# Patient Record
Sex: FEMALE | NOT HISPANIC OR LATINO | Employment: OTHER | URBAN - METROPOLITAN AREA
[De-identification: names, ages, dates, MRNs, and addresses within clinical notes are randomized per-mention and may not be internally consistent; named-entity substitution may affect disease eponyms.]

---

## 2017-01-12 ENCOUNTER — HOSPITAL ENCOUNTER (OUTPATIENT)
Dept: ULTRASOUND IMAGING | Facility: HOSPITAL | Age: 82
Discharge: HOME | End: 2017-01-12
Attending: INTERNAL MEDICINE | Admitting: INTERNAL MEDICINE

## 2017-01-12 DIAGNOSIS — N18.3 CHRONIC KIDNEY DISEASE (CKD), STAGE 3 (MODERATE): ICD-10-CM

## 2017-01-12 PROCEDURE — 76775 US EXAM ABDO BACK WALL LIM: CPT

## 2017-01-19 ENCOUNTER — LAB (OUTPATIENT)
Dept: LAB | Facility: HOSPITAL | Age: 82
End: 2017-01-19
Attending: INTERNAL MEDICINE

## 2017-01-19 ENCOUNTER — TRANSCRIBE ORDERS (OUTPATIENT)
Dept: ADMINISTRATIVE | Facility: HOSPITAL | Age: 82
End: 2017-01-19

## 2017-01-19 DIAGNOSIS — N18.30 CHRONIC KIDNEY DISEASE, STAGE III (MODERATE) (HCC): ICD-10-CM

## 2017-01-19 DIAGNOSIS — N17.9 ACUTE KIDNEY FAILURE, UNSPECIFIED (HCC): ICD-10-CM

## 2017-01-19 DIAGNOSIS — I12.9 HYPERTENSIVE NEPHROPATHY, STAGE 1-4 OR UNSPECIFIED CHRONIC KIDNEY DISEASE: Primary | ICD-10-CM

## 2017-01-19 DIAGNOSIS — I12.9 HYPERTENSIVE NEPHROPATHY, STAGE 1-4 OR UNSPECIFIED CHRONIC KIDNEY DISEASE: ICD-10-CM

## 2017-01-19 LAB
ANION GAP SERPL CALCULATED.3IONS-SCNC: 10.5 MMOL/L
BACTERIA UR QL AUTO: ABNORMAL /HPF
BILIRUB UR QL STRIP: NEGATIVE
BUN BLD-MCNC: 20 MG/DL (ref 8–23)
BUN/CREAT SERPL: 15.9 (ref 7–25)
CALCIUM SPEC-SCNC: 9.8 MG/DL (ref 8.6–10.5)
CHLORIDE SERPL-SCNC: 103 MMOL/L (ref 98–107)
CLARITY UR: CLEAR
CO2 SERPL-SCNC: 28.5 MMOL/L (ref 22–29)
COLOR UR: YELLOW
CREAT BLD-MCNC: 1.26 MG/DL (ref 0.57–1)
CREAT UR-MCNC: 14.3 MG/DL
DEPRECATED RDW RBC AUTO: 48.2 FL (ref 37–54)
ERYTHROCYTE [DISTWIDTH] IN BLOOD BY AUTOMATED COUNT: 12.9 % (ref 11.7–13)
GFR SERPL CREATININE-BSD FRML MDRD: 41 ML/MIN/1.73
GLUCOSE BLD-MCNC: 88 MG/DL (ref 65–99)
GLUCOSE UR STRIP-MCNC: NEGATIVE MG/DL
HCT VFR BLD AUTO: 40.7 % (ref 35.6–45.5)
HGB BLD-MCNC: 13.2 G/DL (ref 11.9–15.5)
HGB UR QL STRIP.AUTO: ABNORMAL
HYALINE CASTS UR QL AUTO: ABNORMAL /LPF
KETONES UR QL STRIP: NEGATIVE
LEUKOCYTE ESTERASE UR QL STRIP.AUTO: NEGATIVE
MCH RBC QN AUTO: 33.2 PG (ref 26.9–32)
MCHC RBC AUTO-ENTMCNC: 32.4 G/DL (ref 32.4–36.3)
MCV RBC AUTO: 102.3 FL (ref 80.5–98.2)
NITRITE UR QL STRIP: NEGATIVE
PH UR STRIP.AUTO: 7.5 [PH] (ref 5–8)
PLATELET # BLD AUTO: 243 10*3/MM3 (ref 140–500)
PMV BLD AUTO: 10.4 FL (ref 6–12)
POTASSIUM BLD-SCNC: 3.8 MMOL/L (ref 3.5–5.2)
PROT UR QL STRIP: ABNORMAL
RBC # BLD AUTO: 3.98 10*6/MM3 (ref 3.9–5.2)
RBC # UR: ABNORMAL /HPF
REF LAB TEST METHOD: ABNORMAL
SODIUM BLD-SCNC: 142 MMOL/L (ref 136–145)
SODIUM UR-SCNC: 88 MMOL/L
SP GR UR STRIP: <=1.005 (ref 1–1.03)
SQUAMOUS #/AREA URNS HPF: ABNORMAL /HPF
UROBILINOGEN UR QL STRIP: ABNORMAL
WBC NRBC COR # BLD: 4.87 10*3/MM3 (ref 4.5–10.7)
WBC UR QL AUTO: ABNORMAL /HPF

## 2017-01-19 PROCEDURE — 81001 URINALYSIS AUTO W/SCOPE: CPT

## 2017-01-19 PROCEDURE — 36415 COLL VENOUS BLD VENIPUNCTURE: CPT

## 2017-01-19 PROCEDURE — 84300 ASSAY OF URINE SODIUM: CPT

## 2017-01-19 PROCEDURE — 80048 BASIC METABOLIC PNL TOTAL CA: CPT

## 2017-01-19 PROCEDURE — 82570 ASSAY OF URINE CREATININE: CPT

## 2017-01-19 PROCEDURE — 85027 COMPLETE CBC AUTOMATED: CPT

## 2017-01-25 ENCOUNTER — OFFICE VISIT (OUTPATIENT)
Dept: FAMILY MEDICINE CLINIC | Facility: CLINIC | Age: 82
End: 2017-01-25

## 2017-01-25 VITALS
TEMPERATURE: 98.6 F | HEIGHT: 62 IN | DIASTOLIC BLOOD PRESSURE: 84 MMHG | SYSTOLIC BLOOD PRESSURE: 142 MMHG | BODY MASS INDEX: 23.92 KG/M2 | OXYGEN SATURATION: 96 % | HEART RATE: 55 BPM | WEIGHT: 130 LBS

## 2017-01-25 DIAGNOSIS — R51.9 NONINTRACTABLE EPISODIC HEADACHE, UNSPECIFIED HEADACHE TYPE: ICD-10-CM

## 2017-01-25 DIAGNOSIS — R41.3 MEMORY LOSS: ICD-10-CM

## 2017-01-25 DIAGNOSIS — I10 ESSENTIAL HYPERTENSION: ICD-10-CM

## 2017-01-25 DIAGNOSIS — R47.01 EXPRESSIVE APHASIA: Primary | ICD-10-CM

## 2017-01-25 PROCEDURE — 99213 OFFICE O/P EST LOW 20 MIN: CPT | Performed by: NURSE PRACTITIONER

## 2017-01-25 NOTE — PROGRESS NOTES
Subjective   Alba Correa is a 83 y.o. female.     History of Present Illness   Patient presents to office with  and daughter-in-law for evaluation of elevated blood pressure.  Patient reports she has had headache and elevated readings for the past 4 days.  She also reports some increased anxiety recently. She is very concerned about possible dementia. Reports she has noticed some short term memory issues.  She reports she has difficulty speaking at times. She is unable to recall words when she is trying to explain something.  She also reports some general lower extremity weakness, but it is bilateral.  Her headaches and aphasia are not necessarily coinciding.  She has had the aphasia going on for some time but only recently started having headaches and elevated blood pressure. Patient also reports some dizziness but has had intermittent vertigo for years. Initial blood pressure today was 150/90 but repeat BP is 142/84.  Patient sees nephrologist and has appt tomorrow.    The following portions of the patient's history were reviewed and updated as appropriate: allergies, current medications, past family history, past medical history, past social history, past surgical history and problem list.    Review of Systems   Constitutional: Negative for unexpected weight change.   Respiratory: Negative for shortness of breath.    Cardiovascular: Negative for chest pain.   Neurological: Positive for dizziness, weakness and headaches. Negative for tremors, seizures, syncope, facial asymmetry, speech difficulty and numbness.       Objective   Physical Exam   Constitutional: She is oriented to person, place, and time. She appears well-developed and well-nourished.   HENT:   Head: Normocephalic and atraumatic.   Pulmonary/Chest: Effort normal.   Neurological: She is alert and oriented to person, place, and time.   Skin: Skin is warm and dry.   Psychiatric: She has a normal mood and affect. Judgment normal.   Vitals  reviewed.      Assessment/Plan   Alba was seen today for hypertension and headache.    Diagnoses and all orders for this visit:    Expressive aphasia  -     Ambulatory Referral to Neurology    Memory loss  -     Ambulatory Referral to Neurology    Essential hypertension    Nonintractable episodic headache, unspecified headache type  -     Ambulatory Referral to Neurology      Do not want to change blood pressure medication until patient has seen nephrologist.   Encouraged patient to make f/u appt after seeing nephrologist.

## 2017-01-25 NOTE — MR AVS SNAPSHOT
Alba Correa   2017 2:15 PM   Office Visit    Provider:  GROVER Lazcano   Department:  [facility]   Dept Phone:  [phone]                Your Full Care Plan              Today's Medication Changes          These changes are accurate as of: 17  3:11 PM.  If you have any questions, ask your nurse or doctor.               Stop taking medication(s)listed here:     furosemide 40 MG tablet   Commonly known as:  LASIX   Stopped by:  GROVER Lazcano                      Your Updated Medication List          This list is accurate as of: 17  3:11 PM.  Always use your most recent med list.                amitriptyline 50 MG tablet   Commonly known as:  ELAVIL   Take 1 tablet by mouth Daily.       atenolol 25 MG tablet   Commonly known as:  TENORMIN   Take 1 tablet by mouth Daily.       DULoxetine 60 MG capsule   Commonly known as:  CYMBALTA   Take 1 capsule by mouth Daily.       febuxostat 40 MG tablet   Commonly known as:  ULORIC   Take 1 tablet by mouth Daily.       simvastatin 20 MG tablet   Commonly known as:  ZOCOR   Take 1 tablet by mouth Daily.               We Performed the Following     Ambulatory Referral to Neurology       You Were Diagnosed With        Codes Comments    Expressive aphasia    -  Primary ICD-10-CM: R47.01  ICD-9-CM: 784.3       Instructions     None    Patient Instructions History      [employer] Signup     [facility] [employer] allows you to send messages to your doctor, view your test results, renew your prescriptions, schedule appointments, and more. To sign up, go to [employer] and click on the Sign Up Now link in the New User? box. Enter your [employer] Activation Code exactly as it appears below along with the last four digits of your Social Security Number and your Date of Birth () to complete the sign-up process. If you do not sign up before the expiration date, you must  "request a new code.    [employer] Activation Code: [email or web address]  Expires: 2/8/2017  3:11 PM    If you have questions, you can email [email or web address] or call [phone] to talk to our [facility] [employer] staff. Remember, [employer] is NOT to be used for urgent needs. For medical emergencies, dial 911.               Other Info from Your Visit           Your Appointments     May 17, 2017  1:00 PM EDT   Office Visit with Aramis Doty MD   [facility] (--)    [address] [place] [id number] [phone]           Arrive 15 minutes prior to appointment.              Allergies     Bactrim [Sulfamethoxazole-trimethoprim]  Delirium    Levofloxacin        Reason for Visit     Hypertension 4 days    Headache           Vital Signs     Blood Pressure Pulse Temperature Height Weight Oxygen Saturation    142/84 (BP Location: Right arm, Patient Position: Sitting, Cuff Size: Adult) 55 98.6 °F (37 °C) 62\" (157.5 cm) 130 lb (59 kg) 96%    Body Mass Index Smoking Status                23.78 kg/m2 Never Smoker          Problems and Diagnoses Noted     Expressive aphasia    -  Primary      "

## 2017-02-03 ENCOUNTER — TELEPHONE (OUTPATIENT)
Dept: FAMILY MEDICINE CLINIC | Facility: CLINIC | Age: 82
End: 2017-02-03

## 2017-02-03 NOTE — TELEPHONE ENCOUNTER
Patient states her appt for neurology is not until 05- she is concerned that is too long to wait. Should she be referred to another doctor?

## 2017-02-24 ENCOUNTER — PROCEDURE VISIT (OUTPATIENT)
Dept: FAMILY MEDICINE CLINIC | Facility: CLINIC | Age: 82
End: 2017-02-24

## 2017-02-24 VITALS
BODY MASS INDEX: 23.55 KG/M2 | RESPIRATION RATE: 16 BRPM | TEMPERATURE: 98.5 F | DIASTOLIC BLOOD PRESSURE: 82 MMHG | HEART RATE: 54 BPM | WEIGHT: 128 LBS | HEIGHT: 62 IN | SYSTOLIC BLOOD PRESSURE: 136 MMHG

## 2017-02-24 DIAGNOSIS — B96.89 BACTERIAL VAGINOSIS: Primary | ICD-10-CM

## 2017-02-24 DIAGNOSIS — N76.0 BACTERIAL VAGINOSIS: Primary | ICD-10-CM

## 2017-02-24 PROCEDURE — 99214 OFFICE O/P EST MOD 30 MIN: CPT | Performed by: NURSE PRACTITIONER

## 2017-02-24 RX ORDER — AMITRIPTYLINE HYDROCHLORIDE 10 MG/1
TABLET, FILM COATED ORAL
Refills: 0 | COMMUNITY
Start: 2017-02-07 | End: 2017-05-10

## 2017-02-24 RX ORDER — METRONIDAZOLE 500 MG/1
500 TABLET ORAL 2 TIMES DAILY
Qty: 14 TABLET | Refills: 0 | Status: SHIPPED | OUTPATIENT
Start: 2017-02-24 | End: 2017-03-03

## 2017-02-24 NOTE — PROGRESS NOTES
Subjective   Alba Correa is a 83 y.o. female.     History of Present Illness   Alba Correa female 83 y.o. who presents for evaluation of vaginal symptoms of perineal odor. Symptoms have been present for several months. Symptoms include intermittent vaginal itching, occasional creamy discharge, fishy odor.  Prior therapies tried: OTC yeast cream such as Monistat or Gyne-Lotrimin.   Denies spotting.  Denies urinary symptoms. Had urinalysis last shabnam which was negative for infection.     The following portions of the patient's history were reviewed and updated as appropriate: allergies, current medications, past family history, past medical history, past social history, past surgical history and problem list.    Review of Systems   Constitutional: Negative for chills and fever.   Genitourinary: Positive for vaginal discharge. Negative for decreased urine volume, difficulty urinating, dyspareunia, dysuria, flank pain, frequency, genital sores, hematuria, menstrual problem, pelvic pain, urgency, vaginal bleeding and vaginal pain.       Objective   Physical Exam   Constitutional: She is oriented to person, place, and time. She appears well-developed and well-nourished.   Pulmonary/Chest: Effort normal.   Genitourinary: Vagina normal. Pelvic exam was performed with patient supine. There is no rash, tenderness, lesion or injury on the right labia. There is no rash, tenderness, lesion or injury on the left labia. No erythema, tenderness or bleeding in the vagina. No vaginal discharge found.   Neurological: She is alert and oriented to person, place, and time.   Skin: Skin is warm and dry.   Psychiatric: She has a normal mood and affect. Her behavior is normal. Judgment and thought content normal.   Nursing note and vitals reviewed.      Assessment/Plan   Alba was seen today for gynecologic exam.    Diagnoses and all orders for this visit:    Bacterial vaginosis  -     metroNIDAZOLE (FLAGYL) 500 MG tablet; Take 1 tablet  by mouth 2 (Two) Times a Day for 7 days.

## 2017-05-07 DIAGNOSIS — F33.42 RECURRENT MAJOR DEPRESSIVE DISORDER, IN FULL REMISSION (HCC): ICD-10-CM

## 2017-05-08 ENCOUNTER — HOSPITAL ENCOUNTER (EMERGENCY)
Facility: HOSPITAL | Age: 82
Discharge: HOME | End: 2017-05-08
Attending: EMERGENCY MEDICINE | Admitting: EMERGENCY MEDICINE

## 2017-05-08 VITALS
OXYGEN SATURATION: 98 % | RESPIRATION RATE: 16 BRPM | HEIGHT: 62 IN | HEART RATE: 62 BPM | BODY MASS INDEX: 22.08 KG/M2 | WEIGHT: 120 LBS | SYSTOLIC BLOOD PRESSURE: 179 MMHG | TEMPERATURE: 97.2 F | DIASTOLIC BLOOD PRESSURE: 89 MMHG

## 2017-05-08 DIAGNOSIS — I10 ESSENTIAL HYPERTENSION: Primary | ICD-10-CM

## 2017-05-08 DIAGNOSIS — R30.0 DYSURIA: ICD-10-CM

## 2017-05-08 LAB
ALBUMIN SERPL-MCNC: 4.2 G/DL (ref 3.5–5.2)
ALBUMIN/GLOB SERPL: 1.1 G/DL
ALP SERPL-CCNC: 67 U/L (ref 39–117)
ALT SERPL W P-5'-P-CCNC: 14 U/L (ref 1–33)
ANION GAP SERPL CALCULATED.3IONS-SCNC: 14.3 MMOL/L
AST SERPL-CCNC: 30 U/L (ref 1–32)
BASOPHILS # BLD AUTO: 0.07 10*3/MM3 (ref 0–0.2)
BASOPHILS NFR BLD AUTO: 1 % (ref 0–1.5)
BILIRUB SERPL-MCNC: 0.5 MG/DL (ref 0.1–1.2)
BILIRUB UR QL STRIP: NEGATIVE
BUN BLD-MCNC: 14 MG/DL (ref 8–23)
BUN/CREAT SERPL: 13.3 (ref 7–25)
CALCIUM SPEC-SCNC: 9.9 MG/DL (ref 8.6–10.5)
CHLORIDE SERPL-SCNC: 101 MMOL/L (ref 98–107)
CLARITY UR: CLEAR
CO2 SERPL-SCNC: 24.7 MMOL/L (ref 22–29)
COLOR UR: YELLOW
CREAT BLD-MCNC: 1.05 MG/DL (ref 0.57–1)
DEPRECATED RDW RBC AUTO: 48.3 FL (ref 37–54)
EOSINOPHIL # BLD AUTO: 0.4 10*3/MM3 (ref 0–0.7)
EOSINOPHIL NFR BLD AUTO: 5.8 % (ref 0.3–6.2)
ERYTHROCYTE [DISTWIDTH] IN BLOOD BY AUTOMATED COUNT: 13.3 % (ref 11.7–13)
GFR SERPL CREATININE-BSD FRML MDRD: 50 ML/MIN/1.73
GLOBULIN UR ELPH-MCNC: 3.9 GM/DL
GLUCOSE BLD-MCNC: 106 MG/DL (ref 65–99)
GLUCOSE UR STRIP-MCNC: NEGATIVE MG/DL
HCT VFR BLD AUTO: 42.3 % (ref 35.6–45.5)
HGB BLD-MCNC: 14 G/DL (ref 11.9–15.5)
HGB UR QL STRIP.AUTO: NEGATIVE
HOLD SPECIMEN: NORMAL
IMM GRANULOCYTES # BLD: 0 10*3/MM3 (ref 0–0.03)
IMM GRANULOCYTES NFR BLD: 0 % (ref 0–0.5)
KETONES UR QL STRIP: NEGATIVE
LEUKOCYTE ESTERASE UR QL STRIP.AUTO: NEGATIVE
LYMPHOCYTES # BLD AUTO: 3.75 10*3/MM3 (ref 0.9–4.8)
LYMPHOCYTES NFR BLD AUTO: 54.6 % (ref 19.6–45.3)
MCH RBC QN AUTO: 32.8 PG (ref 26.9–32)
MCHC RBC AUTO-ENTMCNC: 33.1 G/DL (ref 32.4–36.3)
MCV RBC AUTO: 99.1 FL (ref 80.5–98.2)
MONOCYTES # BLD AUTO: 0.56 10*3/MM3 (ref 0.2–1.2)
MONOCYTES NFR BLD AUTO: 8.2 % (ref 5–12)
NEUTROPHILS # BLD AUTO: 2.09 10*3/MM3 (ref 1.9–8.1)
NEUTROPHILS NFR BLD AUTO: 30.4 % (ref 42.7–76)
NITRITE UR QL STRIP: NEGATIVE
PH UR STRIP.AUTO: 7.5 [PH] (ref 5–8)
PLATELET # BLD AUTO: 270 10*3/MM3 (ref 140–500)
PMV BLD AUTO: 10.3 FL (ref 6–12)
POTASSIUM BLD-SCNC: 4 MMOL/L (ref 3.5–5.2)
PROT SERPL-MCNC: 8.1 G/DL (ref 6–8.5)
PROT UR QL STRIP: ABNORMAL
RBC # BLD AUTO: 4.27 10*6/MM3 (ref 3.9–5.2)
SODIUM BLD-SCNC: 140 MMOL/L (ref 136–145)
SP GR UR STRIP: 1.01 (ref 1–1.03)
UROBILINOGEN UR QL STRIP: ABNORMAL
WBC NRBC COR # BLD: 6.87 10*3/MM3 (ref 4.5–10.7)
WHOLE BLOOD HOLD SPECIMEN: NORMAL

## 2017-05-08 PROCEDURE — 85025 COMPLETE CBC W/AUTO DIFF WBC: CPT | Performed by: EMERGENCY MEDICINE

## 2017-05-08 PROCEDURE — 99283 EMERGENCY DEPT VISIT LOW MDM: CPT

## 2017-05-08 PROCEDURE — 36415 COLL VENOUS BLD VENIPUNCTURE: CPT | Performed by: EMERGENCY MEDICINE

## 2017-05-08 PROCEDURE — 81003 URINALYSIS AUTO W/O SCOPE: CPT | Performed by: EMERGENCY MEDICINE

## 2017-05-08 PROCEDURE — 80053 COMPREHEN METABOLIC PANEL: CPT | Performed by: EMERGENCY MEDICINE

## 2017-05-08 RX ORDER — DULOXETIN HYDROCHLORIDE 60 MG/1
CAPSULE, DELAYED RELEASE ORAL
Qty: 90 CAPSULE | Refills: 1 | Status: SHIPPED | OUTPATIENT
Start: 2017-05-08 | End: 2017-05-17 | Stop reason: SDUPTHER

## 2017-05-08 RX ORDER — SODIUM CHLORIDE 0.9 % (FLUSH) 0.9 %
10 SYRINGE (ML) INJECTION AS NEEDED
Status: DISCONTINUED | OUTPATIENT
Start: 2017-05-08 | End: 2017-05-08 | Stop reason: HOSPADM

## 2017-05-10 ENCOUNTER — OFFICE VISIT (OUTPATIENT)
Dept: FAMILY MEDICINE CLINIC | Facility: CLINIC | Age: 82
End: 2017-05-10

## 2017-05-10 VITALS
SYSTOLIC BLOOD PRESSURE: 144 MMHG | HEART RATE: 84 BPM | WEIGHT: 123 LBS | BODY MASS INDEX: 22.63 KG/M2 | RESPIRATION RATE: 16 BRPM | OXYGEN SATURATION: 94 % | HEIGHT: 62 IN | TEMPERATURE: 98.2 F | DIASTOLIC BLOOD PRESSURE: 84 MMHG

## 2017-05-10 DIAGNOSIS — E78.5 HYPERLIPIDEMIA, UNSPECIFIED HYPERLIPIDEMIA TYPE: ICD-10-CM

## 2017-05-10 DIAGNOSIS — I10 BENIGN ESSENTIAL HYPERTENSION: ICD-10-CM

## 2017-05-10 PROCEDURE — 99214 OFFICE O/P EST MOD 30 MIN: CPT | Performed by: FAMILY MEDICINE

## 2017-05-10 RX ORDER — SIMVASTATIN 20 MG
20 TABLET ORAL DAILY
Qty: 90 TABLET | Refills: 1 | Status: SHIPPED | OUTPATIENT
Start: 2017-05-10 | End: 2017-05-17 | Stop reason: SDUPTHER

## 2017-05-10 RX ORDER — FEBUXOSTAT 40 MG/1
40 TABLET, FILM COATED ORAL DAILY
Qty: 90 TABLET | Refills: 1 | Status: CANCELLED | OUTPATIENT
Start: 2017-05-10

## 2017-05-10 RX ORDER — ATENOLOL 25 MG/1
25 TABLET ORAL DAILY
Qty: 90 TABLET | Refills: 1 | Status: SHIPPED | OUTPATIENT
Start: 2017-05-10 | End: 2017-05-17 | Stop reason: SDUPTHER

## 2017-05-10 RX ORDER — SENNOSIDES 8.6 MG
650 CAPSULE ORAL EVERY 8 HOURS PRN
COMMUNITY
End: 2018-09-10

## 2017-05-10 RX ORDER — LISINOPRIL 5 MG/1
5 TABLET ORAL DAILY
Qty: 30 TABLET | Refills: 2 | Status: SHIPPED | OUTPATIENT
Start: 2017-05-10 | End: 2017-05-17 | Stop reason: SINTOL

## 2017-05-17 ENCOUNTER — OFFICE VISIT (OUTPATIENT)
Dept: FAMILY MEDICINE CLINIC | Facility: CLINIC | Age: 82
End: 2017-05-17

## 2017-05-17 VITALS
TEMPERATURE: 98.5 F | SYSTOLIC BLOOD PRESSURE: 136 MMHG | WEIGHT: 125 LBS | HEART RATE: 76 BPM | RESPIRATION RATE: 16 BRPM | BODY MASS INDEX: 23 KG/M2 | HEIGHT: 62 IN | DIASTOLIC BLOOD PRESSURE: 78 MMHG

## 2017-05-17 DIAGNOSIS — F33.42 RECURRENT MAJOR DEPRESSIVE DISORDER, IN FULL REMISSION (HCC): ICD-10-CM

## 2017-05-17 DIAGNOSIS — I10 BENIGN ESSENTIAL HYPERTENSION: ICD-10-CM

## 2017-05-17 DIAGNOSIS — M10.9 GOUT, UNSPECIFIED CAUSE, UNSPECIFIED CHRONICITY, UNSPECIFIED SITE: Primary | ICD-10-CM

## 2017-05-17 DIAGNOSIS — E78.5 HYPERLIPIDEMIA, UNSPECIFIED HYPERLIPIDEMIA TYPE: ICD-10-CM

## 2017-05-17 PROCEDURE — 99214 OFFICE O/P EST MOD 30 MIN: CPT | Performed by: FAMILY MEDICINE

## 2017-05-17 RX ORDER — FEBUXOSTAT 40 MG/1
40 TABLET, FILM COATED ORAL DAILY
COMMUNITY
End: 2017-05-17 | Stop reason: SDUPTHER

## 2017-05-17 RX ORDER — DULOXETIN HYDROCHLORIDE 60 MG/1
60 CAPSULE, DELAYED RELEASE ORAL DAILY
Qty: 90 CAPSULE | Refills: 1 | Status: SHIPPED | OUTPATIENT
Start: 2017-05-17 | End: 2018-01-10 | Stop reason: SDUPTHER

## 2017-05-17 RX ORDER — LOSARTAN POTASSIUM 25 MG/1
25 TABLET ORAL DAILY
Qty: 90 TABLET | Refills: 1 | Status: SHIPPED | OUTPATIENT
Start: 2017-05-17 | End: 2017-06-13 | Stop reason: SDUPTHER

## 2017-05-17 RX ORDER — SIMVASTATIN 20 MG
20 TABLET ORAL DAILY
Qty: 90 TABLET | Refills: 1 | Status: SHIPPED | OUTPATIENT
Start: 2017-05-17 | End: 2018-01-10 | Stop reason: SDUPTHER

## 2017-05-17 RX ORDER — ATENOLOL 25 MG/1
25 TABLET ORAL DAILY
Qty: 90 TABLET | Refills: 1 | Status: SHIPPED | OUTPATIENT
Start: 2017-05-17 | End: 2018-01-10 | Stop reason: SDUPTHER

## 2017-05-17 RX ORDER — FEBUXOSTAT 40 MG/1
40 TABLET, FILM COATED ORAL DAILY
Qty: 90 TABLET | Refills: 1 | Status: SHIPPED | OUTPATIENT
Start: 2017-05-17 | End: 2017-10-31 | Stop reason: SDUPTHER

## 2017-06-13 ENCOUNTER — OFFICE VISIT (OUTPATIENT)
Dept: FAMILY MEDICINE CLINIC | Facility: CLINIC | Age: 82
End: 2017-06-13

## 2017-06-13 VITALS
DIASTOLIC BLOOD PRESSURE: 66 MMHG | WEIGHT: 126 LBS | HEART RATE: 70 BPM | RESPIRATION RATE: 16 BRPM | HEIGHT: 62 IN | SYSTOLIC BLOOD PRESSURE: 158 MMHG | BODY MASS INDEX: 23.19 KG/M2 | TEMPERATURE: 98.2 F

## 2017-06-13 DIAGNOSIS — I10 BENIGN ESSENTIAL HYPERTENSION: ICD-10-CM

## 2017-06-13 PROCEDURE — 99213 OFFICE O/P EST LOW 20 MIN: CPT | Performed by: FAMILY MEDICINE

## 2017-06-13 RX ORDER — LOSARTAN POTASSIUM 25 MG/1
50 TABLET ORAL DAILY
Qty: 90 TABLET | Refills: 1
Start: 2017-06-13 | End: 2017-07-05 | Stop reason: SDUPTHER

## 2017-06-13 NOTE — PROGRESS NOTES
"Subjective   Alba Correa is a 84 y.o. female.     History of Present Illness     Chief Complaint:   Chief Complaint   Patient presents with   • Hypertension     follow up blood pressure        Alba Correa 84 y.o. female who presents today for Medical Management of the below listed issues and medication refills.  she has a history of   Patient Active Problem List   Diagnosis   • Chronic kidney failure   • DVT (deep venous thrombosis)   • Depression   • Edema   • Gout   • Elevated cholesterol   • Osteoporosis   • Hyperparathyroidism   • Benign essential hypertension   • Adaptive colitis   • Osteopenia   • Rheumatoid arthritis   • Degeneration of intervertebral disc   • D (diarrhea)   • BP (high blood pressure)   • Detrusor muscle hypertonia   • Open leg wound   • Vaginal odor   • Infected wound   .  Since the last visit, she has overall felt well.  she has been compliant with   Current Outpatient Prescriptions:   •  acetaminophen (TYLENOL) 650 MG 8 hr tablet, Take 650 mg by mouth Every 8 (Eight) Hours As Needed for Mild Pain (1-3)., Disp: , Rfl:   •  atenolol (TENORMIN) 25 MG tablet, Take 1 tablet by mouth Daily., Disp: 90 tablet, Rfl: 1  •  Calcium Carbonate-Vitamin D (CALCIUM 600+D PO), Take  by mouth., Disp: , Rfl:   •  DULoxetine (CYMBALTA) 60 MG capsule, Take 1 capsule by mouth Daily., Disp: 90 capsule, Rfl: 1  •  febuxostat (ULORIC) 40 MG tablet, Take 1 tablet by mouth Daily., Disp: 90 tablet, Rfl: 1  •  losartan (COZAAR) 25 MG tablet, Take 2 tablets by mouth Daily., Disp: 90 tablet, Rfl: 1  •  simvastatin (ZOCOR) 20 MG tablet, Take 1 tablet by mouth Daily., Disp: 90 tablet, Rfl: 1.  she denies medication side effects.    All of the chronic condition(s) listed above are stable w/o issues.    /66  Pulse 70  Temp 98.2 °F (36.8 °C) (Oral)   Resp 16  Ht 62\" (157.5 cm)  Wt 126 lb (57.2 kg)  BMI 23.05 kg/m2    Results for orders placed or performed during the hospital encounter of 05/08/17   Urinalysis " With / Culture If Indicated   Result Value Ref Range    Color, UA Yellow Yellow, Straw    Appearance, UA Clear Clear    pH, UA 7.5 5.0 - 8.0    Specific Gravity, UA 1.008 1.005 - 1.030    Glucose, UA Negative Negative    Ketones, UA Negative Negative    Bilirubin, UA Negative Negative    Blood, UA Negative Negative    Protein, UA Trace (A) Negative    Leuk Esterase, UA Negative Negative    Nitrite, UA Negative Negative    Urobilinogen, UA 0.2 E.U./dL 0.2 - 1.0 E.U./dL   Comprehensive Metabolic Panel   Result Value Ref Range    Glucose 106 (H) 65 - 99 mg/dL    BUN 14 8 - 23 mg/dL    Creatinine 1.05 (H) 0.57 - 1.00 mg/dL    Sodium 140 136 - 145 mmol/L    Potassium 4.0 3.5 - 5.2 mmol/L    Chloride 101 98 - 107 mmol/L    CO2 24.7 22.0 - 29.0 mmol/L    Calcium 9.9 8.6 - 10.5 mg/dL    Total Protein 8.1 6.0 - 8.5 g/dL    Albumin 4.20 3.50 - 5.20 g/dL    ALT (SGPT) 14 1 - 33 U/L    AST (SGOT) 30 1 - 32 U/L    Alkaline Phosphatase 67 39 - 117 U/L    Total Bilirubin 0.5 0.1 - 1.2 mg/dL    eGFR Non African Amer 50 (L) >60 mL/min/1.73    Globulin 3.9 gm/dL    A/G Ratio 1.1 g/dL    BUN/Creatinine Ratio 13.3 7.0 - 25.0    Anion Gap 14.3 mmol/L   CBC Auto Differential   Result Value Ref Range    WBC 6.87 4.50 - 10.70 10*3/mm3    RBC 4.27 3.90 - 5.20 10*6/mm3    Hemoglobin 14.0 11.9 - 15.5 g/dL    Hematocrit 42.3 35.6 - 45.5 %    MCV 99.1 (H) 80.5 - 98.2 fL    MCH 32.8 (H) 26.9 - 32.0 pg    MCHC 33.1 32.4 - 36.3 g/dL    RDW 13.3 (H) 11.7 - 13.0 %    RDW-SD 48.3 37.0 - 54.0 fl    MPV 10.3 6.0 - 12.0 fL    Platelets 270 140 - 500 10*3/mm3    Neutrophil % 30.4 (L) 42.7 - 76.0 %    Lymphocyte % 54.6 (H) 19.6 - 45.3 %    Monocyte % 8.2 5.0 - 12.0 %    Eosinophil % 5.8 0.3 - 6.2 %    Basophil % 1.0 0.0 - 1.5 %    Immature Grans % 0.0 0.0 - 0.5 %    Neutrophils, Absolute 2.09 1.90 - 8.10 10*3/mm3    Lymphocytes, Absolute 3.75 0.90 - 4.80 10*3/mm3    Monocytes, Absolute 0.56 0.20 - 1.20 10*3/mm3    Eosinophils, Absolute 0.40 0.00 - 0.70  10*3/mm3    Basophils, Absolute 0.07 0.00 - 0.20 10*3/mm3    Immature Grans, Absolute 0.00 0.00 - 0.03 10*3/mm3   Light Blue Top   Result Value Ref Range    Extra Tube hold for add-on    Gold Top - SST   Result Value Ref Range    Extra Tube Hold for add-ons.          The following portions of the patient's history were reviewed and updated as appropriate: allergies, current medications, past family history, past medical history, past social history, past surgical history and problem list.    Review of Systems   Constitutional: Negative for activity change, chills, fatigue and fever.   Respiratory: Negative for cough and chest tightness.    Cardiovascular: Negative for chest pain and palpitations.   Gastrointestinal: Negative for abdominal pain and nausea.   Endocrine: Negative for cold intolerance.   Psychiatric/Behavioral: Negative for behavioral problems and dysphoric mood.       Objective   Physical Exam   Constitutional: She appears well-developed and well-nourished.   Neck: Neck supple. No thyromegaly present.   Cardiovascular: Normal rate and regular rhythm.    No murmur heard.  Pulmonary/Chest: Effort normal and breath sounds normal.   Abdominal: Bowel sounds are normal.   Psychiatric: She has a normal mood and affect. Her behavior is normal.   Nursing note and vitals reviewed.      Assessment/Plan   Alba was seen today for hypertension.    Diagnoses and all orders for this visit:    Benign essential hypertension  Comments:  uncontrolled  Orders:  -     losartan (COZAAR) 25 MG tablet; Take 2 tablets by mouth Daily.

## 2017-07-05 ENCOUNTER — TELEPHONE (OUTPATIENT)
Dept: FAMILY MEDICINE CLINIC | Facility: CLINIC | Age: 82
End: 2017-07-05

## 2017-07-05 DIAGNOSIS — I10 BENIGN ESSENTIAL HYPERTENSION: ICD-10-CM

## 2017-07-05 RX ORDER — LOSARTAN POTASSIUM 50 MG/1
50 TABLET ORAL DAILY
Qty: 90 TABLET | Refills: 1 | Status: SHIPPED | OUTPATIENT
Start: 2017-07-05 | End: 2018-01-10 | Stop reason: SDUPTHER

## 2017-07-11 ENCOUNTER — OFFICE VISIT (OUTPATIENT)
Dept: FAMILY MEDICINE CLINIC | Facility: CLINIC | Age: 82
End: 2017-07-11

## 2017-07-11 VITALS
BODY MASS INDEX: 23.19 KG/M2 | SYSTOLIC BLOOD PRESSURE: 128 MMHG | WEIGHT: 126 LBS | TEMPERATURE: 97.8 F | RESPIRATION RATE: 16 BRPM | HEART RATE: 68 BPM | HEIGHT: 62 IN | DIASTOLIC BLOOD PRESSURE: 86 MMHG

## 2017-07-11 DIAGNOSIS — Z00.00 ANNUAL PHYSICAL EXAM: Primary | ICD-10-CM

## 2017-07-11 DIAGNOSIS — Z86.59 HISTORY OF DEPRESSION: ICD-10-CM

## 2017-07-11 DIAGNOSIS — Z87.448 HISTORY OF RENAL INSUFFICIENCY SYNDROME: ICD-10-CM

## 2017-07-11 DIAGNOSIS — Z87.39 HISTORY OF DEGENERATIVE DISC DISEASE: ICD-10-CM

## 2017-07-11 DIAGNOSIS — Z87.39 HISTORY OF RHEUMATOID ARTHRITIS: ICD-10-CM

## 2017-07-11 DIAGNOSIS — I10 BENIGN ESSENTIAL HYPERTENSION: ICD-10-CM

## 2017-07-11 DIAGNOSIS — Z86.718 HISTORY OF DVT (DEEP VEIN THROMBOSIS): ICD-10-CM

## 2017-07-11 DIAGNOSIS — Z87.39 HISTORY OF OSTEOPOROSIS: ICD-10-CM

## 2017-07-11 PROCEDURE — 99212 OFFICE O/P EST SF 10 MIN: CPT | Performed by: FAMILY MEDICINE

## 2017-07-11 PROCEDURE — G0438 PPPS, INITIAL VISIT: HCPCS | Performed by: FAMILY MEDICINE

## 2017-07-11 NOTE — PROGRESS NOTES
"Subjective   Alba Correa is a 84 y.o. female.     History of Present Illness     Chief Complaint:   Chief Complaint   Patient presents with   • MEDICARE WELLNESS DUE     BP FOLLOW UP    • Hypertension   • Hyperlipidemia   • Anxiety       Alba Correa 84 y.o. female who presents today for Medical Management of the below listed issues and medication refills.  she has a history of   Patient Active Problem List   Diagnosis   • Chronic kidney failure   • DVT (deep venous thrombosis)   • Depression   • Edema   • Gout   • Elevated cholesterol   • Osteoporosis   • Hyperparathyroidism   • Benign essential hypertension   • Adaptive colitis   • Osteopenia   • Rheumatoid arthritis   • Degeneration of intervertebral disc   • D (diarrhea)   • BP (high blood pressure)   • Detrusor muscle hypertonia   • Open leg wound   • Vaginal odor   • Infected wound   .  Since the last visit, she has overall felt well.  she has been compliant with   Current Outpatient Prescriptions:   •  acetaminophen (TYLENOL) 650 MG 8 hr tablet, Take 650 mg by mouth Every 8 (Eight) Hours As Needed for Mild Pain (1-3)., Disp: , Rfl:   •  atenolol (TENORMIN) 25 MG tablet, Take 1 tablet by mouth Daily., Disp: 90 tablet, Rfl: 1  •  Calcium Carbonate-Vitamin D (CALCIUM 600+D PO), Take  by mouth., Disp: , Rfl:   •  DULoxetine (CYMBALTA) 60 MG capsule, Take 1 capsule by mouth Daily., Disp: 90 capsule, Rfl: 1  •  febuxostat (ULORIC) 40 MG tablet, Take 1 tablet by mouth Daily., Disp: 90 tablet, Rfl: 1  •  losartan (COZAAR) 50 MG tablet, Take 1 tablet by mouth Daily., Disp: 90 tablet, Rfl: 1  •  simvastatin (ZOCOR) 20 MG tablet, Take 1 tablet by mouth Daily., Disp: 90 tablet, Rfl: 1.  she denies medication side effects.    All of the chronic condition(s) listed above are stable w/o issues.    /86  Pulse 68  Temp 97.8 °F (36.6 °C) (Oral)   Resp 16  Ht 62\" (157.5 cm)  Wt 126 lb (57.2 kg)  BMI 23.05 kg/m2    Results for orders placed or performed during the " hospital encounter of 05/08/17   Urinalysis With / Culture If Indicated   Result Value Ref Range    Color, UA Yellow Yellow, Straw    Appearance, UA Clear Clear    pH, UA 7.5 5.0 - 8.0    Specific Gravity, UA 1.008 1.005 - 1.030    Glucose, UA Negative Negative    Ketones, UA Negative Negative    Bilirubin, UA Negative Negative    Blood, UA Negative Negative    Protein, UA Trace (A) Negative    Leuk Esterase, UA Negative Negative    Nitrite, UA Negative Negative    Urobilinogen, UA 0.2 E.U./dL 0.2 - 1.0 E.U./dL   Comprehensive Metabolic Panel   Result Value Ref Range    Glucose 106 (H) 65 - 99 mg/dL    BUN 14 8 - 23 mg/dL    Creatinine 1.05 (H) 0.57 - 1.00 mg/dL    Sodium 140 136 - 145 mmol/L    Potassium 4.0 3.5 - 5.2 mmol/L    Chloride 101 98 - 107 mmol/L    CO2 24.7 22.0 - 29.0 mmol/L    Calcium 9.9 8.6 - 10.5 mg/dL    Total Protein 8.1 6.0 - 8.5 g/dL    Albumin 4.20 3.50 - 5.20 g/dL    ALT (SGPT) 14 1 - 33 U/L    AST (SGOT) 30 1 - 32 U/L    Alkaline Phosphatase 67 39 - 117 U/L    Total Bilirubin 0.5 0.1 - 1.2 mg/dL    eGFR Non African Amer 50 (L) >60 mL/min/1.73    Globulin 3.9 gm/dL    A/G Ratio 1.1 g/dL    BUN/Creatinine Ratio 13.3 7.0 - 25.0    Anion Gap 14.3 mmol/L   CBC Auto Differential   Result Value Ref Range    WBC 6.87 4.50 - 10.70 10*3/mm3    RBC 4.27 3.90 - 5.20 10*6/mm3    Hemoglobin 14.0 11.9 - 15.5 g/dL    Hematocrit 42.3 35.6 - 45.5 %    MCV 99.1 (H) 80.5 - 98.2 fL    MCH 32.8 (H) 26.9 - 32.0 pg    MCHC 33.1 32.4 - 36.3 g/dL    RDW 13.3 (H) 11.7 - 13.0 %    RDW-SD 48.3 37.0 - 54.0 fl    MPV 10.3 6.0 - 12.0 fL    Platelets 270 140 - 500 10*3/mm3    Neutrophil % 30.4 (L) 42.7 - 76.0 %    Lymphocyte % 54.6 (H) 19.6 - 45.3 %    Monocyte % 8.2 5.0 - 12.0 %    Eosinophil % 5.8 0.3 - 6.2 %    Basophil % 1.0 0.0 - 1.5 %    Immature Grans % 0.0 0.0 - 0.5 %    Neutrophils, Absolute 2.09 1.90 - 8.10 10*3/mm3    Lymphocytes, Absolute 3.75 0.90 - 4.80 10*3/mm3    Monocytes, Absolute 0.56 0.20 - 1.20 10*3/mm3     Eosinophils, Absolute 0.40 0.00 - 0.70 10*3/mm3    Basophils, Absolute 0.07 0.00 - 0.20 10*3/mm3    Immature Grans, Absolute 0.00 0.00 - 0.03 10*3/mm3   Light Blue Top   Result Value Ref Range    Extra Tube hold for add-on    Gold Top - SST   Result Value Ref Range    Extra Tube Hold for add-ons.          The following portions of the patient's history were reviewed and updated as appropriate: allergies, current medications, past family history, past medical history, past social history, past surgical history and problem list.    Review of Systems   Constitutional: Negative for activity change, chills, fatigue and fever.   Respiratory: Negative for cough and chest tightness.    Cardiovascular: Negative for chest pain and palpitations.   Gastrointestinal: Negative for abdominal pain and nausea.   Endocrine: Negative for cold intolerance.   Psychiatric/Behavioral: Negative for behavioral problems and dysphoric mood.       Objective   Physical Exam   Constitutional: She appears well-developed and well-nourished.   Neck: Neck supple. No thyromegaly present.   Cardiovascular: Normal rate and regular rhythm.    No murmur heard.  Pulmonary/Chest: Effort normal and breath sounds normal.   Abdominal: Bowel sounds are normal.   Psychiatric: She has a normal mood and affect. Her behavior is normal.   Nursing note and vitals reviewed.      Assessment/Plan   Alba was seen today for medicare wellness due, hypertension, hyperlipidemia and anxiety.    Diagnoses and all orders for this visit:    Annual physical exam    Benign essential hypertension    Pt doing markedly better. Will continue the same.

## 2017-07-11 NOTE — PATIENT INSTRUCTIONS
Medicare Wellness  Personal Prevention Plan of Service     Date of Office Visit:  2017  Encounter Provider:  Aramis Doty MD  Place of Service:  [facility]  Patient Name: Alba Correa  :  1933    As part of the Medicare Wellness portion of your visit today, we are providing you with this personalized preventive plan of services (PPPS). This plan is based upon recommendations of the [place] Preventive Services Task Force (USPSTF) and the Advisory Committee on Immunization Practices (ACIP).    This lists the preventive care services that should be considered, and provides dates of when you are due. Items listed as completed are up-to-date and do not require any further intervention.    Health Maintenance   Topic Date Due   • INFLUENZA VACCINE  2017   • DXA SCAN  10/26/2017   • MAMMOGRAM  2017   • LIPID PANEL  2017   • MEDICARE ANNUAL WELLNESS  2018   • COLONOSCOPY  2026   • PNEUMOCOCCAL VACCINES (65+ LOW/MEDIUM RISK)  Completed   • ZOSTER VACCINE  Completed   • TDAP/TD VACCINES  Excluded       No orders of the defined types were placed in this encounter.      Return in about 6 months (around 2018) for Recheck.

## 2017-07-11 NOTE — PROGRESS NOTES
QUICK REFERENCE INFORMATION:  The ABCs of the Annual Wellness Visit    Initial Medicare Wellness Visit    HEALTH RISK ASSESSMENT    5/22/1933    Recent Hospitalizations:  No hospitalization(s) within the last year..        Current Medical Providers:  Patient Care Team:  Aramis Doty MD as PCP - General (Family Medicine)  Zack Dewitt MD as PCP - Claims Attributed  Juan Jose Muñiz MD as Consulting Physician (Neurology)  Adrian Perez MD as Consulting Physician (Hematology and Oncology)        Smoking Status:  History   Smoking Status   • Never Smoker   Smokeless Tobacco   • Never Used       Alcohol Consumption:  History   Alcohol Use   • Yes       Depression Screen:   PHQ-9 Depression Screening 7/11/2017   Little interest or pleasure in doing things 0   Feeling down, depressed, or hopeless 0   Trouble falling or staying asleep, or sleeping too much 0   Feeling tired or having little energy 0   Poor appetite or overeating 0   Feeling bad about yourself - or that you are a failure or have let yourself or your family down 0   Trouble concentrating on things, such as reading the newspaper or watching television 0   Moving or speaking so slowly that other people could have noticed. Or the opposite - being so fidgety or restless that you have been moving around a lot more than usual 0   Thoughts that you would be better off dead, or of hurting yourself in some way 0   PHQ-9 Total Score 0       Health Habits and Functional and Cognitive Screening:  Functional & Cognitive Status 7/11/2017   Do you have difficulty preparing food and eating? No   Do you have difficulty bathing yourself? No   Do you have difficulty getting dressed? No   Do you have difficulty using the toilet? No   Do you have difficulty moving around from place to place? No   In the past year have you fallen or experienced a near fall? No   Do you need help using the phone?  No   Are you deaf or do you have serious difficulty hearing?  No   Do you need help  with transportation? No   Do you need help shopping? No   Do you need help preparing meals?  No   Do you need help with housework?  No   Do you need help with laundry? No   Do you need help taking your medications? No   Do you need help managing money? No   Do you have difficulty concentrating, remembering or making decisions? No       Health Habits  Current Diet: Well Balanced Diet  Dental Exam: Up to date  Eye Exam: Up to date  Exercise (times per week): 3 times per week  Current Exercise Activities Include: Walking          Does the patient have evidence of cognitive impairment? No    Asiprin use counseling: Does not need ASA (and currently is not on it)      Recent Lab Results:    Visual Acuity:  No exam data present    Age-appropriate Screening Schedule:  Refer to the list below for future screening recommendations based on patient's age, sex and/or medical conditions. Orders for these recommended tests are listed in the plan section. The patient has been provided with a written plan.    Health Maintenance   Topic Date Due   • INFLUENZA VACCINE  08/01/2017   • DXA SCAN  10/26/2017   • MAMMOGRAM  11/01/2017   • LIPID PANEL  11/17/2017   • COLONOSCOPY  11/16/2026   • PNEUMOCOCCAL VACCINES (65+ LOW/MEDIUM RISK)  Completed   • ZOSTER VACCINE  Completed   • TDAP/TD VACCINES  Excluded        Subjective   History of Present Illness    Alba Correa is a 84 y.o. female who presents for an Annual Wellness Visit.    The following portions of the patient's history were reviewed and updated as appropriate: allergies, current medications, past family history, past medical history, past social history, past surgical history and problem list.    Outpatient Medications Prior to Visit   Medication Sig Dispense Refill   • acetaminophen (TYLENOL) 650 MG 8 hr tablet Take 650 mg by mouth Every 8 (Eight) Hours As Needed for Mild Pain (1-3).     • atenolol (TENORMIN) 25 MG tablet Take 1 tablet by mouth Daily. 90 tablet 1   • Calcium  "Carbonate-Vitamin D (CALCIUM 600+D PO) Take  by mouth.     • DULoxetine (CYMBALTA) 60 MG capsule Take 1 capsule by mouth Daily. 90 capsule 1   • febuxostat (ULORIC) 40 MG tablet Take 1 tablet by mouth Daily. 90 tablet 1   • losartan (COZAAR) 50 MG tablet Take 1 tablet by mouth Daily. 90 tablet 1   • simvastatin (ZOCOR) 20 MG tablet Take 1 tablet by mouth Daily. 90 tablet 1     No facility-administered medications prior to visit.        Patient Active Problem List   Diagnosis   • Chronic kidney failure   • DVT (deep venous thrombosis)   • Depression   • Edema   • Gout   • Elevated cholesterol   • Osteoporosis   • Hyperparathyroidism   • Benign essential hypertension   • Adaptive colitis   • Osteopenia   • Rheumatoid arthritis   • Degeneration of intervertebral disc   • D (diarrhea)   • BP (high blood pressure)   • Detrusor muscle hypertonia   • Open leg wound   • Vaginal odor   • Infected wound   • History of depression   • History of renal insufficiency syndrome   • History of degenerative disc disease   • History of osteoporosis   • History of DVT (deep vein thrombosis)   • History of rheumatoid arthritis       Advance Care Planning:  has an advance directive - a copy HAS NOT been provided    Identification of Risk Factors:  Risk factors include: cardiovascular risk.    Review of Systems    Compared to one year ago, the patient feels her physical health is the same.  Compared to one year ago, the patient feels her mental health is the same.    Objective     Physical Exam    Vitals:    07/11/17 1256   BP: 128/86   Pulse: 68   Resp: 16   Temp: 97.8 °F (36.6 °C)   TempSrc: Oral   Weight: 126 lb (57.2 kg)   Height: 62\" (157.5 cm)   PainSc: 0-No pain       Body mass index is 23.05 kg/(m^2).  Discussed the patient's BMI with her. The BMI is in the acceptable range.    Assessment/Plan   Patient Self-Management and Personalized Health Advice  The patient has been provided with information about: diet, exercise and weight " management and preventive services including:   · Exercise counseling provided, Fall Risk assessment done, Nutrition counseling provided.    Visit Diagnoses:    ICD-10-CM ICD-9-CM   1. Annual physical exam Z00.00 V70.0   2. Benign essential hypertension I10 401.1   3. History of depression Z86.59 V11.8   4. History of renal insufficiency syndrome Z87.448 V13.09   5. History of degenerative disc disease Z87.39 V13.59   6. History of osteoporosis Z87.39 V13.59   7. History of DVT (deep vein thrombosis) Z86.718 V12.51   8. History of rheumatoid arthritis Z87.39 V13.4       No orders of the defined types were placed in this encounter.      Outpatient Encounter Prescriptions as of 7/11/2017   Medication Sig Dispense Refill   • acetaminophen (TYLENOL) 650 MG 8 hr tablet Take 650 mg by mouth Every 8 (Eight) Hours As Needed for Mild Pain (1-3).     • atenolol (TENORMIN) 25 MG tablet Take 1 tablet by mouth Daily. 90 tablet 1   • Calcium Carbonate-Vitamin D (CALCIUM 600+D PO) Take  by mouth.     • DULoxetine (CYMBALTA) 60 MG capsule Take 1 capsule by mouth Daily. 90 capsule 1   • febuxostat (ULORIC) 40 MG tablet Take 1 tablet by mouth Daily. 90 tablet 1   • losartan (COZAAR) 50 MG tablet Take 1 tablet by mouth Daily. 90 tablet 1   • simvastatin (ZOCOR) 20 MG tablet Take 1 tablet by mouth Daily. 90 tablet 1     No facility-administered encounter medications on file as of 7/11/2017.        Reviewed use of high risk medication in the elderly: not applicable  Reviewed for potential of harmful drug interactions in the elderly: not applicable    Follow Up:  Return in about 6 months (around 1/11/2018) for Recheck.     An After Visit Summary and PPPS with all of these plans were given to the patient.

## 2017-09-10 ENCOUNTER — HOSPITAL ENCOUNTER (EMERGENCY)
Facility: HOSPITAL | Age: 82
Discharge: HOME | End: 2017-09-10
Attending: EMERGENCY MEDICINE | Admitting: EMERGENCY MEDICINE

## 2017-09-10 VITALS
HEIGHT: 62 IN | HEART RATE: 76 BPM | SYSTOLIC BLOOD PRESSURE: 198 MMHG | BODY MASS INDEX: 22.08 KG/M2 | TEMPERATURE: 97.8 F | RESPIRATION RATE: 16 BRPM | DIASTOLIC BLOOD PRESSURE: 98 MMHG | OXYGEN SATURATION: 96 % | WEIGHT: 120 LBS

## 2017-09-10 DIAGNOSIS — T14.8XXA MULTIPLE SKIN TEARS: ICD-10-CM

## 2017-09-10 DIAGNOSIS — S00.83XA FACIAL CONTUSION, INITIAL ENCOUNTER: ICD-10-CM
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
## 2017-09-10 PROCEDURE — 99283 EMERGENCY DEPT VISIT LOW MDM: CPT

## 2017-09-10 RX ORDER — CEPHALEXIN 500 MG/1
500 CAPSULE ORAL 3 TIMES DAILY
Qty: 21 CAPSULE | Refills: 0 | Status: SHIPPED | OUTPATIENT
Start: 2017-09-10 | End: 2018-01-10

## 2017-09-10 NOTE — ED TRIAGE NOTES
PT TRIPPED OVER CURB AT [facility] INJURED LEFT KNEE, ABRASION TO NOSE RT HAND INJURY. KASHMIR LOC, NOT ON BLOOD THINNERS.

## 2017-09-11 NOTE — ED PROVIDER NOTES
" EMERGENCY DEPARTMENT ENCOUNTER    CHIEF COMPLAINT  Chief Complaint: L knee injury  History given by: Pt  History limited by: Nothing  Room Number: HALA/A  PMD: Aramis Doty MD      HPI:  Pt is a 84 y.o. female who presents complaining of multiple abrasions and L knee injury s/p falling, onset PTA. She states that when she fell she landed face down. Pt also c/o abrasions to LUE and face Pt reports coming from a party and drinking \"two little glasses\" of alcohol. Pt denies any LOC or visual disturbances.     Duration:  PTA  Onset: sudden  Timing: constant  Location: L knee  Radiation: none  Quality: pain  Intensity/Severity: moderate  Progression: unchanged  Associated Symptoms: abrasions to face and LUE  Aggravating Factors: none  Alleviating Factors: none  Previous Episodes: none  Treatment before arrival: Pt does not state any treatment PTA.    PAST MEDICAL HISTORY  Active Ambulatory Problems     Diagnosis Date Noted   • Chronic kidney failure    • DVT (deep venous thrombosis)    • Depression    • Edema    • Gout    • Elevated cholesterol    • Osteoporosis    • Hyperparathyroidism    • Benign essential hypertension    • Adaptive colitis    • Osteopenia    • Rheumatoid arthritis    • Degeneration of intervertebral disc    • D (diarrhea) 11/20/2015   • BP (high blood pressure) 11/20/2015   • 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   • Open leg wound 11/20/2015   • Vaginal odor 11/20/2015   • Infected wound 11/20/2015   • History of depression 07/11/2017   • History of renal insufficiency syndrome 07/11/2017   • History of degenerative disc disease 07/11/2017   • History of osteoporosis 07/11/2017   • History of DVT (deep vein thrombosis) 07/11/2017   • History of rheumatoid arthritis 07/11/2017     Resolved Ambulatory Problems     Diagnosis Date Noted   • No Resolved Ambulatory Problems     Past Medical History:   Diagnosis Date   • Benign essential hypertension    • Chronic renal insufficiency    • Degeneration of " intervertebral disc    • Depression    • Disease of thyroid gland    • DVT (deep venous thrombosis)    • Edema    • Gout    • H/O complete eye exam 09/2016   • Hyperlipidemia    • Hyperparathyroidism    • IBS (irritable bowel syndrome)    • OAB (overactive bladder)    • Osteopenia    • Osteoporosis    • Rheumatoid arthritis        PAST SURGICAL HISTORY  Past Surgical History:   Procedure Laterality Date   • BREAST BIOPSY     • COLONOSCOPY      declines   • HERNIA REPAIR  1965   • HYSTERECTOMY      still has ovaries   • MAMMO BILATERAL  11/16/2016    pt declines   • PAP SMEAR  11/16/2016    declines       FAMILY HISTORY  Family History   Problem Relation Age of Onset   • Alcohol abuse Other    • Cancer Other    • Hypertension Other    • Kidney disease Other    • Lung disease Other    • Heart disease Mother 89   • Ovarian cancer Daughter 63       SOCIAL HISTORY  Social History     Social History   • Marital status:      Spouse name: N/A   • Number of children: N/A   • Years of education: N/A     Occupational History   • Not on file.     Social History Main Topics   • Smoking status: Never Smoker   • Smokeless tobacco: Never Used   • Alcohol use Yes   • Drug use: No   • Sexual activity: Yes     Partners: Male     Birth control/ protection: Surgical     Other Topics Concern   • Not on file     Social History Narrative   • No narrative on file       ALLERGIES  Bactrim [sulfamethoxazole-trimethoprim] and Levofloxacin    REVIEW OF SYSTEMS  Review of Systems   Constitutional: Negative for fever.   HENT: Negative for sore throat.    Eyes: Negative.    Respiratory: Negative for cough and shortness of breath.    Cardiovascular: Negative for chest pain.   Gastrointestinal: Negative for abdominal pain, diarrhea and vomiting.   Genitourinary: Negative for dysuria.   Musculoskeletal: Negative for neck pain.   Skin: Positive for wound (Abrasion to knee, LUE and face). Negative for rash.   Allergic/Immunologic: Negative.     Neurological: Negative for weakness, numbness and headaches.   Hematological: Negative.    Psychiatric/Behavioral: Negative.    All other systems reviewed and are negative.      PHYSICAL EXAM  ED Triage Vitals   Temp Heart Rate Resp BP SpO2   09/10/17 1925 09/10/17 1925 09/10/17 1925 09/10/17 1928 09/10/17 1925   98.1 °F (36.7 °C) 69 18 182/76 94 %      Temp src Heart Rate Source Patient Position BP Location FiO2 (%)   09/10/17 1925 09/10/17 1925 -- -- --   Tympanic Monitor          Physical Exam   Constitutional: She is oriented to person, place, and time and well-developed, well-nourished, and in no distress. No distress.   HENT:   Head: Normocephalic.   Mouth/Throat: Normal dentition.   No malocculsion or scalp hematoma; abrasion to her L upper lip, ecchymosis and soft tissue swelling to L cheek, eyebrow, and L lip.  Small laceration to the internal aspect of the left upper lip.   Eyes: EOM are normal. Pupils are equal, round, and reactive to light.   Neck: Normal range of motion. Neck supple.   Cardiovascular: Normal rate, regular rhythm and normal heart sounds.    Pulmonary/Chest: Effort normal and breath sounds normal. No respiratory distress.   Abdominal: Soft. There is no tenderness. There is no rebound and no guarding.   Musculoskeletal: Normal range of motion. She exhibits no edema or deformity.        Cervical back: She exhibits no tenderness.   Neurological: She is alert and oriented to person, place, and time. She has normal sensation and normal strength.   Skin: Skin is warm and dry. No rash noted.   Skin tear to R hand, L forearm and elbow, and L knee.   Psychiatric: Mood and affect normal.   Nursing note and vitals reviewed.        PROCEDURES  Laceration Repair  Date/Time: 9/10/2017 9:53 PM  Performed by: TIMOTHY JOHNSON.  Authorized by: TIMOTHY JOHNSON   Consent: Verbal consent obtained.  Consent given by: patient  Patient identity confirmed: arm band  Body area: lower extremity  Location  details: left knee  Laceration length: 5 cm  Foreign bodies: no foreign bodies  Tendon involvement: none  Nerve involvement: none  Vascular damage: no  Irrigation solution: saline  Irrigation method: tap  Amount of cleaning: standard  Debridement: none  Degree of undermining: none  Skin closure: Steri-Strips  Number of sutures: 7  Technique: simple  Approximation: close  Approximation difficulty: simple  Dressing: 4x4 sterile gauze  Patient tolerance: Patient tolerated the procedure well with no immediate complications            PROGRESS AND CONSULTS  ED Course     2118 - Wound care ordered.    2153 - Laceration repair ordered.      MEDICAL DECISION MAKING  Results were reviewed/discussed with the patient and they were also made aware of online access. Pt also made aware that some labs, such as cultures, will not be resulted during ER visit and follow up with PMD is necessary.     MDM  Number of Diagnoses or Management Options         DIAGNOSIS  Final diagnoses:   Fall, initial encounter   Facial contusion, initial encounter   Multiple skin tears       DISPOSITION  DISCHARGE    Patient discharged in stable condition.    Reviewed implications of results, diagnosis, meds, responsibility to follow up, warning signs and symptoms of possible worsening, potential complications and reasons to return to ER.    Patient/Family voiced understanding of above instructions.    Discussed plan for discharge, as there is no emergent indication for admission.  Pt/family is agreeable and understands need for follow up and repeat testing.  Pt is aware that discharge does not mean that nothing is wrong but it indicates no emergency is present that requires admission and they must continue care with follow-up as given below or physician of their choice.     FOLLOW-UP  Aramis Doty MD  [address] [address] [address] [phone]    Schedule an appointment as soon as possible for a visit in 1 week      [facility]  [facility]  [address] [facility] [address]  [phone]  Schedule an appointment as soon as possible for a visit  For wound re-check         Medication List      New Prescriptions          cephalexin 500 MG capsule   Commonly known as:  KEFLEX   Take 1 capsule by mouth 3 (Three) Times a Day.                 Latest Documented Vital Signs:  As of 11:07 PM  BP- (!) 198/98 HR- 76 Temp- 97.8 °F (36.6 °C) (Tympanic) O2 sat- 96%    --  Documentation assistance provided by nick Gutiérrez for Monster Stark PA-C.  Information recorded by the nick was done at my direction and has been verified and validated by me.     Guy Gutiérrez  09/10/17 8213       EFFIE Gannon  09/10/17 6272

## 2017-09-11 NOTE — DISCHARGE INSTRUCTIONS
Home, rest, home medicine as prescribed, follow up with PCP for recheck. Return to care with further concerns. Keep laceration clean and dry, antibiotic as directed, watch carefully for signs of infection, follow up with [facility] for recheck as needed.

## 2017-09-11 NOTE — ED NOTES
Applied Dressing and cleaned wound to right hand and left elbow. Knee is currently Soaking while PA preps to mastisol knee     Adrian [facility]  09/10/17 1950

## 2017-09-11 NOTE — ED PROVIDER NOTES
I supervised care provided by the midlevel provider.    We have discussed this patient's history, physical exam, and treatment plan.   I have reviewed the note and personally saw and examined the patient and agree with the plan of care.    Pt with L knee injury s/p fall after tripping over curb at restaurant. She reports striking her face, but denies any LOC.    On exam, she is NV intact. There is a repaired skin tear to the L knee. Multiple various contusions along body. No bony tenderness. MAEW.    Ster-strips applied to the knee. Will discharge.    Documentation assistance provided by nick Easley for Dr. Lama.  Information recorded by the scribe was done at my direction and has been verified and validated by me.       Adrian Easley  09/10/17 3339       Denys Lama MD  09/11/17 5214

## 2017-09-21 ENCOUNTER — OFFICE VISIT (OUTPATIENT)
Dept: FAMILY MEDICINE CLINIC | Facility: CLINIC | Age: 82
End: 2017-09-21

## 2017-09-21 VITALS
BODY MASS INDEX: 22.82 KG/M2 | RESPIRATION RATE: 16 BRPM | HEART RATE: 66 BPM | SYSTOLIC BLOOD PRESSURE: 148 MMHG | HEIGHT: 62 IN | DIASTOLIC BLOOD PRESSURE: 58 MMHG | TEMPERATURE: 98 F | WEIGHT: 124 LBS

## 2017-09-21 DIAGNOSIS — Z00.00 MEDICARE ANNUAL WELLNESS VISIT, SUBSEQUENT: Primary | ICD-10-CM

## 2017-09-21 DIAGNOSIS — Z23 IMMUNIZATION DUE: ICD-10-CM

## 2017-09-21 DIAGNOSIS — S81.012D: ICD-10-CM
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
## 2017-09-21 PROCEDURE — G0008 ADMIN INFLUENZA VIRUS VAC: HCPCS | Performed by: FAMILY MEDICINE

## 2017-09-21 PROCEDURE — G0439 PPPS, SUBSEQ VISIT: HCPCS | Performed by: FAMILY MEDICINE

## 2017-09-21 PROCEDURE — 90686 IIV4 VACC NO PRSV 0.5 ML IM: CPT | Performed by: FAMILY MEDICINE

## 2017-09-21 PROCEDURE — 99213 OFFICE O/P EST LOW 20 MIN: CPT | Performed by: FAMILY MEDICINE

## 2017-09-21 NOTE — PATIENT INSTRUCTIONS
Medicare Wellness  Personal Prevention Plan of Service     Date of Office Visit:  2017  Encounter Provider:  Aramis Doty MD  Place of Service:  [facility]  Patient Name: Alba Correa  :  1933    As part of the Medicare Wellness portion of your visit today, we are providing you with this personalized preventive plan of services (PPPS). This plan is based upon recommendations of the [place] Preventive Services Task Force (USPSTF) and the Advisory Committee on Immunization Practices (ACIP).    This lists the preventive care services that should be considered, and provides dates of when you are due. Items listed as completed are up-to-date and do not require any further intervention.    Health Maintenance   Topic Date Due   • INFLUENZA VACCINE  10/16/2017 (Originally 2017)   • DXA SCAN  10/26/2017   • MAMMOGRAM  2017   • LIPID PANEL  2017   • MEDICARE ANNUAL WELLNESS  2018   • COLONOSCOPY  2026   • PNEUMOCOCCAL VACCINES (65+ LOW/MEDIUM RISK)  Completed   • ZOSTER VACCINE  Completed   • TDAP/TD VACCINES  Excluded       No orders of the defined types were placed in this encounter.      Return if symptoms worsen or fail to improve.

## 2017-09-21 NOTE — PROGRESS NOTES
"Subjective   Alba Correa is a 84 y.o. female.     CC: Hospital F/U for Fall    History of Present Illness     Pt returns today for the above. That ED visit was as follows:    HPI:  Pt is a 84 y.o. female who presents complaining of multiple abrasions and L knee injury s/p falling, onset PTA. She states that when she fell she landed face down. Pt also c/o abrasions to LUE and face Pt reports coming from a party and drinking \"two little glasses\" of alcohol. Pt denies any LOC or visual disturbances.     PROCEDURES  Laceration Repair  Date/Time: 9/10/2017 9:53 PM  Performed by: TIMOTHY JOHNSON.  Authorized by: TIMOTHY JOHNSON   Consent: Verbal consent obtained.  Consent given by: patient  Patient identity confirmed: arm band  Body area: lower extremity  Location details: left knee  Laceration length: 5 cm  Foreign bodies: no foreign bodies  Tendon involvement: none  Nerve involvement: none  Vascular damage: no  Irrigation solution: saline  Irrigation method: tap  Amount of cleaning: standard  Debridement: none  Degree of undermining: none  Skin closure: Steri-Strips  Number of sutures: 7  Technique: simple  Approximation: close  Approximation difficulty: simple  Dressing: 4x4 sterile gauze  Patient tolerance: Patient tolerated the procedure well with no immediate complications    DIAGNOSIS  Final diagnoses:   Fall, initial encounter   Facial contusion, initial encounter   Multiple skin tears       New Prescriptions         cephalexin 500 MG capsule   Commonly known as:  KEFLEX   Take 1 capsule by mouth 3 (Three) Times a Day.    Current medication list is compared to recent hospital d/c and the medications are reconciled.    The following portions of the patient's history were reviewed and updated as appropriate: allergies, current medications, past family history, past medical history, past social history, past surgical history and problem list.    Review of Systems   Constitutional: Negative for activity change, " "chills, fatigue and fever.   Respiratory: Negative for cough and chest tightness.    Cardiovascular: Negative for chest pain and palpitations.   Gastrointestinal: Negative for abdominal pain and nausea.   Endocrine: Negative for cold intolerance.   Psychiatric/Behavioral: Negative for behavioral problems and dysphoric mood.     /58  Pulse 66  Temp 98 °F (36.7 °C) (Oral)   Resp 16  Ht 62\" (157.5 cm)  Wt 124 lb (56.2 kg)  BMI 22.68 kg/m2    Objective   Physical Exam   Constitutional: She appears well-developed and well-nourished.   Neck: Neck supple. No thyromegaly present.   Cardiovascular: Normal rate and regular rhythm.    No murmur heard.  Pulmonary/Chest: Effort normal and breath sounds normal.   Abdominal: Bowel sounds are normal.   Skin:   Well-healing laceration left knee with Steri-Strips in place   Psychiatric: She has a normal mood and affect. Her behavior is normal.   Nursing note and vitals reviewed.  Hospital records reviewed with pt confirming HPI.      Assessment/Plan   Alba was seen today for fall, facial contusions, multiple abraisons and medicare wellness exam.    Diagnoses and all orders for this visit:    Medicare annual wellness visit, subsequent    Laceration of knee, left, subsequent encounter    Fall, subsequent encounter    Immunization due  -     Flu Vaccine Quad PF 3YR+    Discussed fall prevention and cessation of ETOH at her age.           "

## 2017-09-21 NOTE — PROGRESS NOTES
QUICK REFERENCE INFORMATION:  The ABCs of the Annual Wellness Visit    Subsequent Medicare Wellness Visit    HEALTH RISK ASSESSMENT    5/22/1933    Recent Hospitalizations:  No hospitalization(s) within the last year..        Current Medical Providers:  Patient Care Team:  Aramis Doty MD as PCP - General (Family Medicine)  Zack Brown MD as PCP - Claims Attributed  Juan Jose Muñiz MD as Consulting Physician (Neurology)  Adrian Perez MD as Consulting Physician (Hematology and Oncology)        Smoking Status:  History   Smoking Status   • Never Smoker   Smokeless Tobacco   • Never Used       Alcohol Consumption:  History   Alcohol Use   • Yes       Depression Screen:   PHQ-2/PHQ-9 Depression Screening 9/21/2017   Little interest or pleasure in doing things 0   Feeling down, depressed, or hopeless 0   Trouble falling or staying asleep, or sleeping too much -   Feeling tired or having little energy -   Poor appetite or overeating -   Feeling bad about yourself - or that you are a failure or have let yourself or your family down -   Trouble concentrating on things, such as reading the newspaper or watching television -   Moving or speaking so slowly that other people could have noticed. Or the opposite - being so fidgety or restless that you have been moving around a lot more than usual -   Thoughts that you would be better off dead, or of hurting yourself in some way -   Total Score 0   If you checked off any problems, how difficult have these problems made it for you to do your work, take care of things at home, or get along with other people? -       Health Habits and Functional and Cognitive Screening:  Functional & Cognitive Status 9/21/2017   Do you have difficulty preparing food and eating? No   Do you have difficulty bathing yourself? No   Do you have difficulty getting dressed? No   Do you have difficulty using the toilet? No   Do you have difficulty moving around from place to place? No   In the past year  have you fallen or experienced a near fall? Yes   Do you need help using the phone?  No   Are you deaf or do you have serious difficulty hearing?  No   Do you need help with transportation? No   Do you need help shopping? No   Do you need help preparing meals?  No   Do you need help with housework?  No   Do you need help with laundry? No   Do you need help taking your medications? No   Do you need help managing money? No   Do you have difficulty concentrating, remembering or making decisions? No       Health Habits  Current Diet: Well Balanced Diet  Dental Exam: Up to date  Eye Exam: Up to date  Exercise (times per week): 0 times per week  Current Exercise Activities Include: Walking      Does the patient have evidence of cognitive impairment? No    Aspirin use counseling: Does not need ASA (and currently is not on it)      Recent Lab Results:  CMP:  Lab Results   Component Value Date    GLU 97 11/17/2016    BUN 14 05/08/2017    CREATININE 1.05 (H) 05/08/2017    EGFRIFNONA 50 (L) 05/08/2017    EGFRIFAFRI 40 (L) 11/17/2016    BCR 13.3 05/08/2017     05/08/2017    K 4.0 05/08/2017    CO2 24.7 05/08/2017    CALCIUM 9.9 05/08/2017    PROTENTOTREF 7.4 11/17/2016    ALBUMIN 4.20 05/08/2017    LABGLOBREF 3.2 11/17/2016    LABIL2 1.1 05/08/2017    BILITOT 0.5 05/08/2017    ALKPHOS 67 05/08/2017    AST 30 05/08/2017    ALT 14 05/08/2017     Lipid Panel:  Lab Results   Component Value Date    TRIG 165 (H) 11/17/2016    HDL 43 11/17/2016    VLDL 33 11/17/2016     HbA1c:       Visual Acuity:  No exam data present    Age-appropriate Screening Schedule:  Refer to the list below for future screening recommendations based on patient's age, sex and/or medical conditions. Orders for these recommended tests are listed in the plan section. The patient has been provided with a written plan.    Health Maintenance   Topic Date Due   • INFLUENZA VACCINE  10/16/2017 (Originally 8/1/2017)   • DXA SCAN  10/26/2017   • MAMMOGRAM   11/01/2017   • LIPID PANEL  11/17/2017   • COLONOSCOPY  11/16/2026   • PNEUMOCOCCAL VACCINES (65+ LOW/MEDIUM RISK)  Completed   • ZOSTER VACCINE  Completed   • TDAP/TD VACCINES  Excluded        Subjective   History of Present Illness    Alba Correa is a 84 y.o. female who presents for an Subsequent Wellness Visit.    The following portions of the patient's history were reviewed and updated as appropriate: allergies, current medications, past family history, past medical history, past social history, past surgical history and problem list.    Outpatient Medications Prior to Visit   Medication Sig Dispense Refill   • acetaminophen (TYLENOL) 650 MG 8 hr tablet Take 650 mg by mouth Every 8 (Eight) Hours As Needed for Mild Pain (1-3).     • atenolol (TENORMIN) 25 MG tablet Take 1 tablet by mouth Daily. 90 tablet 1   • Calcium Carbonate-Vitamin D (CALCIUM 600+D PO) Take  by mouth.     • cephalexin (KEFLEX) 500 MG capsule Take 1 capsule by mouth 3 (Three) Times a Day. 21 capsule 0   • DULoxetine (CYMBALTA) 60 MG capsule Take 1 capsule by mouth Daily. 90 capsule 1   • febuxostat (ULORIC) 40 MG tablet Take 1 tablet by mouth Daily. 90 tablet 1   • losartan (COZAAR) 50 MG tablet Take 1 tablet by mouth Daily. 90 tablet 1   • simvastatin (ZOCOR) 20 MG tablet Take 1 tablet by mouth Daily. 90 tablet 1   • Triamcinolone Acetonide (NASACORT AQ NA) into each nostril As Needed.       No facility-administered medications prior to visit.        Patient Active Problem List   Diagnosis   • Chronic kidney failure   • DVT (deep venous thrombosis)   • Depression   • Edema   • Gout   • Elevated cholesterol   • Osteoporosis   • Hyperparathyroidism   • Benign essential hypertension   • Adaptive colitis   • Osteopenia   • Rheumatoid arthritis   • Degeneration of intervertebral disc   • D (diarrhea)   • BP (high blood pressure)   • Detrusor muscle hypertonia   • Open leg wound   • Vaginal odor   • Infected wound   • History of depression   •  "History of renal insufficiency syndrome   • History of degenerative disc disease   • History of osteoporosis   • History of DVT (deep vein thrombosis)   • History of rheumatoid arthritis       Advance Care Planning:  has an advance directive - a copy HAS NOT been provided    Identification of Risk Factors:  Risk factors include: cardiovascular risk.    Review of Systems    Compared to one year ago, the patient feels her physical health is the same.  Compared to one year ago, the patient feels her mental health is the same.    Objective     Physical Exam    Vitals:    09/21/17 1035   BP: 148/58   Pulse: 66   Resp: 16   Temp: 98 °F (36.7 °C)   TempSrc: Oral   Weight: 124 lb (56.2 kg)   Height: 62\" (157.5 cm)   PainSc: 2  Comment: LEFT KNEE PAIN       Body mass index is 22.68 kg/(m^2).  Discussed the patient's BMI with her. The BMI is in the acceptable range.    Assessment/Plan   Patient Self-Management and Personalized Health Advice  The patient has been provided with information about: diet and weight management and preventive services including:   · Exercise counseling provided, Fall Risk assessment done, Nutrition counseling provided.    Visit Diagnoses:    ICD-10-CM ICD-9-CM   1. Medicare annual wellness visit, subsequent Z00.00 V70.0   2. Laceration of knee, left, subsequent encounter S81.012D V58.89     891.0   3. Fall, subsequent encounter W19.XXXD V58.89     E888.9       No orders of the defined types were placed in this encounter.      Outpatient Encounter Prescriptions as of 9/21/2017   Medication Sig Dispense Refill   • acetaminophen (TYLENOL) 650 MG 8 hr tablet Take 650 mg by mouth Every 8 (Eight) Hours As Needed for Mild Pain (1-3).     • atenolol (TENORMIN) 25 MG tablet Take 1 tablet by mouth Daily. 90 tablet 1   • Calcium Carbonate-Vitamin D (CALCIUM 600+D PO) Take  by mouth.     • cephalexin (KEFLEX) 500 MG capsule Take 1 capsule by mouth 3 (Three) Times a Day. 21 capsule 0   • DULoxetine (CYMBALTA) 60 " MG capsule Take 1 capsule by mouth Daily. 90 capsule 1   • febuxostat (ULORIC) 40 MG tablet Take 1 tablet by mouth Daily. 90 tablet 1   • losartan (COZAAR) 50 MG tablet Take 1 tablet by mouth Daily. 90 tablet 1   • simvastatin (ZOCOR) 20 MG tablet Take 1 tablet by mouth Daily. 90 tablet 1   • Triamcinolone Acetonide (NASACORT AQ NA) into each nostril As Needed.       No facility-administered encounter medications on file as of 9/21/2017.        Reviewed use of high risk medication in the elderly: not applicable  Reviewed for potential of harmful drug interactions in the elderly: not applicable    Follow Up:  No Follow-up on file.     An After Visit Summary and PPPS with all of these plans were given to the patient.

## 2017-10-31 DIAGNOSIS — M10.9 GOUT, UNSPECIFIED CAUSE, UNSPECIFIED CHRONICITY, UNSPECIFIED SITE: ICD-10-CM

## 2017-10-31 RX ORDER — FEBUXOSTAT 40 MG/1
TABLET ORAL
Qty: 90 TABLET | Refills: 0 | Status: SHIPPED | OUTPATIENT
Start: 2017-10-31 | End: 2018-01-10 | Stop reason: SDUPTHER

## 2017-12-23 DIAGNOSIS — I10 BENIGN ESSENTIAL HYPERTENSION: ICD-10-CM

## 2017-12-26 RX ORDER — LOSARTAN POTASSIUM 50 MG/1
TABLET ORAL
Qty: 90 TABLET | Refills: 1 | OUTPATIENT
Start: 2017-12-26

## 2017-12-27 ENCOUNTER — TELEPHONE (OUTPATIENT)
Dept: FAMILY MEDICINE CLINIC | Facility: CLINIC | Age: 82
End: 2017-12-27

## 2017-12-27 DIAGNOSIS — E78.00 ELEVATED CHOLESTEROL: ICD-10-CM

## 2017-12-27 DIAGNOSIS — I10 BENIGN ESSENTIAL HYPERTENSION: Primary | ICD-10-CM

## 2018-01-10 ENCOUNTER — OFFICE VISIT (OUTPATIENT)
Dept: FAMILY MEDICINE CLINIC | Facility: CLINIC | Age: 83
End: 2018-01-10

## 2018-01-10 VITALS
SYSTOLIC BLOOD PRESSURE: 144 MMHG | TEMPERATURE: 98.6 F | WEIGHT: 123 LBS | HEART RATE: 78 BPM | DIASTOLIC BLOOD PRESSURE: 81 MMHG | BODY MASS INDEX: 22.63 KG/M2 | HEIGHT: 62 IN | RESPIRATION RATE: 16 BRPM

## 2018-01-10 DIAGNOSIS — F33.42 RECURRENT MAJOR DEPRESSIVE DISORDER, IN FULL REMISSION (HCC): ICD-10-CM

## 2018-01-10 DIAGNOSIS — E78.5 HYPERLIPIDEMIA, UNSPECIFIED HYPERLIPIDEMIA TYPE: ICD-10-CM

## 2018-01-10 DIAGNOSIS — J06.9 ACUTE URI: ICD-10-CM

## 2018-01-10 DIAGNOSIS — M10.9 GOUT, UNSPECIFIED CAUSE, UNSPECIFIED CHRONICITY, UNSPECIFIED SITE: ICD-10-CM

## 2018-01-10 DIAGNOSIS — I10 BENIGN ESSENTIAL HYPERTENSION: Primary | ICD-10-CM

## 2018-01-10 PROCEDURE — 99214 OFFICE O/P EST MOD 30 MIN: CPT | Performed by: FAMILY MEDICINE

## 2018-01-10 RX ORDER — DULOXETIN HYDROCHLORIDE 60 MG/1
60 CAPSULE, DELAYED RELEASE ORAL DAILY
Qty: 90 CAPSULE | Refills: 1 | Status: SHIPPED | OUTPATIENT
Start: 2018-01-10 | End: 2018-08-15 | Stop reason: SDUPTHER

## 2018-01-10 RX ORDER — SIMVASTATIN 20 MG
20 TABLET ORAL DAILY
Qty: 90 TABLET | Refills: 1 | Status: SHIPPED | OUTPATIENT
Start: 2018-01-10 | End: 2018-08-08 | Stop reason: HOSPADM

## 2018-01-10 RX ORDER — AZITHROMYCIN 250 MG/1
TABLET, FILM COATED ORAL
Qty: 6 TABLET | Refills: 0 | Status: SHIPPED | OUTPATIENT
Start: 2018-01-10 | End: 2018-02-28

## 2018-01-10 RX ORDER — LOSARTAN POTASSIUM 50 MG/1
50 TABLET ORAL DAILY
Qty: 90 TABLET | Refills: 1 | Status: SHIPPED | OUTPATIENT
Start: 2018-01-10 | End: 2018-01-10 | Stop reason: SDUPTHER

## 2018-01-10 RX ORDER — FEBUXOSTAT 40 MG/1
40 TABLET, FILM COATED ORAL DAILY
Qty: 90 TABLET | Refills: 1 | Status: SHIPPED | OUTPATIENT
Start: 2018-01-10 | End: 2018-05-21 | Stop reason: SDUPTHER

## 2018-01-10 RX ORDER — LOSARTAN POTASSIUM 50 MG/1
50 TABLET ORAL DAILY
Qty: 14 TABLET | Refills: 1 | Status: SHIPPED | OUTPATIENT
Start: 2018-01-10 | End: 2018-01-19 | Stop reason: SDUPTHER

## 2018-01-10 RX ORDER — ATENOLOL 25 MG/1
25 TABLET ORAL DAILY
Qty: 90 TABLET | Refills: 1 | Status: SHIPPED | OUTPATIENT
Start: 2018-01-10 | End: 2018-08-15 | Stop reason: SDUPTHER

## 2018-01-10 NOTE — PROGRESS NOTES
Subjective   Alba Correa is a 84 y.o. female.     History of Present Illness     Chief Complaint:   Chief Complaint   Patient presents with   • Depression   • Hypertension   • Hyperlipidemia   • Earache   • Cough       Alba Correa 84 y.o. female who presents today for Medical Management of the below listed issues and medication refills.  she has a problem list of   Patient Active Problem List   Diagnosis   • Chronic kidney failure   • DVT (deep venous thrombosis)   • Depression   • Edema   • Gout   • Elevated cholesterol   • Osteoporosis   • Hyperparathyroidism   • Benign essential hypertension   • Adaptive colitis   • Osteopenia   • Rheumatoid arthritis   • Degeneration of intervertebral disc   • D (diarrhea)   • BP (high blood pressure)   • Detrusor muscle hypertonia   • Open leg wound   • Vaginal odor   • Infected wound   • History of depression   • History of renal insufficiency syndrome   • History of degenerative disc disease   • History of osteoporosis   • History of DVT (deep vein thrombosis)   • History of rheumatoid arthritis   • Hyperlipidemia   .  Since the last visit, she has overall felt well until 1 week ago when she developed left ear pressure, along with lots of coughing and malaise. No f/c. No sinus pressure.  she has been compliant with   Current Outpatient Prescriptions:   •  atenolol (TENORMIN) 25 MG tablet, Take 1 tablet by mouth Daily., Disp: 90 tablet, Rfl: 1  •  DULoxetine (CYMBALTA) 60 MG capsule, Take 1 capsule by mouth Daily., Disp: 90 capsule, Rfl: 1  •  febuxostat (ULORIC) 40 MG tablet, Take 1 tablet by mouth Daily., Disp: 90 tablet, Rfl: 1  •  losartan (COZAAR) 50 MG tablet, Take 1 tablet by mouth Daily., Disp: 14 tablet, Rfl: 1  •  simvastatin (ZOCOR) 20 MG tablet, Take 1 tablet by mouth Daily., Disp: 90 tablet, Rfl: 1  •  acetaminophen (TYLENOL) 650 MG 8 hr tablet, Take 650 mg by mouth Every 8 (Eight) Hours As Needed for Mild Pain (1-3)., Disp: , Rfl:   •  azithromycin (ZITHROMAX  "Z-JOHN) 250 MG tablet, Take 2 tablets the first day, then 1 tablet daily for 4 days., Disp: 6 tablet, Rfl: 0  •  Calcium Carbonate-Vitamin D (CALCIUM 600+D PO), Take  by mouth., Disp: , Rfl:   •  Dextromethorphan-Guaifenesin (MUCINEX DM PO), Take  by mouth., Disp: , Rfl: .  she denies medication side effects.    All of the chronic condition(s) listed above are stable w/o issues.    /81  Pulse 78  Temp 98.6 °F (37 °C) (Oral)   Resp 16  Ht 157.5 cm (62\")  Wt 55.8 kg (123 lb)  BMI 22.5 kg/m2    Results for orders placed or performed during the hospital encounter of 05/08/17   Urinalysis With / Culture If Indicated   Result Value Ref Range    Color, UA Yellow Yellow, Straw    Appearance, UA Clear Clear    pH, UA 7.5 5.0 - 8.0    Specific Gravity, UA 1.008 1.005 - 1.030    Glucose, UA Negative Negative    Ketones, UA Negative Negative    Bilirubin, UA Negative Negative    Blood, UA Negative Negative    Protein, UA Trace (A) Negative    Leuk Esterase, UA Negative Negative    Nitrite, UA Negative Negative    Urobilinogen, UA 0.2 E.U./dL 0.2 - 1.0 E.U./dL   Comprehensive Metabolic Panel   Result Value Ref Range    Glucose 106 (H) 65 - 99 mg/dL    BUN 14 8 - 23 mg/dL    Creatinine 1.05 (H) 0.57 - 1.00 mg/dL    Sodium 140 136 - 145 mmol/L    Potassium 4.0 3.5 - 5.2 mmol/L    Chloride 101 98 - 107 mmol/L    CO2 24.7 22.0 - 29.0 mmol/L    Calcium 9.9 8.6 - 10.5 mg/dL    Total Protein 8.1 6.0 - 8.5 g/dL    Albumin 4.20 3.50 - 5.20 g/dL    ALT (SGPT) 14 1 - 33 U/L    AST (SGOT) 30 1 - 32 U/L    Alkaline Phosphatase 67 39 - 117 U/L    Total Bilirubin 0.5 0.1 - 1.2 mg/dL    eGFR Non African Amer 50 (L) >60 mL/min/1.73    Globulin 3.9 gm/dL    A/G Ratio 1.1 g/dL    BUN/Creatinine Ratio 13.3 7.0 - 25.0    Anion Gap 14.3 mmol/L   CBC Auto Differential   Result Value Ref Range    WBC 6.87 4.50 - 10.70 10*3/mm3    RBC 4.27 3.90 - 5.20 10*6/mm3    Hemoglobin 14.0 11.9 - 15.5 g/dL    Hematocrit 42.3 35.6 - 45.5 %    MCV 99.1 " (H) 80.5 - 98.2 fL    MCH 32.8 (H) 26.9 - 32.0 pg    MCHC 33.1 32.4 - 36.3 g/dL    RDW 13.3 (H) 11.7 - 13.0 %    RDW-SD 48.3 37.0 - 54.0 fl    MPV 10.3 6.0 - 12.0 fL    Platelets 270 140 - 500 10*3/mm3    Neutrophil % 30.4 (L) 42.7 - 76.0 %    Lymphocyte % 54.6 (H) 19.6 - 45.3 %    Monocyte % 8.2 5.0 - 12.0 %    Eosinophil % 5.8 0.3 - 6.2 %    Basophil % 1.0 0.0 - 1.5 %    Immature Grans % 0.0 0.0 - 0.5 %    Neutrophils, Absolute 2.09 1.90 - 8.10 10*3/mm3    Lymphocytes, Absolute 3.75 0.90 - 4.80 10*3/mm3    Monocytes, Absolute 0.56 0.20 - 1.20 10*3/mm3    Eosinophils, Absolute 0.40 0.00 - 0.70 10*3/mm3    Basophils, Absolute 0.07 0.00 - 0.20 10*3/mm3    Immature Grans, Absolute 0.00 0.00 - 0.03 10*3/mm3   Light Blue Top   Result Value Ref Range    Extra Tube hold for add-on    Gold Top - SST   Result Value Ref Range    Extra Tube Hold for add-ons.            The following portions of the patient's history were reviewed and updated as appropriate: allergies, current medications, past family history, past medical history, past social history, past surgical history and problem list.    Review of Systems   Constitutional: Negative for activity change, chills, fatigue and fever.   HENT: Positive for ear pain.    Respiratory: Positive for cough. Negative for chest tightness.    Cardiovascular: Negative for chest pain and palpitations.   Gastrointestinal: Negative for abdominal pain and nausea.   Endocrine: Negative for cold intolerance.   Psychiatric/Behavioral: Negative for behavioral problems and dysphoric mood.       Objective   Physical Exam   Constitutional: She appears well-developed and well-nourished.   Neck: Neck supple. No thyromegaly present.   Cardiovascular: Normal rate and regular rhythm.    No murmur heard.  Pulmonary/Chest: Effort normal and breath sounds normal.   Abdominal: Bowel sounds are normal.   Psychiatric: She has a normal mood and affect. Her behavior is normal.   Nursing note and vitals  reviewed.  Labs ordered and pt to complete.    Assessment/Plan   Alba was seen today for depression, hypertension, hyperlipidemia, earache and cough.    Diagnoses and all orders for this visit:    Benign essential hypertension  -     atenolol (TENORMIN) 25 MG tablet; Take 1 tablet by mouth Daily.  -     Discontinue: losartan (COZAAR) 50 MG tablet; Take 1 tablet by mouth Daily.  -     losartan (COZAAR) 50 MG tablet; Take 1 tablet by mouth Daily.    Recurrent major depressive disorder, in full remission  -     DULoxetine (CYMBALTA) 60 MG capsule; Take 1 capsule by mouth Daily.    Hyperlipidemia, unspecified hyperlipidemia type  -     simvastatin (ZOCOR) 20 MG tablet; Take 1 tablet by mouth Daily.    Gout, unspecified cause, unspecified chronicity, unspecified site  -     febuxostat (ULORIC) 40 MG tablet; Take 1 tablet by mouth Daily.    Acute URI  -     azithromycin (ZITHROMAX Z-JOHN) 250 MG tablet; Take 2 tablets the first day, then 1 tablet daily for 4 days.

## 2018-01-19 DIAGNOSIS — I10 BENIGN ESSENTIAL HYPERTENSION: ICD-10-CM

## 2018-01-19 LAB
ALBUMIN SERPL-MCNC: 4.1 G/DL (ref 3.5–5.2)
ALBUMIN/GLOB SERPL: 1.1 G/DL
ALP SERPL-CCNC: 77 U/L (ref 39–117)
ALT SERPL-CCNC: 16 U/L (ref 1–33)
AST SERPL-CCNC: 29 U/L (ref 1–32)
BASOPHILS # BLD AUTO: 0.1 10*3/MM3 (ref 0–0.2)
BASOPHILS NFR BLD AUTO: 1.7 % (ref 0–1.5)
BILIRUB SERPL-MCNC: 0.4 MG/DL (ref 0.1–1.2)
BUN SERPL-MCNC: 23 MG/DL (ref 8–23)
BUN/CREAT SERPL: 18.3 (ref 7–25)
CALCIUM SERPL-MCNC: 10 MG/DL (ref 8.6–10.5)
CHLORIDE SERPL-SCNC: 101 MMOL/L (ref 98–107)
CHOLEST SERPL-MCNC: 165 MG/DL (ref 0–200)
CO2 SERPL-SCNC: 29.6 MMOL/L (ref 22–29)
CREAT SERPL-MCNC: 1.26 MG/DL (ref 0.57–1)
EOSINOPHIL # BLD AUTO: 0.57 10*3/MM3 (ref 0–0.7)
EOSINOPHIL NFR BLD AUTO: 9.8 % (ref 0.3–6.2)
ERYTHROCYTE [DISTWIDTH] IN BLOOD BY AUTOMATED COUNT: 12.4 % (ref 11.7–13)
GLOBULIN SER CALC-MCNC: 3.8 GM/DL
GLUCOSE SERPL-MCNC: 94 MG/DL (ref 65–99)
HCT VFR BLD AUTO: 42.5 % (ref 35.6–45.5)
HDLC SERPL-MCNC: 41 MG/DL (ref 40–60)
HGB BLD-MCNC: 13.1 G/DL (ref 11.9–15.5)
IMM GRANULOCYTES # BLD: 0 10*3/MM3 (ref 0–0.03)
IMM GRANULOCYTES NFR BLD: 0 % (ref 0–0.5)
LDLC SERPL CALC-MCNC: 85 MG/DL (ref 0–100)
LDLC/HDLC SERPL: 2.08 {RATIO}
LYMPHOCYTES # BLD AUTO: 2.69 10*3/MM3 (ref 0.9–4.8)
LYMPHOCYTES NFR BLD AUTO: 46.3 % (ref 19.6–45.3)
MCH RBC QN AUTO: 32.4 PG (ref 26.9–32)
MCHC RBC AUTO-ENTMCNC: 30.8 G/DL (ref 32.4–36.3)
MCV RBC AUTO: 105.2 FL (ref 80.5–98.2)
MONOCYTES # BLD AUTO: 0.53 10*3/MM3 (ref 0.2–1.2)
MONOCYTES NFR BLD AUTO: 9.1 % (ref 5–12)
NEUTROPHILS # BLD AUTO: 1.92 10*3/MM3 (ref 1.9–8.1)
NEUTROPHILS NFR BLD AUTO: 33.1 % (ref 42.7–76)
PLATELET # BLD AUTO: 302 10*3/MM3 (ref 140–500)
POTASSIUM SERPL-SCNC: 5.3 MMOL/L (ref 3.5–5.2)
PROT SERPL-MCNC: 7.9 G/DL (ref 6–8.5)
RBC # BLD AUTO: 4.04 10*6/MM3 (ref 3.9–5.2)
SODIUM SERPL-SCNC: 142 MMOL/L (ref 136–145)
TRIGL SERPL-MCNC: 193 MG/DL (ref 0–150)
TSH SERPL DL<=0.005 MIU/L-ACNC: 2.94 MIU/ML (ref 0.27–4.2)
URATE SERPL-MCNC: 3.1 MG/DL (ref 2.4–5.7)
VLDLC SERPL CALC-MCNC: 38.6 MG/DL (ref 5–40)
WBC # BLD AUTO: 5.81 10*3/MM3 (ref 4.5–10.7)

## 2018-01-19 RX ORDER — LOSARTAN POTASSIUM 50 MG/1
50 TABLET ORAL DAILY
Qty: 90 TABLET | Refills: 1 | Status: SHIPPED | OUTPATIENT
Start: 2018-01-19 | End: 2018-05-15 | Stop reason: SDUPTHER

## 2018-01-23 LAB
FOLATE SERPL-MCNC: >20 NG/ML (ref 4.78–24.2)
VIT B12 SERPL-MCNC: 1195 PG/ML (ref 211–946)
WRITTEN AUTHORIZATION: NORMAL

## 2018-01-29 ENCOUNTER — TELEPHONE (OUTPATIENT)
Dept: FAMILY MEDICINE CLINIC | Facility: CLINIC | Age: 83
End: 2018-01-29

## 2018-01-29 DIAGNOSIS — R79.89 ABNORMAL CBC: Primary | ICD-10-CM

## 2018-01-29 NOTE — TELEPHONE ENCOUNTER
----- Message from Aramis oDty MD sent at 1/24/2018  6:39 PM EST -----  Please call the patient regarding her abnormal result. B12/Folate WNL. Send to [facility] (Dr Vu) to look into further.

## 2018-02-28 ENCOUNTER — LAB (OUTPATIENT)
Dept: LAB | Facility: HOSPITAL | Age: 83
End: 2018-02-28

## 2018-02-28 ENCOUNTER — APPOINTMENT (OUTPATIENT)
Dept: ONCOLOGY | Facility: CLINIC | Age: 83
End: 2018-02-28

## 2018-02-28 ENCOUNTER — OFFICE VISIT (OUTPATIENT)
Dept: ONCOLOGY | Facility: CLINIC | Age: 83
End: 2018-02-28

## 2018-02-28 ENCOUNTER — APPOINTMENT (OUTPATIENT)
Dept: LAB | Facility: HOSPITAL | Age: 83
End: 2018-02-28

## 2018-02-28 VITALS
SYSTOLIC BLOOD PRESSURE: 207 MMHG | BODY MASS INDEX: 23.7 KG/M2 | DIASTOLIC BLOOD PRESSURE: 82 MMHG | TEMPERATURE: 98.4 F | RESPIRATION RATE: 12 BRPM | OXYGEN SATURATION: 99 % | HEIGHT: 62 IN | HEART RATE: 65 BPM | WEIGHT: 128.8 LBS

## 2018-02-28 DIAGNOSIS — D75.89 MACROCYTOSIS: Primary | ICD-10-CM

## 2018-02-28 DIAGNOSIS — R79.89 ABNORMAL CBC: Primary | ICD-10-CM

## 2018-02-28 LAB
BASOPHILS # BLD AUTO: 0.07 10*3/MM3 (ref 0–0.1)
BASOPHILS NFR BLD AUTO: 1.3 % (ref 0–1.1)
DEPRECATED RDW RBC AUTO: 46.1 FL (ref 37–49)
EOSINOPHIL # BLD AUTO: 0.3 10*3/MM3 (ref 0–0.36)
EOSINOPHIL NFR BLD AUTO: 5.4 % (ref 1–5)
ERYTHROCYTE [DISTWIDTH] IN BLOOD BY AUTOMATED COUNT: 12.6 % (ref 11.7–14.5)
HCT VFR BLD AUTO: 40.3 % (ref 34–45)
HGB BLD-MCNC: 13.5 G/DL (ref 11.5–14.9)
IMM GRANULOCYTES # BLD: 0.01 10*3/MM3 (ref 0–0.03)
IMM GRANULOCYTES NFR BLD: 0.2 % (ref 0–0.5)
LYMPHOCYTES # BLD AUTO: 2.89 10*3/MM3 (ref 1–3.5)
LYMPHOCYTES NFR BLD AUTO: 52.1 % (ref 20–49)
MCH RBC QN AUTO: 33.2 PG (ref 27–33)
MCHC RBC AUTO-ENTMCNC: 33.5 G/DL (ref 32–35)
MCV RBC AUTO: 99 FL (ref 83–97)
MONOCYTES # BLD AUTO: 0.58 10*3/MM3 (ref 0.25–0.8)
MONOCYTES NFR BLD AUTO: 10.5 % (ref 4–12)
NEUTROPHILS # BLD AUTO: 1.7 10*3/MM3 (ref 1.5–7)
NEUTROPHILS NFR BLD AUTO: 30.5 % (ref 39–75)
NRBC BLD MANUAL-RTO: 0 /100 WBC (ref 0–0)
PLATELET # BLD AUTO: 227 10*3/MM3 (ref 150–375)
PMV BLD AUTO: 9.8 FL (ref 8.9–12.1)
RBC # BLD AUTO: 4.07 10*6/MM3 (ref 3.9–5)
WBC NRBC COR # BLD: 5.55 10*3/MM3 (ref 4–10)

## 2018-02-28 PROCEDURE — 85025 COMPLETE CBC W/AUTO DIFF WBC: CPT | Performed by: INTERNAL MEDICINE

## 2018-02-28 PROCEDURE — 36416 COLLJ CAPILLARY BLOOD SPEC: CPT | Performed by: INTERNAL MEDICINE

## 2018-02-28 PROCEDURE — 99215 OFFICE O/P EST HI 40 MIN: CPT | Performed by: INTERNAL MEDICINE

## 2018-02-28 RX ORDER — ACETAMINOPHEN,DIPHENHYDRAMINE HCL 500; 25 MG/1; MG/1
1 TABLET, FILM COATED ORAL NIGHTLY PRN
COMMUNITY
End: 2018-08-08 | Stop reason: HOSPADM

## 2018-02-28 NOTE — PROGRESS NOTES
REFERRING PROVIDER:    Aramis Doty MD  [id number] [facility] [address]  [place] [address]    REASON FOR CONSULTATION:    Macrocytosis without anemia    HISTORY OF PRESENT ILLNESS:  Alba Correa is a 84 y.o. female who is referred today for further evaluation of macrocytosis without anemia.  She had a CBC on 1/19/2018 with a normal hemoglobin at 13.1 but her MCV was elevated at 105.2.  White blood cells and platelets were normal.  TSH was normal at 2.94.  Vitamin B12 was normal at 1195 and folic acid was greater than 20.  She was referred for further evaluation.    The MCV was essentially normal at 99.1 as of 5/8/2017.  The MCV was elevated at 105.8 on 11/17/2016.       I saw her during an inpatient hospitalization at [facility] in November 2015 for pancytopenia related to Bactrim and methotrexate use.  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  I saw her back in the office on 1/13/2016.  I intended to see her back in 6 months but she did not follow-up.    She feels well at this point.  Her rheumatologic issues are in remission.  She has some mild swelling of the left wrist.  She has some fatigue.  She denies any bleeding.  No fevers or chills.  No significant alcohol use.  She denies any headaches or chest pain.  She is quite hypertensive in the office today.    Past Medical History:   Diagnosis Date   • Benign essential hypertension    • Chronic renal insufficiency    • Degeneration of intervertebral disc    • Depression    • Disease of thyroid gland    • DVT (deep venous thrombosis)    • Edema    • Gout    • H/O complete eye exam 09/2016   • Hyperlipidemia    • Hyperparathyroidism    • IBS (irritable bowel syndrome)    • OAB (overactive bladder)    • Osteopenia    • Osteoporosis    • Rheumatoid arthritis        Past Surgical History:   Procedure Laterality Date   • BREAST BIOPSY     • COLONOSCOPY      declines    • HERNIA REPAIR  1965   • HYSTERECTOMY      still has ovaries   • MAMMO BILATERAL  11/16/2016    pt declines   • PAP SMEAR  11/16/2016    declines       SOCIAL HISTORY:   reports that she has quit smoking. Her smoking use included Cigarettes. She quit after 5.00 years of use. She has never used smokeless tobacco. She reports that she drinks alcohol. She reports that she does not use illicit drugs.    FAMILY HISTORY:  family history includes Alcohol abuse in her other; Cancer in her other; Heart disease (age of onset: 89) in her mother; Hypertension in her other; Kidney disease in her other; Lung cancer in her brother; Lung disease in her other; Ovarian cancer in her sister; Ovarian cancer (age of onset: 63) in her daughter; Stomach cancer in her father.    ALLERGIES:  Allergies   Allergen Reactions   • Bactrim [Sulfamethoxazole-Trimethoprim] Delirium   • Levofloxacin    • Lisinopril        MEDICATIONS:  The medication list has been reviewed with the patient by the medical assistant, and the list has been updated in the electronic medical record, which I reviewed.  Medication dosages and frequencies were confirmed to be accurate.    Review of Systems   Constitutional: Positive for fatigue. Negative for appetite change, chills, fever and unexpected weight change.   HENT: Negative for congestion, dental problem, facial swelling, hearing loss, mouth sores, nosebleeds, rhinorrhea, sore throat, tinnitus, trouble swallowing and voice change.    Eyes: Negative.    Respiratory: Negative for cough, chest tightness, shortness of breath, wheezing and stridor.    Cardiovascular: Negative for chest pain, palpitations and leg swelling.   Gastrointestinal: Negative for abdominal distention, abdominal pain, blood in stool, constipation, diarrhea, nausea and vomiting.   Endocrine: Negative.    Genitourinary: Negative for difficulty urinating, dysuria, flank pain, frequency, hematuria, menstrual problem, pelvic pain, vaginal  "bleeding and vaginal discharge.   Musculoskeletal: Positive for joint swelling. Negative for arthralgias, back pain, myalgias, neck pain and neck stiffness.   Skin: Negative for color change, rash and wound.   Allergic/Immunologic: Negative for environmental allergies.   Neurological: Negative for dizziness, seizures, syncope, weakness, numbness and headaches.   Hematological: Negative for adenopathy. Does not bruise/bleed easily.   Psychiatric/Behavioral: Negative for agitation, confusion and sleep disturbance. The patient is not nervous/anxious.    All other systems reviewed and are negative.      Vitals:    02/28/18 1051   BP: (!) 207/82  Comment: RECHECKED WITH THE MACHINE   Pulse: 65   Resp: 12   Temp: 98.4 °F (36.9 °C)   TempSrc: Oral   SpO2: 99%   Weight: 58.4 kg (128 lb 12.8 oz)   Height: 158 cm (62.21\")   PainSc: 0-No pain       Physical Exam   Constitutional: She is oriented to person, place, and time. She appears well-developed and well-nourished.   HENT:   Head: Normocephalic and atraumatic.   Nose: Nose normal.   Eyes: Conjunctivae and EOM are normal. Pupils are equal, round, and reactive to light.   Neck: Neck supple.   Cardiovascular: Normal rate, regular rhythm, S1 normal, S2 normal and normal heart sounds.  Exam reveals no gallop and no friction rub.    No murmur heard.  Pulmonary/Chest: Effort normal and breath sounds normal. No stridor. No respiratory distress. She has no wheezes. She has no rhonchi. She has no rales. She exhibits no tenderness.   Abdominal: Soft. Bowel sounds are normal. She exhibits no distension and no mass. There is generalized tenderness. There is no rebound and no guarding.   Musculoskeletal: Normal range of motion. She exhibits no edema.   Mild edema of the left wrist   Lymphadenopathy:     She has no cervical adenopathy.     She has no axillary adenopathy.        Right: No inguinal and no supraclavicular adenopathy present.        Left: No inguinal and no supraclavicular " adenopathy present.   Neurological: She is alert and oriented to person, place, and time. No cranial nerve deficit or sensory deficit.   Skin: Skin is warm and dry. No rash noted. No erythema.   Psychiatric: She has a normal mood and affect. Her behavior is normal. Judgment and thought content normal.   Vitals reviewed.      DIAGNOSTIC DATA:  WBC   Date Value Ref Range Status   02/28/2018 5.55 4.00 - 10.00 10*3/mm3 Final     RBC   Date Value Ref Range Status   02/28/2018 4.07 3.90 - 5.00 10*6/mm3 Final     Hemoglobin   Date Value Ref Range Status   02/28/2018 13.5 11.5 - 14.9 g/dL Final     Hematocrit   Date Value Ref Range Status   02/28/2018 40.3 34.0 - 45.0 % Final     MCV   Date Value Ref Range Status   02/28/2018 99.0 (H) 83.0 - 97.0 fL Final     MCH   Date Value Ref Range Status   02/28/2018 33.2 (H) 27.0 - 33.0 pg Final     MCHC   Date Value Ref Range Status   02/28/2018 33.5 32.0 - 35.0 g/dL Final     RDW   Date Value Ref Range Status   02/28/2018 12.6 11.7 - 14.5 % Final     RDW-SD   Date Value Ref Range Status   02/28/2018 46.1 37.0 - 49.0 fl Final     MPV   Date Value Ref Range Status   02/28/2018 9.8 8.9 - 12.1 fL Final     Platelets   Date Value Ref Range Status   02/28/2018 227 150 - 375 10*3/mm3 Final     Neutrophil %   Date Value Ref Range Status   02/28/2018 30.5 (L) 39.0 - 75.0 % Final     Lymphocyte %   Date Value Ref Range Status   02/28/2018 52.1 (H) 20.0 - 49.0 % Final     Monocyte %   Date Value Ref Range Status   02/28/2018 10.5 4.0 - 12.0 % Final     Eosinophil %   Date Value Ref Range Status   02/28/2018 5.4 (H) 1.0 - 5.0 % Final     Basophil %   Date Value Ref Range Status   02/28/2018 1.3 (H) 0.0 - 1.1 % Final     Immature Grans %   Date Value Ref Range Status   02/28/2018 0.2 0.0 - 0.5 % Final     Neutrophils, Absolute   Date Value Ref Range Status   02/28/2018 1.70 1.50 - 7.00 10*3/mm3 Final     Lymphocytes, Absolute   Date Value Ref Range Status   02/28/2018 2.89 1.00 - 3.50 10*3/mm3  Final     Monocytes, Absolute   Date Value Ref Range Status   02/28/2018 0.58 0.25 - 0.80 10*3/mm3 Final     Eosinophils, Absolute   Date Value Ref Range Status   02/28/2018 0.30 0.00 - 0.36 10*3/mm3 Final     Basophils, Absolute   Date Value Ref Range Status   02/28/2018 0.07 0.00 - 0.10 10*3/mm3 Final     Immature Grans, Absolute   Date Value Ref Range Status   02/28/2018 0.01 0.00 - 0.03 10*3/mm3 Final     nRBC   Date Value Ref Range Status   02/28/2018 0.0 0.0 - 0.0 /100 WBC Final       IMAGING:    None reviewed    ASSESSMENT:  This is a 84 y.o. female with:  1.  Macrocytosis without anemia: Referred back for this new issue today.  Her MCV is essentially normal today.  She denies alcohol use.  Vitamin B12 and folic acid levels were normal.  TSH was normal.  The absence of anemia I don't think any further evaluation is necessary at this point.  We will continue to monitor this.    2.  History of pancytopenia secondary to methotrexate and Bactrim use.  Her blood counts are normal today.  Rheumatologic issues are in remission.  3.  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  She does continue to have more lymphocytes than neutrophils this point.  She is asymptomatic.  Total white blood cell count is normal.  We will monitor this intermittently.  No therapy is necessary.  4.  Hypertension: I repeated her blood pressure today in the left arm and got 180/110.  She is asymptomatic without headache or chest pain.  She has a history of labile hypertension.  She will follow-up with Dr. Doty.    PLAN:   1.  I will see her back in 6 months for follow-up with a CBC and differential.    Daughter present and provided some history today as well.

## 2018-03-01 ENCOUNTER — OFFICE VISIT (OUTPATIENT)
Dept: FAMILY MEDICINE CLINIC | Facility: CLINIC | Age: 83
End: 2018-03-01

## 2018-03-01 VITALS
RESPIRATION RATE: 16 BRPM | HEIGHT: 61 IN | HEART RATE: 60 BPM | BODY MASS INDEX: 24.17 KG/M2 | DIASTOLIC BLOOD PRESSURE: 94 MMHG | WEIGHT: 128 LBS | TEMPERATURE: 97.7 F | SYSTOLIC BLOOD PRESSURE: 156 MMHG

## 2018-03-01 DIAGNOSIS — I10 BENIGN ESSENTIAL HYPERTENSION: Primary | ICD-10-CM

## 2018-03-01 PROCEDURE — 99213 OFFICE O/P EST LOW 20 MIN: CPT | Performed by: FAMILY MEDICINE

## 2018-03-01 RX ORDER — AMLODIPINE BESYLATE 2.5 MG/1
2.5 TABLET ORAL DAILY
Qty: 30 TABLET | Refills: 5 | Status: SHIPPED | OUTPATIENT
Start: 2018-03-01 | End: 2018-08-18 | Stop reason: SDUPTHER

## 2018-03-01 NOTE — PROGRESS NOTES
"Subjective   Alba Correa is a 84 y.o. female.     CC: Management of HTN    History of Present Illness     Pt returns today after being seen at her hematologist yesterday and being told her BPs are elevated and she was asked to come in today. Denies sx.    The following portions of the patient's history were reviewed and updated as appropriate: allergies, current medications, past family history, past medical history, past social history, past surgical history and problem list.    Review of Systems   Constitutional: Negative for activity change, chills, fatigue and fever.   Respiratory: Negative for cough and chest tightness.    Cardiovascular: Negative for chest pain and palpitations.   Gastrointestinal: Negative for abdominal pain and nausea.   Endocrine: Negative for cold intolerance.   Psychiatric/Behavioral: Negative for behavioral problems and dysphoric mood.     /94  Pulse 60  Temp 97.7 °F (36.5 °C) (Oral)   Resp 16  Ht 154.9 cm (61\")  Wt 58.1 kg (128 lb)  BMI 24.19 kg/m2    Objective   Physical Exam   Constitutional: She appears well-developed and well-nourished.   Neck: Neck supple. No thyromegaly present.   Cardiovascular: Normal rate and regular rhythm.    No murmur heard.  Pulmonary/Chest: Effort normal and breath sounds normal.   Abdominal: Bowel sounds are normal.   Psychiatric: She has a normal mood and affect. Her behavior is normal.   Nursing note and vitals reviewed.      Assessment/Plan   Alba was seen today for hypertension.    Diagnoses and all orders for this visit:    Benign essential hypertension  Comments:  uncontrolled  Orders:  -     amLODIPine (NORVASC) 2.5 MG tablet; Take 1 tablet by mouth Daily.               "

## 2018-03-29 ENCOUNTER — OFFICE VISIT (OUTPATIENT)
Dept: FAMILY MEDICINE CLINIC | Facility: CLINIC | Age: 83
End: 2018-03-29

## 2018-03-29 VITALS
BODY MASS INDEX: 22.45 KG/M2 | DIASTOLIC BLOOD PRESSURE: 72 MMHG | RESPIRATION RATE: 14 BRPM | HEART RATE: 58 BPM | HEIGHT: 62 IN | TEMPERATURE: 98 F | WEIGHT: 122 LBS | SYSTOLIC BLOOD PRESSURE: 128 MMHG

## 2018-03-29 DIAGNOSIS — I10 BENIGN ESSENTIAL HYPERTENSION: Primary | ICD-10-CM

## 2018-03-29 PROCEDURE — 99212 OFFICE O/P EST SF 10 MIN: CPT | Performed by: FAMILY MEDICINE

## 2018-03-29 NOTE — PROGRESS NOTES
Subjective   Alba Correa is a 84 y.o. female.     History of Present Illness     Chief Complaint:   Chief Complaint   Patient presents with   • Hypertension     new bp medication amlodipine 2.5 mgs once a day        Alba Correa 84 y.o. female who presents today for Medical Management of the below listed issues and medication refills.  she has a problem list of   Patient Active Problem List   Diagnosis   • Chronic kidney failure   • DVT (deep venous thrombosis)   • Depression   • Edema   • Gout   • Elevated cholesterol   • Osteoporosis   • Hyperparathyroidism   • Benign essential hypertension   • Adaptive colitis   • Osteopenia   • Rheumatoid arthritis   • Degeneration of intervertebral disc   • D (diarrhea)   • BP (high blood pressure)   • Detrusor muscle hypertonia   • Open leg wound   • Vaginal odor   • Infected wound   • History of depression   • History of renal insufficiency syndrome   • History of degenerative disc disease   • History of osteoporosis   • History of DVT (deep vein thrombosis)   • History of rheumatoid arthritis   • Hyperlipidemia   • Macrocytosis   .  Since the last visit, she has overall felt well.  she has been compliant with   Current Outpatient Prescriptions:   •  amLODIPine (NORVASC) 2.5 MG tablet, Take 1 tablet by mouth Daily., Disp: 30 tablet, Rfl: 5  •  acetaminophen (TYLENOL) 650 MG 8 hr tablet, Take 650 mg by mouth Every 8 (Eight) Hours As Needed for Mild Pain (1-3)., Disp: , Rfl:   •  atenolol (TENORMIN) 25 MG tablet, Take 1 tablet by mouth Daily., Disp: 90 tablet, Rfl: 1  •  BIOTIN PO, Take  by mouth., Disp: , Rfl:   •  Calcium Carbonate-Vitamin D (CALCIUM 600+D PO), Take  by mouth., Disp: , Rfl:   •  diphenhydrAMINE-acetaminophen (TYLENOL PM)  MG tablet per tablet, Take 1 tablet by mouth At Night As Needed for Sleep., Disp: , Rfl:   •  DULoxetine (CYMBALTA) 60 MG capsule, Take 1 capsule by mouth Daily., Disp: 90 capsule, Rfl: 1  •  febuxostat (ULORIC) 40 MG tablet, Take 1  "tablet by mouth Daily., Disp: 90 tablet, Rfl: 1  •  losartan (COZAAR) 50 MG tablet, Take 1 tablet by mouth Daily., Disp: 90 tablet, Rfl: 1  •  Multiple Vitamins-Minerals (CENTRUM SILVER PO), Take  by mouth., Disp: , Rfl:   •  Omega-3 Fatty Acids (FISH OIL PO), Take 1,200 mg by mouth Every Night., Disp: , Rfl:   •  simvastatin (ZOCOR) 20 MG tablet, Take 1 tablet by mouth Daily., Disp: 90 tablet, Rfl: 1.  she denies medication side effects.    All of the chronic condition(s) listed above are stable w/o issues.    /72   Pulse 58   Temp 98 °F (36.7 °C) (Oral)   Resp 14   Ht 157.5 cm (62\")   Wt 55.3 kg (122 lb)   BMI 22.31 kg/m²     Results for orders placed or performed in visit on 02/28/18   CBC Auto Differential   Result Value Ref Range    WBC 5.55 4.00 - 10.00 10*3/mm3    RBC 4.07 3.90 - 5.00 10*6/mm3    Hemoglobin 13.5 11.5 - 14.9 g/dL    Hematocrit 40.3 34.0 - 45.0 %    MCV 99.0 (H) 83.0 - 97.0 fL    MCH 33.2 (H) 27.0 - 33.0 pg    MCHC 33.5 32.0 - 35.0 g/dL    RDW 12.6 11.7 - 14.5 %    RDW-SD 46.1 37.0 - 49.0 fl    MPV 9.8 8.9 - 12.1 fL    Platelets 227 150 - 375 10*3/mm3    Neutrophil % 30.5 (L) 39.0 - 75.0 %    Lymphocyte % 52.1 (H) 20.0 - 49.0 %    Monocyte % 10.5 4.0 - 12.0 %    Eosinophil % 5.4 (H) 1.0 - 5.0 %    Basophil % 1.3 (H) 0.0 - 1.1 %    Immature Grans % 0.2 0.0 - 0.5 %    Neutrophils, Absolute 1.70 1.50 - 7.00 10*3/mm3    Lymphocytes, Absolute 2.89 1.00 - 3.50 10*3/mm3    Monocytes, Absolute 0.58 0.25 - 0.80 10*3/mm3    Eosinophils, Absolute 0.30 0.00 - 0.36 10*3/mm3    Basophils, Absolute 0.07 0.00 - 0.10 10*3/mm3    Immature Grans, Absolute 0.01 0.00 - 0.03 10*3/mm3    nRBC 0.0 0.0 - 0.0 /100 WBC           The following portions of the patient's history were reviewed and updated as appropriate: allergies, current medications, past family history, past medical history, past social history, past surgical history and problem list.    Review of Systems   Constitutional: Negative for " activity change, chills, fatigue and fever.   Respiratory: Negative for cough and chest tightness.    Cardiovascular: Negative for chest pain and palpitations.   Gastrointestinal: Negative for abdominal pain and nausea.   Endocrine: Negative for cold intolerance.   Psychiatric/Behavioral: Negative for behavioral problems and dysphoric mood.       Objective   Physical Exam   Constitutional: She appears well-developed and well-nourished.   Neck: Neck supple. No thyromegaly present.   Cardiovascular: Normal rate and regular rhythm.    No murmur heard.  Pulmonary/Chest: Effort normal and breath sounds normal.   Abdominal: Bowel sounds are normal.   Psychiatric: She has a normal mood and affect. Her behavior is normal.   Nursing note and vitals reviewed.      Assessment/Plan   Alba was seen today for hypertension.    Diagnoses and all orders for this visit:    Benign essential hypertension    Pt doing well and is to continue current rx.

## 2018-05-02 DIAGNOSIS — F33.42 RECURRENT MAJOR DEPRESSIVE DISORDER, IN FULL REMISSION (HCC): ICD-10-CM

## 2018-05-02 DIAGNOSIS — E78.5 HYPERLIPIDEMIA, UNSPECIFIED HYPERLIPIDEMIA TYPE: ICD-10-CM

## 2018-05-02 DIAGNOSIS — M10.9 GOUT, UNSPECIFIED CAUSE, UNSPECIFIED CHRONICITY, UNSPECIFIED SITE: ICD-10-CM

## 2018-05-02 DIAGNOSIS — I10 BENIGN ESSENTIAL HYPERTENSION: ICD-10-CM

## 2018-05-02 RX ORDER — ATENOLOL 25 MG/1
TABLET ORAL
Qty: 90 TABLET | Refills: 1 | OUTPATIENT
Start: 2018-05-02

## 2018-05-02 RX ORDER — FEBUXOSTAT 40 MG/1
TABLET ORAL
Qty: 90 TABLET | Refills: 0 | OUTPATIENT
Start: 2018-05-02

## 2018-05-02 RX ORDER — DULOXETIN HYDROCHLORIDE 60 MG/1
CAPSULE, DELAYED RELEASE ORAL
Qty: 90 CAPSULE | Refills: 1 | OUTPATIENT
Start: 2018-05-02

## 2018-05-02 RX ORDER — SIMVASTATIN 20 MG
TABLET ORAL
Qty: 90 TABLET | Refills: 1 | OUTPATIENT
Start: 2018-05-02

## 2018-05-15 ENCOUNTER — OFFICE VISIT (OUTPATIENT)
Dept: FAMILY MEDICINE CLINIC | Facility: CLINIC | Age: 83
End: 2018-05-15

## 2018-05-15 VITALS
SYSTOLIC BLOOD PRESSURE: 150 MMHG | OXYGEN SATURATION: 98 % | HEIGHT: 62 IN | HEART RATE: 58 BPM | TEMPERATURE: 97.5 F | WEIGHT: 128.5 LBS | RESPIRATION RATE: 18 BRPM | DIASTOLIC BLOOD PRESSURE: 78 MMHG | BODY MASS INDEX: 23.65 KG/M2

## 2018-05-15 DIAGNOSIS — Z87.898 H/O MOTION SICKNESS: ICD-10-CM

## 2018-05-15 DIAGNOSIS — I10 BENIGN ESSENTIAL HYPERTENSION: Primary | ICD-10-CM

## 2018-05-15 DIAGNOSIS — L29.9 PRURITUS: ICD-10-CM

## 2018-05-15 PROCEDURE — 99214 OFFICE O/P EST MOD 30 MIN: CPT | Performed by: NURSE PRACTITIONER

## 2018-05-15 RX ORDER — SCOLOPAMINE TRANSDERMAL SYSTEM 1 MG/1
1 PATCH, EXTENDED RELEASE TRANSDERMAL
Qty: 4 EACH | Refills: 0 | Status: SHIPPED | OUTPATIENT
Start: 2018-05-15 | End: 2018-08-08 | Stop reason: HOSPADM

## 2018-05-15 RX ORDER — LOSARTAN POTASSIUM 100 MG/1
100 TABLET ORAL DAILY
Qty: 30 TABLET | Refills: 0 | Status: SHIPPED | OUTPATIENT
Start: 2018-05-15 | End: 2018-05-29 | Stop reason: SDUPTHER

## 2018-05-15 NOTE — PROGRESS NOTES
Subjective   Alba Correa is a 84 y.o. female.     History of Present Illness   Alba Correa 84 y.o. female presents for evaluation of a possible rash involving the right mid back. Patient reports itching in the area for a few days after using perfume spray.  She has not seen rash but was told by her  that she had one.  She states itching it better today.  She has not been using OTC treatment.      Patient's BP is also elevated today.      She is going on a cruise the second week of June and would like to have scopolamine patches for motion sickness.     The following portions of the patient's history were reviewed and updated as appropriate: allergies, current medications, past family history, past medical history, past social history, past surgical history and problem list.    Review of Systems   Constitutional: Negative for unexpected weight change.   Eyes: Negative for visual disturbance.   Respiratory: Negative for shortness of breath.    Cardiovascular: Negative for chest pain and palpitations.   Skin: Positive for rash.   Neurological: Negative for dizziness, light-headedness and headaches.   Psychiatric/Behavioral: Negative for behavioral problems.       Objective   Physical Exam   Constitutional: She is oriented to person, place, and time. She appears well-developed and well-nourished.   Cardiovascular: Normal rate and regular rhythm.    Pulmonary/Chest: Effort normal and breath sounds normal.   Neurological: She is alert and oriented to person, place, and time.   Skin: Skin is warm, dry and intact. No rash noted. No erythema.   No rash noted to right mid back.    Psychiatric: She has a normal mood and affect. Judgment normal.   Nursing note and vitals reviewed.      Assessment/Plan   Problems Addressed this Visit        Cardiovascular and Mediastinum    Benign essential hypertension - Primary    Relevant Medications    losartan (COZAAR) 100 MG tablet      Other Visit Diagnoses     Pruritus         H/O motion sickness        Relevant Medications    Scopolamine (TRANSDERM-SCOP, 1.5 MG,) 1.5 MG/3DAYS patch          Increased losartan to 100 mg daily. Patient will f/u in 2 weeks for BP recheck.

## 2018-05-21 ENCOUNTER — PRIOR AUTHORIZATION (OUTPATIENT)
Dept: FAMILY MEDICINE CLINIC | Facility: CLINIC | Age: 83
End: 2018-05-21

## 2018-05-21 DIAGNOSIS — M10.9 GOUT, UNSPECIFIED CAUSE, UNSPECIFIED CHRONICITY, UNSPECIFIED SITE: ICD-10-CM

## 2018-05-21 RX ORDER — FEBUXOSTAT 40 MG/1
TABLET ORAL
Qty: 90 TABLET | Refills: 1 | Status: SHIPPED | OUTPATIENT
Start: 2018-05-21 | End: 2018-09-10 | Stop reason: SDUPTHER

## 2018-05-29 ENCOUNTER — OFFICE VISIT (OUTPATIENT)
Dept: FAMILY MEDICINE CLINIC | Facility: CLINIC | Age: 83
End: 2018-05-29

## 2018-05-29 VITALS
SYSTOLIC BLOOD PRESSURE: 130 MMHG | TEMPERATURE: 97.8 F | HEIGHT: 62 IN | OXYGEN SATURATION: 96 % | HEART RATE: 57 BPM | DIASTOLIC BLOOD PRESSURE: 60 MMHG | RESPIRATION RATE: 16 BRPM | BODY MASS INDEX: 23.74 KG/M2 | WEIGHT: 129 LBS

## 2018-05-29 DIAGNOSIS — R25.2 MUSCLE CRAMPING: Primary | ICD-10-CM

## 2018-05-29 DIAGNOSIS — I10 BENIGN ESSENTIAL HYPERTENSION: ICD-10-CM

## 2018-05-29 PROCEDURE — 99213 OFFICE O/P EST LOW 20 MIN: CPT | Performed by: NURSE PRACTITIONER

## 2018-05-29 RX ORDER — LOSARTAN POTASSIUM 100 MG/1
100 TABLET ORAL DAILY
Qty: 30 TABLET | Refills: 2 | Status: SHIPPED | OUTPATIENT
Start: 2018-05-29 | End: 2018-08-08 | Stop reason: HOSPADM

## 2018-05-29 NOTE — PROGRESS NOTES
Subjective   Alba Correa is a 85 y.o. female.     History of Present Illness   Patient presents to office to f/u on blood pressure. At last visit, her losartan was increased to 100 mg daily.  Patient states she has been taking as prescribed. She reports feeling well and denies side effects. BP improved today.       Reports muscle cramping in hands and legs at night.  Does not have much cramping during the day.  She states she does not drink enough water. She is taking multivitamin daily.   The following portions of the patient's history were reviewed and updated as appropriate: allergies, current medications, past family history, past medical history, past social history, past surgical history and problem list.    Review of Systems   Constitutional: Negative for chills, fever and unexpected weight change.   Respiratory: Negative for cough and shortness of breath.    Cardiovascular: Negative for chest pain and palpitations.   Psychiatric/Behavioral: Negative for behavioral problems.       Objective   Physical Exam   Constitutional: She is oriented to person, place, and time. She appears well-developed and well-nourished.   Pulmonary/Chest: Effort normal.   Neurological: She is alert and oriented to person, place, and time.   Psychiatric: She has a normal mood and affect. Judgment normal.   Nursing note and vitals reviewed.      Assessment/Plan   Alba was seen today for hypertension.    Diagnoses and all orders for this visit:    Muscle cramping  -     Basic metabolic panel    Benign essential hypertension  -     losartan (COZAAR) 100 MG tablet; Take 1 tablet by mouth Daily.        Will recheck BMP to make sure potassium level ok. Discussed with her that taking multivitamin with losartan may be causing her potassium to be elevated.

## 2018-05-30 LAB
BUN SERPL-MCNC: 20 MG/DL (ref 8–23)
BUN/CREAT SERPL: 16.1 (ref 7–25)
CALCIUM SERPL-MCNC: 9.8 MG/DL (ref 8.6–10.5)
CHLORIDE SERPL-SCNC: 99 MMOL/L (ref 98–107)
CO2 SERPL-SCNC: 27 MMOL/L (ref 22–29)
CREAT SERPL-MCNC: 1.24 MG/DL (ref 0.57–1)
GFR SERPLBLD CREATININE-BSD FMLA CKD-EPI: 41 ML/MIN/1.73
GFR SERPLBLD CREATININE-BSD FMLA CKD-EPI: 50 ML/MIN/1.73
GLUCOSE SERPL-MCNC: 85 MG/DL (ref 65–99)
POTASSIUM SERPL-SCNC: 4.9 MMOL/L (ref 3.5–5.2)
SODIUM SERPL-SCNC: 138 MMOL/L (ref 136–145)

## 2018-06-12 DIAGNOSIS — I10 BENIGN ESSENTIAL HYPERTENSION: ICD-10-CM

## 2018-06-12 RX ORDER — LOSARTAN POTASSIUM 100 MG/1
TABLET ORAL
Qty: 30 TABLET | Refills: 0 | Status: SHIPPED | OUTPATIENT
Start: 2018-06-12 | End: 2018-09-10 | Stop reason: SDUPTHER

## 2018-06-16 ENCOUNTER — HOSPITAL ENCOUNTER (EMERGENCY)
Facility: HOSPITAL | Age: 83
Discharge: HOME | End: 2018-06-16
Attending: EMERGENCY MEDICINE | Admitting: EMERGENCY MEDICINE

## 2018-06-16 VITALS
TEMPERATURE: 97.1 F | HEART RATE: 80 BPM | OXYGEN SATURATION: 95 % | RESPIRATION RATE: 16 BRPM | SYSTOLIC BLOOD PRESSURE: 172 MMHG | HEIGHT: 62 IN | WEIGHT: 125 LBS | BODY MASS INDEX: 23 KG/M2 | DIASTOLIC BLOOD PRESSURE: 67 MMHG

## 2018-06-16 DIAGNOSIS — S81.802A LEG WOUND, LEFT, INITIAL ENCOUNTER: Primary | ICD-10-CM

## 2018-06-16 PROCEDURE — 99283 EMERGENCY DEPT VISIT LOW MDM: CPT

## 2018-06-16 PROCEDURE — 90715 TDAP VACCINE 7 YRS/> IM: CPT | Performed by: EMERGENCY MEDICINE

## 2018-06-16 PROCEDURE — 90471 IMMUNIZATION ADMIN: CPT | Performed by: EMERGENCY MEDICINE

## 2018-06-16 PROCEDURE — 25010000002 TDAP 5-2.5-18.5 LF-MCG/0.5 SUSPENSION: Performed by: EMERGENCY MEDICINE

## 2018-06-16 RX ORDER — CEPHALEXIN 500 MG/1
500 CAPSULE ORAL ONCE
Status: COMPLETED | OUTPATIENT
Start: 2018-06-16 | End: 2018-06-16

## 2018-06-16 RX ORDER — CEPHALEXIN 250 MG/1
250 CAPSULE ORAL 2 TIMES DAILY
Qty: 14 CAPSULE | Refills: 0 | Status: SHIPPED | OUTPATIENT
Start: 2018-06-16 | End: 2018-08-08 | Stop reason: HOSPADM

## 2018-06-16 RX ADMIN — TETANUS TOXOID, REDUCED DIPHTHERIA TOXOID AND ACELLULAR PERTUSSIS VACCINE, ADSORBED 0.5 ML: 5; 2.5; 8; 8; 2.5 SUSPENSION INTRAMUSCULAR at 21:09

## 2018-06-16 RX ADMIN — CEPHALEXIN 500 MG: 500 CAPSULE ORAL at 21:03

## 2018-06-17 NOTE — ED PROVIDER NOTES
[facility] EMERGENCY DEPARTMENT ENCOUNTER    CHIEF COMPLAINT  Chief Complaint: left leg pain  History given by: patient  History limited by: nothing  [facility] Room Number: 53/53  PMD: Aramis Doty MD      HPI:  Pt is a 85 y.o. female who presents complaining of redness and swelling in her left lower leg. Pt cut her left leg just below her knee 5 days ago while on a cruise and the redness and swelling has been progressively worsening since then. While on the cruise, the wound was cleaned and steri-strips were placed.  She returned this afternoon and came straight here for further evaluation.     Onset: gradual  Duration: 5 days  Severity: moderate  Associated symptoms: redness and swelling around a clean and dressed wound  Previous treatment: wound was cleaned and steri-strips were placed.     PAST MEDICAL HISTORY  Active Ambulatory Problems     Diagnosis Date Noted   • Chronic kidney failure    • DVT (deep venous thrombosis)    • Depression    • Edema    • Gout    • Elevated cholesterol    • Osteoporosis    • Hyperparathyroidism    • Benign essential hypertension    • Adaptive colitis    • Osteopenia    • Rheumatoid arthritis    • Degeneration of intervertebral disc    • D (diarrhea) 11/20/2015   • BP (high blood pressure) 11/20/2015   • 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   • Open leg wound 11/20/2015   • Vaginal odor 11/20/2015   • Infected wound 11/20/2015   • History of depression 07/11/2017   • History of renal insufficiency syndrome 07/11/2017   • History of degenerative disc disease 07/11/2017   • History of osteoporosis 07/11/2017   • History of DVT (deep vein thrombosis) 07/11/2017   • History of rheumatoid arthritis 07/11/2017   • Hyperlipidemia 01/10/2018   • Macrocytosis 02/28/2018     Resolved Ambulatory Problems     Diagnosis Date Noted   • No Resolved Ambulatory Problems     Past Medical History:   Diagnosis Date   • Benign essential hypertension    • Chronic renal insufficiency    • Degeneration of  intervertebral disc    • Depression    • Disease of thyroid gland    • DVT (deep venous thrombosis)    • Edema    • Gout    • H/O complete eye exam 09/2016   • Hyperlipidemia    • Hyperparathyroidism    • IBS (irritable bowel syndrome)    • OAB (overactive bladder)    • Osteopenia    • Osteoporosis    • Rheumatoid arthritis        PAST SURGICAL HISTORY  Past Surgical History:   Procedure Laterality Date   • BREAST BIOPSY     • COLONOSCOPY      declines   • HERNIA REPAIR  1965   • HYSTERECTOMY      still has ovaries   • MAMMO BILATERAL  11/16/2016    pt declines   • PAP SMEAR  11/16/2016    declines       FAMILY HISTORY  Family History   Problem Relation Age of Onset   • Alcohol abuse Other    • Cancer Other    • Hypertension Other    • Kidney disease Other    • Lung disease Other    • Heart disease Mother 89   • Ovarian cancer Daughter 63   • Stomach cancer Father    • Ovarian cancer Sister    • Lung cancer Brother        SOCIAL HISTORY  Social History     Social History   • Marital status:      Spouse name: Ray   • Number of children: N/A   • Years of education: High school     Occupational History   • Dental assistant Retired     Social History Main Topics   • Smoking status: Former Smoker     Years: 5.00     Types: Cigarettes   • Smokeless tobacco: Never Used   • Alcohol use Yes      Comment: Occasional   • Drug use: No   • Sexual activity: Yes     Partners: Male     Birth control/ protection: Surgical     Other Topics Concern   • Not on file     Social History Narrative   • No narrative on file       ALLERGIES  Bactrim [sulfamethoxazole-trimethoprim]; Lisinopril; and Levofloxacin    REVIEW OF SYSTEMS  Review of Systems   Constitutional: Negative for fever.   Respiratory: Negative for shortness of breath.    Cardiovascular: Negative for chest pain.   Musculoskeletal: Positive for myalgias (pain in left leg).   Skin: Positive for color change (redness around wound in left leg).        Swelling around wound  in left anterior lower leg   All other systems reviewed and are negative.      PHYSICAL EXAM  ED Triage Vitals [06/16/18 2032]   Temp Heart Rate Resp BP SpO2   97.1 °F (36.2 °C) 80 16 -- 95 %      Temp src Heart Rate Source Patient Position BP Location FiO2 (%)   Tympanic Monitor -- -- --       Physical Exam   Constitutional: She is oriented to person, place, and time. No distress.   Eyes: EOM are normal.   Neck: Normal range of motion.   Cardiovascular: Normal rate and regular rhythm.    Pulmonary/Chest: Effort normal and breath sounds normal. No respiratory distress.   Neurological: She is alert and oriented to person, place, and time. She has normal sensation and normal strength.   Skin: Skin is warm and dry.   Steri-strips intact in left anterior proximal lower leg. There is mild erythema around the wound and there is bruising and swelling to the surrounding area.    Psychiatric: Affect normal.   Nursing note and vitals reviewed.      PROCEDURES  Procedures      PROGRESS AND CONSULTS     2042  Discussed plans to discharge Pt with an rx for abx to treat possible infection. She will receive a tdap booster prior to discharge. Instructed her to return to the ER if she develops worsening redness, fever, chills, drainage, or other signs of infection. Pt understands and agrees to all plans. All questions answered.       MEDICAL DECISION MAKING  Results were reviewed/discussed with the patient and they were also made aware of online access. Pt also made aware that some labs, such as cultures, will not be resulted during ER visit and follow up with PMD is necessary.     MDM  Number of Diagnoses or Management Options  Leg wound, left, initial encounter:   Patient Progress  Patient progress: stable         DIAGNOSIS  Final diagnoses:   Leg wound, left, initial encounter       DISPOSITION  DISCHARGE    Patient discharged in stable condition.    Reviewed implications of results, diagnosis, meds, responsibility to follow up,  warning signs and symptoms of possible worsening, potential complications and reasons to return to ER, including fever, chills, worsening redness or swelling, drainage or other signs of infection.    Patient/Family voiced understanding of above instructions.    Discussed plan for discharge, as there is no emergent indication for admission. Patient referred to primary care provider for BP management due to today's BP. Pt/family is agreeable and understands need for follow up and repeat testing.  Pt is aware that discharge does not mean that nothing is wrong but it indicates no emergency is present that requires admission and they must continue care with follow-up as given below or physician of their choice.     FOLLOW-UP  Aramis Doty MD  [address] [facility] [address]  [place] [address]  [phone]    In 3 days  For recheck         Medication List      New Prescriptions    cephalexin 250 MG capsule  Commonly known as:  KEFLEX  Take 1 capsule by mouth 2 (Two) Times a Day.              Latest Documented Vital Signs:  As of 9:09 PM  BP- 172/67 HR- 80 Temp- 97.1 °F (36.2 °C) (Tympanic) O2 sat- 95%    --  Documentation assistance provided by nick Ricketts for Dr. Slade.  Information recorded by the scralicia was done at my direction and has been verified and validated by me.     Lela Ricketts  06/16/18 2049       Rafi Slade MD  06/16/18 2110

## 2018-06-17 NOTE — ED NOTES
"Pt sitting up on stretcher in no obvious distress, states \"my  made me come here.\" Pt fell while on a cruise, hematoma noted to left anterior shin area, some mild redness and edema to both legs. Steri-strips noted just above the hematoma, intact, no drainage. 2 small scabbed areas noted to rt shin, without drainage, warmth or obvious infection.     Shonda Wolfe RN  06/16/18 2055    "

## 2018-06-19 ENCOUNTER — TELEPHONE (OUTPATIENT)
Dept: SOCIAL WORK | Facility: HOSPITAL | Age: 83
End: 2018-06-19

## 2018-06-19 NOTE — TELEPHONE ENCOUNTER
ED f/u phone call: states she is taking prescribed med and area looks better. Reminded to f/u w/ PCP. No questions/concerns

## 2018-06-29 ENCOUNTER — OFFICE VISIT (OUTPATIENT)
Dept: WOUND CARE | Facility: HOSPITAL | Age: 83
End: 2018-06-29
Attending: SURGERY

## 2018-06-29 PROCEDURE — G0463 HOSPITAL OUTPT CLINIC VISIT: HCPCS

## 2018-07-02 ENCOUNTER — APPOINTMENT (OUTPATIENT)
Dept: WOUND CARE | Facility: HOSPITAL | Age: 83
End: 2018-07-02
Attending: SURGERY

## 2018-07-09 ENCOUNTER — OFFICE VISIT (OUTPATIENT)
Dept: WOUND CARE | Facility: HOSPITAL | Age: 83
End: 2018-07-09
Attending: SURGERY

## 2018-07-09 PROCEDURE — G0463 HOSPITAL OUTPT CLINIC VISIT: HCPCS

## 2018-07-16 ENCOUNTER — OFFICE VISIT (OUTPATIENT)
Dept: WOUND CARE | Facility: HOSPITAL | Age: 83
End: 2018-07-16
Attending: SURGERY

## 2018-07-16 PROCEDURE — G0463 HOSPITAL OUTPT CLINIC VISIT: HCPCS

## 2018-07-30 ENCOUNTER — OFFICE VISIT (OUTPATIENT)
Dept: WOUND CARE | Facility: HOSPITAL | Age: 83
End: 2018-07-30
Attending: SURGERY

## 2018-07-30 PROCEDURE — G0463 HOSPITAL OUTPT CLINIC VISIT: HCPCS

## 2018-08-05 ENCOUNTER — APPOINTMENT (OUTPATIENT)
Dept: MRI IMAGING | Facility: HOSPITAL | Age: 83
End: 2018-08-05

## 2018-08-05 ENCOUNTER — HOSPITAL ENCOUNTER (INPATIENT)
Facility: HOSPITAL | Age: 83
LOS: 3 days | Discharge: NURSING, REHAB OR LONG-TERM CARE | End: 2018-08-08
Attending: EMERGENCY MEDICINE | Admitting: HOSPITALIST

## 2018-08-05 ENCOUNTER — APPOINTMENT (OUTPATIENT)
Dept: CT IMAGING | Facility: HOSPITAL | Age: 83
End: 2018-08-05

## 2018-08-05 DIAGNOSIS — R26.2 DIFFICULTY WALKING: ICD-10-CM

## 2018-08-05 DIAGNOSIS — R29.898 RIGHT LEG WEAKNESS: Primary | ICD-10-CM
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
## 2018-08-05 LAB
ALBUMIN SERPL-MCNC: 4.2 G/DL (ref 3.5–5.2)
ALBUMIN/GLOB SERPL: 1.2 G/DL
ALP SERPL-CCNC: 76 U/L (ref 39–117)
ALT SERPL W P-5'-P-CCNC: 16 U/L (ref 1–33)
ANION GAP SERPL CALCULATED.3IONS-SCNC: 12.1 MMOL/L
AST SERPL-CCNC: 25 U/L (ref 1–32)
BASOPHILS # BLD AUTO: 0.08 10*3/MM3 (ref 0–0.2)
BASOPHILS NFR BLD AUTO: 1.3 % (ref 0–1.5)
BILIRUB SERPL-MCNC: 0.2 MG/DL (ref 0.1–1.2)
BUN BLD-MCNC: 19 MG/DL (ref 8–23)
BUN/CREAT SERPL: 17.8 (ref 7–25)
CALCIUM SPEC-SCNC: 9.5 MG/DL (ref 8.6–10.5)
CHLORIDE SERPL-SCNC: 103 MMOL/L (ref 98–107)
CO2 SERPL-SCNC: 24.9 MMOL/L (ref 22–29)
CREAT BLD-MCNC: 1.07 MG/DL (ref 0.57–1)
DEPRECATED RDW RBC AUTO: 48.5 FL (ref 37–54)
EOSINOPHIL # BLD AUTO: 0.51 10*3/MM3 (ref 0–0.7)
EOSINOPHIL NFR BLD AUTO: 8.1 % (ref 0.3–6.2)
ERYTHROCYTE [DISTWIDTH] IN BLOOD BY AUTOMATED COUNT: 12.7 % (ref 11.7–13)
GFR SERPL CREATININE-BSD FRML MDRD: 49 ML/MIN/1.73
GLOBULIN UR ELPH-MCNC: 3.4 GM/DL
GLUCOSE BLD-MCNC: 113 MG/DL (ref 65–99)
HCT VFR BLD AUTO: 38.7 % (ref 35.6–45.5)
HGB BLD-MCNC: 12.5 G/DL (ref 11.9–15.5)
IMM GRANULOCYTES # BLD: 0.01 10*3/MM3 (ref 0–0.03)
IMM GRANULOCYTES NFR BLD: 0.2 % (ref 0–0.5)
INR PPP: 1.02 (ref 0.9–1.1)
LYMPHOCYTES # BLD AUTO: 2.04 10*3/MM3 (ref 0.9–4.8)
LYMPHOCYTES NFR BLD AUTO: 32.5 % (ref 19.6–45.3)
MCH RBC QN AUTO: 33.7 PG (ref 26.9–32)
MCHC RBC AUTO-ENTMCNC: 32.3 G/DL (ref 32.4–36.3)
MCV RBC AUTO: 104.3 FL (ref 80.5–98.2)
MONOCYTES # BLD AUTO: 0.45 10*3/MM3 (ref 0.2–1.2)
MONOCYTES NFR BLD AUTO: 7.2 % (ref 5–12)
NEUTROPHILS # BLD AUTO: 3.19 10*3/MM3 (ref 1.9–8.1)
NEUTROPHILS NFR BLD AUTO: 50.9 % (ref 42.7–76)
PLATELET # BLD AUTO: 237 10*3/MM3 (ref 140–500)
PMV BLD AUTO: 10.4 FL (ref 6–12)
POTASSIUM BLD-SCNC: 4.8 MMOL/L (ref 3.5–5.2)
PROT SERPL-MCNC: 7.6 G/DL (ref 6–8.5)
PROTHROMBIN TIME: 13.2 SECONDS (ref 11.7–14.2)
RBC # BLD AUTO: 3.71 10*6/MM3 (ref 3.9–5.2)
SODIUM BLD-SCNC: 140 MMOL/L (ref 136–145)
TROPONIN T SERPL-MCNC: <0.01 NG/ML (ref 0–0.03)
WBC NRBC COR # BLD: 6.27 10*3/MM3 (ref 4.5–10.7)

## 2018-08-05 PROCEDURE — 70551 MRI BRAIN STEM W/O DYE: CPT

## 2018-08-05 PROCEDURE — 70547 MR ANGIOGRAPHY NECK W/O DYE: CPT

## 2018-08-05 PROCEDURE — G0378 HOSPITAL OBSERVATION PER HR: HCPCS

## 2018-08-05 PROCEDURE — 84484 ASSAY OF TROPONIN QUANT: CPT | Performed by: EMERGENCY MEDICINE

## 2018-08-05 PROCEDURE — 85610 PROTHROMBIN TIME: CPT | Performed by: EMERGENCY MEDICINE

## 2018-08-05 PROCEDURE — 93010 ELECTROCARDIOGRAM REPORT: CPT | Performed by: INTERNAL MEDICINE

## 2018-08-05 PROCEDURE — 80053 COMPREHEN METABOLIC PANEL: CPT | Performed by: EMERGENCY MEDICINE

## 2018-08-05 PROCEDURE — 93005 ELECTROCARDIOGRAM TRACING: CPT | Performed by: EMERGENCY MEDICINE

## 2018-08-05 PROCEDURE — 72148 MRI LUMBAR SPINE W/O DYE: CPT

## 2018-08-05 PROCEDURE — 86592 SYPHILIS TEST NON-TREP QUAL: CPT | Performed by: PSYCHIATRY & NEUROLOGY

## 2018-08-05 PROCEDURE — 70544 MR ANGIOGRAPHY HEAD W/O DYE: CPT

## 2018-08-05 PROCEDURE — 85025 COMPLETE CBC W/AUTO DIFF WBC: CPT | Performed by: EMERGENCY MEDICINE

## 2018-08-05 PROCEDURE — 72141 MRI NECK SPINE W/O DYE: CPT

## 2018-08-05 PROCEDURE — 94799 UNLISTED PULMONARY SVC/PX: CPT

## 2018-08-05 PROCEDURE — 99284 EMERGENCY DEPT VISIT MOD MDM: CPT

## 2018-08-05 PROCEDURE — 70450 CT HEAD/BRAIN W/O DYE: CPT

## 2018-08-05 PROCEDURE — 99221 1ST HOSP IP/OBS SF/LOW 40: CPT | Performed by: PSYCHIATRY & NEUROLOGY

## 2018-08-05 RX ORDER — ASPIRIN 300 MG/1
300 SUPPOSITORY RECTAL DAILY
Status: DISCONTINUED | OUTPATIENT
Start: 2018-08-05 | End: 2018-08-08 | Stop reason: HOSPADM

## 2018-08-05 RX ORDER — DULOXETIN HYDROCHLORIDE 60 MG/1
60 CAPSULE, DELAYED RELEASE ORAL DAILY
Status: DISCONTINUED | OUTPATIENT
Start: 2018-08-05 | End: 2018-08-08 | Stop reason: HOSPADM

## 2018-08-05 RX ORDER — LOSARTAN POTASSIUM 100 MG/1
100 TABLET ORAL DAILY
Status: DISCONTINUED | OUTPATIENT
Start: 2018-08-05 | End: 2018-08-08 | Stop reason: HOSPADM

## 2018-08-05 RX ORDER — FEBUXOSTAT 40 MG/1
40 TABLET, FILM COATED ORAL DAILY
Status: DISCONTINUED | OUTPATIENT
Start: 2018-08-05 | End: 2018-08-08 | Stop reason: HOSPADM

## 2018-08-05 RX ORDER — ACETAMINOPHEN,DIPHENHYDRAMINE HCL 500; 25 MG/1; MG/1
1 TABLET, FILM COATED ORAL NIGHTLY PRN
Status: DISCONTINUED | OUTPATIENT
Start: 2018-08-05 | End: 2018-08-06 | Stop reason: CLARIF

## 2018-08-05 RX ORDER — LOSARTAN POTASSIUM 100 MG/1
100 TABLET ORAL DAILY
Status: DISCONTINUED | OUTPATIENT
Start: 2018-08-05 | End: 2018-08-05 | Stop reason: SDUPTHER

## 2018-08-05 RX ORDER — ATENOLOL 25 MG/1
25 TABLET ORAL DAILY
Status: DISCONTINUED | OUTPATIENT
Start: 2018-08-05 | End: 2018-08-08 | Stop reason: HOSPADM

## 2018-08-05 RX ORDER — ASPIRIN 325 MG
325 TABLET ORAL ONCE
Status: COMPLETED | OUTPATIENT
Start: 2018-08-05 | End: 2018-08-05

## 2018-08-05 RX ORDER — SODIUM CHLORIDE 0.9 % (FLUSH) 0.9 %
1-10 SYRINGE (ML) INJECTION AS NEEDED
Status: DISCONTINUED | OUTPATIENT
Start: 2018-08-05 | End: 2018-08-08 | Stop reason: HOSPADM

## 2018-08-05 RX ORDER — ASPIRIN 325 MG
325 TABLET ORAL DAILY
Status: DISCONTINUED | OUTPATIENT
Start: 2018-08-05 | End: 2018-08-08 | Stop reason: HOSPADM

## 2018-08-05 RX ORDER — ATORVASTATIN CALCIUM 80 MG/1
80 TABLET, FILM COATED ORAL NIGHTLY
Status: DISCONTINUED | OUTPATIENT
Start: 2018-08-05 | End: 2018-08-07

## 2018-08-05 RX ORDER — SODIUM CHLORIDE 0.9 % (FLUSH) 0.9 %
10 SYRINGE (ML) INJECTION AS NEEDED
Status: DISCONTINUED | OUTPATIENT
Start: 2018-08-05 | End: 2018-08-08 | Stop reason: HOSPADM

## 2018-08-05 RX ORDER — ONDANSETRON 2 MG/ML
4 INJECTION INTRAMUSCULAR; INTRAVENOUS EVERY 6 HOURS PRN
Status: DISCONTINUED | OUTPATIENT
Start: 2018-08-05 | End: 2018-08-08 | Stop reason: HOSPADM

## 2018-08-05 RX ORDER — AMLODIPINE BESYLATE 2.5 MG/1
2.5 TABLET ORAL DAILY
Status: DISCONTINUED | OUTPATIENT
Start: 2018-08-05 | End: 2018-08-08 | Stop reason: HOSPADM

## 2018-08-05 RX ORDER — ACETAMINOPHEN 325 MG/1
650 TABLET ORAL EVERY 6 HOURS PRN
Status: DISCONTINUED | OUTPATIENT
Start: 2018-08-05 | End: 2018-08-08 | Stop reason: HOSPADM

## 2018-08-05 RX ADMIN — ASPIRIN 325 MG: 325 TABLET ORAL at 13:09

## 2018-08-05 RX ADMIN — ATORVASTATIN CALCIUM 80 MG: 80 TABLET, FILM COATED ORAL at 20:04

## 2018-08-05 NOTE — H&P
HISTORY AND PHYSICAL   [facility]        Patient Identification:  Name: Alba Correa  Age: 85 y.o.  Sex: female  :  1933  MRN: [id number]                     Primary Care Physician: Aramis Doty MD    Chief Complaint:  Right leg weakness    History of Present Illness:         The patient is an 85-year-old white female with history of hypertension, chronic kidney disease, degenerative disc disease, hypothyroidism, history of rheumatoid arthritis, osteoporosis and hyperlipidemia who is admitted with history of having some right leg weakness for 2 days.  Her right legs also been numb.  She also had some associated left hand and arm weakness.  She'd fallen down twice and bruised up her elbow.  She also hit her head with one of her falls.  She's not had any headaches.  She denies any chest pain or shortness of air.  The patient was evaluated in ER and admitted for possible stroke workup.    Past Medical History:  Past Medical History:   Diagnosis Date   • Benign essential hypertension    • Chronic kidney disease    • Chronic renal insufficiency    • Degeneration of intervertebral disc    • Depression    • Disease of thyroid gland    • DVT (deep venous thrombosis) (CMS/HCC)    • Edema    • Gout    • H/O complete eye exam 2016   • Hyperlipidemia    • Hyperparathyroidism (CMS/HCC)    • IBS (irritable bowel syndrome)    • OAB (overactive bladder)    • Osteopenia    • Osteoporosis    • Rheumatoid arthritis (CMS/HCC)      Past Surgical History:  Past Surgical History:   Procedure Laterality Date   • APPENDECTOMY     • BREAST BIOPSY     • COLONOSCOPY      declines   • HERNIA REPAIR     • HYSTERECTOMY      still has ovaries   • MAMMO BILATERAL  2016    pt declines   • PAP SMEAR  2016    declines      Home Meds:    (Not in a hospital admission)  Current meds    Current Facility-Administered Medications:   •  Insert peripheral IV, , , Once **AND** sodium chloride 0.9 % flush 10 mL, 10  mL, Intravenous, PRN, Sami Gomez MD    Current Outpatient Prescriptions:   •  acetaminophen (TYLENOL) 650 MG 8 hr tablet, Take 650 mg by mouth Every 8 (Eight) Hours As Needed for Mild Pain (1-3)., Disp: , Rfl:   •  amLODIPine (NORVASC) 2.5 MG tablet, Take 1 tablet by mouth Daily., Disp: 30 tablet, Rfl: 5  •  atenolol (TENORMIN) 25 MG tablet, Take 1 tablet by mouth Daily., Disp: 90 tablet, Rfl: 1  •  Calcium Carbonate-Vitamin D (CALCIUM 600+D PO), Take  by mouth., Disp: , Rfl:   •  diphenhydrAMINE-acetaminophen (TYLENOL PM)  MG tablet per tablet, Take 1 tablet by mouth At Night As Needed for Sleep., Disp: , Rfl:   •  DULoxetine (CYMBALTA) 60 MG capsule, Take 1 capsule by mouth Daily., Disp: 90 capsule, Rfl: 1  •  losartan (COZAAR) 100 MG tablet, Take 1 tablet by mouth Daily., Disp: 30 tablet, Rfl: 2  •  losartan (COZAAR) 100 MG tablet, TAKE 1 TABLET BY MOUTH EVERY DAY, Disp: 30 tablet, Rfl: 0  •  Omega-3 Fatty Acids (FISH OIL PO), Take 1,200 mg by mouth Every Night., Disp: , Rfl:   •  Scopolamine (TRANSDERM-SCOP, 1.5 MG,) 1.5 MG/3DAYS patch, Place 1 patch on the skin Every 72 (Seventy-Two) Hours., Disp: 4 each, Rfl: 0  •  simvastatin (ZOCOR) 20 MG tablet, Take 1 tablet by mouth Daily., Disp: 90 tablet, Rfl: 1  •  ULORIC 40 MG tablet, TAKE 1 TABLET DAILY, Disp: 90 tablet, Rfl: 1  •  BIOTIN PO, Take  by mouth., Disp: , Rfl:   •  cephalexin (KEFLEX) 250 MG capsule, Take 1 capsule by mouth 2 (Two) Times a Day., Disp: 14 capsule, Rfl: 0  •  Multiple Vitamins-Minerals (CENTRUM SILVER PO), Take  by mouth., Disp: , Rfl:   Allergies:  Allergies   Allergen Reactions   • Bactrim [Sulfamethoxazole-Trimethoprim] Delirium   • Lisinopril Unknown (See Comments)     Unknown per pt   • Levofloxacin Rash     Immunizations:  Immunization History   Administered Date(s) Administered   • Flu Vaccine Quad PF >18YRS 11/16/2016   • Flu Vaccine Quad PF >36MO 09/21/2017   • Pneumococcal Conjugate 13-Valent (PCV13) 08/10/2016   •  Tdap 2016, 2018     Social History:   Social History     Social History Narrative   • No narrative on file     Social History     Social History   • Marital status:      Spouse name: Ray   • Number of children: 3   • Years of education: High school     Occupational History   • Dental assistant Retired     Social History Main Topics   • Smoking status: Former Smoker     Years: 5.00     Types: Cigarettes   • Smokeless tobacco: Never Used   • Alcohol use Yes      Comment: Occasional   • Drug use: No   • Sexual activity: Yes     Partners: Male     Birth control/ protection: Surgical     Other Topics Concern   • Not on file     Social History Narrative   • No narrative on file       Family History:  Family History   Problem Relation Age of Onset   • Alcohol abuse Other    • Cancer Other    • Hypertension Other    • Kidney disease Other    • Lung disease Other    • Heart disease Mother 89   • Heart failure Mother    • Ovarian cancer Daughter 60   • Stomach cancer Father    • Kidney cancer Father 52   • Lung cancer Father 52   • Ovarian cancer Sister    • Lung cancer Brother 65        Review of Systems  See history of present illness and past medical history.  Patient denies headache, dizziness, syncope, falls, trauma, change in vision, change in hearing, change in taste, changes in weight, changes in appetite, focal weakness, numbness, or paresthesia.  Patient denies chest pain, palpitations, dyspnea, orthopnea, PND, cough, sinus pressure, rhinorrhea, epistaxis, hemoptysis, nausea, vomiting,hematemesis, diarrhea, constipation or hematchezia.  Denies cold or heat intolerance, polydipsia, polyuria, polyphagia. Denies hematuria, pyuria, dysuria, hesitancy, frequency or urgency. Denies fever, chills, sweats, night sweats.  Denies missing any routine medications. Remainder of ROS is negative.    Objective:  tMax 24 hrs: Temp (24hrs), Av.9 °F (36.6 °C), Min:97.9 °F (36.6 °C), Max:97.9 °F (36.6  "°C)    Vitals Ranges:   Temp:  [97.9 °F (36.6 °C)] 97.9 °F (36.6 °C)  Heart Rate:  [58-69] 58  Resp:  [14-18] 18  BP: (175-189)/(88-89) 175/88      Exam:  /88   Pulse 58   Temp 97.9 °F (36.6 °C) (Oral)   Resp 18   Ht 157.5 cm (62\")   Wt 56.7 kg (125 lb)   SpO2 98%   BMI 22.86 kg/m²     General Appearance:    Alert, cooperative, no distress, appears stated age   Head:    Normocephalic, without obvious abnormality, atraumatic   Eyes:    PERRL, conjunctiva/corneas clear, EOM's intact, both eyes   Ears:    Normal external ear canals, both ears   Nose:   Nares normal, septum midline, mucosa normal, no drainage    or sinus tenderness   Throat:   Lips, mucosa, and tongue normal   Neck:   Supple, symmetrical, trachea midline, no adenopathy;     thyroid:  no enlargement/tenderness/nodules; no carotid    bruit or JVD   Back:     Symmetric, no curvature, ROM normal, no CVA tenderness   Lungs:     Clear to auscultation bilaterally, respirations unlabored   Chest Wall:    No tenderness or deformity    Heart:    Regular rate and rhythm, S1 and S2 normal, no murmur, rub   or gallop   Abdomen:     Soft, non-tender, bowel sounds active all four quadrants,     no masses, no hepatomegaly, no splenomegaly   Extremities:   Extremities normal, atraumatic, no cyanosis or edema   Pulses:   2+ and symmetric all extremities   Skin:   Skin color, texture, turgor normal, no rashes or lesions   Lymph nodes:   Cervical, supraclavicular, and axillary nodes normal   Neurologic:   CNII-XII intact, right leg weak, sensation intact throughout      .    Data Review:  Lab Results (last 72 hours)     Procedure Component Value Units Date/Time    Comprehensive Metabolic Panel [[id number]]  (Abnormal) Collected:  08/05/18 1152    Specimen:  Blood Updated:  08/05/18 1224     Glucose 113 (H) mg/dL      BUN 19 mg/dL      Creatinine 1.07 (H) mg/dL      Sodium 140 mmol/L      Potassium 4.8 mmol/L      Chloride 103 mmol/L      CO2 24.9 mmol/L      " Calcium 9.5 mg/dL      Total Protein 7.6 g/dL      Albumin 4.20 g/dL      ALT (SGPT) 16 U/L      AST (SGOT) 25 U/L      Alkaline Phosphatase 76 U/L      Total Bilirubin 0.2 mg/dL      eGFR Non African Amer 49 (L) mL/min/1.73      Globulin 3.4 gm/dL      A/G Ratio 1.2 g/dL      BUN/Creatinine Ratio 17.8     Anion Gap 12.1 mmol/L     Narrative:       The MDRD GFR formula is only valid for adults with stable renal function between ages 18 and 70.    Troponin [[id number]]  (Normal) Collected:  08/05/18 1152    Specimen:  Blood Updated:  08/05/18 1224     Troponin T <0.010 ng/mL     Narrative:       Troponin T Reference Ranges:  Less than 0.03 ng/mL:    Negative for AMI  0.03 to 0.09 ng/mL:      Indeterminant for AMI  Greater than 0.09 ng/mL: Positive for AMI    Protime-INR [[id number]]  (Normal) Collected:  08/05/18 1152    Specimen:  Blood Updated:  08/05/18 1213     Protime 13.2 Seconds      INR 1.02    CBC & Differential [[id number]] Collected:  08/05/18 1153    Specimen:  Blood Updated:  08/05/18 1205    Narrative:       The following orders were created for panel order CBC & Differential.  Procedure                               Abnormality         Status                     ---------                               -----------         ------                     CBC Auto Differential[[id number]]        Abnormal            Final result                 Please view results for these tests on the individual orders.    CBC Auto Differential [[id number]]  (Abnormal) Collected:  08/05/18 1153    Specimen:  Blood Updated:  08/05/18 1205     WBC 6.27 10*3/mm3      RBC 3.71 (L) 10*6/mm3      Hemoglobin 12.5 g/dL      Hematocrit 38.7 %      .3 (H) fL      MCH 33.7 (H) pg      MCHC 32.3 (L) g/dL      RDW 12.7 %      RDW-SD 48.5 fl      MPV 10.4 fL      Platelets 237 10*3/mm3      Neutrophil % 50.9 %      Lymphocyte % 32.5 %      Monocyte % 7.2 %      Eosinophil % 8.1 (H) %      Basophil % 1.3 %      Immature Grans % 0.2 %       Neutrophils, Absolute 3.19 10*3/mm3      Lymphocytes, Absolute 2.04 10*3/mm3      Monocytes, Absolute 0.45 10*3/mm3      Eosinophils, Absolute 0.51 10*3/mm3      Basophils, Absolute 0.08 10*3/mm3      Immature Grans, Absolute 0.01 10*3/mm3                    Imaging Results (all)     Procedure Component Value Units Date/Time    CT Head Without Contrast [[id number]] Collected:  08/05/18 1207     Updated:  08/05/18 1213    Narrative:       CT HEAD WO CONTRAST-     INDICATIONS: Weakness, fall injury     TECHNIQUE: Radiation dose reduction techniques were utilized, including  automated exposure control and exposure modulation based on body size.      Noncontrast head CT     COMPARISON: 1/22/2015     FINDINGS:              No acute intracranial hemorrhage, midline shift or mass effect. No acute  territorial infarct is identified.     Mild to moderate similar-appearing periventricular hypodensities suggest  chronic small vessel ischemic change in a patient this age.     Arterial calcifications are seen at the base of the brain.     Ventricles, cisterns, cerebral sulci are unremarkable for patient age.     The visualized paranasal sinuses, orbits, mastoid air cells are  unremarkable. Degenerative changes are noted in the temporomandibular  joints, right more than left.                   Impression:              No acute intracranial hemorrhage or hydrocephalus. Chronic changes.  If there is further clinical concern, MRI could be considered for  further evaluation.     This report was finalized on 8/5/2018 12:10 PM by Dr. Levy Mock M.D.           Patient Active Problem List   Diagnosis Code   • Chronic kidney failure N18.9   • DVT (deep venous thrombosis) (CMS/[facility]) I82.409   • Depression F32.9   • Edema R60.9   • Gout M10.9   • Elevated cholesterol E78.00   • Osteoporosis M81.0   • Hyperparathyroidism (CMS/[facility]) E21.3   • Benign essential hypertension I10   • Adaptive colitis K58.9   • Osteopenia M85.80   •  Rheumatoid arthritis (CMS/[facility]) M06.9   • Degeneration of intervertebral disc [id number]   • D (diarrhea) R19.7   • BP (high blood pressure) I10   • Detrusor muscle hypertonia N32.81   • Open leg wound S81.809A   • Vaginal odor N89.8   • Infected wound T14.8XXA, L08.9   • History of depression Z86.59   • History of renal insufficiency syndrome Z87.448   • History of degenerative disc disease Z87.39   • History of osteoporosis Z87.39   • History of DVT (deep vein thrombosis) Z86.718   • History of rheumatoid arthritis Z87.39   • Hyperlipidemia E78.5   • Macrocytosis D75.89   • Right leg weakness R29.898       Assessment:  Active Hospital Problems    Diagnosis Date Noted   • **Right leg weakness [R29.898] 08/05/2018   • Hyperlipidemia [E78.5] 01/10/2018   • History of degenerative disc disease [Z87.39] 07/11/2017   • History of DVT (deep vein thrombosis) [Z86.718] 07/11/2017   • History of rheumatoid arthritis [Z87.39] 07/11/2017   • Chronic kidney failure [N18.9]    • DVT (deep venous thrombosis) (CMS/[facility]) [I82.409]    • Gout [M10.9]    • Elevated cholesterol [E78.00]    • Osteoporosis [M81.0]    • Hyperparathyroidism (CMS/[facility]) [E21.3]    • Benign essential hypertension [I10]       Resolved Hospital Problems    Diagnosis Date Noted Date Resolved   No resolved problems to display.       Plan:        The patient is admitted to the hospital and will check MRI brain, MRA head and neck, MRI cervical spine and lumbar spine.  We'll consult neurology.  She'll get aspirin and statin and will ask for PT OT speech therapy evaluation.    Marco A Urbano MD  8/5/2018  2:03 PM

## 2018-08-05 NOTE — CONSULTS
Neurology Note    Patient:  Alba Correa    YOB: 1933    REFERRING PHYSICIAN:  Marco A Urbano MD    CHIEF COMPLAINT:    Right leg weakness    HISTORY OF PRESENT ILLNESS:   The patient is a 85 y.o. female admitted with two days of right leg weakness and numbness of lower leg, two falls. She has some baseline leg numbness, reports ah/o peripheral neuropathy. She has bruised her left elbow and since this am has been having left arm and hand weakness vs clumsiness, was having trouble turning the door knob. No h/o prior stroke. BP on admission 189/89. No Afib reported. Head CT negative. Just back from MRI. Denies facial weakness, numbness, speech or swallowing changes.    Past Medical History:  Past Medical History:   Diagnosis Date   • Benign essential hypertension    • Chronic kidney disease    • Chronic renal insufficiency    • Degeneration of intervertebral disc    • Depression    • Disease of thyroid gland    • DVT (deep venous thrombosis) (CMS/HCC)    • Edema    • Gout    • H/O complete eye exam 09/2016   • Hyperlipidemia    • Hyperparathyroidism (CMS/HCC)    • IBS (irritable bowel syndrome)    • OAB (overactive bladder)    • Osteopenia    • Osteoporosis    • Rheumatoid arthritis (CMS/HCC)        Past Surgical History:  Past Surgical History:   Procedure Laterality Date   • APPENDECTOMY     • BREAST BIOPSY     • COLONOSCOPY      declines   • HERNIA REPAIR  1965   • HYSTERECTOMY      still has ovaries   • MAMMO BILATERAL  11/16/2016    pt declines   • PAP SMEAR  11/16/2016    declines       Social History:   Social History     Social History   • Marital status:      Spouse name: Peña   • Number of children: 3   • Years of education: High school     Occupational History   • Dental assistant Retired     Social History Main Topics   • Smoking status: Former Smoker     Years: 5.00     Types: Cigarettes   • Smokeless tobacco: Never Used   • Alcohol use Yes      Comment: Occasional   • Drug use: No    • Sexual activity: Yes     Partners: Male     Birth control/ protection: Surgical     Other Topics Concern   • Not on file        Family History:   Family History   Problem Relation Age of Onset   • Alcohol abuse Other    • Cancer Other    • Hypertension Other    • Kidney disease Other    • Lung disease Other    • Heart disease Mother 89   • Heart failure Mother    • Ovarian cancer Daughter 60   • Stomach cancer Father    • Kidney cancer Father 52   • Lung cancer Father 52   • Ovarian cancer Sister    • Lung cancer Brother 65       Medications Prior to Admission:    Prior to Admission medications    Medication Sig Start Date End Date Taking? Authorizing Provider   acetaminophen (TYLENOL) 650 MG 8 hr tablet Take 650 mg by mouth Every 8 (Eight) Hours As Needed for Mild Pain (1-3).   Yes Vanessa Castle MD   amLODIPine (NORVASC) 2.5 MG tablet Take 1 tablet by mouth Daily. 3/1/18  Yes Aramis Doty MD   atenolol (TENORMIN) 25 MG tablet Take 1 tablet by mouth Daily. 1/10/18  Yes Aramis Doty MD   Calcium Carbonate-Vitamin D (CALCIUM 600+D PO) Take  by mouth.   Yes ProviderVanessa MD   diphenhydrAMINE-acetaminophen (TYLENOL PM)  MG tablet per tablet Take 1 tablet by mouth At Night As Needed for Sleep.   Yes Vanessa Castle MD   DULoxetine (CYMBALTA) 60 MG capsule Take 1 capsule by mouth Daily. 1/10/18  Yes Aramis Doty MD   losartan (COZAAR) 100 MG tablet Take 1 tablet by mouth Daily. 5/29/18  Yes Marla Winston APRN   losartan (COZAAR) 100 MG tablet TAKE 1 TABLET BY MOUTH EVERY DAY 6/12/18  Yes Marla Winston APRN   Omega-3 Fatty Acids (FISH OIL PO) Take 1,200 mg by mouth Every Night.   Yes Vanessa Castle MD   Scopolamine (TRANSDERM-SCOP, 1.5 MG,) 1.5 MG/3DAYS patch Place 1 patch on the skin Every 72 (Seventy-Two) Hours. 5/15/18  Yes Marla Winston APRN   simvastatin (ZOCOR) 20 MG tablet Take 1 tablet by mouth Daily. 1/10/18  Yes Aramis Doty MD    ULORIC 40 MG tablet TAKE 1 TABLET DAILY 5/21/18  Yes Aramis Doty MD   BIOTIN PO Take  by mouth.    Provider, MD Vnaessa   cephalexin (KEFLEX) 250 MG capsule Take 1 capsule by mouth 2 (Two) Times a Day. 6/16/18   Rafi Slade MD   Multiple Vitamins-Minerals (CENTRUM SILVER PO) Take  by mouth.    Provider, MD Vanessa       Allergies:  Bactrim [sulfamethoxazole-trimethoprim]; Lisinopril; and Levofloxacin      Review of system  Review of Systems   Constitutional: Negative for fever.   Eyes: Positive for pain.   Musculoskeletal: Positive for gait problem.   Neurological: Negative for weakness and numbness.   All other systems reviewed and are negative.      Vitals:    08/05/18 1520   BP:    Pulse: 59   Resp:    Temp:    SpO2: 99%       Physical exam  Physical Exam   Constitutional: She is oriented to person, place, and time. She appears well-developed and well-nourished.   Eyes: Pupils are equal, round, and reactive to light. EOM are normal.   Cardiovascular: Normal rate and regular rhythm.    Pulmonary/Chest: Effort normal.   Neurological: She is alert and oriented to person, place, and time. She has normal reflexes. No cranial nerve deficit.   Clumsy in left hand on finger to nose, no pronator drift. Antigravity strength in all limbs, somewhat weaker in right leg on dorsiflexion of foot. Sensation to touch, cold and vibration decreased on left side. DTRs hypoactive, stocking level loss to cold and vibration in both legs. Babinskis negative.   Psychiatric: She has a normal mood and affect. Her behavior is normal. Thought content normal.         Lab Results   Component Value Date    WBC 6.27 08/05/2018    HGB 12.5 08/05/2018    HCT 38.7 08/05/2018    .3 (H) 08/05/2018     08/05/2018     Lab Results   Component Value Date    GLUCOSE 113 (H) 08/05/2018    BUN 19 08/05/2018    CREATININE 1.07 (H) 08/05/2018    EGFRIFNONA 49 (L) 08/05/2018    EGFRIFAFRI 50 (L) 05/29/2018    BCR 17.8 08/05/2018     CO2 24.9 08/05/2018    CALCIUM 9.5 08/05/2018    PROTENTOTREF 7.9 01/19/2018    ALBUMIN 4.20 08/05/2018    LABIL2 1.1 01/19/2018    AST 25 08/05/2018    ALT 16 08/05/2018         Radiological Studies:  Ct Head Without Contrast    Result Date: 8/5/2018  CT HEAD WO CONTRAST-  INDICATIONS: Weakness, fall injury  TECHNIQUE: Radiation dose reduction techniques were utilized, including automated exposure control and exposure modulation based on body size.    Noncontrast head CT  COMPARISON: 1/22/2015  FINDINGS:       No acute intracranial hemorrhage, midline shift or mass effect. No acute territorial infarct is identified.  Mild to moderate similar-appearing periventricular hypodensities suggest chronic small vessel ischemic change in a patient this age.  Arterial calcifications are seen at the base of the brain.  Ventricles, cisterns, cerebral sulci are unremarkable for patient age.  The visualized paranasal sinuses, orbits, mastoid air cells are unremarkable. Degenerative changes are noted in the temporomandibular joints, right more than left.               No acute intracranial hemorrhage or hydrocephalus. Chronic changes. If there is further clinical concern, MRI could be considered for further evaluation.  This report was finalized on 8/5/2018 12:10 PM by Dr. Levy Mock M.D.      Mri Angiogram Head Without Contrast    Result Date: 8/5/2018  MRI BRAIN WITHOUT CONTRAST, MR ANGIOGRAM OF HEAD WITHOUT CONTRAST, MRA ANGIOGRAM WITHOUT CONTRAST  HISTORY: Patient was sitting in a chair 3 days ago and thought her foot fell asleep. She went to stand and fell. Patient fell again today, hitting the back of her head. Patient cannot put weight on the right lower extremity.  TECHNIQUE: MRI of the brain includes axial diffusion, FLAIR, T1, T2, gradient as well as sagittal T1-weighted sequences. MR angiogram of the head and neck was obtained without contrast and MIP reconstructed images were obtained.  COMPARISON: Head CT  without contrast 08/05/2018. MRI brain without contrast 11/20/2015.  FINDINGS: Within the posterior aspect of the external limb of the right external capsule and within the posteroinferior right frontal lobe there is a focal area of restricted diffusion measuring 13 x 6 mm consistent with an acute infarction. There are additional smaller foci of restricted diffusion within the posterior right frontal lobe and anterior right parietal lobe consistent with small acute infarctions within the right middle cerebral artery distribution. There is moderately extensive chronic small vessel ischemic white matter change. Chronic cerebral and cerebellar atrophy are present and there is associated ventricular enlargement and prominence of the sulci. There is no evidence for intracranial hemorrhage. No extra-axial fluid collection is evident.  MR angiogram of the neck is very limited due to the extensive motion related artifact and lack of intravenous contrast. Great vessel origins appear to be patent. There is flow-related enhancement within both common carotid arteries.  There is no evidence for a NASCET stenosis of the right common carotid or internal carotid artery. Left common carotid artery appears patent. There is apparent diminished flow within the proximal 1.5 cm of the left internal carotid artery consistent with a stenosis though evaluation of the degree is severely limited by the degree of motion related artifact. This appears to represent approximately 50% stenosis though this would be best evaluated with CT angiogram of the head. There is flow-related enhancement within both mid to distal cervical internal carotid arteries. Flow related enhancement is present within both cervical vertebral arteries. There is diminished enhancement within the distal cervical left vertebral artery and this could be related to the degree of motion related artifact though could also represent slow diminished or absent flow. There is  flow-related enhancement within the intracranial segments of both vertebral artery and the basilar artery. Both posterior cerebral arteries exhibit flow related enhancement.  There is flow-related enhancement within the distal cervical, petrous, cavernous, supracavernous internal carotid arteries. Evaluation of detail is very limited by the degree of motion related artifact in these segments. Evaluation for stenosis is severely limited. There is flow-related enhancement within both middle cerebral arteries and anterior cerebral arteries and major branches. There is minimal to absent flow within the A1 segment of the left anterior cerebral artery.      1. 13 x 6 mm acute infarction within the posterior aspect of the right external capsule. There are also smaller foci of restricted diffusion along the right frontoparietal junction consistent with acute infarctions within the right middle cerebral artery distribution. Findings discussed with Ijeoma, the nurse caring for the patient on [facility] on 08/05/2018 at 7:10 PM. 2. MR angiogram of the head and neck is severely motion limited. There is diminished enhancement of the proximal left internal carotid artery suspected to represent stenosis that appears to be approximately 50% though more accurate assessment could be performed with CT angiography. There is also diminished flow-related enhancement within the distal left vertebral artery associated with motion related artifact or slow, diminished, or absent flow. This could also be further evaluated with CT angiography if indicated.  This report was finalized on 8/5/2018 7:48 PM by Dr. Rafi Ortiz M.D.      Mri Angiogram Neck Without Contrast    Result Date: 8/5/2018  MRI BRAIN WITHOUT CONTRAST, MR ANGIOGRAM OF HEAD WITHOUT CONTRAST, MRA ANGIOGRAM WITHOUT CONTRAST  HISTORY: Patient was sitting in a chair 3 days ago and thought her foot fell asleep. She went to stand and fell. Patient fell again today, hitting the back  of her head. Patient cannot put weight on the right lower extremity.  TECHNIQUE: MRI of the brain includes axial diffusion, FLAIR, T1, T2, gradient as well as sagittal T1-weighted sequences. MR angiogram of the head and neck was obtained without contrast and MIP reconstructed images were obtained.  COMPARISON: Head CT without contrast 08/05/2018. MRI brain without contrast 11/20/2015.  FINDINGS: Within the posterior aspect of the external limb of the right external capsule and within the posteroinferior right frontal lobe there is a focal area of restricted diffusion measuring 13 x 6 mm consistent with an acute infarction. There are additional smaller foci of restricted diffusion within the posterior right frontal lobe and anterior right parietal lobe consistent with small acute infarctions within the right middle cerebral artery distribution. There is moderately extensive chronic small vessel ischemic white matter change. Chronic cerebral and cerebellar atrophy are present and there is associated ventricular enlargement and prominence of the sulci. There is no evidence for intracranial hemorrhage. No extra-axial fluid collection is evident.  MR angiogram of the neck is very limited due to the extensive motion related artifact and lack of intravenous contrast. Great vessel origins appear to be patent. There is flow-related enhancement within both common carotid arteries.  There is no evidence for a NASCET stenosis of the right common carotid or internal carotid artery. Left common carotid artery appears patent. There is apparent diminished flow within the proximal 1.5 cm of the left internal carotid artery consistent with a stenosis though evaluation of the degree is severely limited by the degree of motion related artifact. This appears to represent approximately 50% stenosis though this would be best evaluated with CT angiogram of the head. There is flow-related enhancement within both mid to distal cervical  internal carotid arteries. Flow related enhancement is present within both cervical vertebral arteries. There is diminished enhancement within the distal cervical left vertebral artery and this could be related to the degree of motion related artifact though could also represent slow diminished or absent flow. There is flow-related enhancement within the intracranial segments of both vertebral artery and the basilar artery. Both posterior cerebral arteries exhibit flow related enhancement.  There is flow-related enhancement within the distal cervical, petrous, cavernous, supracavernous internal carotid arteries. Evaluation of detail is very limited by the degree of motion related artifact in these segments. Evaluation for stenosis is severely limited. There is flow-related enhancement within both middle cerebral arteries and anterior cerebral arteries and major branches. There is minimal to absent flow within the A1 segment of the left anterior cerebral artery.      1. 13 x 6 mm acute infarction within the posterior aspect of the right external capsule. There are also smaller foci of restricted diffusion along the right frontoparietal junction consistent with acute infarctions within the right middle cerebral artery distribution. Findings discussed with Ijeoma, the nurse caring for the patient on [facility] on 08/05/2018 at 7:10 PM. 2. MR angiogram of the head and neck is severely motion limited. There is diminished enhancement of the proximal left internal carotid artery suspected to represent stenosis that appears to be approximately 50% though more accurate assessment could be performed with CT angiography. There is also diminished flow-related enhancement within the distal left vertebral artery associated with motion related artifact or slow, diminished, or absent flow. This could also be further evaluated with CT angiography if indicated.  This report was finalized on 8/5/2018 7:48 PM by Dr. aRfi Ortiz M.D.       Mri Brain Without Contrast    Result Date: 8/5/2018  MRI BRAIN WITHOUT CONTRAST, MR ANGIOGRAM OF HEAD WITHOUT CONTRAST, MRA ANGIOGRAM WITHOUT CONTRAST  HISTORY: Patient was sitting in a chair 3 days ago and thought her foot fell asleep. She went to stand and fell. Patient fell again today, hitting the back of her head. Patient cannot put weight on the right lower extremity.  TECHNIQUE: MRI of the brain includes axial diffusion, FLAIR, T1, T2, gradient as well as sagittal T1-weighted sequences. MR angiogram of the head and neck was obtained without contrast and MIP reconstructed images were obtained.  COMPARISON: Head CT without contrast 08/05/2018. MRI brain without contrast 11/20/2015.  FINDINGS: Within the posterior aspect of the external limb of the right external capsule and within the posteroinferior right frontal lobe there is a focal area of restricted diffusion measuring 13 x 6 mm consistent with an acute infarction. There are additional smaller foci of restricted diffusion within the posterior right frontal lobe and anterior right parietal lobe consistent with small acute infarctions within the right middle cerebral artery distribution. There is moderately extensive chronic small vessel ischemic white matter change. Chronic cerebral and cerebellar atrophy are present and there is associated ventricular enlargement and prominence of the sulci. There is no evidence for intracranial hemorrhage. No extra-axial fluid collection is evident.  MR angiogram of the neck is very limited due to the extensive motion related artifact and lack of intravenous contrast. Great vessel origins appear to be patent. There is flow-related enhancement within both common carotid arteries.  There is no evidence for a NASCET stenosis of the right common carotid or internal carotid artery. Left common carotid artery appears patent. There is apparent diminished flow within the proximal 1.5 cm of the left internal carotid artery  consistent with a stenosis though evaluation of the degree is severely limited by the degree of motion related artifact. This appears to represent approximately 50% stenosis though this would be best evaluated with CT angiogram of the head. There is flow-related enhancement within both mid to distal cervical internal carotid arteries. Flow related enhancement is present within both cervical vertebral arteries. There is diminished enhancement within the distal cervical left vertebral artery and this could be related to the degree of motion related artifact though could also represent slow diminished or absent flow. There is flow-related enhancement within the intracranial segments of both vertebral artery and the basilar artery. Both posterior cerebral arteries exhibit flow related enhancement.  There is flow-related enhancement within the distal cervical, petrous, cavernous, supracavernous internal carotid arteries. Evaluation of detail is very limited by the degree of motion related artifact in these segments. Evaluation for stenosis is severely limited. There is flow-related enhancement within both middle cerebral arteries and anterior cerebral arteries and major branches. There is minimal to absent flow within the A1 segment of the left anterior cerebral artery.      1. 13 x 6 mm acute infarction within the posterior aspect of the right external capsule. There are also smaller foci of restricted diffusion along the right frontoparietal junction consistent with acute infarctions within the right middle cerebral artery distribution. Findings discussed with Ijeoma, the nurse caring for the patient on [facility] on 08/05/2018 at 7:10 PM. 2. MR angiogram of the head and neck is severely motion limited. There is diminished enhancement of the proximal left internal carotid artery suspected to represent stenosis that appears to be approximately 50% though more accurate assessment could be performed with CT angiography. There  is also diminished flow-related enhancement within the distal left vertebral artery associated with motion related artifact or slow, diminished, or absent flow. This could also be further evaluated with CT angiography if indicated.  This report was finalized on 8/5/2018 7:48 PM by Dr. Rafi Ortiz M.D.      Mri Cervical Spine Without Contrast    Result Date: 8/5/2018  MRI CERVICAL SPINE WITHOUT CONTRAST, MRI LUMBAR SPINE WITHOUT CONTRAST  HISTORY: Cannot put weight on right lower extremity. Patient was sitting in a chair 3 days ago and thought her foot fell asleep, she went to stand and fell.  TECHNIQUE: MRI cervical spine without contrast includes sagittal T1, T2 fat sat, PD as well as axial gradient oblique sagittal T2-weighted sequences through the neural foramina. MRI lumbar spine includes sagittal T1, T2 fat-sat, PD and axial T1 and T2 weighted sequences.  COMPARISON: There are no previous MRIs of the lumbar spine or cervical spine for comparison.  FINDINGS CERVICAL SPINE: There is reversal of the normal cervical lordotic curvature centered at C4 and C5. The vertebral body heights appear within normal limits. There are chronic arthritic changes of the anterior atlantoaxial joint and there is panus formation surrounding the tip of the odontoid process.  At C2-3, the central canal and neural foramina are patent.  At C3-4, there is a central to left posterior disc protrusion slightly effacing the anterior thecal sac. There is mild narrowing of the left neural foramen associated with facet arthritis. The right neural foramina is patent.  At C4-5, there is mild endplate spurring and there is a small central posterior disc protrusion slightly effacing the anterior aspect of the thecal sac. Mild facet overgrowth is present. The neural foramina are patent.  At C5-6, there is disc space narrowing with 3 mm retrolisthesis of C5 with respect to C6. Endplate spurring is present and there is a disc bulge with mild canal  narrowing. Bilateral facet arthritis is present and there is uncovertebral overgrowth with moderate bilateral neural foraminal narrowing.  At C6-7, there is bilateral facet arthritis. Disc space narrowing is present and there are mild endplate degenerative changes. The central canal is patent. There is mild narrowing of the right neural foramen. The left neural foramina is patent.  At C7-T1, the central canal and neural foramina are patent.      CERVICAL SPINE:  Reversal of the normal cervical lordotic curvature centered at C4 and C5. Degenerative disc disease and facet arthritis, as described with moderate foraminal stenosis at C5-6.    LUMBAR SPINE: Vertebral body heights appear within normal limits. There is a scoliotic curvature of the lumbar spine convexed to the left at L3-4. The distal thoracic cord and conus medullaris appear normal and the tip of the conus medullaris terminates at the L1-2 level.  At T12-L1, the central canal and neural foramina are patent.  At L1-2, there is 3 mm retrolisthesis of L1 with respect to L2. Uncovered disc bulge is present slightly effacing the anterior thecal sac. There is mild bilateral neural foraminal narrowing.  At L2-3, there is a broad disc bulge. Mild right greater than left facet arthritis is present.  There is trace fluid in the facet joints. There is very mild narrowing of the central canal. The neural foramina are patent.  At L3-4, facet arthritis is present.  There is trace fluid in the facet joints. Mild disc bulge is present without central canal or foraminal narrowing.  At L4-5, there is a 4 mm anterolisthesis of L4 with respect to L5. Uncovered disc bulge is present and there is bilateral facet arthritis with spur formation and mild bilateral neural foraminal narrowing.  At L5-S1, there is a 2 mm anterolisthesis of L5 with respect to S1. Uncovered disc bulge is present and there is bilateral facet overgrowth. Disc bulge with facet spurring contribute to  moderate left neural foraminal narrowing. The central canal and right neural foramina are patent.  IMPRESSION: LUMBAR SPINE: 1. 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 2. Degenerative disc disease with mild degenerative anterolisthesis of L4 with respect to L5 and L5 with respect to S1. There is facet arthritis with mild foraminal narrowing at L4-5 and moderate left foraminal narrowing at L5-S1.  This report was finalized on 8/5/2018 7:19 PM by Dr. Rafi Ortiz M.D.      Mri Lumbar Spine Without Contrast    Result Date: 8/5/2018  MRI CERVICAL SPINE WITHOUT CONTRAST, MRI LUMBAR SPINE WITHOUT CONTRAST  HISTORY: Cannot put weight on right lower extremity. Patient was sitting in a chair 3 days ago and thought her foot fell asleep, she went to stand and fell.  TECHNIQUE: MRI cervical spine without contrast includes sagittal T1, T2 fat sat, PD as well as axial gradient oblique sagittal T2-weighted sequences through the neural foramina. MRI lumbar spine includes sagittal T1, T2 fat-sat, PD and axial T1 and T2 weighted sequences.  COMPARISON: There are no previous MRIs of the lumbar spine or cervical spine for comparison.  FINDINGS CERVICAL SPINE: There is reversal of the normal cervical lordotic curvature centered at C4 and C5. The vertebral body heights appear within normal limits. There are chronic arthritic changes of the anterior atlantoaxial joint and there is panus formation surrounding the tip of the odontoid process.  At C2-3, the central canal and neural foramina are patent.  At C3-4, there is a central to left posterior disc protrusion slightly effacing the anterior thecal sac. There is mild narrowing of the left neural foramen associated with facet arthritis. The right neural foramina is patent.  At C4-5, there is mild endplate spurring and there is a small central posterior disc protrusion slightly effacing the anterior aspect of the thecal sac. Mild facet overgrowth is present. The neural  foramina are patent.  At C5-6, there is disc space narrowing with 3 mm retrolisthesis of C5 with respect to C6. Endplate spurring is present and there is a disc bulge with mild canal narrowing. Bilateral facet arthritis is present and there is uncovertebral overgrowth with moderate bilateral neural foraminal narrowing.  At C6-7, there is bilateral facet arthritis. Disc space narrowing is present and there are mild endplate degenerative changes. The central canal is patent. There is mild narrowing of the right neural foramen. The left neural foramina is patent.  At C7-T1, the central canal and neural foramina are patent.      CERVICAL SPINE:  Reversal of the normal cervical lordotic curvature centered at C4 and C5. Degenerative disc disease and facet arthritis, as described with moderate foraminal stenosis at C5-6.    LUMBAR SPINE: Vertebral body heights appear within normal limits. There is a scoliotic curvature of the lumbar spine convexed to the left at L3-4. The distal thoracic cord and conus medullaris appear normal and the tip of the conus medullaris terminates at the L1-2 level.  At T12-L1, the central canal and neural foramina are patent.  At L1-2, there is 3 mm retrolisthesis of L1 with respect to L2. Uncovered disc bulge is present slightly effacing the anterior thecal sac. There is mild bilateral neural foraminal narrowing.  At L2-3, there is a broad disc bulge. Mild right greater than left facet arthritis is present.  There is trace fluid in the facet joints. There is very mild narrowing of the central canal. The neural foramina are patent.  At L3-4, facet arthritis is present.  There is trace fluid in the facet joints. Mild disc bulge is present without central canal or foraminal narrowing.  At L4-5, there is a 4 mm anterolisthesis of L4 with respect to L5. Uncovered disc bulge is present and there is bilateral facet arthritis with spur formation and mild bilateral neural foraminal narrowing.  At L5-S1,  there is a 2 mm anterolisthesis of L5 with respect to S1. Uncovered disc bulge is present and there is bilateral facet overgrowth. Disc bulge with facet spurring contribute to moderate left neural foraminal narrowing. The central canal and right neural foramina are patent.  IMPRESSION: LUMBAR SPINE: 1. 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 2. Degenerative disc disease with mild degenerative anterolisthesis of L4 with respect to L5 and L5 with respect to S1. There is facet arthritis with mild foraminal narrowing at L4-5 and moderate left foraminal narrowing at L5-S1.  This report was finalized on 8/5/2018 7:19 PM by Dr. Rafi Ortiz M.D.        During this visit the following were done:  Labs Reviewed [x]    Labs Ordered [x]    Radiology Reports Reviewed [x]    Radiology Ordered []    EKG, echo, and/or stress test reviewed []    EEG results reviewed  []    EEG reviewed and interpreted per myself   []    Discussed case with neurointerventionalist or neuroradiologist []    Referring Provider Records Reviewed [x]    ER Records Reviewed []    Hospital Records Reviewed [x]    History Obtained From Family []    Radiological images view and Interpreted per myself [x]    Case Discussed with referring provider []     Decision to obtain and request outside records  []        Assessment and Plan     Multiple acute small ischemic strokes in right MCA territory, rule out embolic source. 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    Continue aspirin, atorvastatin, observation on telemetry. Stroke order set. Echo pending. Check b12, folate, TSH, RPR. PT, OT eval. May need inpatient rehab.      Electronically signed by Norm Burgos MD on 8/5/2018 at 6:29 PM

## 2018-08-05 NOTE — ED NOTES
Clean skin tear on elbow with gauze and saline solution, and covered it with Telfa bordered gauze.      Luh Maddox  08/05/18 4817

## 2018-08-05 NOTE — PROGRESS NOTES
Discharge Planning Assessment  [facility]     Patient Name: Alba Correa  MRN: [id number]  Today's Date: 8/5/2018    Admit Date: 8/5/2018          Discharge Needs Assessment     Row Name 08/05/18 1330       Living Environment    Lives With spouse    Current Living Arrangements home/apartment/condo    Primary Care Provided by self    Provides Primary Care For no one    Family Caregiver if Needed child(ja), adult    Quality of Family Relationships involved;supportive       Resource/Environmental Concerns    Resource/Environmental Concerns none    Transportation Concerns car, none       Transition Planning    Patient/Family Anticipates Transition to home    Patient/Family Anticipated Services at Transition none    Transportation Anticipated car, drives self       Discharge Needs Assessment    Readmission Within the Last 30 Days no previous admission in last 30 days    Concerns to be Addressed no discharge needs identified    Equipment Currently Used at Home bath bench;grab bar;walker, standard    Anticipated Changes Related to Illness none    Equipment Needed After Discharge none            Discharge Plan    No documentation.       Destination     No service coordination in this encounter.      Durable Medical Equipment     No service coordination in this encounter.      Dialysis/Infusion     No service coordination in this encounter.      Home Medical Care     No service coordination in this encounter.      Social Care     No service coordination in this encounter.                Demographic Summary    No documentation.           Functional Status    No documentation.           Psychosocial    No documentation.           Abuse/Neglect    No documentation.           Legal    No documentation.           Substance Abuse    No documentation.           Patient Forms    No documentation.         Aminah Tovar RN

## 2018-08-05 NOTE — ED PROVIDER NOTES
EMERGENCY DEPARTMENT ENCOUNTER    CHIEF COMPLAINT  Chief Complaint: RLE weakness  History given by: Pt  History limited by: Nothing  Room Number: 531/1  PMD: Aramis Doty MD      HPI:  Pt is a 85 y.o. female who presents s/p fall 2 nights ago after her R leg gave out and another fall this morning when the pt was standing and fell (around 0830), and the pt is not sure why. The pt has hardwood floors. The pt states that her R leg has felt weak since her fall 2 days ago. The pt hit her head but denies headache or LOC, and states that she barely hit her head. The pt is complaining of L elbow laceration and L arm and hand weakness that began after her fall this morning. The pt and family deny slurred speech.    Duration:  2 days  Onset: Sudden  Timing: Constant  Location: RLE  Radiation: None  Quality: Weak  Intensity/Severity: Moderate  Progression: No change  Associated Symptoms: L elbow abrasion, L arm and hand weakness  Aggravating Factors: Fall, unknown  Alleviating Factors: Unknown  Treatment before arrival: None    PAST MEDICAL HISTORY  Active Ambulatory Problems     Diagnosis Date Noted   • Chronic kidney failure    • DVT (deep venous thrombosis) (CMS/HCC)    • Depression    • Edema    • Gout    • Elevated cholesterol    • Osteoporosis    • Hyperparathyroidism (CMS/HCC)    • Benign essential hypertension    • Adaptive colitis    • Osteopenia    • Rheumatoid arthritis (CMS/HCC)    • Degeneration of intervertebral disc    • D (diarrhea) 11/20/2015   • BP (high blood pressure) 11/20/2015   • 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   • Open leg wound 11/20/2015   • Vaginal odor 11/20/2015   • Infected wound 11/20/2015   • History of depression 07/11/2017   • History of renal insufficiency syndrome 07/11/2017   • History of degenerative disc disease 07/11/2017   • History of osteoporosis 07/11/2017   • History of DVT (deep vein thrombosis) 07/11/2017   • History of rheumatoid arthritis 07/11/2017   • Hyperlipidemia  01/10/2018   • Macrocytosis 02/28/2018     Resolved Ambulatory Problems     Diagnosis Date Noted   • No Resolved Ambulatory Problems     Past Medical History:   Diagnosis Date   • Benign essential hypertension    • Chronic kidney disease    • Chronic renal insufficiency    • Degeneration of intervertebral disc    • Depression    • Disease of thyroid gland    • DVT (deep venous thrombosis) (CMS/HCC)    • Edema    • Gout    • H/O complete eye exam 09/2016   • Hyperlipidemia    • Hyperparathyroidism (CMS/HCC)    • IBS (irritable bowel syndrome)    • OAB (overactive bladder)    • Osteopenia    • Osteoporosis    • Rheumatoid arthritis (CMS/HCC)        PAST SURGICAL HISTORY  Past Surgical History:   Procedure Laterality Date   • APPENDECTOMY     • BREAST BIOPSY     • COLONOSCOPY      declines   • HERNIA REPAIR  1965   • HYSTERECTOMY      still has ovaries   • MAMMO BILATERAL  11/16/2016    pt declines   • PAP SMEAR  11/16/2016    declines       FAMILY HISTORY  Family History   Problem Relation Age of Onset   • Alcohol abuse Other    • Cancer Other    • Hypertension Other    • Kidney disease Other    • Lung disease Other    • Heart disease Mother 89   • Heart failure Mother    • Ovarian cancer Daughter 60   • Stomach cancer Father    • Kidney cancer Father 52   • Lung cancer Father 52   • Ovarian cancer Sister    • Lung cancer Brother 65       SOCIAL HISTORY  Social History     Social History   • Marital status:      Spouse name: Peña   • Number of children: 3   • Years of education: High school     Occupational History   • Dental assistant Retired     Social History Main Topics   • Smoking status: Former Smoker     Years: 5.00     Types: Cigarettes   • Smokeless tobacco: Never Used   • Alcohol use Yes      Comment: Occasional   • Drug use: No   • Sexual activity: Yes     Partners: Male     Birth control/ protection: Surgical     Other Topics Concern   • Not on file     Social History Narrative   • No narrative on  file       ALLERGIES  Bactrim [sulfamethoxazole-trimethoprim]; Lisinopril; and Levofloxacin    REVIEW OF SYSTEMS  Review of Systems   Constitutional: Negative for fever.   HENT: Negative for sore throat.    Eyes: Negative.    Respiratory: Negative for cough and shortness of breath.    Cardiovascular: Negative for chest pain.   Gastrointestinal: Negative for abdominal pain, diarrhea and vomiting.   Genitourinary: Negative for dysuria.   Musculoskeletal: Negative for neck pain.   Skin: Positive for wound (L elbow). Negative for rash.   Allergic/Immunologic: Negative.    Neurological: Positive for weakness (RLE, L arm). Negative for numbness and headaches.   Hematological: Negative.    Psychiatric/Behavioral: Negative.    All other systems reviewed and are negative.      PHYSICAL EXAM  ED Triage Vitals   Temp Heart Rate Resp BP SpO2   08/05/18 1118 08/05/18 1054 08/05/18 1054 08/05/18 1054 08/05/18 1054   97.9 °F (36.6 °C) 69 14 (!) 189/89 99 %      Temp src Heart Rate Source Patient Position BP Location FiO2 (%)   08/05/18 1118 08/05/18 1054 -- -- --   Oral Monitor          Physical Exam   Constitutional: She is oriented to person, place, and time. No distress.   HENT:   Head: Normocephalic and atraumatic.   Eyes: Pupils are equal, round, and reactive to light. EOM are normal.   Neck: Normal range of motion. Neck supple.   Nontender   Cardiovascular: Normal rate, regular rhythm and normal heart sounds.    Pulmonary/Chest: Effort normal and breath sounds normal. No respiratory distress.   Abdominal: Soft. There is no tenderness. There is no rebound and no guarding.   Musculoskeletal: Normal range of motion. She exhibits no edema.   Abrasion to L elbow   Neurological: She is alert and oriented to person, place, and time. She has normal sensation, normal strength and intact cranial nerves. She has a normal Finger-Nose-Finger Test and a normal Heel to Mcmahon Test.   Equal  strength  Decreased sensation in both feet  chronically due to neuropathy, normal sensation in upper legs   Skin: Skin is warm and dry. No rash noted.   Psychiatric: Mood and affect normal.   Nursing note and vitals reviewed.      LAB RESULTS  Lab Results (last 24 hours)     Procedure Component Value Units Date/Time    Comprehensive Metabolic Panel [[id number]]  (Abnormal) Collected:  08/05/18 1152    Specimen:  Blood Updated:  08/05/18 1224     Glucose 113 (H) mg/dL      BUN 19 mg/dL      Creatinine 1.07 (H) mg/dL      Sodium 140 mmol/L      Potassium 4.8 mmol/L      Chloride 103 mmol/L      CO2 24.9 mmol/L      Calcium 9.5 mg/dL      Total Protein 7.6 g/dL      Albumin 4.20 g/dL      ALT (SGPT) 16 U/L      AST (SGOT) 25 U/L      Alkaline Phosphatase 76 U/L      Total Bilirubin 0.2 mg/dL      eGFR Non African Amer 49 (L) mL/min/1.73      Globulin 3.4 gm/dL      A/G Ratio 1.2 g/dL      BUN/Creatinine Ratio 17.8     Anion Gap 12.1 mmol/L     Narrative:       The MDRD GFR formula is only valid for adults with stable renal function between ages 18 and 70.    Protime-INR [[id number]]  (Normal) Collected:  08/05/18 1152    Specimen:  Blood Updated:  08/05/18 1213     Protime 13.2 Seconds      INR 1.02    Troponin [[id number]]  (Normal) Collected:  08/05/18 1152    Specimen:  Blood Updated:  08/05/18 1224     Troponin T <0.010 ng/mL     Narrative:       Troponin T Reference Ranges:  Less than 0.03 ng/mL:    Negative for AMI  0.03 to 0.09 ng/mL:      Indeterminant for AMI  Greater than 0.09 ng/mL: Positive for AMI    CBC & Differential [[id number]] Collected:  08/05/18 1153    Specimen:  Blood Updated:  08/05/18 1205    Narrative:       The following orders were created for panel order CBC & Differential.  Procedure                               Abnormality         Status                     ---------                               -----------         ------                     CBC Auto Differential[[id number]]        Abnormal            Final result                  Please view results for these tests on the individual orders.    CBC Auto Differential [[id number]]  (Abnormal) Collected:  08/05/18 1153    Specimen:  Blood Updated:  08/05/18 1205     WBC 6.27 10*3/mm3      RBC 3.71 (L) 10*6/mm3      Hemoglobin 12.5 g/dL      Hematocrit 38.7 %      .3 (H) fL      MCH 33.7 (H) pg      MCHC 32.3 (L) g/dL      RDW 12.7 %      RDW-SD 48.5 fl      MPV 10.4 fL      Platelets 237 10*3/mm3      Neutrophil % 50.9 %      Lymphocyte % 32.5 %      Monocyte % 7.2 %      Eosinophil % 8.1 (H) %      Basophil % 1.3 %      Immature Grans % 0.2 %      Neutrophils, Absolute 3.19 10*3/mm3      Lymphocytes, Absolute 2.04 10*3/mm3      Monocytes, Absolute 0.45 10*3/mm3      Eosinophils, Absolute 0.51 10*3/mm3      Basophils, Absolute 0.08 10*3/mm3      Immature Grans, Absolute 0.01 10*3/mm3           I ordered the above labs and reviewed the results    RADIOLOGY  CT Head Without Contrast   Final Result           No acute intracranial hemorrhage or hydrocephalus. Chronic changes.   If there is further clinical concern, MRI could be considered for   further evaluation.       This report was finalized on 8/5/2018 12:10 PM by Dr. Levy Mock M.D.          MRI Brain Without Contrast    (Results Pending)   MRI Angiogram Head Without Contrast    (Results Pending)   MRI Angiogram Neck Without Contrast    (Results Pending)   MRI Cervical Spine Without Contrast    (Results Pending)   MRI Lumbar Spine Without Contrast    (Results Pending)         I ordered the above noted radiological studies. Interpreted by radiologist. Reviewed by me in PACS.       PROCEDURES  Procedures   NIH Stroke Scale      Interval: Baseline  Time: 11:38 AM  Person Administering Scale: Luh Vincent    Administer stroke scale items in the order listed. Record performance in each category after each subscale exam. Do not go back and change scores. Follow directions provided for each exam technique. Scores should reflect  what the patient does, not what the clinician thinks the patient can do. The clinician should record answers while administering the exam and work quickly. Except where indicated, the patient should not be coached (i.e., repeated requests to patient to make a special effort).      1a  Level of consciousness: 0=alert; keenly responsive   1b. LOC questions:  0=Performs both tasks correctly   1c. LOC commands: 0=Performs both tasks correctly   2.  Best Gaze: 0=normal   3.  Visual: 0=No visual loss   4. Facial Palsy: 0=Normal symmetric movement   5a.  Motor left arm: 0=No drift, limb holds 90 (or 45) degrees for full 10 seconds   5b.  Motor right arm: 0=No drift, limb holds 90 (or 45) degrees for full 10 seconds   6a. motor left le=No drift, limb holds 90 (or 45) degrees for full 10 seconds   6b  Motor right le=Drift, limb holds 90 (or 45) degrees but drifts down before full 10 seconds: does not hit bed   7. Limb Ataxia: 0=Absent   8.  Sensory: 0=Normal; no sensory loss   9. Best Language:  0=No aphasia, normal   10. Dysarthria: 0=Normal   11. Extinction and Inattention: 0=No abnormality   12. Distal motor function: 0=Normal    Total:   1     EKG           EKG time: 1230  Rhythm/Rate: Sinus, 62  P waves and OH: Normal   QRS, axis: Normal   ST and T waves: Nonspecific T wave changes anteriorly     Interpreted Contemporaneously by me, independently viewed  Unchanged compared to prior 11/22/15      PROGRESS AND CONSULTS     1130 Pt is not a tPA candidate since symptoms began 2 days ago.     1139 Ordered CMP, protime-INR, troponin, CBC and EKG for further evaluation. Ordered CT head due to pt age and fall where the pt hit her head.     1256 Rechecked the patient and she is resting comfortably. Discussed pertinent lab and imaging results, including CT head. Discussed the plan to admit the pt for further evaluation due to the pt's RLE weakness. Patient agrees with plan and all questions were addressed.     1257  Placed call to [facility] for admission.     1315 Discussed the pt with Dr. Urbano ([facility]) who agrees to admit the pt.    1318 Ordered tele bed request and observation status.    MEDICAL DECISION MAKING  Results were reviewed/discussed with the patient and they were also made aware of online access. Pt also made aware that some labs, such as cultures, will not be resulted during ER visit and follow up with PMD is necessary.     MDM  Number of Diagnoses or Management Options  Fall, initial encounter:   Right leg weakness:   Diagnosis management comments: Patient did have some mild right leg weakness.  NIH score was 1.  She was not a TPA candidate as her symptoms have been present for over 2 days.  Head CT was negative.  Labs were unremarkable.  Patient was given aspirin in the ER.  She will be admitted for further workup.  Case was discussed with Dr. Urbano and he agreed to admit the patient.       Amount and/or Complexity of Data Reviewed  Clinical lab tests: ordered and reviewed  Tests in the radiology section of CPT®: ordered and reviewed (CT head: Nothing acute)  Tests in the medicine section of CPT®: ordered and reviewed  Decide to obtain previous medical records or to obtain history from someone other than the patient: yes  Review and summarize past medical records: yes (The pt was admitted in November 2015 for L leg cellulitis and acute encephalopathy. )  Discuss the patient with other providers: yes (Dr. rUbano ([facility]))    Patient Progress  Patient progress: stable         DIAGNOSIS  Final diagnoses:   Right leg weakness   Fall, initial encounter       DISPOSITION  ADMISSION    Discussed treatment plan and reason for admission with pt/family and admitting physician.  Pt/family voiced understanding of the plan for admission for further testing/treatment as needed.     Latest Documented Vital Signs:  As of 4:23 PM  BP- 177/75 HR- 58 Temp- 97.9 °F (36.6 °C) (Oral) O2 sat- 98%    --  Documentation assistance provided by nick  Luh Vincent for Dr. Gomez.  Information recorded by the scribe was done at my direction and has been verified and validated by me.     Luh Vincent  08/05/18 1150       Luh Vincent  08/05/18 1320       Sami Gomez MD  08/05/18 5519

## 2018-08-05 NOTE — ED TRIAGE NOTES
Patient states two night ago she had an episode where her right leg gave out on her, she states that she almost fell. She states today then she had gotten up and her right leg gave out on her again. Patient states that today she fell and her elbow is also hurting her. She states that she hit the back of her head when she fell today and that after she fell her family helped her get up and go to the bathroom and she felt like she couldn't use her left hand to turn on the water and her left hand was weak.

## 2018-08-05 NOTE — PLAN OF CARE
Problem: Patient Care Overview  Goal: Plan of Care Review  Outcome: Ongoing (interventions implemented as appropriate)   08/05/18 2777   Coping/Psychosocial   Plan of Care Reviewed With patient;family   Plan of Care Review   Progress no change   OTHER   Outcome Summary Admitted with right leg weakness. NIH 0; MRI done, results pending.

## 2018-08-06 ENCOUNTER — APPOINTMENT (OUTPATIENT)
Dept: CARDIOLOGY | Facility: HOSPITAL | Age: 83
End: 2018-08-06
Attending: HOSPITALIST

## 2018-08-06 PROBLEM — I63.9 CVA (CEREBRAL VASCULAR ACCIDENT): Status: ACTIVE | Noted: 2018-08-06

## 2018-08-06 LAB
ANION GAP SERPL CALCULATED.3IONS-SCNC: 13.1 MMOL/L
BASOPHILS # BLD AUTO: 0.08 10*3/MM3 (ref 0–0.2)
BASOPHILS NFR BLD AUTO: 1.2 % (ref 0–1.5)
BH CV ECHO MEAS - ACS: 1.6 CM
BH CV ECHO MEAS - AI DEC SLOPE: 219 CM/SEC^2
BH CV ECHO MEAS - AI MAX PG: 79.2 MMHG
BH CV ECHO MEAS - AI MAX VEL: 445 CM/SEC
BH CV ECHO MEAS - AI P1/2T: 595.1 MSEC
BH CV ECHO MEAS - AO MAX PG (FULL): 2.6 MMHG
BH CV ECHO MEAS - AO MAX PG: 5.1 MMHG
BH CV ECHO MEAS - AO MEAN PG (FULL): 1 MMHG
BH CV ECHO MEAS - AO MEAN PG: 2 MMHG
BH CV ECHO MEAS - AO ROOT AREA (BSA CORRECTED): 1.7
BH CV ECHO MEAS - AO ROOT AREA: 5.7 CM^2
BH CV ECHO MEAS - AO ROOT DIAM: 2.7 CM
BH CV ECHO MEAS - AO V2 MAX: 113 CM/SEC
BH CV ECHO MEAS - AO V2 MEAN: 66.6 CM/SEC
BH CV ECHO MEAS - AO V2 VTI: 26.5 CM
BH CV ECHO MEAS - ASC AORTA: 3.2 CM
BH CV ECHO MEAS - AVA(I,A): 2.1 CM^2
BH CV ECHO MEAS - AVA(I,D): 2.1 CM^2
BH CV ECHO MEAS - AVA(V,A): 2.2 CM^2
BH CV ECHO MEAS - AVA(V,D): 2.2 CM^2
BH CV ECHO MEAS - BSA(HAYCOCK): 1.6 M^2
BH CV ECHO MEAS - BSA: 1.6 M^2
BH CV ECHO MEAS - BZI_BMI: 24.1 KILOGRAMS/M^2
BH CV ECHO MEAS - BZI_METRIC_HEIGHT: 157.5 CM
BH CV ECHO MEAS - BZI_METRIC_WEIGHT: 59.9 KG
BH CV ECHO MEAS - CONTRAST EF (2CH): 57.5 ML/M^2
BH CV ECHO MEAS - CONTRAST EF 4CH: 57.1 ML/M^2
BH CV ECHO MEAS - EDV(CUBED): 103.8 ML
BH CV ECHO MEAS - EDV(MOD-SP2): 40 ML
BH CV ECHO MEAS - EDV(MOD-SP4): 49 ML
BH CV ECHO MEAS - EDV(TEICH): 102.4 ML
BH CV ECHO MEAS - EF(CUBED): 62.1 %
BH CV ECHO MEAS - EF(MOD-BP): 57 %
BH CV ECHO MEAS - EF(MOD-SP2): 57.5 %
BH CV ECHO MEAS - EF(MOD-SP4): 57.1 %
BH CV ECHO MEAS - EF(TEICH): 53.7 %
BH CV ECHO MEAS - ESV(CUBED): 39.3 ML
BH CV ECHO MEAS - ESV(MOD-SP2): 17 ML
BH CV ECHO MEAS - ESV(MOD-SP4): 21 ML
BH CV ECHO MEAS - ESV(TEICH): 47.4 ML
BH CV ECHO MEAS - FS: 27.7 %
BH CV ECHO MEAS - IVS/LVPW: 1.4
BH CV ECHO MEAS - IVSD: 1.4 CM
BH CV ECHO MEAS - LAT PEAK E' VEL: 8 CM/SEC
BH CV ECHO MEAS - LV DIASTOLIC VOL/BSA (35-75): 30.6 ML/M^2
BH CV ECHO MEAS - LV MASS(C)D: 212 GRAMS
BH CV ECHO MEAS - LV MASS(C)DI: 132.3 GRAMS/M^2
BH CV ECHO MEAS - LV MAX PG: 2.5 MMHG
BH CV ECHO MEAS - LV MEAN PG: 1 MMHG
BH CV ECHO MEAS - LV SYSTOLIC VOL/BSA (12-30): 13.1 ML/M^2
BH CV ECHO MEAS - LV V1 MAX: 79.1 CM/SEC
BH CV ECHO MEAS - LV V1 MEAN: 49.1 CM/SEC
BH CV ECHO MEAS - LV V1 VTI: 17.7 CM
BH CV ECHO MEAS - LVIDD: 4.7 CM
BH CV ECHO MEAS - LVIDS: 3.4 CM
BH CV ECHO MEAS - LVLD AP2: 5.9 CM
BH CV ECHO MEAS - LVLD AP4: 6.3 CM
BH CV ECHO MEAS - LVLS AP2: 5.2 CM
BH CV ECHO MEAS - LVLS AP4: 5.6 CM
BH CV ECHO MEAS - LVOT AREA (M): 3.1 CM^2
BH CV ECHO MEAS - LVOT AREA: 3.1 CM^2
BH CV ECHO MEAS - LVOT DIAM: 2 CM
BH CV ECHO MEAS - LVPWD: 1 CM
BH CV ECHO MEAS - MED PEAK E' VEL: 5 CM/SEC
BH CV ECHO MEAS - MV A DUR: 0.12 SEC
BH CV ECHO MEAS - MV A MAX VEL: 86.8 CM/SEC
BH CV ECHO MEAS - MV DEC SLOPE: 330 CM/SEC^2
BH CV ECHO MEAS - MV DEC TIME: 0.13 SEC
BH CV ECHO MEAS - MV E MAX VEL: 77.6 CM/SEC
BH CV ECHO MEAS - MV E/A: 0.89
BH CV ECHO MEAS - MV MAX PG: 3.5 MMHG
BH CV ECHO MEAS - MV MEAN PG: 1 MMHG
BH CV ECHO MEAS - MV P1/2T MAX VEL: 70.6 CM/SEC
BH CV ECHO MEAS - MV P1/2T: 62.7 MSEC
BH CV ECHO MEAS - MV V2 MAX: 93.9 CM/SEC
BH CV ECHO MEAS - MV V2 MEAN: 51.6 CM/SEC
BH CV ECHO MEAS - MV V2 VTI: 29.4 CM
BH CV ECHO MEAS - MVA P1/2T LCG: 3.1 CM^2
BH CV ECHO MEAS - MVA(P1/2T): 3.5 CM^2
BH CV ECHO MEAS - MVA(VTI): 1.9 CM^2
BH CV ECHO MEAS - PA ACC TIME: 0.16 SEC
BH CV ECHO MEAS - PA MAX PG (FULL): 0.8 MMHG
BH CV ECHO MEAS - PA MAX PG: 3.6 MMHG
BH CV ECHO MEAS - PA PR(ACCEL): 7.9 MMHG
BH CV ECHO MEAS - PA V2 MAX: 94.7 CM/SEC
BH CV ECHO MEAS - PULM A REVS DUR: 0.11 SEC
BH CV ECHO MEAS - PULM A REVS VEL: 19.4 CM/SEC
BH CV ECHO MEAS - PULM DIAS VEL: 61.6 CM/SEC
BH CV ECHO MEAS - PULM S/D: 1.2
BH CV ECHO MEAS - PULM SYS VEL: 71.8 CM/SEC
BH CV ECHO MEAS - PVA(V,A): 3.7 CM^2
BH CV ECHO MEAS - PVA(V,D): 3.7 CM^2
BH CV ECHO MEAS - QP/QS: 1.3
BH CV ECHO MEAS - RAP SYSTOLE: 3 MMHG
BH CV ECHO MEAS - RV MAX PG: 2.8 MMHG
BH CV ECHO MEAS - RV MEAN PG: 1 MMHG
BH CV ECHO MEAS - RV V1 MAX: 83.5 CM/SEC
BH CV ECHO MEAS - RV V1 MEAN: 50.7 CM/SEC
BH CV ECHO MEAS - RV V1 VTI: 17.6 CM
BH CV ECHO MEAS - RVOT AREA: 4.2 CM^2
BH CV ECHO MEAS - RVOT DIAM: 2.3 CM
BH CV ECHO MEAS - RVSP: 32.2 MMHG
BH CV ECHO MEAS - SI(AO): 94.7 ML/M^2
BH CV ECHO MEAS - SI(CUBED): 40.3 ML/M^2
BH CV ECHO MEAS - SI(LVOT): 34.7 ML/M^2
BH CV ECHO MEAS - SI(MOD-SP2): 14.4 ML/M^2
BH CV ECHO MEAS - SI(MOD-SP4): 17.5 ML/M^2
BH CV ECHO MEAS - SI(TEICH): 34.3 ML/M^2
BH CV ECHO MEAS - SUP REN AO DIAM: 1.6 CM
BH CV ECHO MEAS - SV(AO): 151.7 ML
BH CV ECHO MEAS - SV(CUBED): 64.5 ML
BH CV ECHO MEAS - SV(LVOT): 55.6 ML
BH CV ECHO MEAS - SV(MOD-SP2): 23 ML
BH CV ECHO MEAS - SV(MOD-SP4): 28 ML
BH CV ECHO MEAS - SV(RVOT): 73.1 ML
BH CV ECHO MEAS - SV(TEICH): 54.9 ML
BH CV ECHO MEAS - TAPSE (>1.6): 1.9 CM2
BH CV ECHO MEAS - TR MAX VEL: 270 CM/SEC
BH CV ECHO MEASUREMENTS AVERAGE E/E' RATIO: 11.94
BH CV VAS BP RIGHT ARM: NORMAL MMHG
BH CV XLRA - RV BASE: 3.3 CM
BH CV XLRA - TDI S': 10 CM/SEC
BUN BLD-MCNC: 14 MG/DL (ref 8–23)
BUN/CREAT SERPL: 14.1 (ref 7–25)
CALCIUM SPEC-SCNC: 9.9 MG/DL (ref 8.6–10.5)
CHLORIDE SERPL-SCNC: 104 MMOL/L (ref 98–107)
CHOLEST SERPL-MCNC: 161 MG/DL (ref 0–200)
CO2 SERPL-SCNC: 24.9 MMOL/L (ref 22–29)
CREAT BLD-MCNC: 0.99 MG/DL (ref 0.57–1)
DEPRECATED RDW RBC AUTO: 47.4 FL (ref 37–54)
EOSINOPHIL # BLD AUTO: 0.91 10*3/MM3 (ref 0–0.7)
EOSINOPHIL NFR BLD AUTO: 13.7 % (ref 0.3–6.2)
ERYTHROCYTE [DISTWIDTH] IN BLOOD BY AUTOMATED COUNT: 12.5 % (ref 11.7–13)
FOLATE SERPL-MCNC: >20 NG/ML (ref 4.78–24.2)
GFR SERPL CREATININE-BSD FRML MDRD: 53 ML/MIN/1.73
GLUCOSE BLD-MCNC: 92 MG/DL (ref 65–99)
GLUCOSE BLDC GLUCOMTR-MCNC: 115 MG/DL (ref 70–130)
GLUCOSE BLDC GLUCOMTR-MCNC: 116 MG/DL (ref 70–130)
GLUCOSE BLDC GLUCOMTR-MCNC: 125 MG/DL (ref 70–130)
GLUCOSE BLDC GLUCOMTR-MCNC: 89 MG/DL (ref 70–130)
GLUCOSE BLDC GLUCOMTR-MCNC: 94 MG/DL (ref 70–130)
HBA1C MFR BLD: 5.3 % (ref 4.8–5.6)
HCT VFR BLD AUTO: 40.9 % (ref 35.6–45.5)
HDLC SERPL-MCNC: 38 MG/DL (ref 40–60)
HGB BLD-MCNC: 12.9 G/DL (ref 11.9–15.5)
IMM GRANULOCYTES # BLD: 0 10*3/MM3 (ref 0–0.03)
IMM GRANULOCYTES NFR BLD: 0 % (ref 0–0.5)
LDLC SERPL CALC-MCNC: 89 MG/DL (ref 0–100)
LDLC/HDLC SERPL: 2.34 {RATIO}
LEFT ATRIUM VOLUME INDEX: 15 ML/M2
LYMPHOCYTES # BLD AUTO: 2.51 10*3/MM3 (ref 0.9–4.8)
LYMPHOCYTES NFR BLD AUTO: 37.7 % (ref 19.6–45.3)
MAXIMAL PREDICTED HEART RATE: 135 BPM
MCH RBC QN AUTO: 33 PG (ref 26.9–32)
MCHC RBC AUTO-ENTMCNC: 31.5 G/DL (ref 32.4–36.3)
MCV RBC AUTO: 104.6 FL (ref 80.5–98.2)
MONOCYTES # BLD AUTO: 0.78 10*3/MM3 (ref 0.2–1.2)
MONOCYTES NFR BLD AUTO: 11.7 % (ref 5–12)
NEUTROPHILS # BLD AUTO: 2.37 10*3/MM3 (ref 1.9–8.1)
NEUTROPHILS NFR BLD AUTO: 35.7 % (ref 42.7–76)
PLATELET # BLD AUTO: 249 10*3/MM3 (ref 140–500)
PMV BLD AUTO: 11 FL (ref 6–12)
POTASSIUM BLD-SCNC: 3.8 MMOL/L (ref 3.5–5.2)
RBC # BLD AUTO: 3.91 10*6/MM3 (ref 3.9–5.2)
SODIUM BLD-SCNC: 142 MMOL/L (ref 136–145)
STRESS TARGET HR: 115 BPM
TRIGL SERPL-MCNC: 171 MG/DL (ref 0–150)
TSH SERPL DL<=0.05 MIU/L-ACNC: 2.87 MIU/ML (ref 0.27–4.2)
VIT B12 BLD-MCNC: 775 PG/ML (ref 211–946)
VLDLC SERPL-MCNC: 34.2 MG/DL (ref 5–40)
WBC NRBC COR # BLD: 6.65 10*3/MM3 (ref 4.5–10.7)

## 2018-08-06 PROCEDURE — 85025 COMPLETE CBC W/AUTO DIFF WBC: CPT | Performed by: HOSPITALIST

## 2018-08-06 PROCEDURE — 82962 GLUCOSE BLOOD TEST: CPT

## 2018-08-06 PROCEDURE — 82607 VITAMIN B-12: CPT | Performed by: HOSPITALIST

## 2018-08-06 PROCEDURE — 97110 THERAPEUTIC EXERCISES: CPT

## 2018-08-06 PROCEDURE — 99221 1ST HOSP IP/OBS SF/LOW 40: CPT | Performed by: INTERNAL MEDICINE

## 2018-08-06 PROCEDURE — 96125 COGNITIVE TEST BY HC PRO: CPT

## 2018-08-06 PROCEDURE — 63710000001 DIPHENHYDRAMINE PER 50 MG: Performed by: HOSPITALIST

## 2018-08-06 PROCEDURE — 97162 PT EVAL MOD COMPLEX 30 MIN: CPT

## 2018-08-06 PROCEDURE — 97535 SELF CARE MNGMENT TRAINING: CPT

## 2018-08-06 PROCEDURE — 84443 ASSAY THYROID STIM HORMONE: CPT | Performed by: HOSPITALIST

## 2018-08-06 PROCEDURE — G8988 SELF CARE GOAL STATUS: HCPCS

## 2018-08-06 PROCEDURE — 82746 ASSAY OF FOLIC ACID SERUM: CPT | Performed by: PSYCHIATRY & NEUROLOGY

## 2018-08-06 PROCEDURE — G8989 SELF CARE D/C STATUS: HCPCS

## 2018-08-06 PROCEDURE — 97166 OT EVAL MOD COMPLEX 45 MIN: CPT

## 2018-08-06 PROCEDURE — 93306 TTE W/DOPPLER COMPLETE: CPT

## 2018-08-06 PROCEDURE — 99233 SBSQ HOSP IP/OBS HIGH 50: CPT | Performed by: PSYCHIATRY & NEUROLOGY

## 2018-08-06 PROCEDURE — 83036 HEMOGLOBIN GLYCOSYLATED A1C: CPT | Performed by: HOSPITALIST

## 2018-08-06 PROCEDURE — 80061 LIPID PANEL: CPT | Performed by: HOSPITALIST

## 2018-08-06 PROCEDURE — 80048 BASIC METABOLIC PNL TOTAL CA: CPT | Performed by: HOSPITALIST

## 2018-08-06 PROCEDURE — G8987 SELF CARE CURRENT STATUS: HCPCS

## 2018-08-06 PROCEDURE — 93306 TTE W/DOPPLER COMPLETE: CPT | Performed by: INTERNAL MEDICINE

## 2018-08-06 PROCEDURE — 25010000002 ONDANSETRON PER 1 MG: Performed by: HOSPITALIST

## 2018-08-06 RX ORDER — ACETAMINOPHEN 500 MG
500 TABLET ORAL NIGHTLY PRN
Status: DISCONTINUED | OUTPATIENT
Start: 2018-08-06 | End: 2018-08-08 | Stop reason: HOSPADM

## 2018-08-06 RX ORDER — HYDRALAZINE HYDROCHLORIDE 20 MG/ML
10 INJECTION INTRAMUSCULAR; INTRAVENOUS EVERY 4 HOURS PRN
Status: DISCONTINUED | OUTPATIENT
Start: 2018-08-06 | End: 2018-08-08 | Stop reason: HOSPADM

## 2018-08-06 RX ORDER — DIPHENHYDRAMINE HCL 25 MG
25 CAPSULE ORAL NIGHTLY PRN
Status: DISCONTINUED | OUTPATIENT
Start: 2018-08-06 | End: 2018-08-08 | Stop reason: HOSPADM

## 2018-08-06 RX ADMIN — ONDANSETRON HYDROCHLORIDE 4 MG: 2 INJECTION, SOLUTION INTRAMUSCULAR; INTRAVENOUS at 10:00

## 2018-08-06 RX ADMIN — ONDANSETRON HYDROCHLORIDE 4 MG: 2 INJECTION, SOLUTION INTRAMUSCULAR; INTRAVENOUS at 16:01

## 2018-08-06 RX ADMIN — ONDANSETRON HYDROCHLORIDE 4 MG: 2 INJECTION, SOLUTION INTRAMUSCULAR; INTRAVENOUS at 21:31

## 2018-08-06 RX ADMIN — ATENOLOL 25 MG: 25 TABLET ORAL at 08:51

## 2018-08-06 RX ADMIN — AMLODIPINE BESYLATE 2.5 MG: 2.5 TABLET ORAL at 08:51

## 2018-08-06 RX ADMIN — ACETAMINOPHEN 500 MG: 500 TABLET, FILM COATED ORAL at 23:54

## 2018-08-06 RX ADMIN — ATORVASTATIN CALCIUM 80 MG: 80 TABLET, FILM COATED ORAL at 20:35

## 2018-08-06 RX ADMIN — DULOXETINE HYDROCHLORIDE 60 MG: 60 CAPSULE, DELAYED RELEASE ORAL at 08:51

## 2018-08-06 RX ADMIN — DIPHENHYDRAMINE HYDROCHLORIDE 25 MG: 25 CAPSULE ORAL at 23:54

## 2018-08-06 RX ADMIN — FEBUXOSTAT 40 MG: 40 TABLET ORAL at 08:51

## 2018-08-06 RX ADMIN — ASPIRIN 325 MG: 325 TABLET ORAL at 08:51

## 2018-08-06 RX ADMIN — LOSARTAN POTASSIUM 100 MG: 100 TABLET, FILM COATED ORAL at 08:51

## 2018-08-06 RX ADMIN — ACETAMINOPHEN 650 MG: 325 TABLET ORAL at 20:35

## 2018-08-06 NOTE — PLAN OF CARE
Problem: Patient Care Overview  Goal: Plan of Care Review  Outcome: Ongoing (interventions implemented as appropriate)   08/06/18 5518   Coping/Psychosocial   Plan of Care Reviewed With patient   OTHER   Outcome Summary Pt presents with impaired balance, weakness and incoordination R and L. Pt is Mod A to walk to BR and unable to complete lower body ADL. Pt may benefit from skilled OT to increase safety and indepencence along with UE function.

## 2018-08-06 NOTE — NURSING NOTE
Stroke booklet provided to patient and family at bedside. Discussed pages 12 and 13 specifically emphasizing all personal modifiable risk factors for stroke. Also discussed stroke medications, signs and symptoms of stroke and importance of calling 911/seeking immediate medical attention for any signs/symptoms of stroke. All questions answered.

## 2018-08-06 NOTE — CONSULTS
Patient Name: Alba Correa  :1933  85 y.o.    Date of Admission: 2018  Date of Consultation:  18  Encounter Provider: Roshan Martines III, MD  Place of Service: [facility] CARDIOLOGY  Referring Provider: Marco A Urbano MD  Patient Care Team:  Aramis Doty MD as PCP - General (Family Medicine)  Zack Brown MD as PCP - Claims Attributed  Juan Jose Muñiz MD as Consulting Physician (Neurology)  Adrian Perez MD as Consulting Physician (Hematology and Oncology)  Adrian Caldera MD as Consulting Physician (Nephrology)  Zack Dewitt MD as Consulting Physician (Rheumatology)      Chief complaint: S/p Fall    History of Present Illness:   Ms. Alba Correa is an 85 year old female patient with a history of hypertension, CKD, HLD, hypothyroidism, DVT in  previously on warfarin, peripheral neuropathy and rheumatoid arthritis. She has a history of tobacco abuse.      She presented to the ED yesterday with the s/p fall. The patient noted that she fell two nights ago after her right leg became weak and gave out. She then had another fall yesterday morning. She noted that she hit her head but denied headache or loss of consciousness. She did have a laceration on her left elbow. She was hypertensive on arrival at 189/89. Labs included Troponin negative and Cr 1.07 (baseline 1.1). CT head was negative. MRI showed multiple acute right sided small ischemic strokes. MRI C spine and L spine showed mild levoscoliotic curvature of the lumbar spine and degenerative disc disease. Her echo showed left ventricular wall thickness consistent with mild to moderate septal asymmetric hypertrophy and normal LV systolic function with an EF of 57%.     She denies any cardiac symptoms.  She has no history of any esophageal issues or pathology.  No dysphasia.  This patient denies any chest pain, pressure, tightness, squeezing, or heartburn.  This patient has not experienced any  feeling of palpitations, tachycardia or heart racing and no presyncope or syncope.  There has not been any problems with dizziness or lightheadedness.  There has not been any orthopnea or PND, and no problems with lower extremity edema.  This patient denies any shortness of breath at rest or with activity and has not had any wheezing.  This patient has not had any problems with unexplained nausea or vomiting. The patient has continued to perform daily activities of living without any specific problem or change in the level of activity.  This patient has not been recently hospitalized for any reason.  Have been consulted to arrange for a transesophageal echocardiogram to be performed.       Previous Testing:    ECHO 8/6/18      Renal US bilateral 12/12/2016        Past Medical History:   Diagnosis Date   • Benign essential hypertension    • Chronic kidney disease    • Chronic renal insufficiency    • Degeneration of intervertebral disc    • Depression    • Disease of thyroid gland    • DVT (deep venous thrombosis) (CMS/HCC)    • Edema    • Gout    • H/O complete eye exam 09/2016   • Hyperlipidemia    • Hyperparathyroidism (CMS/HCC)    • IBS (irritable bowel syndrome)    • OAB (overactive bladder)    • Osteopenia    • Osteoporosis    • Rheumatoid arthritis (CMS/HCC)        Past Surgical History:   Procedure Laterality Date   • APPENDECTOMY     • BREAST BIOPSY     • COLONOSCOPY      declines   • HERNIA REPAIR  1965   • HYSTERECTOMY      still has ovaries   • MAMMO BILATERAL  11/16/2016    pt declines   • PAP SMEAR  11/16/2016    declines         Prior to Admission medications    Medication Sig Start Date End Date Taking? Authorizing Provider   acetaminophen (TYLENOL) 650 MG 8 hr tablet Take 650 mg by mouth Every 8 (Eight) Hours As Needed for Mild Pain (1-3).   Yes Provider, MD Vanessa   amLODIPine (NORVASC) 2.5 MG tablet Take 1 tablet by mouth Daily. 3/1/18  Yes Aramis Doty MD   atenolol (TENORMIN) 25 MG tablet  Take 1 tablet by mouth Daily. 1/10/18  Yes Aramis Doty MD   Calcium Carbonate-Vitamin D (CALCIUM 600+D PO) Take  by mouth.   Yes Vanessa Castle MD   diphenhydrAMINE-acetaminophen (TYLENOL PM)  MG tablet per tablet Take 1 tablet by mouth At Night As Needed for Sleep.   Yes Vanessa Castle MD   DULoxetine (CYMBALTA) 60 MG capsule Take 1 capsule by mouth Daily. 1/10/18  Yes Aramis Doty MD   losartan (COZAAR) 100 MG tablet Take 1 tablet by mouth Daily. 5/29/18  Yes Marla Winston APRN   losartan (COZAAR) 100 MG tablet TAKE 1 TABLET BY MOUTH EVERY DAY 6/12/18  Yes Marla Winston APRN   Omega-3 Fatty Acids (FISH OIL PO) Take 1,200 mg by mouth Every Night.   Yes Vanessa Castle MD   Scopolamine (TRANSDERM-SCOP, 1.5 MG,) 1.5 MG/3DAYS patch Place 1 patch on the skin Every 72 (Seventy-Two) Hours. 5/15/18  Yes Marla Winston APRN   simvastatin (ZOCOR) 20 MG tablet Take 1 tablet by mouth Daily. 1/10/18  Yes Aramis Doty MD   ULORIC 40 MG tablet TAKE 1 TABLET DAILY 5/21/18  Yes Aramis Doty MD   BIOTIN PO Take  by mouth.    Vanessa Castle MD   cephalexin (KEFLEX) 250 MG capsule Take 1 capsule by mouth 2 (Two) Times a Day. 6/16/18   Rafi Slade MD   Multiple Vitamins-Minerals (CENTRUM SILVER PO) Take  by mouth.    Vanessa Castle MD       Allergies   Allergen Reactions   • Bactrim [Sulfamethoxazole-Trimethoprim] Delirium   • Lisinopril Unknown (See Comments)     Unknown per pt   • Levofloxacin Rash       Social History     Social History   • Marital status:      Spouse name: Ray   • Number of children: 3   • Years of education: High school     Occupational History   • Dental assistant Retired     Social History Main Topics   • Smoking status: Former Smoker     Years: 5.00     Types: Cigarettes   • Smokeless tobacco: Never Used   • Alcohol use Yes      Comment: Occasional   • Drug use: No   • Sexual activity: Yes     Partners: Male      Birth control/ protection: Surgical     Other Topics Concern   • Not on file       Family History   Problem Relation Age of Onset   • Alcohol abuse Other    • Cancer Other    • Hypertension Other    • Kidney disease Other    • Lung disease Other    • Heart disease Mother 89   • Heart failure Mother    • Ovarian cancer Daughter 60   • Stomach cancer Father    • Kidney cancer Father 52   • Lung cancer Father 52   • Ovarian cancer Sister    • Lung cancer Brother 65       REVIEW OF SYSTEMS:   All systems reviewed.  Pertinent positives identified in HPI.  All other systems are negative.      Objective:     Vitals:    08/05/18 1520 08/05/18 1931 08/06/18 0146 08/06/18 0819   BP:  (!) 181/79  171/96   BP Location:  Right arm  Right arm   Patient Position:  Lying  Sitting   Pulse: 59 62 57 66   Resp:  16  18   Temp:  98.1 °F (36.7 °C)  97.6 °F (36.4 °C)   TempSrc:  Oral  Oral   SpO2: 99% 95% 92% 96%   Weight:       Height:         Body mass index is 24.31 kg/m².    Physical Exam:  General Appearance:    Alert, cooperative, in no acute distress   Head:    Normocephalic, without obvious abnormality, atraumatic   Eyes:            Lids and lashes normal, conjunctivae and sclerae normal, no   icterus, no pallor, corneas clear, PERRLA   Ears:    Ears appear intact with no abnormalities noted   Throat:   No oral lesions, no thrush, oral mucosa moist   Neck:   No adenopathy, supple, trachea midline, no thyromegaly, no   carotid bruit, no JVD   Back:     No kyphosis present, no scoliosis present, no skin lesions, erythema or scars, no tenderness to percussion or palpation, range of motion normal   Lungs:     Clear to auscultation,respirations regular, even and unlabored    Heart:    Regular rhythm and normal rate, normal S1 and S2, no murmur, no gallop, no rub, no click   Chest Wall:    No abnormalities observed   Abdomen:     Normal bowel sounds, no masses, no organomegaly, soft        non-tender, non-distended, no guarding, no  rebound  tenderness   Extremities:   Moves all extremities well, no edema, no cyanosis, no redness   Pulses:   Pulses palpable and equal bilaterally. Normal radial, carotid, femoral, dorsalis pedis and posterior tibial pulses bilaterally. Normal abdominal aorta   Skin:  Psychiatric:   No bleeding, bruising or rash    Alert and oriented x 3, normal mood and affect         Lab Review:       Results from last 7 days  Lab Units 08/06/18  0449 08/05/18  1152   SODIUM mmol/L 142 140   POTASSIUM mmol/L 3.8 4.8   CHLORIDE mmol/L 104 103   CO2 mmol/L 24.9 24.9   BUN mg/dL 14 19   CREATININE mg/dL 0.99 1.07*   CALCIUM mg/dL 9.9 9.5   BILIRUBIN mg/dL  --  0.2   ALK PHOS U/L  --  76   ALT (SGPT) U/L  --  16   AST (SGOT) U/L  --  25   GLUCOSE mg/dL 92 113*       Results from last 7 days  Lab Units 08/05/18  1152   TROPONIN T ng/mL <0.010       Results from last 7 days  Lab Units 08/06/18  0449   WBC 10*3/mm3 6.65   HEMOGLOBIN g/dL 12.9   HEMATOCRIT % 40.9   PLATELETS 10*3/mm3 249       Results from last 7 days  Lab Units 08/05/18  1152   INR  1.02           Results from last 7 days  Lab Units 08/06/18  0449   CHOLESTEROL mg/dL 161   TRIGLYCERIDES mg/dL 171*   HDL CHOL mg/dL 38*   LDL CHOL mg/dL 89           EKG 8/5/18      I personally viewed and interpreted the patient's EKG/Telemetry data.      Current Facility-Administered Medications:   •  acetaminophen (TYLENOL) tablet 650 mg, 650 mg, Oral, Q6H PRN, Marco A Urbano MD  •  amLODIPine (NORVASC) tablet 2.5 mg, 2.5 mg, Oral, Daily, Marco A Urbano MD, 2.5 mg at 08/06/18 0851  •  aspirin tablet 325 mg, 325 mg, Oral, Daily, 325 mg at 08/06/18 0851 **OR** aspirin suppository 300 mg, 300 mg, Rectal, Daily, Marco A Urbano MD  •  atenolol (TENORMIN) tablet 25 mg, 25 mg, Oral, Daily, Marco A Urbano MD, 25 mg at 08/06/18 0851  •  atorvastatin (LIPITOR) tablet 80 mg, 80 mg, Oral, Nightly, Marco A Urbano MD, 80 mg at 08/05/18 2004  •  diphenhydrAMINE-acetaminophen (TYLENOL PM)  MG  per tablet 1 tablet, 1 tablet, Oral, Nightly PRN, Marco A Urbano MD  •  DULoxetine (CYMBALTA) DR capsule 60 mg, 60 mg, Oral, Daily, Marco A Urbano MD, 60 mg at 08/06/18 0851  •  febuxostat (ULORIC) tablet 40 mg, 40 mg, Oral, Daily, Marco A Urbano MD, 40 mg at 08/06/18 0851  •  losartan (COZAAR) tablet 100 mg, 100 mg, Oral, Daily, Marco A Urbano MD, 100 mg at 08/06/18 0851  •  ondansetron (ZOFRAN) injection 4 mg, 4 mg, Intravenous, Q6H PRN, Marco A Urbano MD, 4 mg at 08/06/18 1000  •  sodium chloride 0.9 % flush 1-10 mL, 1-10 mL, Intravenous, PRN, Marco A Urbano MD  •  Insert peripheral IV, , , Once **AND** sodium chloride 0.9 % flush 10 mL, 10 mL, Intravenous, PRN, Sami Gomez MD    Assessment and Plan:       Active Hospital Problems    Diagnosis Date Noted   • **CVA (cerebral vascular accident) (CMS/HCC) [I63.9] 08/06/2018   • Right leg weakness [R29.898] 08/05/2018   • Hyperlipidemia [E78.5] 01/10/2018   • History of degenerative disc disease [Z87.39] 07/11/2017   • History of DVT (deep vein thrombosis) [Z86.718] 07/11/2017   • History of rheumatoid arthritis [Z87.39] 07/11/2017   • Chronic kidney failure [N18.9]    • Gout [M10.9]    • Elevated cholesterol [E78.00]    • Osteoporosis [M81.0]    • Hyperparathyroidism (CMS/HCC) [E21.3]    • Benign essential hypertension [I10]       Resolved Hospital Problems    Diagnosis Date Noted Date Resolved   No resolved problems to display.     1.  CVA-we will arrange for a transesophageal echocardiogram to be performed.  If negative, then a monitor will be placed at discharge.    Roshan Martines III, MD  08/06/18  1:27 PM

## 2018-08-06 NOTE — THERAPY EVALUATION
Acute Care - Occupational Therapy Initial Evaluation  [facility]     Patient Name: Alba Correa  : 1933  MRN: [id number]  Today's Date: 2018  Onset of Illness/Injury or Date of Surgery: 18     Referring Physician: Dr Urbano    Admit Date: 2018       ICD-10-CM ICD-9-CM   1. Right leg weakness R29.898 729.89   2. Fall, initial encounter W19.XXXA E888.9   3. Difficulty walking R26.2 719.7     Patient Active Problem List   Diagnosis   • Chronic kidney failure   • DVT (deep venous thrombosis) (CMS/HCC)   • Depression   • Edema   • Gout   • Elevated cholesterol   • Osteoporosis   • Hyperparathyroidism (CMS/HCC)   • Benign essential hypertension   • Adaptive colitis   • Osteopenia   • Rheumatoid arthritis (CMS/HCC)   • Degeneration of intervertebral disc   • D (diarrhea)   • BP (high blood pressure)   • Detrusor muscle hypertonia   • Open leg wound   • Vaginal odor   • Infected wound   • History of depression   • History of renal insufficiency syndrome   • History of degenerative disc disease   • History of osteoporosis   • History of DVT (deep vein thrombosis)   • History of rheumatoid arthritis   • Hyperlipidemia   • Macrocytosis   • Right leg weakness     Past Medical History:   Diagnosis Date   • Benign essential hypertension    • Chronic kidney disease    • Chronic renal insufficiency    • Degeneration of intervertebral disc    • Depression    • Disease of thyroid gland    • DVT (deep venous thrombosis) (CMS/HCC)    • Edema    • Gout    • H/O complete eye exam 2016   • Hyperlipidemia    • Hyperparathyroidism (CMS/HCC)    • IBS (irritable bowel syndrome)    • OAB (overactive bladder)    • Osteopenia    • Osteoporosis    • Rheumatoid arthritis (CMS/HCC)      Past Surgical History:   Procedure Laterality Date   • APPENDECTOMY     • BREAST BIOPSY     • COLONOSCOPY      declines   • HERNIA REPAIR     • HYSTERECTOMY      still has ovaries   • MAMMO BILATERAL  2016    pt declines   • PAP  SMEAR  11/16/2016    declines          OT ASSESSMENT FLOWSHEET (last 72 hours)      Occupational Therapy Evaluation     Row Name 08/06/18 0909                   OT Evaluation Time/Intention    Document Type evaluation;therapy note (daily note)  -LE        Patient Effort good  -LE        Symptoms Noted During/After Treatment --   feel nauseous, BP taken,RN notified, got washcloth, basin  -LE           General Information    Patient Profile Reviewed? yes  -LE        Referring Physician Dr Urbano  -LE        General Observations of Patient sitting in chair  -LE        Prior Level of Function independent:;driving;shopping;cooking;ADL's;gait  -LE        Equipment Currently Used at Home shower chair;grab bar  -LE        Pertinent History of Current Functional Problem increase weakness, trouble turning knobs with R hand  -LE        Existing Precautions/Restrictions fall  -LE           Relationship/Environment    Primary Source of Support/Comfort spouse  -LE           Cognitive Assessment/Intervention- PT/OT    Orientation Status (Cognition) oriented x 4  -LE        Follows Commands (Cognition) follows one step commands;over 90% accuracy  -LE        Personal Safety Interventions fall prevention program maintained;gait belt;nonskid shoes/slippers when out of bed  -LE           Bed Mobility Assessment/Treatment    Comment (Bed Mobility) out of bed already  -LE           Functional Mobility    Functional Mobility- Ind. Level moderate assist (50% patient effort)  -LE        Functional Mobility- Device rolling walker  -LE        Functional Mobility-Distance (Feet) 20  -LE        Functional Mobility- Safety Issues balance decreased during turns;sequencing ability decreased;step length decreased;weight-shifting ability decreased;loses balance backward   leans to L. assist to stabilize and push walker.   -LE        Functional Mobility- Comment weakness with walking and taking steps. q  -LE           Transfer Assessment/Treatment     Transfer Assessment/Treatment toilet transfer;stand-sit transfer;sit-stand transfer  -LE        Comment (Transfers) VC for hand placement, scoot out to edge, pushing up and reaching back during xfers  -LE           Sit-Stand Transfer    Sit-Stand [place] (Transfers) minimum assist (75% patient effort)  -LE        Assistive Device (Sit-Stand Transfers) walker, front-wheeled  -LE           Stand-Sit Transfer    Stand-Sit [place] (Transfers) moderate assist (50% patient effort)  -LE        Assistive Device (Stand-Sit Transfers) walker, front-wheeled  -LE           Toilet Transfer    Type (Toilet Transfer) stand pivot/stand step  -LE        [place] Level (Toilet Transfer) moderate assist (50% patient effort)  -LE        Assistive Device (Toilet Transfer) grab bars/safety frame;walker, front-wheeled  -LE           ADL Assessment/Intervention    BADL Assessment/Intervention lower body dressing;feeding;toileting  -LE           Lower Body Dressing Assessment/Training    Lower Body Dressing [place] Level don;socks;doff;maximum assist (25% patient effort)   able to doff socks but unable to luis  -LE        Lower Body Dressing Position supported sitting  -LE        Comment (Lower Body Dressing) impaired balance, UE strength and dexterity.   -LE           Self-Feeding Assessment/Training    [place] Level (Feeding) --   able to self feed but difficulty manipulation, holding utens  -LE           Toileting Assessment/Training    [place] Level (Toileting) --   able to complete hygiene SBA, predict assist cloth mngt  -LE        Comment (Toileting) does not have underwear/pants to assess clothing management  -LE           General ROM    GENERAL ROM COMMENTS L UE lag to reach end range.  grossly 6/8 L UE  -LE           MMT (Manual Muscle Testing)    Additional Documentation --   L UE 4-/5, R UE 4/5  -LE           Motor Assessment/Interventions    Additional Documentation Balance (Group);Balance  Interventions (Group);Gross Motor Coordination (Group);Fine Motor Testing & Training (Group)  -LE           Gross Motor Coordination    Gross Motor Impairments coordination   L drift of UE and body w/ eyes closed.  Mod diff cross midli  -LE        Gross Motor Skill, Impairments Detail reports L hand worse to use utensils in addition to milder R hand dificulty.   -LE        Gross Motor Coordination Interventions --   ed to look at L side while using.  -LE           Balance    Balance static sitting balance;static standing balance;dynamic standing balance;dynamic sitting balance  -LE           Static Sitting Balance    Level of [place] (Unsupported Sitting, Static Balance) supervision  -LE        Sitting Position (Unsupported Sitting, Static Balance) sitting in chair   on toilet  -LE        Time Able to Maintain Position (Unsupported Sitting, Static Balance) more than 5 minutes  -LE        Level of [place] (Supported Sitting, Static Balance) supervision  -LE           Dynamic Sitting Balance    Level of [place], Reaches Outside Midline (Sitting, Dynamic Balance) minimal assist, 75% patient effort   leans to L in chair when eyes closed for 10 seconds  -LE           Static Standing Balance    Level of [place] (Supported Standing, Static Balance) contact guard assist  -LE        Time Able to Maintain Position (Supported Standing, Static Balance) 15 to 30 seconds  -LE        Assistive Device Utilized (Supported Standing, Static Balance) rolling walker  -LE           Dynamic Standing Balance    Level of [place], Reaches Outside Midline (Standing, Dynamic Balance) moderate assist, 50 to 74% patient effort   at walker  -LE        Time Able to Maintain Position, Reaches Outside Midline (Standing, Dynamic Balance) 1 to 2 minutes  -LE           Balance Interventions    Training Strategies (Balance) ed on posture, trunk control to improve stability.  pt returns demo when cued  -LE           Fine Motor  Testing & Training    Comment, Fine Motor Coordination slow and has to look at fingers to oppose on L side  -LE           Sensory Assessment/Intervention    Sensory General Assessment --   tingling L worse than R  -LE           Positioning and Restraints    Pre-Treatment Position sitting in chair/recliner  -LE           Pain Scale: Numbers Pre/Post-Treatment    Pain Scale: Numbers, Pretreatment 0/10 - no pain  -LE           Wound 08/05/18 1929 Left elbow skin tear    Wound - [facility] Date first assessed: 08/05/18  - Time first assessed: 1929  - Present On Admission : yes  -AH Side: Left  -AH Location: elbow  -AH Type: skin tear  -AH       Clinical Impression (OT)    OT Diagnosis need for assist with personal care  -LE        Patient/Family Goals Statement (OT Eval) return to prior level of function  -LE        Criteria for Skilled Therapeutic Interventions Met (OT Eval) treatment indicated;yes  -LE        Rehab Potential (OT Eval) good, to achieve stated therapy goals  -LE        Therapy Frequency (OT Eval) 5 times/wk  -LE        Care Plan Review (OT) evaluation/treatment results reviewed;care plan/treatment goals reviewed  -LE        Anticipated Equipment Needs at Discharge (OT) shower chair;front wheeled walker;raised toilet seat  -LE        Anticipated Discharge Disposition (OT) inpatient rehabilitation facility  -LE           Vital Signs    Pre Systolic BP Rehab 171  -LE        Pre Treatment Diastolic BP 96  -LE        Intra Systolic BP Rehab 174  -LE        Intra Treatment Diastolic BP 91  -LE        Pre Patient Position Sitting  -LE        Intra Patient Position Standing  -LE        Post Patient Position Sitting  -LE        Activity Duration --   nausea and LE weakness limit activity tolerance.   -LE           Planned OT Interventions    Planned Therapy Interventions (OT Eval) activity tolerance training;adaptive equipment training;BADL retraining;functional balance retraining;occupation/activity  based interventions;strengthening exercise;ROM/therapeutic exercise;transfer/mobility retraining  -LE           OT Goals    Transfer Goal Selection (OT) transfer, OT goal 1  -LE        Dressing Goal Selection (OT) dressing, OT goal 1  -LE        Strength Goal Selection (OT) strength, OT goal 1  -LE        Coordination Goal Selection (OT) coordination, OT goal 1  -LE        Additional Documentation Coordination Goal Selection (OT) (Row);Strength Goal Selection (OT) (Row)  -LE           Transfer Goal 1 (OT)    Activity/Assistive Device (Transfer Goal 1, OT) sit-to-stand/stand-to-sit;bed-to-chair/chair-to-bed;toilet  -LE        [place] Level/Cues Needed (Transfer Goal 1, OT) minimum assist (75% or more patient effort)  -LE        Time Frame (Transfer Goal 1, OT) 1 week  -LE        Progress/Outcome (Transfer Goal 1, OT) goal ongoing  -LE           Dressing Goal 1 (OT)    Activity/Assistive Device (Dressing Goal 1, OT) lower body dressing  -LE        [place]/Cues Needed (Dressing Goal 1, OT) minimum assist (75% or more patient effort)  -LE        Time Frame (Dressing Goal 1, OT) 1 week  -LE        Progress/Outcome (Dressing Goal 1, OT) goal ongoing  -LE           Strength Goal 1 (OT)    Strength Goal 1 (OT) 10 reps B UE to increase functional strength/grasp during ADL.   -LE        Time Frame (Strength Goal 1, OT) 1 week  -LE        Progress/Outcome (Strength Goal 1, OT) goal ongoing  -LE           Coordination Goal 1 (OT)    Activity/Assistive Device (Coordination Goal 1, OT) FM written ex program;GM written ex program;FM task;GM task   B UE for increased dexterity during ADL tasks.   -LE        [place] Level/Cues Needed (Coordination Goal 1, OT) supervision required  -LE        Time Frame (Coordination Goal 1, OT) 1 week  -LE        Progress/Outcomes (Coordination Goal 1, OT) goal ongoing  -LE          User Key  (r) = Recorded By, (t) = Taken By, (c) = Cosigned By    Initials Name Effective Dates    LE  Janine Roberts, OTR 06/08/18 -     MILLICENT Burnette April, RN 12/01/16 -            Occupational Therapy Education     Title: PT OT SLP Therapies (Active)     Topic: Occupational Therapy (Done)     Point: ADL training (Done)     Description: Instruct learner(s) on proper safety adaptation and remediation techniques during self care or transfers.   Instruct in proper use of assistive devices.   Learning Progress Summary     Learner Status Readiness Method Response Comment Documented by    Patient Done MINA Austin D DU,VU Ed hand placement, body mechanics during xfers.  Ed posture for balance.  Ed risk of falls and call for help to get up.  Ed to call out if feeling poorly or feel  like pass out. LE 08/06/18 0949          Point: Precautions (Done)     Description: Instruct learner(s) on prescribed precautions during self-care and functional transfers.   Learning Progress Summary     Learner Status Readiness Method Response Comment Documented by    Patient Done MINA Austin D DU,VU Ed hand placement, body mechanics during xfers.  Ed posture for balance.  Ed risk of falls and call for help to get up.  Ed to call out if feeling poorly or feel  like pass out. LE 08/06/18 0949          Point: Body mechanics (Done)     Description: Instruct learner(s) on proper positioning and spine alignment during self-care, functional mobility activities and/or exercises.   Learning Progress Summary     Learner Status Readiness Method Response Comment Documented by    Patient Done MINA Austin D DU,VU Ed hand placement, body mechanics during xfers.  Ed posture for balance.  Ed risk of falls and call for help to get up.  Ed to call out if feeling poorly or feel  like pass out. LE 08/06/18 0949                      User Key     Initials Effective Dates Name Provider Type Discipline    LE 06/08/18 -  Janine Roberts, OTR Occupational Therapist OT                  OT Recommendation and Plan  Outcome Summary/Treatment Plan (OT)  Anticipated Equipment Needs at  Discharge (OT): shower chair, front wheeled walker, raised toilet seat  Anticipated Discharge Disposition (OT): inpatient rehabilitation facility  Planned Therapy Interventions (OT Eval): activity tolerance training, adaptive equipment training, BADL retraining, functional balance retraining, occupation/activity based interventions, strengthening exercise, ROM/therapeutic exercise, transfer/mobility retraining  Therapy Frequency (OT Eval): 5 times/wk  Plan of Care Review  Plan of Care Reviewed With: patient  Plan of Care Reviewed With: patient  Outcome Summary: Pt presents with impaired balance, weakness and incoordination R and L.  Pt is Mod A to walk to BR and unable to complete lower body ADL.  Pt may benefit from skilled OT to increase safety and indepencence along with UE function.           Outcome Measures     Row Name 08/06/18 0952 08/06/18 0951 08/06/18 0900       How much help from another person do you currently need...    Turning from your back to your side while in flat bed without using bedrails?  --  -- 3  -EJ    Moving from lying on back to sitting on the side of a flat bed without bedrails?  --  -- 3  -EJ    Moving to and from a bed to a chair (including a wheelchair)?  --  -- 2  -EJ    Standing up from a chair using your arms (e.g., wheelchair, bedside chair)?  --  -- 3  -EJ    Climbing 3-5 steps with a railing?  --  -- 2  -EJ    To walk in hospital room?  --  -- 2  -EJ    AM-PAC 6 Clicks Score  --  -- 15  -EJ       How much help from another is currently needed...    Putting on and taking off regular lower body clothing? 2  -LE  --  --    Bathing (including washing, rinsing, and drying) 2  -LE  --  --    Toileting (which includes using toilet bed pan or urinal) 2  -LE  --  --    Putting on and taking off regular upper body clothing 3  -LE  --  --    Taking care of personal grooming (such as brushing teeth) 3  -LE  --  --    Eating meals 3  -LE  --  --    Score 15  -LE  --  --       Modified [place]  Scale    Modified [place] Scale 4 - Moderately severe disability.  Unable to walk without assistance, and unable to attend to own bodily needs without assistance.  -LE  --  --       Functional Assessment    Outcome Measure Options Modified Jose  -LE AM-PAC 6 Clicks Daily Activity (OT)  -LE AM-PAC 6 Clicks Basic Mobility (PT)  -EJ      User Key  (r) = Recorded By, (t) = Taken By, (c) = Cosigned By    Initials Name Provider Type    Janine Recio OTR Occupational Therapist    Aye Daugherty, PT Physical Therapist          Time Calculation:   OT Start Time: 0906  OT Stop Time: 0932  OT Time Calculation (min): 26 min  Therapy Suggested Charges     Code   Minutes Charges    None           Therapy Charges for Today     Code Description Service Date Service Provider Modifiers Qty    [id number]  OT EVAL MOD COMPLEXITY 2 8/6/2018 Jainne Roberts OTR GO 1    [id number]  OT SELF CARE/MGMT/TRAIN EA 15 MIN 8/6/2018 Janine Roberts OTR GO 1               MARITZA Swanson  8/6/2018

## 2018-08-06 NOTE — THERAPY EVALUATION
Acute Care - Physical Therapy Initial Evaluation  [facility]     Patient Name: Alba Correa  : 1933  MRN: [id number]  Today's Date: 2018   Onset of Illness/Injury or Date of Surgery: 18  Date of Referral to PT: 18  Referring Physician: [place]      Admit Date: 2018    Visit Dx:     ICD-10-CM ICD-9-CM   1. Right leg weakness R29.898 729.89   2. Fall, initial encounter W19.XXXA E888.9   3. Difficulty walking R26.2 719.7     Patient Active Problem List   Diagnosis   • Chronic kidney failure   • DVT (deep venous thrombosis) (CMS/HCC)   • Depression   • Edema   • Gout   • Elevated cholesterol   • Osteoporosis   • Hyperparathyroidism (CMS/HCC)   • Benign essential hypertension   • Adaptive colitis   • Osteopenia   • Rheumatoid arthritis (CMS/HCC)   • Degeneration of intervertebral disc   • D (diarrhea)   • BP (high blood pressure)   • Detrusor muscle hypertonia   • Open leg wound   • Vaginal odor   • Infected wound   • History of depression   • History of renal insufficiency syndrome   • History of degenerative disc disease   • History of osteoporosis   • History of DVT (deep vein thrombosis)   • History of rheumatoid arthritis   • Hyperlipidemia   • Macrocytosis   • Right leg weakness     Past Medical History:   Diagnosis Date   • Benign essential hypertension    • Chronic kidney disease    • Chronic renal insufficiency    • Degeneration of intervertebral disc    • Depression    • Disease of thyroid gland    • DVT (deep venous thrombosis) (CMS/HCC)    • Edema    • Gout    • H/O complete eye exam 2016   • Hyperlipidemia    • Hyperparathyroidism (CMS/HCC)    • IBS (irritable bowel syndrome)    • OAB (overactive bladder)    • Osteopenia    • Osteoporosis    • Rheumatoid arthritis (CMS/HCC)      Past Surgical History:   Procedure Laterality Date   • APPENDECTOMY     • BREAST BIOPSY     • COLONOSCOPY      declines   • HERNIA REPAIR     • HYSTERECTOMY      still has ovaries   • MAMMO  BILATERAL  11/16/2016    pt declines   • PAP SMEAR  11/16/2016    declines        PT ASSESSMENT (last 12 hours)      Physical Therapy Evaluation     Row Name 08/06/18 0840          PT Evaluation Time/Intention    Subjective Information complains of;weakness  -EJ     Document Type evaluation  -EJ     Mode of Treatment physical therapy  -EJ     Patient Effort good  -EJ     Symptoms Noted During/After Treatment none  -EJ     Row Name 08/06/18 0840          General Information    Onset of Illness/Injury or Date of Surgery 08/05/18  -EJ     Referring Physician [place]  -EJ     Patient Observations alert;cooperative;agree to therapy  -EJ     General Observations of Patient supine in bed, no acute distress  -EJ     Prior Level of Function independent:;all household mobility;community mobility;ADL's;driving  -EJ     Equipment Currently Used at Home none  -EJ     Pertinent History of Current Functional Problem increased weakness, decreased coordination  -EJ     Existing Precautions/Restrictions fall  -EJ     Barriers to Rehab none identified  -EJ     Row Name 08/06/18 0840          Relationship/Environment    Lives With spouse  -EJ     Row Name 08/06/18 0840          Resource/Environmental Concerns    Current Living Arrangements home/apartment/condo  -EJ     Row Name 08/06/18 0840          Cognitive Assessment/Intervention- PT/OT    Orientation Status (Cognition) oriented x 3  -EJ     Follows Commands (Cognition) WNL  -EJ     Personal Safety Interventions fall prevention program maintained;gait belt;nonskid shoes/slippers when out of bed;supervised activity  -EJ     Row Name 08/06/18 0840          Bed Mobility Assessment/Treatment    Bed Mobility Assessment/Treatment supine-sit;sit-supine  -EJ     Supine-Sit [place] (Bed Mobility) verbal cues;contact guard  -EJ     Sit-Supine [place] (Bed Mobility) not tested  -EJ     Row Name 08/06/18 0840          Transfer Assessment/Treatment    Transfer Assessment/Treatment  sit-stand transfer;stand-sit transfer  -     Sit-Stand [place] (Transfers) verbal cues;minimum assist (75% patient effort);2 person assist  -EJ     Stand-Sit [place] (Transfers) verbal cues;minimum assist (75% patient effort);2 person assist  -[facility] Name 08/06/18 0840          Stand-Sit Transfer    Assistive Device (Stand-Sit Transfers) walker, front-wheeled  -     Row Name 08/06/18 0840          Gait/Stairs Assessment/Training    [place] Level (Gait) verbal cues;nonverbal cues (demo/gesture);moderate assist (50% patient effort);2 person assist  -     Assistive Device (Gait) walker, front-wheeled  -     Distance in Feet (Gait) 50  -EJ     Deviations/Abnormal Patterns (Gait) base of support, narrow;ataxic;noemi decreased;stride length decreased  -EJ     Bilateral Gait Deviations weight shift ability decreased  -EJ     Comment (Gait/Stairs) strong left lean, very uncoordinated, knees stay flexed with weight bearing  -[facility] Name 08/06/18 0840          General ROM    GENERAL ROM COMMENTS WFL  -[facility] Name 08/06/18 0840          General Assessment (Manual Muscle Testing)    Comment, General Manual Muscle Testing (MMT) Assessment B hip flexion 3+/5; generalized weakness noted with functional mobility  -[facility] Name 08/06/18 0840          Pain Assessment    Additional Documentation Pain Scale: Numbers Pre/Post-Treatment (Group)  -EJ     Row Name 08/06/18 0840          Pain Scale: Numbers Pre/Post-Treatment    Pain Scale: Numbers, Pretreatment 0/10 - no pain  -     Pain Scale: Numbers, Post-Treatment 0/10 - no pain  -[facility] Name             Wound 08/05/18 1929 Left elbow skin tear    Wound - Properties Group Date first assessed: 08/05/18  - Time first assessed: 1929  - Present On Admission : yes  - Side: Left  - Location: elbow  - Type: skin tear  -AH    Row Name 08/06/18 0840          Plan of Care Review    Plan of Care Reviewed With patient  -[facility] Name  08/06/18 0840          Physical Therapy Clinical Impression    Date of Referral to PT 08/06/18  -EJ     PT Diagnosis (PT Clinical Impression) R leg weakness  -EJ     Patient/Family Goals Statement (PT Clinical Impression) Pt would like to return home, pending progress with PT  -EJ     Criteria for Skilled Interventions Met (PT Clinical Impression) treatment indicated  -EJ     Impairments Found (describe specific impairments) gait, locomotion, and balance  -EJ     Rehab Potential (PT Clinical Summary) good, to achieve stated therapy goals  -EJ     Row Name 08/06/18 0840          Physical Therapy Goals    Bed Mobility Goal Selection (PT) bed mobility, PT goal 1  -EJ     Transfer Goal Selection (PT) transfer, PT goal 1  -EJ     Gait Training Goal Selection (PT) gait training, PT goal 1  -EJ     Row Name 08/06/18 0840          Bed Mobility Goal 1 (PT)    Activity/Assistive Device (Bed Mobility Goal 1, PT) bed mobility activities, all  -EJ     [place] Level/Cues Needed (Bed Mobility Goal 1, PT) supervision required  -EJ     Time Frame (Bed Mobility Goal 1, PT) 1 week  -EJ     Row Name 08/06/18 0840          Transfer Goal 1 (PT)    Activity/Assistive Device (Transfer Goal 1, PT) transfers, all;walker, rolling  -EJ     [place] Level/Cues Needed (Transfer Goal 1, PT) contact guard assist  -EJ     Time Frame (Transfer Goal 1, PT) 1 week  -EJ     Row Name 08/06/18 0840          Gait Training Goal 1 (PT)    Activity/Assistive Device (Gait Training Goal 1, PT) gait (walking locomotion);walker, rolling  -EJ     [place] Level (Gait Training Goal 1, PT) contact guard assist  -EJ     Distance (Gait Goal 1, PT) 150  -EJ     Time Frame (Gait Training Goal 1, PT) 1 week  -EJ     Row Name 08/06/18 0840          Positioning and Restraints    Pre-Treatment Position in bed  -EJ     Post Treatment Position chair  -EJ     In Chair notified nsg;sitting;call light within reach;encouraged to call for assist;with nsg  -EJ        User Key  (r) = Recorded By, (t) = Taken By, (c) = Cosigned By    Initials Name Provider Type    EJ Aye Kellogg, PT Physical Therapist    Almaz Nichols, RN Registered Nurse          Physical Therapy Education     Title: PT OT SLP Therapies (Active)     Topic: Physical Therapy (Active)     Point: Mobility training (Done)    Learning Progress Summary     Learner Status Readiness Method Response Comment Documented by    Patient Done Acceptance E,TB,D VU,NR  EJ 08/06/18 0906          Point: Body mechanics (Done)    Learning Progress Summary     Learner Status Readiness Method Response Comment Documented by    Patient Done Acceptance E,TB,CONNIE VU,NR  EJ 08/06/18 0906          Point: Precautions (Done)    Learning Progress Summary     Learner Status Readiness Method Response Comment Documented by    Patient Done Acceptance E,TB,CONNIE WILSON,NR  EJ 08/06/18 0906                      User Key     Initials Effective Dates Name Provider Type [place]     04/03/18 -  Aye Kellogg, PT Physical Therapist PT                PT Recommendation and Plan  Anticipated Discharge Disposition (PT): home with assist, home with home health, skilled nursing facility, inpatient rehabilitation facility (pending progress)  Planned Therapy Interventions (PT Eval): bed mobility training, gait training, balance training, home exercise program, patient/family education, ROM (range of motion), strengthening, transfer training  Therapy Frequency (PT Clinical Impression): daily  Outcome Summary/Treatment Plan (PT)  Anticipated Discharge Disposition (PT): home with assist, home with home health, skilled nursing facility, inpatient rehabilitation facility (pending progress)  Plan of Care Reviewed With: patient  Progress: improving  Outcome Summary: Pt agreeable to PT, states she still feels weak. Pt previously independent at home, driving and walking her dog. Pt presents with decreased coordination and balance, increased weakness both R and L  side, and decreased overall functional mobility Pt did ambulate with mod A x 2 approx 50 ft, but leans strongly to the left and exhibits overall decreased coordination with movement.  She will continue to benefit from skilled PT.           Outcome Measures     Row Name 08/06/18 0900             How much help from another person do you currently need...    Turning from your back to your side while in flat bed without using bedrails? 3  -EJ      Moving from lying on back to sitting on the side of a flat bed without bedrails? 3  -EJ      Moving to and from a bed to a chair (including a wheelchair)? 2  -EJ      Standing up from a chair using your arms (e.g., wheelchair, bedside chair)? 3  -EJ      Climbing 3-5 steps with a railing? 2  -EJ      To walk in hospital room? 2  -EJ      AM-PAC 6 Clicks Score 15  -EJ         Functional Assessment    Outcome Measure Options AM-PAC 6 Clicks Basic Mobility (PT)  -EJ        User Key  (r) = Recorded By, (t) = Taken By, (c) = Cosigned By    Initials Name Provider Type    Aye Daugherty, PT Physical Therapist           Time Calculation:         PT Charges     Row Name 08/06/18 0910             Time Calculation    Start Time 0840  -EJ      Stop Time 0903  -EJ      Time Calculation (min) 23 min  -EJ      PT Received On 08/06/18  -EJ      PT - Next Appointment 08/07/18  -EJ      PT Goal Re-Cert Due Date 08/13/18  -EJ         Timed Charges    10874 - PT Therapeutic Exercise Minutes 12  -EJ        User Key  (r) = Recorded By, (t) = Taken By, (c) = Cosigned By    Initials Name Provider Type    Aye Daugherty, PT Physical Therapist        Therapy Suggested Charges     Code   Minutes Charges    03838 (CPT®) Hc Pt Neuromusc Re Education Ea 15 Min      56587 (CPT®) Hc Pt Ther Proc Ea 15 Min 12 1    84926 (CPT®) Hc Gait Training Ea 15 Min      12037 (CPT®) Hc Pt Therapeutic Act Ea 15 Min      66010 (CPT®) Hc Pt Manual Therapy Ea 15 Min      17654 (CPT®) Hc Pt Iontophoresis Ea 15  Min      96026 (CPT®) Hc Pt Elec Stim Ea-Per 15 Min      97617 (CPT®) Hc Pt Ultrasound Ea 15 Min      53277 (CPT®) Hc Pt Self Care/Mgmt/Train Ea 15 Min      Total  12 1        Therapy Charges for Today     Code Description Service Date Service Provider Modifiers Qty    25670966126 HC PT EVAL MOD COMPLEXITY 2 8/6/2018 Aye Kellogg, PT GP 1    20000688179 HC PT THER PROC EA 15 MIN 8/6/2018 Aye Kellogg, PT GP 1    15983002820 HC PT THER SUPP EA 15 MIN 8/6/2018 Aye Kellogg, PT GP 1          PT G-Codes  Outcome Measure Options: AM-PAC 6 Clicks Basic Mobility (PT)      Aye Kellogg, PT  8/6/2018

## 2018-08-06 NOTE — PLAN OF CARE
Problem: Patient Care Overview  Goal: Plan of Care Review   08/06/18 1410   Coping/Psychosocial   Plan of Care Reviewed With patient   OTHER   Outcome Summary Pt seen this date for a cognitive evaluation. Pt presents with mild-moderate cognitive impairments. SLP recs cognitive therapy, pt agreed. SLP to follow for cog tx.

## 2018-08-06 NOTE — PLAN OF CARE
Problem: Patient Care Overview  Goal: Plan of Care Review  Outcome: Ongoing (interventions implemented as appropriate)   08/06/18 0907   Coping/Psychosocial   Plan of Care Reviewed With patient   Plan of Care Review   Progress improving   OTHER   Outcome Summary Pt agreeable to PT, states she still feels weak. Pt previously independent at home, driving and walking her dog. Pt presents with decreased coordination and balance, increased weakness both R and L side, and decreased overall functional mobility Pt did ambulate with mod A x 2 approx 50 ft, but leans strongly to the left and exhibits overall decreased coordination with movement. She will continue to benefit from skilled PT.

## 2018-08-06 NOTE — PROGRESS NOTES
"DOS: 2018  NAME: Alba Correa   : 1933  PCP: Aramis Doty MD  Chief Complaint   Patient presents with   • Extremity Weakness     right leg post fall a couple days ago.  \"gait is off.\"  left hand - couldn't turn off faucet this am.  neg on ems strike scale   • Fall     today - hit head.  no blood thinners.       Chief complaint: Stroke  Subjective: Reports improvement of left hand weakness and clumsiness. Developed nausea earlier this morning but it improved with zofran and she has not associated headache. She walked with PT with difficulty.    Objective: (12)  Vital signs: /96 (BP Location: Right arm, Patient Position: Sitting)   Pulse 66   Temp 97.6 °F (36.4 °C) (Oral)   Resp 18   Ht 157.5 cm (62\")   Wt 60.3 kg (132 lb 15 oz)   SpO2 96%   BMI 24.31 kg/m²    Gen: NAD, vitals reviewed  MS: oriented x3, recent/remote memory intact, normal attention/concentration, language intact, no neglect.  CN: visual acuity grossly normal, PERRL, EOMI, no facial droop, no dysarthria  Motor: 5/5 RUE, 4+/5 L UE, 5/5 R LE, 4+/5 L LE, normal tone throughout    Laboratory results:  Lab Results   Component Value Date    GLUCOSE 92 2018    CALCIUM 9.9 2018     2018    K 3.8 2018    CO2 24.9 2018     2018    BUN 14 2018    CREATININE 0.99 2018    EGFRIFAFRI 50 (L) 2018    EGFRIFNONA 53 (L) 2018    BCR 14.1 2018    ANIONGAP 13.1 2018     Lab Results   Component Value Date    WBC 6.65 2018    HGB 12.9 2018    HCT 40.9 2018    .6 (H) 2018     2018     Lab Results   Component Value Date    CHOL 161 2018     Lab Results   Component Value Date    HDL 38 (L) 2018    HDL 41 2018    HDL 43 2016     Lab Results   Component Value Date    LDL 89 2018    LDL 85 2018    LDL 79 2016     Lab Results   Component Value Date    TRIG 171 (H) 2018    TRIG " 193 (H) 01/19/2018    TRIG 165 (H) 11/17/2016     LDL 89, B12 775    Review and interpretation of imaging: MRI/MRA personally reviewed, MRI shows multiple small lacunar strokes right MCA territory, MRA head and neck essentially negative. Radiology reports reviewed. 2D echo reviewed essentially normal study.    Impression:  1. Stroke, right MCA territory, due to small vessel disease vs. cardioembolism  2. Left hemiparesis  3. Hyperlipidemia, moderately controlled  4. Hypertension, poorly controlled    Comment: 84 yo WF w/ HTN, HLD, here for right leg weakness felt to be related to peroneal neuropathy and left hand/arm weakness from right MCA stroke. MRI looks like small vessel strokes however there are multiple strokes in the same territory suggestive of a possible embolic source. Vessel imaging negative, 2D echo negative. Will request cardiology consult for possible KASH. If KASH negative plan will be ASA/statin and BP control    PLAN:  1.   2. Can be on atorva 40 given LDL <100  3. Ok to restart BP meds for gradual BP lowering over next several days, target <140/80  4. OOB PT/OT  5. Cardiology consult to request KASH.

## 2018-08-06 NOTE — PLAN OF CARE
"Problem: Patient Care Overview  Goal: Plan of Care Review  Outcome: Ongoing (interventions implemented as appropriate)   08/06/18 0542   Coping/Psychosocial   Plan of Care Reviewed With patient   Plan of Care Review   Progress improving   OTHER   Outcome Summary A+O X 4. V/S WDL. Up to bathroom X 2 r/t Lt side weakness. Educated on bedpan and BSC use, refused to use them claiming: \"I'd rarher not to use them\". NIH 0. Sleeping w/o SOA at rest.       Problem: Fall Risk (Adult)  Goal: Identify Related Risk Factors and Signs and Symptoms  Outcome: Ongoing (interventions implemented as appropriate)    Goal: Absence of Fall  Outcome: Ongoing (interventions implemented as appropriate)      Problem: Stroke (Ischemic) (Adult)  Goal: Signs and Symptoms of Listed Potential Problems Will be Absent, Minimized or Managed (Stroke)  Outcome: Ongoing (interventions implemented as appropriate)        "

## 2018-08-06 NOTE — THERAPY EVALUATION
Acute Care - Speech Language Pathology Initial Evaluation  [facility]     Patient Name: Alba Correa  : 1933  MRN: [id number]  Today's Date: 2018  Onset of Illness/Injury or Date of Surgery: 18     Referring Physician: Dr Urbano      Admit Date: 2018     Visit Dx:    ICD-10-CM ICD-9-CM   1. Right leg weakness R29.898 729.89   2. Fall, initial encounter W19.XXXA E888.9   3. Difficulty walking R26.2 719.7     Patient Active Problem List   Diagnosis   • Chronic kidney failure   • DVT (deep venous thrombosis) (CMS/HCC)   • Depression   • Edema   • Gout   • Elevated cholesterol   • Osteoporosis   • Hyperparathyroidism (CMS/HCC)   • Benign essential hypertension   • Adaptive colitis   • Osteopenia   • Rheumatoid arthritis (CMS/HCC)   • Degeneration of intervertebral disc   • D (diarrhea)   • BP (high blood pressure)   • Detrusor muscle hypertonia   • Open leg wound   • Vaginal odor   • Infected wound   • History of depression   • History of renal insufficiency syndrome   • History of degenerative disc disease   • History of osteoporosis   • History of DVT (deep vein thrombosis)   • History of rheumatoid arthritis   • Hyperlipidemia   • Macrocytosis   • Right leg weakness   • CVA (cerebral vascular accident) (CMS/HCC)     Past Medical History:   Diagnosis Date   • Benign essential hypertension    • Chronic kidney disease    • Chronic renal insufficiency    • Degeneration of intervertebral disc    • Depression    • Disease of thyroid gland    • DVT (deep venous thrombosis) (CMS/HCC)    • Edema    • Gout    • H/O complete eye exam 2016   • Hyperlipidemia    • Hyperparathyroidism (CMS/HCC)    • IBS (irritable bowel syndrome)    • OAB (overactive bladder)    • Osteopenia    • Osteoporosis    • Rheumatoid arthritis (CMS/HCC)      Past Surgical History:   Procedure Laterality Date   • APPENDECTOMY     • BREAST BIOPSY     • COLONOSCOPY      declines   • HERNIA REPAIR     • HYSTERECTOMY      still  "has ovaries   • MAMMO BILATERAL  11/16/2016    pt declines   • PAP SMEAR  11/16/2016    declines          SLP EVALUATION (last 72 hours)      SLP SLC Evaluation     Row Name 08/06/18 1300          Document Type evaluation  -SH (r) ES (t) SH (c)    Subjective Information no complaints  -SH (r) ES (t) SH (c)    Patient Observations alert;cooperative;agree to therapy  -SH (r) ES (t) SH (c)    Patient Effort good  -SH (r) ES (t) SH (c)    Symptoms Noted During/After Treatment none  -SH (r) ES (t) SH (c)          Patient Profile Reviewed yes  -SH (r) ES (t) SH (c)    Pertinent History Of Current Problem Pt was admitted to the hospital with acute CVA. MRI reports \"acute infarction within the posterior aspect of the right external capsule. There are also smaller foci of restricted diffusion along the right frontoparietal junction consistent with acute infarctions within the right middle cerebral artery distribution.\"   -SH (r) ES (t) SH (c)    Precautions/Limitations, Vision for purposes of eval;WFL with corrective lenses  -SH (r) ES (t) SH (c)    Precautions/Limitations, Hearing WFL;for purposes of eval  -SH (r) ES (t) SH (c)    Prior Level of Function-Communication WFL  -SH (r) ES (t) SH (c)    Plans/Goals Discussed with patient;family;agreed upon  -SH (r) ES (t) SH (c)    Barriers to Rehab none identified  -SH (r) ES (t) SH (c)    Standardized Assessment Used [facility]  -SH (r) ES (t) SH (c)          Pain Scale: Numbers, Pretreatment 0/10 - no pain  -SH (r) ES (t) SH (c)    Pain Scale: Numbers, Post-Treatment 0/10 - no pain  -SH (r) ES (t) SH (c)          Comprehension Assessment/Intervention --  -SH (r) ES (t) SH (c)          Cognitive Function (Cognition) moderate impairment  -SH (r) ES (t) SH (c)    Orientation Status (Cognition) WNL;person;place;time;situation;other (see comments)  -SH (r) ES (t) SH (c)    Memory (Cognitive) WFL;immediate;moderate impairment;short-term  -SH (r) ES (t) SH (c)    Attention (Cognitive) " WFL;sustained  -SH (r) ES (t) SH (c)    Thought Organization (Cognitive) abstract divergent;mild impairment  -SH    Functional Math (Cognitive) severe impairment;simple  -SH (r) ES (t) SH (c)    Executive Function (Cognition) WFL;deficit awareness;initiation;self-monitoring/correction  -SH (r) ES (t) SH (c)    Pragmatics (Communication) WFL  -SH (r) ES (t) SH (c)    Cognition, Comment The Danielito Cognitive Assessment (MOCA) is a standardized, normative assessment of cognitive-linguistic skills for use with patients ages 55-85 years old. There are three versions of the test, to decrease learning effects when administered repeatedly. Components of this assessment include: visuospatial/executive (trail making, copy 3 dimensional figure, and clock drawing), Naming (pictures of animals), Memory (recall of word list), Attention (repetition of series of numbers, responding to target stimuli within auditory list, and serial subtraction), Language (repetition of sentences, semantic fluency), Abstraction, and Orientation. The following ranges may be used to grade severity: Normal= 26 or greater, 18-25 = mild cognitive impairment, 10-17= moderate cognitive impairment and less than 10= severe cognitive impairment. The patient's overall score was 15/30, indicating moderate impairment; however, pt and family report no use of functional math and baseline memory deficits. Without taking functional math and memory into account, the patient still demonstrates a mild cognitive impairment, which therapy could address. Pt and family agree that therapy would benefit her. Deficits were noted with executive functioning and visuospatial tasks, short term memory, functional math, and language. The patient was able to accurately perform digit span forward and backward tasks, immediate recall, delayed recall with category and multiple choice cues 3/4, repeat sentences, and attend to longer tasks. Pt was able to answer some basic  conversational questions and appeared within normal limits with pragmatics. Pt demonstrates deficit awareness.   -          SLP Diagnosis Pt with moderate cognitive impairment; however, without taking into account math and memory, pt with mild cognitive impairment.  -SH (r) ES (t) SH (c)    Rehab Potential/Prognosis good  -SH (r) ES (t) SH (c)    Criteria for Skilled Therapy Interventions Met yes  -SH (r) ES (t) SH (c)    Functional Impact functional impact in ADLs  -SH (r) ES (t) SH (c)          Therapy Frequency (SLP SLC) PRN  -SH (r) ES (t) SH (c)    Predicted Duration Therapy Intervention (Days) until discharge  -SH (r) ES (t) SH (c)    Anticipated Dischage Disposition anticipate therapy at next level of care  -SH (r) ES (t) SH (c)          Cognitive Linguistic Treatment Objectives Cognitive Linguistic Treatment Objectives (Group)  -SH (r) ES (t) SH (c)          Memory Skills Selection memory skills, SLP goal 1  -SH (r) ES (t) SH (c)    Organizational Skills Selection organizational skills, SLP goal 1  -SH (r) ES (t) SH (c)          Improve Memory Skills Through Goal 1 (SLP) recalling related word lists with an imposed delay;80%;with minimal cues (75-90%)  -SH (r) ES (t) SH (c)    Time Frame (Memory Skills Goal 1, SLP) by discharge  -SH (r) ES (t) SH (c)          Improve Thought Organization Through Goal 1 (SLP) completing a divergent naming task;with minimal cues (75-90%);80%  -SH (r) ES (t) SH (c)    Time Frame (Thought Organization Skills Goal 1, SLP) by discharge  -SH (r) ES (t) SH (c)      User Key  (r) = Recorded By, (t) = Taken By, (c) = Cosigned By    Initials Name Effective Dates    Nicky Bruce, MS CCC-SLP 03/07/18 -     Berta Hurtado, Speech Therapy Student 06/11/18 -                EDUCATION  The patient has been educated in the following areas:     Cognitive Impairment.    SLP Recommendation and Plan     Cog tx.                                                        SLP GOALS     Row Name  08/06/18 1300             Memory Skills Goal 1 (SLP)    Improve Memory Skills Through Goal 1 (SLP) recalling related word lists with an imposed delay;80%;with minimal cues (75-90%)  -SH (r) ES (t) SH (c)      Time Frame (Memory Skills Goal 1, SLP) by discharge  -SH (r) ES (t) SH (c)         Organizational Skills Goal 1 (SLP)    Improve Thought Organization Through Goal 1 (SLP) completing a divergent naming task;with minimal cues (75-90%);80%  -SH (r) ES (t) SH (c)      Time Frame (Thought Organization Skills Goal 1, SLP) by discharge  -SH (r) ES (t) SH (c)        User Key  (r) = Recorded By, (t) = Taken By, (c) = Cosigned By    Initials Name Provider Type    Nicky Bruce MS CCC-SLP Speech and Language Pathologist    Berta Hurtado, Speech Therapy Student Speech Therapy Student                 SLP Outcome Measures (last 72 hours)      SLP Outcome Measures     Row Name 08/06/18 1400             SLP Outcome Measures    Outcome Measure Used? Adult NOMS  -SH (r) ES (t) SH (c)         Adult FCM Scores    FCM Chosen Memory  -SH (r) ES (t) SH (c)      Memory FCM Score 5  -SH (r) ES (t) SH (c)        User Key  (r) = Recorded By, (t) = Taken By, (c) = Cosigned By    Initials Name Effective Dates     Nicky Bautista, MS CCC-SLP 03/07/18 -     Berta Hurtado, Speech Therapy Student 06/11/18 -               Time Calculation:           Time Calculation- SLP     Row Name 08/06/18 1414             Time Calculation- SLP    SLP Start Time 1315  -SH (r) ES (t) SH (c)      SLP Received On 08/06/18  -SH (r) ES (t) SH (c)        User Key  (r) = Recorded By, (t) = Taken By, (c) = Cosigned By    Initials Name Provider Type    Nicky Bruce MS CCC-SLP Speech and Language Pathologist    Berta Hurtado, Speech Therapy Student Speech Therapy Student                 ADULT NOMS (last 72 hours)      Adult NOMS     Row Name 08/06/18 1400                   Adult FCM Scores    FCM Chosen Memory  -SH (r) ES (t) SH (c)        Memory  FCM Score 5  -SH (r) ES (t) SH (c)          User Key  (r) = Recorded By, (t) = Taken By, (c) = Cosigned By    Initials Name Effective Dates    Nicky Bruce MS CCC-SLP 03/07/18 -     Berta Hurtado, Speech Therapy Student 06/11/18 -                Nicky Bautista, MS CCC-SLP  8/6/2018

## 2018-08-06 NOTE — PROGRESS NOTES
[facility]     LOS: 0 days     Subjective:  Nauseated now  Walked today that was not easy  clumpsy left hand that is better    Objective:    Vital Signs:  Temp:  [97.6 °F (36.4 °C)-98.1 °F (36.7 °C)] 97.6 °F (36.4 °C)  Heart Rate:  [57-69] 66  Resp:  [14-18] 18  BP: (171-189)/(75-96) 171/96    SpO2:  [92 %-99 %] 96 %  on   ;   Device (Oxygen Therapy): room air  Body mass index is 24.31 kg/m².    Physical Exam   Constitutional: She appears well-developed and well-nourished.   HENT:   Head: Normocephalic and atraumatic.   Cardiovascular: Normal rate and regular rhythm.    No murmur heard.  Pulmonary/Chest: Effort normal and breath sounds normal.   Abdominal: Soft. Bowel sounds are normal. She exhibits no distension. There is no tenderness.   Neurological: She is alert.   Skin: Skin is warm and dry.   Psychiatric: She has a normal mood and affect. Her behavior is normal.       Results Review:    Glucose   Date Value Ref Range Status   08/06/2018 92 65 - 99 mg/dL Final   08/05/2018 113 (H) 65 - 99 mg/dL Final       Results from last 7 days  Lab Units 08/06/18  0449   WBC 10*3/mm3 6.65   HEMOGLOBIN g/dL 12.9   HEMATOCRIT % 40.9   PLATELETS 10*3/mm3 249       Results from last 7 days  Lab Units 08/06/18  0449 08/05/18  1152   SODIUM mmol/L 142 140   POTASSIUM mmol/L 3.8 4.8   CHLORIDE mmol/L 104 103   CO2 mmol/L 24.9 24.9   BUN mg/dL 14 19   CREATININE mg/dL 0.99 1.07*   CALCIUM mg/dL 9.9 9.5   BILIRUBIN mg/dL  --  0.2   ALK PHOS U/L  --  76   ALT (SGPT) U/L  --  16   AST (SGOT) U/L  --  25   GLUCOSE mg/dL 92 113*       Results from last 7 days  Lab Units 08/05/18  1152   INR  1.02           Results from last 7 days  Lab Units 08/05/18  1152   TROPONIN T ng/mL <0.010     Cultures:       I have reviewed daily medications and changes in CPOE    Scheduled meds    amLODIPine 2.5 mg Oral Daily   aspirin 325 mg Oral Daily   Or      aspirin 300 mg Rectal Daily   atenolol 25 mg Oral Daily    atorvastatin 80 mg Oral Nightly   DULoxetine 60 mg Oral Daily   febuxostat 40 mg Oral Daily   losartan 100 mg Oral Daily     mri:    13 x 6 mm acute infarction within the posterior aspect of the right  external capsule.          PRN meds  •  acetaminophen  •  diphenhydrAMINE-acetaminophen  •  ondansetron  •  sodium chloride  •  Insert peripheral IV **AND** sodium chloride      Principal Problem:    CVA (cerebral vascular accident) (CMS/HCC)  Active Problems:    Chronic kidney failure    Gout    Elevated cholesterol    Osteoporosis    Hyperparathyroidism (CMS/HCC)    Benign essential hypertension    History of degenerative disc disease    History of DVT (deep vein thrombosis)    History of rheumatoid arthritis    Hyperlipidemia    Right leg weakness        Assessment/Plan:      CVA (cerebral vascular accident) (CMS/HCC)  -left hand is weak, right leg is giving out  -neurology is following  -aspirin and statin      Chronic kidney failure  -creatinine is stable and currently normal      Gout  -uloric  -last attack several years      Elevated cholesterol      Osteoporosis      Hyperparathyroidism (CMS/HCC)      Benign essential hypertension  -norvasc, atenolol and losartan  -bp is elevated  -given acute CVA will not escalated BP meds      History of degenerative disc disease      History of DVT (deep vein thrombosis)-approximately 7 years  -Took warfarin for 3 months      History of rheumatoid arthritis- 3 years ago it went into remission      Hyperlipidemia      Right leg weakness-likely unrelated to the stroke    >35 minutes spent, > 1/2 time spent counseling and coordination of care of cva      Logan Ku MD  08/06/18  10:19 AM

## 2018-08-07 ENCOUNTER — APPOINTMENT (OUTPATIENT)
Dept: CARDIOLOGY | Facility: HOSPITAL | Age: 83
End: 2018-08-07
Attending: INTERNAL MEDICINE

## 2018-08-07 LAB
ANION GAP SERPL CALCULATED.3IONS-SCNC: 12.8 MMOL/L
BH CV ECHO MEAS - BSA(HAYCOCK): 1.6 M^2
BH CV ECHO MEAS - BSA: 1.6 M^2
BH CV ECHO MEAS - BZI_BMI: 24.3 KILOGRAMS/M^2
BH CV ECHO MEAS - BZI_METRIC_HEIGHT: 157.5 CM
BH CV ECHO MEAS - BZI_METRIC_WEIGHT: 60.3 KG
BH CV VAS BP RIGHT ARM: NORMAL MMHG
BUN BLD-MCNC: 17 MG/DL (ref 8–23)
BUN/CREAT SERPL: 14.9 (ref 7–25)
CALCIUM SPEC-SCNC: 9.9 MG/DL (ref 8.6–10.5)
CHLORIDE SERPL-SCNC: 101 MMOL/L (ref 98–107)
CO2 SERPL-SCNC: 27.2 MMOL/L (ref 22–29)
CREAT BLD-MCNC: 1.14 MG/DL (ref 0.57–1)
GFR SERPL CREATININE-BSD FRML MDRD: 45 ML/MIN/1.73
GLUCOSE BLD-MCNC: 100 MG/DL (ref 65–99)
GLUCOSE BLDC GLUCOMTR-MCNC: 108 MG/DL (ref 70–130)
GLUCOSE BLDC GLUCOMTR-MCNC: 128 MG/DL (ref 70–130)
GLUCOSE BLDC GLUCOMTR-MCNC: 90 MG/DL (ref 70–130)
POTASSIUM BLD-SCNC: 4.2 MMOL/L (ref 3.5–5.2)
RPR SER QL: NORMAL
SODIUM BLD-SCNC: 141 MMOL/L (ref 136–145)

## 2018-08-07 PROCEDURE — 82962 GLUCOSE BLOOD TEST: CPT

## 2018-08-07 PROCEDURE — 93312 ECHO TRANSESOPHAGEAL: CPT

## 2018-08-07 PROCEDURE — B246ZZ4 ULTRASONOGRAPHY OF RIGHT AND LEFT HEART, TRANSESOPHAGEAL: ICD-10-PCS | Performed by: INTERNAL MEDICINE

## 2018-08-07 PROCEDURE — 99233 SBSQ HOSP IP/OBS HIGH 50: CPT | Performed by: NURSE PRACTITIONER

## 2018-08-07 PROCEDURE — 80048 BASIC METABOLIC PNL TOTAL CA: CPT | Performed by: HOSPITALIST

## 2018-08-07 PROCEDURE — 93325 DOPPLER ECHO COLOR FLOW MAPG: CPT | Performed by: INTERNAL MEDICINE

## 2018-08-07 PROCEDURE — 93312 ECHO TRANSESOPHAGEAL: CPT | Performed by: INTERNAL MEDICINE

## 2018-08-07 PROCEDURE — 93320 DOPPLER ECHO COMPLETE: CPT | Performed by: INTERNAL MEDICINE

## 2018-08-07 PROCEDURE — 93320 DOPPLER ECHO COMPLETE: CPT

## 2018-08-07 PROCEDURE — 93325 DOPPLER ECHO COLOR FLOW MAPG: CPT

## 2018-08-07 PROCEDURE — 63710000001 DIPHENHYDRAMINE PER 50 MG: Performed by: HOSPITALIST

## 2018-08-07 PROCEDURE — 25010000002 FENTANYL CITRATE (PF) 100 MCG/2ML SOLUTION: Performed by: INTERNAL MEDICINE

## 2018-08-07 PROCEDURE — 97110 THERAPEUTIC EXERCISES: CPT

## 2018-08-07 PROCEDURE — 25010000002 MIDAZOLAM PER 1 MG: Performed by: INTERNAL MEDICINE

## 2018-08-07 PROCEDURE — 99152 MOD SED SAME PHYS/QHP 5/>YRS: CPT | Performed by: INTERNAL MEDICINE

## 2018-08-07 RX ORDER — MIDAZOLAM HYDROCHLORIDE 1 MG/ML
INJECTION INTRAMUSCULAR; INTRAVENOUS
Status: COMPLETED | OUTPATIENT
Start: 2018-08-07 | End: 2018-08-07

## 2018-08-07 RX ORDER — FENTANYL CITRATE 50 UG/ML
INJECTION, SOLUTION INTRAMUSCULAR; INTRAVENOUS
Status: COMPLETED | OUTPATIENT
Start: 2018-08-07 | End: 2018-08-07

## 2018-08-07 RX ORDER — ATORVASTATIN CALCIUM 20 MG/1
40 TABLET, FILM COATED ORAL NIGHTLY
Status: DISCONTINUED | OUTPATIENT
Start: 2018-08-07 | End: 2018-08-08 | Stop reason: HOSPADM

## 2018-08-07 RX ORDER — SODIUM CHLORIDE 9 MG/ML
INJECTION, SOLUTION INTRAVENOUS
Status: COMPLETED | OUTPATIENT
Start: 2018-08-07 | End: 2018-08-07

## 2018-08-07 RX ADMIN — ACETAMINOPHEN 500 MG: 500 TABLET, FILM COATED ORAL at 20:52

## 2018-08-07 RX ADMIN — AMLODIPINE BESYLATE 2.5 MG: 2.5 TABLET ORAL at 10:34

## 2018-08-07 RX ADMIN — DIPHENHYDRAMINE HYDROCHLORIDE 25 MG: 25 CAPSULE ORAL at 20:52

## 2018-08-07 RX ADMIN — ASPIRIN 325 MG: 325 TABLET ORAL at 10:35

## 2018-08-07 RX ADMIN — ATENOLOL 25 MG: 25 TABLET ORAL at 10:34

## 2018-08-07 RX ADMIN — LOSARTAN POTASSIUM 100 MG: 100 TABLET, FILM COATED ORAL at 10:35

## 2018-08-07 RX ADMIN — BENZOCAINE, BUTAMBEN, AND TETRACAINE HYDROCHLORIDE 2 SPRAY: .028; .004; .004 AEROSOL, SPRAY TOPICAL at 13:21

## 2018-08-07 RX ADMIN — ATORVASTATIN CALCIUM 40 MG: 80 TABLET, FILM COATED ORAL at 20:35

## 2018-08-07 RX ADMIN — MIDAZOLAM 2 MG: 1 INJECTION INTRAMUSCULAR; INTRAVENOUS at 13:21

## 2018-08-07 RX ADMIN — DULOXETINE HYDROCHLORIDE 60 MG: 60 CAPSULE, DELAYED RELEASE ORAL at 10:35

## 2018-08-07 RX ADMIN — FEBUXOSTAT 40 MG: 40 TABLET ORAL at 10:35

## 2018-08-07 RX ADMIN — SODIUM CHLORIDE 50 ML/HR: 9 INJECTION, SOLUTION INTRAVENOUS at 12:58

## 2018-08-07 RX ADMIN — FENTANYL CITRATE 25 MCG: 50 INJECTION INTRAMUSCULAR; INTRAVENOUS at 13:21

## 2018-08-07 NOTE — SIGNIFICANT NOTE
08/07/18 1503   Rehab Treatment   Discipline occupational therapist   Reason Treatment Not Performed other (see comments)  (Checked on twice, first off floor to cardio and second MD speaking with pt and family. Will follow up next service date.)   Recommendation   OT - Next Appointment 08/08/18

## 2018-08-07 NOTE — PROGRESS NOTES
"DOS: 2018  NAME: Alba Correa   : 1933  PCP: Aramis Doty MD  Chief Complaint   Patient presents with   • Extremity Weakness     right leg post fall a couple days ago.  \"gait is off.\"  left hand - couldn't turn off faucet this am.  neg on ems strike scale   • Fall     today - hit head.  no blood thinners.     Stroke    Subjective: Pt sitting in chair. Reports left hand function is improved today. No new stroke/TIA symptoms.    Objective:  Vital signs: BP (!) 191/78 (BP Location: Right arm, Patient Position: Lying)   Pulse 64   Temp 97.9 °F (36.6 °C) (Oral)   Resp 18   Ht 157.5 cm (62\")   Wt 60.5 kg (133 lb 6.1 oz)   SpO2 97%   BMI 24.40 kg/m²       General appearance: Well developed, well nourished, well groomed, alert and cooperative.   HEENT: Normocephalic.   Neck and spine: Normal range of motion. Normal alignment. No mass or tenderness.    Cardiac: Regular rate and rhythm.   Peripheral Vasculature: Extremities warm and dry.  Chest Exam: Clear to auscultation bilaterally, no wheezes, no rhonchi.  Extremities: No edema.   Skin: No rashes or birthmarks.     Higher integrative function: Oriented to time, place, person, intact recent and remote memory, attention span, concentration and language. Spontaneous speech, fund of vocabulary are normal.   CN II: Normal visual fields.   CN III IV VI: Extraocular movements are full without nystagmus. Pupils are equal, round, and reactive to light. No relative afferent pupillary defect. No internuclear ophthalmoplegia.   CN V: Normal facial sensation.   CN VII: Facial movements are symmetric, no weakness.   CN VIII: Auditory acuity is normal.   CN IX & X: Symmetric palatal movement.   CN XI: Sternocleidomastoid and trapezius are normal. No weakness.   CN XII: The tongue is midline. No atrophy or fasciculations.   Motor: RUE/RLE 5/5. LUE/LLE 4+/5 with drift. No fasciculations, rigidity, spasticity or abnormal movements.   Sensation: Normal light touch.. "   Station and gait: Not assessed.   Muscle stretch reflexes: Reflexes are normal and symmetric in the upper and lower extremities.   Coordination: Finger to nose test showed no dysmetria. Rapid alternating movements were decreased on left. Heel to shin normal.     Meds reviewed  Scheduled Meds:  amLODIPine 2.5 mg Oral Daily   aspirin 325 mg Oral Daily   Or      aspirin 300 mg Rectal Daily   atenolol 25 mg Oral Daily   atorvastatin 80 mg Oral Nightly   DULoxetine 60 mg Oral Daily   febuxostat 40 mg Oral Daily   losartan 100 mg Oral Daily     Continuous Infusions:   PRN Meds:.•  diphenhydrAMINE **AND** acetaminophen  •  acetaminophen  •  hydrALAZINE  •  ondansetron  •  sodium chloride  •  Insert peripheral IV **AND** sodium chloride    Laboratory results:  Lab Results   Component Value Date    GLUCOSE 92 08/06/2018    CALCIUM 9.9 08/06/2018     08/06/2018    K 3.8 08/06/2018    CO2 24.9 08/06/2018     08/06/2018    BUN 14 08/06/2018    CREATININE 0.99 08/06/2018    EGFRIFAFRI 50 (L) 05/29/2018    EGFRIFNONA 53 (L) 08/06/2018    BCR 14.1 08/06/2018    ANIONGAP 13.1 08/06/2018     Lab Results   Component Value Date    WBC 6.65 08/06/2018    HGB 12.9 08/06/2018    HCT 40.9 08/06/2018    .6 (H) 08/06/2018     08/06/2018     Lab Results   Component Value Date    CHOL 161 08/06/2018     Lab Results   Component Value Date    HDL 38 (L) 08/06/2018    HDL 41 01/19/2018    HDL 43 11/17/2016     Lab Results   Component Value Date    LDL 89 08/06/2018    LDL 85 01/19/2018    LDL 79 11/17/2016     Lab Results   Component Value Date    TRIG 171 (H) 08/06/2018    TRIG 193 (H) 01/19/2018    TRIG 165 (H) 11/17/2016     Lab Results   Component Value Date    HGBA1C 5.30 08/06/2018       Imaging:  Ct Head Without Contrast    Result Date: 8/5/2018       No acute intracranial hemorrhage or hydrocephalus. Chronic changes. If there is further clinical concern, MRI could be considered for further evaluation.  This  report was finalized on 8/5/2018 12:10 PM by Dr. Levy Mock M.D.      Mri Angiogram Head Without Contrast    Result Date: 8/5/2018  1. 13 x 6 mm acute infarction within the posterior aspect of the right external capsule. There are also smaller foci of restricted diffusion along the right frontoparietal junction consistent with acute infarctions within the right middle cerebral artery distribution. Findings discussed with Ijeoma the nurse caring for the patient on [facility] on 08/05/2018 at 7:10 PM. 2. MR angiogram of the head and neck is severely motion limited. There is diminished enhancement of the proximal left internal carotid artery suspected to represent stenosis that appears to be approximately 50% though more accurate assessment could be performed with CT angiography. There is also diminished flow-related enhancement within the distal left vertebral artery associated with motion related artifact or slow, diminished, or absent flow. This could also be further evaluated with CT angiography if indicated.  This report was finalized on 8/5/2018 7:48 PM by Dr. Rafi Ortiz M.D.      Mri Angiogram Neck Without Contrast    Result Date: 8/5/2018  1. 13 x 6 mm acute infarction within the posterior aspect of the right external capsule. There are also smaller foci of restricted diffusion along the right frontoparietal junction consistent with acute infarctions within the right middle cerebral artery distribution. Findings discussed with Ijeoma the nurse caring for the patient on [place] on 08/05/2018 at 7:10 PM. 2. MR angiogram of the head and neck is severely motion limited. There is diminished enhancement of the proximal left internal carotid artery suspected to represent stenosis that appears to be approximately 50% though more accurate assessment could be performed with CT angiography. There is also diminished flow-related enhancement within the distal left vertebral artery associated with motion related  artifact or slow, diminished, or absent flow. This could also be further evaluated with CT angiography if indicated.  This report was finalized on 8/5/2018 7:48 PM by Dr. Rafi Ortiz M.D.      Mri Brain Without Contrast    Result Date: 8/5/2018  1. 13 x 6 mm acute infarction within the posterior aspect of the right external capsule. There are also smaller foci of restricted diffusion along the right frontoparietal junction consistent with acute infarctions within the right middle cerebral artery distribution. Findings discussed with Ijeoma, the nurse caring for the patient on [place] on 08/05/2018 at 7:10 PM. 2. MR angiogram of the head and neck is severely motion limited. There is diminished enhancement of the proximal left internal carotid artery suspected to represent stenosis that appears to be approximately 50% though more accurate assessment could be performed with CT angiography. There is also diminished flow-related enhancement within the distal left vertebral artery associated with motion related artifact or slow, diminished, or absent flow. This could also be further evaluated with CT angiography if indicated.  This report was finalized on 8/5/2018 7:48 PM by Dr. Rafi Ortiz M.D.      Mri Cervical Spine Without Contrast    Result Date: 8/5/2018  CERVICAL SPINE:  Reversal of the normal cervical lordotic curvature centered at C4 and C5. Degenerative disc disease and facet arthritis, as described with moderate foraminal stenosis at C5-6.    LUMBAR SPINE: Vertebral body heights appear within normal limits. There is a scoliotic curvature of the lumbar spine convexed to the left at L3-4. The distal thoracic cord and conus medullaris appear normal and the tip of the conus medullaris terminates at the L1-2 level.  At T12-L1, the central canal and neural foramina are patent.  At L1-2, there is 3 mm retrolisthesis of L1 with respect to L2. Uncovered disc bulge is present slightly effacing the anterior  thecal sac. There is mild bilateral neural foraminal narrowing.  At L2-3, there is a broad disc bulge. Mild right greater than left facet arthritis is present.  There is trace fluid in the facet joints. There is very mild narrowing of the central canal. The neural foramina are patent.  At L3-4, facet arthritis is present.  There is trace fluid in the facet joints. Mild disc bulge is present without central canal or foraminal narrowing.  At L4-5, there is a 4 mm anterolisthesis of L4 with respect to L5. Uncovered disc bulge is present and there is bilateral facet arthritis with spur formation and mild bilateral neural foraminal narrowing.  At L5-S1, there is a 2 mm anterolisthesis of L5 with respect to S1. Uncovered disc bulge is present and there is bilateral facet overgrowth. Disc bulge with facet spurring contribute to moderate left neural foraminal narrowing. The central canal and right neural foramina are patent.  IMPRESSION: LUMBAR SPINE: 1. 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 2. Degenerative disc disease with mild degenerative anterolisthesis of L4 with respect to L5 and L5 with respect to S1. There is facet arthritis with mild foraminal narrowing at L4-5 and moderate left foraminal narrowing at L5-S1.  This report was finalized on 8/5/2018 7:19 PM by Dr. Rafi Ortiz M.D.      Mri Lumbar Spine Without Contrast    Result Date: 8/5/2018  CERVICAL SPINE:  Reversal of the normal cervical lordotic curvature centered at C4 and C5. Degenerative disc disease and facet arthritis, as described with moderate foraminal stenosis at C5-6.    LUMBAR SPINE: Vertebral body heights appear within normal limits. There is a scoliotic curvature of the lumbar spine convexed to the left at L3-4. The distal thoracic cord and conus medullaris appear normal and the tip of the conus medullaris terminates at the L1-2 level.  At T12-L1, the central canal and neural foramina are patent.  At L1-2, there is 3 mm  retrolisthesis of L1 with respect to L2. Uncovered disc bulge is present slightly effacing the anterior thecal sac. There is mild bilateral neural foraminal narrowing.  At L2-3, there is a broad disc bulge. Mild right greater than left facet arthritis is present.  There is trace fluid in the facet joints. There is very mild narrowing of the central canal. The neural foramina are patent.  At L3-4, facet arthritis is present.  There is trace fluid in the facet joints. Mild disc bulge is present without central canal or foraminal narrowing.  At L4-5, there is a 4 mm anterolisthesis of L4 with respect to L5. Uncovered disc bulge is present and there is bilateral facet arthritis with spur formation and mild bilateral neural foraminal narrowing.  At L5-S1, there is a 2 mm anterolisthesis of L5 with respect to S1. Uncovered disc bulge is present and there is bilateral facet overgrowth. Disc bulge with facet spurring contribute to moderate left neural foraminal narrowing. The central canal and right neural foramina are patent.  IMPRESSION: LUMBAR SPINE: 1. 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 2. Degenerative disc disease with mild degenerative anterolisthesis of L4 with respect to L5 and L5 with respect to S1. There is facet arthritis with mild foraminal narrowing at L4-5 and moderate left foraminal narrowing at L5-S1.  This report was finalized on 8/5/2018 7:19 PM by Dr. Rafi Ortiz M.D.      TTE 8/6/18  Echocardiogram Findings     Left Ventricle Left ventricular systolic function is normal. Calculated EF = 57%. Estimated EF was in agreement with the calculated EF. Normal left ventricular cavity size noted. All left ventricular wall segments contract normally. Left ventricular wall thickness is consistent with mild to moderate septal asymmetric hypertrophy. There is no LVOT gradient.      Right Ventricle Normal right ventricular cavity size and systolic function noted.      Left Atrium Normal left atrial  size noted. Saline test results are negative.      Right Atrium Normal right atrial size noted.      Aortic Valve The aortic valve is grossly normal in structure. The valve appears trileaflet. Trace-to-mild aortic valve regurgitation is present. No aortic valve stenosis is present.      Mitral Valve The mitral valve is grossly normal in structure. Trace-to-mild mitral valve regurgitation is present. No significant mitral valve stenosis is present.      Tricuspid Valve The tricuspid valve is grossly normal. No tricuspid valve stenosis is present. Trace to mild tricuspid valve regurgitation is present. Estimated right ventricular systolic pressure from tricuspid regurgitation is normal (<35 mmHg).      Pulmonic Valve The pulmonic valve is grossly normal in structure. There is no significant pulmonic valve stenosis present. There is trace pulmonic valve regurgitation present.      Greater Vessels No dilation of the aortic root is present.      Pericardium There is no evidence of pericardial effusion.          Impression:  Ms Correa is an 84 yo female with PMH HTN, HLD, DVT 2011, hypothyroidism, CKD, DDD, rheumatoid arthritis, osteoporosis here for right leg weakness felt to be related to peroneal neuropathy and left hand/arm weakness from right MCA stroke.    MRI shows multiple small lacunar strokes right MCA territory, MRA head and neck essentially negative. MRI looks like small vessel disease but given multiple stroke need to eval for cardiac source with KASH. TTE unremarkable    Plan:   mg added  Lipitor 40 mg   IF KASH negative event monitor to be placed.  Neurochecks  BP control, ok to gradually normalize to goal <140/80  Stroke Education  RACHEL/SCDs  PT/OT/ST  CCP for d/c planning    D/W Dr Leary. Will follow.    Addendum at 1622  KASH result:    · Left ventricular systolic function is normal. Estimated EF appears to be in the range of 56 - 60%. Normal left ventricular cavity size and wall thickness noted.  All left ventricular wall segments contract normally.  · Left ventricular diastolic function is normal.  · No interatrial septal aneurysm present. No evidence of a patent foramen ovale. Saline test results are negative.  · No evidence of a left atrial appendage thrombus was present.  · The aortic valve is abnormal in structure. The valve exhibits sclerosis.  · Mild to moderate aortic valve regurgitation is present.  · Mild mitral valve regurgitation is present.  · Mild tricuspid valve regurgitation is present. Estimated right ventricular systolic pressure from tricuspid regurgitation is normal (<35 mmHg).  · There is (grade 1) plaque in the aortic arch present. There is evidence of aortic arch sclerosis/calcification present.        KASH did not show source of stroke. Event monitor to be ordered per cardiology. No further w/u. D/W Dr Leary. Will sign off. Please call if further questions.

## 2018-08-07 NOTE — PLAN OF CARE
Problem: Patient Care Overview  Goal: Plan of Care Review   08/07/18 1103   OTHER   Outcome Summary Pt has improved mobility as compared to yesterday. L LE mildly weaker than R during functional mobility. Decreased assist needed. Requires FWW for balance. Educated on exercises to complete in bed/chair to improve LE strength/coordination. Will continue to progress as tolerated.

## 2018-08-07 NOTE — PLAN OF CARE
Problem: Patient Care Overview  Goal: Plan of Care Review  Outcome: Ongoing (interventions implemented as appropriate)   08/07/18 0330   Coping/Psychosocial   Plan of Care Reviewed With patient;family   Plan of Care Review   Progress improving   OTHER   Outcome Summary [place] today, negative. event monitor to be placed. decreased leg sensation and mildly decreased in left leg. up in chair and ambulate with p.t. bmp results called to dr. brown. no complaints. family at bedside. safety maintained will continue to monitor       Problem: Fall Risk (Adult)  Goal: Identify Related Risk Factors and Signs and Symptoms  Outcome: Outcome(s) achieved Date Met: 08/07/18    Goal: Absence of Fall  Outcome: Ongoing (interventions implemented as appropriate)      Problem: Stroke (Ischemic) (Adult)  Goal: Signs and Symptoms of Listed Potential Problems Will be Absent, Minimized or Managed (Stroke)  Outcome: Ongoing (interventions implemented as appropriate)      Problem: Pain, Acute (Adult)  Goal: Identify Related Risk Factors and Signs and Symptoms  Outcome: Outcome(s) achieved Date Met: 08/07/18    Goal: Acceptable Pain Control/Comfort Level  Outcome: Ongoing (interventions implemented as appropriate)      Problem: Nausea/Vomiting (Adult)  Goal: Identify Related Risk Factors and Signs and Symptoms  Outcome: Ongoing (interventions implemented as appropriate)    Goal: Adequate Hydration  Outcome: Ongoing (interventions implemented as appropriate)

## 2018-08-07 NOTE — PROGRESS NOTES
[facility]     LOS: 2 days     Subjective:  No cp  No soa  Left hand weakness better  No po intake since yesterday but now taking gingerale      Objective:    Vital Signs:  Temp:  [97.8 °F (36.6 °C)-98.2 °F (36.8 °C)] 97.9 °F (36.6 °C)  Heart Rate:  [56-88] 56  Resp:  [14-20] 18  BP: (132-217)/() 159/84    SpO2:  [94 %-99 %] 96 %  on   ;   Device (Oxygen Therapy): room air  Body mass index is 24.33 kg/m².    Physical Exam   Constitutional: She appears well-developed and well-nourished.   HENT:   Head: Normocephalic and atraumatic.   Cardiovascular: Normal rate and regular rhythm.    No murmur heard.  Pulmonary/Chest: Effort normal and breath sounds normal.   Abdominal: Soft. Bowel sounds are normal. She exhibits no distension. There is no tenderness.   Neurological: She is alert.   Skin: Skin is warm and dry.       Results Review:    Glucose   Date Value Ref Range Status   08/06/2018 92 65 - 99 mg/dL Final   08/05/2018 113 (H) 65 - 99 mg/dL Final       Results from last 7 days  Lab Units 08/06/18  0449   WBC 10*3/mm3 6.65   HEMOGLOBIN g/dL 12.9   HEMATOCRIT % 40.9   PLATELETS 10*3/mm3 249       Results from last 7 days  Lab Units 08/06/18  0449 08/05/18  1152   SODIUM mmol/L 142 140   POTASSIUM mmol/L 3.8 4.8   CHLORIDE mmol/L 104 103   CO2 mmol/L 24.9 24.9   BUN mg/dL 14 19   CREATININE mg/dL 0.99 1.07*   CALCIUM mg/dL 9.9 9.5   BILIRUBIN mg/dL  --  0.2   ALK PHOS U/L  --  76   ALT (SGPT) U/L  --  16   AST (SGOT) U/L  --  25   GLUCOSE mg/dL 92 113*       Results from last 7 days  Lab Units 08/05/18  1152   INR  1.02           Results from last 7 days  Lab Units 08/05/18  1152   TROPONIN T ng/mL <0.010     Cultures:       I have reviewed daily medications and changes in CPOE    Scheduled meds    amLODIPine 2.5 mg Oral Daily   aspirin 325 mg Oral Daily   Or      aspirin 300 mg Rectal Daily   atenolol 25 mg Oral Daily   atorvastatin 40 mg Oral Nightly   DULoxetine 60 mg Oral Daily    febuxostat 40 mg Oral Daily   losartan 100 mg Oral Daily          PRN meds  •  diphenhydrAMINE **AND** acetaminophen  •  acetaminophen  •  hydrALAZINE  •  ondansetron  •  sodium chloride  •  Insert peripheral IV **AND** sodium chloride      Principal Problem:    CVA (cerebral vascular accident) (CMS/[facility])  Active Problems:    Chronic kidney failure    Gout    Elevated cholesterol    Osteoporosis    Hyperparathyroidism (CMS/HCC)    Benign essential hypertension    History of degenerative disc disease    History of DVT (deep vein thrombosis)    History of rheumatoid arthritis    Hyperlipidemia    Right leg weakness        Assessment/Plan:      CVA (cerebral vascular accident) (CMS/HCC)  -left hand is weak, right leg is giving out this is better  -neurology is following  -aspirin and statin       Chronic kidney failure  -creatinine is stable and currently normal       Gout  -uloric  -last attack several years       Elevated cholesterol   -lipitor      Osteoporosis       Hyperparathyroidism (CMS/HCC)       Benign essential hypertension  -norvasc, atenolol and losartan  -bp is elevated  -given acute CVA will not escalated BP meds   -bp better now but may need to increase      History of degenerative disc disease       History of DVT (deep vein thrombosis)-approximately 7 years  -Took warfarin for 3 months       History of rheumatoid arthritis- 3 years ago it went into remission       Hyperlipidemia       Right leg weakness-likely unrelated to the stroke     Vomiting last night- better    Plan:  Event monitor no d/c      Logan Ku MD  08/07/18  3:05 PM

## 2018-08-07 NOTE — PLAN OF CARE
Problem: Patient Care Overview  Goal: Plan of Care Review  Outcome: Ongoing (interventions implemented as appropriate)   08/07/18 0537   Coping/Psychosocial   Plan of Care Reviewed With patient   Plan of Care Review   Progress no change   OTHER   Outcome Summary no falls. vital signs stable. nausea and HA improved. PRN orders received for hydralazine; did not meet order pararmeters. plan for KASH today. NPO past midnght. will continue to monitor.        Problem: Fall Risk (Adult)  Goal: Absence of Fall  Outcome: Ongoing (interventions implemented as appropriate)      Problem: Stroke (Ischemic) (Adult)  Goal: Signs and Symptoms of Listed Potential Problems Will be Absent, Minimized or Managed (Stroke)  Outcome: Ongoing (interventions implemented as appropriate)      Problem: Pain, Acute (Adult)  Goal: Acceptable Pain Control/Comfort Level  Outcome: Ongoing (interventions implemented as appropriate)      Problem: Nausea/Vomiting (Adult)  Goal: Symptom Relief  Outcome: Outcome(s) achieved Date Met: 08/07/18

## 2018-08-07 NOTE — THERAPY TREATMENT NOTE
Acute Care - Physical Therapy Treatment Note  [facility]     Patient Name: Alba Correa  : 1933  MRN: [id number]  Today's Date: 2018  Onset of Illness/Injury or Date of Surgery: 18  Date of Referral to PT: 18  Referring Physician: Dr Urbano    Admit Date: 2018    Visit Dx:    ICD-10-CM ICD-9-CM   1. Right leg weakness R29.898 729.89   2. Fall, initial encounter W19.XXXA E888.9   3. Difficulty walking R26.2 719.7     Patient Active Problem List   Diagnosis   • Chronic kidney failure   • DVT (deep venous thrombosis) (CMS/HCC)   • Depression   • Edema   • Gout   • Elevated cholesterol   • Osteoporosis   • Hyperparathyroidism (CMS/HCC)   • Benign essential hypertension   • Adaptive colitis   • Osteopenia   • Rheumatoid arthritis (CMS/HCC)   • Degeneration of intervertebral disc   • D (diarrhea)   • BP (high blood pressure)   • Detrusor muscle hypertonia   • Open leg wound   • Vaginal odor   • Infected wound   • History of depression   • History of renal insufficiency syndrome   • History of degenerative disc disease   • History of osteoporosis   • History of DVT (deep vein thrombosis)   • History of rheumatoid arthritis   • Hyperlipidemia   • Macrocytosis   • Right leg weakness   • CVA (cerebral vascular accident) (CMS/HCC)       Therapy Treatment          Rehabilitation Treatment Summary     Row Name 18 1100             Treatment Time/Intention    Discipline physical therapist  -MG      Document Type therapy note (daily note)  -MG      Subjective Information no complaints  -MG      Patient/Family Observations supine in bed, no acute distress, family present   -MG      Patient Effort good  -MG      Existing Precautions/Restrictions fall  -MG      Recorded by [MG] Gosselin, Megan, PT 18 1103      Row Name 18 1100             Cognitive Assessment/Intervention- PT/OT    Orientation Status (Cognition) oriented x 4  -MG      Follows Commands (Cognition) WNL  -MG      Personal  Safety Interventions fall prevention program maintained;gait belt;muscle strengthening facilitated;nonskid shoes/slippers when out of bed;supervised activity  -MG      Recorded by [MG] Gosselin, Megan, PT 08/07/18 1103      Row Name 08/07/18 1100             Safety Issues, Functional Mobility    Impairments Affecting Function (Mobility) balance;coordination;endurance/activity tolerance;strength  -MG      Recorded by [MG] Gosselin, Megan, PT 08/07/18 1103      Row Name 08/07/18 1100             Bed Mobility Assessment/Treatment    Supine-Sit [place] (Bed Mobility) supervision  -MG      Sit-Supine [place] (Bed Mobility) not tested  -MG      Recorded by [MG] Gosselin, Megan, PT 08/07/18 1103      Row Name 08/07/18 1100             Sit-Stand Transfer    Sit-Stand [place] (Transfers) minimum assist (75% patient effort)  -MG      Assistive Device (Sit-Stand Transfers) walker, front-wheeled  -MG      Recorded by [MG] Gosselin, Megan, PT 08/07/18 1103      Row Name 08/07/18 1100             Stand-Sit Transfer    Stand-Sit [place] (Transfers) minimum assist (75% patient effort)  -MG      Assistive Device (Stand-Sit Transfers) walker, front-wheeled  -MG      Recorded by [MG] Gosselin, Megan, PT 08/07/18 1103      Row Name 08/07/18 1100             Gait/Stairs Assessment/Training    [place] Level (Gait) minimum assist (75% patient effort);2 person assist  -MG      Assistive Device (Gait) walker, front-wheeled  -MG      Distance in Feet (Gait) 150  -MG      Pattern (Gait) step-through  -MG      Deviations/Abnormal Patterns (Gait) base of support, narrow;noemi decreased;stride length decreased  -MG      Comment (Gait/Stairs) LEs mild flexion during stance phase, decreased coordination of B foot placement   -MG      Recorded by [MG] Gosselin, Megan, PT 08/07/18 1103      Row Name 08/07/18 1100             Motor Skills Assessment/Interventions    Additional Documentation Therapeutic Exercise (Group)  -MG       Recorded by [MG] Gosselin, Megan, PT 08/07/18 1103      Row Name 08/07/18 1100             Therapeutic Exercise    Therapeutic Exercise supine, lower extremities  -MG      Additional Documentation Therapeutic Exercise (Row)  -MG      Recorded by [MG] Gosselin, Megan, PT 08/07/18 1103      Row Name 08/07/18 1100             Lower Extremity Supine Therapeutic Exercise    Performed, Supine Lower Extremity (Therapeutic Exercise) hip abduction/adduction;SLR (straight leg raise)  -MG      Exercise Type, Supine Lower Extremity (Therapeutic Exercise) AROM (active range of motion)  -MG      Sets/Reps Detail, Supine Lower Extremity (Therapeutic Exercise) 1/10  -MG      Recorded by [MG] Gosselin, Megan, PT 08/07/18 1103      Row Name 08/07/18 1100             Static Standing Balance    Level of [place] (Supported Standing, Static Balance) contact guard assist  -MG      Time Able to Maintain Position (Supported Standing, Static Balance) 1 to 2 minutes  -MG      Assistive Device Utilized (Supported Standing, Static Balance) rolling walker  -MG      Recorded by [MG] Gosselin, Megan, PT 08/07/18 1103      Row Name 08/07/18 1100             Positioning and Restraints    Pre-Treatment Position in bed  -MG      Post Treatment Position chair  -MG      In Chair reclined;call light within reach;encouraged to call for assist;with family/caregiver  -MG      Recorded by [MG] Gosselin, Megan, PT 08/07/18 1103      Row Name 08/07/18 1100             Pain Scale: Numbers Pre/Post-Treatment    Pain Scale: Numbers, Pretreatment 0/10 - no pain  -MG      Pain Scale: Numbers, Post-Treatment 0/10 - no pain  -MG      Recorded by [MG] Gosselin, Megan, PT 08/07/18 1103      Row Name                Wound 08/05/18 1929 Left elbow skin tear    Wound - Properties Group Date first assessed: 08/05/18 [AH] Time first assessed: 1929 [AH] Present On Admission : yes [AH] Side: Left [AH] Location: elbow [AH] Type: skin tear [AH] Recorded by:  [AH] Vasile  April, RN 08/05/18 7396      User Key  (r) = Recorded By, (t) = Taken By, (c) = Cosigned By    Initials Name Effective Dates Discipline    MILLICENT BurnetteAlmaz RN 12/01/16 -  Nurse    MG Gosselin, Megan, PT 04/03/18 -  PT          Wound 08/05/18 1929 Left elbow skin tear (Active)   Closure None 8/7/2018 12:50 AM   Base dressing in place, unable to visualize 8/7/2018 12:50 AM   Periwound dry;ecchymotic;edematous;redness;non-blanchable 8/6/2018  2:19 PM   Periwound Temperature warm 8/6/2018  2:19 PM   Periwound Skin Turgor soft 8/6/2018  2:19 PM   Drainage Amount small 8/6/2018  2:19 PM             Physical Therapy Education     Title: PT OT SLP Therapies (Done)     Topic: Physical Therapy (Done)     Point: Mobility training (Done)    Learning Progress Summary     Learner Status Readiness Method Response Comment Documented by    Patient Done Acceptance E VU,NR  MG 08/07/18 1103     Done Acceptance MINA PASTOR D VU,NR   08/06/18 0906          Point: Home exercise program (Done)    Learning Progress Summary     Learner Status Readiness Method Response Comment Documented by    Patient Done Acceptance E STEVENR  MG 08/07/18 1103          Point: Body mechanics (Done)    Learning Progress Summary     Learner Status Readiness Method Response Comment Documented by    Patient Done Acceptance MINA PASTOR D VU,NR  EJ 08/06/18 0906          Point: Precautions (Done)    Learning Progress Summary     Learner Status Readiness Method Response Comment Documented by    Patient Done Acceptance E VU,NR  MG 08/07/18 1103     Done Acceptance MINA PASTOR D VU,NR  EJ 08/06/18 0906                      User Key     Initials Effective Dates Name Provider Type Discipline     04/03/18 -  Aye Kellogg, PT Physical Therapist PT    MG 04/03/18 -  Gosselin, Megan, PT Physical Therapist PT                    PT Recommendation and Plan     Outcome Summary: Pt has improved mobility as compared to yesterday. L LE mildly weaker than R during functional mobility. Decreased  assist needed. Requires FWW for balance. Educated on exercises to complete in bed/chair to improve LE strength/coordination. Will continue to progress as tolerated.           Outcome Measures     Row Name 08/07/18 1100 08/06/18 0952 08/06/18 0951       How much help from another person do you currently need...    Turning from your back to your side while in flat bed without using bedrails? 4  -MG  --  --    Moving from lying on back to sitting on the side of a flat bed without bedrails? 4  -MG  --  --    Moving to and from a bed to a chair (including a wheelchair)? 3  -MG  --  --    Standing up from a chair using your arms (e.g., wheelchair, bedside chair)? 3  -MG  --  --    Climbing 3-5 steps with a railing? 2  -MG  --  --    To walk in hospital room? 3  -MG  --  --    AM-[facility] 6 Clicks Score 19  -MG  --  --       How much help from another is currently needed...    Putting on and taking off regular lower body clothing?  -- 2  -LE  --    Bathing (including washing, rinsing, and drying)  -- 2  -LE  --    Toileting (which includes using toilet bed pan or urinal)  -- 2  -LE  --    Putting on and taking off regular upper body clothing  -- 3  -LE  --    Taking care of personal grooming (such as brushing teeth)  -- 3  -LE  --    Eating meals  -- 3  -LE  --    Score  -- 15  -LE  --       Modified Christiana Scale    Modified Jose Scale  -- 4 - Moderately severe disability.  Unable to walk without assistance, and unable to attend to own bodily needs without assistance.  -LE  --       Functional Assessment    Outcome Measure Options AM-[facility] 6 Clicks Basic Mobility (PT)  -MG Modified Jose  -LE AM-[facility] 6 Clicks Daily Activity (OT)  -LE    Row Name 08/06/18 0900             How much help from another person do you currently need...    Turning from your back to your side while in flat bed without using bedrails? 3  -EJ      Moving from lying on back to sitting on the side of a flat bed without bedrails? 3  -EJ      Moving to and  from a bed to a chair (including a wheelchair)? 2  -EJ      Standing up from a chair using your arms (e.g., wheelchair, bedside chair)? 3  -EJ      Climbing 3-5 steps with a railing? 2  -EJ      To walk in hospital room? 2  -EJ      AM-PAC 6 Clicks Score 15  -EJ         Functional Assessment    Outcome Measure Options AM-PAC 6 Clicks Basic Mobility (PT)  -EJ        User Key  (r) = Recorded By, (t) = Taken By, (c) = Cosigned By    Initials Name Provider Type    Janine Recio, OTR Occupational Therapist    EJ Aye Kellogg, PT Physical Therapist    MG Gosselin, Megan, PT Physical Therapist           Time Calculation:         PT Charges     Row Name 08/07/18 1104             Time Calculation    Start Time 1040  -MG      Stop Time 1053  -MG      Time Calculation (min) 13 min  -MG      PT Received On 08/07/18  -MG      PT - Next Appointment 08/08/18  -MG        User Key  (r) = Recorded By, (t) = Taken By, (c) = Cosigned By    Initials Name Provider Type    MG Gosselin, Megan, PT Physical Therapist        Therapy Suggested Charges     Code   Minutes Charges    88703 (CPT®) Hc Pt Neuromusc Re Education Ea 15 Min      06118 (CPT®) Hc Pt Ther Proc Ea 15 Min 12 1    84045 (CPT®) Hc Gait Training Ea 15 Min      16784 (CPT®) Hc Pt Therapeutic Act Ea 15 Min      94838 (CPT®) Hc Pt Manual Therapy Ea 15 Min      28682 (CPT®) Hc Pt Iontophoresis Ea 15 Min      52852 (CPT®) Hc Pt Elec Stim Ea-Per 15 Min      98719 (CPT®) Hc Pt Ultrasound Ea 15 Min      44082 (CPT®) Hc Pt Self Care/Mgmt/Train Ea 15 Min      Total  12 1        Therapy Charges for Today     Code Description Service Date Service Provider Modifiers Qty    55618609835 HC PT THER PROC EA 15 MIN 8/7/2018 Gosselin, Megan, PT GP 1          PT G-Codes  Outcome Measure Options: AM-PAC 6 Clicks Basic Mobility (PT)    Megan Gosselin, PT  8/7/2018

## 2018-08-07 NOTE — PROGRESS NOTES
Discharge Planning Assessment  [facility]     Patient Name: Alba Correa  MRN: [id number]  Today's Date: 8/7/2018    Admit Date: 8/5/2018          Discharge Needs Assessment     Row Name 08/07/18 1706       Living Environment    Lives With spouse    Current Living Arrangements home/apartment/condo    Primary Care Provided by self    Provides Primary Care For no one    Family Caregiver if Needed child(ja), adult;spouse    Quality of Family Relationships helpful;involved;supportive    Able to Return to Prior Arrangements yes       Resource/Environmental Concerns    Resource/Environmental Concerns none    Transportation Concerns car, none       Transition Planning    Patient/Family Anticipated Services at Transition rehabilitation services    Transportation Anticipated family or friend will provide       Discharge Needs Assessment    Readmission Within the Last 30 Days no previous admission in last 30 days    Equipment Currently Used at Home none    Equipment Needed After Discharge walker, rolling    Discharge Facility/Level of Care Needs rehabilitation facility    Offered/Gave Vendor List yes            Discharge Plan     Row Name 08/07/18 8185       Plan    Plan Referral to [facility]- will need [facility] precert/notification    Patient/Family in Agreement with Plan yes    Plan Comments Spoke with pt at bedside, along with son Ankit Doty and his wife Elma Doty and pts daughter Marleni at bedside. Introduced self and explained CCP role. Verified facesheet and confirmed local pharmacy [facility]. Pt was IADL prior to admission, denies any DME supplies. Pt has used HH provider unknown in the past and has gone to [facility]. CCP provided HH/snf List and Road to recovery and explained hospital is unable to provide recommendations, and encouraged family to tour SNF. Pt requests referral to [facility] (formerly [facility]) for referral. Anticipate dc tomorrow. CCP spoke with Liyah/Huey and she will evaluate  pt and contact CCP in AM. IMM Notice given. Fiorella RN/CCP        Destination     Service Request Status Selected Specialties Address Phone Number Fax Number    [facility] Pending - Request Sent N/A [address] , [facility] [id number] [phone]      Durable Medical Equipment     No service coordination in this encounter.      Dialysis/Infusion     No service coordination in this encounter.      Home Medical Care     No service coordination in this encounter.      Social Care     No service coordination in this encounter.                Demographic Summary     Row Name 08/07/18 170       General Information    Admission Type inpatient    Referral Source admission list    Reason for Consult discharge planning    Preferred Language English     Used During This Interaction no       Contact Information    Permission Granted to Share Info With family/designee;facility     Contact Information Comments Pt gave permission to speak in front of family at bedside and contact her spouse as needed.            Functional Status     Row Name 08/07/18 1706       Functional Status    Usual Activity Tolerance good    Current Activity Tolerance moderate       Functional Status, IADL    Medications independent    Meal Preparation independent    Housekeeping independent    Laundry independent    Shopping independent       Mental Status    General Appearance WDL WDL       Mental Status Summary    Recent Changes in Mental Status/Cognitive Functioning no changes       Employment/    Employment Status retired            Psychosocial    No documentation.           Abuse/Neglect    No documentation.           Legal    No documentation.           Substance Abuse    No documentation.           Patient Forms     Row Name 08/07/18 1706       Patient Forms    Provider Choice List Delivered    Delivered to Patient    Method of delivery In person          Natividad Vann,  RN

## 2018-08-07 NOTE — DISCHARGE PLACEMENT REQUEST
"Kaci Chavez (85 y.o. Female)     Date of Birth Social Security Number Address Home Phone MRN    05/22/1933  [address] [place] [id number] [phone] [id number]    Taoist Marital Status          Restorationism        Admission Date Admission Type Admitting Provider Attending Provider Department, Room/Bed    8/5/18 Emergency Marco A Urbano MD Edling, Stephen A, MD [address], [address]/1    Discharge Date Discharge Disposition Discharge Destination                       Attending Provider:  Logan Ku MD    Allergies:  Bactrim [Sulfamethoxazole-trimethoprim], Lisinopril, Levofloxacin    Isolation:  None   Infection:  None   Code Status:  CPR    Ht:  157.5 cm (62\")   Wt:  60.3 kg (133 lb)    Admission Cmt:  None   Principal Problem:  CVA (cerebral vascular accident) (CMS/[facility]) [I63.9]                 Active Insurance as of 8/5/2018     Primary Coverage     Payor Plan Insurance Group Employer/Plan Group    [facility] MEDICARE REPLACEMENT [place] [id number]     Payor Plan Address Payor Plan Phone Number Effective From Effective To    [address] [address]  2/1/2017     [place] [id number]       Subscriber Name Subscriber Birth Date Member ID       KACI CHAVEZ 5/22/1933 [id number]                 Emergency Contacts      (Rel.) Home Phone Work Phone Mobile Phone    Peña Chavez (Spouse) [phone] -- [phone]    Ankit Doty (Son) [phone] -- --    Angy Doty (Other) -- [phone] --              "

## 2018-08-08 ENCOUNTER — APPOINTMENT (OUTPATIENT)
Dept: CARDIOLOGY | Facility: HOSPITAL | Age: 83
End: 2018-08-08

## 2018-08-08 VITALS
HEART RATE: 66 BPM | DIASTOLIC BLOOD PRESSURE: 65 MMHG | OXYGEN SATURATION: 96 % | SYSTOLIC BLOOD PRESSURE: 146 MMHG | BODY MASS INDEX: 24.48 KG/M2 | TEMPERATURE: 98.2 F | HEIGHT: 62 IN | RESPIRATION RATE: 18 BRPM | WEIGHT: 133 LBS

## 2018-08-08 LAB
ANION GAP SERPL CALCULATED.3IONS-SCNC: 13.5 MMOL/L
BASOPHILS # BLD AUTO: 0.05 10*3/MM3 (ref 0–0.2)
BASOPHILS NFR BLD AUTO: 0.7 % (ref 0–1.5)
BUN BLD-MCNC: 22 MG/DL (ref 8–23)
BUN/CREAT SERPL: 19.6 (ref 7–25)
CALCIUM SPEC-SCNC: 9.7 MG/DL (ref 8.6–10.5)
CHLORIDE SERPL-SCNC: 101 MMOL/L (ref 98–107)
CO2 SERPL-SCNC: 26.5 MMOL/L (ref 22–29)
CREAT BLD-MCNC: 1.12 MG/DL (ref 0.57–1)
DEPRECATED RDW RBC AUTO: 48.3 FL (ref 37–54)
EOSINOPHIL # BLD AUTO: 0.64 10*3/MM3 (ref 0–0.7)
EOSINOPHIL NFR BLD AUTO: 8.5 % (ref 0.3–6.2)
ERYTHROCYTE [DISTWIDTH] IN BLOOD BY AUTOMATED COUNT: 12.6 % (ref 11.7–13)
GFR SERPL CREATININE-BSD FRML MDRD: 46 ML/MIN/1.73
GLUCOSE BLD-MCNC: 108 MG/DL (ref 65–99)
GLUCOSE BLDC GLUCOMTR-MCNC: 110 MG/DL (ref 70–130)
GLUCOSE BLDC GLUCOMTR-MCNC: 96 MG/DL (ref 70–130)
HCT VFR BLD AUTO: 41.9 % (ref 35.6–45.5)
HGB BLD-MCNC: 13.8 G/DL (ref 11.9–15.5)
IMM GRANULOCYTES # BLD: 0.01 10*3/MM3 (ref 0–0.03)
IMM GRANULOCYTES NFR BLD: 0.1 % (ref 0–0.5)
LYMPHOCYTES # BLD AUTO: 2.53 10*3/MM3 (ref 0.9–4.8)
LYMPHOCYTES NFR BLD AUTO: 33.7 % (ref 19.6–45.3)
MCH RBC QN AUTO: 34.1 PG (ref 26.9–32)
MCHC RBC AUTO-ENTMCNC: 32.9 G/DL (ref 32.4–36.3)
MCV RBC AUTO: 103.5 FL (ref 80.5–98.2)
MONOCYTES # BLD AUTO: 0.68 10*3/MM3 (ref 0.2–1.2)
MONOCYTES NFR BLD AUTO: 9.1 % (ref 5–12)
NEUTROPHILS # BLD AUTO: 3.61 10*3/MM3 (ref 1.9–8.1)
NEUTROPHILS NFR BLD AUTO: 48 % (ref 42.7–76)
PLATELET # BLD AUTO: 265 10*3/MM3 (ref 140–500)
PMV BLD AUTO: 10.2 FL (ref 6–12)
POTASSIUM BLD-SCNC: 3.9 MMOL/L (ref 3.5–5.2)
RBC # BLD AUTO: 4.05 10*6/MM3 (ref 3.9–5.2)
SODIUM BLD-SCNC: 141 MMOL/L (ref 136–145)
WBC NRBC COR # BLD: 7.51 10*3/MM3 (ref 4.5–10.7)

## 2018-08-08 PROCEDURE — 97110 THERAPEUTIC EXERCISES: CPT

## 2018-08-08 PROCEDURE — 85025 COMPLETE CBC W/AUTO DIFF WBC: CPT | Performed by: HOSPITALIST

## 2018-08-08 PROCEDURE — 99231 SBSQ HOSP IP/OBS SF/LOW 25: CPT | Performed by: NURSE PRACTITIONER

## 2018-08-08 PROCEDURE — 80048 BASIC METABOLIC PNL TOTAL CA: CPT | Performed by: HOSPITALIST

## 2018-08-08 PROCEDURE — 0296T HC EXT ECG > 48HR TO 21 DAY RCRD W/CONECT INTL RCRD: CPT

## 2018-08-08 PROCEDURE — 82962 GLUCOSE BLOOD TEST: CPT

## 2018-08-08 RX ORDER — ASPIRIN 325 MG
325 TABLET ORAL DAILY
Start: 2018-08-09 | End: 2019-10-11

## 2018-08-08 RX ORDER — ATORVASTATIN CALCIUM 40 MG/1
40 TABLET, FILM COATED ORAL NIGHTLY
Qty: 30 TABLET | Refills: 1 | Status: SHIPPED | OUTPATIENT
Start: 2018-08-08 | End: 2018-09-07

## 2018-08-08 RX ADMIN — ASPIRIN 325 MG: 325 TABLET ORAL at 09:12

## 2018-08-08 RX ADMIN — ATENOLOL 25 MG: 25 TABLET ORAL at 09:12

## 2018-08-08 RX ADMIN — FEBUXOSTAT 40 MG: 40 TABLET ORAL at 09:12

## 2018-08-08 RX ADMIN — LOSARTAN POTASSIUM 100 MG: 100 TABLET, FILM COATED ORAL at 09:12

## 2018-08-08 RX ADMIN — AMLODIPINE BESYLATE 2.5 MG: 2.5 TABLET ORAL at 09:12

## 2018-08-08 RX ADMIN — DULOXETINE HYDROCHLORIDE 60 MG: 60 CAPSULE, DELAYED RELEASE ORAL at 09:12

## 2018-08-08 NOTE — NURSING NOTE
Referral per Epic stroke order set for stroke screen. Patient requests rehab at [facility] as she has been there before. Will sign off, recommend snu.    Tiara Merchant RN  Rehab Admissions Nurse  [phone]

## 2018-08-08 NOTE — PLAN OF CARE
Problem: Patient Care Overview  Goal: Plan of Care Review  Outcome: Ongoing (interventions implemented as appropriate)   08/08/18 0229   Coping/Psychosocial   Plan of Care Reviewed With patient   Plan of Care Review   Progress improving   OTHER   Outcome Summary no falls. vital signs stable. NIH complete. patient anxious for d/c. will continue to monitor.        Problem: Fall Risk (Adult)  Goal: Absence of Fall  Outcome: Ongoing (interventions implemented as appropriate)      Problem: Stroke (Ischemic) (Adult)  Goal: Signs and Symptoms of Listed Potential Problems Will be Absent, Minimized or Managed (Stroke)  Outcome: Ongoing (interventions implemented as appropriate)      Problem: Pain, Acute (Adult)  Goal: Acceptable Pain Control/Comfort Level  Outcome: Ongoing (interventions implemented as appropriate)

## 2018-08-08 NOTE — PROGRESS NOTES
[facility]     LOS: 3 days     Subjective:  No cp  No soa  Left hand weakness better  Eating well      Objective:    Vital Signs:  Temp:  [97.2 °F (36.2 °C)-98.2 °F (36.8 °C)] 98.2 °F (36.8 °C)  Heart Rate:  [56-88] 66  Resp:  [14-18] 18  BP: (132-217)/() 146/65    SpO2:  [93 %-99 %] 96 %  on   ;   Device (Oxygen Therapy): room air  Body mass index is 24.33 kg/m².    Physical Exam   Constitutional: She appears well-developed and well-nourished.   HENT:   Head: Normocephalic and atraumatic.   Cardiovascular: Normal rate and regular rhythm.    No murmur heard.  Pulmonary/Chest: Effort normal and breath sounds normal.   Abdominal: Soft. Bowel sounds are normal. She exhibits no distension. There is no tenderness.   Neurological: She is alert.   Skin: Skin is warm and dry.       Results Review:    Glucose   Date Value Ref Range Status   08/07/2018 100 (H) 65 - 99 mg/dL Final   08/06/2018 92 65 - 99 mg/dL Final   08/05/2018 113 (H) 65 - 99 mg/dL Final       Results from last 7 days  Lab Units 08/06/18  0449   WBC 10*3/mm3 6.65   HEMOGLOBIN g/dL 12.9   HEMATOCRIT % 40.9   PLATELETS 10*3/mm3 249       Results from last 7 days  Lab Units 08/07/18  1524  08/05/18  1152   SODIUM mmol/L 141  < > 140   POTASSIUM mmol/L 4.2  < > 4.8   CHLORIDE mmol/L 101  < > 103   CO2 mmol/L 27.2  < > 24.9   BUN mg/dL 17  < > 19   CREATININE mg/dL 1.14*  < > 1.07*   CALCIUM mg/dL 9.9  < > 9.5   BILIRUBIN mg/dL  --   --  0.2   ALK PHOS U/L  --   --  76   ALT (SGPT) U/L  --   --  16   AST (SGOT) U/L  --   --  25   GLUCOSE mg/dL 100*  < > 113*   < > = values in this interval not displayed.    Results from last 7 days  Lab Units 08/05/18  1152   INR  1.02           Results from last 7 days  Lab Units 08/05/18  1152   TROPONIN T ng/mL <0.010     Cultures:       I have reviewed daily medications and changes in CPOE    Scheduled meds    amLODIPine 2.5 mg Oral Daily   aspirin 325 mg Oral Daily   Or      aspirin 300 mg  Rectal Daily   atenolol 25 mg Oral Daily   atorvastatin 40 mg Oral Nightly   DULoxetine 60 mg Oral Daily   febuxostat 40 mg Oral Daily   losartan 100 mg Oral Daily          PRN meds  •  diphenhydrAMINE **AND** acetaminophen  •  acetaminophen  •  hydrALAZINE  •  ondansetron  •  sodium chloride  •  Insert peripheral IV **AND** sodium chloride      Principal Problem:    CVA (cerebral vascular accident) (CMS/[facility])  Active Problems:    Chronic kidney failure    Gout    Elevated cholesterol    Osteoporosis    Hyperparathyroidism (CMS/HCC)    Benign essential hypertension    History of degenerative disc disease    History of DVT (deep vein thrombosis)    History of rheumatoid arthritis    Hyperlipidemia    Right leg weakness        Assessment/Plan:      CVA (cerebral vascular accident) (CMS/[facility])  -left hand is weak, right leg is giving out, this is all better  -aspirin and statin       Chronic kidney failure  -creatinine is stable and currently normal       Gout  -uloric  -last attack several years       Elevated cholesterol   -lipitor      Osteoporosis       Hyperparathyroidism (CMS/HCC)       Benign essential hypertension  -norvasc, atenolol and losartan  -bp is elevated  -given acute CVA will not escalated BP meds   -bp better now but may need to increase      History of degenerative disc disease       History of DVT (deep vein thrombosis)-approximately 7 years  -Took warfarin for 3 months       History of rheumatoid arthritis- 3 years ago it went into remission   -She has seen Dr Dewitt in the past      Hyperlipidemia       Right leg weakness-likely unrelated to the stroke      Vomiting- is better    mild increase in creatinine - will recheck    eosinophillia    Plan:  Event monitor on d/c      Logan Ku MD  08/08/18  10:20 AM

## 2018-08-08 NOTE — DISCHARGE SUMMARY
[facility]    ASSOCIATES  [phone]    DISCHARGE SUMMARY  [facility]    Patient Identification:  Name: Alba Correa  Age: 85 y.o.  Sex: female  :  1933  MRN: [id number]  Primary Care Physician: Aramis Doty MD    Admit date: 2018  Discharge date and time:      Discharge Diagnoses:Principal Problem:    CVA (cerebral vascular accident) (CMS/[facility])  Active Problems:    Chronic kidney failure    Gout    Elevated cholesterol    Osteoporosis    Hyperparathyroidism (CMS/HCC)    Benign essential hypertension    History of degenerative disc disease    History of DVT (deep vein thrombosis)    History of rheumatoid arthritis    Hyperlipidemia    Right leg weakness       History of present illness from H&P:    The patient is an 85-year-old white female with history of hypertension, chronic kidney disease, degenerative disc disease, hypothyroidism, history of rheumatoid arthritis, osteoporosis and hyperlipidemia who is admitted with history of having some right leg weakness for 2 days.  Her right legs also been numb.  She also had some associated left hand and arm weakness.  She'd fallen down twice and bruised up her elbow.  She also hit her head with one of her falls.  She's not had any headaches.  She denies any chest pain or shortness of air.  The patient was evaluated in ER and admitted for possible stroke workup.       Hospital Course:     Patient was admitted to the hospital for right leg giving out in the left hand weakness.  MRI did show an acute stroke on the right side likely causing left hand weakness.  This did not explain the patient's right leg giving out.  Patient was seen in consultation by neurology.  Workup was done with [facility] which was unremarkable.  They recommend continuous heart monitoring on discharge.  Patient be discharged with aspirin and we will change her simvastatin to Lipitor 40.    The patient is doing much better her left hand is back to  normal.    Further hospital course by problem list:    CVA (cerebral vascular accident) (CMS/[facility])  -left hand is weak, right leg is giving out, this is all better  -aspirin and statin       Chronic kidney failure  -creatinine is stable and currently normal       Gout  -uloric  -last attack several years       Elevated cholesterol   -lipitor       Osteoporosis       Hyperparathyroidism (CMS/HCC)       Benign essential hypertension  -norvasc, atenolol and losartan  -bp is elevated but improving  -given acute CVA will not escalated BP meds inpatient but will need to be done outpatient.       History of degenerative disc disease       History of DVT (deep vein thrombosis)-approximately 7 years ago  -Took warfarin for 3 months       History of rheumatoid arthritis- 3 years ago it went into remission   -She has seen Dr Dewitt in the past       Hyperlipidemia       Right leg weakness-likely unrelated to the stroke possible peroneal neuropathy      Vomiting- is better     mild increase in creatinine - will recheck     eosinophillia since 2016 and high mcv- mild increase the % but normal absolute number  -mcv has been high at least back to 2015, she can follow this up outpatient. It is slightly higher this hospitalization    Consults:     Consults     Date and Time Order Name Status Description    8/6/2018 1042 Inpatient Cardiology Consult Completed     8/5/2018 1420 Inpatient Neurology Consult Completed     8/5/2018 1258 [facility] (on-call MD unless specified) Completed             Results from last 7 days  Lab Units 08/08/18  1030   WBC 10*3/mm3 7.51   HEMOGLOBIN g/dL 13.8   HEMATOCRIT % 41.9   PLATELETS 10*3/mm3 265         Results from last 7 days  Lab Units 08/08/18  1030   SODIUM mmol/L 141   POTASSIUM mmol/L 3.9   CHLORIDE mmol/L 101   CO2 mmol/L 26.5   BUN mg/dL 22   CREATININE mg/dL 1.12*   GLUCOSE mg/dL 108*   CALCIUM mg/dL 9.7       Significant Diagnostic Studies:   Lab Results   Component Value Date    WBC 7.51  08/08/2018    HGB 13.8 08/08/2018    HCT 41.9 08/08/2018     08/08/2018     Lab Results   Component Value Date     08/08/2018    K 3.9 08/08/2018     08/08/2018    CO2 26.5 08/08/2018    BUN 22 08/08/2018    CREATININE 1.12 (H) 08/08/2018    GLUCOSE 108 (H) 08/08/2018     Lab Results   Component Value Date    CALCIUM 9.7 08/08/2018     No results found for: AST, ALT, ALKPHOS  No results found for: APTT, INR  No results found for: COLORU, CLARITYU, SPECGRAV, PHUR, PROTEINUR, GLUCOSEU, KETONESU, BLOODU, NITRITE, LEUKOCYTESUR, BILIRUBINUR, UROBILINOGEN, RBCUA, WBCUA, BACTERIA, UACOMMENT  No results found for: TROPONINT, TROPONINI, BNP  No components found for: HGBA1C;2  No components found for: TSH;2    Imaging Results (all)     Procedure Component Value Units Date/Time    MRI Brain Without Contrast [[id number]] Collected:  08/05/18 1933     Updated:  08/05/18 1952    Narrative:       MRI BRAIN WITHOUT CONTRAST, MR ANGIOGRAM OF HEAD WITHOUT CONTRAST, MRA  ANGIOGRAM WITHOUT CONTRAST     HISTORY: Patient was sitting in a chair 3 days ago and thought her foot  fell asleep. She went to stand and fell. Patient fell again today,  hitting the back of her head. Patient cannot put weight on the right  lower extremity.     TECHNIQUE: MRI of the brain includes axial diffusion, FLAIR, T1, T2,  gradient as well as sagittal T1-weighted sequences. MR angiogram of the  head and neck was obtained without contrast and MIP reconstructed images  were obtained.     COMPARISON: Head CT without contrast 08/05/2018. MRI brain without  contrast 11/20/2015.     FINDINGS: Within the posterior aspect of the external limb of the right  external capsule and within the posteroinferior right frontal lobe there  is a focal area of restricted diffusion measuring 13 x 6 mm consistent  with an acute infarction. There are additional smaller foci of  restricted diffusion within the posterior right frontal lobe and  anterior right parietal  lobe consistent with small acute infarctions  within the right middle cerebral artery distribution. There is  moderately extensive chronic small vessel ischemic white matter change.  Chronic cerebral and cerebellar atrophy are present and there is  associated ventricular enlargement and prominence of the sulci. There is  no evidence for intracranial hemorrhage. No extra-axial fluid collection  is evident.     MR angiogram of the neck is very limited due to the extensive motion  related artifact and lack of intravenous contrast. Great vessel origins  appear to be patent. There is flow-related enhancement within both  common carotid arteries.  There is no evidence for a NASCET stenosis of  the right common carotid or internal carotid artery. Left common carotid  artery appears patent. There is apparent diminished flow within the  proximal 1.5 cm of the left internal carotid artery consistent with a  stenosis though evaluation of the degree is severely limited by the  degree of motion related artifact. This appears to represent  approximately 50% stenosis though this would be best evaluated with CT  angiogram of the head. There is flow-related enhancement within both mid  to distal cervical internal carotid arteries. Flow related enhancement  is present within both cervical vertebral arteries. There is diminished  enhancement within the distal cervical left vertebral artery and this  could be related to the degree of motion related artifact though could  also represent slow diminished or absent flow. There is flow-related  enhancement within the intracranial segments of both vertebral artery  and the basilar artery. Both posterior cerebral arteries exhibit flow  related enhancement.     There is flow-related enhancement within the distal cervical, petrous,  cavernous, supracavernous internal carotid arteries. Evaluation of  detail is very limited by the degree of motion related artifact in these  segments. Evaluation  for stenosis is severely limited. There is  flow-related enhancement within both middle cerebral arteries and  anterior cerebral arteries and major branches. There is minimal to  absent flow within the A1 segment of the left anterior cerebral artery.       Impression:       1. 13 x 6 mm acute infarction within the posterior aspect of the right  external capsule. There are also smaller foci of restricted diffusion  along the right frontoparietal junction consistent with acute  infarctions within the right middle cerebral artery distribution.  Findings discussed with Ijeoma, the nurse caring for the patient on  [address] on 08/05/2018 at 7:10 PM.  2. MR angiogram of the head and neck is severely motion limited. There  is diminished enhancement of the proximal left internal carotid artery  suspected to represent stenosis that appears to be approximately 50%  though more accurate assessment could be performed with CT angiography.  There is also diminished flow-related enhancement within the distal left  vertebral artery associated with motion related artifact or slow,  diminished, or absent flow. This could also be further evaluated with CT  angiography if indicated.     This report was finalized on 8/5/2018 7:48 PM by Dr. Rafi Ortiz M.D.       MRI Angiogram Head Without Contrast [[id number]] Collected:  08/05/18 1933     Updated:  08/05/18 1952    Narrative:       MRI BRAIN WITHOUT CONTRAST, MR ANGIOGRAM OF HEAD WITHOUT CONTRAST, MRA  ANGIOGRAM WITHOUT CONTRAST     HISTORY: Patient was sitting in a chair 3 days ago and thought her foot  fell asleep. She went to stand and fell. Patient fell again today,  hitting the back of her head. Patient cannot put weight on the right  lower extremity.     TECHNIQUE: MRI of the brain includes axial diffusion, FLAIR, T1, T2,  gradient as well as sagittal T1-weighted sequences. MR angiogram of the  head and neck was obtained without contrast and MIP reconstructed images  were  obtained.     COMPARISON: Head CT without contrast 08/05/2018. MRI brain without  contrast 11/20/2015.     FINDINGS: Within the posterior aspect of the external limb of the right  external capsule and within the posteroinferior right frontal lobe there  is a focal area of restricted diffusion measuring 13 x 6 mm consistent  with an acute infarction. There are additional smaller foci of  restricted diffusion within the posterior right frontal lobe and  anterior right parietal lobe consistent with small acute infarctions  within the right middle cerebral artery distribution. There is  moderately extensive chronic small vessel ischemic white matter change.  Chronic cerebral and cerebellar atrophy are present and there is  associated ventricular enlargement and prominence of the sulci. There is  no evidence for intracranial hemorrhage. No extra-axial fluid collection  is evident.     MR angiogram of the neck is very limited due to the extensive motion  related artifact and lack of intravenous contrast. Great vessel origins  appear to be patent. There is flow-related enhancement within both  common carotid arteries.  There is no evidence for a NASCET stenosis of  the right common carotid or internal carotid artery. Left common carotid  artery appears patent. There is apparent diminished flow within the  proximal 1.5 cm of the left internal carotid artery consistent with a  stenosis though evaluation of the degree is severely limited by the  degree of motion related artifact. This appears to represent  approximately 50% stenosis though this would be best evaluated with CT  angiogram of the head. There is flow-related enhancement within both mid  to distal cervical internal carotid arteries. Flow related enhancement  is present within both cervical vertebral arteries. There is diminished  enhancement within the distal cervical left vertebral artery and this  could be related to the degree of motion related artifact though  could  also represent slow diminished or absent flow. There is flow-related  enhancement within the intracranial segments of both vertebral artery  and the basilar artery. Both posterior cerebral arteries exhibit flow  related enhancement.     There is flow-related enhancement within the distal cervical, petrous,  cavernous, supracavernous internal carotid arteries. Evaluation of  detail is very limited by the degree of motion related artifact in these  segments. Evaluation for stenosis is severely limited. There is  flow-related enhancement within both middle cerebral arteries and  anterior cerebral arteries and major branches. There is minimal to  absent flow within the A1 segment of the left anterior cerebral artery.       Impression:       1. 13 x 6 mm acute infarction within the posterior aspect of the right  external capsule. There are also smaller foci of restricted diffusion  along the right frontoparietal junction consistent with acute  infarctions within the right middle cerebral artery distribution.  Findings discussed with Ijeoma, the nurse caring for the patient on  [facility] on 08/05/2018 at 7:10 PM.  2. MR angiogram of the head and neck is severely motion limited. There  is diminished enhancement of the proximal left internal carotid artery  suspected to represent stenosis that appears to be approximately 50%  though more accurate assessment could be performed with CT angiography.  There is also diminished flow-related enhancement within the distal left  vertebral artery associated with motion related artifact or slow,  diminished, or absent flow. This could also be further evaluated with CT  angiography if indicated.     This report was finalized on 8/5/2018 7:48 PM by Dr. Rafi Ortiz M.D.       MRI Angiogram Neck Without Contrast [[id number]] Collected:  08/05/18 1933     Updated:  08/05/18 1952    Narrative:       MRI BRAIN WITHOUT CONTRAST, MR ANGIOGRAM OF HEAD WITHOUT CONTRAST, MRA  ANGIOGRAM  WITHOUT CONTRAST     HISTORY: Patient was sitting in a chair 3 days ago and thought her foot  fell asleep. She went to stand and fell. Patient fell again today,  hitting the back of her head. Patient cannot put weight on the right  lower extremity.     TECHNIQUE: MRI of the brain includes axial diffusion, FLAIR, T1, T2,  gradient as well as sagittal T1-weighted sequences. MR angiogram of the  head and neck was obtained without contrast and MIP reconstructed images  were obtained.     COMPARISON: Head CT without contrast 08/05/2018. MRI brain without  contrast 11/20/2015.     FINDINGS: Within the posterior aspect of the external limb of the right  external capsule and within the posteroinferior right frontal lobe there  is a focal area of restricted diffusion measuring 13 x 6 mm consistent  with an acute infarction. There are additional smaller foci of  restricted diffusion within the posterior right frontal lobe and  anterior right parietal lobe consistent with small acute infarctions  within the right middle cerebral artery distribution. There is  moderately extensive chronic small vessel ischemic white matter change.  Chronic cerebral and cerebellar atrophy are present and there is  associated ventricular enlargement and prominence of the sulci. There is  no evidence for intracranial hemorrhage. No extra-axial fluid collection  is evident.     MR angiogram of the neck is very limited due to the extensive motion  related artifact and lack of intravenous contrast. Great vessel origins  appear to be patent. There is flow-related enhancement within both  common carotid arteries.  There is no evidence for a NASCET stenosis of  the right common carotid or internal carotid artery. Left common carotid  artery appears patent. There is apparent diminished flow within the  proximal 1.5 cm of the left internal carotid artery consistent with a  stenosis though evaluation of the degree is severely limited by the  degree of  motion related artifact. This appears to represent  approximately 50% stenosis though this would be best evaluated with CT  angiogram of the head. There is flow-related enhancement within both mid  to distal cervical internal carotid arteries. Flow related enhancement  is present within both cervical vertebral arteries. There is diminished  enhancement within the distal cervical left vertebral artery and this  could be related to the degree of motion related artifact though could  also represent slow diminished or absent flow. There is flow-related  enhancement within the intracranial segments of both vertebral artery  and the basilar artery. Both posterior cerebral arteries exhibit flow  related enhancement.     There is flow-related enhancement within the distal cervical, petrous,  cavernous, supracavernous internal carotid arteries. Evaluation of  detail is very limited by the degree of motion related artifact in these  segments. Evaluation for stenosis is severely limited. There is  flow-related enhancement within both middle cerebral arteries and  anterior cerebral arteries and major branches. There is minimal to  absent flow within the A1 segment of the left anterior cerebral artery.       Impression:       1. 13 x 6 mm acute infarction within the posterior aspect of the right  external capsule. There are also smaller foci of restricted diffusion  along the right frontoparietal junction consistent with acute  infarctions within the right middle cerebral artery distribution.  Findings discussed with Ijeoma, the nurse caring for the patient on  [facility] on 08/05/2018 at 7:10 PM.  2. MR angiogram of the head and neck is severely motion limited. There  is diminished enhancement of the proximal left internal carotid artery  suspected to represent stenosis that appears to be approximately 50%  though more accurate assessment could be performed with CT angiography.  There is also diminished flow-related enhancement  within the distal left  vertebral artery associated with motion related artifact or slow,  diminished, or absent flow. This could also be further evaluated with CT  angiography if indicated.     This report was finalized on 8/5/2018 7:48 PM by Dr. Rafi Ortiz M.D.       MRI Cervical Spine Without Contrast [[id number]] Collected:  08/05/18 1915     Updated:  08/05/18 1922    Narrative:       MRI CERVICAL SPINE WITHOUT CONTRAST, MRI LUMBAR SPINE WITHOUT CONTRAST     HISTORY: Cannot put weight on right lower extremity. Patient was sitting  in a chair 3 days ago and thought her foot fell asleep, she went to  stand and fell.     TECHNIQUE: MRI cervical spine without contrast includes sagittal T1, T2  fat sat, PD as well as axial gradient oblique sagittal T2-weighted  sequences through the neural foramina. MRI lumbar spine includes  sagittal T1, T2 fat-sat, PD and axial T1 and T2 weighted sequences.     COMPARISON: There are no previous MRIs of the lumbar spine or cervical  spine for comparison.     FINDINGS  CERVICAL SPINE: There is reversal of the normal cervical lordotic  curvature centered at C4 and C5. The vertebral body heights appear  within normal limits. There are chronic arthritic changes of the  anterior atlantoaxial joint and there is panus formation surrounding the  tip of the odontoid process.     At C2-3, the central canal and neural foramina are patent.     At C3-4, there is a central to left posterior disc protrusion slightly  effacing the anterior thecal sac. There is mild narrowing of the left  neural foramen associated with facet arthritis. The right neural  foramina is patent.     At C4-5, there is mild endplate spurring and there is a small central  posterior disc protrusion slightly effacing the anterior aspect of the  thecal sac. Mild facet overgrowth is present. The neural foramina are  patent.     At C5-6, there is disc space narrowing with 3 mm retrolisthesis of C5  with respect to C6.  Endplate spurring is present and there is a disc  bulge with mild canal narrowing. Bilateral facet arthritis is present  and there is uncovertebral overgrowth with moderate bilateral neural  foraminal narrowing.     At C6-7, there is bilateral facet arthritis. Disc space narrowing is  present and there are mild endplate degenerative changes. The central  canal is patent. There is mild narrowing of the right neural foramen.  The left neural foramina is patent.     At C7-T1, the central canal and neural foramina are patent.       Impression:       CERVICAL SPINE:    Reversal of the normal cervical lordotic curvature centered at C4 and  C5. Degenerative disc disease and facet arthritis, as described with  moderate foraminal stenosis at C5-6.           LUMBAR SPINE: Vertebral body heights appear within normal limits. There  is a scoliotic curvature of the lumbar spine convexed to the left at  L3-4. The distal thoracic cord and conus medullaris appear normal and  the tip of the conus medullaris terminates at the L1-2 level.     At T12-L1, the central canal and neural foramina are patent.     At L1-2, there is 3 mm retrolisthesis of L1 with respect to L2.   Uncovered disc bulge is present slightly effacing the anterior thecal  sac. There is mild bilateral neural foraminal narrowing.     At L2-3, there is a broad disc bulge. Mild right greater than left facet  arthritis is present.  There is trace fluid in the facet joints. There  is very mild narrowing of the central canal. The neural foramina are  patent.     At L3-4, facet arthritis is present.  There is trace fluid in the facet  joints. Mild disc bulge is present without central canal or foraminal  narrowing.     At L4-5, there is a 4 mm anterolisthesis of L4 with respect to L5.  Uncovered disc bulge is present and there is bilateral facet arthritis  with spur formation and mild bilateral neural foraminal narrowing.     At L5-S1, there is a 2 mm anterolisthesis of L5  with respect to S1.   Uncovered disc bulge is present and there is bilateral facet overgrowth.  Disc bulge with facet spurring contribute to moderate left neural  foraminal narrowing. The central canal and right neural foramina are  patent.     IMPRESSION: LUMBAR SPINE:  1. 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  2. Degenerative disc disease with mild degenerative anterolisthesis of  L4 with respect to L5 and L5 with respect to S1. There is facet  arthritis with mild foraminal narrowing at L4-5 and moderate left  foraminal narrowing at L5-S1.     This report was finalized on 8/5/2018 7:19 PM by Dr. Rafi Ortiz M.D.       MRI Lumbar Spine Without Contrast [[id number]] Collected:  08/05/18 1915     Updated:  08/05/18 1922    Narrative:       MRI CERVICAL SPINE WITHOUT CONTRAST, MRI LUMBAR SPINE WITHOUT CONTRAST     HISTORY: Cannot put weight on right lower extremity. Patient was sitting  in a chair 3 days ago and thought her foot fell asleep, she went to  stand and fell.     TECHNIQUE: MRI cervical spine without contrast includes sagittal T1, T2  fat sat, PD as well as axial gradient oblique sagittal T2-weighted  sequences through the neural foramina. MRI lumbar spine includes  sagittal T1, T2 fat-sat, PD and axial T1 and T2 weighted sequences.     COMPARISON: There are no previous MRIs of the lumbar spine or cervical  spine for comparison.     FINDINGS  CERVICAL SPINE: There is reversal of the normal cervical lordotic  curvature centered at C4 and C5. The vertebral body heights appear  within normal limits. There are chronic arthritic changes of the  anterior atlantoaxial joint and there is panus formation surrounding the  tip of the odontoid process.     At C2-3, the central canal and neural foramina are patent.     At C3-4, there is a central to left posterior disc protrusion slightly  effacing the anterior thecal sac. There is mild narrowing of the left  neural foramen associated with facet  arthritis. The right neural  foramina is patent.     At C4-5, there is mild endplate spurring and there is a small central  posterior disc protrusion slightly effacing the anterior aspect of the  thecal sac. Mild facet overgrowth is present. The neural foramina are  patent.     At C5-6, there is disc space narrowing with 3 mm retrolisthesis of C5  with respect to C6. Endplate spurring is present and there is a disc  bulge with mild canal narrowing. Bilateral facet arthritis is present  and there is uncovertebral overgrowth with moderate bilateral neural  foraminal narrowing.     At C6-7, there is bilateral facet arthritis. Disc space narrowing is  present and there are mild endplate degenerative changes. The central  canal is patent. There is mild narrowing of the right neural foramen.  The left neural foramina is patent.     At C7-T1, the central canal and neural foramina are patent.       Impression:       CERVICAL SPINE:    Reversal of the normal cervical lordotic curvature centered at C4 and  C5. Degenerative disc disease and facet arthritis, as described with  moderate foraminal stenosis at C5-6.           LUMBAR SPINE: Vertebral body heights appear within normal limits. There  is a scoliotic curvature of the lumbar spine convexed to the left at  L3-4. The distal thoracic cord and conus medullaris appear normal and  the tip of the conus medullaris terminates at the L1-2 level.     At T12-L1, the central canal and neural foramina are patent.     At L1-2, there is 3 mm retrolisthesis of L1 with respect to L2.   Uncovered disc bulge is present slightly effacing the anterior thecal  sac. There is mild bilateral neural foraminal narrowing.     At L2-3, there is a broad disc bulge. Mild right greater than left facet  arthritis is present.  There is trace fluid in the facet joints. There  is very mild narrowing of the central canal. The neural foramina are  patent.     At L3-4, facet arthritis is present.  There is  trace fluid in the facet  joints. Mild disc bulge is present without central canal or foraminal  narrowing.     At L4-5, there is a 4 mm anterolisthesis of L4 with respect to L5.  Uncovered disc bulge is present and there is bilateral facet arthritis  with spur formation and mild bilateral neural foraminal narrowing.     At L5-S1, there is a 2 mm anterolisthesis of L5 with respect to S1.   Uncovered disc bulge is present and there is bilateral facet overgrowth.  Disc bulge with facet spurring contribute to moderate left neural  foraminal narrowing. The central canal and right neural foramina are  patent.     IMPRESSION: LUMBAR SPINE:  1. 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  2. Degenerative disc disease with mild degenerative anterolisthesis of  L4 with respect to L5 and L5 with respect to S1. There is facet  arthritis with mild foraminal narrowing at L4-5 and moderate left  foraminal narrowing at L5-S1.     This report was finalized on 8/5/2018 7:19 PM by Dr. Rafi Ortiz M.D.       CT Head Without Contrast [[id number]] Collected:  08/05/18 1207     Updated:  08/05/18 1213    Narrative:       CT HEAD WO CONTRAST-     INDICATIONS: Weakness, fall injury     TECHNIQUE: Radiation dose reduction techniques were utilized, including  automated exposure control and exposure modulation based on body size.      Noncontrast head CT     COMPARISON: 1/22/2015     FINDINGS:              No acute intracranial hemorrhage, midline shift or mass effect. No acute  territorial infarct is identified.     Mild to moderate similar-appearing periventricular hypodensities suggest  chronic small vessel ischemic change in a patient this age.     Arterial calcifications are seen at the base of the brain.     Ventricles, cisterns, cerebral sulci are unremarkable for patient age.     The visualized paranasal sinuses, orbits, mastoid air cells are  unremarkable. Degenerative changes are noted in the temporomandibular  joints,  right more than left.                   Impression:              No acute intracranial hemorrhage or hydrocephalus. Chronic changes.  If there is further clinical concern, MRI could be considered for  further evaluation.     This report was finalized on 8/5/2018 12:10 PM by Dr. Levy Mock M.D.             TEST  RESULTS PENDING AT DISCHARGE      Patient Instructions:      Discharge Medications      ASK your doctor about these medications      Instructions Start Date   acetaminophen 650 MG 8 hr tablet  Commonly known as:  TYLENOL   650 mg, Oral, Every 8 Hours PRN      amLODIPine 2.5 MG tablet  Commonly known as:  NORVASC   2.5 mg, Oral, Daily      atenolol 25 MG tablet  Commonly known as:  TENORMIN   25 mg, Oral, Daily      BIOTIN PO   Oral      CALCIUM 600+D PO   Oral      CENTRUM SILVER PO   Oral      cephalexin 250 MG capsule  Commonly known as:  KEFLEX   250 mg, Oral, 2 Times Daily      diphenhydrAMINE-acetaminophen  MG tablet per tablet  Commonly known as:  TYLENOL PM   1 tablet, Oral, Nightly PRN      DULoxetine 60 MG capsule  Commonly known as:  CYMBALTA   60 mg, Oral, Daily      FISH OIL PO   1,200 mg, Oral, Nightly      losartan 100 MG tablet  Commonly known as:  COZAAR   100 mg, Oral, Daily      losartan 100 MG tablet  Commonly known as:  COZAAR   TAKE 1 TABLET BY MOUTH EVERY DAY      Scopolamine 1.5 MG/3DAYS patch  Commonly known as:  TRANSDERM-SCOP (1.5 MG)   1 patch, Transdermal, Every 72 Hours      simvastatin 20 MG tablet  Commonly known as:  ZOCOR   20 mg, Oral, Daily      ULORIC 40 MG tablet  Generic drug:  febuxostat   TAKE 1 TABLET DAILY           Future Appointments  Date Time Provider Department Center   8/15/2018 1:00 PM LAB CHAIR 1 [facility]  [facility]   8/15/2018 1:40 PM Adrian Perez MD [facility]     Follow-up Information     Radha Valles, APRN. Schedule an appointment as soon as possible for a visit in 3 month(s).    Specialties:  Nurse Practitioner,  Neurology  Contact information:  [address] [facility] [address]  [facility] [address] [phone]             Chanda Bird, APRN. Schedule an appointment as soon as possible for a visit in 1 month(s).    Specialty:  Cardiology  Contact information:  [address] [facility] [address]  [facility] [id number] [phone]                 Discharge Order     None        Follow-up nursing care physician within the week  Follow-up with a CBC and a CMP in next couple days to nursing care facility    Follow-up with neurology and cardiology has written above      Total time spent discharging patient including evaluation, post hospitalization follow up,  medication and post hospitalization instructions and education, total time exceeds 30 minutes.    Signed:  Logan Ku MD  8/8/2018  12:01 PM

## 2018-08-08 NOTE — THERAPY TREATMENT NOTE
Acute Care - Physical Therapy Treatment Note  [facility]     Patient Name: Alba Correa  : 1933  MRN: [id number]  Today's Date: 2018  Onset of Illness/Injury or Date of Surgery: 18  Date of Referral to PT: 18  Referring Physician: Dr Urbano    Admit Date: 2018    Visit Dx:    ICD-10-CM ICD-9-CM   1. Right leg weakness R29.898 729.89   2. Fall, initial encounter W19.XXXA E888.9   3. Difficulty walking R26.2 719.7     Patient Active Problem List   Diagnosis   • Chronic kidney failure   • DVT (deep venous thrombosis) (CMS/HCC)   • Depression   • Edema   • Gout   • Elevated cholesterol   • Osteoporosis   • Hyperparathyroidism (CMS/HCC)   • Benign essential hypertension   • Adaptive colitis   • Osteopenia   • Rheumatoid arthritis (CMS/HCC)   • Degeneration of intervertebral disc   • D (diarrhea)   • BP (high blood pressure)   • Detrusor muscle hypertonia   • Open leg wound   • Vaginal odor   • Infected wound   • History of depression   • History of renal insufficiency syndrome   • History of degenerative disc disease   • History of osteoporosis   • History of DVT (deep vein thrombosis)   • History of rheumatoid arthritis   • Hyperlipidemia   • Macrocytosis   • Right leg weakness   • CVA (cerebral vascular accident) (CMS/HCC)       Therapy Treatment          Rehabilitation Treatment Summary     Row Name 18             Treatment Time/Intention    Discipline physical therapy assistant  -      Document Type therapy note (daily note)  -AGUSTO      Subjective Information complains of;fatigue;nausea/vomiting  -      Care Plan Review patient/other agree to care plan  -AGUSTO      Comment slight headache  -AGUSTO      Existing Precautions/Restrictions fall  -AGUSTO      Recorded by [AGUSTO] Nehal Lama PTA 1857      Row Name 1851             Bed Mobility Assessment/Treatment    Comment (Bed Mobility) in chair  -AGUSTO      Recorded by [AGUSTO] Nehal Lama PTA 1857      Nino  Name 08/08/18 0951             Sit-Stand Transfer    Sit-Stand [facility] (Transfers) contact guard;verbal cues   cues for hand placement  -      Assistive Device (Sit-Stand Transfers) walker, front-wheeled  -JM      Recorded by [JM] Nehal Lama, ARACELIS 08/08/18 0957      Row Name 08/08/18 0951             Stand-Sit Transfer    Stand-Sit [facility] (Transfers) supervision;verbal cues  -      Recorded by [JM] Nehal Lama PTA 08/08/18 0957      Row Name 08/08/18 0951             Gait/Stairs Assessment/Training    [facility] Level (Gait) contact guard  -      Assistive Device (Gait) walker, front-wheeled  -      Distance in Feet (Gait) 90  -JM      Bilateral Gait Deviations leans right  -JM      Comment (Gait/Stairs) slight LOB at doorway, nausea limiting dist  -JM      Recorded by [JM] Nehal Lama PTA 08/08/18 0957      Row Name 08/08/18 0951             Therapeutic Exercise    Lower Extremity (Therapeutic Exercise) quad sets, bilateral;LAQ (long arc quad), bilateral;marching while seated  -      Sets/Reps (Therapeutic Exercise) 10 reps each, educ to perf APs, QS every few hrs  -JM      Recorded by [JM] Nehal Lama, ARACELIS 08/08/18 0957      Row Name 08/08/18 0951             Positioning and Restraints    Pre-Treatment Position sitting in chair/recliner  -JM      In Chair reclined;call light within reach;encouraged to call for assist   no alarm upon entry  -JM      Recorded by [JM] Nehal Lama, ARACELIS 08/08/18 0957      Row Name 08/08/18 0951             Pain Scale: Numbers Pre/Post-Treatment    Pain Scale: Numbers, Pretreatment 0/10 - no pain  -      Pain Scale: Numbers, Post-Treatment 0/10 - no pain  -      Recorded by [JM] Nehal Lama, PTA 08/08/18 0957      Row Name                Wound 08/05/18 1929 Left elbow skin tear    Wound - Properties Group Date first assessed: 08/05/18 [AH] Time first assessed: 1929 [AH] Present On Admission : yes [AH] Side: Left [AH] Location:  elbow [AH] Type: skin tear [AH] Recorded by:  [AH] Almaz Burnette RN 08/05/18 9389      User Key  (r) = Recorded By, (t) = Taken By, (c) = Cosigned By    Initials Name Effective Dates Discipline    Nehal Centeno PTA 03/07/18 -  PT    Almaz Nichols RN 12/01/16 -  Nurse          Wound 08/05/18 1929 Left elbow skin tear (Active)   Dressing Appearance dry;intact;no drainage 8/8/2018 12:20 AM   Closure LETICIA 8/8/2018 12:20 AM   Base dressing in place, unable to visualize 8/8/2018 12:20 AM   Drainage Amount none 8/7/2018 11:00 AM   Dressing Care, Wound low-adherent 8/7/2018  2:25 PM             Physical Therapy Education     Title: PT OT SLP Therapies (Done)     Topic: Physical Therapy (Done)     Point: Mobility training (Done)    Learning Progress Summary     Learner Status Readiness Method Response Comment Documented by    Patient Done MINA Austin D VU   08/08/18 0958     Done Acceptance E VU,NR  MG 08/07/18 1103     Done Acceptance MINA PASTOR D VU,NR  EJ 08/06/18 0906          Point: Home exercise program (Done)    Learning Progress Summary     Learner Status Readiness Method Response Comment Documented by    Patient Done MINA Austin D VU   08/08/18 0958     Done Acceptance E VU,NR  MG 08/07/18 1103          Point: Body mechanics (Done)    Learning Progress Summary     Learner Status Readiness Method Response Comment Documented by    Patient Done MINA Austin D VU   08/08/18 0958     Done Acceptance MINA PASTOR D VU,NR  EJ 08/06/18 0906          Point: Precautions (Done)    Learning Progress Summary     Learner Status Readiness Method Response Comment Documented by    Patient Done MINA Austin D VU   08/08/18 0958     Done Acceptance E VU,NR  MG 08/07/18 1103     Done Acceptance MINA PASTOR D VU,NR  EJ 08/06/18 0906                      User Key     Initials Effective Dates Name Provider Type Discipline    AGUSTO 03/07/18 -  Nehal Lama PTA Physical Therapy Assistant PT     04/03/18 -  Aye Kellogg PT Physical Therapist  PT    MG 04/03/18 -  Gosselin, Megan, PT Physical Therapist PT                    PT Recommendation and Plan     Plan of Care Reviewed With: patient  Outcome Summary: pt plans SNU,  on a walker          Outcome Measures     Row Name 08/07/18 1100 08/06/18 0952 08/06/18 0951       How much help from another person do you currently need...    Turning from your back to your side while in flat bed without using bedrails? 4  -MG  --  --    Moving from lying on back to sitting on the side of a flat bed without bedrails? 4  -MG  --  --    Moving to and from a bed to a chair (including a wheelchair)? 3  -MG  --  --    Standing up from a chair using your arms (e.g., wheelchair, bedside chair)? 3  -MG  --  --    Climbing 3-5 steps with a railing? 2  -MG  --  --    To walk in hospital room? 3  -MG  --  --    AM-PAC 6 Clicks Score 19  -MG  --  --       How much help from another is currently needed...    Putting on and taking off regular lower body clothing?  -- 2  -LE  --    Bathing (including washing, rinsing, and drying)  -- 2  -LE  --    Toileting (which includes using toilet bed pan or urinal)  -- 2  -LE  --    Putting on and taking off regular upper body clothing  -- 3  -LE  --    Taking care of personal grooming (such as brushing teeth)  -- 3  -LE  --    Eating meals  -- 3  -LE  --    Score  -- 15  -LE  --       Modified Jose Scale    Modified Jose Scale  -- 4 - Moderately severe disability.  Unable to walk without assistance, and unable to attend to own bodily needs without assistance.  -LE  --       Functional Assessment    Outcome Measure Options AM-PAC 6 Clicks Basic Mobility (PT)  -MG Modified [place]  -LE AM-PAC 6 Clicks Daily Activity (OT)  -LE    Row Name 08/06/18 0900             How much help from another person do you currently need...    Turning from your back to your side while in flat bed without using bedrails? 3  -EJ      Moving from lying on back to sitting on the side of a flat bed without  bedrails? 3  -EJ      Moving to and from a bed to a chair (including a wheelchair)? 2  -EJ      Standing up from a chair using your arms (e.g., wheelchair, bedside chair)? 3  -EJ      Climbing 3-5 steps with a railing? 2  -EJ      To walk in hospital room? 2  -EJ      AM-PAC 6 Clicks Score 15  -EJ         Functional Assessment    Outcome Measure Options AM-PAC 6 Clicks Basic Mobility (PT)  -        User Key  (r) = Recorded By, (t) = Taken By, (c) = Cosigned By    Initials Name Provider Type    Janine Recio, OTR Occupational Therapist    EJ Aye Kellogg, PT Physical Therapist    MG Gosselin, Megan, PT Physical Therapist           Time Calculation:         PT Charges     Row Name 08/08/18 0959             Time Calculation    Start Time 0940  -      Stop Time 0959  -      Time Calculation (min) 19 min  -      PT Received On 08/08/18  -      PT - Next Appointment 08/09/18  -         Time Calculation- PT    Total Timed Code Minutes- PT 19 minute(s)  -        User Key  (r) = Recorded By, (t) = Taken By, (c) = Cosigned By    Initials Name Provider Type    Nehal Centeno PTA Physical Therapy Assistant        Therapy Suggested Charges     Code   Minutes Charges    38370 (CPT®) Hc Pt Neuromusc Re Education Ea 15 Min      13920 (CPT®) Hc Pt Ther Proc Ea 15 Min 12 1    98851 (CPT®) Hc Gait Training Ea 15 Min      59223 (CPT®) Hc Pt Therapeutic Act Ea 15 Min      86035 (CPT®) Hc Pt Manual Therapy Ea 15 Min      36355 (CPT®) Hc Pt Iontophoresis Ea 15 Min      75219 (CPT®) Hc Pt Elec Stim Ea-Per 15 Min      08370 (CPT®) Hc Pt Ultrasound Ea 15 Min      30886 (CPT®) Hc Pt Self Care/Mgmt/Train Ea 15 Min      Total  12 1        Therapy Charges for Today     Code Description Service Date Service Provider Modifiers Qty    00413054302 HC PT THER PROC EA 15 MIN 8/8/2018 Nehal Lama PTA GP 1          PT G-Codes  Outcome Measure Options: AM-PAC 6 Clicks Basic Mobility (PT)    Nehal Lama  PTA  8/8/2018

## 2018-08-08 NOTE — PLAN OF CARE
Problem: Patient Care Overview  Goal: Plan of Care Review  Outcome: Ongoing (interventions implemented as appropriate)   08/08/18 7484   Coping/Psychosocial   Plan of Care Reviewed With patient   OTHER   Outcome Summary pt plans SNU,  on a walker

## 2018-08-08 NOTE — SIGNIFICANT NOTE
08/08/18 1530   Rehab Treatment   Discipline occupational therapist   Reason Treatment Not Performed other (see comments)  (per nsg pt is dc to [facility] today. noted dc order in chart. 1406)

## 2018-08-08 NOTE — PROGRESS NOTES
[facility]     LOS: 3 days     Subjective:  No cp  No soa  Left hand weakness better  Eating well      Objective:    Vital Signs:  Temp:  [97.2 °F (36.2 °C)-98.2 °F (36.8 °C)] 98.2 °F (36.8 °C)  Heart Rate:  [56-88] 66  Resp:  [14-18] 18  BP: (132-217)/() 146/65    SpO2:  [93 %-99 %] 96 %  on   ;   Device (Oxygen Therapy): room air  Body mass index is 24.33 kg/m².    Physical Exam   Constitutional: She appears well-developed and well-nourished.   HENT:   Head: Normocephalic and atraumatic.   Cardiovascular: Normal rate and regular rhythm.    No murmur heard.  Pulmonary/Chest: Effort normal and breath sounds normal.   Abdominal: Soft. Bowel sounds are normal. She exhibits no distension. There is no tenderness.   Neurological: She is alert.   Skin: Skin is warm and dry.       Results Review:    Glucose   Date Value Ref Range Status   08/07/2018 100 (H) 65 - 99 mg/dL Final   08/06/2018 92 65 - 99 mg/dL Final   08/05/2018 113 (H) 65 - 99 mg/dL Final       Results from last 7 days  Lab Units 08/06/18  0449   WBC 10*3/mm3 6.65   HEMOGLOBIN g/dL 12.9   HEMATOCRIT % 40.9   PLATELETS 10*3/mm3 249       Results from last 7 days  Lab Units 08/07/18  1524  08/05/18  1152   SODIUM mmol/L 141  < > 140   POTASSIUM mmol/L 4.2  < > 4.8   CHLORIDE mmol/L 101  < > 103   CO2 mmol/L 27.2  < > 24.9   BUN mg/dL 17  < > 19   CREATININE mg/dL 1.14*  < > 1.07*   CALCIUM mg/dL 9.9  < > 9.5   BILIRUBIN mg/dL  --   --  0.2   ALK PHOS U/L  --   --  76   ALT (SGPT) U/L  --   --  16   AST (SGOT) U/L  --   --  25   GLUCOSE mg/dL 100*  < > 113*   < > = values in this interval not displayed.    Results from last 7 days  Lab Units 08/05/18  1152   INR  1.02           Results from last 7 days  Lab Units 08/05/18  1152   TROPONIN T ng/mL <0.010     Cultures:       I have reviewed daily medications and changes in CPOE    Scheduled meds    amLODIPine 2.5 mg Oral Daily   aspirin 325 mg Oral Daily   Or      aspirin 300 mg  Rectal Daily   atenolol 25 mg Oral Daily   atorvastatin 40 mg Oral Nightly   DULoxetine 60 mg Oral Daily   febuxostat 40 mg Oral Daily   losartan 100 mg Oral Daily          PRN meds  •  diphenhydrAMINE **AND** acetaminophen  •  acetaminophen  •  hydrALAZINE  •  ondansetron  •  sodium chloride  •  Insert peripheral IV **AND** sodium chloride      Principal Problem:    CVA (cerebral vascular accident) (CMS/[facility])  Active Problems:    Chronic kidney failure    Gout    Elevated cholesterol    Osteoporosis    Hyperparathyroidism (CMS/HCC)    Benign essential hypertension    History of degenerative disc disease    History of DVT (deep vein thrombosis)    History of rheumatoid arthritis    Hyperlipidemia    Right leg weakness        Assessment/Plan:      CVA (cerebral vascular accident) (CMS/[facility])  -left hand is weak, right leg is giving out, this is all better  -aspirin and statin       Chronic kidney failure  -creatinine is stable and currently normal       Gout  -uloric  -last attack several years       Elevated cholesterol   -lipitor      Osteoporosis       Hyperparathyroidism (CMS/HCC)       Benign essential hypertension  -norvasc, atenolol and losartan  -bp is elevated  -given acute CVA will not escalated BP meds   -bp better now but may need to increase      History of degenerative disc disease       History of DVT (deep vein thrombosis)-approximately 7 years  -Took warfarin for 3 months       History of rheumatoid arthritis- 3 years ago it went into remission   -She has seen Dr Dewitt in the past      Hyperlipidemia       Right leg weakness-likely unrelated to the stroke      Vomiting- is better    Plan:  Event monitor on d/c      Logan Ku MD  08/08/18  10:16 AM

## 2018-08-08 NOTE — PROGRESS NOTES
"    Patient Name: Alba Correa  :1933  85 y.o.      Patient Care Team:  Aramis Doty MD as PCP - General (Family Medicine)  Zack Brown MD as PCP - Claims Attributed  Juan Jose Muñiz MD as Consulting Physician (Neurology)  Adrian Perez MD as Consulting Physician (Hematology and Oncology)  Adrian Caldera MD as Consulting Physician (Nephrology)  Zack Dewitt MD as Consulting Physician (Rheumatology)  Aramis Doty MD as Referring Physician (Family Medicine)    Chief Complaint: follow up stroke.     Interval History: Sitting up in the chair. Feels good. Hopes to go to rehab today. KASH yesterday without source of stroke.        Objective   Vital Signs  Temp:  [97.2 °F (36.2 °C)-98.2 °F (36.8 °C)] 98.2 °F (36.8 °C)  Heart Rate:  [56-88] 66  Resp:  [14-18] 18  BP: (132-217)/() 146/65    Intake/Output Summary (Last 24 hours) at 18 1046  Last data filed at 18 0826   Gross per 24 hour   Intake              340 ml   Output                0 ml   Net              340 ml     Flowsheet Rows      First Filed Value   Admission Height  157.5 cm (62\") Documented at 2018 1054   Admission Weight  56.7 kg (125 lb) Documented at 2018 1054          Physical Exam:   General Appearance:    Alert, cooperative, in no acute distress   Lungs:     Clear to auscultation.  Normal respiratory effort and rate.      Heart:    Regular rhythm and normal rate, normal S1 and S2, no murmurs, gallops or rubs.     Chest Wall:    No abnormalities observed   Abdomen:     Soft, nontender, positive bowel sounds.     Extremities:   no cyanosis, clubbing or edema.  No marked joint deformities.  Adequate musculoskeletal strength.       Results Review:      Results from last 7 days  Lab Units 18  1524   SODIUM mmol/L 141   POTASSIUM mmol/L 4.2   CHLORIDE mmol/L 101   CO2 mmol/L 27.2   BUN mg/dL 17   CREATININE mg/dL 1.14*   GLUCOSE mg/dL 100*   CALCIUM mg/dL 9.9       Results from last 7 days  Lab " Units 08/05/18  1152   TROPONIN T ng/mL <0.010       Results from last 7 days  Lab Units 08/06/18  0449   WBC 10*3/mm3 6.65   HEMOGLOBIN g/dL 12.9   HEMATOCRIT % 40.9   PLATELETS 10*3/mm3 249       Results from last 7 days  Lab Units 08/05/18  1152   INR  1.02           Results from last 7 days  Lab Units 08/06/18  0449   CHOLESTEROL mg/dL 161   TRIGLYCERIDES mg/dL 171*   HDL CHOL mg/dL 38*   LDL CHOL mg/dL 89               Medication Review:     amLODIPine 2.5 mg Oral Daily   aspirin 325 mg Oral Daily   Or      aspirin 300 mg Rectal Daily   atenolol 25 mg Oral Daily   atorvastatin 40 mg Oral Nightly   DULoxetine 60 mg Oral Daily   febuxostat 40 mg Oral Daily   losartan 100 mg Oral Daily             Assessment/Plan   1. Acute stroke - KASH with negative saline study, no evidence of thrombus. Normal size and function of LA. Will discharge with Zio patch. She should follow up with Dr. Martines in 4 weeks. OK to rehab at anytime from a cardiac standpoint.     2. HTN - improving.        GROVER Cevallos  [facility]  08/08/18  10:46 AM

## 2018-08-15 ENCOUNTER — APPOINTMENT (OUTPATIENT)
Dept: ONCOLOGY | Facility: CLINIC | Age: 83
End: 2018-08-15

## 2018-08-15 ENCOUNTER — APPOINTMENT (OUTPATIENT)
Dept: LAB | Facility: HOSPITAL | Age: 83
End: 2018-08-15

## 2018-08-15 DIAGNOSIS — F33.42 RECURRENT MAJOR DEPRESSIVE DISORDER, IN FULL REMISSION (HCC): ICD-10-CM

## 2018-08-15 DIAGNOSIS — I10 BENIGN ESSENTIAL HYPERTENSION: ICD-10-CM

## 2018-08-15 DIAGNOSIS — E78.5 HYPERLIPIDEMIA, UNSPECIFIED HYPERLIPIDEMIA TYPE: ICD-10-CM

## 2018-08-15 RX ORDER — DULOXETIN HYDROCHLORIDE 60 MG/1
CAPSULE, DELAYED RELEASE ORAL
Qty: 90 CAPSULE | Refills: 0 | Status: SHIPPED | OUTPATIENT
Start: 2018-08-15 | End: 2018-09-10 | Stop reason: SDUPTHER

## 2018-08-15 RX ORDER — SIMVASTATIN 20 MG
TABLET ORAL
Qty: 90 TABLET | Refills: 0 | Status: SHIPPED | OUTPATIENT
Start: 2018-08-15 | End: 2018-09-10

## 2018-08-15 RX ORDER — ATENOLOL 25 MG/1
TABLET ORAL
Qty: 90 TABLET | Refills: 0 | Status: SHIPPED | OUTPATIENT
Start: 2018-08-15 | End: 2018-09-10 | Stop reason: SDUPTHER

## 2018-08-17 ENCOUNTER — TELEPHONE (OUTPATIENT)
Dept: FAMILY MEDICINE CLINIC | Facility: CLINIC | Age: 83
End: 2018-08-17

## 2018-08-17 NOTE — TELEPHONE ENCOUNTER
[facility] pharmacy called needing clarification on pt's cholesterol medication. It looks like you have always prescribed Simvastaton but this was recently changed to Atorvastatin - possibly during her Hospital stay.    Please advise which one she should be taking    [facility] # [phone]     Reference # [id number]

## 2018-08-18 DIAGNOSIS — I10 BENIGN ESSENTIAL HYPERTENSION: ICD-10-CM

## 2018-08-20 RX ORDER — AMLODIPINE BESYLATE 2.5 MG/1
2.5 TABLET ORAL DAILY
Qty: 30 TABLET | Refills: 0 | Status: SHIPPED | OUTPATIENT
Start: 2018-08-20 | End: 2018-09-10 | Stop reason: SDUPTHER

## 2018-08-24 ENCOUNTER — TELEPHONE (OUTPATIENT)
Dept: FAMILY MEDICINE CLINIC | Facility: CLINIC | Age: 83
End: 2018-08-24

## 2018-08-24 NOTE — TELEPHONE ENCOUNTER
Verbal order given for 2 week Home Health - Patient is being discharged from Rehab next week.

## 2018-09-05 PROCEDURE — 0298T HOLTER MONITOR - 72 HOUR UP TO 21 DAY: CPT | Performed by: INTERNAL MEDICINE

## 2018-09-10 ENCOUNTER — OFFICE VISIT (OUTPATIENT)
Dept: FAMILY MEDICINE CLINIC | Facility: CLINIC | Age: 83
End: 2018-09-10

## 2018-09-10 VITALS
BODY MASS INDEX: 23 KG/M2 | TEMPERATURE: 97.7 F | SYSTOLIC BLOOD PRESSURE: 128 MMHG | HEART RATE: 46 BPM | WEIGHT: 125 LBS | HEIGHT: 62 IN | RESPIRATION RATE: 14 BRPM | OXYGEN SATURATION: 97 % | DIASTOLIC BLOOD PRESSURE: 64 MMHG

## 2018-09-10 DIAGNOSIS — I63.9 CEREBROVASCULAR ACCIDENT (CVA), UNSPECIFIED MECHANISM (HCC): Primary | ICD-10-CM

## 2018-09-10 DIAGNOSIS — I10 BENIGN ESSENTIAL HYPERTENSION: ICD-10-CM

## 2018-09-10 DIAGNOSIS — M10.9 GOUT, UNSPECIFIED CAUSE, UNSPECIFIED CHRONICITY, UNSPECIFIED SITE: ICD-10-CM

## 2018-09-10 DIAGNOSIS — E78.5 HYPERLIPIDEMIA, UNSPECIFIED HYPERLIPIDEMIA TYPE: ICD-10-CM

## 2018-09-10 DIAGNOSIS — F33.42 RECURRENT MAJOR DEPRESSIVE DISORDER, IN FULL REMISSION (HCC): ICD-10-CM

## 2018-09-10 DIAGNOSIS — Z09 HOSPITAL DISCHARGE FOLLOW-UP: ICD-10-CM

## 2018-09-10 PROCEDURE — 99214 OFFICE O/P EST MOD 30 MIN: CPT | Performed by: FAMILY MEDICINE

## 2018-09-10 RX ORDER — DULOXETIN HYDROCHLORIDE 60 MG/1
60 CAPSULE, DELAYED RELEASE ORAL DAILY
Qty: 90 CAPSULE | Refills: 1 | Status: SHIPPED | OUTPATIENT
Start: 2018-09-10 | End: 2019-02-19 | Stop reason: SDUPTHER

## 2018-09-10 RX ORDER — AMLODIPINE BESYLATE 5 MG/1
5 TABLET ORAL DAILY
Qty: 90 TABLET | Refills: 1 | Status: SHIPPED | OUTPATIENT
Start: 2018-09-10 | End: 2019-02-19 | Stop reason: SDUPTHER

## 2018-09-10 RX ORDER — ATENOLOL 25 MG/1
25 TABLET ORAL DAILY
Qty: 90 TABLET | Refills: 1 | Status: SHIPPED | OUTPATIENT
Start: 2018-09-10 | End: 2019-02-19 | Stop reason: SDUPTHER

## 2018-09-10 RX ORDER — ATORVASTATIN CALCIUM 40 MG/1
40 TABLET, FILM COATED ORAL DAILY
Qty: 90 TABLET | Refills: 1 | Status: SHIPPED | OUTPATIENT
Start: 2018-09-10 | End: 2019-02-19 | Stop reason: SDUPTHER

## 2018-09-10 RX ORDER — HYDRALAZINE HYDROCHLORIDE 25 MG/1
TABLET, FILM COATED ORAL
Refills: 0 | COMMUNITY
Start: 2018-08-29 | End: 2019-04-02

## 2018-09-10 RX ORDER — LOSARTAN POTASSIUM 100 MG/1
100 TABLET ORAL DAILY
Qty: 90 TABLET | Refills: 1 | Status: SHIPPED | OUTPATIENT
Start: 2018-09-10 | End: 2019-02-19 | Stop reason: SDUPTHER

## 2018-09-10 RX ORDER — ATORVASTATIN CALCIUM 40 MG/1
40 TABLET, FILM COATED ORAL DAILY
COMMUNITY
End: 2018-09-10 | Stop reason: SDUPTHER

## 2018-09-10 RX ORDER — FEBUXOSTAT 40 MG/1
40 TABLET, FILM COATED ORAL DAILY
Qty: 90 TABLET | Refills: 1 | Status: SHIPPED | OUTPATIENT
Start: 2018-09-10 | End: 2019-02-19 | Stop reason: SDUPTHER

## 2018-09-10 RX ORDER — SENNOSIDES 8.6 MG
CAPSULE ORAL
COMMUNITY
End: 2018-09-10

## 2018-09-10 NOTE — PROGRESS NOTES
Subjective   Alba Correa is a 85 y.o. female.     CC: Hospital F/U for CVA    History of Present Illness     Pt returns today after recent hospitalization. That visit was as follows:    Admit date: 8/5/2018  Discharge date and time:       Discharge Diagnoses:Principal Problem:    CVA (cerebral vascular accident) (CMS/[facility])  Active Problems:    Chronic kidney failure    Gout    Elevated cholesterol    Osteoporosis    Hyperparathyroidism (CMS/[facility])    Benign essential hypertension    History of degenerative disc disease    History of DVT (deep vein thrombosis)    History of rheumatoid arthritis    Hyperlipidemia    Right leg weakness        History of present illness from H&P:     The patient is an 85-year-old white female with history of hypertension, chronic kidney disease, degenerative disc disease, hypothyroidism, history of rheumatoid arthritis, osteoporosis and hyperlipidemia who is admitted with history of having some right leg weakness for 2 days.  Her right legs also been numb.  She also had some associated left hand and arm weakness.  She'd fallen down twice and bruised up her elbow.  She also hit her head with one of her falls.  She's not had any headaches.  She denies any chest pain or shortness of air.  The patient was evaluated in ER and admitted for possible stroke workup.        Hospital Course:      Patient was admitted to the hospital for right leg giving out in the left hand weakness.  MRI did show an acute stroke on the right side likely causing left hand weakness.  This did not explain the patient's right leg giving out.  Patient was seen in consultation by neurology.  Workup was done with KASH which was unremarkable.  They recommend continuous heart monitoring on discharge.  Patient be discharged with aspirin and we will change her simvastatin to Lipitor 40.     The patient is doing much better her left hand is back to normal.    Current medication list is compared to recent hospital d/c and the  "medications are reconciled.    She had been scheduled to see the [facility] as an outpatient yet has had to cancel that appt twice.      The following portions of the patient's history were reviewed and updated as appropriate: allergies, current medications, past family history, past medical history, past social history, past surgical history and problem list.    Review of Systems   Constitutional: Negative for activity change, chills, fatigue and fever.   Respiratory: Negative for cough and chest tightness.    Cardiovascular: Negative for chest pain and palpitations.   Gastrointestinal: Negative for abdominal pain and nausea.   Endocrine: Negative for cold intolerance.   Psychiatric/Behavioral: Negative for behavioral problems and dysphoric mood.     /64   Pulse (!) 46   Temp 97.7 °F (36.5 °C) (Oral)   Resp 14   Ht 157.5 cm (62\")   Wt 56.7 kg (125 lb)   SpO2 97%   BMI 22.86 kg/m²     Objective   Physical Exam   Constitutional: She appears well-developed and well-nourished.   Neck: Neck supple. No thyromegaly present.   Cardiovascular: Normal rate and regular rhythm.    No murmur heard.  Pulmonary/Chest: Effort normal and breath sounds normal.   Abdominal: Bowel sounds are normal. There is no tenderness.   Neurological: She is alert. She has normal strength. She displays a negative Romberg sign.   Psychiatric: She has a normal mood and affect. Her behavior is normal.   Nursing note and vitals reviewed.  Hospital records reviewed with pt confirming HPI.      Assessment/Plan   Alba was seen today for cerebrovascular accident.    Diagnoses and all orders for this visit:    Cerebrovascular accident (CVA), unspecified mechanism (CMS/HCC)    Benign essential hypertension  -     atenolol (TENORMIN) 25 MG tablet; Take 1 tablet by mouth Daily.  -     losartan (COZAAR) 100 MG tablet; Take 1 tablet by mouth Daily.  -     amLODIPine (NORVASC) 5 MG tablet; Take 1 tablet by mouth Daily.    Recurrent major depressive " disorder, in full remission (CMS/HCC)  -     DULoxetine (CYMBALTA) 60 MG capsule; Take 1 capsule by mouth Daily.    Gout, unspecified cause, unspecified chronicity, unspecified site  -     febuxostat (ULORIC) 40 MG tablet; Take 1 tablet by mouth Daily.    Hyperlipidemia, unspecified hyperlipidemia type  -     atorvastatin (LIPITOR) 40 MG tablet; Take 1 tablet by mouth Daily.    Hospital discharge follow-up    Pt encouraged to keep appt with her cardiologist next month.

## 2018-09-14 ENCOUNTER — TELEPHONE (OUTPATIENT)
Dept: NEUROLOGY | Facility: CLINIC | Age: 83
End: 2018-09-14

## 2018-09-14 NOTE — TELEPHONE ENCOUNTER
Two Week Stroke Phone Call    Spoke with Patient    · Admission Date: 8/5/2018     · Discharge Date: 8/8/2018    · Discharge Destination: [facility], Patient is now living at home    · Meds reviewed with patient/caregiver?     [x]Yes [] No   o Antiplatelet: Aspirin   o Understands purpose   [x]  Yes     []  No    o Understands how to take    [x]  Yes     []  No    o Cholesterol Reducing: Lipitor  o Understands purpose   [x]  Yes     []  No    o Understands how to take    [x]  Yes     []  No     · Is the patient taking all medication as directed?    [x]  Yes  []  No    · Discussed personal risk factors     [x]  Yes []  No    o High blood pressure   - Has been monitoring BP [x]  Yes     []  No  Patient has been monitoring at home. Bp goal <130/80  o High cholesterol   - Continue Lipitor daily, LDL goal <70  • Discussed signs and symptoms of stroke and when patient to call 911?      [x]  Yes []  No  o Sudden weakness or numbness of the face, arm, or leg especially on one side of the body  o Sudden confusion, trouble speaking or understanding  o Sudden trouble seeing in one or both eyes   o Sudden trouble walking, dizziness, loss of balance or coordination  o Sudden severe headaches with no known cause    Notified Patient that if any of these symptoms occur to call 911    · Does the patient have any new signs or symptoms of a stroke?     []  Yes     [x]  No    · Does the patietnt have an appointment with PCP?    [x]  Yes     []  No  · Does the patient have 3 month Stroke Clinic appointment?   Office will call to make appointment        Patient Satisfaction     · Was the patient happy with the stroke care that was provided at [facility]?   [x]  Yes     []  No  · Does the patient feel they were kept up to date with their plan of care?   [x]  Yes     []  No     · Does the patient  feel they had to wait too long for tests to be completed or for results to be communicated to them?    []  Yes      [x]  No    · What does the patient feel was done well with their stroke care?    She states being content with the care          · What suggestions does the patient have of ways to improve the care to stroke patients?   Denies any

## 2018-09-21 DIAGNOSIS — I10 BENIGN ESSENTIAL HYPERTENSION: ICD-10-CM

## 2018-09-21 RX ORDER — LOSARTAN POTASSIUM 100 MG/1
TABLET ORAL
Qty: 30 TABLET | Refills: 0 | OUTPATIENT
Start: 2018-09-21

## 2018-10-08 ENCOUNTER — OFFICE VISIT (OUTPATIENT)
Dept: CARDIOLOGY | Facility: CLINIC | Age: 83
End: 2018-10-08

## 2018-10-08 VITALS
HEART RATE: 67 BPM | HEIGHT: 62 IN | DIASTOLIC BLOOD PRESSURE: 78 MMHG | WEIGHT: 125 LBS | SYSTOLIC BLOOD PRESSURE: 138 MMHG | BODY MASS INDEX: 23 KG/M2

## 2018-10-08 DIAGNOSIS — I35.1 NONRHEUMATIC AORTIC (VALVE) INSUFFICIENCY: Primary | Chronic | ICD-10-CM

## 2018-10-08 DIAGNOSIS — I63.9 CEREBROVASCULAR ACCIDENT (CVA), UNSPECIFIED MECHANISM (HCC): ICD-10-CM

## 2018-10-08 DIAGNOSIS — I10 BENIGN ESSENTIAL HYPERTENSION: ICD-10-CM

## 2018-10-08 PROCEDURE — 99213 OFFICE O/P EST LOW 20 MIN: CPT | Performed by: INTERNAL MEDICINE

## 2018-10-08 NOTE — PROGRESS NOTES
Subjective:     Encounter Date:10/08/2018      Patient ID: Alba Correa is a 85 y.o. female.    Chief Complaint: AI, CVA  History of Present Illness    Dear Dr. Doty,    I had the pleasure of seeing this patient in the office today for follow-up after recent hospitalization.  She presented for his CVA.  We were asked to see her for evaluation of a cardiac source of emboli.  None was found.  Transthoracic and transesophageal echocardiograms did not demonstrate any cardiac source of emboli.  Holter monitor showed no ectopy.  She was found to have mild-moderate aortic insufficiency; she comes in today for follow-up.    She denies any cardiac symptoms.This patient denies any chest pain, pressure, tightness, squeezing, or heartburn.  This patient has not experienced any feeling of palpitations, tachycardia or heart racing and no presyncope or syncope.  There has not been any problems with dizziness or lightheadedness.  There has not been any orthopnea or PND, and no problems with lower extremity edema.  This patient denies any shortness of breath at rest or with activity and has not had any wheezing.  This patient has not had any problems with unexplained nausea or vomiting. The patient has continued to perform daily activities of living without any specific problem or change in the level of activity.        The following portions of the patient's history were reviewed and updated as appropriate: allergies, current medications, past family history, past medical history, past social history, past surgical history and problem list.    Past Medical History:   Diagnosis Date   • Benign essential hypertension    • Chronic kidney disease    • Chronic kidney failure    • Chronic renal insufficiency    • CVA (cerebral vascular accident) (CMS/[facility])    • Degeneration of intervertebral disc    • Depression    • Disease of thyroid gland    • DVT (deep venous thrombosis) (CMS/[facility])    • Edema    • Gout    • H/O complete eye  exam 09/2016   • Hyperlipidemia    • Hyperparathyroidism (CMS/HCC)    • IBS (irritable bowel syndrome)    • Nonrheumatic aortic (valve) insufficiency 10/8/2018   • OAB (overactive bladder)    • Osteopenia    • Osteoporosis    • Rheumatoid arthritis (CMS/HCC)        Past Surgical History:   Procedure Laterality Date   • APPENDECTOMY     • BREAST BIOPSY     • COLONOSCOPY      declines   • HERNIA REPAIR  1965   • HYSTERECTOMY      still has ovaries   • MAMMO BILATERAL  11/16/2016    pt declines   • PAP SMEAR  11/16/2016    declines       Social History     Social History   • Marital status:      Spouse name: Ray   • Number of children: 3   • Years of education: High school     Occupational History   • Dental assistant Retired     Social History Main Topics   • Smoking status: Former Smoker     Years: 5.00     Types: Cigarettes   • Smokeless tobacco: Never Used   • Alcohol use Yes      Comment: Occasional   • Drug use: No   • Sexual activity: Yes     Partners: Male     Birth control/ protection: Surgical     Other Topics Concern   • Not on file     Social History Narrative   • No narrative on file       Review of Systems   Constitution: Negative for chills, decreased appetite, fever and night sweats.   HENT: Negative for ear discharge, ear pain, hearing loss, nosebleeds and sore throat.    Eyes: Negative for blurred vision, double vision and pain.   Cardiovascular: Negative for cyanosis.   Respiratory: Negative for hemoptysis and sputum production.    Endocrine: Negative for cold intolerance and heat intolerance.   Hematologic/Lymphatic: Negative for adenopathy.   Skin: Negative for dry skin, itching, nail changes, rash and suspicious lesions.   Musculoskeletal: Negative for arthritis, gout, muscle cramps, muscle weakness, myalgias and neck pain.   Gastrointestinal: Negative for anorexia, bowel incontinence, constipation, diarrhea, dysphagia, hematemesis and jaundice.   Genitourinary: Negative for bladder  "incontinence, dysuria, flank pain, frequency, hematuria and nocturia.   Neurological: Negative for focal weakness, numbness, paresthesias and seizures.   Psychiatric/Behavioral: Negative for altered mental status, hallucinations, hypervigilance, suicidal ideas and thoughts of violence.   Allergic/Immunologic: Negative for persistent infections.       Procedures       Objective:     Vitals:    10/08/18 1150   BP: 138/78   Pulse: 67   Weight: 56.7 kg (125 lb)   Height: 157.5 cm (62\")         Physical Exam   Constitutional: She is oriented to person, place, and time. She appears well-developed and well-nourished. No distress.   HENT:   Head: Normocephalic and atraumatic.   Nose: Nose normal.   Mouth/Throat: Oropharynx is clear and moist.   Eyes: Pupils are equal, round, and reactive to light. Conjunctivae and EOM are normal. Right eye exhibits no discharge. Left eye exhibits no discharge.   Neck: Normal range of motion. Neck supple. No tracheal deviation present. No thyromegaly present.   Cardiovascular: Normal rate, regular rhythm, S1 normal, S2 normal and normal pulses.  Exam reveals no S3.    Murmur heard.   Medium-pitched midsystolic murmur is present with a grade of 1/6  at the upper right sternal border radiating to the neck  High-pitched blowing decrescendo early diastolic murmur is present with a grade of 1/6  at the upper right sternal border radiating to the apex  Pulmonary/Chest: Effort normal and breath sounds normal. No stridor. No respiratory distress. She exhibits no tenderness.   Abdominal: Soft. Bowel sounds are normal. She exhibits no distension and no mass. There is no tenderness. There is no rebound and no guarding.   Musculoskeletal: Normal range of motion. She exhibits no tenderness or deformity.   Lymphadenopathy:     She has no cervical adenopathy.   Neurological: She is alert and oriented to person, place, and time. She has normal reflexes.   Skin: Skin is warm and dry. No rash noted. She is " not diaphoretic. No erythema.   Psychiatric: She has a normal mood and affect. Thought content normal.       Lab Review:             Performed        Assessment:          Diagnosis Plan   1. Nonrheumatic aortic (valve) insufficiency     2. Cerebrovascular accident (CVA), unspecified mechanism (CMS/HCC)     3. Benign essential hypertension            Plan:       1.  Aortic insufficiency-patient is currently on amlodipine and losartan for antihypertensive control, and we will continue same.  Blood pressure is under good control.  I will plan on seeing her back in one year.  We anticipate a repeat echocardiogram in 2 years unless she develops any symptoms in the meantime.  2.  History of CVA-no evidence of a cardiac source of emboli  3.  Benign essential hypertension-continue current medical regimen    Thank you very much for allowing us to participate in the care of this pleasant patient.  Please don't hesitate to call if I can be of assistance in any way.      Current Outpatient Prescriptions:   •  amLODIPine (NORVASC) 5 MG tablet, Take 1 tablet by mouth Daily., Disp: 90 tablet, Rfl: 1  •  aspirin 325 MG tablet, Take 1 tablet by mouth Daily., Disp: , Rfl:   •  atenolol (TENORMIN) 25 MG tablet, Take 1 tablet by mouth Daily., Disp: 90 tablet, Rfl: 1  •  atorvastatin (LIPITOR) 40 MG tablet, Take 1 tablet by mouth Daily., Disp: 90 tablet, Rfl: 1  •  BIOTIN PO, Take 1 tablet by mouth Daily., Disp: , Rfl:   •  Calcium Carbonate-Vitamin D (CALCIUM 600+D PO), Take 1 tablet by mouth Daily., Disp: , Rfl:   •  DULoxetine (CYMBALTA) 60 MG capsule, Take 1 capsule by mouth Daily., Disp: 90 capsule, Rfl: 1  •  febuxostat (ULORIC) 40 MG tablet, Take 1 tablet by mouth Daily., Disp: 90 tablet, Rfl: 1  •  hydrALAZINE (APRESOLINE) 25 MG tablet, TAKE 1 TABLET BY MOUTH EVERY 12 HOURS IF BLOOD PRESSURE IS GREATER THAN 160/90, Disp: , Rfl: 0  •  losartan (COZAAR) 100 MG tablet, Take 1 tablet by mouth Daily., Disp: 90 tablet, Rfl: 1  •   Multiple Vitamins-Minerals (CENTRUM SILVER PO), Take  by mouth., Disp: , Rfl:   •  Omega-3 Fatty Acids (FISH OIL PO), Take 1,200 mg by mouth Every Night., Disp: , Rfl:

## 2018-10-23 DIAGNOSIS — I10 BENIGN ESSENTIAL HYPERTENSION: ICD-10-CM

## 2018-10-23 RX ORDER — AMLODIPINE BESYLATE 5 MG/1
TABLET ORAL
Qty: 30 TABLET | Refills: 0 | OUTPATIENT
Start: 2018-10-23

## 2018-11-02 DIAGNOSIS — F33.42 RECURRENT MAJOR DEPRESSIVE DISORDER, IN FULL REMISSION (HCC): ICD-10-CM

## 2018-11-02 DIAGNOSIS — I10 BENIGN ESSENTIAL HYPERTENSION: ICD-10-CM

## 2018-11-02 RX ORDER — DULOXETIN HYDROCHLORIDE 60 MG/1
CAPSULE, DELAYED RELEASE ORAL
Qty: 90 CAPSULE | Refills: 0 | OUTPATIENT
Start: 2018-11-02

## 2018-11-02 RX ORDER — ATENOLOL 25 MG/1
TABLET ORAL
Qty: 90 TABLET | Refills: 0 | OUTPATIENT
Start: 2018-11-02

## 2018-11-15 NOTE — PROGRESS NOTES
DOS: 2018  NAME: Alba Correa   : 1933  PCP: Aramis Doty MD      Chief Complaint: 3 month stroke follow up      Referring MD: Aramis Doty MD    Neurological Problem and Interval History:  85 y.o.  female with equal history of hypertension, hyperlipidemia, DVT , hypothyroidism, chronic kidney disease, degenerative disc disease, rheumatoid arthritis, neuropathy, and osteoporosis who presents today for follow-up from hospital admission in this 2018 for stroke.  The patient is accompanied by her daughter.  She presented to the hospital with complaints of right leg weakness and numbness and 2 falls however on exam she had left arm and hand weakness versus clumsiness.  On exam she also had left leg weakness.  MRI brain showed multiple small lacunar strokes in the right MCA territory, MRA head and neck was essentially negative.  Side an MRI of the cervical spine without contrast which showed degenerative disc disease with moderate foraminal stenosis at C5-6.  She had MRI of the lumbar spine which showed scoliotic curvature of the lumbar spine convex to the left at L3-4 and degenerative disc disease with mild to moderate foraminal narrowing.  Strokes were felt to be secondary to small vessel disease however given multiple strokes KASH was performed to evaluate for cardiac source.  KASH showed EF of 56-60%, no interatrial septal aneurysm, no PFO, saline test results are negative, left atria was normal in size.  Holter monitor was completed for 13 days and 20 hours and showed some semi frequent episodes of SVT lasting up to 10 seconds, rare PVCs, but no A. Fib.  She was started on full dose aspirin.  She was previously on simvastatin 20 mg and her LDL was 81.  She was changed to Lipitor 40 mg daily.  Hemaglobin A1c was 5.3%.    The patient spent 3 months at [facility].  She states she feels like she is completely back to baseline.  At rehabilitation her biggest issue was and maintaining her  balance.     Patient is now living at home alone she has a life alert device.  After she got out of the hospital from her stroke her  went into the hospital and he is now in long-term care.     Review of Systems:        Review of Systems   Constitutional: Negative for chills, fatigue and fever.   HENT: Positive for hearing loss. Negative for tinnitus and trouble swallowing.    Eyes: Negative for pain, redness and visual disturbance.   Respiratory: Negative for cough, shortness of breath and wheezing.    Cardiovascular: Negative for chest pain, palpitations and leg swelling.   Gastrointestinal: Negative for constipation, diarrhea, nausea and vomiting.   Endocrine: Negative for cold intolerance, heat intolerance and polydipsia.   Genitourinary: Negative for decreased urine volume, difficulty urinating, frequency and urgency.   Musculoskeletal: Positive for arthralgias. Negative for gait problem, neck pain and neck stiffness.   Skin: Negative for color change, rash and wound.   Allergic/Immunologic: Negative for environmental allergies, food allergies and immunocompromised state.   Neurological: Negative for dizziness, tremors, seizures, syncope, facial asymmetry, speech difficulty, weakness, light-headedness, numbness and headaches.   Hematological: Negative for adenopathy. Does not bruise/bleed easily.   Psychiatric/Behavioral: Negative for agitation, behavioral problems, confusion, decreased concentration, dysphoric mood, hallucinations, self-injury, sleep disturbance and suicidal ideas. The patient is not nervous/anxious and is not hyperactive.          Current Outpatient Medications:   •  amLODIPine (NORVASC) 5 MG tablet, Take 1 tablet by mouth Daily., Disp: 90 tablet, Rfl: 1  •  aspirin 325 MG tablet, Take 1 tablet by mouth Daily., Disp: , Rfl:   •  atenolol (TENORMIN) 25 MG tablet, Take 1 tablet by mouth Daily., Disp: 90 tablet, Rfl: 1  •  atorvastatin (LIPITOR) 40 MG tablet, Take 1 tablet by mouth  Daily., Disp: 90 tablet, Rfl: 1  •  BIOTIN PO, Take 1 tablet by mouth Daily., Disp: , Rfl:   •  Calcium Carbonate-Vitamin D (CALCIUM 600+D PO), Take 1 tablet by mouth Daily., Disp: , Rfl:   •  DULoxetine (CYMBALTA) 60 MG capsule, Take 1 capsule by mouth Daily., Disp: 90 capsule, Rfl: 1  •  febuxostat (ULORIC) 40 MG tablet, Take 1 tablet by mouth Daily., Disp: 90 tablet, Rfl: 1  •  hydrALAZINE (APRESOLINE) 25 MG tablet, TAKE 1 TABLET BY MOUTH EVERY 12 HOURS IF BLOOD PRESSURE IS GREATER THAN 160/90, Disp: , Rfl: 0  •  losartan (COZAAR) 100 MG tablet, Take 1 tablet by mouth Daily., Disp: 90 tablet, Rfl: 1  •  Multiple Vitamins-Minerals (CENTRUM SILVER PO), Take  by mouth., Disp: , Rfl:   •  Omega-3 Fatty Acids (FISH OIL PO), Take 1,200 mg by mouth Every Night., Disp: , Rfl:     Laboratory Results:             Lab Results   Component Value Date    HGBA1C 5.30 08/06/2018         Lab Results   Component Value Date    CHOL 161 08/06/2018         Lab Results   Component Value Date    HDL 38 (L) 08/06/2018    HDL 41 01/19/2018    HDL 43 11/17/2016         Lab Results   Component Value Date    LDL 89 08/06/2018    LDL 85 01/19/2018    LDL 79 11/17/2016         Lab Results   Component Value Date    TRIG 171 (H) 08/06/2018    TRIG 193 (H) 01/19/2018    TRIG 165 (H) 11/17/2016     Lab Results   Component Value Date    RPR Non-Reactive 08/05/2018     Lab Results   Component Value Date    TSH 2.870 08/06/2018     Lab Results   Component Value Date    [id number] 775 08/06/2018       Physical Examination: NIHSS: 0. She scored 7 on the [facility] Lability scale indicating she does not have pseudobulbar affect.  She scored 0 on thePHQ-9 questionnaire indicating she is not depressed. She scored a 2 on the STOP BANG questionnaire for HTN and age > 50, indicating low risk for PAM.    Vitals:    11/20/18 1456   BP: 100/68   Pulse: 73   SpO2: 95%         11/20/18  1456   Weight: 56.2 kg (124 lb)     BMI 22.7      General  Appearance:   Well developed, well nourished, well groomed, alert, and cooperative.  Patient appears younger than stated age.  HEENT: Normocephalic.    Neck and Spine: Normal range of motion.  Normal alignment. No mass or tenderness.  Cardiac: Regular rate and rhythm. No murmurs.  Peripheral Vasculature: Extremities warm and dry.  Extremities:    No edema or deformities  Skin:    No rashes or birth marks.    Neurological examination:  Higher Integrative  Function: Oriented to time, place and person. Normal registration, recall, attention span and concentration. Normal language including comprehension, spontaneous speech, repetition, reading, writing, naming and vocabulary. No neglect with normal visual-spatial function and construction. Normal fund of knowledge and higher integrative function.  CN II: Pupils are equal, round, and reactive to light. Normal visual fields.    CN III IV VI: Extraocular movements are full without nystagmus.   CN V: Normal facial sensation.    CN VII: Facial movements are symmetric. No weakness.  CN VIII:   Auditory acuity is normal.  CN IX & X:   Symmetric palatal movement.  CN XI: Sternocleidomastoid and trapezius are normal.  No weakness.  CN XII:   The tongue is midline.    Motor: Normal muscle strength, bulk and tone in upper and lower extremities. No pronator drift. No fasciculations, rigidity, spasticity, or abnormal movements.  Reflexes: 2+ in the upper and lower extremities.   Sensation: Normal to light touch. Normal graphesthesia and no extinction on DSS.  Station and Gait: Mildly broad-based gait. Pt quickly loses balance when attempting to toe walk and tandem walk.  Coordination: Finger to nose test shows no dysmetria. Heel to shin normal.    Diagnoses / Discussion:  Mrs. Correa was seen today in follow-up from her R MCA stroke in August 2018.  She is doing very well other than she continues to have some balance issues which may in part be due to her neuropathy.    Plan:    Continue aspirin 325 mg and Lipitor 40 mg daily.   Blood pressure control to <130/80   Goal LDL <70-recommend high dose statins-    Serum glucose < 140   Call 911 for stroke any stroke symptoms.  Stroke symptoms reviewed with the patient and her daughter.   Follow-up me in 6 months, or sooner if needed.  Alba was seen today for cerebrovascular accident.    Diagnoses and all orders for this visit:    History of ischemic right MCA stroke        MDM  Reviewed: previous chart and vitals  Reviewed previous: labs and MRI

## 2018-11-20 ENCOUNTER — OFFICE VISIT (OUTPATIENT)
Dept: NEUROLOGY | Facility: CLINIC | Age: 83
End: 2018-11-20

## 2018-11-20 VITALS
BODY MASS INDEX: 22.82 KG/M2 | SYSTOLIC BLOOD PRESSURE: 100 MMHG | DIASTOLIC BLOOD PRESSURE: 68 MMHG | HEIGHT: 62 IN | OXYGEN SATURATION: 95 % | HEART RATE: 73 BPM | WEIGHT: 124 LBS

## 2018-11-20 DIAGNOSIS — Z86.73 HISTORY OF ISCHEMIC RIGHT MCA STROKE: Primary | ICD-10-CM

## 2018-11-20 PROCEDURE — 99213 OFFICE O/P EST LOW 20 MIN: CPT | Performed by: NURSE PRACTITIONER

## 2019-01-09 ENCOUNTER — OFFICE VISIT (OUTPATIENT)
Dept: FAMILY MEDICINE CLINIC | Facility: CLINIC | Age: 84
End: 2019-01-09

## 2019-01-09 VITALS
SYSTOLIC BLOOD PRESSURE: 144 MMHG | DIASTOLIC BLOOD PRESSURE: 68 MMHG | RESPIRATION RATE: 16 BRPM | WEIGHT: 122 LBS | HEIGHT: 62 IN | HEART RATE: 54 BPM | BODY MASS INDEX: 22.45 KG/M2 | OXYGEN SATURATION: 98 % | TEMPERATURE: 98.1 F

## 2019-01-09 DIAGNOSIS — M75.02 ADHESIVE CAPSULITIS OF LEFT SHOULDER: ICD-10-CM

## 2019-01-09 DIAGNOSIS — S46.012A ROTATOR CUFF STRAIN, LEFT, INITIAL ENCOUNTER: Primary | ICD-10-CM

## 2019-01-09 PROCEDURE — 99213 OFFICE O/P EST LOW 20 MIN: CPT | Performed by: FAMILY MEDICINE

## 2019-01-09 PROCEDURE — 73020 X-RAY EXAM OF SHOULDER: CPT | Performed by: FAMILY MEDICINE

## 2019-01-09 NOTE — PROGRESS NOTES
Subjective   Alba Correa is a 85 y.o. female.     85-year-old female presents with left shoulder pain ×1 week.  Pain is worse at night and wakes her from sleep at times.  Shoulder feels stiff and sore.  Heat helps at times.  Not much pain during the day.  However notes pain during the day when she moves her shoulder certainly.  Denies recent trauma or falls or injuries.  Denies recent over exertion or lifting heavy things.  Denies numbness or tingling.  Denies pain shooting down her arm.  Denies neck pain or headache.  Denies prior injury to this shoulder.             The following portions of the patient's history were reviewed and updated as appropriate: allergies, current medications, past family history, past medical history, past social history, past surgical history and problem list.    Review of Systems   Constitutional: Negative for chills, fatigue and fever.   Musculoskeletal: Positive for arthralgias and myalgias. Negative for joint swelling.       Objective   Physical Exam   Constitutional: She is oriented to person, place, and time. She appears well-developed and well-nourished.   HENT:   Head: Normocephalic and atraumatic.   Eyes: Conjunctivae and EOM are normal. Pupils are equal, round, and reactive to light.   Neck: Normal range of motion. Neck supple.   Pulmonary/Chest: Effort normal.   Musculoskeletal:        Left shoulder: She exhibits decreased range of motion (secondary to pain) and tenderness. She exhibits no bony tenderness, no swelling, no effusion, no crepitus, no deformity, no laceration, no spasm and normal pulse.   Positive empty can test on left shoulder  Pain with internal rotation against resistance on left side  Normal passive range of motion however with some pain   Neurological: She is alert and oriented to person, place, and time.   Psychiatric: She has a normal mood and affect.         Assessment/Plan     Alba was seen today for shoulder pain.    Diagnoses and all orders for this  visit:    Rotator cuff strain, left, initial encounter  -     XR Shoulder 1 View Left    Adhesive capsulitis of left shoulder    Follow-up radiologist read of x-ray    Diagnosis: Rotator cuff strain left versus adhesive capsulitis of left shoulder.  Pain is worse at night and associated with shoulder stiffness.  No numbness or tingling or neck pain.  Patient given shoulder range of motion exercises to work on home.  Also given prescription via Rx alternatives for transdermal cream which contains diclofenac, bupivacaine, gabapentin, depression, cyclobenzaprine.  Advised use as needed for pain in addition to Tylenol as needed for pain.  Also advised to apply a ice pack for 10 minutes and alternate with heating pad.  Do not follows a heating pad ever.    Return to clinic in 6 weeks for follow-up.  If still having issues can consider physical therapy and/or orthopedic referral.  If this is adhesive capsulitis, orthopedics can consider intra-articular injection.    Patient expressed understanding and agreeable to plan.

## 2019-01-11 NOTE — PROGRESS NOTES
Please inform patient of x-ray does not show any acute injury or changes.  Continue plan of care as discussed.

## 2019-02-19 ENCOUNTER — OFFICE VISIT (OUTPATIENT)
Dept: FAMILY MEDICINE CLINIC | Facility: CLINIC | Age: 84
End: 2019-02-19

## 2019-02-19 VITALS
DIASTOLIC BLOOD PRESSURE: 68 MMHG | TEMPERATURE: 97.8 F | SYSTOLIC BLOOD PRESSURE: 134 MMHG | HEIGHT: 62 IN | RESPIRATION RATE: 14 BRPM | WEIGHT: 121 LBS | BODY MASS INDEX: 22.26 KG/M2 | HEART RATE: 56 BPM | OXYGEN SATURATION: 99 %

## 2019-02-19 DIAGNOSIS — M10.9 GOUT, UNSPECIFIED CAUSE, UNSPECIFIED CHRONICITY, UNSPECIFIED SITE: ICD-10-CM

## 2019-02-19 DIAGNOSIS — Z00.00 MEDICARE ANNUAL WELLNESS VISIT, SUBSEQUENT: Primary | ICD-10-CM

## 2019-02-19 DIAGNOSIS — I10 BENIGN ESSENTIAL HYPERTENSION: ICD-10-CM

## 2019-02-19 DIAGNOSIS — M79.604 PAIN IN BOTH LOWER EXTREMITIES: ICD-10-CM

## 2019-02-19 DIAGNOSIS — R29.898 WEAKNESS OF BOTH LOWER EXTREMITIES: ICD-10-CM

## 2019-02-19 DIAGNOSIS — M79.605 PAIN IN BOTH LOWER EXTREMITIES: ICD-10-CM

## 2019-02-19 DIAGNOSIS — E78.5 HYPERLIPIDEMIA, UNSPECIFIED HYPERLIPIDEMIA TYPE: ICD-10-CM

## 2019-02-19 DIAGNOSIS — M71.20 SYNOVIAL CYST OF POPLITEAL SPACE, UNSPECIFIED LATERALITY: ICD-10-CM

## 2019-02-19 DIAGNOSIS — F33.42 RECURRENT MAJOR DEPRESSIVE DISORDER, IN FULL REMISSION (HCC): ICD-10-CM

## 2019-02-19 PROCEDURE — G0439 PPPS, SUBSEQ VISIT: HCPCS | Performed by: FAMILY MEDICINE

## 2019-02-19 PROCEDURE — 99214 OFFICE O/P EST MOD 30 MIN: CPT | Performed by: FAMILY MEDICINE

## 2019-02-19 RX ORDER — ATENOLOL 25 MG/1
25 TABLET ORAL DAILY
Qty: 90 TABLET | Refills: 1 | Status: SHIPPED | OUTPATIENT
Start: 2019-02-19 | End: 2019-08-13 | Stop reason: SDUPTHER

## 2019-02-19 RX ORDER — DULOXETIN HYDROCHLORIDE 60 MG/1
60 CAPSULE, DELAYED RELEASE ORAL DAILY
Qty: 90 CAPSULE | Refills: 1 | Status: SHIPPED | OUTPATIENT
Start: 2019-02-19 | End: 2019-08-13 | Stop reason: SDUPTHER

## 2019-02-19 RX ORDER — LOSARTAN POTASSIUM 100 MG/1
100 TABLET ORAL DAILY
Qty: 90 TABLET | Refills: 1 | Status: SHIPPED | OUTPATIENT
Start: 2019-02-19 | End: 2019-08-13 | Stop reason: SDUPTHER

## 2019-02-19 RX ORDER — ATORVASTATIN CALCIUM 40 MG/1
40 TABLET, FILM COATED ORAL DAILY
Qty: 90 TABLET | Refills: 1 | Status: ON HOLD | OUTPATIENT
Start: 2019-02-19 | End: 2019-04-04 | Stop reason: SDUPTHER

## 2019-02-19 RX ORDER — AMLODIPINE BESYLATE 5 MG/1
5 TABLET ORAL DAILY
Qty: 90 TABLET | Refills: 1 | Status: SHIPPED | OUTPATIENT
Start: 2019-02-19 | End: 2019-08-13 | Stop reason: SDUPTHER

## 2019-02-19 RX ORDER — FEBUXOSTAT 40 MG/1
40 TABLET, FILM COATED ORAL DAILY
Qty: 90 TABLET | Refills: 1 | Status: SHIPPED | OUTPATIENT
Start: 2019-02-19 | End: 2019-08-13 | Stop reason: SDUPTHER

## 2019-02-19 NOTE — PROGRESS NOTES
"Subjective   Alba Correa is a 85 y.o. female.     History of Present Illness     Chief Complaint:   Chief Complaint   Patient presents with   • medicare wellness   • billateral lower leg weakness     x 2 weeks   • Hypertension     med refill    • Hyperlipidemia   • Depression       Alba Correa 85 y.o. female who presents today for Medical Management of the below listed issues and medication refills.  she has a problem list of   Patient Active Problem List   Diagnosis   • Chronic kidney failure   • DVT (deep venous thrombosis) (CMS/[facility])   • Depression   • Edema   • Gout   • Elevated cholesterol   • Osteoporosis   • Hyperparathyroidism (CMS/HCC)   • Benign essential hypertension   • Adaptive colitis   • Osteopenia   • Rheumatoid arthritis (CMS/HCC)   • Degeneration of intervertebral disc   • D (diarrhea)   • BP (high blood pressure)   • Detrusor muscle hypertonia   • Open leg wound   • Vaginal odor   • Infected wound   • History of depression   • History of renal insufficiency syndrome   • History of degenerative disc disease   • History of osteoporosis   • History of DVT (deep vein thrombosis)   • History of rheumatoid arthritis   • Hyperlipidemia   • Macrocytosis   • Right leg weakness   • CVA (cerebral vascular accident) (CMS/[facility])   • Nonrheumatic aortic (valve) insufficiency   • History of ischemic right MCA stroke   .  Since the last visit, she has overall felt well until 2-3 weeks ago when she has developed bilateral LE \"weakness\" from the knees down with some cramps and pain. Only with walking. Pt never smoked. Has a h/o Baker's Cysts but not currently bothering. No falls but does feel weak. Denies back pain.  she has been compliant with   Current Outpatient Medications:   •  amLODIPine (NORVASC) 5 MG tablet, Take 1 tablet by mouth Daily., Disp: 90 tablet, Rfl: 1  •  aspirin 325 MG tablet, Take 1 tablet by mouth Daily., Disp: , Rfl:   •  atenolol (TENORMIN) 25 MG tablet, Take 1 tablet by mouth Daily., Disp: " "90 tablet, Rfl: 1  •  atorvastatin (LIPITOR) 40 MG tablet, Take 1 tablet by mouth Daily., Disp: 90 tablet, Rfl: 1  •  BIOTIN PO, Take 1 tablet by mouth Daily., Disp: , Rfl:   •  Calcium Carbonate-Vitamin D (CALCIUM 600+D PO), Take 1 tablet by mouth Daily., Disp: , Rfl:   •  DULoxetine (CYMBALTA) 60 MG capsule, Take 1 capsule by mouth Daily., Disp: 90 capsule, Rfl: 1  •  febuxostat (ULORIC) 40 MG tablet, Take 1 tablet by mouth Daily., Disp: 90 tablet, Rfl: 1  •  hydrALAZINE (APRESOLINE) 25 MG tablet, TAKE 1 TABLET BY MOUTH EVERY 12 HOURS IF BLOOD PRESSURE IS GREATER THAN 160/90, Disp: , Rfl: 0  •  losartan (COZAAR) 100 MG tablet, Take 1 tablet by mouth Daily., Disp: 90 tablet, Rfl: 1  •  Multiple Vitamins-Minerals (CENTRUM SILVER PO), Take  by mouth., Disp: , Rfl:   •  Omega-3 Fatty Acids (FISH OIL PO), Take 1,200 mg by mouth Every Night., Disp: , Rfl: .  she denies medication side effects.    All of the chronic condition(s) listed above are stable w/o issues.    /68   Pulse 56   Temp 97.8 °F (36.6 °C) (Oral)   Resp 14   Ht 157.5 cm (62\")   Wt 54.9 kg (121 lb)   SpO2 99%   BMI 22.13 kg/m²     Results for orders placed or performed during the hospital encounter of 08/05/18   Comprehensive Metabolic Panel   Result Value Ref Range    Glucose 113 (H) 65 - 99 mg/dL    BUN 19 8 - 23 mg/dL    Creatinine 1.07 (H) 0.57 - 1.00 mg/dL    Sodium 140 136 - 145 mmol/L    Potassium 4.8 3.5 - 5.2 mmol/L    Chloride 103 98 - 107 mmol/L    CO2 24.9 22.0 - 29.0 mmol/L    Calcium 9.5 8.6 - 10.5 mg/dL    Total Protein 7.6 6.0 - 8.5 g/dL    Albumin 4.20 3.50 - 5.20 g/dL    ALT (SGPT) 16 1 - 33 U/L    AST (SGOT) 25 1 - 32 U/L    Alkaline Phosphatase 76 39 - 117 U/L    Total Bilirubin 0.2 0.1 - 1.2 mg/dL    eGFR Non African Amer 49 (L) >60 mL/min/1.73    Globulin 3.4 gm/dL    A/G Ratio 1.2 g/dL    BUN/Creatinine Ratio 17.8 7.0 - 25.0    Anion Gap 12.1 mmol/L   Protime-INR   Result Value Ref Range    Protime 13.2 11.7 - 14.2 " Seconds    INR 1.02 0.90 - 1.10   Troponin   Result Value Ref Range    Troponin T <0.010 0.000 - 0.030 ng/mL   CBC Auto Differential   Result Value Ref Range    WBC 6.27 4.50 - 10.70 10*3/mm3    RBC 3.71 (L) 3.90 - 5.20 10*6/mm3    Hemoglobin 12.5 11.9 - 15.5 g/dL    Hematocrit 38.7 35.6 - 45.5 %    .3 (H) 80.5 - 98.2 fL    MCH 33.7 (H) 26.9 - 32.0 pg    MCHC 32.3 (L) 32.4 - 36.3 g/dL    RDW 12.7 11.7 - 13.0 %    RDW-SD 48.5 37.0 - 54.0 fl    MPV 10.4 6.0 - 12.0 fL    Platelets 237 140 - 500 10*3/mm3    Neutrophil % 50.9 42.7 - 76.0 %    Lymphocyte % 32.5 19.6 - 45.3 %    Monocyte % 7.2 5.0 - 12.0 %    Eosinophil % 8.1 (H) 0.3 - 6.2 %    Basophil % 1.3 0.0 - 1.5 %    Immature Grans % 0.2 0.0 - 0.5 %    Neutrophils, Absolute 3.19 1.90 - 8.10 10*3/mm3    Lymphocytes, Absolute 2.04 0.90 - 4.80 10*3/mm3    Monocytes, Absolute 0.45 0.20 - 1.20 10*3/mm3    Eosinophils, Absolute 0.51 0.00 - 0.70 10*3/mm3    Basophils, Absolute 0.08 0.00 - 0.20 10*3/mm3    Immature Grans, Absolute 0.01 0.00 - 0.03 10*3/mm3   RPR   Result Value Ref Range    RPR Non-Reactive Non-Reactive   Hemoglobin A1c   Result Value Ref Range    Hemoglobin A1C 5.30 4.80 - 5.60 %   Lipid Panel   Result Value Ref Range    Total Cholesterol 161 0 - 200 mg/dL    Triglycerides 171 (H) 0 - 150 mg/dL    HDL Cholesterol 38 (L) 40 - 60 mg/dL    LDL Cholesterol  89 0 - 100 mg/dL    VLDL Cholesterol 34.2 5 - 40 mg/dL    LDL/HDL Ratio 2.34    Basic Metabolic Panel   Result Value Ref Range    Glucose 92 65 - 99 mg/dL    BUN 14 8 - 23 mg/dL    Creatinine 0.99 0.57 - 1.00 mg/dL    Sodium 142 136 - 145 mmol/L    Potassium 3.8 3.5 - 5.2 mmol/L    Chloride 104 98 - 107 mmol/L    CO2 24.9 22.0 - 29.0 mmol/L    Calcium 9.9 8.6 - 10.5 mg/dL    eGFR Non African Amer 53 (L) >60 mL/min/1.73    BUN/Creatinine Ratio 14.1 7.0 - 25.0    Anion Gap 13.1 mmol/L   Vitamin B12   Result Value Ref Range    Vitamin B-12 775 211 - 946 pg/mL   TSH   Result Value Ref Range    TSH 2.870  0.270 - 4.200 mIU/mL   Folate   Result Value Ref Range    Folate >20.00 4.78 - 24.20 ng/mL   CBC Auto Differential   Result Value Ref Range    WBC 6.65 4.50 - 10.70 10*3/mm3    RBC 3.91 3.90 - 5.20 10*6/mm3    Hemoglobin 12.9 11.9 - 15.5 g/dL    Hematocrit 40.9 35.6 - 45.5 %    .6 (H) 80.5 - 98.2 fL    MCH 33.0 (H) 26.9 - 32.0 pg    MCHC 31.5 (L) 32.4 - 36.3 g/dL    RDW 12.5 11.7 - 13.0 %    RDW-SD 47.4 37.0 - 54.0 fl    MPV 11.0 6.0 - 12.0 fL    Platelets 249 140 - 500 10*3/mm3    Neutrophil % 35.7 (L) 42.7 - 76.0 %    Lymphocyte % 37.7 19.6 - 45.3 %    Monocyte % 11.7 5.0 - 12.0 %    Eosinophil % 13.7 (H) 0.3 - 6.2 %    Basophil % 1.2 0.0 - 1.5 %    Immature Grans % 0.0 0.0 - 0.5 %    Neutrophils, Absolute 2.37 1.90 - 8.10 10*3/mm3    Lymphocytes, Absolute 2.51 0.90 - 4.80 10*3/mm3    Monocytes, Absolute 0.78 0.20 - 1.20 10*3/mm3    Eosinophils, Absolute 0.91 (H) 0.00 - 0.70 10*3/mm3    Basophils, Absolute 0.08 0.00 - 0.20 10*3/mm3    Immature Grans, Absolute 0.00 0.00 - 0.03 10*3/mm3   Basic Metabolic Panel   Result Value Ref Range    Glucose 100 (H) 65 - 99 mg/dL    BUN 17 8 - 23 mg/dL    Creatinine 1.14 (H) 0.57 - 1.00 mg/dL    Sodium 141 136 - 145 mmol/L    Potassium 4.2 3.5 - 5.2 mmol/L    Chloride 101 98 - 107 mmol/L    CO2 27.2 22.0 - 29.0 mmol/L    Calcium 9.9 8.6 - 10.5 mg/dL    eGFR Non African Amer 45 (L) >60 mL/min/1.73    BUN/Creatinine Ratio 14.9 7.0 - 25.0    Anion Gap 12.8 mmol/L   Basic Metabolic Panel   Result Value Ref Range    Glucose 108 (H) 65 - 99 mg/dL    BUN 22 8 - 23 mg/dL    Creatinine 1.12 (H) 0.57 - 1.00 mg/dL    Sodium 141 136 - 145 mmol/L    Potassium 3.9 3.5 - 5.2 mmol/L    Chloride 101 98 - 107 mmol/L    CO2 26.5 22.0 - 29.0 mmol/L    Calcium 9.7 8.6 - 10.5 mg/dL    eGFR Non African Amer 46 (L) >60 mL/min/1.73    BUN/Creatinine Ratio 19.6 7.0 - 25.0    Anion Gap 13.5 mmol/L   CBC Auto Differential   Result Value Ref Range    WBC 7.51 4.50 - 10.70 10*3/mm3    RBC 4.05 3.90 -  5.20 10*6/mm3    Hemoglobin 13.8 11.9 - 15.5 g/dL    Hematocrit 41.9 35.6 - 45.5 %    .5 (H) 80.5 - 98.2 fL    MCH 34.1 (H) 26.9 - 32.0 pg    MCHC 32.9 32.4 - 36.3 g/dL    RDW 12.6 11.7 - 13.0 %    RDW-SD 48.3 37.0 - 54.0 fl    MPV 10.2 6.0 - 12.0 fL    Platelets 265 140 - 500 10*3/mm3    Neutrophil % 48.0 42.7 - 76.0 %    Lymphocyte % 33.7 19.6 - 45.3 %    Monocyte % 9.1 5.0 - 12.0 %    Eosinophil % 8.5 (H) 0.3 - 6.2 %    Basophil % 0.7 0.0 - 1.5 %    Immature Grans % 0.1 0.0 - 0.5 %    Neutrophils, Absolute 3.61 1.90 - 8.10 10*3/mm3    Lymphocytes, Absolute 2.53 0.90 - 4.80 10*3/mm3    Monocytes, Absolute 0.68 0.20 - 1.20 10*3/mm3    Eosinophils, Absolute 0.64 0.00 - 0.70 10*3/mm3    Basophils, Absolute 0.05 0.00 - 0.20 10*3/mm3    Immature Grans, Absolute 0.01 0.00 - 0.03 10*3/mm3   POC Glucose Once   Result Value Ref Range    Glucose 94 70 - 130 mg/dL   POC Glucose Once   Result Value Ref Range    Glucose 89 70 - 130 mg/dL   POC Glucose Once   Result Value Ref Range    Glucose 125 70 - 130 mg/dL   POC Glucose Once   Result Value Ref Range    Glucose 115 70 - 130 mg/dL   POC Glucose Once   Result Value Ref Range    Glucose 116 70 - 130 mg/dL   POC Glucose Once   Result Value Ref Range    Glucose 90 70 - 130 mg/dL   POC Glucose Once   Result Value Ref Range    Glucose 108 70 - 130 mg/dL   POC Glucose Once   Result Value Ref Range    Glucose 128 70 - 130 mg/dL   POC Glucose Once   Result Value Ref Range    Glucose 96 70 - 130 mg/dL   POC Glucose Once   Result Value Ref Range    Glucose 110 70 - 130 mg/dL   Adult Transthoracic Echo Complete W/ Cont if Necessary Per Protocol (With Agitated Saline)   Result Value Ref Range    BSA 1.6 m^2    IVSd 1.4 cm    LVIDd 4.7 cm    LVIDs 3.4 cm    LVPWd 1.0 cm    IVS/LVPW 1.4     FS 27.7 %    EDV(Teich) 102.4 ml    ESV(Teich) 47.4 ml    EF(Teich) 53.7 %    EDV(cubed) 103.8 ml    ESV(cubed) 39.3 ml    EF(cubed) 62.1 %    LV mass(C)d 212.0 grams    LV mass(C)dI 132.3  grams/m^2    SV(Teich) 54.9 ml    SI(Teich) 34.3 ml/m^2    SV(cubed) 64.5 ml    SI(cubed) 40.3 ml/m^2    Ao root diam 2.7 cm    Ao root area 5.7 cm^2    ACS 1.6 cm    asc Aorta Diam 3.2 cm    LVOT diam 2.0 cm    LVOT area 3.1 cm^2    LVOT area(traced) 3.1 cm^2    RVOT diam 2.3 cm    RVOT area 4.2 cm^2    LVLd ap4 6.3 cm    EDV(MOD-sp4) 49.0 ml    LVLs ap4 5.6 cm    ESV(MOD-sp4) 21.0 ml    EF(MOD-sp4) 57.1 %    LVLd ap2 5.9 cm    EDV(MOD-sp2) 40.0 ml    LVLs ap2 5.2 cm    ESV(MOD-sp2) 17.0 ml    EF(MOD-sp2) 57.5 %    SV(MOD-sp4) 28.0 ml    SI(MOD-sp4) 17.5 ml/m^2    SV(MOD-sp2) 23.0 ml    SI(MOD-sp2) 14.4 ml/m^2    Ao root area (BSA corrected) 1.7     EF - Contrast (2Ch) 57.5 ml/m^2    EF - Contrast (4Ch) 57.1 ml/m^2    LV Daniel Vol (BSA corrected) 30.6 ml/m^2    LV Sys Vol (BSA corrected) 13.1 ml/m^2    MV A dur 0.12 sec    MV E max chepe 77.6 cm/sec    MV A max chepe 86.8 cm/sec    MV E/A 0.89     MV V2 max 93.9 cm/sec    MV max PG 3.5 mmHg    MV V2 mean 51.6 cm/sec    MV mean PG 1.0 mmHg    MV V2 VTI 29.4 cm    MVA(VTI) 1.9 cm^2    MV P1/2t max chepe 70.6 cm/sec    MV P1/2t 62.7 msec    MVA(P1/2t) 3.5 cm^2    MV dec slope 330.0 cm/sec^2    MV dec time 0.13 sec    Ao pk chepe 113.0 cm/sec    Ao max PG 5.1 mmHg    Ao max PG (full) 2.6 mmHg    Ao V2 mean 66.6 cm/sec    Ao mean PG 2.0 mmHg    Ao mean PG (full) 1.0 mmHg    Ao V2 VTI 26.5 cm    MARIA A(I,A) 2.1 cm^2    MARIA A(I,D) 2.1 cm^2    MARIA A(V,A) 2.2 cm^2    MARIA A(V,D) 2.2 cm^2    AI max chepe 445.0 cm/sec    AI max PG 79.2 mmHg    AI dec slope 219.0 cm/sec^2    AI P1/2t 595.1 msec    LV V1 max PG 2.5 mmHg    LV V1 mean PG 1.0 mmHg    LV V1 max 79.1 cm/sec    LV V1 mean 49.1 cm/sec    LV V1 VTI 17.7 cm    SV(Ao) 151.7 ml    SI(Ao) 94.7 ml/m^2    SV(LVOT) 55.6 ml    SV(RVOT) 73.1 ml    SI(LVOT) 34.7 ml/m^2    PA V2 max 94.7 cm/sec    PA max PG 3.6 mmHg    PA max PG (full) 0.8 mmHg    BH CV ECHO MARCOS - PVA(V,A) 3.7 cm^2    BH CV ECHO MARCOS - PVA(V,D) 3.7 cm^2    PA acc time 0.16 sec     RV V1 max PG 2.8 mmHg    RV V1 mean PG 1.0 mmHg    RV V1 max 83.5 cm/sec    RV V1 mean 50.7 cm/sec    RV V1 VTI 17.6 cm    TR max tanvir 270.0 cm/sec    RVSP(TR) 32.2 mmHg    RAP systole 3.0 mmHg    PA pr(Accel) 7.9 mmHg    Pulm Sys Tanvir 71.8 cm/sec    Pulm Daniel Tanvir 61.6 cm/sec    Pulm S/D 1.2     Qp/Qs 1.3     Pulm A Revs Dur 0.11 sec    Pulm A Revs Tanvir 19.4 cm/sec    MVA P1/2T LCG 3.1 cm^2     CV ECHO MARCOS - BZI_BMI 24.1 kilograms/m^2     CV ECHO MARCOS - BSA(Haul Zing.) 1.6 m^2     CV ECHO MARCOS - BZI_METRIC_WEIGHT 59.9 kg     CV ECHO MARCOS - BZI_METRIC_HEIGHT 157.5 cm    Target HR (85%) 115 bpm    Max. Pred. HR (100%) 135 bpm     CV VAS BP RIGHT /79 mmHg    TDI S' 10.00 cm/sec    RV Base 3.30 cm    LA Volume Index 15.0 mL/m2    Avg E/e' ratio 11.94     EF(MOD-bp) 57.0 %    Lat Peak E' Tanvir 8.0 cm/sec    Med Peak E' Tanvir 5.00 cm/sec    Abdo Ao Diam 1.60 cm    TAPSE (>1.6) 1.90 cm2   Adult Transesophageal Echo (KASH) W/ Cont if Necessary Per Protocol   Result Value Ref Range    BSA 1.6 m^2     CV ECHO MARCOS - BZI_BMI 24.3 kilograms/m^2     CV ECHO MARCOS - BSA(Haul Zing.) 1.6 m^2     CV ECHO MARCOS - BZI_METRIC_WEIGHT 60.3 kg     CV ECHO MARCOS - BZI_METRIC_HEIGHT 157.5 cm     CV VAS BP RIGHT /116 mmHg           The following portions of the patient's history were reviewed and updated as appropriate: allergies, current medications, past family history, past medical history, past social history, past surgical history and problem list.    Review of Systems   Constitutional: Negative for activity change, chills, fatigue and fever.   Respiratory: Negative for cough and chest tightness.    Cardiovascular: Negative for chest pain and palpitations.   Gastrointestinal: Negative for abdominal pain and nausea.   Endocrine: Negative for cold intolerance.   Psychiatric/Behavioral: Negative for behavioral problems and dysphoric mood.       Objective   Physical Exam   Constitutional: She appears well-developed and  well-nourished.   Neck: Neck supple. No thyromegaly present.   Cardiovascular: Normal rate and regular rhythm.   No murmur heard.  Pulses good   Pulmonary/Chest: Effort normal and breath sounds normal.   Abdominal: Bowel sounds are normal. There is no tenderness.   Neurological: She is alert.   Psychiatric: She has a normal mood and affect. Her behavior is normal.   Nursing note and vitals reviewed.  Pt has been referred to [facility] or chronically elevated MCV and is awaiting that appt.    Assessment/Plan   Alba was seen today for medicare wellness, billateral lower leg weakness, hypertension, hyperlipidemia and depression.    Diagnoses and all orders for this visit:    Medicare annual wellness visit, subsequent    Benign essential hypertension  -     amLODIPine (NORVASC) 5 MG tablet; Take 1 tablet by mouth Daily.  -     atenolol (TENORMIN) 25 MG tablet; Take 1 tablet by mouth Daily.  -     losartan (COZAAR) 100 MG tablet; Take 1 tablet by mouth Daily.    Hyperlipidemia, unspecified hyperlipidemia type  -     atorvastatin (LIPITOR) 40 MG tablet; Take 1 tablet by mouth Daily.    Recurrent major depressive disorder, in full remission (CMS/[facility])  -     DULoxetine (CYMBALTA) 60 MG capsule; Take 1 capsule by mouth Daily.    Gout, unspecified cause, unspecified chronicity, unspecified site  -     febuxostat (ULORIC) 40 MG tablet; Take 1 tablet by mouth Daily.    Weakness of both lower extremities  -     Duplex Venous Lower Extremity - Bilateral CAR; Future  -     Ambulatory Referral to Orthopedic Surgery    Synovial cyst of popliteal space, unspecified laterality  -     Duplex Venous Lower Extremity - Bilateral CAR; Future  -     Ambulatory Referral to Orthopedic Surgery    Pain in both lower extremities  -     Duplex Venous Lower Extremity - Bilateral CAR; Future  -     Ambulatory Referral to Orthopedic Surgery

## 2019-02-19 NOTE — PATIENT INSTRUCTIONS
Medicare Wellness  Personal Prevention Plan of Service     Date of Office Visit:  2019  Encounter Provider:  Aramis Doty MD  Place of Service:  [facility]  Patient Name: Alba Correa  :  1933    As part of the Medicare Wellness portion of your visit today, we are providing you with this personalized preventive plan of services (PPPS). This plan is based upon recommendations of the [place] Preventive Services Task Force (USPSTF) and the Advisory Committee on Immunization Practices (ACIP).    This lists the preventive care services that should be considered, and provides dates of when you are due. Items listed as completed are up-to-date and do not require any further intervention.    Health Maintenance   Topic Date Due   • INFLUENZA VACCINE  2019 (Originally 2018)   • ZOSTER VACCINE (2 of 2) 2020 (Originally 2012)   • LIPID PANEL  2019   • MEDICARE ANNUAL WELLNESS  2020   • PNEUMOCOCCAL VACCINES (65+ LOW/MEDIUM RISK)  Completed   • MAMMOGRAM  Discontinued   • DXA SCAN  Discontinued   • COLONOSCOPY  Discontinued   • TDAP/TD VACCINES  Discontinued       Orders Placed This Encounter   Procedures   • Ambulatory Referral to Orthopedic Surgery     Referral Priority:   Routine     Referral Type:   Consultation     Referral Reason:   Specialty Services Required     Referred to Provider:   Olegario Gutiérrez MD     Requested Specialty:   Orthopedic Surgery     Number of Visits Requested:   1       Return in about 6 months (around 2019) for Recheck.

## 2019-02-19 NOTE — PROGRESS NOTES
QUICK REFERENCE INFORMATION:  The ABCs of the Annual Wellness Visit    Subsequent Medicare Wellness Visit    HEALTH RISK ASSESSMENT    5/22/1933    Recent Hospitalizations:  No hospitalization(s) within the last year..        Current Medical Providers:  Patient Care Team:  Aramis Doty MD as PCP - General (Family Medicine)  Juan Jose Muñiz MD as Consulting Physician (Neurology)  Adrian Perez MD as Consulting Physician (Hematology and Oncology)  Adrian Caldera MD as Consulting Physician (Nephrology)  Zack Dewitt MD as Consulting Physician (Rheumatology)  Aramis Doty MD as Referring Physician (Family Medicine)  Edi Singh MD as Surgeon (Wound Care)  Yoly Isaac APRN as Nurse Practitioner (Obstetrics and Gynecology)        Smoking Status:  Social History     Tobacco Use   Smoking Status Former Smoker   • Years: 5.00   • Types: Cigarettes   Smokeless Tobacco Never Used       Alcohol Consumption:  Social History     Substance and Sexual Activity   Alcohol Use Yes    Comment: Occasional       Depression Screen:   PHQ-2/PHQ-9 Depression Screening 2/19/2019   Little interest or pleasure in doing things 0   Feeling down, depressed, or hopeless 0   Trouble falling or staying asleep, or sleeping too much -   Feeling tired or having little energy -   Poor appetite or overeating -   Feeling bad about yourself - or that you are a failure or have let yourself or your family down -   Trouble concentrating on things, such as reading the newspaper or watching television -   Moving or speaking so slowly that other people could have noticed. Or the opposite - being so fidgety or restless that you have been moving around a lot more than usual -   Thoughts that you would be better off dead, or of hurting yourself in some way -   Total Score 0   If you checked off any problems, how difficult have these problems made it for you to do your work, take care of things at home, or get along with other people? -        Health Habits and Functional and Cognitive Screening:  Functional & Cognitive Status 2/19/2019   Do you have difficulty preparing food and eating? No   Do you have difficulty bathing yourself, getting dressed or grooming yourself? No   Do you have difficulty using the toilet? No   Do you have difficulty moving around from place to place? No   Do you have trouble with steps or getting out of a bed or a chair? No   In the past year have you fallen or experienced a near fall? No   Current Diet Well Balanced Diet   Dental Exam Up to date   Eye Exam Up to date   Exercise (times per week) 3 times per week   Current Exercise Activities Include Walking   Do you need help using the phone?  No   Are you deaf or do you have serious difficulty hearing?  No   Do you need help with transportation? No   Do you need help shopping? No   Do you need help preparing meals?  No   Do you need help with housework?  No   Do you need help with laundry? No   Do you need help taking your medications? No   Do you need help managing money? No   Do you ever drive or ride in a car without wearing a seat belt? No   Have you felt unusual stress, anger or loneliness in the last month? No   Who do you live with? Alone   If you need help, do you have trouble finding someone available to you? Yes   Have you been bothered in the last four weeks by sexual problems? No   Do you have difficulty concentrating, remembering or making decisions? No           Does the patient have evidence of cognitive impairment? No    Aspirin use counseling: Taking ASA appropriately as indicated      Recent Lab Results:  CMP:  Lab Results   Component Value Date    GLU 85 05/29/2018    BUN 22 08/08/2018    CREATININE 1.12 (H) 08/08/2018    EGFRIFNONA 46 (L) 08/08/2018    EGFRIFAFRI 50 (L) 05/29/2018    BCR 19.6 08/08/2018     08/08/2018    K 3.9 08/08/2018    CO2 26.5 08/08/2018    CALCIUM 9.7 08/08/2018    PROTENTOTREF 7.9 01/19/2018    ALBUMIN 4.20 08/05/2018     LABGLOBREF 3.8 01/19/2018    LABIL2 1.1 01/19/2018    BILITOT 0.2 08/05/2018    ALKPHOS 76 08/05/2018    AST 25 08/05/2018    ALT 16 08/05/2018     Lipid Panel:  Lab Results   Component Value Date    CHOL 161 08/06/2018    TRIG 171 (H) 08/06/2018    HDL 38 (L) 08/06/2018    VLDL 34.2 08/06/2018    LDLHDL 2.34 08/06/2018     HbA1c:  Lab Results   Component Value Date    HGBA1C 5.30 08/06/2018       Visual Acuity:  No exam data present    Age-appropriate Screening Schedule:  Refer to the list below for future screening recommendations based on patient's age, sex and/or medical conditions. Orders for these recommended tests are listed in the plan section. The patient has been provided with a written plan.    Health Maintenance   Topic Date Due   • INFLUENZA VACCINE  03/13/2019 (Originally 8/1/2018)   • ZOSTER VACCINE (2 of 2) 02/23/2020 (Originally 2/26/2012)   • LIPID PANEL  08/06/2019   • PNEUMOCOCCAL VACCINES (65+ LOW/MEDIUM RISK)  Completed   • MAMMOGRAM  Discontinued   • DXA SCAN  Discontinued   • COLONOSCOPY  Discontinued   • TDAP/TD VACCINES  Discontinued        Subjective   History of Present Illness    Alba Correa is a 85 y.o. female who presents for an Subsequent Wellness Visit.    The following portions of the patient's history were reviewed and updated as appropriate: allergies, current medications, past family history, past medical history, past social history, past surgical history and problem list.    Outpatient Medications Prior to Visit   Medication Sig Dispense Refill   • amLODIPine (NORVASC) 5 MG tablet Take 1 tablet by mouth Daily. 90 tablet 1   • atenolol (TENORMIN) 25 MG tablet Take 1 tablet by mouth Daily. 90 tablet 1   • atorvastatin (LIPITOR) 40 MG tablet Take 1 tablet by mouth Daily. 90 tablet 1   • DULoxetine (CYMBALTA) 60 MG capsule Take 1 capsule by mouth Daily. 90 capsule 1   • febuxostat (ULORIC) 40 MG tablet Take 1 tablet by mouth Daily. 90 tablet 1   • losartan (COZAAR) 100 MG tablet  Take 1 tablet by mouth Daily. 90 tablet 1   • aspirin 325 MG tablet Take 1 tablet by mouth Daily.     • BIOTIN PO Take 1 tablet by mouth Daily.     • Calcium Carbonate-Vitamin D (CALCIUM 600+D PO) Take 1 tablet by mouth Daily.     • hydrALAZINE (APRESOLINE) 25 MG tablet TAKE 1 TABLET BY MOUTH EVERY 12 HOURS IF BLOOD PRESSURE IS GREATER THAN 160/90  0   • Multiple Vitamins-Minerals (CENTRUM SILVER PO) Take  by mouth.     • Omega-3 Fatty Acids (FISH OIL PO) Take 1,200 mg by mouth Every Night.       No facility-administered medications prior to visit.        Patient Active Problem List   Diagnosis   • Chronic kidney failure   • DVT (deep venous thrombosis) (CMS/HCC)   • Depression   • Edema   • Gout   • Elevated cholesterol   • Osteoporosis   • Hyperparathyroidism (CMS/HCC)   • Benign essential hypertension   • Adaptive colitis   • Osteopenia   • Rheumatoid arthritis (CMS/HCC)   • Degeneration of intervertebral disc   • D (diarrhea)   • BP (high blood pressure)   • Detrusor muscle hypertonia   • Open leg wound   • Vaginal odor   • Infected wound   • History of depression   • History of renal insufficiency syndrome   • History of degenerative disc disease   • History of osteoporosis   • History of DVT (deep vein thrombosis)   • History of rheumatoid arthritis   • Hyperlipidemia   • Macrocytosis   • Right leg weakness   • CVA (cerebral vascular accident) (CMS/[facility])   • Nonrheumatic aortic (valve) insufficiency   • History of ischemic right MCA stroke       Advance Care Planning:  has an advance directive - a copy has been provided and is in file    Identification of Risk Factors:  Risk factors include: cardiovascular risk and age.    Review of Systems    Compared to one year ago, the patient feels her physical health is the same.  Compared to one year ago, the patient feels her mental health is the same.    Objective     Physical Exam    Vitals:    02/19/19 1318   BP: 134/68   Pulse: 56   Resp: 14   Temp: 97.8 °F (36.6  "°C)   TempSrc: Oral   SpO2: 99%   Weight: 54.9 kg (121 lb)   Height: 157.5 cm (62\")   PainSc:   4       Patient's Body mass index is 22.13 kg/m². BMI is within normal parameters. No follow-up required..      Assessment/Plan   Patient Self-Management and Personalized Health Advice  The patient has been provided with information about: diet, exercise and weight management and preventive services including:   · Exercise counseling provided, Fall Risk assessment done, Nutrition counseling provided.    Visit Diagnoses:    ICD-10-CM ICD-9-CM   1. Medicare annual wellness visit, subsequent Z00.00 V70.0   2. Benign essential hypertension I10 401.1   3. Hyperlipidemia, unspecified hyperlipidemia type E78.5 272.4   4. Recurrent major depressive disorder, in full remission (CMS/[facility]) F33.42 296.36   5. Gout, unspecified cause, unspecified chronicity, unspecified site M10.9 274.9   6. Weakness of both lower extremities R29.898 729.89   7. Synovial cyst of popliteal space, unspecified laterality M71.20 727.51   8. Pain in both lower extremities M79.604 729.5    M79.605        Orders Placed This Encounter   Procedures   • Ambulatory Referral to Orthopedic Surgery     Referral Priority:   Routine     Referral Type:   Consultation     Referral Reason:   Specialty Services Required     Referred to Provider:   Olegario Gutiérrez MD     Requested Specialty:   Orthopedic Surgery     Number of Visits Requested:   1       Outpatient Encounter Medications as of 2/19/2019   Medication Sig Dispense Refill   • [DISCONTINUED] amLODIPine (NORVASC) 5 MG tablet Take 1 tablet by mouth Daily. 90 tablet 1   • [DISCONTINUED] atenolol (TENORMIN) 25 MG tablet Take 1 tablet by mouth Daily. 90 tablet 1   • [DISCONTINUED] atorvastatin (LIPITOR) 40 MG tablet Take 1 tablet by mouth Daily. 90 tablet 1   • [DISCONTINUED] DULoxetine (CYMBALTA) 60 MG capsule Take 1 capsule by mouth Daily. 90 capsule 1   • [DISCONTINUED] febuxostat (ULORIC) 40 MG tablet Take 1 " tablet by mouth Daily. 90 tablet 1   • [DISCONTINUED] losartan (COZAAR) 100 MG tablet Take 1 tablet by mouth Daily. 90 tablet 1   • amLODIPine (NORVASC) 5 MG tablet Take 1 tablet by mouth Daily. 90 tablet 1   • aspirin 325 MG tablet Take 1 tablet by mouth Daily.     • atenolol (TENORMIN) 25 MG tablet Take 1 tablet by mouth Daily. 90 tablet 1   • atorvastatin (LIPITOR) 40 MG tablet Take 1 tablet by mouth Daily. 90 tablet 1   • BIOTIN PO Take 1 tablet by mouth Daily.     • Calcium Carbonate-Vitamin D (CALCIUM 600+D PO) Take 1 tablet by mouth Daily.     • DULoxetine (CYMBALTA) 60 MG capsule Take 1 capsule by mouth Daily. 90 capsule 1   • febuxostat (ULORIC) 40 MG tablet Take 1 tablet by mouth Daily. 90 tablet 1   • hydrALAZINE (APRESOLINE) 25 MG tablet TAKE 1 TABLET BY MOUTH EVERY 12 HOURS IF BLOOD PRESSURE IS GREATER THAN 160/90  0   • losartan (COZAAR) 100 MG tablet Take 1 tablet by mouth Daily. 90 tablet 1   • Multiple Vitamins-Minerals (CENTRUM SILVER PO) Take  by mouth.     • Omega-3 Fatty Acids (FISH OIL PO) Take 1,200 mg by mouth Every Night.       No facility-administered encounter medications on file as of 2/19/2019.        Reviewed use of high risk medication in the elderly: not applicable  Reviewed for potential of harmful drug interactions in the elderly: not applicable    Follow Up:  No Follow-up on file.     An After Visit Summary and PPPS with all of these plans were given to the patient.

## 2019-02-26 ENCOUNTER — HOSPITAL ENCOUNTER (OUTPATIENT)
Dept: CARDIOLOGY | Facility: HOSPITAL | Age: 84
Discharge: HOME | End: 2019-02-26
Admitting: FAMILY MEDICINE

## 2019-02-26 DIAGNOSIS — R29.898 WEAKNESS OF BOTH LOWER EXTREMITIES: ICD-10-CM

## 2019-02-26 DIAGNOSIS — M79.604 PAIN IN BOTH LOWER EXTREMITIES: ICD-10-CM

## 2019-02-26 DIAGNOSIS — M79.605 PAIN IN BOTH LOWER EXTREMITIES: ICD-10-CM

## 2019-02-26 DIAGNOSIS — M71.20 SYNOVIAL CYST OF POPLITEAL SPACE, UNSPECIFIED LATERALITY: ICD-10-CM

## 2019-02-26 LAB
BH CV LOW VAS LEFT POPLITEAL SPONT: 1
BH CV LOW VAS LEFT SAPHENOFEMORAL JUNCTION SPONT: 1
BH CV LOW VAS RIGHT LESSER SAPH VESSEL: 1
BH CV LOW VAS RIGHT POPLITEAL SPONT: 1
BH CV LOWER VASCULAR LEFT COMMON FEMORAL AUGMENT: NORMAL
BH CV LOWER VASCULAR LEFT COMMON FEMORAL COMPETENT: NORMAL
BH CV LOWER VASCULAR LEFT COMMON FEMORAL COMPRESS: NORMAL
BH CV LOWER VASCULAR LEFT COMMON FEMORAL PHASIC: NORMAL
BH CV LOWER VASCULAR LEFT COMMON FEMORAL SPONT: NORMAL
BH CV LOWER VASCULAR LEFT DISTAL FEMORAL COMPRESS: NORMAL
BH CV LOWER VASCULAR LEFT GASTRONEMIUS COMPRESS: NORMAL
BH CV LOWER VASCULAR LEFT GREATER SAPH AK COMPRESS: NORMAL
BH CV LOWER VASCULAR LEFT GREATER SAPH BK COMPRESS: NORMAL
BH CV LOWER VASCULAR LEFT LESSER SAPH COMPRESS: NORMAL
BH CV LOWER VASCULAR LEFT MID FEMORAL AUGMENT: NORMAL
BH CV LOWER VASCULAR LEFT MID FEMORAL COMPETENT: NORMAL
BH CV LOWER VASCULAR LEFT MID FEMORAL COMPRESS: NORMAL
BH CV LOWER VASCULAR LEFT MID FEMORAL PHASIC: NORMAL
BH CV LOWER VASCULAR LEFT MID FEMORAL SPONT: NORMAL
BH CV LOWER VASCULAR LEFT PERONEAL COMPRESS: NORMAL
BH CV LOWER VASCULAR LEFT POPLITEAL AUGMENT: NORMAL
BH CV LOWER VASCULAR LEFT POPLITEAL COMPETENT: NORMAL
BH CV LOWER VASCULAR LEFT POPLITEAL COMPRESS: NORMAL
BH CV LOWER VASCULAR LEFT POPLITEAL PHASIC: NORMAL
BH CV LOWER VASCULAR LEFT POPLITEAL SPONT: NORMAL
BH CV LOWER VASCULAR LEFT POSTERIOR TIBIAL COMPRESS: NORMAL
BH CV LOWER VASCULAR LEFT PROXIMAL FEMORAL COMPRESS: NORMAL
BH CV LOWER VASCULAR LEFT SAPHENOFEMORAL JUNCTION AUGMENT: NORMAL
BH CV LOWER VASCULAR LEFT SAPHENOFEMORAL JUNCTION COMPETENT: NORMAL
BH CV LOWER VASCULAR LEFT SAPHENOFEMORAL JUNCTION COMPRESS: NORMAL
BH CV LOWER VASCULAR LEFT SAPHENOFEMORAL JUNCTION PHASIC: NORMAL
BH CV LOWER VASCULAR LEFT SAPHENOFEMORAL JUNCTION SPONT: NORMAL
BH CV LOWER VASCULAR RIGHT COMMON FEMORAL AUGMENT: NORMAL
BH CV LOWER VASCULAR RIGHT COMMON FEMORAL COMPETENT: NORMAL
BH CV LOWER VASCULAR RIGHT COMMON FEMORAL COMPRESS: NORMAL
BH CV LOWER VASCULAR RIGHT COMMON FEMORAL PHASIC: NORMAL
BH CV LOWER VASCULAR RIGHT COMMON FEMORAL SPONT: NORMAL
BH CV LOWER VASCULAR RIGHT DISTAL FEMORAL COMPRESS: NORMAL
BH CV LOWER VASCULAR RIGHT GASTRONEMIUS COMPRESS: NORMAL
BH CV LOWER VASCULAR RIGHT GREATER SAPH AK COMPRESS: NORMAL
BH CV LOWER VASCULAR RIGHT GREATER SAPH BK COMPRESS: NORMAL
BH CV LOWER VASCULAR RIGHT LESSER SAPH COMPRESS: NORMAL
BH CV LOWER VASCULAR RIGHT LESSER SAPH THROMBUS: NORMAL
BH CV LOWER VASCULAR RIGHT MID FEMORAL AUGMENT: NORMAL
BH CV LOWER VASCULAR RIGHT MID FEMORAL COMPETENT: NORMAL
BH CV LOWER VASCULAR RIGHT MID FEMORAL COMPRESS: NORMAL
BH CV LOWER VASCULAR RIGHT MID FEMORAL PHASIC: NORMAL
BH CV LOWER VASCULAR RIGHT MID FEMORAL SPONT: NORMAL
BH CV LOWER VASCULAR RIGHT PERONEAL COMPRESS: NORMAL
BH CV LOWER VASCULAR RIGHT POPLITEAL AUGMENT: NORMAL
BH CV LOWER VASCULAR RIGHT POPLITEAL COMPETENT: NORMAL
BH CV LOWER VASCULAR RIGHT POPLITEAL COMPRESS: NORMAL
BH CV LOWER VASCULAR RIGHT POPLITEAL PHASIC: NORMAL
BH CV LOWER VASCULAR RIGHT POPLITEAL SPONT: NORMAL
BH CV LOWER VASCULAR RIGHT POSTERIOR TIBIAL COMPRESS: NORMAL
BH CV LOWER VASCULAR RIGHT PROXIMAL FEMORAL COMPRESS: NORMAL
BH CV LOWER VASCULAR RIGHT SAPHENOFEMORAL JUNCTION AUGMENT: NORMAL
BH CV LOWER VASCULAR RIGHT SAPHENOFEMORAL JUNCTION COMPETENT: NORMAL
BH CV LOWER VASCULAR RIGHT SAPHENOFEMORAL JUNCTION COMPRESS: NORMAL
BH CV LOWER VASCULAR RIGHT SAPHENOFEMORAL JUNCTION PHASIC: NORMAL
BH CV LOWER VASCULAR RIGHT SAPHENOFEMORAL JUNCTION SPONT: NORMAL
BH CV POP FLUID COLLECT LEFT: 1
BH CV VAS POP FLUID COLLECTED: 1

## 2019-02-26 PROCEDURE — 93970 EXTREMITY STUDY: CPT

## 2019-04-02 ENCOUNTER — HOSPITAL ENCOUNTER (OUTPATIENT)
Facility: HOSPITAL | Age: 84
Setting detail: OBSERVATION
Discharge: HOME | End: 2019-04-04
Attending: EMERGENCY MEDICINE | Admitting: INTERNAL MEDICINE

## 2019-04-02 ENCOUNTER — APPOINTMENT (OUTPATIENT)
Dept: CT IMAGING | Facility: HOSPITAL | Age: 84
End: 2019-04-02

## 2019-04-02 DIAGNOSIS — R11.0 NAUSEA: ICD-10-CM

## 2019-04-02 DIAGNOSIS — Z86.73 HISTORY OF CVA (CEREBROVASCULAR ACCIDENT): ICD-10-CM

## 2019-04-02 DIAGNOSIS — R93.0 ABNORMAL CT OF THE HEAD: ICD-10-CM

## 2019-04-02 DIAGNOSIS — E78.5 HYPERLIPIDEMIA, UNSPECIFIED HYPERLIPIDEMIA TYPE: ICD-10-CM

## 2019-04-02 DIAGNOSIS — R51.9 ACUTE INTRACTABLE HEADACHE, UNSPECIFIED HEADACHE TYPE: Primary | ICD-10-CM

## 2019-04-02 LAB
ALBUMIN SERPL-MCNC: 4.7 G/DL (ref 3.5–5.2)
ALBUMIN/GLOB SERPL: 1.1 G/DL
ALP SERPL-CCNC: 65 U/L (ref 39–117)
ALT SERPL W P-5'-P-CCNC: 31 U/L (ref 1–33)
ANION GAP SERPL CALCULATED.3IONS-SCNC: 13.6 MMOL/L
AST SERPL-CCNC: 39 U/L (ref 1–32)
B PARAPERT DNA SPEC QL NAA+PROBE: NOT DETECTED
B PERT DNA SPEC QL NAA+PROBE: NOT DETECTED
BACTERIA UR QL AUTO: ABNORMAL /HPF
BASOPHILS # BLD AUTO: 0.09 10*3/MM3 (ref 0–0.2)
BASOPHILS NFR BLD AUTO: 1.3 % (ref 0–1.5)
BILIRUB SERPL-MCNC: 0.4 MG/DL (ref 0.2–1.2)
BILIRUB UR QL STRIP: NEGATIVE
BUN BLD-MCNC: 15 MG/DL (ref 8–23)
BUN/CREAT SERPL: 14.9 (ref 7–25)
C PNEUM DNA NPH QL NAA+NON-PROBE: NOT DETECTED
CALCIUM SPEC-SCNC: 10.5 MG/DL (ref 8.6–10.5)
CHLORIDE SERPL-SCNC: 98 MMOL/L (ref 98–107)
CLARITY UR: CLEAR
CO2 SERPL-SCNC: 26.4 MMOL/L (ref 22–29)
COLOR UR: ABNORMAL
CREAT BLD-MCNC: 1.01 MG/DL (ref 0.57–1)
DEPRECATED RDW RBC AUTO: 47 FL (ref 37–54)
EOSINOPHIL # BLD AUTO: 0.21 10*3/MM3 (ref 0–0.4)
EOSINOPHIL NFR BLD AUTO: 3.1 % (ref 0.3–6.2)
ERYTHROCYTE [DISTWIDTH] IN BLOOD BY AUTOMATED COUNT: 12.3 % (ref 12.3–15.4)
FLUAV H1 2009 PAND RNA NPH QL NAA+PROBE: NOT DETECTED
FLUAV H1 HA GENE NPH QL NAA+PROBE: NOT DETECTED
FLUAV H3 RNA NPH QL NAA+PROBE: NOT DETECTED
FLUAV SUBTYP SPEC NAA+PROBE: NOT DETECTED
FLUBV RNA ISLT QL NAA+PROBE: NOT DETECTED
GFR SERPL CREATININE-BSD FRML MDRD: 52 ML/MIN/1.73
GLOBULIN UR ELPH-MCNC: 4.2 GM/DL
GLUCOSE BLD-MCNC: 125 MG/DL (ref 65–99)
GLUCOSE BLDC GLUCOMTR-MCNC: 105 MG/DL (ref 70–130)
GLUCOSE BLDC GLUCOMTR-MCNC: 138 MG/DL (ref 70–130)
GLUCOSE UR STRIP-MCNC: NEGATIVE MG/DL
HADV DNA SPEC NAA+PROBE: NOT DETECTED
HCOV 229E RNA SPEC QL NAA+PROBE: NOT DETECTED
HCOV HKU1 RNA SPEC QL NAA+PROBE: NOT DETECTED
HCOV NL63 RNA SPEC QL NAA+PROBE: NOT DETECTED
HCOV OC43 RNA SPEC QL NAA+PROBE: NOT DETECTED
HCT VFR BLD AUTO: 49.5 % (ref 34–46.6)
HGB BLD-MCNC: 15.8 G/DL (ref 12–15.9)
HGB UR QL STRIP.AUTO: ABNORMAL
HMPV RNA NPH QL NAA+NON-PROBE: NOT DETECTED
HPIV1 RNA SPEC QL NAA+PROBE: NOT DETECTED
HPIV2 RNA SPEC QL NAA+PROBE: NOT DETECTED
HPIV3 RNA NPH QL NAA+PROBE: NOT DETECTED
HPIV4 P GENE NPH QL NAA+PROBE: NOT DETECTED
HYALINE CASTS UR QL AUTO: ABNORMAL /LPF
IMM GRANULOCYTES # BLD AUTO: 0.02 10*3/MM3 (ref 0–0.05)
IMM GRANULOCYTES NFR BLD AUTO: 0.3 % (ref 0–0.5)
KETONES UR QL STRIP: ABNORMAL
LEUKOCYTE ESTERASE UR QL STRIP.AUTO: ABNORMAL
LIPASE SERPL-CCNC: 54 U/L (ref 13–60)
LYMPHOCYTES # BLD AUTO: 1.89 10*3/MM3 (ref 0.7–3.1)
LYMPHOCYTES NFR BLD AUTO: 28.3 % (ref 19.6–45.3)
M PNEUMO IGG SER IA-ACNC: NOT DETECTED
MAGNESIUM SERPL-MCNC: 2.2 MG/DL (ref 1.6–2.4)
MCH RBC QN AUTO: 32.6 PG (ref 26.6–33)
MCHC RBC AUTO-ENTMCNC: 31.9 G/DL (ref 31.5–35.7)
MCV RBC AUTO: 102.1 FL (ref 79–97)
MONOCYTES # BLD AUTO: 0.47 10*3/MM3 (ref 0.1–0.9)
MONOCYTES NFR BLD AUTO: 7 % (ref 5–12)
NEUTROPHILS # BLD AUTO: 3.99 10*3/MM3 (ref 1.4–7)
NEUTROPHILS NFR BLD AUTO: 60 % (ref 42.7–76)
NITRITE UR QL STRIP: NEGATIVE
NRBC BLD AUTO-RTO: 0 /100 WBC (ref 0–0)
PH UR STRIP.AUTO: 5.5 [PH] (ref 5–8)
PLATELET # BLD AUTO: 256 10*3/MM3 (ref 140–450)
PMV BLD AUTO: 10.5 FL (ref 6–12)
POTASSIUM BLD-SCNC: 3.8 MMOL/L (ref 3.5–5.2)
PROT SERPL-MCNC: 8.9 G/DL (ref 6–8.5)
PROT UR QL STRIP: ABNORMAL
RBC # BLD AUTO: 4.85 10*6/MM3 (ref 3.77–5.28)
RBC # UR: ABNORMAL /HPF
REF LAB TEST METHOD: ABNORMAL
RHINOVIRUS RNA SPEC NAA+PROBE: NOT DETECTED
RSV RNA NPH QL NAA+NON-PROBE: NOT DETECTED
SODIUM BLD-SCNC: 138 MMOL/L (ref 136–145)
SP GR UR STRIP: 1.02 (ref 1–1.03)
SQUAMOUS #/AREA URNS HPF: ABNORMAL /HPF
TROPONIN T SERPL-MCNC: <0.01 NG/ML (ref 0–0.03)
UROBILINOGEN UR QL STRIP: ABNORMAL
WBC NRBC COR # BLD: 6.67 10*3/MM3 (ref 3.4–10.8)
WBC UR QL AUTO: ABNORMAL /HPF

## 2019-04-02 PROCEDURE — 25010000002 ONDANSETRON PER 1 MG: Performed by: EMERGENCY MEDICINE

## 2019-04-02 PROCEDURE — 99215 OFFICE O/P EST HI 40 MIN: CPT | Performed by: PSYCHIATRY & NEUROLOGY

## 2019-04-02 PROCEDURE — 96361 HYDRATE IV INFUSION ADD-ON: CPT

## 2019-04-02 PROCEDURE — 93005 ELECTROCARDIOGRAM TRACING: CPT | Performed by: NURSE PRACTITIONER

## 2019-04-02 PROCEDURE — 99285 EMERGENCY DEPT VISIT HI MDM: CPT

## 2019-04-02 PROCEDURE — 83690 ASSAY OF LIPASE: CPT | Performed by: EMERGENCY MEDICINE

## 2019-04-02 PROCEDURE — 96374 THER/PROPH/DIAG INJ IV PUSH: CPT

## 2019-04-02 PROCEDURE — 70450 CT HEAD/BRAIN W/O DYE: CPT

## 2019-04-02 PROCEDURE — G0378 HOSPITAL OBSERVATION PER HR: HCPCS

## 2019-04-02 PROCEDURE — 87633 RESP VIRUS 12-25 TARGETS: CPT | Performed by: EMERGENCY MEDICINE

## 2019-04-02 PROCEDURE — 87798 DETECT AGENT NOS DNA AMP: CPT | Performed by: EMERGENCY MEDICINE

## 2019-04-02 PROCEDURE — 81001 URINALYSIS AUTO W/SCOPE: CPT | Performed by: EMERGENCY MEDICINE

## 2019-04-02 PROCEDURE — 85025 COMPLETE CBC W/AUTO DIFF WBC: CPT | Performed by: EMERGENCY MEDICINE

## 2019-04-02 PROCEDURE — 82962 GLUCOSE BLOOD TEST: CPT

## 2019-04-02 PROCEDURE — 83735 ASSAY OF MAGNESIUM: CPT | Performed by: EMERGENCY MEDICINE

## 2019-04-02 PROCEDURE — 93010 ELECTROCARDIOGRAM REPORT: CPT | Performed by: INTERNAL MEDICINE

## 2019-04-02 PROCEDURE — 84484 ASSAY OF TROPONIN QUANT: CPT | Performed by: EMERGENCY MEDICINE

## 2019-04-02 PROCEDURE — 80053 COMPREHEN METABOLIC PANEL: CPT | Performed by: EMERGENCY MEDICINE

## 2019-04-02 PROCEDURE — 87486 CHLMYD PNEUM DNA AMP PROBE: CPT | Performed by: EMERGENCY MEDICINE

## 2019-04-02 PROCEDURE — 87581 M.PNEUMON DNA AMP PROBE: CPT | Performed by: EMERGENCY MEDICINE

## 2019-04-02 RX ORDER — AMLODIPINE BESYLATE 5 MG/1
5 TABLET ORAL DAILY
Status: DISCONTINUED | OUTPATIENT
Start: 2019-04-03 | End: 2019-04-04 | Stop reason: HOSPADM

## 2019-04-02 RX ORDER — SODIUM CHLORIDE 0.9 % (FLUSH) 0.9 %
3 SYRINGE (ML) INJECTION EVERY 12 HOURS SCHEDULED
Status: DISCONTINUED | OUTPATIENT
Start: 2019-04-02 | End: 2019-04-04 | Stop reason: HOSPADM

## 2019-04-02 RX ORDER — SENNOSIDES 8.6 MG
1300 CAPSULE ORAL EVERY 8 HOURS PRN
COMMUNITY

## 2019-04-02 RX ORDER — SODIUM CHLORIDE 9 MG/ML
75 INJECTION, SOLUTION INTRAVENOUS CONTINUOUS
Status: DISCONTINUED | OUTPATIENT
Start: 2019-04-02 | End: 2019-04-04 | Stop reason: HOSPADM

## 2019-04-02 RX ORDER — MULTIVIT WITH MINERALS/LUTEIN
1 TABLET ORAL DAILY
COMMUNITY

## 2019-04-02 RX ORDER — HYDRALAZINE HYDROCHLORIDE 25 MG/1
25 TABLET, FILM COATED ORAL 2 TIMES DAILY PRN
COMMUNITY
End: 2020-05-28

## 2019-04-02 RX ORDER — SODIUM CHLORIDE 0.9 % (FLUSH) 0.9 %
3-10 SYRINGE (ML) INJECTION AS NEEDED
Status: DISCONTINUED | OUTPATIENT
Start: 2019-04-02 | End: 2019-04-04 | Stop reason: HOSPADM

## 2019-04-02 RX ORDER — DULOXETIN HYDROCHLORIDE 60 MG/1
60 CAPSULE, DELAYED RELEASE ORAL DAILY
Status: DISCONTINUED | OUTPATIENT
Start: 2019-04-02 | End: 2019-04-04 | Stop reason: HOSPADM

## 2019-04-02 RX ORDER — ACETAMINOPHEN 325 MG/1
650 TABLET ORAL EVERY 4 HOURS PRN
Status: DISCONTINUED | OUTPATIENT
Start: 2019-04-02 | End: 2019-04-04 | Stop reason: HOSPADM

## 2019-04-02 RX ORDER — ASPIRIN 325 MG
325 TABLET ORAL DAILY
Status: DISCONTINUED | OUTPATIENT
Start: 2019-04-02 | End: 2019-04-04 | Stop reason: HOSPADM

## 2019-04-02 RX ORDER — FEBUXOSTAT 40 MG/1
40 TABLET, FILM COATED ORAL DAILY
Status: DISCONTINUED | OUTPATIENT
Start: 2019-04-02 | End: 2019-04-04 | Stop reason: HOSPADM

## 2019-04-02 RX ORDER — AMOXICILLIN 500 MG
1200 CAPSULE ORAL DAILY
COMMUNITY
End: 2020-06-19

## 2019-04-02 RX ORDER — LOSARTAN POTASSIUM 100 MG/1
100 TABLET ORAL DAILY
Status: DISCONTINUED | OUTPATIENT
Start: 2019-04-03 | End: 2019-04-04 | Stop reason: HOSPADM

## 2019-04-02 RX ORDER — ATORVASTATIN CALCIUM 80 MG/1
80 TABLET, FILM COATED ORAL NIGHTLY
Status: DISCONTINUED | OUTPATIENT
Start: 2019-04-02 | End: 2019-04-04 | Stop reason: HOSPADM

## 2019-04-02 RX ORDER — ASPIRIN 300 MG/1
300 SUPPOSITORY RECTAL DAILY
Status: DISCONTINUED | OUTPATIENT
Start: 2019-04-02 | End: 2019-04-04 | Stop reason: HOSPADM

## 2019-04-02 RX ORDER — SIMVASTATIN 40 MG
40 TABLET ORAL DAILY
COMMUNITY
End: 2019-04-04 | Stop reason: HOSPADM

## 2019-04-02 RX ORDER — ASPIRIN 325 MG
325 TABLET ORAL ONCE
Status: COMPLETED | OUTPATIENT
Start: 2019-04-02 | End: 2019-04-02

## 2019-04-02 RX ORDER — ONDANSETRON 2 MG/ML
4 INJECTION INTRAMUSCULAR; INTRAVENOUS ONCE
Status: COMPLETED | OUTPATIENT
Start: 2019-04-02 | End: 2019-04-02

## 2019-04-02 RX ORDER — ATENOLOL 25 MG/1
25 TABLET ORAL DAILY
Status: DISCONTINUED | OUTPATIENT
Start: 2019-04-03 | End: 2019-04-03

## 2019-04-02 RX ORDER — ONDANSETRON 4 MG/1
4 TABLET, FILM COATED ORAL EVERY 6 HOURS PRN
Status: DISCONTINUED | OUTPATIENT
Start: 2019-04-02 | End: 2019-04-04 | Stop reason: HOSPADM

## 2019-04-02 RX ADMIN — ASPIRIN 325 MG: 325 TABLET ORAL at 13:28

## 2019-04-02 RX ADMIN — SODIUM CHLORIDE, PRESERVATIVE FREE 3 ML: 5 INJECTION INTRAVENOUS at 20:34

## 2019-04-02 RX ADMIN — ACETAMINOPHEN 650 MG: 325 TABLET, FILM COATED ORAL at 18:13

## 2019-04-02 RX ADMIN — ATORVASTATIN CALCIUM 80 MG: 80 TABLET, FILM COATED ORAL at 20:34

## 2019-04-02 RX ADMIN — SODIUM CHLORIDE 1000 ML: 9 INJECTION, SOLUTION INTRAVENOUS at 11:37

## 2019-04-02 RX ADMIN — ONDANSETRON 4 MG: 2 INJECTION INTRAMUSCULAR; INTRAVENOUS at 11:37

## 2019-04-02 RX ADMIN — DULOXETINE HYDROCHLORIDE 60 MG: 60 CAPSULE, DELAYED RELEASE ORAL at 18:05

## 2019-04-02 RX ADMIN — FEBUXOSTAT 40 MG: 40 TABLET ORAL at 18:05

## 2019-04-02 RX ADMIN — ONDANSETRON 4 MG: 4 TABLET, FILM COATED ORAL at 18:05

## 2019-04-02 RX ADMIN — SODIUM CHLORIDE 75 ML/HR: 9 INJECTION, SOLUTION INTRAVENOUS at 18:05

## 2019-04-02 RX ADMIN — ACETAMINOPHEN 650 MG: 325 TABLET, FILM COATED ORAL at 22:18

## 2019-04-02 NOTE — PROGRESS NOTES
Clinical Pharmacy Services: Medication History    Abla Correa is a 85 y.o. female presenting to [facility] for   Chief Complaint   Patient presents with   • Weakness - Generalized     PT C/O GENERALIZED WEAKNESS, NAUSEA AND HEADACHE STARTING 2 DAYS AGO       She  has a past medical history of Benign essential hypertension, Chronic kidney disease, Chronic kidney failure, Chronic renal insufficiency, CVA (cerebral vascular accident) (CMS/[facility]), Degeneration of intervertebral disc, Depression, Disease of thyroid gland, DVT (deep venous thrombosis) (CMS/[facility]), Edema, Gout, H/O complete eye exam (09/2016), Hyperlipidemia, Hyperparathyroidism (CMS/[facility]), IBS (irritable bowel syndrome), Nonrheumatic aortic (valve) insufficiency (10/8/2018), OAB (overactive bladder), Osteopenia, Osteoporosis, Peripheral neuropathy, and Rheumatoid arthritis (CMS/[facility]).    Allergies as of 04/02/2019 - Reviewed 04/02/2019   Allergen Reaction Noted   • Bactrim [sulfamethoxazole-trimethoprim] Delirium 12/22/2015   • Lisinopril Unknown (See Comments) 01/10/2018   • Levofloxacin Rash 11/20/2015       Medication information was obtained from: Patient, medication list  Pharmacy and Phone Number: [phone]    Prior to Admission Medications     Prescriptions Last Dose Informant Patient Reported? Taking?    acetaminophen (TYLENOL) 650 MG 8 hr tablet 4/2/2019 Self Yes Yes    Take 1,300 mg by mouth Every 8 (Eight) Hours As Needed for Mild Pain .    amLODIPine (NORVASC) 5 MG tablet 4/2/2019 Self No Yes    Take 1 tablet by mouth Daily.    aspirin 325 MG tablet 4/2/2019 Self No Yes    Take 1 tablet by mouth Daily.    atenolol (TENORMIN) 25 MG tablet 4/2/2019 Self No Yes    Take 1 tablet by mouth Daily.    atorvastatin (LIPITOR) 40 MG tablet 4/1/2019 Self No Yes    Take 1 tablet by mouth Daily.    Biotin 3 MG tablet 4/1/2019 Self Yes Yes    Take 1 tablet by mouth Daily.    calcium carbonate-vitamin d (CALCIUM 600+D) 600-400 MG-UNIT per  tablet 4/1/2019 Self Yes Yes    Take 1 tablet by mouth Daily.    DULoxetine (CYMBALTA) 60 MG capsule 4/1/2019 Self No Yes    Take 1 capsule by mouth Daily.    febuxostat (ULORIC) 40 MG tablet 4/1/2019 Self No Yes    Take 1 tablet by mouth Daily.    losartan (COZAAR) 100 MG tablet 4/2/2019 Self No Yes    Take 1 tablet by mouth Daily.    Multiple Vitamins-Minerals (CENTRUM SILVER) tablet 4/1/2019 Self Yes Yes    Take 1 tablet by mouth Daily.    Omega-3 Fatty Acids (FISH OIL) 1200 MG capsule capsule 4/1/2019 Self Yes Yes    Take 1,200 mg by mouth Daily.    simvastatin (ZOCOR) 40 MG tablet 4/1/2019 Self Yes Yes    Take 40 mg by mouth Daily.    hydrALAZINE (APRESOLINE) 25 MG tablet  Self Yes No    Take 25 mg by mouth 2 (Two) Times a Day As Needed (If BP greater than 160/90).            Medication notes: Tylenol and Simvastatin added per patient medication list. Patient says that she has never taken the Hydralazine, it was never needed. Only takes if BP is above 160/90.    This medication list is complete to the best of my knowledge as of 4/2/2019    Please call if questions.    Heidi Edge, Medication History Technician  4/2/2019 3:07 PM

## 2019-04-02 NOTE — CONSULTS
Neurology Consult Note    Consult Date: 4/2/2019    Referring MD: Ankit Mendez*    Reason for Consult I have been asked to see the patient in neurological consultation to render advice and opinion regarding difficulty walking, possible subacute stroke on CT scan    Alba Correa is a 85 y.o. female known to me from previous admission for stroke.  At that time she had a right MCA stroke that was thought to be due to small vessel disease.  She underwent 2D and KASH which were negative as well as long-term cardiac monitoring which did not show any arrhythmia.  She was treated with full dose aspirin and statin and did well with rehab and recovered to her neurologic baseline.  She been doing well until the last month when she developed progressive difficulty with walking.  She noticed that when she would get up and try to walk she would develop severe pain in her calves and become weak.  She saw her primary care about this who referred her to orthopedics.  Seems Dopplers of her lower extremity pulses were good.  Patient since then her symptoms have been relatively stable.  She denies any worsening of her lower extremity numbness from baseline peripheral neuropathy.  She feels her balance is been impaired since her stroke but has not really worsened in the last month.  She also has new right-sided headache over the last several days.  CT of the emergency department shows possible subacute right frontal stroke.    Past Medical/Surgical Hx:  Past Medical History:   Diagnosis Date   • Benign essential hypertension    • Chronic kidney disease    • Chronic renal insufficiency    • CVA (cerebral vascular accident) (CMS/[facility])    • Degeneration of intervertebral disc    • Depression    • DVT (deep venous thrombosis) (CMS/[facility])    • Edema    • Gout    • H/O complete eye exam 09/2016   • Hyperlipidemia    • IBS (irritable bowel syndrome)    • OAB (overactive bladder)    • Osteopenia    • Osteoporosis    • Peripheral  neuropathy    • Rheumatoid arthritis (CMS/HCC)      Past Surgical History:   Procedure Laterality Date   • APPENDECTOMY     • BREAST BIOPSY     • COLONOSCOPY      declines   • HERNIA REPAIR  1965   • HYSTERECTOMY      still has ovaries   • MAMMO BILATERAL  11/16/2016    pt declines   • PAP SMEAR  11/16/2016    declines       Medications On Admission  Medications Prior to Admission   Medication Sig Dispense Refill Last Dose   • acetaminophen (TYLENOL) 650 MG 8 hr tablet Take 1,300 mg by mouth Every 8 (Eight) Hours As Needed for Mild Pain .   4/2/2019 at am   • amLODIPine (NORVASC) 5 MG tablet Take 1 tablet by mouth Daily. 90 tablet 1 4/2/2019 at Unknown time   • aspirin 325 MG tablet Take 1 tablet by mouth Daily.   4/2/2019 at Unknown time   • atenolol (TENORMIN) 25 MG tablet Take 1 tablet by mouth Daily. 90 tablet 1 4/2/2019 at Unknown time   • atorvastatin (LIPITOR) 40 MG tablet Take 1 tablet by mouth Daily. 90 tablet 1 4/1/2019 at Unknown time   • Biotin 3 MG tablet Take 1 tablet by mouth Daily.   4/1/2019 at Unknown time   • calcium carbonate-vitamin d (CALCIUM 600+D) 600-400 MG-UNIT per tablet Take 1 tablet by mouth Daily.   4/1/2019 at Unknown time   • DULoxetine (CYMBALTA) 60 MG capsule Take 1 capsule by mouth Daily. 90 capsule 1 4/1/2019 at Unknown time   • febuxostat (ULORIC) 40 MG tablet Take 1 tablet by mouth Daily. 90 tablet 1 4/1/2019 at Unknown time   • losartan (COZAAR) 100 MG tablet Take 1 tablet by mouth Daily. 90 tablet 1 4/2/2019 at Unknown time   • Multiple Vitamins-Minerals (CENTRUM SILVER) tablet Take 1 tablet by mouth Daily.   4/1/2019 at Unknown time   • Omega-3 Fatty Acids (FISH OIL) 1200 MG capsule capsule Take 1,200 mg by mouth Daily.   4/1/2019 at Unknown time   • simvastatin (ZOCOR) 40 MG tablet Take 40 mg by mouth Daily.   4/1/2019 at Unknown time   • hydrALAZINE (APRESOLINE) 25 MG tablet Take 25 mg by mouth 2 (Two) Times a Day As Needed (If BP greater than 160/90).   More than a month  at Unknown time       Allergies:  Allergies   Allergen Reactions   • Bactrim [Sulfamethoxazole-Trimethoprim] Delirium   • Lisinopril Unknown (See Comments)     Unknown per pt   • Levofloxacin Rash       Social Hx:  Social History     Socioeconomic History   • Marital status:      Spouse name: Peña   • Number of children: 3   • Years of education: High school   • Highest education level: Not on file   Occupational History   • Occupation: Dental assistant     Employer: RETIRED   Tobacco Use   • Smoking status: Former Smoker     Years: 5.00     Types: Cigarettes   • Smokeless tobacco: Never Used   • Tobacco comment: quit 40 years ago   Substance and Sexual Activity   • Alcohol use: Yes     Comment: Occasional- glass ofwine few times a week   • Drug use: No   • Sexual activity: Defer     Partners: Male     Birth control/protection: Surgical       Family Hx:  Family History   Problem Relation Age of Onset   • Alcohol abuse Other    • Cancer Other    • Hypertension Other    • Kidney disease Other    • Lung disease Other    • Heart disease Mother 89   • Heart failure Mother    • Ovarian cancer Daughter 60   • Stomach cancer Father    • Kidney cancer Father 52   • Lung cancer Father 52   • Ovarian cancer Sister    • Lung cancer Brother 65       Review of Systems  Constitutional: [No fevers, chills]  Eye: [No recent visual problems, eye discharge]  HEENT: [No ear pain, nasal congestion]  Respiratory: [No shortness of breath, cough]  Cardiovascular: [No Chest pain, palpitations]  Gastrointestinal: [No nausea, vomiting]  Genitourinary: [No hematuria, incontinence]  Hema/Lymph: [no nosebleeds, history of anticoagulation]  Endocrine: [Negative for excessive urination, heat or cold intolerance]  Musculoskeletal: [No back pain, + neck pain]  Integumentary: [No rash, pruritus]  Neurologic: [+ weakness, numbness]  Psychiatric: [No anxiety, depression]    Exam    /85 (BP Location: Left arm, Patient Position: Lying)    "Pulse 56   Temp 97.9 °F (36.6 °C) (Oral)   Resp 18   Ht 157.5 cm (62\")   Wt 54.4 kg (120 lb)   SpO2 97%   BMI 21.95 kg/m²   gen: NAD, vitals reviewed  Eyes: fundus sharp with no papilledema or retinal hemorrhages  CVS: RRR, S1, S2  MS: oriented x3, recent/remote memory intact, normal attention/concentration, language intact, no neglect, normal fund of knowledge  CN: visual acuity grossly normal, visual fields full, PERRL, EOMI, facial sensation equal, no facial droop, hearing symmetric, palate elevates symmetrically, shoulder shrug equal, tongue midline  Motor: 5/5 throughout upper extremities, 4+/5 bilateral hip flexion, 4+/5 bilateral knee extension, 5/5 bilateral knee flexion, 5/5 bilateral plantar flexion and dorsiflexion.  Normal tone throughout  Sensation: Diminished to cold temperature bilateral lower extremities, mildly diminished vibration bilateral lower extremities.  Reflexes: 2+ bilateral upper extremities, 3+ bilateral lower extremities, plantars mute bilaterally  Coordination: no dysmetria with finger to nose bilaterally  Gait: Positive Romberg sign, narrow based gait, stiff, shuffling.    DATA:    Lab Results   Component Value Date    GLUCOSE 125 (H) 04/02/2019    CALCIUM 10.5 04/02/2019     04/02/2019    K 3.8 04/02/2019    CO2 26.4 04/02/2019    CL 98 04/02/2019    BUN 15 04/02/2019    CREATININE 1.01 (H) 04/02/2019    EGFRIFAFRI 50 (L) 05/29/2018    EGFRIFNONA 52 (L) 04/02/2019    BCR 14.9 04/02/2019    ANIONGAP 13.6 04/02/2019     Lab Results   Component Value Date    WBC 6.67 04/02/2019    HGB 15.8 04/02/2019    HCT 49.5 (H) 04/02/2019    .1 (H) 04/02/2019     04/02/2019     Lab Results   Component Value Date    CHOL 161 08/06/2018     Lab Results   Component Value Date    HDL 38 (L) 08/06/2018    HDL 41 01/19/2018    HDL 43 11/17/2016     Lab Results   Component Value Date    LDL 89 08/06/2018    LDL 85 01/19/2018    LDL 79 11/17/2016     Lab Results   Component Value " Date    TRIG 171 (H) 08/06/2018    TRIG 193 (H) 01/19/2018    TRIG 165 (H) 11/17/2016     Lab Results   Component Value Date    HGBA1C 5.30 08/06/2018     Lab Results   Component Value Date    INR 1.02 08/05/2018    INR 1.0 11/22/2015    INR 1.1 11/20/2015    PROTIME 13.2 08/05/2018    PROTIME 13.4 11/22/2015    PROTIME 13.6 11/20/2015       Lab review: Hemoglobin 15.9, mild microcytosis, BMP unremarkable    Imaging review: I personally reviewed her CT head which shows a right frontal subacute appearing stroke.  Radiology report reviewed MRI brain and MRA head and neck are pending.  MRI C-spine ordered.    Impression:  1) abnormal head CT  2) neck pain  3) peripheral neuropathy  4) claudication    Comment: Differential for her worsening gait dysfunction includes new stroke versus cervical spinal stenosis versus vascular or musculoskeletal cause.  Agree with getting MRI given what looks like a new stroke.  We will also check MRI of the cervical spine to exclude cervical spinal stenosis.  She had an extensive cardiac workup before, if MRI shows a new embolic appearing stroke we might have to think about empiric anticoagulation    PLAN:  1. Agree with MRI/MRA of the brain  2. MRI C spine given new gait dysfunction and neck pain  3. ASA, statin.

## 2019-04-02 NOTE — PLAN OF CARE
Problem: Patient Care Overview  Goal: Plan of Care Review  Outcome: Ongoing (interventions implemented as appropriate)   04/02/19 7140   Coping/Psychosocial   Plan of Care Reviewed With patient   Plan of Care Review   Progress no change   OTHER   Outcome Summary new admission from the er. c/o nausea unrelieved by zofran given in the er. nih=0. awaiting mri. family at the bedside.        Problem: Stroke (Ischemic) (Adult)  Goal: Signs and Symptoms of Listed Potential Problems Will be Absent, Minimized or Managed (Stroke)  Outcome: Ongoing (interventions implemented as appropriate)      Problem: Nausea/Vomiting (Adult)  Goal: Identify Related Risk Factors and Signs and Symptoms  Outcome: Ongoing (interventions implemented as appropriate)    Goal: Symptom Relief  Outcome: Ongoing (interventions implemented as appropriate)    Goal: Adequate Hydration  Outcome: Ongoing (interventions implemented as appropriate)

## 2019-04-02 NOTE — ED PROVIDER NOTES
EMERGENCY DEPARTMENT ENCOUNTER    CHIEF COMPLAINT  Chief Complaint: HA  History given by: patient  History limited by: nothing  Room Number: 19/19  PMD: Aramis Doty MD      HPI:  Pt is a 85 y.o. female who presents complaining of a frontal HA that began two nights ago. Pt states that she has since developed nausea, generalized myalgias, subjective fever, diaphoresis, chills and generalized weakness but denies unusual neck pain, vomiting, urinary frequency, dysuria, cough, abd pain or diarrhea. Pt states that her nausea is worse with ambulation and that she has mild dizziness when she is ambulating. Pt states that she has needed to use a walker to ambulate at home, which is abnormal for her. Pt denies a history of headaches or having similar symptoms previously. Pt states that she has been taking Tylenol without relief of her HA.    Duration:  2.5-3 days  Onset: gradual  Timing: constant  Location: frontal head  Radiation: none  Quality: unspecifed  Intensity/Severity: moderate to severe  Progression: worsening  Associated Symptoms: nausea, dizziness, generalized myalgias, subjective fever, diaphoresis, chills and generalized weakness  Aggravating Factors: none  Alleviating Factors: none  Previous Episodes: Pt denies a history of headaches or having similar symptoms previously.   Treatment before arrival:  Pt states that she has been taking Tylenol without relief of her HA.      PAST MEDICAL HISTORY  Active Ambulatory Problems     Diagnosis Date Noted   • Chronic kidney failure    • DVT (deep venous thrombosis) (CMS/[facility])    • Depression    • Edema    • Gout    • Elevated cholesterol    • Osteoporosis    • Hyperparathyroidism (CMS/HCC)    • Benign essential hypertension    • Adaptive colitis    • Osteopenia    • Rheumatoid arthritis (CMS/HCC)    • Degeneration of intervertebral disc    • D (diarrhea) 11/20/2015   • BP (high blood pressure) 11/20/2015   • 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   • Open leg wound  11/20/2015   • Vaginal odor 11/20/2015   • Infected wound 11/20/2015   • History of depression 07/11/2017   • History of renal insufficiency syndrome 07/11/2017   • History of degenerative disc disease 07/11/2017   • History of osteoporosis 07/11/2017   • History of DVT (deep vein thrombosis) 07/11/2017   • History of rheumatoid arthritis 07/11/2017   • Hyperlipidemia 01/10/2018   • Macrocytosis 02/28/2018   • Right leg weakness 08/05/2018   • CVA (cerebral vascular accident) (CMS/HCC) 08/06/2018   • Nonrheumatic aortic (valve) insufficiency 10/08/2018   • History of ischemic right MCA stroke 11/20/2018     Resolved Ambulatory Problems     Diagnosis Date Noted   • No Resolved Ambulatory Problems     Past Medical History:   Diagnosis Date   • Benign essential hypertension    • Chronic kidney disease    • Chronic kidney failure    • Chronic renal insufficiency    • CVA (cerebral vascular accident) (CMS/[facility])    • Degeneration of intervertebral disc    • Depression    • Disease of thyroid gland    • DVT (deep venous thrombosis) (CMS/[facility])    • Edema    • Gout    • H/O complete eye exam 09/2016   • Hyperlipidemia    • Hyperparathyroidism (CMS/[facility])    • IBS (irritable bowel syndrome)    • Nonrheumatic aortic (valve) insufficiency 10/8/2018   • OAB (overactive bladder)    • Osteopenia    • Osteoporosis    • Peripheral neuropathy    • Rheumatoid arthritis (CMS/HCC)        PAST SURGICAL HISTORY  Past Surgical History:   Procedure Laterality Date   • APPENDECTOMY     • BREAST BIOPSY     • COLONOSCOPY      declines   • HERNIA REPAIR  1965   • HYSTERECTOMY      still has ovaries   • MAMMO BILATERAL  11/16/2016    pt declines   • PAP SMEAR  11/16/2016    declines       FAMILY HISTORY  Family History   Problem Relation Age of Onset   • Alcohol abuse Other    • Cancer Other    • Hypertension Other    • Kidney disease Other    • Lung disease Other    • Heart disease Mother 89   • Heart failure Mother    • Ovarian cancer Daughter 60    • Stomach cancer Father    • Kidney cancer Father 52   • Lung cancer Father 52   • Ovarian cancer Sister    • Lung cancer Brother 65       SOCIAL HISTORY  Social History     Socioeconomic History   • Marital status:      Spouse name: Ray   • Number of children: 3   • Years of education: High school   • Highest education level: Not on file   Occupational History   • Occupation: Dental assistant     Employer: RETIRED   Tobacco Use   • Smoking status: Former Smoker     Years: 5.00     Types: Cigarettes   • Smokeless tobacco: Never Used   Substance and Sexual Activity   • Alcohol use: Yes     Comment: Occasional   • Drug use: No   • Sexual activity: Yes     Partners: Male     Birth control/protection: Surgical       ALLERGIES  Bactrim [sulfamethoxazole-trimethoprim]; Lisinopril; and Levofloxacin    REVIEW OF SYSTEMS  Review of Systems   Constitutional: Positive for chills, diaphoresis and fever (subjective).   HENT: Negative for sore throat.    Eyes: Negative.    Respiratory: Negative for cough and shortness of breath.    Cardiovascular: Negative for chest pain.   Gastrointestinal: Positive for nausea. Negative for abdominal pain, diarrhea and vomiting.   Genitourinary: Negative for dysuria.   Musculoskeletal: Positive for myalgias (generalized). Negative for neck pain.   Skin: Negative for rash.   Neurological: Positive for dizziness, weakness (generalized) and headaches. Negative for numbness.   Hematological: Negative.    Psychiatric/Behavioral: Negative.    All other systems reviewed and are negative.      PHYSICAL EXAM  ED Triage Vitals [04/02/19 1058]   Temp Heart Rate Resp BP SpO2   98.8 °F (37.1 °C) 59 16 162/64 99 %      Temp src Heart Rate Source Patient Position BP Location FiO2 (%)   Tympanic -- -- -- --       Physical Exam   Constitutional: She is oriented to person, place, and time.  Non-toxic appearance. No distress.   Pt appears uncomfortable   HENT:   Mouth/Throat: Mucous membranes are normal.    Eyes: EOM are normal.   Pupils are 4mm and equal bilaterally without any significant photophobia   Neck: Neck supple.   No meningismus   Cardiovascular: Normal rate and regular rhythm. Exam reveals no gallop and no friction rub.   No murmur heard.  Pulmonary/Chest: Effort normal. No respiratory distress. She has no wheezes. She has no rhonchi. She has no rales.   Breath sounds are symmetric.   Abdominal: Soft. Bowel sounds are normal. She exhibits no distension. There is no tenderness. There is no guarding.   Musculoskeletal: Normal range of motion. She exhibits no edema or deformity.   Neurological: She is alert and oriented to person, place, and time.   moving all extremities, no focal deficits, no meningeal signs   Skin: Skin is warm and dry.   Psychiatric:   Calm, cooperative       LAB RESULTS  Lab Results (last 24 hours)     Procedure Component Value Units Date/Time    Urinalysis With Microscopic If Indicated (No Culture) - Urine, Clean Catch [[id number]]  (Abnormal) Collected:  04/02/19 1132    Specimen:  Urine, Clean Catch Updated:  04/02/19 1158     Color, UA Dark Yellow     Appearance, UA Clear     pH, UA 5.5     Specific Gravity, UA 1.025     Glucose, UA Negative     Ketones, UA Trace     Bilirubin, UA Negative     Blood, UA Trace     Protein,  mg/dL (2+)     Leuk Esterase, UA Trace     Nitrite, UA Negative     Urobilinogen, UA 1.0 E.U./dL    Urinalysis, Microscopic Only - Urine, Clean Catch [[id number]]  (Abnormal) Collected:  04/02/19 1132    Specimen:  Urine, Clean Catch Updated:  04/02/19 1158     RBC, UA 6-12 /HPF      WBC, UA 0-2 /HPF      Bacteria, UA None Seen /HPF      Squamous Epithelial Cells, UA 0-2 /HPF      Hyaline Casts, UA 3-6 /LPF      Methodology Automated Microscopy    Respiratory Panel, PCR - Swab, Nasopharynx [[id number]]  (Normal) Collected:  04/02/19 1133    Specimen:  Swab from Nasopharynx Updated:  04/02/19 1250     ADENOVIRUS, PCR Not Detected     Coronavirus 229E Not  Detected     Coronavirus HKU1 Not Detected     Coronavirus NL63 Not Detected     Coronavirus OC43 Not Detected     Human Metapneumovirus Not Detected     Human Rhinovirus/Enterovirus Not Detected     Influenza B PCR Not Detected     Parainfluenza Virus 1 Not Detected     Parainfluenza Virus 2 Not Detected     Parainfluenza Virus 3 Not Detected     Parainfluenza Virus 4 Not Detected     Bordetella pertussis pcr Not Detected     Influenza A H1 2009 PCR Not Detected     Chlamydophila pneumoniae PCR Not Detected     Mycoplasma pneumo by PCR Not Detected     Influenza A PCR Not Detected     Influenza A H3 Not Detected     Influenza A H1 Not Detected     RSV, PCR Not Detected     Bordetella parapertussis PCR Not Detected    CBC & Differential [[id number]] Collected:  04/02/19 1136    Specimen:  Blood Updated:  04/02/19 1153    Narrative:       The following orders were created for panel order CBC & Differential.  Procedure                               Abnormality         Status                     ---------                               -----------         ------                     CBC Auto Differential[[id number]]        Abnormal            Final result                 Please view results for these tests on the individual orders.    Comprehensive Metabolic Panel [[id number]]  (Abnormal) Collected:  04/02/19 1136    Specimen:  Blood Updated:  04/02/19 1211     Glucose 125 mg/dL      BUN 15 mg/dL      Creatinine 1.01 mg/dL      Sodium 138 mmol/L      Potassium 3.8 mmol/L      Chloride 98 mmol/L      CO2 26.4 mmol/L      Calcium 10.5 mg/dL      Total Protein 8.9 g/dL      Albumin 4.70 g/dL      ALT (SGPT) 31 U/L      AST (SGOT) 39 U/L      Alkaline Phosphatase 65 U/L      Total Bilirubin 0.4 mg/dL      eGFR Non African Amer 52 mL/min/1.73      Globulin 4.2 gm/dL      A/G Ratio 1.1 g/dL      BUN/Creatinine Ratio 14.9     Anion Gap 13.6 mmol/L     Narrative:       GFR Normal >60  Chronic Kidney Disease <60  Kidney Failure <15     Lipase [[id number]]  (Normal) Collected:  04/02/19 1136    Specimen:  Blood Updated:  04/02/19 1211     Lipase 54 U/L     Troponin [[id number]]  (Normal) Collected:  04/02/19 1136    Specimen:  Blood Updated:  04/02/19 1211     Troponin T <0.010 ng/mL     Narrative:       Troponin T Reference Range:  <= 0.03 ng/mL-   Negative for AMI  >0.03 ng/mL-     Abnormal for myocardial necrosis.  Clinicians would have to utilize clinical acumen, EKG, Troponin and serial changes to determine if it is an Acute Myocardial Infarction or myocardial injury due to an underlying chronic condition.     Magnesium [[id number]]  (Normal) Collected:  04/02/19 1136    Specimen:  Blood Updated:  04/02/19 1211     Magnesium 2.2 mg/dL     CBC Auto Differential [[id number]]  (Abnormal) Collected:  04/02/19 1136    Specimen:  Blood Updated:  04/02/19 1153     WBC 6.67 10*3/mm3      RBC 4.85 10*6/mm3      Hemoglobin 15.8 g/dL      Hematocrit 49.5 %      .1 fL      MCH 32.6 pg      MCHC 31.9 g/dL      RDW 12.3 %      RDW-SD 47.0 fl      MPV 10.5 fL      Platelets 256 10*3/mm3      Neutrophil % 60.0 %      Lymphocyte % 28.3 %      Monocyte % 7.0 %      Eosinophil % 3.1 %      Basophil % 1.3 %      Immature Grans % 0.3 %      Neutrophils, Absolute 3.99 10*3/mm3      Lymphocytes, Absolute 1.89 10*3/mm3      Monocytes, Absolute 0.47 10*3/mm3      Eosinophils, Absolute 0.21 10*3/mm3      Basophils, Absolute 0.09 10*3/mm3      Immature Grans, Absolute 0.02 10*3/mm3      nRBC 0.0 /100 WBC           I ordered the above labs and reviewed the results    RADIOLOGY  CT Head Without Contrast   Preliminary Result   No evidence of hemorrhage. No focal area of decreased   attenuation is present to suggest an acute infarct. There is moderate   small vessel ischemic disease. There is a new area of decreased   attenuation involving the frontoparietal region on the right   superolaterally, subcortical likely representing a remote infarct.   Further evaluation  could be performed with MRI examination of brain and   MRA of the head. The prior MRA of 08/05/2018 demonstrated attenuated   signal in the inferior division of the right middle cerebral artery.        The above information was called to and discussed with Dr. Berg.       CHECK CHECK               Radiation dose reduction techniques were utilized, including automated   exposure control and exposure modulation based on body size.                   I ordered the above noted radiological studies. Interpreted by radiologist. Discussed with radiologist (Dr. Zepeda). Reviewed by me in PACS.       PROCEDURES  Procedures      PROGRESS AND CONSULTS     1115- Discussed the plan to order lab and imaging studies for further evaluation of the pt's symptoms. Pt understands and agrees with the plan, all questions answered.    1117- Ordered CT Head for further evaluation. Ordered IVF for hydration.    1253- Rechecked pt. Pt is resting comfortably but continues to complain of a HA. Pt but complains of yellow spots in her vision (only occurred one night, now resolved) but denies numbness or weakness. On re-exam, the pt has intact sensation and strength. Notified pt of her lab and imaging results, including her respiratory panel and CT Head results. Discussed the plan to admit the pt for further evaluation and treatment of the pt's symptoms. Pt understands and agrees with the plan, all questions answered.    1307- Placed call to [facility] for admission.    1326- Discussed the pt's case with Dr. Mendez ([facility]), who agrees to admit the pt to a tele bed.    MEDICAL DECISION MAKING  Results were reviewed/discussed with the patient and they were also made aware of online access. Pt also made aware that some labs, such as cultures, will not be resulted during ER visit and follow up with PMD is necessary.     MDM  Number of Diagnoses or Management Options  Abnormal CT of the head:   Acute intractable headache, unspecified headache type:   History  of CVA (cerebrovascular accident):   Nausea:   Diagnosis management comments: Patient with headache and nausea, and per Radiology report concern for possible exacerbation or worsening of previously noted inferior right MCA occlusion from prior CVA. Recommended further MRA head to evaluate, so patient has been hospitalized to [facility] for remainder stroke workup. Remainder of laboratory workup unremarkable.        Amount and/or Complexity of Data Reviewed  Clinical lab tests: ordered and reviewed (Respiratory panel is negative)  Tests in the radiology section of CPT®: ordered and reviewed (CT Head shows old infarcts that were seen previously. One old infarct is larger than previously seen and recommends MRA Head.)  Discussion of test results with the performing providers: yes (Dr. Zepeda)  Decide to obtain previous medical records or to obtain history from someone other than the patient: yes  Review and summarize past medical records: yes (Pt was last admitted in August 2018 for RLE weakness secondary to CVA.)  Discuss the patient with other providers: yes (Dr. Mendez ([facility]))  Independent visualization of images, tracings, or specimens: yes    Patient Progress  Patient progress: stable      Pt is not a tPA candidate since symptoms began greater than 24 hours PTA and NIHSS=0.    DIAGNOSIS  Final diagnoses:   Acute intractable headache, unspecified headache type   Abnormal CT of the head   History of CVA (cerebrovascular accident)   Nausea       DISPOSITION  ADMISSION    Discussed treatment plan and reason for admission with pt/family and admitting physician.  Pt/family voiced understanding of the plan for admission for further testing/treatment as needed.       Latest Documented Vital Signs:  As of 1:31 PM  BP- 178/77 HR- 55 Temp- 98.8 °F (37.1 °C) (Tympanic) O2 sat- 99%    --  Documentation assistance provided by nick Abarca for Dr. Berg.  Information recorded by the nick was done at my direction and has  been verified and validated by me.     Clover Abarca  04/02/19 1331       Eulogio Berg MD  04/02/19 5873

## 2019-04-02 NOTE — H&P
History and Physical    Patient Name: Alba Correa  Age/Sex: 85 y.o. female  : 1933  MRN: [id number]    Date of Admission: 2019  Date of Encounter Visit: 19  Encounter Provider: GROVER Latham  Place of Service: [facility]  Patient Care Team:  Aramis Doty MD as PCP - General (Family Medicine)  Juan Jose Muñiz MD as Consulting Physician (Neurology)  Adrian Perez MD as Consulting Physician (Hematology and Oncology)  Adrian Caldera MD as Consulting Physician (Nephrology)  Zack Dewitt MD as Consulting Physician (Rheumatology)  Aramis Doty MD as Referring Physician (Family Medicine)  Edi Singh MD as Surgeon (Wound Care)  Yoly Isaac APRN as Nurse Practitioner (Obstetrics and Gynecology)    Subjective:     Chief Complaint:   Chief Complaint   Patient presents with   • Weakness - Generalized     PT C/O GENERALIZED WEAKNESS, NAUSEA AND HEADACHE STARTING 2 DAYS AGO     History of Present Illness  Alba Correa is a 85 y.o. female with a history of HTN, CKD, CVA, depression, thyroid, DVT, RA and hyperparathyroidism. There was no identifiable source of prior stroke per patient, but KASH did not aortic calcification and possible left ICA stenosis.    Patient presented with complaints of headache and nausea that started  and was associated with nausea and weakness. Over the past 2 weeks she has noticed increased weakness of BLE worse in the morning and improves with activity throughout the day. She had a recent venous doppler that showed some valve issues, but no DVT. She has an appointment to see orthopedics.     She has a walker at home that she rarely used, but is now using on a daily basis. She has been in bed for the past few days due to the headache, nausea, hot and cold flashes and body aches with worsening left hip and lower back pain.  Denies any fever, recent sick contacts, cough, shortness of breath, chest pain, dysuria, syncope,  abdominal pain, vomiting or diarrhea.  Denies any history of migraines. She takes Aspirin 325 mg daily and denies any missed doses.     CT of the head showed no sign of hemorrhage or acute infarct, but there were small vessel ischemic changes with decreased attenuation in the frontoparietal region suggestive of possible subacute infarct.  Viral panel was negative.  UA negative.  All other labs are unremarkable except for macrocytosis.    Review of Systems   Constitutional: Positive for appetite change (decreased) and chills. Negative for fatigue and fever.   HENT: Negative for congestion and trouble swallowing.    Eyes: Negative for visual disturbance.   Respiratory: Negative for cough, shortness of breath and wheezing.    Cardiovascular: Negative for chest pain, palpitations and leg swelling.   Gastrointestinal: Positive for nausea. Negative for abdominal pain, diarrhea and vomiting.   Genitourinary: Negative for difficulty urinating, dysuria, frequency and urgency.   Musculoskeletal: Positive for arthralgias and gait problem. Negative for back pain.   Neurological: Positive for headaches (right frontal). Negative for dizziness, syncope and weakness.   Psychiatric/Behavioral: Negative for confusion. The patient is not nervous/anxious.    All other systems reviewed and are negative.      Past Medical and Surgical History:  Past Medical History:   Diagnosis Date   • Benign essential hypertension    • Chronic kidney disease    • Chronic kidney failure    • Chronic renal insufficiency    • CVA (cerebral vascular accident) (CMS/[facility])    • Degeneration of intervertebral disc    • Depression    • Disease of thyroid gland    • DVT (deep venous thrombosis) (CMS/[facility])    • Edema    • Gout    • H/O complete eye exam 09/2016   • Hyperlipidemia    • Hyperparathyroidism (CMS/[facility])    • IBS (irritable bowel syndrome)    • Nonrheumatic aortic (valve) insufficiency 10/8/2018   • OAB (overactive bladder)    • Osteopenia    •  Osteoporosis    • Peripheral neuropathy    • Rheumatoid arthritis (CMS/HCC)        Past Surgical History:   Procedure Laterality Date   • APPENDECTOMY     • BREAST BIOPSY     • COLONOSCOPY      declines   • HERNIA REPAIR  1965   • HYSTERECTOMY      still has ovaries   • MAMMO BILATERAL  11/16/2016    pt declines   • PAP SMEAR  11/16/2016    declines       Home Medications:     (Not in a hospital admission)    Inpatient Medications:  Scheduled Meds:  Continuous Infusions:  No current facility-administered medications for this encounter.   PRN Meds:.    Allergies:  Allergies   Allergen Reactions   • Bactrim [Sulfamethoxazole-Trimethoprim] Delirium   • Lisinopril Unknown (See Comments)     Unknown per pt   • Levofloxacin Rash       Past Social History:  Social History     Socioeconomic History   • Marital status:      Spouse name: Ray   • Number of children: 3   • Years of education: High school   • Highest education level: Not on file   Occupational History   • Occupation: Dental assistant     Employer: RETIRED   Tobacco Use   • Smoking status: Former Smoker     Years: 5.00     Types: Cigarettes   • Smokeless tobacco: Never Used   Substance and Sexual Activity   • Alcohol use: Yes     Comment: Occasional   • Drug use: No   • Sexual activity: Yes     Partners: Male     Birth control/protection: Surgical       Past Family History:  Family History   Problem Relation Age of Onset   • Alcohol abuse Other    • Cancer Other    • Hypertension Other    • Kidney disease Other    • Lung disease Other    • Heart disease Mother 89   • Heart failure Mother    • Ovarian cancer Daughter 60   • Stomach cancer Father    • Kidney cancer Father 52   • Lung cancer Father 52   • Ovarian cancer Sister    • Lung cancer Brother 65       Objective:   Temp:  [98.8 °F (37.1 °C)] 98.8 °F (37.1 °C)  Heart Rate:  [52-59] 53  Resp:  [16-18] 18  BP: (162-184)/() 178/77   SpO2:  [95 %-100 %] 98 %  on    Device (Oxygen Therapy): room  air    Intake/Output Summary (Last 24 hours) at 4/2/2019 1402  Last data filed at 4/2/2019 1328  Gross per 24 hour   Intake 1000 ml   Output --   Net 1000 ml     Body mass index is 21.95 kg/m².      04/02/19  1135   Weight: 54.4 kg (120 lb)     Weight change:     Physical Exam   Constitutional: She is oriented to person, place, and time. She appears well-developed and well-nourished. No distress.   HENT:   Head: Normocephalic and atraumatic.   Eyes: Conjunctivae and EOM are normal. Pupils are equal, round, and reactive to light. No scleral icterus.   Discoloration lines of bilateral sclera   Neck: Neck supple. No JVD present. No tracheal deviation present.   Left carotid bruit   Cardiovascular: Regular rhythm and normal heart sounds. Bradycardia present.   No murmur heard.  Pulmonary/Chest: Effort normal and breath sounds normal. She has no wheezes. She has no rales.   Abdominal: Soft. Bowel sounds are normal. There is no tenderness.   Musculoskeletal: She exhibits no edema.   Neurological: She is alert and oriented to person, place, and time. No cranial nerve deficit or sensory deficit.   4/5 BLE weakness slightly worse on the left. 5/5 BUE strength.      Skin: Skin is warm and dry. She is not diaphoretic.   Hyperpigmentation of bilateral shins   Psychiatric: She has a normal mood and affect. Her behavior is normal.   Nursing note and vitals reviewed.     Lab Review:     Results from last 7 days   Lab Units 04/02/19  1136   SODIUM mmol/L 138   POTASSIUM mmol/L 3.8   CHLORIDE mmol/L 98   CO2 mmol/L 26.4   BUN mg/dL 15   CREATININE mg/dL 1.01*   GLUCOSE mg/dL 125*   CALCIUM mg/dL 10.5   AST (SGOT) U/L 39*   ALT (SGPT) U/L 31     Estimated Creatinine Clearance: 35 mL/min (A) (by C-G formula based on SCr of 1.01 mg/dL (H)).          Results from last 7 days   Lab Units 04/02/19  1136   TROPONIN T ng/mL <0.010             Results from last 7 days   Lab Units 04/02/19  1136   MAGNESIUM mg/dL 2.2         Results from last  7 days   Lab Units 04/02/19  1136   WBC 10*3/mm3 6.67   HEMOGLOBIN g/dL 15.8   HEMATOCRIT % 49.5*   PLATELETS 10*3/mm3 256   MCV fL 102.1*   MCH pg 32.6   MCHC g/dL 31.9   RDW % 12.3   RDW-SD fl 47.0   MPV fL 10.5   NEUTROPHIL % % 60.0   LYMPHOCYTE % % 28.3   MONOCYTES % % 7.0   EOSINOPHIL % % 3.1   BASOPHIL % % 1.3   IMM GRAN % % 0.3   NEUTROS ABS 10*3/mm3 3.99   LYMPHS ABS 10*3/mm3 1.89   MONOS ABS 10*3/mm3 0.47   EOS ABS 10*3/mm3 0.21   BASOS ABS 10*3/mm3 0.09   IMMATURE GRANS (ABS) 10*3/mm3 0.02   NRBC /100 WBC 0.0                   Results from last 7 days   Lab Units 04/02/19  1136   LIPASE U/L 54         Results from last 7 days   Lab Units 04/02/19  1132   NITRITE UA  Negative   WBC UA /HPF 0-2   BACTERIA UA /HPF None Seen   SQUAM EPITHEL UA /HPF 0-2     Results from last 7 days   Lab Units 04/02/19  1133   ADENOVIRUS DETECTION BY PCR  Not Detected   CORONAVIRUS 229E  Not Detected   CORONAVIRUS HKU1  Not Detected   CORONAVIRUS NL63  Not Detected   CORONAVIRUS OC43  Not Detected   HUMAN METAPNEUMOVIRUS  Not Detected   HUMAN RHINOVIRUS/ENTEROVIRUS  Not Detected   INFLUENZA B PCR  Not Detected   PARAINFLUENZA 1  Not Detected   PARAINFLUENZA VIRUS 2  Not Detected   PARAINFLUENZA VIRUS 3  Not Detected   PARAINFLUENZA VIRUS 4  Not Detected   BORDETELLA PERTUSSIS PCR  Not Detected   CHLAMYDOPHILA PNEUMONIAE PCR  Not Detected   MYCOPLAMA PNEUMO PCR  Not Detected   INFLUENZA A PCR  Not Detected   INFLUENZA A H3  Not Detected   INFLUENZA A H1  Not Detected   RSV, PCR  Not Detected           Imaging:  Imaging Results (last 24 hours)     Procedure Component Value Units Date/Time    CT Head Without Contrast [[id number]] Collected:  04/02/19 1319     Updated:  04/02/19 1319    Narrative:       CT HEAD WITHOUT CONTRAST     HISTORY:  Headache, nausea.     Noncontrasted CT examination of the brain was performed and compared to  the prior CT examination of 08/05/2018 and to the MRI examination of  08/05/2018.     FINDINGS:  There is age-appropriate atrophy. Areas of decreased  attenuation suggesting moderate small vessel ischemic disease are  appreciated, overall similar in appearance compared to prior examination  but with a new area of decreased attenuation involving the subcortical  white matter of the frontoparietal region on the right superiolaterally  suggesting a remote subcortical vascular insult. There is no evidence of  restricted diffusion at this site on the MRI examination of 08/05/2018.  There is a small area of encephalomalacia involving the insular cortex  of the right corresponding in location to the small infarct present on  the MRI examination of 08/05/2018. No focal area of decreased  attenuation to suggest acute infarction is identified. Moderate vascular  calcification is noted.       Impression:       No evidence of hemorrhage. No focal area of decreased  attenuation is present to suggest an acute infarct. There is moderate  small vessel ischemic disease. There is a new area of decreased  attenuation involving the frontoparietal region on the right  superolaterally, subcortical likely representing a remote infarct.  Further evaluation could be performed with MRI examination of brain and  MRA of the head. The prior MRA of 08/05/2018 demonstrated attenuated  signal in the inferior division of the right middle cerebral artery.      The above information was called to and discussed with Dr. Berg.     CHECK CHECK           Radiation dose reduction techniques were utilized, including automated  exposure control and exposure modulation based on body size.                EKG:  No orders to display       I reviewed the patient's new clinical results.  I reviewed the patient's new imaging results and agree with the interpretation.  I reviewed the patient's other test results and agree with the interpretation.  I personally viewed and interpreted the patient's EKG/Telemetry data.    Problem List:     Active Hospital  Problems    Diagnosis  POA   • **Acute intractable headache [R51]  Yes   • History of ischemic right MCA stroke [Z86.73]  Not Applicable   • Benign essential hypertension [I10]  Yes   • Elevated cholesterol [E78.00]  Yes   • Rheumatoid arthritis (CMS/HCC) [M06.9]  Yes   • Chronic renal failure, stage 3 (moderate) (CMS/HCC) [N18.3]  Yes      Resolved Hospital Problems   No resolved problems to display.     Assessment and Plan:     Patient is an 85-year-old female with a history of right MCA stroke, hyperlipidemia, hypertension, chronic kidney disease stage III and rheumatoid arthritis who presents with complaints of headache, nausea, lower extremity weakness and fatigue.  CT of the head showed possible decreased attenuation especially when compared to prior MRI.  There was no identifiable source of her prior stroke and she is on high-dose aspirin at home.  She did have significant nausea with her previous stroke, which is concerning for possible subacute infarct.    - Admit to a neuromonitored bed.  - Neuro checks per stroke protocol. NIHSS every shift or as otherwise specified by neurology/neurosurgery.  - Medications: Aspirin 325 mg daily and Lipitor 80 mg daily.   - PRN BP medications to maintain SBP less than 160, but greater than 140 unless otherwise specified by neurology  - Hydrate with IVF: NS at 50 ml/hr  - Consult neurology, ST, OT and PT.  - Check Lipid panel, Hgb A1C, B12 and TSH in am   - Non-pharmacological DVT prophylaxis with SCD's/RACHEL hose  - Diagnostics: EKG, MRI brain with and without  -Consider prolonged holter monitoring at discharge for further evaluation of any arrhthymias.   - NPO unless able to pass bedside swallow.   · If passes beside swallow can resume home diet.   · If fails, speech therapy to evaluate and consider cortrak if fails evaluation  - will address Home medications once verified by RN  - Stroke Education    Goals  · SBP >140 but <160, DBP <100   · LDL <70   · Serum glucose <  140    DVT prophylaxis: SCD's   Code status addressed: DNR and would like to adjust POA on paperwork given that her  is POA and is in a nursing home with dementia  Diet: HH if passes swallow  Consult CCP to help with DC planning    I discussed the patients findings and my recommendations with patient and family.    GROVER Latham  [facility]  04/02/19  2:02 PM    Addendum: I have reviewed the history and plan as obtained by GROVER Latham, performed my own independent history. I have personally examined the patient. My exam confirms her physical findings and I agree with the plan as listed above, with the addition to the following:     The patient is being admitted for a constellation of symptoms including lower extremity weakness and nausea along with an abnormal head CT that shows concern for possible subacute right frontoparietal infarct.  On exam she is elderly and somewhat frail-appearing.  She has dry mucous membranes.  No focal neurologic deficits.  Cardiovascular exam reveals regular rate and rhythm.  Lungs are clear.  Abdominal exam reveals mild generalized tenderness to palpation but no distention, rigidity, or guarding.  The patient has no signs of an infectious process but I guess it is possible she has a undetected viral illness leading to her symptoms.  Given the head CT findings we will pursue additional neurologic evaluation with MRI/MRA of the head and neck, echocardiogram, and neurology consultation.  She will be placed on aspirin and statin.  She will be seen by therapy services with additional plans based on her clinical course.    Ankit Mendez MD  [facility]  04/02/19  3:36 PM

## 2019-04-03 ENCOUNTER — APPOINTMENT (OUTPATIENT)
Dept: MRI IMAGING | Facility: HOSPITAL | Age: 84
End: 2019-04-03

## 2019-04-03 ENCOUNTER — APPOINTMENT (OUTPATIENT)
Dept: CARDIOLOGY | Facility: HOSPITAL | Age: 84
End: 2019-04-03

## 2019-04-03 LAB
AORTIC DIMENSIONLESS INDEX: 1.1 (DI)
BH CV ECHO MEAS - ACS: 1.7 CM
BH CV ECHO MEAS - AI DEC SLOPE: 269 CM/SEC^2
BH CV ECHO MEAS - AI MAX PG: 57.8 MMHG
BH CV ECHO MEAS - AI MAX VEL: 377.5 CM/SEC
BH CV ECHO MEAS - AI P1/2T: 411 MSEC
BH CV ECHO MEAS - AO MAX PG: 5 MMHG
BH CV ECHO MEAS - AO MEAN PG (FULL): 0 MMHG
BH CV ECHO MEAS - AO MEAN PG: 2 MMHG
BH CV ECHO MEAS - AO ROOT AREA (BSA CORRECTED): 1.8
BH CV ECHO MEAS - AO ROOT AREA: 5.7 CM^2
BH CV ECHO MEAS - AO ROOT DIAM: 2.7 CM
BH CV ECHO MEAS - AO V2 MAX: 114 CM/SEC
BH CV ECHO MEAS - AO V2 MEAN: 70.4 CM/SEC
BH CV ECHO MEAS - AO V2 VTI: 23.7 CM
BH CV ECHO MEAS - ASC AORTA: 3.7 CM
BH CV ECHO MEAS - AVA(I,A): 3.3 CM^2
BH CV ECHO MEAS - AVA(I,D): 3.3 CM^2
BH CV ECHO MEAS - BSA(HAYCOCK): 1.5 M^2
BH CV ECHO MEAS - BSA: 1.5 M^2
BH CV ECHO MEAS - BZI_BMI: 21.9 KILOGRAMS/M^2
BH CV ECHO MEAS - BZI_METRIC_HEIGHT: 157.5 CM
BH CV ECHO MEAS - BZI_METRIC_WEIGHT: 54.4 KG
BH CV ECHO MEAS - EDV(CUBED): 117.6 ML
BH CV ECHO MEAS - EDV(MOD-SP2): 88 ML
BH CV ECHO MEAS - EDV(MOD-SP4): 90 ML
BH CV ECHO MEAS - EDV(TEICH): 112.8 ML
BH CV ECHO MEAS - EF(CUBED): 53.4 %
BH CV ECHO MEAS - EF(MOD-BP): 68 %
BH CV ECHO MEAS - EF(MOD-SP2): 68.2 %
BH CV ECHO MEAS - EF(MOD-SP4): 67.8 %
BH CV ECHO MEAS - EF(TEICH): 45.1 %
BH CV ECHO MEAS - ESV(CUBED): 54.9 ML
BH CV ECHO MEAS - ESV(MOD-SP2): 28 ML
BH CV ECHO MEAS - ESV(MOD-SP4): 29 ML
BH CV ECHO MEAS - ESV(TEICH): 62 ML
BH CV ECHO MEAS - FS: 22.4 %
BH CV ECHO MEAS - IVS/LVPW: 1
BH CV ECHO MEAS - IVSD: 0.7 CM
BH CV ECHO MEAS - LAT PEAK E' VEL: 7 CM/SEC
BH CV ECHO MEAS - LV DIASTOLIC VOL/BSA (35-75): 58.5 ML/M^2
BH CV ECHO MEAS - LV MASS(C)D: 110.8 GRAMS
BH CV ECHO MEAS - LV MASS(C)DI: 72 GRAMS/M^2
BH CV ECHO MEAS - LV MEAN PG: 2 MMHG
BH CV ECHO MEAS - LV SYSTOLIC VOL/BSA (12-30): 18.8 ML/M^2
BH CV ECHO MEAS - LV V1 MAX: 120 CM/SEC
BH CV ECHO MEAS - LV V1 MEAN: 68.8 CM/SEC
BH CV ECHO MEAS - LV V1 VTI: 25.2 CM
BH CV ECHO MEAS - LVIDD: 4.9 CM
BH CV ECHO MEAS - LVIDS: 3.8 CM
BH CV ECHO MEAS - LVLD AP2: 6.9 CM
BH CV ECHO MEAS - LVLD AP4: 6.5 CM
BH CV ECHO MEAS - LVLS AP2: 5.4 CM
BH CV ECHO MEAS - LVLS AP4: 5.5 CM
BH CV ECHO MEAS - LVOT AREA (M): 3.1 CM^2
BH CV ECHO MEAS - LVOT AREA: 3.1 CM^2
BH CV ECHO MEAS - LVOT DIAM: 2 CM
BH CV ECHO MEAS - LVPWD: 0.7 CM
BH CV ECHO MEAS - MED PEAK E' VEL: 6 CM/SEC
BH CV ECHO MEAS - MV A DUR: 0.12 SEC
BH CV ECHO MEAS - MV A MAX VEL: 91.3 CM/SEC
BH CV ECHO MEAS - MV DEC SLOPE: 418 CM/SEC^2
BH CV ECHO MEAS - MV DEC TIME: 0.18 SEC
BH CV ECHO MEAS - MV E MAX VEL: 78 CM/SEC
BH CV ECHO MEAS - MV E/A: 0.85
BH CV ECHO MEAS - MV MEAN PG: 1 MMHG
BH CV ECHO MEAS - MV P1/2T MAX VEL: 80.1 CM/SEC
BH CV ECHO MEAS - MV P1/2T: 56.1 MSEC
BH CV ECHO MEAS - MV V2 MEAN: 55.1 CM/SEC
BH CV ECHO MEAS - MV V2 VTI: 29.8 CM
BH CV ECHO MEAS - MVA P1/2T LCG: 2.7 CM^2
BH CV ECHO MEAS - MVA(P1/2T): 3.9 CM^2
BH CV ECHO MEAS - MVA(VTI): 2.7 CM^2
BH CV ECHO MEAS - PA ACC SLOPE: 509 CM/SEC^2
BH CV ECHO MEAS - PA ACC TIME: 0.16 SEC
BH CV ECHO MEAS - PA MAX PG: 2.7 MMHG
BH CV ECHO MEAS - PA PR(ACCEL): 6.1 MMHG
BH CV ECHO MEAS - PA V2 MAX: 82.4 CM/SEC
BH CV ECHO MEAS - PULM A REVS DUR: 0.16 SEC
BH CV ECHO MEAS - PULM A REVS VEL: 22.7 CM/SEC
BH CV ECHO MEAS - PULM DIAS VEL: 43.9 CM/SEC
BH CV ECHO MEAS - PULM S/D: 1.6
BH CV ECHO MEAS - PULM SYS VEL: 69.6 CM/SEC
BH CV ECHO MEAS - QP/QS: 0.47
BH CV ECHO MEAS - RAP SYSTOLE: 3 MMHG
BH CV ECHO MEAS - RV MEAN PG: 1 MMHG
BH CV ECHO MEAS - RV V1 MEAN: 42.1 CM/SEC
BH CV ECHO MEAS - RV V1 VTI: 13 CM
BH CV ECHO MEAS - RVOT AREA: 2.8 CM^2
BH CV ECHO MEAS - RVOT DIAM: 1.9 CM
BH CV ECHO MEAS - RVSP: 34 MMHG
BH CV ECHO MEAS - SI(AO): 88.2 ML/M^2
BH CV ECHO MEAS - SI(CUBED): 40.8 ML/M^2
BH CV ECHO MEAS - SI(LVOT): 51.5 ML/M^2
BH CV ECHO MEAS - SI(MOD-SP2): 39 ML/M^2
BH CV ECHO MEAS - SI(MOD-SP4): 39.6 ML/M^2
BH CV ECHO MEAS - SI(TEICH): 33.1 ML/M^2
BH CV ECHO MEAS - SV(AO): 135.7 ML
BH CV ECHO MEAS - SV(CUBED): 62.8 ML
BH CV ECHO MEAS - SV(LVOT): 79.2 ML
BH CV ECHO MEAS - SV(MOD-SP2): 60 ML
BH CV ECHO MEAS - SV(MOD-SP4): 61 ML
BH CV ECHO MEAS - SV(RVOT): 36.9 ML
BH CV ECHO MEAS - SV(TEICH): 50.9 ML
BH CV ECHO MEAS - TAPSE (>1.6): 1.8 CM2
BH CV ECHO MEAS - TR MAX VEL: 276 CM/SEC
BH CV ECHO MEASUREMENTS AVERAGE E/E' RATIO: 12
BH CV VAS BP RIGHT ARM: NORMAL MMHG
BH CV XLRA - RV BASE: 3.3 CM
BH CV XLRA - TDI S': 13 CM/SEC
CHOLEST SERPL-MCNC: 148 MG/DL (ref 0–200)
HBA1C MFR BLD: 5.2 % (ref 4.8–5.6)
HDLC SERPL-MCNC: 38 MG/DL (ref 40–60)
LDLC SERPL CALC-MCNC: 87 MG/DL (ref 0–100)
LDLC/HDLC SERPL: 2.29 {RATIO}
LEFT ATRIUM VOLUME INDEX: 19 ML/M2
TRIGL SERPL-MCNC: 114 MG/DL (ref 0–150)
TSH SERPL DL<=0.05 MIU/L-ACNC: 2.34 MIU/ML (ref 0.27–4.2)
VIT B12 BLD-MCNC: 1088 PG/ML (ref 211–946)
VLDLC SERPL-MCNC: 22.8 MG/DL (ref 5–40)

## 2019-04-03 PROCEDURE — 82607 VITAMIN B-12: CPT | Performed by: NURSE PRACTITIONER

## 2019-04-03 PROCEDURE — 96361 HYDRATE IV INFUSION ADD-ON: CPT

## 2019-04-03 PROCEDURE — 70549 MR ANGIOGRAPH NECK W/O&W/DYE: CPT

## 2019-04-03 PROCEDURE — 80061 LIPID PANEL: CPT | Performed by: NURSE PRACTITIONER

## 2019-04-03 PROCEDURE — 93306 TTE W/DOPPLER COMPLETE: CPT

## 2019-04-03 PROCEDURE — 0 GADOBENATE DIMEGLUMINE 529 MG/ML SOLUTION: Performed by: INTERNAL MEDICINE

## 2019-04-03 PROCEDURE — 84443 ASSAY THYROID STIM HORMONE: CPT | Performed by: NURSE PRACTITIONER

## 2019-04-03 PROCEDURE — 72141 MRI NECK SPINE W/O DYE: CPT

## 2019-04-03 PROCEDURE — 97165 OT EVAL LOW COMPLEX 30 MIN: CPT | Performed by: OCCUPATIONAL THERAPIST

## 2019-04-03 PROCEDURE — G0378 HOSPITAL OBSERVATION PER HR: HCPCS

## 2019-04-03 PROCEDURE — 97110 THERAPEUTIC EXERCISES: CPT | Performed by: OCCUPATIONAL THERAPIST

## 2019-04-03 PROCEDURE — 25010000002 ONDANSETRON PER 1 MG: Performed by: INTERNAL MEDICINE

## 2019-04-03 PROCEDURE — 83036 HEMOGLOBIN GLYCOSYLATED A1C: CPT | Performed by: NURSE PRACTITIONER

## 2019-04-03 PROCEDURE — 70544 MR ANGIOGRAPHY HEAD W/O DYE: CPT

## 2019-04-03 PROCEDURE — A9577 INJ MULTIHANCE: HCPCS | Performed by: INTERNAL MEDICINE

## 2019-04-03 PROCEDURE — 36415 COLL VENOUS BLD VENIPUNCTURE: CPT | Performed by: NURSE PRACTITIONER

## 2019-04-03 PROCEDURE — 96376 TX/PRO/DX INJ SAME DRUG ADON: CPT

## 2019-04-03 PROCEDURE — 99214 OFFICE O/P EST MOD 30 MIN: CPT | Performed by: NURSE PRACTITIONER

## 2019-04-03 PROCEDURE — 70553 MRI BRAIN STEM W/O & W/DYE: CPT

## 2019-04-03 PROCEDURE — 25010000002 PERFLUTREN (DEFINITY) 8.476 MG IN SODIUM CHLORIDE 0.9 % 10 ML INJECTION: Performed by: INTERNAL MEDICINE

## 2019-04-03 PROCEDURE — 97161 PT EVAL LOW COMPLEX 20 MIN: CPT | Performed by: PHYSICAL THERAPIST

## 2019-04-03 PROCEDURE — 93306 TTE W/DOPPLER COMPLETE: CPT | Performed by: INTERNAL MEDICINE

## 2019-04-03 RX ORDER — ONDANSETRON 2 MG/ML
4 INJECTION INTRAMUSCULAR; INTRAVENOUS EVERY 6 HOURS PRN
Status: DISCONTINUED | OUTPATIENT
Start: 2019-04-03 | End: 2019-04-04 | Stop reason: HOSPADM

## 2019-04-03 RX ORDER — ATENOLOL 25 MG/1
12.5 TABLET ORAL DAILY
Status: DISCONTINUED | OUTPATIENT
Start: 2019-04-04 | End: 2019-04-04 | Stop reason: HOSPADM

## 2019-04-03 RX ORDER — ATENOLOL 25 MG/1
12.5 TABLET ORAL DAILY
Status: DISCONTINUED | OUTPATIENT
Start: 2019-04-04 | End: 2019-04-03

## 2019-04-03 RX ADMIN — SODIUM CHLORIDE, PRESERVATIVE FREE 3 ML: 5 INJECTION INTRAVENOUS at 10:35

## 2019-04-03 RX ADMIN — ONDANSETRON 4 MG: 4 TABLET, FILM COATED ORAL at 00:34

## 2019-04-03 RX ADMIN — ACETAMINOPHEN 650 MG: 325 TABLET, FILM COATED ORAL at 10:34

## 2019-04-03 RX ADMIN — DULOXETINE HYDROCHLORIDE 60 MG: 60 CAPSULE, DELAYED RELEASE ORAL at 10:34

## 2019-04-03 RX ADMIN — SODIUM CHLORIDE, PRESERVATIVE FREE 3 ML: 5 INJECTION INTRAVENOUS at 20:01

## 2019-04-03 RX ADMIN — SODIUM CHLORIDE 75 ML/HR: 9 INJECTION, SOLUTION INTRAVENOUS at 07:26

## 2019-04-03 RX ADMIN — LOSARTAN POTASSIUM 100 MG: 100 TABLET, FILM COATED ORAL at 10:34

## 2019-04-03 RX ADMIN — ASPIRIN 325 MG: 325 TABLET ORAL at 10:34

## 2019-04-03 RX ADMIN — ATENOLOL 12.5 MG: 25 TABLET ORAL at 10:35

## 2019-04-03 RX ADMIN — AMLODIPINE BESYLATE 5 MG: 5 TABLET ORAL at 10:34

## 2019-04-03 RX ADMIN — FEBUXOSTAT 40 MG: 40 TABLET ORAL at 10:34

## 2019-04-03 RX ADMIN — ATORVASTATIN CALCIUM 80 MG: 80 TABLET, FILM COATED ORAL at 20:01

## 2019-04-03 RX ADMIN — ONDANSETRON 4 MG: 2 INJECTION INTRAMUSCULAR; INTRAVENOUS at 10:34

## 2019-04-03 RX ADMIN — PERFLUTREN 2.5 ML: 6.52 INJECTION, SUSPENSION INTRAVENOUS at 10:03

## 2019-04-03 RX ADMIN — GADOBENATE DIMEGLUMINE 11 ML: 529 INJECTION, SOLUTION INTRAVENOUS at 15:15

## 2019-04-03 NOTE — PLAN OF CARE
Problem: Patient Care Overview  Goal: Plan of Care Review   04/03/19 6820   Coping/Psychosocial   Plan of Care Reviewed With patient   Plan of Care Review   Progress improving   OTHER   Outcome Summary pt evaluated for OT. pt walked in room with no A needed. able to luis/doff socks sitting EOB no A needed. strength and ROM in B UE is good, no further OT needs at this time. pt agrees and feels she is at baseline except for her headache. OT signing off.

## 2019-04-03 NOTE — PLAN OF CARE
Problem: Patient Care Overview  Goal: Plan of Care Review  Outcome: Ongoing (interventions implemented as appropriate)   04/03/19 1098   Coping/Psychosocial   Plan of Care Reviewed With patient   Plan of Care Review   Progress no change   OTHER   Outcome Summary BP running high overnight. NIH 0. Patient still c/o headache. PRN Tylenol given with little relief. Zofran given for continuous nausea feeling. MRI and Echo to be completed today. Will continue to monitor.        Problem: Stroke (Ischemic) (Adult)  Goal: Signs and Symptoms of Listed Potential Problems Will be Absent, Minimized or Managed (Stroke)  Outcome: Ongoing (interventions implemented as appropriate)      Problem: Nausea/Vomiting (Adult)  Goal: Symptom Relief  Outcome: Ongoing (interventions implemented as appropriate)    Goal: Adequate Hydration  Outcome: Ongoing (interventions implemented as appropriate)

## 2019-04-03 NOTE — PROGRESS NOTES
DOS: 4/3/2019  NAME: Alba Correa   : 1933  PCP: Aramis Doty MD    Chief Complaint   Patient presents with   • Weakness - Generalized     PT C/O GENERALIZED WEAKNESS, NAUSEA AND HEADACHE STARTING 2 DAYS AGO        Stroke    Subjective: Pt seen in follow up, however the problem is new to me.  MRI brain, MRA head/neck, MRI C-spine pending.  Denies any new numbness, weakness, speech or visual disturbances.  She does still have a mild right frontal headache, however she reports it did improve and pretty much went away after she ate yesterday.  Her leg pain has improved, but does still hurt when she ambulates.  Family at bedside.    Objective:  Vital signs:      Vitals:    19 1536 19 1900 19 2300 19 0700   BP: 180/85 173/71 (!) 199/89  Comment: checked twice and notified nurse 162/81   BP Location: Left arm Right arm Right arm Right arm   Patient Position: Lying Lying Lying Lying   Pulse: 56 58 62 56   Resp:  16 16 16   Temp:  98.3 °F (36.8 °C) 98.1 °F (36.7 °C) 98.6 °F (37 °C)   TempSrc:  Oral Oral Oral   SpO2: 97% 95% 94% 97%   Weight:       Height:           Current Facility-Administered Medications:   •  acetaminophen (TYLENOL) tablet 650 mg, 650 mg, Oral, Q4H PRN, Rita Lockhart APRN, 650 mg at 19 2218  •  amLODIPine (NORVASC) tablet 5 mg, 5 mg, Oral, Daily, Ankit Mendez MD  •  aspirin tablet 325 mg, 325 mg, Oral, Daily **OR** aspirin suppository 300 mg, 300 mg, Rectal, Daily, Rita Lockhart APRN  •  atenolol (TENORMIN) tablet 25 mg, 25 mg, Oral, Daily, Ankit Mendez MD  •  atorvastatin (LIPITOR) tablet 80 mg, 80 mg, Oral, Nightly, Rita Lockhart APRN, 80 mg at 19  •  DULoxetine (CYMBALTA) DR capsule 60 mg, 60 mg, Oral, Daily, Ankit Mendez MD, 60 mg at 19  •  febuxostat (ULORIC) tablet 40 mg, 40 mg, Oral, Daily, Ankit Mendez MD, 40 mg at 19  •  losartan (COZAAR) tablet 100 mg, 100  mg, Oral, Daily, Ankit Mendez MD  •  ondansetron (ZOFRAN) injection 4 mg, 4 mg, Intravenous, Q6H PRN, Ankit Mendez MD  •  ondansetron (ZOFRAN) tablet 4 mg, 4 mg, Oral, Q6H PRN, Rita Lockhart APRN, 4 mg at 04/03/19 0034  •  sodium chloride 0.9 % flush 3 mL, 3 mL, Intravenous, Q12H, Rita Lockhart APRN, 3 mL at 04/02/19 2034  •  sodium chloride 0.9 % flush 3-10 mL, 3-10 mL, Intravenous, PRN, Rita Lockhart, APRN  •  sodium chloride 0.9 % infusion, 75 mL/hr, Intravenous, Continuous, Ankit Mendez MD, Last Rate: 75 mL/hr at 04/03/19 0726, 75 mL/hr at 04/03/19 0726    PRN meds  •  acetaminophen  •  ondansetron  •  ondansetron  •  sodium chloride    No current facility-administered medications on file prior to encounter.      Current Outpatient Medications on File Prior to Encounter   Medication Sig   • acetaminophen (TYLENOL) 650 MG 8 hr tablet Take 1,300 mg by mouth Every 8 (Eight) Hours As Needed for Mild Pain .   • amLODIPine (NORVASC) 5 MG tablet Take 1 tablet by mouth Daily.   • aspirin 325 MG tablet Take 1 tablet by mouth Daily.   • atenolol (TENORMIN) 25 MG tablet Take 1 tablet by mouth Daily.   • atorvastatin (LIPITOR) 40 MG tablet Take 1 tablet by mouth Daily.   • Biotin 3 MG tablet Take 1 tablet by mouth Daily.   • calcium carbonate-vitamin d (CALCIUM 600+D) 600-400 MG-UNIT per tablet Take 1 tablet by mouth Daily.   • DULoxetine (CYMBALTA) 60 MG capsule Take 1 capsule by mouth Daily.   • febuxostat (ULORIC) 40 MG tablet Take 1 tablet by mouth Daily.   • losartan (COZAAR) 100 MG tablet Take 1 tablet by mouth Daily.   • Multiple Vitamins-Minerals (CENTRUM SILVER) tablet Take 1 tablet by mouth Daily.   • Omega-3 Fatty Acids (FISH OIL) 1200 MG capsule capsule Take 1,200 mg by mouth Daily.   • simvastatin (ZOCOR) 40 MG tablet Take 40 mg by mouth Daily.   • hydrALAZINE (APRESOLINE) 25 MG tablet Take 25 mg by mouth 2 (Two) Times a Day As Needed (If BP greater than  160/90).       General appearance: NAD, alert and cooperative, well groomed  HEENT: Normocephalic, atraumatic, PERRL, no masses or tenderness  COR: RRR  Resp: Even and unlabored  Extremities: Equal pulses  Skin: warm, dry    Neurological:   MS: oriented x3, recent/remote memory intact, normal attention/concentration, language intact, no neglect, normal fund of knowledge  CN: visual acuity grossly normal, visual fields full, PERRL, EOMI, facial sensation equal, no facial droop, hearing symmetric, palate elevates symmetrically, shoulder shrug equal, tongue midline  Motor: 5/5 in all 4 ext., normal tone  Reflexes: 1+ in all 4 ext.  Sensory: light touch sensation intact in all 4 ext.  Coordination: Normal finger to nose test, normal heel to shin test    Laboratory results:  Lab Results   Component Value Date    TSH 2.340 04/03/2019     Lab Results   Component Value Date    HGBA1C 5.20 04/03/2019     Lab Results   Component Value Date    TWIRWRXC71 1,088 (H) 04/03/2019     Lab Results   Component Value Date    CHOL 148 04/03/2019    CHOL 161 08/06/2018    CHLPL 165 01/19/2018    CHLPL 155 11/17/2016     Lab Results   Component Value Date    TRIG 114 04/03/2019    TRIG 171 (H) 08/06/2018    TRIG 193 (H) 01/19/2018     Lab Results   Component Value Date    HDL 38 (L) 04/03/2019    HDL 38 (L) 08/06/2018    HDL 41 01/19/2018     Lab Results   Component Value Date    LDL 87 04/03/2019    LDL 89 08/06/2018    LDL 85 01/19/2018     Lab Results   Component Value Date    WBC 6.67 04/02/2019    HGB 15.8 04/02/2019    HCT 49.5 (H) 04/02/2019    .1 (H) 04/02/2019     04/02/2019     Lab Results   Component Value Date    GLUCOSE 125 (H) 04/02/2019    BUN 15 04/02/2019    CREATININE 1.01 (H) 04/02/2019    EGFRIFNONA 52 (L) 04/02/2019    EGFRIFAFRI 50 (L) 05/29/2018    BCR 14.9 04/02/2019    K 3.8 04/02/2019    CO2 26.4 04/02/2019    CALCIUM 10.5 04/02/2019    PROTENTOTREF 7.9 01/19/2018    ALBUMIN 4.70 04/02/2019     LABIL2 1.1 01/19/2018    AST 39 (H) 04/02/2019    ALT 31 04/02/2019     Lab Results   Component Value Date    PTT 35.9 (H) 11/23/2015     Lab Results   Component Value Date    INR 1.02 08/05/2018    INR 1.0 11/22/2015    INR 1.1 11/20/2015    PROTIME 13.2 08/05/2018    PROTIME 13.4 11/22/2015    PROTIME 13.6 11/20/2015     Brief Urine Lab Results  (Last result in the past 365 days)      Color   Clarity   Blood   Leuk Est   Nitrite   Protein   CREAT   Urine HCG        04/02/19 1132 Dark Yellow Clear Trace Trace Negative 100 mg/dL (2+)               Review and interpretation of imaging:  Ct Head Without Contrast    Result Date: 4/3/2019  No evidence of hemorrhage. No focal area of decreased attenuation is present to suggest an acute infarct. There is moderate small vessel ischemic disease. There is a new area of decreased attenuation involving the frontoparietal region on the right superolaterally, subcortical likely representing a remote infarct. Further evaluation could be performed with MRI examination of brain and MRA of the head. The prior MRA of 08/05/2018 demonstrated absent signal in the inferior division of the right middle cerebral artery posteriorly.  The above information was called to and discussed with Dr. Berg.      Radiation dose reduction techniques were utilized, including automated exposure control and exposure modulation based on body size.  This report was finalized on 4/3/2019 8:32 AM by Dr. Logan Zepeda M.D.      Results for orders placed during the hospital encounter of 08/05/18   Adult Transesophageal Echo (KASH) W/ Cont if Necessary Per Protocol    Narrative · Left ventricular systolic function is normal. Estimated EF appears to be   in the range of 56 - 60%. Normal left ventricular cavity size and wall   thickness noted. All left ventricular wall segments contract normally.  · Left ventricular diastolic function is normal.  · No interatrial septal aneurysm present. No evidence of a patent  foramen   ovale. Saline test results are negative.  · No evidence of a left atrial appendage thrombus was present.  · The aortic valve is abnormal in structure. The valve exhibits sclerosis.  · Mild to moderate aortic valve regurgitation is present.  · Mild mitral valve regurgitation is present.  · Mild tricuspid valve regurgitation is present. Estimated right   ventricular systolic pressure from tricuspid regurgitation is normal (<35   mmHg).  · There is (grade 1) plaque in the aortic arch present. There is evidence   of aortic arch sclerosis/calcification present.            Impression/Assessment:  This is a 85-year-old female with past medical history of DVT, hyperlipidemia, hypertension, chronic kidney disease, peripheral neuropathy, right MCA stroke on full dose aspirin prior to admission who was admitted on 4/2 with complaints of sudden progressive difficulty with walking over the last month as well as severe pain in her calves during ambulation that caused her to feel weak in bilateral lower extremities and a new right-sided headache that's new over the last couple of days.  She was previously seen by us for her right MCA stroke in August 2018 that was thought to be due to small vessel disease.  She underwent a KASH at that time that was negative as well as long-term cardiac monitoring which did not show any arrhythmia.  She was discharged on a full dose aspirin and statin and sent to rehab where she reportedly recovered and returned to her neurologic baseline.  She reportedly received bilateral lower extremity Dopplers on 2/26 per her PCP and these were negative.  She does have peripheral neuropathy and denies any worsening lower extremity numbness.  She obtain a CT of her head upon admission to the ER that showed a possible subacute right frontal stroke.  Blood pressure on arrival was 162/64.  EKG showing normal sinus rhythm.    Today her neuro exam remains normal.  She does still complain of her right  frontal headache.  Her blood pressure has been elevated (sys 160's-199) as well as patient has not been eating or drinking stating she just does not feel like it.  Her family reports yesterday after she ate her headache did go away.  For the past month she states she started to develop this weakness and pain that was greater in the right leg vs. Left.    1. Abnormal head CT, suspected right frontal subacute stroke  2. Bilateral lower extremity weakness  3. Claudication  4. Right frontal headache  5. Peripheral neuropathy    2D echo pending.  MRI MRA brain head and neck, MRI C-spine pending.  Given her extensive cardiac workup with last CVA, if MRI shows a new embolic appearing stroke we might have to think about empiric long-term anticoagulation.  I did discuss this with the patient and the family member at bedside, they stated they would have to have a discussion about this due to patient bleeding easily with minor bumps and injuries.  Will await MRI C-spine for worsening gait dysfunction and lower extremity weakness.  Prior MRI C-spine in August 2018 showing moderate degenerative disc disease and facet arthritis.  No other acute findings at that time.  Could be due to a new stroke versus cervical spine stenosis or a musculoskeletal cause.  Encourage p.o. intake.  Normalize BP.  Other plans as stated below.  Therapies as written. CCP for discharge planning. Call RRT for any acute neurological changes. We will continue to follow and advise.    Plan:  MRI/MRA brain, head, neck, c-spine pending  2D echo pending  ASA 325mg  Lipitor 80mg, LDL 87  Neurochecks per stroke protocol  Normalize BP  Stroke Education  RACHEL/SCDs  PT/OT/ST  Will follow.    Case discussed with Dr. Leary, and he agrees with plan above.   [place]    **Peg Disclaimer:**  Much of this encounter note is an electronic transcription/translation of spoken language to printed text. The electronic translation of spoken language may permit  erroneous, or at times, nonsensical words or phrases to be inadvertently transcribed. Although I have reviewed the note for such errors, some may still exist.

## 2019-04-03 NOTE — SIGNIFICANT NOTE
04/03/19 1454   Rehab Time/Intention   Evaluation Not Performed patient unavailable for evaluation  (Pt. is [facility] for MRI 4/3 at 1450. Check back next date. Pt. may not need swallow eval, passed swallow screen.  )   Rehab Treatment   Discipline speech language pathologist

## 2019-04-03 NOTE — THERAPY DISCHARGE NOTE
Acute Care - Occupational Therapy Initial Eval/Discharge  [facility]     Patient Name: Alba Correa  : 1933  MRN: [id number]  Today's Date: 4/3/2019  Onset of Illness/Injury or Date of Surgery: 19            Admit Date: 2019       ICD-10-CM ICD-9-CM   1. Acute intractable headache, unspecified headache type R51 784.0   2. Abnormal CT of the head R93.0 793.0   3. History of CVA (cerebrovascular accident) Z86.73 V12.54   4. Nausea R11.0 787.02     Patient Active Problem List   Diagnosis   • Chronic renal failure, stage 3 (moderate) (CMS/HCC)   • DVT (deep venous thrombosis) (CMS/HCC)   • Depression   • Edema   • Gout   • Elevated cholesterol   • Osteoporosis   • Hyperparathyroidism (CMS/HCC)   • Benign essential hypertension   • Adaptive colitis   • Osteopenia   • Rheumatoid arthritis (CMS/HCC)   • Degeneration of intervertebral disc   • D (diarrhea)   • BP (high blood pressure)   • Detrusor muscle hypertonia   • Open leg wound   • Vaginal odor   • Infected wound   • History of depression   • History of renal insufficiency syndrome   • History of degenerative disc disease   • History of osteoporosis   • History of DVT (deep vein thrombosis)   • History of rheumatoid arthritis   • Hyperlipidemia   • Macrocytosis   • Right leg weakness   • CVA (cerebral vascular accident) (CMS/HCC)   • Nonrheumatic aortic (valve) insufficiency   • History of ischemic right MCA stroke   • Acute intractable headache   • Nausea     Past Medical History:   Diagnosis Date   • Benign essential hypertension    • Chronic kidney disease    • Chronic renal insufficiency    • CVA (cerebral vascular accident) (CMS/[facility])    • Degeneration of intervertebral disc    • Depression    • DVT (deep venous thrombosis) (CMS/[facility])    • Edema    • Gout    • H/O complete eye exam 2016   • Hyperlipidemia    • IBS (irritable bowel syndrome)    • OAB (overactive bladder)    • Osteopenia    • Osteoporosis    • Peripheral neuropathy    •  Rheumatoid arthritis (CMS/HCC)      Past Surgical History:   Procedure Laterality Date   • APPENDECTOMY     • BREAST BIOPSY     • COLONOSCOPY      declines   • HERNIA REPAIR  1965   • HYSTERECTOMY      still has ovaries   • MAMMO BILATERAL  11/16/2016    pt declines   • PAP SMEAR  11/16/2016    declines          OT ASSESSMENT FLOWSHEET (last 12 hours)      Occupational Therapy Evaluation     Row Name 04/03/19 1314                   OT Evaluation Time/Intention    Subjective Information  no complaints  -        Document Type  evaluation;discharge evaluation/summary  -        Mode of Treatment  individual therapy;occupational therapy  -        Patient Effort  excellent  -        Symptoms Noted During/After Treatment  none  -           General Information    Patient Profile Reviewed?  yes  -        Onset of Illness/Injury or Date of Surgery  04/02/19  -        Patient Observations  alert;cooperative;agree to therapy  -        Patient/Family Observations  pt supine in bed  -        Prior Level of Function  independent:;transfer;ADL's;community mobility;all household mobility  -        Equipment Currently Used at Home  shower chair;grab bar  -        Benefits Reviewed  patient:  -           Cognitive Assessment/Intervention- PT/OT    Orientation Status (Cognition)  oriented x 4  -        Follows Commands (Cognition)  WNL  -           Bed Mobility Assessment/Treatment    Bed Mobility Assessment/Treatment  sit-supine;supine-sit  -        Supine-Sit [place] (Bed Mobility)  independent  -        Sit-Supine [place] (Bed Mobility)  independent  -           Functional Mobility    Functional Mobility- Ind. Level  conditional independence  -        Functional Mobility- Comment  holding onto IV as she is hooked up to IV. but able to walk well w.no A needed  -           Transfer Assessment/Treatment    Transfer Assessment/Treatment  sit-stand transfer;stand-sit transfer  -            Sit-Stand Transfer    Sit-Stand [place] (Transfers)  set up;conditional independence  -KP           Stand-Sit Transfer    Stand-Sit [place] (Transfers)  set up;conditional independence  -           ADL Assessment/Intervention    BADL Assessment/Intervention  lower body dressing;toileting  -           Lower Body Dressing Assessment/Training    Lower Body Dressing [place] Level  doff;don;socks;independent  -        Lower Body Dressing Position  edge of bed sitting  -           Toileting Assessment/Training    Comment (Toileting)  just used BR per pt. she states she did well, nsg just standing by for safety  -           General ROM    GENERAL ROM COMMENTS  B UE 8/8  -KP           MMT (Manual Muscle Testing)    General MMT Comments  B UE 4/5  -           Motor Assessment/Interventions    Additional Documentation  Balance Interventions (Group);Balance (Group);Fine Motor Testing & Training (Group)  -           Balance    Balance  static sitting balance;static standing balance  -           Static Sitting Balance    Level of [place] (Unsupported Sitting, Static Balance)  independent  -        Sitting Position (Unsupported Sitting, Static Balance)  sitting on edge of bed  -        Time Able to Maintain Position (Unsupported Sitting, Static Balance)  more than 5 minutes  -           Static Standing Balance    Level of [place] (Supported Standing, Static Balance)  conditional independence  -        Time Able to Maintain Position (Supported Standing, Static Balance)  more than 5 minutes  -           Fine Motor Testing & Training    Comment, Fine Motor Coordination  FMC intact  -           Sensory Assessment/Intervention    Sensory General Assessment  no sensation deficits identified  -KP           Positioning and Restraints    Pre-Treatment Position  in bed  -KP        Post Treatment Position  bed  -KP        In Bed  supine;encouraged to call for assist;call light within  reach;exit alarm on  -KP           Pain Assessment    Additional Documentation  Pain Scale: Numbers Pre/Post-Treatment (Group)  -           Pain Scale: Numbers Pre/Post-Treatment    Pain Scale: Numbers, Pretreatment  0/10 - no pain  -KP        Pain Scale: Numbers, Post-Treatment  0/10 - no pain  -KP           Plan of Care Review    Plan of Care Reviewed With  patient  -KP           Clinical Impression (OT)    Criteria for Skilled Therapeutic Interventions Met (OT Eval)  no problems identified which require skilled intervention  -KP        Therapy Frequency (OT Eval)  evaluation only  -KP        Anticipated Discharge Disposition (OT)  home  -KP           Patient Education Goal (OT)    Activity (Patient Education Goal, OT)  ed on safety w ADLs and tsf  -KP        [place]/Cues/Accuracy (Memory Goal 2, OT)  independent;demonstrates adequately;verbalizes understanding  -KP        Time Frame (Patient Education Goal, OT)  short term goal (STG);1 day  -KP        Progress/Outcome (Patient Education Goal, OT)  goal met  -KP           Discharge Summary (Occupational Therapy)    Additional Documentation  Discharge Summary, OT Eval (Group)  -KP           Discharge Summary, OT Eval    Reason for Discharge (OT Discharge Summary)  patient met all goals and outcomes  -KP        Outcomes Achieved Upon Discharge (OT Discharge Summary)  able to achieve all goals within established timeline  -          User Key  (r) = Recorded By, (t) = Taken By, (c) = Cosigned By    Initials Name Effective Dates     Bridgette Martel, OTR 06/08/18 -           Occupational Therapy Education     Title: PT OT SLP Therapies (Resolved)     Topic: Occupational Therapy (Resolved)     Point: ADL training (Resolved)     Description: Instruct learner(s) on proper safety adaptation and remediation techniques during self care or transfers.   Instruct in proper use of assistive devices.    Learning Progress Summary           Patient Acceptance,  E,TB, VU,DU by  at 4/3/2019  1:20 PM    Comment:  pt ed on role of OT. ed on safety w. ADLs and tsf. pt demo tsf and some ADLs. pt is at baseline. no OT needs.                   Point: Precautions (Resolved)     Description: Instruct learner(s) on prescribed precautions during self-care and functional transfers.    Learning Progress Summary           Patient Acceptance, E,TB, VU,DU by  at 4/3/2019  1:20 PM    Comment:  pt ed on role of OT. ed on safety w. ADLs and tsf. pt demo tsf and some ADLs. pt is at baseline. no OT needs.                   Point: Body mechanics (Resolved)     Description: Instruct learner(s) on proper positioning and spine alignment during self-care, functional mobility activities and/or exercises.    Learning Progress Summary           Patient Acceptance, E,TB, VU,DU by  at 4/3/2019  1:20 PM    Comment:  pt ed on role of OT. ed on safety w. ADLs and tsf. pt demo tsf and some ADLs. pt is at baseline. no OT needs.                               User Key     Initials Effective Dates Name Provider Type Discipline     06/08/18 -  Bridgette Martel, OTR Occupational Therapist OT                OT Recommendation and Plan  Outcome Summary/Treatment Plan (OT)  Anticipated Discharge Disposition (OT): home  Reason for Discharge (OT Discharge Summary): patient met all goals and outcomes  Therapy Frequency (OT Eval): evaluation only  Plan of Care Review  Plan of Care Reviewed With: patient  Plan of Care Reviewed With: patient  Outcome Summary: pt evaluated for OT. pt walked in room with no A needed. able to luis/doff socks sitting EOB no A needed. strength and ROM in B UE is good, no further OT needs at this time. pt agrees and feels she is at baseline except for her headache. OT signing off.     Rehab Goal Summary     Row Name 04/03/19 7736             Patient Education Goal (OT)    Activity (Patient Education Goal, OT)  ed on safety w ADLs and tsf  -      [place]/Cues/Accuracy (Memory  Goal 2, OT)  independent;demonstrates adequately;verbalizes understanding  -      Time Frame (Patient Education Goal, OT)  short term goal (STG);1 day  -      Progress/Outcome (Patient Education Goal, OT)  goal met  -        User Key  (r) = Recorded By, (t) = Taken By, (c) = Cosigned By    Initials Name Provider Type Discipline    Bridgette Garzon OTR Occupational Therapist OT          Outcome Measures     Row Name 04/03/19 1320             How much help from another is currently needed...    Putting on and taking off regular lower body clothing?  4  -KP      Bathing (including washing, rinsing, and drying)  4  -KP      Toileting (which includes using toilet bed pan or urinal)  4  -KP      Putting on and taking off regular upper body clothing  4  -KP      Taking care of personal grooming (such as brushing teeth)  4  -KP      Eating meals  4  -      Score  24  -         Functional Assessment    Outcome Measure Options  AM-PAC 6 Clicks Daily Activity (OT)  -        User Key  (r) = Recorded By, (t) = Taken By, (c) = Cosigned By    Initials Name Provider Type    Bridgette Garzon OTR Occupational Therapist          Time Calculation:   Time Calculation- OT     Row Name 04/03/19 1320             Time Calculation- OT    OT Start Time  0857  -      OT Stop Time  0911  -      OT Time Calculation (min)  14 min  -      Total Timed Code Minutes- OT  8 minute(s)  -      OT Received On  04/03/19  -        User Key  (r) = Recorded By, (t) = Taken By, (c) = Cosigned By    Initials Name Provider Type    Bridgette Garzon OTR Occupational Therapist        Therapy Suggested Charges     Code   Minutes Charges    None           Therapy Charges for Today     Code Description Service Date Service Provider Modifiers Qty    [id number]  OT THER PROC EA 15 MIN 4/3/2019 Bridgette Martel OTR GO 1    [id number]  OT EVAL LOW COMPLEXITY 2 4/3/2019 Bridgette Martel OTR GO 1                OT Discharge Summary  Anticipated Discharge Disposition (OT): home    Bridgette Martel, OTR  4/3/2019

## 2019-04-03 NOTE — THERAPY DISCHARGE NOTE
Acute Care - Physical Therapy Initial Eval/Discharge  [facility]     Patient Name: Alba Correa  : 1933  MRN: [id number]  Today's Date: 4/3/2019   Onset of Illness/Injury or Date of Surgery: 19            Admit Date: 2019    Visit Dx:    ICD-10-CM ICD-9-CM   1. Acute intractable headache, unspecified headache type R51 784.0   2. Abnormal CT of the head R93.0 793.0   3. History of CVA (cerebrovascular accident) Z86.73 V12.54   4. Nausea R11.0 787.02     Patient Active Problem List   Diagnosis   • Chronic renal failure, stage 3 (moderate) (CMS/HCC)   • DVT (deep venous thrombosis) (CMS/HCC)   • Depression   • Edema   • Gout   • Elevated cholesterol   • Osteoporosis   • Hyperparathyroidism (CMS/HCC)   • Benign essential hypertension   • Adaptive colitis   • Osteopenia   • Rheumatoid arthritis (CMS/HCC)   • Degeneration of intervertebral disc   • D (diarrhea)   • BP (high blood pressure)   • Detrusor muscle hypertonia   • Open leg wound   • Vaginal odor   • Infected wound   • History of depression   • History of renal insufficiency syndrome   • History of degenerative disc disease   • History of osteoporosis   • History of DVT (deep vein thrombosis)   • History of rheumatoid arthritis   • Hyperlipidemia   • Macrocytosis   • Right leg weakness   • CVA (cerebral vascular accident) (CMS/[facility])   • Nonrheumatic aortic (valve) insufficiency   • History of ischemic right MCA stroke   • Acute intractable headache   • Nausea     Past Medical History:   Diagnosis Date   • Benign essential hypertension    • Chronic kidney disease    • Chronic renal insufficiency    • CVA (cerebral vascular accident) (CMS/[facility])    • Degeneration of intervertebral disc    • Depression    • DVT (deep venous thrombosis) (CMS/[facility])    • Edema    • Gout    • H/O complete eye exam 2016   • Hyperlipidemia    • IBS (irritable bowel syndrome)    • OAB (overactive bladder)    • Osteopenia    • Osteoporosis    • Peripheral neuropathy     • Rheumatoid arthritis (CMS/HCC)      Past Surgical History:   Procedure Laterality Date   • APPENDECTOMY     • BREAST BIOPSY     • COLONOSCOPY      declines   • HERNIA REPAIR  1965   • HYSTERECTOMY      still has ovaries   • MAMMO BILATERAL  11/16/2016    pt declines   • PAP SMEAR  11/16/2016    declines          PT ASSESSMENT (last 12 hours)      Physical Therapy Evaluation     Row Name 04/03/19 1255          PT Evaluation Time/Intention    Subjective Information  no complaints  -     Document Type  evaluation  -     Mode of Treatment  physical therapy  -     Patient Effort  excellent  -     Row Name 04/03/19 1255          General Information    Patient Observations  alert;cooperative;agree to therapy  -     General Observations of Patient  sitting EOB, family present  -     Prior Level of Function  independent:  -     Row Name 04/03/19 1255          Relationship/Environment    Lives With  spouse  -     Row Name 04/03/19 1255          Resource/Environmental Concerns    Current Living Arrangements  home/apartment/condo  -     Row Name 04/03/19 1255          Cognitive Assessment/Intervention- PT/OT    Orientation Status (Cognition)  oriented x 4  -     Row Name 04/03/19 1255          Bed Mobility Assessment/Treatment    Comment (Bed Mobility)  NT- sitting EOB  -     Row Name 04/03/19 1255          Transfer Assessment/Treatment    Sit-Stand [place] (Transfers)  supervision  -     Stand-Sit [place] (Transfers)  supervision  -     Row Name 04/03/19 1255          Gait/Stairs Assessment/Training    [place] Level (Gait)  supervision  -     Distance in Feet (Gait)  150ft  -     Pattern (Gait)  swing-through  -     Row Name 04/03/19 1255          General ROM    GENERAL ROM COMMENTS  WFL  -     Row Name 04/03/19 1255          MMT (Manual Muscle Testing)    General MMT Comments  BLE 5/5  -     Row Name 04/03/19 1255          Static Sitting Balance    Level of [place]  (Unsupported Sitting, Static Balance)  independent  -     Sitting Position (Unsupported Sitting, Static Balance)  sitting on edge of bed  -     Time Able to Maintain Position (Unsupported Sitting, Static Balance)  3 to 4 minutes  -     Row Name 04/03/19 1255          Static Standing Balance    Level of [place] (Supported Standing, Static Balance)  supervision  -     Time Able to Maintain Position (Supported Standing, Static Balance)  2 to 3 minutes  -[facility] Name 04/03/19 1255          Pain Scale: Numbers Pre/Post-Treatment    Pain Scale: Numbers, Pretreatment  0/10 - no pain  -[facility] Name 04/03/19 1255          Plan of Care Review    Plan of Care Reviewed With  patient  -[facility] Name 04/03/19 1255          Physical Therapy Clinical Impression    Patient/Family Goals Statement (PT Clinical Impression)  returnh ome to PLOF  -     Criteria for Skilled Interventions Met (PT Clinical Impression)  no problems identified which require skilled intervention  -[facility] Name 04/03/19 1255          Positioning and Restraints    Pre-Treatment Position  in bed  -     Post Treatment Position  bed  -     In Bed  sitting EOB;call light within reach;encouraged to call for assist;with family/caregiver  -       User Key  (r) = Recorded By, (t) = Taken By, (c) = Cosigned By    Initials Name Provider Type     Natividad Agosto, PT Physical Therapist              PT Recommendation and Plan  Anticipated Discharge Disposition (PT): home  Outcome Summary/Treatment Plan (PT)  Anticipated Discharge Disposition (PT): home  Plan of Care Reviewed With: patient  Outcome Summary: Pt admitted with headache, leg pain and was diagnosed subacute stroke. Pt ambulated independently 150 ft and appears to be functioning at baseline level. PT will sign off. Encouraged pt to ambulate 2-3 times per day with nursing or family supervision.     Outcome Measures     [facility] Name 04/03/19 1400 04/03/19 1320          How much  help from another person do you currently need...    Turning from your back to your side while in flat bed without using bedrails?  4  -KH  --     Moving from lying on back to sitting on the side of a flat bed without bedrails?  4  -KH  --     Moving to and from a bed to a chair (including a wheelchair)?  4  -KH  --     Standing up from a chair using your arms (e.g., wheelchair, bedside chair)?  4  -KH  --     Climbing 3-5 steps with a railing?  4  -KH  --     To walk in hospital room?  4  -KH  --     AM-PAC 6 Clicks Score  24  -KH  --        How much help from another is currently needed...    Putting on and taking off regular lower body clothing?  --  4  -KP     Bathing (including washing, rinsing, and drying)  --  4  -KP     Toileting (which includes using toilet bed pan or urinal)  --  4  -KP     Putting on and taking off regular upper body clothing  --  4  -KP     Taking care of personal grooming (such as brushing teeth)  --  4  -KP     Eating meals  --  4  -KP     Score  --  24  -KP        Modified Childress Scale    Modified Childress Scale  0 - No Symptoms at all.  -KH  --        Functional Assessment    Outcome Measure Options  AM-PAC 6 Clicks Basic Mobility (PT);Modified [place]  -KH  AM-PAC 6 Clicks Daily Activity (OT)  -       User Key  (r) = Recorded By, (t) = Taken By, (c) = Cosigned By    Initials Name Provider Type    Natividad Cervantes, SANTIAGO Physical Therapist    Bridgette Garzon, OTR Occupational Therapist           Time Calculation:   PT Charges     Row Name 04/03/19 1259             Time Calculation    Start Time  1225  -      Stop Time  1240  -      Time Calculation (min)  15 min  -        User Key  (r) = Recorded By, (t) = Taken By, (c) = Cosigned By    Initials Name Provider Type    Natividad Cervantes PT Physical Therapist        Therapy Charges for Today     Code Description Service Date Service Provider Modifiers Qty    56758213626 HC PT EVAL LOW COMPLEXITY 2  4/3/2019 Natividad Agosto, PT GP 1          PT G-Codes  Outcome Measure Options: AM-PAC 6 Clicks Basic Mobility (PT), Modified [place]  AM-PAC 6 Clicks Score: 24  Score: 24  Modified Jose Scale: 0 - No Symptoms at all.    PT Discharge Summary  Anticipated Discharge Disposition (PT): home    Natividad Agosto, PT  4/3/2019

## 2019-04-03 NOTE — PLAN OF CARE
Problem: Patient Care Overview  Goal: Plan of Care Review   04/03/19 1403   OTHER   Outcome Summary Pt admitted with headache, leg pain and was diagnosed subacute stroke. Pt ambulated independently 150 ft and appears to be functioning at baseline level. PT will sign off. Encouraged pt to ambulate 2-3 times per day with nursing or family supervision.

## 2019-04-03 NOTE — PROGRESS NOTES
" LOS: 0 days     Name: Alba Correa  Age: 85 y.o.  Sex: female  :  1933  MRN: [id number]         Primary Care Physician: Aramis Doty MD    Subjective   Subjective  Complains of a mild right frontal headache which is improved from yesterday.  No other new complaints at this time.  She still remains somewhat weak when she gets up to stand or ambulate.    Objective   Vital Signs  Temp:  [97.9 °F (36.6 °C)-98.6 °F (37 °C)] 98.6 °F (37 °C)  Heart Rate:  [52-62] 56  Resp:  [16-18] 16  BP: (162-199)/(71-89) 162/81  Body mass index is 21.95 kg/m².    Objective:  General Appearance:  Comfortable and in no acute distress (Elderly and frail-appearing).    Vital signs: (most recent): Blood pressure 162/81, pulse 56, temperature 98.6 °F (37 °C), temperature source Oral, resp. rate 16, height 157.5 cm (62\"), weight 54.4 kg (120 lb), SpO2 97 %, not currently breastfeeding.    Lungs:  Normal effort and normal respiratory rate.    Heart: Normal rate.  Regular rhythm.    Abdomen: Abdomen is soft.  Bowel sounds are normal.   There is no abdominal tenderness.     Extremities: There is no dependent edema or local swelling.    Neurological: Patient is alert and oriented to person, place and time.    Skin:  Warm and dry.              Results Review:       I reviewed the patient's new clinical results.    Results from last 7 days   Lab Units 19  1136   WBC 10*3/mm3 6.67   HEMOGLOBIN g/dL 15.8   PLATELETS 10*3/mm3 256     Results from last 7 days   Lab Units 19  1136   SODIUM mmol/L 138   POTASSIUM mmol/L 3.8   CHLORIDE mmol/L 98   CO2 mmol/L 26.4   BUN mg/dL 15   CREATININE mg/dL 1.01*   CALCIUM mg/dL 10.5   GLUCOSE mg/dL 125*                 Scheduled Meds:     amLODIPine 5 mg Oral Daily   aspirin 325 mg Oral Daily   Or      aspirin 300 mg Rectal Daily   [START ON 2019] atenolol 12.5 mg Oral Daily   atorvastatin 80 mg Oral Nightly   DULoxetine 60 mg Oral Daily   febuxostat 40 mg Oral Daily   losartan 100 mg " Oral Daily   sodium chloride 3 mL Intravenous Q12H     PRN Meds:   •  acetaminophen  •  ondansetron  •  ondansetron  •  sodium chloride  Continuous Infusions:    sodium chloride 75 mL/hr Last Rate: 75 mL/hr (04/03/19 1035)       Assessment/Plan   Active Hospital Problems    Diagnosis  POA   • **Acute intractable headache [R51]  Yes   • Nausea [R11.0]  Unknown   • History of ischemic right MCA stroke [Z86.73]  Not Applicable   • Benign essential hypertension [I10]  Yes   • Elevated cholesterol [E78.00]  Yes   • Rheumatoid arthritis (CMS/HCC) [M06.9]  Yes   • Chronic renal failure, stage 3 (moderate) (CMS/HCC) [N18.3]  Yes      Resolved Hospital Problems   No resolved problems to display.       Assessment & Plan    -Await results of MRI/MRA and echocardiogram  -Continue aspirin and statin  -Physical therapy  -She is bradycardic and will decrease atenolol dose    Dispo  Likely home soon if above workup is unremarkable    Ankit Mendez MD  [facility]  04/03/19  12:08 PM

## 2019-04-04 VITALS
SYSTOLIC BLOOD PRESSURE: 172 MMHG | TEMPERATURE: 98.7 F | RESPIRATION RATE: 16 BRPM | DIASTOLIC BLOOD PRESSURE: 79 MMHG | WEIGHT: 120 LBS | HEART RATE: 60 BPM | BODY MASS INDEX: 22.08 KG/M2 | HEIGHT: 62 IN | OXYGEN SATURATION: 95 %

## 2019-04-04 PROBLEM — R29.898 BILATERAL LEG WEAKNESS: Status: ACTIVE | Noted: 2019-04-04

## 2019-04-04 PROBLEM — R51.9 ACUTE INTRACTABLE HEADACHE: Status: RESOLVED | Noted: 2019-04-02 | Resolved: 2019-04-04

## 2019-04-04 LAB
ANION GAP SERPL CALCULATED.3IONS-SCNC: 12.4 MMOL/L
BASOPHILS # BLD AUTO: 0.06 10*3/MM3 (ref 0–0.2)
BASOPHILS NFR BLD AUTO: 0.9 % (ref 0–1.5)
BUN BLD-MCNC: 10 MG/DL (ref 8–23)
BUN/CREAT SERPL: 13 (ref 7–25)
CALCIUM SPEC-SCNC: 8.8 MG/DL (ref 8.6–10.5)
CHLORIDE SERPL-SCNC: 105 MMOL/L (ref 98–107)
CO2 SERPL-SCNC: 23.6 MMOL/L (ref 22–29)
CREAT BLD-MCNC: 0.77 MG/DL (ref 0.57–1)
DEPRECATED RDW RBC AUTO: 44.9 FL (ref 37–54)
EOSINOPHIL # BLD AUTO: 0.24 10*3/MM3 (ref 0–0.4)
EOSINOPHIL NFR BLD AUTO: 3.8 % (ref 0.3–6.2)
ERYTHROCYTE [DISTWIDTH] IN BLOOD BY AUTOMATED COUNT: 11.9 % (ref 12.3–15.4)
GFR SERPL CREATININE-BSD FRML MDRD: 71 ML/MIN/1.73
GLUCOSE BLD-MCNC: 104 MG/DL (ref 65–99)
HCT VFR BLD AUTO: 41.3 % (ref 34–46.6)
HGB BLD-MCNC: 13.7 G/DL (ref 12–15.9)
IMM GRANULOCYTES # BLD AUTO: 0.01 10*3/MM3 (ref 0–0.05)
IMM GRANULOCYTES NFR BLD AUTO: 0.2 % (ref 0–0.5)
LYMPHOCYTES # BLD AUTO: 2.46 10*3/MM3 (ref 0.7–3.1)
LYMPHOCYTES NFR BLD AUTO: 38.5 % (ref 19.6–45.3)
MCH RBC QN AUTO: 33.7 PG (ref 26.6–33)
MCHC RBC AUTO-ENTMCNC: 33.2 G/DL (ref 31.5–35.7)
MCV RBC AUTO: 101.7 FL (ref 79–97)
MONOCYTES # BLD AUTO: 0.48 10*3/MM3 (ref 0.1–0.9)
MONOCYTES NFR BLD AUTO: 7.5 % (ref 5–12)
NEUTROPHILS # BLD AUTO: 3.14 10*3/MM3 (ref 1.4–7)
NEUTROPHILS NFR BLD AUTO: 49.1 % (ref 42.7–76)
NRBC BLD AUTO-RTO: 0 /100 WBC (ref 0–0)
PLATELET # BLD AUTO: 221 10*3/MM3 (ref 140–450)
PMV BLD AUTO: 10.4 FL (ref 6–12)
POTASSIUM BLD-SCNC: 3.4 MMOL/L (ref 3.5–5.2)
RBC # BLD AUTO: 4.06 10*6/MM3 (ref 3.77–5.28)
SODIUM BLD-SCNC: 141 MMOL/L (ref 136–145)
WBC NRBC COR # BLD: 6.39 10*3/MM3 (ref 3.4–10.8)

## 2019-04-04 PROCEDURE — 85025 COMPLETE CBC W/AUTO DIFF WBC: CPT | Performed by: INTERNAL MEDICINE

## 2019-04-04 PROCEDURE — 80048 BASIC METABOLIC PNL TOTAL CA: CPT | Performed by: INTERNAL MEDICINE

## 2019-04-04 PROCEDURE — 96361 HYDRATE IV INFUSION ADD-ON: CPT

## 2019-04-04 PROCEDURE — 99213 OFFICE O/P EST LOW 20 MIN: CPT | Performed by: NURSE PRACTITIONER

## 2019-04-04 PROCEDURE — G0378 HOSPITAL OBSERVATION PER HR: HCPCS

## 2019-04-04 RX ORDER — ATORVASTATIN CALCIUM 40 MG/1
40 TABLET, FILM COATED ORAL DAILY
Qty: 30 TABLET | Refills: 0 | Status: SHIPPED | OUTPATIENT
Start: 2019-04-04 | End: 2019-05-04

## 2019-04-04 RX ADMIN — DULOXETINE HYDROCHLORIDE 60 MG: 60 CAPSULE, DELAYED RELEASE ORAL at 08:54

## 2019-04-04 RX ADMIN — SODIUM CHLORIDE, PRESERVATIVE FREE 3 ML: 5 INJECTION INTRAVENOUS at 08:54

## 2019-04-04 RX ADMIN — LOSARTAN POTASSIUM 100 MG: 100 TABLET, FILM COATED ORAL at 08:54

## 2019-04-04 RX ADMIN — ASPIRIN 325 MG: 325 TABLET ORAL at 08:54

## 2019-04-04 RX ADMIN — SODIUM CHLORIDE 75 ML/HR: 9 INJECTION, SOLUTION INTRAVENOUS at 00:07

## 2019-04-04 RX ADMIN — ATENOLOL 12.5 MG: 25 TABLET ORAL at 08:54

## 2019-04-04 RX ADMIN — AMLODIPINE BESYLATE 5 MG: 5 TABLET ORAL at 08:54

## 2019-04-04 RX ADMIN — FEBUXOSTAT 40 MG: 40 TABLET ORAL at 08:54

## 2019-04-04 NOTE — DISCHARGE SUMMARY
Date of Admission: 4/2/2019  Date of Discharge:  4/4/2019  Primary Care Physician: Aramis Doty MD     Discharge Diagnosis:  Active Hospital Problems    Diagnosis  POA   • **History of ischemic right MCA stroke with extension [Z86.73]  Not Applicable   • Bilateral leg weakness [R29.898]  Unknown   • Nausea [R11.0]  Unknown   • Benign essential hypertension [I10]  Yes   • Elevated cholesterol [E78.00]  Yes   • Rheumatoid arthritis (CMS/HCC) [M06.9]  Yes   • Chronic renal failure, stage 3 (moderate) (CMS/HCC) [N18.3]  Yes      Resolved Hospital Problems    Diagnosis Date Resolved POA   • Acute intractable headache [R51] 04/04/2019 Yes       DETAILS OF HOSPITAL STAY     Pertinent Test Results and Procedures Performed  Brain MRI:  1. No evidence of acute infarction, mass or of abnormal intra-axial  enhancement.  2. Moderate small vessel ischemic disease with a new area of T2 FLAIR  hyperintensity involving the subcortical white matter of the right  parietal lobe corresponding to the area of decreased attenuation noted  on the CT examination of 04/02/2019 and likely representing extension of  the acute infarcts noted on the MRI of 08/05/2018.  3. Mild dural enhancement supratentorially, nonspecific.    MRA Head:  The prior MRA of the head performed on 08/03/2018  demonstrated absent signal within a M3 branch of the inferior division  of the right middle cerebral artery at a point overlying the insular  cortex. This appears normal on the current examination. There is no  evidence of focal high-grade stenosis or of aneurysm. The left A1  segment is hypoplastic.    MRA neck:  Mild irregularity involving the left vertebral artery but  without significant stenosis. There is 0% stenosis involving the carotid  bifurcations using NASCET criteria.    MRI C-spine:  No evidence of focal herniation. Multilevel degenerative  disease involving the cervical spine as described in detail above most  prominent of which is at C5-6  where there is loss of disc height,  endplate degenerative changes and a grade 1 retrolisthesis. There is  mild uncovertebral degenerative disease to the left.    Head CT:  No evidence of hemorrhage. No focal area of decreased  attenuation is present to suggest an acute infarct. There is moderate  small vessel ischemic disease. There is a new area of decreased  attenuation involving the frontoparietal region on the right  superolaterally, subcortical likely representing a remote infarct.  Further evaluation could be performed with MRI examination of brain and  MRA of the head. The prior MRA of 08/05/2018 demonstrated absent signal  in the inferior division of the right middle cerebral artery  posteriorly.     Transthoracic echocardiogram:  · Estimated EF appears to be in the range of 61 - 65%.  · Left ventricular diastolic dysfunction is noted (grade I) consistent with impaired relaxation.  · Normal right ventricular cavity size and systolic function noted.  · Mild aortic valve regurgitation is present.  · Mild mitral valve regurgitation is present.  · Mild to moderate tricuspid valve regurgitation is present.  · Calculated right ventricular systolic pressure from tricuspid regurgitation is 34 mmHg.  · There is no evidence of pericardial effusion.    RVP negative    Hospital Course  This is an 85-year-old male with history of hypertension, previous stroke, chronic kidney disease amongst other problems who presented to the emergency room with complaints of headache, nausea, and increased lower extremity weakness bilaterally.  Please see H&P for full details of admission.  Was brought in out of concern for possible new or evolving stroke particularly in the right frontal parietal region.  She was evaluated by neurology and underwent MRI/MRA of the head and neck along with MRI of the cervical spine as described above.  I discussed the case with Dr. Leary today.  He feels as if she has had some extension of the  previously known right frontal stroke.  Her symptoms have resolved with conservative management.  She did have some generalized joint aches and muscle pains.  Respiratory viral panel was negative.  Her nausea resolved on its own and she is feeling back to baseline.  Neurology has recommended she continue full dose aspirin and moderate dose statin.  She will follow-up in the neurology clinic in 3 months.  Her lower extremity weakness has resolved.  She has an upcoming appointment with orthopedic surgery and we will see what they think of her exam at that time and consideration of lumbar imaging.  At this point she is medically stable and will be released home today.    Physical Exam at Discharge:  General: No acute distress, AAOx3  HEENT: EOMI, PERRL  Cardiovascular: +s1 and s2, RRR  Lungs: No rhonchi or wheezing  Abdomen: soft, nontender    Consults:   Consults     Date and Time Order Name Status Description    4/2/2019 1508 Inpatient Neurology Consult Stroke Completed     4/2/2019 1307 [facility] (on-call MD unless specified) Completed             Condition on Discharge: Stable    Discharge Disposition  Home or Self Care    Discharge Medications     Discharge Medications      Continue These Medications      Instructions Start Date   acetaminophen 650 MG 8 hr tablet  Commonly known as:  TYLENOL   1,300 mg, Oral, Every 8 Hours PRN      amLODIPine 5 MG tablet  Commonly known as:  NORVASC  Notes to patient:  Last dose 4/4/19 at 0854   5 mg, Oral, Daily      aspirin 325 MG tablet  Notes to patient:  Last dose 4/4/19 at 0854   325 mg, Oral, Daily      atenolol 25 MG tablet  Commonly known as:  TENORMIN  Notes to patient:  Last dose 4/4/19 at 0854   25 mg, Oral, Daily      atorvastatin 40 MG tablet  Commonly known as:  LIPITOR  Notes to patient:  Last dose 4/3/19 at 2001   40 mg, Oral, Daily      Biotin 3 MG tablet   1 tablet, Oral, Daily      CALCIUM 600+D 600-400 MG-UNIT per tablet  Generic drug:  calcium carbonate-vitamin d    1 tablet, Oral, Daily      CENTRUM SILVER tablet   1 tablet, Oral, Daily      DULoxetine 60 MG capsule  Commonly known as:  CYMBALTA  Notes to patient:  Last dose 4/4/19 at 0854   60 mg, Oral, Daily      febuxostat 40 MG tablet  Commonly known as:  ULORIC  Notes to patient:  Last dose 4/4/19 at 0854   40 mg, Oral, Daily      fish oil 1200 MG capsule capsule   1,200 mg, Oral, Daily      hydrALAZINE 25 MG tablet  Commonly known as:  APRESOLINE   25 mg, Oral, 2 Times Daily PRN      losartan 100 MG tablet  Commonly known as:  COZAAR  Notes to patient:  Last dose 4/4/19 at 0854   100 mg, Oral, Daily         Stop These Medications    simvastatin 40 MG tablet  Commonly known as:  ZOCOR            Discharge Diet:   Diet Instructions     Diet: Regular; Thin      Discharge Diet:  Regular    Fluid Consistency:  Thin          Activity at Discharge:   Activity Instructions     Activity as Tolerated            Follow-up Appointments  Future Appointments   Date Time Provider Department Center   4/12/2019  2:20 PM Olegario Gutiérrez MD [id number] None   8/13/2019  1:00 PM Aramis Doty MD [id number] None   10/25/2019  1:15 PM Roshan Martines III, MD [id number] None     Additional Instructions for the Follow-ups that You Need to Schedule     Discharge Follow-up with PCP   As directed       Currently Documented PCP:    Aramis Doty MD    PCP Phone Number:    [phone]     Follow Up Details:  1 week         Discharge Follow-up with Specified Provider: [facility]; 3 Months   As directed      To:  [facility]    Follow Up:  3 Months               I have examined and discussed discharge planning with the patient today.     Ankit Mendez MD  04/04/19  12:26 PM    Time: Discharge 20 min

## 2019-04-04 NOTE — PROGRESS NOTES
DOS: 2019  NAME: Alba Correa   : 1933  PCP: Aramis Doty MD    Chief Complaint   Patient presents with   • Weakness - Generalized     PT C/O GENERALIZED WEAKNESS, NAUSEA AND HEADACHE STARTING 2 DAYS AGO        Stroke    Subjective: No acute events overnight.  Denies any new numbness, weakness, speech or visual disturbances.  Headache has resolved.  No family at bedside.    Objective:  Vital signs:      Vitals:    19 1300 19 1855 19 2300 19 0700   BP: 141/92 132/63 151/70 172/79   BP Location: Right arm Right arm Right arm Right arm   Patient Position: Lying Lying Lying Lying   Pulse: 60 63 57 60   Resp: 16 16 16 16   Temp: 98.9 °F (37.2 °C) 98.6 °F (37 °C) 98.4 °F (36.9 °C) 98.7 °F (37.1 °C)   TempSrc: Oral Oral Oral Oral   SpO2:    95%   Weight:       Height:           Current Facility-Administered Medications:   •  acetaminophen (TYLENOL) tablet 650 mg, 650 mg, Oral, Q4H PRN, Rita Lockhart APRN, 650 mg at 19 1034  •  amLODIPine (NORVASC) tablet 5 mg, 5 mg, Oral, Daily, Ankit Mendez MD, 5 mg at 19 0854  •  aspirin tablet 325 mg, 325 mg, Oral, Daily, 325 mg at 19 0854 **OR** aspirin suppository 300 mg, 300 mg, Rectal, Daily, Rita Lockhart APRN  •  atenolol (TENORMIN) tablet 12.5 mg, 12.5 mg, Oral, Daily, Ankit Mendez MD, 12.5 mg at 19 0854  •  atorvastatin (LIPITOR) tablet 80 mg, 80 mg, Oral, Nightly, Rita Lockhart APRN, 80 mg at 19  •  DULoxetine (CYMBALTA) DR capsule 60 mg, 60 mg, Oral, Daily, Ankit Mendez MD, 60 mg at 19 0854  •  febuxostat (ULORIC) tablet 40 mg, 40 mg, Oral, Daily, Ankit Mendez MD, 40 mg at 19 0854  •  losartan (COZAAR) tablet 100 mg, 100 mg, Oral, Daily, Ankit Mendez MD, 100 mg at 19 0854  •  ondansetron (ZOFRAN) injection 4 mg, 4 mg, Intravenous, Q6H PRN, Ankit Mendez MD, 4 mg at 19 1034  •  ondansetron  (ZOFRAN) tablet 4 mg, 4 mg, Oral, Q6H PRN, Rita Lockhart, APRN, 4 mg at 04/03/19 0034  •  sodium chloride 0.9 % flush 3 mL, 3 mL, Intravenous, Q12H, Rita Lockhart, APRN, 3 mL at 04/04/19 0854  •  sodium chloride 0.9 % flush 3-10 mL, 3-10 mL, Intravenous, PRN, Rita Lockhart, APRN  •  sodium chloride 0.9 % infusion, 75 mL/hr, Intravenous, Continuous, Ankit Mendez MD, Last Rate: 75 mL/hr at 04/04/19 0925, 75 mL/hr at 04/04/19 0925    PRN meds  •  acetaminophen  •  ondansetron  •  ondansetron  •  sodium chloride    No current facility-administered medications on file prior to encounter.      Current Outpatient Medications on File Prior to Encounter   Medication Sig   • acetaminophen (TYLENOL) 650 MG 8 hr tablet Take 1,300 mg by mouth Every 8 (Eight) Hours As Needed for Mild Pain .   • amLODIPine (NORVASC) 5 MG tablet Take 1 tablet by mouth Daily.   • aspirin 325 MG tablet Take 1 tablet by mouth Daily.   • atenolol (TENORMIN) 25 MG tablet Take 1 tablet by mouth Daily.   • atorvastatin (LIPITOR) 40 MG tablet Take 1 tablet by mouth Daily.   • Biotin 3 MG tablet Take 1 tablet by mouth Daily.   • calcium carbonate-vitamin d (CALCIUM 600+D) 600-400 MG-UNIT per tablet Take 1 tablet by mouth Daily.   • DULoxetine (CYMBALTA) 60 MG capsule Take 1 capsule by mouth Daily.   • febuxostat (ULORIC) 40 MG tablet Take 1 tablet by mouth Daily.   • losartan (COZAAR) 100 MG tablet Take 1 tablet by mouth Daily.   • Multiple Vitamins-Minerals (CENTRUM SILVER) tablet Take 1 tablet by mouth Daily.   • Omega-3 Fatty Acids (FISH OIL) 1200 MG capsule capsule Take 1,200 mg by mouth Daily.   • simvastatin (ZOCOR) 40 MG tablet Take 40 mg by mouth Daily.   • hydrALAZINE (APRESOLINE) 25 MG tablet Take 25 mg by mouth 2 (Two) Times a Day As Needed (If BP greater than 160/90).       General appearance: NAD, alert and cooperative, well groomed  HEENT: Normocephalic, atraumatic, PERRL, no masses or tenderness  COR: RRR  Resp: Even and  unlabored  Extremities: Equal pulses  Skin: warm, dry    Neurological:   MS: oriented x3, recent/remote memory intact, normal attention/concentration, language intact, no neglect, normal fund of knowledge  CN: visual acuity grossly normal, visual fields full, PERRL, EOMI, facial sensation equal, no facial droop, hearing symmetric, palate elevates symmetrically, shoulder shrug equal, tongue midline  Motor: 5/5 in all 4 ext., normal tone  Reflexes: 1+ in all 4 ext.  Sensory: light touch sensation intact in all 4 ext.  Coordination: Normal finger to nose test, normal heel to shin test    Laboratory results:  Lab Results   Component Value Date    TSH 2.340 04/03/2019     Lab Results   Component Value Date    HGBA1C 5.20 04/03/2019     Lab Results   Component Value Date    OLQEEWUW35 1,088 (H) 04/03/2019     Lab Results   Component Value Date    CHOL 148 04/03/2019    CHOL 161 08/06/2018    CHLPL 165 01/19/2018    CHLPL 155 11/17/2016     Lab Results   Component Value Date    TRIG 114 04/03/2019    TRIG 171 (H) 08/06/2018    TRIG 193 (H) 01/19/2018     Lab Results   Component Value Date    HDL 38 (L) 04/03/2019    HDL 38 (L) 08/06/2018    HDL 41 01/19/2018     Lab Results   Component Value Date    LDL 87 04/03/2019    LDL 89 08/06/2018    LDL 85 01/19/2018     Lab Results   Component Value Date    WBC 6.39 04/04/2019    HGB 13.7 04/04/2019    HCT 41.3 04/04/2019    .7 (H) 04/04/2019     04/04/2019     Lab Results   Component Value Date    GLUCOSE 104 (H) 04/04/2019    BUN 10 04/04/2019    CREATININE 0.77 04/04/2019    EGFRIFNONA 71 04/04/2019    EGFRIFAFRI 50 (L) 05/29/2018    BCR 13.0 04/04/2019    K 3.4 (L) 04/04/2019    CO2 23.6 04/04/2019    CALCIUM 8.8 04/04/2019    PROTENTOTREF 7.9 01/19/2018    ALBUMIN 4.70 04/02/2019    LABIL2 1.1 01/19/2018    AST 39 (H) 04/02/2019    ALT 31 04/02/2019     Lab Results   Component Value Date    PTT 35.9 (H) 11/23/2015     Lab Results   Component Value Date    INR  1.02 08/05/2018    INR 1.0 11/22/2015    INR 1.1 11/20/2015    PROTIME 13.2 08/05/2018    PROTIME 13.4 11/22/2015    PROTIME 13.6 11/20/2015     Brief Urine Lab Results  (Last result in the past 365 days)      Color   Clarity   Blood   Leuk Est   Nitrite   Protein   CREAT   Urine HCG        04/02/19 1132 Dark Yellow Clear Trace Trace Negative 100 mg/dL (2+)               Labs reviewed TSH and B12 within normal limits  Review and interpretation of imaging:  Ct Head Without Contrast    Result Date: 4/3/2019  No evidence of hemorrhage. No focal area of decreased attenuation is present to suggest an acute infarct. There is moderate small vessel ischemic disease. There is a new area of decreased attenuation involving the frontoparietal region on the right superolaterally, subcortical likely representing a remote infarct. Further evaluation could be performed with MRI examination of brain and MRA of the head. The prior MRA of 08/05/2018 demonstrated absent signal in the inferior division of the right middle cerebral artery posteriorly.  The above information was called to and discussed with Dr. Berg.      Radiation dose reduction techniques were utilized, including automated exposure control and exposure modulation based on body size.  This report was finalized on 4/3/2019 8:32 AM by Dr. Logan Zepeda M.D.      Mri Angiogram Head Without Contrast    Result Date: 4/4/2019  1. No evidence of acute infarction, mass or of abnormal intra-axial enhancement. 2. Moderate small vessel ischemic disease with a new area of T2 FLAIR hyperintensity involving the subcortical white matter of the right parietal lobe corresponding to the area of decreased attenuation noted on the CT examination of 04/02/2019 and likely representing extension of the acute infarcts noted on the MRI of 08/05/2018. 3. Mild dural enhancement supratentorially, nonspecific.    MRA OF THE HEAD WITHOUT CONTRAST:  FINDINGS: There is signal present within the distal  aspect of the vertebral arteries bilaterally. The right vertebral artery is larger than that of the left. The left posterior inferior cerebellar artery is dominant, crossing midline. Additionally, there is an AICA/PICA complex on the right. The basilar artery and the proximal aspects of the posterior cerebral arteries appear unremarkable.  The right internal carotid artery is slightly larger than that of the left at the skull base. Distal aspects of the internal carotid arteries are of normal caliber. The left A1 segment is markedly hypoplastic. Otherwise, the proximal aspects of the anterior and middle cerebral arteries appear unremarkable. The prior MRA demonstrated absent signal in a proximal M3 branch of the inferior division of the right middle cerebral artery approximately 12 mm from its origin. This now demonstrates normal signal with no evidence of underlying stenosis.  IMPRESSION: The prior MRA of the head performed on 08/03/2018 demonstrated absent signal within a M3 branch of the inferior division of the right middle cerebral artery at a point overlying the insular cortex. This appears normal on the current examination. There is no evidence of focal high-grade stenosis or of aneurysm. The left A1 segment is hypoplastic.    MRA OF THE NECK WITH AND WITHOUT CONTRAST: The great vessels are arranged in a classic configuration. The right vertebral artery is slightly larger than that of the left. There is mild irregularity involving the left vertebral artery proximally suggesting mild eccentric atherosclerotic disease but with 0% stenosis using NASCET criteria. There is no evidence of dissection.  There is 0% stenosis involving the proximal aspects of the internal carotid arteries using NASCET criteria.  IMPRESSION: Mild irregularity involving the left vertebral artery but without significant stenosis. There is 0% stenosis involving the carotid bifurcations using NASCET criteria.    MR EXAMINATION OF THE  CERVICAL SPINE WITHOUT CONTRAST: The study is hampered by patient motion. Signal intensity within the cord is grossly normal on the T2 sequences. There is moderate-to-severe loss of disc height at C6-7 with moderate endplate degenerative changes.. This appears similar to the prior examination. This is eccentric being slightly more prominent to the left. There is a grade 1 retrolisthesis of C5 on C6 estimated to be approximately 1 to 2 mm in severity, unchanged.  C2-3: Mild facet degenerative disease is present on the left.  C3-4: There is moderate facet degenerative disease on the left. There is a mild central disc bulge.  C4-5: There is moderate-to-severe facet degenerative disease on the left. There is a minimal central disc osteophyte complex.  C5-6: There is minimal flattening of the ventral surface of thecal sac by the retrolisthesis of C5 on C6. Mild facet degenerative disease is present bilaterally.  C6-7: There is no evidence of disc bulge or herniation.  C7-T1: There is no evidence of disc bulge or herniation.  IMPRESSION: No evidence of focal herniation. Multilevel degenerative disease involving the cervical spine as described in detail above most prominent of which is at C5-6 where there is loss of disc height, endplate degenerative changes and a grade 1 retrolisthesis. There is mild uncovertebral degenerative disease to the left.  This report was finalized on 4/4/2019 8:46 AM by Dr. Logan Zepeda M.D.      Mri Angiogram Neck With & Without Contrast    Result Date: 4/4/2019  1. No evidence of acute infarction, mass or of abnormal intra-axial enhancement. 2. Moderate small vessel ischemic disease with a new area of T2 FLAIR hyperintensity involving the subcortical white matter of the right parietal lobe corresponding to the area of decreased attenuation noted on the CT examination of 04/02/2019 and likely representing extension of the acute infarcts noted on the MRI of 08/05/2018. 3. Mild dural  enhancement supratentorially, nonspecific.    MRA OF THE HEAD WITHOUT CONTRAST:  FINDINGS: There is signal present within the distal aspect of the vertebral arteries bilaterally. The right vertebral artery is larger than that of the left. The left posterior inferior cerebellar artery is dominant, crossing midline. Additionally, there is an AICA/PICA complex on the right. The basilar artery and the proximal aspects of the posterior cerebral arteries appear unremarkable.  The right internal carotid artery is slightly larger than that of the left at the skull base. Distal aspects of the internal carotid arteries are of normal caliber. The left A1 segment is markedly hypoplastic. Otherwise, the proximal aspects of the anterior and middle cerebral arteries appear unremarkable. The prior MRA demonstrated absent signal in a proximal M3 branch of the inferior division of the right middle cerebral artery approximately 12 mm from its origin. This now demonstrates normal signal with no evidence of underlying stenosis.  IMPRESSION: The prior MRA of the head performed on 08/03/2018 demonstrated absent signal within a M3 branch of the inferior division of the right middle cerebral artery at a point overlying the insular cortex. This appears normal on the current examination. There is no evidence of focal high-grade stenosis or of aneurysm. The left A1 segment is hypoplastic.    MRA OF THE NECK WITH AND WITHOUT CONTRAST: The great vessels are arranged in a classic configuration. The right vertebral artery is slightly larger than that of the left. There is mild irregularity involving the left vertebral artery proximally suggesting mild eccentric atherosclerotic disease but with 0% stenosis using NASCET criteria. There is no evidence of dissection.  There is 0% stenosis involving the proximal aspects of the internal carotid arteries using NASCET criteria.  IMPRESSION: Mild irregularity involving the left vertebral artery but without  significant stenosis. There is 0% stenosis involving the carotid bifurcations using NASCET criteria.    MR EXAMINATION OF THE CERVICAL SPINE WITHOUT CONTRAST: The study is hampered by patient motion. Signal intensity within the cord is grossly normal on the T2 sequences. There is moderate-to-severe loss of disc height at C6-7 with moderate endplate degenerative changes.. This appears similar to the prior examination. This is eccentric being slightly more prominent to the left. There is a grade 1 retrolisthesis of C5 on C6 estimated to be approximately 1 to 2 mm in severity, unchanged.  C2-3: Mild facet degenerative disease is present on the left.  C3-4: There is moderate facet degenerative disease on the left. There is a mild central disc bulge.  C4-5: There is moderate-to-severe facet degenerative disease on the left. There is a minimal central disc osteophyte complex.  C5-6: There is minimal flattening of the ventral surface of thecal sac by the retrolisthesis of C5 on C6. Mild facet degenerative disease is present bilaterally.  C6-7: There is no evidence of disc bulge or herniation.  C7-T1: There is no evidence of disc bulge or herniation.  IMPRESSION: No evidence of focal herniation. Multilevel degenerative disease involving the cervical spine as described in detail above most prominent of which is at C5-6 where there is loss of disc height, endplate degenerative changes and a grade 1 retrolisthesis. There is mild uncovertebral degenerative disease to the left.  This report was finalized on 4/4/2019 8:46 AM by Dr. Logan Zepeda M.D.      Mri Brain With & Without Contrast    Result Date: 4/4/2019  1. No evidence of acute infarction, mass or of abnormal intra-axial enhancement. 2. Moderate small vessel ischemic disease with a new area of T2 FLAIR hyperintensity involving the subcortical white matter of the right parietal lobe corresponding to the area of decreased attenuation noted on the CT examination of  04/02/2019 and likely representing extension of the acute infarcts noted on the MRI of 08/05/2018. 3. Mild dural enhancement supratentorially, nonspecific.    MRA OF THE HEAD WITHOUT CONTRAST:  FINDINGS: There is signal present within the distal aspect of the vertebral arteries bilaterally. The right vertebral artery is larger than that of the left. The left posterior inferior cerebellar artery is dominant, crossing midline. Additionally, there is an AICA/PICA complex on the right. The basilar artery and the proximal aspects of the posterior cerebral arteries appear unremarkable.  The right internal carotid artery is slightly larger than that of the left at the skull base. Distal aspects of the internal carotid arteries are of normal caliber. The left A1 segment is markedly hypoplastic. Otherwise, the proximal aspects of the anterior and middle cerebral arteries appear unremarkable. The prior MRA demonstrated absent signal in a proximal M3 branch of the inferior division of the right middle cerebral artery approximately 12 mm from its origin. This now demonstrates normal signal with no evidence of underlying stenosis.  IMPRESSION: The prior MRA of the head performed on 08/03/2018 demonstrated absent signal within a M3 branch of the inferior division of the right middle cerebral artery at a point overlying the insular cortex. This appears normal on the current examination. There is no evidence of focal high-grade stenosis or of aneurysm. The left A1 segment is hypoplastic.    MRA OF THE NECK WITH AND WITHOUT CONTRAST: The great vessels are arranged in a classic configuration. The right vertebral artery is slightly larger than that of the left. There is mild irregularity involving the left vertebral artery proximally suggesting mild eccentric atherosclerotic disease but with 0% stenosis using NASCET criteria. There is no evidence of dissection.  There is 0% stenosis involving the proximal aspects of the internal  carotid arteries using NASCET criteria.  IMPRESSION: Mild irregularity involving the left vertebral artery but without significant stenosis. There is 0% stenosis involving the carotid bifurcations using NASCET criteria.    MR EXAMINATION OF THE CERVICAL SPINE WITHOUT CONTRAST: The study is hampered by patient motion. Signal intensity within the cord is grossly normal on the T2 sequences. There is moderate-to-severe loss of disc height at C6-7 with moderate endplate degenerative changes.. This appears similar to the prior examination. This is eccentric being slightly more prominent to the left. There is a grade 1 retrolisthesis of C5 on C6 estimated to be approximately 1 to 2 mm in severity, unchanged.  C2-3: Mild facet degenerative disease is present on the left.  C3-4: There is moderate facet degenerative disease on the left. There is a mild central disc bulge.  C4-5: There is moderate-to-severe facet degenerative disease on the left. There is a minimal central disc osteophyte complex.  C5-6: There is minimal flattening of the ventral surface of thecal sac by the retrolisthesis of C5 on C6. Mild facet degenerative disease is present bilaterally.  C6-7: There is no evidence of disc bulge or herniation.  C7-T1: There is no evidence of disc bulge or herniation.  IMPRESSION: No evidence of focal herniation. Multilevel degenerative disease involving the cervical spine as described in detail above most prominent of which is at C5-6 where there is loss of disc height, endplate degenerative changes and a grade 1 retrolisthesis. There is mild uncovertebral degenerative disease to the left.  This report was finalized on 4/4/2019 8:46 AM by Dr. Logan Zepeda M.D.      Mri Cervical Spine Without Contrast    Result Date: 4/4/2019  1. No evidence of acute infarction, mass or of abnormal intra-axial enhancement. 2. Moderate small vessel ischemic disease with a new area of T2 FLAIR hyperintensity involving the subcortical white  matter of the right parietal lobe corresponding to the area of decreased attenuation noted on the CT examination of 04/02/2019 and likely representing extension of the acute infarcts noted on the MRI of 08/05/2018. 3. Mild dural enhancement supratentorially, nonspecific.    MRA OF THE HEAD WITHOUT CONTRAST:  FINDINGS: There is signal present within the distal aspect of the vertebral arteries bilaterally. The right vertebral artery is larger than that of the left. The left posterior inferior cerebellar artery is dominant, crossing midline. Additionally, there is an AICA/PICA complex on the right. The basilar artery and the proximal aspects of the posterior cerebral arteries appear unremarkable.  The right internal carotid artery is slightly larger than that of the left at the skull base. Distal aspects of the internal carotid arteries are of normal caliber. The left A1 segment is markedly hypoplastic. Otherwise, the proximal aspects of the anterior and middle cerebral arteries appear unremarkable. The prior MRA demonstrated absent signal in a proximal M3 branch of the inferior division of the right middle cerebral artery approximately 12 mm from its origin. This now demonstrates normal signal with no evidence of underlying stenosis.  IMPRESSION: The prior MRA of the head performed on 08/03/2018 demonstrated absent signal within a M3 branch of the inferior division of the right middle cerebral artery at a point overlying the insular cortex. This appears normal on the current examination. There is no evidence of focal high-grade stenosis or of aneurysm. The left A1 segment is hypoplastic.    MRA OF THE NECK WITH AND WITHOUT CONTRAST: The great vessels are arranged in a classic configuration. The right vertebral artery is slightly larger than that of the left. There is mild irregularity involving the left vertebral artery proximally suggesting mild eccentric atherosclerotic disease but with 0% stenosis using NASCET  criteria. There is no evidence of dissection.  There is 0% stenosis involving the proximal aspects of the internal carotid arteries using NASCET criteria.  IMPRESSION: Mild irregularity involving the left vertebral artery but without significant stenosis. There is 0% stenosis involving the carotid bifurcations using NASCET criteria.    MR EXAMINATION OF THE CERVICAL SPINE WITHOUT CONTRAST: The study is hampered by patient motion. Signal intensity within the cord is grossly normal on the T2 sequences. There is moderate-to-severe loss of disc height at C6-7 with moderate endplate degenerative changes.. This appears similar to the prior examination. This is eccentric being slightly more prominent to the left. There is a grade 1 retrolisthesis of C5 on C6 estimated to be approximately 1 to 2 mm in severity, unchanged.  C2-3: Mild facet degenerative disease is present on the left.  C3-4: There is moderate facet degenerative disease on the left. There is a mild central disc bulge.  C4-5: There is moderate-to-severe facet degenerative disease on the left. There is a minimal central disc osteophyte complex.  C5-6: There is minimal flattening of the ventral surface of thecal sac by the retrolisthesis of C5 on C6. Mild facet degenerative disease is present bilaterally.  C6-7: There is no evidence of disc bulge or herniation.  C7-T1: There is no evidence of disc bulge or herniation.  IMPRESSION: No evidence of focal herniation. Multilevel degenerative disease involving the cervical spine as described in detail above most prominent of which is at C5-6 where there is loss of disc height, endplate degenerative changes and a grade 1 retrolisthesis. There is mild uncovertebral degenerative disease to the left.  This report was finalized on 4/4/2019 8:46 AM by Dr. Logan Zepeda M.D.      Results for orders placed during the hospital encounter of 04/02/19   Adult Transthoracic Echo Complete W/ Cont if Necessary Per Protocol     Narrative · Estimated EF appears to be in the range of 61 - 65%.  · Left ventricular diastolic dysfunction is noted (grade I) consistent   with impaired relaxation.  · Normal right ventricular cavity size and systolic function noted.  · Mild aortic valve regurgitation is present.  · Mild mitral valve regurgitation is present.  · Mild to moderate tricuspid valve regurgitation is present.  · Calculated right ventricular systolic pressure from tricuspid   regurgitation is 34 mmHg.  · There is no evidence of pericardial effusion.          Impression/Assessment:  This is a 85-year-old female with past medical history of DVT, hyperlipidemia, hypertension, chronic kidney disease, peripheral neuropathy, right MCA stroke on full dose aspirin prior to admission who was admitted on 4/2 with complaints of sudden progressive difficulty with walking over the last month as well as severe pain in her calves during ambulation that caused her to feel weak in bilateral lower extremities and a new right-sided headache that's new over the last couple of days.  She was previously seen by us for her right MCA stroke in August 2018 that was thought to be due to small vessel disease.  She underwent a KASH at that time that was negative as well as long-term cardiac monitoring which did not show any arrhythmia.  She was discharged on a full dose aspirin and statin and sent to rehab where she reportedly recovered and returned to her neurologic baseline.  She reportedly received bilateral lower extremity Dopplers on 2/26 per her PCP and these were negative.  She does have peripheral neuropathy and denies any worsening lower extremity numbness.  She obtain a CT of her head upon admission to the ER that showed a possible subacute right frontal stroke.  Blood pressure on arrival was 162/64.  EKG showing normal sinus rhythm.     1. Extension of chronic right MCA stroke  2. Bilateral lower extremity weakness (resolved)  3. Claudication  4. Right  frontal headache (resolved)   5. Peripheral neuropathy    MRI brain with no acute infarction, there is a new area of T2 FLAIR hyperintensity in the right parietal lobe that likely represents extension of the acute infarcts noted on the MRI she had done in August 2018. MRA head/neck without significant stenosis.  And MRI C-spine showing no evidence of focal herniation with multilevel degenerative disease.  She has an appointment with orthopedic surgery next week where they will eval her for her claudication.  This may be from her Lipitor, recommend having a repeat lipid panel in 3 months and if pain persist after orthopedic surgery evaluation recommend lowering her Lipitor dose or possibly switching to a different statin.  Physical therapy has signed off and cleared patient.  She was able to get up and walk around and did not experience any bilateral lower extremity weakness.  Her headache has also resolved.  From our standpoint she is ready to be discharged home.  She will continue aspirin 325mg.  She will need to follow-up with Rad[facility] in August for her one year stroke follow-up. Therapies as written. CCP for discharge planning. Call RRT for any acute neurological changes. We will sign off, will see again per request.    Plan:  Continue ASA 325mg  Lipitor 40mg  Follow-up with Radha [facility] in August for her one year stroke follow-up  Okay for discharge home from our standpoint  Neurochecks per stroke protocol  Normalize BP, follow up with PCP   Stroke Education  RACHEL/SCDs  PT/OT/ST    Case discussed with Dr. Leary, and he agrees with plan above.  [place]    **Peg Disclaimer:**  Much of this encounter note is an electronic transcription/translation of spoken language to printed text. The electronic translation of spoken language may permit erroneous, or at times, nonsensical words or phrases to be inadvertently transcribed. Although I have reviewed the note for such errors,  some may still exist.

## 2019-04-04 NOTE — PLAN OF CARE
Problem: Patient Care Overview  Goal: Plan of Care Review  Outcome: Ongoing (interventions implemented as appropriate)   04/04/19 0342   Coping/Psychosocial   Plan of Care Reviewed With patient   Plan of Care Review   Progress improving   OTHER   Outcome Summary VSS. NIH 0. No new complaints. Patient slept most of the night. Possible d/c today. Will continue to monitor.        Problem: Stroke (Ischemic) (Adult)  Goal: Signs and Symptoms of Listed Potential Problems Will be Absent, Minimized or Managed (Stroke)  Outcome: Ongoing (interventions implemented as appropriate)      Problem: Nausea/Vomiting (Adult)  Goal: Symptom Relief  Outcome: Ongoing (interventions implemented as appropriate)    Goal: Adequate Hydration  Outcome: Ongoing (interventions implemented as appropriate)

## 2019-04-04 NOTE — PLAN OF CARE
Problem: Patient Care Overview  Goal: Plan of Care Review  Outcome: Ongoing (interventions implemented as appropriate)   04/04/19 1229   Coping/Psychosocial   Plan of Care Reviewed With patient   Plan of Care Review   Progress improving   OTHER   Outcome Summary vss, no falls, no pain, return home, continue to monitor

## 2019-04-04 NOTE — ACP (ADVANCE CARE PLANNING)
Patient requested to update her Advance Directive due to the fact that her  is now in a nursing facility. She has her daughter and son as her health care surrogates.

## 2019-04-04 NOTE — PROGRESS NOTES
Case Management Discharge Note    Final Note: Home, no additional CCP orders. Fiorella OROZCO/CCP    Destination      No service has been selected for the patient.      Durable Medical Equipment      No service has been selected for the patient.      Dialysis/Infusion      No service has been selected for the patient.      Home Medical Care      No service has been selected for the patient.      Therapy      No service has been selected for the patient.      Community Resources      No service has been selected for the patient.        Transportation Services  Private: Car    Final Discharge Disposition Code: 01 - home or self-care

## 2019-04-05 ENCOUNTER — READMISSION MANAGEMENT (OUTPATIENT)
Dept: CALL CENTER | Facility: HOSPITAL | Age: 84
End: 2019-04-05

## 2019-04-05 NOTE — OUTREACH NOTE
Prep Survey      Responses   Facility patient discharged from?  [facility]   Is patient eligible?  Yes   Discharge diagnosis  History of ischemic right MCA stroke with extension   Does the patient have one of the following disease processes/diagnoses(primary or secondary)?  Stroke (TIA)   Does the patient have Home health ordered?  No   Is there a DME ordered?  No   Prep survey completed?  Yes          Quin Krishnan RN

## 2019-04-08 ENCOUNTER — READMISSION MANAGEMENT (OUTPATIENT)
Dept: CALL CENTER | Facility: HOSPITAL | Age: 84
End: 2019-04-08

## 2019-04-08 NOTE — OUTREACH NOTE
Stroke Week 1 Survey      Responses   Facility patient discharged from?  [facility]   Does the patient have one of the following disease processes/diagnoses(primary or secondary)?  Stroke (TIA)   Is there a successful TCM telephone encounter documented?  No   Week 1 attempt successful?  No   Unsuccessful attempts  Attempt 1   Revoke  Phone number issues [Not a working phone number]          Geraldine Skelton RN

## 2019-04-12 ENCOUNTER — OFFICE VISIT (OUTPATIENT)
Dept: ORTHOPEDIC SURGERY | Facility: CLINIC | Age: 84
End: 2019-04-12

## 2019-04-12 VITALS — BODY MASS INDEX: 21.64 KG/M2 | HEIGHT: 62 IN | WEIGHT: 117.6 LBS | TEMPERATURE: 98.2 F

## 2019-04-12 DIAGNOSIS — R29.898 WEAKNESS OF BOTH LOWER EXTREMITIES: Primary | ICD-10-CM

## 2019-04-12 PROCEDURE — 99203 OFFICE O/P NEW LOW 30 MIN: CPT | Performed by: ORTHOPAEDIC SURGERY

## 2019-04-12 NOTE — PROGRESS NOTES
Patient: Alba Correa  YOB: 1933 85 y.o. female  Medical Record Number: [id number]    Chief Complaints:   Chief Complaint   Patient presents with   • Right Leg - Establish Care, Pain   • Left Leg - Establish Care, Pain       History of Present Illness:Alba Correa is a 85 y.o. female who presents with bilateral calf pain.  She has had pain for about 5-6 weeks.  She denies a history of trauma.  She was recently hospitalized for rule out stroke and had complaints of bilateral lower extremity weakness and unsteady gait.  She denies any knee pain or hip pain.  Describes as an aching pain worse in the morning it does get a little bit better as she progresses through the day.    Allergies:   Allergies   Allergen Reactions   • Bactrim [Sulfamethoxazole-Trimethoprim] Delirium   • Lisinopril Unknown (See Comments)     Unknown per pt   • Levofloxacin Rash       Medications:   Current Outpatient Medications   Medication Sig Dispense Refill   • acetaminophen (TYLENOL) 650 MG 8 hr tablet Take 1,300 mg by mouth Every 8 (Eight) Hours As Needed for Mild Pain .     • amLODIPine (NORVASC) 5 MG tablet Take 1 tablet by mouth Daily. 90 tablet 1   • aspirin 325 MG tablet Take 1 tablet by mouth Daily.     • atenolol (TENORMIN) 25 MG tablet Take 1 tablet by mouth Daily. 90 tablet 1   • atorvastatin (LIPITOR) 40 MG tablet Take 1 tablet by mouth Daily for 30 days. 30 tablet 0   • Biotin 3 MG tablet Take 1 tablet by mouth Daily.     • calcium carbonate-vitamin d (CALCIUM 600+D) 600-400 MG-UNIT per tablet Take 1 tablet by mouth Daily.     • DULoxetine (CYMBALTA) 60 MG capsule Take 1 capsule by mouth Daily. 90 capsule 1   • febuxostat (ULORIC) 40 MG tablet Take 1 tablet by mouth Daily. 90 tablet 1   • hydrALAZINE (APRESOLINE) 25 MG tablet Take 25 mg by mouth 2 (Two) Times a Day As Needed (If BP greater than 160/90).     • losartan (COZAAR) 100 MG tablet Take 1 tablet by mouth Daily. 90 tablet 1   • Multiple Vitamins-Minerals  "(CENTRUM SILVER) tablet Take 1 tablet by mouth Daily.     • Omega-3 Fatty Acids (FISH OIL) 1200 MG capsule capsule Take 1,200 mg by mouth Daily.       No current facility-administered medications for this visit.          The following portions of the patient's history were reviewed and updated as appropriate: allergies, current medications, past family history, past medical history, past social history, past surgical history and problem list.    Review of Systems:   A 14 point review of systems was performed. All systems negative except pertinent positives/negative listed in HPI above    Physical Exam:   Vitals:    04/12/19 1502   Temp: 98.2 °F (36.8 °C)   TempSrc: Temporal   Weight: 53.3 kg (117 lb 9.6 oz)   Height: 157.5 cm (62\")       General: A and O x 3, ASA, NAD    SCLERA:    Normal    DENTITION:   Normal  Knee:  bilateral    ALIGNMENT:     Neutral  ,   Patella tracks   midline    GAIT:     Nonantalgic slow    SKIN:    No abnormality    RANGE OF MOTION:   0  -  135   DEG    STRENGTH:   4 / 5    LIGAMENTS:    No varus / valgus instability.   Negative  Lachman.    MENISCUS:     Negative   Luther       DISTAL PULSES:    Paplable    DISTAL SENSATION :   Intact    LYMPHATICS:     No   lymphadenopathy    OTHER:          - No  effusion      - No crepitance with ROM      - No Swelling /  tenderness to palpation pes anserine bursa        Assessment/Plan: Bilateral calf pain leg weakness unsteady gait.  I do not think this is related to a knee issue.  This could be radicular in nature or possibly systemic or rheumatologic.  I am going to send her to physical therapy to work on quad strengthening hamstring stretching.  If she is not improved after about a month she will call back and we would consider an MRI of the lumbar spine.  In the meantime she will work on physical therapy at [place]      Olegario Gutiérrez MD  4/12/2019  "

## 2019-04-16 ENCOUNTER — OFFICE VISIT (OUTPATIENT)
Dept: FAMILY MEDICINE CLINIC | Facility: CLINIC | Age: 84
End: 2019-04-16

## 2019-04-16 VITALS
OXYGEN SATURATION: 96 % | WEIGHT: 120 LBS | HEART RATE: 62 BPM | BODY MASS INDEX: 22.08 KG/M2 | TEMPERATURE: 97.6 F | SYSTOLIC BLOOD PRESSURE: 128 MMHG | HEIGHT: 62 IN | DIASTOLIC BLOOD PRESSURE: 74 MMHG | RESPIRATION RATE: 16 BRPM

## 2019-04-16 DIAGNOSIS — Z86.73 HISTORY OF ISCHEMIC RIGHT MCA STROKE: Primary | ICD-10-CM

## 2019-04-16 DIAGNOSIS — Z09 HOSPITAL DISCHARGE FOLLOW-UP: ICD-10-CM

## 2019-04-16 PROCEDURE — 99213 OFFICE O/P EST LOW 20 MIN: CPT | Performed by: FAMILY MEDICINE

## 2019-04-16 NOTE — PROGRESS NOTES
Subjective   Alba Correa is a 85 y.o. female.     CC: Hospital F/U for H/O Right MCA Stroke with Extension          And Leg Weakness      History of Present Illness     Pt returns today after recent hospitalization. That visit was as follows:    Date of Admission: 4/2/2019  Date of Discharge:  4/4/2019  Primary Care Physician: Aramis Doty MD      Discharge Diagnosis:        Active Hospital Problems     Diagnosis   POA   • **History of ischemic right MCA stroke with extension [Z86.73]   Not Applicable   • Bilateral leg weakness [R29.898]   Unknown   • Nausea [R11.0]   Unknown   • Benign essential hypertension [I10]   Yes   • Elevated cholesterol [E78.00]   Yes   • Rheumatoid arthritis (CMS/HCC) [M06.9]   Yes   • Chronic renal failure, stage 3 (moderate) (CMS/HCC) [N18.3]   Yes       Resolved Hospital Problems     Diagnosis Date Resolved POA   • Acute intractable headache [R51] 04/04/2019 Yes         DETAILS OF HOSPITAL STAY      Pertinent Test Results and Procedures Performed  Brain MRI:  1. No evidence of acute infarction, mass or of abnormal intra-axial  enhancement.  2. Moderate small vessel ischemic disease with a new area of T2 FLAIR  hyperintensity involving the subcortical white matter of the right  parietal lobe corresponding to the area of decreased attenuation noted  on the CT examination of 04/02/2019 and likely representing extension of  the acute infarcts noted on the MRI of 08/05/2018.  3. Mild dural enhancement supratentorially, nonspecific.     MRA Head:  The prior MRA of the head performed on 08/03/2018  demonstrated absent signal within a M3 branch of the inferior division  of the right middle cerebral artery at a point overlying the insular  cortex. This appears normal on the current examination. There is no  evidence of focal high-grade stenosis or of aneurysm. The left A1  segment is hypoplastic.     MRA neck:  Mild irregularity involving the left vertebral artery but  without significant  stenosis. There is 0% stenosis involving the carotid  bifurcations using NASCET criteria.     MRI C-spine:  No evidence of focal herniation. Multilevel degenerative  disease involving the cervical spine as described in detail above most  prominent of which is at C5-6 where there is loss of disc height,  endplate degenerative changes and a grade 1 retrolisthesis. There is  mild uncovertebral degenerative disease to the left.     Head CT:  No evidence of hemorrhage. No focal area of decreased  attenuation is present to suggest an acute infarct. There is moderate  small vessel ischemic disease. There is a new area of decreased  attenuation involving the frontoparietal region on the right  superolaterally, subcortical likely representing a remote infarct.  Further evaluation could be performed with MRI examination of brain and  MRA of the head. The prior MRA of 08/05/2018 demonstrated absent signal  in the inferior division of the right middle cerebral artery  posteriorly.      Transthoracic echocardiogram:  · Estimated EF appears to be in the range of 61 - 65%.  · Left ventricular diastolic dysfunction is noted (grade I) consistent with impaired relaxation.  · Normal right ventricular cavity size and systolic function noted.  · Mild aortic valve regurgitation is present.  · Mild mitral valve regurgitation is present.  · Mild to moderate tricuspid valve regurgitation is present.  · Calculated right ventricular systolic pressure from tricuspid regurgitation is 34 mmHg.  · There is no evidence of pericardial effusion.     RVP negative     Hospital Course  This is an 85-year-old male with history of hypertension, previous stroke, chronic kidney disease amongst other problems who presented to the emergency room with complaints of headache, nausea, and increased lower extremity weakness bilaterally.  Please see H&P for full details of admission.  Was brought in out of concern for possible new or evolving stroke particularly  "in the right frontal parietal region.  She was evaluated by neurology and underwent MRI/MRA of the head and neck along with MRI of the cervical spine as described above.  I discussed the case with Dr. Leary today.  He feels as if she has had some extension of the previously known right frontal stroke.  Her symptoms have resolved with conservative management.  She did have some generalized joint aches and muscle pains.  Respiratory viral panel was negative.  Her nausea resolved on its own and she is feeling back to baseline.  Neurology has recommended she continue full dose aspirin and moderate dose statin.  She will follow-up in the neurology clinic in 3 months.  Her lower extremity weakness has resolved.  She has an upcoming appointment with orthopedic surgery and we will see what they think of her exam at that time and consideration of lumbar imaging.  At this point she is medically stable and will be released home today.    Current outpatient and discharge medications have been reconciled for the patient.  Reviewed by: Aramis Doty MD    Did go see ortho and is starting PT shortly.    The following portions of the patient's history were reviewed and updated as appropriate: allergies, current medications, past family history, past medical history, past social history, past surgical history and problem list.    Review of Systems   Constitutional: Negative for activity change, chills, fatigue and fever.   Respiratory: Negative for cough and chest tightness.    Cardiovascular: Negative for chest pain and palpitations.   Gastrointestinal: Negative for abdominal pain and nausea.   Endocrine: Negative for cold intolerance.   Psychiatric/Behavioral: Negative for behavioral problems and dysphoric mood.       /74   Pulse 62   Temp 97.6 °F (36.4 °C) (Oral)   Resp 16   Ht 157.5 cm (62\")   Wt 54.4 kg (120 lb)   SpO2 96%   BMI 21.95 kg/m²     Objective   Physical Exam   Constitutional: She appears " well-developed and well-nourished.   Neck: Neck supple. No thyromegaly present.   Cardiovascular: Normal rate and regular rhythm.   No murmur heard.  Pulmonary/Chest: Effort normal and breath sounds normal.   Abdominal: Bowel sounds are normal. There is no tenderness.   Neurological: She is alert.   Psychiatric: She has a normal mood and affect. Her behavior is normal.   Nursing note and vitals reviewed.  Hospital records reviewed with pt confirming HPI.      Assessment/Plan   Alba was seen today for headache.    Diagnoses and all orders for this visit:    History of ischemic right MCA stroke with extension    Hospital discharge follow-up    Pt to keep f/u with her specialists.

## 2019-05-01 DIAGNOSIS — I10 BENIGN ESSENTIAL HYPERTENSION: ICD-10-CM

## 2019-05-01 RX ORDER — LOSARTAN POTASSIUM 100 MG/1
TABLET ORAL
Qty: 90 TABLET | Refills: 0 | Status: SHIPPED | OUTPATIENT
Start: 2019-05-01 | End: 2019-08-13 | Stop reason: SDUPTHER

## 2019-05-23 ENCOUNTER — OFFICE VISIT (OUTPATIENT)
Dept: WOUND CARE | Facility: HOSPITAL | Age: 84
End: 2019-05-23

## 2019-05-23 PROCEDURE — G0463 HOSPITAL OUTPT CLINIC VISIT: HCPCS

## 2019-06-06 ENCOUNTER — OFFICE VISIT (OUTPATIENT)
Dept: WOUND CARE | Facility: HOSPITAL | Age: 84
End: 2019-06-06

## 2019-06-06 ENCOUNTER — LAB (OUTPATIENT)
Dept: LAB | Facility: HOSPITAL | Age: 84
End: 2019-06-06

## 2019-06-06 ENCOUNTER — OFFICE VISIT (OUTPATIENT)
Dept: NEUROLOGY | Facility: CLINIC | Age: 84
End: 2019-06-06

## 2019-06-06 VITALS
DIASTOLIC BLOOD PRESSURE: 62 MMHG | WEIGHT: 117 LBS | HEIGHT: 62 IN | HEART RATE: 56 BPM | OXYGEN SATURATION: 94 % | SYSTOLIC BLOOD PRESSURE: 140 MMHG | BODY MASS INDEX: 21.53 KG/M2

## 2019-06-06 DIAGNOSIS — R29.898 BILATERAL LEG WEAKNESS: ICD-10-CM

## 2019-06-06 DIAGNOSIS — Z86.73 HISTORY OF ISCHEMIC RIGHT MCA STROKE: ICD-10-CM

## 2019-06-06 DIAGNOSIS — G60.9 NEUROPATHY, IDIOPATHIC: Primary | ICD-10-CM

## 2019-06-06 DIAGNOSIS — G60.9 NEUROPATHY, IDIOPATHIC: ICD-10-CM

## 2019-06-06 LAB
CK SERPL-CCNC: 69 U/L (ref 20–180)
ERYTHROCYTE [SEDIMENTATION RATE] IN BLOOD: 37 MM/HR (ref 0–30)

## 2019-06-06 PROCEDURE — 36415 COLL VENOUS BLD VENIPUNCTURE: CPT | Performed by: NURSE PRACTITIONER

## 2019-06-06 PROCEDURE — 82784 ASSAY IGA/IGD/IGG/IGM EACH: CPT

## 2019-06-06 PROCEDURE — 86334 IMMUNOFIX E-PHORESIS SERUM: CPT

## 2019-06-06 PROCEDURE — 82595 ASSAY OF CRYOGLOBULIN: CPT

## 2019-06-06 PROCEDURE — 83825 ASSAY OF MERCURY: CPT | Performed by: NURSE PRACTITIONER

## 2019-06-06 PROCEDURE — G0463 HOSPITAL OUTPT CLINIC VISIT: HCPCS

## 2019-06-06 PROCEDURE — 85652 RBC SED RATE AUTOMATED: CPT

## 2019-06-06 PROCEDURE — 83655 ASSAY OF LEAD: CPT | Performed by: NURSE PRACTITIONER

## 2019-06-06 PROCEDURE — 84155 ASSAY OF PROTEIN SERUM: CPT

## 2019-06-06 PROCEDURE — 82550 ASSAY OF CK (CPK): CPT

## 2019-06-06 PROCEDURE — 84446 ASSAY OF VITAMIN E: CPT

## 2019-06-06 PROCEDURE — 82525 ASSAY OF COPPER: CPT

## 2019-06-06 PROCEDURE — 99214 OFFICE O/P EST MOD 30 MIN: CPT | Performed by: NURSE PRACTITIONER

## 2019-06-06 PROCEDURE — 84165 PROTEIN E-PHORESIS SERUM: CPT

## 2019-06-06 PROCEDURE — 82175 ASSAY OF ARSENIC: CPT | Performed by: NURSE PRACTITIONER

## 2019-06-06 NOTE — PATIENT INSTRUCTIONS
Stop atorvastatin (lipitor) for 3 months, if leg symptoms don't change/improve restart it  Check labs for treatable causes of peripheral neuropathy

## 2019-06-09 LAB — COPPER SERPL-MCNC: 165 UG/DL (ref 72–166)

## 2019-06-10 LAB
A-TOCOPHEROL VIT E SERPL-MCNC: 16.2 MG/L (ref 9–29)
ALBUMIN SERPL-MCNC: 4 G/DL (ref 2.9–4.4)
ALBUMIN/GLOB SERPL: 1.1 {RATIO} (ref 0.7–1.7)
ALPHA1 GLOB FLD ELPH-MCNC: 0.2 G/DL (ref 0–0.4)
ALPHA2 GLOB SERPL ELPH-MCNC: 1 G/DL (ref 0.4–1)
B-GLOBULIN SERPL ELPH-MCNC: 1.2 G/DL (ref 0.7–1.3)
GAMMA GLOB SERPL ELPH-MCNC: 1.4 G/DL (ref 0.4–1.8)
GAMMA TOCOPHEROL SERPL-MCNC: 0.9 MG/L (ref 0.5–4.9)
GLOBULIN SER CALC-MCNC: 3.8 G/DL (ref 2.2–3.9)
IGA SERPL-MCNC: 380 MG/DL (ref 64–422)
IGG SERPL-MCNC: 1283 MG/DL (ref 700–1600)
IGM SERPL-MCNC: 96 MG/DL (ref 26–217)
INTERPRETATION SERPL IEP-IMP: NORMAL
Lab: NORMAL
M-SPIKE: NORMAL G/DL
PROT SERPL-MCNC: 7.8 G/DL (ref 6–8.5)

## 2019-06-11 LAB
ARSENIC BLD-MCNC: 5 UG/L (ref 2–23)
LEAD BLD-MCNC: 1 UG/DL (ref 0–4)
MERCURY BLD-MCNC: NORMAL UG/L (ref 0–14.9)

## 2019-06-13 LAB — CRYOGLOB SER QL 1D COLD INC: NORMAL

## 2019-06-17 ENCOUNTER — TELEPHONE (OUTPATIENT)
Dept: NEUROLOGY | Facility: CLINIC | Age: 84
End: 2019-06-17

## 2019-06-17 NOTE — TELEPHONE ENCOUNTER
----- Message from GROVER Mueller sent at 6/14/2019  4:22 PM EDT -----  Please let the patient know that her labs looking for causes of peripheral neuropathy and muscle pain were normal.

## 2019-06-17 NOTE — TELEPHONE ENCOUNTER
Called and spoke with the patient.  Patient states understanding and has no other questions at this time

## 2019-06-20 ENCOUNTER — OFFICE VISIT (OUTPATIENT)
Dept: WOUND CARE | Facility: HOSPITAL | Age: 84
End: 2019-06-20

## 2019-06-20 PROCEDURE — G0463 HOSPITAL OUTPT CLINIC VISIT: HCPCS

## 2019-06-24 ENCOUNTER — TRANSCRIBE ORDERS (OUTPATIENT)
Dept: ADMINISTRATIVE | Facility: HOSPITAL | Age: 84
End: 2019-06-24

## 2019-06-24 DIAGNOSIS — I73.9 PAD (PERIPHERAL ARTERY DISEASE) (HCC): Primary | ICD-10-CM

## 2019-07-11 ENCOUNTER — APPOINTMENT (OUTPATIENT)
Dept: WOUND CARE | Facility: HOSPITAL | Age: 84
End: 2019-07-11

## 2019-07-15 ENCOUNTER — HOSPITAL ENCOUNTER (OUTPATIENT)
Dept: CARDIOLOGY | Facility: HOSPITAL | Age: 84
Discharge: HOME | End: 2019-07-15
Admitting: SURGERY

## 2019-07-15 DIAGNOSIS — I73.9 PAD (PERIPHERAL ARTERY DISEASE) (HCC): ICD-10-CM

## 2019-07-15 LAB
BH CV LOWER ARTERIAL LEFT ABI RATIO: 1.09
BH CV LOWER ARTERIAL LEFT DORSALIS PEDIS SYS MAX: 166 MMHG
BH CV LOWER ARTERIAL LEFT GREAT TOE SYS MAX: 115 MMHG
BH CV LOWER ARTERIAL LEFT POST TIBIAL SYS MAX: 169 MMHG
BH CV LOWER ARTERIAL LEFT TBI RATIO: 0.74
BH CV LOWER ARTERIAL RIGHT ABI RATIO: 0.97
BH CV LOWER ARTERIAL RIGHT DORSALIS PEDIS SYS MAX: 144 MMHG
BH CV LOWER ARTERIAL RIGHT GREAT TOE SYS MAX: 105 MMHG
BH CV LOWER ARTERIAL RIGHT POST TIBIAL SYS MAX: 151 MMHG
BH CV LOWER ARTERIAL RIGHT TBI RATIO: 0.67
UPPER ARTERIAL LEFT ARM BRACHIAL SYS MAX: 153 MMHG
UPPER ARTERIAL RIGHT ARM BRACHIAL SYS MAX: 155 MMHG

## 2019-07-15 PROCEDURE — 93923 UPR/LXTR ART STDY 3+ LVLS: CPT

## 2019-07-15 PROCEDURE — 93922 UPR/L XTREMITY ART 2 LEVELS: CPT

## 2019-08-13 ENCOUNTER — OFFICE VISIT (OUTPATIENT)
Dept: FAMILY MEDICINE CLINIC | Facility: CLINIC | Age: 84
End: 2019-08-13

## 2019-08-13 VITALS
RESPIRATION RATE: 16 BRPM | WEIGHT: 120 LBS | DIASTOLIC BLOOD PRESSURE: 70 MMHG | SYSTOLIC BLOOD PRESSURE: 146 MMHG | OXYGEN SATURATION: 96 % | BODY MASS INDEX: 22.08 KG/M2 | HEIGHT: 62 IN | HEART RATE: 60 BPM

## 2019-08-13 DIAGNOSIS — I10 BENIGN ESSENTIAL HYPERTENSION: Primary | ICD-10-CM

## 2019-08-13 DIAGNOSIS — M10.9 GOUT, UNSPECIFIED CAUSE, UNSPECIFIED CHRONICITY, UNSPECIFIED SITE: ICD-10-CM

## 2019-08-13 DIAGNOSIS — F33.42 RECURRENT MAJOR DEPRESSIVE DISORDER, IN FULL REMISSION (HCC): ICD-10-CM

## 2019-08-13 DIAGNOSIS — E78.2 MIXED HYPERLIPIDEMIA: ICD-10-CM

## 2019-08-13 PROCEDURE — 99214 OFFICE O/P EST MOD 30 MIN: CPT | Performed by: FAMILY MEDICINE

## 2019-08-13 RX ORDER — AMLODIPINE BESYLATE 5 MG/1
5 TABLET ORAL DAILY
Qty: 90 TABLET | Refills: 1 | Status: SHIPPED | OUTPATIENT
Start: 2019-08-13 | End: 2020-01-14 | Stop reason: SDUPTHER

## 2019-08-13 RX ORDER — LOSARTAN POTASSIUM 100 MG/1
100 TABLET ORAL DAILY
Qty: 90 TABLET | Refills: 1 | Status: SHIPPED | OUTPATIENT
Start: 2019-08-13 | End: 2020-01-14 | Stop reason: SDUPTHER

## 2019-08-13 RX ORDER — DULOXETIN HYDROCHLORIDE 60 MG/1
60 CAPSULE, DELAYED RELEASE ORAL DAILY
Qty: 90 CAPSULE | Refills: 1 | Status: SHIPPED | OUTPATIENT
Start: 2019-08-13 | End: 2020-01-14 | Stop reason: SDUPTHER

## 2019-08-13 RX ORDER — FEBUXOSTAT 40 MG/1
40 TABLET, FILM COATED ORAL DAILY
Qty: 90 TABLET | Refills: 1 | Status: SHIPPED | OUTPATIENT
Start: 2019-08-13 | End: 2020-01-14 | Stop reason: SDUPTHER

## 2019-08-13 RX ORDER — ATORVASTATIN CALCIUM 10 MG/1
10 TABLET, FILM COATED ORAL DAILY
Qty: 90 TABLET | Refills: 1 | Status: SHIPPED | OUTPATIENT
Start: 2019-08-13 | End: 2020-01-14 | Stop reason: SDUPTHER

## 2019-08-13 RX ORDER — ATENOLOL 25 MG/1
25 TABLET ORAL DAILY
Qty: 90 TABLET | Refills: 1 | Status: SHIPPED | OUTPATIENT
Start: 2019-08-13 | End: 2020-01-14 | Stop reason: SDUPTHER

## 2019-08-13 NOTE — PROGRESS NOTES
Subjective   Alba Correa is a 86 y.o. female.     History of Present Illness     Chief Complaint:   Chief Complaint   Patient presents with   • Hypertension   • Hyperlipidemia       Alba Correa 86 y.o. female who presents today for Medical Management of the below listed issues and medication refills.  she has a problem list of   Patient Active Problem List   Diagnosis   • Chronic renal failure, stage 3 (moderate) (CMS/HCC)   • DVT (deep venous thrombosis) (CMS/HCC)   • Depression   • Edema   • Gout   • Elevated cholesterol   • Osteoporosis   • Hyperparathyroidism (CMS/HCC)   • Benign essential hypertension   • Adaptive colitis   • Osteopenia   • Rheumatoid arthritis (CMS/HCC)   • Degeneration of intervertebral disc   • D (diarrhea)   • BP (high blood pressure)   • Detrusor muscle hypertonia   • Open leg wound   • Vaginal odor   • Infected wound   • History of depression   • History of renal insufficiency syndrome   • History of degenerative disc disease   • History of osteoporosis   • History of DVT (deep vein thrombosis)   • History of rheumatoid arthritis   • Hyperlipidemia   • Macrocytosis   • Right leg weakness   • CVA (cerebral vascular accident) (CMS/HCC)   • Nonrheumatic aortic (valve) insufficiency   • History of ischemic right MCA stroke with extension   • Nausea   • Bilateral leg weakness   .  Since the last visit, she has overall felt well.  she has been compliant with   Current Outpatient Medications:   •  acetaminophen (TYLENOL) 650 MG 8 hr tablet, Take 1,300 mg by mouth Every 8 (Eight) Hours As Needed for Mild Pain ., Disp: , Rfl:   •  aspirin 325 MG tablet, Take 1 tablet by mouth Daily., Disp: , Rfl:   •  Biotin 3 MG tablet, Take 1 tablet by mouth Daily., Disp: , Rfl:   •  calcium carbonate-vitamin d (CALCIUM 600+D) 600-400 MG-UNIT per tablet, Take 1 tablet by mouth Daily., Disp: , Rfl:   •  hydrALAZINE (APRESOLINE) 25 MG tablet, Take 25 mg by mouth 2 (Two) Times a Day As Needed (If BP greater  "than 160/90)., Disp: , Rfl:   •  Multiple Vitamins-Minerals (CENTRUM SILVER) tablet, Take 1 tablet by mouth Daily., Disp: , Rfl:   •  Omega-3 Fatty Acids (FISH OIL) 1200 MG capsule capsule, Take 1,200 mg by mouth Daily., Disp: , Rfl:   •  amLODIPine (NORVASC) 5 MG tablet, Take 1 tablet by mouth Daily., Disp: 90 tablet, Rfl: 1  •  atenolol (TENORMIN) 25 MG tablet, Take 1 tablet by mouth Daily., Disp: 90 tablet, Rfl: 1  •  atorvastatin (LIPITOR) 10 MG tablet, Take 1 tablet by mouth Daily., Disp: 90 tablet, Rfl: 1  •  DULoxetine (CYMBALTA) 60 MG capsule, Take 1 capsule by mouth Daily., Disp: 90 capsule, Rfl: 1  •  febuxostat (ULORIC) 40 MG tablet, Take 1 tablet by mouth Daily., Disp: 90 tablet, Rfl: 1  •  losartan (COZAAR) 100 MG tablet, Take 1 tablet by mouth Daily., Disp: 90 tablet, Rfl: 1.  she denies medication side effects.    All of the chronic condition(s) listed above are stable w/o issues.    /70   Pulse 60   Resp 16   Ht 157.5 cm (62.01\")   Wt 54.4 kg (120 lb)   SpO2 96%   BMI 21.94 kg/m²     Results for orders placed or performed during the hospital encounter of 07/15/19   Doppler Ankle Brachial Index Single Level CAR   Result Value Ref Range    RIGHT DORSALIS PEDIS SYS  mmHg    RIGHT POST TIBIAL SYS  mmHg    RIGHT GREAT TOE SYS  mmHg    RIGHT CRISTAL RATIO 0.97     RIGHT TBI RATIO 0.67     LEFT DORSALIS PEDIS SYS  mmHg    LEFT POST TIBIAL SYS  mmHg    LEFT GREAT TOE SYS  mmHg    LEFT CRISTAL RATIO 1.09     LEFT TBI RATIO 0.74     Upper arterial right arm brachial sys max 155 mmHg    Upper arterial left arm brachial sys max 153 mmHg           The following portions of the patient's history were reviewed and updated as appropriate: allergies, current medications, past family history, past medical history, past social history, past surgical history and problem list.    Review of Systems   Constitutional: Negative for activity change, chills, fatigue and fever. "   Respiratory: Negative for cough and chest tightness.    Cardiovascular: Negative for chest pain and palpitations.   Gastrointestinal: Negative for abdominal pain and nausea.   Endocrine: Negative for cold intolerance.   Psychiatric/Behavioral: Negative for behavioral problems and dysphoric mood.       Objective   Physical Exam   Constitutional: She appears well-developed and well-nourished.   Neck: Neck supple. No thyromegaly present.   Cardiovascular: Normal rate and regular rhythm.   No murmur heard.  Pulmonary/Chest: Effort normal and breath sounds normal.   Abdominal: Bowel sounds are normal. There is no tenderness.   Neurological: She is alert.   Psychiatric: She has a normal mood and affect. Her behavior is normal.   Nursing note and vitals reviewed.      Assessment/Plan   Alba was seen today for hypertension and hyperlipidemia.    Diagnoses and all orders for this visit:    Benign essential hypertension  -     amLODIPine (NORVASC) 5 MG tablet; Take 1 tablet by mouth Daily.  -     atenolol (TENORMIN) 25 MG tablet; Take 1 tablet by mouth Daily.  -     losartan (COZAAR) 100 MG tablet; Take 1 tablet by mouth Daily.    Recurrent major depressive disorder, in full remission (CMS/[facility])  -     DULoxetine (CYMBALTA) 60 MG capsule; Take 1 capsule by mouth Daily.    Gout, unspecified cause, unspecified chronicity, unspecified site  -     febuxostat (ULORIC) 40 MG tablet; Take 1 tablet by mouth Daily.    Mixed hyperlipidemia  -     atorvastatin (LIPITOR) 10 MG tablet; Take 1 tablet by mouth Daily.

## 2019-09-11 ENCOUNTER — OFFICE VISIT (OUTPATIENT)
Dept: FAMILY MEDICINE CLINIC | Facility: CLINIC | Age: 84
End: 2019-09-11

## 2019-09-11 VITALS
TEMPERATURE: 98.2 F | HEIGHT: 62 IN | WEIGHT: 114 LBS | RESPIRATION RATE: 16 BRPM | HEART RATE: 60 BPM | SYSTOLIC BLOOD PRESSURE: 142 MMHG | DIASTOLIC BLOOD PRESSURE: 76 MMHG | OXYGEN SATURATION: 94 % | BODY MASS INDEX: 20.98 KG/M2

## 2019-09-11 DIAGNOSIS — R11.0 NAUSEA: Primary | ICD-10-CM

## 2019-09-11 DIAGNOSIS — N30.00 ACUTE CYSTITIS WITHOUT HEMATURIA: Primary | ICD-10-CM

## 2019-09-11 LAB
BILIRUB BLD-MCNC: NEGATIVE MG/DL
CLARITY, POC: CLEAR
COLOR UR: YELLOW
GLUCOSE UR STRIP-MCNC: NEGATIVE MG/DL
KETONES UR QL: NEGATIVE
LEUKOCYTE EST, POC: ABNORMAL
NITRITE UR-MCNC: NEGATIVE MG/ML
PH UR: 5 [PH] (ref 5–8)
PROT UR STRIP-MCNC: ABNORMAL MG/DL
RBC # UR STRIP: ABNORMAL /UL
SP GR UR: 1.01 (ref 1–1.03)
UROBILINOGEN UR QL: NORMAL

## 2019-09-11 PROCEDURE — 81003 URINALYSIS AUTO W/O SCOPE: CPT | Performed by: FAMILY MEDICINE

## 2019-09-11 PROCEDURE — 99213 OFFICE O/P EST LOW 20 MIN: CPT | Performed by: FAMILY MEDICINE

## 2019-09-11 RX ORDER — ONDANSETRON 4 MG/1
4 TABLET, FILM COATED ORAL EVERY 8 HOURS PRN
Qty: 10 TABLET | Refills: 0 | Status: SHIPPED | OUTPATIENT
Start: 2019-09-11 | End: 2019-09-17 | Stop reason: SDUPTHER

## 2019-09-11 RX ORDER — DOXYCYCLINE HYCLATE 100 MG
100 TABLET ORAL 2 TIMES DAILY
Qty: 14 TABLET | Refills: 0 | Status: SHIPPED | OUTPATIENT
Start: 2019-09-11 | End: 2019-09-19

## 2019-09-11 NOTE — PROGRESS NOTES
"Subjective   Alba Correa is a 86 y.o. female.     CC: Illness    History of Present Illness     Pt returns today c/o a 4 day h/o weakness/HAs/Nausea.  Pt reports vomiting x 1. No burning with urination yet does have some decreased UOP. No f/c. No severe cough or sinus pressure. No tick bite or mosquito bites. No sick exposures known.     The following portions of the patient's history were reviewed and updated as appropriate: allergies, current medications, past family history, past medical history, past social history, past surgical history and problem list.    Review of Systems   Constitutional: Positive for fatigue. Negative for activity change, chills and fever.   Respiratory: Negative for cough and chest tightness.    Cardiovascular: Negative for chest pain and palpitations.   Gastrointestinal: Positive for nausea. Negative for abdominal pain.   Endocrine: Negative for cold intolerance.   Neurological: Positive for weakness and headaches.   Psychiatric/Behavioral: Negative for behavioral problems and dysphoric mood.       /76   Pulse 60   Temp 98.2 °F (36.8 °C) (Oral)   Resp 16   Ht 157.5 cm (62.01\")   Wt 51.7 kg (114 lb)   SpO2 94%   BMI 20.84 kg/m²     Objective   Physical Exam   Constitutional: She appears well-developed and well-nourished.   Neck: Neck supple. No thyromegaly present.   Cardiovascular: Normal rate and regular rhythm.   No murmur heard.  Pulmonary/Chest: Effort normal and breath sounds normal.   Abdominal: Bowel sounds are normal. There is no tenderness.   Neurological: She is alert.   Psychiatric: She has a normal mood and affect. Her behavior is normal.   Nursing note and vitals reviewed.  U/A: positive    Assessment/Plan   Alba was seen today for nausea, headache and weakness.    Diagnoses and all orders for this visit:    Acute cystitis without hematuria  -     POC Urinalysis Dipstick, Automated  -     doxycycline (VIBRAMYICN) 100 MG tablet; Take 1 tablet by mouth 2 (Two) " Times a Day for 7 days.

## 2019-09-17 DIAGNOSIS — R11.0 NAUSEA: ICD-10-CM

## 2019-09-17 RX ORDER — ONDANSETRON 4 MG/1
4 TABLET, FILM COATED ORAL EVERY 8 HOURS PRN
Qty: 10 TABLET | Refills: 0 | Status: SHIPPED | OUTPATIENT
Start: 2019-09-17 | End: 2019-09-27 | Stop reason: SDUPTHER

## 2019-09-19 RX ORDER — CEFIXIME 400 MG/1
400 CAPSULE ORAL DAILY
Qty: 3 CAPSULE | Refills: 0 | Status: SHIPPED | OUTPATIENT
Start: 2019-09-19 | End: 2019-09-22

## 2019-09-27 DIAGNOSIS — R11.0 NAUSEA: ICD-10-CM

## 2019-09-27 RX ORDER — ONDANSETRON 4 MG/1
4 TABLET, FILM COATED ORAL EVERY 8 HOURS PRN
Qty: 10 TABLET | Refills: 0 | Status: SHIPPED | OUTPATIENT
Start: 2019-09-27 | End: 2021-02-02 | Stop reason: SDUPTHER

## 2019-10-02 ENCOUNTER — OFFICE VISIT (OUTPATIENT)
Dept: NEUROLOGY | Facility: CLINIC | Age: 84
End: 2019-10-02

## 2019-10-02 VITALS
SYSTOLIC BLOOD PRESSURE: 142 MMHG | WEIGHT: 117 LBS | OXYGEN SATURATION: 97 % | HEIGHT: 62 IN | DIASTOLIC BLOOD PRESSURE: 82 MMHG | HEART RATE: 48 BPM | BODY MASS INDEX: 21.53 KG/M2

## 2019-10-02 DIAGNOSIS — R29.898 BILATERAL LEG WEAKNESS: ICD-10-CM

## 2019-10-02 DIAGNOSIS — Z86.73 HISTORY OF ISCHEMIC RIGHT MCA STROKE: Primary | ICD-10-CM

## 2019-10-02 PROCEDURE — 99213 OFFICE O/P EST LOW 20 MIN: CPT | Performed by: NURSE PRACTITIONER

## 2019-10-02 RX ORDER — LISINOPRIL 10 MG/1
10 TABLET ORAL DAILY
COMMUNITY
End: 2020-01-14

## 2019-10-02 RX ORDER — LEVOFLOXACIN 250 MG/1
250 TABLET ORAL DAILY
COMMUNITY
End: 2019-10-04

## 2019-10-02 RX ORDER — CHOLECALCIFEROL (VITAMIN D3) 125 MCG
20 CAPSULE ORAL NIGHTLY
COMMUNITY
End: 2020-05-28

## 2019-10-04 ENCOUNTER — OFFICE VISIT (OUTPATIENT)
Dept: CARDIOLOGY | Facility: CLINIC | Age: 84
End: 2019-10-04

## 2019-10-04 VITALS
HEIGHT: 62 IN | RESPIRATION RATE: 16 BRPM | WEIGHT: 119.6 LBS | SYSTOLIC BLOOD PRESSURE: 138 MMHG | DIASTOLIC BLOOD PRESSURE: 54 MMHG | HEART RATE: 43 BPM | BODY MASS INDEX: 22.01 KG/M2

## 2019-10-04 DIAGNOSIS — I35.1 NONRHEUMATIC AORTIC (VALVE) INSUFFICIENCY: ICD-10-CM

## 2019-10-04 DIAGNOSIS — R00.1 BRADYCARDIA WITH 41-50 BEATS PER MINUTE: ICD-10-CM

## 2019-10-04 DIAGNOSIS — I63.9 CEREBROVASCULAR ACCIDENT (CVA), UNSPECIFIED MECHANISM (HCC): ICD-10-CM

## 2019-10-04 DIAGNOSIS — R00.1 BRADYCARDIA, SINUS: Primary | ICD-10-CM

## 2019-10-04 DIAGNOSIS — I10 BENIGN ESSENTIAL HYPERTENSION: ICD-10-CM

## 2019-10-04 PROCEDURE — 99215 OFFICE O/P EST HI 40 MIN: CPT | Performed by: NURSE PRACTITIONER

## 2019-10-04 NOTE — PROGRESS NOTES
Date of Office Visit: 10/04/2019  Encounter Provider: GROVER Drummond  Place of Service: [facility] CARDIOLOGY  Patient Name: Alba Correa  :1933    No chief complaint on file.  :     HPI: Alba Correa is a 86 y.o. female  with history of hyperlipidemia, CVA, rheumatoid arthritis, renal insufficiency, hypertension, irritable bowel syndrome, aortic insufficiency.  She is a patient of Dr. Martines.  I will be seeing her for the first time today and have reviewed her medical record.    She presented in 2016 with CVA.  We were consulted for evaluation of cardiac source of emboli which was not found.  Transthoracic and transesophageal echocardiograms did not reveal any cardiac source of emboli.  Ejection fraction was normal with mild to moderate septal asymmetric hypertrophy.  KASH showed mild to moderate aortic insufficiency.  As well as evidence of calcification and sclerosis within the aortic arch.  13-day mobile telemetry device showed semi-frequent episodes of nonsustained SVT lasting up to 10 seconds.  Minimum heart rate was 41, average 60, maximum 150 and was relatively benign.      She was hospitalized in 2019 with headache.  At that time, heart rate was documented to be 50-60.  CT of the head was unremarkable MRI of the brain head and neck unremarkable for acute infarction.  There was some moderate small vessel ischemia noted.  MRI of the cervical spine showed a likely extension of the acute infarcts noted on MRI in 2018.  Had a repeat echocardiogram which showed normal left ventricular systolic function with an EF of approximately 60%, mild aortic regurgitation, mild mitral regurgitation was noted.  Cardiology did not follow her at that time.    She had CRISTAL testing 2019 which was normal bilateral.  She has history of both phlebitis.    She was last seen in the office in 2018.  She is performing all activities of daily living independently without  any significant problems.    She presents today for evaluation of low heart rate.  She was in to see the neurologist on Wednesday this week who captured a heart rate of 48.  Apparently 3 to 4 weeks ago patient had UTI and was given antibiotic which gave her some nausea so she was prescribed antiemesis medication.  Since then she has had more fatigue which is waxing and waning.  She denies chest pain tightness pressure, palpitation, shortness of breath, edema, lightheadedness, near-syncope, or syncope.  She does not perform any structured exercise but stays very active.  Her blood pressure has been well controlled so she is hesitant to adjust any medication.  She has been on atenolol for more than 15 years.      Allergies   Allergen Reactions   • Bactrim [Sulfamethoxazole-Trimethoprim] Delirium   • Lisinopril Unknown (See Comments)     Unknown per pt   • Levofloxacin Rash       Past Medical History:   Diagnosis Date   • Benign essential hypertension    • Chronic kidney disease    • Chronic renal insufficiency    • CVA (cerebral vascular accident) (CMS/HCC)    • Degeneration of intervertebral disc    • Depression    • DVT (deep venous thrombosis) (CMS/[facility])    • Edema    • Gout    • H/O complete eye exam 09/2016   • Hyperlipidemia    • IBS (irritable bowel syndrome)    • OAB (overactive bladder)    • Osteopenia    • Osteoporosis    • Peripheral neuropathy    • Rheumatoid arthritis (CMS/HCC)        Past Surgical History:   Procedure Laterality Date   • APPENDECTOMY     • BREAST BIOPSY     • COLONOSCOPY      declines   • HERNIA REPAIR  1965   • HYSTERECTOMY      still has ovaries   • MAMMO BILATERAL  11/16/2016    pt declines   • PAP SMEAR  11/16/2016    declines         Family and social history reviewed.     ROS  All other systems were reviewed and are negative          Objective:     Vitals:    10/04/19 1038   BP: 138/54   BP Location: Right arm   Patient Position: Sitting   Pulse: (!) 43   Resp: 16   Weight: 54.3 kg  "(119 lb 9.6 oz)   Height: 157.5 cm (62\")     Body mass index is 21.88 kg/m².    PHYSICAL EXAM:  Physical Exam   Constitutional: She is oriented to person, place, and time. She appears well-developed and well-nourished. No distress.   HENT:   Head: Normocephalic.   Eyes: Conjunctivae are normal.   Neck: Normal range of motion. No JVD present.   Cardiovascular: Normal rate, regular rhythm, normal heart sounds and intact distal pulses.   No murmur heard.  Pulses:       Carotid pulses are 2+ on the right side, and 2+ on the left side.       Radial pulses are 2+ on the right side, and 2+ on the left side.        Posterior tibial pulses are 2+ on the right side, and 2+ on the left side.   Pulmonary/Chest: Effort normal and breath sounds normal. No respiratory distress. She has no wheezes. She has no rhonchi. She has no rales. She exhibits no tenderness.   Abdominal: Soft. Bowel sounds are normal. She exhibits no distension.   Musculoskeletal: Normal range of motion. She exhibits no edema.   Neurological: She is alert and oriented to person, place, and time.   Skin: Skin is warm, dry and intact. No rash noted. She is not diaphoretic. No cyanosis.   Psychiatric: She has a normal mood and affect. Her behavior is normal. Judgment and thought content normal.       Procedures-not performed today    Current Outpatient Medications   Medication Sig Dispense Refill   • acetaminophen (TYLENOL) 650 MG 8 hr tablet Take 1,300 mg by mouth Every 8 (Eight) Hours As Needed for Mild Pain .     • amLODIPine (NORVASC) 5 MG tablet Take 1 tablet by mouth Daily. 90 tablet 1   • aspirin 325 MG tablet Take 1 tablet by mouth Daily.     • atenolol (TENORMIN) 25 MG tablet Take 1 tablet by mouth Daily. 90 tablet 1   • atorvastatin (LIPITOR) 10 MG tablet Take 1 tablet by mouth Daily. 90 tablet 1   • Biotin 3 MG tablet Take 1 tablet by mouth Daily.     • calcium carbonate-vitamin d (CALCIUM 600+D) 600-400 MG-UNIT per tablet Take 1 tablet by mouth Daily. "     • DULoxetine (CYMBALTA) 60 MG capsule Take 1 capsule by mouth Daily. 90 capsule 1   • febuxostat (ULORIC) 40 MG tablet Take 1 tablet by mouth Daily. 90 tablet 1   • hydrALAZINE (APRESOLINE) 25 MG tablet Take 25 mg by mouth 2 (Two) Times a Day As Needed (If BP greater than 160/90).     • lisinopril (PRINIVIL,ZESTRIL) 10 MG tablet Take 10 mg by mouth Daily.     • losartan (COZAAR) 100 MG tablet Take 1 tablet by mouth Daily. 90 tablet 1   • melatonin 5 MG tablet tablet Take 20 mg by mouth Every Night.     • Multiple Vitamins-Minerals (CENTRUM SILVER) tablet Take 1 tablet by mouth Daily.     • Omega-3 Fatty Acids (FISH OIL) 1200 MG capsule capsule Take 1,200 mg by mouth Daily.     • ondansetron (ZOFRAN) 4 MG tablet TAKE 1 TABLET BY MOUTH EVERY 8 (EIGHT) HOURS AS NEEDED FOR NAUSEA OR VOMITING. 10 tablet 0     No current facility-administered medications for this visit.      Assessment:       Diagnosis Plan   1. Bradycardia, sinus  Holter Monitor - 48 Hour   2. Nonrheumatic aortic (valve) insufficiency     3. Cerebrovascular accident (CVA), unspecified mechanism (CMS/HCC)     4. Benign essential hypertension     5. Bradycardia with 41-50 beats per minute          Orders Placed This Encounter   Procedures   • Holter Monitor - 48 Hour     Standing Status:   Future     Standing Expiration Date:   10/3/2020     Order Specific Question:   Reason for exam?     Answer:   Palpitations         Plan:       1.  This is an 86-year-old female with history of CVA maintained on full-strength aspirin follows with neurology on atorvastatin 10 mg  2.  Hypertension blood pressure appears well controlled apparently there is been difficulty controlling her blood pressure she has been on atenolol greater than 15 years and is a little hesitant to adjust any medication at this time  3.  Hyperlipidemia on atorvastatin as above target LDL 70 or less  4.  History of SVT-no palpitations  5.  Sinus bradycardia appears that on average her heart  rate is been approximately 60 on Holter monitor last year.  Her heart rate is in the lower 40s with increased fatigue we will have her wear a 48-hour Holter monitor to rule out significant block.  She does have first-degree AV block on last ECG.  6.  Rheumatoid arthritis  7.  Aortic insufficiency-mild to moderate on KASH August 2018  8.   Atherosclerosis involving the aortic arch grade 1 plaque on KASH August 2018  9.  First-degree AV block-asymptomatic  10.  Recent UTI and fatigue since then x 3-4 weeks      Follow-up will be arranged pending 48-hour Holter results.  If significant bradycardia we will cut back or stop atenolol and readdress her blood pressure if needed.  If unremarkable, she is to follow-up in 1 year call with questions or concerns.            It has been a pleasure to participate in this patient's care.      Thank you,  GROVER Drummond      **I used Dragon to dictate this note:**

## 2019-10-11 ENCOUNTER — TELEPHONE (OUTPATIENT)
Dept: CARDIOLOGY | Facility: CLINIC | Age: 84
End: 2019-10-11

## 2019-10-11 NOTE — TELEPHONE ENCOUNTER
S/W patient about48-hour Holter results.  Average heart rate 71 however predominant rhythm was atrial fibrillation which is a new diagnosis for her.  We will stop aspirin and start Eliquis 2.5 mg twice daily as she is over age 80 and her weight is less than 60 kg.  She is going to see if her child can come pick these up.  She is to continue on aspirin 325 daily until she can make that transition.  She verbalized understanding      Marleny-please arrange 4 week follow-up with Dr. Martines.  If he does not have that availability she needs to see me at that time thank you

## 2019-11-08 ENCOUNTER — OFFICE VISIT (OUTPATIENT)
Dept: CARDIOLOGY | Facility: CLINIC | Age: 84
End: 2019-11-08

## 2019-11-08 VITALS
HEART RATE: 57 BPM | HEIGHT: 62 IN | WEIGHT: 120 LBS | SYSTOLIC BLOOD PRESSURE: 144 MMHG | DIASTOLIC BLOOD PRESSURE: 82 MMHG | BODY MASS INDEX: 22.08 KG/M2

## 2019-11-08 DIAGNOSIS — I48.0 PAF (PAROXYSMAL ATRIAL FIBRILLATION) (HCC): Chronic | ICD-10-CM

## 2019-11-08 DIAGNOSIS — I35.1 NONRHEUMATIC AORTIC (VALVE) INSUFFICIENCY: Primary | Chronic | ICD-10-CM

## 2019-11-08 PROCEDURE — 99214 OFFICE O/P EST MOD 30 MIN: CPT | Performed by: INTERNAL MEDICINE

## 2019-11-08 PROCEDURE — 93000 ELECTROCARDIOGRAM COMPLETE: CPT | Performed by: INTERNAL MEDICINE

## 2019-11-08 NOTE — PROGRESS NOTES
Subjective:     Encounter Date: 11/08/19        Patient ID: Alba Correa is a 86 y.o. female.    Chief Complaint: PAF, CVA  History of Present Illness    Dear Dr. Doty,    I had the pleasure of seeing this patient in the office today for follow-up after recent hospitalization.  She presented for his CVA.  We were asked to see her for evaluation of a cardiac source of emboli.  None was found.  Transthoracic and transesophageal echocardiograms did not demonstrate any cardiac source of emboli.  14 day monitor showed no ectopy.      Then recently seen in our office with question about bradycardia.  A 48-hour Holter monitor was placed.  During that time, she was in atrial fibrillation the entire 48 hours.  She was unaware of this.  She did not feel any palpitations or tachycardia.  No irregularity.  Heart rate was 70 bpm during the 48-hour recording with no pauses.    Today she feels fine.  No complaints.  No chest pain or chest discomfort.  No shortness of breath.    As noted below her EKG shows normal sinus rhythm.        The following portions of the patient's history were reviewed and updated as appropriate: allergies, current medications, past family history, past medical history, past social history, past surgical history and problem list.    Past Medical History:   Diagnosis Date   • Benign essential hypertension    • Chronic kidney disease    • Chronic renal insufficiency    • CVA (cerebral vascular accident) (CMS/HCC)    • Degeneration of intervertebral disc    • Depression    • DVT (deep venous thrombosis) (CMS/HCC)    • Edema    • Gout    • H/O complete eye exam 09/2016   • Hyperlipidemia    • IBS (irritable bowel syndrome)    • OAB (overactive bladder)    • Osteopenia    • Osteoporosis    • Peripheral neuropathy    • Rheumatoid arthritis (CMS/HCC)        Past Surgical History:   Procedure Laterality Date   • APPENDECTOMY     • BREAST BIOPSY     • COLONOSCOPY      declines   • HERNIA REPAIR  1965   •  HYSTERECTOMY      still has ovaries   • MAMMO BILATERAL  11/16/2016    pt declines   • PAP SMEAR  11/16/2016    declines       Social History     Socioeconomic History   • Marital status:      Spouse name: Ray   • Number of children: 3   • Years of education: High school   • Highest education level: Not on file   Occupational History   • Occupation: Dental assistant     Employer: RETIRED   Tobacco Use   • Smoking status: Former Smoker     Years: 5.00     Types: Cigarettes   • Smokeless tobacco: Never Used   • Tobacco comment: quit 40 years ago/ caffeine use    Substance and Sexual Activity   • Alcohol use: Yes     Comment: Occasional- glass ofwine few times a week   • Drug use: No   • Sexual activity: Defer     Partners: Male     Birth control/protection: Surgical       Review of Systems   Constitution: Negative for chills, decreased appetite, fever and night sweats.   HENT: Negative for ear discharge, ear pain, hearing loss, nosebleeds and sore throat.    Eyes: Negative for blurred vision, double vision and pain.   Cardiovascular: Negative for cyanosis.   Respiratory: Negative for hemoptysis and sputum production.    Endocrine: Negative for cold intolerance and heat intolerance.   Hematologic/Lymphatic: Negative for adenopathy.   Skin: Negative for dry skin, itching, nail changes, rash and suspicious lesions.   Musculoskeletal: Negative for arthritis, gout, muscle cramps, muscle weakness, myalgias and neck pain.   Gastrointestinal: Negative for anorexia, bowel incontinence, constipation, diarrhea, dysphagia, hematemesis and jaundice.   Genitourinary: Negative for bladder incontinence, dysuria, flank pain, frequency, hematuria and nocturia.   Neurological: Negative for focal weakness, numbness, paresthesias and seizures.   Psychiatric/Behavioral: Negative for altered mental status, hallucinations, hypervigilance, suicidal ideas and thoughts of violence.   Allergic/Immunologic: Negative for persistent  "infections.         ECG 12 Lead  Date/Time: 11/8/2019 3:25 PM  Performed by: Roshan Martines III, MD  Authorized by: Roshan Martines III, MD   Comparison: compared with previous ECG   Similar to previous ECG  Rhythm: sinus rhythm  Rate: normal  Conduction: conduction normal  ST Segments: ST segments normal  T Waves: T waves normal  QRS axis: normal  Other: no other findings    Clinical impression: normal ECG               Objective:     Vitals:    11/08/19 1429   BP: 144/82   BP Location: Left arm   Patient Position: Sitting   Pulse: 57   Weight: 54.4 kg (120 lb)   Height: 157.5 cm (62\")         Physical Exam   Constitutional: She is oriented to person, place, and time. She appears well-developed and well-nourished. No distress.   HENT:   Head: Normocephalic and atraumatic.   Nose: Nose normal.   Mouth/Throat: Oropharynx is clear and moist.   Eyes: Conjunctivae and EOM are normal. Pupils are equal, round, and reactive to light. Right eye exhibits no discharge. Left eye exhibits no discharge.   Neck: Normal range of motion. Neck supple. No tracheal deviation present. No thyromegaly present.   Cardiovascular: Normal rate, regular rhythm, S1 normal, S2 normal and normal pulses. Exam reveals no S3.   Murmur heard.   Medium-pitched midsystolic murmur is present with a grade of 1/6 at the upper right sternal border radiating to the neck.  High-pitched blowing decrescendo early diastolic murmur is present with a grade of 1/6 at the upper right sternal border radiating to the apex.  Pulmonary/Chest: Effort normal and breath sounds normal. No stridor. No respiratory distress. She exhibits no tenderness.   Abdominal: Soft. Bowel sounds are normal. She exhibits no distension and no mass. There is no tenderness. There is no rebound and no guarding.   Musculoskeletal: Normal range of motion. She exhibits no tenderness or deformity.   Lymphadenopathy:     She has no cervical adenopathy.   Neurological: She is alert and oriented " to person, place, and time. She has normal reflexes.   Skin: Skin is warm and dry. No rash noted. She is not diaphoretic. No erythema.   Psychiatric: She has a normal mood and affect. Thought content normal.       Lab Review:             Performed        Assessment:         No diagnosis found.       Plan:       1.  Aortic insufficiency-patient is currently on amlodipine and losartan for antihypertensive control, and we will continue same.  Blood pressure is under good control.  I will plan on seeing her back in one year.  We anticipate a repeat echocardiogram in 2 years unless she develops any symptoms in the meantime.  2.  History of CVA-  3.  Benign essential hypertension-continue current medical regimen  4.  Paroxysmal atrial fibrillation- back in sinus rhythm.  Asymptomatic.  Now on chronic anticoagulation along with atenolol, and aspirin was discontinued.    Thank you very much for allowing us to participate in the care of this pleasant patient.  Please don't hesitate to call if I can be of assistance in any way.      Current Outpatient Medications:   •  acetaminophen (TYLENOL) 650 MG 8 hr tablet, Take 1,300 mg by mouth Every 8 (Eight) Hours As Needed for Mild Pain ., Disp: , Rfl:   •  amLODIPine (NORVASC) 5 MG tablet, Take 1 tablet by mouth Daily., Disp: 90 tablet, Rfl: 1  •  apixaban (ELIQUIS) 2.5 MG tablet tablet, Take 1 tablet by mouth Every 12 (Twelve) Hours., Disp: 180 tablet, Rfl: 3  •  atenolol (TENORMIN) 25 MG tablet, Take 1 tablet by mouth Daily., Disp: 90 tablet, Rfl: 1  •  atorvastatin (LIPITOR) 10 MG tablet, Take 1 tablet by mouth Daily., Disp: 90 tablet, Rfl: 1  •  Biotin 3 MG tablet, Take 1 tablet by mouth Daily., Disp: , Rfl:   •  calcium carbonate-vitamin d (CALCIUM 600+D) 600-400 MG-UNIT per tablet, Take 1 tablet by mouth Daily., Disp: , Rfl:   •  DULoxetine (CYMBALTA) 60 MG capsule, Take 1 capsule by mouth Daily., Disp: 90 capsule, Rfl: 1  •  febuxostat (ULORIC) 40 MG tablet, Take 1 tablet by  mouth Daily., Disp: 90 tablet, Rfl: 1  •  hydrALAZINE (APRESOLINE) 25 MG tablet, Take 25 mg by mouth 2 (Two) Times a Day As Needed (If BP greater than 160/90)., Disp: , Rfl:   •  lisinopril (PRINIVIL,ZESTRIL) 10 MG tablet, Take 10 mg by mouth Daily., Disp: , Rfl:   •  losartan (COZAAR) 100 MG tablet, Take 1 tablet by mouth Daily., Disp: 90 tablet, Rfl: 1  •  melatonin 5 MG tablet tablet, Take 20 mg by mouth Every Night., Disp: , Rfl:   •  Multiple Vitamins-Minerals (CENTRUM SILVER) tablet, Take 1 tablet by mouth Daily., Disp: , Rfl:   •  Omega-3 Fatty Acids (FISH OIL) 1200 MG capsule capsule, Take 1,200 mg by mouth Daily., Disp: , Rfl:   •  ondansetron (ZOFRAN) 4 MG tablet, TAKE 1 TABLET BY MOUTH EVERY 8 (EIGHT) HOURS AS NEEDED FOR NAUSEA OR VOMITING., Disp: 10 tablet, Rfl: 0

## 2020-01-14 ENCOUNTER — OFFICE VISIT (OUTPATIENT)
Dept: FAMILY MEDICINE CLINIC | Facility: CLINIC | Age: 85
End: 2020-01-14

## 2020-01-14 VITALS
DIASTOLIC BLOOD PRESSURE: 69 MMHG | TEMPERATURE: 97.9 F | HEART RATE: 47 BPM | HEIGHT: 62 IN | BODY MASS INDEX: 22.08 KG/M2 | WEIGHT: 120 LBS | RESPIRATION RATE: 14 BRPM | SYSTOLIC BLOOD PRESSURE: 147 MMHG

## 2020-01-14 DIAGNOSIS — I10 BENIGN ESSENTIAL HYPERTENSION: Primary | ICD-10-CM

## 2020-01-14 DIAGNOSIS — M10.9 GOUT, UNSPECIFIED CAUSE, UNSPECIFIED CHRONICITY, UNSPECIFIED SITE: ICD-10-CM

## 2020-01-14 DIAGNOSIS — F33.42 RECURRENT MAJOR DEPRESSIVE DISORDER, IN FULL REMISSION (HCC): ICD-10-CM

## 2020-01-14 DIAGNOSIS — H61.21 EXCESSIVE CERUMEN IN RIGHT EAR CANAL: ICD-10-CM

## 2020-01-14 DIAGNOSIS — E78.2 MIXED HYPERLIPIDEMIA: ICD-10-CM

## 2020-01-14 PROBLEM — R80.8 OTHER PROTEINURIA: Status: ACTIVE | Noted: 2017-01-26

## 2020-01-14 PROCEDURE — 99214 OFFICE O/P EST MOD 30 MIN: CPT | Performed by: FAMILY MEDICINE

## 2020-01-14 RX ORDER — AMLODIPINE BESYLATE 5 MG/1
5 TABLET ORAL DAILY
Qty: 90 TABLET | Refills: 1 | Status: SHIPPED | OUTPATIENT
Start: 2020-01-14 | End: 2020-03-30

## 2020-01-14 RX ORDER — ATORVASTATIN CALCIUM 10 MG/1
10 TABLET, FILM COATED ORAL DAILY
Qty: 90 TABLET | Refills: 1 | Status: SHIPPED | OUTPATIENT
Start: 2020-01-14 | End: 2020-01-20

## 2020-01-14 RX ORDER — ATENOLOL 25 MG/1
25 TABLET ORAL DAILY
Qty: 90 TABLET | Refills: 1 | Status: SHIPPED | OUTPATIENT
Start: 2020-01-14 | End: 2020-05-28 | Stop reason: SDUPTHER

## 2020-01-14 RX ORDER — AMITRIPTYLINE HYDROCHLORIDE 50 MG/1
TABLET, FILM COATED ORAL
COMMUNITY
Start: 2016-12-08 | End: 2020-05-28

## 2020-01-14 RX ORDER — LOSARTAN POTASSIUM 100 MG/1
100 TABLET ORAL DAILY
Qty: 90 TABLET | Refills: 1 | Status: SHIPPED | OUTPATIENT
Start: 2020-01-14 | End: 2020-10-21 | Stop reason: SDUPTHER

## 2020-01-14 RX ORDER — SIMVASTATIN 20 MG
TABLET ORAL
COMMUNITY
Start: 2016-12-08 | End: 2020-03-31 | Stop reason: SDUPTHER

## 2020-01-14 RX ORDER — FEBUXOSTAT 40 MG/1
40 TABLET, FILM COATED ORAL DAILY
Qty: 90 TABLET | Refills: 1 | Status: SHIPPED | OUTPATIENT
Start: 2020-01-14 | End: 2020-03-02 | Stop reason: CLARIF

## 2020-01-14 RX ORDER — DULOXETIN HYDROCHLORIDE 60 MG/1
60 CAPSULE, DELAYED RELEASE ORAL DAILY
Qty: 90 CAPSULE | Refills: 1 | Status: SHIPPED | OUTPATIENT
Start: 2020-01-14 | End: 2020-05-28

## 2020-01-14 NOTE — PROGRESS NOTES
"Subjective   Alba Correa is a 86 y.o. female.     History of Present Illness     Chief Complaint:   Chief Complaint   Patient presents with   • Hypertension     MED REFILL   • Hyperlipidemia   • Gout   • right ear pain       Alba Correa 86 y.o. female who presents today for Medical Management of the below listed issues and medication refills.  she has a problem list of   Patient Active Problem List   Diagnosis   • Chronic renal failure, stage 3 (moderate) (CMS/[facility])   • DVT (deep venous thrombosis) (CMS/[facility])   • Depression   • Edema   • Gout   • Elevated cholesterol   • Osteoporosis   • Hyperparathyroidism (CMS/HCC)   • Benign essential hypertension   • Adaptive colitis   • Osteopenia   • Rheumatoid arthritis (CMS/HCC)   • Degeneration of intervertebral disc   • D (diarrhea)   • BP (high blood pressure)   • Detrusor muscle hypertonia   • Open leg wound   • Vaginal odor   • Infected wound   • History of depression   • History of renal insufficiency syndrome   • History of degenerative disc disease   • History of osteoporosis   • History of DVT (deep vein thrombosis)   • History of rheumatoid arthritis   • Hyperlipidemia   • Macrocytosis   • Right leg weakness   • CVA (cerebral vascular accident) (CMS/[facility])   • Nonrheumatic aortic (valve) insufficiency   • History of ischemic right MCA stroke with extension   • Nausea   • Bilateral leg weakness   • PAF (paroxysmal atrial fibrillation) (CMS/[facility])   • Anemia in chronic kidney disease   • Other proteinuria   .  Since the last visit, she has overall felt well, although has had a 4 week h/o right ear pressure and \"popping\" with blowing nose and yawning.  she has been compliant with   Current Outpatient Medications:   •  acetaminophen (TYLENOL) 650 MG 8 hr tablet, Take 1,300 mg by mouth Every 8 (Eight) Hours As Needed for Mild Pain ., Disp: , Rfl:   •  amitriptyline (ELAVIL) 50 MG tablet, Take  by mouth., Disp: , Rfl:   •  amLODIPine (NORVASC) 5 MG tablet, Take 1 tablet by " "mouth Daily., Disp: 90 tablet, Rfl: 1  •  apixaban (ELIQUIS) 2.5 MG tablet tablet, Take 1 tablet by mouth Every 12 (Twelve) Hours., Disp: 180 tablet, Rfl: 3  •  atenolol (TENORMIN) 25 MG tablet, Take 1 tablet by mouth Daily., Disp: 90 tablet, Rfl: 1  •  atorvastatin (LIPITOR) 10 MG tablet, Take 1 tablet by mouth Daily., Disp: 90 tablet, Rfl: 1  •  Biotin 3 MG tablet, Take 1 tablet by mouth Daily., Disp: , Rfl:   •  calcium carbonate-vitamin d (CALCIUM 600+D) 600-400 MG-UNIT per tablet, Take 1 tablet by mouth Daily., Disp: , Rfl:   •  DULoxetine (CYMBALTA) 60 MG capsule, Take 1 capsule by mouth Daily., Disp: 90 capsule, Rfl: 1  •  febuxostat (ULORIC) 40 MG tablet, Take 1 tablet by mouth Daily., Disp: 90 tablet, Rfl: 1  •  hydrALAZINE (APRESOLINE) 25 MG tablet, Take 25 mg by mouth 2 (Two) Times a Day As Needed (If BP greater than 160/90)., Disp: , Rfl:   •  losartan (COZAAR) 100 MG tablet, Take 1 tablet by mouth Daily., Disp: 90 tablet, Rfl: 1  •  melatonin 5 MG tablet tablet, Take 20 mg by mouth Every Night., Disp: , Rfl:   •  Multiple Vitamins-Minerals (CENTRUM SILVER) tablet, Take 1 tablet by mouth Daily., Disp: , Rfl:   •  Omega-3 Fatty Acids (FISH OIL) 1200 MG capsule capsule, Take 1,200 mg by mouth Daily., Disp: , Rfl:   •  ondansetron (ZOFRAN) 4 MG tablet, TAKE 1 TABLET BY MOUTH EVERY 8 (EIGHT) HOURS AS NEEDED FOR NAUSEA OR VOMITING., Disp: 10 tablet, Rfl: 0  •  simvastatin (ZOCOR) 20 MG tablet, Take  by mouth., Disp: , Rfl: .  she denies medication side effects.    All of the other chronic condition(s) listed above are stable w/o issues.    /69   Pulse (!) 47   Temp 97.9 °F (36.6 °C) (Oral)   Resp 14   Ht 157.5 cm (62\")   Wt 54.4 kg (120 lb)   BMI 21.95 kg/m²     Results for orders placed or performed in visit on 09/11/19   POC Urinalysis Dipstick, Automated   Result Value Ref Range    Color Yellow Yellow, Straw, Dark Yellow, Faiza    Clarity, UA Clear Clear    Specific Gravity  1.015 1.005 - 1.030 "    pH, Urine 5.0 5.0 - 8.0    Leukocytes Small (1+) (A) Negative    Nitrite, UA Negative Negative    Protein, POC Trace (A) Negative mg/dL    Glucose, UA Negative Negative, 1000 mg/dL (3+) mg/dL    Ketones, UA Negative Negative    Urobilinogen, UA Normal Normal    Bilirubin Negative Negative    Blood, UA Trace (A) Negative           The following portions of the patient's history were reviewed and updated as appropriate: allergies, current medications, past family history, past medical history, past social history, past surgical history and problem list.    Review of Systems   Constitutional: Negative for activity change, chills, fatigue and fever.   HENT: Positive for ear pain (pressure).    Respiratory: Negative for cough and chest tightness.    Cardiovascular: Negative for chest pain and palpitations.   Gastrointestinal: Negative for abdominal pain and nausea.   Endocrine: Negative for cold intolerance.   Psychiatric/Behavioral: Negative for behavioral problems and dysphoric mood.       Objective   Physical Exam   Constitutional: She appears well-developed and well-nourished.   HENT:   Right TM occluded with wax; tried several times to remove yet pt    Neck: Neck supple. No thyromegaly present.   Cardiovascular: Normal rate. An irregularly irregular rhythm present.   No murmur heard.  Pulmonary/Chest: Effort normal and breath sounds normal.   Abdominal: Bowel sounds are normal. There is no tenderness.   Neurological: She is alert.   Psychiatric: She has a normal mood and affect. Her behavior is normal.   Nursing note and vitals reviewed.      Assessment/Plan   Alba was seen today for hypertension, hyperlipidemia, gout and right ear pain.    Diagnoses and all orders for this visit:    Benign essential hypertension  -     amLODIPine (NORVASC) 5 MG tablet; Take 1 tablet by mouth Daily.  -     atenolol (TENORMIN) 25 MG tablet; Take 1 tablet by mouth Daily.  -     losartan (COZAAR) 100 MG tablet; Take 1 tablet by  mouth Daily.  -     Comprehensive metabolic panel  -     Lipid panel  -     CBC and Differential  -     TSH    Mixed hyperlipidemia  -     atorvastatin (LIPITOR) 10 MG tablet; Take 1 tablet by mouth Daily.  -     Lipid panel    Recurrent major depressive disorder, in full remission (CMS/HCC)  -     DULoxetine (CYMBALTA) 60 MG capsule; Take 1 capsule by mouth Daily.    Gout, unspecified cause, unspecified chronicity, unspecified site  -     febuxostat (ULORIC) 40 MG tablet; Take 1 tablet by mouth Daily.    Excessive cerumen in right ear canal    Pt made appt with Dr Manav Mooney on this Friday at 9:00AM.

## 2020-01-18 DIAGNOSIS — E78.2 MIXED HYPERLIPIDEMIA: ICD-10-CM

## 2020-01-20 RX ORDER — ATORVASTATIN CALCIUM 10 MG/1
TABLET, FILM COATED ORAL
Qty: 90 TABLET | Refills: 1 | Status: SHIPPED | OUTPATIENT
Start: 2020-01-20 | End: 2020-04-10 | Stop reason: SINTOL

## 2020-02-19 LAB
ALBUMIN SERPL-MCNC: 4.1 G/DL (ref 3.5–5.2)
ALBUMIN/GLOB SERPL: 1.3 G/DL
ALP SERPL-CCNC: 77 U/L (ref 39–117)
ALT SERPL-CCNC: 16 U/L (ref 1–33)
AST SERPL-CCNC: 26 U/L (ref 1–32)
BASOPHILS # BLD AUTO: 0.1 10*3/MM3 (ref 0–0.2)
BASOPHILS NFR BLD AUTO: 2 % (ref 0–1.5)
BILIRUB SERPL-MCNC: 0.4 MG/DL (ref 0.2–1.2)
BUN SERPL-MCNC: 32 MG/DL (ref 8–23)
BUN/CREAT SERPL: 22.9 (ref 7–25)
CALCIUM SERPL-MCNC: 9.6 MG/DL (ref 8.6–10.5)
CHLORIDE SERPL-SCNC: 104 MMOL/L (ref 98–107)
CHOLEST SERPL-MCNC: 173 MG/DL (ref 0–200)
CO2 SERPL-SCNC: 26.6 MMOL/L (ref 22–29)
CREAT SERPL-MCNC: 1.4 MG/DL (ref 0.57–1)
EOSINOPHIL # BLD AUTO: 0.42 10*3/MM3 (ref 0–0.4)
EOSINOPHIL NFR BLD AUTO: 8.3 % (ref 0.3–6.2)
ERYTHROCYTE [DISTWIDTH] IN BLOOD BY AUTOMATED COUNT: 11.8 % (ref 12.3–15.4)
GLOBULIN SER CALC-MCNC: 3.1 GM/DL
GLUCOSE SERPL-MCNC: 93 MG/DL (ref 65–99)
HCT VFR BLD AUTO: 37.4 % (ref 34–46.6)
HDLC SERPL-MCNC: 45 MG/DL (ref 40–60)
HGB BLD-MCNC: 12.5 G/DL (ref 12–15.9)
IMM GRANULOCYTES # BLD AUTO: 0.01 10*3/MM3 (ref 0–0.05)
IMM GRANULOCYTES NFR BLD AUTO: 0.2 % (ref 0–0.5)
LDLC SERPL CALC-MCNC: 100 MG/DL (ref 0–100)
LYMPHOCYTES # BLD AUTO: 1.88 10*3/MM3 (ref 0.7–3.1)
LYMPHOCYTES NFR BLD AUTO: 37.3 % (ref 19.6–45.3)
MCH RBC QN AUTO: 33.2 PG (ref 26.6–33)
MCHC RBC AUTO-ENTMCNC: 33.4 G/DL (ref 31.5–35.7)
MCV RBC AUTO: 99.5 FL (ref 79–97)
MONOCYTES # BLD AUTO: 0.56 10*3/MM3 (ref 0.1–0.9)
MONOCYTES NFR BLD AUTO: 11.1 % (ref 5–12)
NEUTROPHILS # BLD AUTO: 2.07 10*3/MM3 (ref 1.7–7)
NEUTROPHILS NFR BLD AUTO: 41.1 % (ref 42.7–76)
NRBC BLD AUTO-RTO: 0 /100 WBC (ref 0–0.2)
PLATELET # BLD AUTO: 303 10*3/MM3 (ref 140–450)
POTASSIUM SERPL-SCNC: 4.8 MMOL/L (ref 3.5–5.2)
PROT SERPL-MCNC: 7.2 G/DL (ref 6–8.5)
RBC # BLD AUTO: 3.76 10*6/MM3 (ref 3.77–5.28)
SODIUM SERPL-SCNC: 142 MMOL/L (ref 136–145)
TRIGL SERPL-MCNC: 142 MG/DL (ref 0–150)
TSH SERPL DL<=0.005 MIU/L-ACNC: 3.4 UIU/ML (ref 0.27–4.2)
VLDLC SERPL CALC-MCNC: 28.4 MG/DL
WBC # BLD AUTO: 5.04 10*3/MM3 (ref 3.4–10.8)

## 2020-02-24 DIAGNOSIS — N28.9 ABNORMAL KIDNEY FUNCTION: Primary | ICD-10-CM

## 2020-03-02 ENCOUNTER — TELEPHONE (OUTPATIENT)
Dept: FAMILY MEDICINE CLINIC | Facility: CLINIC | Age: 85
End: 2020-03-02

## 2020-03-02 RX ORDER — ALLOPURINOL 100 MG/1
100 TABLET ORAL DAILY
Qty: 60 TABLET | Refills: 1 | Status: SHIPPED | OUTPATIENT
Start: 2020-03-02 | End: 2020-06-24 | Stop reason: SDUPTHER

## 2020-03-02 NOTE — TELEPHONE ENCOUNTER
Switch to Allopurinol 100mg take 2 tabs QD. Also, see notes from last labs as pt needs those things done.

## 2020-03-02 NOTE — TELEPHONE ENCOUNTER
Pt states that she needs something to replace the uloric because her insurance no longer will pay for it.

## 2020-03-24 ENCOUNTER — OFFICE VISIT (OUTPATIENT)
Dept: FAMILY MEDICINE CLINIC | Facility: CLINIC | Age: 85
End: 2020-03-24

## 2020-03-24 VITALS
DIASTOLIC BLOOD PRESSURE: 90 MMHG | HEART RATE: 98 BPM | HEIGHT: 62 IN | SYSTOLIC BLOOD PRESSURE: 154 MMHG | TEMPERATURE: 97.4 F | RESPIRATION RATE: 16 BRPM | BODY MASS INDEX: 21.16 KG/M2 | WEIGHT: 115 LBS

## 2020-03-24 DIAGNOSIS — R10.32 LLQ PAIN: Primary | ICD-10-CM

## 2020-03-24 LAB
BILIRUB BLD-MCNC: ABNORMAL MG/DL
CLARITY, POC: CLEAR
COLOR UR: ABNORMAL
GLUCOSE UR STRIP-MCNC: NEGATIVE MG/DL
KETONES UR QL: ABNORMAL
LEUKOCYTE EST, POC: NEGATIVE
NITRITE UR-MCNC: NEGATIVE MG/ML
PH UR: 6 [PH] (ref 5–8)
PROT UR STRIP-MCNC: ABNORMAL MG/DL
RBC # UR STRIP: NEGATIVE /UL
SP GR UR: 1.02 (ref 1–1.03)
UROBILINOGEN UR QL: NORMAL

## 2020-03-24 PROCEDURE — 99214 OFFICE O/P EST MOD 30 MIN: CPT | Performed by: FAMILY MEDICINE

## 2020-03-24 PROCEDURE — 81003 URINALYSIS AUTO W/O SCOPE: CPT | Performed by: FAMILY MEDICINE

## 2020-03-24 RX ORDER — AMOXICILLIN AND CLAVULANATE POTASSIUM 875; 125 MG/1; MG/1
1 TABLET, FILM COATED ORAL 2 TIMES DAILY
Qty: 14 TABLET | Refills: 0 | Status: SHIPPED | OUTPATIENT
Start: 2020-03-24 | End: 2020-03-31

## 2020-03-24 NOTE — PROGRESS NOTES
"Subjective   Alba Correa is a 86 y.o. female.     CC: Abdominal Pain    History of Present Illness     Pt comes in today c/o 2 week h/o some nonspecific abdominal discomfort that comes/goes and can even had some upper back discomfort with it. No f/c. No urinary sx. Had a bout of diverticulitis 6 weeks ago that responded well to Augmentin.       The following portions of the patient's history were reviewed and updated as appropriate: allergies, current medications, past family history, past medical history, past social history, past surgical history and problem list.    Review of Systems   Constitutional: Negative for chills, fatigue and fever.   Respiratory: Negative for cough.    Cardiovascular: Negative for chest pain.   Gastrointestinal: Positive for abdominal pain, constipation (started 2 days ago (unusual for her)) and nausea (mild). Negative for blood in stool, diarrhea and vomiting.       /90   Pulse 98   Temp 97.4 °F (36.3 °C) (Oral)   Resp 16   Ht 157.5 cm (62\")   Wt 52.2 kg (115 lb)   BMI 21.03 kg/m²     Objective   Physical Exam   Constitutional: She appears well-developed and well-nourished.   Cardiovascular: Normal rate. An irregularly irregular rhythm present.   Pulmonary/Chest: Effort normal and breath sounds normal.   Abdominal: Soft. Bowel sounds are normal. She exhibits no distension and no mass. There is tenderness (mild LLQ > RLQ). There is no rebound and no guarding. No hernia.   Psychiatric: She has a normal mood and affect. Her behavior is normal.   U/A: Negative for infection; moderate Bili.    Assessment/Plan   Alba was seen today for nausea, mid back pain and upper gastric pain.    Diagnoses and all orders for this visit:    LLQ pain  -     POC Urinalysis Dipstick, Automated  -     amoxicillin-clavulanate (AUGMENTIN) 875-125 MG per tablet; Take 1 tablet by mouth 2 (Two) Times a Day.  -     CBC & Differential  -     Comprehensive Metabolic Panel  -     CT Abdomen Pelvis Without " Contrast; Future    Clear liquids x 48 hours then advance.

## 2020-03-25 ENCOUNTER — HOSPITAL ENCOUNTER (OUTPATIENT)
Dept: CT IMAGING | Facility: HOSPITAL | Age: 85
Discharge: HOME | End: 2020-03-25
Admitting: FAMILY MEDICINE

## 2020-03-25 DIAGNOSIS — R10.32 LLQ PAIN: ICD-10-CM

## 2020-03-25 LAB
ALBUMIN SERPL-MCNC: 4.4 G/DL (ref 3.5–5.2)
ALBUMIN/GLOB SERPL: 1.4 G/DL
ALP SERPL-CCNC: 83 U/L (ref 39–117)
ALT SERPL-CCNC: 25 U/L (ref 1–33)
AST SERPL-CCNC: 36 U/L (ref 1–32)
BASOPHILS # BLD AUTO: 0.07 10*3/MM3 (ref 0–0.2)
BASOPHILS NFR BLD AUTO: 1.4 % (ref 0–1.5)
BILIRUB SERPL-MCNC: 0.4 MG/DL (ref 0.2–1.2)
BUN SERPL-MCNC: 19 MG/DL (ref 8–23)
BUN/CREAT SERPL: 14.5 (ref 7–25)
CALCIUM SERPL-MCNC: 10.2 MG/DL (ref 8.6–10.5)
CHLORIDE SERPL-SCNC: 101 MMOL/L (ref 98–107)
CO2 SERPL-SCNC: 25.6 MMOL/L (ref 22–29)
CREAT SERPL-MCNC: 1.31 MG/DL (ref 0.57–1)
EOSINOPHIL # BLD AUTO: 0.13 10*3/MM3 (ref 0–0.4)
EOSINOPHIL NFR BLD AUTO: 2.7 % (ref 0.3–6.2)
ERYTHROCYTE [DISTWIDTH] IN BLOOD BY AUTOMATED COUNT: 11.5 % (ref 12.3–15.4)
GLOBULIN SER CALC-MCNC: 3.1 GM/DL
GLUCOSE SERPL-MCNC: 110 MG/DL (ref 65–99)
HCT VFR BLD AUTO: 41.6 % (ref 34–46.6)
HGB BLD-MCNC: 14.1 G/DL (ref 12–15.9)
IMM GRANULOCYTES # BLD AUTO: 0.01 10*3/MM3 (ref 0–0.05)
IMM GRANULOCYTES NFR BLD AUTO: 0.2 % (ref 0–0.5)
LYMPHOCYTES # BLD AUTO: 1.88 10*3/MM3 (ref 0.7–3.1)
LYMPHOCYTES NFR BLD AUTO: 38.8 % (ref 19.6–45.3)
MCH RBC QN AUTO: 34.3 PG (ref 26.6–33)
MCHC RBC AUTO-ENTMCNC: 33.9 G/DL (ref 31.5–35.7)
MCV RBC AUTO: 101.2 FL (ref 79–97)
MONOCYTES # BLD AUTO: 0.54 10*3/MM3 (ref 0.1–0.9)
MONOCYTES NFR BLD AUTO: 11.2 % (ref 5–12)
NEUTROPHILS # BLD AUTO: 2.21 10*3/MM3 (ref 1.7–7)
NEUTROPHILS NFR BLD AUTO: 45.7 % (ref 42.7–76)
NRBC BLD AUTO-RTO: 0 /100 WBC (ref 0–0.2)
PLATELET # BLD AUTO: 312 10*3/MM3 (ref 140–450)
POTASSIUM SERPL-SCNC: 5 MMOL/L (ref 3.5–5.2)
PROT SERPL-MCNC: 7.5 G/DL (ref 6–8.5)
RBC # BLD AUTO: 4.11 10*6/MM3 (ref 3.77–5.28)
SODIUM SERPL-SCNC: 141 MMOL/L (ref 136–145)
WBC # BLD AUTO: 4.84 10*3/MM3 (ref 3.4–10.8)

## 2020-03-25 PROCEDURE — 74176 CT ABD & PELVIS W/O CONTRAST: CPT

## 2020-03-27 ENCOUNTER — APPOINTMENT (OUTPATIENT)
Dept: CT IMAGING | Facility: HOSPITAL | Age: 85
End: 2020-03-27

## 2020-03-28 DIAGNOSIS — I10 BENIGN ESSENTIAL HYPERTENSION: ICD-10-CM

## 2020-03-30 RX ORDER — AMLODIPINE BESYLATE 5 MG/1
TABLET ORAL
Qty: 30 TABLET | Refills: 0 | Status: SHIPPED | OUTPATIENT
Start: 2020-03-30 | End: 2020-05-26

## 2020-04-10 RX ORDER — ROSUVASTATIN CALCIUM 5 MG/1
5 TABLET, COATED ORAL DAILY
Qty: 30 TABLET | Refills: 1 | Status: SHIPPED | OUTPATIENT
Start: 2020-04-10 | End: 2020-04-24 | Stop reason: SDUPTHER

## 2020-04-22 DIAGNOSIS — I10 BENIGN ESSENTIAL HYPERTENSION: ICD-10-CM

## 2020-04-22 RX ORDER — AMLODIPINE BESYLATE 5 MG/1
TABLET ORAL
Qty: 30 TABLET | Refills: 0 | OUTPATIENT
Start: 2020-04-22

## 2020-04-24 ENCOUNTER — TELEPHONE (OUTPATIENT)
Dept: FAMILY MEDICINE CLINIC | Facility: CLINIC | Age: 85
End: 2020-04-24

## 2020-04-24 RX ORDER — ROSUVASTATIN CALCIUM 5 MG/1
5 TABLET, COATED ORAL DAILY
Qty: 90 TABLET | Refills: 1 | Status: SHIPPED | OUTPATIENT
Start: 2020-04-24 | End: 2020-10-21 | Stop reason: SDUPTHER

## 2020-04-24 NOTE — TELEPHONE ENCOUNTER
----- Message from Alba Correa sent at 4/23/2020  7:32 PM EDT -----  Regarding: Prescription Question  Contact: [phone]  Dr. Doty,    The new medicine that you prescribed, Rosuvastatin, is working fine for me. Will you please send in the prescription to [facility] for 90 day supply? Thank you very much!    Sincerely,  Jose Correa

## 2020-05-19 ENCOUNTER — TELEPHONE (OUTPATIENT)
Dept: CARDIOLOGY | Facility: CLINIC | Age: 85
End: 2020-05-19

## 2020-05-19 NOTE — TELEPHONE ENCOUNTER
Called and instructed patient on increasing in atenolol.    I have scheduled her for a video visit with you tomorrow,  Thanks  Janine Robledo RN  Triage nurse

## 2020-05-19 NOTE — TELEPHONE ENCOUNTER
I would increase her atenolol to 25 mg twice per day.  Does she want to come into the office to be seen?  Or we can conduct a telehealth visit.

## 2020-05-19 NOTE — TELEPHONE ENCOUNTER
Celia,  This patient is calling in to let you know she has been in afib since yesterday.  She is feeling fatigued, and had a little pressure in her chest yesterday when it started.    Yesterday 152/85 hr 103  Today      148/81 hr 95     She denies SOA, but stated she felt a little dizzy earlier this week, which she contributed to inner ear.    Cardiac meds  Atenolol 25mg daily  amlodipine 5mg daily  Losartan 100mg daily  Eliquis 2.5mg BID    I did some teaching on afib and when to seek emergency treatment, for any sustained heart rate <120-130 for more than an hour she is to go to the ER.  I reassured her that her heart rate seems controlled, and she is protected by her eliquis.  She did mention that her  is currently dying in a nursing home.  She asked if that stress could cause this and it certainly could.    Celia   Is there any thing you would like her to do for now?  Thanks  Janine Robledo RN  Triage nurse

## 2020-05-20 ENCOUNTER — TELEMEDICINE (OUTPATIENT)
Dept: CARDIOLOGY | Facility: CLINIC | Age: 85
End: 2020-05-20

## 2020-05-20 ENCOUNTER — CLINICAL SUPPORT (OUTPATIENT)
Dept: CARDIOLOGY | Facility: CLINIC | Age: 85
End: 2020-05-20

## 2020-05-20 VITALS
HEIGHT: 62 IN | DIASTOLIC BLOOD PRESSURE: 72 MMHG | WEIGHT: 115 LBS | BODY MASS INDEX: 21.16 KG/M2 | SYSTOLIC BLOOD PRESSURE: 131 MMHG | HEART RATE: 81 BPM

## 2020-05-20 VITALS — SYSTOLIC BLOOD PRESSURE: 152 MMHG | HEART RATE: 79 BPM | DIASTOLIC BLOOD PRESSURE: 80 MMHG

## 2020-05-20 DIAGNOSIS — I48.0 PAF (PAROXYSMAL ATRIAL FIBRILLATION) (HCC): Primary | Chronic | ICD-10-CM

## 2020-05-20 DIAGNOSIS — I10 ESSENTIAL HYPERTENSION: ICD-10-CM

## 2020-05-20 DIAGNOSIS — E78.2 MIXED HYPERLIPIDEMIA: ICD-10-CM

## 2020-05-20 DIAGNOSIS — N18.30 CHRONIC RENAL FAILURE, STAGE 3 (MODERATE) (HCC): ICD-10-CM

## 2020-05-20 DIAGNOSIS — Z86.73 HISTORY OF ISCHEMIC RIGHT MCA STROKE: ICD-10-CM

## 2020-05-20 PROBLEM — R11.0 NAUSEA: Status: RESOLVED | Noted: 2019-04-02 | Resolved: 2020-05-20

## 2020-05-20 PROCEDURE — 99214 OFFICE O/P EST MOD 30 MIN: CPT | Performed by: NURSE PRACTITIONER

## 2020-05-20 PROCEDURE — 93000 ELECTROCARDIOGRAM COMPLETE: CPT | Performed by: INTERNAL MEDICINE

## 2020-05-20 NOTE — PROGRESS NOTES
Telehealth Visit    Date of Visit: 2020  Encounter Provider: GROVER Howard  Place of Service: [facility] CARDIOLOGY  Patient Name: Alba Correa  :1933  Primary Cardiologist: Dr. Roshan Martines     Chief Complaint   Patient presents with   • Atrial Fibrillation   • Follow-up   :     Dear Dr. Aramis Doty,     HPI: Alba Correa is a pleasant 86 y.o. female who is an established patient of our practice. Due to COVID-19 virus, I am conducting a telehealth visit via telephone with patient and she has consented to this visit today. She is a new patient to me and her previous records have been reviewed.    She has a known history of hypertension, hyperlipidemia, rheumatoid arthritis, renal insufficiency, and previous stroke (2016).  Etiology of stroke was unknown and wore a monitor which showed no evidence of atrial fibrillation.    In 2019, she presented to the hospital with a headache.  CT of the head was unremarkable and MRI of the brain showed moderate small vessel ischemia.  Repeat echocardiogram showed an EF of 60%,     In 2019, she followed up in our office with concerns of low heart rate of 48 bpm and she was feeling fatigued.  She wore a 48-hour Holter monitor which showed the predominant rhythm atrial fibrillation with an average heart rate of 71 bpm.  Her aspirin was discontinued and she was started on apixaban 2.5 mg daily due to her age and weight less than 60 kg.    In 2019, she followed up in the office with Dr. Martines and was in a normal sinus rhythm at that time with heart rate of 57 bpm.  He recommended a repeat echocardiogram in .    Yesterday, she contacted our triage nurse stating that she felt that she had been in atrial fibrillation since yesterday and felt fatigued with a little chest pressure.  Blood pressure 152/85 and heart rate 103.  She felt a little dizzy earlier that week which she contributed to inner ear  problem.  She said her  was currently dying in a nursing home and was wondering if stress could contribute to atrial fibrillation.  I recommended a telehealth visit     Today is her telehealth visit.  She thinks that she has been in atrial fibrillation recently.  She has had some mild chest heaviness, pressure at the base of her throat, and fatigue.  Her blood pressure was elevated yesterday, but with the increase of atenolol is better controlled today.  She denies shortness of breath, palpitations, PND, orthopnea, edema, dizziness, syncope, or bleeding.  She has been compliant with her apixaban.    Past Medical History:   Diagnosis Date   • Benign essential hypertension    • Chronic kidney disease    • Chronic renal insufficiency    • CVA (cerebral vascular accident) (CMS/[facility])    • Degeneration of intervertebral disc    • Depression    • DVT (deep venous thrombosis) (CMS/[facility])    • Edema    • Gout    • H/O complete eye exam 09/2016   • Hyperlipidemia    • IBS (irritable bowel syndrome)    • OAB (overactive bladder)    • Osteopenia    • Osteoporosis    • PAF (paroxysmal atrial fibrillation) (CMS/[facility]) 11/8/2019   • Peripheral neuropathy    • Rheumatoid arthritis (CMS/[facility])        Past Surgical History:   Procedure Laterality Date   • APPENDECTOMY     • BREAST BIOPSY     • COLONOSCOPY      declines   • HERNIA REPAIR  1965   • HYSTERECTOMY      still has ovaries   • MAMMO BILATERAL  11/16/2016    pt declines   • PAP SMEAR  11/16/2016    declines       Social History     Socioeconomic History   • Marital status:      Spouse name: Peña   • Number of children: 3   • Years of education: High school   • Highest education level: Not on file   Occupational History   • Occupation: Dental assistant     Employer: RETIRED   Tobacco Use   • Smoking status: Former Smoker     Years: 5.00     Types: Cigarettes   • Smokeless tobacco: Never Used   • Tobacco comment: quit 40 years ago/ caffeine use: 2 cups coffee in AM    Substance and Sexual Activity   • Alcohol use: Yes     Comment: Occasional- glass ofwine few times a week   • Drug use: No   • Sexual activity: Defer     Partners: Male     Birth control/protection: Surgical       Family History   Problem Relation Age of Onset   • Alcohol abuse Other    • Cancer Other    • Hypertension Other    • Kidney disease Other    • Lung disease Other    • Heart disease Mother 89   • Heart failure Mother    • Ovarian cancer Daughter 60   • Stomach cancer Father    • Kidney cancer Father 52   • Lung cancer Father 52   • Ovarian cancer Sister    • Lung cancer Brother 65       The following portion of the patient's history were reviewed and updated as appropriate: past medical history, past surgical history, past social history, past family history, allergies, current medications, and problem list.    Review of Systems   Constitution: Positive for malaise/fatigue.   Cardiovascular: Positive for chest pain.   Respiratory: Negative.    Hematologic/Lymphatic: Negative for bleeding problem.   Neurological: Negative.        Allergies   Allergen Reactions   • Bactrim [Sulfamethoxazole-Trimethoprim] Delirium   • Lisinopril Unknown (See Comments)     Unknown per pt   • Levofloxacin Rash         Current Outpatient Medications:   •  acetaminophen (TYLENOL) 650 MG 8 hr tablet, Take 1,300 mg by mouth Every 8 (Eight) Hours As Needed for Mild Pain ., Disp: , Rfl:   •  allopurinol (ZYLOPRIM) 100 MG tablet, Take 1 tablet by mouth Daily., Disp: 60 tablet, Rfl: 1  •  amitriptyline (ELAVIL) 50 MG tablet, Take  by mouth., Disp: , Rfl:   •  amLODIPine (NORVASC) 5 MG tablet, TAKE 1 TABLET BY MOUTH EVERY DAY, Disp: 30 tablet, Rfl: 0  •  apixaban (ELIQUIS) 2.5 MG tablet tablet, Take 1 tablet by mouth Every 12 (Twelve) Hours., Disp: 180 tablet, Rfl: 3  •  atenolol (TENORMIN) 25 MG tablet, Take 1 tablet by mouth Daily., Disp: 90 tablet, Rfl: 1  •  calcium carbonate-vitamin d (CALCIUM 600+D) 600-400 MG-UNIT per tablet,  "Take 1 tablet by mouth Daily., Disp: , Rfl:   •  DULoxetine (CYMBALTA) 60 MG capsule, Take 1 capsule by mouth Daily., Disp: 90 capsule, Rfl: 1  •  hydrALAZINE (APRESOLINE) 25 MG tablet, Take 25 mg by mouth 2 (Two) Times a Day As Needed (If BP greater than 160/90)., Disp: , Rfl:   •  losartan (COZAAR) 100 MG tablet, Take 1 tablet by mouth Daily., Disp: 90 tablet, Rfl: 1  •  melatonin 5 MG tablet tablet, Take 20 mg by mouth Every Night., Disp: , Rfl:   •  Multiple Vitamins-Minerals (CENTRUM SILVER) tablet, Take 1 tablet by mouth Daily., Disp: , Rfl:   •  Omega-3 Fatty Acids (FISH OIL) 1200 MG capsule capsule, Take 1,200 mg by mouth Daily., Disp: , Rfl:   •  ondansetron (ZOFRAN) 4 MG tablet, TAKE 1 TABLET BY MOUTH EVERY 8 (EIGHT) HOURS AS NEEDED FOR NAUSEA OR VOMITING., Disp: 10 tablet, Rfl: 0  •  rosuvastatin (CRESTOR) 5 MG tablet, Take 1 tablet by mouth Daily., Disp: 90 tablet, Rfl: 1        Objective:     Vitals:    05/20/20 1125   BP: 131/72   BP Location: Left arm   Pulse: 81   Weight: 52.2 kg (115 lb)   Height: 157.5 cm (62\")     Body mass index is 21.03 kg/m².    Due to telehealth visit, there was no EKG, vitals, or weight performed in our office.  Vitals/Weight were reported by the patient and conducted at home.       Assessment:       Diagnosis Plan   1. PAF (paroxysmal atrial fibrillation) (CMS/HCC)     2. Essential hypertension     3. History of ischemic right MCA stroke with extension     4. Mixed hyperlipidemia     5. Chronic renal failure, stage 3 (moderate) (CMS/HCC)            Plan:       1.  Paroxysmal Atrial Fibrillation: Diagnosed in 2019 and currently treated with atenolol and apixaban.  She thinks that she may be in atrial fibrillation at this time.  She is having symptoms of mild chest pressure, throat pressure, and fatigue.  I am concerned about the chest pressure so I think she needs to come to the office \for an EKG, blood pressure, and heart rate check.  We will continue with both the atenolol " 25 mg twice daily and apixaban renally dose.    2.  Hypertension: Blood pressure better controlled today.    3.  Previous stroke: Continue with apixaban.    4.  Hyperlipidemia: On statin therapy.    5.  CKD: Stable.     6.  Further recommendations to follow.    This patient has consented to a telehealth visit via telephone. The visit was scheduled as a telephone visit to comply with patient safety concerns in accordance with CDC recommendations.  All vitals recorded within this visit are reported by the patient.  I spent 25 minutes in total including but not limited to the 10 minutes spent in direct conversation with this patient.      As always, it has been a pleasure to participate in your patient's care. Thank you.       Sincerely,         GROVER Del Cid        Dictated utilizing Dragon dictation

## 2020-05-20 NOTE — PROGRESS NOTES
Procedure     ECG 12 Lead  Date/Time: 5/20/2020 3:28 PM  Performed by: Heidi Colby MD  Authorized by: Heidi Colby MD   Comparison: compared with previous ECG   Comparison to previous ECG: A fib now seen  Rhythm: atrial fibrillation    Clinical impression: abnormal EKG           Patient came in the office today for a EKG, B/P and HR check. She had a telephone visit with Celia Quijano today and she was concerned because patient stated that she was having some chest pressure and high readings for HR. Celia Quijano wanted to her to take the atenolol 25 mg BID instead of once a day. Patient stated she feels better after taking the extra dose of atenolol. I consulted with Dr. Colby she stated that her EKG was fine. I reviewed her medications and allergies no changes were made today. Celia will follow up with the patient.

## 2020-05-20 NOTE — PROGRESS NOTES
I spoke with Dr. Colby and she said the patient was in atrial fibrillation with controlled ventricular response.  I am waiting for the EKG to be scanned in.  She is feeling better at this time and denies chest pain.  I recommended that we continue with the atenolol 25 mg twice per day and her current dose of apixaban.  I will follow-up with her next week.

## 2020-05-21 NOTE — PROGRESS NOTES
EKG scanned in up front, Dr. Colby already did a interpretation. Waiting for someone to place the EKG into the chart

## 2020-05-23 DIAGNOSIS — I10 BENIGN ESSENTIAL HYPERTENSION: ICD-10-CM

## 2020-05-26 RX ORDER — AMLODIPINE BESYLATE 5 MG/1
TABLET ORAL
Qty: 30 TABLET | Refills: 0 | Status: SHIPPED | OUTPATIENT
Start: 2020-05-26 | End: 2020-07-28

## 2020-05-28 ENCOUNTER — OFFICE VISIT (OUTPATIENT)
Dept: CARDIOLOGY | Facility: CLINIC | Age: 85
End: 2020-05-28

## 2020-05-28 VITALS
WEIGHT: 115 LBS | SYSTOLIC BLOOD PRESSURE: 145 MMHG | HEART RATE: 88 BPM | DIASTOLIC BLOOD PRESSURE: 87 MMHG | HEIGHT: 62 IN | BODY MASS INDEX: 21.16 KG/M2

## 2020-05-28 DIAGNOSIS — I10 BENIGN ESSENTIAL HYPERTENSION: ICD-10-CM

## 2020-05-28 DIAGNOSIS — I10 ESSENTIAL HYPERTENSION: ICD-10-CM

## 2020-05-28 DIAGNOSIS — I48.0 PAF (PAROXYSMAL ATRIAL FIBRILLATION) (HCC): Primary | Chronic | ICD-10-CM

## 2020-05-28 PROCEDURE — 99441 PR PHYS/QHP TELEPHONE EVALUATION 5-10 MIN: CPT | Performed by: NURSE PRACTITIONER

## 2020-05-28 RX ORDER — ATENOLOL 25 MG/1
37.5 TABLET ORAL 2 TIMES DAILY
Qty: 90 TABLET | Refills: 0
Start: 2020-05-28 | End: 2020-07-14 | Stop reason: SDUPTHER

## 2020-05-28 NOTE — PROGRESS NOTES
Telehealth Visit    Date of Visit: 2020  Encounter Provider: GROVER Howard  Place of Service: [facility] CARDIOLOGY  Patient Name: Alba Correa  :1933  Primary Cardiologist: Dr. Roshan Martines     Chief Complaint   Patient presents with   • Atrial Fibrillation   • Follow-up   :     Dear Dr. Aramis Doty,     HPI: Alba Correa is a pleasant 87 y.o. female who is an established patient of our practice. Due to COVID-19 virus, I am conducting a telehealth visit via telephone with patient and she has consented to this visit today.     She has a known history of hypertension, hyperlipidemia, rheumatoid arthritis, renal insufficiency, and previous stroke (2016). The etiology of stroke was unknown and she wore a monitor which showed no evidence of atrial fibrillation.    In 2019, she presented to the hospital with a headache.  CT of the head was unremarkable and MRI of the brain showed moderate small vessel ischemia.  Repeat echocardiogram showed an EF of 60%,     In 2019, she followed up in our office with concerns of low heart rate of 48 bpm and she was feeling fatigued.  She wore a 48-hour Holter monitor which showed the predominant rhythm atrial fibrillation with an average heart rate of 71 bpm.  Her aspirin was discontinued and she was started on apixaban 2.5 mg daily due to her age and weight less than 60 kg.    In 2019, she followed up in the office with Dr. Martines and was in a normal sinus rhythm at that time with heart rate of 57 bpm.  He recommended a repeat echocardiogram in .    She recently contacted our triage nurse stating that she felt that she had been in atrial fibrillation since yesterday and felt fatigued with a little chest pressure.  Blood pressure 152/85 and heart rate 103.  She felt a little dizzy earlier that week which she contributed to inner ear problem.  She said her  was currently dying in a nursing home and  was wondering if stress could contribute to atrial fibrillation.  I recommended a telehealth visit     On 2020 I conducted a telehealth visit. She reported feeling atrial fibrillation, mild chest heaviness, pressure at the base of her throat, and fatigue.  She came in for an EKG that day which confirmed atrial fibrillation with HR 79. Her atenolol was recently increased to 50 mg twice daily.     Today is her follow-up telehealth visit.  On occasion she reports some mild chest heaviness, mild dizziness, and feelings of atrial fibrillation.  Her blood pressure typically runs 130/70s at home.  She has been compliant with her apixaban.    Past Medical History:   Diagnosis Date   • Benign essential hypertension    • Chronic kidney disease    • Chronic renal insufficiency    • CVA (cerebral vascular accident) (CMS/[facility])    • Degeneration of intervertebral disc    • Depression    • DVT (deep venous thrombosis) (CMS/[facility])    • Edema    • Gout    • H/O complete eye exam 2016   • Hyperlipidemia    • IBS (irritable bowel syndrome)    • OAB (overactive bladder)    • Osteopenia    • Osteoporosis    • PAF (paroxysmal atrial fibrillation) (CMS/[facility]) 2019   • Peripheral neuropathy    • Rheumatoid arthritis (CMS/[facility])        Past Surgical History:   Procedure Laterality Date   • APPENDECTOMY     • BREAST BIOPSY     • COLONOSCOPY      declines   • HERNIA REPAIR     • HYSTERECTOMY      still has ovaries   • MAMMO BILATERAL  2016    pt declines   • PAP SMEAR  2016    declines       Social History     Socioeconomic History   • Marital status:      Spouse name: Peña   • Number of children: 3   • Years of education: High school   • Highest education level: Not on file   Occupational History   • Occupation: Dental assistant     Employer: RETIRED   Tobacco Use   • Smoking status: Former Smoker     Years: 5.00     Types: Cigarettes     Last attempt to quit: 1975     Years since quittin.4   • Smokeless  tobacco: Never Used   • Tobacco comment: caffeine use: 2 cups coffee in AM   Substance and Sexual Activity   • Alcohol use: Yes     Comment: Occasional- glass ofwine few times a week   • Drug use: No   • Sexual activity: Defer     Partners: Male     Birth control/protection: Surgical       Family History   Problem Relation Age of Onset   • Alcohol abuse Other    • Cancer Other    • Hypertension Other    • Kidney disease Other    • Lung disease Other    • Heart disease Mother 89   • Heart failure Mother    • Ovarian cancer Daughter 60   • Stomach cancer Father    • Kidney cancer Father 52   • Lung cancer Father 52   • Ovarian cancer Sister    • Lung cancer Brother 65       The following portion of the patient's history were reviewed and updated as appropriate: past medical history, past surgical history, past social history, past family history, allergies, current medications, and problem list.    Review of Systems   Constitution: Positive for malaise/fatigue.   Cardiovascular: Positive for chest pain.   Respiratory: Negative.    Hematologic/Lymphatic: Negative for bleeding problem.   Neurological: Negative.        Allergies   Allergen Reactions   • Bactrim [Sulfamethoxazole-Trimethoprim] Delirium   • Lisinopril Unknown (See Comments)     Unknown per pt   • Levofloxacin Rash         Current Outpatient Medications:   •  acetaminophen (TYLENOL) 650 MG 8 hr tablet, Take 1,300 mg by mouth Every 8 (Eight) Hours As Needed for Mild Pain ., Disp: , Rfl:   •  allopurinol (ZYLOPRIM) 100 MG tablet, Take 1 tablet by mouth Daily., Disp: 60 tablet, Rfl: 1  •  amLODIPine (NORVASC) 5 MG tablet, TAKE 1 TABLET BY MOUTH EVERY DAY, Disp: 30 tablet, Rfl: 0  •  apixaban (ELIQUIS) 2.5 MG tablet tablet, Take 1 tablet by mouth Every 12 (Twelve) Hours., Disp: 180 tablet, Rfl: 3  •  atenolol (TENORMIN) 25 MG tablet, Take 1 tablet by mouth Daily. (Patient taking differently: Take 25 mg by mouth 2 (Two) Times a Day.), Disp: 90 tablet, Rfl: 1  •   "calcium carbonate-vitamin d (CALCIUM 600+D) 600-400 MG-UNIT per tablet, Take 1 tablet by mouth Daily., Disp: , Rfl:   •  losartan (COZAAR) 100 MG tablet, Take 1 tablet by mouth Daily., Disp: 90 tablet, Rfl: 1  •  Multiple Vitamins-Minerals (CENTRUM SILVER) tablet, Take 1 tablet by mouth Daily., Disp: , Rfl:   •  Omega-3 Fatty Acids (FISH OIL) 1200 MG capsule capsule, Take 1,200 mg by mouth Daily., Disp: , Rfl:   •  ondansetron (ZOFRAN) 4 MG tablet, TAKE 1 TABLET BY MOUTH EVERY 8 (EIGHT) HOURS AS NEEDED FOR NAUSEA OR VOMITING., Disp: 10 tablet, Rfl: 0  •  rosuvastatin (CRESTOR) 5 MG tablet, Take 1 tablet by mouth Daily., Disp: 90 tablet, Rfl: 1        Objective:     Vitals:    05/28/20 1047   BP: 145/87   BP Location: Left arm   Pulse: 88   Weight: 52.2 kg (115 lb)   Height: 157.5 cm (62\")     Body mass index is 21.03 kg/m².    Due to telehealth visit, there was no EKG, vitals, or weight performed in our office.  Vitals/Weight were reported by the patient and conducted at home.       Assessment:       Diagnosis Plan   1. PAF (paroxysmal atrial fibrillation) (CMS/HCC)     2. Essential hypertension            Plan:       1.  Paroxysmal Atrial Fibrillation: Diagnosed in 2019 and currently treated with atenolol and apixaban.  Recent EKG confirmed atrial fibrillation with controlled ventricular response.  She is having symptoms of mild chest pressure, throat pressure, and fatigue.  I recommended increasing her atenolol to 37.5 mg twice per day to see if this helps her symptoms and blood pressure.    2.  Hypertension: Blood pressure elevated today.     3.  Previous stroke: Continue with apixaban.    4.  Hyperlipidemia: On statin therapy.    5.  CKD: Stable.     6.  I recommended follow-up with Dr. Roshan Martines in the next few weeks.    This patient has consented to a telehealth visit via telephone. The visit was scheduled as a telephone visit to comply with patient safety concerns in accordance with CDC recommendations.  " All vitals recorded within this visit are reported by the patient.  I spent 25 minutes in total including but not limited to the 10 minutes spent in direct conversation with this patient.      As always, it has been a pleasure to participate in your patient's care. Thank you.       Sincerely,         GROVER Del Cid        Dictated utilizing Dragon dictation

## 2020-06-18 ENCOUNTER — TELEPHONE (OUTPATIENT)
Dept: CARDIOLOGY | Facility: CLINIC | Age: 85
End: 2020-06-18

## 2020-06-19 ENCOUNTER — OFFICE VISIT (OUTPATIENT)
Dept: CARDIOLOGY | Facility: CLINIC | Age: 85
End: 2020-06-19

## 2020-06-19 VITALS
SYSTOLIC BLOOD PRESSURE: 150 MMHG | HEART RATE: 78 BPM | BODY MASS INDEX: 21.9 KG/M2 | WEIGHT: 119 LBS | HEIGHT: 62 IN | DIASTOLIC BLOOD PRESSURE: 90 MMHG

## 2020-06-19 DIAGNOSIS — I35.1 NONRHEUMATIC AORTIC (VALVE) INSUFFICIENCY: Chronic | ICD-10-CM

## 2020-06-19 DIAGNOSIS — I48.0 PAF (PAROXYSMAL ATRIAL FIBRILLATION) (HCC): Primary | Chronic | ICD-10-CM

## 2020-06-19 PROCEDURE — 93000 ELECTROCARDIOGRAM COMPLETE: CPT | Performed by: INTERNAL MEDICINE

## 2020-06-19 PROCEDURE — 99213 OFFICE O/P EST LOW 20 MIN: CPT | Performed by: INTERNAL MEDICINE

## 2020-06-19 NOTE — PROGRESS NOTES
Subjective:     Encounter Date: 06/19/20        Patient ID: Alba Correa is a 87 y.o. female.    Chief Complaint: PAF, CVA  Atrial Fibrillation   Past medical history includes atrial fibrillation.       Dear Dr. Doty,    I had the pleasure of seeing this patient in the office today for follow-up.  She has been very fatigued.  She was seen by [facility] couple weeks ago.  Atenolol was increased.  However she is continued to feel very tired and very fatigued.  No chest pain or chest discomfort.  No shortness of breath at rest.  No orthopnea or PND.  She has not had any dizziness or lightheadedness and no presyncope or syncope.    We saw her initially in the hospital.  She presented for his CVA.  We were asked to see her for evaluation of a cardiac source of emboli.  None was found.  Transthoracic and transesophageal echocardiograms did not demonstrate any cardiac source of emboli.  14 day monitor showed no ectopy.      Then recently seen in our office with question about bradycardia.  A 48-hour Holter monitor was placed.  During that time, she was in atrial fibrillation the entire 48 hours.  She was unaware of this.  She did not feel any palpitations or tachycardia.  No irregularity.  Heart rate was 70 bpm during the 48-hour recording with no pauses.      The following portions of the patient's history were reviewed and updated as appropriate: allergies, current medications, past family history, past medical history, past social history, past surgical history and problem list.    Past Medical History:   Diagnosis Date   • Benign essential hypertension    • Chronic kidney disease    • Chronic renal insufficiency    • CVA (cerebral vascular accident) (CMS/[facility])    • Degeneration of intervertebral disc    • Depression    • DVT (deep venous thrombosis) (CMS/[facility])    • Edema    • Gout    • H/O complete eye exam 09/2016   • Hyperlipidemia    • IBS (irritable bowel syndrome)    • OAB (overactive bladder)    •  Osteopenia    • Osteoporosis    • PAF (paroxysmal atrial fibrillation) (CMS/HCC) 2019   • Peripheral neuropathy    • Rheumatoid arthritis (CMS/[facility])        Past Surgical History:   Procedure Laterality Date   • APPENDECTOMY     • BREAST BIOPSY     • COLONOSCOPY      declines   • HERNIA REPAIR     • HYSTERECTOMY      still has ovaries   • MAMMO BILATERAL  2016    pt declines   • PAP SMEAR  2016    declines       Social History     Socioeconomic History   • Marital status:      Spouse name: Peña   • Number of children: 3   • Years of education: High school   • Highest education level: Not on file   Occupational History   • Occupation: Dental assistant     Employer: RETIRED   Tobacco Use   • Smoking status: Former Smoker     Years: 5.00     Types: Cigarettes     Last attempt to quit: 1975     Years since quittin.4   • Smokeless tobacco: Never Used   • Tobacco comment: caffeine use: 2 cups coffee in AM   Substance and Sexual Activity   • Alcohol use: Yes     Comment: Occasional- glass ofwine few times a week   • Drug use: No   • Sexual activity: Defer     Partners: Male     Birth control/protection: Surgical       Review of Systems   Constitution: Negative for chills, decreased appetite, fever and night sweats.   HENT: Negative for ear discharge, ear pain, hearing loss, nosebleeds and sore throat.    Eyes: Negative for blurred vision, double vision and pain.   Cardiovascular: Negative for cyanosis.   Respiratory: Negative for hemoptysis and sputum production.    Endocrine: Negative for cold intolerance and heat intolerance.   Hematologic/Lymphatic: Negative for adenopathy.   Skin: Negative for dry skin, itching, nail changes, rash and suspicious lesions.   Musculoskeletal: Negative for arthritis, gout, muscle cramps, muscle weakness, myalgias and neck pain.   Gastrointestinal: Negative for anorexia, bowel incontinence, constipation, diarrhea, dysphagia, hematemesis and jaundice.  "  Genitourinary: Negative for bladder incontinence, dysuria, flank pain, frequency, hematuria and nocturia.   Neurological: Negative for focal weakness, numbness, paresthesias and seizures.   Psychiatric/Behavioral: Negative for altered mental status, hallucinations, hypervigilance, suicidal ideas and thoughts of violence.   Allergic/Immunologic: Negative for persistent infections.         ECG 12 Lead  Date/Time: 6/19/2020 1:58 PM  Performed by: Roshan Martines III, MD  Authorized by: Roshan Martines III, MD   Comparison: compared with previous ECG   Similar to previous ECG  Rhythm: atrial fibrillation  Rate: normal  Conduction: conduction normal  ST Segments: ST segments normal  T Waves: T waves normal  QRS axis: normal  Other: no other findings    Clinical impression: abnormal EKG               Objective:     Vitals:    06/19/20 0905   BP: 150/90   Pulse: 78   Weight: 54 kg (119 lb)   Height: 157.5 cm (62\")         Physical Exam   Constitutional: She is oriented to person, place, and time. She appears well-developed and well-nourished. No distress.   HENT:   Head: Normocephalic and atraumatic.   Nose: Nose normal.   Mouth/Throat: Oropharynx is clear and moist.   Eyes: Pupils are equal, round, and reactive to light. Conjunctivae and EOM are normal. Right eye exhibits no discharge. Left eye exhibits no discharge.   Neck: Normal range of motion. Neck supple. No tracheal deviation present. No thyromegaly present.   Cardiovascular: Normal rate, regular rhythm, S1 normal, S2 normal and normal pulses. Exam reveals no S3.   Murmur heard.   Medium-pitched midsystolic murmur is present with a grade of 1/6 at the upper right sternal border radiating to the neck.  High-pitched blowing decrescendo early diastolic murmur is present with a grade of 1/6 at the upper right sternal border radiating to the apex.  Pulmonary/Chest: Effort normal and breath sounds normal. No stridor. No respiratory distress. She exhibits no tenderness. "   Abdominal: Soft. Bowel sounds are normal. She exhibits no distension and no mass. There is no tenderness. There is no rebound and no guarding.   Musculoskeletal: Normal range of motion. She exhibits no tenderness or deformity.   Lymphadenopathy:     She has no cervical adenopathy.   Neurological: She is alert and oriented to person, place, and time. She has normal reflexes.   Skin: Skin is warm and dry. No rash noted. She is not diaphoretic. No erythema.   Psychiatric: She has a normal mood and affect. Thought content normal.       Lab Review:             Performed        Assessment:          Diagnosis Plan   1. PAF (paroxysmal atrial fibrillation) (CMS/HCC)  Adult Transthoracic Echo Complete W/ Cont if Necessary Per Protocol    Holter Monitor - 24 Hour   2. Nonrheumatic aortic (valve) insufficiency  Adult Transthoracic Echo Complete W/ Cont if Necessary Per Protocol    Holter Monitor - 24 Hour          Plan:       1.  Aortic insufficiency-p worsening fatigue, we will recheck an echocardiogram  2.  History of CVA-  3.  Benign essential hypertension-continue current medical regimen  4.  Paroxysmal atrial fibrillation- currently still in atrial fibrillation but appears to have adequate rate control.  Very fatigued with lack of energy, we will replace a Holter monitor.  Her blood pressure cuff told her earlier this morning that she was in a normal rhythm.    Thank you very much for allowing us to participate in the care of this pleasant patient.  Please don't hesitate to call if I can be of assistance in any way.      Current Outpatient Medications:   •  acetaminophen (TYLENOL) 650 MG 8 hr tablet, Take 1,300 mg by mouth Every 8 (Eight) Hours As Needed for Mild Pain ., Disp: , Rfl:   •  allopurinol (ZYLOPRIM) 100 MG tablet, Take 1 tablet by mouth Daily., Disp: 60 tablet, Rfl: 1  •  amLODIPine (NORVASC) 5 MG tablet, TAKE 1 TABLET BY MOUTH EVERY DAY, Disp: 30 tablet, Rfl: 0  •  apixaban (ELIQUIS) 2.5 MG tablet tablet,  Take 1 tablet by mouth Every 12 (Twelve) Hours., Disp: 180 tablet, Rfl: 3  •  atenolol (TENORMIN) 25 MG tablet, Take 1.5 tablets by mouth 2 (Two) Times a Day., Disp: 90 tablet, Rfl: 0  •  calcium carbonate-vitamin d (CALCIUM 600+D) 600-400 MG-UNIT per tablet, Take 1 tablet by mouth Daily., Disp: , Rfl:   •  losartan (COZAAR) 100 MG tablet, Take 1 tablet by mouth Daily., Disp: 90 tablet, Rfl: 1  •  Multiple Vitamins-Minerals (CENTRUM SILVER) tablet, Take 1 tablet by mouth Daily., Disp: , Rfl:   •  ondansetron (ZOFRAN) 4 MG tablet, TAKE 1 TABLET BY MOUTH EVERY 8 (EIGHT) HOURS AS NEEDED FOR NAUSEA OR VOMITING., Disp: 10 tablet, Rfl: 0  •  rosuvastatin (CRESTOR) 5 MG tablet, Take 1 tablet by mouth Daily., Disp: 90 tablet, Rfl: 1

## 2020-06-24 ENCOUNTER — TELEPHONE (OUTPATIENT)
Dept: FAMILY MEDICINE CLINIC | Facility: CLINIC | Age: 85
End: 2020-06-24

## 2020-06-24 RX ORDER — ALLOPURINOL 100 MG/1
100 TABLET ORAL DAILY
Qty: 90 TABLET | Refills: 1 | Status: SHIPPED | OUTPATIENT
Start: 2020-06-24 | End: 2020-10-21 | Stop reason: SDUPTHER

## 2020-07-02 ENCOUNTER — HOSPITAL ENCOUNTER (OUTPATIENT)
Dept: CARDIOLOGY | Facility: HOSPITAL | Age: 85
Discharge: HOME | End: 2020-07-02
Admitting: INTERNAL MEDICINE

## 2020-07-02 DIAGNOSIS — I35.1 NONRHEUMATIC AORTIC (VALVE) INSUFFICIENCY: ICD-10-CM

## 2020-07-02 DIAGNOSIS — I48.0 PAF (PAROXYSMAL ATRIAL FIBRILLATION) (HCC): ICD-10-CM

## 2020-07-02 LAB
AORTIC ARCH: 2 CM
ASCENDING AORTA: 3.7 CM
BH CV ECHO MEAS - ACS: 1.6 CM
BH CV ECHO MEAS - AI DEC SLOPE: 126.3 CM/SEC^2
BH CV ECHO MEAS - AI MAX PG: 31.6 MMHG
BH CV ECHO MEAS - AI MAX VEL: 281 CM/SEC
BH CV ECHO MEAS - AI P1/2T: 651.4 MSEC
BH CV ECHO MEAS - AO MAX PG (FULL): 2.4 MMHG
BH CV ECHO MEAS - AO MAX PG: 4.1 MMHG
BH CV ECHO MEAS - AO MEAN PG (FULL): 1.5 MMHG
BH CV ECHO MEAS - AO MEAN PG: 2.3 MMHG
BH CV ECHO MEAS - AO V2 MAX: 101.8 CM/SEC
BH CV ECHO MEAS - AO V2 MEAN: 69.6 CM/SEC
BH CV ECHO MEAS - AO V2 VTI: 19.8 CM
BH CV ECHO MEAS - ASC AORTA: 3.7 CM
BH CV ECHO MEAS - AVA(I,A): 1.9 CM^2
BH CV ECHO MEAS - AVA(I,D): 1.9 CM^2
BH CV ECHO MEAS - AVA(V,A): 1.9 CM^2
BH CV ECHO MEAS - AVA(V,D): 1.9 CM^2
BH CV ECHO MEAS - BSA(HAYCOCK): 1.5 M^2
BH CV ECHO MEAS - BSA: 1.5 M^2
BH CV ECHO MEAS - BZI_BMI: 21.8 KILOGRAMS/M^2
BH CV ECHO MEAS - BZI_METRIC_HEIGHT: 157.5 CM
BH CV ECHO MEAS - BZI_METRIC_WEIGHT: 54 KG
BH CV ECHO MEAS - EDV(MOD-SP2): 44 ML
BH CV ECHO MEAS - EDV(MOD-SP4): 48 ML
BH CV ECHO MEAS - EDV(TEICH): 96.4 ML
BH CV ECHO MEAS - EF(CUBED): 83.9 %
BH CV ECHO MEAS - EF(MOD-BP): 58 %
BH CV ECHO MEAS - EF(MOD-SP2): 61.4 %
BH CV ECHO MEAS - EF(MOD-SP4): 58.3 %
BH CV ECHO MEAS - EF(TEICH): 77.1 %
BH CV ECHO MEAS - ESV(MOD-SP2): 17 ML
BH CV ECHO MEAS - ESV(MOD-SP4): 20 ML
BH CV ECHO MEAS - ESV(TEICH): 22.1 ML
BH CV ECHO MEAS - FS: 45.7 %
BH CV ECHO MEAS - IVS/LVPW: 1.1
BH CV ECHO MEAS - IVSD: 1.1 CM
BH CV ECHO MEAS - LAT PEAK E' VEL: 8.7 CM/SEC
BH CV ECHO MEAS - LV DIASTOLIC VOL/BSA (35-75): 31.3 ML/M^2
BH CV ECHO MEAS - LV MASS(C)D: 160.2 GRAMS
BH CV ECHO MEAS - LV MASS(C)DI: 104.5 GRAMS/M^2
BH CV ECHO MEAS - LV MAX PG: 1.8 MMHG
BH CV ECHO MEAS - LV MEAN PG: 0.72 MMHG
BH CV ECHO MEAS - LV SYSTOLIC VOL/BSA (12-30): 13 ML/M^2
BH CV ECHO MEAS - LV V1 MAX: 66.8 CM/SEC
BH CV ECHO MEAS - LV V1 MEAN: 36 CM/SEC
BH CV ECHO MEAS - LV V1 VTI: 12.8 CM
BH CV ECHO MEAS - LVIDD: 4.6 CM
BH CV ECHO MEAS - LVIDS: 2.5 CM
BH CV ECHO MEAS - LVLD AP2: 6.6 CM
BH CV ECHO MEAS - LVLD AP4: 6.5 CM
BH CV ECHO MEAS - LVLS AP2: 5.1 CM
BH CV ECHO MEAS - LVLS AP4: 5.5 CM
BH CV ECHO MEAS - LVOT AREA (M): 2.8 CM^2
BH CV ECHO MEAS - LVOT AREA: 3 CM^2
BH CV ECHO MEAS - LVOT DIAM: 1.9 CM
BH CV ECHO MEAS - LVPWD: 0.94 CM
BH CV ECHO MEAS - MED PEAK E' VEL: 10.2 CM/SEC
BH CV ECHO MEAS - MR MAX PG: 47.5 MMHG
BH CV ECHO MEAS - MR MAX VEL: 344.8 CM/SEC
BH CV ECHO MEAS - MV DEC SLOPE: 340.7 CM/SEC^2
BH CV ECHO MEAS - MV DEC TIME: 0.23 SEC
BH CV ECHO MEAS - MV E MAX VEL: 81 CM/SEC
BH CV ECHO MEAS - MV MAX PG: 3.6 MMHG
BH CV ECHO MEAS - MV MEAN PG: 1.3 MMHG
BH CV ECHO MEAS - MV P1/2T MAX VEL: 80.6 CM/SEC
BH CV ECHO MEAS - MV P1/2T: 69.2 MSEC
BH CV ECHO MEAS - MV V2 MAX: 95 CM/SEC
BH CV ECHO MEAS - MV V2 MEAN: 52.1 CM/SEC
BH CV ECHO MEAS - MV V2 VTI: 24.4 CM
BH CV ECHO MEAS - MVA P1/2T LCG: 2.7 CM^2
BH CV ECHO MEAS - MVA(P1/2T): 3.2 CM^2
BH CV ECHO MEAS - MVA(VTI): 1.6 CM^2
BH CV ECHO MEAS - PA MAX PG (FULL): 0.85 MMHG
BH CV ECHO MEAS - PA MAX PG: 2 MMHG
BH CV ECHO MEAS - PA V2 MAX: 70.6 CM/SEC
BH CV ECHO MEAS - PVA(V,A): 2.4 CM^2
BH CV ECHO MEAS - PVA(V,D): 2.4 CM^2
BH CV ECHO MEAS - QP/QS: 1
BH CV ECHO MEAS - RAP SYSTOLE: 3 MMHG
BH CV ECHO MEAS - RV BASE (<4.1) - OBSOLETE: 3.3 CM
BH CV ECHO MEAS - RV LENGTH (<8.5) - OBSOLETE: 5.7 CM
BH CV ECHO MEAS - RV MAX PG: 1.1 MMHG
BH CV ECHO MEAS - RV MEAN PG: 0.65 MMHG
BH CV ECHO MEAS - RV V1 MAX: 53.6 CM/SEC
BH CV ECHO MEAS - RV V1 MEAN: 38.6 CM/SEC
BH CV ECHO MEAS - RV V1 VTI: 12.6 CM
BH CV ECHO MEAS - RVOT AREA: 3.1 CM^2
BH CV ECHO MEAS - RVOT DIAM: 2 CM
BH CV ECHO MEAS - RVSP: 31 MMHG
BH CV ECHO MEAS - SI(CUBED): 52.7 ML/M^2
BH CV ECHO MEAS - SI(LVOT): 24.7 ML/M^2
BH CV ECHO MEAS - SI(MOD-SP2): 17.6 ML/M^2
BH CV ECHO MEAS - SI(MOD-SP4): 18.3 ML/M^2
BH CV ECHO MEAS - SI(TEICH): 48.5 ML/M^2
BH CV ECHO MEAS - SUP REN AO DIAM: 1.8 CM
BH CV ECHO MEAS - SV(CUBED): 80.8 ML
BH CV ECHO MEAS - SV(LVOT): 37.9 ML
BH CV ECHO MEAS - SV(MOD-SP2): 27 ML
BH CV ECHO MEAS - SV(MOD-SP4): 28 ML
BH CV ECHO MEAS - SV(RVOT): 39.6 ML
BH CV ECHO MEAS - SV(TEICH): 74.3 ML
BH CV ECHO MEAS - TAPSE (>1.6): 1.8 CM2
BH CV ECHO MEAS - TR MAX VEL: 266 CM/SEC
BH CV ECHO MEASUREMENTS AVERAGE E/E' RATIO: 8.57
BH CV XLRA - RV BASE: 3.7 CM
BH CV XLRA - RV LENGTH: 5.7 CM
BH CV XLRA - RV MID: 2.7 CM
BH CV XLRA - TDI S': 9 CM/SEC
LEFT ATRIUM VOLUME INDEX: 29 ML/M2
MAXIMAL PREDICTED HEART RATE: 133 BPM
SINUS: 3.2 CM
STJ: 3 CM
STRESS TARGET HR: 113 BPM

## 2020-07-02 PROCEDURE — 93306 TTE W/DOPPLER COMPLETE: CPT | Performed by: INTERNAL MEDICINE

## 2020-07-02 PROCEDURE — 93306 TTE W/DOPPLER COMPLETE: CPT

## 2020-07-10 ENCOUNTER — TELEPHONE (OUTPATIENT)
Dept: CARDIOLOGY | Facility: CLINIC | Age: 85
End: 2020-07-10

## 2020-07-14 ENCOUNTER — OFFICE VISIT (OUTPATIENT)
Dept: CARDIOLOGY | Facility: CLINIC | Age: 85
End: 2020-07-14

## 2020-07-14 VITALS
HEIGHT: 62 IN | WEIGHT: 115 LBS | SYSTOLIC BLOOD PRESSURE: 125 MMHG | DIASTOLIC BLOOD PRESSURE: 82 MMHG | HEART RATE: 73 BPM | BODY MASS INDEX: 21.16 KG/M2

## 2020-07-14 DIAGNOSIS — I35.1 NONRHEUMATIC AORTIC (VALVE) INSUFFICIENCY: Chronic | ICD-10-CM

## 2020-07-14 DIAGNOSIS — I48.0 PAF (PAROXYSMAL ATRIAL FIBRILLATION) (HCC): Primary | Chronic | ICD-10-CM

## 2020-07-14 DIAGNOSIS — Z86.73 HISTORY OF ISCHEMIC RIGHT MCA STROKE: ICD-10-CM

## 2020-07-14 DIAGNOSIS — I10 BENIGN ESSENTIAL HYPERTENSION: ICD-10-CM

## 2020-07-14 PROCEDURE — 99441 PR PHYS/QHP TELEPHONE EVALUATION 5-10 MIN: CPT | Performed by: INTERNAL MEDICINE

## 2020-07-14 RX ORDER — ATENOLOL 25 MG/1
37.5 TABLET ORAL 2 TIMES DAILY
Qty: 270 TABLET | Refills: 3
Start: 2020-07-14 | End: 2020-07-17 | Stop reason: SDUPTHER

## 2020-07-14 NOTE — PROGRESS NOTES
Subjective:     Encounter Date: 07/14/20        Patient ID: Alba Correa is a 87 y.o. female.    Chief Complaint: PAF, CVA  Atrial Fibrillation   Past medical history includes atrial fibrillation.       Dear Dr. Doty,    This patient has consented to a telehealth visit via audio. The visit was scheduled as a audio visit to comply with patient safety concerns in accordance with CDC recommendations.  All vitals recorded within this visit are reported by the patient.  I spent  12 minutes in total including but not limited to the 9 minutes spent in direct conversation with this patient.     I saw her recently.  She has persistent atrial fibrillation.  She was seen by SAY Del Cid recently and had her atenolol increased because of some tachycardia and fatigue.  However Daniel 2 weeks ago she continued to feel fatigued.  We obtained a Holter monitor that showed atrial fibrillation present throughout but good rate control.  She also had an echocardiogram performed that showed mild to moderate aortic insufficiency with normal left her size and function and otherwise normal valvular structure and function.    She has not had any other medical issues or complaints since her last visit.    We saw her initially in the hospital in 2019.  She presented for his CVA.  We were asked to see her for evaluation of a cardiac source of emboli.  None was found.  Transthoracic and transesophageal echocardiograms did not demonstrate any cardiac source of emboli.  14 day monitor showed no ectopy.      Then recently seen in our office with question about bradycardia.  A 48-hour Holter monitor was placed.  During that time, she was in atrial fibrillation the entire 48 hours.  She was unaware of this.  She did not feel any palpitations or tachycardia.  No irregularity.  Heart rate was 70 bpm during the 48-hour recording with no pauses.      The following portions of the patient's history were reviewed and updated as appropriate:  allergies, current medications, past family history, past medical history, past social history, past surgical history and problem list.    Past Medical History:   Diagnosis Date   • Benign essential hypertension    • Chronic kidney disease    • Chronic renal insufficiency    • CVA (cerebral vascular accident) (CMS/[facility])    • Degeneration of intervertebral disc    • Depression    • DVT (deep venous thrombosis) (CMS/[facility])    • Edema    • Gout    • H/O complete eye exam 2016   • Hyperlipidemia    • IBS (irritable bowel syndrome)    • OAB (overactive bladder)    • Osteopenia    • Osteoporosis    • PAF (paroxysmal atrial fibrillation) (CMS/[facility]) 2019   • Peripheral neuropathy    • Rheumatoid arthritis (CMS/[facility])        Past Surgical History:   Procedure Laterality Date   • APPENDECTOMY     • BREAST BIOPSY     • COLONOSCOPY      declines   • HERNIA REPAIR     • HYSTERECTOMY      still has ovaries   • MAMMO BILATERAL  2016    pt declines   • PAP SMEAR  2016    declines       Social History     Socioeconomic History   • Marital status:      Spouse name: Ray   • Number of children: 3   • Years of education: High school   • Highest education level: Not on file   Occupational History   • Occupation: Dental assistant     Employer: RETIRED   Tobacco Use   • Smoking status: Former Smoker     Years: 5.00     Types: Cigarettes     Last attempt to quit:      Years since quittin.5   • Smokeless tobacco: Never Used   • Tobacco comment: caffeine use: 2 cups coffee in AM   Substance and Sexual Activity   • Alcohol use: Yes     Comment: Occasional- glass ofwine few times a week   • Drug use: No   • Sexual activity: Defer     Partners: Male     Birth control/protection: Surgical       Review of Systems   Constitution: Negative for chills, decreased appetite, fever and night sweats.   HENT: Negative for ear discharge, ear pain, hearing loss, nosebleeds and sore throat.    Eyes: Negative for blurred  "vision, double vision and pain.   Cardiovascular: Negative for cyanosis.   Respiratory: Negative for hemoptysis and sputum production.    Endocrine: Negative for cold intolerance and heat intolerance.   Hematologic/Lymphatic: Negative for adenopathy.   Skin: Negative for dry skin, itching, nail changes, rash and suspicious lesions.   Musculoskeletal: Negative for arthritis, gout, muscle cramps, muscle weakness, myalgias and neck pain.   Gastrointestinal: Negative for anorexia, bowel incontinence, constipation, diarrhea, dysphagia, hematemesis and jaundice.   Genitourinary: Negative for bladder incontinence, dysuria, flank pain, frequency, hematuria and nocturia.   Neurological: Negative for focal weakness, numbness, paresthesias and seizures.   Psychiatric/Behavioral: Negative for altered mental status, hallucinations, hypervigilance, suicidal ideas and thoughts of violence.   Allergic/Immunologic: Negative for persistent infections.       Procedures       Objective:     Vitals:    07/14/20 1341   BP: 125/82   Pulse: 73   Weight: 52.2 kg (115 lb)   Height: 157.5 cm (62\")        Alert and oriented x3, thought processes normal, judgment normal, speaking in full sentences with no wheezing and no respiratory distress. Hearing is appropriate without difficulty.  Lab Review:             Performed        Assessment:         No diagnosis found.       Plan:       1.  Aortic insufficiency- no significant change, continue same  2.  History of CVA-  3.  Benign essential hypertension-continue current medical regimen  4.  Paroxysmal atrial fibrillation- currently persistently in A. fib with fatigue.  We will arrange for a cardioversion.    Thank you very much for allowing us to participate in the care of this pleasant patient.  Please don't hesitate to call if I can be of assistance in any way.      Current Outpatient Medications:   •  acetaminophen (TYLENOL) 650 MG 8 hr tablet, Take 1,300 mg by mouth Every 8 (Eight) Hours As " Needed for Mild Pain ., Disp: , Rfl:   •  allopurinol (Zyloprim) 100 MG tablet, Take 1 tablet by mouth Daily., Disp: 90 tablet, Rfl: 1  •  amLODIPine (NORVASC) 5 MG tablet, TAKE 1 TABLET BY MOUTH EVERY DAY, Disp: 30 tablet, Rfl: 0  •  apixaban (ELIQUIS) 2.5 MG tablet tablet, Take 1 tablet by mouth Every 12 (Twelve) Hours., Disp: 180 tablet, Rfl: 3  •  atenolol (TENORMIN) 25 MG tablet, Take 1.5 tablets by mouth 2 (Two) Times a Day., Disp: 90 tablet, Rfl: 0  •  calcium carbonate-vitamin d (CALCIUM 600+D) 600-400 MG-UNIT per tablet, Take 1 tablet by mouth Daily., Disp: , Rfl:   •  losartan (COZAAR) 100 MG tablet, Take 1 tablet by mouth Daily., Disp: 90 tablet, Rfl: 1  •  Multiple Vitamins-Minerals (CENTRUM SILVER) tablet, Take 1 tablet by mouth Daily., Disp: , Rfl:   •  ondansetron (ZOFRAN) 4 MG tablet, TAKE 1 TABLET BY MOUTH EVERY 8 (EIGHT) HOURS AS NEEDED FOR NAUSEA OR VOMITING., Disp: 10 tablet, Rfl: 0  •  rosuvastatin (CRESTOR) 5 MG tablet, Take 1 tablet by mouth Daily., Disp: 90 tablet, Rfl: 1

## 2020-07-17 DIAGNOSIS — I10 BENIGN ESSENTIAL HYPERTENSION: ICD-10-CM

## 2020-07-17 RX ORDER — ATENOLOL 25 MG/1
37.5 TABLET ORAL 2 TIMES DAILY
Qty: 270 TABLET | Refills: 3 | Status: SHIPPED | OUTPATIENT
Start: 2020-07-17 | End: 2020-07-20 | Stop reason: SDUPTHER

## 2020-07-17 RX ORDER — ATENOLOL 25 MG/1
37.5 TABLET ORAL 2 TIMES DAILY
Qty: 270 TABLET | Refills: 3
Start: 2020-07-17 | End: 2020-07-17 | Stop reason: SDUPTHER

## 2020-07-20 ENCOUNTER — TELEPHONE (OUTPATIENT)
Dept: CARDIOLOGY | Facility: CLINIC | Age: 85
End: 2020-07-20

## 2020-07-20 DIAGNOSIS — I10 BENIGN ESSENTIAL HYPERTENSION: ICD-10-CM

## 2020-07-20 RX ORDER — ATENOLOL 25 MG/1
37.5 TABLET ORAL 2 TIMES DAILY
Qty: 270 TABLET | Refills: 3 | Status: SHIPPED | OUTPATIENT
Start: 2020-07-20 | End: 2020-08-03 | Stop reason: SDUPTHER

## 2020-07-20 NOTE — TELEPHONE ENCOUNTER
PT called. Can you call her and schedule her for a cardioversion? PT #: [phone]    Thanks Luh

## 2020-07-22 ENCOUNTER — TRANSCRIBE ORDERS (OUTPATIENT)
Dept: SLEEP MEDICINE | Facility: HOSPITAL | Age: 85
End: 2020-07-22

## 2020-07-22 ENCOUNTER — TRANSCRIBE ORDERS (OUTPATIENT)
Dept: CARDIOLOGY | Facility: CLINIC | Age: 85
End: 2020-07-22

## 2020-07-22 DIAGNOSIS — Z13.6 SCREENING FOR CARDIOVASCULAR CONDITION: Primary | ICD-10-CM

## 2020-07-22 DIAGNOSIS — Z01.818 OTHER SPECIFIED PRE-OPERATIVE EXAMINATION: Primary | ICD-10-CM

## 2020-07-22 DIAGNOSIS — I48.0 PAF (PAROXYSMAL ATRIAL FIBRILLATION) (HCC): ICD-10-CM

## 2020-07-22 DIAGNOSIS — Z01.810 PREPROCEDURAL CARDIOVASCULAR EXAMINATION: ICD-10-CM

## 2020-07-27 ENCOUNTER — LAB (OUTPATIENT)
Dept: LAB | Facility: HOSPITAL | Age: 85
End: 2020-07-27

## 2020-07-27 DIAGNOSIS — Z01.818 OTHER SPECIFIED PRE-OPERATIVE EXAMINATION: ICD-10-CM

## 2020-07-28 ENCOUNTER — TRANSCRIBE ORDERS (OUTPATIENT)
Dept: SLEEP MEDICINE | Facility: HOSPITAL | Age: 85
End: 2020-07-28

## 2020-07-28 DIAGNOSIS — Z01.818 OTHER SPECIFIED PRE-OPERATIVE EXAMINATION: Primary | ICD-10-CM

## 2020-07-28 DIAGNOSIS — I10 BENIGN ESSENTIAL HYPERTENSION: ICD-10-CM

## 2020-07-28 RX ORDER — AMLODIPINE BESYLATE 5 MG/1
TABLET ORAL
Qty: 30 TABLET | Refills: 0 | Status: SHIPPED | OUTPATIENT
Start: 2020-07-28 | End: 2020-10-21 | Stop reason: SDUPTHER

## 2020-07-29 ENCOUNTER — APPOINTMENT (OUTPATIENT)
Dept: POSTOP/PACU | Facility: HOSPITAL | Age: 85
End: 2020-07-29

## 2020-07-29 ENCOUNTER — TELEPHONE (OUTPATIENT)
Dept: CARDIOLOGY | Facility: CLINIC | Age: 85
End: 2020-07-29

## 2020-07-29 NOTE — TELEPHONE ENCOUNTER
Pt is having 2 teeth remove with . It is not schedule. Is it ok if she hold her Eliquis 2.5 MG 2 days prior?     #: [phone]

## 2020-07-31 ENCOUNTER — LAB (OUTPATIENT)
Dept: LAB | Facility: HOSPITAL | Age: 85
End: 2020-07-31

## 2020-07-31 DIAGNOSIS — Z01.818 OTHER SPECIFIED PRE-OPERATIVE EXAMINATION: ICD-10-CM

## 2020-07-31 PROCEDURE — C9803 HOPD COVID-19 SPEC COLLECT: HCPCS

## 2020-07-31 PROCEDURE — U0004 COV-19 TEST NON-CDC HGH THRU: HCPCS

## 2020-08-02 LAB
REF LAB TEST METHOD: NORMAL
SARS-COV-2 RNA RESP QL NAA+PROBE: NOT DETECTED

## 2020-08-03 ENCOUNTER — ANESTHESIA (OUTPATIENT)
Dept: POSTOP/PACU | Facility: HOSPITAL | Age: 85
End: 2020-08-03

## 2020-08-03 ENCOUNTER — HOSPITAL ENCOUNTER (OUTPATIENT)
Dept: CARDIOLOGY | Facility: HOSPITAL | Age: 85
Discharge: HOME | End: 2020-08-03

## 2020-08-03 ENCOUNTER — ANESTHESIA EVENT (OUTPATIENT)
Dept: POSTOP/PACU | Facility: HOSPITAL | Age: 85
End: 2020-08-03

## 2020-08-03 ENCOUNTER — HOSPITAL ENCOUNTER (OUTPATIENT)
Dept: POSTOP/PACU | Facility: HOSPITAL | Age: 85
Discharge: HOME | End: 2020-08-03
Admitting: INTERNAL MEDICINE

## 2020-08-03 VITALS — SYSTOLIC BLOOD PRESSURE: 97 MMHG | OXYGEN SATURATION: 98 % | DIASTOLIC BLOOD PRESSURE: 49 MMHG

## 2020-08-03 VITALS
DIASTOLIC BLOOD PRESSURE: 78 MMHG | HEART RATE: 52 BPM | OXYGEN SATURATION: 97 % | SYSTOLIC BLOOD PRESSURE: 122 MMHG | TEMPERATURE: 97.7 F | RESPIRATION RATE: 16 BRPM

## 2020-08-03 DIAGNOSIS — Z86.73 HISTORY OF ISCHEMIC RIGHT MCA STROKE: ICD-10-CM

## 2020-08-03 DIAGNOSIS — I35.1 NONRHEUMATIC AORTIC (VALVE) INSUFFICIENCY: Chronic | ICD-10-CM

## 2020-08-03 DIAGNOSIS — I48.0 PAF (PAROXYSMAL ATRIAL FIBRILLATION) (HCC): Chronic | ICD-10-CM

## 2020-08-03 DIAGNOSIS — I10 BENIGN ESSENTIAL HYPERTENSION: ICD-10-CM

## 2020-08-03 LAB
ANION GAP SERPL CALCULATED.3IONS-SCNC: 7 MMOL/L (ref 5–15)
BUN SERPL-MCNC: 33 MG/DL (ref 8–23)
BUN/CREAT SERPL: 27.3 (ref 7–25)
CALCIUM SPEC-SCNC: 10 MG/DL (ref 8.6–10.5)
CHLORIDE SERPL-SCNC: 100 MMOL/L (ref 98–107)
CO2 SERPL-SCNC: 26 MMOL/L (ref 22–29)
CREAT SERPL-MCNC: 1.21 MG/DL (ref 0.57–1)
GFR SERPL CREATININE-BSD FRML MDRD: 42 ML/MIN/1.73
GLUCOSE SERPL-MCNC: 97 MG/DL (ref 65–99)
POTASSIUM SERPL-SCNC: 4.4 MMOL/L (ref 3.5–5.2)
SODIUM SERPL-SCNC: 133 MMOL/L (ref 136–145)

## 2020-08-03 PROCEDURE — 0296T HC EXT ECG > 48HR TO 21 DAY RCRD W/CONECT INTL RCRD: CPT

## 2020-08-03 PROCEDURE — 93010 ELECTROCARDIOGRAM REPORT: CPT | Performed by: INTERNAL MEDICINE

## 2020-08-03 PROCEDURE — 80048 BASIC METABOLIC PNL TOTAL CA: CPT | Performed by: INTERNAL MEDICINE

## 2020-08-03 PROCEDURE — S0260 H&P FOR SURGERY: HCPCS | Performed by: INTERNAL MEDICINE

## 2020-08-03 PROCEDURE — 92960 CARDIOVERSION ELECTRIC EXT: CPT | Performed by: INTERNAL MEDICINE

## 2020-08-03 PROCEDURE — 93005 ELECTROCARDIOGRAM TRACING: CPT | Performed by: INTERNAL MEDICINE

## 2020-08-03 PROCEDURE — 25010000002 PROPOFOL 10 MG/ML EMULSION: Performed by: NURSE ANESTHETIST, CERTIFIED REGISTERED

## 2020-08-03 RX ORDER — ACETAMINOPHEN,DIPHENHYDRAMINE HCL 500; 25 MG/1; MG/1
2 TABLET, FILM COATED ORAL NIGHTLY PRN
COMMUNITY
End: 2021-12-28

## 2020-08-03 RX ORDER — DULOXETIN HYDROCHLORIDE 60 MG/1
60 CAPSULE, DELAYED RELEASE ORAL DAILY
COMMUNITY
End: 2020-11-30

## 2020-08-03 RX ORDER — ATENOLOL 25 MG/1
25 TABLET ORAL 2 TIMES DAILY
Qty: 270 TABLET | Refills: 3 | Status: SHIPPED | OUTPATIENT
Start: 2020-08-03 | End: 2020-10-16 | Stop reason: HOSPADM

## 2020-08-03 RX ORDER — PROPOFOL 10 MG/ML
VIAL (ML) INTRAVENOUS AS NEEDED
Status: DISCONTINUED | OUTPATIENT
Start: 2020-08-03 | End: 2020-08-03 | Stop reason: SURG

## 2020-08-03 RX ORDER — SODIUM CHLORIDE, SODIUM LACTATE, POTASSIUM CHLORIDE, CALCIUM CHLORIDE 600; 310; 30; 20 MG/100ML; MG/100ML; MG/100ML; MG/100ML
INJECTION, SOLUTION INTRAVENOUS CONTINUOUS PRN
Status: DISCONTINUED | OUTPATIENT
Start: 2020-08-03 | End: 2020-08-03 | Stop reason: SURG

## 2020-08-03 RX ORDER — CEPHALEXIN 500 MG/1
500 CAPSULE ORAL 4 TIMES DAILY
COMMUNITY
Start: 2020-08-02 | End: 2020-08-09

## 2020-08-03 RX ADMIN — SODIUM CHLORIDE, POTASSIUM CHLORIDE, SODIUM LACTATE AND CALCIUM CHLORIDE: 600; 310; 30; 20 INJECTION, SOLUTION INTRAVENOUS at 07:44

## 2020-08-03 RX ADMIN — PROPOFOL 100 MG: 10 INJECTION, EMULSION INTRAVENOUS at 07:48

## 2020-08-03 NOTE — H&P
Date of Hospital Visit: [unfilled]ENC@  Encounter Provider: Heidi Colby MD  Place of Service: [facility] CARDIOLOG[facility]  Patient Name: Alba Correa  :1933  Referral Provider: Roshan Martines III, MD    Chief complaint    Atrial fibrillation    History of Present Illness    She has persistent atrial fibrillation.  She was seen by SAY Del Cid recently and had her atenolol increased because of some tachycardia and fatigue.  However she continued to feel fatigued.  We obtained a Holter monitor that showed atrial fibrillation present throughout but good rate control.  She also had an echocardiogram performed that showed mild to moderate aortic insufficiency with normal left her size and function and otherwise normal valvular structure and function.     She has not had any other medical issues or complaints since her last visit.     We saw her initially in the hospital in 2019.  She presented for his CVA.  We were asked to see her for evaluation of a cardiac source of emboli.  None was found.  Transthoracic and transesophageal echocardiograms did not demonstrate any cardiac source of emboli.  14 day monitor showed no ectopy.       Then recently seen in our office with question about bradycardia.  A 48-hour Holter monitor was placed.  During that time, she was in atrial fibrillation the entire 48 hours.  She was unaware of this.  She did not feel any palpitations or tachycardia.  No irregularity.  Heart rate was 70 bpm during the 48-hour recording with no pauses.    She presents today for cardioversion.  Unfortunately she has had some very difficult circumstances.  Her  who was in the nursing home passed away 1 week ago and at the  she fell and broke out her tooth.  She has been dizzy and has had frequent falls at home.  She has not missed any doses of Eliquis.  Her energy levels been decreased but she is had no chest pain or difficulty breathing.  She does not  feel her heart racing or skipping.  She has had no fevers, chills or cough.  She has no nausea or vomiting.  She is otherwise been taking her medications as directed.    Past Medical History:   Diagnosis Date   • Benign essential hypertension    • Chronic kidney disease    • Chronic renal insufficiency    • CVA (cerebral vascular accident) (CMS/[facility])    • Degeneration of intervertebral disc    • Depression    • DVT (deep venous thrombosis) (CMS/[facility])    • Edema    • Gout    • H/O complete eye exam 2016   • Hyperlipidemia    • IBS (irritable bowel syndrome)    • OAB (overactive bladder)    • Osteopenia    • Osteoporosis    • PAF (paroxysmal atrial fibrillation) (CMS/[facility]) 2019   • Peripheral neuropathy    • Rheumatoid arthritis (CMS/[facility])        Past Surgical History:   Procedure Laterality Date   • APPENDECTOMY     • BREAST BIOPSY     • COLONOSCOPY      declines   • HERNIA REPAIR     • HYSTERECTOMY      still has ovaries   • MAMMO BILATERAL  2016    pt declines   • PAP SMEAR  2016    declines         (Not in a hospital admission)    Current Meds  Scheduled Meds:  Continuous Infusions:  No current facility-administered medications for this encounter.   PRN Meds:.    Allergies as of 2020 - Reviewed 2020   Allergen Reaction Noted   • Bactrim [sulfamethoxazole-trimethoprim] Delirium 2015   • Lisinopril Unknown (See Comments) 01/10/2018   • Levofloxacin Rash 2015       Social History     Socioeconomic History   • Marital status:      Spouse name: Peña   • Number of children: 3   • Years of education: High school   • Highest education level: Not on file   Occupational History   • Occupation: Dental assistant     Employer: RETIRED   Tobacco Use   • Smoking status: Former Smoker     Years: 5.00     Types: Cigarettes     Last attempt to quit: 1975     Years since quittin.6   • Smokeless tobacco: Never Used   • Tobacco comment: caffeine use: 2 cups coffee in AM    Substance and Sexual Activity   • Alcohol use: Yes     Comment: Occasional- glass ofwine few times a week   • Drug use: No   • Sexual activity: Defer     Partners: Male     Birth control/protection: Surgical       Family History   Problem Relation Age of Onset   • Alcohol abuse Other    • Cancer Other    • Hypertension Other    • Kidney disease Other    • Lung disease Other    • Heart disease Mother 89   • Heart failure Mother    • Ovarian cancer Daughter 60   • Stomach cancer Father    • Kidney cancer Father 52   • Lung cancer Father 52   • Ovarian cancer Sister    • Lung cancer Brother 65       REVIEW OF SYSTEMS:   12 point ROS was performed and is negative except as outlined in HPI            Objective:   Temp:  [97.7 °F (36.5 °C)] 97.7 °F (36.5 °C)  Heart Rate:  [62] 62  Resp:  [16] 16  BP: ()/(49-94) 97/49  There is no height or weight on file to calculate BMI.    Vitals:    08/03/20 0605   BP:    Pulse:    Resp:    Temp:    SpO2: 100%       General Appearance:    Alert, cooperative, in no acute distress   Head:    Normocephalic, without obvious abnormality, atraumatic   Eyes:            Lids and lashes normal, conjunctivae and sclerae normal, no   icterus, no pallor, corneas clear   Ears:    Ears appear intact with no abnormalities noted       Neck:   No adenopathy, supple, trachea midline, no thyromegaly, no   carotid bruit, no JVD   Back:     No kyphosis present, no scoliosis present, no skin lesions, erythema or scars, no tenderness to palpation, range of motion normal   Lungs:     Clear to auscultation,respirations regular, even and unlabored    Heart:    irregular rhythm and normal rate, normal S1 and S2, no murmur, no gallop, no rub, no click   Chest Wall:    No abnormalities observed   Abdomen:     Normal bowel sounds, no masses, no organomegaly, soft, nontender, nondistended, no guarding, no rebound  tenderness   Extremities:   Moves all extremities well, no edema, no cyanosis, no redness    Pulses:   Pulses palpable and equal bilaterally. Normal radial, carotid,  dorsalis pedis and posterior tibial pulses bilaterally.    Skin:  Psychiatric:   No bleeding, bruising or rash, wound on RLE    Alert and oriented x 3, normal mood and affect                 Lab Review:    Results from last 7 days   Lab Units 08/03/20  0627   SODIUM mmol/L 133*   POTASSIUM mmol/L 4.4   CHLORIDE mmol/L 100   CO2 mmol/L 26.0   BUN mg/dL 33*   CREATININE mg/dL 1.21*   CALCIUM mg/dL 10.0   GLUCOSE mg/dL 97                           I personally viewed and interpreted the patient's EKG/Telemetry data  )  Patient Active Problem List   Diagnosis   • Chronic renal failure, stage 3 (moderate) (CMS/[facility])   • DVT (deep venous thrombosis) (CMS/[facility])   • Depression   • Edema   • Gout   • Osteoporosis   • Hyperparathyroidism (CMS/[facility])   • Adaptive colitis   • Osteopenia   • Rheumatoid arthritis (CMS/[facility])   • Degeneration of intervertebral disc   • BP (high blood pressure)   • Detrusor muscle hypertonia   • Open leg wound   • Vaginal odor   • Infected wound   • History of depression   • History of renal insufficiency syndrome   • History of degenerative disc disease   • History of osteoporosis   • History of DVT (deep vein thrombosis)   • History of rheumatoid arthritis   • Hyperlipidemia   • Macrocytosis   • Right leg weakness   • CVA (cerebral vascular accident) (CMS/[facility])   • Nonrheumatic aortic (valve) insufficiency   • History of ischemic right MCA stroke with extension   • Bilateral leg weakness   • PAF (paroxysmal atrial fibrillation) (CMS/[facility])   • Anemia in chronic kidney disease   • Other proteinuria     Assessment and Plan:  1.  Aortic insufficiency- no significant change, continue same  2.  History of CVA-  3.  Benign essential hypertension-continue current medical regimen  4.  Paroxysmal atrial fibrillation- currently persistently in A. fib with fatigue.  On apixaban.  Also on atenolol amlodipine and losartan.  5.  Falls with  dizziness.  Will set up for a ZIO Patch two-week recorder after the cardioversion to see if she has sick sinus syndrome as the cause of her falls.  6.  Death of her  1 week ago.  7.  CKD.    cardioversion today, risks and benefits explained to the patient.    Heidi Colby MD  08/03/20  07:56.  Time spent in reviewing chart, discussion and examination:

## 2020-08-03 NOTE — ANESTHESIA POSTPROCEDURE EVALUATION
Patient: Alba Correa    Procedure Summary     Date:  08/03/20 Room / Location:  [facility] PACU    Anesthesia Start:  0743 Anesthesia Stop:  0755    Procedure:  CARDIOVERSION EXTERNAL IN CARDIOLOGY DEPARTMENT Diagnosis:       Benign essential hypertension      PAF (paroxysmal atrial fibrillation) (CMS/[facility])      Nonrheumatic aortic (valve) insufficiency      History of ischemic right MCA stroke      (AFIB)    Scheduled Providers:   Provider:  Tone Norris MD    Anesthesia Type:  MAC ASA Status:  3          Anesthesia Type: MAC    Vitals  Vitals Value Taken Time   /74 8/3/2020  8:30 AM   Temp     Pulse 47 8/3/2020  8:32 AM   Resp 16 8/3/2020  8:15 AM   SpO2 92 % 8/3/2020  8:32 AM   Vitals shown include unvalidated device data.        Post Anesthesia Care and Evaluation    Patient location during evaluation: PACU  Patient participation: complete - patient participated  Level of consciousness: awake and alert  Pain management: adequate  Airway patency: patent  Anesthetic complications: No anesthetic complications    Cardiovascular status: acceptable  Respiratory status: acceptable  Hydration status: acceptable    Comments: --------------------            08/03/20 0815     --------------------   BP:       127/64     Pulse:    (!) 46     Resp:       16       Temp:                SpO2:      98%      --------------------

## 2020-08-03 NOTE — PROCEDURES
"Cardioversion External in Cardiology Department  Date/Time: 8/3/2020 8:02 AM  Performed by: Heidi Colby MD  Authorized by: Roshan Martines III, MD   Consent: Verbal consent obtained.  Consent given by: patient  Patient understanding: patient states understanding of the procedure being performed  Patient consent: the patient's understanding of the procedure matches consent given  Procedure consent: procedure consent matches procedure scheduled  Relevant documents: relevant documents present and verified  Test results: test results available and properly labeled  Patient identity confirmed: verbally with patient  Time out: Immediately prior to procedure a \"time out\" was called to verify the correct patient, procedure, equipment, support staff and site/side marked as required.  Local anesthesia used: no    Anesthesia:  Local anesthesia used: no    Sedation:  Patient sedated: yes  Sedation type: moderate (conscious) sedation(Per anesthesia)  Vitals: Vital signs were monitored during sedation.    Patient tolerance: Patient tolerated the procedure well with no immediate complications  Comments:   Successful cardioversion with 200 J biphasic, 1 attempt, patient returned to sinus bradycardia with PACs.  Will be dismissed to home with follow-up with Dr. Martines her APRN in 2 weeks.  Her heart rate after cardioversion was 30s to 40s.  She has had falls, will set up a 2-week monitor to look for bradycardia or pauses.        "

## 2020-08-03 NOTE — ANESTHESIA PREPROCEDURE EVALUATION
Anesthesia Evaluation     Patient summary reviewed and Nursing notes reviewed                Airway   Mallampati: II  Dental          Pulmonary    (+) a smoker Former,   Cardiovascular     ECG reviewed  PT is on anticoagulation therapy  Patient on routine beta blocker  Rhythm: irregular  Rate: normal    (+) hypertension, valvular problems/murmurs TI, dysrhythmias Atrial Fib, Paroxysmal Atrial Fib, DVT, hyperlipidemia,       Neuro/Psych  (+) CVA, numbness, psychiatric history Anxiety and Depression,     GI/Hepatic/Renal/Endo    (+)   renal disease CRI,     Musculoskeletal     Abdominal    Substance History - negative use     OB/GYN negative ob/gyn ROS         Other   arthritis,                      Anesthesia Plan    ASA 3     MAC   (Patient's   recently  Patient has both a missing and recently loosened front upper middle tooth from a fall at the  service for her )  intravenous induction     Anesthetic plan, all risks, benefits, and alternatives have been provided, discussed and informed consent has been obtained with: patient.

## 2020-08-26 ENCOUNTER — TELEPHONE (OUTPATIENT)
Dept: CARDIOLOGY | Facility: CLINIC | Age: 85
End: 2020-08-26

## 2020-08-26 PROCEDURE — 0298T HOLTER MONITOR - 72 HOUR UP TO 21 DAY: CPT | Performed by: INTERNAL MEDICINE

## 2020-08-26 NOTE — TELEPHONE ENCOUNTER
----- Message from Roshan Martines III, MD sent at 8/26/2020  4:05 PM EDT -----  Luh- let's set up a telehealth visit to review

## 2020-08-31 ENCOUNTER — OFFICE VISIT (OUTPATIENT)
Dept: CARDIOLOGY | Facility: CLINIC | Age: 85
End: 2020-08-31

## 2020-08-31 VITALS
SYSTOLIC BLOOD PRESSURE: 141 MMHG | DIASTOLIC BLOOD PRESSURE: 58 MMHG | HEIGHT: 62 IN | WEIGHT: 115 LBS | HEART RATE: 50 BPM | BODY MASS INDEX: 21.16 KG/M2

## 2020-08-31 DIAGNOSIS — I48.0 PAF (PAROXYSMAL ATRIAL FIBRILLATION) (HCC): Primary | Chronic | ICD-10-CM

## 2020-08-31 DIAGNOSIS — I35.1 NONRHEUMATIC AORTIC (VALVE) INSUFFICIENCY: Chronic | ICD-10-CM

## 2020-08-31 PROCEDURE — 99441 PR PHYS/QHP TELEPHONE EVALUATION 5-10 MIN: CPT | Performed by: INTERNAL MEDICINE

## 2020-08-31 RX ORDER — OXYBUTYNIN CHLORIDE 5 MG/1
5 TABLET ORAL 3 TIMES DAILY
COMMUNITY
End: 2020-10-21

## 2020-08-31 NOTE — PROGRESS NOTES
Subjective:     Encounter Date: 08/31/20        Patient ID: Alba Correa is a 87 y.o. female.    Chief Complaint: PAF, CVA  Atrial Fibrillation   Past medical history includes atrial fibrillation.       Dear Dr. Doty,    This patient has consented to a telehealth visit via audio. The visit was scheduled as a audio visit to comply with patient safety concerns in accordance with CDC recommendations.  All vitals recorded within this visit are reported by the patient.  I spent  9 minutes in total including but not limited to the 6 minutes spent in direct conversation with this patient.     I saw her recently.  She had persistent atrial fibrillation.  We performed a cardioversion after she has been on rate control and anticoagulation for an appropriate period of time.  She then had another Holter monitor she was having some palpitations.  This showed episodes of ectopic atrial rhythm and brief 3-4 beat runs of wide-complex tachycardia but no atrial fibrillation.  It was determined to keep her on her medical therapy.    She states she is doing great now without any complaints.  No chest pain or chest discomfort.  No shortness of breath.  No palpitations or tachycardia.  No presyncope or syncope.  No orthopnea or PND.    We saw her initially in the hospital in 2019.  She presented for a CVA.  We were asked to see her for evaluation of a cardiac source of emboli.  None was found.  Transthoracic and transesophageal echocardiograms did not demonstrate any cardiac source of emboli.  14 day monitor showed no ectopy.  Then recently seen in our office with question about bradycardia.  A 48-hour Holter monitor was placed.  During that time, she was in atrial fibrillation the entire 48 hours.  She was unaware of this.  She did not feel any palpitations or tachycardia.  No irregularity.  Heart rate was 70 bpm during the 48-hour recording with no pauses.      The following portions of the patient's history were reviewed and  updated as appropriate: allergies, current medications, past family history, past medical history, past social history, past surgical history and problem list.    Past Medical History:   Diagnosis Date   • Benign essential hypertension    • Chronic kidney disease    • Chronic renal insufficiency    • CVA (cerebral vascular accident) (CMS/[facility])    • Degeneration of intervertebral disc    • Depression    • DVT (deep venous thrombosis) (CMS/[facility])    • Edema    • Gout    • H/O complete eye exam 2016   • Hyperlipidemia    • IBS (irritable bowel syndrome)    • OAB (overactive bladder)    • Osteopenia    • Osteoporosis    • PAF (paroxysmal atrial fibrillation) (CMS/[facility]) 2019   • Peripheral neuropathy    • Rheumatoid arthritis (CMS/[facility])        Past Surgical History:   Procedure Laterality Date   • APPENDECTOMY     • BREAST BIOPSY     • COLONOSCOPY      declines   • HERNIA REPAIR     • HYSTERECTOMY      still has ovaries   • MAMMO BILATERAL  2016    pt declines   • PAP SMEAR  2016    declines       Social History     Socioeconomic History   • Marital status:      Spouse name: Ray   • Number of children: 3   • Years of education: High school   • Highest education level: Not on file   Occupational History   • Occupation: Dental assistant     Employer: RETIRED   Tobacco Use   • Smoking status: Former Smoker     Years: 5.00     Types: Cigarettes     Last attempt to quit:      Years since quittin.6   • Smokeless tobacco: Never Used   • Tobacco comment: caffeine use: 2 cups coffee in AM   Substance and Sexual Activity   • Alcohol use: Yes     Comment: Occasional- glass ofwine few times a week   • Drug use: No   • Sexual activity: Defer     Partners: Male     Birth control/protection: Surgical       Review of Systems   Constitution: Negative for chills, decreased appetite, fever and night sweats.   HENT: Negative for ear discharge, ear pain, hearing loss, nosebleeds and sore throat.    Eyes:  "Negative for blurred vision, double vision and pain.   Cardiovascular: Negative for cyanosis.   Respiratory: Negative for hemoptysis and sputum production.    Endocrine: Negative for cold intolerance and heat intolerance.   Hematologic/Lymphatic: Negative for adenopathy.   Skin: Negative for dry skin, itching, nail changes, rash and suspicious lesions.   Musculoskeletal: Negative for arthritis, gout, muscle cramps, muscle weakness, myalgias and neck pain.   Gastrointestinal: Negative for anorexia, bowel incontinence, constipation, diarrhea, dysphagia, hematemesis and jaundice.   Genitourinary: Negative for bladder incontinence, dysuria, flank pain, frequency, hematuria and nocturia.   Neurological: Negative for focal weakness, numbness, paresthesias and seizures.   Psychiatric/Behavioral: Negative for altered mental status, hallucinations, hypervigilance, suicidal ideas and thoughts of violence.   Allergic/Immunologic: Negative for persistent infections.       Procedures       Objective:     Vitals:    08/31/20 1357   BP: 141/58   Pulse: 50   Weight: 52.2 kg (115 lb)   Height: 157.5 cm (62\")        Alert and oriented x3, thought processes normal, judgment normal, speaking in full sentences with no wheezing and no respiratory distress. Hearing is appropriate without difficulty.  Lab Review:             Performed        Assessment:          Diagnosis Plan   1. PAF (paroxysmal atrial fibrillation) (CMS/[facility])     2. Nonrheumatic aortic (valve) insufficiency            Plan:       1.  Aortic insufficiency- no significant change, continue same  2.  History of CVA-  3.  Benign essential hypertension-continue current medical regimen  4.  Paroxysmal atrial fibrillation- in sinus rhythm.  No A. fib seen on the recent monitor    Thank you very much for allowing us to participate in the care of this pleasant patient.  Please don't hesitate to call if I can be of assistance in any way.      Current Outpatient Medications:   •  " acetaminophen (TYLENOL) 650 MG 8 hr tablet, Take 1,300 mg by mouth Every 8 (Eight) Hours As Needed for Mild Pain ., Disp: , Rfl:   •  allopurinol (Zyloprim) 100 MG tablet, Take 1 tablet by mouth Daily., Disp: 90 tablet, Rfl: 1  •  amLODIPine (NORVASC) 5 MG tablet, TAKE 1 TABLET BY MOUTH EVERY DAY, Disp: 30 tablet, Rfl: 0  •  apixaban (ELIQUIS) 2.5 MG tablet tablet, Take 1 tablet by mouth Every 12 (Twelve) Hours., Disp: 180 tablet, Rfl: 3  •  atenolol (TENORMIN) 25 MG tablet, Take 1 tablet by mouth 2 (Two) Times a Day., Disp: 270 tablet, Rfl: 3  •  calcium carbonate-vitamin d (CALCIUM 600+D) 600-400 MG-UNIT per tablet, Take 1 tablet by mouth Daily., Disp: , Rfl:   •  diphenhydrAMINE-acetaminophen (TYLENOL PM)  MG tablet per tablet, Take 1 tablet by mouth At Night As Needed for Sleep., Disp: , Rfl:   •  DULoxetine (CYMBALTA) 60 MG capsule, Take 60 mg by mouth Daily., Disp: , Rfl:   •  losartan (COZAAR) 100 MG tablet, Take 1 tablet by mouth Daily., Disp: 90 tablet, Rfl: 1  •  Multiple Vitamins-Minerals (CENTRUM SILVER) tablet, Take 1 tablet by mouth Daily., Disp: , Rfl:   •  Omega-3 Fatty Acids (OMEGA-3 FISH OIL PO), Take 1,600 mg by mouth Every Morning., Disp: , Rfl:   •  ondansetron (ZOFRAN) 4 MG tablet, TAKE 1 TABLET BY MOUTH EVERY 8 (EIGHT) HOURS AS NEEDED FOR NAUSEA OR VOMITING., Disp: 10 tablet, Rfl: 0  •  oxybutynin (DITROPAN) 5 MG tablet, Take 5 mg by mouth 3 (Three) Times a Day., Disp: , Rfl:   •  rosuvastatin (CRESTOR) 5 MG tablet, Take 1 tablet by mouth Daily., Disp: 90 tablet, Rfl: 1

## 2020-10-07 DIAGNOSIS — I10 BENIGN ESSENTIAL HYPERTENSION: ICD-10-CM

## 2020-10-08 RX ORDER — AMLODIPINE BESYLATE 5 MG/1
TABLET ORAL
Qty: 90 TABLET | Refills: 1 | OUTPATIENT
Start: 2020-10-08

## 2020-10-08 RX ORDER — APIXABAN 2.5 MG/1
TABLET, FILM COATED ORAL
Qty: 180 TABLET | Refills: 1 | Status: SHIPPED | OUTPATIENT
Start: 2020-10-08 | End: 2021-01-20

## 2020-10-08 RX ORDER — ROSUVASTATIN CALCIUM 5 MG/1
TABLET, COATED ORAL
Qty: 90 TABLET | Refills: 1 | OUTPATIENT
Start: 2020-10-08

## 2020-10-15 ENCOUNTER — APPOINTMENT (OUTPATIENT)
Dept: GENERAL RADIOLOGY | Facility: HOSPITAL | Age: 85
End: 2020-10-15

## 2020-10-15 ENCOUNTER — HOSPITAL ENCOUNTER (OUTPATIENT)
Facility: HOSPITAL | Age: 85
Setting detail: OBSERVATION
Discharge: HOME | End: 2020-10-16
Attending: EMERGENCY MEDICINE | Admitting: HOSPITALIST

## 2020-10-15 DIAGNOSIS — R42 LIGHTHEADEDNESS: ICD-10-CM

## 2020-10-15 DIAGNOSIS — R07.9 CHEST PAIN, UNSPECIFIED TYPE: Primary | ICD-10-CM

## 2020-10-15 DIAGNOSIS — R19.7 DIARRHEA, UNSPECIFIED TYPE: ICD-10-CM

## 2020-10-15 PROBLEM — I48.91 ATRIAL FIBRILLATION STATUS POST CARDIOVERSION: Status: ACTIVE | Noted: 2020-10-15

## 2020-10-15 LAB
ALBUMIN SERPL-MCNC: 4.4 G/DL (ref 3.5–5.2)
ALBUMIN/GLOB SERPL: 1.2 G/DL
ALP SERPL-CCNC: 75 U/L (ref 39–117)
ALT SERPL W P-5'-P-CCNC: 16 U/L (ref 1–33)
ANION GAP SERPL CALCULATED.3IONS-SCNC: 8 MMOL/L (ref 5–15)
AST SERPL-CCNC: 25 U/L (ref 1–32)
B PARAPERT DNA SPEC QL NAA+PROBE: NOT DETECTED
B PERT DNA SPEC QL NAA+PROBE: NOT DETECTED
BASOPHILS # BLD AUTO: 0.08 10*3/MM3 (ref 0–0.2)
BASOPHILS NFR BLD AUTO: 1.3 % (ref 0–1.5)
BILIRUB SERPL-MCNC: 0.3 MG/DL (ref 0–1.2)
BUN SERPL-MCNC: 27 MG/DL (ref 8–23)
BUN/CREAT SERPL: 20.1 (ref 7–25)
C PNEUM DNA NPH QL NAA+NON-PROBE: NOT DETECTED
CALCIUM SPEC-SCNC: 10.2 MG/DL (ref 8.6–10.5)
CHLORIDE SERPL-SCNC: 104 MMOL/L (ref 98–107)
CO2 SERPL-SCNC: 27 MMOL/L (ref 22–29)
CREAT SERPL-MCNC: 1.34 MG/DL (ref 0.57–1)
DEPRECATED RDW RBC AUTO: 43.7 FL (ref 37–54)
EOSINOPHIL # BLD AUTO: 0.24 10*3/MM3 (ref 0–0.4)
EOSINOPHIL NFR BLD AUTO: 3.8 % (ref 0.3–6.2)
ERYTHROCYTE [DISTWIDTH] IN BLOOD BY AUTOMATED COUNT: 11.9 % (ref 12.3–15.4)
FLUAV H1 2009 PAND RNA NPH QL NAA+PROBE: NOT DETECTED
FLUAV H1 HA GENE NPH QL NAA+PROBE: NOT DETECTED
FLUAV H3 RNA NPH QL NAA+PROBE: NOT DETECTED
FLUAV SUBTYP SPEC NAA+PROBE: NOT DETECTED
FLUBV RNA ISLT QL NAA+PROBE: NOT DETECTED
GFR SERPL CREATININE-BSD FRML MDRD: 37 ML/MIN/1.73
GLOBULIN UR ELPH-MCNC: 3.7 GM/DL
GLUCOSE SERPL-MCNC: 100 MG/DL (ref 65–99)
HADV DNA SPEC NAA+PROBE: NOT DETECTED
HCOV 229E RNA SPEC QL NAA+PROBE: NOT DETECTED
HCOV HKU1 RNA SPEC QL NAA+PROBE: NOT DETECTED
HCOV NL63 RNA SPEC QL NAA+PROBE: NOT DETECTED
HCOV OC43 RNA SPEC QL NAA+PROBE: NOT DETECTED
HCT VFR BLD AUTO: 38.8 % (ref 34–46.6)
HGB BLD-MCNC: 13.1 G/DL (ref 12–15.9)
HMPV RNA NPH QL NAA+NON-PROBE: NOT DETECTED
HOLD SPECIMEN: NORMAL
HOLD SPECIMEN: NORMAL
HPIV1 RNA SPEC QL NAA+PROBE: NOT DETECTED
HPIV2 RNA SPEC QL NAA+PROBE: NOT DETECTED
HPIV3 RNA NPH QL NAA+PROBE: NOT DETECTED
HPIV4 P GENE NPH QL NAA+PROBE: NOT DETECTED
IMM GRANULOCYTES # BLD AUTO: 0.02 10*3/MM3 (ref 0–0.05)
IMM GRANULOCYTES NFR BLD AUTO: 0.3 % (ref 0–0.5)
LYMPHOCYTES # BLD AUTO: 2.16 10*3/MM3 (ref 0.7–3.1)
LYMPHOCYTES NFR BLD AUTO: 34.4 % (ref 19.6–45.3)
M PNEUMO IGG SER IA-ACNC: NOT DETECTED
MCH RBC QN AUTO: 34.1 PG (ref 26.6–33)
MCHC RBC AUTO-ENTMCNC: 33.8 G/DL (ref 31.5–35.7)
MCV RBC AUTO: 101 FL (ref 79–97)
MONOCYTES # BLD AUTO: 0.65 10*3/MM3 (ref 0.1–0.9)
MONOCYTES NFR BLD AUTO: 10.4 % (ref 5–12)
NEUTROPHILS NFR BLD AUTO: 3.12 10*3/MM3 (ref 1.7–7)
NEUTROPHILS NFR BLD AUTO: 49.8 % (ref 42.7–76)
NRBC BLD AUTO-RTO: 0 /100 WBC (ref 0–0.2)
NT-PROBNP SERPL-MCNC: 1058 PG/ML (ref 0–1800)
PLATELET # BLD AUTO: 284 10*3/MM3 (ref 140–450)
PMV BLD AUTO: 10.1 FL (ref 6–12)
POTASSIUM SERPL-SCNC: 4.7 MMOL/L (ref 3.5–5.2)
PROT SERPL-MCNC: 8.1 G/DL (ref 6–8.5)
RBC # BLD AUTO: 3.84 10*6/MM3 (ref 3.77–5.28)
RHINOVIRUS RNA SPEC NAA+PROBE: NOT DETECTED
RSV RNA NPH QL NAA+NON-PROBE: NOT DETECTED
SARS-COV-2 RNA NPH QL NAA+NON-PROBE: NOT DETECTED
SODIUM SERPL-SCNC: 139 MMOL/L (ref 136–145)
TROPONIN T SERPL-MCNC: <0.01 NG/ML (ref 0–0.03)
WBC # BLD AUTO: 6.27 10*3/MM3 (ref 3.4–10.8)
WHOLE BLOOD HOLD SPECIMEN: NORMAL
WHOLE BLOOD HOLD SPECIMEN: NORMAL

## 2020-10-15 PROCEDURE — 93005 ELECTROCARDIOGRAM TRACING: CPT

## 2020-10-15 PROCEDURE — G0378 HOSPITAL OBSERVATION PER HR: HCPCS

## 2020-10-15 PROCEDURE — 93010 ELECTROCARDIOGRAM REPORT: CPT | Performed by: INTERNAL MEDICINE

## 2020-10-15 PROCEDURE — 85025 COMPLETE CBC W/AUTO DIFF WBC: CPT | Performed by: EMERGENCY MEDICINE

## 2020-10-15 PROCEDURE — 93005 ELECTROCARDIOGRAM TRACING: CPT | Performed by: EMERGENCY MEDICINE

## 2020-10-15 PROCEDURE — 71046 X-RAY EXAM CHEST 2 VIEWS: CPT

## 2020-10-15 PROCEDURE — 83880 ASSAY OF NATRIURETIC PEPTIDE: CPT | Performed by: EMERGENCY MEDICINE

## 2020-10-15 PROCEDURE — 0202U NFCT DS 22 TRGT SARS-COV-2: CPT | Performed by: EMERGENCY MEDICINE

## 2020-10-15 PROCEDURE — 80053 COMPREHEN METABOLIC PANEL: CPT | Performed by: EMERGENCY MEDICINE

## 2020-10-15 PROCEDURE — 84484 ASSAY OF TROPONIN QUANT: CPT | Performed by: EMERGENCY MEDICINE

## 2020-10-15 PROCEDURE — 99285 EMERGENCY DEPT VISIT HI MDM: CPT

## 2020-10-15 PROCEDURE — 84484 ASSAY OF TROPONIN QUANT: CPT | Performed by: INTERNAL MEDICINE

## 2020-10-15 PROCEDURE — 99284 EMERGENCY DEPT VISIT MOD MDM: CPT

## 2020-10-15 RX ORDER — ACETAMINOPHEN 500 MG
500 TABLET ORAL NIGHTLY PRN
Status: DISCONTINUED | OUTPATIENT
Start: 2020-10-15 | End: 2020-10-16 | Stop reason: HOSPADM

## 2020-10-15 RX ORDER — ACETAMINOPHEN 325 MG/1
650 TABLET ORAL EVERY 6 HOURS PRN
Status: DISCONTINUED | OUTPATIENT
Start: 2020-10-15 | End: 2020-10-16 | Stop reason: HOSPADM

## 2020-10-15 RX ORDER — LOSARTAN POTASSIUM 100 MG/1
100 TABLET ORAL DAILY
Status: DISCONTINUED | OUTPATIENT
Start: 2020-10-16 | End: 2020-10-16 | Stop reason: HOSPADM

## 2020-10-15 RX ORDER — AMLODIPINE BESYLATE 5 MG/1
5 TABLET ORAL DAILY
Status: DISCONTINUED | OUTPATIENT
Start: 2020-10-16 | End: 2020-10-16 | Stop reason: HOSPADM

## 2020-10-15 RX ORDER — ALLOPURINOL 100 MG/1
100 TABLET ORAL DAILY
Status: DISCONTINUED | OUTPATIENT
Start: 2020-10-16 | End: 2020-10-16 | Stop reason: HOSPADM

## 2020-10-15 RX ORDER — DULOXETIN HYDROCHLORIDE 60 MG/1
60 CAPSULE, DELAYED RELEASE ORAL DAILY
Status: DISCONTINUED | OUTPATIENT
Start: 2020-10-16 | End: 2020-10-16 | Stop reason: HOSPADM

## 2020-10-15 RX ORDER — NITROGLYCERIN 0.4 MG/1
0.4 TABLET SUBLINGUAL
Status: DISCONTINUED | OUTPATIENT
Start: 2020-10-15 | End: 2020-10-16 | Stop reason: HOSPADM

## 2020-10-15 RX ORDER — ATENOLOL 25 MG/1
25 TABLET ORAL
Status: DISCONTINUED | OUTPATIENT
Start: 2020-10-16 | End: 2020-10-16 | Stop reason: HOSPADM

## 2020-10-15 RX ORDER — ACETAMINOPHEN,DIPHENHYDRAMINE HCL 500; 25 MG/1; MG/1
1 TABLET, FILM COATED ORAL NIGHTLY PRN
Status: DISCONTINUED | OUTPATIENT
Start: 2020-10-15 | End: 2020-10-15 | Stop reason: CLARIF

## 2020-10-15 RX ORDER — SODIUM CHLORIDE 0.9 % (FLUSH) 0.9 %
10 SYRINGE (ML) INJECTION AS NEEDED
Status: DISCONTINUED | OUTPATIENT
Start: 2020-10-15 | End: 2020-10-16 | Stop reason: HOSPADM

## 2020-10-15 RX ORDER — DIPHENHYDRAMINE HCL 25 MG
25 CAPSULE ORAL NIGHTLY PRN
Status: DISCONTINUED | OUTPATIENT
Start: 2020-10-15 | End: 2020-10-16 | Stop reason: HOSPADM

## 2020-10-15 RX ORDER — ROSUVASTATIN CALCIUM 5 MG/1
5 TABLET, COATED ORAL DAILY
Status: DISCONTINUED | OUTPATIENT
Start: 2020-10-16 | End: 2020-10-16 | Stop reason: HOSPADM

## 2020-10-15 RX ORDER — MULTIPLE VITAMINS W/ MINERALS TAB 9MG-400MCG
1 TAB ORAL DAILY
Status: DISCONTINUED | OUTPATIENT
Start: 2020-10-16 | End: 2020-10-16 | Stop reason: HOSPADM

## 2020-10-15 RX ORDER — SODIUM CHLORIDE 0.9 % (FLUSH) 0.9 %
10 SYRINGE (ML) INJECTION EVERY 12 HOURS SCHEDULED
Status: DISCONTINUED | OUTPATIENT
Start: 2020-10-15 | End: 2020-10-16 | Stop reason: HOSPADM

## 2020-10-15 RX ORDER — SODIUM CHLORIDE 9 MG/ML
100 INJECTION, SOLUTION INTRAVENOUS CONTINUOUS
Status: DISCONTINUED | OUTPATIENT
Start: 2020-10-15 | End: 2020-10-16 | Stop reason: HOSPADM

## 2020-10-15 RX ORDER — ONDANSETRON 2 MG/ML
4 INJECTION INTRAMUSCULAR; INTRAVENOUS EVERY 6 HOURS PRN
Status: DISCONTINUED | OUTPATIENT
Start: 2020-10-15 | End: 2020-10-16 | Stop reason: HOSPADM

## 2020-10-15 RX ORDER — OXYBUTYNIN CHLORIDE 5 MG/1
5 TABLET ORAL 3 TIMES DAILY
Status: DISCONTINUED | OUTPATIENT
Start: 2020-10-16 | End: 2020-10-16 | Stop reason: HOSPADM

## 2020-10-15 RX ADMIN — OXYBUTYNIN CHLORIDE 5 MG: 5 TABLET ORAL at 22:54

## 2020-10-15 RX ADMIN — SODIUM CHLORIDE 500 ML: 9 INJECTION, SOLUTION INTRAVENOUS at 15:04

## 2020-10-15 RX ADMIN — APIXABAN 2.5 MG: 2.5 TABLET, FILM COATED ORAL at 22:55

## 2020-10-15 RX ADMIN — SODIUM CHLORIDE 100 ML/HR: 9 INJECTION, SOLUTION INTRAVENOUS at 22:54

## 2020-10-15 NOTE — ED NOTES
Nursing report ED to floor  Alba Correa  87 y.o.  female    HPI (triage note):   Chief Complaint   Patient presents with   • Chest Pain       Admitting doctor:   Frank Seo MD    Admitting diagnosis:   The primary encounter diagnosis was Chest pain, unspecified type. Diagnoses of Lightheadedness and Diarrhea, unspecified type were also pertinent to this visit.    Code status:   Current Code Status     Date Active Code Status Order ID Comments User Context       Prior    Advance Care Planning Activity          Allergies:   Bactrim [sulfamethoxazole-trimethoprim], Lisinopril, and Levofloxacin    Weight:       10/15/20  1423   Weight: 55 kg (121 lb 4.8 oz)       Most recent vitals:   Vitals:    10/15/20 1512 10/15/20 1514 10/15/20 1525 10/15/20 1623   BP: 160/61 150/74  174/66   BP Location:       Patient Position: Sitting Standing     Pulse: (!) 45 59 59 50   Resp:   16 16   Temp:       TempSrc:       SpO2:    97%   Weight:       Height:           Active LDAs/IV Access:   Lines, Drains & Airways    Active LDAs     Name:   Placement date:   Placement time:   Site:   Days:    Peripheral IV 10/15/20 1428 Right Antecubital   10/15/20    1428    Antecubital   less than 1                Labs (abnormal labs have a star):   Labs Reviewed   COMPREHENSIVE METABOLIC PANEL - Abnormal; Notable for the following components:       Result Value    Glucose 100 (*)     BUN 27 (*)     Creatinine 1.34 (*)     eGFR Non  Amer 37 (*)     All other components within normal limits    Narrative:     GFR Normal >60  Chronic Kidney Disease <60  Kidney Failure <15     CBC WITH AUTO DIFFERENTIAL - Abnormal; Notable for the following components:    .0 (*)     MCH 34.1 (*)     RDW 11.9 (*)     All other components within normal limits   TROPONIN (IN-HOUSE) - Normal    Narrative:     Troponin T Reference Range:  <= 0.03 ng/mL-   Negative for AMI  >0.03 ng/mL-     Abnormal for myocardial necrosis.  Clinicians would have to utilize  clinical acumen, EKG, Troponin and serial changes to determine if it is an Acute Myocardial Infarction or myocardial injury due to an underlying chronic condition.       Results may be falsely decreased if patient taking Biotin.     BNP (IN-HOUSE) - Normal    Narrative:     Among patients with dyspnea, NT-proBNP is highly sensitive for the detection of acute congestive heart failure. In addition NT-proBNP of <300 pg/ml effectively rules out acute congestive heart failure with 99% negative predictive value.    Results may be falsely decreased if patient taking Biotin.     COVID PRE-OP / PRE-PROCEDURE SCREENING ORDER (NO ISOLATION)    Narrative:     The following orders were created for panel order COVID PRE-OP / PRE-PROCEDURE SCREENING ORDER (NO ISOLATION) - Swab, Nasopharynx.  Procedure                               Abnormality         Status                     ---------                               -----------         ------                     Respiratory Panel PCR w/...[[id number]]                                                   Please view results for these tests on the individual orders.   RESPIRATORY PANEL PCR W/ COVID-19 (SARS-COV-2) FERNANDO/HAYDE/TOOTIE/PAD/COR/MAD IN-HOUSE, NP SWAB IN [facility], 3-4 HR TAT   RAINBOW DRAW    Narrative:     The following orders were created for panel order [place] Draw.  Procedure                               Abnormality         Status                     ---------                               -----------         ------                     Light Blue Top[[id number]]                                   Final result               Green Top (Gel)[[id number]]                                  Final result               Lavender Top[[id number]]                                     Final result               Gold Top - SST[[id number]]                                   Final result                 Please view results for these tests on the individual orders.   LIGHT BLUE TOP   GREEN TOP    LAVENDER TOP   GOLD TOP - SST   CBC AND DIFFERENTIAL    Narrative:     The following orders were created for panel order CBC & Differential.  Procedure                               Abnormality         Status                     ---------                               -----------         ------                     CBC Auto Differential[[id number]]        Abnormal            Final result                 Please view results for these tests on the individual orders.       EKG:   ECG 12 Lead   Preliminary Result   HEART RATE= 59  bpm   RR Interval= 1012  ms   ID Interval= 207  ms   P Horizontal Axis= -35  deg   P Front Axis= 47  deg   QRSD Interval= 81  ms   QT Interval= 388  ms   QRS Axis= -18  deg   T Wave Axis= 53  deg   - ABNORMAL ECG -   Sinus rhythm   Borderline left axis deviation   Anteroseptal infarct, age indeterminate   Electronically Signed By:    Date and Time of Study: 2020-10-15 14:06:51          Meds given in ED:   Medications   sodium chloride 0.9 % bolus 500 mL (0 mL Intravenous Stopped 10/15/20 1643)       Imaging results:  No radiology results for the last day    Ambulatory status:   - ad shawn    Social issues:   Social History     Socioeconomic History   • Marital status:      Spouse name: Ray   • Number of children: 3   • Years of education: High school   • Highest education level: Not on file   Occupational History   • Occupation: Dental assistant     Employer: RETIRED   Tobacco Use   • Smoking status: Former Smoker     Years: 5.00     Types: Cigarettes     Quit date:      Years since quittin.8   • Smokeless tobacco: Never Used   • Tobacco comment: caffeine use: 2 cups coffee in AM   Substance and Sexual Activity   • Alcohol use: Yes     Comment: Occasional- glass ofwine few times a week   • Drug use: No   • Sexual activity: Defer     Partners: Male     Birth control/protection: Surgical    Nursing report ED to floor  Alba Correa  87 y.o.  female    HPI (triage note):   Chief  Complaint   Patient presents with   • Chest Pain       Admitting doctor:   Frank Seo MD    Admitting diagnosis:   The primary encounter diagnosis was Chest pain, unspecified type. Diagnoses of Lightheadedness and Diarrhea, unspecified type were also pertinent to this visit.    Code status:   Current Code Status     Date Active Code Status Order ID Comments User Context       Prior    Advance Care Planning Activity          Allergies:   Bactrim [sulfamethoxazole-trimethoprim], Lisinopril, and Levofloxacin    Weight:       10/15/20  1423   Weight: 55 kg (121 lb 4.8 oz)       Most recent vitals:   Vitals:    10/15/20 1512 10/15/20 1514 10/15/20 1525 10/15/20 1623   BP: 160/61 150/74  174/66   BP Location:       Patient Position: Sitting Standing     Pulse: (!) 45 59 59 50   Resp:   16 16   Temp:       TempSrc:       SpO2:    97%   Weight:       Height:           Active LDAs/IV Access:   Lines, Drains & Airways    Active LDAs     Name:   Placement date:   Placement time:   Site:   Days:    Peripheral IV 10/15/20 1428 Right Antecubital   10/15/20    1428    Antecubital   less than 1                Labs (abnormal labs have a star):   Labs Reviewed   COMPREHENSIVE METABOLIC PANEL - Abnormal; Notable for the following components:       Result Value    Glucose 100 (*)     BUN 27 (*)     Creatinine 1.34 (*)     eGFR Non  Amer 37 (*)     All other components within normal limits    Narrative:     GFR Normal >60  Chronic Kidney Disease <60  Kidney Failure <15     CBC WITH AUTO DIFFERENTIAL - Abnormal; Notable for the following components:    .0 (*)     MCH 34.1 (*)     RDW 11.9 (*)     All other components within normal limits   TROPONIN (IN-HOUSE) - Normal    Narrative:     Troponin T Reference Range:  <= 0.03 ng/mL-   Negative for AMI  >0.03 ng/mL-     Abnormal for myocardial necrosis.  Clinicians would have to utilize clinical acumen, EKG, Troponin and serial changes to determine if it is an Acute Myocardial  Infarction or myocardial injury due to an underlying chronic condition.       Results may be falsely decreased if patient taking Biotin.     BNP (IN-HOUSE) - Normal    Narrative:     Among patients with dyspnea, NT-proBNP is highly sensitive for the detection of acute congestive heart failure. In addition NT-proBNP of <300 pg/ml effectively rules out acute congestive heart failure with 99% negative predictive value.    Results may be falsely decreased if patient taking Biotin.     COVID PRE-OP / PRE-PROCEDURE SCREENING ORDER (NO ISOLATION)    Narrative:     The following orders were created for panel order COVID PRE-OP / PRE-PROCEDURE SCREENING ORDER (NO ISOLATION) - Swab, Nasopharynx.  Procedure                               Abnormality         Status                     ---------                               -----------         ------                     Respiratory Panel PCR w/...[[id number]]                                                   Please view results for these tests on the individual orders.   RESPIRATORY PANEL PCR W/ COVID-19 (SARS-COV-2) FERNANDO/HAYDE/TOOTIE/PAD/COR/MAD IN-HOUSE, NP SWAB IN [facility], 3-4 HR TAT   RAINBOW DRAW    Narrative:     The following orders were created for panel order [place] Draw.  Procedure                               Abnormality         Status                     ---------                               -----------         ------                     Light Blue Top[[id number]]                                   Final result               Green Top (Gel)[[id number]]                                  Final result               Lavender Top[[id number]]                                     Final result               Gold Top - SST[[id number]]                                   Final result                 Please view results for these tests on the individual orders.   LIGHT BLUE TOP   GREEN TOP   LAVENDER TOP   GOLD TOP - SST   CBC AND DIFFERENTIAL    Narrative:     The following orders were  created for panel order CBC & Differential.  Procedure                               Abnormality         Status                     ---------                               -----------         ------                     CBC Auto Differential[[id number]]        Abnormal            Final result                 Please view results for these tests on the individual orders.       EKG:   ECG 12 Lead   Preliminary Result   HEART RATE= 59  bpm   RR Interval= 1012  ms   SC Interval= 207  ms   P Horizontal Axis= -35  deg   P Front Axis= 47  deg   QRSD Interval= 81  ms   QT Interval= 388  ms   QRS Axis= -18  deg   T Wave Axis= 53  deg   - ABNORMAL ECG -   Sinus rhythm   Borderline left axis deviation   Anteroseptal infarct, age indeterminate   Electronically Signed By:    Date and Time of Study: 2020-10-15 14:06:51          Meds given in ED:   Medications   sodium chloride 0.9 % bolus 500 mL (0 mL Intravenous Stopped 10/15/20 5871)       Imaging results:  No radiology results for the last day    Ambulatory status:   - adlib    Social issues:   Social History     Socioeconomic History   • Marital status:      Spouse name: Ray   • Number of children: 3   • Years of education: High school   • Highest education level: Not on file   Occupational History   • Occupation: Dental assistant     Employer: RETIRED   Tobacco Use   • Smoking status: Former Smoker     Years: 5.00     Types: Cigarettes     Quit date:      Years since quittin.8   • Smokeless tobacco: Never Used   • Tobacco comment: caffeine use: 2 cups coffee in AM   Substance and Sexual Activity   • Alcohol use: Yes     Comment: Occasional- glass ofwine few times a week   • Drug use: No   • Sexual activity: Defer     Partners: Male     Birth control/protection: Briseida Worthy RN  10/15/20 3915

## 2020-10-15 NOTE — ED PROVIDER NOTES
EMERGENCY DEPARTMENT ENCOUNTER    Room Number:  31/31  PCP: Aramis Doty MD  Historian: Patient  History Limited By: Nothing      HPI  Chief Complaint: Chest pain and lightheadedness  Context: Alba Correa is a 87 y.o. female who presents to the ED c/o chest pain and lightheadedness.  Patient states symptoms started 2 nights ago with lightheadedness when she stands up.  Patient states chest pain started yesterday prior to this pressure that goes through to her back and into her left arm.  Patient had another episode of chest pain today.  Pain is nonexertional.  Patient did have some diarrhea with it as well.  Has had some mild shortness of breath.  No fevers or cough.      Location: Substernal chest  Radiation: Left arm  Character: Pressure  Duration: 2 days  Severity: Moderate  Progression: Intermittent  Aggravating Factors: Nothing  Alleviating Factors: Nothing        MEDICAL RECORD REVIEW    Patient has been seen for ADanielle lind in the past          PAST MEDICAL HISTORY  Active Ambulatory Problems     Diagnosis Date Noted   • Chronic renal failure, stage 3 (moderate)    • DVT (deep venous thrombosis) (CMS/HCC)    • Depression    • Edema    • Gout    • Osteoporosis    • Hyperparathyroidism (CMS/HCC)    • Adaptive colitis    • Osteopenia    • Rheumatoid arthritis (CMS/HCC)    • Degeneration of intervertebral disc    • BP (high blood pressure) 11/20/2015   • 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   • Open leg wound 11/20/2015   • Vaginal odor 11/20/2015   • Infected wound 11/20/2015   • History of depression 07/11/2017   • History of renal insufficiency syndrome 07/11/2017   • History of degenerative disc disease 07/11/2017   • History of osteoporosis 07/11/2017   • History of DVT (deep vein thrombosis) 07/11/2017   • History of rheumatoid arthritis 07/11/2017   • Hyperlipidemia 01/10/2018   • Macrocytosis 02/28/2018   • Right leg weakness 08/05/2018   • CVA (cerebral vascular accident) (CMS/HCC) 08/06/2018   •  Nonrheumatic aortic (valve) insufficiency 10/08/2018   • History of ischemic right MCA stroke with extension 2018   • Bilateral leg weakness 2019   • PAF (paroxysmal atrial fibrillation) (CMS/HCC) 2019   • Anemia in chronic kidney disease 2016   • Other proteinuria 2017     Resolved Ambulatory Problems     Diagnosis Date Noted   • Elevated cholesterol    • Benign essential hypertension    • D (diarrhea) 2015   • Acute intractable headache 2019   • Nausea 2019     Past Medical History:   Diagnosis Date   • Chronic kidney disease    • Chronic renal insufficiency    • H/O complete eye exam 2016   • IBS (irritable bowel syndrome)    • OAB (overactive bladder)    • Peripheral neuropathy          PAST SURGICAL HISTORY  Past Surgical History:   Procedure Laterality Date   • APPENDECTOMY     • BREAST BIOPSY     • COLONOSCOPY      declines   • HERNIA REPAIR     • HYSTERECTOMY      still has ovaries   • MAMMO BILATERAL  2016    pt declines   • PAP SMEAR  2016    declines         FAMILY HISTORY  Family History   Problem Relation Age of Onset   • Alcohol abuse Other    • Cancer Other    • Hypertension Other    • Kidney disease Other    • Lung disease Other    • Heart disease Mother 89   • Heart failure Mother    • Ovarian cancer Daughter 60   • Stomach cancer Father    • Kidney cancer Father 52   • Lung cancer Father 52   • Ovarian cancer Sister    • Lung cancer Brother 65         SOCIAL HISTORY  Social History     Socioeconomic History   • Marital status:      Spouse name: Ray   • Number of children: 3   • Years of education: High school   • Highest education level: Not on file   Occupational History   • Occupation: Dental assistant     Employer: RETIRED   Tobacco Use   • Smoking status: Former Smoker     Years: 5.00     Types: Cigarettes     Quit date:      Years since quittin.8   • Smokeless tobacco: Never Used   • Tobacco comment: caffeine  use: 2 cups coffee in AM   Substance and Sexual Activity   • Alcohol use: Yes     Comment: Occasional- glass ofwine few times a week   • Drug use: No   • Sexual activity: Defer     Partners: Male     Birth control/protection: Surgical         ALLERGIES  Bactrim [sulfamethoxazole-trimethoprim], Lisinopril, and Levofloxacin        REVIEW OF SYSTEMS  Review of Systems   Constitutional: Negative for fever.   HENT: Negative for sore throat.    Eyes: Negative.    Respiratory: Negative for cough and shortness of breath.    Cardiovascular: Positive for chest pain.   Gastrointestinal: Positive for diarrhea. Negative for abdominal pain and vomiting.   Genitourinary: Negative for dysuria.   Musculoskeletal: Negative for neck pain.   Skin: Negative for rash.   Allergic/Immunologic: Negative.    Neurological: Positive for light-headedness. Negative for weakness, numbness and headaches.   Hematological: Negative.    Psychiatric/Behavioral: Negative.    All other systems reviewed and are negative.           PHYSICAL EXAM  ED Triage Vitals   Temp Heart Rate Resp BP SpO2   10/15/20 1403 10/15/20 1403 10/15/20 1403 10/15/20 1423 10/15/20 1403   97.6 °F (36.4 °C) 63 18 161/88 96 %      Temp src Heart Rate Source Patient Position BP Location FiO2 (%)   10/15/20 1403 10/15/20 1403 10/15/20 1423 10/15/20 1423 --   Tympanic Monitor Lying Right arm        Physical Exam   Constitutional: She is oriented to person, place, and time. No distress.   HENT:   Head: Normocephalic and atraumatic.   Eyes: Pupils are equal, round, and reactive to light. EOM are normal.   Neck: Normal range of motion. Neck supple.   Cardiovascular: Normal rate, regular rhythm and normal heart sounds.   Pulmonary/Chest: Effort normal and breath sounds normal. No respiratory distress.   Abdominal: Soft. There is no abdominal tenderness. There is no rebound and no guarding.   Musculoskeletal: Normal range of motion.         General: No edema.   Neurological: She is alert  and oriented to person, place, and time. She has normal sensation and normal strength.   Skin: Skin is warm and dry. No rash noted.   Psychiatric: Mood and affect normal.   Nursing note and vitals reviewed.    Patient was wearing a face mask when I entered the room and they continued to wear a mask throughout their stay in the ED.  I wore PPE, including gloves, face mask with shield or face mask with goggles whenever I was in the room with patient.       LAB RESULTS  Recent Results (from the past 24 hour(s))   Light Blue Top    Collection Time: 10/15/20  2:30 PM   Result Value Ref Range    Extra Tube hold for add-on    Green Top (Gel)    Collection Time: 10/15/20  2:30 PM   Result Value Ref Range    Extra Tube Hold for add-ons.    Lavender Top    Collection Time: 10/15/20  2:30 PM   Result Value Ref Range    Extra Tube hold for add-on    Gold Top - SST    Collection Time: 10/15/20  2:30 PM   Result Value Ref Range    Extra Tube Hold for add-ons.    Comprehensive Metabolic Panel    Collection Time: 10/15/20  2:30 PM    Specimen: Blood   Result Value Ref Range    Glucose 100 (H) 65 - 99 mg/dL    BUN 27 (H) 8 - 23 mg/dL    Creatinine 1.34 (H) 0.57 - 1.00 mg/dL    Sodium 139 136 - 145 mmol/L    Potassium 4.7 3.5 - 5.2 mmol/L    Chloride 104 98 - 107 mmol/L    CO2 27.0 22.0 - 29.0 mmol/L    Calcium 10.2 8.6 - 10.5 mg/dL    Total Protein 8.1 6.0 - 8.5 g/dL    Albumin 4.40 3.50 - 5.20 g/dL    ALT (SGPT) 16 1 - 33 U/L    AST (SGOT) 25 1 - 32 U/L    Alkaline Phosphatase 75 39 - 117 U/L    Total Bilirubin 0.3 0.0 - 1.2 mg/dL    eGFR Non African Amer 37 (L) >60 mL/min/1.73    Globulin 3.7 gm/dL    A/G Ratio 1.2 g/dL    BUN/Creatinine Ratio 20.1 7.0 - 25.0    Anion Gap 8.0 5.0 - 15.0 mmol/L   Troponin    Collection Time: 10/15/20  2:30 PM    Specimen: Blood   Result Value Ref Range    Troponin T <0.010 0.000 - 0.030 ng/mL   BNP    Collection Time: 10/15/20  2:30 PM    Specimen: Blood   Result Value Ref Range    proBNP 1,058.0  0.0-1,800.0 pg/mL   CBC Auto Differential    Collection Time: 10/15/20  2:30 PM    Specimen: Blood   Result Value Ref Range    WBC 6.27 3.40 - 10.80 10*3/mm3    RBC 3.84 3.77 - 5.28 10*6/mm3    Hemoglobin 13.1 12.0 - 15.9 g/dL    Hematocrit 38.8 34.0 - 46.6 %    .0 (H) 79.0 - 97.0 fL    MCH 34.1 (H) 26.6 - 33.0 pg    MCHC 33.8 31.5 - 35.7 g/dL    RDW 11.9 (L) 12.3 - 15.4 %    RDW-SD 43.7 37.0 - 54.0 fl    MPV 10.1 6.0 - 12.0 fL    Platelets 284 140 - 450 10*3/mm3    Neutrophil % 49.8 42.7 - 76.0 %    Lymphocyte % 34.4 19.6 - 45.3 %    Monocyte % 10.4 5.0 - 12.0 %    Eosinophil % 3.8 0.3 - 6.2 %    Basophil % 1.3 0.0 - 1.5 %    Immature Grans % 0.3 0.0 - 0.5 %    Neutrophils, Absolute 3.12 1.70 - 7.00 10*3/mm3    Lymphocytes, Absolute 2.16 0.70 - 3.10 10*3/mm3    Monocytes, Absolute 0.65 0.10 - 0.90 10*3/mm3    Eosinophils, Absolute 0.24 0.00 - 0.40 10*3/mm3    Basophils, Absolute 0.08 0.00 - 0.20 10*3/mm3    Immature Grans, Absolute 0.02 0.00 - 0.05 10*3/mm3    nRBC 0.0 0.0 - 0.2 /100 WBC       Ordered the above labs and reviewed the results.        RADIOLOGY  XR Chest 2 View   Final Result           Ordered the above noted radiological studies. Reviewed by me in PACS.           PROCEDURES  Procedures      EKG:          EKG time: 1406  Rhythm/Rate: Sinus bradycardia 59  P waves and OR: Normal P waves  QRS, axis: Normal QRS  ST and T waves: T wave inversion in V2    Interpreted Contemporaneously by me, independently viewed  Changed compared to prior 8/30/2020.  T wave inversion is new        MEDICATIONS GIVEN IN ER  Medications   sodium chloride 0.9 % bolus 500 mL (0 mL Intravenous Stopped 10/15/20 1643)             PROGRESS AND CONSULTS  ED Course as of Oct 15 1728   Thu Oct 15, 2020   1629 16:29 EDT  Patient here for multiple complaints.  She does have chest pain with new T wave inversions in V2.  Also with lightheadedness and diarrhea.  Discussed with Dr. Martines who will consult.  Discussed with Dr. Seo  who will admit.    [SL]      ED Course User Index  [SL] Uli Wells MD           MEDICAL DECISION MAKING      MDM  Number of Diagnoses or Management Options  Chest pain, unspecified type:   Diarrhea, unspecified type:   Lightheadedness:      Amount and/or Complexity of Data Reviewed  Clinical lab tests: ordered and reviewed (Troponin normal)  Tests in the radiology section of CPT®: reviewed and ordered (Chest x-ray negative)  Discuss the patient with other providers: yes (Discussed with Dr. Martines and then Dr. Seo and will admit.)               DIAGNOSIS  Final diagnoses:   Chest pain, unspecified type   Lightheadedness   Diarrhea, unspecified type           DISPOSITION  admit        Latest Documented Vital Signs:  As of 17:28 EDT  BP- 160/64 HR- 50 Temp- 97.6 °F (36.4 °C) (Tympanic) O2 sat- 99%                       Uli Wells MD  10/15/20 2547

## 2020-10-15 NOTE — ED NOTES
Pt presents to ED with complains of chest pain and dizziness yesterday. Pt states this morning she began to have chest pain, back pain, and L arm pain, went to [facility] and was sent here. Pt did not have EKG or blood work done at [facility]. Pt is A&Ox4, able to ambulate, and in a mask. Pt is on blood thinners.            Sara Gomez, RN  10/15/20 5313

## 2020-10-15 NOTE — NURSING NOTE
VSS, no c/o chest pain, no signs of distress, A&Ox4, continent of bladder and stools, RA, reg. Cardiac diet, MD called-awaiting orders,  will continue to monitor

## 2020-10-15 NOTE — PLAN OF CARE
Problem: Adult Inpatient Plan of Care  Goal: Absence of Hospital-Acquired Illness or Injury  Intervention: Identify and Manage Fall Risk  Recent Flowsheet Documentation  Taken 10/15/2020 1807 by Aren St RN  Safety Promotion/Fall Prevention:   activity supervised   assistive device/personal items within reach   clutter free environment maintained   room organization consistent   safety round/check completed  Intervention: Prevent Skin Injury  Recent Flowsheet Documentation  Taken 10/15/2020 1807 by Aren St RN  Body Position:   supine   position changed independently  Intervention: Prevent and Manage VTE (venous thromboembolism) Risk  Recent Flowsheet Documentation  Taken 10/15/2020 1807 by Aren St RN  VTE Prevention/Management:   bilateral   dorsiflexion/plantar flexion performed  Goal: Optimal Comfort and Wellbeing  Intervention: Provide Person-Centered Care  Recent Flowsheet Documentation  Taken 10/15/2020 1807 by Aren St RN  Trust Relationship/Rapport:   thoughts/feelings acknowledged   care explained   empathic listening provided   questions answered  Goal: Readiness for Transition of Care  Intervention: Mutually Develop Transition Plan  Recent Flowsheet Documentation  Taken 10/15/2020 1805 by Aren St RN  Transportation Anticipated: family or friend will provide  Patient/Family Anticipated Services at Transition: none  Patient/Family Anticipates Transition to: home  Taken 10/15/2020 1802 by Aren St, RN  Equipment Currently Used at Home: none   Goal Outcome Evaluation:

## 2020-10-15 NOTE — ED NOTES
Patient reports having epigastric pain that radiates to her left arm and to her back, along with dizziness, that started yesterday. Patient reports that even while resting her chest hurts. Patient reports that her back and arm are not hurting today like it was yesterday. Patient reports having nausea but denies vomiting or diarrhea. Family @ bedside. Patient, family in masks. ER staff in mask and goggles.      Briseida Bay RN  10/15/20 [id number]

## 2020-10-16 ENCOUNTER — READMISSION MANAGEMENT (OUTPATIENT)
Dept: CALL CENTER | Facility: HOSPITAL | Age: 85
End: 2020-10-16

## 2020-10-16 VITALS
BODY MASS INDEX: 22.32 KG/M2 | OXYGEN SATURATION: 96 % | WEIGHT: 121.3 LBS | DIASTOLIC BLOOD PRESSURE: 63 MMHG | HEIGHT: 62 IN | TEMPERATURE: 97.8 F | HEART RATE: 54 BPM | SYSTOLIC BLOOD PRESSURE: 147 MMHG | RESPIRATION RATE: 16 BRPM

## 2020-10-16 LAB
ALBUMIN SERPL-MCNC: 3.9 G/DL (ref 3.5–5.2)
ALBUMIN/GLOB SERPL: 1.2 G/DL
ALP SERPL-CCNC: 68 U/L (ref 39–117)
ALT SERPL W P-5'-P-CCNC: 13 U/L (ref 1–33)
ANION GAP SERPL CALCULATED.3IONS-SCNC: 8.1 MMOL/L (ref 5–15)
AST SERPL-CCNC: 23 U/L (ref 1–32)
BASOPHILS # BLD AUTO: 0.07 10*3/MM3 (ref 0–0.2)
BASOPHILS NFR BLD AUTO: 1.1 % (ref 0–1.5)
BILIRUB SERPL-MCNC: 0.5 MG/DL (ref 0–1.2)
BUN SERPL-MCNC: 19 MG/DL (ref 8–23)
BUN/CREAT SERPL: 20 (ref 7–25)
CALCIUM SPEC-SCNC: 9.3 MG/DL (ref 8.6–10.5)
CHLORIDE SERPL-SCNC: 107 MMOL/L (ref 98–107)
CO2 SERPL-SCNC: 25.9 MMOL/L (ref 22–29)
CREAT SERPL-MCNC: 0.95 MG/DL (ref 0.57–1)
D-LACTATE SERPL-SCNC: 0.7 MMOL/L (ref 0.5–2)
DEPRECATED RDW RBC AUTO: 42.5 FL (ref 37–54)
EOSINOPHIL # BLD AUTO: 0.28 10*3/MM3 (ref 0–0.4)
EOSINOPHIL NFR BLD AUTO: 4.2 % (ref 0.3–6.2)
ERYTHROCYTE [DISTWIDTH] IN BLOOD BY AUTOMATED COUNT: 11.8 % (ref 12.3–15.4)
GFR SERPL CREATININE-BSD FRML MDRD: 56 ML/MIN/1.73
GLOBULIN UR ELPH-MCNC: 3.3 GM/DL
GLUCOSE SERPL-MCNC: 99 MG/DL (ref 65–99)
HCT VFR BLD AUTO: 37.1 % (ref 34–46.6)
HGB BLD-MCNC: 12.6 G/DL (ref 12–15.9)
IMM GRANULOCYTES # BLD AUTO: 0.02 10*3/MM3 (ref 0–0.05)
IMM GRANULOCYTES NFR BLD AUTO: 0.3 % (ref 0–0.5)
LYMPHOCYTES # BLD AUTO: 2.16 10*3/MM3 (ref 0.7–3.1)
LYMPHOCYTES NFR BLD AUTO: 32.6 % (ref 19.6–45.3)
MCH RBC QN AUTO: 34.1 PG (ref 26.6–33)
MCHC RBC AUTO-ENTMCNC: 34 G/DL (ref 31.5–35.7)
MCV RBC AUTO: 100.3 FL (ref 79–97)
MONOCYTES # BLD AUTO: 0.77 10*3/MM3 (ref 0.1–0.9)
MONOCYTES NFR BLD AUTO: 11.6 % (ref 5–12)
NEUTROPHILS NFR BLD AUTO: 3.32 10*3/MM3 (ref 1.7–7)
NEUTROPHILS NFR BLD AUTO: 50.2 % (ref 42.7–76)
NRBC BLD AUTO-RTO: 0 /100 WBC (ref 0–0.2)
PLATELET # BLD AUTO: 242 10*3/MM3 (ref 140–450)
PMV BLD AUTO: 10.3 FL (ref 6–12)
POTASSIUM SERPL-SCNC: 4.2 MMOL/L (ref 3.5–5.2)
PROT SERPL-MCNC: 7.2 G/DL (ref 6–8.5)
RBC # BLD AUTO: 3.7 10*6/MM3 (ref 3.77–5.28)
SODIUM SERPL-SCNC: 141 MMOL/L (ref 136–145)
TROPONIN T SERPL-MCNC: <0.01 NG/ML (ref 0–0.03)
WBC # BLD AUTO: 6.62 10*3/MM3 (ref 3.4–10.8)

## 2020-10-16 PROCEDURE — G0378 HOSPITAL OBSERVATION PER HR: HCPCS

## 2020-10-16 PROCEDURE — 83605 ASSAY OF LACTIC ACID: CPT | Performed by: INTERNAL MEDICINE

## 2020-10-16 PROCEDURE — 80053 COMPREHEN METABOLIC PANEL: CPT | Performed by: INTERNAL MEDICINE

## 2020-10-16 PROCEDURE — 99213 OFFICE O/P EST LOW 20 MIN: CPT | Performed by: INTERNAL MEDICINE

## 2020-10-16 PROCEDURE — 85025 COMPLETE CBC W/AUTO DIFF WBC: CPT | Performed by: INTERNAL MEDICINE

## 2020-10-16 RX ORDER — ATENOLOL 25 MG/1
25 TABLET ORAL
Qty: 30 TABLET | Refills: 0 | Status: SHIPPED | OUTPATIENT
Start: 2020-10-17 | End: 2020-10-21 | Stop reason: SDUPTHER

## 2020-10-16 RX ADMIN — SODIUM CHLORIDE, PRESERVATIVE FREE 10 ML: 5 INJECTION INTRAVENOUS at 08:18

## 2020-10-16 RX ADMIN — DULOXETINE HYDROCHLORIDE 60 MG: 60 CAPSULE, DELAYED RELEASE ORAL at 08:18

## 2020-10-16 RX ADMIN — CALCIUM CARBONATE-VITAMIN D TAB 500 MG-200 UNIT 500 MG: 500-200 TAB at 08:18

## 2020-10-16 RX ADMIN — AMLODIPINE BESYLATE 5 MG: 5 TABLET ORAL at 08:18

## 2020-10-16 RX ADMIN — ATENOLOL 25 MG: 25 TABLET ORAL at 08:18

## 2020-10-16 RX ADMIN — ROSUVASTATIN CALCIUM 5 MG: 5 TABLET, FILM COATED ORAL at 08:18

## 2020-10-16 RX ADMIN — APIXABAN 2.5 MG: 2.5 TABLET, FILM COATED ORAL at 12:43

## 2020-10-16 RX ADMIN — OXYBUTYNIN CHLORIDE 5 MG: 5 TABLET ORAL at 08:18

## 2020-10-16 RX ADMIN — MULTIPLE VITAMINS W/ MINERALS TAB 1 TABLET: TAB at 08:18

## 2020-10-16 RX ADMIN — LOSARTAN POTASSIUM 100 MG: 100 TABLET, FILM COATED ORAL at 08:18

## 2020-10-16 RX ADMIN — ALLOPURINOL 100 MG: 100 TABLET ORAL at 08:18

## 2020-10-16 NOTE — DISCHARGE SUMMARY
PHYSICIAN DISCHARGE SUMMARY                                                                        [facility]    Patient Identification:  Name: Alba Correa  Age: 87 y.o.  Sex: female  :  1933  MRN: [id number]  Primary Care Physician: Aramis Doty MD    Admit date: 10/15/2020  Discharge date and time:10/16/2020  Discharged Condition: good    Discharge Diagnoses:  Active Hospital Problems    Diagnosis  POA   • **Chest pain [R07.9]  Yes   • Atrial fibrillation status post cardioversion (CMS/[facility]) [I48.91]  Unknown   • Dizziness [R42]  Unknown   • PAF (paroxysmal atrial fibrillation) (CMS/[facility]) [I48.0]  Yes   • History of ischemic right MCA stroke with extension [Z86.73]  Not Applicable   • D (diarrhea) [R19.7]  Yes   • Chronic renal failure, stage 3 (moderate) [N18.30]  Yes   • Rheumatoid arthritis (CMS/[facility]) [M06.9]  Yes      Resolved Hospital Problems   No resolved problems to display.          PMHX:   Past Medical History:   Diagnosis Date   • Benign essential hypertension    • Chronic kidney disease    • Chronic renal insufficiency    • CVA (cerebral vascular accident) (CMS/[facility])    • Degeneration of intervertebral disc    • Depression    • DVT (deep venous thrombosis) (CMS/[facility])    • Edema    • Gout    • H/O complete eye exam 2016   • Hyperlipidemia    • IBS (irritable bowel syndrome)    • OAB (overactive bladder)    • Osteopenia    • Osteoporosis    • PAF (paroxysmal atrial fibrillation) (CMS/[facility]) 2019   • Peripheral neuropathy    • Rheumatoid arthritis (CMS/[facility])      PSHX:   Past Surgical History:   Procedure Laterality Date   • APPENDECTOMY     • BREAST BIOPSY     • COLONOSCOPY      declines   • HERNIA REPAIR     • HYSTERECTOMY      still has ovaries   • MAMMO BILATERAL  2016    pt declines   • PAP SMEAR  2016    declines       Hospital Course: Alba Correa  is 87-year-old female who lives by  herself but talks to her daughter on a daily basis was in her usual state of her health since her cardioversion for underlying atrial fibrillation in August of this year.  According to her yesterday when she woke up she was fine except that she had loose bowel movement and afterwards she was having some discomfort in the chest that was going to her back.  She also had intermittent episodes of dizziness and started using walker but was able to get around.  The episode of chest discomfort started around 330 or 4:00 in the afternoon while she was sitting in the couch.  It was bad enough for her to remember but not severe enough for her to seek any help.  She continued to feel discomfort off-and-on throughout until she went to bed at around 11:00 last night.  She usually wakes up at night multiple times to go to the bathroom and so she did her routine last night and did not feel any dizziness the first time when she woke up to go to bathroom.  Second time when she woke up she felt dizzy but was safely able to maneuver her way to the bathroom and come back in third time when she woke up was early morning today and she has been up since.  Today she did not have any diarrhea or bowel movement but started having similar discomfort.  She was also somewhat dizzy.  Her daughter called at this morning as per her routine and when she heard these complaints she suggested that she needs to go to see her primary care physician or do something about it.  Patient attempting to get in to see her primary care physician but she was told that the closest opening that they have for her would be next Tuesday.  Patient decided to go to the [facility] urgent care at [place] where she was evaluated and was recommended that patient needed to go to the emergency room for further evaluation.  After being evaluated in the emergency room patient is being admitted for further care.  She has some blood work done an EKG done at the  [facility] care center.  Patient currently feels okay and denies any chest discomfort.  As mentioned above she did not have any more loose bowel movement since yesterday.  Patient does have history of irregular heartbeat for which she underwent cardioversion by Dr. Roshan Martines couple of months ago and according to her another medicine was added to her regimen at that time that she does not remember the name of.  She does make a point that since her cardioversion she has significant improvement in her energy level and how she feels.         The patient was admitted to the hospital and seen by cardiology.  They did not recommend any further work-up and thought her chest pain may be due to GI issues.  She looked well enough to go home and she will follow-up with her primary care in 1 week for continuing care and also have follow-up with cardiology.  Her heart rate was a bit slow and we did reduce her beta-blocker due to some bradycardia.    Consults:     Consults     Date and Time Order Name Status Description    10/15/2020 2203 Inpatient Cardiology Consult Completed     10/15/2020 1616 [facility] (on-call MD unless specified) Details Completed     10/15/2020 1549 [facility] (on-call MD unless specified) Completed         Results from last 7 days   Lab Units 10/16/20  0800   WBC 10*3/mm3 6.62   HEMOGLOBIN g/dL 12.6   HEMATOCRIT % 37.1   PLATELETS 10*3/mm3 242     Results from last 7 days   Lab Units 10/16/20  0800   SODIUM mmol/L 141   POTASSIUM mmol/L 4.2   CHLORIDE mmol/L 107   CO2 mmol/L 25.9   BUN mg/dL 19   CREATININE mg/dL 0.95   GLUCOSE mg/dL 99   CALCIUM mg/dL 9.3     Significant Diagnostic Studies:   WBC   Date Value Ref Range Status   10/16/2020 6.62 3.40 - 10.80 10*3/mm3 Final     Hemoglobin   Date Value Ref Range Status   10/16/2020 12.6 12.0 - 15.9 g/dL Final     Hematocrit   Date Value Ref Range Status   10/16/2020 37.1 34.0 - 46.6 % Final     Platelets   Date Value Ref Range Status   10/16/2020 242 140 - 450 10*3/mm3  Final     Sodium   Date Value Ref Range Status   10/16/2020 141 136 - 145 mmol/L Final     Potassium   Date Value Ref Range Status   10/16/2020 4.2 3.5 - 5.2 mmol/L Final     Chloride   Date Value Ref Range Status   10/16/2020 107 98 - 107 mmol/L Final     CO2   Date Value Ref Range Status   10/16/2020 25.9 22.0 - 29.0 mmol/L Final     BUN   Date Value Ref Range Status   10/16/2020 19 8 - 23 mg/dL Final     Creatinine   Date Value Ref Range Status   10/16/2020 0.95 0.57 - 1.00 mg/dL Final     Glucose   Date Value Ref Range Status   10/16/2020 99 65 - 99 mg/dL Final     Calcium   Date Value Ref Range Status   10/16/2020 9.3 8.6 - 10.5 mg/dL Final     AST (SGOT)   Date Value Ref Range Status   10/16/2020 23 1 - 32 U/L Final     ALT (SGPT)   Date Value Ref Range Status   10/16/2020 13 1 - 33 U/L Final     Alkaline Phosphatase   Date Value Ref Range Status   10/16/2020 68 39 - 117 U/L Final     No results found for: APTT, INR  No results found for: COLORU, CLARITYU, SPECGRAV, PHUR, PROTEINUR, GLUCOSEU, KETONESU, BLOODU, NITRITE, LEUKOCYTESUR, BILIRUBINUR, UROBILINOGEN, RBCUA, WBCUA, BACTERIA, UACOMMENT  Troponin T   Date Value Ref Range Status   10/15/2020 <0.010 0.000 - 0.030 ng/mL Final     No components found for: HGBA1C;2  No components found for: TSH;2  Imaging Results (All)     Procedure Component Value Units Date/Time    XR Chest 2 View [[id number]] Collected: 10/15/20 1515     Updated: 10/15/20 1527    Narrative:      TWO-VIEW CHEST     HISTORY: Chest pain.     FINDINGS: The lungs are well-expanded and clear. The heart is slightly  enlarged and there is no acute disease or change from 11/20/2015.     This report was finalized on 10/15/2020 3:23 PM by Dr. Garo Larson M.D.           Lab Results (last 7 days)     Procedure Component Value Units Date/Time    Comprehensive Metabolic Panel [[id number]]  (Abnormal) Collected: 10/16/20 0800    Specimen: Blood from Hand, Left Updated: 10/16/20 0933     Glucose 99 mg/dL       BUN 19 mg/dL      Creatinine 0.95 mg/dL      Sodium 141 mmol/L      Potassium 4.2 mmol/L      Chloride 107 mmol/L      CO2 25.9 mmol/L      Calcium 9.3 mg/dL      Total Protein 7.2 g/dL      Albumin 3.90 g/dL      ALT (SGPT) 13 U/L      AST (SGOT) 23 U/L      Alkaline Phosphatase 68 U/L      Total Bilirubin 0.5 mg/dL      eGFR Non African Amer 56 mL/min/1.73      Globulin 3.3 gm/dL      A/G Ratio 1.2 g/dL      BUN/Creatinine Ratio 20.0     Anion Gap 8.1 mmol/L     Narrative:      GFR Normal >60  Chronic Kidney Disease <60  Kidney Failure <15      Lactic Acid, Plasma [[id number]]  (Normal) Collected: 10/16/20 0800    Specimen: Blood Updated: 10/16/20 0900     Lactate 0.7 mmol/L     CBC Auto Differential [[id number]]  (Abnormal) Collected: 10/16/20 0800    Specimen: Blood Updated: 10/16/20 0853     WBC 6.62 10*3/mm3      RBC 3.70 10*6/mm3      Hemoglobin 12.6 g/dL      Hematocrit 37.1 %      .3 fL      MCH 34.1 pg      MCHC 34.0 g/dL      RDW 11.8 %      RDW-SD 42.5 fl      MPV 10.3 fL      Platelets 242 10*3/mm3      Neutrophil % 50.2 %      Lymphocyte % 32.6 %      Monocyte % 11.6 %      Eosinophil % 4.2 %      Basophil % 1.1 %      Immature Grans % 0.3 %      Neutrophils, Absolute 3.32 10*3/mm3      Lymphocytes, Absolute 2.16 10*3/mm3      Monocytes, Absolute 0.77 10*3/mm3      Eosinophils, Absolute 0.28 10*3/mm3      Basophils, Absolute 0.07 10*3/mm3      Immature Grans, Absolute 0.02 10*3/mm3      nRBC 0.0 /100 WBC     Troponin [[id number]]  (Normal) Collected: 10/15/20 2309    Specimen: Blood Updated: 10/16/20 0039     Troponin T <0.010 ng/mL     Narrative:      Troponin T Reference Range:  <= 0.03 ng/mL-   Negative for AMI  >0.03 ng/mL-     Abnormal for myocardial necrosis.  Clinicians would have to utilize clinical acumen, EKG, Troponin and serial changes to determine if it is an Acute Myocardial Infarction or myocardial injury due to an underlying chronic condition.       Results may be falsely  decreased if patient taking Biotin.      COVID PRE-OP / PRE-PROCEDURE SCREENING ORDER (NO ISOLATION) - Swab, Nasopharynx [[id number]]  (Normal) Collected: 10/15/20 1642    Specimen: Swab from Nasopharynx Updated: 10/15/20 1810    Narrative:      The following orders were created for panel order COVID PRE-OP / PRE-PROCEDURE SCREENING ORDER (NO ISOLATION) - Swab, Nasopharynx.  Procedure                               Abnormality         Status                     ---------                               -----------         ------                     Respiratory Panel PCR w/...[[id number]]  Normal              Final result                 Please view results for these tests on the individual orders.    Respiratory Panel PCR w/COVID-19(SARS-CoV-2) FERNANDO/HAYDE/TOOTIE/PAD/[facility] In-House, NP Swab in UTM/VTM, 3-4 HR TAT - Swab, Nasopharynx [[id number]]  (Normal) Collected: 10/15/20 1642    Specimen: Swab from Nasopharynx Updated: 10/15/20 1810     ADENOVIRUS, PCR Not Detected     Coronavirus 229E Not Detected     Coronavirus HKU1 Not Detected     Coronavirus NL63 Not Detected     Coronavirus OC43 Not Detected     COVID19 Not Detected     Human Metapneumovirus Not Detected     Human Rhinovirus/Enterovirus Not Detected     Influenza A PCR Not Detected     Influenza A H1 Not Detected     Influenza A H1 2009 PCR Not Detected     Influenza A H3 Not Detected     Influenza B PCR Not Detected     Parainfluenza Virus 1 Not Detected     Parainfluenza Virus 2 Not Detected     Parainfluenza Virus 3 Not Detected     Parainfluenza Virus 4 Not Detected     RSV, PCR Not Detected     Bordetella pertussis pcr Not Detected     Bordetella parapertussis PCR Not Detected     Chlamydophila pneumoniae PCR Not Detected     Mycoplasma pneumo by PCR Not Detected    Narrative:      Fact sheet for providers: https://docs.[employer]/wp-content/uploads/RZP4755-3372-HV5.1-EUA-Provider-Fact-Sheet-3.pdf    Fact sheet for patients:  https://docs.[employer]/wp-content/uploads/CDF1952-1523-HK1.1-EUA-Patient-Fact-Sheet-1.pdf    Comprehensive Metabolic Panel [[id number]]  (Abnormal) Collected: 10/15/20 1430    Specimen: Blood Updated: 10/15/20 1539     Glucose 100 mg/dL      BUN 27 mg/dL      Creatinine 1.34 mg/dL      Sodium 139 mmol/L      Potassium 4.7 mmol/L      Chloride 104 mmol/L      CO2 27.0 mmol/L      Calcium 10.2 mg/dL      Total Protein 8.1 g/dL      Albumin 4.40 g/dL      ALT (SGPT) 16 U/L      AST (SGOT) 25 U/L      Alkaline Phosphatase 75 U/L      Total Bilirubin 0.3 mg/dL      eGFR Non African Amer 37 mL/min/1.73      Globulin 3.7 gm/dL      A/G Ratio 1.2 g/dL      BUN/Creatinine Ratio 20.1     Anion Gap 8.0 mmol/L     Narrative:      GFR Normal >60  Chronic Kidney Disease <60  Kidney Failure <15      Troponin [[id number]]  (Normal) Collected: 10/15/20 1430    Specimen: Blood Updated: 10/15/20 1539     Troponin T <0.010 ng/mL     Narrative:      Troponin T Reference Range:  <= 0.03 ng/mL-   Negative for AMI  >0.03 ng/mL-     Abnormal for myocardial necrosis.  Clinicians would have to utilize clinical acumen, EKG, Troponin and serial changes to determine if it is an Acute Myocardial Infarction or myocardial injury due to an underlying chronic condition.       Results may be falsely decreased if patient taking Biotin.      BNP [[id number]]  (Normal) Collected: 10/15/20 1430    Specimen: Blood Updated: 10/15/20 1536     proBNP 1,058.0 pg/mL     Narrative:      Among patients with dyspnea, NT-proBNP is highly sensitive for the detection of acute congestive heart failure. In addition NT-proBNP of <300 pg/ml effectively rules out acute congestive heart failure with 99% negative predictive value.    Results may be falsely decreased if patient taking Biotin.      [place] Draw [[id number]] Collected: 10/15/20 1430    Specimen: Blood Updated: 10/15/20 1531    Narrative:      The following orders were created for panel order [place]  Draw.  Procedure                               Abnormality         Status                     ---------                               -----------         ------                     Light Blue Top[[id number]]                                   Final result               Green Top (Gel)[[id number]]                                  Final result               Lavender Top[[id number]]                                     Final result               Gold Top - SST[[id number]]                                   Final result                 Please view results for these tests on the individual orders.    Light Blue Top [[id number]] Collected: 10/15/20 1430    Specimen: Blood Updated: 10/15/20 1531     Extra Tube hold for add-on     Comment: Auto resulted       Green Top (Gel) [[id number]] Collected: 10/15/20 1430    Specimen: Blood Updated: 10/15/20 1531     Extra Tube Hold for add-ons.     Comment: Auto resulted.       Lavender Top [[id number]] Collected: 10/15/20 1430    Specimen: Blood Updated: 10/15/20 1531     Extra Tube hold for add-on     Comment: Auto resulted       Gold Top - SST [[id number]] Collected: 10/15/20 1430    Specimen: Blood Updated: 10/15/20 1531     Extra Tube Hold for add-ons.     Comment: Auto resulted.       CBC & Differential [[id number]]  (Abnormal) Collected: 10/15/20 1430    Specimen: Blood Updated: 10/15/20 1513    Narrative:      The following orders were created for panel order CBC & Differential.  Procedure                               Abnormality         Status                     ---------                               -----------         ------                     CBC Auto Differential[[id number]]        Abnormal            Final result                 Please view results for these tests on the individual orders.    CBC Auto Differential [[id number]]  (Abnormal) Collected: 10/15/20 1430    Specimen: Blood Updated: 10/15/20 1513     WBC 6.27 10*3/mm3      RBC 3.84 10*6/mm3      Hemoglobin 13.1 g/dL       "Hematocrit 38.8 %      .0 fL      MCH 34.1 pg      MCHC 33.8 g/dL      RDW 11.9 %      RDW-SD 43.7 fl      MPV 10.1 fL      Platelets 284 10*3/mm3      Neutrophil % 49.8 %      Lymphocyte % 34.4 %      Monocyte % 10.4 %      Eosinophil % 3.8 %      Basophil % 1.3 %      Immature Grans % 0.3 %      Neutrophils, Absolute 3.12 10*3/mm3      Lymphocytes, Absolute 2.16 10*3/mm3      Monocytes, Absolute 0.65 10*3/mm3      Eosinophils, Absolute 0.24 10*3/mm3      Basophils, Absolute 0.08 10*3/mm3      Immature Grans, Absolute 0.02 10*3/mm3      nRBC 0.0 /100 WBC         /66 (BP Location: Left arm, Patient Position: Lying)   Pulse 57   Temp 97.6 °F (36.4 °C) (Oral)   Resp 18   Ht 157.5 cm (62\")   Wt 55 kg (121 lb 4.8 oz)   SpO2 94%   BMI 22.19 kg/m²     Discharge Exam:  General Appearance:    Alert, cooperative, no distress                          Head:    Normocephalic, without obvious abnormality, atraumatic                          Eyes:                            Throat:   Lips, tongue, gums normal                          Neck:   Supple, symmetrical, trachea midline, no JVD                        Lungs:     Clear to auscultation bilaterally, respirations unlabored                Chest Wall:    No tenderness or deformity                        Heart:    Regular rate and rhythm, S1 and S2 normal, no murmur,no  Rub or gallop                  Abdomen:     Soft, non-tender, bowel sounds active, no masses, no  organomegaly                  Extremities:   Extremities normal, atraumatic, no cyanosis or edema                             Skin:   Skin is warm and dry,  no rashes or palpable lesions                  Neurologic:   no focal deficits noted     Disposition:  Home    Patient Instructions:      Discharge Medications      Changes to Medications      Instructions Start Date   atenolol 25 MG tablet  Commonly known as: TENORMIN  What changed: when to take this   25 mg, Oral, Every 24 Hours Scheduled   " Start Date: October 17, 2020        Continue These Medications      Instructions Start Date   acetaminophen 650 MG 8 hr tablet  Commonly known as: TYLENOL   1,300 mg, Oral, Every 8 Hours PRN      allopurinol 100 MG tablet  Commonly known as: Zyloprim   100 mg, Oral, Daily      amLODIPine 5 MG tablet  Commonly known as: NORVASC   TAKE 1 TABLET BY MOUTH EVERY DAY      Calcium 600+D 600-400 MG-UNIT per tablet  Generic drug: calcium carbonate-vitamin d   1 tablet, Oral, Daily      Centrum Silver tablet   1 tablet, Oral, Daily      diphenhydrAMINE-acetaminophen  MG tablet per tablet  Commonly known as: TYLENOL PM   1 tablet, Oral, Nightly PRN      DULoxetine 60 MG capsule  Commonly known as: CYMBALTA   60 mg, Oral, Daily      Eliquis 2.5 MG tablet tablet  Generic drug: apixaban   TAKE 1 TABLET EVERY 12     HOURS      losartan 100 MG tablet  Commonly known as: COZAAR   100 mg, Oral, Daily      OMEGA-3 FISH OIL PO   1,600 mg, Oral, Every Morning      ondansetron 4 MG tablet  Commonly known as: ZOFRAN   4 mg, Oral, Every 8 Hours PRN      oxybutynin 5 MG tablet  Commonly known as: DITROPAN   5 mg, Oral, 3 Times Daily      rosuvastatin 5 MG tablet  Commonly known as: CRESTOR   5 mg, Oral, Daily           Future Appointments   Date Time Provider Department Center   10/26/2020 10:30 AM Miracle Terry APRN [facility] None   2/16/2021  2:15 PM Roshan Martines III, MD [facility] None     Follow-up Information     Roshan Martines III, MD Follow up in 1 week(s).    Specialty: Cardiology  Contact information:  [address]  [facility] [id number] [phone]             Aramis Doty MD Follow up in 1 week(s).    Specialty: Family Medicine  Contact information:  [address] [facility] [address]  [facility] [id number] [phone]                 Discharge Order (From admission, onward)     Start     Ordered    10/16/20 1155  Discharge patient  Once     Expected Discharge Date: 10/16/20    Discharge Disposition: Home  or Self Care    Physician of Record for Attribution - Please select from Treatment Team: MARCO A URBANO [[id number]]    Review needed by CMO to determine Physician of Record: No       Question Answer Comment   Physician of Record for Attribution - Please select from Treatment Team MARCO A URBANO    Review needed by CMO to determine Physician of Record No        10/16/20 1157                Total time spent discharging patient including evaluation,post hospitalization follow up,  medication and post hospitalization instructions and education total time exceeds 30 minutes.    Signed:  Marco A Urbano MD  10/16/2020  11:58 EDT

## 2020-10-16 NOTE — PROGRESS NOTES
Continued Stay Note  [facility]     Patient Name: Alba Correa  MRN: [id number]  Today's Date: 10/16/2020    Admit Date: 10/15/2020    Discharge Plan     Row Name 10/16/20 0833       Plan    Plan  Plan home with family.  TORO Rosales RN    Patient/Family in Agreement with Plan  yes    Plan Comments  FACE SHEET VERIFIED/ SIMS SIGNED.  Spoke to pt at bedside.  Pt's PCP is Dr. Aramis Doty.  Pt lives alone in a single story patio home but her daughter  (Angy Pederson ) is staying with her currently.   Pt is independent with ADL's.  Pt has a grab bar, rollator, shower chair, rolling walker and wheelchair for home use if needed.  Pt gets prescriptions at [facility]) and [facility].   Pt denies any issues affording medications.  Pt is not current with .  Pt has been in [place] for skilled care.  Pt denies any discharge needs.  Pt states family can transport her home. Plan home with family.  TORO Rosales RN        Discharge Codes    No documentation.             Patrica Rosales RN

## 2020-10-16 NOTE — PLAN OF CARE
Goal Outcome Evaluation:  Plan of Care Reviewed With: patient  Progress: no change  Outcome Summary: No c/o pain, VSS, IV fluids, cardio consulted. No s/s of distress noted will continue to monitor

## 2020-10-16 NOTE — PLAN OF CARE
VSS, no c/o chest pain, no signs of distress      Problem: Adult Inpatient Plan of Care  Goal: Absence of Hospital-Acquired Illness or Injury  Intervention: Identify and Manage Fall Risk  Recent Flowsheet Documentation  Taken 10/16/2020 1002 by Aren St RN  Safety Promotion/Fall Prevention:   activity supervised   assistive device/personal items within reach   clutter free environment maintained   fall prevention program maintained   room organization consistent   safety round/check completed   nonskid shoes/slippers when out of bed  Taken 10/16/2020 0810 by Aren St RN  Safety Promotion/Fall Prevention:   activity supervised   assistive device/personal items within reach   clutter free environment maintained   fall prevention program maintained   nonskid shoes/slippers when out of bed   safety round/check completed   room organization consistent  Intervention: Prevent Skin Injury  Recent Flowsheet Documentation  Taken 10/16/2020 1002 by Aren St RN  Body Position: position changed independently  Taken 10/16/2020 0810 by Aren St RN  Body Position:   sitting up in bed   position changed independently  Intervention: Prevent and Manage VTE (venous thromboembolism) Risk  Recent Flowsheet Documentation  Taken 10/16/2020 0810 by Aren St RN  VTE Prevention/Management:   bilateral   dorsiflexion/plantar flexion performed  Intervention: Prevent Infection  Recent Flowsheet Documentation  Taken 10/16/2020 1002 by Aren St RN  Infection Prevention:   single patient room provided   rest/sleep promoted  Taken 10/16/2020 0810 by Aren St RN  Infection Prevention:   rest/sleep promoted   single patient room provided  Goal: Optimal Comfort and Wellbeing  Intervention: Provide Person-Centered Care  Recent Flowsheet Documentation  Taken 10/16/2020 0810 by Aren St RN  Trust Relationship/Rapport:   care explained   empathic  listening provided   questions answered   thoughts/feelings acknowledged   Goal Outcome Evaluation:  Plan of Care Reviewed With: patient  Progress: no change

## 2020-10-16 NOTE — PROGRESS NOTES
Discharge Planning Assessment  [facility]     Patient Name: Alba Correa  MRN: [id number]  Today's Date: 10/16/2020    Admit Date: 10/15/2020    Discharge Needs Assessment     Row Name 10/16/20 0830       Living Environment    Lives With  alone    Current Living Arrangements  home/apartment/condo    Primary Care Provided by  self    Provides Primary Care For  no one    Family Caregiver if Needed  child(ja), adult    Family Caregiver Names  Son  ( Ankit Doty  [phone]) and daughter ( Angy Pederson ([phone])    Quality of Family Relationships  involved;supportive    Able to Return to Prior Arrangements  yes    Living Arrangement Comments  Pt lives alone in a single story patio home but her daughter  (Angy Pederson ) is staying with her currently.       Resource/Environmental Concerns    Resource/Environmental Concerns  none    Transportation Concerns  car, none       Transition Planning    Patient/Family Anticipates Transition to  home with family    Patient/Family Anticipated Services at Transition  none    Transportation Anticipated  family or friend will provide       Discharge Needs Assessment    Readmission Within the Last 30 Days  no previous admission in last 30 days    Equipment Currently Used at Home  grab bar;rollator;shower chair;walker, rolling;wheelchair    Concerns to be Addressed  no discharge needs identified    Anticipated Changes Related to Illness  none    Equipment Needed After Discharge  grab bar, tub/shower;rollator;shower chair;walker, rolling;wheelchair, manual        Discharge Plan     Row Name 10/16/20 0833       Plan    Plan  Plan home with family.  TORO Rosales RN    Patient/Family in Agreement with Plan  yes    Plan Comments  FACE SHEET VERIFIED/ SIMS SIGNED.  Spoke to pt at bedside.  Pt's PCP is Dr. Aramis Doty.  Pt lives alone in a single story patio home but her daughter  (Angy Pederson ) is staying with her currently.   Pt is independent with ADL's.  Pt has a  grab bar, rollator, shower chair, rolling walker and wheelchair for home use if needed.  Pt gets prescriptions at [facility]  ([address]) and [facility].   Pt denies any issues affording medications.  Pt is not current with HH.  Pt has been in [place] for skilled care.  Pt denies any discharge needs.  Pt states family can transport her home. Plan home with family.  TORO Rosales RN        Continued Care and Services - Admitted Since 10/15/2020    Coordination has not been started for this encounter.         Demographic Summary     Row Name 10/16/20 0829       General Information    Admission Type  observation    Arrived From  emergency department    Required Notices Provided  Observation Status Notice    Referral Source  admission list    Reason for Consult  discharge planning    Preferred Language  English     Used During This Interaction  no        Functional Status     Row Name 10/16/20 0829       Functional Status    Usual Activity Tolerance  good    Current Activity Tolerance  moderate       Functional Status, IADL    Medications  independent    Meal Preparation  assistive person    Housekeeping  assistive person    Laundry  assistive person    Shopping  assistive person       Mental Status    General Appearance WDL  WDL       Mental Status Summary    Recent Changes in Mental Status/Cognitive Functioning  no changes        Psychosocial    No documentation.       Abuse/Neglect    No documentation.       Legal    No documentation.       Substance Abuse    No documentation.       Patient Forms    No documentation.           Patrica Rosales, RN

## 2020-10-16 NOTE — OUTREACH NOTE
Prep Survey      Responses   [facility] patient discharged from?  [place]   Is LACE score < 7 ?  No   Eligibility  [facility]   Date of Admission  10/15/20   Date of Discharge  10/16/20   Discharge Disposition  Home or Self Care   Discharge diagnosis  Atrial fibrillation status post cardioversion    Does the patient have one of the following disease processes/diagnoses(primary or secondary)?  Other   Does the patient have Home health ordered?  No   Is there a DME ordered?  No   Prep survey completed?  Yes          Rosa Vyas RN

## 2020-10-16 NOTE — H&P
Internal medicine history and physical  INTERNAL MEDICINE   [facility]       Patient Identification:  Name: Alba Correa  Age: 87 y.o.  Sex: female  :  1933  MRN: [id number]                   Primary Care Physician: Aramis Doty MD                               Date of admission:10/15/2020    Chief Complaint: Chest pain and dizziness.    History of Present Illness:   Patient is 87-year-old female who lives by herself but talks to her daughter on a daily basis was in her usual state of her health since her cardioversion for underlying atrial fibrillation in August of this year.  According to her yesterday when she woke up she was fine except that she had loose bowel movement and afterwards she was having some discomfort in the chest that was going to her back.  She also had intermittent episodes of dizziness and started using walker but was able to get around.  The episode of chest discomfort started around 330 or 4:00 in the afternoon while she was sitting in the couch.  It was bad enough for her to remember but not severe enough for her to seek any help.  She continued to feel discomfort off-and-on throughout until she went to bed at around 11:00 last night.  She usually wakes up at night multiple times to go to the bathroom and so she did her routine last night and did not feel any dizziness the first time when she woke up to go to bathroom.  Second time when she woke up she felt dizzy but was safely able to maneuver her way to the bathroom and come back in third time when she woke up was early morning today and she has been up since.  Today she did not have any diarrhea or bowel movement but started having similar discomfort.  She was also somewhat dizzy.  Her daughter called at this morning as per her routine and when she heard these complaints she suggested that she needs to go to see her primary care physician or do something about it.  Patient attempting to get in to see her  primary care physician but she was told that the closest opening that they have for her would be next Tuesday.  Patient decided to go to the [facility] urgent care at [place] where she was evaluated and was recommended that patient needed to go to the emergency room for further evaluation.  After being evaluated in the emergency room patient is being admitted for further care.  She has some blood work done an EKG done at the immediate care center.  Patient currently feels okay and denies any chest discomfort.  As mentioned above she did not have any more loose bowel movement since yesterday.  Patient does have history of irregular heartbeat for which she underwent cardioversion by Dr. Roshan Martines couple of months ago and according to her another medicine was added to her regimen at that time that she does not remember the name of.  She does make a point that since her cardioversion she has significant improvement in her energy level and how she feels.    Past Medical History:  Past Medical History:   Diagnosis Date   • Benign essential hypertension    • Chronic kidney disease    • Chronic renal insufficiency    • CVA (cerebral vascular accident) (CMS/[facility])    • Degeneration of intervertebral disc    • Depression    • DVT (deep venous thrombosis) (CMS/[facility])    • Edema    • Gout    • H/O complete eye exam 09/2016   • Hyperlipidemia    • IBS (irritable bowel syndrome)    • OAB (overactive bladder)    • Osteopenia    • Osteoporosis    • PAF (paroxysmal atrial fibrillation) (CMS/[facility]) 11/8/2019   • Peripheral neuropathy    • Rheumatoid arthritis (CMS/[facility])      Past Surgical History:  Past Surgical History:   Procedure Laterality Date   • APPENDECTOMY     • BREAST BIOPSY     • COLONOSCOPY      declines   • HERNIA REPAIR  1965   • HYSTERECTOMY      still has ovaries   • MAMMO BILATERAL  11/16/2016    pt declines   • PAP SMEAR  11/16/2016    declines      Home Meds:  Medications Prior to Admission   Medication Sig  Dispense Refill Last Dose   • acetaminophen (TYLENOL) 650 MG 8 hr tablet Take 1,300 mg by mouth Every 8 (Eight) Hours As Needed for Mild Pain .      • allopurinol (Zyloprim) 100 MG tablet Take 1 tablet by mouth Daily. 90 tablet 1    • amLODIPine (NORVASC) 5 MG tablet TAKE 1 TABLET BY MOUTH EVERY DAY 30 tablet 0    • atenolol (TENORMIN) 25 MG tablet Take 1 tablet by mouth 2 (Two) Times a Day. 270 tablet 3    • calcium carbonate-vitamin d (CALCIUM 600+D) 600-400 MG-UNIT per tablet Take 1 tablet by mouth Daily.      • diphenhydrAMINE-acetaminophen (TYLENOL PM)  MG tablet per tablet Take 1 tablet by mouth At Night As Needed for Sleep.      • DULoxetine (CYMBALTA) 60 MG capsule Take 60 mg by mouth Daily.      • Eliquis 2.5 MG tablet tablet TAKE 1 TABLET EVERY 12     HOURS 180 tablet 1    • losartan (COZAAR) 100 MG tablet Take 1 tablet by mouth Daily. 90 tablet 1    • Multiple Vitamins-Minerals (CENTRUM SILVER) tablet Take 1 tablet by mouth Daily.      • Omega-3 Fatty Acids (OMEGA-3 FISH OIL PO) Take 1,600 mg by mouth Every Morning.      • ondansetron (ZOFRAN) 4 MG tablet TAKE 1 TABLET BY MOUTH EVERY 8 (EIGHT) HOURS AS NEEDED FOR NAUSEA OR VOMITING. 10 tablet 0    • oxybutynin (DITROPAN) 5 MG tablet Take 5 mg by mouth 3 (Three) Times a Day.      • rosuvastatin (CRESTOR) 5 MG tablet Take 1 tablet by mouth Daily. 90 tablet 1      Current Meds:   No current facility-administered medications for this encounter.   Allergies:  Allergies   Allergen Reactions   • Bactrim [Sulfamethoxazole-Trimethoprim] Delirium   • Lisinopril Unknown (See Comments)     Unknown per pt   • Levofloxacin Rash     Social History:   Social History     Tobacco Use   • Smoking status: Former Smoker     Years: 5.00     Types: Cigarettes     Quit date: 1975     Years since quittin.8   • Smokeless tobacco: Never Used   • Tobacco comment: caffeine use: 2 cups coffee in AM   Substance Use Topics   • Alcohol use: Yes     Comment: Occasional- glass  "ofwine few times a week      Family History:  Family History   Problem Relation Age of Onset   • Alcohol abuse Other    • Cancer Other    • Hypertension Other    • Kidney disease Other    • Lung disease Other    • Heart disease Mother 89   • Heart failure Mother    • Ovarian cancer Daughter 60   • Stomach cancer Father    • Kidney cancer Father 52   • Lung cancer Father 52   • Ovarian cancer Sister    • Lung cancer Brother 65          Review of Systems  See history of present illness and past medical history.    Constitutional: Remarkable for no fever or chills  Respiratory: Negative for shortness of breath.    Cardiovascular: Positive for chest pain. Negative for palpitations.   GI: Remarkable for 1 loose bowel movement yesterday but denies any diarrhea or abdominal pain decrease in appetite no nausea or vomiting.  : Remarkable for no burning in urination frequency urgency.  Musculoskeletal: Remarkable for no specific joint aches and pain.  Neurological: Positive for dizziness, weakness (generalized) and light-headedness.   Remainder of ROS is negative.      Vitals:   /60 (BP Location: Right arm, Patient Position: Sitting)   Pulse 59   Temp 97.7 °F (36.5 °C) (Oral)   Resp 16   Ht 157.5 cm (62\")   Wt 55 kg (121 lb 4.8 oz)   SpO2 97%   BMI 22.19 kg/m²   I/O:     Intake/Output Summary (Last 24 hours) at 10/15/2020 2200  Last data filed at 10/15/2020 1643  Gross per 24 hour   Intake 500 ml   Output --   Net 500 ml     Exam:  Patient is examined using the personal protective equipment as per guidelines from infection control for this particular patient as enacted.  Hand washing was performed before and after patient interaction.  General Appearance:    Alert, cooperative, no distress, appears stated age   Head:    Normocephalic, without obvious abnormality, atraumatic   Eyes:    PERRL, conjunctiva/corneas clear, EOM's intact, both eyes   Ears:    Normal external ear canals, both ears   Nose:   Nares " normal, septum midline, mucosa normal, no drainage    or sinus tenderness   Throat:   Lips, tongue, gums normal; oral mucosa pink and moist   Neck:   Supple, symmetrical, trachea midline, no adenopathy;     thyroid:  no enlargement/tenderness/nodules; no carotid    bruit or JVD   Back:     Symmetric, no curvature, ROM normal, no CVA tenderness   Lungs:     Clear to auscultation bilaterally, respirations unlabored   Chest Wall:    No tenderness or deformity    Heart:   S1-S2 regular no murmur heard   Abdomen:    Soft nontender   Extremities:   Extremities normal, atraumatic, no cyanosis or edema   Pulses:   Pulses palpable in all extremities; symmetric all extremities   Skin:   Skin color normal, Skin is warm and dry,  no rashes or palpable lesions   Neurologic:  Grossly nonfocal       Data Review:      I reviewed the patient's new clinical results.  Results from last 7 days   Lab Units 10/15/20  1430   WBC 10*3/mm3 6.27   HEMOGLOBIN g/dL 13.1   PLATELETS 10*3/mm3 284     Results from last 7 days   Lab Units 10/15/20  1430   SODIUM mmol/L 139   POTASSIUM mmol/L 4.7   CHLORIDE mmol/L 104   CO2 mmol/L 27.0   BUN mg/dL 27*   CREATININE mg/dL 1.34*   CALCIUM mg/dL 10.2   GLUCOSE mg/dL 100*     ECG 12 Lead   Final Result   HEART RATE= 59  bpm   RR Interval= 1012  ms   MA Interval= 207  ms   P Horizontal Axis= -35  deg   P Front Axis= 47  deg   QRSD Interval= 81  ms   QT Interval= 388  ms   QRS Axis= -18  deg   T Wave Axis= 53  deg   - ABNORMAL ECG -   Sinus rhythm   Borderline left axis deviation   Anteroseptal infarct, age indeterminate   NO SIGNIFICANT CHANGE FROM PREVIOUS ECG   Electronically Signed By: Naga Juarez ([facility]) 15-Oct-2020 17:24:50   Date and Time of Study: 2020-10-15 14:06:51      No radiology results for the last 30 days.  Microbiology Results (last 10 days)     Procedure Component Value - Date/Time    COVID PRE-OP / PRE-PROCEDURE SCREENING ORDER (NO ISOLATION) - Swab, Nasopharynx [[id number]]   (Normal) Collected: 10/15/20 1642    Lab Status: Final result Specimen: Swab from Nasopharynx Updated: 10/15/20 1810    Narrative:      The following orders were created for panel order COVID PRE-OP / PRE-PROCEDURE SCREENING ORDER (NO ISOLATION) - Swab, Nasopharynx.  Procedure                               Abnormality         Status                     ---------                               -----------         ------                     Respiratory Panel PCR w/...[[id number]]  Normal              Final result                 Please view results for these tests on the individual orders.    Respiratory Panel PCR w/COVID-19(SARS-CoV-2) FERNANDO/HAYDE/TOOTIE/PAD/COR/MAD In-House, NP Swab in UTM/VTM, 3-4 HR TAT - Swab, Nasopharynx [[id number]]  (Normal) Collected: 10/15/20 1642    Lab Status: Final result Specimen: Swab from Nasopharynx Updated: 10/15/20 1810     ADENOVIRUS, PCR Not Detected     Coronavirus 229E Not Detected     Coronavirus HKU1 Not Detected     Coronavirus NL63 Not Detected     Coronavirus OC43 Not Detected     COVID19 Not Detected     Human Metapneumovirus Not Detected     Human Rhinovirus/Enterovirus Not Detected     Influenza A PCR Not Detected     Influenza A H1 Not Detected     Influenza A H1 2009 PCR Not Detected     Influenza A H3 Not Detected     Influenza B PCR Not Detected     Parainfluenza Virus 1 Not Detected     Parainfluenza Virus 2 Not Detected     Parainfluenza Virus 3 Not Detected     Parainfluenza Virus 4 Not Detected     RSV, PCR Not Detected     Bordetella pertussis pcr Not Detected     Bordetella parapertussis PCR Not Detected     Chlamydophila pneumoniae PCR Not Detected     Mycoplasma pneumo by PCR Not Detected    Narrative:      Fact sheet for providers: https://docs.[employer]/wp-content/uploads/XNC2046-6830-UY4.1-EUA-Provider-Fact-Sheet-3.pdf    Fact sheet for patients: https://docs.[employer]/wp-content/uploads/JGS9983-1189-BC7.1-EUA-Patient-Fact-Sheet-1.pdf        TWO-VIEW CHEST        HISTORY: Chest pain.       FINDINGS: The lungs are well-expanded and clear. The heart is slightly   enlarged and there is no acute disease or change from 11/20/2015.       This report was finalized on 10/15/2020 3:23 PM by Dr. Garo Larson M.D.   · BNP 1058  · Troponin less than 0.010  Assessment:  Active Hospital Problems    Diagnosis POA   • **Chest pain [R07.9] Yes   • Atrial fibrillation status post cardioversion (CMS/[facility]) [I48.91] Unknown   • Dizziness [R42] Unknown   • PAF (paroxysmal atrial fibrillation) (CMS/[facility]) [I48.0] Yes   • History of ischemic right MCA stroke with extension [Z86.73] Not Applicable   • D (diarrhea) [R19.7] Yes   • Chronic renal failure, stage 3 (moderate) [N18.30] Yes   • Rheumatoid arthritis (CMS/[facility]) [M06.9] Yes       Medical decision making:  Episodic chest discomfort described as pressure and tightness in the middle of the chest going into her back off and on since yesterday afternoon associated with dizziness currently resolved with initial work-up negative for any acute cardiac process-plan is to admit the patient check serial troponin and cardiology evaluation.  History of atrial fibrillation of paroxysmal type for which she is status post cardioversion in August 2020 currently in sinus rhythm-monitor her cardiac rhythm continue her current regimen including anticoagulation therapy and monitor.  Consult her cardiologist Dr. Martines.  Chronic kidney disease-monitor renal function avoid nephrotoxic agent and hypotensive episodes.  Episode of diarrhea but has no bowel movement today and the bowel movement that was liquidy was only once yesterday significance unclear-monitor.      Frank Seo MD   10/15/2020  22:00 EDT  Much of this encounter note is an electronic transcription/translation of spoken language to printed text. The electronic translation of spoken language may permit erroneous, or at times, nonsensical words or phrases to be inadvertently transcribed; Although  I have reviewed the note for such errors, some may still exist

## 2020-10-16 NOTE — CONSULTS
[facility] Consult Note    Patient Name: Alba Correa  :1933  87 y.o.    Date of Admission: 10/15/2020  Date of Consultation:  10/16/20  Encounter Provider: Onelia Hernandez MD  Place of Service: [facility] CARDIOLOGY  Referring Provider: Frank Seo MD  Patient Care Team:  Aramis Doty MD as PCP - General (Family Medicine)  Juan Jose Muñiz MD as Consulting Physician (Neurology)  Adrian Perez MD as Consulting Physician (Hematology and Oncology)  Adrian Caldera MD as Consulting Physician (Nephrology)  Zack Dewitt MD as Consulting Physician (Rheumatology)  Aramis Doty MD as Referring Physician (Family Medicine)  Edi Singh MD as Surgeon (Wound Care)  Yoly Isaac APRN as Nurse Practitioner (Obstetrics and Gynecology)      Chief complaint: chest pain, dizziness    History of Present Illness:  This is a 87 year old female normally followed by Dr. Martines with a history of hypertension, paroxsymal atrial fibrillation, prior stroke in 2019, who presents with lightheadedness and chest pain.      The patient reports that 2 days ago she had diarrhea.  She then developed lower chest/epigastric pain.  The following day the diarrhea and the pain improved but she was feeling lightheaded.  She believes she was taking adequate p.o. but admits that at baseline she is not good about drinking water.  She opted to come to the emergency room because of the discomfort.  She reported 1 episode of recurrent discomfort early this morning along with a headache that resolved after receiving morphine.  She currently denies any discomfort.  Her lightheadedness has improved.    In May of 2020, she was complaining of some fatigue and dizziness.A 48 hour holter monitor was done in early July which found she was in a fib the entire time that was rate controlled, rates in 70s. An echocardiogram was done as well which found EF of 57%, mild to moderate aortic valve  regurgitation, and moderate tricuspid valve regurgitation.  She was then cardioverted on 8/3/2020.  She was sent home with a Holter monitor to see if she had any significant sick sinus syndrome that might be contributing to her symptoms.  The holter monitor where she was found to have ectopic atrial rhythm and brief  2-4 beat runs of wide complex tachycardia, but this time, no atrial fibrillation. She was kept on medical therapy.  She had a follow up/telehealth visit with Dr. Martines on 8/31/20 where no changes were made to her regimen.        XR chest 2 vw on 10/15/20-  FINDINGS: The lungs are well-expanded and clear. The heart is slightly  enlarged and there is no acute disease or change from 11/20/2015.    Previous Testing-  Holter monitor on 8/3/20-  Study Findings    Patient diary submitted.Neck and arm pain was reported during the monitoring period. Neck and arm pain Neck and arm pain had no correlations. Patient reported rare episodes. No complications noted. The predominant rhythm noted during the testing period was sinus rhythm. Premature atrial contractions occured occasionally. There were several episodes of non-sustained supraventricular tachycardia, the longest lasting 19 seconds and the fastest with a rate of 152 bpm. Premature ventricular contractions occured rarely. Ventricular couplets and triplets. There was one episode of non-sustained ventricular tachycardia lasting 4 beats with a rate of 134 bpm. Sinoatrial node conduction was normal. No atrioventricular block noted.     Holter monitor on 7/2/20-  Interpretation Summary    · An abnormal monitor study.  · Atrial fibrillation is present throughout  · Average heart rate 69 bpm, range  bpm.       Echocardiogram on 7/2/20-  Interpretation Summary    · Left ventricular systolic function is normal. Calculated EF = 58.0%.  · Left ventricular wall thickness is consistent with mild concentric hypertrophy.  · Normal right ventricular systolic function  noted. Right ventricular cavity is mildly dilated.  · Left atrial cavity size is mildly dilated.  · Mild to moderate aortic valve regurgitation is present.  · Moderate tricuspid valve regurgitation is present.  · Calculated right ventricular systolic pressure from tricuspid regurgitation is 31 mmHg.  · There is no evidence of pericardial effusion.     Echocardiogram on 4/3/19-  Interpretation Summary    · Estimated EF appears to be in the range of 61 - 65%.  · Left ventricular diastolic dysfunction is noted (grade I) consistent with impaired relaxation.  · Normal right ventricular cavity size and systolic function noted.  · Mild aortic valve regurgitation is present.  · Mild mitral valve regurgitation is present.  · Mild to moderate tricuspid valve regurgitation is present.  · Calculated right ventricular systolic pressure from tricuspid regurgitation is 34 mmHg.  · There is no evidence of pericardial effusion.     KASH on 8/7/18-  Interpretation Summary    · Left ventricular systolic function is normal. Estimated EF appears to be in the range of 56 - 60%. Normal left ventricular cavity size and wall thickness noted. All left ventricular wall segments contract normally.  · Left ventricular diastolic function is normal.  · No interatrial septal aneurysm present. No evidence of a patent foramen ovale. Saline test results are negative.  · No evidence of a left atrial appendage thrombus was present.  · The aortic valve is abnormal in structure. The valve exhibits sclerosis.  · Mild to moderate aortic valve regurgitation is present.  · Mild mitral valve regurgitation is present.  · Mild tricuspid valve regurgitation is present. Estimated right ventricular systolic pressure from tricuspid regurgitation is normal (<35 mmHg).  · There is (grade 1) plaque in the aortic arch present. There is evidence of aortic arch sclerosis/calcification present.         Past Medical History:   Diagnosis Date   • Benign essential  hypertension    • Chronic kidney disease    • Chronic renal insufficiency    • CVA (cerebral vascular accident) (CMS/[facility])    • Degeneration of intervertebral disc    • Depression    • DVT (deep venous thrombosis) (CMS/[facility])    • Edema    • Gout    • H/O complete eye exam 09/2016   • Hyperlipidemia    • IBS (irritable bowel syndrome)    • OAB (overactive bladder)    • Osteopenia    • Osteoporosis    • PAF (paroxysmal atrial fibrillation) (CMS/[facility]) 11/8/2019   • Peripheral neuropathy    • Rheumatoid arthritis (CMS/[facility])        Past Surgical History:   Procedure Laterality Date   • APPENDECTOMY     • BREAST BIOPSY     • COLONOSCOPY      declines   • HERNIA REPAIR  1965   • HYSTERECTOMY      still has ovaries   • MAMMO BILATERAL  11/16/2016    pt declines   • PAP SMEAR  11/16/2016    declines         Prior to Admission medications    Medication Sig Start Date End Date Taking? Authorizing Provider   acetaminophen (TYLENOL) 650 MG 8 hr tablet Take 1,300 mg by mouth Every 8 (Eight) Hours As Needed for Mild Pain .   Yes Vanessa Castle MD   allopurinol (Zyloprim) 100 MG tablet Take 1 tablet by mouth Daily. 6/24/20  Yes Aramis Doty MD   amLODIPine (NORVASC) 5 MG tablet TAKE 1 TABLET BY MOUTH EVERY DAY 7/28/20  Yes Aramis Doty MD   atenolol (TENORMIN) 25 MG tablet Take 1 tablet by mouth 2 (Two) Times a Day. 8/3/20  Yes Heidi Colby MD   calcium carbonate-vitamin d (CALCIUM 600+D) 600-400 MG-UNIT per tablet Take 1 tablet by mouth Daily.   Yes ProviderVanessa MD   diphenhydrAMINE-acetaminophen (TYLENOL PM)  MG tablet per tablet Take 1 tablet by mouth At Night As Needed for Sleep.   Yes Vanessa Castle MD   DULoxetine (CYMBALTA) 60 MG capsule Take 60 mg by mouth Daily.   Yes Vanessa Castle MD   Eliquis 2.5 MG tablet tablet TAKE 1 TABLET EVERY 12     HOURS 10/8/20  Yes Roshan Martines III, MD   losartan (COZAAR) 100 MG tablet Take 1 tablet by mouth Daily. 1/14/20  Yes Aramis Doty  MD   Multiple Vitamins-Minerals (CENTRUM SILVER) tablet Take 1 tablet by mouth Daily.   Yes ProviderVanessa MD   Omega-3 Fatty Acids (OMEGA-3 FISH OIL PO) Take 1,600 mg by mouth Every Morning.   Yes ProviderVanessa MD   ondansetron (ZOFRAN) 4 MG tablet TAKE 1 TABLET BY MOUTH EVERY 8 (EIGHT) HOURS AS NEEDED FOR NAUSEA OR VOMITING. 19  Yes Aramis Doty MD   oxybutynin (DITROPAN) 5 MG tablet Take 5 mg by mouth 3 (Three) Times a Day.   Yes ProviderVanessa MD   rosuvastatin (CRESTOR) 5 MG tablet Take 1 tablet by mouth Daily. 20  Yes Aramis Doty MD       Allergies   Allergen Reactions   • Bactrim [Sulfamethoxazole-Trimethoprim] Delirium   • Lisinopril Unknown (See Comments)     Unknown per pt   • Levofloxacin Rash       Social History     Socioeconomic History   • Marital status:      Spouse name: Ray   • Number of children: 3   • Years of education: High school   • Highest education level: Not on file   Occupational History   • Occupation: Dental assistant     Employer: RETIRED   Tobacco Use   • Smoking status: Former Smoker     Years: 5.00     Types: Cigarettes     Quit date:      Years since quittin.8   • Smokeless tobacco: Never Used   • Tobacco comment: caffeine use: 2 cups coffee in AM   Substance and Sexual Activity   • Alcohol use: Yes     Comment: Occasional- glass ofwine few times a week   • Drug use: No   • Sexual activity: Defer     Partners: Male     Birth control/protection: Surgical       Family History   Problem Relation Age of Onset   • Alcohol abuse Other    • Cancer Other    • Hypertension Other    • Kidney disease Other    • Lung disease Other    • Heart disease Mother 89   • Heart failure Mother    • Ovarian cancer Daughter 60   • Stomach cancer Father    • Kidney cancer Father 52   • Lung cancer Father 52   • Ovarian cancer Sister    • Lung cancer Brother 65       REVIEW OF SYSTEMS:   All systems reviewed.  Pertinent positives identified in HPI.  All  other systems are negative.      Objective:     Vitals:    10/15/20 1810 10/15/20 2014 10/16/20 0001 10/16/20 0804   BP:  147/60 154/60 161/66   BP Location:  Right arm Right arm Left arm   Patient Position:  Sitting Lying Lying   Pulse: 51 59 56 57   Resp: 16 16 16 18   Temp:  97.7 °F (36.5 °C) 98.2 °F (36.8 °C) 97.6 °F (36.4 °C)   TempSrc:  Oral Oral Oral   SpO2:  97% 96% 94%   Weight:       Height:         Body mass index is 22.19 kg/m².    General Appearance:    Alert, cooperative, in no acute distress   Head:    Normocephalic, without obvious abnormality, atraumatic   Eyes:            Lids and lashes normal, conjunctivae and sclerae normal, no icterus, no pallor, corneas clear, PERRLA   Ears:    Ears appear intact with no abnormalities noted   Neck:   No adenopathy, supple, trachea midline, no thyromegaly, no carotid bruit, no JVD   Lungs:     Clear to auscultation, respirations regular, even and unlabored    Heart:    Regular rhythm and normal rate, normal S1 and S2, no murmur, no gallop, no rub, no click   Chest Wall:    No abnormalities observed   Abdomen:     Normal bowel sounds, no masses, no organomegaly, soft, nontender, nondistended, no guarding, no rebound  tenderness   Extremities:   Moves all extremities well, no edema, no cyanosis, no redness   Pulses:   Pulses palpable and equal bilaterally. Normal radial, carotid, femoral, dorsalis pedis and posterior tibial pulses bilaterally. Normal abdominal aorta   Skin:  Psychiatric:   No bleeding, bruising or rash    Alert and oriented x 3, normal mood and affect   Lab Review:     Results from last 7 days   Lab Units 10/16/20  0800   SODIUM mmol/L 141   POTASSIUM mmol/L 4.2   CHLORIDE mmol/L 107   CO2 mmol/L 25.9   BUN mg/dL 19   CREATININE mg/dL 0.95   CALCIUM mg/dL 9.3   BILIRUBIN mg/dL 0.5   ALK PHOS U/L 68   ALT (SGPT) U/L 13   AST (SGOT) U/L 23   GLUCOSE mg/dL 99     Results from last 7 days   Lab Units 10/15/20  2309 10/15/20  1430   TROPONIN T ng/mL  <0.010 <0.010     Results from last 7 days   Lab Units 10/16/20  0800   WBC 10*3/mm3 6.62   HEMOGLOBIN g/dL 12.6   HEMATOCRIT % 37.1   PLATELETS 10*3/mm3 242                          Telemetry-    EKG on 10/15/20-    I personally viewed and interpreted the patient's EKG/Telemetry data.        Assessment and Plan:       1.  Epigastric pain.  On exam she clearly has epigastric pain.  This is reproducible with palpation.  Based on her history I suspect her symptoms are gastric rather than cardiac.  Cardiac enzymes are normal and EKGs are unremarkable.  Symptoms have improved as her GI symptoms have improved.  2.  Lightheadedness.  Suspect this is due to her diarrhea and volume depletion.  Her symptoms have improved with IV fluid administration.  3.  Paroxysmal atrial fibrillation.  She remains in sinus rhythm this morning with frequent PACs.  4.  History of stroke  5.  Hypertension. mildly elevated.  Because of her history of lightheadedness I do not think we need to be too aggressive with getting this under ideal control.    -Her symptoms sound atypical for ischemic heart disease.  At this point I would not recommend any further cardiac work-up unless her symptoms continue to recur.  -If she remains asymptomatic this morning she would be okay for discharge from my standpoint.  We will arrange for her to follow-up in our office in about a week.    Onelia Hernandez MD  10/16/20  10:32 EDT

## 2020-10-16 NOTE — PROGRESS NOTES
Case Management Discharge Note      Final Note: Pt discharged home with family.  TORO Rosales RN         Selected Continued Care - Admitted Since 10/15/2020     Destination    No services have been selected for the patient.              Durable Medical Equipment    No services have been selected for the patient.              Dialysis/Infusion    No services have been selected for the patient.              Home Medical Care    No services have been selected for the patient.              Therapy    No services have been selected for the patient.              Community Resources    No services have been selected for the patient.                  Transportation Services  Private: Car    Final Discharge Disposition Code: 01 - home or self-care

## 2020-10-19 ENCOUNTER — TRANSITIONAL CARE MANAGEMENT TELEPHONE ENCOUNTER (OUTPATIENT)
Dept: CALL CENTER | Facility: HOSPITAL | Age: 85
End: 2020-10-19

## 2020-10-19 NOTE — OUTREACH NOTE
Call Center TCM Note      Responses   [facility] patient discharged from?  [place]   Does the patient have one of the following disease processes/diagnoses(primary or secondary)?  Other   TCM attempt successful?  Yes   Call start time  1120   Call end time  1124   Discharge diagnosis  Atrial fibrillation status post cardioversion    Meds reviewed with patient/caregiver?  Yes   Is the patient having any side effects they believe may be caused by any medication additions or changes?  No   Does the patient have all medications ordered at discharge?  Yes   Is the patient taking all medications as directed (includes completed medication regime)?  Yes   Does the patient have a primary care provider?   Yes   Does the patient have an appointment with their PCP within 7 days of discharge?  N/A   Has the patient kept scheduled appointments due by today?  N/A   Psychosocial issues?  No   Did the patient receive a copy of their discharge instructions?  Yes   Nursing interventions  Reviewed instructions with patient   What is the patient's perception of their health status since discharge?  Improving   Is the patient/caregiver able to teach back the hierarchy of who to call/visit for symptoms/problems? PCP, Specialist, Home health nurse, Urgent Care, ED, 911  No   TCM call completed?  Yes   Wrap up additional comments  Patient says she is doing ok but is having some bad dizzy spells, she is requesting  to be seen ASAP.          Jimena Camargo RN    10/19/2020, 11:24 EDT

## 2020-10-20 DIAGNOSIS — I10 BENIGN ESSENTIAL HYPERTENSION: ICD-10-CM

## 2020-10-20 RX ORDER — LOSARTAN POTASSIUM 100 MG/1
TABLET ORAL
Qty: 90 TABLET | Refills: 1 | OUTPATIENT
Start: 2020-10-20

## 2020-10-21 ENCOUNTER — OFFICE VISIT (OUTPATIENT)
Dept: FAMILY MEDICINE CLINIC | Facility: CLINIC | Age: 85
End: 2020-10-21

## 2020-10-21 VITALS
BODY MASS INDEX: 21.53 KG/M2 | HEIGHT: 62 IN | HEART RATE: 70 BPM | TEMPERATURE: 97.4 F | DIASTOLIC BLOOD PRESSURE: 69 MMHG | RESPIRATION RATE: 16 BRPM | SYSTOLIC BLOOD PRESSURE: 151 MMHG | WEIGHT: 117 LBS

## 2020-10-21 DIAGNOSIS — M10.9 GOUT, UNSPECIFIED CAUSE, UNSPECIFIED CHRONICITY, UNSPECIFIED SITE: ICD-10-CM

## 2020-10-21 DIAGNOSIS — I10 BENIGN ESSENTIAL HYPERTENSION: ICD-10-CM

## 2020-10-21 DIAGNOSIS — R07.9 CHEST PAIN, UNSPECIFIED TYPE: Primary | ICD-10-CM

## 2020-10-21 DIAGNOSIS — E78.2 MIXED HYPERLIPIDEMIA: ICD-10-CM

## 2020-10-21 DIAGNOSIS — Z09 HOSPITAL DISCHARGE FOLLOW-UP: ICD-10-CM

## 2020-10-21 DIAGNOSIS — R42 VERTIGO: ICD-10-CM

## 2020-10-21 PROCEDURE — 99495 TRANSJ CARE MGMT MOD F2F 14D: CPT | Performed by: FAMILY MEDICINE

## 2020-10-21 RX ORDER — ATENOLOL 25 MG/1
25 TABLET ORAL
Qty: 90 TABLET | Refills: 1 | Status: SHIPPED | OUTPATIENT
Start: 2020-10-21 | End: 2021-01-19

## 2020-10-21 RX ORDER — ROSUVASTATIN CALCIUM 5 MG/1
5 TABLET, COATED ORAL DAILY
Qty: 90 TABLET | Refills: 1 | Status: SHIPPED | OUTPATIENT
Start: 2020-10-21 | End: 2021-02-01 | Stop reason: SDUPTHER

## 2020-10-21 RX ORDER — AMLODIPINE BESYLATE 5 MG/1
5 TABLET ORAL DAILY
Qty: 90 TABLET | Refills: 1 | Status: SHIPPED | OUTPATIENT
Start: 2020-10-21 | End: 2021-02-01 | Stop reason: SDUPTHER

## 2020-10-21 RX ORDER — LOSARTAN POTASSIUM 100 MG/1
100 TABLET ORAL DAILY
Qty: 90 TABLET | Refills: 1 | Status: SHIPPED | OUTPATIENT
Start: 2020-10-21 | End: 2021-02-01 | Stop reason: SDUPTHER

## 2020-10-21 RX ORDER — ALLOPURINOL 100 MG/1
100 TABLET ORAL DAILY
Qty: 90 TABLET | Refills: 1 | Status: SHIPPED | OUTPATIENT
Start: 2020-10-21 | End: 2021-02-01 | Stop reason: SDUPTHER

## 2020-10-21 NOTE — PROGRESS NOTES
Transitional Care Follow Up Visit  Subjective     Alba Correa is a 87 y.o. female who presents for a transitional care management visit.    Within 48 business hours after discharge our office contacted her via telephone to coordinate her care and needs.      I reviewed and discussed the details of that call along with the discharge summary, hospital problems, inpatient lab results, inpatient diagnostic studies, and consultation reports with Alba.     Current outpatient and discharge medications have been reconciled for the patient.  Reviewed by: Aramis oDty MD      Date of TCM Phone Call 10/16/2020   [facility]   Date of Admission 10/15/2020   Date of Discharge 10/16/2020   Discharge Disposition Home or Self Care     Risk for Readmission (LACE) Score: 10 (10/16/2020  6:00 AM)      History of Present Illness   Course During Hospital Stay:      Admit date: 10/15/2020  Discharge date and time:10/16/2020  Discharged Condition: good     Discharge Diagnoses:        Active Hospital Problems     Diagnosis   POA   • **Chest pain [R07.9]   Yes   • Atrial fibrillation status post cardioversion (CMS/[facility]) [I48.91]   Unknown   • Dizziness [R42]   Unknown   • PAF (paroxysmal atrial fibrillation) (CMS/[facility]) [I48.0]   Yes   • History of ischemic right MCA stroke with extension [Z86.73]   Not Applicable   • D (diarrhea) [R19.7]   Yes   • Chronic renal failure, stage 3 (moderate) [N18.30]   Yes   • Rheumatoid arthritis (CMS/[facility]) [M06.9]   Yes       Resolved Hospital Problems   No resolved problems to display.            PMHX:   Medical History        Past Medical History:   Diagnosis Date   • Benign essential hypertension     • Chronic kidney disease     • Chronic renal insufficiency     • CVA (cerebral vascular accident) (CMS/[facility])     • Degeneration of intervertebral disc     • Depression     • DVT (deep venous thrombosis) (CMS/[facility])     • Edema     • Gout     • H/O complete eye exam 09/2016   • Hyperlipidemia     • IBS  (irritable bowel syndrome)     • OAB (overactive bladder)     • Osteopenia     • Osteoporosis     • PAF (paroxysmal atrial fibrillation) (CMS/[facility]) 11/8/2019   • Peripheral neuropathy     • Rheumatoid arthritis (CMS/[facility])          PSHX:   Surgical History         Past Surgical History:   Procedure Laterality Date   • APPENDECTOMY       • BREAST BIOPSY       • COLONOSCOPY         declines   • HERNIA REPAIR   1965   • HYSTERECTOMY         still has ovaries   • MAMMO BILATERAL   11/16/2016     pt declines   • PAP SMEAR   11/16/2016     declines           Hospital Course: Alba Correa  is 87-year-old female who lives by herself but talks to her daughter on a daily basis was in her usual state of her health since her cardioversion for underlying atrial fibrillation in August of this year.  According to her yesterday when she woke up she was fine except that she had loose bowel movement and afterwards she was having some discomfort in the chest that was going to her back.  She also had intermittent episodes of dizziness and started using walker but was able to get around.  The episode of chest discomfort started around 330 or 4:00 in the afternoon while she was sitting in the couch.  It was bad enough for her to remember but not severe enough for her to seek any help.  She continued to feel discomfort off-and-on throughout until she went to bed at around 11:00 last night.  She usually wakes up at night multiple times to go to the bathroom and so she did her routine last night and did not feel any dizziness the first time when she woke up to go to bathroom.  Second time when she woke up she felt dizzy but was safely able to maneuver her way to the bathroom and come back in third time when she woke up was early morning today and she has been up since.  Today she did not have any diarrhea or bowel movement but started having similar discomfort.  She was also somewhat dizzy.  Her daughter called at this morning as per her  routine and when she heard these complaints she suggested that she needs to go to see her primary care physician or do something about it.  Patient attempting to get in to see her primary care physician but she was told that the closest opening that they have for her would be next Tuesday.  Patient decided to go to the [facility] urgent care at [place] where she was evaluated and was recommended that patient needed to go to the emergency room for further evaluation.  After being evaluated in the emergency room patient is being admitted for further care.  She has some blood work done an EKG done at the [facility] care center.  Patient currently feels okay and denies any chest discomfort.  As mentioned above she did not have any more loose bowel movement since yesterday.  Patient does have history of irregular heartbeat for which she underwent cardioversion by Dr. Roshan Martines couple of months ago and according to her another medicine was added to her regimen at that time that she does not remember the name of.  She does make a point that since her cardioversion she has significant improvement in her energy level and how she feels.         The patient was admitted to the hospital and seen by cardiology.  They did not recommend any further work-up and thought her chest pain may be due to GI issues.  She looked well enough to go home and she will follow-up with her primary care in 1 week for continuing care and also have follow-up with cardiology.  Her heart rate was a bit slow and we did reduce her beta-blocker due to some bradycardia.    Current outpatient and discharge medications have been reconciled for the patient.  Reviewed by: Aramis Doty MD     Pt reports the dizziness continues since all this began and is using a walker. Pt has a long h/o vertigo.    The following portions of the patient's history were reviewed and updated as appropriate: allergies, current medications, past family history, past  "medical history, past social history, past surgical history and problem list.    Review of Systems   Constitutional: Negative for activity change, chills and fever.   Respiratory: Negative for cough.    Cardiovascular: Negative for chest pain.   Psychiatric/Behavioral: Negative for dysphoric mood.       /69   Pulse 70   Temp 97.4 °F (36.3 °C) (Oral)   Resp 16   Ht 157.5 cm (62\")   Wt 53.1 kg (117 lb)   BMI 21.40 kg/m²     Objective   Physical Exam  Constitutional:       General: She is not in acute distress.     Appearance: She is well-developed.   Cardiovascular:      Rate and Rhythm: Normal rate and regular rhythm.   Pulmonary:      Effort: Pulmonary effort is normal.      Breath sounds: Normal breath sounds.   Neurological:      Mental Status: She is alert and oriented to person, place, and time.   Psychiatric:         Behavior: Behavior normal.         Thought Content: Thought content normal.     Hospital records reviewed with pt confirming HPI.      Assessment/Plan   Diagnoses and all orders for this visit:    1. Chest pain, unspecified type (Primary)  Comments:  resolved    2. Hospital discharge follow-up    3. Benign essential hypertension  -     losartan (COZAAR) 100 MG tablet; Take 1 tablet by mouth Daily.  Dispense: 90 tablet; Refill: 1  -     amLODIPine (NORVASC) 5 MG tablet; Take 1 tablet by mouth Daily.  Dispense: 90 tablet; Refill: 1  -     atenolol (TENORMIN) 25 MG tablet; Take 1 tablet by mouth Daily for 90 days.  Dispense: 90 tablet; Refill: 1    4. Mixed hyperlipidemia  -     rosuvastatin (CRESTOR) 5 MG tablet; Take 1 tablet by mouth Daily.  Dispense: 90 tablet; Refill: 1    5. Gout, unspecified cause, unspecified chronicity, unspecified site  -     allopurinol (Zyloprim) 100 MG tablet; Take 1 tablet by mouth Daily.  Dispense: 90 tablet; Refill: 1    6. Vertigo  -     Ambulatory Referral to Physical Therapy Evaluate and treat    Pt to keep f/u with cardiology.             "

## 2020-10-22 DIAGNOSIS — I10 BENIGN ESSENTIAL HYPERTENSION: ICD-10-CM

## 2020-10-23 RX ORDER — AMLODIPINE BESYLATE 5 MG/1
TABLET ORAL
Qty: 30 TABLET | Refills: 0 | OUTPATIENT
Start: 2020-10-23

## 2020-10-26 ENCOUNTER — READMISSION MANAGEMENT (OUTPATIENT)
Dept: CALL CENTER | Facility: HOSPITAL | Age: 85
End: 2020-10-26

## 2020-10-26 NOTE — OUTREACH NOTE
Medical Week 2 Survey      Responses   [facility] patient discharged from?  [place]   Does the patient have one of the following disease processes/diagnoses(primary or secondary)?  Other   Week 2 attempt successful?  No   Unsuccessful attempts  Attempt 1          Jaz Brice RN

## 2020-10-29 ENCOUNTER — READMISSION MANAGEMENT (OUTPATIENT)
Dept: CALL CENTER | Facility: HOSPITAL | Age: 85
End: 2020-10-29

## 2020-10-29 NOTE — OUTREACH NOTE
Medical Week 2 Survey      Responses   [facility] patient discharged from?  [place]   Does the patient have one of the following disease processes/diagnoses(primary or secondary)?  Other   Week 2 attempt successful?  No   Unsuccessful attempts  Attempt 2          Jimena Camargo RN

## 2020-11-05 ENCOUNTER — READMISSION MANAGEMENT (OUTPATIENT)
Dept: CALL CENTER | Facility: HOSPITAL | Age: 85
End: 2020-11-05

## 2020-11-05 NOTE — OUTREACH NOTE
Medical Week 3 Survey      Responses   [facility] patient discharged from?  [place]   Does the patient have one of the following disease processes/diagnoses(primary or secondary)?  Other   Week 3 attempt successful?  No   Unsuccessful attempts  Attempt 1          Meghana Fox RN

## 2020-11-19 DIAGNOSIS — R11.0 NAUSEA: ICD-10-CM

## 2020-11-19 RX ORDER — ONDANSETRON 4 MG/1
4 TABLET, FILM COATED ORAL EVERY 8 HOURS PRN
Qty: 10 TABLET | Refills: 0 | OUTPATIENT
Start: 2020-11-19

## 2020-11-30 RX ORDER — DULOXETIN HYDROCHLORIDE 60 MG/1
CAPSULE, DELAYED RELEASE ORAL
Qty: 90 CAPSULE | Refills: 1 | Status: SHIPPED | OUTPATIENT
Start: 2020-11-30 | End: 2021-02-01 | Stop reason: SDUPTHER

## 2021-01-20 RX ORDER — APIXABAN 2.5 MG/1
TABLET, FILM COATED ORAL
Qty: 180 TABLET | Refills: 0 | Status: SHIPPED | OUTPATIENT
Start: 2021-01-20 | End: 2021-04-29

## 2021-02-02 ENCOUNTER — OFFICE VISIT (OUTPATIENT)
Dept: FAMILY MEDICINE CLINIC | Facility: CLINIC | Age: 86
End: 2021-02-02

## 2021-02-02 DIAGNOSIS — E78.2 MIXED HYPERLIPIDEMIA: Chronic | ICD-10-CM

## 2021-02-02 DIAGNOSIS — F33.42 RECURRENT MAJOR DEPRESSIVE DISORDER, IN FULL REMISSION (HCC): Chronic | ICD-10-CM

## 2021-02-02 DIAGNOSIS — M10.9 GOUT, UNSPECIFIED CAUSE, UNSPECIFIED CHRONICITY, UNSPECIFIED SITE: Chronic | ICD-10-CM

## 2021-02-02 DIAGNOSIS — R11.0 NAUSEA: ICD-10-CM

## 2021-02-02 DIAGNOSIS — I10 BENIGN ESSENTIAL HYPERTENSION: Primary | Chronic | ICD-10-CM

## 2021-02-02 PROCEDURE — 99442 PR PHYS/QHP TELEPHONE EVALUATION 11-20 MIN: CPT | Performed by: FAMILY MEDICINE

## 2021-02-02 RX ORDER — ONDANSETRON 4 MG/1
4 TABLET, FILM COATED ORAL EVERY 8 HOURS PRN
Qty: 10 TABLET | Refills: 0 | Status: SHIPPED | OUTPATIENT
Start: 2021-02-02 | End: 2021-12-28

## 2021-02-02 RX ORDER — LOSARTAN POTASSIUM 100 MG/1
100 TABLET ORAL DAILY
Qty: 90 TABLET | Refills: 1 | Status: SHIPPED | OUTPATIENT
Start: 2021-02-02 | End: 2021-06-14

## 2021-02-02 RX ORDER — ROSUVASTATIN CALCIUM 5 MG/1
5 TABLET, COATED ORAL DAILY
Qty: 90 TABLET | Refills: 1 | Status: SHIPPED | OUTPATIENT
Start: 2021-02-02 | End: 2021-06-14

## 2021-02-02 RX ORDER — ALLOPURINOL 100 MG/1
100 TABLET ORAL DAILY
Qty: 90 TABLET | Refills: 1 | Status: SHIPPED | OUTPATIENT
Start: 2021-02-02 | End: 2021-08-19 | Stop reason: SDUPTHER

## 2021-02-02 RX ORDER — DULOXETIN HYDROCHLORIDE 60 MG/1
60 CAPSULE, DELAYED RELEASE ORAL DAILY
Qty: 90 CAPSULE | Refills: 1 | Status: SHIPPED | OUTPATIENT
Start: 2021-02-02 | End: 2021-08-19 | Stop reason: SDUPTHER

## 2021-02-02 RX ORDER — AMLODIPINE BESYLATE 5 MG/1
5 TABLET ORAL DAILY
Qty: 90 TABLET | Refills: 1 | Status: SHIPPED | OUTPATIENT
Start: 2021-02-02 | End: 2021-06-14

## 2021-02-02 NOTE — PROGRESS NOTES
Subjective   Alba Correa is a 87 y.o. female.     CC: Telehealth Visit for Medical Management    History of Present Illness     Chief Complaint:   Chief Complaint   Patient presents with   • Nausea   • Hypertension     med refill due -  no labs - meds reviewed with pt today    • Hyperlipidemia     telephone encounter - multiple pharm    • Arthritis       Alba Correa 87 y.o. female who presents today for Medical Management of the below listed issues and medication refills.  she has a problem list of   Patient Active Problem List   Diagnosis   • Chronic renal failure, stage 3 (moderate)   • DVT (deep venous thrombosis) (CMS/HCC)   • Depression   • Edema   • Gout   • Osteoporosis   • Hyperparathyroidism (CMS/HCC)   • Adaptive colitis   • Osteopenia   • Rheumatoid arthritis (CMS/HCC)   • Degeneration of intervertebral disc   • D (diarrhea)   • BP (high blood pressure)   • Detrusor muscle hypertonia   • Open leg wound   • Vaginal odor   • Infected wound   • History of depression   • History of renal insufficiency syndrome   • History of degenerative disc disease   • History of osteoporosis   • History of DVT (deep vein thrombosis)   • History of rheumatoid arthritis   • Hyperlipidemia   • Macrocytosis   • Right leg weakness   • CVA (cerebral vascular accident) (CMS/HCC)   • Nonrheumatic aortic (valve) insufficiency   • History of ischemic right MCA stroke with extension   • Bilateral leg weakness   • PAF (paroxysmal atrial fibrillation) (CMS/HCC)   • Anemia in chronic kidney disease   • Other proteinuria   • Chest pain   • Atrial fibrillation status post cardioversion (CMS/[facility])   • Dizziness   .  Since the last visit, she has overall felt well.  she has been compliant with   Current Outpatient Medications:   •  acetaminophen (TYLENOL) 650 MG 8 hr tablet, Take 1,300 mg by mouth Every 8 (Eight) Hours As Needed for Mild Pain ., Disp: , Rfl:   •  allopurinol (Zyloprim) 100 MG tablet, Take 1 tablet by mouth Daily., Disp:  90 tablet, Rfl: 1  •  amLODIPine (NORVASC) 5 MG tablet, Take 1 tablet by mouth Daily., Disp: 90 tablet, Rfl: 1  •  calcium carbonate-vitamin d (CALCIUM 600+D) 600-400 MG-UNIT per tablet, Take 1 tablet by mouth Daily., Disp: , Rfl:   •  diphenhydrAMINE-acetaminophen (TYLENOL PM)  MG tablet per tablet, Take 1 tablet by mouth At Night As Needed for Sleep., Disp: , Rfl:   •  DULoxetine (CYMBALTA) 60 MG capsule, Take 1 capsule by mouth Daily., Disp: 90 capsule, Rfl: 1  •  Eliquis 2.5 MG tablet tablet, TAKE 1 TABLET BY MOUTH  EVERY 12 HOURS, Disp: 180 tablet, Rfl: 0  •  losartan (COZAAR) 100 MG tablet, Take 1 tablet by mouth Daily., Disp: 90 tablet, Rfl: 1  •  Multiple Vitamins-Minerals (CENTRUM SILVER) tablet, Take 1 tablet by mouth Daily., Disp: , Rfl:   •  Omega-3 Fatty Acids (OMEGA-3 FISH OIL PO), Take 1,600 mg by mouth Every Morning., Disp: , Rfl:   •  ondansetron (ZOFRAN) 4 MG tablet, Take 1 tablet by mouth Every 8 (Eight) Hours As Needed for Nausea or Vomiting., Disp: 10 tablet, Rfl: 0  •  rosuvastatin (CRESTOR) 5 MG tablet, Take 1 tablet by mouth Daily., Disp: 90 tablet, Rfl: 1.  she denies medication side effects.    All of the other chronic condition(s) listed above are stable w/o issues.    There were no vitals taken for this visit.    Results for orders placed or performed during the hospital encounter of 10/15/20   Respiratory Panel PCR w/COVID-19(SARS-CoV-2) FERNANDO/HAYDE/TOOTIE/PAD/[facility] In-House, NP Swab in UTM/VTM, 3-4 HR TAT - Swab, Nasopharynx    Specimen: Nasopharynx; Swab   Result Value Ref Range    ADENOVIRUS, PCR Not Detected Not Detected    Coronavirus 229E Not Detected Not Detected    Coronavirus HKU1 Not Detected Not Detected    Coronavirus NL63 Not Detected Not Detected    Coronavirus OC43 Not Detected Not Detected    COVID19 Not Detected Not Detected - Ref. Range    Human Metapneumovirus Not Detected Not Detected    Human Rhinovirus/Enterovirus Not Detected Not Detected    Influenza A PCR Not  Detected Not Detected    Influenza A H1 Not Detected Not Detected    Influenza A H1 2009 PCR Not Detected Not Detected    Influenza A H3 Not Detected Not Detected    Influenza B PCR Not Detected Not Detected    Parainfluenza Virus 1 Not Detected Not Detected    Parainfluenza Virus 2 Not Detected Not Detected    Parainfluenza Virus 3 Not Detected Not Detected    Parainfluenza Virus 4 Not Detected Not Detected    RSV, PCR Not Detected Not Detected    Bordetella pertussis pcr Not Detected Not Detected    Bordetella parapertussis PCR Not Detected Not Detected    Chlamydophila pneumoniae PCR Not Detected Not Detected    Mycoplasma pneumo by PCR Not Detected Not Detected   Comprehensive Metabolic Panel    Specimen: Blood   Result Value Ref Range    Glucose 100 (H) 65 - 99 mg/dL    BUN 27 (H) 8 - 23 mg/dL    Creatinine 1.34 (H) 0.57 - 1.00 mg/dL    Sodium 139 136 - 145 mmol/L    Potassium 4.7 3.5 - 5.2 mmol/L    Chloride 104 98 - 107 mmol/L    CO2 27.0 22.0 - 29.0 mmol/L    Calcium 10.2 8.6 - 10.5 mg/dL    Total Protein 8.1 6.0 - 8.5 g/dL    Albumin 4.40 3.50 - 5.20 g/dL    ALT (SGPT) 16 1 - 33 U/L    AST (SGOT) 25 1 - 32 U/L    Alkaline Phosphatase 75 39 - 117 U/L    Total Bilirubin 0.3 0.0 - 1.2 mg/dL    eGFR Non African Amer 37 (L) >60 mL/min/1.73    Globulin 3.7 gm/dL    A/G Ratio 1.2 g/dL    BUN/Creatinine Ratio 20.1 7.0 - 25.0    Anion Gap 8.0 5.0 - 15.0 mmol/L   Troponin    Specimen: Blood   Result Value Ref Range    Troponin T <0.010 0.000 - 0.030 ng/mL   BNP    Specimen: Blood   Result Value Ref Range    proBNP 1,058.0 0.0-1,800.0 pg/mL   CBC Auto Differential    Specimen: Blood   Result Value Ref Range    WBC 6.27 3.40 - 10.80 10*3/mm3    RBC 3.84 3.77 - 5.28 10*6/mm3    Hemoglobin 13.1 12.0 - 15.9 g/dL    Hematocrit 38.8 34.0 - 46.6 %    .0 (H) 79.0 - 97.0 fL    MCH 34.1 (H) 26.6 - 33.0 pg    MCHC 33.8 31.5 - 35.7 g/dL    RDW 11.9 (L) 12.3 - 15.4 %    RDW-SD 43.7 37.0 - 54.0 fl    MPV 10.1 6.0 - 12.0 fL     Platelets 284 140 - 450 10*3/mm3    Neutrophil % 49.8 42.7 - 76.0 %    Lymphocyte % 34.4 19.6 - 45.3 %    Monocyte % 10.4 5.0 - 12.0 %    Eosinophil % 3.8 0.3 - 6.2 %    Basophil % 1.3 0.0 - 1.5 %    Immature Grans % 0.3 0.0 - 0.5 %    Neutrophils, Absolute 3.12 1.70 - 7.00 10*3/mm3    Lymphocytes, Absolute 2.16 0.70 - 3.10 10*3/mm3    Monocytes, Absolute 0.65 0.10 - 0.90 10*3/mm3    Eosinophils, Absolute 0.24 0.00 - 0.40 10*3/mm3    Basophils, Absolute 0.08 0.00 - 0.20 10*3/mm3    Immature Grans, Absolute 0.02 0.00 - 0.05 10*3/mm3    nRBC 0.0 0.0 - 0.2 /100 WBC   Troponin    Specimen: Blood   Result Value Ref Range    Troponin T <0.010 0.000 - 0.030 ng/mL   Comprehensive Metabolic Panel    Specimen: Hand, Left; Blood   Result Value Ref Range    Glucose 99 65 - 99 mg/dL    BUN 19 8 - 23 mg/dL    Creatinine 0.95 0.57 - 1.00 mg/dL    Sodium 141 136 - 145 mmol/L    Potassium 4.2 3.5 - 5.2 mmol/L    Chloride 107 98 - 107 mmol/L    CO2 25.9 22.0 - 29.0 mmol/L    Calcium 9.3 8.6 - 10.5 mg/dL    Total Protein 7.2 6.0 - 8.5 g/dL    Albumin 3.90 3.50 - 5.20 g/dL    ALT (SGPT) 13 1 - 33 U/L    AST (SGOT) 23 1 - 32 U/L    Alkaline Phosphatase 68 39 - 117 U/L    Total Bilirubin 0.5 0.0 - 1.2 mg/dL    eGFR Non African Amer 56 (L) >60 mL/min/1.73    Globulin 3.3 gm/dL    A/G Ratio 1.2 g/dL    BUN/Creatinine Ratio 20.0 7.0 - 25.0    Anion Gap 8.1 5.0 - 15.0 mmol/L   CBC Auto Differential    Specimen: Blood   Result Value Ref Range    WBC 6.62 3.40 - 10.80 10*3/mm3    RBC 3.70 (L) 3.77 - 5.28 10*6/mm3    Hemoglobin 12.6 12.0 - 15.9 g/dL    Hematocrit 37.1 34.0 - 46.6 %    .3 (H) 79.0 - 97.0 fL    MCH 34.1 (H) 26.6 - 33.0 pg    MCHC 34.0 31.5 - 35.7 g/dL    RDW 11.8 (L) 12.3 - 15.4 %    RDW-SD 42.5 37.0 - 54.0 fl    MPV 10.3 6.0 - 12.0 fL    Platelets 242 140 - 450 10*3/mm3    Neutrophil % 50.2 42.7 - 76.0 %    Lymphocyte % 32.6 19.6 - 45.3 %    Monocyte % 11.6 5.0 - 12.0 %    Eosinophil % 4.2 0.3 - 6.2 %    Basophil % 1.1  0.0 - 1.5 %    Immature Grans % 0.3 0.0 - 0.5 %    Neutrophils, Absolute 3.32 1.70 - 7.00 10*3/mm3    Lymphocytes, Absolute 2.16 0.70 - 3.10 10*3/mm3    Monocytes, Absolute 0.77 0.10 - 0.90 10*3/mm3    Eosinophils, Absolute 0.28 0.00 - 0.40 10*3/mm3    Basophils, Absolute 0.07 0.00 - 0.20 10*3/mm3    Immature Grans, Absolute 0.02 0.00 - 0.05 10*3/mm3    nRBC 0.0 0.0 - 0.2 /100 WBC   Lactic Acid, Plasma    Specimen: Blood   Result Value Ref Range    Lactate 0.7 0.5 - 2.0 mmol/L   Light Blue Top   Result Value Ref Range    Extra Tube hold for add-on    Green Top (Gel)   Result Value Ref Range    Extra Tube Hold for add-ons.    Lavender Top   Result Value Ref Range    Extra Tube hold for add-on    Gold Top - SST   Result Value Ref Range    Extra Tube Hold for add-ons.            The following portions of the patient's history were reviewed and updated as appropriate: allergies, current medications, past family history, past medical history, past social history, past surgical history and problem list.    Review of Systems   Constitutional: Negative for activity change, chills and fever.   Respiratory: Negative for cough.    Cardiovascular: Negative for chest pain.   Gastrointestinal: Positive for nausea (rarely and no pattern).   Psychiatric/Behavioral: Negative for dysphoric mood.       Objective   Physical Exam  Constitutional:       General: She is not in acute distress.  Pulmonary:      Effort: Pulmonary effort is normal.   Neurological:      Mental Status: She is oriented to person, place, and time.   Psychiatric:         Behavior: Behavior normal.         Thought Content: Thought content normal.         Assessment/Plan   Diagnoses and all orders for this visit:    1. Benign essential hypertension (Primary)  -     losartan (COZAAR) 100 MG tablet; Take 1 tablet by mouth Daily.  Dispense: 90 tablet; Refill: 1  -     amLODIPine (NORVASC) 5 MG tablet; Take 1 tablet by mouth Daily.  Dispense: 90 tablet; Refill: 1    2.  Gout, unspecified cause, unspecified chronicity, unspecified site  -     allopurinol (Zyloprim) 100 MG tablet; Take 1 tablet by mouth Daily.  Dispense: 90 tablet; Refill: 1    3. Mixed hyperlipidemia  -     rosuvastatin (CRESTOR) 5 MG tablet; Take 1 tablet by mouth Daily.  Dispense: 90 tablet; Refill: 1    4. Recurrent major depressive disorder, in full remission (CMS/HCC)  -     DULoxetine (CYMBALTA) 60 MG capsule; Take 1 capsule by mouth Daily.  Dispense: 90 capsule; Refill: 1    5. Nausea  Comments:  occasional  Orders:  -     ondansetron (ZOFRAN) 4 MG tablet; Take 1 tablet by mouth Every 8 (Eight) Hours As Needed for Nausea or Vomiting.  Dispense: 10 tablet; Refill: 0    Spent  14   minutes with chart and interview and consent for this encounter given by the patient.  You have chosen to receive care through a telehealth visit.  Do you consent to use a TELEPHONE connection for your medical care today? Yes

## 2021-02-16 ENCOUNTER — OFFICE VISIT (OUTPATIENT)
Dept: CARDIOLOGY | Facility: CLINIC | Age: 86
End: 2021-02-16

## 2021-02-16 VITALS
WEIGHT: 115 LBS | HEART RATE: 78 BPM | DIASTOLIC BLOOD PRESSURE: 66 MMHG | SYSTOLIC BLOOD PRESSURE: 124 MMHG | HEIGHT: 62 IN | BODY MASS INDEX: 21.16 KG/M2

## 2021-02-16 DIAGNOSIS — E21.3 HYPERPARATHYROIDISM (HCC): ICD-10-CM

## 2021-02-16 DIAGNOSIS — M06.9 RHEUMATOID ARTHRITIS, INVOLVING UNSPECIFIED SITE, UNSPECIFIED WHETHER RHEUMATOID FACTOR PRESENT (HCC): ICD-10-CM

## 2021-02-16 DIAGNOSIS — I35.1 NONRHEUMATIC AORTIC (VALVE) INSUFFICIENCY: Chronic | ICD-10-CM

## 2021-02-16 DIAGNOSIS — I48.0 PAF (PAROXYSMAL ATRIAL FIBRILLATION) (HCC): ICD-10-CM

## 2021-02-16 DIAGNOSIS — Z86.73 HISTORY OF ISCHEMIC RIGHT MCA STROKE: Primary | ICD-10-CM

## 2021-02-16 DIAGNOSIS — E78.2 MIXED HYPERLIPIDEMIA: ICD-10-CM

## 2021-02-16 DIAGNOSIS — N18.32 CHRONIC RENAL FAILURE, STAGE 3B (HCC): ICD-10-CM

## 2021-02-16 PROBLEM — E78.5 HYPERLIPIDEMIA: Chronic | Status: ACTIVE | Noted: 2018-01-10

## 2021-02-16 PROCEDURE — 99442 PR PHYS/QHP TELEPHONE EVALUATION 11-20 MIN: CPT | Performed by: INTERNAL MEDICINE

## 2021-02-16 NOTE — PROGRESS NOTES
Subjective:     Encounter Date: 02/16/21        Patient ID: Alba Correa is a 87 y.o. female.    Chief Complaint: PAF, CVA  Atrial Fibrillation  Past medical history includes atrial fibrillation.       Dear Dr. Doty,    This patient has consented to a telehealth visit via audio. The visit was scheduled as a audio visit to comply with patient safety concerns in accordance with CDC recommendations.  All vitals recorded within this visit are reported by the patient.  I spent  16 minutes in total including but not limited to the13 minutes spent in direct conversation with this patient.     I had a visit with her about 6 months ago.  She has been feeling fine. She denies any chest pain, pressure, tightness, squeezing, or heartburn.  She has not experienced any feeling of palpitations, tachycardia or heart racing and no presyncope or syncope.  There has not been any problems with dizziness or lightheadedness.  There has not been any orthopnea or PND, and no problems with lower extremity edema.  She denies any shortness of breath at rest or with activity and has not had any wheezing.  She has continued to perform daily activities of living without any specific problem or change in the level of activity.    She has had a history of persistent atrial fibrillation.  We performed a cardioversion 7/2020 after she has been on rate control and anticoagulation for an appropriate period of time.  She then had another Holter monitor she was having some palpitations.  This showed episodes of ectopic atrial rhythm and brief 3-4 beat runs of wide-complex tachycardia but no atrial fibrillation.  It was determined to keep her on her medical therapy.    We saw her initially in the hospital in 2019.  She presented for a CVA.  We were asked to see her for evaluation of a cardiac source of emboli.  None was found.  Transthoracic and transesophageal echocardiograms did not demonstrate any cardiac source of emboli.  14 day monitor  "showed no ectopy.  Then recently seen in our office with question about bradycardia.  A 48-hour Holter monitor was placed.  During that time, she was in atrial fibrillation the entire 48 hours.  She was unaware of this.  She did not feel any palpitations or tachycardia.  No irregularity.  Heart rate was 70 bpm during the 48-hour recording with no pauses.      The following portions of the patient's history were reviewed and updated as appropriate: allergies, current medications, past family history, past medical history, past social history, past surgical history and problem list.    Past Medical History:   Diagnosis Date   • Benign essential hypertension    • Chronic kidney disease    • Chronic renal insufficiency    • CVA (cerebral vascular accident) (CMS/[facility])    • Degeneration of intervertebral disc    • Depression    • DVT (deep venous thrombosis) (CMS/[facility])    • Edema    • Gout    • H/O complete eye exam 09/2016   • Hyperlipidemia    • IBS (irritable bowel syndrome)    • OAB (overactive bladder)    • Osteopenia    • Osteoporosis    • PAF (paroxysmal atrial fibrillation) (CMS/[facility]) 11/8/2019   • Peripheral neuropathy    • Rheumatoid arthritis (CMS/[facility])        Procedures       Objective:     Vitals:    02/16/21 1222   BP: 124/66   Pulse: 78   Weight: 52.2 kg (115 lb)   Height: 157.5 cm (62\")        Alert and oriented x3, thought processes normal, judgment normal, speaking in full sentences with no wheezing and no respiratory distress. Hearing is appropriate without difficulty.  Lab Review:             Results for orders placed during the hospital encounter of 07/02/20   Adult Transthoracic Echo Complete W/ Cont if Necessary Per Protocol    Narrative · Left ventricular systolic function is normal. Calculated EF = 58.0%.  · Left ventricular wall thickness is consistent with mild concentric   hypertrophy.  · Normal right ventricular systolic function noted. Right ventricular   cavity is mildly dilated.  · Left atrial " cavity size is mildly dilated.  · Mild to moderate aortic valve regurgitation is present.  · Moderate tricuspid valve regurgitation is present.  · Calculated right ventricular systolic pressure from tricuspid   regurgitation is 31 mmHg.  · There is no evidence of pericardial effusion.        Lab Results   Component Value Date    GLUCOSE 99 10/16/2020    BUN 19 10/16/2020    CREATININE 0.95 10/16/2020    EGFRIFNONA 56 (L) 10/16/2020    EGFRIFAFRI 47 (L) 03/24/2020    BCR 20.0 10/16/2020    K 4.2 10/16/2020    CO2 25.9 10/16/2020    CALCIUM 9.3 10/16/2020    PROTENTOTREF 7.5 03/24/2020    ALBUMIN 3.90 10/16/2020    LABIL2 1.4 03/24/2020    AST 23 10/16/2020    ALT 13 10/16/2020     CBC    CBC 3/24/20 10/15/20 10/16/20   WBC 4.84 6.27 6.62   RBC 4.11 3.84 3.70 (A)   Hemoglobin 14.1 13.1 12.6   Hematocrit 41.6 38.8 37.1   .2 (A) 101.0 (A) 100.3 (A)   MCH 34.3 (A) 34.1 (A) 34.1 (A)   MCHC 33.9 33.8 34.0   RDW 11.5 (A) 11.9 (A) 11.8 (A)   Platelets 312 284 242   (A) Abnormal value                    Assessment:          Diagnosis Plan   1. History of ischemic right MCA stroke with extension     2. PAF (paroxysmal atrial fibrillation) (CMS/[facility])     3. Hyperparathyroidism (CMS/[facility])     4. Chronic renal failure, stage 3b (CMS/HCC)     5. Rheumatoid arthritis, involving unspecified site, unspecified whether rheumatoid factor present (CMS/[facility])     6. Nonrheumatic aortic (valve) insufficiency     7. Mixed hyperlipidemia            Plan:       1.  Aortic insufficiency- no significant change on her symptoms, cont amlodipine, losartan  2.  History of CVA-continue Eliquis and rosuvastatin.  Creatinine BUN reviewed as well as hemoglobin, hematocrit, and platelet count.  3.  Benign essential hypertension-continue current medical regimen with amlodipine losartan, BUN/creatinine reviewed  4.  Paroxysmal atrial fibrillation- in sinus rhythm.  No A. fib seen on the recent monitor   5.  Chronic renal failure, most recent creatinine  BUN reviewed.  Creatinine currently consistent with a 5 mg dose of Eliquis, but has had issues with renal dysfunction previously and creatinine clearance is still only about 50.  Age 87 years old, discussed, will remain on the 2.5 mg twice daily of the Eliquis.    Thank you very much for allowing us to participate in the care of this pleasant patient.  Please don't hesitate to call if I can be of assistance in any way.      Current Outpatient Medications:   •  acetaminophen (TYLENOL) 650 MG 8 hr tablet, Take 1,300 mg by mouth Every 8 (Eight) Hours As Needed for Mild Pain ., Disp: , Rfl:   •  allopurinol (Zyloprim) 100 MG tablet, Take 1 tablet by mouth Daily., Disp: 90 tablet, Rfl: 1  •  amLODIPine (NORVASC) 5 MG tablet, Take 1 tablet by mouth Daily., Disp: 90 tablet, Rfl: 1  •  calcium carbonate-vitamin d (CALCIUM 600+D) 600-400 MG-UNIT per tablet, Take 1 tablet by mouth Daily., Disp: , Rfl:   •  diphenhydrAMINE-acetaminophen (TYLENOL PM)  MG tablet per tablet, Take 2 tablets by mouth At Night As Needed for Sleep., Disp: , Rfl:   •  DULoxetine (CYMBALTA) 60 MG capsule, Take 1 capsule by mouth Daily., Disp: 90 capsule, Rfl: 1  •  Eliquis 2.5 MG tablet tablet, TAKE 1 TABLET BY MOUTH  EVERY 12 HOURS, Disp: 180 tablet, Rfl: 0  •  losartan (COZAAR) 100 MG tablet, Take 1 tablet by mouth Daily., Disp: 90 tablet, Rfl: 1  •  Multiple Vitamins-Minerals (CENTRUM SILVER) tablet, Take 1 tablet by mouth Daily., Disp: , Rfl:   •  Omega-3 Fatty Acids (OMEGA-3 FISH OIL PO), Take 1,600 mg by mouth Every Morning., Disp: , Rfl:   •  ondansetron (ZOFRAN) 4 MG tablet, Take 1 tablet by mouth Every 8 (Eight) Hours As Needed for Nausea or Vomiting., Disp: 10 tablet, Rfl: 0  •  rosuvastatin (CRESTOR) 5 MG tablet, Take 1 tablet by mouth Daily., Disp: 90 tablet, Rfl: 1

## 2021-03-01 DIAGNOSIS — R55 SYNCOPE, UNSPECIFIED SYNCOPE TYPE: Primary | ICD-10-CM

## 2021-03-03 LAB
ALBUMIN SERPL-MCNC: 4.3 G/DL (ref 3.5–5.2)
ALBUMIN/GLOB SERPL: 1.3 G/DL
ALP SERPL-CCNC: 75 U/L (ref 39–117)
ALT SERPL-CCNC: 18 U/L (ref 1–33)
AST SERPL-CCNC: 30 U/L (ref 1–32)
BASOPHILS # BLD AUTO: 0.08 10*3/MM3 (ref 0–0.2)
BASOPHILS NFR BLD AUTO: 1.1 % (ref 0–1.5)
BILIRUB SERPL-MCNC: 0.3 MG/DL (ref 0–1.2)
BUN SERPL-MCNC: 30 MG/DL (ref 8–23)
BUN/CREAT SERPL: 24 (ref 7–25)
CALCIUM SERPL-MCNC: 9.8 MG/DL (ref 8.6–10.5)
CHLORIDE SERPL-SCNC: 101 MMOL/L (ref 98–107)
CO2 SERPL-SCNC: 26.4 MMOL/L (ref 22–29)
CREAT SERPL-MCNC: 1.25 MG/DL (ref 0.57–1)
EOSINOPHIL # BLD AUTO: 0.27 10*3/MM3 (ref 0–0.4)
EOSINOPHIL NFR BLD AUTO: 3.8 % (ref 0.3–6.2)
ERYTHROCYTE [DISTWIDTH] IN BLOOD BY AUTOMATED COUNT: 12 % (ref 12.3–15.4)
GLOBULIN SER CALC-MCNC: 3.2 GM/DL
GLUCOSE SERPL-MCNC: 94 MG/DL (ref 65–99)
HCT VFR BLD AUTO: 40.7 % (ref 34–46.6)
HGB BLD-MCNC: 13.9 G/DL (ref 12–15.9)
IMM GRANULOCYTES # BLD AUTO: 0.02 10*3/MM3 (ref 0–0.05)
IMM GRANULOCYTES NFR BLD AUTO: 0.3 % (ref 0–0.5)
LYMPHOCYTES # BLD AUTO: 3.29 10*3/MM3 (ref 0.7–3.1)
LYMPHOCYTES NFR BLD AUTO: 46.8 % (ref 19.6–45.3)
MCH RBC QN AUTO: 34.3 PG (ref 26.6–33)
MCHC RBC AUTO-ENTMCNC: 34.2 G/DL (ref 31.5–35.7)
MCV RBC AUTO: 100.5 FL (ref 79–97)
MONOCYTES # BLD AUTO: 0.7 10*3/MM3 (ref 0.1–0.9)
MONOCYTES NFR BLD AUTO: 10 % (ref 5–12)
NEUTROPHILS # BLD AUTO: 2.67 10*3/MM3 (ref 1.7–7)
NEUTROPHILS NFR BLD AUTO: 38 % (ref 42.7–76)
NRBC BLD AUTO-RTO: 0 /100 WBC (ref 0–0.2)
PLATELET # BLD AUTO: 255 10*3/MM3 (ref 140–450)
POTASSIUM SERPL-SCNC: 4.8 MMOL/L (ref 3.5–5.2)
PROT SERPL-MCNC: 7.5 G/DL (ref 6–8.5)
RBC # BLD AUTO: 4.05 10*6/MM3 (ref 3.77–5.28)
SODIUM SERPL-SCNC: 138 MMOL/L (ref 136–145)
WBC # BLD AUTO: 7.03 10*3/MM3 (ref 3.4–10.8)

## 2021-03-04 ENCOUNTER — OFFICE VISIT (OUTPATIENT)
Dept: FAMILY MEDICINE CLINIC | Facility: CLINIC | Age: 86
End: 2021-03-04

## 2021-03-04 VITALS
OXYGEN SATURATION: 95 % | SYSTOLIC BLOOD PRESSURE: 162 MMHG | RESPIRATION RATE: 16 BRPM | HEART RATE: 56 BPM | HEIGHT: 62 IN | BODY MASS INDEX: 21.71 KG/M2 | TEMPERATURE: 97.2 F | WEIGHT: 118 LBS | DIASTOLIC BLOOD PRESSURE: 76 MMHG

## 2021-03-04 DIAGNOSIS — I48.0 PAF (PAROXYSMAL ATRIAL FIBRILLATION) (HCC): Chronic | ICD-10-CM

## 2021-03-04 DIAGNOSIS — R55 SYNCOPE, UNSPECIFIED SYNCOPE TYPE: Primary | ICD-10-CM

## 2021-03-04 PROCEDURE — 93000 ELECTROCARDIOGRAM COMPLETE: CPT | Performed by: FAMILY MEDICINE

## 2021-03-04 PROCEDURE — 99213 OFFICE O/P EST LOW 20 MIN: CPT | Performed by: FAMILY MEDICINE

## 2021-03-04 NOTE — PROGRESS NOTES
Procedure     ECG 12 Lead    Date/Time: 3/4/2021 11:42 AM  Performed by: Aramis Doty MD  Authorized by: Aramis Doty MD   Comparison: compared with previous ECG from 10/15/2020  Similar to previous ECG  Rhythm: sinus rhythm  Rate: normal  Conduction: conduction normal  ST Segments: ST segments normal  T Waves: T waves normal  QRS axis: normal    Clinical impression: normal ECG

## 2021-03-04 NOTE — PROGRESS NOTES
"Subjective   Alba Correa is a 87 y.o. female.     CC: Management of Syncope    History of Present Illness     Pt returns today after her son sent me this message 3 days ago:    This is Kurt Doty, Jose Correa's son. Last evening she was over for dinner. Shortly after eating, she got up and went to the bathroom. When she came back she said she wasn't feeling well. Then she passed out. Thankfully we caught her before she fell. When she came to, she thought she had fallen. She didn't realize that she had passes out first. She said she felt hot and clammy. We took her blood pressure and it was 124/77. Her heart rate was 77. We wanted to take her to the hospital or call 911 but she would not let us.   Today she is feeling tired, but she didn't get a good night's sleep. I was wondering if you should see her and if she should have any lab work or test done.  My response was:    Good idea. I ordered some labs for her to run by and complete, hopefully tomorrow. Go ahead and call tomorrow and schedule her for the next day or two so as to allow time for the labs to come back. She probably had a Vasovagal event, that can be related to blood pressure +/or hydration.  Today she is feeling quite normal today, although her BP machine is telling her that she's been having some episodes of A.Fib.this past week (prior h/o A.Fib., 11/20 and was cardioverted then).    The following portions of the patient's history were reviewed and updated as appropriate: allergies, current medications, past family history, past medical history, past social history, past surgical history and problem list.    Review of Systems   Constitutional: Negative for activity change, chills and fever.   Respiratory: Negative for cough.    Cardiovascular: Negative for chest pain.   Psychiatric/Behavioral: Negative for dysphoric mood.       /76   Pulse 56   Temp 97.2 °F (36.2 °C) (Oral)   Resp 16   Ht 157.5 cm (62\")   Wt 53.5 kg (118 lb)   SpO2 95%  "  BMI 21.58 kg/m²     Orthostatics: sitting = 152/68, standing = 150/78    Objective   Physical Exam  Constitutional:       General: She is not in acute distress.     Appearance: She is well-developed.   Cardiovascular:      Rate and Rhythm: Normal rate and regular rhythm.   Pulmonary:      Effort: Pulmonary effort is normal.      Breath sounds: Normal breath sounds.   Neurological:      Mental Status: She is alert and oriented to person, place, and time.   Psychiatric:         Behavior: Behavior normal.         Thought Content: Thought content normal.     Labs reviewed with pt today during visit. All questions answered.      Assessment/Plan   Diagnoses and all orders for this visit:    1. Syncope, unspecified syncope type (Primary)  Comments:  most likely Vasovagal event  Orders:  -     Ambulatory Referral to Cardiology  -     ECG 12 Lead    2. PAF (paroxysmal atrial fibrillation) (CMS/[facility])  -     Ambulatory Referral to Cardiology    Most likely a Vasovagal event, although will get back to cardiology due to prior A.Fib. Hydration and eating stressed.

## 2021-03-08 ENCOUNTER — OFFICE VISIT (OUTPATIENT)
Dept: CARDIOLOGY | Facility: CLINIC | Age: 86
End: 2021-03-08

## 2021-03-08 VITALS
WEIGHT: 117 LBS | BODY MASS INDEX: 21.53 KG/M2 | SYSTOLIC BLOOD PRESSURE: 142 MMHG | HEART RATE: 55 BPM | HEIGHT: 62 IN | DIASTOLIC BLOOD PRESSURE: 82 MMHG

## 2021-03-08 DIAGNOSIS — I35.1 NONRHEUMATIC AORTIC (VALVE) INSUFFICIENCY: Chronic | ICD-10-CM

## 2021-03-08 DIAGNOSIS — Z86.73 HISTORY OF ISCHEMIC RIGHT MCA STROKE: ICD-10-CM

## 2021-03-08 DIAGNOSIS — E78.2 MIXED HYPERLIPIDEMIA: Chronic | ICD-10-CM

## 2021-03-08 DIAGNOSIS — I48.0 PAF (PAROXYSMAL ATRIAL FIBRILLATION) (HCC): Primary | Chronic | ICD-10-CM

## 2021-03-08 DIAGNOSIS — I63.9 CEREBROVASCULAR ACCIDENT (CVA), UNSPECIFIED MECHANISM (HCC): ICD-10-CM

## 2021-03-08 DIAGNOSIS — R55 SYNCOPE AND COLLAPSE: ICD-10-CM

## 2021-03-08 PROCEDURE — 99214 OFFICE O/P EST MOD 30 MIN: CPT | Performed by: INTERNAL MEDICINE

## 2021-03-08 RX ORDER — ATENOLOL 25 MG/1
25 TABLET ORAL DAILY
COMMUNITY
End: 2021-08-19 | Stop reason: SDUPTHER

## 2021-03-08 NOTE — PROGRESS NOTES
Subjective:     Encounter Date: 03/08/21        Patient ID: Alba Correa is a 87 y.o. female.    Chief Complaint: PAF, CVA  Atrial Fibrillation  Past medical history includes atrial fibrillation.       Dear Dr. Doty,    I had the pleasure of seeing this patient in the office today.  We just recently had a visit with her.  However she comes in because subsequent to that she had an episode of syncope.  She was visiting her son's house and just as she was getting ready to leave she was standing up and then without warning she passed out.  Fortunately they were able to catch her before she hit the ground.  She says she just suddenly felt very lightheaded and that all she remembered.  She did not have any sensation of palpitations or tachycardia and no chest pain.  Her blood pressure cuff recently a couple times flash the irregular heart rate symbol but she has not had any recurrent atrial fibrillation that she knows of.    She has had a history of persistent atrial fibrillation.  We performed a cardioversion 7/2020 after she has been on rate control and anticoagulation for an appropriate period of time.  She then had another Holter monitor she was having some palpitations.  This showed episodes of ectopic atrial rhythm and brief 3-4 beat runs of wide-complex tachycardia but no atrial fibrillation.  It was determined to keep her on her medical therapy.    We saw her initially in the hospital in 2019.  She presented for a CVA.  We were asked to see her for evaluation of a cardiac source of emboli.  None was found.  Transthoracic and transesophageal echocardiograms did not demonstrate any cardiac source of emboli.  14 day monitor showed no ectopy.  Then recently seen in our office with question about bradycardia.  A 48-hour Holter monitor was placed.  During that time, she was in atrial fibrillation the entire 48 hours.  She was unaware of this.  She did not feel any palpitations or tachycardia.  No irregularity.   "Heart rate was 70 bpm during the 48-hour recording with no pauses.      The following portions of the patient's history were reviewed and updated as appropriate: allergies, current medications, past family history, past medical history, past social history, past surgical history and problem list.    Past Medical History:   Diagnosis Date   • Benign essential hypertension    • Chronic kidney disease    • Chronic renal insufficiency    • CVA (cerebral vascular accident) (CMS/[facility])    • Degeneration of intervertebral disc    • Depression    • DVT (deep venous thrombosis) (CMS/[facility])    • Edema    • Gout    • H/O complete eye exam 09/2016   • Hyperlipidemia    • IBS (irritable bowel syndrome)    • OAB (overactive bladder)    • Osteopenia    • Osteoporosis    • PAF (paroxysmal atrial fibrillation) (CMS/[facility]) 11/8/2019   • Peripheral neuropathy    • Rheumatoid arthritis (CMS/[facility])        Procedures       Objective:     Vitals:    03/08/21 1328   BP: 142/82   Pulse: 55   Weight: 53.1 kg (117 lb)   Height: 157.5 cm (62\")        Alert and oriented x3, thought processes normal, judgment normal, speaking in full sentences with no wheezing and no respiratory distress. Hearing is appropriate without difficulty.  Lab Review:   Results from last 7 days   Lab Units 03/02/21  1503   SODIUM mmol/L 138   POTASSIUM mmol/L 4.8   CHLORIDE mmol/L 101   TOTAL CO2 mmol/L 26.4   BUN mg/dL 30*   CREATININE mg/dL 1.25*   CALCIUM mg/dL 9.8           Results for orders placed during the hospital encounter of 07/02/20    Adult Transthoracic Echo Complete W/ Cont if Necessary Per Protocol    Interpretation Summary  · Left ventricular systolic function is normal. Calculated EF = 58.0%.  · Left ventricular wall thickness is consistent with mild concentric hypertrophy.  · Normal right ventricular systolic function noted. Right ventricular cavity is mildly dilated.  · Left atrial cavity size is mildly dilated.  · Mild to moderate aortic valve regurgitation " is present.  · Moderate tricuspid valve regurgitation is present.  · Calculated right ventricular systolic pressure from tricuspid regurgitation is 31 mmHg.  · There is no evidence of pericardial effusion.    Lab Results   Component Value Date    GLUCOSE 99 10/16/2020    BUN 30 (H) 03/02/2021    CREATININE 1.25 (H) 03/02/2021    EGFRIFNONA 41 (L) 03/02/2021    EGFRIFAFRI 49 (L) 03/02/2021    BCR 24.0 03/02/2021    K 4.8 03/02/2021    CO2 26.4 03/02/2021    CALCIUM 9.8 03/02/2021    PROTENTOTREF 7.5 03/02/2021    ALBUMIN 4.30 03/02/2021    LABIL2 1.3 03/02/2021    AST 30 03/02/2021    ALT 18 03/02/2021     CBC    CBC 10/15/20 10/16/20 3/2/21   WBC 6.27 6.62 7.03   RBC 3.84 3.70 (A) 4.05   Hemoglobin 13.1 12.6 13.9   Hematocrit 38.8 37.1 40.7   .0 (A) 100.3 (A) 100.5 (A)   MCH 34.1 (A) 34.1 (A) 34.3 (A)   MCHC 33.8 34.0 34.2   RDW 11.9 (A) 11.8 (A) 12.0 (A)   Platelets 284 242 255   (A) Abnormal value                    Assessment:          Diagnosis Plan   1. PAF (paroxysmal atrial fibrillation) (CMS/[facility])  Cardiac Event Monitor   2. Nonrheumatic aortic (valve) insufficiency  Cardiac Event Monitor   3. Mixed hyperlipidemia  Cardiac Event Monitor   4. Cerebrovascular accident (CVA), unspecified mechanism (CMS/[facility])  Cardiac Event Monitor   5. History of ischemic right MCA stroke with extension  Cardiac Event Monitor   6. Syncope and collapse  Cardiac Event Monitor          Plan:       1.  Aortic insufficiency- no significant change on her symptoms, cont amlodipine, losartan  2.  History of CVA-continue Eliquis and rosuvastatin.  Creatinine BUN reviewed  3.  Benign essential hypertension-continue current medical regimen with amlodipine losartan, BUN/creatinine reviewed  4.  Paroxysmal atrial fibrillation- in sinus rhythm.  No A. fib seen on the recent monitor, but with her recent episode of syncope working to place an event monitor  5.  Chronic renal failure, most recent creatinine BUN reviewed.  Creatinine  currently consistent with a 5 mg dose of Eliquis, but has had issues with renal dysfunction previously and creatinine clearance is still only about 50.  Age 87 years old, discussed, will remain on the 2.5 mg twice daily of the Eliquis.  6.  Syncope and collapse-felt most likely to be secondary to vasovagal, but we will review the event monitor to screen for any additional arrhythmia  Thank you very much for allowing us to participate in the care of this pleasant patient.  Please don't hesitate to call if I can be of assistance in any way.      Current Outpatient Medications:   •  acetaminophen (TYLENOL) 650 MG 8 hr tablet, Take 1,300 mg by mouth Every 8 (Eight) Hours As Needed for Mild Pain ., Disp: , Rfl:   •  allopurinol (Zyloprim) 100 MG tablet, Take 1 tablet by mouth Daily., Disp: 90 tablet, Rfl: 1  •  amLODIPine (NORVASC) 5 MG tablet, Take 1 tablet by mouth Daily., Disp: 90 tablet, Rfl: 1  •  atenolol (TENORMIN) 25 MG tablet, Take 25 mg by mouth Daily., Disp: , Rfl:   •  calcium carbonate-vitamin d (CALCIUM 600+D) 600-400 MG-UNIT per tablet, Take 1 tablet by mouth Daily., Disp: , Rfl:   •  diphenhydrAMINE-acetaminophen (TYLENOL PM)  MG tablet per tablet, Take 2 tablets by mouth At Night As Needed for Sleep., Disp: , Rfl:   •  DULoxetine (CYMBALTA) 60 MG capsule, Take 1 capsule by mouth Daily., Disp: 90 capsule, Rfl: 1  •  Eliquis 2.5 MG tablet tablet, TAKE 1 TABLET BY MOUTH  EVERY 12 HOURS, Disp: 180 tablet, Rfl: 0  •  losartan (COZAAR) 100 MG tablet, Take 1 tablet by mouth Daily., Disp: 90 tablet, Rfl: 1  •  Multiple Vitamins-Minerals (CENTRUM SILVER) tablet, Take 1 tablet by mouth Daily., Disp: , Rfl:   •  Omega-3 Fatty Acids (OMEGA-3 FISH OIL PO), Take 1,600 mg by mouth Every Morning., Disp: , Rfl:   •  ondansetron (ZOFRAN) 4 MG tablet, Take 1 tablet by mouth Every 8 (Eight) Hours As Needed for Nausea or Vomiting., Disp: 10 tablet, Rfl: 0  •  rosuvastatin (CRESTOR) 5 MG tablet, Take 1 tablet by mouth  Daily., Disp: 90 tablet, Rfl: 1

## 2021-04-29 RX ORDER — APIXABAN 2.5 MG/1
TABLET, FILM COATED ORAL
Qty: 180 TABLET | Refills: 3 | Status: SHIPPED | OUTPATIENT
Start: 2021-04-29 | End: 2022-03-31 | Stop reason: SDUPTHER

## 2021-05-15 DIAGNOSIS — I10 BENIGN ESSENTIAL HYPERTENSION: Chronic | ICD-10-CM

## 2021-05-15 DIAGNOSIS — E78.2 MIXED HYPERLIPIDEMIA: Chronic | ICD-10-CM

## 2021-05-17 RX ORDER — AMLODIPINE BESYLATE 5 MG/1
5 TABLET ORAL DAILY
Qty: 90 TABLET | Refills: 3 | OUTPATIENT
Start: 2021-05-17

## 2021-05-17 RX ORDER — LOSARTAN POTASSIUM 100 MG/1
100 TABLET ORAL DAILY
Qty: 90 TABLET | Refills: 3 | OUTPATIENT
Start: 2021-05-17

## 2021-05-17 RX ORDER — ROSUVASTATIN CALCIUM 5 MG/1
5 TABLET, COATED ORAL DAILY
Qty: 90 TABLET | Refills: 3 | OUTPATIENT
Start: 2021-05-17

## 2021-05-18 ENCOUNTER — TELEPHONE (OUTPATIENT)
Dept: CARDIOLOGY | Facility: CLINIC | Age: 86
End: 2021-05-18

## 2021-05-18 RX ORDER — AMIODARONE HYDROCHLORIDE 200 MG/1
200 TABLET ORAL DAILY
Qty: 90 TABLET | Refills: 3 | Status: SHIPPED | OUTPATIENT
Start: 2021-05-18 | End: 2021-08-24 | Stop reason: SDUPTHER

## 2021-05-18 NOTE — TELEPHONE ENCOUNTER
Dr. Martines,    Pt called this afternoon asking if we could give her the results of her event monitor.    Thank you,    Kristen Fisher, RN  Triage MG

## 2021-05-18 NOTE — TELEPHONE ENCOUNTER
----- Message from Luh Perez MA sent at 5/18/2021  4:08 PM EDT -----  Pt has been schedule to see Celia on 1:00 PM on Wednesday  ----- Message -----  From: Roshan Martines III, MD  Sent: 5/18/2021   4:04 PM EDT  To: Luh Perez MA    This patient needs to be seen next week and I am in the hospital, please call her and arrange an appointment for sometime early to mid next week to follow-up with Celia DEAL.

## 2021-05-26 ENCOUNTER — OFFICE VISIT (OUTPATIENT)
Dept: CARDIOLOGY | Facility: CLINIC | Age: 86
End: 2021-05-26

## 2021-05-26 VITALS
HEIGHT: 62 IN | SYSTOLIC BLOOD PRESSURE: 138 MMHG | DIASTOLIC BLOOD PRESSURE: 62 MMHG | WEIGHT: 118.2 LBS | BODY MASS INDEX: 21.75 KG/M2 | HEART RATE: 50 BPM

## 2021-05-26 DIAGNOSIS — I34.0 NONRHEUMATIC MITRAL VALVE REGURGITATION: ICD-10-CM

## 2021-05-26 DIAGNOSIS — N18.32 CHRONIC RENAL FAILURE, STAGE 3B (HCC): ICD-10-CM

## 2021-05-26 DIAGNOSIS — R00.1 SINUS BRADYCARDIA: ICD-10-CM

## 2021-05-26 DIAGNOSIS — Z86.73 HISTORY OF ISCHEMIC RIGHT MCA STROKE: ICD-10-CM

## 2021-05-26 DIAGNOSIS — Z79.899 ON AMIODARONE THERAPY: ICD-10-CM

## 2021-05-26 DIAGNOSIS — I48.0 PAF (PAROXYSMAL ATRIAL FIBRILLATION) (HCC): Primary | Chronic | ICD-10-CM

## 2021-05-26 DIAGNOSIS — I35.1 NONRHEUMATIC AORTIC (VALVE) INSUFFICIENCY: Chronic | ICD-10-CM

## 2021-05-26 DIAGNOSIS — I44.0 FIRST DEGREE AV BLOCK: ICD-10-CM

## 2021-05-26 DIAGNOSIS — R55 SYNCOPE, UNSPECIFIED SYNCOPE TYPE: ICD-10-CM

## 2021-05-26 DIAGNOSIS — I10 ESSENTIAL HYPERTENSION: ICD-10-CM

## 2021-05-26 PROBLEM — R42 DIZZINESS: Status: RESOLVED | Noted: 2020-10-15 | Resolved: 2021-05-26

## 2021-05-26 PROBLEM — I63.9 CVA (CEREBRAL VASCULAR ACCIDENT) (HCC): Status: RESOLVED | Noted: 2018-08-06 | Resolved: 2021-05-26

## 2021-05-26 PROBLEM — R29.898 RIGHT LEG WEAKNESS: Status: RESOLVED | Noted: 2018-08-05 | Resolved: 2021-05-26

## 2021-05-26 PROBLEM — R07.9 CHEST PAIN: Status: RESOLVED | Noted: 2020-10-15 | Resolved: 2021-05-26

## 2021-05-26 PROBLEM — Z87.448 HISTORY OF RENAL INSUFFICIENCY SYNDROME: Status: RESOLVED | Noted: 2017-07-11 | Resolved: 2021-05-26

## 2021-05-26 PROBLEM — Z86.718 HISTORY OF DVT (DEEP VEIN THROMBOSIS): Status: RESOLVED | Noted: 2017-07-11 | Resolved: 2021-05-26

## 2021-05-26 PROBLEM — Z87.39 HISTORY OF DEGENERATIVE DISC DISEASE: Status: RESOLVED | Noted: 2017-07-11 | Resolved: 2021-05-26

## 2021-05-26 PROBLEM — Z87.39 HISTORY OF RHEUMATOID ARTHRITIS: Status: RESOLVED | Noted: 2017-07-11 | Resolved: 2021-05-26

## 2021-05-26 PROBLEM — Z86.59 HISTORY OF DEPRESSION: Status: RESOLVED | Noted: 2017-07-11 | Resolved: 2021-05-26

## 2021-05-26 PROBLEM — I48.91 ATRIAL FIBRILLATION STATUS POST CARDIOVERSION (HCC): Status: RESOLVED | Noted: 2020-10-15 | Resolved: 2021-05-26

## 2021-05-26 PROBLEM — Z87.39 HISTORY OF OSTEOPOROSIS: Status: RESOLVED | Noted: 2017-07-11 | Resolved: 2021-05-26

## 2021-05-26 PROCEDURE — 93000 ELECTROCARDIOGRAM COMPLETE: CPT | Performed by: NURSE PRACTITIONER

## 2021-05-26 PROCEDURE — 99214 OFFICE O/P EST MOD 30 MIN: CPT | Performed by: NURSE PRACTITIONER

## 2021-05-26 NOTE — PROGRESS NOTES
Date of Office Visit: 2021  Encounter Provider: GROVER Howard  Place of Service: [facility] CARDIOLOGY  Patient Name: Alba Correa  :1933  Primary Cardiologist: Dr. Roshan Martines    Chief Complaint   Patient presents with   • Atrial Fibrillation   • Follow-up   :     HPI: Alba Correa is a pleasant 88 y.o. female who presents today for follow-up after starting amiodarone therapy.  I have reviewed her medical records.    In 2019, she presented to the hospital and was diagnosed with an acute CVA.  She wore a 14-day follow-up Holter monitor which showed no abnormalities.  She then developed bradycardia and a 48-hour Holter monitor showed atrial fibrillation in which she was asymptomatic.  She has been diagnosed with persistent atrial fibrillation.  In 2020, she had a cardioversion completed.  She continued to experience palpitations and wore a Holter monitor which showed episodes of ectopic atrial rhythm, but no atrial fibrillation.    In 2020, echocardiogram showed the following: Normal LVEF, mild concentric hypertrophy, right ventricular cavity mildly dilated, left atrial cavity mildly dilated, mild to moderate aortic regurgitation, moderate tricuspid valve regurgitation, and RVSP normal.    In 2021, she saw Dr. Roshan Martines in the office.  She had had a syncopal episode at her son's house of unknown etiology.  In 2021, she wore a 30-day event recorder with the following results: Paroxysmal atrial fibrillation accounting for 40% of the recording with an average heart rate that was generally well controlled.    She presents today with her son accompanying her.  We reviewed the recent monitor results and they verbalized understanding.  When she is in atrial fibrillation, she has felt fatigued.  She denies chest pain, shortness of breath, palpitations, edema, dizziness, syncope, or bleeding.  She feels that she is in a normal sinus rhythm  today and her fatigue has resolved.  She has been monitoring her blood pressure and heart rates at home.  Occasionally her heart rate is in the upper 40s.    Past Medical History:   Diagnosis Date   • Benign essential hypertension    • Chronic kidney disease    • Chronic renal insufficiency    • CVA (cerebral vascular accident) (CMS/[facility])    • Degeneration of intervertebral disc    • Depression    • DVT (deep venous thrombosis) (CMS/[facility])    • Edema    • First degree AV block 2021   • Gout    • H/O complete eye exam 2016   • Hyperlipidemia    • IBS (irritable bowel syndrome)    • Nonrheumatic mitral valve regurgitation    • OAB (overactive bladder)    • Osteopenia    • Osteoporosis    • PAF (paroxysmal atrial fibrillation) (CMS/[facility]) 2019   • Peripheral neuropathy    • Rheumatoid arthritis (CMS/[facility])    • Sinus bradycardia 2021       Past Surgical History:   Procedure Laterality Date   • APPENDECTOMY     • BREAST BIOPSY     • CARDIOVERSION  2020    Dr. Colby   • COLONOSCOPY      declines   • HERNIA REPAIR     • HYSTERECTOMY      still has ovaries   • MAMMO BILATERAL  2016    pt declines   • PAP SMEAR  2016    declines       Social History     Socioeconomic History   • Marital status:      Spouse name: Peña   • Number of children: 3   • Years of education: High school   • Highest education level: Not on file   Tobacco Use   • Smoking status: Former Smoker     Years: 5.00     Types: Cigarettes     Quit date:      Years since quittin.4   • Smokeless tobacco: Never Used   • Tobacco comment: caffeine use: 2 cups coffee in AM   Substance and Sexual Activity   • Alcohol use: Yes     Comment: Occasional- glass ofwine few times a week   • Drug use: No   • Sexual activity: Defer     Partners: Male     Birth control/protection: Surgical       Family History   Problem Relation Age of Onset   • Alcohol abuse Other    • Cancer Other    • Hypertension Other    • Kidney disease  Other    • Lung disease Other    • Heart disease Mother 89   • Heart failure Mother    • Ovarian cancer Daughter 60   • Stomach cancer Father    • Kidney cancer Father 52   • Lung cancer Father 52   • Ovarian cancer Sister    • Lung cancer Brother 65       The following portion of the patient's history were reviewed and updated as appropriate: past medical history, past surgical history, past social history, past family history, allergies, current medications, and problem list.    Review of Systems   Constitutional: Positive for malaise/fatigue.   Cardiovascular: Negative.    Respiratory: Negative.    Hematologic/Lymphatic: Negative.    Neurological: Negative.        Allergies   Allergen Reactions   • Bactrim [Sulfamethoxazole-Trimethoprim] Delirium   • Lisinopril Unknown (See Comments)     Unknown per pt   • Levofloxacin Rash         Current Outpatient Medications:   •  acetaminophen (TYLENOL) 650 MG 8 hr tablet, Take 1,300 mg by mouth Every 8 (Eight) Hours As Needed for Mild Pain ., Disp: , Rfl:   •  allopurinol (Zyloprim) 100 MG tablet, Take 1 tablet by mouth Daily., Disp: 90 tablet, Rfl: 1  •  amiodarone (PACERONE) 200 MG tablet, Take 1 tablet by mouth Daily., Disp: 90 tablet, Rfl: 3  •  amLODIPine (NORVASC) 5 MG tablet, Take 1 tablet by mouth Daily., Disp: 90 tablet, Rfl: 1  •  atenolol (TENORMIN) 25 MG tablet, Take 25 mg by mouth Daily., Disp: , Rfl:   •  calcium carbonate-vitamin d (CALCIUM 600+D) 600-400 MG-UNIT per tablet, Take 1 tablet by mouth Daily., Disp: , Rfl:   •  diphenhydrAMINE-acetaminophen (TYLENOL PM)  MG tablet per tablet, Take 2 tablets by mouth At Night As Needed for Sleep., Disp: , Rfl:   •  DULoxetine (CYMBALTA) 60 MG capsule, Take 1 capsule by mouth Daily., Disp: 90 capsule, Rfl: 1  •  Eliquis 2.5 MG tablet tablet, TAKE 1 TABLET BY MOUTH  EVERY 12 HOURS, Disp: 180 tablet, Rfl: 3  •  losartan (COZAAR) 100 MG tablet, Take 1 tablet by mouth Daily., Disp: 90 tablet, Rfl: 1  •  Multiple  "Vitamins-Minerals (CENTRUM SILVER) tablet, Take 1 tablet by mouth Daily., Disp: , Rfl:   •  Omega-3 Fatty Acids (OMEGA-3 FISH OIL PO), Take 1,600 mg by mouth Every Morning., Disp: , Rfl:   •  ondansetron (ZOFRAN) 4 MG tablet, Take 1 tablet by mouth Every 8 (Eight) Hours As Needed for Nausea or Vomiting., Disp: 10 tablet, Rfl: 0  •  rosuvastatin (CRESTOR) 5 MG tablet, Take 1 tablet by mouth Daily., Disp: 90 tablet, Rfl: 1        Objective:     Vitals:    05/26/21 1309 05/26/21 1316   BP: 138/62 138/62   BP Location: Left arm Right arm   Pulse: 50    Weight: 53.6 kg (118 lb 3.2 oz)    Height: 157.5 cm (62\")      Body mass index is 21.62 kg/m².    PHYSICAL EXAM:    Vitals Reviewed.   General Appearance: No acute distress, well developed and well nourished.  Thin.   Eyes: Conjunctiva and lids: No erythema, swelling, or discharge. Sclera non-icteric.   HENT: Atraumatic, normocephalic. External eyes, ears, and nose normal. No hearing loss noted. Mucous membranes normal. Lips not cyanotic. Neck supple with no tenderness.  Respiratory: No signs of respiratory distress. Respiration rhythm and depth normal.   Clear to auscultation. No rales, crackles, rhonchi, or wheezing auscultated.   Cardiovascular:  Jugular Venous Pressure: Normal  Heart Rate and Rhythm: Bradycardic; normal rhythm.  Heart Sounds: Normal S1 and S2. No S3 or S4 noted.  Murmurs: No murmurs noted. No rubs, thrills, or gallops.   Lower Extremities: No edema noted.  Gastrointestinal:  Abdomen soft, non-distended, non-tender. Normal bowel sounds.    Musculoskeletal: Normal movement of extremities  Skin and Nails: General appearance normal. No pallor, cyanosis, diaphoresis. Skin temperature normal. No clubbing of fingernails.   Psychiatric: Patient alert and oriented to person, place, and time. Speech and behavior appropriate. Normal mood and affect.       ECG 12 Lead    Date/Time: 5/26/2021 1:11 PM  Performed by: Celia Quijano APRN  Authorized by: Macie, " Celia SALAZARGROVER   Comparison: compared with previous ECG from 3/24/2021  Similar to previous ECG  Rhythm: sinus rhythm  Rate: bradycardic  BPM: 50  Conduction: 1st degree AV block  Q waves: V1 and V2    ST Segments: ST segments normal  T Waves: T waves normal  QRS axis: normal  Other findings: poor R wave progression    Clinical impression: abnormal EKG              Assessment:       Diagnosis Plan   1. PAF (paroxysmal atrial fibrillation) (CMS/[facility])     2. On amiodarone therapy     3. Sinus bradycardia     4. First degree AV block     5. Nonrheumatic aortic (valve) insufficiency     6. Nonrheumatic mitral valve regurgitation     7. Essential hypertension     8. History of ischemic right MCA stroke with extension     9. Chronic renal failure, stage 3b (CMS/[facility])     10. Syncope, unspecified syncope type            Plan:       1-2.  Paroxysmal Atrial fibrillation: Currently in a normal sinus rhythm on amiodarone.  Her heart rate is 50 bpm today in the office and she is asymptomatic.  On occasion she has noticed her heart rate in the upper 40s at home, but mostly in the 50s.  I recommended that she take the atenolol at nighttime and the amiodarone in the morning.  She will continue with apixaban (renally dosed) at 2.5 mg twice per day.  Overall she is feeling better and her symptom with atrial fibrillation is fatigue.    3-4.  Sinus Bradycardia and First-degree A-V Block: Continue to monitor.  If her heart rate is more consistently in the 40s, have asked her to call the office and we will decrease her atenolol.    5.  Aortic Insufficiency: Mild to moderate per echocardiogram in July 2020.    6.  Mitral Regurgitation: Moderate per echocardiogram July 2020.    7.  Hypertension: Blood pressure stable today.    8.  History of Ischemic Stroke: Continue apixaban and rosuvastatin.    9.  Chronic Kidney Disease: Stage III.    10.  Syncope: No further episodes.    11.  I recommended follow-up with Dr. Roshan Martines in 6 months,  unless otherwise needed sooner.    As always, it has been a pleasure to participate in your patient's care. Thank you.       Sincerely,       GROVER Del Cid  [facility] Cardiology      · COVID-19 Precautions - Patient was compliant in wearing a mask. When I saw the patient, I used appropriate personal protective equipment (PPE) including mask and eye shield (standard procedure).  Additionally, I used gown and gloves if indicated.  Hand hygiene was completed before and after seeing the patient.  · Dictated utilizing Dragon Dictation

## 2021-05-27 DIAGNOSIS — E78.2 MIXED HYPERLIPIDEMIA: Chronic | ICD-10-CM

## 2021-05-27 DIAGNOSIS — I10 BENIGN ESSENTIAL HYPERTENSION: Chronic | ICD-10-CM

## 2021-05-28 RX ORDER — AMLODIPINE BESYLATE 5 MG/1
5 TABLET ORAL DAILY
Qty: 90 TABLET | Refills: 3 | OUTPATIENT
Start: 2021-05-28

## 2021-05-28 RX ORDER — ROSUVASTATIN CALCIUM 5 MG/1
5 TABLET, COATED ORAL DAILY
Qty: 90 TABLET | Refills: 3 | OUTPATIENT
Start: 2021-05-28

## 2021-05-28 RX ORDER — LOSARTAN POTASSIUM 100 MG/1
100 TABLET ORAL DAILY
Qty: 90 TABLET | Refills: 3 | OUTPATIENT
Start: 2021-05-28

## 2021-06-12 DIAGNOSIS — I10 BENIGN ESSENTIAL HYPERTENSION: Chronic | ICD-10-CM

## 2021-06-12 DIAGNOSIS — E78.2 MIXED HYPERLIPIDEMIA: Chronic | ICD-10-CM

## 2021-06-14 RX ORDER — AMLODIPINE BESYLATE 5 MG/1
5 TABLET ORAL DAILY
Qty: 90 TABLET | Refills: 0 | Status: SHIPPED | OUTPATIENT
Start: 2021-06-14 | End: 2021-08-09

## 2021-06-14 RX ORDER — ROSUVASTATIN CALCIUM 5 MG/1
5 TABLET, COATED ORAL DAILY
Qty: 90 TABLET | Refills: 0 | Status: SHIPPED | OUTPATIENT
Start: 2021-06-14 | End: 2021-08-09

## 2021-06-14 RX ORDER — LOSARTAN POTASSIUM 100 MG/1
100 TABLET ORAL DAILY
Qty: 90 TABLET | Refills: 0 | Status: SHIPPED | OUTPATIENT
Start: 2021-06-14 | End: 2021-08-09

## 2021-08-08 DIAGNOSIS — E78.2 MIXED HYPERLIPIDEMIA: Chronic | ICD-10-CM

## 2021-08-08 DIAGNOSIS — I10 BENIGN ESSENTIAL HYPERTENSION: Chronic | ICD-10-CM

## 2021-08-09 RX ORDER — AMLODIPINE BESYLATE 5 MG/1
5 TABLET ORAL DAILY
Qty: 30 TABLET | Refills: 0 | Status: SHIPPED | OUTPATIENT
Start: 2021-08-09 | End: 2021-08-19 | Stop reason: SDUPTHER

## 2021-08-09 RX ORDER — LOSARTAN POTASSIUM 100 MG/1
100 TABLET ORAL DAILY
Qty: 30 TABLET | Refills: 0 | Status: SHIPPED | OUTPATIENT
Start: 2021-08-09 | End: 2021-08-19 | Stop reason: SDUPTHER

## 2021-08-09 RX ORDER — ROSUVASTATIN CALCIUM 5 MG/1
5 TABLET, COATED ORAL DAILY
Qty: 30 TABLET | Refills: 0 | Status: SHIPPED | OUTPATIENT
Start: 2021-08-09 | End: 2021-08-19 | Stop reason: SDUPTHER

## 2021-08-19 ENCOUNTER — OFFICE VISIT (OUTPATIENT)
Dept: FAMILY MEDICINE CLINIC | Facility: CLINIC | Age: 86
End: 2021-08-19

## 2021-08-19 VITALS
WEIGHT: 120 LBS | BODY MASS INDEX: 22.08 KG/M2 | RESPIRATION RATE: 14 BRPM | SYSTOLIC BLOOD PRESSURE: 148 MMHG | DIASTOLIC BLOOD PRESSURE: 62 MMHG | TEMPERATURE: 97.3 F | HEART RATE: 46 BPM | HEIGHT: 62 IN

## 2021-08-19 DIAGNOSIS — F33.42 RECURRENT MAJOR DEPRESSIVE DISORDER, IN FULL REMISSION (HCC): Chronic | ICD-10-CM

## 2021-08-19 DIAGNOSIS — E78.2 MIXED HYPERLIPIDEMIA: Chronic | ICD-10-CM

## 2021-08-19 DIAGNOSIS — I10 BENIGN ESSENTIAL HYPERTENSION: Chronic | ICD-10-CM

## 2021-08-19 DIAGNOSIS — M10.9 GOUT, UNSPECIFIED CAUSE, UNSPECIFIED CHRONICITY, UNSPECIFIED SITE: Chronic | ICD-10-CM

## 2021-08-19 DIAGNOSIS — R53.83 OTHER FATIGUE: Primary | ICD-10-CM

## 2021-08-19 DIAGNOSIS — M54.50 LOW BACK PAIN WITHOUT SCIATICA, UNSPECIFIED BACK PAIN LATERALITY, UNSPECIFIED CHRONICITY: ICD-10-CM

## 2021-08-19 LAB
BILIRUB BLD-MCNC: NEGATIVE MG/DL
CLARITY, POC: CLEAR
COLOR UR: YELLOW
GLUCOSE UR STRIP-MCNC: NEGATIVE MG/DL
KETONES UR QL: ABNORMAL
LEUKOCYTE EST, POC: NEGATIVE
NITRITE UR-MCNC: NEGATIVE MG/ML
PH UR: 5.5 [PH] (ref 5–8)
PROT UR STRIP-MCNC: NEGATIVE MG/DL
RBC # UR STRIP: NEGATIVE /UL
SP GR UR: 1.01 (ref 1–1.03)
UROBILINOGEN UR QL: NORMAL

## 2021-08-19 PROCEDURE — 81003 URINALYSIS AUTO W/O SCOPE: CPT | Performed by: FAMILY MEDICINE

## 2021-08-19 PROCEDURE — 99214 OFFICE O/P EST MOD 30 MIN: CPT | Performed by: FAMILY MEDICINE

## 2021-08-19 RX ORDER — ALLOPURINOL 100 MG/1
100 TABLET ORAL DAILY
Qty: 90 TABLET | Refills: 1 | Status: SHIPPED | OUTPATIENT
Start: 2021-08-19 | End: 2022-02-28

## 2021-08-19 RX ORDER — ATENOLOL 25 MG/1
25 TABLET ORAL DAILY
Qty: 90 TABLET | Refills: 1 | Status: SHIPPED | OUTPATIENT
Start: 2021-08-19 | End: 2021-10-12 | Stop reason: HOSPADM

## 2021-08-19 RX ORDER — LOSARTAN POTASSIUM 100 MG/1
100 TABLET ORAL DAILY
Qty: 90 TABLET | Refills: 1 | Status: SHIPPED | OUTPATIENT
Start: 2021-08-19 | End: 2021-09-22

## 2021-08-19 RX ORDER — ROSUVASTATIN CALCIUM 5 MG/1
5 TABLET, COATED ORAL DAILY
Qty: 90 TABLET | Refills: 1 | Status: SHIPPED | OUTPATIENT
Start: 2021-08-19 | End: 2022-01-26

## 2021-08-19 RX ORDER — DULOXETIN HYDROCHLORIDE 60 MG/1
60 CAPSULE, DELAYED RELEASE ORAL DAILY
Qty: 90 CAPSULE | Refills: 1 | Status: SHIPPED | OUTPATIENT
Start: 2021-08-19 | End: 2022-02-28

## 2021-08-19 RX ORDER — AMLODIPINE BESYLATE 5 MG/1
5 TABLET ORAL DAILY
Qty: 90 TABLET | Refills: 1 | Status: SHIPPED | OUTPATIENT
Start: 2021-08-19 | End: 2022-01-26

## 2021-08-19 NOTE — PROGRESS NOTES
Subjective   Alba Correa is a 88 y.o. female.     History of Present Illness     Chief Complaint:   Chief Complaint   Patient presents with   • Fatigue   • Extremity Weakness   • Hypertension     med refill due    no labs    • Hyperlipidemia   • Arthritis       Alba Correa 88 y.o. female who presents today for Medical Management of the below listed issues and medication refills.  she has a problem list of   Patient Active Problem List   Diagnosis   • Chronic renal failure, stage 3 (moderate)   • DVT (deep venous thrombosis) (CMS/HCC)   • Depression   • Gout   • Hyperparathyroidism (CMS/HCC)   • Adaptive colitis   • Osteopenia   • Rheumatoid arthritis (CMS/HCC)   • Degeneration of intervertebral disc   • Essential hypertension   • Detrusor muscle hypertonia   • Hyperlipidemia   • Macrocytosis   • Nonrheumatic aortic (valve) insufficiency   • History of ischemic right MCA stroke with extension   • Bilateral leg weakness   • PAF (paroxysmal atrial fibrillation) (CMS/HCC)   • Anemia in chronic kidney disease   • Other proteinuria   • On amiodarone therapy   • Sinus bradycardia   • First degree AV block   • Nonrheumatic mitral valve regurgitation   .  Since the last visit, she has overall felt well, although has been more tired and weak recently.   she has been compliant with   Current Outpatient Medications:   •  amLODIPine (NORVASC) 5 MG tablet, Take 1 tablet by mouth Daily., Disp: 90 tablet, Rfl: 1  •  DULoxetine (CYMBALTA) 60 MG capsule, Take 1 capsule by mouth Daily., Disp: 90 capsule, Rfl: 1  •  losartan (COZAAR) 100 MG tablet, Take 1 tablet by mouth Daily., Disp: 90 tablet, Rfl: 1  •  rosuvastatin (CRESTOR) 5 MG tablet, Take 1 tablet by mouth Daily., Disp: 90 tablet, Rfl: 1  •  acetaminophen (TYLENOL) 650 MG 8 hr tablet, Take 1,300 mg by mouth Every 8 (Eight) Hours As Needed for Mild Pain ., Disp: , Rfl:   •  allopurinol (Zyloprim) 100 MG tablet, Take 1 tablet by mouth Daily., Disp: 90 tablet, Rfl: 1  •   "amiodarone (PACERONE) 200 MG tablet, Take 1 tablet by mouth Daily., Disp: 90 tablet, Rfl: 3  •  atenolol (TENORMIN) 25 MG tablet, Take 1 tablet by mouth Daily., Disp: 90 tablet, Rfl: 1  •  calcium carbonate-vitamin d (CALCIUM 600+D) 600-400 MG-UNIT per tablet, Take 1 tablet by mouth Daily., Disp: , Rfl:   •  diphenhydrAMINE-acetaminophen (TYLENOL PM)  MG tablet per tablet, Take 2 tablets by mouth At Night As Needed for Sleep., Disp: , Rfl:   •  Eliquis 2.5 MG tablet tablet, TAKE 1 TABLET BY MOUTH  EVERY 12 HOURS, Disp: 180 tablet, Rfl: 3  •  Multiple Vitamins-Minerals (CENTRUM SILVER) tablet, Take 1 tablet by mouth Daily., Disp: , Rfl:   •  Omega-3 Fatty Acids (OMEGA-3 FISH OIL PO), Take 1,600 mg by mouth Every Morning., Disp: , Rfl:   •  ondansetron (ZOFRAN) 4 MG tablet, Take 1 tablet by mouth Every 8 (Eight) Hours As Needed for Nausea or Vomiting., Disp: 10 tablet, Rfl: 0.  she denies medication side effects.    All of the other chronic condition(s) listed above are stable w/o issues.    /62   Pulse (!) 46   Temp 97.3 °F (36.3 °C) (Oral)   Resp 14   Ht 157.5 cm (62\")   Wt 54.4 kg (120 lb)   BMI 21.95 kg/m²     Results for orders placed or performed in visit on 08/19/21   POC Urinalysis Dipstick, Automated    Specimen: Urine   Result Value Ref Range    Color Yellow Yellow, Straw, Dark Yellow, Faiza    Clarity, UA Clear Clear    Specific Gravity  1.010 1.005 - 1.030    pH, Urine 5.5 5.0 - 8.0    Leukocytes Negative Negative    Nitrite, UA Negative Negative    Protein, POC Negative Negative mg/dL    Glucose, UA Negative Negative, 1000 mg/dL (3+) mg/dL    Ketones, UA Trace (A) Negative    Urobilinogen, UA Normal Normal    Bilirubin Negative Negative    Blood, UA Negative Negative           The following portions of the patient's history were reviewed and updated as appropriate: allergies, current medications, past family history, past medical history, past social history, past surgical history and " problem list.    Review of Systems   Constitutional: Positive for fatigue. Negative for activity change, chills and fever.   Respiratory: Negative for cough.    Cardiovascular: Negative for chest pain.   Psychiatric/Behavioral: Negative for dysphoric mood.       Objective   Physical Exam  Constitutional:       General: She is not in acute distress.     Appearance: She is well-developed.   Cardiovascular:      Rate and Rhythm: Normal rate and regular rhythm.   Pulmonary:      Effort: Pulmonary effort is normal.      Breath sounds: Normal breath sounds.   Neurological:      Mental Status: She is alert and oriented to person, place, and time.   Psychiatric:         Behavior: Behavior normal.         Thought Content: Thought content normal.     U/A: NEGATIVE    Assessment/Plan   Diagnoses and all orders for this visit:    1. Other fatigue (Primary)  -     TSH  -     CBC & Differential  -     Comprehensive Metabolic Panel  -     POC Urinalysis Dipstick, Automated    2. Benign essential hypertension  -     losartan (COZAAR) 100 MG tablet; Take 1 tablet by mouth Daily.  Dispense: 90 tablet; Refill: 1  -     amLODIPine (NORVASC) 5 MG tablet; Take 1 tablet by mouth Daily.  Dispense: 90 tablet; Refill: 1  -     TSH  -     CBC & Differential  -     Comprehensive Metabolic Panel  -     atenolol (TENORMIN) 25 MG tablet; Take 1 tablet by mouth Daily.  Dispense: 90 tablet; Refill: 1    3. Mixed hyperlipidemia  -     rosuvastatin (CRESTOR) 5 MG tablet; Take 1 tablet by mouth Daily.  Dispense: 90 tablet; Refill: 1    4. Recurrent major depressive disorder, in full remission (CMS/HCC)  -     DULoxetine (CYMBALTA) 60 MG capsule; Take 1 capsule by mouth Daily.  Dispense: 90 capsule; Refill: 1    5. Gout, unspecified cause, unspecified chronicity, unspecified site  -     allopurinol (Zyloprim) 100 MG tablet; Take 1 tablet by mouth Daily.  Dispense: 90 tablet; Refill: 1    6. Low back pain without sciatica, unspecified back pain  laterality, unspecified chronicity  -     POC Urinalysis Dipstick, Automated

## 2021-08-24 RX ORDER — AMIODARONE HYDROCHLORIDE 200 MG/1
200 TABLET ORAL DAILY
Qty: 90 TABLET | Refills: 3 | Status: SHIPPED | OUTPATIENT
Start: 2021-08-24 | End: 2022-03-27 | Stop reason: HOSPADM

## 2021-09-01 ENCOUNTER — TRANSCRIBE ORDERS (OUTPATIENT)
Dept: ADMINISTRATIVE | Facility: HOSPITAL | Age: 86
End: 2021-09-01

## 2021-09-01 DIAGNOSIS — N18.30 STAGE 3 CHRONIC KIDNEY DISEASE, UNSPECIFIED WHETHER STAGE 3A OR 3B CKD (HCC): Primary | ICD-10-CM

## 2021-09-01 LAB
ALBUMIN SERPL-MCNC: 4.6 G/DL (ref 3.5–5.2)
ALBUMIN/GLOB SERPL: 1.6 G/DL
ALP SERPL-CCNC: 69 U/L (ref 39–117)
ALT SERPL-CCNC: 17 U/L (ref 1–33)
AST SERPL-CCNC: 28 U/L (ref 1–32)
BASOPHILS # BLD AUTO: 0.11 10*3/MM3 (ref 0–0.2)
BASOPHILS NFR BLD AUTO: 2.1 % (ref 0–1.5)
BILIRUB SERPL-MCNC: 0.4 MG/DL (ref 0–1.2)
BUN SERPL-MCNC: 32 MG/DL (ref 8–23)
BUN/CREAT SERPL: 18.9 (ref 7–25)
CALCIUM SERPL-MCNC: 10 MG/DL (ref 8.6–10.5)
CHLORIDE SERPL-SCNC: 103 MMOL/L (ref 98–107)
CO2 SERPL-SCNC: 26.2 MMOL/L (ref 22–29)
CREAT SERPL-MCNC: 1.69 MG/DL (ref 0.57–1)
EOSINOPHIL # BLD AUTO: 0.22 10*3/MM3 (ref 0–0.4)
EOSINOPHIL NFR BLD AUTO: 4.2 % (ref 0.3–6.2)
ERYTHROCYTE [DISTWIDTH] IN BLOOD BY AUTOMATED COUNT: 11.9 % (ref 12.3–15.4)
GLOBULIN SER CALC-MCNC: 2.8 GM/DL
GLUCOSE SERPL-MCNC: 96 MG/DL (ref 65–99)
HCT VFR BLD AUTO: 38 % (ref 34–46.6)
HGB BLD-MCNC: 12.9 G/DL (ref 12–15.9)
IMM GRANULOCYTES # BLD AUTO: 0.01 10*3/MM3 (ref 0–0.05)
IMM GRANULOCYTES NFR BLD AUTO: 0.2 % (ref 0–0.5)
LYMPHOCYTES # BLD AUTO: 1.77 10*3/MM3 (ref 0.7–3.1)
LYMPHOCYTES NFR BLD AUTO: 33.7 % (ref 19.6–45.3)
MCH RBC QN AUTO: 35.1 PG (ref 26.6–33)
MCHC RBC AUTO-ENTMCNC: 33.9 G/DL (ref 31.5–35.7)
MCV RBC AUTO: 103.3 FL (ref 79–97)
MONOCYTES # BLD AUTO: 0.43 10*3/MM3 (ref 0.1–0.9)
MONOCYTES NFR BLD AUTO: 8.2 % (ref 5–12)
NEUTROPHILS # BLD AUTO: 2.71 10*3/MM3 (ref 1.7–7)
NEUTROPHILS NFR BLD AUTO: 51.6 % (ref 42.7–76)
NRBC BLD AUTO-RTO: 0 /100 WBC (ref 0–0.2)
PLATELET # BLD AUTO: 258 10*3/MM3 (ref 140–450)
POTASSIUM SERPL-SCNC: 4.8 MMOL/L (ref 3.5–5.2)
PROT SERPL-MCNC: 7.4 G/DL (ref 6–8.5)
RBC # BLD AUTO: 3.68 10*6/MM3 (ref 3.77–5.28)
SODIUM SERPL-SCNC: 138 MMOL/L (ref 136–145)
TSH SERPL DL<=0.005 MIU/L-ACNC: 7.72 UIU/ML (ref 0.27–4.2)
WBC # BLD AUTO: 5.25 10*3/MM3 (ref 3.4–10.8)

## 2021-09-05 DIAGNOSIS — E03.9 ACQUIRED HYPOTHYROIDISM: Primary | ICD-10-CM

## 2021-09-05 RX ORDER — LEVOTHYROXINE SODIUM 0.03 MG/1
25 TABLET ORAL DAILY
Qty: 30 TABLET | Refills: 2 | Status: SHIPPED | OUTPATIENT
Start: 2021-09-05 | End: 2021-12-28

## 2021-09-21 NOTE — PROGRESS NOTES
"Subjective   Alba Correa is a 88 y.o. female.     CC: Back Pain    History of Present Illness     Pt comes in today reporting some LBP issues since a fall 1 week prior when losing her balance and falling back onto her lower back/bottom. Hurts to sit directly on her buttocks.      The following portions of the patient's history were reviewed and updated as appropriate: allergies, current medications, past family history, past medical history, past social history, past surgical history and problem list.    Review of Systems   Constitutional: Negative for activity change, chills and fever.   Respiratory: Negative for cough.    Cardiovascular: Negative for chest pain.   Musculoskeletal: Positive for back pain.   Neurological: Negative for weakness and numbness.   Psychiatric/Behavioral: Negative for dysphoric mood.       /72   Pulse 62   Temp 96.4 °F (35.8 °C) (Oral)   Resp 14   Ht 157.5 cm (62\")   Wt 54.4 kg (120 lb)   BMI 21.95 kg/m²     Objective   Physical Exam  Constitutional:       General: She is not in acute distress.     Appearance: She is well-developed.   Pulmonary:      Effort: Pulmonary effort is normal.   Musculoskeletal:        Back:       Comments: Tender to palpation.   Neurological:      Mental Status: She is alert and oriented to person, place, and time.   Psychiatric:         Behavior: Behavior normal.         Thought Content: Thought content normal.     XR L-spine: ordered due to pain s/p fall, no comparison, read by me: Scoliosis and spondylosis of several vertebrae, but nothing acute.    Assessment/Plan   Diagnoses and all orders for this visit:    1. Acute low back pain without sciatica, unspecified back pain laterality (Primary)  -     XR Spine Lumbar 2 or 3 View  -     traMADol (ULTRAM) 50 MG tablet; Take 1 tablet by mouth Every 6 (Six) Hours As Needed for Moderate Pain .  Dispense: 40 tablet; Refill: 0    Donut discussed. Pt only to use the Tramadol IF someone with her to watch " and help. Pt had NOT been using her Walker when she fell and is strongly encouraged to do this going forward.

## 2021-09-22 ENCOUNTER — OFFICE VISIT (OUTPATIENT)
Dept: FAMILY MEDICINE CLINIC | Facility: CLINIC | Age: 86
End: 2021-09-22

## 2021-09-22 VITALS
SYSTOLIC BLOOD PRESSURE: 128 MMHG | HEIGHT: 62 IN | RESPIRATION RATE: 14 BRPM | TEMPERATURE: 96.4 F | WEIGHT: 120 LBS | DIASTOLIC BLOOD PRESSURE: 72 MMHG | BODY MASS INDEX: 22.08 KG/M2 | HEART RATE: 62 BPM

## 2021-09-22 DIAGNOSIS — M54.50 ACUTE LOW BACK PAIN WITHOUT SCIATICA, UNSPECIFIED BACK PAIN LATERALITY: Primary | ICD-10-CM

## 2021-09-22 PROCEDURE — 99213 OFFICE O/P EST LOW 20 MIN: CPT | Performed by: FAMILY MEDICINE

## 2021-09-22 PROCEDURE — 72100 X-RAY EXAM L-S SPINE 2/3 VWS: CPT | Performed by: FAMILY MEDICINE

## 2021-09-22 RX ORDER — LOSARTAN POTASSIUM 50 MG/1
50 TABLET ORAL DAILY
COMMUNITY
Start: 2021-09-02 | End: 2022-03-10

## 2021-09-22 RX ORDER — TRAMADOL HYDROCHLORIDE 50 MG/1
50 TABLET ORAL EVERY 6 HOURS PRN
Qty: 40 TABLET | Refills: 0 | Status: SHIPPED | OUTPATIENT
Start: 2021-09-22 | End: 2021-12-28

## 2021-10-04 ENCOUNTER — HOSPITAL ENCOUNTER (OUTPATIENT)
Dept: ULTRASOUND IMAGING | Facility: HOSPITAL | Age: 86
Discharge: HOME | End: 2021-10-04
Admitting: INTERNAL MEDICINE

## 2021-10-04 DIAGNOSIS — N18.30 STAGE 3 CHRONIC KIDNEY DISEASE, UNSPECIFIED WHETHER STAGE 3A OR 3B CKD (HCC): ICD-10-CM

## 2021-10-04 PROCEDURE — 76775 US EXAM ABDO BACK WALL LIM: CPT

## 2021-10-10 ENCOUNTER — APPOINTMENT (OUTPATIENT)
Dept: CT IMAGING | Facility: HOSPITAL | Age: 86
End: 2021-10-10

## 2021-10-10 ENCOUNTER — APPOINTMENT (OUTPATIENT)
Dept: GENERAL RADIOLOGY | Facility: HOSPITAL | Age: 86
End: 2021-10-10

## 2021-10-10 ENCOUNTER — HOSPITAL ENCOUNTER (OUTPATIENT)
Facility: HOSPITAL | Age: 86
Discharge: HOME | End: 2021-10-12
Attending: EMERGENCY MEDICINE | Admitting: INTERNAL MEDICINE

## 2021-10-10 DIAGNOSIS — S00.03XA CONTUSION OF OCCIPITAL REGION OF SCALP, INITIAL ENCOUNTER: ICD-10-CM

## 2021-10-10 DIAGNOSIS — R00.1 SINUS BRADYCARDIA: ICD-10-CM

## 2021-10-10 DIAGNOSIS — R55 SYNCOPE AND COLLAPSE: Primary | ICD-10-CM

## 2021-10-10 LAB
ALBUMIN SERPL-MCNC: 4.2 G/DL (ref 3.5–5.2)
ALBUMIN/GLOB SERPL: 1.5 G/DL
ALP SERPL-CCNC: 70 U/L (ref 39–117)
ALT SERPL W P-5'-P-CCNC: 17 U/L (ref 1–33)
ANION GAP SERPL CALCULATED.3IONS-SCNC: 10.1 MMOL/L (ref 5–15)
AST SERPL-CCNC: 26 U/L (ref 1–32)
BASOPHILS # BLD AUTO: 0.07 10*3/MM3 (ref 0–0.2)
BASOPHILS NFR BLD AUTO: 1.3 % (ref 0–1.5)
BILIRUB SERPL-MCNC: 0.2 MG/DL (ref 0–1.2)
BILIRUB UR QL STRIP: NEGATIVE
BUN SERPL-MCNC: 39 MG/DL (ref 8–23)
BUN/CREAT SERPL: 25.2 (ref 7–25)
CALCIUM SPEC-SCNC: 9.2 MG/DL (ref 8.6–10.5)
CHLORIDE SERPL-SCNC: 103 MMOL/L (ref 98–107)
CLARITY UR: CLEAR
CO2 SERPL-SCNC: 25.9 MMOL/L (ref 22–29)
COLOR UR: YELLOW
CREAT SERPL-MCNC: 1.55 MG/DL (ref 0.57–1)
DEPRECATED RDW RBC AUTO: 46.6 FL (ref 37–54)
EOSINOPHIL # BLD AUTO: 0.18 10*3/MM3 (ref 0–0.4)
EOSINOPHIL NFR BLD AUTO: 3.4 % (ref 0.3–6.2)
ERYTHROCYTE [DISTWIDTH] IN BLOOD BY AUTOMATED COUNT: 12.6 % (ref 12.3–15.4)
ETHANOL BLD-MCNC: <10 MG/DL (ref 0–10)
ETHANOL UR QL: <0.01 %
GFR SERPL CREATININE-BSD FRML MDRD: 32 ML/MIN/1.73
GLOBULIN UR ELPH-MCNC: 2.8 GM/DL
GLUCOSE SERPL-MCNC: 114 MG/DL (ref 65–99)
GLUCOSE UR STRIP-MCNC: NEGATIVE MG/DL
HCT VFR BLD AUTO: 36.5 % (ref 34–46.6)
HGB BLD-MCNC: 12.1 G/DL (ref 12–15.9)
HGB UR QL STRIP.AUTO: NEGATIVE
IMM GRANULOCYTES # BLD AUTO: 0.02 10*3/MM3 (ref 0–0.05)
IMM GRANULOCYTES NFR BLD AUTO: 0.4 % (ref 0–0.5)
INR PPP: 1.1 (ref 0.9–1.1)
KETONES UR QL STRIP: NEGATIVE
LEUKOCYTE ESTERASE UR QL STRIP.AUTO: NEGATIVE
LYMPHOCYTES # BLD AUTO: 1.83 10*3/MM3 (ref 0.7–3.1)
LYMPHOCYTES NFR BLD AUTO: 34.5 % (ref 19.6–45.3)
MCH RBC QN AUTO: 34.4 PG (ref 26.6–33)
MCHC RBC AUTO-ENTMCNC: 33.2 G/DL (ref 31.5–35.7)
MCV RBC AUTO: 103.7 FL (ref 79–97)
MONOCYTES # BLD AUTO: 0.69 10*3/MM3 (ref 0.1–0.9)
MONOCYTES NFR BLD AUTO: 13 % (ref 5–12)
NEUTROPHILS NFR BLD AUTO: 2.52 10*3/MM3 (ref 1.7–7)
NEUTROPHILS NFR BLD AUTO: 47.4 % (ref 42.7–76)
NITRITE UR QL STRIP: NEGATIVE
NRBC BLD AUTO-RTO: 0 /100 WBC (ref 0–0.2)
PH UR STRIP.AUTO: 7.5 [PH] (ref 5–8)
PLATELET # BLD AUTO: 205 10*3/MM3 (ref 140–450)
PMV BLD AUTO: 10.3 FL (ref 6–12)
POTASSIUM SERPL-SCNC: 5 MMOL/L (ref 3.5–5.2)
PROT SERPL-MCNC: 7 G/DL (ref 6–8.5)
PROT UR QL STRIP: NEGATIVE
PROTHROMBIN TIME: 14.1 SECONDS (ref 11.7–14.2)
RBC # BLD AUTO: 3.52 10*6/MM3 (ref 3.77–5.28)
SODIUM SERPL-SCNC: 139 MMOL/L (ref 136–145)
SP GR UR STRIP: 1.01 (ref 1–1.03)
TROPONIN T SERPL-MCNC: 0.02 NG/ML (ref 0–0.03)
UROBILINOGEN UR QL STRIP: NORMAL
WBC # BLD AUTO: 5.31 10*3/MM3 (ref 3.4–10.8)

## 2021-10-10 PROCEDURE — 70450 CT HEAD/BRAIN W/O DYE: CPT

## 2021-10-10 PROCEDURE — 99285 EMERGENCY DEPT VISIT HI MDM: CPT

## 2021-10-10 PROCEDURE — 71045 X-RAY EXAM CHEST 1 VIEW: CPT

## 2021-10-10 PROCEDURE — 81003 URINALYSIS AUTO W/O SCOPE: CPT | Performed by: EMERGENCY MEDICINE

## 2021-10-10 PROCEDURE — 93010 ELECTROCARDIOGRAM REPORT: CPT | Performed by: INTERNAL MEDICINE

## 2021-10-10 PROCEDURE — G0378 HOSPITAL OBSERVATION PER HR: HCPCS

## 2021-10-10 PROCEDURE — 80053 COMPREHEN METABOLIC PANEL: CPT | Performed by: EMERGENCY MEDICINE

## 2021-10-10 PROCEDURE — U0004 COV-19 TEST NON-CDC HGH THRU: HCPCS | Performed by: EMERGENCY MEDICINE

## 2021-10-10 PROCEDURE — 84484 ASSAY OF TROPONIN QUANT: CPT | Performed by: EMERGENCY MEDICINE

## 2021-10-10 PROCEDURE — 85610 PROTHROMBIN TIME: CPT | Performed by: EMERGENCY MEDICINE

## 2021-10-10 PROCEDURE — 36415 COLL VENOUS BLD VENIPUNCTURE: CPT | Performed by: INTERNAL MEDICINE

## 2021-10-10 PROCEDURE — 84484 ASSAY OF TROPONIN QUANT: CPT | Performed by: INTERNAL MEDICINE

## 2021-10-10 PROCEDURE — 85025 COMPLETE CBC W/AUTO DIFF WBC: CPT | Performed by: EMERGENCY MEDICINE

## 2021-10-10 PROCEDURE — 82077 ASSAY SPEC XCP UR&BREATH IA: CPT | Performed by: EMERGENCY MEDICINE

## 2021-10-10 PROCEDURE — 93005 ELECTROCARDIOGRAM TRACING: CPT | Performed by: EMERGENCY MEDICINE

## 2021-10-10 RX ORDER — ACETAMINOPHEN,DIPHENHYDRAMINE HCL 500; 25 MG/1; MG/1
2 TABLET, FILM COATED ORAL NIGHTLY PRN
Status: DISCONTINUED | OUTPATIENT
Start: 2021-10-10 | End: 2021-10-11 | Stop reason: CLARIF

## 2021-10-10 RX ORDER — NITROGLYCERIN 0.4 MG/1
0.4 TABLET SUBLINGUAL
Status: DISCONTINUED | OUTPATIENT
Start: 2021-10-10 | End: 2021-10-12 | Stop reason: HOSPADM

## 2021-10-10 RX ORDER — ONDANSETRON 4 MG/1
4 TABLET, FILM COATED ORAL EVERY 6 HOURS PRN
Status: DISCONTINUED | OUTPATIENT
Start: 2021-10-10 | End: 2021-10-12 | Stop reason: HOSPADM

## 2021-10-10 RX ORDER — UREA 10 %
3 LOTION (ML) TOPICAL NIGHTLY PRN
Status: DISCONTINUED | OUTPATIENT
Start: 2021-10-10 | End: 2021-10-12 | Stop reason: HOSPADM

## 2021-10-10 RX ORDER — AMLODIPINE BESYLATE 5 MG/1
5 TABLET ORAL DAILY
Status: DISCONTINUED | OUTPATIENT
Start: 2021-10-11 | End: 2021-10-12 | Stop reason: HOSPADM

## 2021-10-10 RX ORDER — AMIODARONE HYDROCHLORIDE 200 MG/1
200 TABLET ORAL DAILY
Status: DISCONTINUED | OUTPATIENT
Start: 2021-10-11 | End: 2021-10-12 | Stop reason: HOSPADM

## 2021-10-10 RX ORDER — LOSARTAN POTASSIUM 50 MG/1
50 TABLET ORAL DAILY
Status: DISCONTINUED | OUTPATIENT
Start: 2021-10-11 | End: 2021-10-12 | Stop reason: HOSPADM

## 2021-10-10 RX ORDER — TRAMADOL HYDROCHLORIDE 50 MG/1
50 TABLET ORAL EVERY 6 HOURS PRN
Status: DISCONTINUED | OUTPATIENT
Start: 2021-10-10 | End: 2021-10-12 | Stop reason: HOSPADM

## 2021-10-10 RX ORDER — ALLOPURINOL 100 MG/1
100 TABLET ORAL DAILY
Status: DISCONTINUED | OUTPATIENT
Start: 2021-10-11 | End: 2021-10-12 | Stop reason: HOSPADM

## 2021-10-10 RX ORDER — LEVOTHYROXINE SODIUM 0.03 MG/1
25 TABLET ORAL DAILY
Status: DISCONTINUED | OUTPATIENT
Start: 2021-10-11 | End: 2021-10-12 | Stop reason: HOSPADM

## 2021-10-10 RX ORDER — ACETAMINOPHEN 325 MG/1
650 TABLET ORAL EVERY 4 HOURS PRN
Status: DISCONTINUED | OUTPATIENT
Start: 2021-10-10 | End: 2021-10-12 | Stop reason: HOSPADM

## 2021-10-10 RX ORDER — ONDANSETRON 2 MG/ML
4 INJECTION INTRAMUSCULAR; INTRAVENOUS EVERY 6 HOURS PRN
Status: DISCONTINUED | OUTPATIENT
Start: 2021-10-10 | End: 2021-10-12 | Stop reason: HOSPADM

## 2021-10-10 RX ORDER — ROSUVASTATIN CALCIUM 5 MG/1
5 TABLET, COATED ORAL DAILY
Status: DISCONTINUED | OUTPATIENT
Start: 2021-10-11 | End: 2021-10-12 | Stop reason: HOSPADM

## 2021-10-10 RX ORDER — SODIUM CHLORIDE 0.9 % (FLUSH) 0.9 %
10 SYRINGE (ML) INJECTION AS NEEDED
Status: DISCONTINUED | OUTPATIENT
Start: 2021-10-10 | End: 2021-10-12 | Stop reason: HOSPADM

## 2021-10-10 RX ORDER — MULTIPLE VITAMINS W/ MINERALS TAB 9MG-400MCG
1 TAB ORAL DAILY
Status: DISCONTINUED | OUTPATIENT
Start: 2021-10-11 | End: 2021-10-12 | Stop reason: HOSPADM

## 2021-10-10 RX ORDER — DULOXETIN HYDROCHLORIDE 60 MG/1
60 CAPSULE, DELAYED RELEASE ORAL DAILY
Status: DISCONTINUED | OUTPATIENT
Start: 2021-10-11 | End: 2021-10-12 | Stop reason: HOSPADM

## 2021-10-10 RX ADMIN — Medication 3 MG: at 22:11

## 2021-10-10 RX ADMIN — SODIUM CHLORIDE 500 ML: 9 INJECTION, SOLUTION INTRAVENOUS at 15:50

## 2021-10-10 RX ADMIN — ACETAMINOPHEN 650 MG: 325 TABLET, FILM COATED ORAL at 22:11

## 2021-10-11 ENCOUNTER — APPOINTMENT (OUTPATIENT)
Dept: CARDIOLOGY | Facility: HOSPITAL | Age: 86
End: 2021-10-11

## 2021-10-11 LAB
ANION GAP SERPL CALCULATED.3IONS-SCNC: 11.9 MMOL/L (ref 5–15)
AORTIC DIMENSIONLESS INDEX: 0.8 (DI)
BH CV ECHO MEAS - AI DEC SLOPE: 162 CM/SEC^2
BH CV ECHO MEAS - AI MAX PG: 51 MMHG
BH CV ECHO MEAS - AI MAX VEL: 357 CM/SEC
BH CV ECHO MEAS - AI P1/2T: 645.5 MSEC
BH CV ECHO MEAS - AO MAX PG (FULL): 0.8 MMHG
BH CV ECHO MEAS - AO MAX PG: 5.3 MMHG
BH CV ECHO MEAS - AO MEAN PG (FULL): 1 MMHG
BH CV ECHO MEAS - AO MEAN PG: 3 MMHG
BH CV ECHO MEAS - AO ROOT AREA (BSA CORRECTED): 1.9
BH CV ECHO MEAS - AO ROOT AREA: 6.2 CM^2
BH CV ECHO MEAS - AO ROOT DIAM: 2.8 CM
BH CV ECHO MEAS - AO V2 MAX: 115 CM/SEC
BH CV ECHO MEAS - AO V2 MEAN: 75.2 CM/SEC
BH CV ECHO MEAS - AO V2 VTI: 28.7 CM
BH CV ECHO MEAS - AVA(I,A): 2.5 CM^2
BH CV ECHO MEAS - AVA(I,D): 2.5 CM^2
BH CV ECHO MEAS - AVA(V,A): 2.9 CM^2
BH CV ECHO MEAS - AVA(V,D): 2.9 CM^2
BH CV ECHO MEAS - BSA(HAYCOCK): 1.5 M^2
BH CV ECHO MEAS - BSA: 1.5 M^2
BH CV ECHO MEAS - BZI_BMI: 20.8 KILOGRAMS/M^2
BH CV ECHO MEAS - BZI_METRIC_HEIGHT: 154.9 CM
BH CV ECHO MEAS - BZI_METRIC_WEIGHT: 49.9 KG
BH CV ECHO MEAS - EDV(CUBED): 132.7 ML
BH CV ECHO MEAS - EDV(MOD-SP2): 97 ML
BH CV ECHO MEAS - EDV(MOD-SP4): 91 ML
BH CV ECHO MEAS - EDV(TEICH): 123.8 ML
BH CV ECHO MEAS - EF(CUBED): 75.3 %
BH CV ECHO MEAS - EF(MOD-BP): 65.5 %
BH CV ECHO MEAS - EF(MOD-SP2): 66 %
BH CV ECHO MEAS - EF(MOD-SP4): 62.6 %
BH CV ECHO MEAS - EF(TEICH): 66.9 %
BH CV ECHO MEAS - ESV(CUBED): 32.8 ML
BH CV ECHO MEAS - ESV(MOD-SP2): 33 ML
BH CV ECHO MEAS - ESV(MOD-SP4): 34 ML
BH CV ECHO MEAS - ESV(TEICH): 41 ML
BH CV ECHO MEAS - FS: 37.3 %
BH CV ECHO MEAS - IVS/LVPW: 1
BH CV ECHO MEAS - IVSD: 0.8 CM
BH CV ECHO MEAS - LAT PEAK E' VEL: 7.1 CM/SEC
BH CV ECHO MEAS - LV DIASTOLIC VOL/BSA (35-75): 62.1 ML/M^2
BH CV ECHO MEAS - LV MASS(C)D: 140.5 GRAMS
BH CV ECHO MEAS - LV MASS(C)DI: 95.9 GRAMS/M^2
BH CV ECHO MEAS - LV MAX PG: 4.5 MMHG
BH CV ECHO MEAS - LV MEAN PG: 2 MMHG
BH CV ECHO MEAS - LV SYSTOLIC VOL/BSA (12-30): 23.2 ML/M^2
BH CV ECHO MEAS - LV V1 MAX: 106 CM/SEC
BH CV ECHO MEAS - LV V1 MEAN: 59.7 CM/SEC
BH CV ECHO MEAS - LV V1 VTI: 22.6 CM
BH CV ECHO MEAS - LVIDD: 5.1 CM
BH CV ECHO MEAS - LVIDS: 3.2 CM
BH CV ECHO MEAS - LVLD AP2: 7.1 CM
BH CV ECHO MEAS - LVLD AP4: 7.4 CM
BH CV ECHO MEAS - LVLS AP2: 5.6 CM
BH CV ECHO MEAS - LVLS AP4: 5.7 CM
BH CV ECHO MEAS - LVOT AREA (M): 3.1 CM^2
BH CV ECHO MEAS - LVOT AREA: 3.1 CM^2
BH CV ECHO MEAS - LVOT DIAM: 2 CM
BH CV ECHO MEAS - LVPWD: 0.8 CM
BH CV ECHO MEAS - MED PEAK E' VEL: 7.5 CM/SEC
BH CV ECHO MEAS - MV A DUR: 0.11 SEC
BH CV ECHO MEAS - MV A MAX VEL: 79.7 CM/SEC
BH CV ECHO MEAS - MV DEC SLOPE: 398.5 CM/SEC^2
BH CV ECHO MEAS - MV DEC TIME: 228 SEC
BH CV ECHO MEAS - MV E MAX VEL: 91.3 CM/SEC
BH CV ECHO MEAS - MV E/A: 1.1
BH CV ECHO MEAS - MV MAX PG: 4.1 MMHG
BH CV ECHO MEAS - MV MEAN PG: 1 MMHG
BH CV ECHO MEAS - MV P1/2T MAX VEL: 97.9 CM/SEC
BH CV ECHO MEAS - MV P1/2T: 72 MSEC
BH CV ECHO MEAS - MV V2 MAX: 101 CM/SEC
BH CV ECHO MEAS - MV V2 MEAN: 56.6 CM/SEC
BH CV ECHO MEAS - MV V2 VTI: 32.4 CM
BH CV ECHO MEAS - MVA P1/2T LCG: 2.2 CM^2
BH CV ECHO MEAS - MVA(P1/2T): 3.1 CM^2
BH CV ECHO MEAS - MVA(VTI): 2.2 CM^2
BH CV ECHO MEAS - PA ACC TIME: 0.09 SEC
BH CV ECHO MEAS - PA MAX PG (FULL): 0.99 MMHG
BH CV ECHO MEAS - PA MAX PG: 2.6 MMHG
BH CV ECHO MEAS - PA PR(ACCEL): 37.6 MMHG
BH CV ECHO MEAS - PA V2 MAX: 80.5 CM/SEC
BH CV ECHO MEAS - RAP SYSTOLE: 3 MMHG
BH CV ECHO MEAS - RV MAX PG: 1.6 MMHG
BH CV ECHO MEAS - RV MEAN PG: 1 MMHG
BH CV ECHO MEAS - RV V1 MAX: 63.3 CM/SEC
BH CV ECHO MEAS - RV V1 MEAN: 45.8 CM/SEC
BH CV ECHO MEAS - RV V1 VTI: 17.2 CM
BH CV ECHO MEAS - RVSP: 38 MMHG
BH CV ECHO MEAS - SI(AO): 120.6 ML/M^2
BH CV ECHO MEAS - SI(CUBED): 68.2 ML/M^2
BH CV ECHO MEAS - SI(LVOT): 48.5 ML/M^2
BH CV ECHO MEAS - SI(MOD-SP2): 43.7 ML/M^2
BH CV ECHO MEAS - SI(MOD-SP4): 38.9 ML/M^2
BH CV ECHO MEAS - SI(TEICH): 56.5 ML/M^2
BH CV ECHO MEAS - SV(AO): 176.7 ML
BH CV ECHO MEAS - SV(CUBED): 99.9 ML
BH CV ECHO MEAS - SV(LVOT): 71 ML
BH CV ECHO MEAS - SV(MOD-SP2): 64 ML
BH CV ECHO MEAS - SV(MOD-SP4): 57 ML
BH CV ECHO MEAS - SV(TEICH): 82.8 ML
BH CV ECHO MEAS - TAPSE (>1.6): 2.1 CM
BH CV ECHO MEAS - TR MAX VEL: 296 CM/SEC
BH CV ECHO MEASUREMENTS AVERAGE E/E' RATIO: 12.51
BH CV XLRA - RV BASE: 3.5 CM
BH CV XLRA - RV LENGTH: 6.4 CM
BH CV XLRA - RV MID: 2.3 CM
BH CV XLRA - TDI S': 11.1 CM/SEC
BH CV XLRA MEAS LEFT DIST CCA EDV: 6.6 CM/SEC
BH CV XLRA MEAS LEFT DIST CCA PSV: 52.7 CM/SEC
BH CV XLRA MEAS LEFT DIST ICA EDV: -9.4 CM/SEC
BH CV XLRA MEAS LEFT DIST ICA PSV: -63.1 CM/SEC
BH CV XLRA MEAS LEFT ICA/CCA RATIO: 1.2
BH CV XLRA MEAS LEFT MID ICA EDV: -10.5 CM/SEC
BH CV XLRA MEAS LEFT MID ICA PSV: -62.3 CM/SEC
BH CV XLRA MEAS LEFT PROX CCA EDV: 4.4 CM/SEC
BH CV XLRA MEAS LEFT PROX CCA PSV: 61.9 CM/SEC
BH CV XLRA MEAS LEFT PROX ECA EDV: -5.1 CM/SEC
BH CV XLRA MEAS LEFT PROX ECA PSV: -61.2 CM/SEC
BH CV XLRA MEAS LEFT PROX ICA EDV: -9.1 CM/SEC
BH CV XLRA MEAS LEFT PROX ICA PSV: -47.3 CM/SEC
BH CV XLRA MEAS LEFT PROX SCLA PSV: 186.6 CM/SEC
BH CV XLRA MEAS LEFT VERTEBRAL A EDV: 5.3 CM/SEC
BH CV XLRA MEAS LEFT VERTEBRAL A PSV: 54.9 CM/SEC
BH CV XLRA MEAS RIGHT DIST CCA EDV: 7.4 CM/SEC
BH CV XLRA MEAS RIGHT DIST CCA PSV: 52.9 CM/SEC
BH CV XLRA MEAS RIGHT DIST ICA EDV: -6.9 CM/SEC
BH CV XLRA MEAS RIGHT DIST ICA PSV: -75.7 CM/SEC
BH CV XLRA MEAS RIGHT ICA/CCA RATIO: 1.43
BH CV XLRA MEAS RIGHT MID ICA EDV: -10.3 CM/SEC
BH CV XLRA MEAS RIGHT MID ICA PSV: -71.3 CM/SEC
BH CV XLRA MEAS RIGHT PROX CCA EDV: -8.3 CM/SEC
BH CV XLRA MEAS RIGHT PROX CCA PSV: -66.3 CM/SEC
BH CV XLRA MEAS RIGHT PROX ECA EDV: 5.5 CM/SEC
BH CV XLRA MEAS RIGHT PROX ECA PSV: 87.8 CM/SEC
BH CV XLRA MEAS RIGHT PROX ICA EDV: -10.5 CM/SEC
BH CV XLRA MEAS RIGHT PROX ICA PSV: -45.5 CM/SEC
BH CV XLRA MEAS RIGHT PROX SCLA PSV: 111.8 CM/SEC
BH CV XLRA MEAS RIGHT VERTEBRAL A EDV: 8.6 CM/SEC
BH CV XLRA MEAS RIGHT VERTEBRAL A PSV: 51 CM/SEC
BUN SERPL-MCNC: 28 MG/DL (ref 8–23)
BUN/CREAT SERPL: 22.4 (ref 7–25)
CALCIUM SPEC-SCNC: 9.5 MG/DL (ref 8.6–10.5)
CHLORIDE SERPL-SCNC: 104 MMOL/L (ref 98–107)
CO2 SERPL-SCNC: 24.1 MMOL/L (ref 22–29)
CREAT SERPL-MCNC: 1.25 MG/DL (ref 0.57–1)
DEPRECATED RDW RBC AUTO: 44.5 FL (ref 37–54)
ERYTHROCYTE [DISTWIDTH] IN BLOOD BY AUTOMATED COUNT: 12.2 % (ref 12.3–15.4)
GFR SERPL CREATININE-BSD FRML MDRD: 40 ML/MIN/1.73
GLUCOSE SERPL-MCNC: 92 MG/DL (ref 65–99)
HCT VFR BLD AUTO: 35.7 % (ref 34–46.6)
HGB BLD-MCNC: 12.4 G/DL (ref 12–15.9)
LEFT ARM BP: NORMAL MMHG
LEFT ATRIUM VOLUME INDEX: 23.1 ML/M2
LV EF 2D ECHO EST: 66 %
MCH RBC QN AUTO: 35.1 PG (ref 26.6–33)
MCHC RBC AUTO-ENTMCNC: 34.7 G/DL (ref 31.5–35.7)
MCV RBC AUTO: 101.1 FL (ref 79–97)
PLATELET # BLD AUTO: 215 10*3/MM3 (ref 140–450)
PMV BLD AUTO: 10.5 FL (ref 6–12)
POTASSIUM SERPL-SCNC: 4.1 MMOL/L (ref 3.5–5.2)
QT INTERVAL: 465 MS
RBC # BLD AUTO: 3.53 10*6/MM3 (ref 3.77–5.28)
RIGHT ARM BP: NORMAL MMHG
SARS-COV-2 ORF1AB RESP QL NAA+PROBE: NOT DETECTED
SODIUM SERPL-SCNC: 140 MMOL/L (ref 136–145)
TROPONIN T SERPL-MCNC: <0.01 NG/ML (ref 0–0.03)
WBC # BLD AUTO: 5.72 10*3/MM3 (ref 3.4–10.8)

## 2021-10-11 PROCEDURE — 99214 OFFICE O/P EST MOD 30 MIN: CPT | Performed by: NURSE PRACTITIONER

## 2021-10-11 PROCEDURE — 93306 TTE W/DOPPLER COMPLETE: CPT | Performed by: INTERNAL MEDICINE

## 2021-10-11 PROCEDURE — G0378 HOSPITAL OBSERVATION PER HR: HCPCS

## 2021-10-11 PROCEDURE — 99204 OFFICE O/P NEW MOD 45 MIN: CPT | Performed by: PSYCHIATRY & NEUROLOGY

## 2021-10-11 PROCEDURE — 93306 TTE W/DOPPLER COMPLETE: CPT

## 2021-10-11 PROCEDURE — 85027 COMPLETE CBC AUTOMATED: CPT | Performed by: INTERNAL MEDICINE

## 2021-10-11 PROCEDURE — 80048 BASIC METABOLIC PNL TOTAL CA: CPT | Performed by: INTERNAL MEDICINE

## 2021-10-11 PROCEDURE — 93880 EXTRACRANIAL BILAT STUDY: CPT

## 2021-10-11 PROCEDURE — 25010000002 PERFLUTREN (DEFINITY) 8.476 MG IN SODIUM CHLORIDE (PF) 0.9 % 10 ML INJECTION: Performed by: INTERNAL MEDICINE

## 2021-10-11 RX ORDER — ACETAMINOPHEN 500 MG
1000 TABLET ORAL NIGHTLY PRN
Status: DISCONTINUED | OUTPATIENT
Start: 2021-10-11 | End: 2021-10-12 | Stop reason: HOSPADM

## 2021-10-11 RX ORDER — DIPHENHYDRAMINE HCL 25 MG
50 CAPSULE ORAL NIGHTLY PRN
Status: DISCONTINUED | OUTPATIENT
Start: 2021-10-11 | End: 2021-10-12 | Stop reason: HOSPADM

## 2021-10-11 RX ADMIN — CALCIUM CARBONATE-VITAMIN D TAB 500 MG-200 UNIT 500 MG: 500-200 TAB at 08:03

## 2021-10-11 RX ADMIN — ALLOPURINOL 100 MG: 100 TABLET ORAL at 08:03

## 2021-10-11 RX ADMIN — PERFLUTREN 2 ML: 6.52 INJECTION, SUSPENSION INTRAVENOUS at 15:18

## 2021-10-11 RX ADMIN — AMLODIPINE BESYLATE 5 MG: 5 TABLET ORAL at 08:03

## 2021-10-11 RX ADMIN — Medication 3 MG: at 22:22

## 2021-10-11 RX ADMIN — LEVOTHYROXINE SODIUM 25 MCG: 0.03 TABLET ORAL at 08:03

## 2021-10-11 RX ADMIN — AMIODARONE HYDROCHLORIDE 200 MG: 200 TABLET ORAL at 08:03

## 2021-10-11 RX ADMIN — APIXABAN 2.5 MG: 2.5 TABLET, FILM COATED ORAL at 12:44

## 2021-10-11 RX ADMIN — DULOXETINE HYDROCHLORIDE 60 MG: 60 CAPSULE, DELAYED RELEASE ORAL at 08:03

## 2021-10-11 RX ADMIN — ACETAMINOPHEN 650 MG: 325 TABLET, FILM COATED ORAL at 08:08

## 2021-10-11 RX ADMIN — LOSARTAN POTASSIUM 50 MG: 50 TABLET, FILM COATED ORAL at 08:03

## 2021-10-11 RX ADMIN — ROSUVASTATIN CALCIUM 5 MG: 5 TABLET, FILM COATED ORAL at 08:03

## 2021-10-11 RX ADMIN — MULTIPLE VITAMINS W/ MINERALS TAB 1 TABLET: TAB at 08:03

## 2021-10-11 RX ADMIN — ACETAMINOPHEN 650 MG: 325 TABLET, FILM COATED ORAL at 22:22

## 2021-10-12 ENCOUNTER — APPOINTMENT (OUTPATIENT)
Dept: MRI IMAGING | Facility: HOSPITAL | Age: 86
End: 2021-10-12

## 2021-10-12 ENCOUNTER — READMISSION MANAGEMENT (OUTPATIENT)
Dept: CALL CENTER | Facility: HOSPITAL | Age: 86
End: 2021-10-12

## 2021-10-12 VITALS
TEMPERATURE: 98.3 F | BODY MASS INDEX: 20.77 KG/M2 | DIASTOLIC BLOOD PRESSURE: 88 MMHG | RESPIRATION RATE: 16 BRPM | HEART RATE: 65 BPM | OXYGEN SATURATION: 98 % | HEIGHT: 61 IN | WEIGHT: 110 LBS | SYSTOLIC BLOOD PRESSURE: 154 MMHG

## 2021-10-12 PROCEDURE — G0378 HOSPITAL OBSERVATION PER HR: HCPCS

## 2021-10-12 PROCEDURE — 33285 INSJ SUBQ CAR RHYTHM MNTR: CPT | Performed by: INTERNAL MEDICINE

## 2021-10-12 PROCEDURE — 25010000002 ONDANSETRON PER 1 MG: Performed by: INTERNAL MEDICINE

## 2021-10-12 PROCEDURE — 99214 OFFICE O/P EST MOD 30 MIN: CPT | Performed by: NURSE PRACTITIONER

## 2021-10-12 PROCEDURE — 96374 THER/PROPH/DIAG INJ IV PUSH: CPT

## 2021-10-12 PROCEDURE — 70551 MRI BRAIN STEM W/O DYE: CPT

## 2021-10-12 PROCEDURE — 99213 OFFICE O/P EST LOW 20 MIN: CPT | Performed by: INTERNAL MEDICINE

## 2021-10-12 PROCEDURE — C1764 EVENT RECORDER, CARDIAC: HCPCS | Performed by: INTERNAL MEDICINE

## 2021-10-12 DEVICE — ICM LP/RECRD REVEAL LINQ MEDTRONIC: Type: IMPLANTABLE DEVICE | Status: FUNCTIONAL

## 2021-10-12 RX ORDER — LIDOCAINE HYDROCHLORIDE AND EPINEPHRINE 10; 10 MG/ML; UG/ML
INJECTION, SOLUTION INFILTRATION; PERINEURAL AS NEEDED
Status: DISCONTINUED | OUTPATIENT
Start: 2021-10-12 | End: 2021-10-12 | Stop reason: HOSPADM

## 2021-10-12 RX ADMIN — ONDANSETRON 4 MG: 2 INJECTION INTRAMUSCULAR; INTRAVENOUS at 11:37

## 2021-10-12 RX ADMIN — AMLODIPINE BESYLATE 5 MG: 5 TABLET ORAL at 08:41

## 2021-10-12 RX ADMIN — APIXABAN 2.5 MG: 2.5 TABLET, FILM COATED ORAL at 11:33

## 2021-10-12 RX ADMIN — LEVOTHYROXINE SODIUM 25 MCG: 0.03 TABLET ORAL at 08:40

## 2021-10-12 RX ADMIN — ROSUVASTATIN CALCIUM 5 MG: 5 TABLET, FILM COATED ORAL at 08:40

## 2021-10-12 RX ADMIN — ALLOPURINOL 100 MG: 100 TABLET ORAL at 08:40

## 2021-10-12 RX ADMIN — CALCIUM CARBONATE-VITAMIN D TAB 500 MG-200 UNIT 500 MG: 500-200 TAB at 08:41

## 2021-10-12 RX ADMIN — MULTIPLE VITAMINS W/ MINERALS TAB 1 TABLET: TAB at 08:40

## 2021-10-12 RX ADMIN — DULOXETINE HYDROCHLORIDE 60 MG: 60 CAPSULE, DELAYED RELEASE ORAL at 08:40

## 2021-10-12 RX ADMIN — LOSARTAN POTASSIUM 50 MG: 50 TABLET, FILM COATED ORAL at 08:40

## 2021-10-12 RX ADMIN — AMIODARONE HYDROCHLORIDE 200 MG: 200 TABLET ORAL at 08:40

## 2021-10-13 ENCOUNTER — TRANSITIONAL CARE MANAGEMENT TELEPHONE ENCOUNTER (OUTPATIENT)
Dept: CALL CENTER | Facility: HOSPITAL | Age: 86
End: 2021-10-13

## 2021-10-13 NOTE — OUTREACH NOTE
Call Center TCM Note      Responses   [facility] patient discharged from? [place]   Does the patient have one of the following disease processes/diagnoses(primary or secondary)? Other   TCM attempt successful? Yes   Call start time 1430   Call end time 1433   Discharge diagnosis syncope and collapse, bradycardia   Is patient permission given to speak with other caregiver? Yes   List who call center can speak with family member   Person spoke with today (if not patient) and relationship family member   Meds reviewed with patient/caregiver? Yes   Is the patient having any side effects they believe may be caused by any medication additions or changes? No   Does the patient have all medications ordered at discharge? N/A   Is the patient taking all medications as directed (includes completed medication regime)? Yes   Does the patient have a primary care provider?  Yes   Does the patient have an appointment with their PCP within 7 days of discharge? No   Nursing Interventions Advised patient to make appointment   Has the patient kept scheduled appointments due by today? N/A   Has home health visited the patient within 72 hours of discharge? N/A   Psychosocial issues? No   Did the patient receive a copy of their discharge instructions? Yes   Nursing interventions Reviewed instructions with patient  [with family member, patient was in background]   What is the patient's perception of their health status since discharge? Improving   Is the patient/caregiver able to teach back the hierarchy of who to call/visit for symptoms/problems? PCP, Specialist, Home health nurse, Urgent Care, ED, 911 Yes   TCM call completed? Yes   Wrap up additional comments Per family member, patient is doing well, no questions or concerns at this time, states patient will be following up with cardiology.          Jimena Camargo RN    10/13/2021, 14:33 EDT

## 2021-10-20 ENCOUNTER — HOSPITAL ENCOUNTER (OUTPATIENT)
Dept: NEUROLOGY | Facility: HOSPITAL | Age: 86
Discharge: HOME | End: 2021-10-20
Admitting: NURSE PRACTITIONER

## 2021-10-20 DIAGNOSIS — R55 SYNCOPE AND COLLAPSE: ICD-10-CM

## 2021-10-20 PROCEDURE — 95813 EEG EXTND MNTR 61-119 MIN: CPT | Performed by: PSYCHIATRY & NEUROLOGY

## 2021-10-20 PROCEDURE — 95813 EEG EXTND MNTR 61-119 MIN: CPT

## 2021-10-21 ENCOUNTER — TELEPHONE (OUTPATIENT)
Dept: NEUROLOGY | Facility: CLINIC | Age: 86
End: 2021-10-21

## 2021-10-21 NOTE — TELEPHONE ENCOUNTER
Caller:  KACI     Relationship to patient: SELF    Best call back number: [phone]    New or established patient?  [] New  [x] Established    Date of discharge: 10/12/21    Facility discharged from: [facility]   Diagnosis/Symptoms: SYNCOPE AND COLLAPSE   Length of stay (If applicable): 2 DAYS    Specialty Only: Did you see a [facility] health provider?    [x] Yes  [] No  If so, who? NCIKY REID [[id number]]      PER DISCHARGE NOTES REEMA TO SEE PT IN 3 MONTHS NOTHING AVAIL UNTIL MARCH. PLEASE ADVISE.

## 2021-10-25 ENCOUNTER — OFFICE VISIT (OUTPATIENT)
Dept: FAMILY MEDICINE CLINIC | Facility: CLINIC | Age: 86
End: 2021-10-25

## 2021-10-25 VITALS
DIASTOLIC BLOOD PRESSURE: 60 MMHG | RESPIRATION RATE: 16 BRPM | HEIGHT: 61 IN | HEART RATE: 72 BPM | OXYGEN SATURATION: 96 % | BODY MASS INDEX: 20.58 KG/M2 | WEIGHT: 109 LBS | TEMPERATURE: 97.2 F | SYSTOLIC BLOOD PRESSURE: 118 MMHG

## 2021-10-25 DIAGNOSIS — R39.15 URINARY URGENCY: Primary | ICD-10-CM

## 2021-10-25 DIAGNOSIS — R42 VERTIGO: ICD-10-CM

## 2021-10-25 DIAGNOSIS — R55 SYNCOPE AND COLLAPSE: ICD-10-CM

## 2021-10-25 DIAGNOSIS — Z09 HOSPITAL DISCHARGE FOLLOW-UP: ICD-10-CM

## 2021-10-25 PROCEDURE — 99495 TRANSJ CARE MGMT MOD F2F 14D: CPT | Performed by: NURSE PRACTITIONER

## 2021-10-25 PROCEDURE — 1111F DSCHRG MED/CURRENT MED MERGE: CPT | Performed by: NURSE PRACTITIONER

## 2021-10-25 RX ORDER — OXYBUTYNIN CHLORIDE 5 MG/1
5 TABLET ORAL NIGHTLY PRN
Qty: 30 TABLET | Refills: 0 | Status: SHIPPED | OUTPATIENT
Start: 2021-10-25 | End: 2021-11-17

## 2021-10-25 RX ORDER — MECLIZINE HYDROCHLORIDE 25 MG/1
25 TABLET ORAL 3 TIMES DAILY PRN
Qty: 30 TABLET | Refills: 0 | Status: SHIPPED | OUTPATIENT
Start: 2021-10-25 | End: 2021-12-28

## 2021-10-25 NOTE — PROGRESS NOTES
Transitional Care Follow Up Visit  Subjective     Alba Correa is a 88 y.o. female who presents for a transitional care management visit.    Within 48 business hours after discharge our office contacted her via telephone to coordinate her care and needs.      I reviewed and discussed the details of that call along with the discharge summary, hospital problems, inpatient lab results, inpatient diagnostic studies, and consultation reports with Alba.     Current outpatient and discharge medications have been reconciled for the patient.  Reviewed by: GROVER Lazcano      Date of TCM Phone Call 10/12/2021   [facility]   Date of Admission 10/10/2021   Date of Discharge 10/12/2021   Discharge Disposition Home or Self Care     Risk for Readmission (LACE) Score: 7 (10/12/2021  6:00 AM)      History of Present Illness   Course During Hospital Stay:         Presenting Problem/History of Present Illness:  Syncope and collapse [R55]  Sinus bradycardia [R00.1]  Contusion of occipital region of scalp, initial encounter [S00.03XA]      88 year old female who presented to the ED after she had a syncopal episode at home; she was in the kitchen and had washed her dishes; she was going to fix her hair and then awoke in a chair in her living room; her walker was a few feet away; she called her daughter who says she was a little confused following the episode; she denies incontinence of bowel or bladder; no chest pain, dizziness     Hospital Course:  The patient is a 88 y.o. female who presented with syncopal episode.  She was admitted and neurology and cardiology were consulted.  She had bradycardia with first-degree heart block on EKG in the setting of chronic atrial fibrillation and prior cardioversion.  I did not show any acute stroke and neurology has recommended outpatient EEG.  She has been taken off of atenolol and continued on amiodarone and her home Eliquis.  Prior to discharge today  cardiology is going to place loop recorder.      Patient has not had syncopal episode since hospital discharge.  She reports having an episode prior to recent hospital visit but did not seek medical attention for that episode.  Her workup in hospital did not identify cause of episodes and workup is still ongoing.  She had EEG and results are pending.  She does have dizziness with standing up, quick head movements and rolling over in bed.  She has had vertigo in the past and states this is similar.  She did not experience any vertigo with her syncopal episodes and states she did not have any warning prior to losing consciousness.          Patient is currently concerned about urinary frequency and urgency that primarily occurs at night. This has been going on for some time.  She has seen urogyn a few years ago and did pelvic floor PT for incontinence. She states she is not having incontinence now.  She states she will have to get up several times per night but will not be able to void much urine.    The following portions of the patient's history were reviewed and updated as appropriate: allergies, current medications, past family history, past medical history, past social history, past surgical history and problem list.    Review of Systems   Constitutional: Negative for unexpected weight change.   Respiratory: Negative for shortness of breath.    Cardiovascular: Negative for chest pain and palpitations.   Genitourinary: Positive for frequency and urgency. Negative for dysuria.   Neurological: Positive for dizziness and syncope. Negative for speech difficulty and light-headedness.   Psychiatric/Behavioral: Negative for behavioral problems.       Objective   Physical Exam  Vitals and nursing note reviewed.   Constitutional:       Appearance: Normal appearance. She is well-developed.   Cardiovascular:      Rate and Rhythm: Normal rate and regular rhythm.      Heart sounds: Murmur heard.       Pulmonary:      Effort:  Pulmonary effort is normal.      Breath sounds: Normal breath sounds.   Neurological:      Mental Status: She is alert and oriented to person, place, and time.   Psychiatric:         Mood and Affect: Mood normal.         Behavior: Behavior normal.         Thought Content: Thought content normal.         Judgment: Judgment normal.         Assessment/Plan   Diagnoses and all orders for this visit:    1. Urinary urgency (Primary)  -     Ambulatory Referral to Gynecologic Urology  -     oxybutynin (DITROPAN) 5 MG tablet; Take 1 tablet by mouth At Night As Needed (bladder spasm).  Dispense: 30 tablet; Refill: 0    2. Vertigo  -     meclizine (ANTIVERT) 25 MG tablet; Take 1 tablet by mouth 3 (Three) Times a Day As Needed for Dizziness.  Dispense: 30 tablet; Refill: 0    3. Syncope and collapse    4. Hospital discharge follow-up      Hospital records reviewed with pt confirming HPI.       Referral back to urogyn. Will start oxybutynin as needed at bedtime until she sees specialist.  She will use meclizine as needed for vertigo. She will keep f/u with neurology and cardiology.

## 2021-10-26 ENCOUNTER — TELEPHONE (OUTPATIENT)
Dept: NEUROLOGY | Facility: CLINIC | Age: 86
End: 2021-10-26

## 2021-10-26 PROCEDURE — G2066 INTER DEVC REMOTE 30D: HCPCS | Performed by: INTERNAL MEDICINE

## 2021-10-26 PROCEDURE — 93298 REM INTERROG DEV EVAL SCRMS: CPT | Performed by: INTERNAL MEDICINE

## 2021-10-26 NOTE — TELEPHONE ENCOUNTER
Provider: REEMA  Caller: SERGIO  Relationship to Patient: SELF  Phone Number: [phone]  Reason for Call: PT CALLED IN ASKING IF SOMEONE CAN TELL HER THE RESULTS OF THE EEG. PLEASE ADVISE.

## 2021-10-26 NOTE — TELEPHONE ENCOUNTER
Informed pt EEG was still in process. We will call as soon as it has been resulted. Pt verbalized understanding.

## 2021-10-27 ENCOUNTER — CLINICAL SUPPORT NO REQUIREMENTS (OUTPATIENT)
Dept: CARDIOLOGY | Facility: CLINIC | Age: 86
End: 2021-10-27

## 2021-10-27 DIAGNOSIS — R55 SYNCOPE, UNSPECIFIED SYNCOPE TYPE: Primary | ICD-10-CM

## 2021-10-27 PROCEDURE — 93285 PRGRMG DEV EVAL SCRMS IP: CPT | Performed by: INTERNAL MEDICINE

## 2021-11-03 ENCOUNTER — CLINICAL SUPPORT NO REQUIREMENTS (OUTPATIENT)
Dept: CARDIOLOGY | Facility: CLINIC | Age: 86
End: 2021-11-03

## 2021-11-03 DIAGNOSIS — R55 SYNCOPE, UNSPECIFIED SYNCOPE TYPE: Primary | ICD-10-CM

## 2021-11-03 PROCEDURE — 93291 INTERROG DEV EVAL SCRMS IP: CPT | Performed by: INTERNAL MEDICINE

## 2021-11-09 ENCOUNTER — IMMUNIZATION (OUTPATIENT)
Dept: VACCINE CLINIC | Facility: HOSPITAL | Age: 86
End: 2021-11-09

## 2021-11-09 ENCOUNTER — APPOINTMENT (OUTPATIENT)
Dept: VACCINE CLINIC | Facility: HOSPITAL | Age: 86
End: 2021-11-09

## 2021-11-09 PROCEDURE — 0004A ADM SARSCOV2 30MCG/0.3ML BOOSTER: CPT | Performed by: INTERNAL MEDICINE

## 2021-11-09 PROCEDURE — 91300 HC SARSCOV02 VAC 30MCG/0.3ML IM: CPT | Performed by: INTERNAL MEDICINE

## 2021-11-15 ENCOUNTER — TELEPHONE (OUTPATIENT)
Dept: NEUROLOGY | Facility: CLINIC | Age: 86
End: 2021-11-15

## 2021-11-15 NOTE — TELEPHONE ENCOUNTER
"Patient called today requesting EEG results.  Results reviewed with patient.      Patient complains of ongoing dizziness and lightheadedness.  She states that she is not driving because the dizziness/lightheadness worries her.  She states that she is lightheaded when changing positions from lying down to sitting up.  When she lays down, she gets dizzy and the room spins, \"only when I'm in bed.\"    Denies any recent infection or ringing in the ears.  Denies any weakness, numbness, slurred speech, facial droop, or vision change.    On 10/25/2021, her PCP started her on Meclizine TID, which she states she has tried and has not helped.    Advised patient to change positions slowly and sit on the edge of the bed for several minutes before standing.  Also advised her to drink plenty of fluids and ensure she is staying hydrated.    Do you have any recommendations?    Thank you!  "

## 2021-11-15 NOTE — TELEPHONE ENCOUNTER
Spoke with patient.  Vestibular therapy offered but patient not interested at this time.  She will call if she changes her mind.  Also rescheduled appointment as she was previously established with GROVER Bai.

## 2021-11-17 DIAGNOSIS — R39.15 URINARY URGENCY: ICD-10-CM

## 2021-11-17 RX ORDER — OXYBUTYNIN CHLORIDE 5 MG/1
5 TABLET ORAL NIGHTLY PRN
Qty: 30 TABLET | Refills: 0 | Status: SHIPPED | OUTPATIENT
Start: 2021-11-17 | End: 2021-12-28

## 2021-11-26 PROCEDURE — G2066 INTER DEVC REMOTE 30D: HCPCS | Performed by: INTERNAL MEDICINE

## 2021-11-26 PROCEDURE — 93298 REM INTERROG DEV EVAL SCRMS: CPT | Performed by: INTERNAL MEDICINE

## 2021-12-03 DIAGNOSIS — E03.9 ACQUIRED HYPOTHYROIDISM: ICD-10-CM

## 2021-12-03 RX ORDER — LEVOTHYROXINE SODIUM 0.03 MG/1
TABLET ORAL
Qty: 90 TABLET | OUTPATIENT
Start: 2021-12-03

## 2021-12-28 ENCOUNTER — OFFICE VISIT (OUTPATIENT)
Dept: CARDIOLOGY | Facility: CLINIC | Age: 86
End: 2021-12-28

## 2021-12-28 VITALS
HEART RATE: 74 BPM | DIASTOLIC BLOOD PRESSURE: 82 MMHG | HEIGHT: 61 IN | BODY MASS INDEX: 22.28 KG/M2 | WEIGHT: 118 LBS | SYSTOLIC BLOOD PRESSURE: 138 MMHG

## 2021-12-28 DIAGNOSIS — R29.898 BILATERAL LEG WEAKNESS: ICD-10-CM

## 2021-12-28 DIAGNOSIS — Z86.73 HISTORY OF ISCHEMIC RIGHT MCA STROKE: ICD-10-CM

## 2021-12-28 DIAGNOSIS — Z09 HOSPITAL DISCHARGE FOLLOW-UP: Primary | ICD-10-CM

## 2021-12-28 DIAGNOSIS — I48.0 PAF (PAROXYSMAL ATRIAL FIBRILLATION) (HCC): Chronic | ICD-10-CM

## 2021-12-28 DIAGNOSIS — I35.1 NONRHEUMATIC AORTIC (VALVE) INSUFFICIENCY: Chronic | ICD-10-CM

## 2021-12-28 DIAGNOSIS — E78.2 MIXED HYPERLIPIDEMIA: Chronic | ICD-10-CM

## 2021-12-28 PROCEDURE — 99214 OFFICE O/P EST MOD 30 MIN: CPT | Performed by: INTERNAL MEDICINE

## 2021-12-28 NOTE — PROGRESS NOTES
Subjective:     Encounter Date: 12/28/21        Patient ID: Alba Correa is a 88 y.o. female.    Chief Complaint: PAF, CVA  Atrial Fibrillation  Past medical history includes atrial fibrillation.       Dear Dr. Doty,    Patient in the hospital today.  She comes in for hospital follow-up.    She has had a history of persistent atrial fibrillation.  We performed a cardioversion 7/2020 after she has been on rate control and anticoagulation for an appropriate period of time.  She then had another Holter monitor she was having some palpitations.  This showed episodes of ectopic atrial rhythm and brief 3-4 beat runs of wide-complex tachycardia but no atrial fibrillation.  It was determined to keep her on her medical therapy.    He was hospitalized October 2021 after episode of syncope cardiac evaluation was unremarkable.  She had an echocardiogram that was unremarkable.  She ended up having a loop recorder placed.  So far no arrhythmias been seen.    They report that she is having more memory issues.  She was seen by neurology while hospitalized and she is scheduled to be seen in the office with them soon.  She reports also some more weakness in the muscles in her legs.  That weakness is bilateral.    We saw her initially in the hospital in 2019.  She presented for a CVA.  We were asked to see her for evaluation of a cardiac source of emboli.  None was found.  Transthoracic and transesophageal echocardiograms did not demonstrate any cardiac source of emboli.  14 day monitor showed no ectopy.  Then recently seen in our office with question about bradycardia.  A 48-hour Holter monitor was placed.  During that time, she was in atrial fibrillation the entire 48 hours.  She was unaware of this.  She did not feel any palpitations or tachycardia.  No irregularity.  Heart rate was 70 bpm during the 48-hour recording with no pauses.      The following portions of the patient's history were reviewed and updated as  "appropriate: allergies, current medications, past family history, past medical history, past social history, past surgical history and problem list.    Past Medical History:   Diagnosis Date   • Benign essential hypertension    • Chronic kidney disease    • Chronic renal insufficiency    • CVA (cerebral vascular accident) ([facility])    • Degeneration of intervertebral disc    • Depression    • DVT (deep venous thrombosis) ([facility])    • Edema    • First degree AV block 5/26/2021   • Gout    • H/O complete eye exam 09/2016   • Hyperlipidemia    • IBS (irritable bowel syndrome)    • Nonrheumatic mitral valve regurgitation    • OAB (overactive bladder)    • Osteopenia    • Osteoporosis    • PAF (paroxysmal atrial fibrillation) ([facility]) 11/8/2019   • Peripheral neuropathy    • Rheumatoid arthritis ([facility])    • Sinus bradycardia 5/26/2021       Procedures       Objective:     Vitals:    12/28/21 1443   BP: 138/82   Pulse: 74   Weight: 53.5 kg (118 lb)   Height: 154.9 cm (61\")           General Appearance:    Alert, cooperative, in no acute distress   Head:    Normocephalic, without obvious abnormality   Eyes:            Lids and lashes normal, conjunctivae and sclerae normal, no icterus, no pallor, corneas clear   Ears:    Ears appear intact with no abnormalities noted   Throat:   No oral lesions, oral mucosa moist   Neck:   No adenopathy, supple, trachea midline, no thyromegaly, no carotid bruit, no JVD   Back:     No kyphosis present, no erythema or scars, no tenderness to palpation    Lungs:     Clear to auscultation,respirations regular, even and unlabored    Heart:    Regular rhythm and normal rate, normal S1 and S2, no murmur, no gallop, no rub, no click   Chest Wall:    No abnormalities observed   Abdomen:     Normal bowel sounds, no masses, no organomegaly, soft        non-tender, non-distended, no guarding   Extremities:   Moves all extremities well, no edema, no cyanosis, no redness   Pulses:  Bilateral carotids brisk "   Skin:  Psychiatric:   No bleeding or rash    Alert and oriented, normal mood and affect       Lab Review:             Results for orders placed during the hospital encounter of 10/10/21    Adult Transthoracic Echo Complete W/ Cont if Necessary Per Protocol    Interpretation Summary  · Estimated left ventricular EF = 66% Left ventricular systolic function is normal.  · Left ventricular diastolic function is consistent with age.  · Moderate tricuspid valve regurgitation is present.  · Estimated right ventricular systolic pressure from tricuspid regurgitation is mildly elevated (35-45 mmHg).    Lab Results   Component Value Date    GLUCOSE 92 10/11/2021    BUN 28 (H) 10/11/2021    CREATININE 1.25 (H) 10/11/2021    EGFRIFNONA 40 (L) 10/11/2021    EGFRIFAFRI 35 (L) 09/01/2021    BCR 22.4 10/11/2021    K 4.1 10/11/2021    CO2 24.1 10/11/2021    CALCIUM 9.5 10/11/2021    PROTENTOTREF 7.4 09/01/2021    ALBUMIN 4.20 10/10/2021    LABIL2 1.6 09/01/2021    AST 26 10/10/2021    ALT 17 10/10/2021     CBC    CBC 9/1/21 10/10/21 10/11/21   WBC 5.25 5.31 5.72   RBC 3.68 (A) 3.52 (A) 3.53 (A)   Hemoglobin 12.9 12.1 12.4   Hematocrit 38.0 36.5 35.7   .3 (A) 103.7 (A) 101.1 (A)   MCH 35.1 (A) 34.4 (A) 35.1 (A)   MCHC 33.9 33.2 34.7   RDW 11.9 (A) 12.6 12.2 (A)   Platelets 258 205 215   (A) Abnormal value                    Assessment:          Diagnosis Plan   1. Hospital discharge follow-up     2. History of ischemic right MCA stroke with extension     3. Bilateral leg weakness     4. PAF (paroxysmal atrial fibrillation) ([facility])     5. Nonrheumatic aortic (valve) insufficiency     6. Mixed hyperlipidemia            Plan:       1.  Aortic insufficiency- no significant change on her symptoms, cont amlodipine, losartan  2.  History of CVA-continue Eliquis and rosuvastatin.  Creatinine BUN reviewed, patient is followed by neurology as scheduled see them again soon  3.  Benign essential hypertension-continue current medical regimen  with amlodipine losartan, BUN/creatinine reviewed  4.  Paroxysmal atrial fibrillation- in sinus rhythm.  Loop recorder in place, continue to follow  5.  Chronic renal failure, most recent creatinine BUN reviewed.  Age 87 years old, discussed, will remain on the 2.5 mg twice daily of the Eliquis.  6.  Syncope and collapse-following with neurology  7.  Patient reports bilateral leg weakness.  We'll hold her rosuvastatin to see if that is contributing, and she is instructed follow-up with neurology soon on this  8.  Mental status change-more problems with memory, family states this has been more prominent over the last several months, this was discussed while she was hospitalized she is scheduled to follow-up with neuro soon.    Thank you very much for allowing us to participate in the care of this pleasant patient.  Please don't hesitate to call if I can be of assistance in any way.      Current Outpatient Medications:   •  acetaminophen (TYLENOL) 650 MG 8 hr tablet, Take 1,300 mg by mouth Every 8 (Eight) Hours As Needed for Mild Pain ., Disp: , Rfl:   •  allopurinol (Zyloprim) 100 MG tablet, Take 1 tablet by mouth Daily., Disp: 90 tablet, Rfl: 1  •  amiodarone (PACERONE) 200 MG tablet, Take 1 tablet by mouth Daily., Disp: 90 tablet, Rfl: 3  •  amLODIPine (NORVASC) 5 MG tablet, Take 1 tablet by mouth Daily., Disp: 90 tablet, Rfl: 1  •  calcium carbonate-vitamin d (CALCIUM 600+D) 600-400 MG-UNIT per tablet, Take 1 tablet by mouth Daily., Disp: , Rfl:   •  DULoxetine (CYMBALTA) 60 MG capsule, Take 1 capsule by mouth Daily., Disp: 90 capsule, Rfl: 1  •  Eliquis 2.5 MG tablet tablet, TAKE 1 TABLET BY MOUTH  EVERY 12 HOURS, Disp: 180 tablet, Rfl: 3  •  losartan (COZAAR) 50 MG tablet, Take 50 mg by mouth Daily., Disp: , Rfl:   •  Multiple Vitamins-Minerals (CENTRUM SILVER) tablet, Take 1 tablet by mouth Daily., Disp: , Rfl:   •  Omega-3 Fatty Acids (OMEGA-3 FISH OIL PO), Take 1,600 mg by mouth Every Morning., Disp: , Rfl:    •  rosuvastatin (CRESTOR) 5 MG tablet, Take 1 tablet by mouth Daily., Disp: 90 tablet, Rfl: 1

## 2022-01-24 ENCOUNTER — TELEPHONE (OUTPATIENT)
Dept: FAMILY MEDICINE CLINIC | Facility: CLINIC | Age: 87
End: 2022-01-24

## 2022-01-24 DIAGNOSIS — E03.9 ACQUIRED HYPOTHYROIDISM: ICD-10-CM

## 2022-01-24 DIAGNOSIS — E03.9 ACQUIRED HYPOTHYROIDISM: Primary | ICD-10-CM

## 2022-01-24 RX ORDER — LEVOTHYROXINE SODIUM 0.03 MG/1
25 TABLET ORAL DAILY
Qty: 30 TABLET | Refills: 1 | Status: SHIPPED | OUTPATIENT
Start: 2022-01-24 | End: 2022-03-16

## 2022-01-24 RX ORDER — LEVOTHYROXINE SODIUM 0.03 MG/1
TABLET ORAL
Qty: 90 TABLET | OUTPATIENT
Start: 2022-01-24

## 2022-01-24 NOTE — TELEPHONE ENCOUNTER
I talked to pt . Pt told Med was started 9/21 and pt was asked to complete the labs (ordered 10/7/21) in 6 weeks, which obviously didn't get done. I resent the med, she needs to be on it for a month, and then come in and complete the 10/7/21 thyroid labs.

## 2022-01-24 NOTE — TELEPHONE ENCOUNTER
Caller: LalithaPerriDaria    Relationship: Emergency Contact    Best call back number:  [phone]    What is the best time to reach you: ANY     Who are you requesting to speak with (clinical staff, provider,  specific staff member): CLINICAL STAFF     Do you know the name of the person who called: N/A     What was the call regarding: DARIA IS CALLING AND STATED PATIENT IS OUT OF HER LEVOTHYROXINE  25 MCG ANS WANTED TO KNOW IF SHE CAN GET A REFILL OR SHE ALSO WAS SENT A MESSAGE THAT SHE NEEDS LABS AND WANTED TO KNOW IF SHE NEEDS THE LABS DONE BEFORE THE REFILL . DARIA ALSO STATED SHE BELIEVES SHE GOT THE MEDICATION CONFUSED . PLEASE CALL DARIA AND ADVISE OR RESPOND VIA MY CHART .     Do you require a callback: YES

## 2022-01-24 NOTE — TELEPHONE ENCOUNTER
Med was started 9/21 and pt was asked to complete the labs (ordered 10/7/21) in 6 weeks, which obviously didn't get done. I resent the med, she needs to be on it for a month, and then come in and complete the 10/7/21 thyroid labs.

## 2022-01-25 DIAGNOSIS — E78.2 MIXED HYPERLIPIDEMIA: Chronic | ICD-10-CM

## 2022-01-25 DIAGNOSIS — I10 BENIGN ESSENTIAL HYPERTENSION: Chronic | ICD-10-CM

## 2022-01-26 RX ORDER — LOSARTAN POTASSIUM 100 MG/1
TABLET ORAL
Qty: 30 TABLET | Refills: 0 | Status: SHIPPED | OUTPATIENT
Start: 2022-01-26 | End: 2022-03-09

## 2022-01-26 RX ORDER — AMLODIPINE BESYLATE 5 MG/1
5 TABLET ORAL DAILY
Qty: 30 TABLET | Refills: 0 | Status: SHIPPED | OUTPATIENT
Start: 2022-01-26 | End: 2022-03-09

## 2022-01-26 RX ORDER — ROSUVASTATIN CALCIUM 5 MG/1
5 TABLET, COATED ORAL DAILY
Qty: 30 TABLET | Refills: 0 | Status: SHIPPED | OUTPATIENT
Start: 2022-01-26 | End: 2022-03-09

## 2022-01-27 PROCEDURE — G2066 INTER DEVC REMOTE 30D: HCPCS | Performed by: INTERNAL MEDICINE

## 2022-01-27 PROCEDURE — 93298 REM INTERROG DEV EVAL SCRMS: CPT | Performed by: INTERNAL MEDICINE

## 2022-02-23 DIAGNOSIS — E03.9 ACQUIRED HYPOTHYROIDISM: ICD-10-CM

## 2022-02-23 RX ORDER — LEVOTHYROXINE SODIUM 0.03 MG/1
TABLET ORAL
Qty: 30 TABLET | Refills: 1 | OUTPATIENT
Start: 2022-02-23

## 2022-02-25 DIAGNOSIS — F33.42 RECURRENT MAJOR DEPRESSIVE DISORDER, IN FULL REMISSION: Chronic | ICD-10-CM

## 2022-02-25 DIAGNOSIS — M10.9 GOUT, UNSPECIFIED CAUSE, UNSPECIFIED CHRONICITY, UNSPECIFIED SITE: Chronic | ICD-10-CM

## 2022-02-27 PROCEDURE — G2066 INTER DEVC REMOTE 30D: HCPCS | Performed by: INTERNAL MEDICINE

## 2022-02-27 PROCEDURE — 93298 REM INTERROG DEV EVAL SCRMS: CPT | Performed by: INTERNAL MEDICINE

## 2022-02-28 RX ORDER — DULOXETIN HYDROCHLORIDE 60 MG/1
60 CAPSULE, DELAYED RELEASE ORAL DAILY
Qty: 30 CAPSULE | Refills: 0 | Status: SHIPPED | OUTPATIENT
Start: 2022-02-28 | End: 2022-03-16 | Stop reason: SDUPTHER

## 2022-02-28 RX ORDER — ALLOPURINOL 100 MG/1
100 TABLET ORAL DAILY
Qty: 30 TABLET | Refills: 0 | Status: SHIPPED | OUTPATIENT
Start: 2022-02-28 | End: 2022-03-16 | Stop reason: SDUPTHER

## 2022-03-03 ENCOUNTER — TELEPHONE (OUTPATIENT)
Dept: FAMILY MEDICINE CLINIC | Facility: CLINIC | Age: 87
End: 2022-03-03

## 2022-03-03 DIAGNOSIS — E03.9 ACQUIRED HYPOTHYROIDISM: ICD-10-CM

## 2022-03-03 DIAGNOSIS — I10 ESSENTIAL HYPERTENSION: Primary | ICD-10-CM

## 2022-03-03 DIAGNOSIS — E78.2 MIXED HYPERLIPIDEMIA: ICD-10-CM

## 2022-03-08 ENCOUNTER — TELEPHONE (OUTPATIENT)
Dept: FAMILY MEDICINE CLINIC | Facility: CLINIC | Age: 87
End: 2022-03-08

## 2022-03-08 DIAGNOSIS — E87.5 HYPERKALEMIA: Primary | ICD-10-CM

## 2022-03-08 LAB
ALBUMIN SERPL-MCNC: 4.6 G/DL (ref 3.6–4.6)
ALBUMIN/GLOB SERPL: 1.3 {RATIO} (ref 1.2–2.2)
ALP SERPL-CCNC: 95 IU/L (ref 44–121)
ALT SERPL-CCNC: 24 IU/L (ref 0–32)
AST SERPL-CCNC: 34 IU/L (ref 0–40)
BASOPHILS # BLD AUTO: 0.1 X10E3/UL (ref 0–0.2)
BASOPHILS NFR BLD AUTO: 1 %
BILIRUB SERPL-MCNC: 0.3 MG/DL (ref 0–1.2)
BUN SERPL-MCNC: 36 MG/DL (ref 8–27)
BUN/CREAT SERPL: 20 (ref 12–28)
CALCIUM SERPL-MCNC: 10.3 MG/DL (ref 8.7–10.3)
CHLORIDE SERPL-SCNC: 95 MMOL/L (ref 96–106)
CHOLEST SERPL-MCNC: 253 MG/DL (ref 100–199)
CO2 SERPL-SCNC: 23 MMOL/L (ref 20–29)
CREAT SERPL-MCNC: 1.79 MG/DL (ref 0.57–1)
EGFR GENE MUT ANL BLD/T: 27 ML/MIN/1.73
EOSINOPHIL # BLD AUTO: 0.1 X10E3/UL (ref 0–0.4)
EOSINOPHIL NFR BLD AUTO: 2 %
ERYTHROCYTE [DISTWIDTH] IN BLOOD BY AUTOMATED COUNT: 13.1 % (ref 11.7–15.4)
GLOBULIN SER CALC-MCNC: 3.6 G/DL (ref 1.5–4.5)
GLUCOSE SERPL-MCNC: 94 MG/DL (ref 65–99)
HCT VFR BLD AUTO: 40.9 % (ref 34–46.6)
HDLC SERPL-MCNC: 58 MG/DL
HGB BLD-MCNC: 13.6 G/DL (ref 11.1–15.9)
IMM GRANULOCYTES # BLD AUTO: 0 X10E3/UL (ref 0–0.1)
IMM GRANULOCYTES NFR BLD AUTO: 0 %
LDLC SERPL CALC-MCNC: 168 MG/DL (ref 0–99)
LYMPHOCYTES # BLD AUTO: 2.3 X10E3/UL (ref 0.7–3.1)
LYMPHOCYTES NFR BLD AUTO: 29 %
MCH RBC QN AUTO: 33.6 PG (ref 26.6–33)
MCHC RBC AUTO-ENTMCNC: 33.3 G/DL (ref 31.5–35.7)
MCV RBC AUTO: 101 FL (ref 79–97)
MONOCYTES # BLD AUTO: 0.6 X10E3/UL (ref 0.1–0.9)
MONOCYTES NFR BLD AUTO: 8 %
NEUTROPHILS # BLD AUTO: 4.8 X10E3/UL (ref 1.4–7)
NEUTROPHILS NFR BLD AUTO: 60 %
PLATELET # BLD AUTO: 324 X10E3/UL (ref 150–450)
POTASSIUM SERPL-SCNC: 6 MMOL/L (ref 3.5–5.2)
PROT SERPL-MCNC: 8.2 G/DL (ref 6–8.5)
RBC # BLD AUTO: 4.05 X10E6/UL (ref 3.77–5.28)
SODIUM SERPL-SCNC: 134 MMOL/L (ref 134–144)
T3FREE SERPL-MCNC: 2.5 PG/ML (ref 2–4.4)
T4 FREE SERPL-MCNC: 1.31 NG/DL (ref 0.82–1.77)
TRIGL SERPL-MCNC: 151 MG/DL (ref 0–149)
TSH SERPL DL<=0.005 MIU/L-ACNC: 14.4 UIU/ML (ref 0.45–4.5)
VLDLC SERPL CALC-MCNC: 27 MG/DL (ref 5–40)
WBC # BLD AUTO: 8 X10E3/UL (ref 3.4–10.8)

## 2022-03-08 NOTE — TELEPHONE ENCOUNTER
Left message for pt. Pt told Pt's K+ came back at 6.0. Could certainly be a lab error but, if accurate, is a problem. I ordered a nonfasting BMP for her to complete TODAY, as this can't wait. Please call her and arrange. She is then due an appt as some of her other labs need to be addressed. Pt told to call if any further questions rachel

## 2022-03-08 NOTE — TELEPHONE ENCOUNTER
Pt's K+ came back at 6.0. Could certainly be a lab error but, if accurate, is a problem. I ordered a nonfasting BMP for her to complete TODAY, as this can't wait. Please call her and arrange. She is then due an appt as some of her other labs need to be addressed.

## 2022-03-08 NOTE — TELEPHONE ENCOUNTER
I TALKED TO PT . PT TOLD  LAB RESULTS. PT HAVING TRANSPORTATION PROBLEMS AND CAN NOT GET LABS DONE TILL TOMORROW. DR REID ADVISED ARABELLA

## 2022-03-09 DIAGNOSIS — E78.2 MIXED HYPERLIPIDEMIA: Chronic | ICD-10-CM

## 2022-03-09 DIAGNOSIS — I10 BENIGN ESSENTIAL HYPERTENSION: Chronic | ICD-10-CM

## 2022-03-09 RX ORDER — ROSUVASTATIN CALCIUM 5 MG/1
5 TABLET, COATED ORAL DAILY
Qty: 30 TABLET | Refills: 0 | Status: SHIPPED | OUTPATIENT
Start: 2022-03-09 | End: 2022-03-16 | Stop reason: SDUPTHER

## 2022-03-09 RX ORDER — AMLODIPINE BESYLATE 5 MG/1
5 TABLET ORAL DAILY
Qty: 30 TABLET | Refills: 0 | Status: SHIPPED | OUTPATIENT
Start: 2022-03-09 | End: 2022-03-16 | Stop reason: SDUPTHER

## 2022-03-09 RX ORDER — LOSARTAN POTASSIUM 100 MG/1
TABLET ORAL
Qty: 30 TABLET | Refills: 0 | Status: SHIPPED | OUTPATIENT
Start: 2022-03-09 | End: 2022-03-10

## 2022-03-10 DIAGNOSIS — I10 BENIGN ESSENTIAL HYPERTENSION: Primary | ICD-10-CM

## 2022-03-10 DIAGNOSIS — E87.5 HYPERKALEMIA: ICD-10-CM

## 2022-03-10 LAB
BUN SERPL-MCNC: 35 MG/DL (ref 8–27)
BUN/CREAT SERPL: 20 (ref 12–28)
CALCIUM SERPL-MCNC: 10 MG/DL (ref 8.7–10.3)
CHLORIDE SERPL-SCNC: 98 MMOL/L (ref 96–106)
CO2 SERPL-SCNC: 24 MMOL/L (ref 20–29)
CREAT SERPL-MCNC: 1.79 MG/DL (ref 0.57–1)
EGFR GENE MUT ANL BLD/T: 27 ML/MIN/1.73
GLUCOSE SERPL-MCNC: 79 MG/DL (ref 65–99)
POTASSIUM SERPL-SCNC: 5.9 MMOL/L (ref 3.5–5.2)
SODIUM SERPL-SCNC: 137 MMOL/L (ref 134–144)

## 2022-03-10 RX ORDER — CARVEDILOL 6.25 MG/1
6.25 TABLET ORAL 2 TIMES DAILY WITH MEALS
Qty: 60 TABLET | Refills: 2 | Status: SHIPPED | OUTPATIENT
Start: 2022-03-10 | End: 2022-03-27 | Stop reason: HOSPADM

## 2022-03-10 NOTE — TELEPHONE ENCOUNTER
Pt's repeat BMP shows continued K+ elevation. I stopped the Losartan and replaced it with Coreg 6.25mg BID (pt to continue the amlodipine 5mg).  [facility] her [facility], so she can start immediately. I ordered a repeat non-fasting BMP for 1 week.

## 2022-03-16 RX ORDER — LEVOTHYROXINE SODIUM 0.03 MG/1
25 TABLET ORAL DAILY
Qty: 90 TABLET | Refills: 1 | Status: CANCELLED | OUTPATIENT
Start: 2022-03-16

## 2022-03-16 NOTE — PROGRESS NOTES
Subjective   Alba Correa is a 88 y.o. female.     History of Present Illness     Chief Complaint:   Chief Complaint   Patient presents with   • Hypertension   • Hyperlipidemia     Pt declines medicare wellness today per pt / pt on bladder spasm medication ?    • Depression   • Arthritis       Alba Correa 88 y.o. female who presents today for Medical Management of the below listed issues. She  has a problem list of   Patient Active Problem List   Diagnosis   • Chronic renal failure, stage 3 (moderate)   • DVT (deep venous thrombosis) ([facility])   • Depression   • Gout   • Hyperparathyroidism ([facility])   • Adaptive colitis   • Osteopenia   • Rheumatoid arthritis ([facility])   • Degeneration of intervertebral disc   • Essential hypertension   • Detrusor muscle hypertonia   • Hyperlipidemia   • Macrocytosis   • Nonrheumatic aortic (valve) insufficiency   • History of ischemic right MCA stroke with extension   • Bilateral leg weakness   • PAF (paroxysmal atrial fibrillation) ([facility])   • Anemia in chronic kidney disease   • Other proteinuria   • Afib ([facility])   • On amiodarone therapy   • Sinus bradycardia   • First degree AV block   • Nonrheumatic mitral valve regurgitation   • Syncope and collapse   .  Since the last visit, She has had some labs completed showing a K+ of 6 and a creatinine that was elevated (has been this way prior and sees nephrology). Repeat confirmed same, thus ACEI was stopped and changed to Coreg.   she has been compliant with   Current Outpatient Medications:   •  allopurinol (ZYLOPRIM) 100 MG tablet, Take 1 tablet by mouth Daily., Disp: 90 tablet, Rfl: 1  •  amLODIPine (NORVASC) 5 MG tablet, Take 1 tablet by mouth Daily., Disp: 90 tablet, Rfl: 1  •  carvedilol (Coreg) 6.25 MG tablet, Take 1 tablet by mouth 2 (Two) Times a Day With Meals., Disp: 60 tablet, Rfl: 2  •  DULoxetine (CYMBALTA) 60 MG capsule, Take 1 capsule by mouth Daily., Disp: 90 capsule, Rfl: 1  •  levothyroxine (SYNTHROID, LEVOTHROID) 25 MCG  "tablet, Take 1 tablet by mouth Daily., Disp: 90 tablet, Rfl: 1  •  rosuvastatin (CRESTOR) 5 MG tablet, Take 1 tablet by mouth Daily., Disp: 90 tablet, Rfl: 1  •  acetaminophen (TYLENOL) 650 MG 8 hr tablet, Take 1,300 mg by mouth Every 8 (Eight) Hours As Needed for Mild Pain ., Disp: , Rfl:   •  amiodarone (PACERONE) 200 MG tablet, Take 1 tablet by mouth Daily., Disp: 90 tablet, Rfl: 3  •  calcium carbonate-vitamin d (CALCIUM 600+D) 600-400 MG-UNIT per tablet, Take 1 tablet by mouth Daily., Disp: , Rfl:   •  Eliquis 2.5 MG tablet tablet, TAKE 1 TABLET BY MOUTH  EVERY 12 HOURS, Disp: 180 tablet, Rfl: 3  •  Multiple Vitamins-Minerals (CENTRUM SILVER) tablet, Take 1 tablet by mouth Daily., Disp: , Rfl:   •  Omega-3 Fatty Acids (OMEGA-3 FISH OIL PO), Take 1,600 mg by mouth Every Morning., Disp: , Rfl: .  She denies medication side effects.    All of the other chronic condition(s) listed above are stable w/o issues.    /60   Pulse 65   Temp 97.8 °F (36.6 °C) (Oral)   Resp 14   Ht 154.9 cm (61\")   Wt 53.1 kg (117 lb)   BMI 22.11 kg/m²     Results for orders placed or performed in visit on 03/08/22   Basic Metabolic Panel    Specimen: Blood   Result Value Ref Range    Glucose 79 65 - 99 mg/dL    BUN 35 (H) 8 - 27 mg/dL    Creatinine 1.79 (H) 0.57 - 1.00 mg/dL    EGFR Result 27 (L) >59 mL/min/1.73    BUN/Creatinine Ratio 20 12 - 28    Sodium 137 134 - 144 mmol/L    Potassium 5.9 (H) 3.5 - 5.2 mmol/L    Chloride 98 96 - 106 mmol/L    Total CO2 24 20 - 29 mmol/L    Calcium 10.0 8.7 - 10.3 mg/dL             The following portions of the patient's history were reviewed and updated as appropriate: allergies, current medications, past family history, past medical history, past social history, past surgical history, and problem list.    Review of Systems   Constitutional: Negative for activity change, chills and fever.   Respiratory: Negative for cough.    Cardiovascular: Negative for chest pain. "   Psychiatric/Behavioral: Negative for dysphoric mood.       Objective   Physical Exam  Constitutional:       General: She is not in acute distress.     Appearance: She is well-developed.   Cardiovascular:      Rate and Rhythm: Normal rate and regular rhythm.   Pulmonary:      Effort: Pulmonary effort is normal.      Breath sounds: Normal breath sounds.   Neurological:      Mental Status: She is alert and oriented to person, place, and time.   Psychiatric:         Behavior: Behavior normal.         Thought Content: Thought content normal.     Labs reviewed with pt today during visit. All questions answered.  Pt has appt tomorrow with nephrologist and is encouraged to keep it.      Assessment/Plan   Diagnoses and all orders for this visit:    1. Recurrent major depressive disorder, in full remission (HCC) (Primary)  -     DULoxetine (CYMBALTA) 60 MG capsule; Take 1 capsule by mouth Daily.  Dispense: 90 capsule; Refill: 1    2. Mixed hyperlipidemia  -     rosuvastatin (CRESTOR) 5 MG tablet; Take 1 tablet by mouth Daily.  Dispense: 90 tablet; Refill: 1    3. Gout, unspecified cause, unspecified chronicity, unspecified site  -     allopurinol (ZYLOPRIM) 100 MG tablet; Take 1 tablet by mouth Daily.  Dispense: 90 tablet; Refill: 1    4. Benign essential hypertension  -     amLODIPine (NORVASC) 5 MG tablet; Take 1 tablet by mouth Daily.  Dispense: 90 tablet; Refill: 1  -     Basic Metabolic Panel; Future    5. Acquired hypothyroidism  -     levothyroxine (SYNTHROID, LEVOTHROID) 25 MCG tablet; Take 1 tablet by mouth Daily.  Dispense: 90 tablet; Refill: 1  -     TSH; Future  -     T4, Free; Future  -     T3, Free; Future    Repeat CMP is ordered and pt to complete. Hopefully K+ is trending downward with the d/c of the ACEI.

## 2022-03-17 ENCOUNTER — OFFICE VISIT (OUTPATIENT)
Dept: FAMILY MEDICINE CLINIC | Facility: CLINIC | Age: 87
End: 2022-03-17

## 2022-03-17 VITALS
HEART RATE: 65 BPM | DIASTOLIC BLOOD PRESSURE: 60 MMHG | HEIGHT: 61 IN | RESPIRATION RATE: 14 BRPM | TEMPERATURE: 97.8 F | BODY MASS INDEX: 22.09 KG/M2 | WEIGHT: 117 LBS | SYSTOLIC BLOOD PRESSURE: 136 MMHG

## 2022-03-17 DIAGNOSIS — F33.42 RECURRENT MAJOR DEPRESSIVE DISORDER, IN FULL REMISSION: Primary | Chronic | ICD-10-CM

## 2022-03-17 DIAGNOSIS — E03.9 ACQUIRED HYPOTHYROIDISM: Chronic | ICD-10-CM

## 2022-03-17 DIAGNOSIS — I10 BENIGN ESSENTIAL HYPERTENSION: Chronic | ICD-10-CM

## 2022-03-17 DIAGNOSIS — M10.9 GOUT, UNSPECIFIED CAUSE, UNSPECIFIED CHRONICITY, UNSPECIFIED SITE: Chronic | ICD-10-CM

## 2022-03-17 DIAGNOSIS — E78.2 MIXED HYPERLIPIDEMIA: Chronic | ICD-10-CM

## 2022-03-17 PROCEDURE — 99214 OFFICE O/P EST MOD 30 MIN: CPT | Performed by: FAMILY MEDICINE

## 2022-03-17 RX ORDER — LEVOTHYROXINE SODIUM 0.03 MG/1
25 TABLET ORAL DAILY
Qty: 90 TABLET | Refills: 1 | Status: SHIPPED | OUTPATIENT
Start: 2022-03-17 | End: 2022-03-27 | Stop reason: HOSPADM

## 2022-03-17 RX ORDER — DULOXETIN HYDROCHLORIDE 60 MG/1
60 CAPSULE, DELAYED RELEASE ORAL DAILY
Qty: 90 CAPSULE | Refills: 1 | Status: SHIPPED | OUTPATIENT
Start: 2022-03-17 | End: 2022-08-17

## 2022-03-17 RX ORDER — CARVEDILOL 6.25 MG/1
6.25 TABLET ORAL 2 TIMES DAILY WITH MEALS
Qty: 180 TABLET | Refills: 1 | Status: CANCELLED | OUTPATIENT
Start: 2022-03-17

## 2022-03-17 RX ORDER — ROSUVASTATIN CALCIUM 5 MG/1
5 TABLET, COATED ORAL DAILY
Qty: 90 TABLET | Refills: 1 | Status: ON HOLD | OUTPATIENT
Start: 2022-03-17 | End: 2022-03-25

## 2022-03-17 RX ORDER — ALLOPURINOL 100 MG/1
100 TABLET ORAL DAILY
Qty: 90 TABLET | Refills: 1 | Status: SHIPPED | OUTPATIENT
Start: 2022-03-17 | End: 2022-08-17

## 2022-03-17 RX ORDER — AMLODIPINE BESYLATE 5 MG/1
5 TABLET ORAL DAILY
Qty: 90 TABLET | Refills: 1 | Status: SHIPPED | OUTPATIENT
Start: 2022-03-17 | End: 2022-03-27 | Stop reason: HOSPADM

## 2022-03-17 NOTE — PATIENT INSTRUCTIONS
Medicare Wellness  Personal Prevention Plan of Service     Date of Office Visit:    Encounter Provider:  Aramis Doty MD  Place of Service:  [facility]  Patient Name: Alba Correa  :  1933    As part of the Medicare Wellness portion of your visit today, we are providing you with this personalized preventive plan of services (PPPS). This plan is based upon recommendations of the [place] Preventive Services Task Force (USPSTF) and the Advisory Committee on Immunization Practices (ACIP).    This lists the preventive care services that should be considered, and provides dates of when you are due. Items listed as completed are up-to-date and do not require any further intervention.    Health Maintenance   Topic Date Due    ZOSTER VACCINE (2 of 2) 2012    LIPID PANEL  2023    ANNUAL WELLNESS VISIT  2023    COVID-19 Vaccine  Completed    INFLUENZA VACCINE  Completed    Pneumococcal Vaccine 65+  Completed    DXA SCAN  Discontinued    TDAP/TD VACCINES  Discontinued    COLORECTAL CANCER SCREENING  Discontinued       No orders of the defined types were placed in this encounter.      Return in about 6 months (around 2022) for Recheck.

## 2022-03-25 ENCOUNTER — HOSPITAL ENCOUNTER (INPATIENT)
Facility: HOSPITAL | Age: 87
LOS: 1 days | Discharge: HOME | End: 2022-03-27
Attending: EMERGENCY MEDICINE | Admitting: HOSPITALIST

## 2022-03-25 ENCOUNTER — APPOINTMENT (OUTPATIENT)
Dept: GENERAL RADIOLOGY | Facility: HOSPITAL | Age: 87
End: 2022-03-25

## 2022-03-25 DIAGNOSIS — I10 HYPERTENSION, UNSPECIFIED TYPE: ICD-10-CM

## 2022-03-25 DIAGNOSIS — R00.1 BRADYCARDIA: Primary | ICD-10-CM

## 2022-03-25 DIAGNOSIS — N18.9 CHRONIC KIDNEY DISEASE, UNSPECIFIED CKD STAGE: ICD-10-CM

## 2022-03-25 DIAGNOSIS — R06.09 DYSPNEA ON EXERTION: ICD-10-CM

## 2022-03-25 DIAGNOSIS — E87.5 HYPERKALEMIA: ICD-10-CM

## 2022-03-25 DIAGNOSIS — R53.83 FATIGUE, UNSPECIFIED TYPE: ICD-10-CM

## 2022-03-25 LAB
ALBUMIN SERPL-MCNC: 4.6 G/DL (ref 3.5–5.2)
ALBUMIN/GLOB SERPL: 1.4 G/DL
ALP SERPL-CCNC: 111 U/L (ref 39–117)
ALT SERPL W P-5'-P-CCNC: 23 U/L (ref 1–33)
ANION GAP SERPL CALCULATED.3IONS-SCNC: 11.5 MMOL/L (ref 5–15)
AST SERPL-CCNC: 32 U/L (ref 1–32)
BASOPHILS # BLD AUTO: 0.07 10*3/MM3 (ref 0–0.2)
BASOPHILS NFR BLD AUTO: 1 % (ref 0–1.5)
BILIRUB SERPL-MCNC: 0.3 MG/DL (ref 0–1.2)
BUN SERPL-MCNC: 49 MG/DL (ref 8–23)
BUN/CREAT SERPL: 28.5 (ref 7–25)
CALCIUM SPEC-SCNC: 9.7 MG/DL (ref 8.6–10.5)
CHLORIDE SERPL-SCNC: 98 MMOL/L (ref 98–107)
CO2 SERPL-SCNC: 24.5 MMOL/L (ref 22–29)
CREAT SERPL-MCNC: 1.72 MG/DL (ref 0.57–1)
DEPRECATED RDW RBC AUTO: 51 FL (ref 37–54)
EGFRCR SERPLBLD CKD-EPI 2021: 28.3 ML/MIN/1.73
EOSINOPHIL # BLD AUTO: 0.24 10*3/MM3 (ref 0–0.4)
EOSINOPHIL NFR BLD AUTO: 3.4 % (ref 0.3–6.2)
ERYTHROCYTE [DISTWIDTH] IN BLOOD BY AUTOMATED COUNT: 13.4 % (ref 12.3–15.4)
GLOBULIN UR ELPH-MCNC: 3.2 GM/DL
GLUCOSE SERPL-MCNC: 102 MG/DL (ref 65–99)
HCT VFR BLD AUTO: 37.4 % (ref 34–46.6)
HGB BLD-MCNC: 12.6 G/DL (ref 12–15.9)
IMM GRANULOCYTES # BLD AUTO: 0.01 10*3/MM3 (ref 0–0.05)
IMM GRANULOCYTES NFR BLD AUTO: 0.1 % (ref 0–0.5)
LYMPHOCYTES # BLD AUTO: 2.1 10*3/MM3 (ref 0.7–3.1)
LYMPHOCYTES NFR BLD AUTO: 30 % (ref 19.6–45.3)
MAGNESIUM SERPL-MCNC: 2.3 MG/DL (ref 1.6–2.4)
MCH RBC QN AUTO: 34.6 PG (ref 26.6–33)
MCHC RBC AUTO-ENTMCNC: 33.7 G/DL (ref 31.5–35.7)
MCV RBC AUTO: 102.7 FL (ref 79–97)
MONOCYTES # BLD AUTO: 0.74 10*3/MM3 (ref 0.1–0.9)
MONOCYTES NFR BLD AUTO: 10.6 % (ref 5–12)
NEUTROPHILS NFR BLD AUTO: 3.83 10*3/MM3 (ref 1.7–7)
NEUTROPHILS NFR BLD AUTO: 54.9 % (ref 42.7–76)
NRBC BLD AUTO-RTO: 0 /100 WBC (ref 0–0.2)
NT-PROBNP SERPL-MCNC: 2945 PG/ML (ref 0–1800)
PLATELET # BLD AUTO: 277 10*3/MM3 (ref 140–450)
PMV BLD AUTO: 10.4 FL (ref 6–12)
POTASSIUM SERPL-SCNC: 5.6 MMOL/L (ref 3.5–5.2)
PROT SERPL-MCNC: 7.8 G/DL (ref 6–8.5)
RBC # BLD AUTO: 3.64 10*6/MM3 (ref 3.77–5.28)
SARS-COV-2 RNA PNL SPEC NAA+PROBE: NOT DETECTED
SODIUM SERPL-SCNC: 134 MMOL/L (ref 136–145)
T4 FREE SERPL-MCNC: 1.14 NG/DL (ref 0.93–1.7)
TROPONIN T SERPL-MCNC: <0.01 NG/ML (ref 0–0.03)
TSH SERPL DL<=0.05 MIU/L-ACNC: 20.7 UIU/ML (ref 0.27–4.2)
WBC NRBC COR # BLD: 6.99 10*3/MM3 (ref 3.4–10.8)

## 2022-03-25 PROCEDURE — 80053 COMPREHEN METABOLIC PANEL: CPT | Performed by: EMERGENCY MEDICINE

## 2022-03-25 PROCEDURE — 93005 ELECTROCARDIOGRAM TRACING: CPT | Performed by: EMERGENCY MEDICINE

## 2022-03-25 PROCEDURE — G0378 HOSPITAL OBSERVATION PER HR: HCPCS

## 2022-03-25 PROCEDURE — 83880 ASSAY OF NATRIURETIC PEPTIDE: CPT | Performed by: EMERGENCY MEDICINE

## 2022-03-25 PROCEDURE — 71045 X-RAY EXAM CHEST 1 VIEW: CPT

## 2022-03-25 PROCEDURE — 84439 ASSAY OF FREE THYROXINE: CPT | Performed by: EMERGENCY MEDICINE

## 2022-03-25 PROCEDURE — 84484 ASSAY OF TROPONIN QUANT: CPT | Performed by: EMERGENCY MEDICINE

## 2022-03-25 PROCEDURE — 93005 ELECTROCARDIOGRAM TRACING: CPT

## 2022-03-25 PROCEDURE — 93010 ELECTROCARDIOGRAM REPORT: CPT | Performed by: INTERNAL MEDICINE

## 2022-03-25 PROCEDURE — 83735 ASSAY OF MAGNESIUM: CPT | Performed by: EMERGENCY MEDICINE

## 2022-03-25 PROCEDURE — 85025 COMPLETE CBC W/AUTO DIFF WBC: CPT | Performed by: EMERGENCY MEDICINE

## 2022-03-25 PROCEDURE — 99284 EMERGENCY DEPT VISIT MOD MDM: CPT

## 2022-03-25 PROCEDURE — 87635 SARS-COV-2 COVID-19 AMP PRB: CPT | Performed by: EMERGENCY MEDICINE

## 2022-03-25 PROCEDURE — 84443 ASSAY THYROID STIM HORMONE: CPT | Performed by: EMERGENCY MEDICINE

## 2022-03-25 RX ORDER — LEVOTHYROXINE SODIUM 0.03 MG/1
25 TABLET ORAL DAILY
Status: DISCONTINUED | OUTPATIENT
Start: 2022-03-26 | End: 2022-03-26

## 2022-03-25 RX ORDER — ONDANSETRON 2 MG/ML
4 INJECTION INTRAMUSCULAR; INTRAVENOUS EVERY 6 HOURS PRN
Status: DISCONTINUED | OUTPATIENT
Start: 2022-03-25 | End: 2022-03-27 | Stop reason: HOSPADM

## 2022-03-25 RX ORDER — ACETAMINOPHEN 325 MG/1
650 TABLET ORAL EVERY 4 HOURS PRN
Status: DISCONTINUED | OUTPATIENT
Start: 2022-03-25 | End: 2022-03-27 | Stop reason: HOSPADM

## 2022-03-25 RX ORDER — NITROGLYCERIN 0.4 MG/1
0.4 TABLET SUBLINGUAL
Status: DISCONTINUED | OUTPATIENT
Start: 2022-03-25 | End: 2022-03-27 | Stop reason: HOSPADM

## 2022-03-25 RX ORDER — DULOXETIN HYDROCHLORIDE 60 MG/1
60 CAPSULE, DELAYED RELEASE ORAL DAILY
Status: DISCONTINUED | OUTPATIENT
Start: 2022-03-26 | End: 2022-03-27 | Stop reason: HOSPADM

## 2022-03-25 RX ORDER — ALLOPURINOL 100 MG/1
100 TABLET ORAL DAILY
Status: DISCONTINUED | OUTPATIENT
Start: 2022-03-26 | End: 2022-03-27 | Stop reason: HOSPADM

## 2022-03-25 RX ORDER — AMIODARONE HYDROCHLORIDE 200 MG/1
200 TABLET ORAL DAILY
Status: DISCONTINUED | OUTPATIENT
Start: 2022-03-26 | End: 2022-03-26

## 2022-03-25 RX ORDER — UREA 10 %
3 LOTION (ML) TOPICAL NIGHTLY PRN
Status: DISCONTINUED | OUTPATIENT
Start: 2022-03-25 | End: 2022-03-27 | Stop reason: HOSPADM

## 2022-03-25 RX ORDER — ONDANSETRON 4 MG/1
4 TABLET, FILM COATED ORAL EVERY 6 HOURS PRN
Status: DISCONTINUED | OUTPATIENT
Start: 2022-03-25 | End: 2022-03-27 | Stop reason: HOSPADM

## 2022-03-25 RX ORDER — AMLODIPINE BESYLATE 5 MG/1
5 TABLET ORAL DAILY
Status: DISCONTINUED | OUTPATIENT
Start: 2022-03-26 | End: 2022-03-26

## 2022-03-25 RX ADMIN — APIXABAN 2.5 MG: 2.5 TABLET, FILM COATED ORAL at 23:17

## 2022-03-26 PROBLEM — N17.9 AKI (ACUTE KIDNEY INJURY): Status: ACTIVE | Noted: 2022-03-26

## 2022-03-26 LAB
ANION GAP SERPL CALCULATED.3IONS-SCNC: 9 MMOL/L (ref 5–15)
BUN SERPL-MCNC: 42 MG/DL (ref 8–23)
BUN/CREAT SERPL: 24.3 (ref 7–25)
CALCIUM SPEC-SCNC: 9.3 MG/DL (ref 8.6–10.5)
CHLORIDE SERPL-SCNC: 101 MMOL/L (ref 98–107)
CO2 SERPL-SCNC: 26 MMOL/L (ref 22–29)
CREAT SERPL-MCNC: 1.73 MG/DL (ref 0.57–1)
DEPRECATED RDW RBC AUTO: 48.6 FL (ref 37–54)
EGFRCR SERPLBLD CKD-EPI 2021: 28.1 ML/MIN/1.73
ERYTHROCYTE [DISTWIDTH] IN BLOOD BY AUTOMATED COUNT: 13.4 % (ref 12.3–15.4)
GLUCOSE SERPL-MCNC: 113 MG/DL (ref 65–99)
HCT VFR BLD AUTO: 35.5 % (ref 34–46.6)
HGB BLD-MCNC: 12.2 G/DL (ref 12–15.9)
MCH RBC QN AUTO: 34.3 PG (ref 26.6–33)
MCHC RBC AUTO-ENTMCNC: 34.4 G/DL (ref 31.5–35.7)
MCV RBC AUTO: 99.7 FL (ref 79–97)
PLATELET # BLD AUTO: 285 10*3/MM3 (ref 140–450)
PMV BLD AUTO: 10.6 FL (ref 6–12)
POTASSIUM SERPL-SCNC: 4.3 MMOL/L (ref 3.5–5.2)
QT INTERVAL: 509 MS
RBC # BLD AUTO: 3.56 10*6/MM3 (ref 3.77–5.28)
SODIUM SERPL-SCNC: 136 MMOL/L (ref 136–145)
WBC NRBC COR # BLD: 7.04 10*3/MM3 (ref 3.4–10.8)

## 2022-03-26 PROCEDURE — 36415 COLL VENOUS BLD VENIPUNCTURE: CPT | Performed by: INTERNAL MEDICINE

## 2022-03-26 PROCEDURE — 99222 1ST HOSP IP/OBS MODERATE 55: CPT | Performed by: INTERNAL MEDICINE

## 2022-03-26 PROCEDURE — 85027 COMPLETE CBC AUTOMATED: CPT | Performed by: INTERNAL MEDICINE

## 2022-03-26 RX ORDER — LEVOTHYROXINE SODIUM 0.05 MG/1
50 TABLET ORAL
Status: DISCONTINUED | OUTPATIENT
Start: 2022-03-27 | End: 2022-03-27 | Stop reason: HOSPADM

## 2022-03-26 RX ORDER — AMLODIPINE BESYLATE 10 MG/1
10 TABLET ORAL DAILY
Status: DISCONTINUED | OUTPATIENT
Start: 2022-03-27 | End: 2022-03-27 | Stop reason: HOSPADM

## 2022-03-26 RX ADMIN — APIXABAN 2.5 MG: 2.5 TABLET, FILM COATED ORAL at 08:35

## 2022-03-26 RX ADMIN — ALLOPURINOL 100 MG: 100 TABLET ORAL at 08:34

## 2022-03-26 RX ADMIN — AMLODIPINE BESYLATE 5 MG: 5 TABLET ORAL at 08:39

## 2022-03-26 RX ADMIN — LEVOTHYROXINE SODIUM 25 MCG: 0.03 TABLET ORAL at 08:34

## 2022-03-26 RX ADMIN — DULOXETINE HYDROCHLORIDE 60 MG: 60 CAPSULE, DELAYED RELEASE ORAL at 08:34

## 2022-03-26 RX ADMIN — ACETAMINOPHEN 650 MG: 325 TABLET, FILM COATED ORAL at 13:56

## 2022-03-27 ENCOUNTER — READMISSION MANAGEMENT (OUTPATIENT)
Dept: CALL CENTER | Facility: HOSPITAL | Age: 87
End: 2022-03-27

## 2022-03-27 ENCOUNTER — APPOINTMENT (OUTPATIENT)
Dept: CARDIOLOGY | Facility: HOSPITAL | Age: 87
End: 2022-03-27

## 2022-03-27 VITALS
WEIGHT: 114.4 LBS | OXYGEN SATURATION: 90 % | RESPIRATION RATE: 18 BRPM | SYSTOLIC BLOOD PRESSURE: 175 MMHG | TEMPERATURE: 97.9 F | HEIGHT: 62 IN | DIASTOLIC BLOOD PRESSURE: 85 MMHG | BODY MASS INDEX: 21.05 KG/M2 | HEART RATE: 53 BPM

## 2022-03-27 LAB
ALBUMIN SERPL-MCNC: 3.8 G/DL (ref 3.5–5.2)
ANION GAP SERPL CALCULATED.3IONS-SCNC: 7.7 MMOL/L (ref 5–15)
AORTIC DIMENSIONLESS INDEX: 0.8 (DI)
BH CV ECHO MEAS - ACS: 1.66 CM
BH CV ECHO MEAS - AO MAX PG: 5.8 MMHG
BH CV ECHO MEAS - AO MEAN PG: 2.8 MMHG
BH CV ECHO MEAS - AO ROOT DIAM: 2.5 CM
BH CV ECHO MEAS - AO V2 MAX: 120.8 CM/SEC
BH CV ECHO MEAS - AO V2 VTI: 28.1 CM
BH CV ECHO MEAS - AVA(I,D): 2.7 CM2
BH CV ECHO MEAS - EDV(CUBED): 81.8 ML
BH CV ECHO MEAS - EDV(MOD-SP2): 110 ML
BH CV ECHO MEAS - EDV(MOD-SP4): 89 ML
BH CV ECHO MEAS - EF(MOD-BP): 71.9 %
BH CV ECHO MEAS - EF(MOD-SP2): 74.5 %
BH CV ECHO MEAS - EF(MOD-SP4): 71.9 %
BH CV ECHO MEAS - ESV(CUBED): 17.4 ML
BH CV ECHO MEAS - ESV(MOD-SP2): 28 ML
BH CV ECHO MEAS - ESV(MOD-SP4): 25 ML
BH CV ECHO MEAS - FS: 40.3 %
BH CV ECHO MEAS - IVS/LVPW: 1.03 CM
BH CV ECHO MEAS - IVSD: 1.14 CM
BH CV ECHO MEAS - LAT PEAK E' VEL: 7.2 CM/SEC
BH CV ECHO MEAS - LV DIASTOLIC VOL/BSA (35-75): 59.1 CM2
BH CV ECHO MEAS - LV MASS(C)D: 171 GRAMS
BH CV ECHO MEAS - LV MAX PG: 4.2 MMHG
BH CV ECHO MEAS - LV MEAN PG: 2.03 MMHG
BH CV ECHO MEAS - LV SYSTOLIC VOL/BSA (12-30): 16.6 CM2
BH CV ECHO MEAS - LV V1 MAX: 102.8 CM/SEC
BH CV ECHO MEAS - LV V1 VTI: 24.1 CM
BH CV ECHO MEAS - LVIDD: 4.3 CM
BH CV ECHO MEAS - LVIDS: 2.6 CM
BH CV ECHO MEAS - LVOT AREA: 3.2 CM2
BH CV ECHO MEAS - LVOT DIAM: 2.02 CM
BH CV ECHO MEAS - LVPWD: 1.11 CM
BH CV ECHO MEAS - MED PEAK E' VEL: 6.2 CM/SEC
BH CV ECHO MEAS - MR MAX PG: 49.4 MMHG
BH CV ECHO MEAS - MR MAX VEL: 351.4 CM/SEC
BH CV ECHO MEAS - MV A MAX VEL: 90.3 CM/SEC
BH CV ECHO MEAS - MV DEC SLOPE: 373.3 CM/SEC2
BH CV ECHO MEAS - MV DEC TIME: 0.24 MSEC
BH CV ECHO MEAS - MV E MAX VEL: 70 CM/SEC
BH CV ECHO MEAS - MV E/A: 0.78
BH CV ECHO MEAS - MV MAX PG: 3.4 MMHG
BH CV ECHO MEAS - MV MEAN PG: 1.29 MMHG
BH CV ECHO MEAS - MV V2 VTI: 30.5 CM
BH CV ECHO MEAS - MVA(VTI): 2.5 CM2
BH CV ECHO MEAS - PA V2 MAX: 101.8 CM/SEC
BH CV ECHO MEAS - RAP SYSTOLE: 3 MMHG
BH CV ECHO MEAS - RV MAX PG: 2.15 MMHG
BH CV ECHO MEAS - RV V1 MAX: 73.3 CM/SEC
BH CV ECHO MEAS - RV V1 VTI: 18.5 CM
BH CV ECHO MEAS - RVOT DIAM: 1.9 CM
BH CV ECHO MEAS - RVSP: 36.9 MMHG
BH CV ECHO MEAS - SI(MOD-SP2): 54.5 ML/M2
BH CV ECHO MEAS - SI(MOD-SP4): 42.5 ML/M2
BH CV ECHO MEAS - SV(LVOT): 77 ML
BH CV ECHO MEAS - SV(MOD-SP2): 82 ML
BH CV ECHO MEAS - SV(MOD-SP4): 64 ML
BH CV ECHO MEAS - SV(RVOT): 52.6 ML
BH CV ECHO MEAS - TAPSE (>1.6): 1.9 CM
BH CV ECHO MEAS - TR MAX PG: 33.9 MMHG
BH CV ECHO MEAS - TR MAX VEL: 291.1 CM/SEC
BH CV ECHO MEASUREMENTS AVERAGE E/E' RATIO: 10.45
BH CV XLRA - RV BASE: 3.3 CM
BH CV XLRA - TDI S': 13.2 CM/SEC
BUN SERPL-MCNC: 32 MG/DL (ref 8–23)
BUN/CREAT SERPL: 23 (ref 7–25)
CALCIUM SPEC-SCNC: 9.6 MG/DL (ref 8.6–10.5)
CHLORIDE SERPL-SCNC: 106 MMOL/L (ref 98–107)
CO2 SERPL-SCNC: 24.3 MMOL/L (ref 22–29)
CREAT SERPL-MCNC: 1.39 MG/DL (ref 0.57–1)
EGFRCR SERPLBLD CKD-EPI 2021: 36.6 ML/MIN/1.73
GLUCOSE SERPL-MCNC: 96 MG/DL (ref 65–99)
LEFT ATRIUM VOLUME INDEX: 23.1 ML/M2
MAGNESIUM SERPL-MCNC: 2.1 MG/DL (ref 1.6–2.4)
MAXIMAL PREDICTED HEART RATE: 132 BPM
PHOSPHATE SERPL-MCNC: 3.1 MG/DL (ref 2.5–4.5)
POTASSIUM SERPL-SCNC: 3.9 MMOL/L (ref 3.5–5.2)
SINUS: 2.7 CM
SODIUM SERPL-SCNC: 138 MMOL/L (ref 136–145)
STRESS TARGET HR: 112 BPM

## 2022-03-27 PROCEDURE — 97161 PT EVAL LOW COMPLEX 20 MIN: CPT

## 2022-03-27 PROCEDURE — 25010000002 PERFLUTREN (DEFINITY) 8.476 MG IN SODIUM CHLORIDE (PF) 0.9 % 10 ML INJECTION: Performed by: INTERNAL MEDICINE

## 2022-03-27 PROCEDURE — 99232 SBSQ HOSP IP/OBS MODERATE 35: CPT | Performed by: INTERNAL MEDICINE

## 2022-03-27 PROCEDURE — 83735 ASSAY OF MAGNESIUM: CPT | Performed by: INTERNAL MEDICINE

## 2022-03-27 PROCEDURE — 93306 TTE W/DOPPLER COMPLETE: CPT | Performed by: INTERNAL MEDICINE

## 2022-03-27 PROCEDURE — 80069 RENAL FUNCTION PANEL: CPT | Performed by: INTERNAL MEDICINE

## 2022-03-27 PROCEDURE — 93306 TTE W/DOPPLER COMPLETE: CPT

## 2022-03-27 RX ORDER — HYDRALAZINE HYDROCHLORIDE 10 MG/1
10 TABLET, FILM COATED ORAL EVERY 8 HOURS SCHEDULED
Qty: 90 TABLET | Refills: 0 | Status: SHIPPED | OUTPATIENT
Start: 2022-03-27 | End: 2022-03-27 | Stop reason: HOSPADM

## 2022-03-27 RX ORDER — HYDRALAZINE HYDROCHLORIDE 10 MG/1
10 TABLET, FILM COATED ORAL EVERY 8 HOURS SCHEDULED
Status: DISCONTINUED | OUTPATIENT
Start: 2022-03-27 | End: 2022-03-27 | Stop reason: HOSPADM

## 2022-03-27 RX ORDER — AMLODIPINE BESYLATE 10 MG/1
10 TABLET ORAL DAILY
Qty: 30 TABLET | Refills: 0 | Status: SHIPPED | OUTPATIENT
Start: 2022-03-28 | End: 2022-04-21

## 2022-03-27 RX ORDER — LEVOTHYROXINE SODIUM 0.05 MG/1
50 TABLET ORAL
Qty: 30 TABLET | Refills: 0 | Status: SHIPPED | OUTPATIENT
Start: 2022-03-28 | End: 2022-04-22 | Stop reason: SDUPTHER

## 2022-03-27 RX ADMIN — LEVOTHYROXINE SODIUM 50 MCG: 0.05 TABLET ORAL at 06:13

## 2022-03-27 RX ADMIN — DULOXETINE HYDROCHLORIDE 60 MG: 60 CAPSULE, DELAYED RELEASE ORAL at 08:47

## 2022-03-27 RX ADMIN — PERFLUTREN 2 ML: 6.52 INJECTION, SUSPENSION INTRAVENOUS at 08:33

## 2022-03-27 RX ADMIN — ALLOPURINOL 100 MG: 100 TABLET ORAL at 08:47

## 2022-03-27 RX ADMIN — AMLODIPINE BESYLATE 10 MG: 10 TABLET ORAL at 08:47

## 2022-03-27 RX ADMIN — HYDRALAZINE HYDROCHLORIDE 10 MG: 10 TABLET, FILM COATED ORAL at 15:33

## 2022-03-27 NOTE — OUTREACH NOTE
Prep Survey    Flowsheet Row Responses   [facility] facility patient discharged from? [place]   Is LACE score < 7 ? No   Emergency Room discharge w/ pulse ox? No   Eligibility [facility]   Date of Admission 03/25/22   Date of Discharge 03/27/22   Discharge Disposition Home or Self Care   Discharge diagnosis symptomatic bradycardia - meds adjusted, FATIMAH, CKD, RA   Does the patient have one of the following disease processes/diagnoses(primary or secondary)? Other   Does the patient have Home health ordered? No   Is there a DME ordered? No   Prep survey completed? Yes          VALARIE SALAZAR - Registered Nurse

## 2022-03-28 ENCOUNTER — TRANSITIONAL CARE MANAGEMENT TELEPHONE ENCOUNTER (OUTPATIENT)
Dept: CALL CENTER | Facility: HOSPITAL | Age: 87
End: 2022-03-28

## 2022-03-28 NOTE — OUTREACH NOTE
Call Center TCM Note    Flowsheet Row Responses   [facility] patient discharged from? [place]   Does the patient have one of the following disease processes/diagnoses(primary or secondary)? Other   TCM attempt successful? Yes   Call start time 1452   Call end time 1454   Discharge diagnosis symptomatic bradycardia - meds adjusted, FATIMAH, CKD, RA   Meds reviewed with patient/caregiver? Yes   Is the patient having any side effects they believe may be caused by any medication additions or changes? No   Does the patient have all medications ordered at discharge? N/A   Prescription comments PATIENT STATES HER KIDS PUT ALL OF HER MEDICATIONS IN HER DAILY MED PLANNER.    Is the patient taking all medications as directed (includes completed medication regime)? Yes   Does the patient have a primary care provider?  Yes   Does the patient have an appointment with their PCP within 7 days of discharge? No   Comments regarding PCP PATIENT DECLINED MAKING A FOLLOW UP APPOINTMENT WITH HER PCP DURING THIS CALL, STATING SHE NEEDS TO GET AN APPOINTMENT WITH HER CARDIOLOGIST FIRST.    What is preventing the patient from scheduling follow up appointments within 7 days of discharge? Haven't had time   Nursing Interventions Advised patient to make appointment, Educated patient on importance of making appointment   Has the patient kept scheduled appointments due by today? N/A   Has home health visited the patient within 72 hours of discharge? N/A   Psychosocial issues? No   Did the patient receive a copy of their discharge instructions? Yes   Nursing interventions Reviewed instructions with patient   What is the patient's perception of their health status since discharge? Improving   Is the patient/caregiver able to teach back signs and symptoms related to disease process for when to call PCP? Yes   Is the patient/caregiver able to teach back signs and symptoms related to disease process for when to call 911? Yes   Is the  patient/caregiver able to teach back the hierarchy of who to call/visit for symptoms/problems? PCP, Specialist, Home health nurse, Urgent Care, ED, 911 Yes   If the patient is a current smoker, are they able to teach back resources for cessation? Not a smoker   TCM call completed? Yes          Mary Giles LPN    3/28/2022, 14:58 EDT

## 2022-03-29 PROCEDURE — 93005 ELECTROCARDIOGRAM TRACING: CPT | Performed by: NURSE PRACTITIONER

## 2022-03-30 PROCEDURE — G2066 INTER DEVC REMOTE 30D: HCPCS | Performed by: INTERNAL MEDICINE

## 2022-03-30 PROCEDURE — 93298 REM INTERROG DEV EVAL SCRMS: CPT | Performed by: INTERNAL MEDICINE

## 2022-03-31 ENCOUNTER — TELEPHONE (OUTPATIENT)
Dept: CARDIOLOGY | Facility: CLINIC | Age: 87
End: 2022-03-31

## 2022-03-31 ENCOUNTER — TELEPHONE (OUTPATIENT)
Dept: FAMILY MEDICINE CLINIC | Facility: CLINIC | Age: 87
End: 2022-03-31

## 2022-03-31 DIAGNOSIS — E87.5 HYPERKALEMIA: Primary | ICD-10-CM

## 2022-03-31 DIAGNOSIS — R00.1 BRADYCARDIA: ICD-10-CM

## 2022-03-31 DIAGNOSIS — R94.4 ABNORMAL RENAL FUNCTION TEST: ICD-10-CM

## 2022-03-31 LAB
BUN SERPL-MCNC: 46 MG/DL (ref 8–27)
BUN/CREAT SERPL: 23 (ref 12–28)
CALCIUM SERPL-MCNC: 10.1 MG/DL (ref 8.7–10.3)
CHLORIDE SERPL-SCNC: 95 MMOL/L (ref 96–106)
CO2 SERPL-SCNC: 23 MMOL/L (ref 20–29)
CREAT SERPL-MCNC: 2.02 MG/DL (ref 0.57–1)
EGFRCR SERPLBLD CKD-EPI 2021: 23 ML/MIN/1.73
GLUCOSE SERPL-MCNC: 86 MG/DL (ref 65–99)
POTASSIUM SERPL-SCNC: 6.4 MMOL/L (ref 3.5–5.2)
SODIUM SERPL-SCNC: 134 MMOL/L (ref 134–144)

## 2022-03-31 NOTE — TELEPHONE ENCOUNTER
Got labs back just now that she completed yesterday after getting home recently from the hospital. Cr up as well as the K+ and wasn't just 3 days ago. Pt's son called and discussed all this. He reports his mom's HR is continually in the 40's well-off the Coreg and that cardiology is involved. Discussed the fact that it's probable the low HR is not getting good perfusion to the kidney's leading to elevation of both creatinine and K+. Will check a quick BMP now to see and, if still elevated, will send pt back to the ED as she may need a pacemaker. Son voices understanding and agreement.

## 2022-03-31 NOTE — TELEPHONE ENCOUNTER
Rx Refill Note  Requested Prescriptions      No prescriptions requested or ordered in this encounter      Last office visit with prescribing clinician: 12/28/2021      Next office visit with prescribing clinician: 6/28/2022            Radha Ashraf MA  03/31/22, 09:13 EDT

## 2022-04-01 ENCOUNTER — HOSPITAL ENCOUNTER (EMERGENCY)
Facility: HOSPITAL | Age: 87
Discharge: HOME | End: 2022-04-01
Attending: EMERGENCY MEDICINE | Admitting: EMERGENCY MEDICINE

## 2022-04-01 VITALS
OXYGEN SATURATION: 91 % | SYSTOLIC BLOOD PRESSURE: 166 MMHG | DIASTOLIC BLOOD PRESSURE: 67 MMHG | HEART RATE: 47 BPM | RESPIRATION RATE: 16 BRPM | TEMPERATURE: 97 F

## 2022-04-01 DIAGNOSIS — E87.5 HYPERKALEMIA: Primary | ICD-10-CM

## 2022-04-01 LAB
ALBUMIN SERPL-MCNC: 4.4 G/DL (ref 3.5–5.2)
ALBUMIN/GLOB SERPL: 1.3 G/DL
ALP SERPL-CCNC: 116 U/L (ref 39–117)
ALT SERPL W P-5'-P-CCNC: 22 U/L (ref 1–33)
ANION GAP SERPL CALCULATED.3IONS-SCNC: 11 MMOL/L (ref 5–15)
ANION GAP SERPL CALCULATED.3IONS-SCNC: 9 MMOL/L (ref 5–15)
AST SERPL-CCNC: 31 U/L (ref 1–32)
BASOPHILS # BLD AUTO: 0.09 10*3/MM3 (ref 0–0.2)
BASOPHILS NFR BLD AUTO: 1.4 % (ref 0–1.5)
BILIRUB SERPL-MCNC: 0.3 MG/DL (ref 0–1.2)
BUN SERPL-MCNC: 40 MG/DL (ref 8–23)
BUN SERPL-MCNC: 45 MG/DL (ref 8–23)
BUN SERPL-MCNC: 48 MG/DL (ref 8–27)
BUN/CREAT SERPL: 24 (ref 12–28)
BUN/CREAT SERPL: 25.2 (ref 7–25)
BUN/CREAT SERPL: 29 (ref 7–25)
CALCIUM SERPL-MCNC: 10.2 MG/DL (ref 8.7–10.3)
CALCIUM SPEC-SCNC: 9.3 MG/DL (ref 8.6–10.5)
CALCIUM SPEC-SCNC: 9.6 MG/DL (ref 8.6–10.5)
CHLORIDE SERPL-SCNC: 103 MMOL/L (ref 98–107)
CHLORIDE SERPL-SCNC: 104 MMOL/L (ref 98–107)
CHLORIDE SERPL-SCNC: 98 MMOL/L (ref 96–106)
CO2 SERPL-SCNC: 23 MMOL/L (ref 20–29)
CO2 SERPL-SCNC: 27 MMOL/L (ref 22–29)
CO2 SERPL-SCNC: 28 MMOL/L (ref 22–29)
CREAT SERPL-MCNC: 1.55 MG/DL (ref 0.57–1)
CREAT SERPL-MCNC: 1.59 MG/DL (ref 0.57–1)
CREAT SERPL-MCNC: 1.96 MG/DL (ref 0.57–1)
DEPRECATED RDW RBC AUTO: 48.5 FL (ref 37–54)
EGFRCR SERPLBLD CKD-EPI 2021: 24 ML/MIN/1.73
EGFRCR SERPLBLD CKD-EPI 2021: 31.1 ML/MIN/1.73
EGFRCR SERPLBLD CKD-EPI 2021: 32.1 ML/MIN/1.73
EOSINOPHIL # BLD AUTO: 0.29 10*3/MM3 (ref 0–0.4)
EOSINOPHIL NFR BLD AUTO: 4.5 % (ref 0.3–6.2)
ERYTHROCYTE [DISTWIDTH] IN BLOOD BY AUTOMATED COUNT: 12.9 % (ref 12.3–15.4)
GLOBULIN UR ELPH-MCNC: 3.3 GM/DL
GLUCOSE SERPL-MCNC: 71 MG/DL (ref 65–99)
GLUCOSE SERPL-MCNC: 93 MG/DL (ref 65–99)
GLUCOSE SERPL-MCNC: 99 MG/DL (ref 65–99)
HCT VFR BLD AUTO: 38.7 % (ref 34–46.6)
HGB BLD-MCNC: 13 G/DL (ref 12–15.9)
HOLD SPECIMEN: NORMAL
HOLD SPECIMEN: NORMAL
IMM GRANULOCYTES # BLD AUTO: 0.01 10*3/MM3 (ref 0–0.05)
IMM GRANULOCYTES NFR BLD AUTO: 0.2 % (ref 0–0.5)
LYMPHOCYTES # BLD AUTO: 1.45 10*3/MM3 (ref 0.7–3.1)
LYMPHOCYTES NFR BLD AUTO: 22.4 % (ref 19.6–45.3)
MAGNESIUM SERPL-MCNC: 2.3 MG/DL (ref 1.6–2.4)
MCH RBC QN AUTO: 34.2 PG (ref 26.6–33)
MCHC RBC AUTO-ENTMCNC: 33.6 G/DL (ref 31.5–35.7)
MCV RBC AUTO: 101.8 FL (ref 79–97)
MONOCYTES # BLD AUTO: 0.58 10*3/MM3 (ref 0.1–0.9)
MONOCYTES NFR BLD AUTO: 9 % (ref 5–12)
NEUTROPHILS NFR BLD AUTO: 4.06 10*3/MM3 (ref 1.7–7)
NEUTROPHILS NFR BLD AUTO: 62.5 % (ref 42.7–76)
NRBC BLD AUTO-RTO: 0 /100 WBC (ref 0–0.2)
PLATELET # BLD AUTO: 320 10*3/MM3 (ref 140–450)
PMV BLD AUTO: 10.1 FL (ref 6–12)
POTASSIUM SERPL-SCNC: 4.5 MMOL/L (ref 3.5–5.2)
POTASSIUM SERPL-SCNC: 5 MMOL/L (ref 3.5–5.2)
POTASSIUM SERPL-SCNC: 6.4 MMOL/L (ref 3.5–5.2)
PROT SERPL-MCNC: 7.7 G/DL (ref 6–8.5)
QT INTERVAL: 475 MS
RBC # BLD AUTO: 3.8 10*6/MM3 (ref 3.77–5.28)
SODIUM SERPL-SCNC: 137 MMOL/L (ref 134–144)
SODIUM SERPL-SCNC: 140 MMOL/L (ref 136–145)
SODIUM SERPL-SCNC: 142 MMOL/L (ref 136–145)
WBC NRBC COR # BLD: 6.48 10*3/MM3 (ref 3.4–10.8)
WHOLE BLOOD HOLD SPECIMEN: NORMAL
WHOLE BLOOD HOLD SPECIMEN: NORMAL

## 2022-04-01 PROCEDURE — 85025 COMPLETE CBC W/AUTO DIFF WBC: CPT | Performed by: NURSE PRACTITIONER

## 2022-04-01 PROCEDURE — 93005 ELECTROCARDIOGRAM TRACING: CPT | Performed by: NURSE PRACTITIONER

## 2022-04-01 PROCEDURE — 83735 ASSAY OF MAGNESIUM: CPT | Performed by: EMERGENCY MEDICINE

## 2022-04-01 PROCEDURE — 93010 ELECTROCARDIOGRAM REPORT: CPT | Performed by: INTERNAL MEDICINE

## 2022-04-01 PROCEDURE — 80053 COMPREHEN METABOLIC PANEL: CPT | Performed by: NURSE PRACTITIONER

## 2022-04-01 PROCEDURE — 99283 EMERGENCY DEPT VISIT LOW MDM: CPT

## 2022-04-01 RX ORDER — SODIUM CHLORIDE 0.9 % (FLUSH) 0.9 %
10 SYRINGE (ML) INJECTION AS NEEDED
Status: DISCONTINUED | OUTPATIENT
Start: 2022-04-01 | End: 2022-04-01 | Stop reason: HOSPADM

## 2022-04-01 NOTE — DISCHARGE INSTRUCTIONS
Home to rest   Drink plenty of fluids  Return if worse or new concerns   Continue care with your primary care physician and have your blood pressure regularly checked and managed. Normal blood pressure is 120/80.

## 2022-04-01 NOTE — ED TRIAGE NOTES
Pt had blood drawn 2 days ago and potassium was high.  The md had it drawn again to be sure it was a correct result and it is still high 6.4    Patient was placed in face mask during first look triage.  Patient was wearing a face mask throughout encounter.  I wore personal protective equipment throughout the encounter.  Hand hygiene was performed before and after patient encounter.

## 2022-04-01 NOTE — ED PROVIDER NOTES
EMERGENCY DEPARTMENT ENCOUNTER    Room Number:  06/06  Date of encounter:  4/1/2022  PCP: Aramis Doty MD  Historian: patient, son  Full history not obtainable due to: none     HPI:  Chief Complaint: Abnormal lab     Context: Alba Correa is a 88 y.o. female who presents to the ED c/o abnormal lab .  The patient reports she went to her family doctor 2 days ago for labs and her potassium was elevated.  They called her back yesterday for repeat and her potassium was persistently elevated at 6.4.  She was called this morning and referred to the emergency department for further evaluation.  The pt has no complaints. She states she feels fine. No cp. No soa. No n/v. No weakness or dizziness.  She states she was admitted a week ago for the same and her potassium returned to normal limits and she was discharged from the hospital.  She denies any fall or trauma.  History of chronic kidney disease but is not on dialysis.  She has no history of diabetes.  No new medications.  Reports eating and drinking adequately.  Reports voiding is normal.    Onset: 2 days prior  Timing: Constant  Severity: Severe   Relieving factors none    MEDICAL RECORD REVIEW:Labs in epic from 2 days ago, K is 6.4 then repeat from 20 hours pta, 6.4       PAST MEDICAL HISTORY    Active Ambulatory Problems     Diagnosis Date Noted   • CKD (chronic kidney disease) stage 4, GFR 15-29 ml/min ([facility])    • DVT (deep venous thrombosis) ([facility])    • Depression    • Gout    • Hyperparathyroidism ([facility])    • Adaptive colitis    • Osteopenia    • Rheumatoid arthritis ([facility])    • Degeneration of intervertebral disc    • Essential hypertension 11/20/2015   • 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   • History of DVT (deep vein thrombosis) 07/11/2017   • Hyperlipidemia 01/10/2018   • Macrocytosis 02/28/2018   • Nonrheumatic aortic (valve) insufficiency 10/08/2018   • History of ischemic right MCA stroke with extension 11/20/2018   • Bilateral leg weakness 04/04/2019   •  PAF (paroxysmal atrial fibrillation) ([facility]) 11/08/2019   • Anemia in chronic kidney disease 12/08/2016   • Other proteinuria 01/26/2017   • Afib ([facility]) 10/15/2020   • On amiodarone therapy 05/26/2021   • Sinus bradycardia 05/26/2021   • First degree AV block 05/26/2021   • Nonrheumatic mitral valve regurgitation    • Syncope and collapse 10/10/2021   • Bradycardia 03/25/2022   • FATIMAH (acute kidney injury) ([facility]) 03/26/2022     Resolved Ambulatory Problems     Diagnosis Date Noted   • Edema    • Elevated cholesterol    • Osteoporosis    • Benign essential hypertension    • D (diarrhea) 11/20/2015   • Open leg wound 11/20/2015   • Vaginal odor 11/20/2015   • Infected wound 11/20/2015   • History of depression 07/11/2017   • History of renal insufficiency syndrome 07/11/2017   • History of degenerative disc disease 07/11/2017   • History of osteoporosis 07/11/2017   • History of rheumatoid arthritis 07/11/2017   • Right leg weakness 08/05/2018   • CVA (cerebral vascular accident) ([facility]) 08/06/2018   • Acute intractable headache 04/02/2019   • Nausea 04/02/2019   • Chest pain 10/15/2020   • Dizziness 10/15/2020     Past Medical History:   Diagnosis Date   • Chronic kidney disease    • Chronic renal insufficiency    • H/O complete eye exam 09/2016   • IBS (irritable bowel syndrome)    • OAB (overactive bladder)    • Peripheral neuropathy          PAST SURGICAL HISTORY  Past Surgical History:   Procedure Laterality Date   • APPENDECTOMY     • BREAST BIOPSY     • CARDIAC ELECTROPHYSIOLOGY PROCEDURE N/A 10/12/2021    Procedure: Loop insertion linq;  Surgeon: Tone Anguiano MD;  Location:  INVASIVE LOCATION;  Service: Cardiovascular;  Laterality: N/A;   • CARDIOVERSION  08/03/2020    Dr. Colby   • COLONOSCOPY      declines   • HERNIA REPAIR  1965   • HYSTERECTOMY      still has ovaries   • MAMMO BILATERAL  11/16/2016    pt declines   • PAP SMEAR  11/16/2016    declines         FAMILY HISTORY  Family History    Problem Relation Age of Onset   • Alcohol abuse Other    • Cancer Other    • Hypertension Other    • Kidney disease Other    • Lung disease Other    • Heart disease Mother 89   • Heart failure Mother    • Ovarian cancer Daughter 60   • Stomach cancer Father    • Kidney cancer Father 52   • Lung cancer Father 52   • Ovarian cancer Sister    • Lung cancer Brother 65         SOCIAL HISTORY  Social History     Socioeconomic History   • Marital status:      Spouse name: Ray   • Number of children: 3   • Years of education: High school   Tobacco Use   • Smoking status: Former Smoker     Years: 5.00     Types: Cigarettes     Quit date:      Years since quittin.2   • Smokeless tobacco: Never Used   • Tobacco comment: caffeine use: 2 cups coffee in AM   Substance and Sexual Activity   • Alcohol use: Yes     Comment: Occasional- glass ofwine few times a week   • Drug use: No   • Sexual activity: Defer     Partners: Male     Birth control/protection: Surgical         ALLERGIES  Bactrim [sulfamethoxazole-trimethoprim], Lisinopril, and Levofloxacin        REVIEW OF SYSTEMS  Review of Systems   All systems reviewed and marked as negative except as listed in HPI       PHYSICAL EXAM    I have reviewed the triage vital signs and nursing notes.    ED Triage Vitals   Temp Heart Rate Resp BP SpO2   22 1123 22 1123 22 1123 22 1132 22 1123   97 °F (36.1 °C) 54 16 166/67 92 %      Temp src Heart Rate Source Patient Position BP Location FiO2 (%)   22 1123 22 1123 -- -- --   Tympanic Monitor          GENERAL: Alert well developed, well nourished in no distress.  Well-appearing.  HENT: NCAT, neck supple, trachea midline  EYES: no scleral icterus, PERRL, normal conjunctivae  CV: regular rhythm, regular rate, no murmur  RESPIRATORY: unlabored effort, CTAB  ABDOMEN: soft, nontender, nondistended, bowel sounds present  MUSCULOSKELETAL: no gross deformity  NEURO: alert,  sensory and  motor function of extremities grossly intact, speech clear, mental status normal/baseline  SKIN: warm, dry, no rash  PSYCH:  Appropriate mood and affect    Vital signs and nursing notes reviewed.          LAB RESULTS  Recent Results (from the past 24 hour(s))   Comprehensive Metabolic Panel    Collection Time: 04/01/22 11:44 AM    Specimen: Blood   Result Value Ref Range    Glucose 71 65 - 99 mg/dL    BUN 40 (H) 8 - 23 mg/dL    Creatinine 1.59 (H) 0.57 - 1.00 mg/dL    Sodium 142 136 - 145 mmol/L    Potassium 4.5 3.5 - 5.2 mmol/L    Chloride 104 98 - 107 mmol/L    CO2 27.0 22.0 - 29.0 mmol/L    Calcium 9.6 8.6 - 10.5 mg/dL    Total Protein 7.7 6.0 - 8.5 g/dL    Albumin 4.40 3.50 - 5.20 g/dL    ALT (SGPT) 22 1 - 33 U/L    AST (SGOT) 31 1 - 32 U/L    Alkaline Phosphatase 116 39 - 117 U/L    Total Bilirubin 0.3 0.0 - 1.2 mg/dL    Globulin 3.3 gm/dL    A/G Ratio 1.3 g/dL    BUN/Creatinine Ratio 25.2 (H) 7.0 - 25.0    Anion Gap 11.0 5.0 - 15.0 mmol/L    eGFR 31.1 (L) >60.0 mL/min/1.73   CBC Auto Differential    Collection Time: 04/01/22 11:44 AM    Specimen: Blood   Result Value Ref Range    WBC 6.48 3.40 - 10.80 10*3/mm3    RBC 3.80 3.77 - 5.28 10*6/mm3    Hemoglobin 13.0 12.0 - 15.9 g/dL    Hematocrit 38.7 34.0 - 46.6 %    .8 (H) 79.0 - 97.0 fL    MCH 34.2 (H) 26.6 - 33.0 pg    MCHC 33.6 31.5 - 35.7 g/dL    RDW 12.9 12.3 - 15.4 %    RDW-SD 48.5 37.0 - 54.0 fl    MPV 10.1 6.0 - 12.0 fL    Platelets 320 140 - 450 10*3/mm3    Neutrophil % 62.5 42.7 - 76.0 %    Lymphocyte % 22.4 19.6 - 45.3 %    Monocyte % 9.0 5.0 - 12.0 %    Eosinophil % 4.5 0.3 - 6.2 %    Basophil % 1.4 0.0 - 1.5 %    Immature Grans % 0.2 0.0 - 0.5 %    Neutrophils, Absolute 4.06 1.70 - 7.00 10*3/mm3    Lymphocytes, Absolute 1.45 0.70 - 3.10 10*3/mm3    Monocytes, Absolute 0.58 0.10 - 0.90 10*3/mm3    Eosinophils, Absolute 0.29 0.00 - 0.40 10*3/mm3    Basophils, Absolute 0.09 0.00 - 0.20 10*3/mm3    Immature Grans, Absolute 0.01 0.00 - 0.05  10*3/mm3    nRBC 0.0 0.0 - 0.2 /100 WBC   Green Top (Gel)    Collection Time: 04/01/22 11:44 AM   Result Value Ref Range    Extra Tube Hold for add-ons.    Lavender Top    Collection Time: 04/01/22 11:44 AM   Result Value Ref Range    Extra Tube hold for add-on    Gold Top - SST    Collection Time: 04/01/22 11:44 AM   Result Value Ref Range    Extra Tube Hold for add-ons.    Light Blue Top    Collection Time: 04/01/22 11:44 AM   Result Value Ref Range    Extra Tube hold for add-on    Magnesium    Collection Time: 04/01/22 11:44 AM    Specimen: Blood   Result Value Ref Range    Magnesium 2.3 1.6 - 2.4 mg/dL   ECG 12 Lead    Collection Time: 04/01/22 11:49 AM   Result Value Ref Range    QT Interval 475 ms   Basic Metabolic Panel    Collection Time: 04/01/22  1:12 PM    Specimen: Blood   Result Value Ref Range    Glucose 99 65 - 99 mg/dL    BUN 45 (H) 8 - 23 mg/dL    Creatinine 1.55 (H) 0.57 - 1.00 mg/dL    Sodium 140 136 - 145 mmol/L    Potassium 5.0 3.5 - 5.2 mmol/L    Chloride 103 98 - 107 mmol/L    CO2 28.0 22.0 - 29.0 mmol/L    Calcium 9.3 8.6 - 10.5 mg/dL    BUN/Creatinine Ratio 29.0 (H) 7.0 - 25.0    Anion Gap 9.0 5.0 - 15.0 mmol/L    eGFR 32.1 (L) >60.0 mL/min/1.73       Ordered the above labs and independently reviewed the results.        RADIOLOGY  No Radiology Exams Resulted Within Past 24 Hours    I ordered the above noted radiological studies. Independently reviewed by me and discussed with radiologist.  See dictation above for official radiology interpretation.      PROCEDURES    Procedures        MEDICATIONS GIVEN IN ER    Medications   sodium chloride 0.9 % flush 10 mL (has no administration in time range)         PROGRESS, DATA ANALYSIS, CONSULTS, AND MEDICAL DECISION MAKING    All labs have been independently reviewed by me.  All radiology studies have been reviewed by me.   EKG's independently reviewed by me.  Discussion below represents my analysis of pertinent findings related to patient's  condition, differential diagnosis, treatment plan and final disposition.    DIFFERENTIAL DIAGNOSIS INCLUDE BUT NOT LIMITED TO: Rhabdomyolysis, acute kidney injury, chronic renal failure, medication reaction, dehydration, diarrheal illness, lab malfunction    ED Course as of 04/01/22 1555   Fri Apr 01, 2022   1222 Creatinine(!): 1.59 [JS]   1554 Hemoglobin: 13.0 [JS]   1554 WBC: 6.48 [JS]   1554 Magnesium: 2.3 [JS]      ED Course User Index  [JS] Nehal Muñiz APRN     MDM: Laboratory work-up reveals normal potassium.  They do note slight hemolysis at 4.5.  A BMP was then sent again and no hemolysis noted on repeat draw with a potassium of 5.0.  The patient is asymptomatic.  Her bradycardia is not a new symptom.  It is unclear why her pat potassium was quite elevated yesterday and the day before however as it is normal now and she is asymptomatic and appears stable for discharge she will be discharged in the emergency department with close follow-up with family physician.      BP - 166/67  HR - (!) 47  TEMP - 97 °F (36.1 °C) (Tympanic)  O2 SATS - 91%    DIAGNOSIS  Final diagnoses:   Hyperkalemia, resolved         DISPOSITION  Discharge    Pt masked in first look. I wore appropriate PPE throughout my encounters with the pt. I performed hand hygiene on entry into the pt room and upon exit.     Dictated utilizing Dragon dictation:  Much of this encounter note is an electronic transcription/translation of spoken language to printed text.      Nehal Muñiz APRN  04/01/22 1874

## 2022-04-01 NOTE — PROGRESS NOTES
Well, it looks like the renal function and potassium are remaining elevated, sadly. She'll need to go back to the ER for further management today. Please go ahead and take her over there.    (Meghana, please call pt's son, Mr Doty) and make sure he got this message.

## 2022-04-01 NOTE — ED PROVIDER NOTES
MD ATTESTATION NOTE    The MINA and I have discussed this patient's history, physical exam, and treatment plan.  I have reviewed the documentation and personally had a face to face interaction with the patient. I affirm the documentation and agree with the treatment and plan.  The attached note describes my personal findings.    I provided a substantive portion of the care of this patient. I personally performed the physical exam, in its entirety.    History  88-year-old female presents due to reported high potassium levels drawn outpatient.  Patient had 2 potassium levels drawn over 6 and was sent to the ED for further evaluation.  Patient here has no complaints.    Physical Exam  Vital Signs reviewed  GENERAL: Alert female no obvious distress  HENT: nares patent  EYES: no scleral icterus  CV: Regular without murmur, heart rate running around 50  RESPIRATORY: normal effort, clear to auscultation bilaterally  ABDOMEN: soft-nontender to palpation  MUSCULOSKELETAL: no deformity  NEURO: Strength sensation and coordination are grossly intact.  Speech and mentation are unremarkable  SKIN: warm, dry    EKG          EKG time: 1149  Rhythm/Rate: Sinus bradycardia 47  P waves and MD: Normal P waves and MD intervals  QRS, axis: Left axis deviation, LAFB  ST and T waves: Unremarkable ST and T wave    Interpreted Contemporaneously by me, independently viewed  Heart rate is faster when compared to 3/2025.      Disposition  I discussed treatment and evaluation this patient with NP Ariana Muñiz.  Her serum potassium is 4.5 with some reported hemolysis.  Because we need to know if our labs are right or outpatient labs are right we will go ahead and redraw a another potassium level to verify that the readings are in the 4 range and not in the sixes.  Creatinine of 1.59 is improved from several days ago and does represent some chronic renal failure.       Darío Booth MD  04/01/22 [id number]

## 2022-04-19 ENCOUNTER — OFFICE VISIT (OUTPATIENT)
Dept: NEUROLOGY | Facility: CLINIC | Age: 87
End: 2022-04-19

## 2022-04-19 VITALS
HEIGHT: 62 IN | DIASTOLIC BLOOD PRESSURE: 74 MMHG | OXYGEN SATURATION: 96 % | BODY MASS INDEX: 21.55 KG/M2 | SYSTOLIC BLOOD PRESSURE: 110 MMHG | WEIGHT: 117.1 LBS | HEART RATE: 68 BPM

## 2022-04-19 DIAGNOSIS — I48.91 ATRIAL FIBRILLATION, UNSPECIFIED TYPE: ICD-10-CM

## 2022-04-19 DIAGNOSIS — Z87.898 HISTORY OF SYNCOPE: Primary | ICD-10-CM

## 2022-04-19 DIAGNOSIS — G60.9 IDIOPATHIC PERIPHERAL NEUROPATHY: ICD-10-CM

## 2022-04-19 DIAGNOSIS — Z86.73 HISTORY OF ISCHEMIC RIGHT MCA STROKE: ICD-10-CM

## 2022-04-19 PROCEDURE — 99215 OFFICE O/P EST HI 40 MIN: CPT | Performed by: NURSE PRACTITIONER

## 2022-04-19 RX ORDER — LOSARTAN POTASSIUM 100 MG/1
TABLET ORAL
COMMUNITY
Start: 2022-03-24 | End: 2022-04-21

## 2022-04-19 RX ORDER — VIBEGRON 75 MG/1
TABLET, FILM COATED ORAL
COMMUNITY
End: 2022-04-29

## 2022-04-19 RX ORDER — UREA 10 %
LOTION (ML) TOPICAL
COMMUNITY
End: 2022-04-29

## 2022-04-19 NOTE — PROGRESS NOTES
CC: Follow-up for syncope, history of stroke, peripheral neuropathy    HPI:  Alba Correa is a  88 y.o.  right-handed female with HTN, HLD, prior stroke, CKD, PAF (on Eliquis 2.5 mg twice daily), recurrent symptomatic bradycardia, history of DVT, rheumatoid arthritis, idiopathic peripheral neuropathy, and osteoporosis who is being seen in follow-up today history of stroke and previous hospitalization for syncope.  She is accompanied by her son.    She had right MCA territory strokes in August 2018 with MRI demonstrating absent signal in the right M3 branch.  KASH was unrevealing and Zio patch was negative for A. fib and she was started on full dose aspirin.  She was later identified to have atrial fibrillation and was placed on anticoagulation.    She has had symptoms of peripheral neuropathy for at least 12 years.  Lab work-up for treatable causes of peripheral neuropathy completed in 2019 was unremarkable.    In October 2021 she was admitted with recurrent blackout spells in the previous 4 months.  She was seen by Dr. Crowder and Sammi NESS.  Was no report of associated seizure activity.  CT head showed scalp hematoma without underlying fracture or acute intracranial abnormality.  Carotid ultrasound showed mild right ICA stenosis and normal proximal/mid left ICA. MRI brain without contrast showed no acute findings.  There is mild to moderate small vessel disease and some old lacunar disease.  No significant change from previous MRI from April 2019. Outpatient >60 minute EEG was completed in October 2021.  This was normal in the awake and asleep states.  There was no potentially epileptic activity, seizure activity, or focal slowing.  Linq device was implanted per cardiology.    She had another admission in March 2022 for symptomatic bradycardia.  She also had FATIMAH on CKD.  Medications were adjusted and  urgent pacemaker was not felt necessary for her bradycardia.  Loop recorder showed no significant  pauses.    Today she reports her neuropathy is stable.  She typically walks with a walker.  No recent TIA or stroke symptoms.  She reports that since her previous stroke that her left facial sensation is still abnormal and when she is tired she has some left leg weakness.  No episodes of syncope or near syncope since her hospitalization in March.    She lives alone with her dog.  Her family manages her finances for her.  She is no longer driving.  She is no longer cooking as her family brings her meals.  She states that she will be moving into an assisted living facility when the construction is completed.      Past Medical History:   Diagnosis Date   • Benign essential hypertension    • Chronic kidney disease    • Chronic renal insufficiency    • CVA (cerebral vascular accident) ([facility])    • Degeneration of intervertebral disc    • Depression    • DVT (deep venous thrombosis) ([facility])    • Edema    • First degree AV block 5/26/2021   • Gout    • H/O complete eye exam 09/2016   • Hyperlipidemia    • IBS (irritable bowel syndrome)    • Nonrheumatic mitral valve regurgitation    • OAB (overactive bladder)    • Osteopenia    • Osteoporosis    • PAF (paroxysmal atrial fibrillation) ([facility]) 11/8/2019   • Peripheral neuropathy    • Rheumatoid arthritis ([facility])    • Sinus bradycardia 5/26/2021         Past Surgical History:   Procedure Laterality Date   • APPENDECTOMY     • BREAST BIOPSY     • CARDIAC ELECTROPHYSIOLOGY PROCEDURE N/A 10/12/2021    Procedure: Loop insertion linq;  Surgeon: Tone Anguiano MD;  Location: [facility] INVASIVE LOCATION;  Service: Cardiovascular;  Laterality: N/A;   • CARDIOVERSION  08/03/2020    Dr. Colby   • COLONOSCOPY      declines   • HERNIA REPAIR  1965   • HYSTERECTOMY      still has ovaries   • MAMMO BILATERAL  11/16/2016    pt declines   • PAP SMEAR  11/16/2016    declines           Current Outpatient Medications:   •  acetaminophen (TYLENOL) 650 MG 8 hr tablet, Take 1,300 mg by mouth  Every 8 (Eight) Hours As Needed for Mild Pain ., Disp: , Rfl:   •  allopurinol (ZYLOPRIM) 100 MG tablet, Take 1 tablet by mouth Daily., Disp: 90 tablet, Rfl: 1  •  amLODIPine (NORVASC) 10 MG tablet, Take 1 tablet by mouth Daily., Disp: 30 tablet, Rfl: 0  •  apixaban (Eliquis) 2.5 MG tablet tablet, Take 1 tablet by mouth Every 12 (Twelve) Hours., Disp: 180 tablet, Rfl: 3  •  calcium carbonate-vitamin d 600-400 MG-UNIT per tablet, Take 1 tablet by mouth Daily., Disp: , Rfl:   •  DULoxetine (CYMBALTA) 60 MG capsule, Take 1 capsule by mouth Daily., Disp: 90 capsule, Rfl: 1  •  levothyroxine (SYNTHROID, LEVOTHROID) 50 MCG tablet, Take 1 tablet by mouth Every Morning., Disp: 30 tablet, Rfl: 0  •  losartan (COZAAR) 100 MG tablet, , Disp: , Rfl:   •  melatonin 1 MG tablet, Take  by mouth., Disp: , Rfl:   •  Multiple Vitamins-Minerals (CENTRUM SILVER) tablet, Take 1 tablet by mouth Daily., Disp: , Rfl:   •  Omega-3 Fatty Acids (OMEGA-3 FISH OIL PO), Take 1,600 mg by mouth Every Morning., Disp: , Rfl:   •  Vibegron (Gemtesa) 75 MG tablet, Take  by mouth., Disp: , Rfl:       Family History   Problem Relation Age of Onset   • Alcohol abuse Other    • Cancer Other    • Hypertension Other    • Kidney disease Other    • Lung disease Other    • Heart disease Mother 89   • Heart failure Mother    • Ovarian cancer Daughter 60   • Stomach cancer Father    • Kidney cancer Father 52   • Lung cancer Father 52   • Ovarian cancer Sister    • Lung cancer Brother 65         Social History     Socioeconomic History   • Marital status:      Spouse name: Peña   • Number of children: 3   • Years of education: High school   Tobacco Use   • Smoking status: Former Smoker     Years: 5.00     Types: Cigarettes     Quit date:      Years since quittin.3   • Smokeless tobacco: Never Used   • Tobacco comment: caffeine use: 2 cups coffee in AM   Substance and Sexual Activity   • Alcohol use: Yes     Comment: Occasional- glass ofwine few  "times a week   • Drug use: No   • Sexual activity: Defer     Partners: Male     Birth control/protection: Surgical         Allergies   Allergen Reactions   • Bactrim [Sulfamethoxazole-Trimethoprim] Delirium   • Lisinopril Unknown (See Comments)     Unknown per pt   • Levofloxacin Rash         Pain Scale:        ROS:  Review of Systems   Constitutional: Negative for activity change, appetite change, fatigue and unexpected weight change.   HENT: Positive for hearing loss (hearing aides). Negative for dental problem, drooling, ear pain, facial swelling, nosebleeds, tinnitus and trouble swallowing.    Eyes: Negative for photophobia, pain and visual disturbance.   Respiratory: Positive for shortness of breath (with exertion). Negative for choking, chest tightness and wheezing.    Cardiovascular: Negative for chest pain, palpitations and leg swelling.   Gastrointestinal: Negative for abdominal pain, constipation, diarrhea, nausea and rectal pain.   Endocrine: Negative for cold intolerance and heat intolerance.   Genitourinary: Positive for frequency. Negative for decreased urine volume, difficulty urinating, dysuria and urgency.   Musculoskeletal: Positive for gait problem (walker, cane) and joint swelling (bilateral ankle swelling). Negative for neck pain and neck stiffness.   Skin: Negative for color change, pallor, rash and wound.   Allergic/Immunologic: Negative for food allergies.   Neurological: Negative for dizziness, seizures, syncope, facial asymmetry, weakness, light-headedness, numbness and headaches.   Hematological: Bruises/bleeds easily.   Psychiatric/Behavioral: Positive for sleep disturbance (trouble staying sleep). Negative for confusion and decreased concentration. The patient is not nervous/anxious.      ROS completed by the MA was reviewed by me and I agree.    Physical Exam:  Vitals:    04/19/22 1537   BP: 110/74   Pulse: 68   SpO2: 96%   Weight: 53.1 kg (117 lb 1.6 oz)   Height: 157.5 cm (62\") "     Orthostatic BP:    Body mass index is 21.42 kg/m².    General appearance: Well developed, well nourished, well groomed, alert and cooperative.   HEENT: Normocephalic.   Neck: Supple  Cardiac: Regular rate and rhythm.   Peripheral Vasculature: Peripheral pulses are equal and symmetric.  Chest Exam: Clear to auscultation bilaterally, no wheezes, no rhonchi.  Extremities: Normal, no edema.   Skin: No rashes or birthmarks.     Higher integrative function: Oriented to time, place, person.  Intact recent memory.  Able to name all the objects on the stroke cards except for the hammock.. Spontaneous speech, fund of vocabulary are normal.  No neglect.  CN II: Visual fields intact.  CN III IV VI: Extraocular movements are full without nystagmus.  Right pupil is round, left pupil is ovoid, reactive bilaterally.  CN V: Normal facial sensation.  CN VII: Facial movements are symmetric, no weakness.   CN VIII: Auditory acuity is intact to finger rub with hearing aids.  CN IX & X: Symmetric palatal movement.   CN XI: Sternocleidomastoid and trapezius are normal. No weakness.   CN XII: The tongue is midline. No atrophy or fasciculations.   Motor: Normal muscle strength, bulk, and tone in upper and lower extremities. No fasciculations, rigidity, spasticity or abnormal movements.   Sensation: Decreased to pinprick in the fingertips more so than in the ankles.  Absent to vibration in the ankles, present in the knees and hands.  Station and gait: Unsteady, drifts.  Muscle stretch reflexes:  Coordination: Finger to nose test showed no dysmetria. Heel to shin normal.       Results:      Lab Results   Component Value Date    GLUCOSE 99 04/01/2022    BUN 45 (H) 04/01/2022    CREATININE 1.55 (H) 04/01/2022    EGFRIFNONA 40 (L) 10/11/2021    EGFRIFAFRI 35 (L) 09/01/2021    BCR 29.0 (H) 04/01/2022    CO2 28.0 04/01/2022    CALCIUM 9.3 04/01/2022    PROTENTOTREF 8.2 03/07/2022    ALBUMIN 4.40 04/01/2022    LABIL2 1.3 03/07/2022    AST 31  04/01/2022    ALT 22 04/01/2022       Lab Results   Component Value Date    WBC 6.48 04/01/2022    HGB 13.0 04/01/2022    HCT 38.7 04/01/2022    .8 (H) 04/01/2022     04/01/2022         .  Lab Results   Component Value Date    RPR Non-Reactive 08/05/2018         Lab Results   Component Value Date    TSH 20.700 (H) 03/25/2022         Lab Results   Component Value Date    QZXVZOJR62 1,088 (H) 04/03/2019         Lab Results   Component Value Date    FOLATE >20.00 08/06/2018         Lab Results   Component Value Date    HGBA1C 5.20 04/03/2019         Lab Results   Component Value Date    GLUCOSE 99 04/01/2022    BUN 45 (H) 04/01/2022    CREATININE 1.55 (H) 04/01/2022    EGFRIFNONA 40 (L) 10/11/2021    EGFRIFAFRI 35 (L) 09/01/2021    BCR 29.0 (H) 04/01/2022    K 5.0 04/01/2022    CO2 28.0 04/01/2022    CALCIUM 9.3 04/01/2022    PROTENTOTREF 8.2 03/07/2022    ALBUMIN 4.40 04/01/2022    LABIL2 1.3 03/07/2022    AST 31 04/01/2022    ALT 22 04/01/2022         Lab Results   Component Value Date    WBC 6.48 04/01/2022    HGB 13.0 04/01/2022    HCT 38.7 04/01/2022    .8 (H) 04/01/2022     04/01/2022       MRI OF THE BRAIN WITHOUT CONTRAST-MRI brain images viewed by me, no acute findings seen.     CLINICAL HISTORY: Right parieto-occipital infarct.     TECHNIQUE: MRI of the brain was obtained with sagittal T1, axial T1,  axial FLAIR, axial T2, axial diffusion, and susceptibility weighted  images.     COMPARISON:Previous CT scan of the head dated 10/10/2021, as well as  previous MRIs of the brain dated 04/03/2019, 08/05/2018.     FINDINGS:      There are no abnormal foci of restricted diffusion. The ventricles,  sulci, and cisterns are age appropriate. There are mild-to-moderate  changes of chronic small vessel ischemic phenomena. There is an  asymmetric focus of FLAIR hyperintensity within the white matter of the  right parietal lobe that is compatible with a combination of chronic  small vessel  ischemic change and chronic white matter infarcts. Punctate  areas of T2 hyperintensity are appreciated within the lentiform nuclei  and the thalami that are probably representative of some combination of  enlarged prevascular spaces and old lacunar disease. When compared to  the prior study, there is no significant interval change in these  intracranial findings. There are no abnormal areas of susceptibility  artifact. The major intracranial flow related signal voids are  unremarkable.     Incidental note is made of small posterior parieto-occipital scalp  hematoma.     IMPRESSION:      No evidence for acute intracranial pathology.     There are mild-to-moderate changes of chronic small vessel ischemic  phenomena. An asymmetric focus of FLAIR and T2 white matter  hyperintensity within the right parietal lobe white matter is compatible  with a combination of chronic small vessel ischemic change and chronic  white matter infarcts. Punctate areas of T2 hyperintensity within the  lentiform nuclei and the thalami are compatible with some combination of  enlarged perivascular spaces and old lacunar disease. When compared to  the prior MRI of the brain dated 04/03/2019, there is no significant  interval change.              This report was finalized on 10/12/2021 11:56 AM by Dr. Naresh Darby M.D.           Assessment/Plan:        Diagnoses and all orders for this visit:    1. History of syncope (Primary)    2. Idiopathic peripheral neuropathy    3. History of ischemic right MCA stroke with extension    4. Atrial fibrillation, unspecified type (HCC)       Continue Eliquis 2.5 mg twice daily   Blood pressure control to <140/80   Goal LDL <70   Recommend strict falls precautions due to history of peripheral neuropathy, osteoporosis, and chronic anticoagulation   Serum glucose < 140   Call 911 for stroke any stroke symptoms   Follow-up as needed      Greater than 40-minute spent in review of the chart, discussion with  patient and son, examination of patient, and documentation.          Dictated utilizing Dragon dictation.

## 2022-04-21 ENCOUNTER — OFFICE VISIT (OUTPATIENT)
Dept: CARDIOLOGY | Facility: CLINIC | Age: 87
End: 2022-04-21

## 2022-04-21 VITALS
WEIGHT: 119 LBS | SYSTOLIC BLOOD PRESSURE: 164 MMHG | HEART RATE: 51 BPM | DIASTOLIC BLOOD PRESSURE: 58 MMHG | BODY MASS INDEX: 21.9 KG/M2 | HEIGHT: 62 IN

## 2022-04-21 DIAGNOSIS — I50.31 ACUTE DIASTOLIC CHF (CONGESTIVE HEART FAILURE): ICD-10-CM

## 2022-04-21 DIAGNOSIS — R60.0 LOWER EXTREMITY EDEMA: ICD-10-CM

## 2022-04-21 DIAGNOSIS — N18.4 CKD (CHRONIC KIDNEY DISEASE) STAGE 4, GFR 15-29 ML/MIN: ICD-10-CM

## 2022-04-21 DIAGNOSIS — I44.0 FIRST DEGREE AV BLOCK: ICD-10-CM

## 2022-04-21 DIAGNOSIS — I48.0 PAF (PAROXYSMAL ATRIAL FIBRILLATION): Primary | Chronic | ICD-10-CM

## 2022-04-21 DIAGNOSIS — I10 ESSENTIAL HYPERTENSION: ICD-10-CM

## 2022-04-21 DIAGNOSIS — R00.1 SINUS BRADYCARDIA: ICD-10-CM

## 2022-04-21 PROBLEM — Z87.898 HISTORY OF SYNCOPE: Status: RESOLVED | Noted: 2021-10-10 | Resolved: 2022-04-21

## 2022-04-21 PROBLEM — Z79.899 ON AMIODARONE THERAPY: Status: RESOLVED | Noted: 2021-05-26 | Resolved: 2022-04-21

## 2022-04-21 PROBLEM — N17.9 AKI (ACUTE KIDNEY INJURY): Status: RESOLVED | Noted: 2022-03-26 | Resolved: 2022-04-21

## 2022-04-21 PROBLEM — R29.898 BILATERAL LEG WEAKNESS: Status: RESOLVED | Noted: 2019-04-04 | Resolved: 2022-04-21

## 2022-04-21 PROBLEM — I48.91 AFIB (HCC): Status: RESOLVED | Noted: 2020-10-15 | Resolved: 2022-04-21

## 2022-04-21 PROCEDURE — 99214 OFFICE O/P EST MOD 30 MIN: CPT | Performed by: NURSE PRACTITIONER

## 2022-04-21 PROCEDURE — 93000 ELECTROCARDIOGRAM COMPLETE: CPT | Performed by: NURSE PRACTITIONER

## 2022-04-21 RX ORDER — LOSARTAN POTASSIUM 25 MG/1
25 TABLET ORAL 2 TIMES DAILY
Qty: 180 TABLET | Refills: 0 | Status: SHIPPED | OUTPATIENT
Start: 2022-04-21 | End: 2022-06-16

## 2022-04-21 RX ORDER — AMLODIPINE BESYLATE 5 MG/1
5 TABLET ORAL DAILY
Qty: 90 TABLET | Refills: 3 | Status: SHIPPED | OUTPATIENT
Start: 2022-04-21

## 2022-04-21 NOTE — PROGRESS NOTES
Date of Office Visit: 2022  Encounter Provider: GROVER Howard  Place of Service: [facility]  Patient Name: Alba Correa  :1933  Primary Cardiologist: Dr. Roshan Martines     Chief Complaint   Patient presents with   • Slow Heart Rate   • Hospital Follow Up Visit   :     HPI: Alba Correa is a pleasant 88 y.o. female who presents today for follow-up on hospitalization for symptomatic bradycardia. I have reviewed her medical records.    In 2019, she was diagnosed with an acute stroke. She wore a 14-day follow-up Holter monitor which showed no abnormalities.  She then developed bradycardia and a 48-hour Holter monitor showed atrial fibrillation in which she was asymptomatic.  She has been diagnosed with persistent atrial fibrillation.  In 2020, she had a cardioversion completed.  She continued to experience palpitations and wore a Holter monitor which showed episodes of ectopic atrial rhythm, but no atrial fibrillation.     In 2020, echocardiogram showed the following: Normal LVEF, mild concentric hypertrophy, right ventricular cavity mildly dilated, left atrial cavity mildly dilated, mild to moderate aortic regurgitation, moderate tricuspid valve regurgitation, and RVSP normal.     In 2021, she had a syncopal episode of unknown etiology. In 2021, she wore a 30-day event recorder with the following results: Paroxysmal atrial fibrillation accounting for 40% of the recording with an average heart rate that was generally well controlled.    In 2022, she was admitted the hospital with symptomatic bradycardia.  The carvedilol and amiodarone were held with improvement of her symptoms.  They did not feel like she needed a pacemaker.  Loop recorder did not show any significant pauses.  Echocardiogram showed hyperdynamic EF of 70%, aortic valve calcification, mild to moderate aortic valve regurgitation, mild to moderate mitral valve  regurgitation, and mild pulmonary hypertension.  Her amlodipine was increased to 10 mg daily.  If her blood pressure remains elevated, nephrology recommended losartan 25 mg daily.  Baseline creatinine was 1.39.  She was recommended to have a 2-week follow-up appointment and loop recorder interrogation.  She will see our EP team in May 2022.  She is to avoid negative chronotropic agents.    She presents today for hospital follow-up visit and her son is accompanying her.  Her blood pressure has been running high at home systolic 150s and heart rates in the 50s-60s.  Since increasing amlodipine, she has noticed some lower extremity edema.  She denies chest pain, shortness of air, palpitations, dizziness, syncope, or bleeding.  She feels like she has more energy and her gait is more stable now with a higher heart rate.      Past Medical History:   Diagnosis Date   • Acute diastolic CHF (congestive heart failure) (formerly [facility]) 3/20/2022   • Chronic kidney disease    • CVA (cerebral vascular accident) (formerly [facility])    • Degeneration of intervertebral disc    • Depression    • DVT (deep venous thrombosis) (formerly [facility])    • First degree AV block 05/26/2021   • Gout    • H/O complete eye exam 09/2016   • Hyperlipidemia    • IBS (irritable bowel syndrome)    • Nonrheumatic mitral valve regurgitation    • OAB (overactive bladder)    • Osteopenia    • Osteoporosis    • PAF (paroxysmal atrial fibrillation) (formerly [facility]) 11/08/2019   • Peripheral neuropathy    • Rheumatoid arthritis (formerly [facility])    • Sinus bradycardia 05/26/2021       Past Surgical History:   Procedure Laterality Date   • APPENDECTOMY     • BREAST BIOPSY     • CARDIAC ELECTROPHYSIOLOGY PROCEDURE N/A 10/12/2021    Procedure: Loop insertion linq;  Surgeon: Tone Anguiano MD;  Location: [facility] INVASIVE LOCATION;  Service: Cardiovascular;  Laterality: N/A;   • CARDIOVERSION  08/03/2020    Dr. Colby   • COLONOSCOPY      declines   • HERNIA REPAIR  1965   • HYSTERECTOMY      still has  ovaries   • MAMMO BILATERAL  2016    pt declines   • PAP SMEAR  2016    declines       Social History     Socioeconomic History   • Marital status:      Spouse name: Ray   • Number of children: 3   • Years of education: High school   Tobacco Use   • Smoking status: Former Smoker     Years: 5.00     Types: Cigarettes     Quit date:      Years since quittin.3   • Smokeless tobacco: Never Used   • Tobacco comment: caffeine use: 2 cups coffee in AM   Vaping Use   • Vaping Use: Never used   Substance and Sexual Activity   • Alcohol use: Yes     Comment: Occasional- glass of wine few times a week   • Drug use: No   • Sexual activity: Defer     Partners: Male     Birth control/protection: Surgical       Family History   Problem Relation Age of Onset   • Alcohol abuse Other    • Cancer Other    • Hypertension Other    • Kidney disease Other    • Lung disease Other    • Heart disease Mother 89   • Heart failure Mother    • Ovarian cancer Daughter 60   • Stomach cancer Father    • Kidney cancer Father 52   • Lung cancer Father 52   • Ovarian cancer Sister    • Lung cancer Brother 65       The following portion of the patient's history were reviewed and updated as appropriate: past medical history, past surgical history, past social history, past family history, allergies, current medications, and problem list.    Review of Systems   Constitutional: Negative.   Cardiovascular: Negative.    Respiratory: Negative.    Hematologic/Lymphatic: Negative.    Musculoskeletal: Positive for joint pain.   Neurological: Negative.        Allergies   Allergen Reactions   • Bactrim [Sulfamethoxazole-Trimethoprim] Delirium   • Lisinopril Unknown (See Comments)     Unknown per pt   • Levofloxacin Rash         Current Outpatient Medications:   •  acetaminophen (TYLENOL) 650 MG 8 hr tablet, Take 1,300 mg by mouth Every 8 (Eight) Hours As Needed for Mild Pain ., Disp: , Rfl:   •  allopurinol (ZYLOPRIM) 100 MG tablet,  "Take 1 tablet by mouth Daily., Disp: 90 tablet, Rfl: 1  •  apixaban (Eliquis) 2.5 MG tablet tablet, Take 1 tablet by mouth Every 12 (Twelve) Hours., Disp: 180 tablet, Rfl: 3  •  calcium carbonate-vitamin d 600-400 MG-UNIT per tablet, Take 1 tablet by mouth Daily., Disp: , Rfl:   •  DULoxetine (CYMBALTA) 60 MG capsule, Take 1 capsule by mouth Daily., Disp: 90 capsule, Rfl: 1  •  levothyroxine (SYNTHROID, LEVOTHROID) 50 MCG tablet, Take 1 tablet by mouth Every Morning., Disp: 30 tablet, Rfl: 0  •  melatonin 1 MG tablet, Take  by mouth., Disp: , Rfl:   •  Multiple Vitamins-Minerals (CENTRUM SILVER) tablet, Take 1 tablet by mouth Daily., Disp: , Rfl:   •  Omega-3 Fatty Acids (OMEGA-3 FISH OIL PO), Take 1,600 mg by mouth Every Morning., Disp: , Rfl:   •  Vibegron (Gemtesa) 75 MG tablet, Take  by mouth., Disp: , Rfl:   •  amLODIPine (NORVASC) 10 MG tablet, Take 1 tablet by mouth Daily., Disp: 90 tablet, Rfl: 3          Objective:     Vitals:    04/21/22 1031 04/21/22 1046   BP: 158/62 164/58   BP Location: Left arm Right arm   Pulse: 51    Weight: 54 kg (119 lb)    Height: 157.5 cm (62\")      Body mass index is 21.77 kg/m².    PHYSICAL EXAM:    Vitals Reviewed.   General Appearance: No acute distress, well developed and well nourished.  Thin.  Eyes: Conjunctiva and lids: No erythema, swelling, or discharge. Sclera non-icteric. Glasses.   HENT: Atraumatic, normocephalic. External eyes, ears, and nose normal. No hearing loss noted. Mucous membranes normal. Lips not cyanotic. Neck supple with no tenderness. Wearing mask.   Respiratory: No signs of respiratory distress. Respiration rhythm and depth normal.   Clear to auscultation. No rales, crackles, rhonchi, or wheezing auscultated.   Cardiovascular:  Jugular Venous Pressure: Normal  Heart Rate and Rhythm: Normal, Heart Sounds: Normal S1 and S2. No S3 or S4 noted.  Murmurs: No murmurs noted. No rubs, thrills, or gallops.   Lower Extremities: Bilateral ankle edema " noted.  Gastrointestinal:  Abdomen soft, non-distended, non-tender.    Musculoskeletal: Normal movement of extremities.  Skin and Nails: General appearance normal. No pallor, cyanosis, diaphoresis. Skin temperature normal. No clubbing of fingernails.   Psychiatric: Patient alert and oriented to person, place, and time. Speech and behavior appropriate. Normal mood and affect.       ECG 12 Lead    Date/Time: 4/21/2022 10:38 AM  Performed by: Celia Quijano APRN  Authorized by: Celia Quijano APRN   Comparison: compared with previous ECG from 4/1/2022  Similar to previous ECG  Rhythm: sinus rhythm  Rate: normal  BPM: 51  Conduction: 1st degree AV block  QRS axis: left  Other findings: non-specific ST-T wave changes    Clinical impression: abnormal EKG              Assessment:       Diagnosis Plan   1. PAF (paroxysmal atrial fibrillation) ([facility])     2. Sinus bradycardia     3. First degree AV block     4. Essential hypertension  Basic Metabolic Panel   5. Lower extremity edema     6. CKD (chronic kidney disease) stage 4, GFR 15-29 ml/min ([facility])     7. Acute diastolic CHF (congestive heart failure) ([facility])            Plan:       1.  Paroxysmal Atrial Fibrillation: Currently in a normal sinus rhythm.  Her amiodarone and carvedilol recently discontinued for symptomatic bradycardia.  Heart rate today is 51 and she feels great.  She is recommended to avoid negative chronotropic medications in the future.  She remains on apixaban for stroke prevention.    Atrial Fibrillation and Atrial Flutter  Assessment  • The patient has paroxysmal atrial fibrillation  • This is non-valvular in etiology  • The patient's CHADS2-VASc score is 7  • A [id number] score of 2 or more is considered a high risk for a thromboembolic event      2.  Bradycardia: Heart rate stable today.  She is scheduled to see Dr. Dada Platt in May 2022.    3.  First-degree A-V Block: Chronic and unchanged.    4.  Hypertension: She developed new onset lower  extremity edema with increasing her amlodipine from 5 to 10 mg daily.  I recommended decreasing the amlodipine to 5 mg daily and adding losartan 25 mg twice per day.  She was previously on losartan 100 mg daily and has known chronic kidney disease.  Repeat BMP in 1 week after restarting the losartan.    5.  CKD: Follows with Dr. Nehal Murray.    6.  Diastolic Heart Failure: Euvolemic today.    7.  She will see Dr. Dada Platt in May 2022 and Dr. Martines in June 2022.  I will follow-up with her about the BMP results and she will monitor her blood pressures at home.      As always, it has been a pleasure to participate in your patient's care. Thank you.       Sincerely,         GROVER Del Cid  [facility]      · Dictated utilizing Dragon Dictation  · COVID-19 Precautions - Patient was compliant in wearing a mask. When I saw the patient, I used appropriate personal protective equipment (PPE) including mask and eye shield (standard procedure).  Additionally, I used gown and gloves if indicated.  Hand hygiene was completed before and after seeing the patient.  · I spent 30 minutes reviewing her medical records/testing/previous office notes/labs, face-to-face interaction with patient, physical examination, formulating the plan of care, and discussion of plan of care with patient.

## 2022-04-22 RX ORDER — LEVOTHYROXINE SODIUM 0.05 MG/1
50 TABLET ORAL
Qty: 90 TABLET | Refills: 1 | Status: SHIPPED | OUTPATIENT
Start: 2022-04-22 | End: 2022-06-24

## 2022-04-29 ENCOUNTER — HOSPITAL ENCOUNTER (OUTPATIENT)
Facility: HOSPITAL | Age: 87
Setting detail: OBSERVATION
Discharge: HOME | End: 2022-04-30
Attending: EMERGENCY MEDICINE | Admitting: EMERGENCY MEDICINE

## 2022-04-29 ENCOUNTER — APPOINTMENT (OUTPATIENT)
Dept: GENERAL RADIOLOGY | Facility: HOSPITAL | Age: 87
End: 2022-04-29

## 2022-04-29 DIAGNOSIS — R07.9 CHEST PAIN, UNSPECIFIED TYPE: Primary | ICD-10-CM

## 2022-04-29 DIAGNOSIS — N28.9 RENAL INSUFFICIENCY: ICD-10-CM

## 2022-04-29 LAB
ALBUMIN SERPL-MCNC: 4.3 G/DL (ref 3.5–5.2)
ALBUMIN/GLOB SERPL: 1.1 G/DL
ALP SERPL-CCNC: 87 U/L (ref 39–117)
ALT SERPL W P-5'-P-CCNC: 23 U/L (ref 1–33)
ANION GAP SERPL CALCULATED.3IONS-SCNC: 13.3 MMOL/L (ref 5–15)
AST SERPL-CCNC: 41 U/L (ref 1–32)
BASOPHILS # BLD AUTO: 0.09 10*3/MM3 (ref 0–0.2)
BASOPHILS NFR BLD AUTO: 1.1 % (ref 0–1.5)
BILIRUB SERPL-MCNC: 0.3 MG/DL (ref 0–1.2)
BUN SERPL-MCNC: 47 MG/DL (ref 8–23)
BUN/CREAT SERPL: 33.6 (ref 7–25)
CALCIUM SPEC-SCNC: 10 MG/DL (ref 8.6–10.5)
CHLORIDE SERPL-SCNC: 102 MMOL/L (ref 98–107)
CHOLEST SERPL-MCNC: 241 MG/DL (ref 0–200)
CO2 SERPL-SCNC: 23.7 MMOL/L (ref 22–29)
CREAT SERPL-MCNC: 1.4 MG/DL (ref 0.57–1)
DEPRECATED RDW RBC AUTO: 45.3 FL (ref 37–54)
EGFRCR SERPLBLD CKD-EPI 2021: 36.3 ML/MIN/1.73
EOSINOPHIL # BLD AUTO: 0.26 10*3/MM3 (ref 0–0.4)
EOSINOPHIL NFR BLD AUTO: 3.2 % (ref 0.3–6.2)
ERYTHROCYTE [DISTWIDTH] IN BLOOD BY AUTOMATED COUNT: 12.7 % (ref 12.3–15.4)
GLOBULIN UR ELPH-MCNC: 3.8 GM/DL
GLUCOSE SERPL-MCNC: 98 MG/DL (ref 65–99)
HCT VFR BLD AUTO: 41.5 % (ref 34–46.6)
HDLC SERPL-MCNC: 57 MG/DL (ref 40–60)
HGB BLD-MCNC: 13.8 G/DL (ref 12–15.9)
IMM GRANULOCYTES # BLD AUTO: 0.02 10*3/MM3 (ref 0–0.05)
IMM GRANULOCYTES NFR BLD AUTO: 0.2 % (ref 0–0.5)
LDLC SERPL CALC-MCNC: 168 MG/DL (ref 0–100)
LDLC/HDLC SERPL: 2.9 {RATIO}
LIPASE SERPL-CCNC: 59 U/L (ref 13–60)
LYMPHOCYTES # BLD AUTO: 1.17 10*3/MM3 (ref 0.7–3.1)
LYMPHOCYTES NFR BLD AUTO: 14.6 % (ref 19.6–45.3)
MCH RBC QN AUTO: 32.8 PG (ref 26.6–33)
MCHC RBC AUTO-ENTMCNC: 33.3 G/DL (ref 31.5–35.7)
MCV RBC AUTO: 98.6 FL (ref 79–97)
MONOCYTES # BLD AUTO: 0.49 10*3/MM3 (ref 0.1–0.9)
MONOCYTES NFR BLD AUTO: 6.1 % (ref 5–12)
NEUTROPHILS NFR BLD AUTO: 5.99 10*3/MM3 (ref 1.7–7)
NEUTROPHILS NFR BLD AUTO: 74.8 % (ref 42.7–76)
NRBC BLD AUTO-RTO: 0 /100 WBC (ref 0–0.2)
NT-PROBNP SERPL-MCNC: 468 PG/ML (ref 0–1800)
PLATELET # BLD AUTO: 319 10*3/MM3 (ref 140–450)
PMV BLD AUTO: 10 FL (ref 6–12)
POTASSIUM SERPL-SCNC: 3.9 MMOL/L (ref 3.5–5.2)
PROT SERPL-MCNC: 8.1 G/DL (ref 6–8.5)
QT INTERVAL: 475 MS
RBC # BLD AUTO: 4.21 10*6/MM3 (ref 3.77–5.28)
SARS-COV-2 RNA RESP QL NAA+PROBE: NOT DETECTED
SODIUM SERPL-SCNC: 139 MMOL/L (ref 136–145)
TRIGL SERPL-MCNC: 94 MG/DL (ref 0–150)
TROPONIN T SERPL-MCNC: <0.01 NG/ML (ref 0–0.03)
TROPONIN T SERPL-MCNC: <0.01 NG/ML (ref 0–0.03)
TSH SERPL DL<=0.05 MIU/L-ACNC: 6.88 UIU/ML (ref 0.27–4.2)
VLDLC SERPL-MCNC: 16 MG/DL (ref 5–40)
WBC NRBC COR # BLD: 8.02 10*3/MM3 (ref 3.4–10.8)

## 2022-04-29 PROCEDURE — 80053 COMPREHEN METABOLIC PANEL: CPT | Performed by: EMERGENCY MEDICINE

## 2022-04-29 PROCEDURE — 99284 EMERGENCY DEPT VISIT MOD MDM: CPT

## 2022-04-29 PROCEDURE — 85025 COMPLETE CBC W/AUTO DIFF WBC: CPT | Performed by: EMERGENCY MEDICINE

## 2022-04-29 PROCEDURE — 71045 X-RAY EXAM CHEST 1 VIEW: CPT

## 2022-04-29 PROCEDURE — 93010 ELECTROCARDIOGRAM REPORT: CPT | Performed by: INTERNAL MEDICINE

## 2022-04-29 PROCEDURE — 36415 COLL VENOUS BLD VENIPUNCTURE: CPT

## 2022-04-29 PROCEDURE — 84484 ASSAY OF TROPONIN QUANT: CPT | Performed by: EMERGENCY MEDICINE

## 2022-04-29 PROCEDURE — 80061 LIPID PANEL: CPT | Performed by: NURSE PRACTITIONER

## 2022-04-29 PROCEDURE — 83880 ASSAY OF NATRIURETIC PEPTIDE: CPT | Performed by: EMERGENCY MEDICINE

## 2022-04-29 PROCEDURE — 93005 ELECTROCARDIOGRAM TRACING: CPT

## 2022-04-29 PROCEDURE — G0378 HOSPITAL OBSERVATION PER HR: HCPCS

## 2022-04-29 PROCEDURE — 84484 ASSAY OF TROPONIN QUANT: CPT | Performed by: NURSE PRACTITIONER

## 2022-04-29 PROCEDURE — 93005 ELECTROCARDIOGRAM TRACING: CPT | Performed by: NURSE PRACTITIONER

## 2022-04-29 PROCEDURE — 84443 ASSAY THYROID STIM HORMONE: CPT | Performed by: NURSE PRACTITIONER

## 2022-04-29 PROCEDURE — 83690 ASSAY OF LIPASE: CPT | Performed by: EMERGENCY MEDICINE

## 2022-04-29 PROCEDURE — U0003 INFECTIOUS AGENT DETECTION BY NUCLEIC ACID (DNA OR RNA); SEVERE ACUTE RESPIRATORY SYNDROME CORONAVIRUS 2 (SARS-COV-2) (CORONAVIRUS DISEASE [COVID-19]), AMPLIFIED PROBE TECHNIQUE, MAKING USE OF HIGH THROUGHPUT TECHNOLOGIES AS DESCRIBED BY CMS-2020-01-R: HCPCS | Performed by: EMERGENCY MEDICINE

## 2022-04-29 RX ORDER — SODIUM CHLORIDE 0.9 % (FLUSH) 0.9 %
10 SYRINGE (ML) INJECTION AS NEEDED
Status: DISCONTINUED | OUTPATIENT
Start: 2022-04-29 | End: 2022-04-30 | Stop reason: HOSPADM

## 2022-04-29 RX ORDER — LOSARTAN POTASSIUM 25 MG/1
25 TABLET ORAL 2 TIMES DAILY
Status: DISCONTINUED | OUTPATIENT
Start: 2022-04-29 | End: 2022-04-30 | Stop reason: HOSPADM

## 2022-04-29 RX ORDER — OXYBUTYNIN CHLORIDE 5 MG/1
5 TABLET ORAL NIGHTLY
Status: DISCONTINUED | OUTPATIENT
Start: 2022-04-29 | End: 2022-04-30 | Stop reason: HOSPADM

## 2022-04-29 RX ORDER — SODIUM CHLORIDE 0.9 % (FLUSH) 0.9 %
10 SYRINGE (ML) INJECTION EVERY 12 HOURS SCHEDULED
Status: DISCONTINUED | OUTPATIENT
Start: 2022-04-29 | End: 2022-04-30 | Stop reason: HOSPADM

## 2022-04-29 RX ORDER — OXYBUTYNIN CHLORIDE 5 MG/1
5 TABLET ORAL NIGHTLY
COMMUNITY
End: 2022-05-05 | Stop reason: ALTCHOICE

## 2022-04-29 RX ORDER — ACETAMINOPHEN 325 MG/1
325 TABLET ORAL EVERY 4 HOURS PRN
Status: DISCONTINUED | OUTPATIENT
Start: 2022-04-29 | End: 2022-04-30 | Stop reason: HOSPADM

## 2022-04-29 RX ORDER — DULOXETIN HYDROCHLORIDE 60 MG/1
60 CAPSULE, DELAYED RELEASE ORAL DAILY
Status: DISCONTINUED | OUTPATIENT
Start: 2022-04-29 | End: 2022-04-30 | Stop reason: HOSPADM

## 2022-04-29 RX ORDER — LEVOTHYROXINE SODIUM 0.05 MG/1
50 TABLET ORAL
Status: DISCONTINUED | OUTPATIENT
Start: 2022-04-30 | End: 2022-04-30 | Stop reason: HOSPADM

## 2022-04-29 RX ORDER — SODIUM CHLORIDE, SODIUM LACTATE, POTASSIUM CHLORIDE, CALCIUM CHLORIDE 600; 310; 30; 20 MG/100ML; MG/100ML; MG/100ML; MG/100ML
75 INJECTION, SOLUTION INTRAVENOUS CONTINUOUS
Status: DISCONTINUED | OUTPATIENT
Start: 2022-04-29 | End: 2022-04-30 | Stop reason: HOSPADM

## 2022-04-29 RX ORDER — ALLOPURINOL 100 MG/1
100 TABLET ORAL DAILY
Status: DISCONTINUED | OUTPATIENT
Start: 2022-04-29 | End: 2022-04-30 | Stop reason: HOSPADM

## 2022-04-29 RX ORDER — MULTIPLE VITAMINS W/ MINERALS TAB 9MG-400MCG
1 TAB ORAL DAILY
Status: DISCONTINUED | OUTPATIENT
Start: 2022-04-29 | End: 2022-04-30 | Stop reason: HOSPADM

## 2022-04-29 RX ORDER — AMLODIPINE BESYLATE 5 MG/1
5 TABLET ORAL DAILY
Status: DISCONTINUED | OUTPATIENT
Start: 2022-04-29 | End: 2022-04-30 | Stop reason: HOSPADM

## 2022-04-29 RX ADMIN — AMLODIPINE BESYLATE 5 MG: 5 TABLET ORAL at 17:59

## 2022-04-29 RX ADMIN — DULOXETINE HYDROCHLORIDE 60 MG: 60 CAPSULE, DELAYED RELEASE ORAL at 17:59

## 2022-04-29 RX ADMIN — Medication 10 ML: at 16:53

## 2022-04-29 RX ADMIN — SODIUM CHLORIDE, POTASSIUM CHLORIDE, SODIUM LACTATE AND CALCIUM CHLORIDE 75 ML/HR: 600; 310; 30; 20 INJECTION, SOLUTION INTRAVENOUS at 23:35

## 2022-04-29 RX ADMIN — LOSARTAN POTASSIUM 25 MG: 25 TABLET, FILM COATED ORAL at 20:43

## 2022-04-29 RX ADMIN — ALLOPURINOL 100 MG: 100 TABLET ORAL at 17:59

## 2022-04-29 RX ADMIN — Medication 10 ML: at 20:44

## 2022-04-29 RX ADMIN — OXYBUTYNIN CHLORIDE 5 MG: 5 TABLET ORAL at 20:43

## 2022-04-29 RX ADMIN — Medication 10 ML: at 20:46

## 2022-04-29 RX ADMIN — Medication 10 ML: at 17:26

## 2022-04-30 ENCOUNTER — APPOINTMENT (OUTPATIENT)
Dept: NUCLEAR MEDICINE | Facility: HOSPITAL | Age: 87
End: 2022-04-30

## 2022-04-30 ENCOUNTER — READMISSION MANAGEMENT (OUTPATIENT)
Dept: CALL CENTER | Facility: HOSPITAL | Age: 87
End: 2022-04-30

## 2022-04-30 VITALS
TEMPERATURE: 97.7 F | WEIGHT: 115 LBS | SYSTOLIC BLOOD PRESSURE: 148 MMHG | DIASTOLIC BLOOD PRESSURE: 87 MMHG | RESPIRATION RATE: 17 BRPM | HEART RATE: 62 BPM | HEIGHT: 62 IN | OXYGEN SATURATION: 100 % | BODY MASS INDEX: 21.16 KG/M2

## 2022-04-30 LAB
ANION GAP SERPL CALCULATED.3IONS-SCNC: 11.1 MMOL/L (ref 5–15)
BH CV NUCLEAR PRIOR STUDY: 3
BH CV REST NUCLEAR ISOTOPE DOSE: 10.7 MCI
BH CV STRESS COMMENTS STAGE 1: NORMAL
BH CV STRESS DOSE REGADENOSON STAGE 1: 0.4
BH CV STRESS DURATION MIN STAGE 1: 0
BH CV STRESS DURATION SEC STAGE 1: 10
BH CV STRESS NUCLEAR ISOTOPE DOSE: 32.7 MCI
BH CV STRESS PROTOCOL 1: NORMAL
BH CV STRESS RECOVERY BP: NORMAL MMHG
BH CV STRESS RECOVERY HR: 71 BPM
BH CV STRESS STAGE 1: 1
BUN SERPL-MCNC: 31 MG/DL (ref 8–23)
BUN/CREAT SERPL: 27.7 (ref 7–25)
CALCIUM SPEC-SCNC: 9.6 MG/DL (ref 8.6–10.5)
CHLORIDE SERPL-SCNC: 106 MMOL/L (ref 98–107)
CO2 SERPL-SCNC: 23.9 MMOL/L (ref 22–29)
CREAT SERPL-MCNC: 1.12 MG/DL (ref 0.57–1)
DEPRECATED RDW RBC AUTO: 49.5 FL (ref 37–54)
EGFRCR SERPLBLD CKD-EPI 2021: 47.4 ML/MIN/1.73
ERYTHROCYTE [DISTWIDTH] IN BLOOD BY AUTOMATED COUNT: 13.2 % (ref 12.3–15.4)
GLUCOSE SERPL-MCNC: 92 MG/DL (ref 65–99)
HCT VFR BLD AUTO: 39.8 % (ref 34–46.6)
HGB BLD-MCNC: 12.9 G/DL (ref 12–15.9)
LV EF NUC BP: 84 %
MAGNESIUM SERPL-MCNC: 2.2 MG/DL (ref 1.6–2.4)
MAXIMAL PREDICTED HEART RATE: 132 BPM
MCH RBC QN AUTO: 33.2 PG (ref 26.6–33)
MCHC RBC AUTO-ENTMCNC: 32.4 G/DL (ref 31.5–35.7)
MCV RBC AUTO: 102.6 FL (ref 79–97)
PERCENT MAX PREDICTED HR: 56.82 %
PLATELET # BLD AUTO: 280 10*3/MM3 (ref 140–450)
PMV BLD AUTO: 9.8 FL (ref 6–12)
POTASSIUM SERPL-SCNC: 3.6 MMOL/L (ref 3.5–5.2)
RBC # BLD AUTO: 3.88 10*6/MM3 (ref 3.77–5.28)
SODIUM SERPL-SCNC: 141 MMOL/L (ref 136–145)
STRESS BASELINE BP: NORMAL MMHG
STRESS BASELINE HR: 63 BPM
STRESS PERCENT HR: 67 %
STRESS POST PEAK BP: NORMAL MMHG
STRESS POST PEAK HR: 75 BPM
STRESS TARGET HR: 112 BPM
TROPONIN T SERPL-MCNC: <0.01 NG/ML (ref 0–0.03)
WBC NRBC COR # BLD: 5.69 10*3/MM3 (ref 3.4–10.8)

## 2022-04-30 PROCEDURE — A9500 TC99M SESTAMIBI: HCPCS | Performed by: EMERGENCY MEDICINE

## 2022-04-30 PROCEDURE — 78452 HT MUSCLE IMAGE SPECT MULT: CPT | Performed by: INTERNAL MEDICINE

## 2022-04-30 PROCEDURE — 78452 HT MUSCLE IMAGE SPECT MULT: CPT

## 2022-04-30 PROCEDURE — 93298 REM INTERROG DEV EVAL SCRMS: CPT | Performed by: INTERNAL MEDICINE

## 2022-04-30 PROCEDURE — G0378 HOSPITAL OBSERVATION PER HR: HCPCS

## 2022-04-30 PROCEDURE — 93018 CV STRESS TEST I&R ONLY: CPT | Performed by: INTERNAL MEDICINE

## 2022-04-30 PROCEDURE — 93016 CV STRESS TEST SUPVJ ONLY: CPT | Performed by: INTERNAL MEDICINE

## 2022-04-30 PROCEDURE — 93005 ELECTROCARDIOGRAM TRACING: CPT | Performed by: NURSE PRACTITIONER

## 2022-04-30 PROCEDURE — 25010000002 REGADENOSON 0.4 MG/5ML SOLUTION: Performed by: EMERGENCY MEDICINE

## 2022-04-30 PROCEDURE — 0 TECHNETIUM SESTAMIBI: Performed by: EMERGENCY MEDICINE

## 2022-04-30 PROCEDURE — 93010 ELECTROCARDIOGRAM REPORT: CPT | Performed by: INTERNAL MEDICINE

## 2022-04-30 PROCEDURE — 85027 COMPLETE CBC AUTOMATED: CPT | Performed by: NURSE PRACTITIONER

## 2022-04-30 PROCEDURE — 84484 ASSAY OF TROPONIN QUANT: CPT | Performed by: NURSE PRACTITIONER

## 2022-04-30 PROCEDURE — 93017 CV STRESS TEST TRACING ONLY: CPT

## 2022-04-30 PROCEDURE — G2066 INTER DEVC REMOTE 30D: HCPCS | Performed by: INTERNAL MEDICINE

## 2022-04-30 PROCEDURE — 83735 ASSAY OF MAGNESIUM: CPT | Performed by: NURSE PRACTITIONER

## 2022-04-30 PROCEDURE — 80048 BASIC METABOLIC PNL TOTAL CA: CPT | Performed by: NURSE PRACTITIONER

## 2022-04-30 PROCEDURE — 99214 OFFICE O/P EST MOD 30 MIN: CPT | Performed by: INTERNAL MEDICINE

## 2022-04-30 RX ADMIN — Medication 10 ML: at 08:39

## 2022-04-30 RX ADMIN — LEVOTHYROXINE SODIUM 50 MCG: 0.05 TABLET ORAL at 08:35

## 2022-04-30 RX ADMIN — REGADENOSON 0.4 MG: 0.08 INJECTION, SOLUTION INTRAVENOUS at 10:55

## 2022-04-30 RX ADMIN — DULOXETINE HYDROCHLORIDE 60 MG: 60 CAPSULE, DELAYED RELEASE ORAL at 08:40

## 2022-04-30 RX ADMIN — ALLOPURINOL 100 MG: 100 TABLET ORAL at 08:40

## 2022-04-30 RX ADMIN — TECHNETIUM TC 99M SESTAMIBI 1 DOSE: 1 INJECTION INTRAVENOUS at 09:15

## 2022-04-30 RX ADMIN — AMLODIPINE BESYLATE 5 MG: 5 TABLET ORAL at 08:40

## 2022-04-30 RX ADMIN — LOSARTAN POTASSIUM 25 MG: 25 TABLET, FILM COATED ORAL at 08:40

## 2022-04-30 NOTE — OUTREACH NOTE
Prep Survey    Flowsheet Row Responses   [facility] patient discharged from? [place]   Is LACE score < 7 ? No   Emergency Room discharge w/ pulse ox? No   Eligibility [facility]   Date of Admission 04/29/22   Date of Discharge 04/30/22   Discharge Disposition Home or Self Care   Discharge diagnosis chest pain   Does the patient have one of the following disease processes/diagnoses(primary or secondary)? Other   Does the patient have Home health ordered? No   Is there a DME ordered? No   Prep survey completed? Yes          ALESSANDRO SALAZAR - Registered Nurse

## 2022-05-02 ENCOUNTER — TRANSITIONAL CARE MANAGEMENT TELEPHONE ENCOUNTER (OUTPATIENT)
Dept: CALL CENTER | Facility: HOSPITAL | Age: 87
End: 2022-05-02

## 2022-05-02 LAB
QT INTERVAL: 453 MS
QT INTERVAL: 475 MS

## 2022-05-02 NOTE — OUTREACH NOTE
Call Center TCM Note    Flowsheet Row Responses   [facility] patient discharged from? [place]   Does the patient have one of the following disease processes/diagnoses(primary or secondary)? Other   TCM attempt successful? Yes   Call start time 1052   Call end time 1059   Discharge diagnosis chest pain   Does the patient have all medications ordered at discharge? N/A   Is the patient taking all medications as directed (includes completed medication regime)? Yes   Medication comments No changes   Comments regarding appointments Cardio 5/5/22   Does the patient have a primary care provider?  Yes   Does the patient have an appointment with their PCP within 7 days of discharge? Yes   Comments regarding PCP HOSP DC FU appt 5/4/22 @ 3pm.    Has the patient kept scheduled appointments due by today? N/A   Has home health visited the patient within 72 hours of discharge? N/A   Psychosocial issues? No   Did the patient receive a copy of their discharge instructions? Yes   Nursing interventions Reviewed instructions with patient   What is the patient's perception of their health status since discharge? Improving  [Feeling tired in am. ]   Is the patient/caregiver able to teach back signs and symptoms related to disease process for when to call PCP? Yes   Is the patient/caregiver able to teach back signs and symptoms related to disease process for when to call 911? Yes   Is the patient/caregiver able to teach back the hierarchy of who to call/visit for symptoms/problems? PCP, Specialist, Home health nurse, Urgent Care, ED, 911 Yes   TCM call completed? Yes   Wrap up additional comments No chest pain, just feels tired in am.           Silvia Siddiqui RN    5/2/2022, 11:00 EDT       Patient LM on [facility] CSP line requesting CB  Please see prior phone note from yesterday  Nurse returned call and LM  No referral to close-pt filled out On-line form

## 2022-05-02 NOTE — OUTREACH NOTE
Call Center TCM Note    Flowsheet Row Responses   [facility] patient discharged from? [place]   Does the patient have one of the following disease processes/diagnoses(primary or secondary)? Other   TCM attempt successful? No   Unsuccessful attempts Attempt 1          Silvia Siddiqui RN    5/2/2022, 09:25 EDT

## 2022-05-03 PROBLEM — E03.9 ACQUIRED HYPOTHYROIDISM: Status: ACTIVE | Noted: 2022-05-03

## 2022-05-05 ENCOUNTER — OFFICE VISIT (OUTPATIENT)
Dept: CARDIOLOGY | Facility: CLINIC | Age: 87
End: 2022-05-05

## 2022-05-05 VITALS
HEIGHT: 62 IN | BODY MASS INDEX: 20.98 KG/M2 | DIASTOLIC BLOOD PRESSURE: 76 MMHG | WEIGHT: 114 LBS | HEART RATE: 52 BPM | SYSTOLIC BLOOD PRESSURE: 118 MMHG

## 2022-05-05 DIAGNOSIS — R00.1 SINUS BRADYCARDIA: ICD-10-CM

## 2022-05-05 DIAGNOSIS — I48.0 PAF (PAROXYSMAL ATRIAL FIBRILLATION): Chronic | ICD-10-CM

## 2022-05-05 DIAGNOSIS — I49.5 SICK SINUS SYNDROME: Primary | ICD-10-CM

## 2022-05-05 PROCEDURE — 93000 ELECTROCARDIOGRAM COMPLETE: CPT | Performed by: INTERNAL MEDICINE

## 2022-05-05 PROCEDURE — 99204 OFFICE O/P NEW MOD 45 MIN: CPT | Performed by: INTERNAL MEDICINE

## 2022-05-05 NOTE — PROGRESS NOTES
Date of Office Visit: 2022  Encounter Provider: Dada Platt MD  Place of Service: [facility]IOLOG[facility]  Patient Name: Alba Correa  :1933    Chief Complaint   Patient presents with   • Slow Heart Rate     New Patient Consult   • Cerebrovascular Accident   • 1st degree AV block   :     HPI: Alba Correa is a 88 y.o. female who presents today for bradycardia.    Patient has a history of stroke, paroxysmal atrial fibrillation, and sinus bradycardia.    She has been noted to have periods of bradycardia, no evident P wave activity, likely sick sinus syndrome.    She does not report any strongly correlating symptoms with these episodes.    Her beta-blocker was stopped, and she notes her heart rates have been higher on home monitoring.    She is not extremely active, but does not describe any particular fatigue or strong symptoms.          Past Medical History:   Diagnosis Date   • Acquired hypothyroidism 5/3/2022   • Acute diastolic CHF (congestive heart failure) ([facility]) 3/20/2022   • Chronic kidney disease    • CVA (cerebral vascular accident) ([facility])    • Degeneration of intervertebral disc    • Depression    • DVT (deep venous thrombosis) ([facility])    • First degree AV block 2021   • Gout    • H/O complete eye exam 2016   • Hyperlipidemia    • IBS (irritable bowel syndrome)    • Nonrheumatic mitral valve regurgitation    • OAB (overactive bladder)    • Osteopenia    • Osteoporosis    • PAF (paroxysmal atrial fibrillation) ([facility]) 2019   • Peripheral neuropathy    • Rheumatoid arthritis ([facility])    • Sinus bradycardia 2021       Past Surgical History:   Procedure Laterality Date   • APPENDECTOMY     • BREAST BIOPSY     • CARDIAC ELECTROPHYSIOLOGY PROCEDURE N/A 10/12/2021    Procedure: Loop insertion linq;  Surgeon: Tone Anguiano MD;  Location: [facility] INVASIVE LOCATION;  Service: Cardiovascular;  Laterality: N/A;   • CARDIOVERSION  2020      [facility]   • COLONOSCOPY      declines   • HERNIA REPAIR     • HYSTERECTOMY      still has ovaries   • MAMMO BILATERAL  2016    pt declines   • PAP SMEAR  2016    declines       Social History     Socioeconomic History   • Marital status:      Spouse name: Peña   • Number of children: 3   • Years of education: High school   Tobacco Use   • Smoking status: Former Smoker     Years: 5.00     Types: Cigarettes     Quit date:      Years since quittin.3   • Smokeless tobacco: Never Used   • Tobacco comment: caffeine use: 2 cups coffee in AM   Vaping Use   • Vaping Use: Never used   Substance and Sexual Activity   • Alcohol use: Yes     Comment: Occasional- glass of wine few times a week   • Drug use: No   • Sexual activity: Defer     Partners: Male     Birth control/protection: Surgical       Family History   Problem Relation Age of Onset   • Alcohol abuse Other    • Cancer Other    • Hypertension Other    • Kidney disease Other    • Lung disease Other    • Heart disease Mother 89   • Heart failure Mother    • Ovarian cancer Daughter 60   • Stomach cancer Father    • Kidney cancer Father 52   • Lung cancer Father 52   • Ovarian cancer Sister    • Lung cancer Brother 65       Review of Systems   Constitutional: Positive for malaise/fatigue.   Cardiovascular: Negative.    Respiratory: Negative.    Gastrointestinal: Negative.        Allergies   Allergen Reactions   • Bactrim [Sulfamethoxazole-Trimethoprim] Delirium   • Lisinopril Unknown (See Comments)     Unknown per pt   • Levofloxacin Rash         Current Outpatient Medications:   •  acetaminophen (TYLENOL) 650 MG 8 hr tablet, Take 1,300 mg by mouth Every 8 (Eight) Hours As Needed for Mild Pain ., Disp: , Rfl:   •  allopurinol (ZYLOPRIM) 100 MG tablet, Take 1 tablet by mouth Daily., Disp: 90 tablet, Rfl: 1  •  amLODIPine (NORVASC) 5 MG tablet, Take 1 tablet by mouth Daily., Disp: 90 tablet, Rfl: 3  •  apixaban (Eliquis) 2.5 MG tablet  "tablet, Take 1 tablet by mouth Every 12 (Twelve) Hours., Disp: 180 tablet, Rfl: 3  •  calcium carbonate-vitamin d 600-400 MG-UNIT per tablet, Take 1 tablet by mouth Daily., Disp: , Rfl:   •  DULoxetine (CYMBALTA) 60 MG capsule, Take 1 capsule by mouth Daily., Disp: 90 capsule, Rfl: 1  •  levothyroxine (SYNTHROID, LEVOTHROID) 50 MCG tablet, Take 1 tablet by mouth Every Morning., Disp: 90 tablet, Rfl: 1  •  losartan (Cozaar) 25 MG tablet, Take 1 tablet by mouth 2 (Two) Times a Day., Disp: 180 tablet, Rfl: 0  •  Multiple Vitamins-Minerals (CENTRUM SILVER) tablet, Take 1 tablet by mouth Daily., Disp: , Rfl:   •  Omega-3 Fatty Acids (OMEGA-3 FISH OIL PO), Take 1,600 mg by mouth Every Morning., Disp: , Rfl:       Objective:     Vitals:    05/05/22 1036   BP: 118/76   Pulse: 52   Weight: 51.7 kg (114 lb)   Height: 157.5 cm (62\")     Body mass index is 20.85 kg/m².    PHYSICAL EXAM:    Vitals and nursing note reviewed.   Constitutional:       Appearance: Normal and healthy appearance.   HENT:    Mouth/Throat:      Pharynx: Oropharynx is clear.   Pulmonary:      Effort: Pulmonary effort is normal.      Breath sounds: Normal breath sounds and air entry.   Cardiovascular:      Bradycardia present. Regular rhythm.   Edema:     Peripheral edema absent.   Skin:     General: Skin is warm.   Neurological:      General: No focal deficit present.      Mental Status: Alert, oriented to person, place, and time and oriented to person, place and time.   Psychiatric:         Attention and Perception: Attention and perception normal.         Mood and Affect: Mood and affect normal.         Behavior: Behavior is cooperative.             ECG 12 Lead    Date/Time: 5/5/2022 11:03 AM  Performed by: Dada Platt MD  Authorized by: Dada Platt MD   Comparison: compared with previous ECG from 4/30/2022  Comparison to previous ECG: Junctional rhythm has replaced sinus rhythm with first-degree AV block  Rhythm comments: Junctional " rhythm                Assessment:       Diagnosis Plan   1. Sick sinus syndrome (HCC)     2. Sinus bradycardia     3. PAF (paroxysmal atrial fibrillation) ([facility])            Plan:       Sick sinus syndrome versus sinus bradycardia.  Discussed option for pacemaker, and potential to improve symptoms of fatigue.  Discussed risk of pacemaker placement.  I do not see evidence of any have the AV block or other malignant arrhythmia on her loop recorder.  Her son was with her and we discussed as well.  If she feels she is currently doing well, and perhaps even a little bit better since stopping beta-blocker no plans to proceed with pacemaker implant at this time.  Discussed symptoms of bradycardia, she will contact if she feels that she is noting low heart rates or worsening symptoms.    As always, it has been a pleasure to participate in your patient's care.      Sincerely,         Dada Platt MD

## 2022-05-09 ENCOUNTER — HOSPITAL ENCOUNTER (EMERGENCY)
Facility: HOSPITAL | Age: 87
Discharge: HOME | End: 2022-05-09
Attending: EMERGENCY MEDICINE | Admitting: EMERGENCY MEDICINE

## 2022-05-09 ENCOUNTER — PATIENT OUTREACH (OUTPATIENT)
Dept: CASE MANAGEMENT | Facility: OTHER | Age: 87
End: 2022-05-09

## 2022-05-09 VITALS
SYSTOLIC BLOOD PRESSURE: 132 MMHG | RESPIRATION RATE: 16 BRPM | DIASTOLIC BLOOD PRESSURE: 78 MMHG | HEART RATE: 62 BPM | TEMPERATURE: 97.3 F | OXYGEN SATURATION: 97 %

## 2022-05-09 DIAGNOSIS — I44.0 FIRST DEGREE AV BLOCK: ICD-10-CM

## 2022-05-09 DIAGNOSIS — R30.0 DYSURIA: Primary | ICD-10-CM

## 2022-05-09 DIAGNOSIS — R60.0 LOWER EXTREMITY EDEMA: ICD-10-CM

## 2022-05-09 DIAGNOSIS — R10.2 PELVIC PAIN: ICD-10-CM

## 2022-05-09 DIAGNOSIS — I49.5 SICK SINUS SYNDROME: Primary | ICD-10-CM

## 2022-05-09 PROCEDURE — 51702 INSERT TEMP BLADDER CATH: CPT

## 2022-05-09 PROCEDURE — 51798 US URINE CAPACITY MEASURE: CPT

## 2022-05-09 PROCEDURE — 99282 EMERGENCY DEPT VISIT SF MDM: CPT

## 2022-05-09 NOTE — OUTREACH NOTE
AMBULATORY CASE MANAGEMENT NOTE    Name and Relationship of Patient/Support Person: Alba Correa J - Self    Outgoing call to the patient to outreach for CCM program.  Reviewed AVS from recent ED visit.  She prefers to reschedule her follow up appointment with Dr Doty.  She denies any questions or needs at this time.  She declines RN-ACM programs and has contact information if she has a future need.        HENOK ROSALES  Ambulatory Case Management    5/9/2022, 11:36 EDT

## 2022-05-10 ENCOUNTER — READMISSION MANAGEMENT (OUTPATIENT)
Dept: CALL CENTER | Facility: HOSPITAL | Age: 87
End: 2022-05-10

## 2022-05-10 NOTE — DISCHARGE INSTRUCTIONS
Home, rest, home medicine as prescribed, keep well-hydrated, follow up with your specialist for further evaluation and treatment, and your PCP for recheck. Return to care if symptoms worsen or with further concerns.

## 2022-05-10 NOTE — ED TRIAGE NOTES
Patient from home via private vehicle. Reports she had a bladder instillation performed today at 1400 for 'painful bladder syndrome' and has been unable to void since.

## 2022-05-10 NOTE — OUTREACH NOTE
Medical Week 2 Survey    Flowsheet Row Responses   [facility] patient discharged from? [place]   Does the patient have one of the following disease processes/diagnoses(primary or secondary)? Other   Week 2 attempt successful? No   Unsuccessful attempts Attempt 1          JOHN EM - Registered Nurse

## 2022-05-10 NOTE — ED NOTES
Attempted to bladder scan patient x 4 times. Bladder scanner reading 0 mL each attempted reading. PA notified.

## 2022-05-10 NOTE — ED PROVIDER NOTES
EMERGENCY DEPARTMENT ENCOUNTER    Room Number:  02/02  Date of encounter:  5/9/2022  PCP: Aramis Doty MD  Historian: Patient      HPI:  Chief Complaint: Urinary retention  A complete HPI/ROS/PMH/PSH/SH/FH are unobtainable due to: N/A    Context: Alba Correa is a 88 y.o. female with past medical history of HTN, HLD, CKD, PAF on Eliquis, and hypothyroidism who presents to the ED c/o urinary retention.  Patient has been following with Dr. Davidson and has recently been diagnosed with painful bladder syndrome and today had a procedure where she had medicine and fluids instilled into her bladder at around 2:30 this afternoon.  Patient states that she has been unable to urinate since that time and is having increasing pain and distention.  Patient also complains of some nausea, but denies any fever, chills, vomiting, diarrhea, or any recent dysuria or hematuria.      PAST MEDICAL HISTORY  Active Ambulatory Problems     Diagnosis Date Noted   • CKD (chronic kidney disease) stage 4, GFR 15-29 ml/min ([facility])    • Depression    • Gout    • Hyperparathyroidism ([facility])    • Adaptive colitis    • Osteopenia    • Rheumatoid arthritis ([facility])    • Degeneration of intervertebral disc    • Essential hypertension 11/20/2015   • 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   • History of DVT (deep vein thrombosis) 07/11/2017   • Hyperlipidemia 01/10/2018   • Macrocytosis 02/28/2018   • Nonrheumatic aortic (valve) insufficiency 10/08/2018   • History of ischemic right MCA stroke with extension 11/20/2018   • PAF (paroxysmal atrial fibrillation) ([facility]) 11/08/2019   • Anemia in chronic kidney disease 12/08/2016   • Other proteinuria 01/26/2017   • Sinus bradycardia 05/26/2021   • First degree AV block 05/26/2021   • Nonrheumatic mitral valve regurgitation    • Idiopathic peripheral neuropathy 04/19/2022   • Lower extremity edema 04/21/2022   • Acute diastolic CHF (congestive heart failure) ([facility]) 03/20/2022   • Chest pain 04/29/2022   • Acquired  hypothyroidism 05/03/2022   • Sick sinus syndrome ([facility]) 05/05/2022     Resolved Ambulatory Problems     Diagnosis Date Noted   • DVT (deep venous thrombosis) ([facility])    • Edema    • Elevated cholesterol    • Osteoporosis    • Benign essential hypertension    • D (diarrhea) 11/20/2015   • Open leg wound 11/20/2015   • Vaginal odor 11/20/2015   • Infected wound 11/20/2015   • History of depression 07/11/2017   • History of renal insufficiency syndrome 07/11/2017   • History of degenerative disc disease 07/11/2017   • History of osteoporosis 07/11/2017   • History of rheumatoid arthritis 07/11/2017   • Right leg weakness 08/05/2018   • CVA (cerebral vascular accident) ([facility]) 08/06/2018   • Acute intractable headache 04/02/2019   • Nausea 04/02/2019   • Bilateral leg weakness 04/04/2019   • Chest pain 10/15/2020   • Afib ([facility]) 10/15/2020   • Dizziness 10/15/2020   • On amiodarone therapy 05/26/2021   • History of syncope 10/10/2021   • Bradycardia 03/25/2022   • FATIMAH (acute kidney injury) ([facility]) 03/26/2022     Past Medical History:   Diagnosis Date   • Chronic kidney disease    • H/O complete eye exam 09/2016   • IBS (irritable bowel syndrome)    • OAB (overactive bladder)    • Peripheral neuropathy          PAST SURGICAL HISTORY  Past Surgical History:   Procedure Laterality Date   • APPENDECTOMY     • BREAST BIOPSY     • CARDIAC ELECTROPHYSIOLOGY PROCEDURE N/A 10/12/2021    Procedure: Loop insertion linq;  Surgeon: Tone Anguiano MD;  Location: [facility] INVASIVE LOCATION;  Service: Cardiovascular;  Laterality: N/A;   • CARDIOVERSION  08/03/2020    Dr. Colby   • COLONOSCOPY      declines   • HERNIA REPAIR  1965   • HYSTERECTOMY      still has ovaries   • MAMMO BILATERAL  11/16/2016    pt declines   • PAP SMEAR  11/16/2016    declines         FAMILY HISTORY  Family History   Problem Relation Age of Onset   • Alcohol abuse Other    • Cancer Other    • Hypertension Other    • Kidney disease Other    • Lung  disease Other    • Heart disease Mother 89   • Heart failure Mother    • Ovarian cancer Daughter 60   • Stomach cancer Father    • Kidney cancer Father 52   • Lung cancer Father 52   • Ovarian cancer Sister    • Lung cancer Brother 65         SOCIAL HISTORY  Social History     Socioeconomic History   • Marital status:      Spouse name: Peña   • Number of children: 3   • Years of education: High school   Tobacco Use   • Smoking status: Former Smoker     Years: 5.00     Types: Cigarettes     Quit date:      Years since quittin.3   • Smokeless tobacco: Never Used   • Tobacco comment: caffeine use: 2 cups coffee in AM   Vaping Use   • Vaping Use: Never used   Substance and Sexual Activity   • Alcohol use: Yes     Comment: Occasional- glass of wine few times a week   • Drug use: No   • Sexual activity: Defer     Partners: Male     Birth control/protection: Surgical         ALLERGIES  Bactrim [sulfamethoxazole-trimethoprim], Lisinopril, and Levofloxacin        REVIEW OF SYSTEMS  Review of Systems     All systems reviewed and negative except for those discussed in HPI.       PHYSICAL EXAM    I have reviewed the triage vital signs and nursing notes.    ED Triage Vitals [22 2120]   Temp Heart Rate Resp BP SpO2   97.3 °F (36.3 °C) 57 18 -- 98 %      Temp src Heart Rate Source Patient Position BP Location FiO2 (%)   -- -- -- -- --       Physical Exam  GENERAL: Alert and oriented x3, appears uncomfortable   HENT: nares patent  EYES: no scleral icterus  CV: regular rhythm, regular rate  RESPIRATORY: normal effort  ABDOMEN: mild to moderate lower abdominal tenderness with guarding, no rebound, normal bowel sounds  MUSCULOSKELETAL: no deformity  NEURO: alert, moves all extremities, follows commands  SKIN: warm, dry        LAB RESULTS  No results found for this or any previous visit (from the past 24 hour(s)).    Ordered the above labs and independently reviewed the results.        RADIOLOGY  No Radiology Exams  Resulted Within Past 24 Hours    I ordered the above noted radiological studies. Reviewed by me and discussed with radiologist.  See dictation for official radiology interpretation.      PROCEDURES    Procedures      MEDICATIONS GIVEN IN ER    Medications - No data to display      PROGRESS, DATA ANALYSIS, CONSULTS, AND MEDICAL DECISION MAKING    All labs have been independently reviewed by me.  All radiology studies have been reviewed by me and discussed with radiologist dictating the report.   EKG's independently viewed and interpreted by me.  Discussion below represents my analysis of pertinent findings related to patient's condition, differential diagnosis, treatment plan and final disposition.    DDx: Includes but not limited to urinary retention, dysuria, UTI,    ED Course as of 05/09/22 2341   Mon May 09, 2022   2254 Patient rechecked: Walker catheter has been placed and patient's symptoms are relieved. [PARKER]   2322 Patient has had [PARKER]   2323 Patient rechecked, she has had over 300 mL out total her bladder.  Discussed plan to pull the patient's Walker and have her follow-up with her pelvic and bladder specialist.  Patient expressed understanding agrees to plan. [PARKER]      ED Course User Index  [PARKER] Monster Stark, PA       MDM: Patient only had a little over 300 mL out of her bladder after placement of Walker, her symptoms are improved.  Will discharge home without her Walker and with close follow-up with her bladder and pelvic pain specialist.  Vital signs are stable, patient is safe for discharge home.    PPE: The patient wore a surgical mask throughout the entire patient encounter. I wore an N95.    AS OF 23:41 EDT VITALS:    BP -    HR - 57  TEMP - 97.3 °F (36.3 °C)  O2 SATS - 98%        DIAGNOSIS  Final diagnoses:   Dysuria   Pelvic pain         DISPOSITION  DISCHARGE    Patient discharged in stable condition.    Reviewed implications of results, diagnosis, meds, responsibility to follow up, warning signs  and symptoms of possible worsening, potential complications and reasons to return to ER.    Patient/Family voiced understanding of above instructions.    Discussed plan for discharge, as there is no emergent indication for admission. Patient referred to primary care provider for BP management due to today's BP. Pt/family is agreeable and understands need for follow up and repeat testing.  Pt is aware that discharge does not mean that nothing is wrong but it indicates no emergency is present that requires admission and they must continue care with follow-up as given below or physician of their choice.     FOLLOW-UP  Aminah Davidson MD  [address] [address]  [phone]    Schedule an appointment as soon as possible for a visit            Medication List      No changes were made to your prescriptions during this visit.                  Monster Stark PA  05/09/22 [id number]

## 2022-05-10 NOTE — ED PROVIDER NOTES
MD ATTESTATION NOTE    The MINA and I have discussed this patient's history, physical exam, and treatment plan.  I have reviewed the documentation and personally had a face to face interaction with the patient. I affirm the documentation and agree with the treatment and plan.  The attached note describes my personal findings.      I provided a substantive portion of the care of the patient.  I personally performed the physical exam in its entirety, and below are my findings.  For this patient encounter, the patient wore surgical mask, I wore full protective PPE including N95 and eye protection.      Brief HPI: Patient is a 88-year-old female with a history of bladder spasms and possible interstitial cystitis who had a urinary procedure performed today.  After the procedure, patient developed urinary incontinence with increasing lower abdominal discomfort.  Patient had a Walker catheter placed here with drainage of approximately 200 cc of urine.  Patient states she feels much better after the Walker placement.  She denies associated nausea or vomiting.  She denies fevers or chills.    PHYSICAL EXAM  ED Triage Vitals [05/09/22 2120]   Temp Heart Rate Resp BP SpO2   97.3 °F (36.3 °C) 57 18 -- 98 %      Temp src Heart Rate Source Patient Position BP Location FiO2 (%)   -- -- -- -- --         GENERAL: no acute distress  HENT: nares patent  EYES: no scleral icterus  CV: regular rhythm, normal rate  RESPIRATORY: normal effort, clear to auscultation bilaterally  ABDOMEN: soft, NABS, nontender nondistended.  MUSCULOSKELETAL: no deformity.  No edema or calf tenderness.  NEURO: alert, moves all extremities, follows commands  PSYCH:  calm, cooperative  SKIN: warm, dry    Vital signs and nursing notes reviewed.        Plan: I agree with plan of discharging patient's Walker catheter in discharging patient to home.  I did discuss with patient and family the pluses and minuses of keeping the Walker catheter in.  Given that only a small  amount of urine was drained and she just had a cystoscopy today, I think it is safe to discharge the Walker catheter.  I advised patient family to return to the ER if her symptoms return              Junaid Azul MD  05/09/22 [id number]

## 2022-05-12 ENCOUNTER — READMISSION MANAGEMENT (OUTPATIENT)
Dept: CALL CENTER | Facility: HOSPITAL | Age: 87
End: 2022-05-12

## 2022-05-12 NOTE — OUTREACH NOTE
Medical Week 2 Survey    Flowsheet Row Responses   [facility] patient discharged from? [facility]   Does the patient have one of the following disease processes/diagnoses(primary or secondary)? Other   Week 2 attempt successful? Yes   Call start time 0938   Discharge diagnosis chest pain   Call end time 0942   Is patient permission given to speak with other caregiver? No   List who call center can speak with Patient only   Meds reviewed with patient/caregiver? Yes   Is the patient having any side effects they believe may be caused by any medication additions or changes? No   Does the patient have all medications ordered at discharge? N/A   Is the patient taking all medications as directed (includes completed medication regime)? Yes   Has the patient kept scheduled appointments due by today? Yes   Comments Cardiology last week  PCP next week   What is the patient's perception of their health status since discharge? Improving   Additional teach back comments Doing fine, better than she expected.   Week 2 Call Completed? Yes          JOANNE BARGER - Registered Nurse

## 2022-05-18 NOTE — PROGRESS NOTES
Subjective   Alba Correa is a 88 y.o. female.     CC: ED F/U for Urinary Retention    History of Present Illness     Pt returns today after being seen in the [facility] ED on 5/9/22 with this note:    Context: Alba Correa is a 88 y.o. female with past medical history of HTN, HLD, CKD, PAF on Eliquis, and hypothyroidism who presents to the ED c/o urinary retention.  Patient has been following with Dr. Davidson and has recently been diagnosed with painful bladder syndrome and today had a procedure where she had medicine and fluids instilled into her bladder at around 2:30 this afternoon.  Patient states that she has been unable to urinate since that time and is having increasing pain and distention.  Patient also complains of some nausea, but denies any fever, chills, vomiting, diarrhea, or any recent dysuria or hematuria.    2254   Patient rechecked: Walker catheter has been placed and patient's symptoms are relieved. [PARKER]   2322   Patient has had [PARKER]   2323   Patient rechecked, she has had over 300 mL out total her bladder. Discussed plan to pull the patient's Walker and have her follow-up with her pelvic and bladder specialist. Patient expressed understanding agrees to plan. [PARKER]    DIAGNOSIS   Final diagnoses:   Dysuria   Pelvic pain     Current outpatient and discharge medications have been reconciled for the patient.  Reviewed by: Aramis Doty MD    Pt saw her nephrologist yesterday and is being seen back in f/u in 1 month.    Pt going every Monday q4 for continued bladder infusions.    Pt reports a long h/o insomnia and is really started to cause issues for her. Using Tylenol-PM w/o help.    The following portions of the patient's history were reviewed and updated as appropriate: allergies, current medications, past family history, past medical history, past social history, past surgical history and problem list.    Review of Systems   Constitutional: Negative for activity change, chills and fever.   Respiratory:  Negative for cough.    Cardiovascular: Negative for chest pain.   Psychiatric/Behavioral: Negative for dysphoric mood.       Objective   Physical Exam  Constitutional:       General: She is not in acute distress.     Appearance: She is well-developed.   Cardiovascular:      Rate and Rhythm: Normal rate and regular rhythm.   Pulmonary:      Effort: Pulmonary effort is normal.      Breath sounds: Normal breath sounds.   Neurological:      Mental Status: She is alert and oriented to person, place, and time.   Psychiatric:         Behavior: Behavior normal.         Thought Content: Thought content normal.     Hospital records reviewed with pt confirming HPI.      Assessment & Plan   Diagnoses and all orders for this visit:    1. Dysuria (Primary)    2. Pelvic pain    3. Hospital discharge follow-up    4. Insomnia, unspecified type  -     traZODone (DESYREL) 50 MG tablet; Take 1 tablet by mouth Every Night.  Dispense: 90 tablet; Refill: 1    Pt to continue with current treatment plan and keep all f/u appts with her speciliast (Dr Davidson).

## 2022-05-19 ENCOUNTER — OFFICE VISIT (OUTPATIENT)
Dept: FAMILY MEDICINE CLINIC | Facility: CLINIC | Age: 87
End: 2022-05-19

## 2022-05-19 VITALS
TEMPERATURE: 98 F | WEIGHT: 114 LBS | SYSTOLIC BLOOD PRESSURE: 157 MMHG | HEART RATE: 56 BPM | BODY MASS INDEX: 20.98 KG/M2 | DIASTOLIC BLOOD PRESSURE: 75 MMHG | HEIGHT: 62 IN | RESPIRATION RATE: 14 BRPM

## 2022-05-19 DIAGNOSIS — R10.2 PELVIC PAIN: ICD-10-CM

## 2022-05-19 DIAGNOSIS — G47.00 INSOMNIA, UNSPECIFIED TYPE: ICD-10-CM

## 2022-05-19 DIAGNOSIS — Z09 HOSPITAL DISCHARGE FOLLOW-UP: ICD-10-CM

## 2022-05-19 DIAGNOSIS — R30.0 DYSURIA: Primary | ICD-10-CM

## 2022-05-19 PROCEDURE — 99214 OFFICE O/P EST MOD 30 MIN: CPT | Performed by: FAMILY MEDICINE

## 2022-05-19 RX ORDER — TRAZODONE HYDROCHLORIDE 50 MG/1
50 TABLET ORAL NIGHTLY
Qty: 90 TABLET | Refills: 1 | Status: SHIPPED | OUTPATIENT
Start: 2022-05-19 | End: 2022-06-28

## 2022-06-12 DIAGNOSIS — I10 BENIGN ESSENTIAL HYPERTENSION: ICD-10-CM

## 2022-06-13 RX ORDER — CARVEDILOL 6.25 MG/1
TABLET ORAL
Qty: 180 TABLET | OUTPATIENT
Start: 2022-06-13

## 2022-06-16 RX ORDER — LOSARTAN POTASSIUM 25 MG/1
TABLET ORAL
Qty: 180 TABLET | Refills: 0 | Status: SHIPPED | OUTPATIENT
Start: 2022-06-16 | End: 2022-09-13

## 2022-06-24 RX ORDER — LEVOTHYROXINE SODIUM 0.05 MG/1
TABLET ORAL
Qty: 90 TABLET | Refills: 1 | Status: SHIPPED | OUTPATIENT
Start: 2022-06-24 | End: 2023-01-05

## 2022-06-28 ENCOUNTER — OFFICE VISIT (OUTPATIENT)
Dept: CARDIOLOGY | Facility: CLINIC | Age: 87
End: 2022-06-28

## 2022-06-28 VITALS
BODY MASS INDEX: 21.35 KG/M2 | DIASTOLIC BLOOD PRESSURE: 80 MMHG | HEIGHT: 62 IN | SYSTOLIC BLOOD PRESSURE: 140 MMHG | WEIGHT: 116 LBS | HEART RATE: 56 BPM

## 2022-06-28 DIAGNOSIS — Z86.718 HISTORY OF DVT (DEEP VEIN THROMBOSIS): ICD-10-CM

## 2022-06-28 DIAGNOSIS — E78.2 MIXED HYPERLIPIDEMIA: Chronic | ICD-10-CM

## 2022-06-28 DIAGNOSIS — N18.4 CKD (CHRONIC KIDNEY DISEASE) STAGE 4, GFR 15-29 ML/MIN: ICD-10-CM

## 2022-06-28 DIAGNOSIS — I48.0 PAF (PAROXYSMAL ATRIAL FIBRILLATION): Primary | Chronic | ICD-10-CM

## 2022-06-28 DIAGNOSIS — I35.1 NONRHEUMATIC AORTIC (VALVE) INSUFFICIENCY: Chronic | ICD-10-CM

## 2022-06-28 PROCEDURE — 99214 OFFICE O/P EST MOD 30 MIN: CPT | Performed by: INTERNAL MEDICINE

## 2022-06-29 NOTE — PROGRESS NOTES
Subjective:     Encounter Date: 06/28/22        Patient ID: Alba Correa is a 89 y.o. female.    Chief Complaint: PAF, CVA  Atrial Fibrillation  Past medical history includes atrial fibrillation.       Dear Dr. Doty,    I had the pleasure of seeing this patient in the office today.  She comes in for hospital follow-up.  She was hospitalized April 29, 2022.    She was hospitalized with chest heaviness.  She was seen by my partner Dr. Aguillon.  EKG and troponin were unremarkable.  Stress test was performed that showed ejection rate 70% and no ischemia.  She was then discharged.    Patient was then seen in our office by Dr. Platt for bradycardia and sick sinus syndrome.  While in the hospital beta-blockade was stopped and her heart rates have been higher on home monitoring.    Today patient states she is feeling much better.  No chest pain or chest discomfort.  No shortness of breath.  Her strength is better.  Her ambulation is better.    She has had a history of persistent atrial fibrillation.  We performed a cardioversion 7/2020 after she has been on rate control and anticoagulation for an appropriate period of time.  She then had another Holter monitor she was having some palpitations.  This showed episodes of ectopic atrial rhythm and brief 3-4 beat runs of wide-complex tachycardia but no atrial fibrillation.  It was determined to keep her on her medical therapy.    She was hospitalized October 2021 after episode of syncope cardiac evaluation was unremarkable.  She had an echocardiogram that was unremarkable.  She ended up having a loop recorder placed.  So far no arrhythmias been seen.    We saw her initially in the hospital in 2019.  She presented for a CVA.  We were asked to see her for evaluation of a cardiac source of emboli.  None was found.  Transthoracic and transesophageal echocardiograms did not demonstrate any cardiac source of emboli.  14 day monitor showed no ectopy.  Then recently seen in  "our office with question about bradycardia.  A 48-hour Holter monitor was placed.  During that time, she was in atrial fibrillation the entire 48 hours.  She was unaware of this.  She did not feel any palpitations or tachycardia.  No irregularity.  Heart rate was 70 bpm during the 48-hour recording with no pauses.      The following portions of the patient's history were reviewed and updated as appropriate: allergies, current medications, past family history, past medical history, past social history, past surgical history and problem list.    Past Medical History:   Diagnosis Date   • Acquired hypothyroidism 5/3/2022   • Acute diastolic CHF (congestive heart failure) ([facility]) 3/20/2022   • Chronic kidney disease    • CVA (cerebral vascular accident) ([facility])    • Degeneration of intervertebral disc    • Depression    • DVT (deep venous thrombosis) ([facility])    • First degree AV block 05/26/2021   • Gout    • H/O complete eye exam 09/2016   • Hyperlipidemia    • IBS (irritable bowel syndrome)    • Nonrheumatic mitral valve regurgitation    • OAB (overactive bladder)    • Osteopenia    • Osteoporosis    • PAF (paroxysmal atrial fibrillation) ([facility]) 11/08/2019   • Peripheral neuropathy    • Rheumatoid arthritis ([facility])    • Sinus bradycardia 05/26/2021       Procedures       Objective:     Vitals:    06/28/22 1349   BP: 140/80   Pulse: 56   Weight: 52.6 kg (116 lb)   Height: 157.5 cm (62\")           General Appearance:    Alert, cooperative, in no acute distress   Head:    Normocephalic, without obvious abnormality   Eyes:            Lids and lashes normal, conjunctivae and sclerae normal, no icterus, no pallor, corneas clear   Ears:    Ears appear intact with no abnormalities noted   Throat:   No oral lesions, oral mucosa moist   Neck:   No adenopathy, supple, trachea midline, no thyromegaly, no carotid bruit, no JVD   Back:     No kyphosis present, no erythema or scars, no tenderness to palpation    Lungs:     Clear to " auscultation,respirations regular, even and unlabored    Heart:    Regular rhythm and normal rate, normal S1 and S2, no murmur, no gallop, no rub, no click   Chest Wall:    No abnormalities observed   Abdomen:     Normal bowel sounds, no masses, no organomegaly, soft        non-tender, non-distended, no guarding   Extremities:   Moves all extremities well, no edema, no cyanosis, no redness   Pulses:  Bilateral carotids brisk   Skin:  Psychiatric:   No bleeding or rash    Alert and oriented, normal mood and affect       Lab Review:             Results for orders placed during the hospital encounter of 03/25/22    Adult Transthoracic Echo Complete W/ Cont if Necessary Per Protocol    Interpretation Summary  · Left ventricular systolic function is hyperdynamic (EF > 70%). Normal left ventricular cavity size noted. Left ventricular wall thickness is consistent with mild concentric hypertrophy. All left ventricular wall segments contract normally. Left ventricular diastolic function was normal. No evidence of left ventricular thrombus or mass present.  · There is mild calcification of the aortic valve. Mild to moderate aortic valve regurgitation is present. No hemodynamically significant aortic valve stenosis is present.  · Mild to moderate mitral valve regurgitation is present. No significant mitral valve stenosis is present.  · Estimated right ventricular systolic pressure from tricuspid regurgitation is mildly elevated (35-45 mmHg).    Lab Results   Component Value Date    GLUCOSE 87 05/02/2022    BUN 34 (H) 05/02/2022    CREATININE 1.78 (H) 05/02/2022    EGFRIFNONA 40 (L) 10/11/2021    EGFRIFAFRI 35 (L) 09/01/2021    BCR 19 05/02/2022    K 5.3 (H) 05/02/2022    CO2 22 05/02/2022    CALCIUM 10.5 (H) 05/02/2022    PROTENTOTREF 8.2 03/07/2022    ALBUMIN 4.30 04/29/2022    LABIL2 1.3 03/07/2022    AST 41 (H) 04/29/2022    ALT 23 04/29/2022     CBC    CBC 4/1/22 4/29/22 4/30/22   WBC 6.48 8.02 5.69   RBC 3.80 4.21 3.88    Hemoglobin 13.0 13.8 12.9   Hematocrit 38.7 41.5 39.8   .8 (A) 98.6 (A) 102.6 (A)   MCH 34.2 (A) 32.8 33.2 (A)   MCHC 33.6 33.3 32.4   RDW 12.9 12.7 13.2   Platelets 320 319 280   (A) Abnormal value                    Assessment:          Diagnosis Plan   1. PAF (paroxysmal atrial fibrillation) ([facility])     2. Nonrheumatic aortic (valve) insufficiency     3. Mixed hyperlipidemia     4. History of DVT (deep vein thrombosis)     5. CKD (chronic kidney disease) stage 4, GFR 15-29 ml/min ([facility])            Plan:       1.  Aortic insufficiency- no significant change on her symptoms, cont amlodipine, losartan  2.  History of CVA-continue Eliquis and rosuvastatin.  Creatinine BUN reviewed, patient is followed by neurology as scheduled see them again soon  3.  Benign essential hypertension-continue current medical regimen with amlodipine losartan, BUN/creatinine reviewed  4.  Paroxysmal atrial fibrillation- in sinus rhythm.  Loop recorder in place, continue to follow, off beta-blockers, being followed by EP  5.  Chronic renal failure, most recent creatinine BUN reviewed.  Age 89 years old, discussed, will remain on the 2.5 mg twice daily of the Eliquis.  6.  Syncope and collapse-following with neurology      Thank you very much for allowing us to participate in the care of this pleasant patient.  Please don't hesitate to call if I can be of assistance in any way.      Current Outpatient Medications:   •  acetaminophen (TYLENOL) 650 MG 8 hr tablet, Take 1,300 mg by mouth Every 8 (Eight) Hours As Needed for Mild Pain ., Disp: , Rfl:   •  allopurinol (ZYLOPRIM) 100 MG tablet, Take 1 tablet by mouth Daily., Disp: 90 tablet, Rfl: 1  •  amLODIPine (NORVASC) 5 MG tablet, Take 1 tablet by mouth Daily., Disp: 90 tablet, Rfl: 3  •  apixaban (Eliquis) 2.5 MG tablet tablet, Take 1 tablet by mouth Every 12 (Twelve) Hours., Disp: 180 tablet, Rfl: 3  •  calcium carbonate-vitamin d 600-400 MG-UNIT per tablet, Take 1 tablet by mouth  Daily., Disp: , Rfl:   •  DULoxetine (CYMBALTA) 60 MG capsule, Take 1 capsule by mouth Daily., Disp: 90 capsule, Rfl: 1  •  levothyroxine (SYNTHROID, LEVOTHROID) 50 MCG tablet, TAKE 1 TABLET BY MOUTH IN  THE MORNING, Disp: 90 tablet, Rfl: 1  •  losartan (COZAAR) 25 MG tablet, TAKE 1 TABLET BY MOUTH  TWICE DAILY, Disp: 180 tablet, Rfl: 0  •  Multiple Vitamins-Minerals (CENTRUM SILVER) tablet, Take 1 tablet by mouth Daily., Disp: , Rfl:   •  Omega-3 Fatty Acids (OMEGA-3 FISH OIL PO), Take 1,600 mg by mouth Every Morning., Disp: , Rfl:

## 2022-07-11 ENCOUNTER — OFFICE VISIT (OUTPATIENT)
Dept: FAMILY MEDICINE CLINIC | Facility: CLINIC | Age: 87
End: 2022-07-11

## 2022-07-11 VITALS
DIASTOLIC BLOOD PRESSURE: 58 MMHG | WEIGHT: 117 LBS | HEART RATE: 51 BPM | BODY MASS INDEX: 21.53 KG/M2 | TEMPERATURE: 98.1 F | OXYGEN SATURATION: 98 % | SYSTOLIC BLOOD PRESSURE: 153 MMHG | RESPIRATION RATE: 16 BRPM | HEIGHT: 62 IN

## 2022-07-11 DIAGNOSIS — J01.00 ACUTE MAXILLARY SINUSITIS, RECURRENCE NOT SPECIFIED: Primary | ICD-10-CM

## 2022-07-11 PROCEDURE — 99213 OFFICE O/P EST LOW 20 MIN: CPT

## 2022-07-11 RX ORDER — HYDROXYZINE HYDROCHLORIDE 10 MG/1
10 TABLET, FILM COATED ORAL
COMMUNITY
Start: 2022-06-08 | End: 2023-01-10

## 2022-07-11 RX ORDER — DOXYCYCLINE HYCLATE 100 MG/1
100 CAPSULE ORAL 2 TIMES DAILY
Qty: 14 CAPSULE | Refills: 0 | Status: SHIPPED | OUTPATIENT
Start: 2022-07-11 | End: 2022-07-18

## 2022-07-11 NOTE — PROGRESS NOTES
Chief Complaint  Sinus Congestion and Facial Pain    Subjective          History of Present Illness    Alba Correa 89 y.o. female who presents for evaluation of sinus pressure and congestion, and cough. Symptoms include ear pressure, congestion, post nasal drip, productive cough, cough described as productive of cream sputum and sinus pain.  Onset of symptoms was 2 weeks ago, stable since that time. Patient denies shortness of breath, wheezing, hemoptysis, pleuritic chest pain, fever, ear drainage, eye discharge, diarrhea, vomiting, vertigo, sneezing, chills, sweats, epistaxis, high fever, and joint pain.   Evaluation to date: none Treatment to date:  OTC antihistamines.     The patient has chronic kidney disease.  GFR was 27 on 05/02/2022.  The patient has been prescribed Doxycycline in the past by her PCP.  She reports she tolerated the medication well with no side effects.     She  denies medication side effects.    Review of Systems   Constitutional: Negative for chills, fatigue and fever.   HENT: Positive for congestion (sinus), dental problem, ear pain (ear pressure, bilateral), postnasal drip and sinus pressure. Negative for ear discharge, facial swelling, hearing loss, nosebleeds, rhinorrhea, sneezing, sore throat, tinnitus, trouble swallowing and voice change.    Eyes: Negative for visual disturbance.   Respiratory: Positive for cough. Negative for apnea, chest tightness and shortness of breath.    Cardiovascular: Negative for chest pain and palpitations.   Gastrointestinal: Negative for abdominal pain, constipation, diarrhea, nausea and vomiting.   Genitourinary: Negative for dysuria, frequency and urgency.   Musculoskeletal: Negative for back pain.   Skin: Negative for rash.   Neurological: Negative for dizziness, syncope, speech difficulty, light-headedness and numbness.   Psychiatric/Behavioral: Negative for behavioral problems and sleep disturbance.        Objective   Vital Signs:   /58    "Pulse 51   Temp 98.1 °F (36.7 °C)   Resp 16   Ht 157.5 cm (62\")   Wt 53.1 kg (117 lb)   SpO2 98%   BMI 21.40 kg/m²      BMI is within normal parameters. No other follow-up for BMI required.        Physical Exam  Vitals and nursing note reviewed.   Constitutional:       General: She is not in acute distress.     Appearance: Normal appearance. She is well-developed. She is not toxic-appearing or diaphoretic.   HENT:      Head: Normocephalic and atraumatic. Hair is normal.      Right Ear: External ear normal. No drainage, swelling or tenderness. There is impacted cerumen (minimal). Tympanic membrane is not erythematous, retracted or bulging.      Left Ear: External ear normal. No drainage, swelling or tenderness. There is no impacted cerumen. Tympanic membrane is not erythematous, retracted or bulging.      Nose: Mucosal edema (minimal) and congestion present. No rhinorrhea.      Right Sinus: Maxillary sinus tenderness present. No frontal sinus tenderness.      Left Sinus: Maxillary sinus tenderness present. No frontal sinus tenderness.      Mouth/Throat:      Mouth: No oral lesions.      Pharynx: Uvula midline. Posterior oropharyngeal erythema present. No oropharyngeal exudate or uvula swelling.   Eyes:      General: No scleral icterus.        Right eye: No discharge.         Left eye: No discharge.      Conjunctiva/sclera: Conjunctivae normal.      Pupils: Pupils are equal, round, and reactive to light.   Cardiovascular:      Rate and Rhythm: Normal rate and regular rhythm.      Heart sounds: Normal heart sounds. No murmur heard.    No gallop.   Pulmonary:      Effort: No tachypnea, bradypnea or respiratory distress.      Breath sounds: Normal breath sounds. No stridor or decreased air movement. No decreased breath sounds, wheezing, rhonchi or rales.   Chest:      Chest wall: No tenderness.   Abdominal:      Palpations: Abdomen is soft.      Tenderness: There is no abdominal tenderness.   Musculoskeletal:      " Cervical back: Normal range of motion and neck supple.   Lymphadenopathy:      Cervical: No cervical adenopathy.   Skin:     General: Skin is warm and dry.      Findings: No rash.   Neurological:      Mental Status: She is alert and oriented to person, place, and time.      Motor: No abnormal muscle tone.   Psychiatric:         Behavior: Behavior normal.         Thought Content: Thought content normal.         Judgment: Judgment normal.          The following data was reviewed by: GROVER Child on 07/11/2022:  Basic Metabolic Panel (05/02/2022 16:12)               Assessment and Plan      Diagnoses and all orders for this visit:    1. Acute maxillary sinusitis, recurrence not specified (Primary)  -     doxycycline (VIBRAMYCIN) 100 MG capsule; Take 1 capsule by mouth 2 (Two) Times a Day for 7 days.  Dispense: 14 capsule; Refill: 0            Follow Up     Return if symptoms worsen or fail to improve.    Patient was given instructions and counseling regarding her condition or for health maintenance advice. Please see specific information pulled into the AVS if appropriate.     -Take medication as prescribed.  -Monitor for fever and take Tylenol as needed.  Drink plenty of fluids and get plenty of rest.  -Use cool-mist humidifier as needed.  -The patient verbalized understanding of all instructions given today.

## 2022-07-15 ENCOUNTER — TELEPHONE (OUTPATIENT)
Dept: FAMILY MEDICINE CLINIC | Facility: CLINIC | Age: 87
End: 2022-07-15

## 2022-07-15 NOTE — TELEPHONE ENCOUNTER
Caller: Alba Correa    Relationship: Self    Best call back number: [phone]    What is the medical concern/diagnosis: URINATION PROBLEMS    What specialty or service is being requested: UROLOGIST    What is the provider, practice or medical service name:ANYONE WHO  WOULD PREFERS

## 2022-07-15 NOTE — TELEPHONE ENCOUNTER
I talked with pt. She sees Aminah Davidson and is wanting to see someone else. I gave her [facility]

## 2022-07-25 RX ORDER — LOSARTAN POTASSIUM 25 MG/1
25 TABLET ORAL 2 TIMES DAILY
Qty: 180 TABLET | Refills: 0 | OUTPATIENT
Start: 2022-07-25

## 2022-08-16 DIAGNOSIS — F33.42 RECURRENT MAJOR DEPRESSIVE DISORDER, IN FULL REMISSION: Chronic | ICD-10-CM

## 2022-08-16 DIAGNOSIS — M10.9 GOUT, UNSPECIFIED CAUSE, UNSPECIFIED CHRONICITY, UNSPECIFIED SITE: Chronic | ICD-10-CM

## 2022-08-17 RX ORDER — ALLOPURINOL 100 MG/1
100 TABLET ORAL DAILY
Qty: 30 TABLET | Refills: 0 | Status: SHIPPED | OUTPATIENT
Start: 2022-08-17 | End: 2022-09-30

## 2022-08-17 RX ORDER — DULOXETIN HYDROCHLORIDE 60 MG/1
60 CAPSULE, DELAYED RELEASE ORAL DAILY
Qty: 30 CAPSULE | Refills: 0 | Status: SHIPPED | OUTPATIENT
Start: 2022-08-17 | End: 2022-09-30

## 2022-09-01 PROCEDURE — 93298 REM INTERROG DEV EVAL SCRMS: CPT | Performed by: INTERNAL MEDICINE

## 2022-09-01 PROCEDURE — G2066 INTER DEVC REMOTE 30D: HCPCS | Performed by: INTERNAL MEDICINE

## 2022-09-13 RX ORDER — LOSARTAN POTASSIUM 25 MG/1
TABLET ORAL
Qty: 180 TABLET | Refills: 3 | Status: SHIPPED | OUTPATIENT
Start: 2022-09-13

## 2022-09-29 DIAGNOSIS — F33.42 RECURRENT MAJOR DEPRESSIVE DISORDER, IN FULL REMISSION: Chronic | ICD-10-CM

## 2022-09-29 DIAGNOSIS — M10.9 GOUT, UNSPECIFIED CAUSE, UNSPECIFIED CHRONICITY, UNSPECIFIED SITE: Chronic | ICD-10-CM

## 2022-09-30 RX ORDER — ALLOPURINOL 100 MG/1
100 TABLET ORAL DAILY
Qty: 30 TABLET | Refills: 0 | Status: SHIPPED | OUTPATIENT
Start: 2022-09-30 | End: 2022-10-28

## 2022-09-30 RX ORDER — DULOXETIN HYDROCHLORIDE 60 MG/1
60 CAPSULE, DELAYED RELEASE ORAL DAILY
Qty: 30 CAPSULE | Refills: 0 | Status: SHIPPED | OUTPATIENT
Start: 2022-09-30 | End: 2022-10-28

## 2022-10-27 DIAGNOSIS — M10.9 GOUT, UNSPECIFIED CAUSE, UNSPECIFIED CHRONICITY, UNSPECIFIED SITE: Chronic | ICD-10-CM

## 2022-10-27 DIAGNOSIS — F33.42 RECURRENT MAJOR DEPRESSIVE DISORDER, IN FULL REMISSION: Chronic | ICD-10-CM

## 2022-10-28 RX ORDER — ALLOPURINOL 100 MG/1
100 TABLET ORAL DAILY
Qty: 30 TABLET | Refills: 0 | Status: SHIPPED | OUTPATIENT
Start: 2022-10-28

## 2022-10-28 RX ORDER — DULOXETIN HYDROCHLORIDE 60 MG/1
60 CAPSULE, DELAYED RELEASE ORAL DAILY
Qty: 30 CAPSULE | Refills: 0 | Status: SHIPPED | OUTPATIENT
Start: 2022-10-28

## 2022-10-28 NOTE — TELEPHONE ENCOUNTER
Rx Refill Note  Requested Prescriptions     Pending Prescriptions Disp Refills   • DULoxetine (CYMBALTA) 60 MG capsule [Pharmacy Med Name: DULoxetine HCl 60 MG Oral Capsule Delayed Release Particles] 30 capsule 11     Sig: TAKE 1 CAPSULE BY MOUTH  DAILY   • allopurinol (ZYLOPRIM) 100 MG tablet [Pharmacy Med Name: Allopurinol 100 MG Oral Tablet] 30 tablet 11     Sig: TAKE 1 TABLET BY MOUTH  DAILY      Last office visit with prescribing clinician: 5/19/2022      Next office visit with prescribing clinician: Visit date not found     Sent a 30 day RX. Pt is needing an appointment.     Okay for the HUB to relay message    CP on exertion

## 2022-11-24 DIAGNOSIS — M10.9 GOUT, UNSPECIFIED CAUSE, UNSPECIFIED CHRONICITY, UNSPECIFIED SITE: Chronic | ICD-10-CM

## 2022-11-24 DIAGNOSIS — F33.42 RECURRENT MAJOR DEPRESSIVE DISORDER, IN FULL REMISSION: Chronic | ICD-10-CM

## 2022-11-27 RX ORDER — ALLOPURINOL 100 MG/1
100 TABLET ORAL DAILY
Qty: 30 TABLET | Refills: 11 | OUTPATIENT
Start: 2022-11-27

## 2022-11-27 RX ORDER — DULOXETIN HYDROCHLORIDE 60 MG/1
60 CAPSULE, DELAYED RELEASE ORAL DAILY
Qty: 30 CAPSULE | Refills: 11 | OUTPATIENT
Start: 2022-11-27

## 2023-01-03 PROCEDURE — 93298 REM INTERROG DEV EVAL SCRMS: CPT | Performed by: INTERNAL MEDICINE

## 2023-01-03 PROCEDURE — G2066 INTER DEVC REMOTE 30D: HCPCS | Performed by: INTERNAL MEDICINE

## 2023-01-05 RX ORDER — LEVOTHYROXINE SODIUM 0.05 MG/1
TABLET ORAL
Qty: 30 TABLET | Refills: 0 | Status: SHIPPED | OUTPATIENT
Start: 2023-01-05 | End: 2023-02-20

## 2023-01-10 ENCOUNTER — OFFICE VISIT (OUTPATIENT)
Dept: CARDIOLOGY | Facility: CLINIC | Age: 88
End: 2023-01-10
Payer: MEDICARE

## 2023-01-10 VITALS
DIASTOLIC BLOOD PRESSURE: 74 MMHG | SYSTOLIC BLOOD PRESSURE: 126 MMHG | BODY MASS INDEX: 22.08 KG/M2 | HEART RATE: 61 BPM | HEIGHT: 62 IN | WEIGHT: 120 LBS | OXYGEN SATURATION: 98 %

## 2023-01-10 DIAGNOSIS — I10 ESSENTIAL HYPERTENSION: Chronic | ICD-10-CM

## 2023-01-10 DIAGNOSIS — I35.1 NONRHEUMATIC AORTIC (VALVE) INSUFFICIENCY: Chronic | ICD-10-CM

## 2023-01-10 DIAGNOSIS — I48.19 ATRIAL FIBRILLATION, PERSISTENT: Primary | ICD-10-CM

## 2023-01-10 DIAGNOSIS — E78.2 MIXED HYPERLIPIDEMIA: Chronic | ICD-10-CM

## 2023-01-10 PROCEDURE — 93000 ELECTROCARDIOGRAM COMPLETE: CPT | Performed by: INTERNAL MEDICINE

## 2023-01-10 PROCEDURE — 99214 OFFICE O/P EST MOD 30 MIN: CPT | Performed by: INTERNAL MEDICINE

## 2023-01-10 NOTE — PROGRESS NOTES
Subjective:     Encounter Date: 01/10/23        Patient ID: Alba Correa is a 89 y.o. female.    Chief Complaint: PAF, CVA  Atrial Fibrillation  Past medical history includes atrial fibrillation.     Dear Dr. Doty,    I had the pleasure of seeing this patient in the office today.     Generally she has been doing well, but over Derik she started feeling sick.  She says she was sneezing a lot, a lot of rhinorrhea, and severe fatigue.  She had some sweatiness at night but did not check her temperature so she does not know if she was febrile.  Ever since then she just felt completely drained.    No cardiac complaints.  No chest pain or chest discomfort, no shortness of breath.    She was hospitalized April 29, 2022. She was hospitalized with chest heaviness.  She was seen by my partner Dr. Aguillon.  EKG and troponin were unremarkable.  Stress test was performed that showed ejection rate 70% and no ischemia.  She was then discharged.    Patient was then seen in our office by Dr. Platt for bradycardia and sick sinus syndrome.  While in the hospital beta-blockade was stopped and her heart rates have been higher on home monitoring.    She has had a history of persistent atrial fibrillation.  We performed a cardioversion 7/2020 after she has been on rate control and anticoagulation for an appropriate period of time.  She then had another Holter monitor she was having some palpitations.  This showed episodes of ectopic atrial rhythm and brief 3-4 beat runs of wide-complex tachycardia but no atrial fibrillation.  It was determined to keep her on her medical therapy.    She was hospitalized October 2021 after episode of syncope cardiac evaluation was unremarkable.  She had an echocardiogram that was unremarkable.  She ended up having a loop recorder placed.  So far no arrhythmias been seen.    We saw her initially in the hospital in 2019.  She presented for a CVA.  We were asked to see her for evaluation of a  cardiac source of emboli.  None was found.  Transthoracic and transesophageal echocardiograms did not demonstrate any cardiac source of emboli.  14 day monitor showed no ectopy.  Then recently seen in our office with question about bradycardia.  A 48-hour Holter monitor was placed.  During that time, she was in atrial fibrillation the entire 48 hours.  She was unaware of this.  She did not feel any palpitations or tachycardia.  No irregularity.  Heart rate was 70 bpm during the 48-hour recording with no pauses.      The following portions of the patient's history were reviewed and updated as appropriate: allergies, current medications, past family history, past medical history, past social history, past surgical history and problem list.    Past Medical History:   Diagnosis Date   • Acquired hypothyroidism 5/3/2022   • Acute diastolic CHF (congestive heart failure) ([facility]) 3/20/2022   • Chronic kidney disease    • CVA (cerebral vascular accident) ([facility])    • Degeneration of intervertebral disc    • Depression    • DVT (deep venous thrombosis) ([facility])    • First degree AV block 05/26/2021   • Gout    • H/O complete eye exam 09/2016   • Hyperlipidemia    • IBS (irritable bowel syndrome)    • Nonrheumatic mitral valve regurgitation    • OAB (overactive bladder)    • Osteopenia    • Osteoporosis    • PAF (paroxysmal atrial fibrillation) ([facility]) 11/08/2019   • Peripheral neuropathy    • Rheumatoid arthritis ([facility])    • Sinus bradycardia 05/26/2021         ECG 12 Lead    Date/Time: 1/10/2023 2:35 PM  Performed by: Roshan Martines III, MD  Authorized by: Roshan Martines III, MD   Comparison: compared with previous ECG   Similar to previous ECG  Rhythm: atrial fibrillation  Rate: normal  Conduction: conduction normal  QRS axis: normal  Other findings: non-specific ST-T wave changes and poor R wave progression    Clinical impression: abnormal EKG               Objective:     Vitals:    01/10/23 1401   BP: 126/74   Pulse: 61   SpO2:  98%   Weight: 54.4 kg (120 lb)   Height: 157.5 cm (62\")           General Appearance:    Alert, cooperative, in no acute distress   Head:    Normocephalic, without obvious abnormality   Eyes:            Lids and lashes normal, conjunctivae and sclerae normal, no icterus, no pallor, corneas clear   Ears:    Ears appear intact with no abnormalities noted   Throat:   No oral lesions, oral mucosa moist   Neck:   No adenopathy, supple, trachea midline, no thyromegaly, no carotid bruit, no JVD   Back:     No kyphosis present, no erythema or scars, no tenderness to palpation    Lungs:     Clear to auscultation,respirations regular, even and unlabored    Heart:    irregular rhythm and normal rate, normal S1 and S2, no murmur, no gallop, no rub, no click   Chest Wall:    No abnormalities observed   Abdomen:     Normal bowel sounds, no masses, no organomegaly, soft        non-tender, non-distended, no guarding   Extremities:   Moves all extremities well, no edema, no cyanosis, no redness   Pulses:  Bilateral carotids brisk   Skin:  Psychiatric:   No bleeding or rash    Alert and oriented, normal mood and affect       Lab Review:             Results for orders placed during the hospital encounter of 03/25/22    Adult Transthoracic Echo Complete W/ Cont if Necessary Per Protocol    Interpretation Summary  · Left ventricular systolic function is hyperdynamic (EF > 70%). Normal left ventricular cavity size noted. Left ventricular wall thickness is consistent with mild concentric hypertrophy. All left ventricular wall segments contract normally. Left ventricular diastolic function was normal. No evidence of left ventricular thrombus or mass present.  · There is mild calcification of the aortic valve. Mild to moderate aortic valve regurgitation is present. No hemodynamically significant aortic valve stenosis is present.  · Mild to moderate mitral valve regurgitation is present. No significant mitral valve stenosis is present.  ·  Estimated right ventricular systolic pressure from tricuspid regurgitation is mildly elevated (35-45 mmHg).    Lab Results   Component Value Date    GLUCOSE 87 05/02/2022    BUN 34 (H) 05/02/2022    CREATININE 1.78 (H) 05/02/2022    EGFRIFNONA 40 (L) 10/11/2021    EGFRIFAFRI 35 (L) 09/01/2021    BCR 19 05/02/2022    K 5.3 (H) 05/02/2022    CO2 22 05/02/2022    CALCIUM 10.5 (H) 05/02/2022    PROTENTOTREF 8.2 03/07/2022    ALBUMIN 4.30 04/29/2022    LABIL2 1.3 03/07/2022    AST 41 (H) 04/29/2022    ALT 23 04/29/2022     CBC    CBC 4/1/22 4/29/22 4/30/22   WBC 6.48 8.02 5.69   RBC 3.80 4.21 3.88   Hemoglobin 13.0 13.8 12.9   Hematocrit 38.7 41.5 39.8   .8 (A) 98.6 (A) 102.6 (A)   MCH 34.2 (A) 32.8 33.2 (A)   MCHC 33.6 33.3 32.4   RDW 12.9 12.7 13.2   Platelets 320 319 280   (A) Abnormal value                    Assessment:          Diagnosis Plan   1. Atrial fibrillation, persistent (HCC)  ECG 12 Lead      2. Nonrheumatic aortic (valve) insufficiency  ECG 12 Lead      3. Mixed hyperlipidemia  ECG 12 Lead      4. Essential hypertension  ECG 12 Lead             Plan:       1.  Aortic insufficiency- no significant change on her symptoms, cont amlodipine, losartan  2.  History of CVA-continue Eliquis and rosuvastatin.  Creatinine BUN reviewed, patient is followed by neurology   3.  Benign essential hypertension-continue current medical regimen with amlodipine losartan, BUN/creatinine reviewed  4.  Persistent atrial fibrillation- in sinus rhythm.  Loop recorder in place, continue to follow, off beta-blockers, being followed by EP  5.  Chronic renal failure, most recent creatinine BUN reviewed.  Age 89 years old, discussed, will remain on the 2.5 mg twice daily of the Eliquis.  6.  Syncope and collapse-following with neurology  7.  Generalized fatigue-sounds like she had a recent viral illness, scheduled to see you soon.      Thank you very much for allowing us to participate in the care of this pleasant patient.   Please don't hesitate to call if I can be of assistance in any way.      Current Outpatient Medications:   •  acetaminophen (TYLENOL) 650 MG 8 hr tablet, Take 1,300 mg by mouth Every 8 (Eight) Hours As Needed for Mild Pain ., Disp: , Rfl:   •  allopurinol (ZYLOPRIM) 100 MG tablet, TAKE 1 TABLET BY MOUTH  DAILY, Disp: 30 tablet, Rfl: 0  •  amLODIPine (NORVASC) 5 MG tablet, Take 1 tablet by mouth Daily., Disp: 90 tablet, Rfl: 3  •  apixaban (Eliquis) 2.5 MG tablet tablet, Take 1 tablet by mouth Every 12 (Twelve) Hours., Disp: 180 tablet, Rfl: 3  •  calcium carbonate-vitamin d 600-400 MG-UNIT per tablet, Take 1 tablet by mouth Daily., Disp: , Rfl:   •  DULoxetine (CYMBALTA) 60 MG capsule, TAKE 1 CAPSULE BY MOUTH  DAILY, Disp: 30 capsule, Rfl: 0  •  levothyroxine (SYNTHROID, LEVOTHROID) 50 MCG tablet, TAKE 1 TABLET BY MOUTH IN  THE MORNING, Disp: 30 tablet, Rfl: 0  •  losartan (COZAAR) 25 MG tablet, TAKE 1 TABLET BY MOUTH  TWICE DAILY, Disp: 180 tablet, Rfl: 3  •  Multiple Vitamins-Minerals (CENTRUM SILVER) tablet, Take 1 tablet by mouth Daily., Disp: , Rfl:   •  Omega-3 Fatty Acids (OMEGA-3 FISH OIL PO), Take 1,600 mg by mouth Every Morning., Disp: , Rfl:

## 2023-01-17 ENCOUNTER — OFFICE VISIT (OUTPATIENT)
Dept: FAMILY MEDICINE CLINIC | Facility: CLINIC | Age: 88
End: 2023-01-17
Payer: MEDICARE

## 2023-01-17 VITALS
RESPIRATION RATE: 16 BRPM | TEMPERATURE: 97.2 F | BODY MASS INDEX: 22.08 KG/M2 | SYSTOLIC BLOOD PRESSURE: 120 MMHG | HEART RATE: 102 BPM | DIASTOLIC BLOOD PRESSURE: 70 MMHG | WEIGHT: 120 LBS | HEIGHT: 62 IN

## 2023-01-17 DIAGNOSIS — E78.2 MIXED HYPERLIPIDEMIA: ICD-10-CM

## 2023-01-17 DIAGNOSIS — I10 ESSENTIAL HYPERTENSION: ICD-10-CM

## 2023-01-17 DIAGNOSIS — E03.9 ACQUIRED HYPOTHYROIDISM: Primary | ICD-10-CM

## 2023-01-17 DIAGNOSIS — E55.9 VITAMIN D DEFICIENCY: ICD-10-CM

## 2023-01-17 DIAGNOSIS — J06.9 ACUTE URI: ICD-10-CM

## 2023-01-17 PROCEDURE — 99214 OFFICE O/P EST MOD 30 MIN: CPT | Performed by: NURSE PRACTITIONER

## 2023-01-17 RX ORDER — AZITHROMYCIN 500 MG/1
500 TABLET, FILM COATED ORAL DAILY
Qty: 3 TABLET | Refills: 0 | Status: ON HOLD | OUTPATIENT
Start: 2023-01-17 | End: 2023-03-08

## 2023-01-17 NOTE — PROGRESS NOTES
"Subjective   Alba Correa is a 89 y.o. female.     History of Present Illness    Since the last visit, she has overall felt tired.  She has Hypothyroidism and must update labs to continue treatment.  Had medication increased at last visit.  She has felt increasingly fatigued recently. She also reports cough, headache, sinus pressure and post nasal drainage for weeks that has not improved.   she has been compliant with current medications have reviewed them.  The patient denies medication side effects.  Will refill medications. /70   Pulse 102   Temp 97.2 °F (36.2 °C)   Resp 16   Ht 157.5 cm (62\")   Wt 54.4 kg (120 lb)   LMP  (LMP Unknown)   BMI 21.95 kg/m²     Results for orders placed or performed during the hospital encounter of 04/29/22   COVID-19,[facility] NP SWAB IN TRANSPORT MEDIA 8-12 HR TAT - Swab, Nasopharynx    Specimen: Nasopharynx; Swab   Result Value Ref Range    COVID19 Not Detected Not Detected - Ref. Range   Comprehensive Metabolic Panel    Specimen: Blood   Result Value Ref Range    Glucose 98 65 - 99 mg/dL    BUN 47 (H) 8 - 23 mg/dL    Creatinine 1.40 (H) 0.57 - 1.00 mg/dL    Sodium 139 136 - 145 mmol/L    Potassium 3.9 3.5 - 5.2 mmol/L    Chloride 102 98 - 107 mmol/L    CO2 23.7 22.0 - 29.0 mmol/L    Calcium 10.0 8.6 - 10.5 mg/dL    Total Protein 8.1 6.0 - 8.5 g/dL    Albumin 4.30 3.50 - 5.20 g/dL    ALT (SGPT) 23 1 - 33 U/L    AST (SGOT) 41 (H) 1 - 32 U/L    Alkaline Phosphatase 87 39 - 117 U/L    Total Bilirubin 0.3 0.0 - 1.2 mg/dL    Globulin 3.8 gm/dL    A/G Ratio 1.1 g/dL    BUN/Creatinine Ratio 33.6 (H) 7.0 - 25.0    Anion Gap 13.3 5.0 - 15.0 mmol/L    eGFR 36.3 (L) >60.0 mL/min/1.73   Lipase    Specimen: Blood   Result Value Ref Range    Lipase 59 13 - 60 U/L   Troponin    Specimen: Blood   Result Value Ref Range    Troponin T <0.010 0.000 - 0.030 ng/mL   CBC Auto Differential    Specimen: Blood   Result Value Ref Range    WBC 8.02 3.40 - 10.80 10*3/mm3    " RBC 4.21 3.77 - 5.28 10*6/mm3    Hemoglobin 13.8 12.0 - 15.9 g/dL    Hematocrit 41.5 34.0 - 46.6 %    MCV 98.6 (H) 79.0 - 97.0 fL    MCH 32.8 26.6 - 33.0 pg    MCHC 33.3 31.5 - 35.7 g/dL    RDW 12.7 12.3 - 15.4 %    RDW-SD 45.3 37.0 - 54.0 fl    MPV 10.0 6.0 - 12.0 fL    Platelets 319 140 - 450 10*3/mm3    Neutrophil % 74.8 42.7 - 76.0 %    Lymphocyte % 14.6 (L) 19.6 - 45.3 %    Monocyte % 6.1 5.0 - 12.0 %    Eosinophil % 3.2 0.3 - 6.2 %    Basophil % 1.1 0.0 - 1.5 %    Immature Grans % 0.2 0.0 - 0.5 %    Neutrophils, Absolute 5.99 1.70 - 7.00 10*3/mm3    Lymphocytes, Absolute 1.17 0.70 - 3.10 10*3/mm3    Monocytes, Absolute 0.49 0.10 - 0.90 10*3/mm3    Eosinophils, Absolute 0.26 0.00 - 0.40 10*3/mm3    Basophils, Absolute 0.09 0.00 - 0.20 10*3/mm3    Immature Grans, Absolute 0.02 0.00 - 0.05 10*3/mm3    nRBC 0.0 0.0 - 0.2 /100 WBC   BNP    Specimen: Blood   Result Value Ref Range    proBNP 468.0 0.0 - 1,800.0 pg/mL   Lipid Panel    Specimen: Blood   Result Value Ref Range    Total Cholesterol 241 (H) 0 - 200 mg/dL    Triglycerides 94 0 - 150 mg/dL    HDL Cholesterol 57 40 - 60 mg/dL    LDL Cholesterol  168 (H) 0 - 100 mg/dL    VLDL Cholesterol 16 5 - 40 mg/dL    LDL/HDL Ratio 2.90    Troponin    Specimen: Arm, Right; Blood   Result Value Ref Range    Troponin T <0.010 0.000 - 0.030 ng/mL   TSH    Specimen: Arm, Right; Blood   Result Value Ref Range    TSH 6.880 (H) 0.270 - 4.200 uIU/mL   Basic Metabolic Panel    Specimen: Blood   Result Value Ref Range    Glucose 92 65 - 99 mg/dL    BUN 31 (H) 8 - 23 mg/dL    Creatinine 1.12 (H) 0.57 - 1.00 mg/dL    Sodium 141 136 - 145 mmol/L    Potassium 3.6 3.5 - 5.2 mmol/L    Chloride 106 98 - 107 mmol/L    CO2 23.9 22.0 - 29.0 mmol/L    Calcium 9.6 8.6 - 10.5 mg/dL    BUN/Creatinine Ratio 27.7 (H) 7.0 - 25.0    Anion Gap 11.1 5.0 - 15.0 mmol/L    eGFR 47.4 (L) >60.0 mL/min/1.73   CBC (No Diff)    Specimen: Blood   Result Value Ref Range    WBC 5.69 3.40 - 10.80 10*3/mm3    RBC  3.88 3.77 - 5.28 10*6/mm3    Hemoglobin 12.9 12.0 - 15.9 g/dL    Hematocrit 39.8 34.0 - 46.6 %    .6 (H) 79.0 - 97.0 fL    MCH 33.2 (H) 26.6 - 33.0 pg    MCHC 32.4 31.5 - 35.7 g/dL    RDW 13.2 12.3 - 15.4 %    RDW-SD 49.5 37.0 - 54.0 fl    MPV 9.8 6.0 - 12.0 fL    Platelets 280 140 - 450 10*3/mm3   Magnesium    Specimen: Blood   Result Value Ref Range    Magnesium 2.2 1.6 - 2.4 mg/dL   Troponin    Specimen: Blood   Result Value Ref Range    Troponin T <0.010 0.000 - 0.030 ng/mL   Stress Test With Myocardial Perfusion One Day   Result Value Ref Range    Target HR (85%) 112 bpm    Max. Pred. HR (100%) 132 bpm     CV STRESS PROTOCOL 1 Pharmacologic     Stage 1 1     Duration Min Stage 1 0     Duration Sec Stage 1 10     Stress Dose Regadenoson Stage 1 0.4     Stress Comments Stage 1 10 sec bolus injection     Baseline HR 63 bpm    Baseline /62 mmHg    Peak HR 75 bpm    Percent Max Pred HR 56.82 %    Percent Target HR 67 %    Peak /54 mmHg    Recovery HR 71 bpm    Recovery /51 mmHg    Nuclear Prior Study 3     BH CV REST NUCLEAR ISOTOPE DOSE 10.7 mCi    BH CV STRESS NUCLEAR ISOTOPE DOSE 32.7 mCi    Nuc Stress EF 84 %   ECG 12 Lead   Result Value Ref Range    QT Interval 475 ms   ECG 12 Lead   Result Value Ref Range    QT Interval 475 ms   ECG 12 Lead   Result Value Ref Range    QT Interval 453 ms         The following portions of the patient's history were reviewed and updated as appropriate: allergies, current medications, past family history, past medical history, past social history, past surgical history and problem list.    Review of Systems   Constitutional: Positive for fatigue.   HENT: Positive for congestion, postnasal drip and sinus pressure.    Respiratory: Positive for cough. Negative for chest tightness, shortness of breath and wheezing.    Endocrine: Negative for cold intolerance, heat intolerance, polydipsia, polyphagia and polyuria.       Objective   Physical Exam  Vitals and  nursing note reviewed.   Constitutional:       Appearance: Normal appearance. She is well-developed.   HENT:      Right Ear: Tympanic membrane, ear canal and external ear normal.      Left Ear: Tympanic membrane, ear canal and external ear normal.      Nose: Mucosal edema present.      Right Sinus: No maxillary sinus tenderness or frontal sinus tenderness.      Left Sinus: No maxillary sinus tenderness or frontal sinus tenderness.      Mouth/Throat:      Lips: Pink.      Mouth: Mucous membranes are moist.      Pharynx: Posterior oropharyngeal erythema present. No pharyngeal swelling, oropharyngeal exudate or uvula swelling.   Cardiovascular:      Rate and Rhythm: Normal rate and regular rhythm.   Pulmonary:      Effort: Pulmonary effort is normal.      Breath sounds: Normal breath sounds.   Neurological:      Mental Status: She is alert and oriented to person, place, and time.   Psychiatric:         Mood and Affect: Mood normal.         Behavior: Behavior normal.         Thought Content: Thought content normal.         Judgment: Judgment normal.         Assessment & Plan   Diagnoses and all orders for this visit:    1. Acquired hypothyroidism (Primary)  -     Comprehensive metabolic panel  -     Lipid panel  -     CBC and Differential  -     TSH  -     T4, free  -     T3, free    2. Mixed hyperlipidemia  -     Comprehensive metabolic panel  -     Lipid panel  -     CBC and Differential  -     TSH  -     T4, free  -     T3, free    3. Essential hypertension  -     Comprehensive metabolic panel  -     Lipid panel  -     CBC and Differential  -     TSH  -     T4, free  -     T3, free    4. Vitamin D deficiency  -     Vitamin D,25-Hydroxy    5. Acute URI  -     azithromycin (Zithromax) 500 MG tablet; Take 1 tablet by mouth Daily.  Dispense: 3 tablet; Refill: 0

## 2023-01-21 LAB
25(OH)D3+25(OH)D2 SERPL-MCNC: 40.8 NG/ML (ref 30–100)
ALBUMIN SERPL-MCNC: 4.4 G/DL (ref 3.5–5.2)
ALBUMIN/GLOB SERPL: 1.2 G/DL
ALP SERPL-CCNC: 93 U/L (ref 39–117)
ALT SERPL-CCNC: 14 U/L (ref 1–33)
AST SERPL-CCNC: 26 U/L (ref 1–32)
BASOPHILS # BLD AUTO: 0.09 10*3/MM3 (ref 0–0.2)
BASOPHILS NFR BLD AUTO: 1.9 % (ref 0–1.5)
BILIRUB SERPL-MCNC: 0.3 MG/DL (ref 0–1.2)
BUN SERPL-MCNC: 30 MG/DL (ref 8–23)
BUN/CREAT SERPL: 26.1 (ref 7–25)
CALCIUM SERPL-MCNC: 10.3 MG/DL (ref 8.6–10.5)
CHLORIDE SERPL-SCNC: 102 MMOL/L (ref 98–107)
CHOLEST SERPL-MCNC: 244 MG/DL (ref 0–200)
CO2 SERPL-SCNC: 24.7 MMOL/L (ref 22–29)
CREAT SERPL-MCNC: 1.15 MG/DL (ref 0.57–1)
EGFRCR SERPLBLD CKD-EPI 2021: 45.6 ML/MIN/1.73
EOSINOPHIL # BLD AUTO: 0.17 10*3/MM3 (ref 0–0.4)
EOSINOPHIL NFR BLD AUTO: 3.6 % (ref 0.3–6.2)
ERYTHROCYTE [DISTWIDTH] IN BLOOD BY AUTOMATED COUNT: 12.2 % (ref 12.3–15.4)
GLOBULIN SER CALC-MCNC: 3.6 GM/DL
GLUCOSE SERPL-MCNC: 89 MG/DL (ref 65–99)
HCT VFR BLD AUTO: 40.9 % (ref 34–46.6)
HDLC SERPL-MCNC: 45 MG/DL (ref 40–60)
HGB BLD-MCNC: 14 G/DL (ref 12–15.9)
IMM GRANULOCYTES # BLD AUTO: 0.03 10*3/MM3 (ref 0–0.05)
IMM GRANULOCYTES NFR BLD AUTO: 0.6 % (ref 0–0.5)
LDLC SERPL CALC-MCNC: 170 MG/DL (ref 0–100)
LYMPHOCYTES # BLD AUTO: 1.98 10*3/MM3 (ref 0.7–3.1)
LYMPHOCYTES NFR BLD AUTO: 41.5 % (ref 19.6–45.3)
MCH RBC QN AUTO: 34.7 PG (ref 26.6–33)
MCHC RBC AUTO-ENTMCNC: 34.2 G/DL (ref 31.5–35.7)
MCV RBC AUTO: 101.5 FL (ref 79–97)
MONOCYTES # BLD AUTO: 0.55 10*3/MM3 (ref 0.1–0.9)
MONOCYTES NFR BLD AUTO: 11.5 % (ref 5–12)
NEUTROPHILS # BLD AUTO: 1.95 10*3/MM3 (ref 1.7–7)
NEUTROPHILS NFR BLD AUTO: 40.9 % (ref 42.7–76)
NRBC BLD AUTO-RTO: 0.2 /100 WBC (ref 0–0.2)
PLATELET # BLD AUTO: 253 10*3/MM3 (ref 140–450)
POTASSIUM SERPL-SCNC: 5 MMOL/L (ref 3.5–5.2)
PROT SERPL-MCNC: 8 G/DL (ref 6–8.5)
RBC # BLD AUTO: 4.03 10*6/MM3 (ref 3.77–5.28)
SODIUM SERPL-SCNC: 138 MMOL/L (ref 136–145)
T3FREE SERPL-MCNC: 2.4 PG/ML (ref 2–4.4)
T4 FREE SERPL-MCNC: 1.3 NG/DL (ref 0.93–1.7)
TRIGL SERPL-MCNC: 158 MG/DL (ref 0–150)
TSH SERPL DL<=0.005 MIU/L-ACNC: 2.52 UIU/ML (ref 0.27–4.2)
VLDLC SERPL CALC-MCNC: 29 MG/DL (ref 5–40)
WBC # BLD AUTO: 4.77 10*3/MM3 (ref 3.4–10.8)

## 2023-02-20 RX ORDER — LEVOTHYROXINE SODIUM 0.05 MG/1
TABLET ORAL
Qty: 30 TABLET | Refills: 4 | Status: SHIPPED | OUTPATIENT
Start: 2023-02-20

## 2023-03-03 RX ORDER — APIXABAN 2.5 MG/1
TABLET, FILM COATED ORAL
Qty: 180 TABLET | Refills: 3 | Status: SHIPPED | OUTPATIENT
Start: 2023-03-03

## 2023-03-06 PROCEDURE — G2066 INTER DEVC REMOTE 30D: HCPCS | Performed by: INTERNAL MEDICINE

## 2023-03-06 PROCEDURE — 93298 REM INTERROG DEV EVAL SCRMS: CPT | Performed by: INTERNAL MEDICINE

## 2023-03-07 ENCOUNTER — APPOINTMENT (OUTPATIENT)
Dept: GENERAL RADIOLOGY | Facility: HOSPITAL | Age: 88
End: 2023-03-07
Payer: MEDICARE

## 2023-03-07 ENCOUNTER — APPOINTMENT (OUTPATIENT)
Dept: CT IMAGING | Facility: HOSPITAL | Age: 88
End: 2023-03-07
Payer: MEDICARE

## 2023-03-07 ENCOUNTER — HOSPITAL ENCOUNTER (OUTPATIENT)
Facility: HOSPITAL | Age: 88
LOS: 2 days | Discharge: NURSING, REHAB OR LONG-TERM CARE | End: 2023-03-14
Attending: EMERGENCY MEDICINE | Admitting: STUDENT IN AN ORGANIZED HEALTH CARE EDUCATION/TRAINING PROGRAM
Payer: MEDICARE

## 2023-03-07 DIAGNOSIS — S01.81XA FACIAL LACERATION, INITIAL ENCOUNTER: ICD-10-CM

## 2023-03-07 DIAGNOSIS — S42.402A CLOSED FRACTURE OF LEFT ELBOW, INITIAL ENCOUNTER: ICD-10-CM

## 2023-03-07 DIAGNOSIS — R53.1 GENERALIZED WEAKNESS: ICD-10-CM

## 2023-03-07 DIAGNOSIS — S09.90XA CLOSED HEAD INJURY, INITIAL ENCOUNTER: ICD-10-CM
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
## 2023-03-07 DIAGNOSIS — S00.83XA FACIAL CONTUSION, INITIAL ENCOUNTER: ICD-10-CM

## 2023-03-07 PROBLEM — S51.811A LACERATION OF RIGHT FOREARM: Status: ACTIVE | Noted: 2023-03-07

## 2023-03-07 PROBLEM — N18.31 CHRONIC KIDNEY DISEASE, STAGE 3A: Status: ACTIVE | Noted: 2023-03-07

## 2023-03-07 LAB
ALBUMIN SERPL-MCNC: 3.1 G/DL (ref 3.5–5.2)
ALBUMIN/GLOB SERPL: 0.5 G/DL
ALP SERPL-CCNC: 89 U/L (ref 39–117)
ALT SERPL W P-5'-P-CCNC: 21 U/L (ref 1–33)
ANION GAP SERPL CALCULATED.3IONS-SCNC: 9.8 MMOL/L (ref 5–15)
APTT PPP: 27.5 SECONDS (ref 22.7–35.4)
AST SERPL-CCNC: 44 U/L (ref 1–32)
BASOPHILS # BLD AUTO: 0.04 10*3/MM3 (ref 0–0.2)
BASOPHILS NFR BLD AUTO: 0.4 % (ref 0–1.5)
BILIRUB SERPL-MCNC: 0.6 MG/DL (ref 0–1.2)
BUN SERPL-MCNC: 21 MG/DL (ref 8–23)
BUN/CREAT SERPL: 20 (ref 7–25)
CALCIUM SPEC-SCNC: 10.8 MG/DL (ref 8.6–10.5)
CHLORIDE SERPL-SCNC: 96 MMOL/L (ref 98–107)
CK SERPL-CCNC: 174 U/L (ref 20–180)
CO2 SERPL-SCNC: 28.2 MMOL/L (ref 22–29)
CREAT SERPL-MCNC: 1.05 MG/DL (ref 0.57–1)
DEPRECATED RDW RBC AUTO: 43.1 FL (ref 37–54)
EGFRCR SERPLBLD CKD-EPI 2021: 50.9 ML/MIN/1.73
EOSINOPHIL # BLD AUTO: 0.01 10*3/MM3 (ref 0–0.4)
EOSINOPHIL NFR BLD AUTO: 0.1 % (ref 0.3–6.2)
ERYTHROCYTE [DISTWIDTH] IN BLOOD BY AUTOMATED COUNT: 11.9 % (ref 12.3–15.4)
GLOBULIN UR ELPH-MCNC: 5.9 GM/DL
GLUCOSE SERPL-MCNC: 163 MG/DL (ref 65–99)
HCT VFR BLD AUTO: 43.6 % (ref 34–46.6)
HGB BLD-MCNC: 14.8 G/DL (ref 12–15.9)
IMM GRANULOCYTES # BLD AUTO: 0.03 10*3/MM3 (ref 0–0.05)
IMM GRANULOCYTES NFR BLD AUTO: 0.3 % (ref 0–0.5)
INR PPP: 1 (ref 0.9–1.1)
LYMPHOCYTES # BLD AUTO: 1.06 10*3/MM3 (ref 0.7–3.1)
LYMPHOCYTES NFR BLD AUTO: 10.8 % (ref 19.6–45.3)
MCH RBC QN AUTO: 33.8 PG (ref 26.6–33)
MCHC RBC AUTO-ENTMCNC: 33.9 G/DL (ref 31.5–35.7)
MCV RBC AUTO: 99.5 FL (ref 79–97)
MONOCYTES # BLD AUTO: 0.69 10*3/MM3 (ref 0.1–0.9)
MONOCYTES NFR BLD AUTO: 7 % (ref 5–12)
NEUTROPHILS NFR BLD AUTO: 7.96 10*3/MM3 (ref 1.7–7)
NEUTROPHILS NFR BLD AUTO: 81.4 % (ref 42.7–76)
NRBC BLD AUTO-RTO: 0 /100 WBC (ref 0–0.2)
PLATELET # BLD AUTO: 310 10*3/MM3 (ref 140–450)
PMV BLD AUTO: 9.9 FL (ref 6–12)
POTASSIUM SERPL-SCNC: 5 MMOL/L (ref 3.5–5.2)
PROT SERPL-MCNC: 9 G/DL (ref 6–8.5)
PROTHROMBIN TIME: 13.3 SECONDS (ref 11.7–14.2)
RBC # BLD AUTO: 4.38 10*6/MM3 (ref 3.77–5.28)
SODIUM SERPL-SCNC: 134 MMOL/L (ref 136–145)
WBC NRBC COR # BLD: 9.79 10*3/MM3 (ref 3.4–10.8)

## 2023-03-07 PROCEDURE — 99285 EMERGENCY DEPT VISIT HI MDM: CPT

## 2023-03-07 PROCEDURE — 85025 COMPLETE CBC W/AUTO DIFF WBC: CPT | Performed by: EMERGENCY MEDICINE

## 2023-03-07 PROCEDURE — G0378 HOSPITAL OBSERVATION PER HR: HCPCS

## 2023-03-07 PROCEDURE — 90715 TDAP VACCINE 7 YRS/> IM: CPT | Performed by: EMERGENCY MEDICINE

## 2023-03-07 PROCEDURE — 70486 CT MAXILLOFACIAL W/O DYE: CPT

## 2023-03-07 PROCEDURE — 73070 X-RAY EXAM OF ELBOW: CPT

## 2023-03-07 PROCEDURE — 85610 PROTHROMBIN TIME: CPT | Performed by: EMERGENCY MEDICINE

## 2023-03-07 PROCEDURE — 25010000002 FENTANYL CITRATE (PF) 50 MCG/ML SOLUTION: Performed by: EMERGENCY MEDICINE

## 2023-03-07 PROCEDURE — 85730 THROMBOPLASTIN TIME PARTIAL: CPT | Performed by: EMERGENCY MEDICINE

## 2023-03-07 PROCEDURE — 80053 COMPREHEN METABOLIC PANEL: CPT | Performed by: EMERGENCY MEDICINE

## 2023-03-07 PROCEDURE — 25010000002 TETANUS-DIPHTH-ACELL PERTUSSIS 5-2.5-18.5 LF-MCG/0.5 SUSPENSION PREFILLED SYRINGE: Performed by: EMERGENCY MEDICINE

## 2023-03-07 PROCEDURE — 70450 CT HEAD/BRAIN W/O DYE: CPT

## 2023-03-07 PROCEDURE — 0 LIDOCAINE 1 % SOLUTION: Performed by: PHYSICIAN ASSISTANT

## 2023-03-07 PROCEDURE — 73090 X-RAY EXAM OF FOREARM: CPT

## 2023-03-07 PROCEDURE — 82550 ASSAY OF CK (CPK): CPT | Performed by: EMERGENCY MEDICINE

## 2023-03-07 PROCEDURE — 72125 CT NECK SPINE W/O DYE: CPT

## 2023-03-07 PROCEDURE — 96374 THER/PROPH/DIAG INJ IV PUSH: CPT

## 2023-03-07 PROCEDURE — 90471 IMMUNIZATION ADMIN: CPT | Performed by: EMERGENCY MEDICINE

## 2023-03-07 RX ORDER — LIDOCAINE HYDROCHLORIDE 10 MG/ML
10 INJECTION, SOLUTION INFILTRATION; PERINEURAL ONCE
Status: COMPLETED | OUTPATIENT
Start: 2023-03-07 | End: 2023-03-07

## 2023-03-07 RX ORDER — SODIUM CHLORIDE 0.9 % (FLUSH) 0.9 %
10 SYRINGE (ML) INJECTION AS NEEDED
Status: DISCONTINUED | OUTPATIENT
Start: 2023-03-07 | End: 2023-03-14 | Stop reason: HOSPADM

## 2023-03-07 RX ORDER — FENTANYL CITRATE 50 UG/ML
50 INJECTION, SOLUTION INTRAMUSCULAR; INTRAVENOUS ONCE
Status: COMPLETED | OUTPATIENT
Start: 2023-03-07 | End: 2023-03-07

## 2023-03-07 RX ADMIN — LIDOCAINE HYDROCHLORIDE 10 ML: 10 INJECTION, SOLUTION INFILTRATION; PERINEURAL at 20:59

## 2023-03-07 RX ADMIN — FENTANYL CITRATE 50 MCG: 50 INJECTION, SOLUTION INTRAMUSCULAR; INTRAVENOUS at 19:19

## 2023-03-07 RX ADMIN — TETANUS TOXOID, REDUCED DIPHTHERIA TOXOID AND ACELLULAR PERTUSSIS VACCINE, ADSORBED 0.5 ML: 5; 2.5; 8; 8; 2.5 SUSPENSION INTRAMUSCULAR at 19:21

## 2023-03-07 NOTE — ED TRIAGE NOTES
From home with c/o abrasion on face and L elbow pain s/p fall at home    Pt wearing mask on arrival.

## 2023-03-08 ENCOUNTER — APPOINTMENT (OUTPATIENT)
Dept: CT IMAGING | Facility: HOSPITAL | Age: 88
End: 2023-03-08
Payer: MEDICARE

## 2023-03-08 LAB
ANION GAP SERPL CALCULATED.3IONS-SCNC: 9.9 MMOL/L (ref 5–15)
BUN SERPL-MCNC: 26 MG/DL (ref 8–23)
BUN/CREAT SERPL: 22.6 (ref 7–25)
CALCIUM SPEC-SCNC: 9.7 MG/DL (ref 8.6–10.5)
CHLORIDE SERPL-SCNC: 100 MMOL/L (ref 98–107)
CO2 SERPL-SCNC: 24.1 MMOL/L (ref 22–29)
CREAT SERPL-MCNC: 1.15 MG/DL (ref 0.57–1)
DEPRECATED RDW RBC AUTO: 43.2 FL (ref 37–54)
EGFRCR SERPLBLD CKD-EPI 2021: 45.6 ML/MIN/1.73
ERYTHROCYTE [DISTWIDTH] IN BLOOD BY AUTOMATED COUNT: 12 % (ref 12.3–15.4)
GLUCOSE SERPL-MCNC: 114 MG/DL (ref 65–99)
HCT VFR BLD AUTO: 35.6 % (ref 34–46.6)
HGB BLD-MCNC: 12.3 G/DL (ref 12–15.9)
MAGNESIUM SERPL-MCNC: 2.1 MG/DL (ref 1.6–2.4)
MCH RBC QN AUTO: 34.5 PG (ref 26.6–33)
MCHC RBC AUTO-ENTMCNC: 34.6 G/DL (ref 31.5–35.7)
MCV RBC AUTO: 99.7 FL (ref 79–97)
PHOSPHATE SERPL-MCNC: 3.9 MG/DL (ref 2.5–4.5)
PLATELET # BLD AUTO: 260 10*3/MM3 (ref 140–450)
PMV BLD AUTO: 10.2 FL (ref 6–12)
POTASSIUM SERPL-SCNC: 4.3 MMOL/L (ref 3.5–5.2)
RBC # BLD AUTO: 3.57 10*6/MM3 (ref 3.77–5.28)
SODIUM SERPL-SCNC: 134 MMOL/L (ref 136–145)
WBC NRBC COR # BLD: 7.8 10*3/MM3 (ref 3.4–10.8)

## 2023-03-08 PROCEDURE — 63710000001 HYDROCODONE-ACETAMINOPHEN 5-325 MG TABLET: Performed by: ORTHOPAEDIC SURGERY

## 2023-03-08 PROCEDURE — 97110 THERAPEUTIC EXERCISES: CPT

## 2023-03-08 PROCEDURE — A9270 NON-COVERED ITEM OR SERVICE: HCPCS | Performed by: ORTHOPAEDIC SURGERY

## 2023-03-08 PROCEDURE — 73200 CT UPPER EXTREMITY W/O DYE: CPT

## 2023-03-08 PROCEDURE — 83735 ASSAY OF MAGNESIUM: CPT | Performed by: HOSPITALIST

## 2023-03-08 PROCEDURE — 63710000001 DULOXETINE 60 MG CAPSULE DELAYED-RELEASE PARTICLES: Performed by: ORTHOPAEDIC SURGERY

## 2023-03-08 PROCEDURE — G0378 HOSPITAL OBSERVATION PER HR: HCPCS

## 2023-03-08 PROCEDURE — 80048 BASIC METABOLIC PNL TOTAL CA: CPT | Performed by: HOSPITALIST

## 2023-03-08 PROCEDURE — 63710000001 LOSARTAN 25 MG TABLET: Performed by: ORTHOPAEDIC SURGERY

## 2023-03-08 PROCEDURE — 97116 GAIT TRAINING THERAPY: CPT

## 2023-03-08 PROCEDURE — 63710000001 LEVOTHYROXINE 50 MCG TABLET: Performed by: ORTHOPAEDIC SURGERY

## 2023-03-08 PROCEDURE — 63710000001 AMLODIPINE 5 MG TABLET: Performed by: ORTHOPAEDIC SURGERY

## 2023-03-08 PROCEDURE — 97166 OT EVAL MOD COMPLEX 45 MIN: CPT

## 2023-03-08 PROCEDURE — 97535 SELF CARE MNGMENT TRAINING: CPT

## 2023-03-08 PROCEDURE — 63710000001 ACETAMINOPHEN 325 MG TABLET: Performed by: ORTHOPAEDIC SURGERY

## 2023-03-08 PROCEDURE — 85027 COMPLETE CBC AUTOMATED: CPT | Performed by: HOSPITALIST

## 2023-03-08 PROCEDURE — 84100 ASSAY OF PHOSPHORUS: CPT | Performed by: HOSPITALIST

## 2023-03-08 PROCEDURE — 97162 PT EVAL MOD COMPLEX 30 MIN: CPT

## 2023-03-08 PROCEDURE — 63710000001 ALLOPURINOL 100 MG TABLET: Performed by: ORTHOPAEDIC SURGERY

## 2023-03-08 RX ORDER — AMLODIPINE BESYLATE 5 MG/1
5 TABLET ORAL DAILY
Status: DISCONTINUED | OUTPATIENT
Start: 2023-03-08 | End: 2023-03-14 | Stop reason: HOSPADM

## 2023-03-08 RX ORDER — LOSARTAN POTASSIUM 25 MG/1
25 TABLET ORAL 2 TIMES DAILY
Status: DISCONTINUED | OUTPATIENT
Start: 2023-03-08 | End: 2023-03-14 | Stop reason: HOSPADM

## 2023-03-08 RX ORDER — ACETAMINOPHEN 325 MG/1
650 TABLET ORAL EVERY 4 HOURS PRN
Status: DISCONTINUED | OUTPATIENT
Start: 2023-03-08 | End: 2023-03-14 | Stop reason: HOSPADM

## 2023-03-08 RX ORDER — SODIUM CHLORIDE 9 MG/ML
40 INJECTION, SOLUTION INTRAVENOUS AS NEEDED
Status: DISCONTINUED | OUTPATIENT
Start: 2023-03-08 | End: 2023-03-14 | Stop reason: HOSPADM

## 2023-03-08 RX ORDER — ONDANSETRON 2 MG/ML
4 INJECTION INTRAMUSCULAR; INTRAVENOUS EVERY 6 HOURS PRN
Status: DISCONTINUED | OUTPATIENT
Start: 2023-03-08 | End: 2023-03-14 | Stop reason: HOSPADM

## 2023-03-08 RX ORDER — ACETAMINOPHEN 650 MG/1
650 SUPPOSITORY RECTAL EVERY 4 HOURS PRN
Status: DISCONTINUED | OUTPATIENT
Start: 2023-03-08 | End: 2023-03-14 | Stop reason: HOSPADM

## 2023-03-08 RX ORDER — ALLOPURINOL 100 MG/1
100 TABLET ORAL DAILY
Status: DISCONTINUED | OUTPATIENT
Start: 2023-03-08 | End: 2023-03-14 | Stop reason: HOSPADM

## 2023-03-08 RX ORDER — LEVOTHYROXINE SODIUM 0.05 MG/1
50 TABLET ORAL EVERY MORNING
Status: DISCONTINUED | OUTPATIENT
Start: 2023-03-08 | End: 2023-03-14 | Stop reason: HOSPADM

## 2023-03-08 RX ORDER — ACETAMINOPHEN 160 MG/5ML
650 SOLUTION ORAL EVERY 4 HOURS PRN
Status: DISCONTINUED | OUTPATIENT
Start: 2023-03-08 | End: 2023-03-14 | Stop reason: HOSPADM

## 2023-03-08 RX ORDER — HYDROCODONE BITARTRATE AND ACETAMINOPHEN 5; 325 MG/1; MG/1
1 TABLET ORAL EVERY 4 HOURS PRN
Status: DISCONTINUED | OUTPATIENT
Start: 2023-03-08 | End: 2023-03-14 | Stop reason: HOSPADM

## 2023-03-08 RX ORDER — SODIUM CHLORIDE 0.9 % (FLUSH) 0.9 %
10 SYRINGE (ML) INJECTION EVERY 12 HOURS SCHEDULED
Status: DISCONTINUED | OUTPATIENT
Start: 2023-03-08 | End: 2023-03-14 | Stop reason: HOSPADM

## 2023-03-08 RX ORDER — DULOXETIN HYDROCHLORIDE 60 MG/1
60 CAPSULE, DELAYED RELEASE ORAL DAILY
Status: DISCONTINUED | OUTPATIENT
Start: 2023-03-08 | End: 2023-03-14 | Stop reason: HOSPADM

## 2023-03-08 RX ORDER — ONDANSETRON 4 MG/1
4 TABLET, FILM COATED ORAL EVERY 6 HOURS PRN
Status: DISCONTINUED | OUTPATIENT
Start: 2023-03-08 | End: 2023-03-14 | Stop reason: HOSPADM

## 2023-03-08 RX ORDER — SODIUM CHLORIDE 0.9 % (FLUSH) 0.9 %
10 SYRINGE (ML) INJECTION AS NEEDED
Status: DISCONTINUED | OUTPATIENT
Start: 2023-03-08 | End: 2023-03-14 | Stop reason: HOSPADM

## 2023-03-08 RX ORDER — CEFAZOLIN SODIUM 2 G/100ML
2 INJECTION, SOLUTION INTRAVENOUS
Status: COMPLETED | OUTPATIENT
Start: 2023-03-11 | End: 2023-03-11

## 2023-03-08 RX ADMIN — AMLODIPINE BESYLATE 5 MG: 5 TABLET ORAL at 09:50

## 2023-03-08 RX ADMIN — DULOXETINE HYDROCHLORIDE 60 MG: 60 CAPSULE, DELAYED RELEASE ORAL at 09:50

## 2023-03-08 RX ADMIN — HYDROCODONE BITARTRATE AND ACETAMINOPHEN 1 TABLET: 5; 325 TABLET ORAL at 09:50

## 2023-03-08 RX ADMIN — HYDROCODONE BITARTRATE AND ACETAMINOPHEN 1 TABLET: 5; 325 TABLET ORAL at 14:26

## 2023-03-08 RX ADMIN — Medication 10 ML: at 22:38

## 2023-03-08 RX ADMIN — ALLOPURINOL 100 MG: 100 TABLET ORAL at 09:50

## 2023-03-08 RX ADMIN — HYDROCODONE BITARTRATE AND ACETAMINOPHEN 1 TABLET: 5; 325 TABLET ORAL at 03:32

## 2023-03-08 RX ADMIN — HYDROCODONE BITARTRATE AND ACETAMINOPHEN 1 TABLET: 5; 325 TABLET ORAL at 18:38

## 2023-03-08 RX ADMIN — LEVOTHYROXINE SODIUM 50 MCG: 0.05 TABLET ORAL at 06:00

## 2023-03-08 RX ADMIN — HYDROCODONE BITARTRATE AND ACETAMINOPHEN 1 TABLET: 5; 325 TABLET ORAL at 22:38

## 2023-03-08 RX ADMIN — LOSARTAN POTASSIUM 25 MG: 25 TABLET, FILM COATED ORAL at 09:50

## 2023-03-08 RX ADMIN — Medication 10 ML: at 09:51

## 2023-03-08 RX ADMIN — LOSARTAN POTASSIUM 25 MG: 25 TABLET, FILM COATED ORAL at 22:38

## 2023-03-08 RX ADMIN — ACETAMINOPHEN 325 MG: 325 TABLET, FILM COATED ORAL at 22:38

## 2023-03-08 NOTE — PROGRESS NOTES
Name: Alba Correa ADMIT: 3/7/2023   : 1933  PCP: Aramis Doty MD    MRN: [id number] LOS: 1 days   AGE/SEX: 89 y.o. female  ROOM: [facility]     Subjective   Subjective     Patient is lying on the bed and appears very weak and lethargic and chronically ill-appearing.  Complaining of pain.  Denies nausea, vomiting, chest pain, shortness of breath.       Objective   Objective   Vital Signs  Temp:  [97.3 °F (36.3 °C)-98.8 °F (37.1 °C)] 98.1 °F (36.7 °C)  Heart Rate:  [] 89  Resp:  [16-18] 18  BP: (108-157)/(66-92) 129/85  SpO2:  [93 %-100 %] 95 %  on   ;   Device (Oxygen Therapy): room air  Body mass index is 21.45 kg/m².  Physical Exam HEENT: PERRLA, extraocular movements intact, Scleras no icterus, left periorbital bruising noted, bruising on left upper lip and chin also noted.  Cardiovascular: Regular rate and rhythm with normal S1 and S2  Respiratory: Fairly clear to auscultation bilaterally with no wheezes  GI: Soft, nontender, bowel sounds present  Extremities: Left upper extremity is in a sling    Results Review     I reviewed the patient's new clinical results.  Results from last 7 days   Lab Units 23   WBC 10*3/mm3 7.80 9.79   HEMOGLOBIN g/dL 12.3 14.8   PLATELETS 10*3/mm3 260 310     Results from last 7 days   Lab Units 23   SODIUM mmol/L 134* 134*   POTASSIUM mmol/L 4.3 5.0   CHLORIDE mmol/L 100 96*   CO2 mmol/L 24.1 28.2   BUN mg/dL 26* 21   CREATININE mg/dL 1.15* 1.05*   GLUCOSE mg/dL 114* 163*   EGFR mL/min/1.73 45.6* 50.9*     Results from last 7 days   Lab Units 23   ALBUMIN g/dL 3.1*   BILIRUBIN mg/dL 0.6   ALK PHOS U/L 89   AST (SGOT) U/L 44*   ALT (SGPT) U/L 21     Results from last 7 days   Lab Units 23  0527 23  1919   CALCIUM mg/dL 9.7 10.8*   ALBUMIN g/dL  --  3.1*   MAGNESIUM mg/dL 2.1  --    PHOSPHORUS mg/dL 3.9  --        No results found for: HGBA1C, POCGLU    CT Head Without Contrast    Result  Date: 3/8/2023  1. No acute intracranial abnormality is identified. There is moderate small vessel disease in the cerebral white matter and there is diffuse cerebral atrophy. 2. There is a scalp hematoma over the anterior inferior lateral left frontal bone extending into the left periorbital region from today's head trauma. The remainder of the head CT is within normal limits with no acute skull fracture or intracranial hemorrhage identified  FACIAL CT TECHNIQUE: Spiral CT images were obtained through the facial bones in the axial imaging plain. The images were reformatted and submitted in 2 mm thick axial, sagittal and coronal CT sections with soft tissue algorithm and 1 mm thick axial, sagittal and coronal CT sections with high-resolution bone algorithm.  COMPARISON: There are no prior facial CTs for comparison.  FINDINGS: Reidentified is moderate size scalp hematoma over the anterior-inferior lateral left frontal bone from today's head trauma extends into the left periorbital region. I see no underlying skull fracture. The orbits are normal in appearance. 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 There is periapical lucency adjacent to the root of the right mandibular premolar tooth #28 likely a tiny periapical abscess. Lucencies adjacent to the left maxillary incisor tooth #10 and canine likely a tiny periapical abscess.  IMPRESSION: 1. Moderate size scalp hematoma over the anterior-inferior lateral left frontal bone extending to left lateral periorbital region from today's head and facial trauma and there is also what appears to be swelling in hematoma anterior to the chin. There is a subtle hairline lucency through the mid left nasal bone the is suspicious for very  subtle hairline nondisplaced fracture and please correlate with physical exam of the left nasal bone. No additional facial fracture seen 2. The patient has periapical lucencies adjacent to the right mandibular premolar tooth #28 as well as the left maxillary lateral incisor and canine teeth #10 and 11 compatible with tiny periapical abscesses and correlation with dental exam suggested, The remainder of the facial CT is within normal limits.  CERVICAL SPINE CT TECHNIQUE: Spiral CT images were obtained from the skull base down to T5 thoracic level and images were reformatted and submitted in 2 mm thick axial and sagittal CT sections with soft tissue algorithm and 1 mm thick axial, sagittal and coronal CT sections with high-resolution bone algorithm.  COMPARISON: There are no prior cervical spine CTs for comparison..  FINDINGS: The atlantooccipital articulation is normal in appearance. At C1-2 there are arthritic changes at the atlantodental interval with narrowing of the interval, marginal spurring off the anterior ring of C1 and the odontoid, otherwise the C1-2 level is normal in appearance.  At C2-3 there is mild-to-moderate left facet overgrowth. The disc space and right facets are normal, there is no canal or foraminal narrowing.  At C3-4 there is moderate left, minimal right facet overgrowth, small posterior central disc bulge and there is no canal or foraminal narrowing.  At C4-5 there is moderate left facet overgrowth. There is a 1 to 2 mm anterolisthesis of C4 on C5, posterior disc margin is normal. There is no canal or foraminal narrowing.  At C5-6 there is moderate disc space narrowing and there are degenerative disc and endplate changes, mild canal narrowing, some mild uncovertebral joint hypertrophy and there is mild right but no left foraminal narrowing  AT C6-7 there is disc space narrowing and degenerative endplate changes, minimal posterior spurring but there is no canal narrowing and there is minimal  foraminal narrowing  At C7-T1 there is mild bilateral facet overgrowth. The posterior disc margin is normal. There is no canal or foraminal narrowing.  No acute fracture is seen in the cervical spine  IMPRESSION: 1. No acute fracture is seen in the cervical spine. There is cervical spondylosis as described in great detail above. Radiation dose reduction techniques were utilized, including automated exposure control and exposure modulation based on body size.  This report was finalized on 3/8/2023 7:28 AM by Dr. Ankit Boss M.D.      CT Cervical Spine Without Contrast    Result Date: 3/8/2023  1. No acute intracranial abnormality is identified. There is moderate small vessel disease in the cerebral white matter and there is diffuse cerebral atrophy. 2. There is a scalp hematoma over the anterior inferior lateral left frontal bone extending into the left periorbital region from today's head trauma. The remainder of the head CT is within normal limits with no acute skull fracture or intracranial hemorrhage identified  FACIAL CT TECHNIQUE: Spiral CT images were obtained through the facial bones in the axial imaging plain. The images were reformatted and submitted in 2 mm thick axial, sagittal and coronal CT sections with soft tissue algorithm and 1 mm thick axial, sagittal and coronal CT sections with high-resolution bone algorithm.  COMPARISON: There are no prior facial CTs for comparison.  FINDINGS: Reidentified is moderate size scalp hematoma over the anterior-inferior lateral left frontal bone from today's head trauma extends into the left periorbital region. I see no underlying skull fracture. The orbits are normal in appearance. There is a hairline lucency tracking in the left nasal bone consistent with a hairline nondisplaced fracture There is joint space narrowing in the temporomandibular joints with marginal spurring off the mandibular heads compatible with osteoarthritic change in the temporomandibular  joints. There is periapical lucency adjacent to the root of the right mandibular premolar tooth #28 likely a tiny periapical abscess. Lucencies adjacent to the left maxillary incisor tooth #10 and canine likely a tiny periapical abscess.  IMPRESSION: 1. Moderate size scalp hematoma over the anterior-inferior lateral left frontal bone extending to left lateral periorbital region from today's head and facial trauma and there is also what appears to be swelling in hematoma anterior to the chin. There is a subtle hairline lucency through the mid left nasal bone the is suspicious for very subtle hairline nondisplaced fracture and please correlate with physical exam of the left nasal bone. No additional facial fracture seen 2. The patient has periapical lucencies adjacent to the right mandibular premolar tooth #28 as well as the left maxillary lateral incisor and canine teeth #10 and 11 compatible with tiny periapical abscesses and correlation with dental exam suggested, The remainder of the facial CT is within normal limits.  CERVICAL SPINE CT TECHNIQUE: Spiral CT images were obtained from the skull base down to T5 thoracic level and images were reformatted and submitted in 2 mm thick axial and sagittal CT sections with soft tissue algorithm and 1 mm thick axial, sagittal and coronal CT sections with high-resolution bone algorithm.  COMPARISON: There are no prior cervical spine CTs for comparison..  FINDINGS: The atlantooccipital articulation is normal in appearance. At C1-2 there are arthritic changes at the atlantodental interval with narrowing of the interval, marginal spurring off the anterior ring of C1 and the odontoid, otherwise the C1-2 level is normal in appearance.  At C2-3 there is mild-to-moderate left facet overgrowth. The disc space and right facets are normal, there is no canal or foraminal narrowing.  At C3-4 there is moderate left, minimal right facet overgrowth, small posterior central disc bulge and  there is no canal or foraminal narrowing.  At C4-5 there is moderate left facet overgrowth. There is a 1 to 2 mm anterolisthesis of C4 on C5, posterior disc margin is normal. There is no canal or foraminal narrowing.  At C5-6 there is moderate disc space narrowing and there are degenerative disc and endplate changes, mild canal narrowing, some mild uncovertebral joint hypertrophy and there is mild right but no left foraminal narrowing  AT C6-7 there is disc space narrowing and degenerative endplate changes, minimal posterior spurring but there is no canal narrowing and there is minimal foraminal narrowing  At C7-T1 there is mild bilateral facet overgrowth. The posterior disc margin is normal. There is no canal or foraminal narrowing.  No acute fracture is seen in the cervical spine  IMPRESSION: 1. No acute fracture is seen in the cervical spine. There is cervical spondylosis as described in great detail above. Radiation dose reduction techniques were utilized, including automated exposure control and exposure modulation based on body size.  This report was finalized on 3/8/2023 7:28 AM by Dr. Ankit Boss M.D.      CT Facial Bones Without Contrast    Result Date: 3/8/2023  1. No acute intracranial abnormality is identified. There is moderate small vessel disease in the cerebral white matter and there is diffuse cerebral atrophy. 2. There is a scalp hematoma over the anterior inferior lateral left frontal bone extending into the left periorbital region from today's head trauma. The remainder of the head CT is within normal limits with no acute skull fracture or intracranial hemorrhage identified  FACIAL CT TECHNIQUE: Spiral CT images were obtained through the facial bones in the axial imaging plain. The images were reformatted and submitted in 2 mm thick axial, sagittal and coronal CT sections with soft tissue algorithm and 1 mm thick axial, sagittal and coronal CT sections with high-resolution bone algorithm.   COMPARISON: There are no prior facial CTs for comparison.  FINDINGS: Reidentified is moderate size scalp hematoma over the anterior-inferior lateral left frontal bone from today's head trauma extends into the left periorbital region. I see no underlying skull fracture. The orbits are normal in appearance. 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 There is periapical lucency adjacent to the root of the right mandibular premolar tooth #28 likely a tiny periapical abscess. Lucencies adjacent to the left maxillary incisor tooth #10 and canine likely a tiny periapical abscess.  IMPRESSION: 1. Moderate size scalp hematoma over the anterior-inferior lateral left frontal bone extending to left lateral periorbital region from today's head and facial trauma and there is also what appears to be swelling in hematoma anterior to the chin. There is a subtle hairline lucency through the mid left nasal bone the is suspicious for very subtle hairline nondisplaced fracture and please correlate with physical exam of the left nasal bone. No additional facial fracture seen 2. The patient has periapical lucencies adjacent to the right mandibular premolar tooth #28 as well as the left maxillary lateral incisor and canine teeth #10 and 11 compatible with tiny periapical abscesses and correlation with dental exam suggested, The remainder of the facial CT is within normal limits.  CERVICAL SPINE CT TECHNIQUE: Spiral CT images were obtained from the skull base down to T5 thoracic level and images were reformatted and submitted in 2 mm thick axial and sagittal CT sections with soft tissue algorithm and 1 mm thick axial, sagittal and coronal CT sections with high-resolution bone algorithm.   COMPARISON: There are no prior cervical spine CTs for comparison..  FINDINGS: The atlantooccipital articulation is normal in appearance. At C1-2 there are arthritic changes at the atlantodental interval with narrowing of the interval, marginal spurring off the anterior ring of C1 and the odontoid, otherwise the C1-2 level is normal in appearance.  At C2-3 there is mild-to-moderate left facet overgrowth. The disc space and right facets are normal, there is no canal or foraminal narrowing.  At C3-4 there is moderate left, minimal right facet overgrowth, small posterior central disc bulge and there is no canal or foraminal narrowing.  At C4-5 there is moderate left facet overgrowth. There is a 1 to 2 mm anterolisthesis of C4 on C5, posterior disc margin is normal. There is no canal or foraminal narrowing.  At C5-6 there is moderate disc space narrowing and there are degenerative disc and endplate changes, mild canal narrowing, some mild uncovertebral joint hypertrophy and there is mild right but no left foraminal narrowing  AT C6-7 there is disc space narrowing and degenerative endplate changes, minimal posterior spurring but there is no canal narrowing and there is minimal foraminal narrowing  At C7-T1 there is mild bilateral facet overgrowth. The posterior disc margin is normal. There is no canal or foraminal narrowing.  No acute fracture is seen in the cervical spine  IMPRESSION: 1. No acute fracture is seen in the cervical spine. There is cervical spondylosis as described in great detail above. Radiation dose reduction techniques were utilized, including automated exposure control and exposure modulation based on body size.  This report was finalized on 3/8/2023 7:28 AM by Dr. Ankit Boss M.D.      CT Upper Extremity Left Without Contrast    Result Date: 3/8/2023  Supracondylar fracture with impaction and posterior angulation. Fracture extends to the articular surface along the lateral margin of the trochlea and  medial margin of the capitellum where there is incongruity of the articular surface. Multiple fracture fragments extend anterior to the radiocapitellar joint. Large hemarthrosis.  Radiation dose reduction techniques were utilized, including automated exposure control and exposure modulation based on body size.       I have personally reviewed all medications:  Scheduled Medications  allopurinol, 100 mg, Oral, Daily  amLODIPine, 5 mg, Oral, Daily  [START ON 3/11/2023] ceFAZolin, 2 g, Intravenous, On Call to OR  DULoxetine, 60 mg, Oral, Daily  levothyroxine, 50 mcg, Oral, QAM  losartan, 25 mg, Oral, BID  sodium chloride, 10 mL, Intravenous, Q12H    Infusions   Diet  Diet: Regular/House Diet; Texture: Regular Texture (IDDSI 7); Fluid Consistency: Thin (IDDSI 0)  NPO Diet NPO Type: Strict NPO    I have personally reviewed:  [x]  Laboratory   [x]  Microbiology   [x]  Radiology   [x]  EKG/Telemetry  [x]  Cardiology/Vascular   [x]  Pathology    [x]  Records       Assessment/Plan     Active Hospital Problems    Diagnosis  POA   • **Fall from standing, initial encounter [W19.XXXA]  Yes   • Closed fracture of left distal humerus [S42.402A]  Yes   • Facial laceration [S01.81XA]  Unknown   • Laceration of right forearm [S51.811A]  Unknown   • Chronic kidney disease, stage 3a (HCC) [N18.31]  Unknown   • Atrial fibrillation, persistent (HCC) [I48.19]  Yes   • Hyperlipidemia [E78.5]  Yes   • Rheumatoid arthritis (HCC) [M06.9]  Yes      Resolved Hospital Problems   No resolved problems to display.       1. Mechanical fall with left humeral fracture, currently patient is in a sling and will continue with analgesics for pain control and orthopedics consultation is also been obtained.  PT/OT consult has been obtained.  2.  CKD stage IIIa, creatinine is at baseline.  3.  Hypothyroidism, on Synthroid.  4.  History of atrial fibrillation, rate controlled and is on Eliquis at home which has been held.  5.  Hypertension, on losartan and  amlodipine which will be continued.  6.  On SCDs for DVT prophylaxis.  7.  CODE STATUS is DNR.    Vladimir Beltran MD  [facility]  03/08/23  15:55 EST

## 2023-03-08 NOTE — CONSULTS
Orthopaedic Surgery  Consult Note  Dr. BENNIE Encinas II  [phone]    HPI:  Patient is a 89 y.o. Not  or  female who presents with left elbow pain after a fall from standing.  Due to significant weakness and head injury she was admitted.  She is currently undergoing work-up and observation.  She complains of sharp pains in the left elbow worse with any activity.  X-rays in the hospital demonstrated a left distal humerus fracture which was a little bit difficult to classify due to patient positioning and pain.  She was splinted.  She is right-hand dominant.  She complains of sharp pains in the left elbow all throughout the night.  Her pain really has not gotten much better and is not well controlled with current medications.    MEDICAL HISTORY  Past Medical History:   Diagnosis Date   • Acquired hypothyroidism 5/3/2022   • Acute diastolic CHF (congestive heart failure) ([facility]) 3/20/2022   • Chronic kidney disease    • CVA (cerebral vascular accident) ([facility])    • Degeneration of intervertebral disc    • Depression    • DVT (deep venous thrombosis) ([facility])    • First degree AV block 05/26/2021   • Gout    • H/O complete eye exam 09/2016   • Hyperlipidemia    • IBS (irritable bowel syndrome)    • Nonrheumatic mitral valve regurgitation    • OAB (overactive bladder)    • Osteopenia    • Osteoporosis    • PAF (paroxysmal atrial fibrillation) ([facility]) 11/08/2019   • Peripheral neuropathy    • Rheumatoid arthritis ([facility])    • Sinus bradycardia 05/26/2021   ·   Past Surgical History:   Procedure Laterality Date   • APPENDECTOMY     • BREAST BIOPSY     • CARDIAC ELECTROPHYSIOLOGY PROCEDURE N/A 10/12/2021    Procedure: Loop insertion linq;  Surgeon: Tone Anguiano MD;  Location: [facility] INVASIVE LOCATION;  Service: Cardiovascular;  Laterality: N/A;   • CARDIOVERSION  08/03/2020    Dr. Colby   • COLONOSCOPY      declines   • HERNIA REPAIR  1965   • HYSTERECTOMY      still has ovaries   • MAMMO  BILATERAL  11/16/2016    pt declines   • PAP SMEAR  11/16/2016    declines   ·   Prior to Admission medications    Medication Sig Start Date End Date Taking? Authorizing Provider   acetaminophen (TYLENOL) 650 MG 8 hr tablet Take 1,300 mg by mouth Every 8 (Eight) Hours As Needed for Mild Pain .    Vanessa Castle MD   allopurinol (ZYLOPRIM) 100 MG tablet TAKE 1 TABLET BY MOUTH  DAILY 10/28/22   Aramis Doty MD   amLODIPine (NORVASC) 5 MG tablet Take 1 tablet by mouth Daily. 4/21/22   Celia Quijano APRN   DULoxetine (CYMBALTA) 60 MG capsule TAKE 1 CAPSULE BY MOUTH  DAILY 10/28/22   Aramis Doty MD   Eliquis 2.5 MG tablet tablet TAKE 1 TABLET BY MOUTH  EVERY 12 HOURS 3/3/23   Roshan Martines III, MD   levothyroxine (SYNTHROID, LEVOTHROID) 50 MCG tablet TAKE 1 TABLET BY MOUTH IN THE  MORNING 2/20/23   Araims Doty MD   losartan (COZAAR) 25 MG tablet TAKE 1 TABLET BY MOUTH  TWICE DAILY 9/13/22   Roshan Martines III, MD   Multiple Vitamins-Minerals (CENTRUM SILVER) tablet Take 1 tablet by mouth Daily.    Vanessa Castle MD   Omega-3 Fatty Acids (OMEGA-3 FISH OIL PO) Take 1,600 mg by mouth Every Morning.    Vanessa Castle MD   azithromycin (Zithromax) 500 MG tablet Take 1 tablet by mouth Daily. 1/17/23 3/8/23  Marla Winston APRN   calcium carbonate-vitamin d 600-400 MG-UNIT per tablet Take 1 tablet by mouth Daily.  3/8/23  Vanessa Castle MD   hydrALAZINE (APRESOLINE) 10 MG tablet Take 1 tablet by mouth Every 8 (Eight) Hours. 3/27/22 3/27/22  Holland Ryan MD   ·   Allergies   Allergen Reactions   • Bactrim [Sulfamethoxazole-Trimethoprim] Delirium   • Lisinopril Unknown (See Comments)     Unknown per pt   • Levofloxacin Rash   ·   Most Recent Immunizations   Administered Date(s) Administered   • COVID-19 (PFIZER) PURPLE CAP 11/09/2021   • Flu Vaccine Quad PF >36MO 09/21/2017   • Fluzone High Dose =>65 Years ([facility] ONLY) 10/07/2021   • Fluzone Quad >6mos (Multi-dose)  "2016   • Influenza TIV (IM) 2015   • Influenza, Unspecified 2020   • Pneumococcal Conjugate 13-Valent (PCV13) 08/10/2016   • Pneumococcal Polysaccharide (PPSV23) 2012, 2012   • Tdap 2023   ·   Social History     Tobacco Use   • Smoking status: Former     Years: 5.00     Types: Cigarettes     Quit date: 1975     Years since quittin.2   • Smokeless tobacco: Never   • Tobacco comments:     caffeine use: 2 cups coffee in AM   Substance Use Topics   • Alcohol use: Yes     Comment: Occasional- glass of wine few times a week   ·    Social History     Substance and Sexual Activity   Drug Use No   ·     VITALS: /71   Pulse 107   Temp 98.4 °F (36.9 °C) (Oral)   Resp 17   Ht 157.5 cm (62\")   Wt 53.2 kg (117 lb 4.6 oz)   LMP  (LMP Unknown)   SpO2 93%   BMI 21.45 kg/m²  Body mass index is 21.45 kg/m².    PHYSICAL EXAM:   · CONSTITUTIONAL: No acute distress  · LUNGS: Equal chest rise, no shortness of air  · CARDIOVASCULAR: palpable peripheral pulses  · SKIN: no skin lesions in the area examined  · LYMPH: no lymphadenopathy in the area examined  · EXTREMITY: Left Upper Extremity  · Tenderness to Palpation: Splinted, unable to assess  · Gross Deformity: Splinted  · Pulses:  Brisk Capillary Refill  · Sensation: Intact to her fingers and thumb  · Motor: Able to wiggle her fingers and thumb    RADIOLOGY REVIEW:   CT Head Without Contrast    Result Date: 3/7/2023  1. No acute intracranial abnormality is identified. There is moderate small vessel disease in the cerebral white matter and there is diffuse cerebral atrophy. 2. There is a scalp hematoma over the anterior inferior lateral left frontal bone extending into the left periorbital region from today's head trauma. The remainder of the head CT is within normal limits with no acute skull fracture or intracranial hemorrhage identified  FACIAL CT TECHNIQUE: Spiral CT images were obtained through the facial bones in the axial " imaging plain. The images were reformatted and submitted in 2 mm thick axial, sagittal and coronal CT sections with soft tissue algorithm and 1 mm thick axial, sagittal and coronal CT sections with high-resolution bone algorithm.  COMPARISON: There are no prior facial CTs for comparison.  FINDINGS: Reidentified is moderate size scalp hematoma over the anterior-inferior lateral left frontal bone from today's head trauma extends into the left periorbital region. I see no underlying skull fracture. The orbits are normal in appearance. 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 There is periapical lucency adjacent to the root of the right mandibular premolar tooth #28 likely a tiny periapical abscess. Lucencies adjacent to the left maxillary incisor tooth #10 and canine likely a tiny periapical abscess.  IMPRESSION: 1. Moderate size scalp hematoma over the anterior-inferior lateral left frontal bone extending to left lateral periorbital region from today's head and facial trauma and there is also what appears to be swelling in hematoma anterior to the chin. There is a subtle hairline lucency through the mid left nasal bone the is suspicious for very subtle hairline nondisplaced fracture. No additional facial fracture seen 2. The patient has periapical lucencies adjacent to the right mandibular premolar tooth #28 as well as the left maxillary lateral incisor and canine teeth #10 and 11 compatible with tiny periapical abscesses and correlation with dental exam suggested, The remainder of the facial CT is within normal limits.  CERVICAL SPINE CT TECHNIQUE: Spiral CT images were obtained from the skull base down to T5 thoracic level and images were reformatted and submitted in 2  mm thick axial and sagittal CT sections with soft tissue algorithm and 1 mm thick axial, sagittal and coronal CT sections with high-resolution bone algorithm.  COMPARISON: There are no prior cervical spine CTs for comparison..  FINDINGS: The atlantooccipital articulation is normal in appearance. At C1-2 there are arthritic changes at the atlantodental interval with narrowing of the interval, marginal spurring off the anterior ring of C1 and the odontoid, otherwise the C1-2 level is normal in appearance.  At C2-3 there is mild-to-moderate left facet overgrowth. The disc space and right facets are normal, there is no canal or foraminal narrowing.  At C3-4 there is moderate left, minimal right facet overgrowth, small posterior central disc bulge and there is no canal or foraminal narrowing.  At C4-5 there is moderate left facet overgrowth. There is a 1 to 2 mm anterolisthesis of C4 on C5, posterior disc margin is normal. There is no canal or foraminal narrowing.  At C5-6 there is moderate disc space narrowing and there are degenerative disc and endplate changes, mild canal narrowing, some mild uncovertebral joint hypertrophy and there is mild right but no left foraminal narrowing  AT C6-7 there is disc space narrowing and degenerative endplate changes, minimal posterior spurring but there is no canal narrowing and there is minimal foraminal narrowing  At C7-T1 there is mild bilateral facet overgrowth. The posterior disc margin is normal. There is no canal or foraminal narrowing.  No acute fracture is seen in the cervical spine  IMPRESSION: 1. No acute fracture is seen in the cervical spine. There is cervical spondylosis as described in great detail above. Radiation dose reduction techniques were utilized, including automated exposure control and exposure modulation based on body size.       CT Cervical Spine Without Contrast    Result Date: 3/7/2023  1. No acute intracranial abnormality is identified. There is moderate  small vessel disease in the cerebral white matter and there is diffuse cerebral atrophy. 2. There is a scalp hematoma over the anterior inferior lateral left frontal bone extending into the left periorbital region from today's head trauma. The remainder of the head CT is within normal limits with no acute skull fracture or intracranial hemorrhage identified  FACIAL CT TECHNIQUE: Spiral CT images were obtained through the facial bones in the axial imaging plain. The images were reformatted and submitted in 2 mm thick axial, sagittal and coronal CT sections with soft tissue algorithm and 1 mm thick axial, sagittal and coronal CT sections with high-resolution bone algorithm.  COMPARISON: There are no prior facial CTs for comparison.  FINDINGS: Reidentified is moderate size scalp hematoma over the anterior-inferior lateral left frontal bone from today's head trauma extends into the left periorbital region. I see no underlying skull fracture. The orbits are normal in appearance. 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 There is periapical lucency adjacent to the root of the right mandibular premolar tooth #28 likely a tiny periapical abscess. Lucencies adjacent to the left maxillary incisor tooth #10 and canine likely a tiny periapical abscess.  IMPRESSION: 1. Moderate size scalp hematoma over the anterior-inferior lateral left frontal bone extending to left lateral periorbital region from today's head and facial trauma and there is also what appears to be swelling in hematoma anterior to the chin. There is a subtle hairline lucency through the mid left nasal bone the is suspicious for very subtle hairline nondisplaced fracture. No additional facial fracture seen 2. The  patient has periapical lucencies adjacent to the right mandibular premolar tooth #28 as well as the left maxillary lateral incisor and canine teeth #10 and 11 compatible with tiny periapical abscesses and correlation with dental exam suggested, The remainder of the facial CT is within normal limits.  CERVICAL SPINE CT TECHNIQUE: Spiral CT images were obtained from the skull base down to T5 thoracic level and images were reformatted and submitted in 2 mm thick axial and sagittal CT sections with soft tissue algorithm and 1 mm thick axial, sagittal and coronal CT sections with high-resolution bone algorithm.  COMPARISON: There are no prior cervical spine CTs for comparison..  FINDINGS: The atlantooccipital articulation is normal in appearance. At C1-2 there are arthritic changes at the atlantodental interval with narrowing of the interval, marginal spurring off the anterior ring of C1 and the odontoid, otherwise the C1-2 level is normal in appearance.  At C2-3 there is mild-to-moderate left facet overgrowth. The disc space and right facets are normal, there is no canal or foraminal narrowing.  At C3-4 there is moderate left, minimal right facet overgrowth, small posterior central disc bulge and there is no canal or foraminal narrowing.  At C4-5 there is moderate left facet overgrowth. There is a 1 to 2 mm anterolisthesis of C4 on C5, posterior disc margin is normal. There is no canal or foraminal narrowing.  At C5-6 there is moderate disc space narrowing and there are degenerative disc and endplate changes, mild canal narrowing, some mild uncovertebral joint hypertrophy and there is mild right but no left foraminal narrowing  AT C6-7 there is disc space narrowing and degenerative endplate changes, minimal posterior spurring but there is no canal narrowing and there is minimal foraminal narrowing  At C7-T1 there is mild bilateral facet overgrowth. The posterior disc margin is normal. There is no canal or foraminal  narrowing.  No acute fracture is seen in the cervical spine  IMPRESSION: 1. No acute fracture is seen in the cervical spine. There is cervical spondylosis as described in great detail above. Radiation dose reduction techniques were utilized, including automated exposure control and exposure modulation based on body size.       CT Facial Bones Without Contrast    Result Date: 3/7/2023  1. No acute intracranial abnormality is identified. There is moderate small vessel disease in the cerebral white matter and there is diffuse cerebral atrophy. 2. There is a scalp hematoma over the anterior inferior lateral left frontal bone extending into the left periorbital region from today's head trauma. The remainder of the head CT is within normal limits with no acute skull fracture or intracranial hemorrhage identified  FACIAL CT TECHNIQUE: Spiral CT images were obtained through the facial bones in the axial imaging plain. The images were reformatted and submitted in 2 mm thick axial, sagittal and coronal CT sections with soft tissue algorithm and 1 mm thick axial, sagittal and coronal CT sections with high-resolution bone algorithm.  COMPARISON: There are no prior facial CTs for comparison.  FINDINGS: Reidentified is moderate size scalp hematoma over the anterior-inferior lateral left frontal bone from today's head trauma extends into the left periorbital region. I see no underlying skull fracture. The orbits are normal in appearance. 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 There is periapical lucency adjacent to the root of the right mandibular premolar tooth #28 likely a tiny periapical abscess. Lucencies adjacent to the left maxillary incisor tooth #10 and canine likely a tiny  periapical abscess.  IMPRESSION: 1. Moderate size scalp hematoma over the anterior-inferior lateral left frontal bone extending to left lateral periorbital region from today's head and facial trauma and there is also what appears to be swelling in hematoma anterior to the chin. There is a subtle hairline lucency through the mid left nasal bone the is suspicious for very subtle hairline nondisplaced fracture. No additional facial fracture seen 2. The patient has periapical lucencies adjacent to the right mandibular premolar tooth #28 as well as the left maxillary lateral incisor and canine teeth #10 and 11 compatible with tiny periapical abscesses and correlation with dental exam suggested, The remainder of the facial CT is within normal limits.  CERVICAL SPINE CT TECHNIQUE: Spiral CT images were obtained from the skull base down to T5 thoracic level and images were reformatted and submitted in 2 mm thick axial and sagittal CT sections with soft tissue algorithm and 1 mm thick axial, sagittal and coronal CT sections with high-resolution bone algorithm.  COMPARISON: There are no prior cervical spine CTs for comparison..  FINDINGS: The atlantooccipital articulation is normal in appearance. At C1-2 there are arthritic changes at the atlantodental interval with narrowing of the interval, marginal spurring off the anterior ring of C1 and the odontoid, otherwise the C1-2 level is normal in appearance.  At C2-3 there is mild-to-moderate left facet overgrowth. The disc space and right facets are normal, there is no canal or foraminal narrowing.  At C3-4 there is moderate left, minimal right facet overgrowth, small posterior central disc bulge and there is no canal or foraminal narrowing.  At C4-5 there is moderate left facet overgrowth. There is a 1 to 2 mm anterolisthesis of C4 on C5, posterior disc margin is normal. There is no canal or foraminal narrowing.  At C5-6 there is moderate disc space narrowing and there are  degenerative disc and endplate changes, mild canal narrowing, some mild uncovertebral joint hypertrophy and there is mild right but no left foraminal narrowing  AT C6-7 there is disc space narrowing and degenerative endplate changes, minimal posterior spurring but there is no canal narrowing and there is minimal foraminal narrowing  At C7-T1 there is mild bilateral facet overgrowth. The posterior disc margin is normal. There is no canal or foraminal narrowing.  No acute fracture is seen in the cervical spine  IMPRESSION: 1. No acute fracture is seen in the cervical spine. There is cervical spondylosis as described in great detail above. Radiation dose reduction techniques were utilized, including automated exposure control and exposure modulation based on body size.         LABS:   Results for the past 24 hours:   Recent Results (from the past 24 hour(s))   Comprehensive Metabolic Panel    Collection Time: 03/07/23  7:19 PM    Specimen: Blood   Result Value Ref Range    Glucose 163 (H) 65 - 99 mg/dL    BUN 21 8 - 23 mg/dL    Creatinine 1.05 (H) 0.57 - 1.00 mg/dL    Sodium 134 (L) 136 - 145 mmol/L    Potassium 5.0 3.5 - 5.2 mmol/L    Chloride 96 (L) 98 - 107 mmol/L    CO2 28.2 22.0 - 29.0 mmol/L    Calcium 10.8 (H) 8.6 - 10.5 mg/dL    Total Protein 9.0 (H) 6.0 - 8.5 g/dL    Albumin 3.1 (L) 3.5 - 5.2 g/dL    ALT (SGPT) 21 1 - 33 U/L    AST (SGOT) 44 (H) 1 - 32 U/L    Alkaline Phosphatase 89 39 - 117 U/L    Total Bilirubin 0.6 0.0 - 1.2 mg/dL    Globulin 5.9 gm/dL    A/G Ratio 0.5 g/dL    BUN/Creatinine Ratio 20.0 7.0 - 25.0    Anion Gap 9.8 5.0 - 15.0 mmol/L    eGFR 50.9 (L) >60.0 mL/min/1.73   Protime-INR    Collection Time: 03/07/23  7:19 PM    Specimen: Blood   Result Value Ref Range    Protime 13.3 11.7 - 14.2 Seconds    INR 1.00 0.90 - 1.10   aPTT    Collection Time: 03/07/23  7:19 PM    Specimen: Blood   Result Value Ref Range    PTT 27.5 22.7 - 35.4 seconds   CK    Collection Time: 03/07/23  7:19 PM     Specimen: Blood   Result Value Ref Range    Creatine Kinase 174 20 - 180 U/L   CBC Auto Differential    Collection Time: 03/07/23  7:19 PM    Specimen: Blood   Result Value Ref Range    WBC 9.79 3.40 - 10.80 10*3/mm3    RBC 4.38 3.77 - 5.28 10*6/mm3    Hemoglobin 14.8 12.0 - 15.9 g/dL    Hematocrit 43.6 34.0 - 46.6 %    MCV 99.5 (H) 79.0 - 97.0 fL    MCH 33.8 (H) 26.6 - 33.0 pg    MCHC 33.9 31.5 - 35.7 g/dL    RDW 11.9 (L) 12.3 - 15.4 %    RDW-SD 43.1 37.0 - 54.0 fl    MPV 9.9 6.0 - 12.0 fL    Platelets 310 140 - 450 10*3/mm3    Neutrophil % 81.4 (H) 42.7 - 76.0 %    Lymphocyte % 10.8 (L) 19.6 - 45.3 %    Monocyte % 7.0 5.0 - 12.0 %    Eosinophil % 0.1 (L) 0.3 - 6.2 %    Basophil % 0.4 0.0 - 1.5 %    Immature Grans % 0.3 0.0 - 0.5 %    Neutrophils, Absolute 7.96 (H) 1.70 - 7.00 10*3/mm3    Lymphocytes, Absolute 1.06 0.70 - 3.10 10*3/mm3    Monocytes, Absolute 0.69 0.10 - 0.90 10*3/mm3    Eosinophils, Absolute 0.01 0.00 - 0.40 10*3/mm3    Basophils, Absolute 0.04 0.00 - 0.20 10*3/mm3    Immature Grans, Absolute 0.03 0.00 - 0.05 10*3/mm3    nRBC 0.0 0.0 - 0.2 /100 WBC   Basic Metabolic Panel    Collection Time: 03/08/23  5:27 AM    Specimen: Blood   Result Value Ref Range    Glucose 114 (H) 65 - 99 mg/dL    BUN 26 (H) 8 - 23 mg/dL    Creatinine 1.15 (H) 0.57 - 1.00 mg/dL    Sodium 134 (L) 136 - 145 mmol/L    Potassium 4.3 3.5 - 5.2 mmol/L    Chloride 100 98 - 107 mmol/L    CO2 24.1 22.0 - 29.0 mmol/L    Calcium 9.7 8.6 - 10.5 mg/dL    BUN/Creatinine Ratio 22.6 7.0 - 25.0    Anion Gap 9.9 5.0 - 15.0 mmol/L    eGFR 45.6 (L) >60.0 mL/min/1.73   CBC (No Diff)    Collection Time: 03/08/23  5:27 AM    Specimen: Blood   Result Value Ref Range    WBC 7.80 3.40 - 10.80 10*3/mm3    RBC 3.57 (L) 3.77 - 5.28 10*6/mm3    Hemoglobin 12.3 12.0 - 15.9 g/dL    Hematocrit 35.6 34.0 - 46.6 %    MCV 99.7 (H) 79.0 - 97.0 fL    MCH 34.5 (H) 26.6 - 33.0 pg    MCHC 34.6 31.5 - 35.7 g/dL    RDW 12.0 (L) 12.3 - 15.4 %    RDW-SD 43.2 37.0 -  "54.0 fl    MPV 10.2 6.0 - 12.0 fL    Platelets 260 140 - 450 10*3/mm3   Magnesium    Collection Time: 03/08/23  5:27 AM    Specimen: Blood   Result Value Ref Range    Magnesium 2.1 1.6 - 2.4 mg/dL   Phosphorus    Collection Time: 03/08/23  5:27 AM    Specimen: Blood   Result Value Ref Range    Phosphorus 3.9 2.5 - 4.5 mg/dL       IMPRESSION:  Patient is a 89 y.o. Not  or  female with left distal humerus fracture    PLAN:   · Admited to: Logan Almodovar MD  · Disposition: Plan CT scan left elbow for surgical planning.  I did discuss with this patient there would be 2 options.  One option would be nonoperative treatment and allow the bone to heal as is.  She would be unlikely to regain much function or range of motion of the elbow but at least she would avoid surgery.  She states that although she is 89 years old she is very functional and does not wish to live disabled.  She would much prefer to have the elbow fixed if possible.  I am going to obtain the CT scan for surgical planning we will plan on surgery either Friday or Saturday pending OR availability.    R \"Pelon\" Ce LUONG MD  Orthopaedic Surgery  [facility]  [phone] - [place] [facility]  [phone] - [place] [facility]            "

## 2023-03-08 NOTE — H&P
Name: Alba Correa ADMIT: 3/7/2023   : 1933  PCP: Aramis Doty MD    MRN: [id number] LOS: 0 days   AGE/SEX: 89 y.o. female  ROOM:      Chief Complaint   Patient presents with   • Fall       Subjective   Patient is a 89 y.o. female who presents to [facility] with the above chief complaint.  Today she was walking through her house and she is feels like her left leg gave out and she fell down.  She laid on the ground for 2 or 3 hours until she was found by her family.  She has a history of a stroke and has left-sided weakness and does have an impaired gait.  She says that every once in a while that left leg just gets very weak and gives out on her.  She does not feel like she had any sort of symptoms prior to the fall denies chest pain palpitations or shortness of breath this morning.  However this evening she has been experiencing some chest discomfort that she attributes to all the excitement of things.  When her family found her she was unable to get herself up so then she was brought to the emergency room for further evaluation.  She has a history of atrial fibrillation and is followed in the outpatient setting by Dr. Martines.  They have attempted cardioversion and multiple times in the past but continues to convert back to atrial fibrillation.  She currently denies any shortness of breath or palpitations.  She also has underlying chronic kidney disease and is followed in the outpatient setting by Dr. Murray.  In the emergency room she had x-rays done of her left elbow with noted significant swelling.  The elbow showed a distal humerus fracture and possible intra-articular fracture.  Because of these findings she was admitted for orthopedic evaluation as well as for PT and OT evaluations to assess for home safety.    History of Present Illness    Past Medical History:   Diagnosis Date   • Acquired hypothyroidism 5/3/2022   • Acute diastolic CHF (congestive heart failure) (HCC)  3/20/2022   • Chronic kidney disease    • CVA (cerebral vascular accident) ([facility])    • Degeneration of intervertebral disc    • Depression    • DVT (deep venous thrombosis) ([facility])    • First degree AV block 2021   • Gout    • H/O complete eye exam 2016   • Hyperlipidemia    • IBS (irritable bowel syndrome)    • Nonrheumatic mitral valve regurgitation    • OAB (overactive bladder)    • Osteopenia    • Osteoporosis    • PAF (paroxysmal atrial fibrillation) ([facility]) 2019   • Peripheral neuropathy    • Rheumatoid arthritis ([facility])    • Sinus bradycardia 2021     Past Surgical History:   Procedure Laterality Date   • APPENDECTOMY     • BREAST BIOPSY     • CARDIAC ELECTROPHYSIOLOGY PROCEDURE N/A 10/12/2021    Procedure: Loop insertion linq;  Surgeon: Tone Anguiano MD;  Location: [facility] INVASIVE LOCATION;  Service: Cardiovascular;  Laterality: N/A;   • CARDIOVERSION  2020    Dr. Colby   • COLONOSCOPY      declines   • HERNIA REPAIR  1965   • HYSTERECTOMY      still has ovaries   • MAMMO BILATERAL  2016    pt declines   • PAP SMEAR  2016    declines     Family History   Problem Relation Age of Onset   • Alcohol abuse Other    • Cancer Other    • Hypertension Other    • Kidney disease Other    • Lung disease Other    • Heart disease Mother 89   • Heart failure Mother    • Ovarian cancer Daughter 60   • Stomach cancer Father    • Kidney cancer Father 52   • Lung cancer Father 52   • Ovarian cancer Sister    • Lung cancer Brother 65     Social History     Tobacco Use   • Smoking status: Former     Years: 5.00     Types: Cigarettes     Quit date:      Years since quittin.2   • Smokeless tobacco: Never   • Tobacco comments:     caffeine use: 2 cups coffee in AM   Vaping Use   • Vaping Use: Never used   Substance Use Topics   • Alcohol use: Yes     Comment: Occasional- glass of wine few times a week   • Drug use: No     (Not in a hospital admission)    Allergies:  Bactrim  [sulfamethoxazole-trimethoprim], Lisinopril, and Levofloxacin    Review of Systems     Objective    Vital Signs  Temp:  [97.3 °F (36.3 °C)] 97.3 °F (36.3 °C)  Heart Rate:  [] 92  Resp:  [16] 16  BP: (124-155)/(68-91) 134/73  SpO2:  [97 %-100 %] 97 %  on   ;   Device (Oxygen Therapy): room air  Body mass index is 21.03 kg/m².    Physical Exam  Vitals and nursing note reviewed.   Constitutional:       General: She is not in acute distress.     Appearance: She is ill-appearing.   Cardiovascular:      Rate and Rhythm: Tachycardia present. Rhythm irregular.   Pulmonary:      Effort: No respiratory distress.      Breath sounds: Normal breath sounds. No wheezing.   Abdominal:      General: Bowel sounds are normal. There is no distension.      Palpations: Abdomen is soft.   Musculoskeletal:      Comments: Left arm/elbow in sling and splint   Skin:     General: Skin is warm and dry.   Neurological:      General: No focal deficit present.      Mental Status: She is alert. Mental status is at baseline.         Results Review:   I reviewed the patient's new clinical results.  Results from last 7 days   Lab Units 03/07/23 1919   WBC 10*3/mm3 9.79   HEMOGLOBIN g/dL 14.8   PLATELETS 10*3/mm3 310     Results from last 7 days   Lab Units 03/07/23 1919   SODIUM mmol/L 134*   POTASSIUM mmol/L 5.0   CHLORIDE mmol/L 96*   CO2 mmol/L 28.2   BUN mg/dL 21   CREATININE mg/dL 1.05*   GLUCOSE mg/dL 163*   ALBUMIN g/dL 3.1*   BILIRUBIN mg/dL 0.6   ALK PHOS U/L 89   AST (SGOT) U/L 44*   ALT (SGPT) U/L 21   Estimated Creatinine Clearance: 29.9 mL/min (A) (by C-G formula based on SCr of 1.05 mg/dL (H)).  Results from last 7 days   Lab Units 03/07/23 1919   INR  1.00   CK TOTAL U/L 174         Invalid input(s): LDLCALC    CT Head Without Contrast   Preliminary Result   1. No acute intracranial abnormality is identified. There is moderate   small vessel disease in the cerebral white matter and there is diffuse   cerebral atrophy.   2.  There is a scalp hematoma over the anterior inferior lateral left   frontal bone extending into the left periorbital region from today's   head trauma. The remainder of the head CT is within normal limits with   no acute skull fracture or intracranial hemorrhage identified       FACIAL CT TECHNIQUE: Spiral CT images were obtained through the facial   bones in the axial imaging plain. The images were reformatted and   submitted in 2 mm thick axial, sagittal and coronal CT sections with   soft tissue algorithm and 1 mm thick axial, sagittal and coronal CT   sections with high-resolution bone algorithm.       COMPARISON: There are no prior facial CTs for comparison.       FINDINGS: Reidentified is moderate size scalp hematoma over the   anterior-inferior lateral left frontal bone from today's head trauma   extends into the left periorbital region. I see no underlying skull   fracture. The orbits are normal in appearance. There is a hairline   lucency tracking in the left nasal bone consistent with a hairline   nondisplaced fracture   There is joint space narrowing in the temporomandibular joints with   marginal spurring off the mandibular heads compatible with   osteoarthritic change in the temporomandibular joints. There is   periapical lucency adjacent to the root of the right mandibular premolar   tooth #28 likely a tiny periapical abscess. Lucencies adjacent to the   left maxillary incisor tooth #10 and canine likely a tiny periapical   abscess.        IMPRESSION:   1. Moderate size scalp hematoma over the anterior-inferior lateral left   frontal bone extending to left lateral periorbital region from today's   head and facial trauma and there is also what appears to be swelling in   hematoma anterior to the chin. There is a subtle hairline lucency   through the mid left nasal bone the is suspicious for very subtle   hairline nondisplaced fracture. No additional facial fracture seen   2. The patient has periapical  lucencies adjacent to the right mandibular   premolar tooth #28 as well as the left maxillary lateral incisor and   canine teeth #10 and 11 compatible with tiny periapical abscesses and   correlation with dental exam suggested, The remainder of the facial CT   is within normal limits.        CERVICAL SPINE CT TECHNIQUE: Spiral CT images were obtained from the   skull base down to T5 thoracic level and images were reformatted and   submitted in 2 mm thick axial and sagittal CT sections with soft tissue   algorithm and 1 mm thick axial, sagittal and coronal CT sections with   high-resolution bone algorithm.       COMPARISON: There are no prior cervical spine CTs for comparison..       FINDINGS: The atlantooccipital articulation is normal in appearance.   At C1-2 there are arthritic changes at the atlantodental interval with   narrowing of the interval, marginal spurring off the anterior ring of C1   and the odontoid, otherwise the C1-2 level is normal in appearance.       At C2-3 there is mild-to-moderate left facet overgrowth. The disc space   and right facets are normal, there is no canal or foraminal narrowing.       At C3-4 there is moderate left, minimal right facet overgrowth, small   posterior central disc bulge and there is no canal or foraminal   narrowing.        At C4-5 there is moderate left facet overgrowth. There is a 1 to 2 mm   anterolisthesis of C4 on C5, posterior disc margin is normal. There is   no canal or foraminal narrowing.        At C5-6 there is moderate disc space narrowing and there are   degenerative disc and endplate changes, mild canal narrowing, some mild   uncovertebral joint hypertrophy and there is mild right but no left   foraminal narrowing       AT C6-7 there is disc space narrowing and degenerative endplate changes,   minimal posterior spurring but there is no canal narrowing and there is   minimal foraminal narrowing       At C7-T1 there is mild bilateral facet overgrowth. The  posterior disc   margin is normal. There is no canal or foraminal narrowing.        No acute fracture is seen in the cervical spine       IMPRESSION:   1. No acute fracture is seen in the cervical spine. There is cervical   spondylosis as described in great detail above.    Radiation dose reduction techniques were utilized, including automated   exposure control and exposure modulation based on body size.              CT Cervical Spine Without Contrast   Preliminary Result   1. No acute intracranial abnormality is identified. There is moderate   small vessel disease in the cerebral white matter and there is diffuse   cerebral atrophy.   2. There is a scalp hematoma over the anterior inferior lateral left   frontal bone extending into the left periorbital region from today's   head trauma. The remainder of the head CT is within normal limits with   no acute skull fracture or intracranial hemorrhage identified       FACIAL CT TECHNIQUE: Spiral CT images were obtained through the facial   bones in the axial imaging plain. The images were reformatted and   submitted in 2 mm thick axial, sagittal and coronal CT sections with   soft tissue algorithm and 1 mm thick axial, sagittal and coronal CT   sections with high-resolution bone algorithm.       COMPARISON: There are no prior facial CTs for comparison.       FINDINGS: Reidentified is moderate size scalp hematoma over the   anterior-inferior lateral left frontal bone from today's head trauma   extends into the left periorbital region. I see no underlying skull   fracture. The orbits are normal in appearance. There is a hairline   lucency tracking in the left nasal bone consistent with a hairline   nondisplaced fracture   There is joint space narrowing in the temporomandibular joints with   marginal spurring off the mandibular heads compatible with   osteoarthritic change in the temporomandibular joints. There is   periapical lucency adjacent to the root of the right  mandibular premolar   tooth #28 likely a tiny periapical abscess. Lucencies adjacent to the   left maxillary incisor tooth #10 and canine likely a tiny periapical   abscess.        IMPRESSION:   1. Moderate size scalp hematoma over the anterior-inferior lateral left   frontal bone extending to left lateral periorbital region from today's   head and facial trauma and there is also what appears to be swelling in   hematoma anterior to the chin. There is a subtle hairline lucency   through the mid left nasal bone the is suspicious for very subtle   hairline nondisplaced fracture. No additional facial fracture seen   2. The patient has periapical lucencies adjacent to the right mandibular   premolar tooth #28 as well as the left maxillary lateral incisor and   canine teeth #10 and 11 compatible with tiny periapical abscesses and   correlation with dental exam suggested, The remainder of the facial CT   is within normal limits.        CERVICAL SPINE CT TECHNIQUE: Spiral CT images were obtained from the   skull base down to T5 thoracic level and images were reformatted and   submitted in 2 mm thick axial and sagittal CT sections with soft tissue   algorithm and 1 mm thick axial, sagittal and coronal CT sections with   high-resolution bone algorithm.       COMPARISON: There are no prior cervical spine CTs for comparison..       FINDINGS: The atlantooccipital articulation is normal in appearance.   At C1-2 there are arthritic changes at the atlantodental interval with   narrowing of the interval, marginal spurring off the anterior ring of C1   and the odontoid, otherwise the C1-2 level is normal in appearance.       At C2-3 there is mild-to-moderate left facet overgrowth. The disc space   and right facets are normal, there is no canal or foraminal narrowing.       At C3-4 there is moderate left, minimal right facet overgrowth, small   posterior central disc bulge and there is no canal or foraminal   narrowing.        At  C4-5 there is moderate left facet overgrowth. There is a 1 to 2 mm   anterolisthesis of C4 on C5, posterior disc margin is normal. There is   no canal or foraminal narrowing.        At C5-6 there is moderate disc space narrowing and there are   degenerative disc and endplate changes, mild canal narrowing, some mild   uncovertebral joint hypertrophy and there is mild right but no left   foraminal narrowing       AT C6-7 there is disc space narrowing and degenerative endplate changes,   minimal posterior spurring but there is no canal narrowing and there is   minimal foraminal narrowing       At C7-T1 there is mild bilateral facet overgrowth. The posterior disc   margin is normal. There is no canal or foraminal narrowing.        No acute fracture is seen in the cervical spine       IMPRESSION:   1. No acute fracture is seen in the cervical spine. There is cervical   spondylosis as described in great detail above.    Radiation dose reduction techniques were utilized, including automated   exposure control and exposure modulation based on body size.              CT Facial Bones Without Contrast   Preliminary Result   1. No acute intracranial abnormality is identified. There is moderate   small vessel disease in the cerebral white matter and there is diffuse   cerebral atrophy.   2. There is a scalp hematoma over the anterior inferior lateral left   frontal bone extending into the left periorbital region from today's   head trauma. The remainder of the head CT is within normal limits with   no acute skull fracture or intracranial hemorrhage identified       FACIAL CT TECHNIQUE: Spiral CT images were obtained through the facial   bones in the axial imaging plain. The images were reformatted and   submitted in 2 mm thick axial, sagittal and coronal CT sections with   soft tissue algorithm and 1 mm thick axial, sagittal and coronal CT   sections with high-resolution bone algorithm.       COMPARISON: There are no prior  facial CTs for comparison.       FINDINGS: Reidentified is moderate size scalp hematoma over the   anterior-inferior lateral left frontal bone from today's head trauma   extends into the left periorbital region. I see no underlying skull   fracture. The orbits are normal in appearance. There is a hairline   lucency tracking in the left nasal bone consistent with a hairline   nondisplaced fracture   There is joint space narrowing in the temporomandibular joints with   marginal spurring off the mandibular heads compatible with   osteoarthritic change in the temporomandibular joints. There is   periapical lucency adjacent to the root of the right mandibular premolar   tooth #28 likely a tiny periapical abscess. Lucencies adjacent to the   left maxillary incisor tooth #10 and canine likely a tiny periapical   abscess.        IMPRESSION:   1. Moderate size scalp hematoma over the anterior-inferior lateral left   frontal bone extending to left lateral periorbital region from today's   head and facial trauma and there is also what appears to be swelling in   hematoma anterior to the chin. There is a subtle hairline lucency   through the mid left nasal bone the is suspicious for very subtle   hairline nondisplaced fracture. No additional facial fracture seen   2. The patient has periapical lucencies adjacent to the right mandibular   premolar tooth #28 as well as the left maxillary lateral incisor and   canine teeth #10 and 11 compatible with tiny periapical abscesses and   correlation with dental exam suggested, The remainder of the facial CT   is within normal limits.        CERVICAL SPINE CT TECHNIQUE: Spiral CT images were obtained from the   skull base down to T5 thoracic level and images were reformatted and   submitted in 2 mm thick axial and sagittal CT sections with soft tissue   algorithm and 1 mm thick axial, sagittal and coronal CT sections with   high-resolution bone algorithm.       COMPARISON: There are no  prior cervical spine CTs for comparison..       FINDINGS: The atlantooccipital articulation is normal in appearance.   At C1-2 there are arthritic changes at the atlantodental interval with   narrowing of the interval, marginal spurring off the anterior ring of C1   and the odontoid, otherwise the C1-2 level is normal in appearance.       At C2-3 there is mild-to-moderate left facet overgrowth. The disc space   and right facets are normal, there is no canal or foraminal narrowing.       At C3-4 there is moderate left, minimal right facet overgrowth, small   posterior central disc bulge and there is no canal or foraminal   narrowing.        At C4-5 there is moderate left facet overgrowth. There is a 1 to 2 mm   anterolisthesis of C4 on C5, posterior disc margin is normal. There is   no canal or foraminal narrowing.        At C5-6 there is moderate disc space narrowing and there are   degenerative disc and endplate changes, mild canal narrowing, some mild   uncovertebral joint hypertrophy and there is mild right but no left   foraminal narrowing       AT C6-7 there is disc space narrowing and degenerative endplate changes,   minimal posterior spurring but there is no canal narrowing and there is   minimal foraminal narrowing       At C7-T1 there is mild bilateral facet overgrowth. The posterior disc   margin is normal. There is no canal or foraminal narrowing.        No acute fracture is seen in the cervical spine       IMPRESSION:   1. No acute fracture is seen in the cervical spine. There is cervical   spondylosis as described in great detail above.    Radiation dose reduction techniques were utilized, including automated   exposure control and exposure modulation based on body size.              XR Forearm 2 View Right         XR ELBOW 2 VIEW LEFT           Assessment & Plan       Fall from standing, initial encounter    Rheumatoid arthritis (HCC)    Hyperlipidemia    Atrial fibrillation, persistent (HCC)    Closed  fracture of left distal humerus    Facial laceration    Laceration of right forearm    Chronic kidney disease, stage 3a ([facility])      Assessment & Plan  This is an 89-year-old female with a history of prior stroke and left-sided weakness and gait impairment who presents to the hospital after a fall at home and suffered a left distal humerus fracture with facial trauma and right forearm laceration  -Orthopedic surgery is consulted to evaluate the humerus fracture may need additional imaging given the difficulty tracking the fracture line into the joint space.  She is also noted to have hemarthrosis.  Will defer to their expertise whether this will require surgery.  -Heart rates currently controlled.  Consider cardiac consultation if will require surgical intervention.  Otherwise home medications are reordered.  -Noted chronic kidney disease stage IIIa continue to monitor renal function closely.  -Noted hypercalcemia but also noted to have primary hyperparathyroidism.  Treat supportively for now  -PT OT evaluate the patient tomorrow to assist with discharge planning  -Mechanical DVT prophylaxis  -DNR      I discussed the patients findings and my recommendations with patient, family and nursing staff.          Logan Almodovar MD  [facility]  03/07/23  22:41 EST

## 2023-03-08 NOTE — ED NOTES
Pt states she was walking down the puri this afternoon and she fell where she landed on her face. Pt denies LOC. Pt is on blood thinners. Pt states she knocked out 4 teeth where her bridge was. Pt states she tried to get up but was unable to due to her left arm. Pt states she tried to move her left arm but was in a lot of pain. Pt has bruising to her upper lip and lower chin. Pt has skin tears on both arms and her right leg. Pt family states they got there around 1530 due to not being able to get a hold of the pt.

## 2023-03-08 NOTE — ED NOTES
.Nursing report ED to floor  Alba Corrae  89 y.o.  female    HPI :   Chief Complaint   Patient presents with    Fall       Admitting doctor:   Logan Almodovar MD    Admitting diagnosis:   The primary encounter diagnosis was Fall from standing, initial encounter. Diagnoses of Generalized weakness, Facial contusion, initial encounter, Closed fracture of left elbow, initial encounter, Facial laceration, initial encounter, and Closed head injury, initial encounter were also pertinent to this visit.    Code status:   Current Code Status       Date Active Code Status Order ID Comments User Context       3/7/2023 2246 No CPR (Do Not Attempt to Resuscitate) [id number]  Logan Almodovar MD ED        Question Answer    Code Status (Patient has no pulse and is not breathing) No CPR (Do Not Attempt to Resuscitate)    Medical Interventions (Patient has pulse or is breathing) Limited Support    Medical Intervention Limits: NO intubation (DNI)    Level Of Support Discussed With Patient                    Allergies:   Bactrim [sulfamethoxazole-trimethoprim], Lisinopril, and Levofloxacin    Isolation:   No active isolations    Intake and Output  No intake or output data in the 24 hours ending 03/07/23 2323    Weight:       03/07/23 2024   Weight: 52.2 kg (115 lb)       Most recent vitals:   Vitals:    03/07/23 2103 03/07/23 2131 03/07/23 2201 03/07/23 2231   BP: 134/73 136/92 124/68 129/81   BP Location:       Patient Position:       Pulse: 92 93 108 90   Resp:       Temp:       SpO2: 97% 94% 97% 100%   Weight:       Height:           Active LDAs/IV Access:   Lines, Drains & Airways       Active LDAs       Name Placement date Placement time Site Days    Peripheral IV 03/07/23 1918 Right Antecubital 03/07/23 1918  Antecubital  less than 1                    Labs (abnormal labs have a star):   Labs Reviewed   COMPREHENSIVE METABOLIC PANEL - Abnormal; Notable for the following components:       Result Value    Glucose 163  (*)     Creatinine 1.05 (*)     Sodium 134 (*)     Chloride 96 (*)     Calcium 10.8 (*)     Total Protein 9.0 (*)     Albumin 3.1 (*)     AST (SGOT) 44 (*)     eGFR 50.9 (*)     All other components within normal limits    Narrative:     GFR Normal >60  Chronic Kidney Disease <60  Kidney Failure <15    The GFR formula is only valid for adults with stable renal function between ages 18 and 70.   CBC WITH AUTO DIFFERENTIAL - Abnormal; Notable for the following components:    MCV 99.5 (*)     MCH 33.8 (*)     RDW 11.9 (*)     Neutrophil % 81.4 (*)     Lymphocyte % 10.8 (*)     Eosinophil % 0.1 (*)     Neutrophils, Absolute 7.96 (*)     All other components within normal limits   PROTIME-INR - Normal   APTT - Normal   CK - Normal   CBC AND DIFFERENTIAL    Narrative:     The following orders were created for panel order CBC & Differential.  Procedure                               Abnormality         Status                     ---------                               -----------         ------                     CBC Auto Differential[[id number]]        Abnormal            Final result                 Please view results for these tests on the individual orders.       EKG:   No orders to display       Meds given in ED:   Medications   sodium chloride 0.9 % flush 10 mL (has no administration in time range)   Tetanus-Diphth-Acell Pertussis (BOOSTRIX) injection 0.5 mL (0.5 mL Intramuscular Given 3/7/23 1921)   fentaNYL citrate (PF) (SUBLIMAZE) injection 50 mcg (50 mcg Intravenous Given 3/7/23 1919)   lidocaine (XYLOCAINE) 1 % injection 10 mL (10 mL Injection Given by Other 3/7/23 2059)       Imaging results:  CT Head Without Contrast    Result Date: 3/7/2023  1. No acute intracranial abnormality is identified. There is moderate small vessel disease in the cerebral white matter and there is diffuse cerebral atrophy. 2. There is a scalp hematoma over the anterior inferior lateral left frontal bone extending into the left  periorbital region from today's head trauma. The remainder of the head CT is within normal limits with no acute skull fracture or intracranial hemorrhage identified  FACIAL CT TECHNIQUE: Spiral CT images were obtained through the facial bones in the axial imaging plain. The images were reformatted and submitted in 2 mm thick axial, sagittal and coronal CT sections with soft tissue algorithm and 1 mm thick axial, sagittal and coronal CT sections with high-resolution bone algorithm.  COMPARISON: There are no prior facial CTs for comparison.  FINDINGS: Reidentified is moderate size scalp hematoma over the anterior-inferior lateral left frontal bone from today's head trauma extends into the left periorbital region. I see no underlying skull fracture. The orbits are normal in appearance. 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 There is periapical lucency adjacent to the root of the right mandibular premolar tooth #28 likely a tiny periapical abscess. Lucencies adjacent to the left maxillary incisor tooth #10 and canine likely a tiny periapical abscess.  IMPRESSION: 1. Moderate size scalp hematoma over the anterior-inferior lateral left frontal bone extending to left lateral periorbital region from today's head and facial trauma and there is also what appears to be swelling in hematoma anterior to the chin. There is a subtle hairline lucency through the mid left nasal bone the is suspicious for very subtle hairline nondisplaced fracture. No additional facial fracture seen 2. The patient has periapical lucencies adjacent to the right mandibular premolar tooth #28 as well as the left maxillary lateral incisor and canine teeth #10 and 11 compatible with tiny periapical abscesses  and correlation with dental exam suggested, The remainder of the facial CT is within normal limits.  CERVICAL SPINE CT TECHNIQUE: Spiral CT images were obtained from the skull base down to T5 thoracic level and images were reformatted and submitted in 2 mm thick axial and sagittal CT sections with soft tissue algorithm and 1 mm thick axial, sagittal and coronal CT sections with high-resolution bone algorithm.  COMPARISON: There are no prior cervical spine CTs for comparison..  FINDINGS: The atlantooccipital articulation is normal in appearance. At C1-2 there are arthritic changes at the atlantodental interval with narrowing of the interval, marginal spurring off the anterior ring of C1 and the odontoid, otherwise the C1-2 level is normal in appearance.  At C2-3 there is mild-to-moderate left facet overgrowth. The disc space and right facets are normal, there is no canal or foraminal narrowing.  At C3-4 there is moderate left, minimal right facet overgrowth, small posterior central disc bulge and there is no canal or foraminal narrowing.  At C4-5 there is moderate left facet overgrowth. There is a 1 to 2 mm anterolisthesis of C4 on C5, posterior disc margin is normal. There is no canal or foraminal narrowing.  At C5-6 there is moderate disc space narrowing and there are degenerative disc and endplate changes, mild canal narrowing, some mild uncovertebral joint hypertrophy and there is mild right but no left foraminal narrowing  AT C6-7 there is disc space narrowing and degenerative endplate changes, minimal posterior spurring but there is no canal narrowing and there is minimal foraminal narrowing  At C7-T1 there is mild bilateral facet overgrowth. The posterior disc margin is normal. There is no canal or foraminal narrowing.  No acute fracture is seen in the cervical spine  IMPRESSION: 1. No acute fracture is seen in the cervical spine. There is cervical spondylosis as described in great detail above. Radiation dose  reduction techniques were utilized, including automated exposure control and exposure modulation based on body size.       CT Cervical Spine Without Contrast    Result Date: 3/7/2023  1. No acute intracranial abnormality is identified. There is moderate small vessel disease in the cerebral white matter and there is diffuse cerebral atrophy. 2. There is a scalp hematoma over the anterior inferior lateral left frontal bone extending into the left periorbital region from today's head trauma. The remainder of the head CT is within normal limits with no acute skull fracture or intracranial hemorrhage identified  FACIAL CT TECHNIQUE: Spiral CT images were obtained through the facial bones in the axial imaging plain. The images were reformatted and submitted in 2 mm thick axial, sagittal and coronal CT sections with soft tissue algorithm and 1 mm thick axial, sagittal and coronal CT sections with high-resolution bone algorithm.  COMPARISON: There are no prior facial CTs for comparison.  FINDINGS: Reidentified is moderate size scalp hematoma over the anterior-inferior lateral left frontal bone from today's head trauma extends into the left periorbital region. I see no underlying skull fracture. The orbits are normal in appearance. 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 There is periapical lucency adjacent to the root of the right mandibular premolar tooth #28 likely a tiny periapical abscess. Lucencies adjacent to the left maxillary incisor tooth #10 and canine likely a tiny periapical abscess.  IMPRESSION: 1. Moderate size scalp hematoma over the anterior-inferior lateral left frontal bone extending to left lateral periorbital region from today's head and facial trauma and  there is also what appears to be swelling in hematoma anterior to the chin. There is a subtle hairline lucency through the mid left nasal bone the is suspicious for very subtle hairline nondisplaced fracture. No additional facial fracture seen 2. The patient has periapical lucencies adjacent to the right mandibular premolar tooth #28 as well as the left maxillary lateral incisor and canine teeth #10 and 11 compatible with tiny periapical abscesses and correlation with dental exam suggested, The remainder of the facial CT is within normal limits.  CERVICAL SPINE CT TECHNIQUE: Spiral CT images were obtained from the skull base down to T5 thoracic level and images were reformatted and submitted in 2 mm thick axial and sagittal CT sections with soft tissue algorithm and 1 mm thick axial, sagittal and coronal CT sections with high-resolution bone algorithm.  COMPARISON: There are no prior cervical spine CTs for comparison..  FINDINGS: The atlantooccipital articulation is normal in appearance. At C1-2 there are arthritic changes at the atlantodental interval with narrowing of the interval, marginal spurring off the anterior ring of C1 and the odontoid, otherwise the C1-2 level is normal in appearance.  At C2-3 there is mild-to-moderate left facet overgrowth. The disc space and right facets are normal, there is no canal or foraminal narrowing.  At C3-4 there is moderate left, minimal right facet overgrowth, small posterior central disc bulge and there is no canal or foraminal narrowing.  At C4-5 there is moderate left facet overgrowth. There is a 1 to 2 mm anterolisthesis of C4 on C5, posterior disc margin is normal. There is no canal or foraminal narrowing.  At C5-6 there is moderate disc space narrowing and there are degenerative disc and endplate changes, mild canal narrowing, some mild uncovertebral joint hypertrophy and there is mild right but no left foraminal narrowing  AT C6-7 there is disc space narrowing and  degenerative endplate changes, minimal posterior spurring but there is no canal narrowing and there is minimal foraminal narrowing  At C7-T1 there is mild bilateral facet overgrowth. The posterior disc margin is normal. There is no canal or foraminal narrowing.  No acute fracture is seen in the cervical spine  IMPRESSION: 1. No acute fracture is seen in the cervical spine. There is cervical spondylosis as described in great detail above. Radiation dose reduction techniques were utilized, including automated exposure control and exposure modulation based on body size.       CT Facial Bones Without Contrast    Result Date: 3/7/2023  1. No acute intracranial abnormality is identified. There is moderate small vessel disease in the cerebral white matter and there is diffuse cerebral atrophy. 2. There is a scalp hematoma over the anterior inferior lateral left frontal bone extending into the left periorbital region from today's head trauma. The remainder of the head CT is within normal limits with no acute skull fracture or intracranial hemorrhage identified  FACIAL CT TECHNIQUE: Spiral CT images were obtained through the facial bones in the axial imaging plain. The images were reformatted and submitted in 2 mm thick axial, sagittal and coronal CT sections with soft tissue algorithm and 1 mm thick axial, sagittal and coronal CT sections with high-resolution bone algorithm.  COMPARISON: There are no prior facial CTs for comparison.  FINDINGS: Reidentified is moderate size scalp hematoma over the anterior-inferior lateral left frontal bone from today's head trauma extends into the left periorbital region. I see no underlying skull fracture. The orbits are normal in appearance. There is a hairline lucency tracking in the left nasal bone consistent with a hairline nondisplaced fracture There is joint space narrowing in the temporomandibular joints with marginal spurring off the mandibular heads compatible with osteoarthritic  change in the temporomandibular joints. There is periapical lucency adjacent to the root of the right mandibular premolar tooth #28 likely a tiny periapical abscess. Lucencies adjacent to the left maxillary incisor tooth #10 and canine likely a tiny periapical abscess.  IMPRESSION: 1. Moderate size scalp hematoma over the anterior-inferior lateral left frontal bone extending to left lateral periorbital region from today's head and facial trauma and there is also what appears to be swelling in hematoma anterior to the chin. There is a subtle hairline lucency through the mid left nasal bone the is suspicious for very subtle hairline nondisplaced fracture. No additional facial fracture seen 2. The patient has periapical lucencies adjacent to the right mandibular premolar tooth #28 as well as the left maxillary lateral incisor and canine teeth #10 and 11 compatible with tiny periapical abscesses and correlation with dental exam suggested, The remainder of the facial CT is within normal limits.  CERVICAL SPINE CT TECHNIQUE: Spiral CT images were obtained from the skull base down to T5 thoracic level and images were reformatted and submitted in 2 mm thick axial and sagittal CT sections with soft tissue algorithm and 1 mm thick axial, sagittal and coronal CT sections with high-resolution bone algorithm.  COMPARISON: There are no prior cervical spine CTs for comparison..  FINDINGS: The atlantooccipital articulation is normal in appearance. At C1-2 there are arthritic changes at the atlantodental interval with narrowing of the interval, marginal spurring off the anterior ring of C1 and the odontoid, otherwise the C1-2 level is normal in appearance.  At C2-3 there is mild-to-moderate left facet overgrowth. The disc space and right facets are normal, there is no canal or foraminal narrowing.  At C3-4 there is moderate left, minimal right facet overgrowth, small posterior central disc bulge and there is no canal or foraminal  narrowing.  At C4-5 there is moderate left facet overgrowth. There is a 1 to 2 mm anterolisthesis of C4 on C5, posterior disc margin is normal. There is no canal or foraminal narrowing.  At C5-6 there is moderate disc space narrowing and there are degenerative disc and endplate changes, mild canal narrowing, some mild uncovertebral joint hypertrophy and there is mild right but no left foraminal narrowing  AT C6-7 there is disc space narrowing and degenerative endplate changes, minimal posterior spurring but there is no canal narrowing and there is minimal foraminal narrowing  At C7-T1 there is mild bilateral facet overgrowth. The posterior disc margin is normal. There is no canal or foraminal narrowing.  No acute fracture is seen in the cervical spine  IMPRESSION: 1. No acute fracture is seen in the cervical spine. There is cervical spondylosis as described in great detail above. Radiation dose reduction techniques were utilized, including automated exposure control and exposure modulation based on body size.        Ambulatory status:   - Up with assistance     Social issues:   Social History     Socioeconomic History    Marital status:      Spouse name: Ray    Number of children: 3    Years of education: High school   Tobacco Use    Smoking status: Former     Years: 5.00     Types: Cigarettes     Quit date:      Years since quittin.2    Smokeless tobacco: Never    Tobacco comments:     caffeine use: 2 cups coffee in AM   Vaping Use    Vaping Use: Never used   Substance and Sexual Activity    Alcohol use: Yes     Comment: Occasional- glass of wine few times a week    Drug use: No    Sexual activity: Defer     Partners: Male     Birth control/protection: Surgical       NIH Stroke Scale:         Shagufta Stratton RN  23 23:23 EST

## 2023-03-08 NOTE — ED PROVIDER NOTES
EMERGENCY DEPARTMENT ENCOUNTER    Room Number:  23/23  Date seen:  3/7/2023  PCP: Aramis Doty MD  Historian: Patient/family      HPI:  Chief Complaint: Fall  A complete HPI/ROS/PMH/PSH/SH/FH are unobtainable due to: None  Context: Alba Correa is a 89 y.o. female who presents to the ED c/o facial pain as well as left elbow discomfort following a fall while at home.  The patient reports that she has a history of a CVA with left-sided weakness.  She states that she feels as though her left foot caught on the ground causing her fall.  She does report significant weakness since the fall and difficulty with ambulation since.  She does report that the pain is dull and achy in nature and moderate in intensity.  She does report that the left elbow discomfort is much worse with flexion and extension.  She denies loss of consciousness.  She denies headache, neck pain, chest pain, shortness of breath, nausea/vomiting, or fevers and chills.  She does take Eliquis on a daily basis secondary to history of atrial fibrillation.          PAST MEDICAL HISTORY  Active Ambulatory Problems     Diagnosis Date Noted   • CKD (chronic kidney disease) stage 4, GFR 15-29 ml/min ([facility])    • Depression    • Gout    • Hyperparathyroidism ([facility])    • Adaptive colitis    • Osteopenia    • Rheumatoid arthritis ([facility])    • Degeneration of intervertebral disc    • Essential hypertension 11/20/2015   • 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   • History of DVT (deep vein thrombosis) 07/11/2017   • Hyperlipidemia 01/10/2018   • Macrocytosis 02/28/2018   • Nonrheumatic aortic (valve) insufficiency 10/08/2018   • History of ischemic right MCA stroke with extension 11/20/2018   • Atrial fibrillation, persistent (HCC) 11/08/2019   • Anemia in chronic kidney disease 12/08/2016   • Other proteinuria 01/26/2017   • Sinus bradycardia 05/26/2021   • First degree AV block 05/26/2021   • Nonrheumatic mitral valve regurgitation    • Idiopathic peripheral  neuropathy 04/19/2022   • Lower extremity edema 04/21/2022   • Acute diastolic CHF (congestive heart failure) ([facility]) 03/20/2022   • Chest pain 04/29/2022   • Acquired hypothyroidism 05/03/2022   • Sick sinus syndrome ([facility]) 05/05/2022   • Insomnia 05/19/2022     Resolved Ambulatory Problems     Diagnosis Date Noted   • DVT (deep venous thrombosis) ([facility])    • Edema    • Elevated cholesterol    • Osteoporosis    • Benign essential hypertension    • D (diarrhea) 11/20/2015   • Open leg wound 11/20/2015   • Vaginal odor 11/20/2015   • Infected wound 11/20/2015   • History of depression 07/11/2017   • History of renal insufficiency syndrome 07/11/2017   • History of degenerative disc disease 07/11/2017   • History of osteoporosis 07/11/2017   • History of rheumatoid arthritis 07/11/2017   • Right leg weakness 08/05/2018   • CVA (cerebral vascular accident) ([facility]) 08/06/2018   • Acute intractable headache 04/02/2019   • Nausea 04/02/2019   • Bilateral leg weakness 04/04/2019   • Chest pain 10/15/2020   • Afib ([facility]) 10/15/2020   • Dizziness 10/15/2020   • On amiodarone therapy 05/26/2021   • History of syncope 10/10/2021   • Bradycardia 03/25/2022   • FATIMAH (acute kidney injury) ([facility]) 03/26/2022     Past Medical History:   Diagnosis Date   • Chronic kidney disease    • H/O complete eye exam 09/2016   • IBS (irritable bowel syndrome)    • OAB (overactive bladder)    • PAF (paroxysmal atrial fibrillation) ([facility]) 11/08/2019   • Peripheral neuropathy          PAST SURGICAL HISTORY  Past Surgical History:   Procedure Laterality Date   • APPENDECTOMY     • BREAST BIOPSY     • CARDIAC ELECTROPHYSIOLOGY PROCEDURE N/A 10/12/2021    Procedure: Loop insertion linq;  Surgeon: Tone Anguiano MD;  Location: [facility] INVASIVE LOCATION;  Service: Cardiovascular;  Laterality: N/A;   • CARDIOVERSION  08/03/2020    Dr. Colby   • COLONOSCOPY      declines   • HERNIA REPAIR  1965   • HYSTERECTOMY      still has ovaries   • MAMMO  BILATERAL  2016    pt declines   • PAP SMEAR  2016    declines         FAMILY HISTORY  Family History   Problem Relation Age of Onset   • Alcohol abuse Other    • Cancer Other    • Hypertension Other    • Kidney disease Other    • Lung disease Other    • Heart disease Mother 89   • Heart failure Mother    • Ovarian cancer Daughter 60   • Stomach cancer Father    • Kidney cancer Father 52   • Lung cancer Father 52   • Ovarian cancer Sister    • Lung cancer Brother 65         SOCIAL HISTORY  Social History     Socioeconomic History   • Marital status:      Spouse name: Ray   • Number of children: 3   • Years of education: High school   Tobacco Use   • Smoking status: Former     Years: 5.00     Types: Cigarettes     Quit date:      Years since quittin.2   • Smokeless tobacco: Never   • Tobacco comments:     caffeine use: 2 cups coffee in AM   Vaping Use   • Vaping Use: Never used   Substance and Sexual Activity   • Alcohol use: Yes     Comment: Occasional- glass of wine few times a week   • Drug use: No   • Sexual activity: Defer     Partners: Male     Birth control/protection: Surgical         ALLERGIES  Bactrim [sulfamethoxazole-trimethoprim], Lisinopril, and Levofloxacin        REVIEW OF SYSTEMS  Review of Systems   Constitutional: Negative for fever.   HENT: Positive for facial swelling. Negative for sore throat.         Facial pain   Eyes: Negative.    Respiratory: Negative for cough and shortness of breath.    Cardiovascular: Negative for chest pain.   Gastrointestinal: Negative for abdominal pain, diarrhea and vomiting.   Genitourinary: Negative for dysuria.   Musculoskeletal: Negative for neck pain.        Left elbow swelling/pain   Skin: Negative for rash.   Allergic/Immunologic: Negative.    Neurological: Negative for weakness, numbness and headaches.   Hematological: Negative.    Psychiatric/Behavioral: Negative.    All other systems reviewed and are negative.         PHYSICAL  EXAM  ED Triage Vitals [03/07/23 1805]   Temp Heart Rate Resp BP SpO2   97.3 °F (36.3 °C) 101 16 155/91 99 %      Temp src Heart Rate Source Patient Position BP Location FiO2 (%)   -- -- -- -- --       Physical Exam  Vitals and nursing note reviewed.   Constitutional:       General: She is not in acute distress.  HENT:      Head: Normocephalic and atraumatic.      Mouth/Throat:      Comments: Intraoral laceration to the upper lip  Eyes:      Pupils: Pupils are equal, round, and reactive to light.   Cardiovascular:      Rate and Rhythm: Normal rate and regular rhythm.      Heart sounds: Normal heart sounds.   Pulmonary:      Effort: Pulmonary effort is normal. No respiratory distress.      Breath sounds: Normal breath sounds.   Abdominal:      Palpations: Abdomen is soft.      Tenderness: There is no abdominal tenderness. There is no guarding or rebound.   Musculoskeletal:         General: Swelling, tenderness and signs of injury present. No deformity.      Cervical back: Normal range of motion and neck supple.      Comments: Swelling with tenderness to palpation to the left elbow   Skin:     General: Skin is warm and dry.      Findings: No rash.      Comments: Right forearm laceration   Neurological:      Mental Status: She is alert and oriented to person, place, and time.      Sensory: Sensation is intact.   Psychiatric:         Mood and Affect: Mood and affect normal.           Vital signs and nursing notes reviewed.          LAB RESULTS  Recent Results (from the past 24 hour(s))   Comprehensive Metabolic Panel    Collection Time: 03/07/23  7:19 PM    Specimen: Blood   Result Value Ref Range    Glucose 163 (H) 65 - 99 mg/dL    BUN 21 8 - 23 mg/dL    Creatinine 1.05 (H) 0.57 - 1.00 mg/dL    Sodium 134 (L) 136 - 145 mmol/L    Potassium 5.0 3.5 - 5.2 mmol/L    Chloride 96 (L) 98 - 107 mmol/L    CO2 28.2 22.0 - 29.0 mmol/L    Calcium 10.8 (H) 8.6 - 10.5 mg/dL    Total Protein 9.0 (H) 6.0 - 8.5 g/dL    Albumin 3.1 (L)  3.5 - 5.2 g/dL    ALT (SGPT) 21 1 - 33 U/L    AST (SGOT) 44 (H) 1 - 32 U/L    Alkaline Phosphatase 89 39 - 117 U/L    Total Bilirubin 0.6 0.0 - 1.2 mg/dL    Globulin 5.9 gm/dL    A/G Ratio 0.5 g/dL    BUN/Creatinine Ratio 20.0 7.0 - 25.0    Anion Gap 9.8 5.0 - 15.0 mmol/L    eGFR 50.9 (L) >60.0 mL/min/1.73   Protime-INR    Collection Time: 03/07/23  7:19 PM    Specimen: Blood   Result Value Ref Range    Protime 13.3 11.7 - 14.2 Seconds    INR 1.00 0.90 - 1.10   aPTT    Collection Time: 03/07/23  7:19 PM    Specimen: Blood   Result Value Ref Range    PTT 27.5 22.7 - 35.4 seconds   CK    Collection Time: 03/07/23  7:19 PM    Specimen: Blood   Result Value Ref Range    Creatine Kinase 174 20 - 180 U/L   CBC Auto Differential    Collection Time: 03/07/23  7:19 PM    Specimen: Blood   Result Value Ref Range    WBC 9.79 3.40 - 10.80 10*3/mm3    RBC 4.38 3.77 - 5.28 10*6/mm3    Hemoglobin 14.8 12.0 - 15.9 g/dL    Hematocrit 43.6 34.0 - 46.6 %    MCV 99.5 (H) 79.0 - 97.0 fL    MCH 33.8 (H) 26.6 - 33.0 pg    MCHC 33.9 31.5 - 35.7 g/dL    RDW 11.9 (L) 12.3 - 15.4 %    RDW-SD 43.1 37.0 - 54.0 fl    MPV 9.9 6.0 - 12.0 fL    Platelets 310 140 - 450 10*3/mm3    Neutrophil % 81.4 (H) 42.7 - 76.0 %    Lymphocyte % 10.8 (L) 19.6 - 45.3 %    Monocyte % 7.0 5.0 - 12.0 %    Eosinophil % 0.1 (L) 0.3 - 6.2 %    Basophil % 0.4 0.0 - 1.5 %    Immature Grans % 0.3 0.0 - 0.5 %    Neutrophils, Absolute 7.96 (H) 1.70 - 7.00 10*3/mm3    Lymphocytes, Absolute 1.06 0.70 - 3.10 10*3/mm3    Monocytes, Absolute 0.69 0.10 - 0.90 10*3/mm3    Eosinophils, Absolute 0.01 0.00 - 0.40 10*3/mm3    Basophils, Absolute 0.04 0.00 - 0.20 10*3/mm3    Immature Grans, Absolute 0.03 0.00 - 0.05 10*3/mm3    nRBC 0.0 0.0 - 0.2 /100 WBC       Ordered the above labs and reviewed the results.        RADIOLOGY  XR ELBOW 2 VIEW LEFT, XR Forearm 2 View Right    Result Date: 3/7/2023  TWO EMERGENCY VIEWS OF THE LEFT ELBOW AND 2 VIEWS OF THE RIGHT FOREARM ON 03/07/2023   CLINICAL HISTORY: Patient fell, has left elbow and right forearm pain.  RIGHT FOREARM: 2 views of the right forearm are submitted for interpretation. I see no acute fracture or malalignment of the right radius or ulna. There is some chondrocalcification of the triangular fibrocartilage of the right wrist.  LEFT ELBOW: 2 views including obliqued AP and oblique lateral views of the left elbow are submitted for interpretation. On the obliqued AP view there is an acute mildly displaced fracture through the lateral cortex of the distal left humerus compatible with an acute mildly displaced lateral supracondylar fracture of the left humerus. It is difficult to trace the entire fracture plane due to the obliquity of the images but it likely tracks distally into the articular surface of the distal left humerus. There is 10 mm lateral displacement of the distal lateral left supracondylar fracture fragment seen on the obliqued AP view. On lateral view there is evidence of some impaction at the fracture site and there is an elbow joint hemarthrosis elevating the anterior fat pad. I recommend either additional views of the left elbow or CT of the left elbow for more comprehensive assessment. The results and recommendations were communicated to Neftali Lama in the ER by telephone on 03/07/2023 at 7:20 PM      CT Head Without Contrast, CT Cervical Spine Without Contrast, CT Facial Bones Without Contrast    Result Date: 3/7/2023  EMERGENCY NONCONTRAST HEAD CT, FACIAL CT AND CERVICAL SPINE CT ON 03/07/2023  CLINICAL HISTORY: Patient fell head and facial trauma, facial contusions, head, face and neck pain, patient is on blood thinners with anticoagulation  HEAD CT TECHNIQUE: Spiral CT images were obtained from the base of the skull to the vertex without intravenous contrast. The images were reformatted and are submitted in 3 mm thick axial, sagittal and coronal CT sections with brain algorithm and 2 mm thick axial CT sections with  high-resolution bone algorithm.  COMPARISON: This is correlated to a prior noncontrast head CT from [facility] on 10/10/2021 and a prior MRI of the brain on 10/12/2021.  FINDINGS: There are prominent patchy nodular and confluent areas of low-density extending from the periventricular into the subcortical white matter of the cerebral hemispheres consistent with moderate small vessel disease. The remainder of the brain parenchyma is normal in attenuation. There is diffuse cerebral atrophy. The ventricles are upper limits of normal in size. I see no focal mass effect. There is no midline shift. No extra axial fluid collections are identified. There is no evidence of acute intracranial hemorrhage. There is a scalp hematoma overlying the anterior inferior lateral left frontal bone extending to the left periorbital scalp from today's head trauma. No underlying acute skull fracture is identified. The calvarium and the skull base are normal in appearance. The paranasal sinuses and mastoid air cells and middle ear cavities are clear.      1. No acute intracranial abnormality is identified. There is moderate small vessel disease in the cerebral white matter and there is diffuse cerebral atrophy. 2. There is a scalp hematoma over the anterior inferior lateral left frontal bone extending into the left periorbital region from today's head trauma. The remainder of the head CT is within normal limits with no acute skull fracture or intracranial hemorrhage identified  FACIAL CT TECHNIQUE: Spiral CT images were obtained through the facial bones in the axial imaging plain. The images were reformatted and submitted in 2 mm thick axial, sagittal and coronal CT sections with soft tissue algorithm and 1 mm thick axial, sagittal and coronal CT sections with high-resolution bone algorithm.  COMPARISON: There are no prior facial CTs for comparison.  FINDINGS: Reidentified is moderate size scalp hematoma over the  anterior-inferior lateral left frontal bone from today's head trauma extends into the left periorbital region. I see no underlying skull fracture. The orbits are normal in appearance. 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 There is periapical lucency adjacent to the root of the right mandibular premolar tooth #28 likely a tiny periapical abscess. Lucencies adjacent to the left maxillary incisor tooth #10 and canine likely a tiny periapical abscess.  IMPRESSION: 1. Moderate size scalp hematoma over the anterior-inferior lateral left frontal bone extending to left lateral periorbital region from today's head and facial trauma and there is also what appears to be swelling in hematoma anterior to the chin. There is a subtle hairline lucency through the mid left nasal bone the is suspicious for very subtle hairline nondisplaced fracture. No additional facial fracture seen 2. The patient has periapical lucencies adjacent to the right mandibular premolar tooth #28 as well as the left maxillary lateral incisor and canine teeth #10 and 11 compatible with tiny periapical abscesses and correlation with dental exam suggested, The remainder of the facial CT is within normal limits.  CERVICAL SPINE CT TECHNIQUE: Spiral CT images were obtained from the skull base down to T5 thoracic level and images were reformatted and submitted in 2 mm thick axial and sagittal CT sections with soft tissue algorithm and 1 mm thick axial, sagittal and coronal CT sections with high-resolution bone algorithm.  COMPARISON: There are no prior cervical spine CTs for comparison..  FINDINGS: The atlantooccipital articulation is normal in appearance. At C1-2 there are arthritic changes at the atlantodental  interval with narrowing of the interval, marginal spurring off the anterior ring of C1 and the odontoid, otherwise the C1-2 level is normal in appearance.  At C2-3 there is mild-to-moderate left facet overgrowth. The disc space and right facets are normal, there is no canal or foraminal narrowing.  At C3-4 there is moderate left, minimal right facet overgrowth, small posterior central disc bulge and there is no canal or foraminal narrowing.  At C4-5 there is moderate left facet overgrowth. There is a 1 to 2 mm anterolisthesis of C4 on C5, posterior disc margin is normal. There is no canal or foraminal narrowing.  At C5-6 there is moderate disc space narrowing and there are degenerative disc and endplate changes, mild canal narrowing, some mild uncovertebral joint hypertrophy and there is mild right but no left foraminal narrowing  AT C6-7 there is disc space narrowing and degenerative endplate changes, minimal posterior spurring but there is no canal narrowing and there is minimal foraminal narrowing  At C7-T1 there is mild bilateral facet overgrowth. The posterior disc margin is normal. There is no canal or foraminal narrowing.  No acute fracture is seen in the cervical spine  IMPRESSION: 1. No acute fracture is seen in the cervical spine. There is cervical spondylosis as described in great detail above. Radiation dose reduction techniques were utilized, including automated exposure control and exposure modulation based on body size.         Ordered the above noted radiological studies. Reviewed by me in PACS.            PROCEDURES  Splint - Cast - Strapping    Date/Time: 3/7/2023 9:05 PM  Performed by: Denys Lama MD  Authorized by: Denys Lama MD     Consent:     Consent obtained:  Verbal    Consent given by:  Patient    Alternatives discussed:  No treatment  Universal protocol:     Procedure explained and questions answered to patient or proxy's satisfaction: yes      Relevant documents present  and verified: yes      Test results available: yes      Imaging studies available: yes      Required blood products, implants, devices, and special equipment available: yes      Site/side marked: no      Immediately prior to procedure a time out was called: no      Patient identity confirmed:  Verbally with patient and arm band  Pre-procedure details:     Distal neurologic exam:  Normal    Distal perfusion: distal pulses strong    Procedure details:     Location:  Elbow    Elbow location:  L elbow    Strapping: no      Cast type:  Short arm    Upper extremity splint type: Posterior short arm.    Supplies:  Fiberglass    Attestation: Splint applied and adjusted personally by me    Post-procedure details:     Distal neurologic exam:  Normal    Distal perfusion: distal pulses strong      Procedure completion:  Tolerated well, no immediate complications    Post-procedure imaging: not applicable                  MEDICATIONS GIVEN IN ER  Medications   sodium chloride 0.9 % flush 10 mL (has no administration in time range)   Tetanus-Diphth-Acell Pertussis (BOOSTRIX) injection 0.5 mL (0.5 mL Intramuscular Given 3/7/23 1921)   fentaNYL citrate (PF) (SUBLIMAZE) injection 50 mcg (50 mcg Intravenous Given 3/7/23 1919)   lidocaine (XYLOCAINE) 1 % injection 10 mL (10 mL Injection Given by Other 3/7/23 2059)                   MEDICAL DECISION MAKING, PROGRESS, and CONSULTS    All labs have been independently reviewed by me.  All radiology studies have been reviewed by me and I have also reviewed the radiology report.   EKG's independently viewed and interpreted by me.  Discussion below represents my analysis of pertinent findings related to patient's condition, differential diagnosis, treatment plan and final disposition.      Additional sources:  - Discussed/ obtained information from independent historians: History obtained from the patient as well as the patient's family at bedside    - External (non-ED) record review: Upon  medical records review, the patient was last seen and evaluated by her family medical provider in office on 1/17/2023 for generalized fatigue as well as follow-up for hyperlipidemia and hypothyroidism    - Chronic or social conditions impacting care: Atrial fibrillation on anticoagulation, CKD    - Shared decision making: Admission decisions based on shared conversations had between myself as well as the patient and the patient's family at bedside.  Case was discussed with Dr. Almodovar [facility], who agrees to admit the patient to the hospital.      Orders placed during this visit:  Orders Placed This Encounter   Procedures   • Laceration Repair   • Laceration Repair   • Splint Cast Strapping   • XR ELBOW 2 VIEW LEFT   • XR Forearm 2 View Right   • CT Head Without Contrast   • CT Cervical Spine Without Contrast   • CT Facial Bones Without Contrast   • Comprehensive Metabolic Panel   • Protime-INR   • aPTT   • CK   • CBC Auto Differential   • Diet: Regular/House Diet; Texture: Regular Texture (IDDSI 7); Fluid Consistency: Thin (IDDSI 0)   • NPO Diet NPO Type: Strict NPO   • Irrigate wound   • Monitor Blood Pressure   • Pulse Oximetry, Continuous   • Cardiac Monitoring   • Code Status and Medical Interventions:   • [facility] (on-call MD unless specified) Details   • Ortho (on-call MD unless specified)   • Insert Peripheral IV   • Inpatient Admission   • CBC & Differential             Differential diagnosis:    Differential diagnosis includes but is not limited to intracranial hemorrhage, subdural hematoma, closed head injury, skull fracture, left elbow fracture, left elbow dislocation, left elbow strain, facial fracture, or multiple facial contusion with laceration.      Independent interpretation of labs, radiology studies, and discussions with consultants:    CT scan of the head and facial bones were independently interpreted by myself showing no acute intracranial hemorrhage or skull fracture.  No obvious facial bone fracture  seen.  X-ray of the left elbow independently interpreted by myself showing a potentially intra-articular fracture of the olecranon.      ED Course as of 03/07/23 2304   [place] Mar 07, 2023   1837 First look:  89-year-old female reports mechanical fall at home around 1400 today.  She was unable to get herself up off the floor and was ultimately found by family who called EMS for transport.  Patient is chronically anticoagulated has obvious head injury, facial injury, knocked out her dental bridge and complains of oral pain with intraoral laceration, has a laceration to her right forearm, and has significant left elbow pain.  Unknown date of last tetanus immunization  Exam: Alert and talkative.  Significant calvarial trauma with hematoma left supraorbital, abrasions and contusions to the face including the nose lip and chin.  Upper lip intraoral laceration with no involvement of the vermilion border appreciated.  Greater than 4 cm laceration on the right forearm which is hemostatic.  Significant swelling and discomfort with palpation of the left elbow.  Plan: Trauma work-up for chronically anticoagulated patient is indicated with stat noncontrast CT of the head followed by CT cervical spine and facial bones as well as imaging of the upper extremity injuries.  Laboratory evaluation is also been initiated.  Wound cleansing, updated tetanus and IV pain medication also ordered. [RS]   2103 On reevaluation, the patient is resting comfortably and without acute complaint.  I did inform her that given the significant weakness following her fall as well as the left elbow fracture, we would admit her to the hospital today for further management and treatment.  The patient is in agreement with that plan and all questions have been answered. [BM]   2145 The patient's presentation, work-up, x-ray findings, as well as treatment and disposition plan were discussed at length with COREY Fabian.  He would like us to discuss the case  with orthopedics and if they are willing to see in consultation, he will admit to the hospital. [BM]   2202 The patient's presentation and x-ray findings were discussed with Dr. Encinas, orthopedic surgery, who agrees to see the patient in hospital consultation tomorrow. [BM]      ED Course User Index  [BM] Denys Lama MD  [RS] Binu Muñiz MD         The patient was wearing a facemask upon entrance into the room and remained in such throughout their visit.  I was wearing PPE including a facemask, eye protection, as well as gloves at any point entering the room and throughout the visit      DIAGNOSIS  Final diagnoses:   Fall from standing, initial encounter   Generalized weakness   Facial contusion, initial encounter   Closed fracture of left elbow, initial encounter   Facial laceration, initial encounter   Closed head injury, initial encounter         DISPOSITION  ADMISSION    Discussed treatment plan and reason for admission with pt/family and admitting physician.  Pt/family voiced understanding of the plan for admission for further testing/treatment as needed.                   Latest Documented Vital Signs:  As of 23:04 EST  BP- 129/81 HR- 90 Temp- 97.3 °F (36.3 °C) O2 sat- 100%              --    Please note that portions of this were completed with a voice recognition program.       Note Disclaimer: At [facility], we believe that sharing information builds trust and better relationships. You are receiving this note because you are receiving care at [facility] or recently visited. It is possible you will see health information before a provider has talked with you about it. This kind of information can be easy to misunderstand. To help you fully understand what it means for your health, we urge you to discuss this note with your provider.             Denys Lama MD  03/07/23 3886

## 2023-03-08 NOTE — PLAN OF CARE
Goal Outcome Evaluation:  Plan of Care Reviewed With: patient, daughter           Outcome Evaluation: Pt is an 89 yo F admitted from home d/t a fall. Work-up revealed L distal humerus fracture - per ortho, planned sx on Friday or Saturday pending OR availability. Pt is normally very independent at BL with a rwx and denies other recent falls hx. Pt lives in a patio home with no stairs, plans transition to a senior living facility when construction completed. Pt presents to PT with impaired strength, endurance, and pain limiting overall mobility. Pt transferred to EOB with min A, STS with min A, and ambulated 10ft to bathroom with HHA x1. Completed toilet transfers with OT - cues to maintain NWB LUE. Pt agreeable to sitting UIC, ambulated with HHA x1 12ft back to chair, assisted with repositioning, and left with needs met. Encouraged pt to call out for assist up to bathroom and to be UIC throughout the day as tolerated. PT will continue to follow to progress mobility as tolerated and re-evaluate post-op. Pt may need SNF at [place] pending progress with mobility after surgery as she lives alone.

## 2023-03-08 NOTE — THERAPY EVALUATION
Patient Name: Alba Correa  : 1933    MRN: [id number]                              Today's Date: 3/8/2023       Admit Date: 3/7/2023    Visit Dx:     ICD-10-CM ICD-9-CM   1. Fall from standing, initial encounter  W19.XXXA E888.9   2. Generalized weakness  R53.1 780.79   3. Facial contusion, initial encounter  S00.83XA 920   4. Closed fracture of left elbow, initial encounter  S42.402A 812.40   5. Facial laceration, initial encounter  S01.81XA 873.40   6. Closed head injury, initial encounter  S09.90XA 959.01     Patient Active Problem List   Diagnosis   • CKD (chronic kidney disease) stage 4, GFR 15-29 ml/min ([facility])   • Depression   • Gout   • Hyperparathyroidism ([facility])   • Adaptive colitis   • Osteopenia   • Rheumatoid arthritis ([facility])   • Degeneration of intervertebral disc   • Essential hypertension   • Detrusor muscle hypertonia   • History of DVT (deep vein thrombosis)   • Hyperlipidemia   • Macrocytosis   • Nonrheumatic aortic (valve) insufficiency   • History of ischemic right MCA stroke with extension   • Atrial fibrillation, persistent ([facility])   • Anemia in chronic kidney disease   • Other proteinuria   • Sinus bradycardia   • First degree AV block   • Nonrheumatic mitral valve regurgitation   • Idiopathic peripheral neuropathy   • Lower extremity edema   • Acute diastolic CHF (congestive heart failure) ([facility])   • Chest pain   • Acquired hypothyroidism   • Sick sinus syndrome ([facility])   • Insomnia   • Fall from standing, initial encounter   • Closed fracture of left distal humerus   • Facial laceration   • Laceration of right forearm   • Chronic kidney disease, stage 3a ([facility])     Past Medical History:   Diagnosis Date   • Acquired hypothyroidism 5/3/2022   • Acute diastolic CHF (congestive heart failure) ([facility]) 3/20/2022   • Chronic kidney disease    • CVA (cerebral vascular accident) ([facility])    • Degeneration of intervertebral disc    • Depression    • DVT (deep venous thrombosis) ([facility])    • First degree AV  block 05/26/2021   • Gout    • H/O complete eye exam 09/2016   • Hyperlipidemia    • IBS (irritable bowel syndrome)    • Nonrheumatic mitral valve regurgitation    • OAB (overactive bladder)    • Osteopenia    • Osteoporosis    • PAF (paroxysmal atrial fibrillation) ([facility]) 11/08/2019   • Peripheral neuropathy    • Rheumatoid arthritis ([facility])    • Sinus bradycardia 05/26/2021     Past Surgical History:   Procedure Laterality Date   • APPENDECTOMY     • BREAST BIOPSY     • CARDIAC ELECTROPHYSIOLOGY PROCEDURE N/A 10/12/2021    Procedure: Loop insertion linq;  Surgeon: Tone Anguiano MD;  Location: [facility] INVASIVE LOCATION;  Service: Cardiovascular;  Laterality: N/A;   • CARDIOVERSION  08/03/2020    Dr. Colby   • COLONOSCOPY      declines   • HERNIA REPAIR  1965   • HYSTERECTOMY      still has ovaries   • MAMMO BILATERAL  11/16/2016    pt declines   • PAP SMEAR  11/16/2016    declines      General Information     Row Name 03/08/23 1406          OT Time and Intention    Document Type evaluation  -RB     Mode of Treatment co-treatment;physical therapy;occupational therapy  -RB     Row Name 03/08/23 1406          General Information    Patient Profile Reviewed yes  -RB     Prior Level of Function independent:;ADL's;transfer  -RB     Existing Precautions/Restrictions fall;non-weight bearing  -RB     Barriers to Rehab medically complex  -RB     Row Name 03/08/23 1406          Living Environment    People in Home alone  -RB     Row Name 03/08/23 1406          Home Main Entrance    Number of Stairs, Main Entrance none  -RB     Row Name 03/08/23 1406          Cognition    Orientation Status (Cognition) oriented x 4  -RB     Row Name 03/08/23 1406          Safety Issues, Functional Mobility    Safety Issues Affecting Function (Mobility) safety precaution awareness;safety precautions follow-through/compliance  -RB     Impairments Affecting Function (Mobility) balance;coordination;endurance/activity  tolerance;pain;strength;range of motion (ROM)  -           User Key  (r) = Recorded By, (t) = Taken By, (c) = Cosigned By    Initials Name Provider Type    RB Isela Stratton OT Occupational Therapist                 Mobility/ADL's     Row Name 03/08/23 1406          Bed Mobility    Bed Mobility supine-sit  -RB     Supine-Sit [place] (Bed Mobility) minimum assist (75% patient effort);1 person assist;verbal cues  -RB     Bed Mobility, Safety Issues decreased use of arms for pushing/pulling  -RB     Assistive Device (Bed Mobility) bed rails  -RB     Row Name 03/08/23 1406          Transfers    Transfers sit-stand transfer;stand-sit transfer;toilet transfer;bed-chair transfer  -RB     Row Name 03/08/23 1406          Bed-Chair Transfer    Bed-Chair [place] (Transfers) minimum assist (75% patient effort);2 person assist;verbal cues  -RB     Assistive Device (Bed-Chair Transfers) --  a  -     Row Name 03/08/23 1406          Sit-Stand Transfer    Sit-Stand [place] (Transfers) minimum assist (75% patient effort);verbal cues;2 person assist  -     Assistive Device (Sit-Stand Transfers) --  [facility]     Row Name 03/08/23 1406          Stand-Sit Transfer    Stand-Sit [place] (Transfers) minimum assist (75% patient effort);2 person assist;verbal cues  -     Assistive Device (Stand-Sit Transfers) --  [facility]  -     Row Name 03/08/23 1406          Toilet Transfer    Type (Toilet Transfer) sit-stand;stand-sit  -RB     [place] Level (Toilet Transfer) verbal cues;2 person assist;moderate assist (50% patient effort)  -     Assistive Device (Toilet Transfer) --  [facility]     Comment, (Toilet Transfer) cues to not use LUE for assistance with standing/lowering onto surfaces  -     Row Name 03/08/23 1406          Functional Mobility    Functional Mobility- Ind. Level minimum assist (75% patient effort);2 person assist required;verbal cues required  -     Functional Mobility- Device --  [facility]      Row Name 03/08/23 1406          Activities of Daily Living    BADL Assessment/Intervention lower body dressing  -RB     Row Name 03/08/23 1406          Mobility    Extremity Weight-bearing Status left upper extremity  -RB     Left Upper Extremity (Weight-bearing Status) non weight-bearing (NWB)  -RB     Row Name 03/08/23 1406          Lower Body Dressing Assessment/Training    [place] Level (Lower Body Dressing) lower body dressing skills;maximum assist (25% patient effort)  -RB     Position (Lower Body Dressing) supported sitting  -RB     Comment, (Lower Body Dressing) limited use of LUE  -RB           User Key  (r) = Recorded By, (t) = Taken By, (c) = Cosigned By    Initials Name Provider Type    Isela Burton OT Occupational Therapist               Obj/Interventions     Row Name 03/08/23 1411          Sensory Assessment (Somatosensory)    Sensory Assessment (Somatosensory) sensation intact  -RB     Row Name 03/08/23 1411          Vision Assessment/Intervention    Visual Impairment/Limitations WFL  -RB     Row Name 03/08/23 1411          Range of Motion Comprehensive    Comment, General Range of Motion LUE secured in a sling - pt able to flex/extend digits  -RB     Row Name 03/08/23 1411          Strength Comprehensive (MMT)    Comment, General Manual Muscle Testing (MMT) Assessment LUE weakness 2/2 to fracture  -RB     Row Name 03/08/23 1411          Hand (Therapeutic Exercise)    Hand (Therapeutic Exercise) AROM (active range of motion)  -RB     Hand AROM/AAROM (Therapeutic Exercise) left;finger flexion;finger extension;10 repetitions  -RB     Row Name 03/08/23 1411          Motor Skills    Therapeutic Exercise hand  -RB     Row Name 03/08/23 1411          Balance    Comment, Balance CGA sitting balance, Min A x2 for standing balance via gait belt and HHA (RUE)  -RB           User Key  (r) = Recorded By, (t) = Taken By, (c) = Cosigned By    Initials Name Provider Type    Isela Burton OT  Occupational Therapist               Goals/Plan     Row Name 03/08/23 1413          Bed Mobility Goal 1 (OT)    Activity/Assistive Device (Bed Mobility Goal 1, OT) bed mobility activities, all  -RB     [place] Level/Cues Needed (Bed Mobility Goal 1, OT) supervision required  -RB     Time Frame (Bed Mobility Goal 1, OT) short term goal (STG);2 weeks  -RB     Progress/Outcomes (Bed Mobility Goal 1, OT) continuing progress toward goal  -RB     Row Name 03/08/23 1413          Transfer Goal 1 (OT)    Activity/Assistive Device (Transfer Goal 1, OT) transfers, all  -RB     [place] Level/Cues Needed (Transfer Goal 1, OT) supervision required  -RB     Time Frame (Transfer Goal 1, OT) short term goal (STG);2 weeks  -RB     Progress/Outcome (Transfer Goal 1, OT) continuing progress toward goal  -RB     Row Name 03/08/23 1413          Dressing Goal 1 (OT)    Activity/Device (Dressing Goal 1, OT) dressing skills, all  -RB     [place]/Cues Needed (Dressing Goal 1, OT) supervision required  -RB     Time Frame (Dressing Goal 1, OT) short term goal (STG);2 weeks  -RB     Progress/Outcome (Dressing Goal 1, OT) continuing progress toward goal  -RB     Row Name 03/08/23 1413          Toileting Goal 1 (OT)    Activity/Device (Toileting Goal 1, OT) toileting skills, all  -RB     [place] Level/Cues Needed (Toileting Goal 1, OT) supervision required  -RB     Time Frame (Toileting Goal 1, OT) short term goal (STG);2 weeks  -RB     Progress/Outcome (Toileting Goal 1, OT) continuing progress toward goal  -RB     Row Name 03/08/23 1413          Grooming Goal 1 (OT)    Activity/Device (Grooming Goal 1, OT) grooming skills, all  -RB     [place] (Grooming Goal 1, OT) supervision required  -RB     Time Frame (Grooming Goal 1, OT) short term goal (STG);2 weeks  -RB     Progress/Outcome (Grooming Goal 1, OT) continuing progress toward goal  -RB     Row Name 03/08/23 1413          Therapy Assessment/Plan (OT)    Planned  Therapy Interventions (OT) transfer/mobility retraining;strengthening exercise;ROM/therapeutic exercise;activity tolerance training;adaptive equipment training;BADL retraining;functional balance retraining;occupation/activity based interventions;edema control/reduction;patient/caregiver education/training  -RB           User Key  (r) = Recorded By, (t) = Taken By, (c) = Cosigned By    Initials Name Provider Type    RB Isela Stratton, OT Occupational Therapist               Clinical Impression     Row Name 03/08/23 1412          Pain Assessment    Pretreatment Pain Rating 3/10  -RB     Pre/Posttreatment Pain Comment pt reports generalized LUE pain 2/2 to her fracture. she did not provide a post treatment pain  -RB     Pain Intervention(s) Repositioned  -RB     Row Name 03/08/23 1412          Plan of Care Review    Plan of Care Reviewed With patient;daughter  -RB     Progress no change  -RB     Row Name 03/08/23 1412          Therapy Assessment/Plan (OT)    Rehab Potential (OT) good, to achieve stated therapy goals  -RB     Criteria for Skilled Therapeutic Interventions Met (OT) yes;skilled treatment is necessary  -RB     Therapy Frequency (OT) 5 times/wk  -RB     Row Name 03/08/23 1412          Therapy Plan Review/Discharge Plan (OT)    Anticipated Discharge Disposition (OT) skilled nursing facility  -RB     Row Name 03/08/23 1412          Vital Signs    O2 Delivery Pre Treatment room air  -RB     O2 Delivery Intra Treatment room air  -RB     O2 Delivery Post Treatment room air  -RB     Pre Patient Position Supine  -RB     Intra Patient Position Standing  -RB     Post Patient Position Sitting  -RB     Row Name 03/08/23 1412          Positioning and Restraints    Pre-Treatment Position in bed  -RB     Post Treatment Position chair  -RB     In Chair notified nsg;reclined;sitting;encouraged to call for assist;exit alarm on;call light within reach;heels elevated;LUE elevated  -RB           User Key  (r) = Recorded By,  (t) = Taken By, (c) = Cosigned By    Initials Name Provider Type    Isela Burton OT Occupational Therapist               Outcome Measures     Row Name 03/08/23 1414          How much help from another is currently needed...    Putting on and taking off regular lower body clothing? 2  -RB     Bathing (including washing, rinsing, and drying) 2  -RB     Toileting (which includes using toilet bed pan or urinal) 2  -RB     Putting on and taking off regular upper body clothing 2  -RB     Taking care of personal grooming (such as brushing teeth) 2  -RB     Eating meals 2  -RB     AM-PAC 6 Clicks Score (OT) 12  -RB     Row Name 03/08/23 1158          How much help from another person do you currently need...    Turning from your back to your side while in flat bed without using bedrails? 3  -BH     Moving from lying on back to sitting on the side of a flat bed without bedrails? 3  -BH     Moving to and from a bed to a chair (including a wheelchair)? 3  -BH     Standing up from a chair using your arms (e.g., wheelchair, bedside chair)? 3  -BH     Climbing 3-5 steps with a railing? 2  -BH     To walk in hospital room? 3  -BH     AM-PAC 6 Clicks Score (PT) 17  -BH     Highest level of mobility 5 --> Static standing  -     Row Name 03/08/23 1414          Modified Jose Scale    Modified Jose Scale 4 - Moderately severe disability.  Unable to walk without assistance, and unable to attend to own bodily needs without assistance.  -     Row Name 03/08/23 1414 03/08/23 1158       Functional Assessment    Outcome Measure Options AM-PAC 6 Clicks Daily Activity (OT);Modified Jose  -RB AM-PAC 6 Clicks Basic Mobility (PT)  -          User Key  (r) = Recorded By, (t) = Taken By, (c) = Cosigned By    Initials Name Provider Type    Isela Burton OT Occupational Therapist     Paz Perez PT Physical Therapist                Occupational Therapy Education     Title: PT OT SLP Therapies (In Progress)      Topic: Occupational Therapy (Not Started)     Point: ADL training (Not Started)     Description:   Instruct learner(s) on proper safety adaptation and remediation techniques during self care or transfers.   Instruct in proper use of assistive devices.              Learner Progress:  Not documented in this visit.          Point: Home exercise program (Not Started)     Description:   Instruct learner(s) on appropriate technique for monitoring, assisting and/or progressing therapeutic exercises/activities.              Learner Progress:  Not documented in this visit.          Point: Precautions (Not Started)     Description:   Instruct learner(s) on prescribed precautions during self-care and functional transfers.              Learner Progress:  Not documented in this visit.          Point: Body mechanics (Not Started)     Description:   Instruct learner(s) on proper positioning and spine alignment during self-care, functional mobility activities and/or exercises.              Learner Progress:  Not documented in this visit.                          OT Recommendation and Plan  Planned Therapy Interventions (OT): transfer/mobility retraining, strengthening exercise, ROM/therapeutic exercise, activity tolerance training, adaptive equipment training, BADL retraining, functional balance retraining, occupation/activity based interventions, edema control/reduction, patient/caregiver education/training  Therapy Frequency (OT): 5 times/wk  Plan of Care Review  Plan of Care Reviewed With: patient, daughter  Progress: no change     Time Calculation:    Time Calculation- OT     Row Name 03/08/23 1405 03/08/23 1159          Time Calculation- OT    OT Start Time 1111  -RB --     OT Stop Time 1140  -RB --     OT Time Calculation (min) 29 min  -RB --     Total Timed Code Minutes- OT 23 minute(s)  -RB --     OT Received On 03/08/23  -RB --     OT - Next Appointment 03/09/23  -RB --     OT Goal Re-Cert Due Date 03/22/23  -RB --         Timed Charges    90622 - OT Therapeutic Exercise Minutes 10  -RB --     80459 - Gait Training Minutes  -- 10  -BH     28090 - OT Self Care/Mgmt Minutes 13  -RB --        Untimed Charges    OT Eval/Re-eval Minutes 6  -RB --        Total Minutes    Timed Charges Total Minutes 23  -RB 10  -BH     Untimed Charges Total Minutes 6  -RB --      Total Minutes 29  -RB 10  -BH           User Key  (r) = Recorded By, (t) = Taken By, (c) = Cosigned By    Initials Name Provider Type    Isela Burton OT Occupational Therapist     Paz Perez PT Physical Therapist              Therapy Charges for Today     Code Description Service Date Service Provider Modifiers Qty    82460984903 HC OT THER PROC EA 15 MIN 3/8/2023 Isela Stratton OT GO 1    59332719297 HC OT SELF CARE/MGMT/TRAIN EA 15 MIN 3/8/2023 Isela Stratton OT GO 1    33610422241 HC OT EVAL MOD COMPLEXITY 2 3/8/2023 Isela Stratton OT GO 1               Isela Stratton OT  3/8/2023

## 2023-03-08 NOTE — DISCHARGE PLACEMENT REQUEST
"Kaci Chavez \"SERGIO\" (89 y.o. Female)     Date of Birth   05/22/1933    Social Security Number       Address   [address] [place] [address]    Home Phone   [phone]    MRN   [id number]       [employer]    Marital Status                               Admission Date   3/7/23    Admission Type   Emergency    Admitting Provider   Logan Almodovar MD    Attending Provider   Vladimir Beltran MD    Department, Room/Bed   [address], [address]/1       Discharge Date       Discharge Disposition       Discharge Destination                               Attending Provider: Vladimir Beltran MD    Allergies: Bactrim [Sulfamethoxazole-trimethoprim], Lisinopril, Levofloxacin    Isolation: None   Infection: None   Code Status: No CPR    Ht: 157.5 cm (62\")   Wt: 53.2 kg (117 lb 4.6 oz)    Admission Cmt: None   Principal Problem: Fall from standing, initial encounter [W19.XXXA]                 Active Insurance as of 3/7/2023     Primary Coverage     Payor Plan Insurance Group Employer/Plan Group    [facility] MEDICARE REPLACEMENT [facility] MEDICARE REPLACEMENT [id number]     Payor Plan Address Payor Plan Phone Number Payor Plan Fax Number Effective Dates    [address] [address]   2/1/2017 - None Entered    [facility] [id number]       Subscriber Name Subscriber Birth Date Member ID       KACI CHAVEZ 5/22/1933 [id number]                 Emergency Contacts      (Rel.) Home Phone Work Phone Mobile Phone    Ankit Doty (Son) [phone] -- --    Angy Doty (Other) -- [phone] --    Marleni Pederson (Daughter) -- -- [phone]              "

## 2023-03-08 NOTE — PLAN OF CARE
Problem: Adult Inpatient Plan of Care  Goal: Plan of Care Review  Outcome: Ongoing, Progressing  Goal: Patient-Specific Goal (Individualized)  Outcome: Ongoing, Progressing  Goal: Absence of Hospital-Acquired Illness or Injury  Outcome: Ongoing, Progressing  Intervention: Identify and Manage Fall Risk  Recent Flowsheet Documentation  Taken 3/8/2023 0558 by Dajuan Pearson RN  Safety Promotion/Fall Prevention:  • safety round/check completed  • clutter free environment maintained  • activity supervised  Taken 3/8/2023 0400 by Dajuan Pearson RN  Safety Promotion/Fall Prevention:  • safety round/check completed  • clutter free environment maintained  • activity supervised  Taken 3/8/2023 0200 by Dajuan Pearson RN  Safety Promotion/Fall Prevention:  • safety round/check completed  • clutter free environment maintained  • activity supervised  Taken 3/8/2023 0125 by Dajuan Pearson RN  Safety Promotion/Fall Prevention:  • safety round/check completed  • clutter free environment maintained  • activity supervised  Intervention: Prevent Skin Injury  Recent Flowsheet Documentation  Taken 3/8/2023 0558 by Dajuan Pearson RN  Body Position:  • position changed independently  • supine  Taken 3/8/2023 0400 by Dajuan Pearson RN  Body Position:  • position changed independently  • supine  Taken 3/8/2023 0200 by Dajuan Pearson RN  Body Position:  • position changed independently  • supine  Taken 3/8/2023 0125 by Dajuan Pearson RN  Body Position:  • position changed independently  • supine  Skin Protection: adhesive use limited  Intervention: Prevent and Manage VTE (Venous Thromboembolism) Risk  Recent Flowsheet Documentation  Taken 3/8/2023 0125 by Dajuan Pearson RN  VTE Prevention/Management: patient refused intervention  Range of Motion: ROM (range of motion) performed  Intervention: Prevent Infection  Recent Flowsheet Documentation  Taken 3/8/2023 0558 by Dajuan Pearson RN  Infection Prevention:  • single  patient room provided  • hand hygiene promoted  • equipment surfaces disinfected  Taken 3/8/2023 0400 by Dajuan Pearson RN  Infection Prevention:  • single patient room provided  • hand hygiene promoted  • equipment surfaces disinfected  Taken 3/8/2023 0200 by Dajuan Pearson RN  Infection Prevention: single patient room provided  Taken 3/8/2023 0125 by Dajuan Pearson RN  Infection Prevention:  • single patient room provided  • hand hygiene promoted  • equipment surfaces disinfected  Goal: Optimal Comfort and Wellbeing  Outcome: Ongoing, Progressing  Intervention: Monitor Pain and Promote Comfort  Recent Flowsheet Documentation  Taken 3/8/2023 0332 by Dajuan Pearson RN  Pain Management Interventions: see MAR  Taken 3/8/2023 0125 by Dajuan Pearson RN  Pain Management Interventions:  • position adjusted  • pillow support provided  Intervention: Provide Person-Centered Care  Recent Flowsheet Documentation  Taken 3/8/2023 0125 by Dajuan Pearson RN  Trust Relationship/Rapport:  • care explained  • questions answered  • questions encouraged  • reassurance provided  Goal: Readiness for Transition of Care  Outcome: Ongoing, Progressing  Intervention: Mutually Develop Transition Plan  Recent Flowsheet Documentation  Taken 3/8/2023 0105 by Dajuan Pearson RN  Transportation Anticipated: family or friend will provide  Patient/Family Anticipated Services at Transition: none  Patient/Family Anticipates Transition to: home  Taken 3/8/2023 0101 by Dajuan Pearson RN  Equipment Currently Used at Home: walker, rolling   Goal Outcome Evaluation:      Reviewed care plan with pt and family;  Pt A&O x4, admitted for left elbow fracture from fall. Orthopedic surgery consulted to evaluate the left elbow fracture for possible surgery. Pt also, have wound on her lip, laceration on both hand and on the right leg

## 2023-03-08 NOTE — THERAPY EVALUATION
Patient Name: Alba Correa  : 1933    MRN: [id number]                              Today's Date: 3/8/2023       Admit Date: 3/7/2023    Visit Dx:     ICD-10-CM ICD-9-CM   1. Fall from standing, initial encounter  W19.XXXA E888.9   2. Generalized weakness  R53.1 780.79   3. Facial contusion, initial encounter  S00.83XA 920   4. Closed fracture of left elbow, initial encounter  S42.402A 812.40   5. Facial laceration, initial encounter  S01.81XA 873.40   6. Closed head injury, initial encounter  S09.90XA 959.01     Patient Active Problem List   Diagnosis   • CKD (chronic kidney disease) stage 4, GFR 15-29 ml/min ([facility])   • Depression   • Gout   • Hyperparathyroidism ([facility])   • Adaptive colitis   • Osteopenia   • Rheumatoid arthritis ([facility])   • Degeneration of intervertebral disc   • Essential hypertension   • Detrusor muscle hypertonia   • History of DVT (deep vein thrombosis)   • Hyperlipidemia   • Macrocytosis   • Nonrheumatic aortic (valve) insufficiency   • History of ischemic right MCA stroke with extension   • Atrial fibrillation, persistent ([facility])   • Anemia in chronic kidney disease   • Other proteinuria   • Sinus bradycardia   • First degree AV block   • Nonrheumatic mitral valve regurgitation   • Idiopathic peripheral neuropathy   • Lower extremity edema   • Acute diastolic CHF (congestive heart failure) ([facility])   • Chest pain   • Acquired hypothyroidism   • Sick sinus syndrome ([facility])   • Insomnia   • Fall from standing, initial encounter   • Closed fracture of left distal humerus   • Facial laceration   • Laceration of right forearm   • Chronic kidney disease, stage 3a ([facility])     Past Medical History:   Diagnosis Date   • Acquired hypothyroidism 5/3/2022   • Acute diastolic CHF (congestive heart failure) ([facility]) 3/20/2022   • Chronic kidney disease    • CVA (cerebral vascular accident) ([facility])    • Degeneration of intervertebral disc    • Depression    • DVT (deep venous thrombosis) ([facility])    • First degree AV  block 05/26/2021   • Gout    • H/O complete eye exam 09/2016   • Hyperlipidemia    • IBS (irritable bowel syndrome)    • Nonrheumatic mitral valve regurgitation    • OAB (overactive bladder)    • Osteopenia    • Osteoporosis    • PAF (paroxysmal atrial fibrillation) ([facility]) 11/08/2019   • Peripheral neuropathy    • Rheumatoid arthritis ([facility])    • Sinus bradycardia 05/26/2021     Past Surgical History:   Procedure Laterality Date   • APPENDECTOMY     • BREAST BIOPSY     • CARDIAC ELECTROPHYSIOLOGY PROCEDURE N/A 10/12/2021    Procedure: Loop insertion linq;  Surgeon: Tone Anguiano MD;  Location:  [facility];  Service: Cardiovascular;  Laterality: N/A;   • CARDIOVERSION  08/03/2020    Dr. Colby   • COLONOSCOPY      declines   • HERNIA REPAIR  1965   • HYSTERECTOMY      still has ovaries   • MAMMO BILATERAL  11/16/2016    pt declines   • PAP SMEAR  11/16/2016    declines      General Information     Row Name 03/08/23 1149          Physical Therapy Time and Intention    Document Type evaluation  -     Mode of Treatment co-treatment;physical therapy;occupational therapy  -[facility] Name 03/08/23 1149          General Information    Patient Profile Reviewed yes  -     Prior Level of Function independent:;gait;transfer;bed mobility  -     Existing Precautions/Restrictions fall;non-weight bearing  NWB LUE - distal humerus fx, planned sx Friday or Saturday  -     Barriers to Rehab none identified  -[facility] Name 03/08/23 1149          Living Environment    People in Home alone  -[facility] Name 03/08/23 1149          Home Main Entrance    Number of Stairs, Main Entrance none  -[facility] Name 03/08/23 1149          Stairs Within Home, Primary    Number of Stairs, Within Home, Primary none  -[facility] Name 03/08/23 1149          Cognition    Orientation Status (Cognition) oriented x 4  -[facility] Name 03/08/23 1149          Safety Issues, Functional Mobility    Impairments Affecting  Function (Mobility) balance;endurance/activity tolerance;strength;pain;range of motion (ROM)  -           User Key  (r) = Recorded By, (t) = Taken By, (c) = Cosigned By    Initials Name Provider Type     Paz Perez PT Physical Therapist               Mobility     Row Name 03/08/23 1150          Bed Mobility    Bed Mobility supine-sit  -     Supine-Sit [place] (Bed Mobility) minimum assist (75% patient effort);1 person assist;verbal cues  -     Assistive Device (Bed Mobility) bed rails;head of bed elevated  -     Row Name 03/08/23 1150          Sit-Stand Transfer    Sit-Stand [place] (Transfers) minimum assist (75% patient effort);1 person assist;verbal cues  -     Assistive Device (Sit-Stand Transfers) other (see comments)  A x1  -     Row Name 03/08/23 1150          Gait/Stairs (Locomotion)    [place] Level (Gait) verbal cues;minimum assist (75% patient effort);1 person assist  -     Assistive Device (Gait) other (see comments)  A x1  -     Distance in Feet (Gait) 10ft + 15ft  -     Deviations/Abnormal Patterns (Gait) base of support, narrow;gait speed decreased;stride length decreased  -     Bilateral Gait Deviations forward flexed posture  -     Comment, (Gait/Stairs) Unsteeady, uses a rwx at BL - needs [facility] on RUE for safety  -     Row Name 03/08/23 1150          Mobility    Extremity Weight-bearing Status left upper extremity  -     Left Upper Extremity (Weight-bearing Status) non weight-bearing (NWB)  -           User Key  (r) = Recorded By, (t) = Taken By, (c) = Cosigned By    Initials Name Provider Type     Paz Perez PT Physical Therapist               Obj/Interventions     Row Name 03/08/23 1151          Range of Motion Comprehensive    General Range of Motion bilateral lower extremity ROM WFL  -     Row Name 03/08/23 1151          Strength Comprehensive (MMT)    General Manual Muscle Testing (MMT) Assessment lower extremity strength  deficits identified  -     Comment, General Manual Muscle Testing (MMT) Assessment Generalized weakness  -     Row Name 03/08/23 1151          Balance    Balance Assessment sitting static balance;sitting dynamic balance;standing static balance;standing dynamic balance  -     Static Sitting Balance standby assist  -     Dynamic Sitting Balance standby assist  -     Position, Sitting Balance unsupported;sitting edge of bed  -     Static Standing Balance contact guard;verbal cues  -     Dynamic Standing Balance minimal assist;verbal cues  -     Position/Device Used, Standing Balance supported;other (see comments)  HHA x2  -     Balance Interventions sitting;standing;sit to stand;supported;static;dynamic  -     Row Name 03/08/23 1151          Sensory Assessment (Somatosensory)    Sensory Assessment (Somatosensory) LE sensation intact  -           User Key  (r) = Recorded By, (t) = Taken By, (c) = Cosigned By    Initials Name Provider Type     Paz Perez, PT Physical Therapist               Goals/Plan     Row Name 03/08/23 1157          Bed Mobility Goal 1 (PT)    Activity/Assistive Device (Bed Mobility Goal 1, PT) bed mobility activities, all  -     [place] Level/Cues Needed (Bed Mobility Goal 1, PT) standby assist  -     Time Frame (Bed Mobility Goal 1, PT) 1 week  -[facility] Name 03/08/23 1152          Transfer Goal 1 (PT)    Activity/Assistive Device (Transfer Goal 1, PT) transfers, all  -     [place] Level/Cues Needed (Transfer Goal 1, PT) standby assist  -     Time Frame (Transfer Goal 1, PT) 1 week  -[facility] Name 03/08/23 1150          Gait Training Goal 1 (PT)    Activity/Assistive Device (Gait Training Goal 1, PT) gait (walking locomotion)  -     [place] Level (Gait Training Goal 1, PT) standby assist  -     Distance (Gait Training Goal 1, PT) 100ft  -     Time Frame (Gait Training Goal 1, PT) 1 week  -[facility] Name 03/08/23 1152          Therapy  Assessment/Plan (PT)    Planned Therapy Interventions (PT) balance training;bed mobility training;gait training;home exercise program;patient/family education;strengthening;transfer training  -           User Key  (r) = Recorded By, (t) = Taken By, (c) = Cosigned By    Initials Name Provider Type     Paz Perez, PT Physical Therapist               Clinical Impression     Row Name 03/08/23 1152          Pain    Pretreatment Pain Rating 3/10  [facility]     Pain Location - Side/Orientation Left  -     Pain Location upper  -     Pain Location - extremity  -     Pain Intervention(s) Repositioned;Ambulation/increased activity;Rest  -     Row Name 03/08/23 1152          Plan of Care Review    Plan of Care Reviewed With patient;daughter  -     Outcome Evaluation Pt is an 89 yo F admitted from home d/t a fall. Work-up revealed L distal humerus fracture - per ortho, planned sx on Friday or Saturday pending OR availability. Pt is normally very independent at BL with a rwx and denies other recent falls hx. Pt lives in a patio home with no stairs, plans transition to a senior living facility when construction completed. Pt presents to PT with impaired strength, endurance, and pain limiting overall mobility. Pt transferred to EOB with min A, STS with min A, and ambulated 10ft to bathroom with HHA x1. Completed toilet transfers with OT - cues to maintain NWB LUE. Pt agreeable to sitting UIC, ambulated with HHA x1 12ft back to chair, assisted with repositioning, and left with needs met. Encouraged pt to call out for assist up to bathroom and to be UIC throughout the day as tolerated. PT will continue to follow to progress mobility as tolerated and re-evaluate post-op. Pt [place] need SNF [place] pending progress with mobility after surgery as she lives alone.  -     Row Name 03/08/23 1152          Therapy Assessment/Plan (PT)    Patient/Family Therapy Goals Statement (PT) Return to PLOF  -     Rehab Potential (PT) good,  to achieve stated therapy goals  -     Criteria for Skilled Interventions Met (PT) yes  -     Therapy Frequency (PT) daily  -     Row Name 03/08/23 1152          Vital Signs    O2 Delivery Pre Treatment room air  -     O2 Delivery Intra Treatment room air  -     O2 Delivery Post Treatment room air  -     Row Name 03/08/23 1152          Positioning and Restraints    Pre-Treatment Position in bed  -     Post Treatment Position chair  -     In Chair notified nsg;reclined;call light within reach;encouraged to call for assist;exit alarm on;with family/caregiver  -           User Key  (r) = Recorded By, (t) = Taken By, (c) = Cosigned By    Initials Name Provider Type     Paz Perez PT Physical Therapist               Outcome Measures     Row Name 03/08/23 1158          How much help from another person do you currently need...    Turning from your back to your side while in flat bed without using bedrails? 3  -     Moving from lying on back to sitting on the side of a flat bed without bedrails? 3  -     Moving to and from a bed to a chair (including a wheelchair)? 3  -     Standing up from a chair using your arms (e.g., wheelchair, bedside chair)? 3  -     Climbing 3-5 steps with a railing? 2  -     To walk in hospital room? 3  -     AM-PAC 6 Clicks Score (PT) 17  -     Highest level of mobility 5 --> Static standing  -     Row Name 03/08/23 1158          Functional Assessment    Outcome Measure Options AM-PAC 6 Clicks Basic Mobility (PT)  -           User Key  (r) = Recorded By, (t) = Taken By, (c) = Cosigned By    Initials Name Provider Type     Paz Perez PT Physical Therapist                             Physical Therapy Education     Title: PT OT SLP Therapies (Done)     Topic: Physical Therapy (Done)     Point: Mobility training (Done)     Learning Progress Summary           Patient Acceptance, E,TB,D, VU,NR by  at 3/8/2023 1158                   Point: Home  exercise program (Done)     Learning Progress Summary           Patient Acceptance, E,TB,D, VU,NR by  at 3/8/2023 1158                   Point: Body mechanics (Done)     Learning Progress Summary           Patient Acceptance, E,TB,D, VU,NR by  at 3/8/2023 1158                   Point: Precautions (Done)     Learning Progress Summary           Patient Acceptance, E,TB,D, VU,NR by  at 3/8/2023 1158                               User Key     Initials Effective Dates Name Provider Type Discipline     04/08/22 -  Paz Perez, PT Physical Therapist PT              PT Recommendation and Plan  Planned Therapy Interventions (PT): balance training, bed mobility training, gait training, home exercise program, patient/family education, strengthening, transfer training  Plan of Care Reviewed With: patient, daughter  Outcome Evaluation: Pt is an 89 yo F admitted from home d/t a fall. Work-up revealed L distal humerus fracture - per ortho, planned sx on Friday or Saturday pending OR availability. Pt is normally very independent at BL with a rwx and denies other recent falls hx. Pt lives in a patio home with no stairs, plans transition to a senior living facility when construction completed. Pt presents to PT with impaired strength, endurance, and pain limiting overall mobility. Pt transferred to EOB with min A, STS with min A, and ambulated 10ft to bathroom with HHA x1. Completed toilet transfers with OT - cues to maintain NWB LUE. Pt agreeable to sitting UIC, ambulated with HHA x1 12ft back to chair, assisted with repositioning, and left with needs met. Encouraged pt to call out for assist up to bathroom and to be UIC throughout the day as tolerated. PT will continue to follow to progress mobility as tolerated and re-evaluate post-op. Pt may need SNF at [place] pending progress with mobility after surgery as she lives alone.     Time Calculation:    PT Charges     Row Name 03/08/23 7099             Time Calculation     Start Time 1114  -      Stop Time 1136  -      Time Calculation (min) 22 min  -      PT Received On 03/08/23  -      PT - Next Appointment 03/09/23  -      PT Goal Re-Cert Due Date 03/15/23  -         Time Calculation- PT    Total Timed Code Minutes- PT 20 minute(s)  -         Timed Charges    19057 - Gait Training Minutes  10  -      98580 - PT Therapeutic Activity Minutes 10  -         Untimed Charges    PT Eval/Re-eval Minutes 5  -         Total Minutes    Timed Charges Total Minutes 20  -      Untimed Charges Total Minutes 5  -       Total Minutes 25  -            User Key  (r) = Recorded By, (t) = Taken By, (c) = Cosigned By    Initials Name Provider Type     Paz Perez, PT Physical Therapist              Therapy Charges for Today     Code Description Service Date Service Provider Modifiers Qty    [id number] HC GAIT TRAINING EA 15 MIN 3/8/2023 Paz Perez, PT GP 1    [id number]  PT EVAL MOD COMPLEXITY 3 3/8/2023 Paz Perez, PT GP 1          PT G-Codes  Outcome Measure Options: AM-PAC 6 Clicks Basic Mobility (PT)  AM-PAC 6 Clicks Score (PT): 17  PT Discharge Summary  Anticipated Discharge Disposition (PT): skilled nursing facility (Pending progress)    Paz Perez, PT  3/8/2023

## 2023-03-08 NOTE — ED PROVIDER NOTES
Laceration Repair    Date/Time: 3/7/2023 8:59 PM  Performed by: Jocelin Fisher PA  Authorized by: Denys Lama MD     Consent:     Consent obtained:  Verbal    Consent given by:  Patient    Risks, benefits, and alternatives were discussed: yes      Risks discussed:  Pain and infection  Universal protocol:     Procedure explained and questions answered to patient or proxy's satisfaction: yes      Patient identity confirmed:  Verbally with patient  Anesthesia:     Anesthesia method:  Local infiltration    Local anesthetic:  Lidocaine 1% w/o epi  Laceration details:     Location:  Lip    Lip location:  Upper interior lip    Length (cm):  2  Exploration:     Hemostasis achieved with:  Direct pressure  Treatment:     Area cleansed with:  Saline    Amount of cleaning:  Standard    Irrigation solution:  Sterile saline  Skin repair:     Repair method:  Sutures    Suture size:  5-0    Wound skin closure material used: vicryl.    Suture technique:  Simple interrupted    Number of sutures:  2  Approximation:     Approximation:  Close  Repair type:     Repair type:  Simple  Post-procedure details:     Dressing:  Open (no dressing)    Procedure completion:  Tolerated well, no immediate complications  Laceration Repair    Date/Time: 3/7/2023 9:00 PM  Performed by: Jocelin Fisher PA  Authorized by: Denys Lama MD     Consent:     Consent obtained:  Verbal    Consent given by:  Patient    Risks, benefits, and alternatives were discussed: yes      Risks discussed:  Infection and pain    Alternatives discussed:  No treatment  Universal protocol:     Patient identity confirmed:  Verbally with patient  Anesthesia:     Anesthesia method:  Local infiltration    Local anesthetic:  Lidocaine 1% w/o epi  Laceration details:     Location:  Shoulder/arm    Shoulder/arm location:  R lower arm    Length (cm):  6  Pre-procedure details:     Preparation:  Patient was prepped and draped in usual sterile fashion  Exploration:      Hemostasis achieved with:  Direct pressure  Treatment:     Area cleansed with:  Saline    Amount of cleaning:  Standard    Irrigation solution:  Sterile saline  Skin repair:     Repair method:  Sutures    Suture size:  4-0    Suture material:  Nylon    Suture technique:  Simple interrupted    Number of sutures:  7  Approximation:     Approximation:  Close  Repair type:     Repair type:  Simple  Post-procedure details:     Dressing:  Non-adherent dressing    Procedure completion:  Tolerated well, no immediate complications           Jocelin Fisher PA  03/07/23 [id number]

## 2023-03-08 NOTE — PLAN OF CARE
The pt was admitted to [facility] 2/2 to a fall resulting in a L distal humeral fx (NWB, surgery scheduled this weekend). Pmhx significant for a stroke several years ago. The pt completed bed mobility with Min A x1. Max A LBD. Min A x2 to stand and mobilize to the bathroom. Mod A x2 for a commode transfer. Education provided on L hand flexion/extension exercises as well as sling positioning/edema control. She uses a walker at baseline and currently remains limited by her weight bearing status and chronic balance deficits. SNF planned at d/c as she lives alone in a patio home.     Patient was not wearing a face mask during this therapy encounter. Therapist used appropriate personal protective equipment including mask and gloves. Mask used was standard procedure mask. Appropriate PPE was worn during the entire therapy session. Hand hygiene was completed before and after therapy session. Patient is not in enhanced droplet precautions.

## 2023-03-08 NOTE — CASE MANAGEMENT/SOCIAL WORK
Discharge Planning Assessment  [facility]     Patient Name: Alba Correa  MRN: [id number]  Today's Date: 3/8/2023    Admit Date: 3/7/2023    Plan: Shoulder surgery scheduled for Friday or Saturday   Discharge Needs Assessment     Row Name 03/08/23 1045       Living Environment    People in Home alone    Current Living Arrangements condominium    Primary Care Provided by self    Provides Primary Care For no one    Family Caregiver if Needed child(ja), adult    Quality of Family Relationships involved;helpful    Able to Return to Prior Arrangements yes       Resource/Environmental Concerns    Resource/Environmental Concerns none       Food Insecurity    Within the past 12 months, you worried that your food would run out before you got the money to buy more. Never true    Within the past 12 months, the food you bought just didn't last and you didn't have money to get more. Never true       Transition Planning    Patient/Family Anticipates Transition to home    Patient/Family Anticipated Services at Transition none    Transportation Anticipated family or friend will provide       Discharge Needs Assessment    Readmission Within the Last 30 Days no previous admission in last 30 days    Current Outpatient/Agency/Support Group homecare agency    Equipment Currently Used at Home walker, rolling    Concerns to be Addressed denies needs/concerns at this time;no discharge needs identified    Equipment Needed After Discharge none    Provided Post Acute Provider List? Yes    Post Acute Provider List Home Health    Delivered To Patient;Support Person    Method of Delivery In person               Discharge Plan     Row Name 03/08/23 1048       Plan    Plan Shoulder surgery scheduled for Friday or Saturday    Patient/Family in Agreement with Plan yes    Plan Comments Spoke with pt and her daughter bedside. Confirmed facesheet correct. Explained role of CCP. Pt reports she lives alone in a condo with no steps. She reports she  is IADLs no DME used. She reports she stopped driving a few months ago and her family is able to assist. pts PCP is Dr. Doty and uses [facility] with no issues. Surgery pending for Friday or Sat. Discussed d/c planning with pt. HH/SNF list provided in case that is needed at d/c. CCP to follow.              Continued Care and Services - Admitted Since 3/7/2023    Coordination has not been started for this encounter.          Demographic Summary    No documentation.                Functional Status    No documentation.                Psychosocial    No documentation.                Abuse/Neglect    No documentation.                Legal    No documentation.                Substance Abuse    No documentation.                Patient Forms    No documentation.                   JAGDEEP Arana

## 2023-03-09 LAB
ANION GAP SERPL CALCULATED.3IONS-SCNC: 9.3 MMOL/L (ref 5–15)
BASOPHILS # BLD AUTO: 0.08 10*3/MM3 (ref 0–0.2)
BASOPHILS NFR BLD AUTO: 1 % (ref 0–1.5)
BUN SERPL-MCNC: 33 MG/DL (ref 8–23)
BUN/CREAT SERPL: 21 (ref 7–25)
CALCIUM SPEC-SCNC: 9.4 MG/DL (ref 8.6–10.5)
CHLORIDE SERPL-SCNC: 102 MMOL/L (ref 98–107)
CO2 SERPL-SCNC: 24.7 MMOL/L (ref 22–29)
CREAT SERPL-MCNC: 1.57 MG/DL (ref 0.57–1)
DEPRECATED RDW RBC AUTO: 43.4 FL (ref 37–54)
EGFRCR SERPLBLD CKD-EPI 2021: 31.4 ML/MIN/1.73
EOSINOPHIL # BLD AUTO: 0.15 10*3/MM3 (ref 0–0.4)
EOSINOPHIL NFR BLD AUTO: 1.9 % (ref 0.3–6.2)
ERYTHROCYTE [DISTWIDTH] IN BLOOD BY AUTOMATED COUNT: 11.9 % (ref 12.3–15.4)
GLUCOSE SERPL-MCNC: 111 MG/DL (ref 65–99)
HCT VFR BLD AUTO: 34.4 % (ref 34–46.6)
HGB BLD-MCNC: 11.7 G/DL (ref 12–15.9)
IMM GRANULOCYTES # BLD AUTO: 0.02 10*3/MM3 (ref 0–0.05)
IMM GRANULOCYTES NFR BLD AUTO: 0.3 % (ref 0–0.5)
LYMPHOCYTES # BLD AUTO: 2.44 10*3/MM3 (ref 0.7–3.1)
LYMPHOCYTES NFR BLD AUTO: 31.3 % (ref 19.6–45.3)
MCH RBC QN AUTO: 34.1 PG (ref 26.6–33)
MCHC RBC AUTO-ENTMCNC: 34 G/DL (ref 31.5–35.7)
MCV RBC AUTO: 100.3 FL (ref 79–97)
MONOCYTES # BLD AUTO: 1.29 10*3/MM3 (ref 0.1–0.9)
MONOCYTES NFR BLD AUTO: 16.6 % (ref 5–12)
NEUTROPHILS NFR BLD AUTO: 3.81 10*3/MM3 (ref 1.7–7)
NEUTROPHILS NFR BLD AUTO: 48.9 % (ref 42.7–76)
NRBC BLD AUTO-RTO: 0 /100 WBC (ref 0–0.2)
PLATELET # BLD AUTO: 226 10*3/MM3 (ref 140–450)
PMV BLD AUTO: 10 FL (ref 6–12)
POTASSIUM SERPL-SCNC: 4.4 MMOL/L (ref 3.5–5.2)
RBC # BLD AUTO: 3.43 10*6/MM3 (ref 3.77–5.28)
SODIUM SERPL-SCNC: 136 MMOL/L (ref 136–145)
WBC NRBC COR # BLD: 7.79 10*3/MM3 (ref 3.4–10.8)

## 2023-03-09 PROCEDURE — 63710000001 ACETAMINOPHEN 325 MG TABLET: Performed by: ORTHOPAEDIC SURGERY

## 2023-03-09 PROCEDURE — 63710000001 AMLODIPINE 5 MG TABLET: Performed by: ORTHOPAEDIC SURGERY

## 2023-03-09 PROCEDURE — 63710000001 ALLOPURINOL 100 MG TABLET: Performed by: ORTHOPAEDIC SURGERY

## 2023-03-09 PROCEDURE — A9270 NON-COVERED ITEM OR SERVICE: HCPCS | Performed by: ORTHOPAEDIC SURGERY

## 2023-03-09 PROCEDURE — 63710000001 DULOXETINE 60 MG CAPSULE DELAYED-RELEASE PARTICLES: Performed by: ORTHOPAEDIC SURGERY

## 2023-03-09 PROCEDURE — 85025 COMPLETE CBC W/AUTO DIFF WBC: CPT | Performed by: INTERNAL MEDICINE

## 2023-03-09 PROCEDURE — 63710000001 LOSARTAN 25 MG TABLET: Performed by: ORTHOPAEDIC SURGERY

## 2023-03-09 PROCEDURE — 63710000001 LEVOTHYROXINE 50 MCG TABLET: Performed by: ORTHOPAEDIC SURGERY

## 2023-03-09 PROCEDURE — 97116 GAIT TRAINING THERAPY: CPT

## 2023-03-09 PROCEDURE — 97110 THERAPEUTIC EXERCISES: CPT

## 2023-03-09 PROCEDURE — 63710000001 HYDROCODONE-ACETAMINOPHEN 5-325 MG TABLET: Performed by: ORTHOPAEDIC SURGERY

## 2023-03-09 PROCEDURE — 97535 SELF CARE MNGMENT TRAINING: CPT

## 2023-03-09 PROCEDURE — G0378 HOSPITAL OBSERVATION PER HR: HCPCS

## 2023-03-09 PROCEDURE — 97530 THERAPEUTIC ACTIVITIES: CPT

## 2023-03-09 PROCEDURE — 80048 BASIC METABOLIC PNL TOTAL CA: CPT | Performed by: INTERNAL MEDICINE

## 2023-03-09 RX ADMIN — HYDROCODONE BITARTRATE AND ACETAMINOPHEN 1 TABLET: 5; 325 TABLET ORAL at 06:27

## 2023-03-09 RX ADMIN — DULOXETINE HYDROCHLORIDE 60 MG: 60 CAPSULE, DELAYED RELEASE ORAL at 08:18

## 2023-03-09 RX ADMIN — AMLODIPINE BESYLATE 5 MG: 5 TABLET ORAL at 08:18

## 2023-03-09 RX ADMIN — HYDROCODONE BITARTRATE AND ACETAMINOPHEN 1 TABLET: 5; 325 TABLET ORAL at 14:27

## 2023-03-09 RX ADMIN — LOSARTAN POTASSIUM 25 MG: 25 TABLET, FILM COATED ORAL at 08:18

## 2023-03-09 RX ADMIN — ALLOPURINOL 100 MG: 100 TABLET ORAL at 08:18

## 2023-03-09 RX ADMIN — Medication 10 ML: at 08:18

## 2023-03-09 RX ADMIN — Medication 10 ML: at 20:12

## 2023-03-09 RX ADMIN — ACETAMINOPHEN 650 MG: 325 TABLET, FILM COATED ORAL at 08:22

## 2023-03-09 RX ADMIN — HYDROCODONE BITARTRATE AND ACETAMINOPHEN 1 TABLET: 5; 325 TABLET ORAL at 20:12

## 2023-03-09 RX ADMIN — LEVOTHYROXINE SODIUM 50 MCG: 0.05 TABLET ORAL at 06:27

## 2023-03-09 RX ADMIN — LOSARTAN POTASSIUM 25 MG: 25 TABLET, FILM COATED ORAL at 20:12

## 2023-03-09 NOTE — PLAN OF CARE
Goal Outcome Evaluation:  Plan of Care Reviewed With: patient        Progress: improving   VSS on RA. Aox4. Left elbow pain improved with PRN pain medicine. Wound care to skin tear on RLE. Up to chair w/ PT. Assist x1 to bathroom. CT LUE completed. Family at bedside. Bed alarm on.

## 2023-03-09 NOTE — CASE MANAGEMENT/SOCIAL WORK
Continued Stay Note  [facility]     Patient Name: Alba Correa  MRN: [id number]  Today's Date: 3/9/2023    Admit Date: 3/7/2023    Plan: SNF pending precert.   Discharge Plan     Row Name 03/09/23 1309       Plan    Plan SNF pending precert.    Plan Comments S/W pt and her son at bedside.  They state [facility].  S/W [facility]/ [facility] - eval pending.  She anticipates bed availability by Monday.  [facility] is also following and has accepted pt pending precert.  CCP will followup after surgery to assist w/ SNF placement. ............Aminah MUÑOZ/ CCP               Discharge Codes    No documentation.               Expected Discharge Date and Time     Expected Discharge Date Expected Discharge Time    Mar 13, 2023             Aminah Rios RN

## 2023-03-09 NOTE — PROGRESS NOTES
Name: Alba Correa ADMIT: 3/7/2023   : 1933  PCP: Aramis Doty MD    MRN: [id number] LOS: 0 days   AGE/SEX: 89 y.o. female  ROOM: [facility]     Subjective   Subjective     Patient is sitting up in the chair and feels much better today.  Pain is reasonably well controlled.  No reports of nausea, vomiting, abdominal pain, chest pain.       Objective   Objective   Vital Signs  Temp:  [98.1 °F (36.7 °C)-98.7 °F (37.1 °C)] 98.7 °F (37.1 °C)  Heart Rate:  [87-98] 87  Resp:  [18] 18  BP: ()/(56-85) 115/82  SpO2:  [94 %-96 %] 94 %  on   ;   Device (Oxygen Therapy): room air  Body mass index is 22.26 kg/m².  Physical Exam HEENT: PERRLA, extraocular movements intact, Scleras no icterus, left periorbital bruising noted, bruising on left upper lip and chin also noted.  Cardiovascular: Regular rate and rhythm with normal S1 and S2  Respiratory: Fairly clear to auscultation bilaterally with no wheezes  GI: Soft, nontender, bowel sounds present  Extremities: Left upper extremity is in a sling    Results Review     I reviewed the patient's new clinical results.  Results from last 7 days   Lab Units 23   WBC 10*3/mm3 7.79 7.80 9.79   HEMOGLOBIN g/dL 11.7* 12.3 14.8   PLATELETS 10*3/mm3 226 260 310     Results from last 7 days   Lab Units 23   SODIUM mmol/L 136 134* 134*   POTASSIUM mmol/L 4.4 4.3 5.0   CHLORIDE mmol/L 102 100 96*   CO2 mmol/L 24.7 24.1 28.2   BUN mg/dL 33* 26* 21   CREATININE mg/dL 1.57* 1.15* 1.05*   GLUCOSE mg/dL 111* 114* 163*   EGFR mL/min/1.73 31.4* 45.6* 50.9*     Results from last 7 days   Lab Units 23   ALBUMIN g/dL 3.1*   BILIRUBIN mg/dL 0.6   ALK PHOS U/L 89   AST (SGOT) U/L 44*   ALT (SGPT) U/L 21     Results from last 7 days   Lab Units 23  0442 23  0527 23   CALCIUM mg/dL 9.4 9.7 10.8*   ALBUMIN g/dL  --   --  3.1*   MAGNESIUM mg/dL  --  2.1  --    PHOSPHORUS mg/dL   --  3.9  --        No results found for: HGBA1C, POCGLU    CT Head Without Contrast    Result Date: 3/8/2023  1. No acute intracranial abnormality is identified. There is moderate small vessel disease in the cerebral white matter and there is diffuse cerebral atrophy. 2. There is a scalp hematoma over the anterior inferior lateral left frontal bone extending into the left periorbital region from today's head trauma. The remainder of the head CT is within normal limits with no acute skull fracture or intracranial hemorrhage identified  FACIAL CT TECHNIQUE: Spiral CT images were obtained through the facial bones in the axial imaging plain. The images were reformatted and submitted in 2 mm thick axial, sagittal and coronal CT sections with soft tissue algorithm and 1 mm thick axial, sagittal and coronal CT sections with high-resolution bone algorithm.  COMPARISON: There are no prior facial CTs for comparison.  FINDINGS: Reidentified is moderate size scalp hematoma over the anterior-inferior lateral left frontal bone from today's head trauma extends into the left periorbital region. I see no underlying skull fracture. The orbits are normal in appearance. 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 There is periapical lucency adjacent to the root of the right mandibular premolar tooth #28 likely a tiny periapical abscess. Lucencies adjacent to the left maxillary incisor tooth #10 and canine likely a tiny periapical abscess.  IMPRESSION: 1. Moderate size scalp hematoma over the anterior-inferior lateral left frontal bone extending to left lateral periorbital region from today's head and facial trauma and there is also what appears to be swelling in hematoma anterior to the chin.  There is a subtle hairline lucency through the mid left nasal bone the is suspicious for very subtle hairline nondisplaced fracture and please correlate with physical exam of the left nasal bone. No additional facial fracture seen 2. The patient has periapical lucencies adjacent to the right mandibular premolar tooth #28 as well as the left maxillary lateral incisor and canine teeth #10 and 11 compatible with tiny periapical abscesses and correlation with dental exam suggested, The remainder of the facial CT is within normal limits.  CERVICAL SPINE CT TECHNIQUE: Spiral CT images were obtained from the skull base down to T5 thoracic level and images were reformatted and submitted in 2 mm thick axial and sagittal CT sections with soft tissue algorithm and 1 mm thick axial, sagittal and coronal CT sections with high-resolution bone algorithm.  COMPARISON: There are no prior cervical spine CTs for comparison..  FINDINGS: The atlantooccipital articulation is normal in appearance. At C1-2 there are arthritic changes at the atlantodental interval with narrowing of the interval, marginal spurring off the anterior ring of C1 and the odontoid, otherwise the C1-2 level is normal in appearance.  At C2-3 there is mild-to-moderate left facet overgrowth. The disc space and right facets are normal, there is no canal or foraminal narrowing.  At C3-4 there is moderate left, minimal right facet overgrowth, small posterior central disc bulge and there is no canal or foraminal narrowing.  At C4-5 there is moderate left facet overgrowth. There is a 1 to 2 mm anterolisthesis of C4 on C5, posterior disc margin is normal. There is no canal or foraminal narrowing.  At C5-6 there is moderate disc space narrowing and there are degenerative disc and endplate changes, mild canal narrowing, some mild uncovertebral joint hypertrophy and there is mild right but no left foraminal narrowing  AT C6-7 there is disc space narrowing and degenerative  endplate changes, minimal posterior spurring but there is no canal narrowing and there is minimal foraminal narrowing  At C7-T1 there is mild bilateral facet overgrowth. The posterior disc margin is normal. There is no canal or foraminal narrowing.  No acute fracture is seen in the cervical spine  IMPRESSION: 1. No acute fracture is seen in the cervical spine. There is cervical spondylosis as described in great detail above. Radiation dose reduction techniques were utilized, including automated exposure control and exposure modulation based on body size.  This report was finalized on 3/8/2023 7:28 AM by Dr. Ankit Boss M.D.      CT Cervical Spine Without Contrast    Result Date: 3/8/2023  1. No acute intracranial abnormality is identified. There is moderate small vessel disease in the cerebral white matter and there is diffuse cerebral atrophy. 2. There is a scalp hematoma over the anterior inferior lateral left frontal bone extending into the left periorbital region from today's head trauma. The remainder of the head CT is within normal limits with no acute skull fracture or intracranial hemorrhage identified  FACIAL CT TECHNIQUE: Spiral CT images were obtained through the facial bones in the axial imaging plain. The images were reformatted and submitted in 2 mm thick axial, sagittal and coronal CT sections with soft tissue algorithm and 1 mm thick axial, sagittal and coronal CT sections with high-resolution bone algorithm.  COMPARISON: There are no prior facial CTs for comparison.  FINDINGS: Reidentified is moderate size scalp hematoma over the anterior-inferior lateral left frontal bone from today's head trauma extends into the left periorbital region. I see no underlying skull fracture. The orbits are normal in appearance. 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 There is periapical lucency adjacent to the root of the right mandibular premolar tooth #28 likely a tiny periapical abscess. Lucencies adjacent to the left maxillary incisor tooth #10 and canine likely a tiny periapical abscess.  IMPRESSION: 1. Moderate size scalp hematoma over the anterior-inferior lateral left frontal bone extending to left lateral periorbital region from today's head and facial trauma and there is also what appears to be swelling in hematoma anterior to the chin. There is a subtle hairline lucency through the mid left nasal bone the is suspicious for very subtle hairline nondisplaced fracture and please correlate with physical exam of the left nasal bone. No additional facial fracture seen 2. The patient has periapical lucencies adjacent to the right mandibular premolar tooth #28 as well as the left maxillary lateral incisor and canine teeth #10 and 11 compatible with tiny periapical abscesses and correlation with dental exam suggested, The remainder of the facial CT is within normal limits.  CERVICAL SPINE CT TECHNIQUE: Spiral CT images were obtained from the skull base down to T5 thoracic level and images were reformatted and submitted in 2 mm thick axial and sagittal CT sections with soft tissue algorithm and 1 mm thick axial, sagittal and coronal CT sections with high-resolution bone algorithm.  COMPARISON: There are no prior cervical spine CTs for comparison..  FINDINGS: The atlantooccipital articulation is normal in appearance. At C1-2 there are arthritic changes at the atlantodental interval with narrowing of the interval, marginal spurring off the anterior ring of C1 and the odontoid, otherwise the C1-2 level is normal in appearance.  At C2-3 there is mild-to-moderate left facet overgrowth. The disc space and right facets are normal, there is no canal or foraminal narrowing.  At C3-4  there is moderate left, minimal right facet overgrowth, small posterior central disc bulge and there is no canal or foraminal narrowing.  At C4-5 there is moderate left facet overgrowth. There is a 1 to 2 mm anterolisthesis of C4 on C5, posterior disc margin is normal. There is no canal or foraminal narrowing.  At C5-6 there is moderate disc space narrowing and there are degenerative disc and endplate changes, mild canal narrowing, some mild uncovertebral joint hypertrophy and there is mild right but no left foraminal narrowing  AT C6-7 there is disc space narrowing and degenerative endplate changes, minimal posterior spurring but there is no canal narrowing and there is minimal foraminal narrowing  At C7-T1 there is mild bilateral facet overgrowth. The posterior disc margin is normal. There is no canal or foraminal narrowing.  No acute fracture is seen in the cervical spine  IMPRESSION: 1. No acute fracture is seen in the cervical spine. There is cervical spondylosis as described in great detail above. Radiation dose reduction techniques were utilized, including automated exposure control and exposure modulation based on body size.  This report was finalized on 3/8/2023 7:28 AM by Dr. Ankit Boss M.D.      CT Facial Bones Without Contrast    Result Date: 3/8/2023  1. No acute intracranial abnormality is identified. There is moderate small vessel disease in the cerebral white matter and there is diffuse cerebral atrophy. 2. There is a scalp hematoma over the anterior inferior lateral left frontal bone extending into the left periorbital region from today's head trauma. The remainder of the head CT is within normal limits with no acute skull fracture or intracranial hemorrhage identified  FACIAL CT TECHNIQUE: Spiral CT images were obtained through the facial bones in the axial imaging plain. The images were reformatted and submitted in 2 mm thick axial, sagittal and coronal CT sections with soft tissue algorithm and  1 mm thick axial, sagittal and coronal CT sections with high-resolution bone algorithm.  COMPARISON: There are no prior facial CTs for comparison.  FINDINGS: Reidentified is moderate size scalp hematoma over the anterior-inferior lateral left frontal bone from today's head trauma extends into the left periorbital region. I see no underlying skull fracture. The orbits are normal in appearance. 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 There is periapical lucency adjacent to the root of the right mandibular premolar tooth #28 likely a tiny periapical abscess. Lucencies adjacent to the left maxillary incisor tooth #10 and canine likely a tiny periapical abscess.  IMPRESSION: 1. Moderate size scalp hematoma over the anterior-inferior lateral left frontal bone extending to left lateral periorbital region from today's head and facial trauma and there is also what appears to be swelling in hematoma anterior to the chin. There is a subtle hairline lucency through the mid left nasal bone the is suspicious for very subtle hairline nondisplaced fracture and please correlate with physical exam of the left nasal bone. No additional facial fracture seen 2. The patient has periapical lucencies adjacent to the right mandibular premolar tooth #28 as well as the left maxillary lateral incisor and canine teeth #10 and 11 compatible with tiny periapical abscesses and correlation with dental exam suggested, The remainder of the facial CT is within normal limits.  CERVICAL SPINE CT TECHNIQUE: Spiral CT images were obtained from the skull base down to T5 thoracic level and images were reformatted and submitted in 2 mm thick axial and sagittal CT sections with soft tissue algorithm and 1 mm  thick axial, sagittal and coronal CT sections with high-resolution bone algorithm.  COMPARISON: There are no prior cervical spine CTs for comparison..  FINDINGS: The atlantooccipital articulation is normal in appearance. At C1-2 there are arthritic changes at the atlantodental interval with narrowing of the interval, marginal spurring off the anterior ring of C1 and the odontoid, otherwise the C1-2 level is normal in appearance.  At C2-3 there is mild-to-moderate left facet overgrowth. The disc space and right facets are normal, there is no canal or foraminal narrowing.  At C3-4 there is moderate left, minimal right facet overgrowth, small posterior central disc bulge and there is no canal or foraminal narrowing.  At C4-5 there is moderate left facet overgrowth. There is a 1 to 2 mm anterolisthesis of C4 on C5, posterior disc margin is normal. There is no canal or foraminal narrowing.  At C5-6 there is moderate disc space narrowing and there are degenerative disc and endplate changes, mild canal narrowing, some mild uncovertebral joint hypertrophy and there is mild right but no left foraminal narrowing  AT C6-7 there is disc space narrowing and degenerative endplate changes, minimal posterior spurring but there is no canal narrowing and there is minimal foraminal narrowing  At C7-T1 there is mild bilateral facet overgrowth. The posterior disc margin is normal. There is no canal or foraminal narrowing.  No acute fracture is seen in the cervical spine  IMPRESSION: 1. No acute fracture is seen in the cervical spine. There is cervical spondylosis as described in great detail above. Radiation dose reduction techniques were utilized, including automated exposure control and exposure modulation based on body size.  This report was finalized on 3/8/2023 7:28 AM by Dr. Ankit Boss M.D.      CT Upper Extremity Left Without Contrast    Result Date: 3/8/2023  Supracondylar fracture with impaction and posterior angulation.  Fracture extends to the articular surface along the lateral margin of the trochlea and medial margin of the capitellum where there is incongruity of the articular surface. Multiple fracture fragments extend anterior to the radiocapitellar joint. Large hemarthrosis.  Radiation dose reduction techniques were utilized, including automated exposure control and exposure modulation based on body size.  This report was finalized on 3/8/2023 4:20 PM by Dr. Rafi Ortiz M.D.      I have personally reviewed all medications:  Scheduled Medications  allopurinol, 100 mg, Oral, Daily  amLODIPine, 5 mg, Oral, Daily  [START ON 3/11/2023] ceFAZolin, 2 g, Intravenous, On Call to OR  DULoxetine, 60 mg, Oral, Daily  levothyroxine, 50 mcg, Oral, QAM  losartan, 25 mg, Oral, BID  sodium chloride, 10 mL, Intravenous, Q12H    Infusions   Diet  Diet: Regular/House Diet; Texture: Regular Texture (IDDSI 7); Fluid Consistency: Thin (IDDSI 0)  NPO Diet NPO Type: Strict NPO    I have personally reviewed:  [x]  Laboratory   [x]  Microbiology   [x]  Radiology   [x]  EKG/Telemetry  [x]  Cardiology/Vascular   [x]  Pathology    [x]  Records       Assessment/Plan     Active Hospital Problems    Diagnosis  POA   • **Fall from standing, initial encounter [W19.XXXA]  Yes   • Closed fracture of left distal humerus [S42.402A]  Yes   • Facial laceration [S01.81XA]  Unknown   • Laceration of right forearm [S51.811A]  Unknown   • Chronic kidney disease, stage 3a (HCC) [N18.31]  Unknown   • Atrial fibrillation, persistent (HCC) [I48.19]  Yes   • Hyperlipidemia [E78.5]  Yes   • Rheumatoid arthritis (HCC) [M06.9]  Yes      Resolved Hospital Problems   No resolved problems to display.       1. Mechanical fall with left humeral fracture, currently patient is in a sling and will continue with analgesics for pain control and orthopedics consultation is also been obtained and timing of surgery is per surgical team.  PT/OT consult has been obtained.    2.  CKD  stage IIIa, creatinine is at baseline.    3.  Hypothyroidism, on Synthroid.    4.  History of atrial fibrillation, rate controlled and is on Eliquis at home which has been held.    5.  Hypertension, on losartan and amlodipine which will be continued.    6.  On SCDs for DVT prophylaxis.    7.  CODE STATUS is DNR.    Copied text on this note has been reviewed by me on 3/9/2023    Vladimir Beltran MD  [facility]  03/09/23  13:51 EST

## 2023-03-09 NOTE — THERAPY TREATMENT NOTE
Patient Name: Alba Correa  : 1933    MRN: [id number]                              Today's Date: 3/9/2023       Admit Date: 3/7/2023    Visit Dx:     ICD-10-CM ICD-9-CM   1. Fall from standing, initial encounter  W19.XXXA E888.9   2. Generalized weakness  R53.1 780.79   3. Facial contusion, initial encounter  S00.83XA 920   4. Closed fracture of left elbow, initial encounter  S42.402A 812.40   5. Facial laceration, initial encounter  S01.81XA 873.40   6. Closed head injury, initial encounter  S09.90XA 959.01     Patient Active Problem List   Diagnosis   • CKD (chronic kidney disease) stage 4, GFR 15-29 ml/min ([facility])   • Depression   • Gout   • Hyperparathyroidism ([facility])   • Adaptive colitis   • Osteopenia   • Rheumatoid arthritis ([facility])   • Degeneration of intervertebral disc   • Essential hypertension   • Detrusor muscle hypertonia   • History of DVT (deep vein thrombosis)   • Hyperlipidemia   • Macrocytosis   • Nonrheumatic aortic (valve) insufficiency   • History of ischemic right MCA stroke with extension   • Atrial fibrillation, persistent ([facility])   • Anemia in chronic kidney disease   • Other proteinuria   • Sinus bradycardia   • First degree AV block   • Nonrheumatic mitral valve regurgitation   • Idiopathic peripheral neuropathy   • Lower extremity edema   • Acute diastolic CHF (congestive heart failure) ([facility])   • Chest pain   • Acquired hypothyroidism   • Sick sinus syndrome ([facility])   • Insomnia   • Fall from standing, initial encounter   • Closed fracture of left distal humerus   • Facial laceration   • Laceration of right forearm   • Chronic kidney disease, stage 3a ([facility])     Past Medical History:   Diagnosis Date   • Acquired hypothyroidism 5/3/2022   • Acute diastolic CHF (congestive heart failure) ([facility]) 3/20/2022   • Chronic kidney disease    • CVA (cerebral vascular accident) ([facility])    • Degeneration of intervertebral disc    • Depression    • DVT (deep venous thrombosis) ([facility])    • First degree AV  block 05/26/2021   • Gout    • H/O complete eye exam 09/2016   • Hyperlipidemia    • IBS (irritable bowel syndrome)    • Nonrheumatic mitral valve regurgitation    • OAB (overactive bladder)    • Osteopenia    • Osteoporosis    • PAF (paroxysmal atrial fibrillation) ([facility]) 11/08/2019   • Peripheral neuropathy    • Rheumatoid arthritis ([facility])    • Sinus bradycardia 05/26/2021     Past Surgical History:   Procedure Laterality Date   • APPENDECTOMY     • BREAST BIOPSY     • CARDIAC ELECTROPHYSIOLOGY PROCEDURE N/A 10/12/2021    Procedure: Loop insertion linq;  Surgeon: Tone Anguiano MD;  [facility];  Service: Cardiovascular;  Laterality: N/A;   • CARDIOVERSION  08/03/2020    Dr. Colby   • COLONOSCOPY      declines   • HERNIA REPAIR  1965   • HYSTERECTOMY      still has ovaries   • MAMMO BILATERAL  11/16/2016    pt declines   • PAP SMEAR  11/16/2016    declines      General Information     Row Name 03/09/23 1326          OT Time and Intention    Document Type therapy note (daily note)  -RB     Mode of Treatment individual therapy;occupational therapy  -RB     Row Name 03/09/23 1326          General Information    Patient Profile Reviewed yes  -RB     Existing Precautions/Restrictions fall;non-weight bearing  -RB     Row Name 03/09/23 1326          Cognition    Orientation Status (Cognition) oriented x 4  -RB           User Key  (r) = Recorded By, (t) = Taken By, (c) = Cosigned By    Initials Name Provider Type    RB Isela Stratton OT Occupational Therapist                 Mobility/ADL's     Row Name 03/09/23 1326          Bed Mobility    Bed Mobility supine-sit  -RB     Supine-Sit [place] (Bed Mobility) moderate assist (50% patient effort);1 person assist;verbal cues  -RB     Bed Mobility, Safety Issues decreased use of arms for pushing/pulling  -RB     Assistive Device (Bed Mobility) bed rails  -RB     Row Name 03/09/23 1326          Transfers    Transfers sit-stand  transfer;stand-sit transfer;bed-chair transfer;toilet transfer  -RB     Row Name 03/09/23 1326          Bed-Chair Transfer    Bed-Chair [place] (Transfers) minimum assist (75% patient effort);2 person assist;verbal cues  -RB     Assistive Device (Bed-Chair Transfers) --  a  -RB     Row Name 03/09/23 1326          Sit-Stand Transfer    Sit-Stand [place] (Transfers) minimum assist (75% patient effort);2 person assist;verbal cues  -RB     Assistive Device (Sit-Stand Transfers) --  a  -RB     Row Name 03/09/23 1326          Stand-Sit Transfer    Stand-Sit [place] (Transfers) minimum assist (75% patient effort);2 person assist;verbal cues  -RB     Assistive Device (Stand-Sit Transfers) --  a  -RB     Row Name 03/09/23 1326          Functional Mobility    Functional Mobility- Ind. Level minimum assist (75% patient effort);1 person + 1 person to manage equipment;verbal cues required  -RB     Functional Mobility- Device --  a  -RB     Functional Mobility- Safety Issues balance decreased during turns;step length decreased;loses balance backward  -RB     Row Name 03/09/23 1326          Activities of Daily Living    BADL Assessment/Intervention toileting  -RB     Row Name 03/09/23 1326          Grooming Assessment/Training    [place] Level (Grooming) grooming skills;minimum assist (75% patient effort);verbal cues  -RB     Position (Grooming) sink side;supported standing  -RB     Row Name 03/09/23 1326          Lower Body Dressing Assessment/Training    [place] Level (Lower Body Dressing) lower body dressing skills;minimum assist (75% patient effort);shoes/slippers;socks  -RB     Position (Lower Body Dressing) supported sitting  -RB     Row Name 03/09/23 1326          Toileting Assessment/Training    [place] Level (Toileting) minimum assist (75% patient effort);verbal cues  -RB     Position (Toileting) supported sitting;supported standing  -RB     Comment, (Toileting) remove brief and luis  a clean pull up single handed  -RB           User Key  (r) = Recorded By, (t) = Taken By, (c) = Cosigned By    Initials Name Provider Type    Isela Burton OT Occupational Therapist               Obj/Interventions     Row Name 03/09/23 1328          Sensory Assessment (Somatosensory)    Sensory Assessment (Somatosensory) sensation intact  -RB     Row Name 03/09/23 1328          Balance    Comment, Balance Min A x1-2 for standing balance during mobility and ADL's  -RB           User Key  (r) = Recorded By, (t) = Taken By, (c) = Cosigned By    Initials Name Provider Type    Isela Burton OT Occupational Therapist               Goals/Plan    No documentation.                Clinical Impression     Row Name 03/09/23 1328          Pain Assessment    Pretreatment Pain Rating 2/10  -RB     Posttreatment Pain Rating 2/10  -RB     Pain Location - Side/Orientation Left  -RB     Pain Location upper  -RB     Pain Location - extremity  -RB     Pain Intervention(s) Repositioned  -RB     Row Name 03/09/23 1328          Plan of Care Review    Plan of Care Reviewed With patient  -RB     Progress improving  -RB     Row Name 03/09/23 1328          Therapy Assessment/Plan (OT)    Rehab Potential (OT) good, to achieve stated therapy goals  -RB     Criteria for Skilled Therapeutic Interventions Met (OT) yes;skilled treatment is necessary  -RB     Therapy Frequency (OT) 5 times/wk  -RB     Row Name 03/09/23 1328          Therapy Plan Review/Discharge Plan (OT)    Anticipated Discharge Disposition (OT) [facility] nursing facility  -RB     Row Name 03/09/23 1328          Vital Signs    O2 Delivery Pre Treatment room air  -RB     O2 Delivery Intra Treatment room air  -RB     O2 Delivery Post Treatment room air  -RB     Pre Patient Position Supine  -RB     Intra Patient Position Standing  -RB     Post Patient Position Sitting  -RB     Row Name 03/09/23 1328          Positioning and Restraints    Pre-Treatment Position in bed  -RB      Post Treatment Position chair  -RB     In Chair notified nsg;reclined;sitting;call light within reach;encouraged to call for assist;exit alarm on;legs elevated  -RB           User Key  (r) = Recorded By, (t) = Taken By, (c) = Cosigned By    Initials Name Provider Type    Isela Burton OT Occupational Therapist               Outcome Measures    No documentation.                 Occupational Therapy Education     Title: PT OT SLP Therapies (In Progress)     Topic: Occupational Therapy (Not Started)     Point: ADL training (Not Started)     Description:   Instruct learner(s) on proper safety adaptation and remediation techniques during self care or transfers.   Instruct in proper use of assistive devices.              Learner Progress:  Not documented in this visit.          Point: Home exercise program (Not Started)     Description:   Instruct learner(s) on appropriate technique for monitoring, assisting and/or progressing therapeutic exercises/activities.              Learner Progress:  Not documented in this visit.          Point: Precautions (Not Started)     Description:   Instruct learner(s) on prescribed precautions during self-care and functional transfers.              Learner Progress:  Not documented in this visit.          Point: Body mechanics (Not Started)     Description:   Instruct learner(s) on proper positioning and spine alignment during self-care, functional mobility activities and/or exercises.              Learner Progress:  Not documented in this visit.                          OT Recommendation and Plan  Planned Therapy Interventions (OT): transfer/mobility retraining, strengthening exercise, ROM/therapeutic exercise, activity tolerance training, adaptive equipment training, BADL retraining, functional balance retraining, occupation/activity based interventions, edema control/reduction, patient/caregiver education/training  Therapy Frequency (OT): 5 times/wk  Plan of Care  Review  Plan of Care Reviewed With: patient  Progress: improving     Time Calculation:    Time Calculation- OT     Row Name 03/09/23 1330             Time Calculation- OT    OT Start Time 1118  -RB      OT Stop Time 1147  -RB      OT Time Calculation (min) 29 min  -RB      Total Timed Code Minutes- OT 29 minute(s)  -RB      OT Received On 03/09/23  -RB      OT - Next Appointment 03/10/23  -RB         Timed Charges    26006 - OT Therapeutic Activity Minutes 9  -RB      51750 - OT Self Care/Mgmt Minutes 20  -RB         Total Minutes    Timed Charges Total Minutes 29  -RB       Total Minutes 29  -RB            User Key  (r) = Recorded By, (t) = Taken By, (c) = Cosigned By    Initials Name Provider Type    RB Isela Stratton OT Occupational Therapist              Therapy Charges for Today     Code Description Service Date Service Provider Modifiers Qty    76189483402 HC OT THER PROC EA 15 MIN 3/8/2023 Isela Stratton OT GO 1    77062324257 HC OT SELF CARE/MGMT/TRAIN EA 15 MIN 3/8/2023 Isela Stratton OT GO 1    48369551850 HC OT EVAL MOD COMPLEXITY 2 3/8/2023 Isela Stratton OT GO 1    19121343398 HC OT SELF CARE/MGMT/TRAIN EA 15 MIN 3/9/2023 Isela Stratton OT GO 1    75138515336 HC OT THERAPEUTIC ACT EA 15 MIN 3/9/2023 Isela Stratton OT GO 1               Isela Stratton OT  3/9/2023

## 2023-03-09 NOTE — PLAN OF CARE
Goal Outcome Evaluation:  Plan of Care Reviewed With: patient        Progress: improving  Outcome Evaluation: Pt tolerated treatment well this date. Required mod A for bed mobility, then CGA-min A to ambulate 40ft w/ HHA. Pt demonstrated unsteady gait throughout, especially w/out HHA given. Pt completed ADLs w/ OT at sink w/ SBA-CGA for static standing balance. Encouraged pt to attempt a few seated LE exercises on own during the day.

## 2023-03-09 NOTE — PLAN OF CARE
Goal Outcome Evaluation:  Plan of Care Reviewed With: patient, family        Progress: no change   VSS on RA. Aox4. Left elbow pain improved with PRN pain medicine. Up to chair for meals. Assist x1 to bathroom. Family at bedside. Bed alarm on.

## 2023-03-09 NOTE — THERAPY TREATMENT NOTE
Patient Name: Alba Correa  : 1933    MRN: [id number]                              Today's Date: 3/9/2023       Admit Date: 3/7/2023    Visit Dx:     ICD-10-CM ICD-9-CM   1. Fall from standing, initial encounter  W19.XXXA E888.9   2. Generalized weakness  R53.1 780.79   3. Facial contusion, initial encounter  S00.83XA 920   4. Closed fracture of left elbow, initial encounter  S42.402A 812.40   5. Facial laceration, initial encounter  S01.81XA 873.40   6. Closed head injury, initial encounter  S09.90XA 959.01     Patient Active Problem List   Diagnosis   • CKD (chronic kidney disease) stage 4, GFR 15-29 ml/min ([facility])   • Depression   • Gout   • Hyperparathyroidism ([facility])   • Adaptive colitis   • Osteopenia   • Rheumatoid arthritis ([facility])   • Degeneration of intervertebral disc   • Essential hypertension   • Detrusor muscle hypertonia   • History of DVT (deep vein thrombosis)   • Hyperlipidemia   • Macrocytosis   • Nonrheumatic aortic (valve) insufficiency   • History of ischemic right MCA stroke with extension   • Atrial fibrillation, persistent ([facility])   • Anemia in chronic kidney disease   • Other proteinuria   • Sinus bradycardia   • First degree AV block   • Nonrheumatic mitral valve regurgitation   • Idiopathic peripheral neuropathy   • Lower extremity edema   • Acute diastolic CHF (congestive heart failure) ([facility])   • Chest pain   • Acquired hypothyroidism   • Sick sinus syndrome ([facility])   • Insomnia   • Fall from standing, initial encounter   • Closed fracture of left distal humerus   • Facial laceration   • Laceration of right forearm   • Chronic kidney disease, stage 3a ([facility])     Past Medical History:   Diagnosis Date   • Acquired hypothyroidism 5/3/2022   • Acute diastolic CHF (congestive heart failure) ([facility]) 3/20/2022   • Chronic kidney disease    • CVA (cerebral vascular accident) ([facility])    • Degeneration of intervertebral disc    • Depression    • DVT (deep venous thrombosis) ([facility])    • First degree AV  block 05/26/2021   • Gout    • H/O complete eye exam 09/2016   • Hyperlipidemia    • IBS (irritable bowel syndrome)    • Nonrheumatic mitral valve regurgitation    • OAB (overactive bladder)    • Osteopenia    • Osteoporosis    • PAF (paroxysmal atrial fibrillation) ([facility]) 11/08/2019   • Peripheral neuropathy    • Rheumatoid arthritis ([facility])    • Sinus bradycardia 05/26/2021     Past Surgical History:   Procedure Laterality Date   • APPENDECTOMY     • BREAST BIOPSY     • CARDIAC ELECTROPHYSIOLOGY PROCEDURE N/A 10/12/2021    Procedure: Loop insertion linq;  Surgeon: Tone Anguiano MD;  Location: [facility] INVASIVE LOCATION;  Service: Cardiovascular;  Laterality: N/A;   • CARDIOVERSION  08/03/2020    Dr. Colby   • COLONOSCOPY      declines   • HERNIA REPAIR  1965   • HYSTERECTOMY      still has ovaries   • MAMMO BILATERAL  11/16/2016    pt declines   • PAP SMEAR  11/16/2016    declines      General Information     Row Name 03/09/23 1511          Physical Therapy Time and Intention    Document Type therapy note (daily note)  -     Mode of Treatment physical therapy  -     Row Name 03/09/23 1511          General Information    Existing Precautions/Restrictions fall;non-weight bearing  NWB L UE  -[facility] Name 03/09/23 1511          Cognition    Orientation Status (Cognition) oriented x 4  -           User Key  (r) = Recorded By, (t) = Taken By, (c) = Cosigned By    Initials Name Provider Type     Nicky Lama PTA Physical Therapist Assistant               Mobility     Row Name 03/09/23 1515          Bed Mobility    Bed Mobility supine-sit  -     Supine-Sit [place] (Bed Mobility) moderate assist (50% patient effort)  -     Assistive Device (Bed Mobility) bed rails;head of bed elevated  -     Row Name 03/09/23 1515          Sit-Stand Transfer    Sit-Stand [place] (Transfers) contact guard  -     Row Name 03/09/23 1515          Gait/Stairs (Locomotion)    [place] Level  (Gait) minimum assist (75% patient effort)  -     Assistive Device (Gait) --  HHA  -SM     Distance in Feet (Gait) 40  -     Deviations/Abnormal Patterns (Gait) noemi decreased;stride length decreased  -     Bilateral Gait Deviations forward flexed posture  -     Comment, (Gait/Stairs) unsteady gait especially w/out HHA  -SM           User Key  (r) = Recorded By, (t) = Taken By, (c) = Cosigned By    Initials Name Provider Type    Nicky Parker PTA Physical Therapist Assistant               Obj/Interventions     Row Name 03/09/23 1518          Motor Skills    Therapeutic Exercise --  seated AP, QS, GS x10 reps  -     Row Name 03/09/23 1518          Balance    Comment, Balance static standing at sink w/ SBA-CGA for safety while completing ADLs w/ OT  -SM           User Key  (r) = Recorded By, (t) = Taken By, (c) = Cosigned By    Initials Name Provider Type    Nicky Parker PTA Physical Therapist Assistant               Goals/Plan    No documentation.                Clinical Impression     Row Name 03/09/23 1519          Pain    Pretreatment Pain Rating 0/10 - no pain  -     Posttreatment Pain Rating 2/10  -SM     Pain Location - Side/Orientation Left  -     Pain Location upper  -     Pain Location - extremity  -     Pain Intervention(s) Repositioned;Ambulation/increased activity;Rest  -     Row Name 03/09/23 1519          Positioning and Restraints    Pre-Treatment Position in bed  -     Post Treatment Position chair  -     In Chair reclined;call light within reach;encouraged to call for assist;exit alarm on  -           User Key  (r) = Recorded By, (t) = Taken By, (c) = Cosigned By    Initials Name Provider Type    Nicky Parker PTA Physical Therapist Assistant               Outcome Measures     Row Name 03/09/23 1519          How much help from another person do you currently need...    Turning from your back to your side while in flat bed without using  bedrails? 3  -SM     Moving from lying on back to sitting on the side of a flat bed without bedrails? 3  -SM     Moving to and from a bed to a chair (including a wheelchair)? 3  -SM     Standing up from a chair using your arms (e.g., wheelchair, bedside chair)? 3  -SM     Climbing 3-5 steps with a railing? 2  -SM     To walk in hospital room? 3  -SM     AM-PAC 6 Clicks Score (PT) 17  -SM     Highest level of mobility 5 --> Static standing  -     Row Name 03/09/23 1519          Functional Assessment    Outcome Measure Options AM-PAC 6 Clicks Basic Mobility (PT)  -           User Key  (r) = Recorded By, (t) = Taken By, (c) = Cosigned By    Initials Name Provider Type     Nicky Lama PTA Physical Therapist Assistant                             Physical Therapy Education     Title: PT OT SLP Therapies (In Progress)     Topic: Physical Therapy (Done)     Point: Mobility training (Done)     Learning Progress Summary           Patient Acceptance, E,TB,D, VU,NR by  at 3/9/2023 1519    Acceptance, E,TB,D, VU,NR by  at 3/8/2023 1158                   Point: Home exercise program (Done)     Learning Progress Summary           Patient Acceptance, E,TB,D, VU,NR by  at 3/9/2023 1519    Acceptance, E,TB,D, VU,NR by  at 3/8/2023 1158                   Point: Body mechanics (Done)     Learning Progress Summary           Patient Acceptance, E,TB,D, VU,NR by  at 3/9/2023 1519    Acceptance, E,TB,D, VU,NR by  at 3/8/2023 1158                   Point: Precautions (Done)     Learning Progress Summary           Patient Acceptance, E,TB,D, VU,NR by  at 3/9/2023 1519    Acceptance, E,TB,D, VU,NR by  at 3/8/2023 1158                               User Key     Initials Effective Dates Name Provider Type Discipline     03/07/18 -  Nicky Lama PTA Physical Therapist Assistant PT     04/08/22 -  Paz Perez PT Physical Therapist PT              PT Recommendation and Plan     Plan of Care Reviewed  With: patient  Progress: improving  Outcome Evaluation: Pt tolerated treatment well this date. Required mod A for bed mobility, then CGA-min A to ambulate 40ft w/ HHA. Pt demonstrated unsteady gait throughout, especially w/out HHA given. Pt completed ADLs w/ OT at sink w/ SBA-CGA for static standing balance. Encouraged pt to attempt a few seated LE exercises on own during the day.     Time Calculation:    PT Charges     Row Name 03/09/23 1528             Time Calculation    Start Time 1117  -      Stop Time 1146  -SM      Time Calculation (min) 29 min  -SM      PT Received On 03/09/23  -      PT - Next Appointment 03/10/23  -            User Key  (r) = Recorded By, (t) = Taken By, (c) = Cosigned By    Initials Name Provider Type     Nicky Lama PTA Physical Therapist Assistant              Therapy Charges for Today     Code Description Service Date Service Provider Modifiers Qty    88933944060 HC PT THER PROC EA 15 MIN 3/9/2023 Nicky Lama PTA GP 1    87496185152 HC GAIT TRAINING EA 15 MIN 3/9/2023 Nicky Lama PTA GP 1    53752046952 HC PT THER SUPP EA 15 MIN 3/9/2023 Nicky Lama, ARACELIS GP 1          PT G-Codes  Outcome Measure Options: AM-PAC 6 Clicks Basic Mobility (PT)  AM-PAC 6 Clicks Score (PT): 17  AM-PAC 6 Clicks Score (OT): 12  Modified Handley Scale: 4 - Moderately severe disability.  Unable to walk without assistance, and unable to attend to own bodily needs without assistance.  PT Discharge Summary  Anticipated Discharge Disposition (PT): skilled nursing facility    Nicky Lama PTA  3/9/2023

## 2023-03-10 LAB
ANION GAP SERPL CALCULATED.3IONS-SCNC: 13 MMOL/L (ref 5–15)
BASOPHILS # BLD AUTO: 0.06 10*3/MM3 (ref 0–0.2)
BASOPHILS NFR BLD AUTO: 0.8 % (ref 0–1.5)
BUN SERPL-MCNC: 43 MG/DL (ref 8–23)
BUN/CREAT SERPL: 30.3 (ref 7–25)
CALCIUM SPEC-SCNC: 9.4 MG/DL (ref 8.6–10.5)
CHLORIDE SERPL-SCNC: 103 MMOL/L (ref 98–107)
CO2 SERPL-SCNC: 21 MMOL/L (ref 22–29)
CREAT SERPL-MCNC: 1.42 MG/DL (ref 0.57–1)
DEPRECATED RDW RBC AUTO: 43.9 FL (ref 37–54)
EGFRCR SERPLBLD CKD-EPI 2021: 35.4 ML/MIN/1.73
EOSINOPHIL # BLD AUTO: 0.25 10*3/MM3 (ref 0–0.4)
EOSINOPHIL NFR BLD AUTO: 3.2 % (ref 0.3–6.2)
ERYTHROCYTE [DISTWIDTH] IN BLOOD BY AUTOMATED COUNT: 11.8 % (ref 12.3–15.4)
GLUCOSE SERPL-MCNC: 114 MG/DL (ref 65–99)
HCT VFR BLD AUTO: 32.8 % (ref 34–46.6)
HGB BLD-MCNC: 11.3 G/DL (ref 12–15.9)
IMM GRANULOCYTES # BLD AUTO: 0.03 10*3/MM3 (ref 0–0.05)
IMM GRANULOCYTES NFR BLD AUTO: 0.4 % (ref 0–0.5)
LYMPHOCYTES # BLD AUTO: 2.41 10*3/MM3 (ref 0.7–3.1)
LYMPHOCYTES NFR BLD AUTO: 30.4 % (ref 19.6–45.3)
MCH RBC QN AUTO: 35 PG (ref 26.6–33)
MCHC RBC AUTO-ENTMCNC: 34.5 G/DL (ref 31.5–35.7)
MCV RBC AUTO: 101.5 FL (ref 79–97)
MONOCYTES # BLD AUTO: 1.17 10*3/MM3 (ref 0.1–0.9)
MONOCYTES NFR BLD AUTO: 14.8 % (ref 5–12)
NEUTROPHILS NFR BLD AUTO: 4.01 10*3/MM3 (ref 1.7–7)
NEUTROPHILS NFR BLD AUTO: 50.4 % (ref 42.7–76)
NRBC BLD AUTO-RTO: 0 /100 WBC (ref 0–0.2)
PLATELET # BLD AUTO: 224 10*3/MM3 (ref 140–450)
PMV BLD AUTO: 10.3 FL (ref 6–12)
POTASSIUM SERPL-SCNC: 4 MMOL/L (ref 3.5–5.2)
RBC # BLD AUTO: 3.23 10*6/MM3 (ref 3.77–5.28)
SODIUM SERPL-SCNC: 137 MMOL/L (ref 136–145)
WBC NRBC COR # BLD: 7.93 10*3/MM3 (ref 3.4–10.8)

## 2023-03-10 PROCEDURE — G0378 HOSPITAL OBSERVATION PER HR: HCPCS

## 2023-03-10 PROCEDURE — 85025 COMPLETE CBC W/AUTO DIFF WBC: CPT | Performed by: INTERNAL MEDICINE

## 2023-03-10 PROCEDURE — 80048 BASIC METABOLIC PNL TOTAL CA: CPT | Performed by: INTERNAL MEDICINE

## 2023-03-10 PROCEDURE — 97116 GAIT TRAINING THERAPY: CPT

## 2023-03-10 PROCEDURE — 97110 THERAPEUTIC EXERCISES: CPT

## 2023-03-10 RX ADMIN — Medication 10 ML: at 08:37

## 2023-03-10 RX ADMIN — DULOXETINE HYDROCHLORIDE 60 MG: 60 CAPSULE, DELAYED RELEASE ORAL at 08:37

## 2023-03-10 RX ADMIN — HYDROCODONE BITARTRATE AND ACETAMINOPHEN 1 TABLET: 5; 325 TABLET ORAL at 11:18

## 2023-03-10 RX ADMIN — HYDROCODONE BITARTRATE AND ACETAMINOPHEN 1 TABLET: 5; 325 TABLET ORAL at 20:24

## 2023-03-10 RX ADMIN — Medication 10 ML: at 20:24

## 2023-03-10 RX ADMIN — AMLODIPINE BESYLATE 5 MG: 5 TABLET ORAL at 08:37

## 2023-03-10 RX ADMIN — LEVOTHYROXINE SODIUM 50 MCG: 0.05 TABLET ORAL at 06:26

## 2023-03-10 RX ADMIN — LOSARTAN POTASSIUM 25 MG: 25 TABLET, FILM COATED ORAL at 08:37

## 2023-03-10 RX ADMIN — ACETAMINOPHEN 650 MG: 325 TABLET, FILM COATED ORAL at 23:16

## 2023-03-10 RX ADMIN — LOSARTAN POTASSIUM 25 MG: 25 TABLET, FILM COATED ORAL at 20:24

## 2023-03-10 RX ADMIN — HYDROCODONE BITARTRATE AND ACETAMINOPHEN 1 TABLET: 5; 325 TABLET ORAL at 06:26

## 2023-03-10 RX ADMIN — ALLOPURINOL 100 MG: 100 TABLET ORAL at 08:37

## 2023-03-10 NOTE — THERAPY TREATMENT NOTE
Patient Name: Alba Correa  : 1933    MRN: [id number]                              Today's Date: 3/10/2023       Admit Date: 3/7/2023    Visit Dx:     ICD-10-CM ICD-9-CM   1. Fall from standing, initial encounter  W19.XXXA E888.9   2. Generalized weakness  R53.1 780.79   3. Facial contusion, initial encounter  S00.83XA 920   4. Closed fracture of left elbow, initial encounter  S42.402A 812.40   5. Facial laceration, initial encounter  S01.81XA 873.40   6. Closed head injury, initial encounter  S09.90XA 959.01     Patient Active Problem List   Diagnosis   • CKD (chronic kidney disease) stage 4, GFR 15-29 ml/min ([facility])   • Depression   • Gout   • Hyperparathyroidism ([facility])   • Adaptive colitis   • Osteopenia   • Rheumatoid arthritis ([facility])   • Degeneration of intervertebral disc   • Essential hypertension   • Detrusor muscle hypertonia   • History of DVT (deep vein thrombosis)   • Hyperlipidemia   • Macrocytosis   • Nonrheumatic aortic (valve) insufficiency   • History of ischemic right MCA stroke with extension   • Atrial fibrillation, persistent ([facility])   • Anemia in chronic kidney disease   • Other proteinuria   • Sinus bradycardia   • First degree AV block   • Nonrheumatic mitral valve regurgitation   • Idiopathic peripheral neuropathy   • Lower extremity edema   • Acute diastolic CHF (congestive heart failure) ([facility])   • Chest pain   • Acquired hypothyroidism   • Sick sinus syndrome ([facility])   • Insomnia   • Fall from standing, initial encounter   • Closed fracture of left distal humerus   • Facial laceration   • Laceration of right forearm   • Chronic kidney disease, stage 3a ([facility])     Past Medical History:   Diagnosis Date   • Acquired hypothyroidism 5/3/2022   • Acute diastolic CHF (congestive heart failure) ([facility]) 3/20/2022   • Chronic kidney disease    • CVA (cerebral vascular accident) ([facility])    • Degeneration of intervertebral disc    • Depression    • DVT (deep venous thrombosis) ([facility])    • First degree AV  block 05/26/2021   • Gout    • H/O complete eye exam 09/2016   • Hyperlipidemia    • IBS (irritable bowel syndrome)    • Nonrheumatic mitral valve regurgitation    • OAB (overactive bladder)    • Osteopenia    • Osteoporosis    • PAF (paroxysmal atrial fibrillation) (HCC) 11/08/2019   • Peripheral neuropathy    • Rheumatoid arthritis (HCC)    • Sinus bradycardia 05/26/2021     Past Surgical History:   Procedure Laterality Date   • APPENDECTOMY     • BREAST BIOPSY     • CARDIAC ELECTROPHYSIOLOGY PROCEDURE N/A 10/12/2021    Procedure: Loop insertion linq;  Surgeon: Tone Anguiano MD;  Location: [facility] INVASIVE LOCATION;  Service: Cardiovascular;  Laterality: N/A;   • CARDIOVERSION  08/03/2020    Dr. Colby   • COLONOSCOPY      declines   • HERNIA REPAIR  1965   • HYSTERECTOMY      still has ovaries   • MAMMO BILATERAL  11/16/2016    pt declines   • PAP SMEAR  11/16/2016    declines      General Information     Row Name 03/10/23 0955          Physical Therapy Time and Intention    Document Type therapy note (daily note)  -     Mode of Treatment physical therapy  -     Row Name 03/10/23 0955          General Information    Existing Precautions/Restrictions fall;non-weight bearing  NWB L UE  -     Row Name 03/10/23 0955          Cognition    Orientation Status (Cognition) oriented x 4  -           User Key  (r) = Recorded By, (t) = Taken By, (c) = Cosigned By    Initials Name Provider Type     Nicky Lama, ARACELIS Physical Therapist Assistant               Mobility     Row Name 03/10/23 0956          Bed Mobility    Comment, (Bed Mobility) up in room w/ RN, then up in chair  -     Row Name 03/10/23 0956          Sit-Stand Transfer    Sit-Stand [place] (Transfers) contact guard  -     Assistive Device (Sit-Stand Transfers) --  HHA  -     Row Name 03/10/23 0956          Gait/Stairs (Locomotion)    [place] Level (Gait) contact guard;minimum assist (75% patient effort)  -      Assistive Device (Gait) --  HHA  -     Distance in Feet (Gait) 40  -SM     Deviations/Abnormal Patterns (Gait) noemi decreased;stride length decreased  -SM     Bilateral Gait Deviations forward flexed posture  -SM     Comment, (Gait/Stairs) slightly unsteady, though improved from previous visit  -           User Key  (r) = Recorded By, (t) = Taken By, (c) = Cosigned By    Initials Name Provider Type    Nicky Parker PTA Physical Therapist Assistant               Obj/Interventions     Row Name 03/10/23 0958          Motor Skills    Therapeutic Exercise --  seated AP, LAQ, marches x10 reps  -           User Key  (r) = Recorded By, (t) = Taken By, (c) = Cosigned By    Initials Name Provider Type    Nicky Parker PTA Physical Therapist Assistant               Goals/Plan    No documentation.                Clinical Impression     [facility] Name 03/10/23 0958          Pain    Pretreatment Pain Rating 2/10  -SM     Posttreatment Pain Rating 3/10  -SM     Pain Location - Side/Orientation Left  -SM     Pain Location upper  -SM     Pain Location - extremity  -SM     Pain Intervention(s) Repositioned;Ambulation/increased activity;Rest  -[facility] Name 03/10/23 0958          Positioning and Restraints    Pre-Treatment Position standing in room  -SM     Post Treatment Position chair  -SM     In Chair reclined;call light within reach;encouraged to call for assist;exit alarm on  -           User Key  (r) = Recorded By, (t) = Taken By, (c) = Cosigned By    Initials Name Provider Type    Nicky Parker PTA Physical Therapist Assistant               Outcome Measures     Row Name 03/10/23 1000          How much help from another person do you currently need...    Turning from your back to your side while in flat bed without using bedrails? 3  -SM     Moving from lying on back to sitting on the side of a flat bed without bedrails? 2  -SM     Moving to and from a bed to a chair (including a wheelchair)?  3  -SM     Standing up from a chair using your arms (e.g., wheelchair, bedside chair)? 3  -SM     Climbing 3-5 steps with a railing? 2  -SM     To walk in hospital room? 3  -SM     AM-PAC 6 Clicks Score (PT) 16  -     Highest level of mobility 5 --> Static standing  -     Row Name 03/10/23 1000          Functional Assessment    Outcome Measure Options AM-PAC 6 Clicks Basic Mobility (PT)  -           User Key  (r) = Recorded By, (t) = Taken By, (c) = Cosigned By    Initials Name Provider Type     Nicky Lama PTA Physical Therapist Assistant                             Physical Therapy Education     Title: PT OT SLP Therapies (In Progress)     Topic: Physical Therapy (Done)     Point: Mobility training (Done)     Learning Progress Summary           Patient Acceptance, E,TB,D, VU,NR by  at 3/10/2023 1000    Acceptance, E,TB,D, VU,NR by  at 3/9/2023 1519    Acceptance, E,TB,D, VU,NR by  at 3/8/2023 1158                   Point: Home exercise program (Done)     Learning Progress Summary           Patient Acceptance, E,TB,D, VU,NR by  at 3/10/2023 1000    Acceptance, E,TB,D, VU,NR by  at 3/9/2023 1519    Acceptance, E,TB,D, VU,NR by  at 3/8/2023 1158                   Point: Body mechanics (Done)     Learning Progress Summary           Patient Acceptance, E,TB,D, VU,NR by  at 3/10/2023 1000    Acceptance, E,TB,D, VU,NR by  at 3/9/2023 1519    Acceptance, E,TB,D, VU,NR by  at 3/8/2023 1158                   Point: Precautions (Done)     Learning Progress Summary           Patient Acceptance, E,TB,D, VU,NR by  at 3/10/2023 1000    Acceptance, E,TB,D, VU,NR by  at 3/9/2023 1519    Acceptance, E,TB,D, VU,NR by  at 3/8/2023 1158                               User Key     Initials Effective Dates Name Provider Type Discipline     03/07/18 -  Nicky Lama PTA Physical Therapist Assistant PT     04/08/22 -  Perez, Paz L, PT Physical Therapist PT              PT  Recommendation and Plan     Plan of Care Reviewed With: patient  Progress: improving  Outcome Evaluation: Pt tolerated treatment well this date. Ambulated 40ft w/ HHA-min A. Slightly unsteady at times, though improved from yesterday. Instructed pt on a few seated LE exercises, and encouraged pt to ambulate w/ nsg again later. Plans for surgery tomorrow, then PT will re-eval.     Time Calculation:    PT Charges     Row Name 03/10/23 1003             Time Calculation    Start Time 0840  -      Stop Time 0906  -      Time Calculation (min) 26 min  -SM      PT Received On 03/10/23  -      PT - Next Appointment 03/12/23  -            User Key  (r) = Recorded By, (t) = Taken By, (c) = Cosigned By    Initials Name Provider Type     Nicky Lama PTA Physical Therapist Assistant              Therapy Charges for Today     Code Description Service Date Service Provider Modifiers Qty    08199852111 HC PT THER PROC EA 15 MIN 3/9/2023 Nicky Lama, ARACELIS GP 1    26894891911 HC GAIT TRAINING EA 15 MIN 3/9/2023 Nicky Lama, ARACELIS GP 1    74813410717 HC PT THER SUPP EA 15 MIN 3/9/2023 Nicky Lama, PTA GP 1    51420713164 HC PT THER PROC EA 15 MIN 3/10/2023 Nicky Lama, ARACELIS GP 1    11229750691 HC GAIT TRAINING EA 15 MIN 3/10/2023 Nicky Lama, ARACELIS GP 1          PT G-Codes  Outcome Measure Options: AM-PAC 6 Clicks Basic Mobility (PT)  AM-PAC 6 Clicks Score (PT): 16  AM-PAC 6 Clicks Score (OT): 12  Modified [place] Scale: 4 - Moderately severe disability.  Unable to walk without assistance, and unable to attend to own bodily needs without assistance.  PT Discharge Summary  Anticipated Discharge Disposition (PT): skilled nursing facility    Nicky Lama PTA  3/10/2023

## 2023-03-10 NOTE — PROGRESS NOTES
Name: Alba Correa ADMIT: 3/7/2023   : 1933  PCP: Aramis Doty MD    MRN: [id number] LOS: 0 days   AGE/SEX: 89 y.o. female  ROOM: [facility]     Subjective   Subjective     Patient is sitting up in the chair and feels much better today.  Pain is reasonably well controlled.  No reports of nausea, vomiting, abdominal pain, chest pain.       Objective   Objective   Vital Signs  Temp:  [98.2 °F (36.8 °C)-98.9 °F (37.2 °C)] 98.2 °F (36.8 °C)  Heart Rate:  [] 89  Resp:  [12-18] 12  BP: ()/(56-76) 124/67  SpO2:  [90 %-99 %] 96 %  on   ;   Device (Oxygen Therapy): room air  Body mass index is 22.26 kg/m².  Physical Exam HEENT: PERRLA, extraocular movements intact, Scleras no icterus, left periorbital bruising noted, bruising on left upper lip and chin also noted.  Cardiovascular: Regular rate and rhythm with normal S1 and S2  Respiratory: Fairly clear to auscultation bilaterally with no wheezes  GI: Soft, nontender, bowel sounds present  Extremities: Left upper extremity is in a sling    Results Review     I reviewed the patient's new clinical results.  Results from last 7 days   Lab Units 03/10/23  0514 03/09/23  0442 03/08/23  0527 03/07/23  1919   WBC 10*3/mm3 7.93 7.79 7.80 9.79   HEMOGLOBIN g/dL 11.3* 11.7* 12.3 14.8   PLATELETS 10*3/mm3 224 226 260 310     Results from last 7 days   Lab Units 03/10/23  0514 03/09/23  0442 03/08/23  0527 03/07/23  1919   SODIUM mmol/L 137 136 134* 134*   POTASSIUM mmol/L 4.0 4.4 4.3 5.0   CHLORIDE mmol/L 103 102 100 96*   CO2 mmol/L 21.0* 24.7 24.1 28.2   BUN mg/dL 43* 33* 26* 21   CREATININE mg/dL 1.42* 1.57* 1.15* 1.05*   GLUCOSE mg/dL 114* 111* 114* 163*   EGFR mL/min/1.73 35.4* 31.4* 45.6* 50.9*     Results from last 7 days   Lab Units 23   ALBUMIN g/dL 3.1*   BILIRUBIN mg/dL 0.6   ALK PHOS U/L 89   AST (SGOT) U/L 44*   ALT (SGPT) U/L 21     Results from last 7 days   Lab Units 03/10/23  0514 23  0442 23  0527 23   CALCIUM  mg/dL 9.4 9.4 9.7 10.8*   ALBUMIN g/dL  --   --   --  3.1*   MAGNESIUM mg/dL  --   --  2.1  --    PHOSPHORUS mg/dL  --   --  3.9  --        No results found for: HGBA1C, POCGLU    No radiology results for the last day  I have personally reviewed all medications:  Scheduled Medications  allopurinol, 100 mg, Oral, Daily  amLODIPine, 5 mg, Oral, Daily  [START ON 3/11/2023] ceFAZolin, 2 g, Intravenous, On Call to OR  DULoxetine, 60 mg, Oral, Daily  levothyroxine, 50 mcg, Oral, QAM  losartan, 25 mg, Oral, BID  sodium chloride, 10 mL, Intravenous, Q12H    Infusions   Diet  Diet: Regular/House Diet; Texture: Regular Texture (IDDSI 7); Fluid Consistency: Thin (IDDSI 0)  NPO Diet NPO Type: Strict NPO    I have personally reviewed:  [x]  Laboratory   [x]  Microbiology   [x]  Radiology   [x]  EKG/Telemetry  [x]  Cardiology/Vascular   [x]  Pathology    [x]  Records       Assessment/Plan     Active Hospital Problems    Diagnosis  POA   • **Fall from standing, initial encounter [W19.XXXA]  Yes   • Closed fracture of left distal humerus [S42.402A]  Yes   • Facial laceration [S01.81XA]  Unknown   • Laceration of right forearm [S51.811A]  Unknown   • Chronic kidney disease, stage 3a (HCC) [N18.31]  Unknown   • Atrial fibrillation, persistent (HCC) [I48.19]  Yes   • Hyperlipidemia [E78.5]  Yes   • Rheumatoid arthritis (HCC) [M06.9]  Yes      Resolved Hospital Problems   No resolved problems to display.       1. Mechanical fall with left humeral fracture, currently patient is in a sling and will continue with analgesics for pain control and orthopedics consultation is also been obtained and timing of surgery is per surgical team.  PT/OT consult has been obtained.  Multiple family members are at bedside and answered their questions to their satisfaction.    2.  CKD stage IIIa, creatinine is at baseline.    3.  Hypothyroidism, on Synthroid.    4.  History of atrial fibrillation, rate controlled and is on Eliquis at home which has been  held.    5.  Hypertension, on losartan and amlodipine which will be continued.    6.  On SCDs for DVT prophylaxis.    7.  CODE STATUS is DNR.    Copied text on this note has been reviewed by me on 3/10/2023    Vladimir Beltran MD  [facility]  03/10/23  14:21 EST

## 2023-03-10 NOTE — PLAN OF CARE
Goal Outcome Evaluation:  Plan of Care Reviewed With: patient        Progress: improving  Outcome Evaluation: Pt tolerated treatment well this date. Ambulated 40ft w/ HHA-min A. Slightly unsteady at times, though improved from yesterday. Instructed pt on a few seated LE exercises, and encouraged pt to ambulate w/ nsg again later. Plans for surgery tomorrow, then PT will re-eval.

## 2023-03-10 NOTE — PLAN OF CARE
Goal Outcome Evaluation:  Plan of Care Reviewed With: patient        Progress: improving  Outcome Evaluation: VSS. Pt is A&Ox4. Pain relieved with PRN medication. Patient ambulating to bathroom with assist x 1. Surgery scheduled for tomorrow. Dressing on L arm reinforced. No acute events during shift.

## 2023-03-11 ENCOUNTER — ANESTHESIA EVENT (OUTPATIENT)
Dept: PERIOP | Facility: HOSPITAL | Age: 88
End: 2023-03-11
Payer: MEDICARE

## 2023-03-11 ENCOUNTER — ANESTHESIA (OUTPATIENT)
Dept: PERIOP | Facility: HOSPITAL | Age: 88
End: 2023-03-11
Payer: MEDICARE

## 2023-03-11 ENCOUNTER — APPOINTMENT (OUTPATIENT)
Dept: GENERAL RADIOLOGY | Facility: HOSPITAL | Age: 88
End: 2023-03-11
Payer: MEDICARE

## 2023-03-11 LAB
ANION GAP SERPL CALCULATED.3IONS-SCNC: 13.9 MMOL/L (ref 5–15)
BUN SERPL-MCNC: 38 MG/DL (ref 8–23)
BUN/CREAT SERPL: 33.3 (ref 7–25)
CALCIUM SPEC-SCNC: 9.6 MG/DL (ref 8.6–10.5)
CHLORIDE SERPL-SCNC: 100 MMOL/L (ref 98–107)
CO2 SERPL-SCNC: 22.1 MMOL/L (ref 22–29)
CREAT SERPL-MCNC: 1.14 MG/DL (ref 0.57–1)
EGFRCR SERPLBLD CKD-EPI 2021: 46.1 ML/MIN/1.73
GLUCOSE SERPL-MCNC: 98 MG/DL (ref 65–99)
POTASSIUM SERPL-SCNC: 4.3 MMOL/L (ref 3.5–5.2)
SODIUM SERPL-SCNC: 136 MMOL/L (ref 136–145)

## 2023-03-11 PROCEDURE — C1713 ANCHOR/SCREW BN/BN,TIS/BN: HCPCS | Performed by: ORTHOPAEDIC SURGERY

## 2023-03-11 PROCEDURE — 63710000001 DULOXETINE 60 MG CAPSULE DELAYED-RELEASE PARTICLES: Performed by: ORTHOPAEDIC SURGERY

## 2023-03-11 PROCEDURE — A9270 NON-COVERED ITEM OR SERVICE: HCPCS | Performed by: ORTHOPAEDIC SURGERY

## 2023-03-11 PROCEDURE — 63710000001 ACETAMINOPHEN 325 MG TABLET: Performed by: ORTHOPAEDIC SURGERY

## 2023-03-11 PROCEDURE — 25010000002 NEOSTIGMINE 5 MG/10ML SOLUTION: Performed by: NURSE ANESTHETIST, CERTIFIED REGISTERED

## 2023-03-11 PROCEDURE — 25010000002 MIDAZOLAM PER 1 MG: Performed by: ANESTHESIOLOGY

## 2023-03-11 PROCEDURE — 63710000001 HYDROCODONE-ACETAMINOPHEN 5-325 MG TABLET: Performed by: ORTHOPAEDIC SURGERY

## 2023-03-11 PROCEDURE — 25010000002 ROPIVACAINE PER 1 MG: Performed by: ANESTHESIOLOGY

## 2023-03-11 PROCEDURE — 25010000002 DEXAMETHASONE SODIUM PHOSPHATE 20 MG/5ML SOLUTION: Performed by: NURSE ANESTHETIST, CERTIFIED REGISTERED

## 2023-03-11 PROCEDURE — 76000 FLUOROSCOPY <1 HR PHYS/QHP: CPT

## 2023-03-11 PROCEDURE — 63710000001 POVIDONE-IODINE 10 % SOLUTION 14.8 ML BOTTLE: Performed by: ORTHOPAEDIC SURGERY

## 2023-03-11 PROCEDURE — 25010000002 HYDROMORPHONE PER 4 MG: Performed by: NURSE ANESTHETIST, CERTIFIED REGISTERED

## 2023-03-11 PROCEDURE — 73070 X-RAY EXAM OF ELBOW: CPT

## 2023-03-11 PROCEDURE — 25010000002 PROPOFOL 10 MG/ML EMULSION: Performed by: NURSE ANESTHETIST, CERTIFIED REGISTERED

## 2023-03-11 PROCEDURE — 63710000001 ALLOPURINOL 100 MG TABLET: Performed by: ORTHOPAEDIC SURGERY

## 2023-03-11 PROCEDURE — 25010000002 MEPIVACAINE PF 2 % SOLUTION 20 ML VIAL: Performed by: ANESTHESIOLOGY

## 2023-03-11 PROCEDURE — 25010000002 CEFAZOLIN IN DEXTROSE 2-4 GM/100ML-% SOLUTION: Performed by: ORTHOPAEDIC SURGERY

## 2023-03-11 PROCEDURE — 25010000002 FENTANYL CITRATE (PF) 50 MCG/ML SOLUTION: Performed by: ANESTHESIOLOGY

## 2023-03-11 PROCEDURE — 80048 BASIC METABOLIC PNL TOTAL CA: CPT | Performed by: INTERNAL MEDICINE

## 2023-03-11 PROCEDURE — 63710000001 AMLODIPINE 5 MG TABLET: Performed by: ORTHOPAEDIC SURGERY

## 2023-03-11 PROCEDURE — G0378 HOSPITAL OBSERVATION PER HR: HCPCS

## 2023-03-11 PROCEDURE — 63710000001 LOSARTAN 25 MG TABLET: Performed by: ORTHOPAEDIC SURGERY

## 2023-03-11 PROCEDURE — 76942 ECHO GUIDE FOR BIOPSY: CPT | Performed by: ORTHOPAEDIC SURGERY

## 2023-03-11 PROCEDURE — 25010000002 ONDANSETRON PER 1 MG: Performed by: NURSE ANESTHETIST, CERTIFIED REGISTERED

## 2023-03-11 DEVICE — EVOS 2.7MM X 30MM CORTEX SCREW T8 SELF-TAPPING
Type: IMPLANTABLE DEVICE | Site: ELBOW | Status: FUNCTIONAL
Brand: EVOS

## 2023-03-11 DEVICE — EVOS 2.7MM X 26MM LOCKING SCREW T8 SELF-TAPPING
Type: IMPLANTABLE DEVICE | Site: ELBOW | Status: FUNCTIONAL
Brand: EVOS

## 2023-03-11 DEVICE — EVOS 2.7MM X 34MM LOCKING SCREW T8 SELF-TAPPING
Type: IMPLANTABLE DEVICE | Site: ELBOW | Status: FUNCTIONAL
Brand: EVOS

## 2023-03-11 DEVICE — EVOS 3.5MM X 24MM CORTEX SCREW SELF-TAPPING
Type: IMPLANTABLE DEVICE | Site: ELBOW | Status: FUNCTIONAL
Brand: EVOS

## 2023-03-11 DEVICE — EVOS 2.7MM/3.5MM MEDIAL DISTAL                                    HUMERUS PLATE 3 HOLE LEFT 80MM
Type: IMPLANTABLE DEVICE | Site: ELBOW | Status: FUNCTIONAL
Brand: EVOS

## 2023-03-11 DEVICE — EVOS 2.7MM X 28MM CORTEX SCREW T8 SELF-TAPPING
Type: IMPLANTABLE DEVICE | Site: ELBOW | Status: FUNCTIONAL
Brand: EVOS

## 2023-03-11 DEVICE — EVOS 3.5MM X 26MM CORTEX SCREW SELF-TAPPING
Type: IMPLANTABLE DEVICE | Site: ELBOW | Status: FUNCTIONAL
Brand: EVOS

## 2023-03-11 DEVICE — EVOS 2.7MM X 22MM LOCKING SCREW T8 SELF-TAPPING
Type: IMPLANTABLE DEVICE | Site: ELBOW | Status: FUNCTIONAL
Brand: EVOS

## 2023-03-11 DEVICE — KNOTLESS TISSUE CONTROL DEVICE, UNDYED UNIDIRECTIONAL (ANTIBACTERIAL) SYNTHETIC ABSORBABLE DEVICE
Type: IMPLANTABLE DEVICE | Site: ELBOW | Status: FUNCTIONAL
Brand: STRATAFIX

## 2023-03-11 DEVICE — EVOS 2.7MM/3.5 OLECRANON PLATE                                    WITH TINES 2 HOLE LEFT 61MM
Type: IMPLANTABLE DEVICE | Site: ELBOW | Status: FUNCTIONAL
Brand: EVOS

## 2023-03-11 DEVICE — EVOS 2.7MM X 46MM LOCKING SCREW T8 SELF-TAPPING
Type: IMPLANTABLE DEVICE | Site: ELBOW | Status: FUNCTIONAL
Brand: EVOS

## 2023-03-11 DEVICE — EVOS 3.5MM X 26MM LOCKING SCREW SELF-TAPPING
Type: IMPLANTABLE DEVICE | Site: ELBOW | Status: FUNCTIONAL
Brand: EVOS

## 2023-03-11 DEVICE — EVOS 2.7MM/3.5MM LATERAL DISTAL                                    HUMERUS PLATE 7 HOLE LEFT 90MM
Type: IMPLANTABLE DEVICE | Site: ELBOW | Status: FUNCTIONAL
Brand: EVOS

## 2023-03-11 DEVICE — EVOS 2.7MM X 20MM LOCKING SCREW T8 SELF-TAPPING
Type: IMPLANTABLE DEVICE | Site: ELBOW | Status: FUNCTIONAL
Brand: EVOS

## 2023-03-11 DEVICE — EVOS 2.7MM X 16MM CORTEX SCREW T8 SELF-TAPPING
Type: IMPLANTABLE DEVICE | Site: ELBOW | Status: FUNCTIONAL
Brand: EVOS

## 2023-03-11 DEVICE — EVOS 2.7MM X 55MM LOCKING SCREW T8 SELF-TAPPING
Type: IMPLANTABLE DEVICE | Site: ELBOW | Status: FUNCTIONAL
Brand: EVOS

## 2023-03-11 DEVICE — PERI-LOC K-WIRE 1.6MM X 150MM                                    LENGTH TROCAR POINT
Type: IMPLANTABLE DEVICE | Site: ELBOW | Status: FUNCTIONAL
Brand: PERI-LOC

## 2023-03-11 DEVICE — EVOS 3.5MM X 32MM LOCKING SCREW SELF-TAPPING
Type: IMPLANTABLE DEVICE | Site: ELBOW | Status: FUNCTIONAL
Brand: EVOS

## 2023-03-11 DEVICE — EVOS 3.5MM X 42MM LOCKING SCREW SELF-TAPPING
Type: IMPLANTABLE DEVICE | Site: ELBOW | Status: FUNCTIONAL
Brand: EVOS

## 2023-03-11 DEVICE — EVOS 3.5MM X 22MM CORTEX SCREW SELF-TAPPING
Type: IMPLANTABLE DEVICE | Site: ELBOW | Status: FUNCTIONAL
Brand: EVOS

## 2023-03-11 DEVICE — EVOS 3.5MM X 20MM LOCKING SCREW SELF-TAPPING
Type: IMPLANTABLE DEVICE | Site: ELBOW | Status: FUNCTIONAL
Brand: EVOS

## 2023-03-11 DEVICE — EVOS 2.7MM X 42MM LOCKING SCREW T8 SELF-TAPPING
Type: IMPLANTABLE DEVICE | Site: ELBOW | Status: FUNCTIONAL
Brand: EVOS

## 2023-03-11 DEVICE — EVOS 2.7MM X 48MM LOCKING SCREW T8 SELF-TAPPING
Type: IMPLANTABLE DEVICE | Site: ELBOW | Status: FUNCTIONAL
Brand: EVOS

## 2023-03-11 RX ORDER — PHENYLEPHRINE HCL IN 0.9% NACL 1 MG/10 ML
SYRINGE (ML) INTRAVENOUS AS NEEDED
Status: DISCONTINUED | OUTPATIENT
Start: 2023-03-11 | End: 2023-03-11 | Stop reason: SURG

## 2023-03-11 RX ORDER — HYDROCODONE BITARTRATE AND ACETAMINOPHEN 7.5; 325 MG/1; MG/1
1 TABLET ORAL EVERY 4 HOURS PRN
Status: DISCONTINUED | OUTPATIENT
Start: 2023-03-11 | End: 2023-03-11 | Stop reason: HOSPADM

## 2023-03-11 RX ORDER — EPHEDRINE SULFATE 50 MG/ML
5 INJECTION, SOLUTION INTRAVENOUS ONCE AS NEEDED
Status: DISCONTINUED | OUTPATIENT
Start: 2023-03-11 | End: 2023-03-11 | Stop reason: HOSPADM

## 2023-03-11 RX ORDER — LIDOCAINE HYDROCHLORIDE 10 MG/ML
0.5 INJECTION, SOLUTION EPIDURAL; INFILTRATION; INTRACAUDAL; PERINEURAL ONCE AS NEEDED
Status: DISCONTINUED | OUTPATIENT
Start: 2023-03-11 | End: 2023-03-11 | Stop reason: HOSPADM

## 2023-03-11 RX ORDER — ROPIVACAINE HYDROCHLORIDE 5 MG/ML
INJECTION, SOLUTION EPIDURAL; INFILTRATION; PERINEURAL
Status: COMPLETED
Start: 2023-03-11 | End: 2023-03-11

## 2023-03-11 RX ORDER — DROPERIDOL 2.5 MG/ML
0.62 INJECTION, SOLUTION INTRAMUSCULAR; INTRAVENOUS
Status: DISCONTINUED | OUTPATIENT
Start: 2023-03-11 | End: 2023-03-11 | Stop reason: HOSPADM

## 2023-03-11 RX ORDER — HYDROMORPHONE HYDROCHLORIDE 1 MG/ML
0.25 INJECTION, SOLUTION INTRAMUSCULAR; INTRAVENOUS; SUBCUTANEOUS
Status: DISCONTINUED | OUTPATIENT
Start: 2023-03-11 | End: 2023-03-11 | Stop reason: HOSPADM

## 2023-03-11 RX ORDER — SODIUM CHLORIDE, SODIUM LACTATE, POTASSIUM CHLORIDE, CALCIUM CHLORIDE 600; 310; 30; 20 MG/100ML; MG/100ML; MG/100ML; MG/100ML
9 INJECTION, SOLUTION INTRAVENOUS CONTINUOUS
Status: DISCONTINUED | OUTPATIENT
Start: 2023-03-11 | End: 2023-03-14 | Stop reason: HOSPADM

## 2023-03-11 RX ORDER — HYDRALAZINE HYDROCHLORIDE 20 MG/ML
5 INJECTION INTRAMUSCULAR; INTRAVENOUS
Status: DISCONTINUED | OUTPATIENT
Start: 2023-03-11 | End: 2023-03-11 | Stop reason: HOSPADM

## 2023-03-11 RX ORDER — FLUMAZENIL 0.1 MG/ML
0.2 INJECTION INTRAVENOUS AS NEEDED
Status: DISCONTINUED | OUTPATIENT
Start: 2023-03-11 | End: 2023-03-11 | Stop reason: HOSPADM

## 2023-03-11 RX ORDER — MIDAZOLAM HYDROCHLORIDE 1 MG/ML
0.5 INJECTION INTRAMUSCULAR; INTRAVENOUS
Status: DISCONTINUED | OUTPATIENT
Start: 2023-03-11 | End: 2023-03-11 | Stop reason: HOSPADM

## 2023-03-11 RX ORDER — SODIUM CHLORIDE 0.9 % (FLUSH) 0.9 %
3 SYRINGE (ML) INJECTION EVERY 12 HOURS SCHEDULED
Status: DISCONTINUED | OUTPATIENT
Start: 2023-03-11 | End: 2023-03-11 | Stop reason: HOSPADM

## 2023-03-11 RX ORDER — FENTANYL CITRATE 50 UG/ML
50 INJECTION, SOLUTION INTRAMUSCULAR; INTRAVENOUS
Status: DISCONTINUED | OUTPATIENT
Start: 2023-03-11 | End: 2023-03-11 | Stop reason: HOSPADM

## 2023-03-11 RX ORDER — PROMETHAZINE HYDROCHLORIDE 25 MG/1
25 SUPPOSITORY RECTAL ONCE AS NEEDED
Status: DISCONTINUED | OUTPATIENT
Start: 2023-03-11 | End: 2023-03-11 | Stop reason: HOSPADM

## 2023-03-11 RX ORDER — ROPIVACAINE HYDROCHLORIDE 5 MG/ML
INJECTION, SOLUTION EPIDURAL; INFILTRATION; PERINEURAL
Status: COMPLETED | OUTPATIENT
Start: 2023-03-11 | End: 2023-03-11

## 2023-03-11 RX ORDER — MIDAZOLAM HYDROCHLORIDE 1 MG/ML
INJECTION INTRAMUSCULAR; INTRAVENOUS
Status: COMPLETED | OUTPATIENT
Start: 2023-03-11 | End: 2023-03-11

## 2023-03-11 RX ORDER — NALOXONE HCL 0.4 MG/ML
0.2 VIAL (ML) INJECTION AS NEEDED
Status: DISCONTINUED | OUTPATIENT
Start: 2023-03-11 | End: 2023-03-11 | Stop reason: HOSPADM

## 2023-03-11 RX ORDER — LIDOCAINE HYDROCHLORIDE 20 MG/ML
INJECTION, SOLUTION INFILTRATION; PERINEURAL AS NEEDED
Status: DISCONTINUED | OUTPATIENT
Start: 2023-03-11 | End: 2023-03-11 | Stop reason: SURG

## 2023-03-11 RX ORDER — EPHEDRINE SULFATE 50 MG/ML
INJECTION INTRAVENOUS AS NEEDED
Status: DISCONTINUED | OUTPATIENT
Start: 2023-03-11 | End: 2023-03-11 | Stop reason: SURG

## 2023-03-11 RX ORDER — FAMOTIDINE 10 MG/ML
20 INJECTION, SOLUTION INTRAVENOUS ONCE
Status: COMPLETED | OUTPATIENT
Start: 2023-03-11 | End: 2023-03-11

## 2023-03-11 RX ORDER — HYDROCODONE BITARTRATE AND ACETAMINOPHEN 5; 325 MG/1; MG/1
1 TABLET ORAL ONCE AS NEEDED
Status: DISCONTINUED | OUTPATIENT
Start: 2023-03-11 | End: 2023-03-11 | Stop reason: HOSPADM

## 2023-03-11 RX ORDER — FENTANYL CITRATE 50 UG/ML
25 INJECTION, SOLUTION INTRAMUSCULAR; INTRAVENOUS
Status: DISCONTINUED | OUTPATIENT
Start: 2023-03-11 | End: 2023-03-11 | Stop reason: HOSPADM

## 2023-03-11 RX ORDER — IPRATROPIUM BROMIDE AND ALBUTEROL SULFATE 2.5; .5 MG/3ML; MG/3ML
3 SOLUTION RESPIRATORY (INHALATION) ONCE AS NEEDED
Status: DISCONTINUED | OUTPATIENT
Start: 2023-03-11 | End: 2023-03-11 | Stop reason: HOSPADM

## 2023-03-11 RX ORDER — DIPHENHYDRAMINE HYDROCHLORIDE 50 MG/ML
12.5 INJECTION INTRAMUSCULAR; INTRAVENOUS
Status: DISCONTINUED | OUTPATIENT
Start: 2023-03-11 | End: 2023-03-11 | Stop reason: HOSPADM

## 2023-03-11 RX ORDER — NEOSTIGMINE METHYLSULFATE 0.5 MG/ML
INJECTION, SOLUTION INTRAVENOUS AS NEEDED
Status: DISCONTINUED | OUTPATIENT
Start: 2023-03-11 | End: 2023-03-11 | Stop reason: SURG

## 2023-03-11 RX ORDER — GLYCOPYRROLATE 0.2 MG/ML
INJECTION INTRAMUSCULAR; INTRAVENOUS AS NEEDED
Status: DISCONTINUED | OUTPATIENT
Start: 2023-03-11 | End: 2023-03-11 | Stop reason: SURG

## 2023-03-11 RX ORDER — DEXAMETHASONE SODIUM PHOSPHATE 4 MG/ML
INJECTION, SOLUTION INTRA-ARTICULAR; INTRALESIONAL; INTRAMUSCULAR; INTRAVENOUS; SOFT TISSUE AS NEEDED
Status: DISCONTINUED | OUTPATIENT
Start: 2023-03-11 | End: 2023-03-11 | Stop reason: SURG

## 2023-03-11 RX ORDER — SODIUM CHLORIDE 0.9 % (FLUSH) 0.9 %
3-10 SYRINGE (ML) INJECTION AS NEEDED
Status: DISCONTINUED | OUTPATIENT
Start: 2023-03-11 | End: 2023-03-11 | Stop reason: HOSPADM

## 2023-03-11 RX ORDER — ONDANSETRON 2 MG/ML
INJECTION INTRAMUSCULAR; INTRAVENOUS AS NEEDED
Status: DISCONTINUED | OUTPATIENT
Start: 2023-03-11 | End: 2023-03-11 | Stop reason: SURG

## 2023-03-11 RX ORDER — ONDANSETRON 2 MG/ML
4 INJECTION INTRAMUSCULAR; INTRAVENOUS ONCE AS NEEDED
Status: DISCONTINUED | OUTPATIENT
Start: 2023-03-11 | End: 2023-03-11 | Stop reason: HOSPADM

## 2023-03-11 RX ORDER — FENTANYL CITRATE 50 UG/ML
INJECTION, SOLUTION INTRAMUSCULAR; INTRAVENOUS
Status: COMPLETED | OUTPATIENT
Start: 2023-03-11 | End: 2023-03-11

## 2023-03-11 RX ORDER — CEFAZOLIN SODIUM 2 G/100ML
2 INJECTION, SOLUTION INTRAVENOUS EVERY 8 HOURS
Status: COMPLETED | OUTPATIENT
Start: 2023-03-11 | End: 2023-03-12

## 2023-03-11 RX ORDER — PROMETHAZINE HYDROCHLORIDE 25 MG/1
25 TABLET ORAL ONCE AS NEEDED
Status: DISCONTINUED | OUTPATIENT
Start: 2023-03-11 | End: 2023-03-11 | Stop reason: HOSPADM

## 2023-03-11 RX ORDER — LABETALOL HYDROCHLORIDE 5 MG/ML
5 INJECTION, SOLUTION INTRAVENOUS
Status: DISCONTINUED | OUTPATIENT
Start: 2023-03-11 | End: 2023-03-11 | Stop reason: HOSPADM

## 2023-03-11 RX ORDER — PROPOFOL 10 MG/ML
VIAL (ML) INTRAVENOUS AS NEEDED
Status: DISCONTINUED | OUTPATIENT
Start: 2023-03-11 | End: 2023-03-11 | Stop reason: SURG

## 2023-03-11 RX ORDER — ROCURONIUM BROMIDE 10 MG/ML
INJECTION, SOLUTION INTRAVENOUS AS NEEDED
Status: DISCONTINUED | OUTPATIENT
Start: 2023-03-11 | End: 2023-03-11 | Stop reason: SURG

## 2023-03-11 RX ADMIN — NEOSTIGMINE METHYLSULFATE 3 MG: 0.5 INJECTION INTRAVENOUS at 10:41

## 2023-03-11 RX ADMIN — LOSARTAN POTASSIUM 25 MG: 25 TABLET, FILM COATED ORAL at 12:56

## 2023-03-11 RX ADMIN — ROCURONIUM BROMIDE 30 MG: 10 INJECTION, SOLUTION INTRAVENOUS at 08:29

## 2023-03-11 RX ADMIN — ROPIVACAINE HYDROCHLORIDE 30 ML: 5 INJECTION, SOLUTION EPIDURAL; INFILTRATION; PERINEURAL at 08:00

## 2023-03-11 RX ADMIN — DULOXETINE HYDROCHLORIDE 60 MG: 60 CAPSULE, DELAYED RELEASE ORAL at 12:56

## 2023-03-11 RX ADMIN — CEFAZOLIN SODIUM 2 G: 2 INJECTION, SOLUTION INTRAVENOUS at 15:39

## 2023-03-11 RX ADMIN — AMLODIPINE BESYLATE 5 MG: 5 TABLET ORAL at 12:56

## 2023-03-11 RX ADMIN — EPHEDRINE SULFATE 10 MG: 50 INJECTION INTRAVENOUS at 09:16

## 2023-03-11 RX ADMIN — HYDROCODONE BITARTRATE AND ACETAMINOPHEN 1 TABLET: 5; 325 TABLET ORAL at 18:33

## 2023-03-11 RX ADMIN — Medication 10 ML: at 20:51

## 2023-03-11 RX ADMIN — FAMOTIDINE 20 MG: 10 INJECTION INTRAVENOUS at 07:50

## 2023-03-11 RX ADMIN — MEPIVACAINE HYDROCHLORIDE 10 ML: 20 INJECTION, SOLUTION EPIDURAL; INFILTRATION at 08:00

## 2023-03-11 RX ADMIN — ONDANSETRON 4 MG: 2 INJECTION INTRAMUSCULAR; INTRAVENOUS at 10:35

## 2023-03-11 RX ADMIN — SODIUM CHLORIDE, POTASSIUM CHLORIDE, SODIUM LACTATE AND CALCIUM CHLORIDE: 600; 310; 30; 20 INJECTION, SOLUTION INTRAVENOUS at 10:42

## 2023-03-11 RX ADMIN — SODIUM CHLORIDE, POTASSIUM CHLORIDE, SODIUM LACTATE AND CALCIUM CHLORIDE 9 ML/HR: 600; 310; 30; 20 INJECTION, SOLUTION INTRAVENOUS at 07:46

## 2023-03-11 RX ADMIN — MIDAZOLAM 1 MG: 1 INJECTION INTRAMUSCULAR; INTRAVENOUS at 07:55

## 2023-03-11 RX ADMIN — LOSARTAN POTASSIUM 25 MG: 25 TABLET, FILM COATED ORAL at 20:21

## 2023-03-11 RX ADMIN — FENTANYL CITRATE 50 MCG: 50 INJECTION, SOLUTION INTRAMUSCULAR; INTRAVENOUS at 07:55

## 2023-03-11 RX ADMIN — PROPOFOL 100 MG: 10 INJECTION, EMULSION INTRAVENOUS at 08:29

## 2023-03-11 RX ADMIN — HYDROCODONE BITARTRATE AND ACETAMINOPHEN 1 TABLET: 5; 325 TABLET ORAL at 23:34

## 2023-03-11 RX ADMIN — GLYCOPYRROLATE 0.4 MCG: 1 INJECTION INTRAMUSCULAR; INTRAVENOUS at 10:41

## 2023-03-11 RX ADMIN — DEXAMETHASONE SODIUM PHOSPHATE 4 MG: 4 INJECTION, SOLUTION INTRAMUSCULAR; INTRAVENOUS at 08:45

## 2023-03-11 RX ADMIN — HYDROMORPHONE HYDROCHLORIDE 0.25 MG: 1 INJECTION, SOLUTION INTRAMUSCULAR; INTRAVENOUS; SUBCUTANEOUS at 11:47

## 2023-03-11 RX ADMIN — ALLOPURINOL 100 MG: 100 TABLET ORAL at 12:56

## 2023-03-11 RX ADMIN — LIDOCAINE HYDROCHLORIDE 60 MG: 20 INJECTION, SOLUTION INFILTRATION; PERINEURAL at 08:29

## 2023-03-11 RX ADMIN — CEFAZOLIN SODIUM 2 G: 2 INJECTION, SOLUTION INTRAVENOUS at 08:15

## 2023-03-11 RX ADMIN — Medication 50 MCG: at 09:16

## 2023-03-11 RX ADMIN — ACETAMINOPHEN 650 MG: 325 TABLET, FILM COATED ORAL at 20:21

## 2023-03-11 RX ADMIN — CEFAZOLIN SODIUM 2 G: 2 INJECTION, SOLUTION INTRAVENOUS at 23:29

## 2023-03-11 NOTE — ANESTHESIA PREPROCEDURE EVALUATION
Anesthesia Evaluation                  Airway   Mallampati: III  TM distance: >3 FB  Neck ROM: full  Dental - normal exam     Pulmonary - normal exam   Cardiovascular     PT is on anticoagulation therapy  Rhythm: irregular    (+) hypertension 2 medications or greater, valvular problems/murmurs AI, dysrhythmias Atrial Fib, CHF , DVT, hyperlipidemia,       Neuro/Psych  (+) CVA, numbness, psychiatric history Depression,    GI/Hepatic/Renal/Endo    (+)   renal disease CRI, thyroid problem hypothyroidism    Musculoskeletal     Abdominal    Substance History      OB/GYN          Other   arthritis,        Other Comment: RA  When she fell she broke her teeth and lacerated her upper lip                  Anesthesia Plan    ASA 4     general with block     intravenous induction     Anesthetic plan, risks, benefits, and alternatives have been provided, discussed and informed consent has been obtained with: patient.        CODE STATUS:    While under anesthesia and immediate perioperative period:  Code Status: CPR - Full Support

## 2023-03-11 NOTE — ANESTHESIA PROCEDURE NOTES
Peripheral Block      Patient location during procedure: holding area  Start time: 3/11/2023 8:00 AM  Stop time: 3/11/2023 8:17 AM  Reason for block: at surgeon's request and post-op pain management  Performed by  Anesthesiologist: Janine Yusuf MD  Preanesthetic Checklist  Completed: patient identified, IV checked, site marked, risks and benefits discussed, surgical consent, monitors and equipment checked, pre-op evaluation and timeout performed  Prep:  Pt Position: supine  Sterile barriers:gloves  Prep: ChloraPrep  Patient monitoring: continuous pulse oximetry, blood pressure monitoring and EKG  Procedure    Sedation: yes  Performed under: PNB  Guidance:ultrasound guided    ULTRASOUND INTERPRETATION.  Using ultrasound guidance a gauge needle was placed in close proximity to the brachial plexus nerve, at which point, under ultrasound guidance anesthetic was injected in the area of the nerve and spread of the anesthesia was seen on ultrasound in close proximity thereto.  There were no abnormalities seen on ultrasound; a digital image was taken; and the patient tolerated the procedure with no complications. Images:still images obtained, printed/placed on chart    Laterality:left  Block Type:supraclavicular and interscalene  Injection Technique:single-shot  Needle Type:echogenic  Needle Gauge:20 G      Medications Used: ropivacaine (NAROPIN) 0.5 % injection - Injection   30 mL - 3/11/2023 8:00:00 AM  mepivacaine PF (CARBOCAINE) 2 % injection - Injection   10 mL - 3/11/2023 8:00:00 AM      Medications  Comment:Ultrasound guidance was used to view and verify medication disbursement.  Ultrasound guidance was used for needle placement.    Post Assessment  Injection Assessment: negative aspiration for heme, no paresthesia on injection and incremental injection  Patient Tolerance:comfortable throughout block  Complications:no  Additional Notes  Ultrasound guidance was used to guide needle placement, as well as to view and  verify medication disbursement.

## 2023-03-11 NOTE — PLAN OF CARE
Goal Outcome Evaluation:  Plan of Care Reviewed With: patient        Progress: improving  Outcome Evaluation: Pt VSS. Pt is A&Ox4. ORIF of R humerus completed today in OR. No complaints of pain. Patient in good spirits. ABle to ambulate to BSC with assist x 1.

## 2023-03-11 NOTE — PROGRESS NOTES
Name: Alba Correa ADMIT: 3/7/2023   : 1933  PCP: Aramis Doty MD    MRN: [id number] LOS: 0 days   AGE/SEX: 89 y.o. female  ROOM: [facility]     Subjective   Subjective     Patient is lying on the bed and underwent surgery earlier today and appears weak and lethargic and very minimally confused.  No reports of nausea, vomiting, abdominal pain, chest pain.       Objective   Objective   Vital Signs  Temp:  [97.4 °F (36.3 °C)-101 °F (38.3 °C)] 97.4 °F (36.3 °C)  Heart Rate:  [] 99  Resp:  [14-18] 18  BP: (111-162)/(64-83) 118/75  SpO2:  [90 %-98 %] 96 %  on  Flow (L/min):  [2-3] 3;   Device (Oxygen Therapy): nasal cannula  Body mass index is 22.18 kg/m².  Physical Exam HEENT: PERRLA, extraocular movements intact, Scleras no icterus, left periorbital bruising noted, bruising on left upper lip and chin also noted.  Cardiovascular: Irregular rate and rhythm with normal S1 and S2  Respiratory: Fairly clear to auscultation bilaterally with no wheezes  GI: Soft, nontender, bowel sounds present  Extremities: Left upper extremity is in a sling    Results Review     I reviewed the patient's new clinical results.  Results from last 7 days   Lab Units 03/10/23  0514 03/09/23  0442 03/08/23  0527 03/07/23  191   WBC 10*3/mm3 7.93 7.79 7.80 9.79   HEMOGLOBIN g/dL 11.3* 11.7* 12.3 14.8   PLATELETS 10*3/mm3 224 226 260 310     Results from last 7 days   Lab Units 03/11/23  0444 03/10/23  0514 03/09/23  0442 03/08/23  0527   SODIUM mmol/L 136 137 136 134*   POTASSIUM mmol/L 4.3 4.0 4.4 4.3   CHLORIDE mmol/L 100 103 102 100   CO2 mmol/L 22.1 21.0* 24.7 24.1   BUN mg/dL 38* 43* 33* 26*   CREATININE mg/dL 1.14* 1.42* 1.57* 1.15*   GLUCOSE mg/dL 98 114* 111* 114*   EGFR mL/min/1.73 46.1* 35.4* 31.4* 45.6*     Results from last 7 days   Lab Units 23  1919   ALBUMIN g/dL 3.1*   BILIRUBIN mg/dL 0.6   ALK PHOS U/L 89   AST (SGOT) U/L 44*   ALT (SGPT) U/L 21     Results from last 7 days   Lab Units 23  6801  03/10/23  0514 03/09/23  0442 03/08/23  0527 03/07/23  1919   CALCIUM mg/dL 9.6 9.4 9.4 9.7 10.8*   ALBUMIN g/dL  --   --   --   --  3.1*   MAGNESIUM mg/dL  --   --   --  2.1  --    PHOSPHORUS mg/dL  --   --   --  3.9  --        No results found for: HGBA1C, POCGLU    XR ELBOW 2 VIEW LEFT    Result Date: 3/11/2023   As described.  This report was finalized on 3/11/2023 1:36 PM by Dr. Levy Mock M.D.      FL C Arm During Surgery    Result Date: 3/11/2023   As described.  This report was finalized on 3/11/2023 1:36 PM by Dr. Levy Mock M.D.      I have personally reviewed all medications:  Scheduled Medications  allopurinol, 100 mg, Oral, Daily  amLODIPine, 5 mg, Oral, Daily  ceFAZolin, 2 g, Intravenous, Q8H  DULoxetine, 60 mg, Oral, Daily  levothyroxine, 50 mcg, Oral, QAM  losartan, 25 mg, Oral, BID  Mepivacaine HCl (PF), , ,   sodium chloride, 10 mL, Intravenous, Q12H    Infusions  lactated ringers, 9 mL/hr, Last Rate: 9 mL/hr (03/11/23 0820)    Diet  Diet: Regular/House Diet; Texture: Regular Texture (IDDSI 7); Fluid Consistency: Thin (IDDSI 0)    I have personally reviewed:  [x]  Laboratory   [x]  Microbiology   [x]  Radiology   [x]  EKG/Telemetry  [x]  Cardiology/Vascular   [x]  Pathology    [x]  Records       Assessment/Plan     Active Hospital Problems    Diagnosis  POA   • **Fall from standing, initial encounter [W19.XXXA]  Yes   • Closed fracture of left distal humerus [S42.402A]  Yes   • Facial laceration [S01.81XA]  Unknown   • Laceration of right forearm [S51.811A]  Unknown   • Chronic kidney disease, stage 3a (HCC) [N18.31]  Unknown   • Atrial fibrillation, persistent (HCC) [I48.19]  Yes   • Hyperlipidemia [E78.5]  Yes   • Rheumatoid arthritis (HCC) [M06.9]  Yes      Resolved Hospital Problems   No resolved problems to display.       1. Mechanical fall with left humeral fracture, currently patient is in a sling and will continue with analgesics for pain control and orthopedics consultation  is also been obtained and underwent ORIF earlier today.  Continue with PT/OT and have encouraged her to use incentive spirometry.    2.  CKD stage IIIa, creatinine is at baseline.    3.  Hypothyroidism, on Synthroid.    4.  History of atrial fibrillation, rate controlled on home dose Eliquis will be resumed once cleared by surgical team.    5.  Hypertension, on losartan and amlodipine which will be continued.    6.  On SCDs for DVT prophylaxis.    7.  CODE STATUS is DNR.    Copied text on this note has been reviewed by me on 3/11/2023    Vladimir Beltran MD  [facility]  03/11/23  17:09 EST

## 2023-03-11 NOTE — OP NOTE
Distal Humerus ORIF Operative Note  Dr. BENNIE Encinas II  [phone]    PATIENT NAME: Alba Correa  MRN: [id number]  : 1933 AGE: 89 y.o. GENDER: female  DATE OF OPERATION: 3/11/2023  PREOPERATIVE DIAGNOSIS: Distal Humerus Fracture  POSTOPERATIVE DIAGNOSIS: Distal Humerus Fracture  OPERATION PERFORMED: Left ORIF Distal Humerus Fracture  SURGEON: Ramón Encinas MD  Circulator: Issac Bronson RN  Radiology Technologist: Shonda Borrero  Scrub Person: Nicky Arias  Vendor Representative: Margarita Queen  ANESTHESIA: General with Block  ASSISTANT: None  ESTIMATED BLOOD LOSS: 50cc  SPONGE AND NEEDLE COUNT: Correct  INDICATIONS: This patient was noted to have distal humerus fracture.  A discussion of operative versus nonoperative treatment was had with the patient.  They elected to undergo ORIF of the distal humerus fracture. The risks of surgery were discussed and included the risk of anesthesia, infection, damage to neurovascular structures, nonunion, malunion, loss of range of motion, implant loosening/fialure, fracture, hardware prominence, the need for further procedures, medical complications, and others. No guarantees were made. The patient wished to proceed with surgery and a surgical consent was signed.  COMPONENTS:   · [employer] distal humerus plates x2 with an olecranon plate    PERTINENT FINDINGS: Comminuted Distal humeral fracture with intra-articular extension    DETAILS OF PROCEDURE:  The patient was met in the preoperative area where the surgical site and consent were reviewed.  The patient was wheeled back to the surgical suite and underwent anesthesia.  There were placed in a bean bag lateral position with an axillary roll under the nonoperative arm.  The operative arm was cleaned with surgical alcohol to remove any dirt and grime from the splint.  The arm was prepped and draped in the normal sterile fashion which included ChloraPrep and multiple layers of sterile drapes.  After a  surgical timeout in which administration of antibiotics and the surgical site was confirmed, the tourniquet was inflated.    Posterior approach to the distal humerus and olecranon was then utilized.  The ulnar nerve was identified proximally and freed up distally. The fracture site was exposed.     It was determined that adequate fixation and reduction could not be achieved without better visualization of the articular surface. A Chevron osteotomy was made in the olecranon that was started with a saw and finished with an osteotome.  The triceps was then mobilized to allow access to the distal humerus. At the end of the case this was fixed with a plate and screws. Reduction was visualized on C-Arm and screws were confirmed to be extra-articular.     The distal humerus articular pieces were then fit together and held by K wires.  The fracture was noted to be intra-articular both medially and laterally.  There was a large lateral piece that was completely fractured off whereas the medial intra-articular component was minimally displaced.  I was able to piece by piece reconstruct the distal humerus.  It was provisionally held with K wires, and then 2 plates were used with locking screws to secure the bony pieces together.  I had to remove the lateral plate after initial reduction due to loss of fixation while putting on the medial plate.  After revision of the lateral fixation I actually fixated the medial plate first which actually had pretty good fixation and then fixated the lateral plate afterwards.  X-rays demonstrated reasonable reduction although there was 1 piece posteriorly around the lateral epicondyle that still did not have very good fixation.  2 anterior/posterior  2.7 screws were then passed as interfrag to help hold this piece reduced.  Articular reduction was maintained the entire time.  Fluoroscopy was used throughout the procedure to ensure proper placement and length of the screws. The elbow was taken  "through range of motion to ensure there is no crepitance.    At that point, the elbow was thoroughly irrigated and then the olecranon osteotomy was closed using a plate after initial fixation with a K wire.  Final x-rays demonstrated good reduction of the bone and there was good range of motion with no crepitance.      The skin was closed in layers and a sterile dressing was applied.  The patient was placed into a posterior splint.  The patient was awoken from anesthesia and moved over to the bed.  The patient was then taken the PACU in good condition.      R \"Pelon\" Ce LUONG MD  Orthopaedic Surgery  [facility]  [phone]                  "

## 2023-03-11 NOTE — ANESTHESIA PROCEDURE NOTES
Airway  Urgency: elective    Date/Time: 3/11/2023 8:30 AM  Airway not difficult    General Information and Staff    Patient location during procedure: OR  Anesthesiologist: Janine Yusuf MD  CRNA/CAA: Dada Love CRNA    Indications and Patient Condition  Indications for airway management: airway protection    Preoxygenated: yes  MILS maintained throughout  Mask difficulty assessment: 1 - vent by mask    Final Airway Details  Final airway type: endotracheal airway      Successful airway: ETT     Successful intubation technique: direct laryngoscopy  Facilitating devices/methods: Bougie  Blade: Renetta  Blade size: 3  ETT size (mm): 7.0  Cormack-Lehane Classification: grade IIb - view of arytenoids or posterior of glottis only  Placement verified by: chest auscultation and capnometry   Measured from: gums  ETT/EBT to gums (cm): 20  Number of attempts at approach: 1  Assessment: lips, teeth, and gum same as pre-op and atraumatic intubation

## 2023-03-11 NOTE — ANESTHESIA POSTPROCEDURE EVALUATION
Patient: Alba Correa    Procedure Summary     Date: 03/11/23 Room / Location: [facility] OR  / [facility] MAIN OR    Anesthesia Start: 0820 Anesthesia Stop: 1105    Procedure: ELBOW OPEN REDUCTION INTERNAL FIXATION (Left: Elbow) Diagnosis:       Fall from standing, initial encounter      (Fall from standing, initial encounter [W19.XXXA])    Surgeons: Ramón Encinas II, MD Provider: Janine Yusuf MD    Anesthesia Type: general with block ASA Status: 4          Anesthesia Type: general with block    Vitals  Vitals Value Taken Time   /96 03/11/23 1201   Temp 36.3 °C (97.4 °F) 03/11/23 1103   Pulse 84 03/11/23 1206   Resp 18 03/11/23 1103   SpO2 97 % 03/11/23 1201   Vitals shown include unvalidated device data.        Post Anesthesia Care and Evaluation    Patient location during evaluation: bedside  Patient participation: complete - patient participated  Level of consciousness: awake  Pain management: adequate    Airway patency: patent  Anesthetic complications: No anesthetic complications    Cardiovascular status: acceptable  Respiratory status: acceptable  Hydration status: acceptable

## 2023-03-12 LAB
ANION GAP SERPL CALCULATED.3IONS-SCNC: 11.7 MMOL/L (ref 5–15)
BASOPHILS # BLD AUTO: 0.02 10*3/MM3 (ref 0–0.2)
BASOPHILS NFR BLD AUTO: 0.2 % (ref 0–1.5)
BUN SERPL-MCNC: 33 MG/DL (ref 8–23)
BUN/CREAT SERPL: 29.2 (ref 7–25)
CALCIUM SPEC-SCNC: 9.8 MG/DL (ref 8.6–10.5)
CHLORIDE SERPL-SCNC: 100 MMOL/L (ref 98–107)
CO2 SERPL-SCNC: 22.3 MMOL/L (ref 22–29)
CREAT SERPL-MCNC: 1.13 MG/DL (ref 0.57–1)
DEPRECATED RDW RBC AUTO: 42.5 FL (ref 37–54)
EGFRCR SERPLBLD CKD-EPI 2021: 46.6 ML/MIN/1.73
EOSINOPHIL # BLD AUTO: 0 10*3/MM3 (ref 0–0.4)
EOSINOPHIL NFR BLD AUTO: 0 % (ref 0.3–6.2)
ERYTHROCYTE [DISTWIDTH] IN BLOOD BY AUTOMATED COUNT: 11.7 % (ref 12.3–15.4)
GLUCOSE SERPL-MCNC: 138 MG/DL (ref 65–99)
HCT VFR BLD AUTO: 31 % (ref 34–46.6)
HGB BLD-MCNC: 10.4 G/DL (ref 12–15.9)
IMM GRANULOCYTES # BLD AUTO: 0.06 10*3/MM3 (ref 0–0.05)
IMM GRANULOCYTES NFR BLD AUTO: 0.6 % (ref 0–0.5)
LYMPHOCYTES # BLD AUTO: 1.04 10*3/MM3 (ref 0.7–3.1)
LYMPHOCYTES NFR BLD AUTO: 9.6 % (ref 19.6–45.3)
MCH RBC QN AUTO: 34.1 PG (ref 26.6–33)
MCHC RBC AUTO-ENTMCNC: 33.5 G/DL (ref 31.5–35.7)
MCV RBC AUTO: 101.6 FL (ref 79–97)
MONOCYTES # BLD AUTO: 1.52 10*3/MM3 (ref 0.1–0.9)
MONOCYTES NFR BLD AUTO: 14 % (ref 5–12)
NEUTROPHILS NFR BLD AUTO: 75.6 % (ref 42.7–76)
NEUTROPHILS NFR BLD AUTO: 8.2 10*3/MM3 (ref 1.7–7)
NRBC BLD AUTO-RTO: 0 /100 WBC (ref 0–0.2)
PLATELET # BLD AUTO: 277 10*3/MM3 (ref 140–450)
PMV BLD AUTO: 10.1 FL (ref 6–12)
POTASSIUM SERPL-SCNC: 4.9 MMOL/L (ref 3.5–5.2)
RBC # BLD AUTO: 3.05 10*6/MM3 (ref 3.77–5.28)
SODIUM SERPL-SCNC: 134 MMOL/L (ref 136–145)
WBC NRBC COR # BLD: 10.84 10*3/MM3 (ref 3.4–10.8)

## 2023-03-12 PROCEDURE — 63710000001 ALLOPURINOL 100 MG TABLET: Performed by: ORTHOPAEDIC SURGERY

## 2023-03-12 PROCEDURE — A9270 NON-COVERED ITEM OR SERVICE: HCPCS | Performed by: ORTHOPAEDIC SURGERY

## 2023-03-12 PROCEDURE — 97164 PT RE-EVAL EST PLAN CARE: CPT

## 2023-03-12 PROCEDURE — 63710000001 DULOXETINE 60 MG CAPSULE DELAYED-RELEASE PARTICLES: Performed by: ORTHOPAEDIC SURGERY

## 2023-03-12 PROCEDURE — 97110 THERAPEUTIC EXERCISES: CPT

## 2023-03-12 PROCEDURE — G0378 HOSPITAL OBSERVATION PER HR: HCPCS

## 2023-03-12 PROCEDURE — 63710000001 LOSARTAN 25 MG TABLET: Performed by: ORTHOPAEDIC SURGERY

## 2023-03-12 PROCEDURE — 63710000001 HYDROCODONE-ACETAMINOPHEN 5-325 MG TABLET: Performed by: ORTHOPAEDIC SURGERY

## 2023-03-12 PROCEDURE — 25010000002 CEFAZOLIN IN DEXTROSE 2-4 GM/100ML-% SOLUTION: Performed by: ORTHOPAEDIC SURGERY

## 2023-03-12 PROCEDURE — 85025 COMPLETE CBC W/AUTO DIFF WBC: CPT | Performed by: ORTHOPAEDIC SURGERY

## 2023-03-12 PROCEDURE — 25010000002 HYDROMORPHONE PER 4 MG: Performed by: INTERNAL MEDICINE

## 2023-03-12 PROCEDURE — 80048 BASIC METABOLIC PNL TOTAL CA: CPT | Performed by: ORTHOPAEDIC SURGERY

## 2023-03-12 PROCEDURE — 63710000001 AMLODIPINE 5 MG TABLET: Performed by: ORTHOPAEDIC SURGERY

## 2023-03-12 PROCEDURE — 63710000001 LEVOTHYROXINE 50 MCG TABLET: Performed by: ORTHOPAEDIC SURGERY

## 2023-03-12 RX ORDER — HYDROMORPHONE HYDROCHLORIDE 1 MG/ML
0.5 INJECTION, SOLUTION INTRAMUSCULAR; INTRAVENOUS; SUBCUTANEOUS EVERY 4 HOURS PRN
Status: DISCONTINUED | OUTPATIENT
Start: 2023-03-12 | End: 2023-03-14 | Stop reason: HOSPADM

## 2023-03-12 RX ADMIN — LOSARTAN POTASSIUM 25 MG: 25 TABLET, FILM COATED ORAL at 21:01

## 2023-03-12 RX ADMIN — HYDROCODONE BITARTRATE AND ACETAMINOPHEN 1 TABLET: 5; 325 TABLET ORAL at 21:01

## 2023-03-12 RX ADMIN — DULOXETINE HYDROCHLORIDE 60 MG: 60 CAPSULE, DELAYED RELEASE ORAL at 08:10

## 2023-03-12 RX ADMIN — Medication 10 ML: at 08:10

## 2023-03-12 RX ADMIN — HYDROMORPHONE HYDROCHLORIDE 0.5 MG: 1 INJECTION, SOLUTION INTRAMUSCULAR; INTRAVENOUS; SUBCUTANEOUS at 15:00

## 2023-03-12 RX ADMIN — Medication 10 ML: at 21:02

## 2023-03-12 RX ADMIN — LOSARTAN POTASSIUM 25 MG: 25 TABLET, FILM COATED ORAL at 08:10

## 2023-03-12 RX ADMIN — HYDROCODONE BITARTRATE AND ACETAMINOPHEN 1 TABLET: 5; 325 TABLET ORAL at 09:08

## 2023-03-12 RX ADMIN — LEVOTHYROXINE SODIUM 50 MCG: 0.05 TABLET ORAL at 05:02

## 2023-03-12 RX ADMIN — ALLOPURINOL 100 MG: 100 TABLET ORAL at 08:10

## 2023-03-12 RX ADMIN — HYDROCODONE BITARTRATE AND ACETAMINOPHEN 1 TABLET: 5; 325 TABLET ORAL at 05:02

## 2023-03-12 RX ADMIN — AMLODIPINE BESYLATE 5 MG: 5 TABLET ORAL at 08:10

## 2023-03-12 RX ADMIN — CEFAZOLIN SODIUM 2 G: 2 INJECTION, SOLUTION INTRAVENOUS at 08:10

## 2023-03-12 NOTE — PLAN OF CARE
Goal Outcome Evaluation:  Plan of Care Reviewed With: patient           Outcome Evaluation: A&O x4, VSS, 3 LNC, intermittent pain and PRN pain medication given. ORIF of R humerus with Sling. Up to BSC with 1 assist. Patient resting comfortably at this time with no questions or concerns. Will continue to monitor.

## 2023-03-12 NOTE — THERAPY RE-EVALUATION
Patient Name: Alba Correa  : 1933    MRN: [id number]                              Today's Date: 3/12/2023       Admit Date: 3/7/2023    Visit Dx:     ICD-10-CM ICD-9-CM   1. Fall from standing, initial encounter  W19.XXXA E888.9   2. Generalized weakness  R53.1 780.79   3. Facial contusion, initial encounter  S00.83XA 920   4. Closed fracture of left elbow, initial encounter  S42.402A 812.40   5. Facial laceration, initial encounter  S01.81XA 873.40   6. Closed head injury, initial encounter  S09.90XA 959.01     Patient Active Problem List   Diagnosis   • CKD (chronic kidney disease) stage 4, GFR 15-29 ml/min ([facility])   • Depression   • Gout   • Hyperparathyroidism ([facility])   • Adaptive colitis   • Osteopenia   • Rheumatoid arthritis ([facility])   • Degeneration of intervertebral disc   • Essential hypertension   • Detrusor muscle hypertonia   • History of DVT (deep vein thrombosis)   • Hyperlipidemia   • Macrocytosis   • Nonrheumatic aortic (valve) insufficiency   • History of ischemic right MCA stroke with extension   • Atrial fibrillation, persistent ([facility])   • Anemia in chronic kidney disease   • Other proteinuria   • Sinus bradycardia   • First degree AV block   • Nonrheumatic mitral valve regurgitation   • Idiopathic peripheral neuropathy   • Lower extremity edema   • Acute diastolic CHF (congestive heart failure) ([facility])   • Chest pain   • Acquired hypothyroidism   • Sick sinus syndrome ([facility])   • Insomnia   • Fall from standing, initial encounter   • Closed fracture of left distal humerus   • Facial laceration   • Laceration of right forearm   • Chronic kidney disease, stage 3a ([facility])     Past Medical History:   Diagnosis Date   • Acquired hypothyroidism 5/3/2022   • Acute diastolic CHF (congestive heart failure) ([facility]) 3/20/2022   • Chronic kidney disease    • CVA (cerebral vascular accident) ([facility])    • Degeneration of intervertebral disc    • Depression    • DVT (deep venous thrombosis) ([facility])    • First degree AV  block 05/26/2021   • Gout    • H/O complete eye exam 09/2016   • Hyperlipidemia    • IBS (irritable bowel syndrome)    • Nonrheumatic mitral valve regurgitation    • OAB (overactive bladder)    • Osteopenia    • Osteoporosis    • PAF (paroxysmal atrial fibrillation) ([facility]) 11/08/2019   • Peripheral neuropathy    • Rheumatoid arthritis ([facility])    • Sinus bradycardia 05/26/2021     Past Surgical History:   Procedure Laterality Date   • APPENDECTOMY     • BREAST BIOPSY     • CARDIAC ELECTROPHYSIOLOGY PROCEDURE N/A 10/12/2021    Procedure: Loop insertion linq;  Surgeon: Tone Anguiano MD;  Location: [facility] INVASIVE LOCATION;  Service: Cardiovascular;  Laterality: N/A;   • CARDIOVERSION  08/03/2020    Dr. Colby   • COLONOSCOPY      declines   • HERNIA REPAIR  1965   • HYSTERECTOMY      still has ovaries   • MAMMO BILATERAL  11/16/2016    pt declines   • PAP SMEAR  11/16/2016    declines      General Information     Row Name 03/12/23 1552          Physical Therapy Time and Intention    Document Type re-evaluation;therapy note (daily note)  -MW     Mode of Treatment physical therapy  -MW     Row Name 03/12/23 1552          General Information    Patient Profile Reviewed yes  -MW     Prior Level of Function independent:  -MW     Existing Precautions/Restrictions fall;non-weight bearing  Orders haven't been clarified for post-op LUE  -MW     Barriers to Rehab medically complex  -MW     Row Name 03/12/23 1552          Living Environment    People in Home alone  -MW     Row Name 03/12/23 1552          Cognition    Orientation Status (Cognition) oriented x 4  -MW     Row Name 03/12/23 1555          Safety Issues, Functional Mobility    Impairments Affecting Function (Mobility) balance;coordination;endurance/activity tolerance;pain;strength;range of motion (ROM)  -MW     Comment, Safety Issues/Impairments (Mobility) Gait belt and nonslip socks used for safety  -MW           User Key  (r) = Recorded By, (t) = Taken  By, (c) = Cosigned By    Initials Name Provider Type    Cami Castillo PT Physical Therapist               Mobility     Row Name 03/12/23 1556          Bed Mobility    Bed Mobility supine-sit-supine  -MW     Supine-Sit-Supine [place] (Bed Mobility) moderate assist (50% patient effort);verbal cues  -MW     Assistive Device (Bed Mobility) bed rails;head of bed elevated  -     Row Name 03/12/23 1556          Sit-Stand Transfer    Sit-Stand [place] (Transfers) minimum assist (75% patient effort);moderate assist (50% patient effort)  -MW     Assistive Device (Sit-Stand Transfers) other (see comments)  bedrail at her R side  -MW     Row Name 03/12/23 1556          Gait/Stairs (Locomotion)    [place] Level (Gait) minimum assist (75% patient effort);moderate assist (50% patient effort);verbal cues  -MW     Assistive Device (Gait) other (see comments)  HHA R side  -MW     Distance in Feet (Gait) 30  -MW     Deviations/Abnormal Patterns (Gait) noemi decreased;stride length decreased;gait speed decreased  -     Comment, (Gait/Stairs) Unsteady, moderate posterior lean  -MW     Row Name 03/12/23 1556          Mobility    Extremity Weight-bearing Status left upper extremity  -MW     Left Upper Extremity (Weight-bearing Status) non weight-bearing (NWB)  -           User Key  (r) = Recorded By, (t) = Taken By, (c) = Cosigned By    Initials Name Provider Type    Cami Castillo PT Physical Therapist               Obj/Interventions     Row Name 03/12/23 1601          Range of Motion Comprehensive    General Range of Motion bilateral lower extremity ROM WNL  -     Row Name 03/12/23 1601          Strength Comprehensive (MMT)    General Manual Muscle Testing (MMT) Assessment lower extremity strength deficits identified  -MW     Comment, General Manual Muscle Testing (MMT) Assessment Generalized weakness BLE  -     Row Name 03/12/23 1601          Motor Skills    Therapeutic Exercise other (see comments)   LUE: wrist flex/ext, open/close hand  -MW     Row Name 03/12/23 1601          Balance    Balance Assessment sitting static balance;sitting dynamic balance;standing static balance;standing dynamic balance  -MW     Static Sitting Balance supervision  -MW     Dynamic Sitting Balance contact guard  -MW     Position, Sitting Balance unsupported;sitting edge of bed  -MW     Static Standing Balance minimal assist  -MW     Dynamic Standing Balance minimal assist;moderate assist  -MW     Position/Device Used, Standing Balance supported  HHA  -MW     Balance Interventions sitting;standing;sit to stand;supported;static;dynamic  -MW           User Key  (r) = Recorded By, (t) = Taken By, (c) = Cosigned By    Initials Name Provider Type    Cami Castillo, PT Physical Therapist               Goals/Plan     Row Name 03/12/23 1612          Bed Mobility Goal 1 (PT)    Activity/Assistive Device (Bed Mobility Goal 1, PT) bed mobility activities, all  -MW     [place] Level/Cues Needed (Bed Mobility Goal 1, PT) contact guard required  -MW     Time Frame (Bed Mobility Goal 1, PT) 1 week  -MW     Row Name 03/12/23 1612          Transfer Goal 1 (PT)    Activity/Assistive Device (Transfer Goal 1, PT) transfers, all;other (see comments)  HHA  -MW     [place] Level/Cues Needed (Transfer Goal 1, PT) contact guard required  -MW     Time Frame (Transfer Goal 1, PT) 1 week  -MW     Row Name 03/12/23 1612          Gait Training Goal 1 (PT)    Activity/Assistive Device (Gait Training Goal 1, PT) gait (walking locomotion);other (see comments)  HHA  -MW     [place] Level (Gait Training Goal 1, PT) contact guard required  -MW     Distance (Gait Training Goal 1, PT) 100ft  -MW     Time Frame (Gait Training Goal 1, PT) 1 week  -MW     Row Name 03/12/23 1612          Therapy Assessment/Plan (PT)    Planned Therapy Interventions (PT) balance training;bed mobility training;gait training;home exercise program;patient/family  education;postural re-education;transfer training;strengthening  -MW           User Key  (r) = Recorded By, (t) = Taken By, (c) = Cosigned By    Initials Name Provider Type    Cami Castillo, PT Physical Therapist               Clinical Impression     Row Name 03/12/23 1605          Pain    Pretreatment Pain Rating 2/10  -MW     Posttreatment Pain Rating 2/10  -MW     Pain Location - Side/Orientation Left  -MW     Pain Location upper  -MW     Pain Location - extremity  -MW     Pain Intervention(s) Ambulation/increased activity;Repositioned;Rest  -MW     Row Name 03/12/23 1600          Plan of Care Review    Plan of Care Reviewed With patient;family  -MW     Outcome Evaluation Patient is an 88 yo female seen for PT Re-evaluated after undergoing L ORIF distal humerus fx, POD #1. L arm bandaged and sling in room for OOB. She currently presents with LUE pain, impaired balance, and impaired functional mobility. Today she required Mod A for bed mobility, Min/Mod A for STS with R HHA, and Min/Mod A gait x 30 ft with R HHA. She is unsteady with standing and ambulation due to moderate posterior lean. Cont with acute PT and recommend SNF after discharge from hospital.  -MW     Row Name 03/12/23 1608          Therapy Assessment/Plan (PT)    Rehab Potential (PT) good, to achieve stated therapy goals  -MW     Criteria for Skilled Interventions Met (PT) yes  -MW     Therapy Frequency (PT) daily  -MW     Row Name 03/12/23 1606          Vital Signs    Pre Systolic BP Rehab 109  -MW     Pre Treatment Diastolic BP 59  -MW     Pretreatment Heart Rate (beats/min) 81  -MW     Pre SpO2 (%) 94  -MW     O2 Delivery Pre Treatment nasal cannula  -MW     O2 Delivery Intra Treatment room air  -MW     Post SpO2 (%) 99  -MW     O2 Delivery Post Treatment nasal cannula  -MW     Pre Patient Position Supine  -MW     Intra Patient Position Standing  -MW     Post Patient Position Supine  -MW     Row Name 03/12/23 1600          Positioning and  Restraints    Pre-Treatment Position in bed  -MW     Post Treatment Position bed  -MW     In Bed supine;call light within reach;with family/caregiver;encouraged to call for assist;exit alarm on;LUE elevated  Needs met, lines intact  -           User Key  (r) = Recorded By, (t) = Taken By, (c) = Cosigned By    Initials Name Provider Type    Cami Castillo, SANTIAGO Physical Therapist               Outcome Measures     Row Name 03/12/23 1613          How much help from another person do you currently need...    Turning from your back to your side while in flat bed without using bedrails? 2  -MW     Moving from lying on back to sitting on the side of a flat bed without bedrails? 2  -MW     Moving to and from a bed to a chair (including a wheelchair)? 3  -MW     Standing up from a chair using your arms (e.g., wheelchair, bedside chair)? 3  -MW     Climbing 3-5 steps with a railing? 1  -MW     To walk in hospital room? 2  -MW     AM-PAC 6 Clicks Score (PT) 13  -MW     Highest level of mobility 4 --> Transferred to chair/commode  -     Row Name 03/12/23 1613          Modified [place] Scale    Modified [place] Scale 4 - Moderately severe disability.  Unable to walk without assistance, and unable to attend to own bodily needs without assistance.  -     Row Name 03/12/23 1613          Functional Assessment    Outcome Measure Options AM-PAC 6 Clicks Basic Mobility (PT)  -           User Key  (r) = Recorded By, (t) = Taken By, (c) = Cosigned By    Initials Name Provider Type    Cami Castillo PT Physical Therapist                             Physical Therapy Education     Title: PT OT SLP Therapies (Done)     Topic: Physical Therapy (Done)     Point: Mobility training (Done)     Learning Progress Summary           Patient Acceptance, E, VU,NR by ALEXANDRA at 3/12/2023 1614    Acceptance, E, VU by JOCELYN at 3/12/2023 1422    Eager, E, VU by JOCELYN at 3/11/2023 1618    Acceptance, E,TB,D, VU,NR by  at 3/10/2023 1000    Acceptance,  E,TB,D, VU,NR by  at 3/9/2023 1519    Acceptance, E,TB,D, VU,NR by  at 3/8/2023 1158   Family Acceptance, E, VU,NR by  at 3/12/2023 1614                   Point: Home exercise program (Done)     Learning Progress Summary           Patient Acceptance, E, VU,NR by MW at 3/12/2023 1614    Acceptance, E, VU by TL at 3/12/2023 1422    Eager, E, VU by TL at 3/11/2023 1618    Acceptance, E,TB,D, VU,NR by  at 3/10/2023 1000    Acceptance, E,TB,D, VU,NR by  at 3/9/2023 1519    Acceptance, E,TB,D, VU,NR by  at 3/8/2023 1158   Family Acceptance, E, VU,NR by  at 3/12/2023 1614                   Point: Body mechanics (Done)     Learning Progress Summary           Patient Acceptance, E, VU,NR by MW at 3/12/2023 1614    Acceptance, E, VU by TL at 3/12/2023 1422    Eager, E, VU by TL at 3/11/2023 1618    Acceptance, E,TB,D, VU,NR by  at 3/10/2023 1000    Acceptance, E,TB,D, VU,NR by  at 3/9/2023 1519    Acceptance, E,TB,D, VU,NR by  at 3/8/2023 1158   Family Acceptance, E, VU,NR by  at 3/12/2023 1614                   Point: Precautions (Done)     Learning Progress Summary           Patient Acceptance, E, VU,NR by MW at 3/12/2023 1614    Acceptance, E, VU by TL at 3/12/2023 1422    Eager, E, VU by TL at 3/11/2023 1618    Acceptance, E,TB,D, VU,NR by  at 3/10/2023 1000    Acceptance, E,TB,D, VU,NR by  at 3/9/2023 1519    Acceptance, E,TB,D, VU,NR by  at 3/8/2023 1158   Family Acceptance, E, VU,NR by  at 3/12/2023 1614                               User Key     Initials Effective Dates Name Provider Type Discipline    SM 03/07/18 -  Nicky Lama, PTA Physical Therapist Assistant PT    MW 06/16/21 -  Cami Hughes, PT Physical Therapist PT    BH 04/08/22 -  Paz Perez, PT Physical Therapist PT    TL 12/27/22 -  Edi Nazario, RN Registered Nurse Nurse              PT Recommendation and Plan  Planned Therapy Interventions (PT): balance training, bed mobility training, gait training, home  exercise program, patient/family education, postural re-education, transfer training, strengthening  Plan of Care Reviewed With: patient, family  Outcome Evaluation: Patient is an 88 yo female seen for PT Re-evaluated after undergoing L ORIF distal humerus fx, POD #1. L arm bandaged and sling in room for OOB. She currently presents with LUE pain, impaired balance, and impaired functional mobility. Today she required Mod A for bed mobility, Min/Mod A for STS with R HHA, and Min/Mod A gait x 30 ft with R HHA. She is unsteady with standing and ambulation due to moderate posterior lean. Cont with acute PT and recommend SNF after discharge from hospital.     Time Calculation:    PT Charges     Row Name 03/12/23 1616             Time Calculation    Start Time 1517  -MW      Stop Time 1539  -MW      Time Calculation (min) 22 min  -MW      PT Received On 03/12/23  -MW      PT - Next Appointment 03/13/23  -MW      PT Goal Re-Cert Due Date 03/26/23  -MW         Time Calculation- PT    Total Timed Code Minutes- PT 19 minute(s)  -MW            User Key  (r) = Recorded By, (t) = Taken By, (c) = Cosigned By    Initials Name Provider Type    Cami Castillo PT Physical Therapist              Therapy Charges for Today     Code Description Service Date Service Provider Modifiers Qty    [id number]  PT RE-EVAL ESTABLISHED PLAN 2 3/12/2023 Cami Hughes, PT GP 1    [id number]  PT THER PROC EA 15 MIN 3/12/2023 Cami Hughes, PT GP 1          PT G-Codes  Outcome Measure Options: AM-PAC 6 Clicks Basic Mobility (PT)  AM-PAC 6 Clicks Score (PT): 13  AM-PAC 6 Clicks Score (OT): 12  Modified [place] Scale: 4 - Moderately severe disability.  Unable to walk without assistance, and unable to attend to own bodily needs without assistance.  PT Discharge Summary  Anticipated Discharge Disposition (PT): skilled nursing facility    Cami Hughes PT  3/12/2023

## 2023-03-12 NOTE — PROGRESS NOTES
Name: Alba Correa ADMIT: 3/7/2023   : 1933  PCP: Aramis Doty MD    MRN: [id number] LOS: 0 days   AGE/SEX: 89 y.o. female  ROOM: [facility]/     Subjective   Subjective     Patient is lying in bed and is complaining of increasing pain in the left.  Denies nausea, vomiting, abdominal pain, chest pain.       Objective   Objective   Vital Signs  Temp:  [98.8 °F (37.1 °C)-100.1 °F (37.8 °C)] 99 °F (37.2 °C)  Heart Rate:  [92-97] 97  Resp:  [16-18] 16  BP: (110-120)/(62-77) 110/62  SpO2:  [95 %-99 %] 95 %  on  Flow (L/min):  [3-4] 4;   Device (Oxygen Therapy): nasal cannula  Body mass index is 24.44 kg/m².  Physical Exam HEENT: PERRLA, extraocular movements intact, Scleras no icterus, left periorbital bruising noted, bruising on left upper lip and chin also noted.  Cardiovascular: Irregular rate and rhythm with normal S1 and S2  Respiratory: Fairly clear to auscultation bilaterally with no wheezes  GI: Soft, nontender, bowel sounds present  Extremities: Left upper extremity is in a sling    Results Review     I reviewed the patient's new clinical results.  Results from last 7 days   Lab Units 23  0512 03/10/23  0514 23  05   WBC 10*3/mm3 10.84* 7.93 7.79 7.80   HEMOGLOBIN g/dL 10.4* 11.3* 11.7* 12.3   PLATELETS 10*3/mm3 277 224 226 260     Results from last 7 days   Lab Units 23  0513 234 03/10/23  0523   SODIUM mmol/L 134* 136 137 136   POTASSIUM mmol/L 4.9 4.3 4.0 4.4   CHLORIDE mmol/L 100 100 103 102   CO2 mmol/L 22.3 22.1 21.0* 24.7   BUN mg/dL 33* 38* 43* 33*   CREATININE mg/dL 1.13* 1.14* 1.42* 1.57*   GLUCOSE mg/dL 138* 98 114* 111*   EGFR mL/min/1.73 46.6* 46.1* 35.4* 31.4*     Results from last 7 days   Lab Units 23  1919   ALBUMIN g/dL 3.1*   BILIRUBIN mg/dL 0.6   ALK PHOS U/L 89   AST (SGOT) U/L 44*   ALT (SGPT) U/L 21     Results from last 7 days   Lab Units 23  0513 23  0444 03/10/23  0514 23  0442 23  0527  03/07/23  1919   CALCIUM mg/dL 9.8 9.6 9.4 9.4 9.7 10.8*   ALBUMIN g/dL  --   --   --   --   --  3.1*   MAGNESIUM mg/dL  --   --   --   --  2.1  --    PHOSPHORUS mg/dL  --   --   --   --  3.9  --        No results found for: HGBA1C, POCGLU    XR ELBOW 2 VIEW LEFT    Result Date: 3/11/2023   As described.  This report was finalized on 3/11/2023 1:36 PM by Dr. Levy Mock M.D.      FL C Arm During Surgery    Result Date: 3/11/2023   As described.  This report was finalized on 3/11/2023 1:36 PM by Dr. Levy Mock M.D.      I have personally reviewed all medications:  Scheduled Medications  allopurinol, 100 mg, Oral, Daily  amLODIPine, 5 mg, Oral, Daily  DULoxetine, 60 mg, Oral, Daily  levothyroxine, 50 mcg, Oral, QAM  losartan, 25 mg, Oral, BID  sodium chloride, 10 mL, Intravenous, Q12H    Infusions  lactated ringers, 9 mL/hr, Last Rate: 9 mL/hr (03/11/23 0820)    Diet  Diet: Regular/House Diet; Texture: Regular Texture (IDDSI 7); Fluid Consistency: Thin (IDDSI 0)    I have personally reviewed:  [x]  Laboratory   [x]  Microbiology   [x]  Radiology   [x]  EKG/Telemetry  [x]  Cardiology/Vascular   [x]  Pathology    [x]  Records       Assessment/Plan     Active Hospital Problems    Diagnosis  POA   • **Fall from standing, initial encounter [W19.XXXA]  Yes   • Closed fracture of left distal humerus [S42.402A]  Yes   • Facial laceration [S01.81XA]  Unknown   • Laceration of right forearm [S51.811A]  Unknown   • Chronic kidney disease, stage 3a (HCC) [N18.31]  Unknown   • Atrial fibrillation, persistent (HCC) [I48.19]  Yes   • Hyperlipidemia [E78.5]  Yes   • Rheumatoid arthritis (HCC) [M06.9]  Yes      Resolved Hospital Problems   No resolved problems to display.       1. Mechanical fall with left humeral fracture, currently patient is in a sling and will continue with analgesics for pain control and orthopedics consultation is also been obtained and underwent ORIF on 3/12/2023.  Requiring IV Dilaudid as a  breakthrough.  Continue with PT/OT and have encouraged her to use incentive spirometry.    2.  CKD stage IIIa, creatinine is at baseline.    3.  Hypothyroidism, on Synthroid.    4.  History of atrial fibrillation, rate controlled on home dose Eliquis will be resumed once cleared by surgical team.    5.  Hypertension, on losartan and amlodipine which will be continued.    6.  On SCDs for DVT prophylaxis.    7.  CODE STATUS is DNR.    8.  Disposition, most certainly will require rehab placement.    9.  Estimated discharge date, approximately 2 days    Copied text on this note has been reviewed by me on 3/12/2023    Vladimir Beltran MD  [facility]  03/12/23  15:28 EDT

## 2023-03-12 NOTE — PLAN OF CARE
Goal Outcome Evaluation:  Plan of Care Reviewed With: patient        Progress: improving  Outcome Evaluation: A&Ox4. VSS. 3L nasal cannula. Patient states current pain medication not alleviating pain. Attending notified, dilaudid 0.5mg added PRN. Placed on purewick. Bath given today at bedside. No acute events.

## 2023-03-12 NOTE — PROGRESS NOTES
"Orthopaedic Surgery  Daily Progress Note    /62 (BP Location: Right arm, Patient Position: Lying)   Pulse 97   Temp 99 °F (37.2 °C) (Oral)   Resp 16   Ht 157.5 cm (62\")   Wt 60.6 kg (133 lb 9.6 oz)   LMP  (LMP Unknown)   SpO2 95%   BMI 24.44 kg/m²     Lab Results (last 24 hours)     Procedure Component Value Units Date/Time    Basic Metabolic Panel [[id number]]  (Abnormal) Collected: 03/12/23 0513    Specimen: Blood Updated: 03/12/23 0613     Glucose 138 mg/dL      BUN 33 mg/dL      Creatinine 1.13 mg/dL      Sodium 134 mmol/L      Potassium 4.9 mmol/L      Chloride 100 mmol/L      CO2 22.3 mmol/L      Calcium 9.8 mg/dL      BUN/Creatinine Ratio 29.2     Anion Gap 11.7 mmol/L      eGFR 46.6 mL/min/1.73     Narrative:      GFR Normal >60  Chronic Kidney Disease <60  Kidney Failure <15    The GFR formula is only valid for adults with stable renal function between ages 18 and 70.    CBC & Differential [[id number]]  (Abnormal) Collected: 03/12/23 0512    Specimen: Blood Updated: 03/12/23 0558    Narrative:      The following orders were created for panel order CBC & Differential.  Procedure                               Abnormality         Status                     ---------                               -----------         ------                     CBC Auto Differential[[id number]]        Abnormal            Final result                 Please view results for these tests on the individual orders.    CBC Auto Differential [[id number]]  (Abnormal) Collected: 03/12/23 0512    Specimen: Blood Updated: 03/12/23 0558     WBC 10.84 10*3/mm3      RBC 3.05 10*6/mm3      Hemoglobin 10.4 g/dL      Hematocrit 31.0 %      .6 fL      MCH 34.1 pg      MCHC 33.5 g/dL      RDW 11.7 %      RDW-SD 42.5 fl      MPV 10.1 fL      Platelets 277 10*3/mm3      Neutrophil % 75.6 %      Lymphocyte % 9.6 %      Monocyte % 14.0 %      Eosinophil % 0.0 %      Basophil % 0.2 %      Immature Grans % 0.6 %      Neutrophils, " Absolute 8.20 10*3/mm3      Lymphocytes, Absolute 1.04 10*3/mm3      Monocytes, Absolute 1.52 10*3/mm3      Eosinophils, Absolute 0.00 10*3/mm3      Basophils, Absolute 0.02 10*3/mm3      Immature Grans, Absolute 0.06 10*3/mm3      nRBC 0.0 /100 WBC           Imaging Results (Last 24 Hours)     Procedure Component Value Units Date/Time    XR ELBOW 2 VIEW LEFT [[id number]] Collected: 03/11/23 1334     Updated: 03/11/23 1339    Narrative:      XR ELBOW 2 VW LEFT-, FL C ARM DURING SURGERY-     INDICATIONS: ORIF     TECHNIQUE: FLUOROSCOPIC ASSISTANCE IN THE OPERATING ROOM.     FINDINGS:     2 intraoperative fluoroscopic spot views were obtained and recorded the  PACS for review, revealing plate and screw hardware fixation of distal  left humerus and proximal left ulna fractures. Please see operative  report for full details.     Fluoroscopy time: 5.6 seconds       Impression:         As described.     This report was finalized on 3/11/2023 1:36 PM by Dr. Levy Mock M.D.       FL C Arm During Surgery [[id number]] Collected: 03/11/23 1334     Updated: 03/11/23 1339    Narrative:      XR ELBOW 2 VW LEFT-, FL C ARM DURING SURGERY-     INDICATIONS: ORIF     TECHNIQUE: FLUOROSCOPIC ASSISTANCE IN THE OPERATING ROOM.     FINDINGS:     2 intraoperative fluoroscopic spot views were obtained and recorded the  PACS for review, revealing plate and screw hardware fixation of distal  left humerus and proximal left ulna fractures. Please see operative  report for full details.     Fluoroscopy time: 5.6 seconds       Impression:         As described.     This report was finalized on 3/11/2023 1:36 PM by Dr. Levy Mock M.D.             Patient Care Team:  Aramis Doty MD as PCP - General (Family Medicine)  Juan Jose Muñiz MD as Consulting Physician (Neurology)  Roshan Martines III, MD as Consulting Physician (Cardiology)  Nehal Murray MD as Consulting Physician (Nephrology)    SUBJECTIVE  Pain controlled now.  Nurse called this morning while we where in surgery for change in pain medications this was taken care of by admitting. She reports her pain is in good controll currently    PHYSICAL EXAM  Resting in NAD  Splint clean, dry, intact  Fingers warm, perfused, brisk capillary refill  Flexes/extends fingers   No pain with passive stretch   Only mild edema in the hand hand  strong today            Fall from standing, initial encounter    Rheumatoid arthritis ([facility])    Hyperlipidemia    Atrial fibrillation, persistent ([facility])    Closed fracture of left distal humerus    Facial laceration    Laceration of right forearm    Chronic kidney disease, stage 3a ([facility])      PLAN / DISPOSITION:  POD #1left distal humerus fracture   s/p , doing well  1. Pain control: Orals  2.  Antibiotics: to end today  3.  PT: in splint  4.. Dispo: home when ok with admitting stable from othropedic standpoint and ok for discharge when appropriate, follow up 2-3 weeks with Dr. Encinas at [facility] ([phone] to make appointment)        GROVER Castrejon  03/12/23  10:32 EDT

## 2023-03-13 LAB
ANION GAP SERPL CALCULATED.3IONS-SCNC: 8.6 MMOL/L (ref 5–15)
BASOPHILS # BLD AUTO: 0.07 10*3/MM3 (ref 0–0.2)
BASOPHILS NFR BLD AUTO: 0.7 % (ref 0–1.5)
BUN SERPL-MCNC: 34 MG/DL (ref 8–23)
BUN/CREAT SERPL: 33.3 (ref 7–25)
CALCIUM SPEC-SCNC: 9.5 MG/DL (ref 8.6–10.5)
CHLORIDE SERPL-SCNC: 103 MMOL/L (ref 98–107)
CO2 SERPL-SCNC: 24.4 MMOL/L (ref 22–29)
CREAT SERPL-MCNC: 1.02 MG/DL (ref 0.57–1)
DEPRECATED RDW RBC AUTO: 42.7 FL (ref 37–54)
EGFRCR SERPLBLD CKD-EPI 2021: 52.7 ML/MIN/1.73
EOSINOPHIL # BLD AUTO: 0.17 10*3/MM3 (ref 0–0.4)
EOSINOPHIL NFR BLD AUTO: 1.7 % (ref 0.3–6.2)
ERYTHROCYTE [DISTWIDTH] IN BLOOD BY AUTOMATED COUNT: 11.8 % (ref 12.3–15.4)
GLUCOSE SERPL-MCNC: 109 MG/DL (ref 65–99)
HCT VFR BLD AUTO: 30.3 % (ref 34–46.6)
HGB BLD-MCNC: 10 G/DL (ref 12–15.9)
IMM GRANULOCYTES # BLD AUTO: 0.03 10*3/MM3 (ref 0–0.05)
IMM GRANULOCYTES NFR BLD AUTO: 0.3 % (ref 0–0.5)
LYMPHOCYTES # BLD AUTO: 1.88 10*3/MM3 (ref 0.7–3.1)
LYMPHOCYTES NFR BLD AUTO: 18.5 % (ref 19.6–45.3)
MCH RBC QN AUTO: 32.9 PG (ref 26.6–33)
MCHC RBC AUTO-ENTMCNC: 33 G/DL (ref 31.5–35.7)
MCV RBC AUTO: 99.7 FL (ref 79–97)
MONOCYTES # BLD AUTO: 1.45 10*3/MM3 (ref 0.1–0.9)
MONOCYTES NFR BLD AUTO: 14.3 % (ref 5–12)
NEUTROPHILS NFR BLD AUTO: 6.54 10*3/MM3 (ref 1.7–7)
NEUTROPHILS NFR BLD AUTO: 64.5 % (ref 42.7–76)
NRBC BLD AUTO-RTO: 0 /100 WBC (ref 0–0.2)
PLATELET # BLD AUTO: 288 10*3/MM3 (ref 140–450)
PMV BLD AUTO: 10.3 FL (ref 6–12)
POTASSIUM SERPL-SCNC: 4.4 MMOL/L (ref 3.5–5.2)
RBC # BLD AUTO: 3.04 10*6/MM3 (ref 3.77–5.28)
SODIUM SERPL-SCNC: 136 MMOL/L (ref 136–145)
WBC NRBC COR # BLD: 10.14 10*3/MM3 (ref 3.4–10.8)

## 2023-03-13 PROCEDURE — G0378 HOSPITAL OBSERVATION PER HR: HCPCS

## 2023-03-13 PROCEDURE — 63710000001 DULOXETINE 60 MG CAPSULE DELAYED-RELEASE PARTICLES: Performed by: ORTHOPAEDIC SURGERY

## 2023-03-13 PROCEDURE — 63710000001 LEVOTHYROXINE 50 MCG TABLET: Performed by: ORTHOPAEDIC SURGERY

## 2023-03-13 PROCEDURE — 80048 BASIC METABOLIC PNL TOTAL CA: CPT | Performed by: ORTHOPAEDIC SURGERY

## 2023-03-13 PROCEDURE — 63710000001 LOSARTAN 25 MG TABLET: Performed by: ORTHOPAEDIC SURGERY

## 2023-03-13 PROCEDURE — 63710000001 ALLOPURINOL 100 MG TABLET: Performed by: ORTHOPAEDIC SURGERY

## 2023-03-13 PROCEDURE — 85025 COMPLETE CBC W/AUTO DIFF WBC: CPT | Performed by: ORTHOPAEDIC SURGERY

## 2023-03-13 PROCEDURE — A9270 NON-COVERED ITEM OR SERVICE: HCPCS | Performed by: ORTHOPAEDIC SURGERY

## 2023-03-13 PROCEDURE — 63710000001 AMLODIPINE 5 MG TABLET: Performed by: ORTHOPAEDIC SURGERY

## 2023-03-13 PROCEDURE — 63710000001 ACETAMINOPHEN 325 MG TABLET: Performed by: ORTHOPAEDIC SURGERY

## 2023-03-13 PROCEDURE — 97110 THERAPEUTIC EXERCISES: CPT

## 2023-03-13 PROCEDURE — 97116 GAIT TRAINING THERAPY: CPT

## 2023-03-13 RX ADMIN — AMLODIPINE BESYLATE 5 MG: 5 TABLET ORAL at 08:59

## 2023-03-13 RX ADMIN — ALLOPURINOL 100 MG: 100 TABLET ORAL at 08:59

## 2023-03-13 RX ADMIN — ACETAMINOPHEN 650 MG: 325 TABLET, FILM COATED ORAL at 20:57

## 2023-03-13 RX ADMIN — LOSARTAN POTASSIUM 25 MG: 25 TABLET, FILM COATED ORAL at 08:59

## 2023-03-13 RX ADMIN — Medication 10 ML: at 09:01

## 2023-03-13 RX ADMIN — LOSARTAN POTASSIUM 25 MG: 25 TABLET, FILM COATED ORAL at 22:36

## 2023-03-13 RX ADMIN — DULOXETINE HYDROCHLORIDE 60 MG: 60 CAPSULE, DELAYED RELEASE ORAL at 08:59

## 2023-03-13 RX ADMIN — LEVOTHYROXINE SODIUM 50 MCG: 0.05 TABLET ORAL at 08:59

## 2023-03-13 RX ADMIN — Medication 10 ML: at 20:58

## 2023-03-13 NOTE — PROGRESS NOTES
Patient appears to be neurovascularly intact to the left upper extremity although she will not move her fingers much due to pain.  Has swelling in the hand but this is to be expected.  She is to maintain herself in the splint for at least 3 weeks.  She will follow-up in 3 to 4 weeks in the office for an x-ray.

## 2023-03-13 NOTE — THERAPY TREATMENT NOTE
Patient Name: Alba Correa  : 1933    MRN: [id number]                              Today's Date: 3/13/2023       Admit Date: 3/7/2023    Visit Dx:     ICD-10-CM ICD-9-CM   1. Fall from standing, initial encounter  W19.XXXA E888.9   2. Generalized weakness  R53.1 780.79   3. Facial contusion, initial encounter  S00.83XA 920   4. Closed fracture of left elbow, initial encounter  S42.402A 812.40   5. Facial laceration, initial encounter  S01.81XA 873.40   6. Closed head injury, initial encounter  S09.90XA 959.01     Patient Active Problem List   Diagnosis   • CKD (chronic kidney disease) stage 4, GFR 15-29 ml/min ([facility])   • Depression   • Gout   • Hyperparathyroidism ([facility])   • Adaptive colitis   • Osteopenia   • Rheumatoid arthritis ([facility])   • Degeneration of intervertebral disc   • Essential hypertension   • Detrusor muscle hypertonia   • History of DVT (deep vein thrombosis)   • Hyperlipidemia   • Macrocytosis   • Nonrheumatic aortic (valve) insufficiency   • History of ischemic right MCA stroke with extension   • Atrial fibrillation, persistent ([facility])   • Anemia in chronic kidney disease   • Other proteinuria   • Sinus bradycardia   • First degree AV block   • Nonrheumatic mitral valve regurgitation   • Idiopathic peripheral neuropathy   • Lower extremity edema   • Acute diastolic CHF (congestive heart failure) ([facility])   • Chest pain   • Acquired hypothyroidism   • Sick sinus syndrome ([facility])   • Insomnia   • Fall from standing, initial encounter   • Closed fracture of left distal humerus   • Facial laceration   • Laceration of right forearm   • Chronic kidney disease, stage 3a ([facility])     Past Medical History:   Diagnosis Date   • Acquired hypothyroidism 5/3/2022   • Acute diastolic CHF (congestive heart failure) ([facility]) 3/20/2022   • Chronic kidney disease    • CVA (cerebral vascular accident) ([facility])    • Degeneration of intervertebral disc    • Depression    • DVT (deep venous thrombosis) ([facility])    • First degree AV  block 05/26/2021   • Gout    • H/O complete eye exam 09/2016   • Hyperlipidemia    • IBS (irritable bowel syndrome)    • Nonrheumatic mitral valve regurgitation    • OAB (overactive bladder)    • Osteopenia    • Osteoporosis    • PAF (paroxysmal atrial fibrillation) (HCC) 11/08/2019   • Peripheral neuropathy    • Rheumatoid arthritis (HCC)    • Sinus bradycardia 05/26/2021     Past Surgical History:   Procedure Laterality Date   • APPENDECTOMY     • BREAST BIOPSY     • CARDIAC ELECTROPHYSIOLOGY PROCEDURE N/A 10/12/2021    Procedure: Loop insertion linq;  Surgeon: Tone Anguiano MD;  Location: [facility] CATH INVASIVE LOCATION;  Service: Cardiovascular;  Laterality: N/A;   • CARDIOVERSION  08/03/2020    Dr. Colby   • COLONOSCOPY      declines   • ELBOW OPEN REDUCTION INTERNAL FIXATION Left 3/11/2023    Procedure: ELBOW OPEN REDUCTION INTERNAL FIXATION;  Surgeon: Ramón Encinas II, MD;  Location: [facility] OR;  Service: Orthopedics;  Laterality: Left;   • HERNIA REPAIR  1965   • HYSTERECTOMY      still has ovaries   • MAMMO BILATERAL  11/16/2016    pt declines   • PAP SMEAR  11/16/2016    declines      General Information     Row Name 03/13/23 1321          General Information    Existing Precautions/Restrictions fall;oxygen therapy device and L/min;non-weight bearing  NWB L UE  -     Row Name 03/13/23 1321          Cognition    Orientation Status (Cognition) oriented x 4  -           User Key  (r) = Recorded By, (t) = Taken By, (c) = Cosigned By    Initials Name Provider Type     Nicky Lama PTA Physical Therapist Assistant               Mobility     Row Name 03/13/23 1322          Bed Mobility    Comment, (Bed Mobility) up in chair  -     Row Name 03/13/23 1322          Sit-Stand Transfer    Sit-Stand [place] (Transfers) contact guard;minimum assist (75% patient effort)  -     Row Name 03/13/23 1322          Gait/Stairs (Locomotion)    [place] Level (Gait) contact  guard;minimum assist (75% patient effort)  -     Assistive Device (Gait) --  HHA  -SM     Distance in Feet (Gait) 25ft x3  -SM     Deviations/Abnormal Patterns (Gait) noemi decreased;stride length decreased  -     Bilateral Gait Deviations forward flexed posture  -SM     Comment, (Gait/Stairs) slightly unsteady throughout, requiring seated rest breaks between each trial d/t fatigue  -SM           User Key  (r) = Recorded By, (t) = Taken By, (c) = Cosigned By    Initials Name Provider Type    Nicky Parker PTA Physical Therapist Assistant               Obj/Interventions     Row Name 03/13/23 1324          Motor Skills    Therapeutic Exercise --  seated AP and LAQ x10 reps  -           User Key  (r) = Recorded By, (t) = Taken By, (c) = Cosigned By    Initials Name Provider Type    Nicky Parker PTA Physical Therapist Assistant               Goals/Plan    No documentation.                Clinical Impression     Row Name 03/13/23 1325          Pain    Pretreatment Pain Rating 4/10  -SM     Posttreatment Pain Rating 4/10  -SM     Pain Location - Side/Orientation Left  -SM     Pain Location upper  -SM     Pain Location - extremity  -SM     Pain Intervention(s) Repositioned;Ambulation/increased activity;Rest  -     Row Name 03/13/23 1325          Positioning and Restraints    Pre-Treatment Position sitting in chair/recliner  -     Post Treatment Position chair  -SM     In Chair reclined;call light within reach;encouraged to call for assist;exit alarm on;with family/caregiver  -           User Key  (r) = Recorded By, (t) = Taken By, (c) = Cosigned By    Initials Name Provider Type    Nicky Parker PTA Physical Therapist Assistant               Outcome Measures     Row Name 03/13/23 1326          How much help from another person do you currently need...    Turning from your back to your side while in flat bed without using bedrails? 3  -SM     Moving from lying on back to sitting  on the side of a flat bed without bedrails? 2  -SM     Moving to and from a bed to a chair (including a wheelchair)? 3  -SM     Standing up from a chair using your arms (e.g., wheelchair, bedside chair)? 3  -SM     Climbing 3-5 steps with a railing? 2  -SM     To walk in hospital room? 3  -SM     AM-PAC 6 Clicks Score (PT) 16  -SM     Highest level of mobility 5 --> Static standing  -     Row Name 03/13/23 1326          Functional Assessment    Outcome Measure Options AM-PAC 6 Clicks Basic Mobility (PT)  -           User Key  (r) = Recorded By, (t) = Taken By, (c) = Cosigned By    Initials Name Provider Type    Nicky Parker, PTA Physical Therapist Assistant                             Physical Therapy Education     Title: PT OT SLP Therapies (Done)     Topic: Physical Therapy (Done)     Point: Mobility training (Done)     Learning Progress Summary           Patient Acceptance, E,TB,D, VU,NR by  at 3/13/2023 1328    Acceptance, E, VU,NR by  at 3/12/2023 1614    Acceptance, E, VU by  at 3/12/2023 1422    Eager, E, VU by  at 3/11/2023 1618    Acceptance, E,TB,D, VU,NR by  at 3/10/2023 1000    Acceptance, E,TB,D, VU,NR by  at 3/9/2023 1519    Acceptance, E,TB,D, VU,NR by  at 3/8/2023 1158   Family Acceptance, E, VU,NR by  at 3/12/2023 1614                   Point: Home exercise program (Done)     Learning Progress Summary           Patient Acceptance, E,TB,D, VU,NR by  at 3/13/2023 1328    Acceptance, E, VU,NR by  at 3/12/2023 1614    Acceptance, E, VU by  at 3/12/2023 1422    Eager, E, VU by  at 3/11/2023 1618    Acceptance, E,TB,D, VU,NR by  at 3/10/2023 1000    Acceptance, E,TB,D, VU,NR by  at 3/9/2023 1519    Acceptance, E,TB,D, VU,NR by  at 3/8/2023 1158   Family Acceptance, E, VU,NR by ALEXANDRA at 3/12/2023 1614                   Point: Body mechanics (Done)     Learning Progress Summary           Patient Acceptance, E,TB,D, VU,NR by  at 3/13/2023 1328    Acceptance, E,  VU,NR by  at 3/12/2023 1614    Acceptance, E, VU by  at 3/12/2023 1422    Eager, E, VU by  at 3/11/2023 1618    Acceptance, E,TB,D, VU,NR by  at 3/10/2023 1000    Acceptance, E,TB,D, VU,NR by  at 3/9/2023 1519    Acceptance, E,TB,D, VU,NR by  at 3/8/2023 1158   Family Acceptance, E, VU,NR by  at 3/12/2023 1614                   Point: Precautions (Done)     Learning Progress Summary           Patient Acceptance, E,TB,D, VU,NR by  at 3/13/2023 1328    Acceptance, E, VU,NR by  at 3/12/2023 1614    Acceptance, E, VU by  at 3/12/2023 1422    Eager, E, VU by  at 3/11/2023 1618    Acceptance, E,TB,D, VU,NR by  at 3/10/2023 1000    Acceptance, E,TB,D, VU,NR by  at 3/9/2023 1519    Acceptance, E,TB,D, VU,NR by  at 3/8/2023 1158   Family Acceptance, E, VU,NR by  at 3/12/2023 1614                               User Key     Initials Effective Dates Name Provider Type Discipline     03/07/18 -  Nicky Lama, PTA Physical Therapist Assistant PT     06/16/21 -  Cami Hughes PT Physical Therapist PT     04/08/22 -  Paz Perez PT Physical Therapist PT     12/27/22 -  Edi Nazario, RN Registered Nurse Nurse              PT Recommendation and Plan     Plan of Care Reviewed With: patient  Progress: improving  Outcome Evaluation: Pt tolerated treatment well this date. Required CGA-min A to stand and ambulate 25ft x3. Pt required seated rest breaks between d/t fatigue, and was slightly unsteady throughout. Used HHA for support on R UE. Pt completed a few LE exercises as well, and encouraged pt to attempt a few exercises on own during the day.     Time Calculation:    PT Charges     Row Name 03/13/23 1330             Time Calculation    Start Time 1117  -      Stop Time 1145  -      Time Calculation (min) 28 min  -      PT Received On 03/13/23  -      PT - Next Appointment 03/14/23  -            User Key  (r) = Recorded By, (t) = Taken By, (c) = Cosigned By    Initials Name  Provider Type     Nicky Lama PTA Physical Therapist Assistant              Therapy Charges for Today     Code Description Service Date Service Provider Modifiers Qty    74596112497 HC PT THER PROC EA 15 MIN 3/13/2023 Nicky Lama, ARACELIS GP 1    97792148557 HC GAIT TRAINING EA 15 MIN 3/13/2023 Nicky Lama PTA GP 1          PT G-Codes  Outcome Measure Options: AM-PAC 6 Clicks Basic Mobility (PT)  AM-PAC 6 Clicks Score (PT): 16  AM-PAC 6 Clicks Score (OT): 12  Modified [place] Scale: 4 - Moderately severe disability.  Unable to walk without assistance, and unable to attend to own bodily needs without assistance.  PT Discharge Summary  Anticipated Discharge Disposition (PT): skilled nursing facility    Nicky Lama PTA  3/13/2023

## 2023-03-13 NOTE — CASE MANAGEMENT/SOCIAL WORK
Continued Stay Note  [facility]     Patient Name: Alba Correa  MRN: [id number]  Today's Date: 3/13/2023    Admit Date: 3/7/2023    Plan: [facility]io Pickens pending [facility] precert initiated today - may need transport w/ o2   Discharge Plan     Row Name 03/13/23 1232       Plan    Plan Julio Pickens pending [facility] precert initiated today - may need transport w/ o2    Patient/Family in Agreement with Plan yes    Plan Comments CCP spoke with family and patient at bedside. Patient and family agreeable to [facility] Pick[facility]. CCP explained to patient and family that if patient needs oxygen at dc, she will need w/c van transport with oxygen; she is agreeable. CCP spoke with Marie/Julio Pickens who is initiating the [facility] precert today. CCP following to see if she will need transport at dc. BABAK CAREY               Discharge Codes    No documentation.               Expected Discharge Date and Time     Expected Discharge Date Expected Discharge Time    Mar 14, 2023             BABAK Gandara

## 2023-03-13 NOTE — PLAN OF CARE
Goal Outcome Evaluation:  Plan of Care Reviewed With: patient           Outcome Evaluation: A&O x3 more confused and forgetful this evening, VSS 3 LNC, some intermittant pain throughout the night PRN pain medication given. Patient resting comfortably throughout the night. No questions or concerns at this time. Will continue to Monitor.

## 2023-03-13 NOTE — PROGRESS NOTES
Name: Alba Correa ADMIT: 3/7/2023   : 1933  PCP: Aramis Doty MD    MRN: [id number] LOS: 0 days   AGE/SEX: 89 y.o. female  ROOM: [facility]/     Subjective   Subjective   States that she feels a lot better today after surgery.  Pain is well controlled on current regimen.    Objective   Objective   Vital Signs  Temp:  [98.6 °F (37 °C)-98.7 °F (37.1 °C)] 98.7 °F (37.1 °C)  Heart Rate:  [] 102  Resp:  [16-18] 16  BP: (131-139)/(67-88) 131/88  SpO2:  [96 %-100 %] 96 %  on  Flow (L/min):  [4] 4;   Device (Oxygen Therapy): nasal cannula  Body mass index is 23.75 kg/m².  Physical Exam  Constitutional:       Appearance: Normal appearance.   Eyes:      Comments: Ecchymoses around left eye   Cardiovascular:      Rate and Rhythm: Normal rate and regular rhythm.   Pulmonary:      Effort: Pulmonary effort is normal. No respiratory distress.      Breath sounds: Normal breath sounds. No wheezing.   Abdominal:      General: Abdomen is flat. There is no distension.      Palpations: Abdomen is soft.      Tenderness: There is no abdominal tenderness.   Musculoskeletal:      Comments: Right wrist wrapped; left upper extremity sling   Neurological:      General: No focal deficit present.      Mental Status: She is alert and oriented to person, place, and time.   Psychiatric:         Mood and Affect: Mood normal.         Results Review     I reviewed the patient's new clinical results.  Results from last 7 days   Lab Units 23  0534 23  0512 03/10/23  0514 23   WBC 10*3/mm3 10.14 10.84* 7.93 7.79   HEMOGLOBIN g/dL 10.0* 10.4* 11.3* 11.7*   PLATELETS 10*3/mm3 288 277 224 226     Results from last 7 days   Lab Units 23  0534 23  0513 23  0444 03/10/23  0514   SODIUM mmol/L 136 134* 136 137   POTASSIUM mmol/L 4.4 4.9 4.3 4.0   CHLORIDE mmol/L 103 100 100 103   CO2 mmol/L 24.4 22.3 22.1 21.0*   BUN mg/dL 34* 33* 38* 43*   CREATININE mg/dL 1.02* 1.13* 1.14* 1.42*   GLUCOSE mg/dL 109*  138* 98 114*   EGFR mL/min/1.73 52.7* 46.6* 46.1* 35.4*     Results from last 7 days   Lab Units 03/07/23  1919   ALBUMIN g/dL 3.1*   BILIRUBIN mg/dL 0.6   ALK PHOS U/L 89   AST (SGOT) U/L 44*   ALT (SGPT) U/L 21     Results from last 7 days   Lab Units 03/13/23  0534 03/12/23  0513 03/11/23 0444 03/10/23  0514 03/09/23  0442 03/08/23  0527 03/07/23 1919   CALCIUM mg/dL 9.5 9.8 9.6 9.4   < > 9.7 10.8*   ALBUMIN g/dL  --   --   --   --   --   --  3.1*   MAGNESIUM mg/dL  --   --   --   --   --  2.1  --    PHOSPHORUS mg/dL  --   --   --   --   --  3.9  --     < > = values in this interval not displayed.       No results found for: HGBA1C, POCGLU    XR ELBOW 2 VIEW LEFT    Result Date: 3/11/2023   As described.  This report was finalized on 3/11/2023 1:36 PM by Dr. Levy Mock M.D.      FL C Arm During Surgery    Result Date: 3/11/2023   As described.  This report was finalized on 3/11/2023 1:36 PM by Dr. Levy Mock M.D.      Scheduled Medications  allopurinol, 100 mg, Oral, Daily  amLODIPine, 5 mg, Oral, Daily  DULoxetine, 60 mg, Oral, Daily  levothyroxine, 50 mcg, Oral, QAM  losartan, 25 mg, Oral, BID  sodium chloride, 10 mL, Intravenous, Q12H    Infusions  lactated ringers, 9 mL/hr, Last Rate: 9 mL/hr (03/11/23 0820)    Diet  Diet: Regular/House Diet; Texture: Regular Texture (IDDSI 7); Fluid Consistency: Thin (IDDSI 0)       Assessment/Plan     Active Hospital Problems    Diagnosis  POA   • **Fall from standing, initial encounter [W19.XXXA]  Yes   • Closed fracture of left distal humerus [S42.402A]  Yes   • Facial laceration [S01.81XA]  Unknown   • Laceration of right forearm [S51.811A]  Unknown   • Chronic kidney disease, stage 3a (HCC) [N18.31]  Unknown   • Atrial fibrillation, persistent (HCC) [I48.19]  Yes   • Hyperlipidemia [E78.5]  Yes   • Rheumatoid arthritis (HCC) [M06.9]  Yes      Resolved Hospital Problems   No resolved problems to display.       89 y.o. female admitted with Fall from  standing, initial encounter.      03/13/23  Anticipate DC to SNF.  Will discuss with CCP for placement assistance    Mechanical fall  Left humeral fracture  -Status post ORIF L distal humerus fx 3/11/2023  -Norco 5 every 4 hours as needed; Dilaudid 0.5 every 4 hours as needed for breakthrough; bowel regimen    A-fib  -AC: apixaban; plan to resume    HTN  -Amlodipine 5 mg, losartan 25 mg twice daily    Gout  -Allopurinol 100 mg    Hypothyroid   -Synthroid 50 mcg  -  · SCDs for DVT prophylaxis; apixaban ON HOLD  · DNR.  · Discussed with patient and CCP.  · Anticipate discharge to SNF once arrangements have been made      Binu Powers MD  [facility]  03/13/23  09:56 EDT

## 2023-03-13 NOTE — NURSING NOTE
Alert and awake up to the chair most of the day with weight shifting and ambulating to restroom. Still off AC at this time. Awaiting rehab when cleared.

## 2023-03-13 NOTE — PLAN OF CARE
Goal Outcome Evaluation:  Plan of Care Reviewed With: patient        Progress: improving  Outcome Evaluation: Pt tolerated treatment well this date. Required CGA-min A to stand and ambulate 25ft x3. Pt required seated rest breaks between d/t fatigue, and was slightly unsteady throughout. Used HHA for support on R UE. Pt completed a few LE exercises as well, and encouraged pt to attempt a few exercises on own during the day.

## 2023-03-14 VITALS
BODY MASS INDEX: 24.59 KG/M2 | WEIGHT: 133.6 LBS | DIASTOLIC BLOOD PRESSURE: 79 MMHG | OXYGEN SATURATION: 95 % | HEIGHT: 62 IN | SYSTOLIC BLOOD PRESSURE: 111 MMHG | HEART RATE: 118 BPM | RESPIRATION RATE: 16 BRPM | TEMPERATURE: 98.5 F

## 2023-03-14 LAB
ANION GAP SERPL CALCULATED.3IONS-SCNC: 9 MMOL/L (ref 5–15)
BASOPHILS # BLD AUTO: 0.08 10*3/MM3 (ref 0–0.2)
BASOPHILS NFR BLD AUTO: 0.9 % (ref 0–1.5)
BUN SERPL-MCNC: 33 MG/DL (ref 8–23)
BUN/CREAT SERPL: 33.7 (ref 7–25)
CALCIUM SPEC-SCNC: 9.1 MG/DL (ref 8.6–10.5)
CHLORIDE SERPL-SCNC: 103 MMOL/L (ref 98–107)
CO2 SERPL-SCNC: 25 MMOL/L (ref 22–29)
CREAT SERPL-MCNC: 0.98 MG/DL (ref 0.57–1)
DEPRECATED RDW RBC AUTO: 41.3 FL (ref 37–54)
EGFRCR SERPLBLD CKD-EPI 2021: 55.3 ML/MIN/1.73
EOSINOPHIL # BLD AUTO: 0.22 10*3/MM3 (ref 0–0.4)
EOSINOPHIL NFR BLD AUTO: 2.4 % (ref 0.3–6.2)
ERYTHROCYTE [DISTWIDTH] IN BLOOD BY AUTOMATED COUNT: 11.6 % (ref 12.3–15.4)
GLUCOSE SERPL-MCNC: 102 MG/DL (ref 65–99)
HCT VFR BLD AUTO: 30.7 % (ref 34–46.6)
HGB BLD-MCNC: 10.5 G/DL (ref 12–15.9)
IMM GRANULOCYTES # BLD AUTO: 0.05 10*3/MM3 (ref 0–0.05)
IMM GRANULOCYTES NFR BLD AUTO: 0.5 % (ref 0–0.5)
LYMPHOCYTES # BLD AUTO: 1.9 10*3/MM3 (ref 0.7–3.1)
LYMPHOCYTES NFR BLD AUTO: 20.7 % (ref 19.6–45.3)
MCH RBC QN AUTO: 34 PG (ref 26.6–33)
MCHC RBC AUTO-ENTMCNC: 34.2 G/DL (ref 31.5–35.7)
MCV RBC AUTO: 99.4 FL (ref 79–97)
MONOCYTES # BLD AUTO: 1.17 10*3/MM3 (ref 0.1–0.9)
MONOCYTES NFR BLD AUTO: 12.8 % (ref 5–12)
NEUTROPHILS NFR BLD AUTO: 5.74 10*3/MM3 (ref 1.7–7)
NEUTROPHILS NFR BLD AUTO: 62.7 % (ref 42.7–76)
NRBC BLD AUTO-RTO: 0 /100 WBC (ref 0–0.2)
PLATELET # BLD AUTO: 323 10*3/MM3 (ref 140–450)
PMV BLD AUTO: 10.2 FL (ref 6–12)
POTASSIUM SERPL-SCNC: 4.3 MMOL/L (ref 3.5–5.2)
RBC # BLD AUTO: 3.09 10*6/MM3 (ref 3.77–5.28)
SODIUM SERPL-SCNC: 137 MMOL/L (ref 136–145)
WBC NRBC COR # BLD: 9.16 10*3/MM3 (ref 3.4–10.8)

## 2023-03-14 PROCEDURE — 63710000001 ALLOPURINOL 100 MG TABLET: Performed by: ORTHOPAEDIC SURGERY

## 2023-03-14 PROCEDURE — A9270 NON-COVERED ITEM OR SERVICE: HCPCS | Performed by: ORTHOPAEDIC SURGERY

## 2023-03-14 PROCEDURE — 63710000001 DULOXETINE 60 MG CAPSULE DELAYED-RELEASE PARTICLES: Performed by: ORTHOPAEDIC SURGERY

## 2023-03-14 PROCEDURE — 63710000001 AMLODIPINE 5 MG TABLET: Performed by: ORTHOPAEDIC SURGERY

## 2023-03-14 PROCEDURE — 85025 COMPLETE CBC W/AUTO DIFF WBC: CPT | Performed by: ORTHOPAEDIC SURGERY

## 2023-03-14 PROCEDURE — 80048 BASIC METABOLIC PNL TOTAL CA: CPT | Performed by: ORTHOPAEDIC SURGERY

## 2023-03-14 PROCEDURE — 63710000001 LOSARTAN 25 MG TABLET: Performed by: ORTHOPAEDIC SURGERY

## 2023-03-14 PROCEDURE — G0378 HOSPITAL OBSERVATION PER HR: HCPCS

## 2023-03-14 PROCEDURE — 63710000001 LEVOTHYROXINE 50 MCG TABLET: Performed by: ORTHOPAEDIC SURGERY

## 2023-03-14 RX ORDER — AMOXICILLIN 250 MG
2 CAPSULE ORAL DAILY
Qty: 60 TABLET | Refills: 0 | Status: SHIPPED | OUTPATIENT
Start: 2023-03-14 | End: 2023-04-13

## 2023-03-14 RX ORDER — HYDROCODONE BITARTRATE AND ACETAMINOPHEN 5; 325 MG/1; MG/1
1 TABLET ORAL EVERY 4 HOURS PRN
Qty: 30 TABLET | Refills: 0 | Status: SHIPPED | OUTPATIENT
Start: 2023-03-14 | End: 2023-03-19

## 2023-03-14 RX ADMIN — LEVOTHYROXINE SODIUM 50 MCG: 0.05 TABLET ORAL at 06:53

## 2023-03-14 RX ADMIN — ALLOPURINOL 100 MG: 100 TABLET ORAL at 09:27

## 2023-03-14 RX ADMIN — DULOXETINE HYDROCHLORIDE 60 MG: 60 CAPSULE, DELAYED RELEASE ORAL at 09:27

## 2023-03-14 RX ADMIN — AMLODIPINE BESYLATE 5 MG: 5 TABLET ORAL at 09:27

## 2023-03-14 RX ADMIN — Medication 10 ML: at 09:29

## 2023-03-14 RX ADMIN — LOSARTAN POTASSIUM 25 MG: 25 TABLET, FILM COATED ORAL at 09:27

## 2023-03-14 NOTE — CASE MANAGEMENT/SOCIAL WORK
Continued Stay Note  [facility]     Patient Name: Alba Correa  MRN: [id number]  Today's Date: 3/14/2023    Admit Date: 3/7/2023    Plan: [facility] - precert received.   Discharge Plan     Row Name 03/14/23 1207       Plan    Plan Comments DC orders noted.  S/W [facility]/ [facility] - bed remains available.  S/W pt and her son Ankit who are in agreement w/ DC to skilled bed at [facility] today.  DC summary and DC packet given to RN.  Jolynn w/gordon olmedo arranged for today at 1400. Reservation # [id number].    Final Note DC to skilled bed at [facility]. ..........Aminah MUÑOZ/ CAITY    Row Name 03/14/23 1026       Plan    Plan [facility] - precert received.    Plan Comments S/W [facility]/ [facility] who confirms precert has been received and bed is available.  Pt notified and is in agreement. ............Aminah MUÑOZ/ CCP               Discharge Codes    No documentation.               Expected Discharge Date and Time     Expected Discharge Date Expected Discharge Time    Mar 14, 2023             Aminah Rios, PAM

## 2023-03-14 NOTE — CASE MANAGEMENT/SOCIAL WORK
Case Management Discharge Note      Final Note: DC to skilled bed at [facility]. ..........Aminah MUÑOZ/ CAITY    Provided Post Acute Provider List?: Yes  Post Acute Provider List: Home Health  Delivered To: Patient, Support Person  Method of Delivery: In person    Selected Continued Care - Discharged on 3/14/2023 Admission date: 3/7/2023 - Discharge disposition: Skilled Nursing Facility (DC - External)    Destination Coordination complete.    Service Provider Selected Services Address Phone Fax Patient Preferred    [facility] [address] [address] [phone] --          Durable Medical Equipment    No services have been selected for the patient.              Dialysis/Infusion    No services have been selected for the patient.              Home Medical Care    No services have been selected for the patient.              Therapy    No services have been selected for the patient.              Community Resources    No services have been selected for the patient.              Community & DME    No services have been selected for the patient.                  Transportation Services  W/C Van: [facility]    Final Discharge Disposition Code: 03 - skilled nursing facility (SNF) ([facility]

## 2023-03-14 NOTE — PLAN OF CARE
Goal Outcome Evaluation:    Problem: Adult Inpatient Plan of Care  Goal: Absence of Hospital-Acquired Illness or Injury  Intervention: Identify and Manage Fall Risk  Recent Flowsheet Documentation  Taken 3/14/2023 0200 by Chadd Gamino RN  Safety Promotion/Fall Prevention:   toileting scheduled   safety round/check completed   nonskid shoes/slippers when out of bed   lighting adjusted   clutter free environment maintained   activity supervised  Taken 3/14/2023 0000 by Chadd Gamino RN  Safety Promotion/Fall Prevention:   toileting scheduled   safety round/check completed   nonskid shoes/slippers when out of bed   lighting adjusted   fall prevention program maintained   clutter free environment maintained   activity supervised  Taken 3/13/2023 2200 by Chadd Gamino RN  Safety Promotion/Fall Prevention:   safety round/check completed   toileting scheduled   nonskid shoes/slippers when out of bed   lighting adjusted   fall prevention program maintained   clutter free environment maintained   activity supervised  Taken 3/13/2023 2057 by Chadd Gamino RN  Safety Promotion/Fall Prevention:   safety round/check completed   nonskid shoes/slippers when out of bed   lighting adjusted   fall prevention program maintained   clutter free environment maintained   activity supervised     Problem: Adult Inpatient Plan of Care  Goal: Absence of Hospital-Acquired Illness or Injury  Intervention: Prevent Skin Injury  Recent Flowsheet Documentation  Taken 3/14/2023 0200 by Chadd Gamino RN  Body Position:   position changed independently   weight shifting  Taken 3/14/2023 0000 by Chadd Gamino RN  Body Position:   position changed independently   supine, legs elevated  Taken 3/13/2023 2200 by Chadd Gamino RN  Body Position:   position changed independently   turned   left  Taken 3/13/2023 2057 by Chadd Gamino RN  Body Position:   position changed independently   turned   right  Skin Protection:  adhesive use limited   Plan of Care Reviewed With: patient alert, c/o left arm pain gave tylenol, medication effective, ambulated to the bathroom will continue to monitor

## 2023-03-14 NOTE — DISCHARGE SUMMARY
Patient Name: Alba Correa  : 1933  MRN: [id number]    Date of Admission: 3/7/2023  Date of Discharge:  3/14/2023  Primary Care Physician: Aramis Doty MD      Chief Complaint:   Fall      Discharge Diagnoses     Active Hospital Problems    Diagnosis  POA   • **Fall from standing, initial encounter [W19.XXXA]  Yes   • Closed fracture of left distal humerus [S42.402A]  Yes   • Facial laceration [S01.81XA]  Unknown   • Laceration of right forearm [S51.811A]  Unknown   • Chronic kidney disease, stage 3a (HCC) [N18.31]  Unknown   • Atrial fibrillation, persistent (HCC) [I48.19]  Yes   • Hyperlipidemia [E78.5]  Yes   • Rheumatoid arthritis (HCC) [M06.9]  Yes      Resolved Hospital Problems   No resolved problems to display.        Brief Admitting HPI     Patient is a 89 y.o. female who presents to [facility] with the above chief complaint.  Today she was walking through her house and she is feels like her left leg gave out and she fell down.  She laid on the ground for 2 or 3 hours until she was found by her family.  She has a history of a stroke and has left-sided weakness and does have an impaired gait.  She says that every once in a while that left leg just gets very weak and gives out on her.  She does not feel like she had any sort of symptoms prior to the fall denies chest pain palpitations or shortness of breath this morning.  However this evening she has been experiencing some chest discomfort that she attributes to all the excitement of things.  When her family found her she was unable to get herself up so then she was brought to the emergency room for further evaluation.  She has a history of atrial fibrillation and is followed in the outpatient setting by Dr. Martines.  They have attempted cardioversion and multiple times in the past but continues to convert back to atrial fibrillation.  She currently denies any shortness of breath or palpitations.  She also has underlying chronic  kidney disease and is followed in the outpatient setting by Dr. Murray.  In the emergency room she had x-rays done of her left elbow with noted significant swelling.  The elbow showed a distal humerus fracture and possible intra-articular fracture.  Because of these findings she was admitted for orthopedic evaluation as well as for PT and OT evaluations to assess for home safety.    Hospital Course     Pt admitted for mechanical fall, found to have elbow fracture on admission.  She was seen in consultation with orthopedic surgery and underwent open reduction and internal fixation of the left elbow on 3/11/2023.  She did well postoperatively and was evaluated by physical therapy who recommended discharge to SNF.  Patient will be in a sling for 3 weeks, and will follow-up with orthopedic surgery for repeat x-ray at that time.  All this was discussed with the patient who has no additional questions at this time and is stable for discharge to rehab facility.    Discharge Plan     Mechanical fall  Left humeral fracture  -Status post ORIF L distal humerus fx 3/11/2023  -Norco 5 every 4 hours as needed x5d ; bowel regimen  -Follow-up with orthopedic surgery in 3 weeks for repeat x-ray (around 4/4/2023)     A-fib  -AC: apixaban     HTN  -Amlodipine 5 mg, losartan 25 mg twice daily     Gout  -Allopurinol 100 mg     Hypothyroid   -Synthroid 50 mcg    Day of Discharge     Subjective:  Sitting up in chair.  She states that she feels a lot better.  Eager for discharge to SNF.    Physical Exam:  Temp:  [98.3 °F (36.8 °C)-98.5 °F (36.9 °C)] 98.5 °F (36.9 °C)  Heart Rate:  [] 118  Resp:  [16-18] 16  BP: (121-137)/(72-92) 122/92  Body mass index is 24.44 kg/m².  Physical Exam  Constitutional:       Appearance: Normal appearance.   Eyes:      Comments: Ecchymoses around left eye   Cardiovascular:      Rate and Rhythm: Normal rate and regular rhythm.   Pulmonary:      Effort: Pulmonary effort is normal. No respiratory distress.       Breath sounds: Normal breath sounds. No wheezing.   Abdominal:      General: Abdomen is flat. There is no distension.      Palpations: Abdomen is soft.      Tenderness: There is no abdominal tenderness.   Musculoskeletal:      Comments: Right wrist wrapped; left upper extremity sling   Neurological:      General: No focal deficit present.      Mental Status: She is alert and oriented to person, place, and time.   Psychiatric:         Mood and Affect: Mood normal.     Consultants     Consult Orders (all) (From admission, onward)     Start     Ordered    03/07/23 2143  Ortho (on-call MD unless specified)  Once        Specialty:  Orthopedic Surgery  Provider:  Ramón Encinas MD    03/07/23 2142 03/07/23 2117  [facility] (on-call MD unless specified) Details  Once        Specialty:  Hospitalist  Provider:  Logan Almodovar MD    03/07/23 2116              Procedures     ELBOW OPEN REDUCTION INTERNAL FIXATION      Imaging Results (All)     Procedure Component Value Units Date/Time    XR ELBOW 2 VIEW LEFT [[id number]] Collected: 03/11/23 1334     Updated: 03/11/23 1339    Narrative:      XR ELBOW 2 VW LEFT-, FL C ARM DURING SURGERY-     INDICATIONS: ORIF     TECHNIQUE: FLUOROSCOPIC ASSISTANCE IN THE OPERATING ROOM.     FINDINGS:     2 intraoperative fluoroscopic spot views were obtained and recorded the  PACS for review, revealing plate and screw hardware fixation of distal  left humerus and proximal left ulna fractures. Please see operative  report for full details.     Fluoroscopy time: 5.6 seconds       Impression:         As described.     This report was finalized on 3/11/2023 1:36 PM by Dr. Levy Mock M.D.       FL C Arm During Surgery [[id number]] Collected: 03/11/23 1334     Updated: 03/11/23 1339    Narrative:      XR ELBOW 2 VW LEFT-, FL C ARM DURING SURGERY-     INDICATIONS: ORIF     TECHNIQUE: FLUOROSCOPIC ASSISTANCE IN THE OPERATING ROOM.     FINDINGS:     2 intraoperative fluoroscopic spot  views were obtained and recorded the  PACS for review, revealing plate and screw hardware fixation of distal  left humerus and proximal left ulna fractures. Please see operative  report for full details.     Fluoroscopy time: 5.6 seconds       Impression:         As described.     This report was finalized on 3/11/2023 1:36 PM by Dr. Levy Mock M.D.       CT Upper Extremity Left Without Contrast [[id number]] Collected: 03/08/23 1435     Updated: 03/08/23 1623    Narrative:      CT LEFT ELBOW WITHOUT CONTRAST     HISTORY: Left elbow fracture. Fall.      TECHNIQUE: CT includes axial imaging through the left elbow and data  reconstructed in coronal and sagittal planes.     COMPARISON: Left elbow x-rays 03/07/2023.     FINDINGS: There is a supracondylar fracture extending transversely  through the medial and lateral humeral epicondyles. The fracture is  associated with impaction, posterior angulation. Fracture creates a  capitellar and lateral condylar fragment that is mildly displaced  posteriorly and laterally. Fracture extends to the lateral aspect of the  trochlea near its lateral margin of the capitellum with incongruity of  the articular surface. There are several small bone fragments extending  anterior to the medial margin of the radiocapitellar articulation with  the largest bone fragment measuring approximately 13 x 8 x 6 mm.     The proximal radius and ulna are intact. There is a hemarthrosis.  Generalized edema is present in the subcutaneous fat about the elbow.       Impression:      Supracondylar fracture with impaction and posterior  angulation. Fracture extends to the articular surface along the lateral  margin of the trochlea and medial margin of the capitellum where there  is incongruity of the articular surface. Multiple fracture fragments  extend anterior to the radiocapitellar joint. Large hemarthrosis.     Radiation dose reduction techniques were utilized, including automated  exposure  control and exposure modulation based on body size.     This report was finalized on 3/8/2023 4:20 PM by Dr. Rafi Ortiz M.D.       XR ELBOW 2 VIEW LEFT [[id number]] Collected: 03/07/23 2133     Updated: 03/08/23 0807    Narrative:      TWO EMERGENCY VIEWS OF THE LEFT ELBOW AND 2 VIEWS OF THE RIGHT FOREARM  ON 03/07/2023     CLINICAL HISTORY: Patient fell, has left elbow and right forearm pain.     RIGHT FOREARM: 2 views of the right forearm are submitted for  interpretation. I see no acute fracture or malalignment of the right  radius or ulna. There is some chondrocalcification of the triangular  fibrocartilage of the right wrist.     LEFT ELBOW: 2 views including obliqued AP and oblique lateral views of  the left elbow are submitted for interpretation. On the obliqued AP view  there is an acute mildly displaced fracture through the lateral cortex  of the distal left humerus compatible with an acute mildly displaced  lateral supracondylar fracture of the left humerus. It is difficult to  trace the entire fracture plane due to the obliquity of the images but  it likely tracks distally into the articular surface of the distal left  humerus. There is 10 mm lateral displacement of the distal lateral left  supracondylar fracture fragment seen on the obliqued AP view. On lateral  view there is evidence of some impaction at the fracture site and there  is an elbow joint hemarthrosis elevating the anterior fat pad. I  recommend either additional plain films of the left elbow or CT of the  left elbow for a more comprehensive assessment of the acute fracture.  The results and recommendations were communicated to Neftali Lama in the  ER by telephone on 03/07/2023 at 7:20 PM     This report was finalized on 3/8/2023 8:04 AM by Dr. Ankit Boss M.D.       XR Forearm 2 View Right [[id number]] Collected: 03/07/23 2133     Updated: 03/08/23 0807    Narrative:      TWO EMERGENCY VIEWS OF THE LEFT ELBOW AND 2 VIEWS OF THE RIGHT  FOREARM  ON 03/07/2023     CLINICAL HISTORY: Patient fell, has left elbow and right forearm pain.     RIGHT FOREARM: 2 views of the right forearm are submitted for  interpretation. I see no acute fracture or malalignment of the right  radius or ulna. There is some chondrocalcification of the triangular  fibrocartilage of the right wrist.     LEFT ELBOW: 2 views including obliqued AP and oblique lateral views of  the left elbow are submitted for interpretation. On the obliqued AP view  there is an acute mildly displaced fracture through the lateral cortex  of the distal left humerus compatible with an acute mildly displaced  lateral supracondylar fracture of the left humerus. It is difficult to  trace the entire fracture plane due to the obliquity of the images but  it likely tracks distally into the articular surface of the distal left  humerus. There is 10 mm lateral displacement of the distal lateral left  supracondylar fracture fragment seen on the obliqued AP view. On lateral  view there is evidence of some impaction at the fracture site and there  is an elbow joint hemarthrosis elevating the anterior fat pad. I  recommend either additional plain films of the left elbow or CT of the  left elbow for a more comprehensive assessment of the acute fracture.  The results and recommendations were communicated to Neftali Lama in the  ER by telephone on 03/07/2023 at 7:20 PM     This report was finalized on 3/8/2023 8:04 AM by Dr. Ankit Boss M.D.       CT Head Without Contrast [[id number]] Collected: 03/07/23 2204     Updated: 03/08/23 0731    Narrative:      EMERGENCY NONCONTRAST HEAD CT, FACIAL CT AND CERVICAL SPINE CT ON  03/07/2023     CLINICAL HISTORY: Patient fell head and facial trauma, facial  contusions, head, face and neck pain, patient is on blood thinners with  anticoagulation     HEAD CT TECHNIQUE: Spiral CT images were obtained from the base of the  skull to the vertex without intravenous contrast. The  images were  reformatted and are submitted in 3 mm thick axial, sagittal and coronal  CT sections with brain algorithm and 2 mm thick axial CT sections with  high-resolution bone algorithm.     COMPARISON: This is correlated to a prior noncontrast head CT from  [facility] on 10/10/2021 and a prior MRI of the brain  on 10/12/2021.     FINDINGS: There are prominent patchy nodular and confluent areas of  low-density extending from the periventricular into the subcortical  white matter of the cerebral hemispheres consistent with moderate small  vessel disease. The remainder of the brain parenchyma is normal in  attenuation. There is diffuse cerebral atrophy. The ventricles are upper  limits of normal in size. I see no focal mass effect. There is no  midline shift. No extra axial fluid collections are identified. There is  no evidence of acute intracranial hemorrhage. There is a scalp hematoma  overlying the anterior inferior lateral left frontal bone extending to  the left periorbital scalp from today's head trauma. No underlying acute  skull fracture is identified. The calvarium and the skull base are  normal in appearance. The paranasal sinuses and mastoid air cells and  middle ear cavities are clear.       Impression:      1. No acute intracranial abnormality is identified. There is moderate  small vessel disease in the cerebral white matter and there is diffuse  cerebral atrophy.  2. There is a scalp hematoma over the anterior inferior lateral left  frontal bone extending into the left periorbital region from today's  head trauma. The remainder of the head CT is within normal limits with  no acute skull fracture or intracranial hemorrhage identified     FACIAL CT TECHNIQUE: Spiral CT images were obtained through the facial  bones in the axial imaging plain. The images were reformatted and  submitted in 2 mm thick axial, sagittal and coronal CT sections with  soft tissue algorithm and 1 mm thick  axial, sagittal and coronal CT  sections with high-resolution bone algorithm.     COMPARISON: There are no prior facial CTs for comparison.     FINDINGS: Reidentified is moderate size scalp hematoma over the  anterior-inferior lateral left frontal bone from today's head trauma  extends into the left periorbital region. I see no underlying skull  fracture. The orbits are normal in appearance. 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 There is  periapical lucency adjacent to the root of the right mandibular premolar  tooth #28 likely a tiny periapical abscess. Lucencies adjacent to the  left maxillary incisor tooth #10 and canine likely a tiny periapical  abscess.      IMPRESSION:  1. Moderate size scalp hematoma over the anterior-inferior lateral left  frontal bone extending to left lateral periorbital region from today's  head and facial trauma and there is also what appears to be swelling in  hematoma anterior to the chin. There is a subtle hairline lucency  through the mid left nasal bone the is suspicious for very subtle  hairline nondisplaced fracture and please correlate with physical exam  of the left nasal bone. No additional facial fracture seen  2. The patient has periapical lucencies adjacent to the right mandibular  premolar tooth #28 as well as the left maxillary lateral incisor and  canine teeth #10 and 11 compatible with tiny periapical abscesses and  correlation with dental exam suggested, The remainder of the facial CT  is within normal limits.      CERVICAL SPINE CT TECHNIQUE: Spiral CT images were obtained from the  skull base down to T5 thoracic level and images were reformatted and  submitted in 2 mm thick axial and sagittal CT sections with soft  tissue  algorithm and 1 mm thick axial, sagittal and coronal CT sections with  high-resolution bone algorithm.     COMPARISON: There are no prior cervical spine CTs for comparison..     FINDINGS: The atlantooccipital articulation is normal in appearance.  At C1-2 there are arthritic changes at the atlantodental interval with  narrowing of the interval, marginal spurring off the anterior ring of C1  and the odontoid, otherwise the C1-2 level is normal in appearance.     At C2-3 there is mild-to-moderate left facet overgrowth. The disc space  and right facets are normal, there is no canal or foraminal narrowing.     At C3-4 there is moderate left, minimal right facet overgrowth, small  posterior central disc bulge and there is no canal or foraminal  narrowing.      At C4-5 there is moderate left facet overgrowth. There is a 1 to 2 mm  anterolisthesis of C4 on C5, posterior disc margin is normal. There is  no canal or foraminal narrowing.      At C5-6 there is moderate disc space narrowing and there are  degenerative disc and endplate changes, mild canal narrowing, some mild  uncovertebral joint hypertrophy and there is mild right but no left  foraminal narrowing     AT C6-7 there is disc space narrowing and degenerative endplate changes,  minimal posterior spurring but there is no canal narrowing and there is  minimal foraminal narrowing     At C7-T1 there is mild bilateral facet overgrowth. The posterior disc  margin is normal. There is no canal or foraminal narrowing.      No acute fracture is seen in the cervical spine     IMPRESSION:  1. No acute fracture is seen in the cervical spine. There is cervical  spondylosis as described in great detail above.   Radiation dose reduction techniques were utilized, including automated  exposure control and exposure modulation based on body size.     This report was finalized on 3/8/2023 7:28 AM by Dr. Ankit Boss M.D.       CT Cervical Spine Without Contrast [[id number]]  Collected: 03/07/23 2204     Updated: 03/08/23 0731    Narrative:      EMERGENCY NONCONTRAST HEAD CT, FACIAL CT AND CERVICAL SPINE CT ON  03/07/2023     CLINICAL HISTORY: Patient fell head and facial trauma, facial  contusions, head, face and neck pain, patient is on blood thinners with  anticoagulation     HEAD CT TECHNIQUE: Spiral CT images were obtained from the base of the  skull to the vertex without intravenous contrast. The images were  reformatted and are submitted in 3 mm thick axial, sagittal and coronal  CT sections with brain algorithm and 2 mm thick axial CT sections with  high-resolution bone algorithm.     COMPARISON: This is correlated to a prior noncontrast head CT from  [facility] on 10/10/2021 and a prior MRI of the brain  on 10/12/2021.     FINDINGS: There are prominent patchy nodular and confluent areas of  low-density extending from the periventricular into the subcortical  white matter of the cerebral hemispheres consistent with moderate small  vessel disease. The remainder of the brain parenchyma is normal in  attenuation. There is diffuse cerebral atrophy. The ventricles are upper  limits of normal in size. I see no focal mass effect. There is no  midline shift. No extra axial fluid collections are identified. There is  no evidence of acute intracranial hemorrhage. There is a scalp hematoma  overlying the anterior inferior lateral left frontal bone extending to  the left periorbital scalp from today's head trauma. No underlying acute  skull fracture is identified. The calvarium and the skull base are  normal in appearance. The paranasal sinuses and mastoid air cells and  middle ear cavities are clear.       Impression:      1. No acute intracranial abnormality is identified. There is moderate  small vessel disease in the cerebral white matter and there is diffuse  cerebral atrophy.  2. There is a scalp hematoma over the anterior inferior lateral left  frontal bone  extending into the left periorbital region from today's  head trauma. The remainder of the head CT is within normal limits with  no acute skull fracture or intracranial hemorrhage identified     FACIAL CT TECHNIQUE: Spiral CT images were obtained through the facial  bones in the axial imaging plain. The images were reformatted and  submitted in 2 mm thick axial, sagittal and coronal CT sections with  soft tissue algorithm and 1 mm thick axial, sagittal and coronal CT  sections with high-resolution bone algorithm.     COMPARISON: There are no prior facial CTs for comparison.     FINDINGS: Reidentified is moderate size scalp hematoma over the  anterior-inferior lateral left frontal bone from today's head trauma  extends into the left periorbital region. I see no underlying skull  fracture. The orbits are normal in appearance. 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 There is  periapical lucency adjacent to the root of the right mandibular premolar  tooth #28 likely a tiny periapical abscess. Lucencies adjacent to the  left maxillary incisor tooth #10 and canine likely a tiny periapical  abscess.      IMPRESSION:  1. Moderate size scalp hematoma over the anterior-inferior lateral left  frontal bone extending to left lateral periorbital region from today's  head and facial trauma and there is also what appears to be swelling in  hematoma anterior to the chin. There is a subtle hairline lucency  through the mid left nasal bone the is suspicious for very subtle  hairline nondisplaced fracture and please correlate with physical exam  of the left nasal bone. No additional facial fracture seen  2. The patient has periapical lucencies adjacent to the right  mandibular  premolar tooth #28 as well as the left maxillary lateral incisor and  canine teeth #10 and 11 compatible with tiny periapical abscesses and  correlation with dental exam suggested, The remainder of the facial CT  is within normal limits.      CERVICAL SPINE CT TECHNIQUE: Spiral CT images were obtained from the  skull base down to T5 thoracic level and images were reformatted and  submitted in 2 mm thick axial and sagittal CT sections with soft tissue  algorithm and 1 mm thick axial, sagittal and coronal CT sections with  high-resolution bone algorithm.     COMPARISON: There are no prior cervical spine CTs for comparison..     FINDINGS: The atlantooccipital articulation is normal in appearance.  At C1-2 there are arthritic changes at the atlantodental interval with  narrowing of the interval, marginal spurring off the anterior ring of C1  and the odontoid, otherwise the C1-2 level is normal in appearance.     At C2-3 there is mild-to-moderate left facet overgrowth. The disc space  and right facets are normal, there is no canal or foraminal narrowing.     At C3-4 there is moderate left, minimal right facet overgrowth, small  posterior central disc bulge and there is no canal or foraminal  narrowing.      At C4-5 there is moderate left facet overgrowth. There is a 1 to 2 mm  anterolisthesis of C4 on C5, posterior disc margin is normal. There is  no canal or foraminal narrowing.      At C5-6 there is moderate disc space narrowing and there are  degenerative disc and endplate changes, mild canal narrowing, some mild  uncovertebral joint hypertrophy and there is mild right but no left  foraminal narrowing     AT C6-7 there is disc space narrowing and degenerative endplate changes,  minimal posterior spurring but there is no canal narrowing and there is  minimal foraminal narrowing     At C7-T1 there is mild bilateral facet overgrowth. The posterior disc  margin is normal. There is no canal or foraminal  narrowing.      No acute fracture is seen in the cervical spine     IMPRESSION:  1. No acute fracture is seen in the cervical spine. There is cervical  spondylosis as described in great detail above.   Radiation dose reduction techniques were utilized, including automated  exposure control and exposure modulation based on body size.     This report was finalized on 3/8/2023 7:28 AM by Dr. Ankit Boss M.D.       CT Facial Bones Without Contrast [[id number]] Collected: 03/07/23 2204     Updated: 03/08/23 0731    Narrative:      EMERGENCY NONCONTRAST HEAD CT, FACIAL CT AND CERVICAL SPINE CT ON  03/07/2023     CLINICAL HISTORY: Patient fell head and facial trauma, facial  contusions, head, face and neck pain, patient is on blood thinners with  anticoagulation     HEAD CT TECHNIQUE: Spiral CT images were obtained from the base of the  skull to the vertex without intravenous contrast. The images were  reformatted and are submitted in 3 mm thick axial, sagittal and coronal  CT sections with brain algorithm and 2 mm thick axial CT sections with  high-resolution bone algorithm.     COMPARISON: This is correlated to a prior noncontrast head CT from  [facility] on 10/10/2021 and a prior MRI of the brain  on 10/12/2021.     FINDINGS: There are prominent patchy nodular and confluent areas of  low-density extending from the periventricular into the subcortical  white matter of the cerebral hemispheres consistent with moderate small  vessel disease. The remainder of the brain parenchyma is normal in  attenuation. There is diffuse cerebral atrophy. The ventricles are upper  limits of normal in size. I see no focal mass effect. There is no  midline shift. No extra axial fluid collections are identified. There is  no evidence of acute intracranial hemorrhage. There is a scalp hematoma  overlying the anterior inferior lateral left frontal bone extending to  the left periorbital scalp from today's head trauma. No  underlying acute  skull fracture is identified. The calvarium and the skull base are  normal in appearance. The paranasal sinuses and mastoid air cells and  middle ear cavities are clear.       Impression:      1. No acute intracranial abnormality is identified. There is moderate  small vessel disease in the cerebral white matter and there is diffuse  cerebral atrophy.  2. There is a scalp hematoma over the anterior inferior lateral left  frontal bone extending into the left periorbital region from today's  head trauma. The remainder of the head CT is within normal limits with  no acute skull fracture or intracranial hemorrhage identified     FACIAL CT TECHNIQUE: Spiral CT images were obtained through the facial  bones in the axial imaging plain. The images were reformatted and  submitted in 2 mm thick axial, sagittal and coronal CT sections with  soft tissue algorithm and 1 mm thick axial, sagittal and coronal CT  sections with high-resolution bone algorithm.     COMPARISON: There are no prior facial CTs for comparison.     FINDINGS: Reidentified is moderate size scalp hematoma over the  anterior-inferior lateral left frontal bone from today's head trauma  extends into the left periorbital region. I see no underlying skull  fracture. The orbits are normal in appearance. 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 There is  periapical lucency adjacent to the root of the right mandibular premolar  tooth #28 likely a tiny periapical abscess. Lucencies adjacent to the  left maxillary incisor tooth #10 and canine likely a tiny periapical  abscess.      IMPRESSION:  1. Moderate size scalp hematoma over the anterior-inferior lateral left  frontal bone extending to left  lateral periorbital region from today's  head and facial trauma and there is also what appears to be swelling in  hematoma anterior to the chin. There is a subtle hairline lucency  through the mid left nasal bone the is suspicious for very subtle  hairline nondisplaced fracture and please correlate with physical exam  of the left nasal bone. No additional facial fracture seen  2. The patient has periapical lucencies adjacent to the right mandibular  premolar tooth #28 as well as the left maxillary lateral incisor and  canine teeth #10 and 11 compatible with tiny periapical abscesses and  correlation with dental exam suggested, The remainder of the facial CT  is within normal limits.      CERVICAL SPINE CT TECHNIQUE: Spiral CT images were obtained from the  skull base down to T5 thoracic level and images were reformatted and  submitted in 2 mm thick axial and sagittal CT sections with soft tissue  algorithm and 1 mm thick axial, sagittal and coronal CT sections with  high-resolution bone algorithm.     COMPARISON: There are no prior cervical spine CTs for comparison..     FINDINGS: The atlantooccipital articulation is normal in appearance.  At C1-2 there are arthritic changes at the atlantodental interval with  narrowing of the interval, marginal spurring off the anterior ring of C1  and the odontoid, otherwise the C1-2 level is normal in appearance.     At C2-3 there is mild-to-moderate left facet overgrowth. The disc space  and right facets are normal, there is no canal or foraminal narrowing.     At C3-4 there is moderate left, minimal right facet overgrowth, small  posterior central disc bulge and there is no canal or foraminal  narrowing.      At C4-5 there is moderate left facet overgrowth. There is a 1 to 2 mm  anterolisthesis of C4 on C5, posterior disc margin is normal. There is  no canal or foraminal narrowing.      At C5-6 there is moderate disc space narrowing and there are  degenerative disc and endplate  changes, mild canal narrowing, some mild  uncovertebral joint hypertrophy and there is mild right but no left  foraminal narrowing     AT C6-7 there is disc space narrowing and degenerative endplate changes,  minimal posterior spurring but there is no canal narrowing and there is  minimal foraminal narrowing     At C7-T1 there is mild bilateral facet overgrowth. The posterior disc  margin is normal. There is no canal or foraminal narrowing.      No acute fracture is seen in the cervical spine     IMPRESSION:  1. No acute fracture is seen in the cervical spine. There is cervical  spondylosis as described in great detail above.   Radiation dose reduction techniques were utilized, including automated  exposure control and exposure modulation based on body size.     This report was finalized on 3/8/2023 7:28 AM by Dr. Ankit Boss M.D.           Results for orders placed during the hospital encounter of 10/10/21    Duplex Carotid Ultrasound CAR    Interpretation Summary  · Proximal right internal carotid artery mild stenosis.  · Proximal left internal carotid artery is normal.  · Mid left internal carotid artery is normal.    Results for orders placed during the hospital encounter of 03/25/22    Adult Transthoracic Echo Complete W/ Cont if Necessary Per Protocol    Interpretation Summary  · Left ventricular systolic function is hyperdynamic (EF > 70%). Normal left ventricular cavity size noted. Left ventricular wall thickness is consistent with mild concentric hypertrophy. All left ventricular wall segments contract normally. Left ventricular diastolic function was normal. No evidence of left ventricular thrombus or mass present.  · There is mild calcification of the aortic valve. Mild to moderate aortic valve regurgitation is present. No hemodynamically significant aortic valve stenosis is present.  · Mild to moderate mitral valve regurgitation is present. No significant mitral valve stenosis is present.  · Estimated  right ventricular systolic pressure from tricuspid regurgitation is mildly elevated (35-45 mmHg).    Pertinent Labs     Results from last 7 days   Lab Units 03/14/23  0559 03/13/23  0534 03/12/23  0512 03/10/23  0514   WBC 10*3/mm3 9.16 10.14 10.84* 7.93   HEMOGLOBIN g/dL 10.5* 10.0* 10.4* 11.3*   PLATELETS 10*3/mm3 323 288 277 224     Results from last 7 days   Lab Units 03/14/23  0600 03/13/23  0534 03/12/23  0513 03/11/23 0444   SODIUM mmol/L 137 136 134* 136   POTASSIUM mmol/L 4.3 4.4 4.9 4.3   CHLORIDE mmol/L 103 103 100 100   CO2 mmol/L 25.0 24.4 22.3 22.1   BUN mg/dL 33* 34* 33* 38*   CREATININE mg/dL 0.98 1.02* 1.13* 1.14*   GLUCOSE mg/dL 102* 109* 138* 98   EGFR mL/min/1.73 55.3* 52.7* 46.6* 46.1*     Results from last 7 days   Lab Units 03/07/23 1919   ALBUMIN g/dL 3.1*   BILIRUBIN mg/dL 0.6   ALK PHOS U/L 89   AST (SGOT) U/L 44*   ALT (SGPT) U/L 21     Results from last 7 days   Lab Units 03/14/23  0600 03/13/23  0534 03/12/23  0513 03/11/23 0444 03/09/23 0442 03/08/23 0527 03/07/23 1919   CALCIUM mg/dL 9.1 9.5 9.8 9.6   < > 9.7 10.8*   ALBUMIN g/dL  --   --   --   --   --   --  3.1*   MAGNESIUM mg/dL  --   --   --   --   --  2.1  --    PHOSPHORUS mg/dL  --   --   --   --   --  3.9  --     < > = values in this interval not displayed.       Results from last 7 days   Lab Units 03/07/23 1919   CK TOTAL U/L 174           Invalid input(s): LDLCALC          Test Results Pending at Discharge       Discharge Details        Discharge Medications      New Medications      Instructions Start Date   HYDROcodone-acetaminophen 5-325 MG per tablet  Commonly known as: NORCO   1 tablet, Oral, Every 4 Hours PRN      sennosides-docusate 8.6-50 MG per tablet  Commonly known as: PERICOLACE   2 tablets, Oral, Daily         Continue These Medications      Instructions Start Date   acetaminophen 650 MG 8 hr tablet  Commonly known as: TYLENOL   1,300 mg, Oral, Every 8 Hours PRN      allopurinol 100 MG tablet  Commonly  known as: ZYLOPRIM   100 mg, Oral, Daily      amLODIPine 5 MG tablet  Commonly known as: NORVASC   5 mg, Oral, Daily      Centrum Silver tablet tablet  Generic drug: multivitamin with minerals   1 tablet, Oral, Daily      DULoxetine 60 MG capsule  Commonly known as: CYMBALTA   60 mg, Oral, Daily      Eliquis 2.5 MG tablet tablet  Generic drug: apixaban   TAKE 1 TABLET BY MOUTH  EVERY 12 HOURS      levothyroxine 50 MCG tablet  Commonly known as: SYNTHROID, LEVOTHROID   TAKE 1 TABLET BY MOUTH IN THE  MORNING      losartan 25 MG tablet  Commonly known as: COZAAR   TAKE 1 TABLET BY MOUTH  TWICE DAILY      OMEGA-3 FISH OIL PO   1,600 mg, Oral, Every Morning             Allergies   Allergen Reactions   • Sulfamethoxazole-Trimethoprim Delirium   • Lisinopril Unknown (See Comments)     Unknown per pt   • Levofloxacin Rash       Discharge Disposition:  Skilled Nursing Facility (DC - External)      Discharge Diet:  Diet Order   Procedures   • Diet: Regular/House Diet; Texture: Regular Texture (IDDSI 7); Fluid Consistency: Thin (IDDSI 0)       Discharge Activity:   Activity Instructions     Gradually Increase Activity Until at Pre-Hospitalization Level      Remain in splint for 3wks          CODE STATUS:    Code Status and Medical Interventions:   Ordered at: 03/07/23 2246     Medical Intervention Limits:    NO intubation (DNI)     Level Of Support Discussed With:    Patient     Code Status (Patient has no pulse and is not breathing):    No CPR (Do Not Attempt to Resuscitate)     Medical Interventions (Patient has pulse or is breathing):    Limited Support       Future Appointments   Date Time Provider Department Center   1/12/2024  2:00 PM Roshan Martines III, MD [facility]      Contact information for follow-up providers     Aramis Doty MD Follow up in 2 week(s).    Specialty: Family Medicine  Contact information:  [address] [facility] [address]  [facility] [address] [phone]             Ramón Encinas  MD AG Follow up in 4 week(s).    Specialty: Orthopedic Surgery  Contact information:  [address]  [address]  [place] [address]  [phone]                   Contact information for after-discharge care     Destination     [facility] .    Service: Skilled Nursing  Contact information:  [address] [facility] [address] [phone]                             Time Spent on Discharge:  Greater than 30 minutes spent on discharge management including final examination, discussion of hospital stay and patient education, preparation of records, medication reconciliation, follow up planning      Binu Powers MD  [facility]  03/14/23  11:44 EDT

## 2023-03-14 NOTE — CASE MANAGEMENT/SOCIAL WORK
Continued Stay Note  [facility]     Patient Name: Alba Correa  MRN: [id number]  Today's Date: 3/14/2023    Admit Date: 3/7/2023    [facility]ert [facility].   Discharge Plan     Row Name 03/14/23 1026       Plan    Plan [facility] - precert received.    Plan Comments S/W Marie/ [facility] who confirms precert has been received and bed is available.  Pt notified and is in agreement. ............Aminah MUÑOZ/ CAITY               Discharge Codes    No documentation.               Expected Discharge Date and Time     Expected Discharge Date Expected Discharge Time    Mar 14, 2023             Aminah Rios RN

## 2023-04-06 PROCEDURE — 93298 REM INTERROG DEV EVAL SCRMS: CPT | Performed by: INTERNAL MEDICINE

## 2023-04-06 PROCEDURE — G2066 INTER DEVC REMOTE 30D: HCPCS | Performed by: INTERNAL MEDICINE

## 2023-04-06 RX ORDER — LEVOTHYROXINE SODIUM 0.05 MG/1
TABLET ORAL
Qty: 90 TABLET | Refills: 3 | OUTPATIENT
Start: 2023-04-06

## 2023-04-09 ENCOUNTER — READMISSION MANAGEMENT (OUTPATIENT)
Dept: CALL CENTER | Facility: HOSPITAL | Age: 88
End: 2023-04-09
Payer: MEDICARE

## 2023-04-10 DIAGNOSIS — M10.9 GOUT, UNSPECIFIED CAUSE, UNSPECIFIED CHRONICITY, UNSPECIFIED SITE: Chronic | ICD-10-CM

## 2023-04-10 DIAGNOSIS — F33.42 RECURRENT MAJOR DEPRESSIVE DISORDER, IN FULL REMISSION: Chronic | ICD-10-CM

## 2023-04-10 RX ORDER — AMLODIPINE BESYLATE 5 MG/1
5 TABLET ORAL DAILY
Qty: 90 TABLET | Refills: 1 | Status: SHIPPED | OUTPATIENT
Start: 2023-04-10

## 2023-04-10 RX ORDER — ALLOPURINOL 100 MG/1
100 TABLET ORAL DAILY
Qty: 30 TABLET | Refills: 0 | Status: SHIPPED | OUTPATIENT
Start: 2023-04-10

## 2023-04-10 RX ORDER — DULOXETIN HYDROCHLORIDE 60 MG/1
60 CAPSULE, DELAYED RELEASE ORAL DAILY
Qty: 30 CAPSULE | Refills: 0 | Status: SHIPPED | OUTPATIENT
Start: 2023-04-10

## 2023-04-10 NOTE — OUTREACH NOTE
Prep Survey    Flowsheet Row Responses   [facility] facility patient discharged from? Non-BH   Is LACE score < 7 ? Non-BH Discharge   Eligibility [facility]   Date of Discharge 04/09/23   Discharge Disposition Home or Self Care   Discharge diagnosis Humerus Fracture   Does the patient have one of the following disease processes/diagnoses(primary or secondary)? Other   Prep survey completed? Yes          Eden GRANADOS - Registered Nurse

## 2023-04-11 ENCOUNTER — TRANSITIONAL CARE MANAGEMENT TELEPHONE ENCOUNTER (OUTPATIENT)
Dept: CALL CENTER | Facility: HOSPITAL | Age: 88
End: 2023-04-11
Payer: MEDICARE

## 2023-04-11 NOTE — OUTREACH NOTE
Call Center TCM Note    Flowsheet Row Responses   [facility] patient discharged from? Non-BH   Does the patient have one of the following disease processes/diagnoses(primary or secondary)? Other   TCM attempt successful? No   Unsuccessful attempts Attempt 1          Alba Galloway MA    4/11/2023, 10:01 EDT

## 2023-04-11 NOTE — OUTREACH NOTE
Call Center TCM Note    Flowsheet Row Responses   [facility] patient discharged from? Non-BH   Does the patient have one of the following disease processes/diagnoses(primary or secondary)? Other   TCM attempt successful? No   Unsuccessful attempts Attempt 2          Alba Galloway MA    4/11/2023, 15:55 EDT

## 2023-04-12 ENCOUNTER — TRANSITIONAL CARE MANAGEMENT TELEPHONE ENCOUNTER (OUTPATIENT)
Dept: CALL CENTER | Facility: HOSPITAL | Age: 88
End: 2023-04-12
Payer: MEDICARE

## 2023-04-12 NOTE — OUTREACH NOTE
Call Center TCM Note    Flowsheet Row Responses   [facility] patient discharged from? Non-BH   Does the patient have one of the following disease processes/diagnoses(primary or secondary)? Other   TCM attempt successful? No   Unsuccessful attempts Attempt 3          Jimena Camargo RN    4/12/2023, 14:05 EDT

## 2023-04-30 DIAGNOSIS — F33.42 RECURRENT MAJOR DEPRESSIVE DISORDER, IN FULL REMISSION: Chronic | ICD-10-CM

## 2023-04-30 DIAGNOSIS — M10.9 GOUT, UNSPECIFIED CAUSE, UNSPECIFIED CHRONICITY, UNSPECIFIED SITE: Chronic | ICD-10-CM

## 2023-05-01 RX ORDER — DULOXETIN HYDROCHLORIDE 60 MG/1
60 CAPSULE, DELAYED RELEASE ORAL DAILY
Qty: 14 CAPSULE | Refills: 0 | Status: SHIPPED | OUTPATIENT
Start: 2023-05-01

## 2023-05-01 RX ORDER — ALLOPURINOL 100 MG/1
100 TABLET ORAL DAILY
Qty: 14 TABLET | Refills: 0 | Status: SHIPPED | OUTPATIENT
Start: 2023-05-01

## 2023-05-31 ENCOUNTER — TELEPHONE (OUTPATIENT)
Dept: FAMILY MEDICINE CLINIC | Facility: CLINIC | Age: 88
End: 2023-05-31

## 2023-05-31 DIAGNOSIS — F33.42 RECURRENT MAJOR DEPRESSIVE DISORDER, IN FULL REMISSION: Chronic | ICD-10-CM

## 2023-05-31 RX ORDER — DULOXETIN HYDROCHLORIDE 60 MG/1
60 CAPSULE, DELAYED RELEASE ORAL DAILY
Qty: 30 CAPSULE | Refills: 0 | Status: SHIPPED | OUTPATIENT
Start: 2023-05-31

## 2023-05-31 NOTE — TELEPHONE ENCOUNTER
PATIENTS DAUGHTER IN LAW STATES THAT SHE ISN'T GETTING HER DULOXATINE FORM THE MAIL ORDER SINCE SHE JUST MOVED INTO AN ASSISTED LIVING FACILITY. SHE WANTS TO HAVE IT CALLED INTO A LOCAL PHARMACY FOR .

## 2023-06-08 ENCOUNTER — OFFICE VISIT (OUTPATIENT)
Dept: FAMILY MEDICINE CLINIC | Facility: CLINIC | Age: 88
End: 2023-06-08
Payer: MEDICARE

## 2023-06-08 VITALS
HEIGHT: 62 IN | BODY MASS INDEX: 21.16 KG/M2 | HEART RATE: 94 BPM | WEIGHT: 115 LBS | SYSTOLIC BLOOD PRESSURE: 108 MMHG | TEMPERATURE: 97.4 F | RESPIRATION RATE: 18 BRPM | OXYGEN SATURATION: 95 % | DIASTOLIC BLOOD PRESSURE: 64 MMHG

## 2023-06-08 DIAGNOSIS — S81.802A WOUND OF LEFT LOWER EXTREMITY, INITIAL ENCOUNTER: ICD-10-CM

## 2023-06-08 DIAGNOSIS — M10.9 GOUT, UNSPECIFIED CAUSE, UNSPECIFIED CHRONICITY, UNSPECIFIED SITE: Chronic | ICD-10-CM

## 2023-06-08 DIAGNOSIS — F33.42 RECURRENT MAJOR DEPRESSIVE DISORDER, IN FULL REMISSION: Chronic | ICD-10-CM

## 2023-06-08 DIAGNOSIS — Z00.00 MEDICARE ANNUAL WELLNESS VISIT, SUBSEQUENT: Primary | ICD-10-CM

## 2023-06-08 DIAGNOSIS — E03.9 ACQUIRED HYPOTHYROIDISM: ICD-10-CM

## 2023-06-08 RX ORDER — ALLOPURINOL 100 MG/1
100 TABLET ORAL DAILY
Qty: 90 TABLET | Refills: 1 | Status: SHIPPED | OUTPATIENT
Start: 2023-06-08

## 2023-06-08 RX ORDER — AZELASTINE 1 MG/ML
2 SPRAY, METERED NASAL 2 TIMES DAILY
COMMUNITY

## 2023-06-08 RX ORDER — DULOXETIN HYDROCHLORIDE 60 MG/1
60 CAPSULE, DELAYED RELEASE ORAL DAILY
Qty: 90 CAPSULE | Refills: 1 | Status: SHIPPED | OUTPATIENT
Start: 2023-06-08

## 2023-06-08 RX ORDER — LEVOTHYROXINE SODIUM 0.05 MG/1
50 TABLET ORAL EVERY MORNING
Qty: 90 TABLET | Refills: 1 | Status: SHIPPED | OUTPATIENT
Start: 2023-06-08

## 2023-06-08 NOTE — PROGRESS NOTES
The ABCs of the Annual Wellness Visit  Subsequent Medicare Wellness Visit    Subjective    Alba Correa is a 89 y.o. female who presents for a Subsequent Medicare Wellness Visit.    The following portions of the patient's history were reviewed and   updated as appropriate: allergies, current medications, past family history, past medical history, past social history, past surgical history, and problem list.    Compared to one year ago, the patient feels her physical   health is the same.    Compared to one year ago, the patient feels her mental   health is the same.    Recent Hospitalizations:  She was admitted within the past 365 days at [facility].       Current Medical Providers:  Patient Care Team:  Aramis Doty MD as PCP - General (Family Medicine)  Juan Jose Muñiz MD as Consulting Physician (Neurology)  Roshan Martines III, MD as Consulting Physician (Cardiology)  Nehal Murray MD as Consulting Physician (Nephrology)    Outpatient Medications Prior to Visit   Medication Sig Dispense Refill    allopurinol (ZYLOPRIM) 100 MG tablet TAKE 1 TABLET BY MOUTH DAILY 14 tablet 0    DULoxetine (CYMBALTA) 60 MG capsule Take 1 capsule by mouth Daily. 30 capsule 0    levothyroxine (SYNTHROID, LEVOTHROID) 50 MCG tablet TAKE 1 TABLET BY MOUTH IN THE  MORNING 30 tablet 4    acetaminophen (TYLENOL) 650 MG 8 hr tablet Take 1,300 mg by mouth Every 8 (Eight) Hours As Needed for Mild Pain .      amLODIPine (NORVASC) 5 MG tablet TAKE 1 TABLET BY MOUTH  DAILY 90 tablet 1    azelastine (ASTELIN) 0.1 % nasal spray 2 sprays into the nostril(s) as directed by provider 2 (Two) Times a Day.      Eliquis 2.5 MG tablet tablet TAKE 1 TABLET BY MOUTH  EVERY 12 HOURS 180 tablet 3    losartan (COZAAR) 25 MG tablet TAKE 1 TABLET BY MOUTH  TWICE DAILY 180 tablet 3    Multiple Vitamins-Minerals (CENTRUM SILVER) tablet Take 1 tablet by mouth Daily.      Omega-3 Fatty Acids (OMEGA-3 FISH OIL PO) Take 1,600 mg by mouth Every Morning.    "    No facility-administered medications prior to visit.       No opioid medication identified on active medication list. I have reviewed chart for other potential  high risk medication/s and harmful drug interactions in the elderly.        Aspirin is not on active medication list.  Aspirin use is contraindicated for this patient due to: current use of Eliquis.  .    Patient Active Problem List   Diagnosis    CKD (chronic kidney disease) stage 4, GFR 15-29 ml/min    Depression    Gout    Hyperparathyroidism    Adaptive colitis    Osteopenia    Rheumatoid arthritis    Degeneration of intervertebral disc    Essential hypertension    Detrusor muscle hypertonia    History of DVT (deep vein thrombosis)    Hyperlipidemia    Macrocytosis    Nonrheumatic aortic (valve) insufficiency    History of ischemic right MCA stroke with extension    Atrial fibrillation, persistent    Anemia in chronic kidney disease    Other proteinuria    Sinus bradycardia    First degree AV block    Nonrheumatic mitral valve regurgitation    Idiopathic peripheral neuropathy    Lower extremity edema    Acute diastolic CHF (congestive heart failure)    Chest pain    Acquired hypothyroidism    Sick sinus syndrome    Insomnia    Fall from standing, initial encounter    Closed fracture of left distal humerus    Facial laceration    Laceration of right forearm    Chronic kidney disease, stage 3a     Advance Care Planning   Advance Care Planning     Advance Directive is on file.  ACP discussion was held with the patient during this visit. Patient has an advance directive in EMR which is still valid.      Objective    Vitals:    06/08/23 0814   BP: 108/64   Pulse: 94   Resp: 18   Temp: 97.4 °F (36.3 °C)   TempSrc: Oral   SpO2: 95%   Weight: 52.2 kg (115 lb)   Height: 157.5 cm (62\")     Estimated body mass index is 21.03 kg/m² as calculated from the following:    Height as of this encounter: 157.5 cm (62\").    Weight as of this encounter: 52.2 kg (115 " lb).    BMI is within normal parameters. No other follow-up for BMI required.      Does the patient have evidence of cognitive impairment? No          HEALTH RISK ASSESSMENT    Smoking Status:  Social History     Tobacco Use   Smoking Status Former    Years: 5.00    Types: Cigarettes    Quit date:     Years since quittin.4   Smokeless Tobacco Never   Tobacco Comments    caffeine use: 2 cups coffee in AM     Alcohol Consumption:  Social History     Substance and Sexual Activity   Alcohol Use Yes    Comment: Occasional- glass of wine few times a week     Fall Risk Screen:    HENOK Fall Risk Assessment has not been completed.    Depression Screening:      3/17/2022    12:56 PM   PHQ-2/PHQ-9 Depression Screening   Little Interest or Pleasure in Doing Things 0-->not at all   Feeling Down, Depressed or Hopeless 0-->not at all   PHQ-9: Brief Depression Severity Measure Score 0       Health Habits and Functional and Cognitive Screenin/8/2023     1:00 PM   Functional & Cognitive Status   Do you have difficulty preparing food and eating? No   Do you have difficulty bathing yourself, getting dressed or grooming yourself? No   Do you have difficulty using the toilet? No   Do you have difficulty moving around from place to place? No   Do you have trouble with steps or getting out of a bed or a chair? No   Current Diet Well Balanced Diet   Dental Exam Up to date   Eye Exam Up to date   Exercise (times per week) 0 times per week   Current Exercises Include No Regular Exercise   Do you need help using the phone?  No   Are you deaf or do you have serious difficulty hearing?  No   Do you need help with transportation? No   Do you need help shopping? No   Do you need help preparing meals?  No   Do you need help with housework?  No   Do you need help with laundry? No   Do you need help taking your medications? No   Do you need help managing money? No   Do you ever drive or ride in a car without wearing a seat belt?  No   Have you felt unusual stress, anger or loneliness in the last month? No   Who do you live with? Other   If you need help, do you have trouble finding someone available to you? Yes   Have you been bothered in the last four weeks by sexual problems? No   Do you have difficulty concentrating, remembering or making decisions? No       Age-appropriate Screening Schedule:  Refer to the list below for future screening recommendations based on patient's age, sex and/or medical conditions. Orders for these recommended tests are listed in the plan section. The patient has been provided with a written plan.    Health Maintenance   Topic Date Due    ZOSTER VACCINE (2 of 2) 09/04/2023 (Originally 2/26/2012)    INFLUENZA VACCINE  08/01/2023    LIPID PANEL  01/20/2024    ANNUAL WELLNESS VISIT  06/08/2024    COVID-19 Vaccine  Completed    Pneumococcal Vaccine 65+  Completed    MAMMOGRAM  Discontinued    DXA SCAN  Discontinued    TDAP/TD VACCINES  Discontinued    COLORECTAL CANCER SCREENING  Discontinued                  CMS Preventative Services Quick Reference  Risk Factors Identified During Encounter  None Identified  The above risks/problems have been discussed with the patient.  Pertinent information has been shared with the patient in the After Visit Summary.  An After Visit Summary and PPPS were made available to the patient.    Follow Up:   Next Medicare Wellness visit to be scheduled in 1 year.       Additional E&M Note during same encounter follows:  Patient has multiple medical problems which are significant and separately identifiable that require additional work above and beyond the Medicare Wellness Visit.      Chief Complaint  Hypothyroidism, Arthritis (/), Depression, MEDICARE WELLNESS DUE, and REHAB THERAPY ORDERS (TO BE SIGNED )    Subjective        Hypothyroidism  Pertinent negatives include no abdominal pain, chest pain, chills, congestion, coughing, fever or rash.   Arthritis  Pertinent negatives include no  "dysuria, fever or rash.   DepressionPatient is not experiencing: shortness of breath.      Alba Correa is also being seen today for Medication Management.    Pt doing well on the medication(s) w/o SEs, and is due refill today.    Pt was in [facility] in March of this year due to a fall and a closed head injury.  Patient was discharged later to rehab facility.  Pt doing very well, although has a 3 week h/o skin tear on the inner/lower left leg.     Review of Systems   Constitutional:  Negative for chills and fever.   HENT:  Negative for congestion and sinus pressure.    Eyes:  Negative for visual disturbance.   Respiratory:  Negative for cough and shortness of breath.    Cardiovascular:  Negative for chest pain.   Gastrointestinal:  Negative for abdominal pain.   Genitourinary:  Negative for dysuria.   Musculoskeletal:  Positive for arthritis.   Skin:  Negative for rash.   Hematological:  Negative for adenopathy.   Psychiatric/Behavioral:  Negative for dysphoric mood.      Objective   Vital Signs:  /64   Pulse 94   Temp 97.4 °F (36.3 °C) (Oral)   Resp 18   Ht 157.5 cm (62\")   Wt 52.2 kg (115 lb)   SpO2 95%   BMI 21.03 kg/m²     Physical Exam  Constitutional:       General: She is not in acute distress.     Appearance: She is well-developed.   Cardiovascular:      Rate and Rhythm: Normal rate and regular rhythm.   Pulmonary:      Effort: Pulmonary effort is normal.      Breath sounds: Normal breath sounds.   Neurological:      Mental Status: She is alert and oriented to person, place, and time.   Psychiatric:         Behavior: Behavior normal.         Thought Content: Thought content normal.    Hospital records reviewed with pt confirming HPI.      The following data was reviewed by: Aramis Doty MD on 06/08/2023:  Common labs          3/12/2023    05:12 3/12/2023    05:13 3/13/2023    05:34 3/14/2023    05:59 3/14/2023    06:00   Common Labs   Glucose  138  109   102    BUN  33  34   " 33    Creatinine  1.13  1.02   0.98    Sodium  134  136   137    Potassium  4.9  4.4   4.3    Chloride  100  103   103    Calcium  9.8  9.5   9.1    WBC 10.84   10.14  9.16     Hemoglobin 10.4   10.0  10.5     Hematocrit 31.0   30.3  30.7     Platelets 277   288  323                  Assessment and Plan   Diagnoses and all orders for this visit:    1. Medicare annual wellness visit, subsequent (Primary)    2. Recurrent major depressive disorder, in full remission  -     DULoxetine (CYMBALTA) 60 MG capsule; Take 1 capsule by mouth Daily.  Dispense: 90 capsule; Refill: 1    3. Gout, unspecified cause, unspecified chronicity, unspecified site  -     allopurinol (ZYLOPRIM) 100 MG tablet; Take 1 tablet by mouth Daily.  Dispense: 90 tablet; Refill: 1    4. Acquired hypothyroidism  -     levothyroxine (SYNTHROID, LEVOTHROID) 50 MCG tablet; Take 1 tablet by mouth Every Morning.  Dispense: 90 tablet; Refill: 1    5. Wound of left lower extremity, initial encounter  -     silver sulfadiazine (Silvadene) 1 % cream; Apply 1 application topically to the appropriate area as directed 2 (Two) Times a Day.  Dispense: 100 g; Refill: 1  -     Ambulatory Referral to Wound Clinic           I spent 20 minutes caring for Alba on this date of service. This time includes time spent by me in the following activities:preparing for the visit, performing a medically appropriate examination and/or evaluation , ordering medications, tests, or procedures, and documenting information in the medical record  Follow Up   No follow-ups on file.  Patient was given instructions and counseling regarding her condition or for health maintenance advice. Please see specific information pulled into the AVS if appropriate.

## 2023-06-08 NOTE — PATIENT INSTRUCTIONS
Medicare Wellness  Personal Prevention Plan of Service     Date of Office Visit:    Encounter Provider:  Aramis Doty MD  Place of Service:  [facility]  Patient Name: Alba Correa  :  1933    As part of the Medicare Wellness portion of your visit today, we are providing you with this personalized preventive plan of services (PPPS). This plan is based upon recommendations of the [place] Preventive Services Task Force (USPSTF) and the Advisory Committee on Immunization Practices (ACIP).    This lists the preventive care services that should be considered, and provides dates of when you are due. Items listed as completed are up-to-date and do not require any further intervention.    Health Maintenance   Topic Date Due    ZOSTER VACCINE (2 of 2) 2023 (Originally 2012)    INFLUENZA VACCINE  2023    LIPID PANEL  2024    ANNUAL WELLNESS VISIT  2024    COVID-19 Vaccine  Completed    Pneumococcal Vaccine 65+  Completed    MAMMOGRAM  Discontinued    DXA SCAN  Discontinued    TDAP/TD VACCINES  Discontinued    COLORECTAL CANCER SCREENING  Discontinued       Orders Placed This Encounter   Procedures    Ambulatory Referral to Wound Clinic     Referral Priority:   Urgent     Referral Type:   Consultation     Referral Reason:   Specialty Services Required     Number of Visits Requested:   1       Return in about 6 months (around 2023) for Recheck.

## 2023-06-12 ENCOUNTER — TELEPHONE (OUTPATIENT)
Dept: FAMILY MEDICINE CLINIC | Facility: CLINIC | Age: 88
End: 2023-06-12
Payer: MEDICARE

## 2023-06-12 DIAGNOSIS — M06.9 RHEUMATOID ARTHRITIS, INVOLVING UNSPECIFIED SITE, UNSPECIFIED WHETHER RHEUMATOID FACTOR PRESENT: ICD-10-CM

## 2023-06-12 DIAGNOSIS — M54.50 ACUTE LOW BACK PAIN WITHOUT SCIATICA, UNSPECIFIED BACK PAIN LATERALITY: Primary | ICD-10-CM

## 2023-06-12 NOTE — TELEPHONE ENCOUNTER
"----- Message from Aramis Doty MD sent at 6/12/2023  2:44 PM EDT -----  Regarding: FW: Power Recliner  Contact: [phone]  Perfectly fine to write this prescription for them.  Thank you.  ----- Message -----  From: Estefanía Radford MA  Sent: 6/12/2023   2:06 PM EDT  To: Aramis Doty MD  Subject: FW: Power Recliner                                 ----- Message -----  From: Alba Correa \"SERGIO\"  Sent: 6/12/2023   1:35 PM EDT  To: [facility] [place] [facility]  Subject: Power Recliner                                   When I brought my Mom, Alba Correa in last week, we forgot to ask you about a prescription for a power lift recliner. Since she has trouble using both her arms, working her recliner handle is a problem. Is this something you can help us with?  Thanks,  Kurt Doty       "

## 2023-07-20 PROCEDURE — 93298 REM INTERROG DEV EVAL SCRMS: CPT | Performed by: INTERNAL MEDICINE

## 2023-07-20 PROCEDURE — G2066 INTER DEVC REMOTE 30D: HCPCS | Performed by: INTERNAL MEDICINE

## 2023-08-20 PROCEDURE — G2066 INTER DEVC REMOTE 30D: HCPCS | Performed by: INTERNAL MEDICINE

## 2023-08-20 PROCEDURE — 93298 REM INTERROG DEV EVAL SCRMS: CPT | Performed by: INTERNAL MEDICINE

## 2023-08-29 ENCOUNTER — OFFICE VISIT (OUTPATIENT)
Dept: FAMILY MEDICINE CLINIC | Facility: CLINIC | Age: 88
End: 2023-08-29
Payer: MEDICARE

## 2023-08-29 VITALS
HEIGHT: 62 IN | OXYGEN SATURATION: 98 % | RESPIRATION RATE: 16 BRPM | TEMPERATURE: 97.7 F | HEART RATE: 112 BPM | DIASTOLIC BLOOD PRESSURE: 71 MMHG | BODY MASS INDEX: 20.61 KG/M2 | SYSTOLIC BLOOD PRESSURE: 121 MMHG | WEIGHT: 112 LBS

## 2023-08-29 DIAGNOSIS — Q67.8 CHEST WALL ASYMMETRY: Primary | ICD-10-CM

## 2023-08-29 PROCEDURE — 1160F RVW MEDS BY RX/DR IN RCRD: CPT | Performed by: NURSE PRACTITIONER

## 2023-08-29 PROCEDURE — 99213 OFFICE O/P EST LOW 20 MIN: CPT | Performed by: NURSE PRACTITIONER

## 2023-08-29 PROCEDURE — 1159F MED LIST DOCD IN RCRD: CPT | Performed by: NURSE PRACTITIONER

## 2023-08-29 NOTE — PROGRESS NOTES
Subjective   Alba Correa is a 89 y.o. female.     History of Present Illness   Mass (Mass under the right breast ).  Last mammogram was several years ago.       Reports noticing more prominent area to right lower rib/chest wall than the left.  Only noticed a week or so ago.  States she has not noticed this previously. Denies pain or mass.  Has not had mammogram in several years.   The following portions of the patient's history were reviewed and updated as appropriate: allergies, current medications, past family history, past medical history, past social history, past surgical history, and problem list.    Review of Systems   Constitutional:  Negative for unexpected weight change.   Respiratory:  Negative for shortness of breath.    Cardiovascular:  Negative for palpitations.   Psychiatric/Behavioral:  Negative for behavioral problems.      Objective   Physical Exam  Vitals and nursing note reviewed.   Constitutional:       Appearance: Normal appearance. She is well-developed.   Pulmonary:      Effort: Pulmonary effort is normal.   Chest:      Chest wall: No mass, swelling or tenderness.   Breasts:     Right: No swelling, bleeding, inverted nipple, mass, nipple discharge, skin change or tenderness.      Left: No swelling, bleeding, inverted nipple, mass, nipple discharge, skin change or tenderness.          Comments: Slight asymmetry to right lower rib cage/chest wall compared to left.  No soft tissue swelling, mass or tenderness on exam.   Neurological:      Mental Status: She is alert and oriented to person, place, and time.   Psychiatric:         Mood and Affect: Mood normal.         Behavior: Behavior normal.         Thought Content: Thought content normal.         Judgment: Judgment normal.   3 view thoracic spine xray ordered and reviewed by me.  Thoracic scoliosis noted.  No comparison on file.  3 view rib series ordered and reviewed by me.  No obvious fracture. No comparison on file.     Assessment & Plan    Diagnoses and all orders for this visit:    1. Chest wall asymmetry (Primary)  -     XR Spine Thoracic 3 View (In Office)  -     XR Ribs Left With PA Chest      Discussed with patient that asymmetry to chest wall is likely from the scoliosis.  No further workup at this time. Patient does not desire to have mammogram.

## 2023-10-24 ENCOUNTER — TELEPHONE (OUTPATIENT)
Dept: FAMILY MEDICINE CLINIC | Facility: CLINIC | Age: 88
End: 2023-10-24
Payer: MEDICARE

## 2023-10-24 NOTE — TELEPHONE ENCOUNTER
Unable to fax therapy forms to [facility]. No fax number unable to find this. Ok for hub to relay message if they should call. Notes scanned in chart. Thanks

## 2023-11-10 ENCOUNTER — TELEPHONE (OUTPATIENT)
Dept: CARDIOLOGY | Facility: CLINIC | Age: 88
End: 2023-11-10

## 2023-11-10 DIAGNOSIS — M10.9 GOUT, UNSPECIFIED CAUSE, UNSPECIFIED CHRONICITY, UNSPECIFIED SITE: Chronic | ICD-10-CM

## 2023-11-10 RX ORDER — AMLODIPINE BESYLATE 5 MG/1
5 TABLET ORAL DAILY
Qty: 90 TABLET | Refills: 1 | Status: SHIPPED | OUTPATIENT
Start: 2023-11-10

## 2023-11-10 NOTE — TELEPHONE ENCOUNTER
Caller: Ankit Doty    Relationship: Emergency Contact    Best call back number: [phone]    Requested Prescriptions:   Requested Prescriptions     Pending Prescriptions Disp Refills    apixaban (Eliquis) 2.5 MG tablet tablet 180 tablet 3     Sig: Take 1 tablet by mouth Every 12 (Twelve) Hours.    amLODIPine (NORVASC) 5 MG tablet 90 tablet 1     Sig: Take 1 tablet by mouth Daily.        Pharmacy where request should be sent: [address] [phone] [facility] [phone]      Last office visit with prescribing clinician: 1/10/2023   Last telemedicine visit with prescribing clinician: Visit date not found   Next office visit with prescribing clinician: 1/12/2024     Additional details provided by patient: REQUESTS 90 DAYS SUPPLY OF BOTH    Does the patient have less than a 3 day supply:  [] Yes  [x] No    Would you like a call back once the refill request has been completed: [] Yes [] No    If the office needs to give you a call back, can they leave a voicemail: [] Yes [] No    Abby Walden Rep   11/10/23 10:04 EST

## 2023-11-12 RX ORDER — ALLOPURINOL 100 MG/1
100 TABLET ORAL DAILY
Qty: 90 TABLET | Refills: 0 | OUTPATIENT
Start: 2023-11-12

## 2023-11-13 DIAGNOSIS — M10.9 GOUT, UNSPECIFIED CAUSE, UNSPECIFIED CHRONICITY, UNSPECIFIED SITE: Chronic | ICD-10-CM

## 2023-11-13 NOTE — TELEPHONE ENCOUNTER
Caller: Angy Doty    Relationship: Emergency Contact    Best call back number: [phone]     Requested Prescriptions:   Requested Prescriptions     Pending Prescriptions Disp Refills    allopurinol (ZYLOPRIM) 100 MG tablet 90 tablet 1     Sig: Take 1 tablet by mouth Daily.        [phone] [phone]      Last office visit with prescribing clinician: 6/8/2023   Last telemedicine visit with prescribing clinician: Visit date not found   Next office visit with prescribing clinician: Visit date not found     Additional details provided by patient: 90 DAY SUPPLY, PHARMACY STATES IT WAS DENIED, NO REQUEST FOUND.     Does the patient have less than a 3 day supply:  [x] Yes  [] No    Would you like a call back once the refill request has been completed: [x] Yes [] No    If the office needs to give you a call back, can they leave a voicemail: [x] Yes [] No    Abby Castano Rep   11/13/23 15:54 EST

## 2023-11-14 RX ORDER — ALLOPURINOL 100 MG/1
100 TABLET ORAL DAILY
Qty: 90 TABLET | Refills: 1 | Status: SHIPPED | OUTPATIENT
Start: 2023-11-14

## 2023-12-06 ENCOUNTER — HOSPITAL ENCOUNTER (EMERGENCY)
Facility: HOSPITAL | Age: 88
Discharge: HOME | End: 2023-12-07
Attending: EMERGENCY MEDICINE
Payer: MEDICARE

## 2023-12-06 ENCOUNTER — APPOINTMENT (OUTPATIENT)
Dept: GENERAL RADIOLOGY | Facility: HOSPITAL | Age: 88
End: 2023-12-06
Payer: MEDICARE

## 2023-12-06 ENCOUNTER — APPOINTMENT (OUTPATIENT)
Dept: CT IMAGING | Facility: HOSPITAL | Age: 88
End: 2023-12-06
Payer: MEDICARE

## 2023-12-06 DIAGNOSIS — S00.83XA FOREHEAD CONTUSION, INITIAL ENCOUNTER: ICD-10-CM

## 2023-12-06 DIAGNOSIS — S32.030A COMPRESSION FRACTURE OF L3 LUMBAR VERTEBRA, CLOSED, INITIAL ENCOUNTER: ICD-10-CM
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
## 2023-12-06 DIAGNOSIS — S51.012A ELBOW LACERATION, LEFT, INITIAL ENCOUNTER: ICD-10-CM

## 2023-12-06 DIAGNOSIS — S70.02XA CONTUSION OF LEFT HIP AND THIGH, INITIAL ENCOUNTER: Primary | ICD-10-CM

## 2023-12-06 DIAGNOSIS — S09.90XA CLOSED HEAD INJURY, INITIAL ENCOUNTER: ICD-10-CM

## 2023-12-06 DIAGNOSIS — S70.12XA CONTUSION OF LEFT HIP AND THIGH, INITIAL ENCOUNTER: Primary | ICD-10-CM

## 2023-12-06 DIAGNOSIS — S50.02XA CONTUSION OF LEFT ELBOW, INITIAL ENCOUNTER: ICD-10-CM
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
## 2023-12-06 DIAGNOSIS — S32.020A COMPRESSION FRACTURE OF L2 LUMBAR VERTEBRA, CLOSED, INITIAL ENCOUNTER: ICD-10-CM

## 2023-12-06 PROCEDURE — 72192 CT PELVIS W/O DYE: CPT

## 2023-12-06 PROCEDURE — 73080 X-RAY EXAM OF ELBOW: CPT

## 2023-12-06 PROCEDURE — 99284 EMERGENCY DEPT VISIT MOD MDM: CPT

## 2023-12-06 PROCEDURE — 90471 IMMUNIZATION ADMIN: CPT | Performed by: EMERGENCY MEDICINE

## 2023-12-06 PROCEDURE — 25010000002 TETANUS-DIPHTH-ACELL PERTUSSIS 5-2.5-18.5 LF-MCG/0.5 SUSPENSION PREFILLED SYRINGE: Performed by: EMERGENCY MEDICINE

## 2023-12-06 PROCEDURE — 90715 TDAP VACCINE 7 YRS/> IM: CPT | Performed by: EMERGENCY MEDICINE

## 2023-12-06 PROCEDURE — 72131 CT LUMBAR SPINE W/O DYE: CPT

## 2023-12-06 PROCEDURE — 70450 CT HEAD/BRAIN W/O DYE: CPT

## 2023-12-06 PROCEDURE — 72110 X-RAY EXAM L-2 SPINE 4/>VWS: CPT

## 2023-12-06 RX ADMIN — Medication 3 ML: at 22:18

## 2023-12-06 RX ADMIN — TETANUS TOXOID, REDUCED DIPHTHERIA TOXOID AND ACELLULAR PERTUSSIS VACCINE, ADSORBED 0.5 ML: 5; 2.5; 8; 8; 2.5 SUSPENSION INTRAMUSCULAR at 22:18

## 2023-12-07 ENCOUNTER — HOSPITAL ENCOUNTER (OUTPATIENT)
Facility: HOSPITAL | Age: 88
Setting detail: OBSERVATION
LOS: 1 days | Discharge: HOME HEALTH | End: 2023-12-09
Attending: EMERGENCY MEDICINE | Admitting: STUDENT IN AN ORGANIZED HEALTH CARE EDUCATION/TRAINING PROGRAM
Payer: MEDICARE

## 2023-12-07 ENCOUNTER — APPOINTMENT (OUTPATIENT)
Dept: GENERAL RADIOLOGY | Facility: HOSPITAL | Age: 88
End: 2023-12-07
Payer: MEDICARE

## 2023-12-07 ENCOUNTER — APPOINTMENT (OUTPATIENT)
Dept: CT IMAGING | Facility: HOSPITAL | Age: 88
End: 2023-12-07
Payer: MEDICARE

## 2023-12-07 VITALS
TEMPERATURE: 98.4 F | HEART RATE: 87 BPM | OXYGEN SATURATION: 97 % | SYSTOLIC BLOOD PRESSURE: 127 MMHG | DIASTOLIC BLOOD PRESSURE: 85 MMHG | RESPIRATION RATE: 18 BRPM

## 2023-12-07 DIAGNOSIS — R11.2 NAUSEA AND VOMITING, UNSPECIFIED VOMITING TYPE: ICD-10-CM

## 2023-12-07 DIAGNOSIS — T50.905A ADVERSE EFFECT OF DRUG, INITIAL ENCOUNTER: ICD-10-CM

## 2023-12-07 DIAGNOSIS — R53.1 GENERALIZED WEAKNESS: Primary | ICD-10-CM

## 2023-12-07 DIAGNOSIS — S09.90XA CLOSED HEAD INJURY, INITIAL ENCOUNTER: ICD-10-CM

## 2023-12-07 PROBLEM — S70.10XA HEMATOMA OF THIGH: Status: ACTIVE | Noted: 2023-12-07

## 2023-12-07 PROBLEM — R93.7 ABNORMAL CT OF SPINE: Status: ACTIVE | Noted: 2023-12-07

## 2023-12-07 PROBLEM — S00.03XA TRAUMATIC HEMATOMA OF SCALP: Status: ACTIVE | Noted: 2023-12-07

## 2023-12-07 LAB
ALBUMIN SERPL-MCNC: 3.8 G/DL (ref 3.5–5.2)
ALBUMIN/GLOB SERPL: 1.2 G/DL
ALP SERPL-CCNC: 78 U/L (ref 39–117)
ALT SERPL W P-5'-P-CCNC: 10 U/L (ref 1–33)
ANION GAP SERPL CALCULATED.3IONS-SCNC: 12.4 MMOL/L (ref 5–15)
ANION GAP SERPL CALCULATED.3IONS-SCNC: 7 MMOL/L (ref 5–15)
APTT PPP: 29 SECONDS (ref 22.7–35.4)
AST SERPL-CCNC: 20 U/L (ref 1–32)
BACTERIA UR QL AUTO: NORMAL /HPF
BASOPHILS # BLD AUTO: 0.07 10*3/MM3 (ref 0–0.2)
BASOPHILS NFR BLD AUTO: 0.8 % (ref 0–1.5)
BILIRUB SERPL-MCNC: 0.5 MG/DL (ref 0–1.2)
BILIRUB UR QL STRIP: NEGATIVE
BUN SERPL-MCNC: 27 MG/DL (ref 8–23)
BUN SERPL-MCNC: 31 MG/DL (ref 8–23)
BUN/CREAT SERPL: 22.5 (ref 7–25)
BUN/CREAT SERPL: 23.9 (ref 7–25)
CALCIUM SPEC-SCNC: 9.1 MG/DL (ref 8.2–9.6)
CALCIUM SPEC-SCNC: 9.8 MG/DL (ref 8.2–9.6)
CHLORIDE SERPL-SCNC: 103 MMOL/L (ref 98–107)
CHLORIDE SERPL-SCNC: 107 MMOL/L (ref 98–107)
CLARITY UR: CLEAR
CO2 SERPL-SCNC: 21 MMOL/L (ref 22–29)
CO2 SERPL-SCNC: 22.6 MMOL/L (ref 22–29)
COLOR UR: YELLOW
CREAT SERPL-MCNC: 1.13 MG/DL (ref 0.57–1)
CREAT SERPL-MCNC: 1.38 MG/DL (ref 0.57–1)
D-LACTATE SERPL-SCNC: 1.3 MMOL/L (ref 0.5–2)
DEPRECATED RDW RBC AUTO: 44 FL (ref 37–54)
DEPRECATED RDW RBC AUTO: 45.6 FL (ref 37–54)
EGFRCR SERPLBLD CKD-EPI 2021: 36.4 ML/MIN/1.73
EGFRCR SERPLBLD CKD-EPI 2021: 46.3 ML/MIN/1.73
EOSINOPHIL # BLD AUTO: 0.1 10*3/MM3 (ref 0–0.4)
EOSINOPHIL NFR BLD AUTO: 1.2 % (ref 0.3–6.2)
ERYTHROCYTE [DISTWIDTH] IN BLOOD BY AUTOMATED COUNT: 12 % (ref 12.3–15.4)
ERYTHROCYTE [DISTWIDTH] IN BLOOD BY AUTOMATED COUNT: 12.2 % (ref 12.3–15.4)
GEN 5 2HR TROPONIN T REFLEX: 14 NG/L
GLOBULIN UR ELPH-MCNC: 3.3 GM/DL
GLUCOSE SERPL-MCNC: 111 MG/DL (ref 65–99)
GLUCOSE SERPL-MCNC: 139 MG/DL (ref 65–99)
GLUCOSE UR STRIP-MCNC: NEGATIVE MG/DL
HCT VFR BLD AUTO: 29.9 % (ref 34–46.6)
HCT VFR BLD AUTO: 33.8 % (ref 34–46.6)
HGB BLD-MCNC: 10.1 G/DL (ref 12–15.9)
HGB BLD-MCNC: 11.3 G/DL (ref 12–15.9)
HGB UR QL STRIP.AUTO: NEGATIVE
HYALINE CASTS UR QL AUTO: NORMAL /LPF
IMM GRANULOCYTES # BLD AUTO: 0.04 10*3/MM3 (ref 0–0.05)
IMM GRANULOCYTES NFR BLD AUTO: 0.5 % (ref 0–0.5)
INR PPP: 1.15 (ref 0.9–1.1)
KETONES UR QL STRIP: ABNORMAL
LEUKOCYTE ESTERASE UR QL STRIP.AUTO: ABNORMAL
LYMPHOCYTES # BLD AUTO: 1.13 10*3/MM3 (ref 0.7–3.1)
LYMPHOCYTES NFR BLD AUTO: 13.1 % (ref 19.6–45.3)
MCH RBC QN AUTO: 34.2 PG (ref 26.6–33)
MCH RBC QN AUTO: 34.3 PG (ref 26.6–33)
MCHC RBC AUTO-ENTMCNC: 33.4 G/DL (ref 31.5–35.7)
MCHC RBC AUTO-ENTMCNC: 33.8 G/DL (ref 31.5–35.7)
MCV RBC AUTO: 101.4 FL (ref 79–97)
MCV RBC AUTO: 102.7 FL (ref 79–97)
MONOCYTES # BLD AUTO: 0.77 10*3/MM3 (ref 0.1–0.9)
MONOCYTES NFR BLD AUTO: 8.9 % (ref 5–12)
NEUTROPHILS NFR BLD AUTO: 6.5 10*3/MM3 (ref 1.7–7)
NEUTROPHILS NFR BLD AUTO: 75.5 % (ref 42.7–76)
NITRITE UR QL STRIP: NEGATIVE
NRBC BLD AUTO-RTO: 0 /100 WBC (ref 0–0.2)
PH UR STRIP.AUTO: 5.5 [PH] (ref 5–8)
PLATELET # BLD AUTO: 202 10*3/MM3 (ref 140–450)
PLATELET # BLD AUTO: 225 10*3/MM3 (ref 140–450)
PMV BLD AUTO: 10 FL (ref 6–12)
PMV BLD AUTO: 9.9 FL (ref 6–12)
POTASSIUM SERPL-SCNC: 4.8 MMOL/L (ref 3.5–5.2)
POTASSIUM SERPL-SCNC: 4.8 MMOL/L (ref 3.5–5.2)
PROT SERPL-MCNC: 7.1 G/DL (ref 6–8.5)
PROT UR QL STRIP: NEGATIVE
PROTHROMBIN TIME: 14.8 SECONDS (ref 11.7–14.2)
QT INTERVAL: 368 MS
QTC INTERVAL: 448 MS
RBC # BLD AUTO: 2.95 10*6/MM3 (ref 3.77–5.28)
RBC # BLD AUTO: 3.29 10*6/MM3 (ref 3.77–5.28)
RBC # UR STRIP: NORMAL /HPF
REF LAB TEST METHOD: NORMAL
SODIUM SERPL-SCNC: 135 MMOL/L (ref 136–145)
SODIUM SERPL-SCNC: 138 MMOL/L (ref 136–145)
SP GR UR STRIP: 1.02 (ref 1–1.03)
SQUAMOUS #/AREA URNS HPF: NORMAL /HPF
TROPONIN T DELTA: -1 NG/L
TROPONIN T SERPL HS-MCNC: 15 NG/L
UROBILINOGEN UR QL STRIP: ABNORMAL
WBC # UR STRIP: NORMAL /HPF
WBC NRBC COR # BLD AUTO: 5.26 10*3/MM3 (ref 3.4–10.8)
WBC NRBC COR # BLD AUTO: 8.61 10*3/MM3 (ref 3.4–10.8)

## 2023-12-07 PROCEDURE — 80053 COMPREHEN METABOLIC PANEL: CPT | Performed by: EMERGENCY MEDICINE

## 2023-12-07 PROCEDURE — G0378 HOSPITAL OBSERVATION PER HR: HCPCS

## 2023-12-07 PROCEDURE — 96361 HYDRATE IV INFUSION ADD-ON: CPT

## 2023-12-07 PROCEDURE — 71045 X-RAY EXAM CHEST 1 VIEW: CPT

## 2023-12-07 PROCEDURE — 36415 COLL VENOUS BLD VENIPUNCTURE: CPT

## 2023-12-07 PROCEDURE — 63710000001 ONDANSETRON ODT 4 MG TABLET DISPERSIBLE: Performed by: EMERGENCY MEDICINE

## 2023-12-07 PROCEDURE — 99221 1ST HOSP IP/OBS SF/LOW 40: CPT | Performed by: STUDENT IN AN ORGANIZED HEALTH CARE EDUCATION/TRAINING PROGRAM

## 2023-12-07 PROCEDURE — 84484 ASSAY OF TROPONIN QUANT: CPT | Performed by: EMERGENCY MEDICINE

## 2023-12-07 PROCEDURE — 93005 ELECTROCARDIOGRAM TRACING: CPT | Performed by: EMERGENCY MEDICINE

## 2023-12-07 PROCEDURE — 25810000003 SODIUM CHLORIDE 0.9 % SOLUTION: Performed by: EMERGENCY MEDICINE

## 2023-12-07 PROCEDURE — 85610 PROTHROMBIN TIME: CPT | Performed by: EMERGENCY MEDICINE

## 2023-12-07 PROCEDURE — 70450 CT HEAD/BRAIN W/O DYE: CPT

## 2023-12-07 PROCEDURE — 85025 COMPLETE CBC W/AUTO DIFF WBC: CPT | Performed by: EMERGENCY MEDICINE

## 2023-12-07 PROCEDURE — 99284 EMERGENCY DEPT VISIT MOD MDM: CPT

## 2023-12-07 PROCEDURE — 25810000003 SODIUM CHLORIDE 0.9 % SOLUTION: Performed by: NURSE PRACTITIONER

## 2023-12-07 PROCEDURE — 36415 COLL VENOUS BLD VENIPUNCTURE: CPT | Performed by: NURSE PRACTITIONER

## 2023-12-07 PROCEDURE — 85730 THROMBOPLASTIN TIME PARTIAL: CPT | Performed by: EMERGENCY MEDICINE

## 2023-12-07 PROCEDURE — 85027 COMPLETE CBC AUTOMATED: CPT | Performed by: NURSE PRACTITIONER

## 2023-12-07 PROCEDURE — 25010000002 ONDANSETRON PER 1 MG: Performed by: EMERGENCY MEDICINE

## 2023-12-07 PROCEDURE — 81001 URINALYSIS AUTO W/SCOPE: CPT | Performed by: EMERGENCY MEDICINE

## 2023-12-07 PROCEDURE — 96374 THER/PROPH/DIAG INJ IV PUSH: CPT

## 2023-12-07 PROCEDURE — 99284 EMERGENCY DEPT VISIT MOD MDM: CPT | Performed by: EMERGENCY MEDICINE

## 2023-12-07 PROCEDURE — 83605 ASSAY OF LACTIC ACID: CPT | Performed by: EMERGENCY MEDICINE

## 2023-12-07 RX ORDER — ONDANSETRON 2 MG/ML
4 INJECTION INTRAMUSCULAR; INTRAVENOUS ONCE
Status: COMPLETED | OUTPATIENT
Start: 2023-12-07 | End: 2023-12-07

## 2023-12-07 RX ORDER — CALCIUM CARBONATE 500 MG/1
2 TABLET, CHEWABLE ORAL 2 TIMES DAILY PRN
Status: DISCONTINUED | OUTPATIENT
Start: 2023-12-07 | End: 2023-12-09 | Stop reason: HOSPADM

## 2023-12-07 RX ORDER — DULOXETIN HYDROCHLORIDE 60 MG/1
60 CAPSULE, DELAYED RELEASE ORAL DAILY
Status: DISCONTINUED | OUTPATIENT
Start: 2023-12-07 | End: 2023-12-09 | Stop reason: HOSPADM

## 2023-12-07 RX ORDER — ONDANSETRON 4 MG/1
4 TABLET, FILM COATED ORAL EVERY 6 HOURS PRN
Status: DISCONTINUED | OUTPATIENT
Start: 2023-12-07 | End: 2023-12-09 | Stop reason: HOSPADM

## 2023-12-07 RX ORDER — ALLOPURINOL 100 MG/1
100 TABLET ORAL DAILY
Status: DISCONTINUED | OUTPATIENT
Start: 2023-12-07 | End: 2023-12-09 | Stop reason: HOSPADM

## 2023-12-07 RX ORDER — HYDROCODONE BITARTRATE AND ACETAMINOPHEN 5; 325 MG/1; MG/1
1 TABLET ORAL 4 TIMES DAILY PRN
Qty: 12 TABLET | Refills: 0 | Status: ON HOLD | OUTPATIENT
Start: 2023-12-07 | End: 2023-12-07

## 2023-12-07 RX ORDER — SODIUM CHLORIDE 0.9 % (FLUSH) 0.9 %
10 SYRINGE (ML) INJECTION EVERY 12 HOURS SCHEDULED
Status: DISCONTINUED | OUTPATIENT
Start: 2023-12-07 | End: 2023-12-09 | Stop reason: HOSPADM

## 2023-12-07 RX ORDER — ONDANSETRON 4 MG/1
4 TABLET, ORALLY DISINTEGRATING ORAL ONCE
Status: COMPLETED | OUTPATIENT
Start: 2023-12-07 | End: 2023-12-07

## 2023-12-07 RX ORDER — ONDANSETRON 4 MG/1
4 TABLET, ORALLY DISINTEGRATING ORAL EVERY 6 HOURS PRN
Qty: 12 TABLET | Refills: 0 | Status: SHIPPED | OUTPATIENT
Start: 2023-12-07

## 2023-12-07 RX ORDER — ONDANSETRON 2 MG/ML
4 INJECTION INTRAMUSCULAR; INTRAVENOUS EVERY 6 HOURS PRN
Status: DISCONTINUED | OUTPATIENT
Start: 2023-12-07 | End: 2023-12-09 | Stop reason: HOSPADM

## 2023-12-07 RX ORDER — SODIUM CHLORIDE 9 MG/ML
40 INJECTION, SOLUTION INTRAVENOUS AS NEEDED
Status: DISCONTINUED | OUTPATIENT
Start: 2023-12-07 | End: 2023-12-09 | Stop reason: HOSPADM

## 2023-12-07 RX ORDER — HYDROCODONE BITARTRATE AND ACETAMINOPHEN 5; 325 MG/1; MG/1
1 TABLET ORAL ONCE AS NEEDED
Status: DISCONTINUED | OUTPATIENT
Start: 2023-12-07 | End: 2023-12-07 | Stop reason: HOSPADM

## 2023-12-07 RX ORDER — ACETAMINOPHEN 500 MG
1000 TABLET ORAL EVERY 8 HOURS PRN
Status: DISCONTINUED | OUTPATIENT
Start: 2023-12-07 | End: 2023-12-09 | Stop reason: HOSPADM

## 2023-12-07 RX ORDER — LEVOTHYROXINE SODIUM 0.05 MG/1
50 TABLET ORAL EVERY MORNING
Status: DISCONTINUED | OUTPATIENT
Start: 2023-12-08 | End: 2023-12-09 | Stop reason: HOSPADM

## 2023-12-07 RX ORDER — SODIUM CHLORIDE 0.9 % (FLUSH) 0.9 %
10 SYRINGE (ML) INJECTION AS NEEDED
Status: DISCONTINUED | OUTPATIENT
Start: 2023-12-07 | End: 2023-12-09 | Stop reason: HOSPADM

## 2023-12-07 RX ORDER — SODIUM CHLORIDE 9 MG/ML
75 INJECTION, SOLUTION INTRAVENOUS CONTINUOUS
Status: DISCONTINUED | OUTPATIENT
Start: 2023-12-07 | End: 2023-12-07

## 2023-12-07 RX ADMIN — ONDANSETRON 4 MG: 4 TABLET, ORALLY DISINTEGRATING ORAL at 00:47

## 2023-12-07 RX ADMIN — HYDROCODONE BITARTRATE AND ACETAMINOPHEN 1 TABLET: 5; 325 TABLET ORAL at 00:47

## 2023-12-07 RX ADMIN — SODIUM CHLORIDE 75 ML/HR: 9 INJECTION, SOLUTION INTRAVENOUS at 07:04

## 2023-12-07 RX ADMIN — ALLOPURINOL 100 MG: 100 TABLET ORAL at 14:50

## 2023-12-07 RX ADMIN — Medication 10 ML: at 20:16

## 2023-12-07 RX ADMIN — ONDANSETRON 4 MG: 2 INJECTION INTRAMUSCULAR; INTRAVENOUS at 03:30

## 2023-12-07 RX ADMIN — SODIUM CHLORIDE 1000 ML: 9 INJECTION, SOLUTION INTRAVENOUS at 03:32

## 2023-12-07 RX ADMIN — DULOXETINE HYDROCHLORIDE 60 MG: 60 CAPSULE, DELAYED RELEASE ORAL at 14:50

## 2023-12-07 NOTE — H&P
Patient Name:  Alba Correa  YOB: 1933  MRN:  [id number]  Admit Date:  12/7/2023  Patient Care Team:  Aramis Doty MD as PCP - General (Family Medicine)  Juan Jose Muñiz MD as Consulting Physician (Neurology)  Roshan Martines III, MD as Consulting Physician (Cardiology)  Nehal Murray MD as Consulting Physician (Nephrology)      Subjective   History Present Illness     Chief Complaint   Patient presents with    Dizziness     History of Present Illness  Ms. Correa is a 89 y.o. former smoker with a history of CKD 3, atrial fibrillation on anticoagulation, prior stroke, anemia, hypertension, RA, hyperlipidemia and hypothyroidism that presents to [facility] complaining of dizziness.  The patient states that her initial issues started when she suffered a fall around 8 PM yesterday.  She has been at a senior living facility after a fall she suffered here back in March when she broke her left arm.  She states that she has a history of falling, but that this 1 was a little bit different.  She states that she is unsure if she had near loss of consciousness or not.  She has a history of syncope with a loop recorder in place.  She states that she fell to the ground and hit her head.  She thought she initially had broke her left hip as she was having a lot of pain, but states that there was no acute fractures found when she presented to the freestanding emergency room at [facility].  CT head revealed left periorbital hematoma but no intracranial bleeding.  X-rays of her lumbar spine revealed L2 and L3 compression fractures of indeterminate chronicity and x-ray of her left elbow showed intact hardware with no fracture.  CT of her lumbar spine was further obtained showing osteopenia and degenerative changes with chronic appearing superior endplate L3 degenerative change or Schmorl's node or just superimposed acute compression fracture was felt possible.  CT of the pelvis did show a  left posterior lateral thigh acute hematoma.    She was given hydrocodone and discharged from the emergency room at that time.  She states that her son was going to stay with her that night given her fall.  She states that when she returned home, she was turning down her bed and started to feel very dizzy and nauseous.  She had subsequent vomiting and weakness and was brought back to the emergency room for further evaluation.  She denies any chest pain or trouble breathing as well as denies any ongoing nausea or vomiting at this time.  She states that she is continuing to have some left hip discomfort as well as left head pain and only mild back discomfort.  She thinks she may have had a reaction to the medication.  In the ED, she was afebrile and hemodynamically stable.  Lab work revealed a WBC of 8.61, hemoglobin 11.3, creatinine 1.38 and stable electrolytes.  UA was noninfectious and high-sensitivity troponin 15 and 14.  Repeat CT head was negative for any acute findings and chest x-ray revealed some pleural scarring or small pleural effusions.  She was given fluids overnight and admitted for further evaluation at this time.    Review of Systems   Constitutional:  Negative for appetite change, chills, fatigue and fever.   HENT:  Negative for congestion and ear pain.    Eyes:  Negative for pain and discharge.   Respiratory:  Negative for cough and shortness of breath.    Cardiovascular:  Negative for chest pain and leg swelling.   Gastrointestinal:  Positive for constipation, nausea and vomiting. Negative for abdominal distention and abdominal pain.   Genitourinary:  Negative for difficulty urinating and dysuria.   Musculoskeletal:  Positive for arthralgias and back pain. Negative for gait problem.   Skin:  Positive for color change. Negative for pallor.   Neurological:  Positive for dizziness and weakness. Negative for light-headedness.   Psychiatric/Behavioral:  Negative for confusion. The patient is not  nervous/anxious.       Personal History     Past Medical History:   Diagnosis Date    Acquired hypothyroidism 5/3/2022    Acute diastolic CHF (congestive heart failure) 3/20/2022    Chronic kidney disease     CVA (cerebral vascular accident)     Degeneration of intervertebral disc     Depression     DVT (deep venous thrombosis)     First degree AV block 05/26/2021    Gout     H/O complete eye exam 09/2016    Hyperlipidemia     IBS (irritable bowel syndrome)     Nonrheumatic mitral valve regurgitation     OAB (overactive bladder)     Osteopenia     Osteoporosis     PAF (paroxysmal atrial fibrillation) 11/08/2019    Peripheral neuropathy     Rheumatoid arthritis     Sinus bradycardia 05/26/2021     Past Surgical History:   Procedure Laterality Date    APPENDECTOMY      BREAST BIOPSY      CARDIAC ELECTROPHYSIOLOGY PROCEDURE N/A 10/12/2021    Procedure: Loop insertion linq;  Surgeon: Tone Anguiano MD;  Location: [facility] INVASIVE LOCATION;  Service: Cardiovascular;  Laterality: N/A;    CARDIOVERSION  08/03/2020    Dr. Colby    COLONOSCOPY      declines    ELBOW OPEN REDUCTION INTERNAL FIXATION Left 3/11/2023    Procedure: ELBOW OPEN REDUCTION INTERNAL FIXATION;  Surgeon: Ramón Encinas II, MD;  Location: [facility] OR;  Service: Orthopedics;  Laterality: Left;    HERNIA REPAIR  1965    HYSTERECTOMY      still has ovaries    MAMMO BILATERAL  11/16/2016    pt declines    PAP SMEAR  11/16/2016    declines     Family History   Problem Relation Age of Onset    Alcohol abuse Other     Cancer Other     Hypertension Other     Kidney disease Other     Lung disease Other     Heart disease Mother 89    Heart failure Mother     Ovarian cancer Daughter 60    Stomach cancer Father     Kidney cancer Father 52    Lung cancer Father 52    Ovarian cancer Sister     Lung cancer Brother 65     Social History     Tobacco Use    Smoking status: Former     Years: 5     Types: Cigarettes     Quit date: 1975     Years  since quittin.9    Smokeless tobacco: Never    Tobacco comments:     caffeine use: 2 cups coffee in AM   Vaping Use    Vaping Use: Never used   Substance Use Topics    Alcohol use: Yes     Comment: Occasional- glass of wine few times a week    Drug use: No     Medications Prior to Admission   Medication Sig Dispense Refill Last Dose    acetaminophen (TYLENOL) 650 MG 8 hr tablet Take 2 tablets by mouth Every 8 (Eight) Hours As Needed for Mild Pain.   2023 at 0900    allopurinol (ZYLOPRIM) 100 MG tablet Take 1 tablet by mouth Daily. 90 tablet 1 2023 at 0900    amLODIPine (NORVASC) 5 MG tablet Take 1 tablet by mouth Daily. 90 tablet 1 2023 at 0900    apixaban (Eliquis) 2.5 MG tablet tablet Take 1 tablet by mouth Every 12 (Twelve) Hours. 180 tablet 1 2023 at 0900    DULoxetine (CYMBALTA) 60 MG capsule Take 1 capsule by mouth Daily. 90 capsule 1 2023 at 090    levothyroxine (SYNTHROID, LEVOTHROID) 50 MCG tablet Take 1 tablet by mouth Every Morning. 90 tablet 1 2023 at 0900    losartan (COZAAR) 25 MG tablet TAKE 1 TABLET BY MOUTH  TWICE DAILY 180 tablet 3 2023 at 0900    Multiple Vitamins-Minerals (CENTRUM SILVER) tablet Take 1 tablet by mouth Daily.   2023 at 0900    Omega-3 Fatty Acids (OMEGA-3 FISH OIL PO) Take 1,600 mg by mouth Every Morning.   2023 at 3    ondansetron ODT (ZOFRAN-ODT) 4 MG disintegrating tablet Place 1 tablet on the tongue Every 6 (Six) Hours As Needed for Nausea or Vomiting. 12 tablet 0 2023 at 1800     Allergies:    Allergies   Allergen Reactions    Hydrocodone-Acetaminophen Nausea And Vomiting and Dizziness    Sulfamethoxazole-Trimethoprim Delirium    Lisinopril Unknown (See Comments)     Unknown per pt    Levofloxacin Rash       Objective    Objective     Vital Signs  Temp:  [97.5 °F (36.4 °C)-98.4 °F (36.9 °C)] 97.9 °F (36.6 °C)  Heart Rate:  [] 102  Resp:  [16-18] 18  BP: (108-141)/(40-96) 129/60  SpO2:  [95 %-100 %] 97 %  on   ;    Device (Oxygen Therapy): room air  Body mass index is 20.49 kg/m².    Physical Exam  Vitals and nursing note reviewed.   Constitutional:       Appearance: She is ill-appearing. She is not toxic-appearing.   HENT:      Head: Normocephalic and atraumatic.      Comments: Left frontal scalp bruising/hematoma.     Right Ear: External ear normal.      Left Ear: External ear normal.      Nose: Nose normal.   Cardiovascular:      Rate and Rhythm: Normal rate. Rhythm irregular.      Pulses: Normal pulses.   Pulmonary:      Effort: Pulmonary effort is normal. No respiratory distress.      Comments: Diminished, but fairly clear  Musculoskeletal:         General: Swelling (trace BLE) present. Normal range of motion.      Cervical back: Normal range of motion and neck supple.   Skin:     General: Skin is warm and dry.      Findings: Bruising present.   Neurological:      Mental Status: She is alert and oriented to person, place, and time.      Sensory: No sensory deficit.      Motor: Weakness present.      Coordination: Coordination normal.   Psychiatric:         Mood and Affect: Mood normal.         Behavior: Behavior normal.       Results Review:  I reviewed the patient's new clinical results.  I reviewed the patient's new imaging results and agree with the interpretation.  I reviewed the patient's other test results and agree with the interpretation  I personally viewed and interpreted the patient's EKG/Telemetry data    Lab Results (last 24 hours)       Procedure Component Value Units Date/Time    CBC & Differential [[id number]]  (Abnormal) Collected: 12/07/23 0314    Specimen: Blood Updated: 12/07/23 0328    Narrative:      The following orders were created for panel order CBC & Differential.  Procedure                               Abnormality         Status                     ---------                               -----------         ------                     CBC Auto Differential[[id number]]        Abnormal             Final result                 Please view results for these tests on the individual orders.    Comprehensive Metabolic Panel [[id number]]  (Abnormal) Collected: 12/07/23 0314    Specimen: Blood Updated: 12/07/23 0348     Glucose 139 mg/dL      BUN 31 mg/dL      Creatinine 1.38 mg/dL      Sodium 138 mmol/L      Potassium 4.8 mmol/L      Chloride 103 mmol/L      CO2 22.6 mmol/L      Calcium 9.8 mg/dL      Total Protein 7.1 g/dL      Albumin 3.8 g/dL      ALT (SGPT) 10 U/L      AST (SGOT) 20 U/L      Alkaline Phosphatase 78 U/L      Total Bilirubin 0.5 mg/dL      Globulin 3.3 gm/dL      A/G Ratio 1.2 g/dL      BUN/Creatinine Ratio 22.5     Anion Gap 12.4 mmol/L      eGFR 36.4 mL/min/1.73     Narrative:      GFR Normal >60  Chronic Kidney Disease <60  Kidney Failure <15    The GFR formula is only valid for adults with stable renal function between ages 18 and 70.    CBC Auto Differential [[id number]]  (Abnormal) Collected: 12/07/23 0314    Specimen: Blood Updated: 12/07/23 0328     WBC 8.61 10*3/mm3      RBC 3.29 10*6/mm3      Hemoglobin 11.3 g/dL      Hematocrit 33.8 %      .7 fL      MCH 34.3 pg      MCHC 33.4 g/dL      RDW 12.2 %      RDW-SD 45.6 fl      MPV 9.9 fL      Platelets 225 10*3/mm3      Neutrophil % 75.5 %      Lymphocyte % 13.1 %      Monocyte % 8.9 %      Eosinophil % 1.2 %      Basophil % 0.8 %      Immature Grans % 0.5 %      Neutrophils, Absolute 6.50 10*3/mm3      Lymphocytes, Absolute 1.13 10*3/mm3      Monocytes, Absolute 0.77 10*3/mm3      Eosinophils, Absolute 0.10 10*3/mm3      Basophils, Absolute 0.07 10*3/mm3      Immature Grans, Absolute 0.04 10*3/mm3      nRBC 0.0 /100 WBC     Protime-INR [[id number]]  (Abnormal) Collected: 12/07/23 0314    Specimen: Blood Updated: 12/07/23 0340     Protime 14.8 Seconds      INR 1.15    aPTT [[id number]]  (Normal) Collected: 12/07/23 0314    Specimen: Blood Updated: 12/07/23 0340     PTT 29.0 seconds     High Sensitivity Troponin T [[id number]]  (Abnormal)  Collected: 12/07/23 0314    Specimen: Blood Updated: 12/07/23 0348     HS Troponin T 15 ng/L     Narrative:      High Sensitive Troponin T Reference Range:  <14.0 ng/L- Negative Female for AMI  <22.0 ng/L- Negative Male for AMI  >=14 - Abnormal Female indicating possible myocardial injury.  >=22 - Abnormal Male indicating possible myocardial injury.   Clinicians would have to utilize clinical acumen, EKG, Troponin, and serial changes to determine if it is an Acute Myocardial Infarction or myocardial injury due to an underlying chronic condition.         Lactic Acid, Plasma [[id number]]  (Normal) Collected: 12/07/23 0314    Specimen: Blood Updated: 12/07/23 0344     Lactate 1.3 mmol/L     Urinalysis With Microscopic If Indicated (No Culture) - Urine, Clean Catch [[id number]]  (Abnormal) Collected: 12/07/23 0448    Specimen: Urine, Clean Catch Updated: 12/07/23 0500     Color, UA Yellow     Appearance, UA Clear     pH, UA 5.5     Specific Gravity, UA 1.018     Glucose, UA Negative     Ketones, UA Trace     Bilirubin, UA Negative     Blood, UA Negative     Protein, UA Negative     Leuk Esterase, UA Trace     Nitrite, UA Negative     Urobilinogen, UA 0.2 E.U./dL    Urinalysis, Microscopic Only - Urine, Clean Catch [[id number]] Collected: 12/07/23 0448    Specimen: Urine, Clean Catch Updated: 12/07/23 0500     RBC, UA 0-2 /HPF      WBC, UA 0-2 /HPF      Bacteria, UA None Seen /HPF      Squamous Epithelial Cells, UA 0-2 /HPF      Hyaline Casts, UA 3-6 /LPF      Methodology Automated Microscopy    High Sensitivity Troponin T 2Hr [[id number]]  (Abnormal) Collected: 12/07/23 0508    Specimen: Blood Updated: 12/07/23 0534     HS Troponin T 14 ng/L      Troponin T Delta -1 ng/L     Narrative:      High Sensitive Troponin T Reference Range:  <14.0 ng/L- Negative Female for AMI  <22.0 ng/L- Negative Male for AMI  >=14 - Abnormal Female indicating possible myocardial injury.  >=22 - Abnormal Male indicating possible myocardial  injury.   Clinicians would have to utilize clinical acumen, EKG, Troponin, and serial changes to determine if it is an Acute Myocardial Infarction or myocardial injury due to an underlying chronic condition.         Basic Metabolic Panel [[id number]]  (Abnormal) Collected: 12/07/23 0750    Specimen: Blood Updated: 12/07/23 0837     Glucose 111 mg/dL      BUN 27 mg/dL      Creatinine 1.13 mg/dL      Sodium 135 mmol/L      Potassium 4.8 mmol/L      Chloride 107 mmol/L      CO2 21.0 mmol/L      Calcium 9.1 mg/dL      BUN/Creatinine Ratio 23.9     Anion Gap 7.0 mmol/L      eGFR 46.3 mL/min/1.73     Narrative:      GFR Normal >60  Chronic Kidney Disease <60  Kidney Failure <15    The GFR formula is only valid for adults with stable renal function between ages 18 and 70.    CBC (No Diff) [[id number]]  (Abnormal) Collected: 12/07/23 0750    Specimen: Blood Updated: 12/07/23 0817     WBC 5.26 10*3/mm3      RBC 2.95 10*6/mm3      Hemoglobin 10.1 g/dL      Hematocrit 29.9 %      .4 fL      MCH 34.2 pg      MCHC 33.8 g/dL      RDW 12.0 %      RDW-SD 44.0 fl      MPV 10.0 fL      Platelets 202 10*3/mm3             Imaging Results (Last 24 Hours)       Procedure Component Value Units Date/Time    XR Chest 1 View [[id number]] Collected: 12/07/23 0605     Updated: 12/07/23 0605    Narrative:        Patient: KACI CHAVEZ  Time Out: 06:05  Exam(s): XR CXR 1 VIEW     EXAM:    XR Chest, 1 View    CLINICAL HISTORY:       Shortness of breath.    TECHNIQUE:    Frontal view of the chest.    COMPARISON:    Chest radiograph 4 29 2022    FINDINGS:    Lungs:  Unremarkable.  No consolidation.    Pleural space:  Blunting of the costophrenic angles may represent   pleural scarring or small pleural effusions.  No pneumothorax.    Heart:  Unremarkable.  No cardiomegaly.    Mediastinum:  Unremarkable.  Normal mediastinal contour.    Bones joints:  There are degenerative changes of the spine.  No acute   fracture.    Lymph nodes:  Calcified  hilar lymph nodes are stable.    Tubes, lines and devices:  An implanted cardiac device is   redemonstrated.    IMPRESSION:         Blunting of the costophrenic angles may represent pleural scarring or   small pleural effusions.      Impression:          Electronically signed by Ladi Martel MD on 12-07-23 at 0605    CT Head Without Contrast [[id number]] Collected: 12/07/23 0340     Updated: 12/07/23 0340    Narrative:        Patient: KACI CHAVEZ  Time Out: 03:39  Exam(s): CT HEAD Without Contrast     EXAM:    CT Head Without Intravenous Contrast    CLINICAL HISTORY:     Reason for exam: Head trauma, minor (Age >= 65y).    TECHNIQUE:    Axial computed tomography images of the head brain without intravenous   contrast.  CTDI is 56 mGy and DLP is 922 mGy-cm.  This CT exam was   performed according to the principle of ALARA (As Low As Reasonably   Achievable) by using one or more of the following dose reduction   techniques: automated exposure control, adjustment of the mA and or kV   according to patient size, and or use of iterative reconstruction   technique.    COMPARISON:    No relevant prior studies available.    FINDINGS:    Brain:  No hemorrhage or mass effect.  Senescent changes.    Ventricles:  No hydrocephalus.    Bones joints:  Unremarkable.    Soft tissues: Left frontal scalp hematoma.    Sinuses:  No air fluid level.    Mastoid air cells:  Clear.    IMPRESSION:         No acute hemorrhage, hydrocephalus, or mass effect.      Impression:          Electronically signed by Eva Guillen MD on 12-07-23 at 0339            Results for orders placed during the hospital encounter of 03/25/22    Adult Transthoracic Echo Complete W/ Cont if Necessary Per Protocol    Interpretation Summary  · Left ventricular systolic function is hyperdynamic (EF > 70%). Normal left ventricular cavity size noted. Left ventricular wall thickness is consistent with mild concentric hypertrophy. All left ventricular wall segments  contract normally. Left ventricular diastolic function was normal. No evidence of left ventricular thrombus or mass present.  · There is mild calcification of the aortic valve. Mild to moderate aortic valve regurgitation is present. No hemodynamically significant aortic valve stenosis is present.  · Mild to moderate mitral valve regurgitation is present. No significant mitral valve stenosis is present.  · Estimated right ventricular systolic pressure from tricuspid regurgitation is mildly elevated (35-45 mmHg).      ECG 12 Lead Other; weakness   Preliminary Result   HEART RATE= 89  bpm   RR Interval= 674  ms   CT Interval=   ms   P Horizontal Axis=   deg   P Front Axis=   deg   QRSD Interval= 77  ms   QT Interval= 368  ms   QTcB= 448  ms   QRS Axis= -13  deg   T Wave Axis= 29  deg   - ABNORMAL ECG -   Atrial fibrillation   Borderline low voltage, extremity leads   Electronically Signed By:    Date and Time of Study: 2023-12-07 03:50:12      SCANNED - TELEMETRY     Final Result           Assessment/Plan     Active Hospital Problems    Diagnosis  POA    **Episode of dizziness [R42]  Yes    Closed head injury [S09.90XA]  Yes    Hematoma of thigh [S70.10XA]  Yes    Traumatic hematoma of scalp [S00.03XA]  Unknown    Abnormal CT of spine [R93.7]  Unknown    Chronic kidney disease, stage 3a [N18.31]  Yes    Sick sinus syndrome [I49.5]  Yes    Acquired hypothyroidism [E03.9]  Yes    Atrial fibrillation, persistent [I48.19]  Yes    History of ischemic right MCA stroke with extension [Z86.73]  Not Applicable    Hyperlipidemia [E78.5]  Yes    History of DVT (deep vein thrombosis) [Z86.718]  Not Applicable    Anemia in chronic kidney disease [N18.9, D63.1]  Yes    Essential hypertension [I10]  Yes    Rheumatoid arthritis [M06.9]  Yes     Ms. Correa is a 89 y.o. female who presented to the hospital with dizziness, nausea and vomiting after taking hydrocodone.  She was seen at the [facility] ED hours prior secondary to a fall  while on Eliquis.  She was found to have a left-sided head hematoma as well as left thigh hematoma but no acute fractures.  She was noted to have age-indeterminate fractures of the L-spine.  Upon returning home, she developed dizziness and nausea prompting reevaluation at the hospital.    Episode of dizziness  Closed head injury  -CT head negative for any acute intracranial/fracture.  -Symptoms primarily occurred after taking hydrocodone-possible reaction to medication.  -Neurology consulted given possible postconcussive syndrome as well-they feel that since her symptoms vastly improved at this time would hold off on further imaging.  But if develops new headache or symptoms then would recommend CTA head and neck.   -Patient does have a history of syncope with loop recorder.  Feels her symptoms with her fall were different than prior and unclear if could have been near syncope-ask her cardiologist to evaluate.    Hematoma of thigh  Traumatic hematoma of scalp  -Hemoglobin currently stable.   -Resume home Eliquis and monitor-May need further discussion of risk versus benefits of anticoagulation in future.    Abnormal CT of spine  -Fairly asymptomatic in regards to back pain.  -Recommend supportive care for now and follow-up if symptoms worsen.  -Tylenol for pain and ask PT to see.    CKD 3  -Creatinine improved overnight with some fluids.  She is eating and drinking.  Will stop fluids.  -Monitor labs and avoid nephrotoxins.    Atrial fibrillation  Sick sinus syndrome  -Ask cardiology to evaluate as above.    I discussed the patients findings and my recommendations with patient.    VTE Prophylaxis - Eliquis (home med).  Code Status - DNR-discussed with patient       GROVER Cardenas  [facility]  12/07/23  13:25 EST

## 2023-12-07 NOTE — ED PROVIDER NOTES
EMERGENCY DEPARTMENT ENCOUNTER    Room Number:  10/10  PCP: Aramis Doty MD  Historian: Patient/family      HPI:  Chief Complaint: Nausea/vomiting  A complete HPI/ROS/PMH/PSH/SH/FH are unobtainable due to: None  Context: Alba Correa is a 89 y.o. female who presents to the ED c/o fairly sudden onset of nausea and vomiting that occurred at home shortly after taking an oral hydrocodone this evening.  The patient fell earlier in the evening and was seen in an outlying emergency room.  She did strike her head but head CT was negative at that point but was told that she had a possible lumbar spinal compression fracture so was prescribed medication.  Following taking the hydrocodone, she became significantly weak and had the nausea and vomiting.  EMS did report that her blood pressure was in the low 100s upon their arrival.  She denies chest pain, shortness of breath, fever/chills, neck pain, or abdominal pain.            PAST MEDICAL HISTORY  Active Ambulatory Problems     Diagnosis Date Noted    CKD (chronic kidney disease) stage 4, GFR 15-29 ml/min     Depression     Gout     Hyperparathyroidism     Adaptive colitis     Osteopenia     Rheumatoid arthritis     Degeneration of intervertebral disc     Essential hypertension 11/20/2015    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    History of DVT (deep vein thrombosis) 07/11/2017    Hyperlipidemia 01/10/2018    Macrocytosis 02/28/2018    Nonrheumatic aortic (valve) insufficiency 10/08/2018    History of ischemic right MCA stroke with extension 11/20/2018    Atrial fibrillation, persistent 11/08/2019    Anemia in chronic kidney disease 12/08/2016    Other proteinuria 01/26/2017    Sinus bradycardia 05/26/2021    First degree AV block 05/26/2021    Nonrheumatic mitral valve regurgitation     Idiopathic peripheral neuropathy 04/19/2022    Lower extremity edema 04/21/2022    Acute diastolic CHF (congestive heart failure) 03/20/2022    Chest pain 04/29/2022    Acquired  hypothyroidism 05/03/2022    Sick sinus syndrome 05/05/2022    Insomnia 05/19/2022    Fall from standing, initial encounter 03/07/2023    Closed fracture of left distal humerus 03/07/2023    Facial laceration 03/07/2023    Laceration of right forearm 03/07/2023    Chronic kidney disease, stage 3a 03/07/2023     Resolved Ambulatory Problems     Diagnosis Date Noted    DVT (deep venous thrombosis)     Edema     Elevated cholesterol     Osteoporosis     Benign essential hypertension     D (diarrhea) 11/20/2015    Open leg wound 11/20/2015    Vaginal odor 11/20/2015    Infected wound 11/20/2015    History of depression 07/11/2017    History of renal insufficiency syndrome 07/11/2017    History of degenerative disc disease 07/11/2017    History of osteoporosis 07/11/2017    History of rheumatoid arthritis 07/11/2017    Right leg weakness 08/05/2018    CVA (cerebral vascular accident) 08/06/2018    Acute intractable headache 04/02/2019    Nausea 04/02/2019    Bilateral leg weakness 04/04/2019    Chest pain 10/15/2020    Afib 10/15/2020    Dizziness 10/15/2020    On amiodarone therapy 05/26/2021    History of syncope 10/10/2021    Bradycardia 03/25/2022    FATIMAH (acute kidney injury) 03/26/2022     Past Medical History:   Diagnosis Date    Chronic kidney disease     H/O complete eye exam 09/2016    IBS (irritable bowel syndrome)     OAB (overactive bladder)     PAF (paroxysmal atrial fibrillation) 11/08/2019    Peripheral neuropathy          PAST SURGICAL HISTORY  Past Surgical History:   Procedure Laterality Date    APPENDECTOMY      BREAST BIOPSY      CARDIAC ELECTROPHYSIOLOGY PROCEDURE N/A 10/12/2021    Procedure: Loop insertion linq;  Surgeon: Tone Anguiano MD;  Location: [facility] INVASIVE LOCATION;  Service: Cardiovascular;  Laterality: N/A;    CARDIOVERSION  08/03/2020    Dr. Colby    COLONOSCOPY      declines    ELBOW OPEN REDUCTION INTERNAL FIXATION Left 3/11/2023    Procedure: ELBOW OPEN REDUCTION  INTERNAL FIXATION;  Surgeon: Ramón Encinas II, MD;  Location: [facility];  Service: Orthopedics;  Laterality: Left;    HERNIA REPAIR  1965    HYSTERECTOMY      still has ovaries    MAMMO BILATERAL  2016    pt declines    PAP SMEAR  2016    declines         FAMILY HISTORY  Family History   Problem Relation Age of Onset    Alcohol abuse Other     Cancer Other     Hypertension Other     Kidney disease Other     Lung disease Other     Heart disease Mother 89    Heart failure Mother     Ovarian cancer Daughter 60    Stomach cancer Father     Kidney cancer Father 52    Lung cancer Father 52    Ovarian cancer Sister     Lung cancer Brother 65         SOCIAL HISTORY  Social History     Socioeconomic History    Marital status:      Spouse name: Peña    Number of children: 3    Years of education: High school   Tobacco Use    Smoking status: Former     Years: 5     Types: Cigarettes     Quit date:      Years since quittin.9    Smokeless tobacco: Never    Tobacco comments:     caffeine use: 2 cups coffee in AM   Vaping Use    Vaping Use: Never used   Substance and Sexual Activity    Alcohol use: Yes     Comment: Occasional- glass of wine few times a week    Drug use: No    Sexual activity: Defer     Partners: Male     Birth control/protection: Surgical         ALLERGIES  Sulfamethoxazole-trimethoprim, Lisinopril, and Levofloxacin        REVIEW OF SYSTEMS  Review of Systems   Constitutional:  Negative for fever.   HENT:  Negative for sore throat.    Eyes: Negative.    Respiratory:  Negative for cough and shortness of breath.    Cardiovascular:  Negative for chest pain.   Gastrointestinal:  Positive for nausea and vomiting. Negative for abdominal pain and diarrhea.   Genitourinary:  Negative for dysuria.   Musculoskeletal:  Negative for neck pain.   Skin:  Negative for rash.   Allergic/Immunologic: Negative.    Neurological:  Positive for weakness and headaches. Negative for numbness.    Hematological: Negative.    Psychiatric/Behavioral: Negative.     All other systems reviewed and are negative.         PHYSICAL EXAM  ED Triage Vitals [12/07/23 0235]   Temp Heart Rate Resp BP SpO2   97.5 °F (36.4 °C) 92 16 112/40 98 %      Temp src Heart Rate Source Patient Position BP Location FiO2 (%)   Oral Monitor Lying Right arm --       Physical Exam  Constitutional:       General: She is not in acute distress.     Appearance: Normal appearance. She is not ill-appearing or toxic-appearing.   HENT:      Head: Normocephalic.      Comments: Left-sided frontal scalp contusion  Eyes:      Extraocular Movements: Extraocular movements intact.      Pupils: Pupils are equal, round, and reactive to light.   Cardiovascular:      Rate and Rhythm: Normal rate and regular rhythm.      Heart sounds: No murmur heard.     No friction rub. No gallop.   Pulmonary:      Effort: Pulmonary effort is normal.      Breath sounds: Normal breath sounds.   Abdominal:      General: Abdomen is flat. There is no distension.      Palpations: Abdomen is soft.      Tenderness: There is no abdominal tenderness.   Musculoskeletal:         General: No swelling or tenderness. Normal range of motion.      Cervical back: Normal range of motion and neck supple.   Skin:     General: Skin is warm and dry.   Neurological:      General: No focal deficit present.      Mental Status: She is alert and oriented to person, place, and time.      Sensory: No sensory deficit.      Motor: No weakness.   Psychiatric:         Mood and Affect: Mood normal.         Behavior: Behavior normal.           Vital signs and nursing notes reviewed.          LAB RESULTS  Recent Results (from the past 24 hour(s))   Comprehensive Metabolic Panel    Collection Time: 12/07/23  3:14 AM    Specimen: Blood   Result Value Ref Range    Glucose 139 (H) 65 - 99 mg/dL    BUN 31 (H) 8 - 23 mg/dL    Creatinine 1.38 (H) 0.57 - 1.00 mg/dL    Sodium 138 136 - 145 mmol/L    Potassium 4.8  3.5 - 5.2 mmol/L    Chloride 103 98 - 107 mmol/L    CO2 22.6 22.0 - 29.0 mmol/L    Calcium 9.8 (H) 8.2 - 9.6 mg/dL    Total Protein 7.1 6.0 - 8.5 g/dL    Albumin 3.8 3.5 - 5.2 g/dL    ALT (SGPT) 10 1 - 33 U/L    AST (SGOT) 20 1 - 32 U/L    Alkaline Phosphatase 78 39 - 117 U/L    Total Bilirubin 0.5 0.0 - 1.2 mg/dL    Globulin 3.3 gm/dL    A/G Ratio 1.2 g/dL    BUN/Creatinine Ratio 22.5 7.0 - 25.0    Anion Gap 12.4 5.0 - 15.0 mmol/L    eGFR 36.4 (L) >60.0 mL/min/1.73   CBC Auto Differential    Collection Time: 12/07/23  3:14 AM    Specimen: Blood   Result Value Ref Range    WBC 8.61 3.40 - 10.80 10*3/mm3    RBC 3.29 (L) 3.77 - 5.28 10*6/mm3    Hemoglobin 11.3 (L) 12.0 - 15.9 g/dL    Hematocrit 33.8 (L) 34.0 - 46.6 %    .7 (H) 79.0 - 97.0 fL    MCH 34.3 (H) 26.6 - 33.0 pg    MCHC 33.4 31.5 - 35.7 g/dL    RDW 12.2 (L) 12.3 - 15.4 %    RDW-SD 45.6 37.0 - 54.0 fl    MPV 9.9 6.0 - 12.0 fL    Platelets 225 140 - 450 10*3/mm3    Neutrophil % 75.5 42.7 - 76.0 %    Lymphocyte % 13.1 (L) 19.6 - 45.3 %    Monocyte % 8.9 5.0 - 12.0 %    Eosinophil % 1.2 0.3 - 6.2 %    Basophil % 0.8 0.0 - 1.5 %    Immature Grans % 0.5 0.0 - 0.5 %    Neutrophils, Absolute 6.50 1.70 - 7.00 10*3/mm3    Lymphocytes, Absolute 1.13 0.70 - 3.10 10*3/mm3    Monocytes, Absolute 0.77 0.10 - 0.90 10*3/mm3    Eosinophils, Absolute 0.10 0.00 - 0.40 10*3/mm3    Basophils, Absolute 0.07 0.00 - 0.20 10*3/mm3    Immature Grans, Absolute 0.04 0.00 - 0.05 10*3/mm3    nRBC 0.0 0.0 - 0.2 /100 WBC   Protime-INR    Collection Time: 12/07/23  3:14 AM    Specimen: Blood   Result Value Ref Range    Protime 14.8 (H) 11.7 - 14.2 Seconds    INR 1.15 (H) 0.90 - 1.10   aPTT    Collection Time: 12/07/23  3:14 AM    Specimen: Blood   Result Value Ref Range    PTT 29.0 22.7 - 35.4 seconds   High Sensitivity Troponin T    Collection Time: 12/07/23  3:14 AM    Specimen: Blood   Result Value Ref Range    HS Troponin T 15 (H) <14 ng/L   Lactic Acid, Plasma    Collection Time:  12/07/23  3:14 AM    Specimen: Blood   Result Value Ref Range    Lactate 1.3 0.5 - 2.0 mmol/L   ECG 12 Lead Other; weakness    Collection Time: 12/07/23  3:50 AM   Result Value Ref Range    QT Interval 368 ms    QTC Interval 448 ms   Urinalysis With Microscopic If Indicated (No Culture) - Urine, Clean Catch    Collection Time: 12/07/23  4:48 AM    Specimen: Urine, Clean Catch   Result Value Ref Range    Color, UA Yellow Yellow, Straw    Appearance, UA Clear Clear    pH, UA 5.5 5.0 - 8.0    Specific Gravity, UA 1.018 1.005 - 1.030    Glucose, UA Negative Negative    Ketones, UA Trace (A) Negative    Bilirubin, UA Negative Negative    Blood, UA Negative Negative    Protein, UA Negative Negative    Leuk Esterase, UA Trace (A) Negative    Nitrite, UA Negative Negative    Urobilinogen, UA 0.2 E.U./dL 0.2 - 1.0 E.U./dL   Urinalysis, Microscopic Only - Urine, Clean Catch    Collection Time: 12/07/23  4:48 AM    Specimen: Urine, Clean Catch   Result Value Ref Range    RBC, UA 0-2 None Seen, 0-2 /HPF    WBC, UA 0-2 None Seen, 0-2 /HPF    Bacteria, UA None Seen None Seen /HPF    Squamous Epithelial Cells, UA 0-2 None Seen, 0-2 /HPF    Hyaline Casts, UA 3-6 None Seen /LPF    Methodology Automated Microscopy    High Sensitivity Troponin T 2Hr    Collection Time: 12/07/23  5:08 AM    Specimen: Blood   Result Value Ref Range    HS Troponin T 14 (H) <14 ng/L    Troponin T Delta -1 >=-4 - <+4 ng/L       Ordered the above labs and reviewed the results.        RADIOLOGY  CT Head Without Contrast    Result Date: 12/7/2023  Patient: KACI CHAVEZ  Time Out: 03:39 Exam(s): CT HEAD Without Contrast EXAM:   CT Head Without Intravenous Contrast CLINICAL HISTORY:    Reason for exam: Head trauma, minor (Age >= 65y). TECHNIQUE:   Axial computed tomography images of the head brain without intravenous contrast.  CTDI is 56 mGy and DLP is 922 mGy-cm.  This CT exam was performed according to the principle of ALARA (As Low As Reasonably Achievable)  by using one or more of the following dose reduction techniques: automated exposure control, adjustment of the mA and or kV according to patient size, and or use of iterative reconstruction technique. COMPARISON:   No relevant prior studies available. FINDINGS:   Brain:  No hemorrhage or mass effect.  Senescent changes.   Ventricles:  No hydrocephalus.   Bones joints:  Unremarkable.   Soft tissues: Left frontal scalp hematoma.   Sinuses:  No air fluid level.   Mastoid air cells:  Clear. IMPRESSION:       No acute hemorrhage, hydrocephalus, or mass effect.     Electronically signed by Eva Guillen MD on 12-07-23 at 0339    CT Lumbar Spine Without Contrast    Result Date: 12/7/2023  Patient: KACI CHAVEZ  Time Out: 02:06 Exam(s): CT L SPINE EXAM:   CT Lumbar Spine Without Intravenous Contrast CLINICAL HISTORY:    Reason for exam: Compression fracture seen on x-ray. TECHNIQUE:   Axial computed tomography images of the lumbar spine without intravenous contrast.  CTDI is 19.55 mGy and DLP is 522.5 mGy-cm.  This CT exam was performed according to the principle of ALARA (As Low As Reasonably Achievable) by using one or more of the following dose reduction techniques: automated exposure control, adjustment of the mA and or kV according to patient size, and or use of iterative reconstruction technique. COMPARISON:   No relevant prior studies available. FINDINGS:   Vertebrae:  Chronic bilateral L5 pars defects.  Generalized osteopenia.   Discs spinal canal neural foramina:  Severe disc degenerative change at L2-L3 with superior endplate Schmorl's node at L3 present.  No spinal canal stenosis.   Soft tissues:  Unremarkable. IMPRESSION:       Osteopenia and degenerative changes with chronic appearing superior endplate L3 degenerative change or Schmorl's node or just superimposed acute compression fracture is possible.  Otherwise, no acute findings on CT of the lumbar spine.  Chronic bilateral L5 pars defects are noted.      Electronically signed by Basilia Salinas MD on 12-07-23 at 0206    CT Pelvis Without Contrast    Result Date: 12/7/2023  Patient: KACI CHAVEZ  Time Out: 01:39 Exam(s): CT PELVIS Without Contrast EXAM:   CT Pelvis Without Intravenous Contrast CLINICAL HISTORY:    Reason for exam: left hip pain. TECHNIQUE:   Axial computed tomography images of the pelvis without intravenous contrast.  CTDI is 8.22 mGy and DLP is 268.2 mGy-cm.  This CT exam was performed according to the principle of ALARA (As Low As Reasonably Achievable) by using one or more of the following dose reduction techniques: automated exposure control, adjustment of the mA and or kV according to patient size, and or use of iterative reconstruction technique. COMPARISON:   No relevant prior studies available. FINDINGS:   Bowel:  Moderate formed stool in the rectum.  No obstruction.  No mucosal thickening.   Appendix:  No findings to suggest acute appendicitis.   Intraperitoneal space:  Unremarkable.  No free air.  No significant fluid collection.   Bladder:  Unremarkable.  No stones.   Reproductive:  Unremarkable as visualized.   Bones joints:  Generalized osteopenia.  Bilateral moderately advanced hip degenerative changes.  No acute fracture or dislocation.   Soft tissues:  Acute left subcutaneous soft tissue hematoma overlying the left greater trochanter and proximal thigh posterolaterally, measuring 8.7 x 5.5 x 4.6 cm.   Vasculature:  Unremarkable.  No lower abdominal aortic aneurysm.   Lymph nodes:  Unremarkable.  No enlarged lymph nodes. IMPRESSION:     No acute fracture or dislocation in the bony pelvis.  There is a left posterolateral thigh acute hematoma measuring 8.7 x 5.5 x 4.6 cm.     Electronically signed by Basilia Salinas MD on 12-07-23 at 0139    CT Head Without Contrast    Result Date: 12/6/2023  Patient: KACI CHAVEZ  Time Out: 23:55 Exam(s): CT HEAD Without Contrast EXAM:   CT Head Without Intravenous Contrast CLINICAL HISTORY:     Reason for exam: injury. TECHNIQUE:   Axial computed tomography images of the head brain without intravenous contrast.  CTDI is 27.5 mGy and DLP is 449 mGy-cm.  This CT exam was performed according to the principle of ALARA (As Low As Reasonably Achievable) by using one or more of the following dose reduction techniques: automated exposure control, adjustment of the mA and or kV according to patient size, and or use of iterative reconstruction technique. COMPARISON:   No relevant prior studies available. FINDINGS: No acute intracranial hemorrhage.  No midline shift or mass effect. The territorial gray-white matter differentiation is maintained throughout.  LEFT periorbital hematoma. Age-related cerebral volume loss.  Periventricular and subcortical white matter hypoattenuation, consistent with chronic microangiopathy. The visualized orbits appear grossly unremarkable. The calvarium is intact. The visualized paranasal sinuses and mastoid air cells are grossly clear. IMPRESSION: No acute intracranial hemorrhage, midline shift, or mass effect. LEFT periorbital hematoma.     Electronically signed by Dilip Barrientos MD on 12-06-23 at 2355    XR Elbow 3+ View Left    Result Date: 12/6/2023  XR ELBOW 3+ VW LEFT-  INDICATION: Unable to move left arm post fall  COMPARISON: 3/11/2023      Plate and screw fixation of the distal humerus and proximal ulna. Hardware is intact without evidence of loosening. No evidence of periprosthetic or proximal radial fracture. Osseous evaluation is partially limited by extensive hardware. Lateral radiograph positioning limits evaluation for joint effusion.  This report was finalized on 12/6/2023 10:50 PM by Dr. Epifanio Gutierrez M.D on Workstation: [id number]      XR Spine Lumbar Complete 4+VW    Result Date: 12/6/2023  XR SPINE LUMBAR COMPLETE 4+VW-  INDICATION: Low back pain status post fall  COMPARISON: CT abdomen pelvis 3/25/2020      L2 and L3 compression fractures of indeterminate  chronicity. These are at least new since 2020 CT abdomen pelvis. No subluxation. Lumbar spine levocurvature with apex at L3-L4. Multilevel disc degeneration, most advanced at L2-L3 and L3-L4. Advanced lower lumbar facet arthropathy.  This report was finalized on 12/6/2023 10:47 PM by Dr. Epifanio Gutierrez M.D on Workstation: [id number]       Ordered the above noted radiological studies. Reviewed by me in PACS.            PROCEDURES  Procedures    EKG independently interpreted by myself as follows:    EKG          EKG time: 0350  Rhythm/Rate: atrial fibrillation, 89  P waves and MS: absent  QRS, axis: nml, nml   ST and T waves: nml     Interpreted Contemporaneously by me, independently viewed  changed compared to prior 4/30/22            MEDICATIONS GIVEN IN ER  Medications   sodium chloride 0.9 % flush 10 mL (has no administration in time range)   sodium chloride 0.9 % flush 10 mL (has no administration in time range)   sodium chloride 0.9 % flush 10 mL (has no administration in time range)   sodium chloride 0.9 % infusion 40 mL (has no administration in time range)   sodium chloride 0.9 % infusion (has no administration in time range)   ondansetron (ZOFRAN) tablet 4 mg (has no administration in time range)     Or   ondansetron (ZOFRAN) injection 4 mg (has no administration in time range)   calcium carbonate (TUMS) chewable tablet 500 mg (200 mg elemental) (has no administration in time range)   sodium chloride 0.9 % bolus 1,000 mL (0 mL Intravenous Stopped 12/7/23 9392)   ondansetron (ZOFRAN) injection 4 mg (4 mg Intravenous Given 12/7/23 7840)                   MEDICAL DECISION MAKING, PROGRESS, and CONSULTS    All labs have been independently reviewed by me.  All radiology studies have been reviewed by me and I have also reviewed the radiology report.   EKG's independently viewed and interpreted by me.  Discussion below represents my analysis of pertinent findings related to patient's condition, differential  diagnosis, treatment plan and final disposition.      Additional sources:  - Discussed/ obtained information from independent historians: History obtained from the patient as well as the patient's family at bedside.    - External (non-ED) record review: Upon medical records review, the patient was last seen and evaluated in the outpatient office of primary care on 8/29/2023 for chest wall asymmetry.    - Chronic or social conditions impacting care: Anticoagulation dependent history of atrial fibrillation    - Shared decision making: Admission decision based on chair conversations have between myself, the patient and family at bedside, as well as GROVER Mcmillan for [facility].      Orders placed during this visit:  Orders Placed This Encounter   Procedures    CT Head Without Contrast    XR Chest 1 View    Comprehensive Metabolic Panel    CBC Auto Differential    Protime-INR    aPTT    Urinalysis With Microscopic If Indicated (No Culture) - Urine, Clean Catch    High Sensitivity Troponin T    Lactic Acid, Plasma    High Sensitivity Troponin T 2Hr    Urinalysis, Microscopic Only - Urine, Clean Catch    Basic Metabolic Panel    CBC (No Diff)    Diet: Regular/House Diet; Texture: Regular Texture (IDDSI 7); Fluid Consistency: Thin (IDDSI 0)    Vital Signs    Intake & Output    Weigh Patient    Oral Care    Saline Lock & Maintain IV Access    Place Sequential Compression Device    Maintain Sequential Compression Device    Code Status and Medical Interventions:    [facility] (on-call MD unless specified) Details    ECG 12 Lead Other; weakness    Insert Peripheral IV    Insert Peripheral IV    Inpatient Admission    CBC & Differential           Differential diagnosis includes but is not limited to:    Differential diagnosis includes but is not limited to medication reaction, closed head injury, intracranial hemorrhage, intracranial mass effect, acute CVA, sepsis, urinary tract infection, or severe electrolyte  disturbance.      Independent interpretation of labs, radiology studies, and discussions with consultants:      Head CT was independently interpreted by myself with my interpretation showing no area of acute ischemia/infarct, hemorrhage, or mass effect.          ED Course as of 12/07/23 0547   Thu Dec 07, 2023   0519 On reevaluation, the patient is resting quite comfortably and states she is feeling significant improvement following ED treatment.  It is highly likely that her symptoms stem from the hydrocodone that she took prior to the onset of symptoms as she is showing significant improvement here in the ED today.  I would like to admit her today for monitoring and further evaluation.  The patient and family is in agreement with that plan and all questions have been answered. [BM]   0530 The patient's presentation, workup, as well as diagnosis and treatment plan was discussed at length with GROVER Mcmillan for A.  She agrees to admit the patient to the hospital today for Dr. Richardson. [BM]      ED Course User Index  [BM] Denys Lama MD             DIAGNOSIS  Final diagnoses:   Generalized weakness   Closed head injury, initial encounter   Nausea and vomiting, unspecified vomiting type   Adverse effect of drug, initial encounter         DISPOSITION  ADMISSION    Discussed treatment plan and reason for admission with pt/family and admitting physician.  Pt/family voiced understanding of the plan for admission for further testing/treatment as needed.               Latest Documented Vital Signs:  As of 05:47 EST  BP- 113/52 HR- 104 Temp- 97.5 °F (36.4 °C) (Oral) O2 sat- 95%              --    Please note that portions of this were completed with a voice recognition program.       Note Disclaimer: At [facility], we believe that sharing information builds trust and better relationships. You are receiving this note because you are receiving care at [facility] or recently visited. It is possible  you will see health information before a provider has talked with you about it. This kind of information can be easy to misunderstand. To help you fully understand what it means for your health, we urge you to discuss this note with your provider.             Denys Lama MD  12/07/23 0529

## 2023-12-07 NOTE — ED NOTES
Pt had a fall at 8 pm last night where she hit her head, elbow and hip all on the left side. Pt on blood thinners. Hx afib and stroke. Pt was seen and treated at [facility]. Pt ws given hydrocodone and zofran at 12:30 this AM upon being discharged from urgent care. Pt went home with family and started to feel like her legs were heavy, felt dizzy, became nauseated with a headache.

## 2023-12-07 NOTE — FSED PROVIDER NOTE
Subjective   History of Present Illness  The patient is a 89-year-old  female with multiple medical issues including chronic CHF, chronic kidney disease, hypothyroidism, previous DVT, on blood thinners, known first-degree AV block, and previous CVA, presents emergency room with complaints of injuries related to a fall.  Patient states that she lives in the independent living portion of [facility].  Patient states that she was in her kitchen and turned around quickly and fell.  When the patient fell, she struck her head.  Patient denies any loss of consciousness.  Patient states that when she fell, she landed on her left hip, left elbow and hit her head.  She has a contusion and bruising noted to her left forehead.  She reports pain to her left hip.  She has a laceration and swelling to her left elbow.  She also complains of pain to her lower back.  She describes her pain as aching, moderate.        Review of Systems   Constitutional: Negative.  Negative for chills, fatigue and fever.   Eyes: Negative.    Respiratory:  Negative for cough, chest tightness and shortness of breath.    Cardiovascular:  Negative for chest pain and palpitations.   Gastrointestinal:  Negative for abdominal pain, diarrhea, nausea and vomiting.   Genitourinary: Negative.    Musculoskeletal:  Positive for arthralgias, back pain, joint swelling and myalgias.   Skin: Negative.  Negative for rash.   Neurological:  Positive for headaches. Negative for seizures, syncope, speech difficulty, weakness and numbness.   Psychiatric/Behavioral: Negative.     All other systems reviewed and are negative.      Past Medical History:   Diagnosis Date    Acquired hypothyroidism 5/3/2022    Acute diastolic CHF (congestive heart failure) 3/20/2022    Chronic kidney disease     CVA (cerebral vascular accident)     Degeneration of intervertebral disc     Depression     DVT (deep venous thrombosis)     First degree AV block 05/26/2021    Gout     H/O  complete eye exam 2016    Hyperlipidemia     IBS (irritable bowel syndrome)     Nonrheumatic mitral valve regurgitation     OAB (overactive bladder)     Osteopenia     Osteoporosis     PAF (paroxysmal atrial fibrillation) 2019    Peripheral neuropathy     Rheumatoid arthritis     Sinus bradycardia 2021       Allergies   Allergen Reactions    Sulfamethoxazole-Trimethoprim Delirium    Lisinopril Unknown (See Comments)     Unknown per pt    Levofloxacin Rash       Past Surgical History:   Procedure Laterality Date    APPENDECTOMY      BREAST BIOPSY      CARDIAC ELECTROPHYSIOLOGY PROCEDURE N/A 10/12/2021    Procedure: Loop insertion linq;  Surgeon: Tone Anguiano MD;  Location: [facility] CATH INVASIVE LOCATION;  Service: Cardiovascular;  Laterality: N/A;    CARDIOVERSION  2020    Dr. Colby    COLONOSCOPY      declines    ELBOW OPEN REDUCTION INTERNAL FIXATION Left 3/11/2023    Procedure: ELBOW OPEN REDUCTION INTERNAL FIXATION;  Surgeon: Ramón Encinas II, MD;  Location: [facility] MAIN OR;  Service: Orthopedics;  Laterality: Left;    HERNIA REPAIR  1965    HYSTERECTOMY      still has ovaries    MAMMO BILATERAL  2016    pt declines    PAP SMEAR  2016    declines       Family History   Problem Relation Age of Onset    Alcohol abuse Other     Cancer Other     Hypertension Other     Kidney disease Other     Lung disease Other     Heart disease Mother 89    Heart failure Mother     Ovarian cancer Daughter 60    Stomach cancer Father     Kidney cancer Father 52    Lung cancer Father 52    Ovarian cancer Sister     Lung cancer Brother 65       Social History     Socioeconomic History    Marital status:      Spouse name: Peña    Number of children: 3    Years of education: High school   Tobacco Use    Smoking status: Former     Years: 5     Types: Cigarettes     Quit date:      Years since quittin.9    Smokeless tobacco: Never    Tobacco comments:     caffeine use:  2 cups coffee in AM   Vaping Use    Vaping Use: Never used   Substance and Sexual Activity    Alcohol use: Yes     Comment: Occasional- glass of wine few times a week    Drug use: No    Sexual activity: Defer     Partners: Male     Birth control/protection: Surgical           Objective   Physical Exam  Vitals and nursing note reviewed.   Constitutional:       General: She is in acute distress.      Appearance: Normal appearance. She is normal weight.   HENT:      Head:      Comments: Left forehead contusion noted     Right Ear: External ear normal.      Left Ear: External ear normal.      Nose: Nose normal.   Cardiovascular:      Rate and Rhythm: Normal rate.      Pulses: Normal pulses.   Pulmonary:      Effort: Pulmonary effort is normal.   Musculoskeletal:      Right elbow: Normal.      Left elbow: Swelling, deformity and laceration present. Decreased range of motion. Tenderness present.      Cervical back: Normal range of motion.      Lumbar back: Signs of trauma, tenderness and bony tenderness present. No swelling or edema. Normal range of motion. Negative left straight leg raise test.      Right hip: Normal.      Left hip: Tenderness and bony tenderness present. No deformity. Decreased range of motion.   Skin:     Capillary Refill: Capillary refill takes less than 2 seconds.   Neurological:      General: No focal deficit present.      Mental Status: She is alert and oriented to person, place, and time.   Psychiatric:         Mood and Affect: Mood normal.         Procedures           ED Course  ED Course as of 12/07/23 0313   Wed Dec 06, 2023   2254 Lumbar spine films show new compression fractures at L2 and L3. [KZ]   2254 Elbow x-ray does not show any fracture [KZ]   Thu Dec 07, 2023   0312 Patient CT scan of her lumbar spine shows compression fracture.  CT head was negative for intracranial injury.  Patient's CT pelvis shows a left thigh contusion and hematoma [KZ]      ED Course User Index  [KZ] Paul  Dada BARGER MD                                           Medical Decision Making  Patient presents emergency room with acute traumatic injury.  Patient presents to the emergency room with injuries related to a fall at home.  Patient's injury occurred just prior to arrival in her kitchen.  Differential diagnosis includes fracture, contusion, sprain/strain.  Patient does report head injury, but denies any loss of consciousness.  He does have bruising to the left forehead neurological status is normal.  I have also considered internal injury, but unlikely based on physical exam and historical findings.  Injuries occurred to low back, left elbow and left hip.  Appropriate imaging has been ordered to further evaluate.    There is no acute traumatic fracture other than new mild lumbar compression fractures.  Head CT does not show any intracranial hemorrhage.  Patient is advised to weight-bear as tolerated using the walker she has at home.      Problems Addressed:  Closed head injury, initial encounter: complicated acute illness or injury  Compression fracture of L2 lumbar vertebra, closed, initial encounter: complicated acute illness or injury  Compression fracture of L3 lumbar vertebra, closed, initial encounter: complicated acute illness or injury  Contusion of left elbow, initial encounter: complicated acute illness or injury  Contusion of left hip and thigh, initial encounter: complicated acute illness or injury  Elbow laceration, left, initial encounter: complicated acute illness or injury  Fall at home, initial encounter: complicated acute illness or injury  Forehead contusion, initial encounter: complicated acute illness or injury    Amount and/or Complexity of Data Reviewed  Radiology: ordered and independent interpretation performed.    Risk  Prescription drug management.        Final diagnoses:   Contusion of left hip and thigh, initial encounter   Closed head injury, initial encounter   Forehead contusion, initial  encounter   Contusion of left elbow, initial encounter   Fall at home, initial encounter   Compression fracture of L2 lumbar vertebra, closed, initial encounter   Compression fracture of L3 lumbar vertebra, closed, initial encounter   Elbow laceration, left, initial encounter       ED Disposition  ED Disposition       ED Disposition   Discharge    Condition   Stable    Comment   --               Aramis Doty MD  [address] [facility] [address]  [place] [id number] [phone]    Schedule an appointment as soon as possible for a visit in 2 days  For repeat evaluation         Medication List        New Prescriptions      HYDROcodone-acetaminophen 5-325 MG per tablet  Commonly known as: NORCO  Take 1 tablet by mouth 4 (Four) Times a Day As Needed for Moderate Pain or Severe Pain.     ondansetron ODT 4 MG disintegrating tablet  Commonly known as: ZOFRAN-ODT  Place 1 tablet on the tongue Every 6 (Six) Hours As Needed for Nausea or Vomiting.               Where to Get Your Medications        These medications were sent to [address] [facility] - [phone]  - [phone] [address] [address]      Phone: [phone]   HYDROcodone-acetaminophen 5-325 MG per tablet  ondansetron ODT 4 MG disintegrating tablet

## 2023-12-07 NOTE — PLAN OF CARE
Problem: Adult Inpatient Plan of Care  Goal: Plan of Care Review  Outcome: Ongoing, Progressing  Flowsheets (Taken 12/7/2023 1840)  Plan of Care Reviewed With:   patient   family  Goal: Patient-Specific Goal (Individualized)  Outcome: Ongoing, Progressing  Goal: Absence of Hospital-Acquired Illness or Injury  Outcome: Ongoing, Progressing  Intervention: Identify and Manage Fall Risk  Recent Flowsheet Documentation  Taken 12/7/2023 1800 by Nehemias Coker RN  Safety Promotion/Fall Prevention:   clutter free environment maintained   assistive device/personal items within reach   activity supervised   fall prevention program maintained   gait belt   lighting adjusted   mobility aid in reach   muscle strengthening facilitated   nonskid shoes/slippers when out of bed   room organization consistent   safety round/check completed  Taken 12/7/2023 1600 by Nehemias Coker RN  Safety Promotion/Fall Prevention:   activity supervised   assistive device/personal items within reach   clutter free environment maintained   fall prevention program maintained   gait belt   lighting adjusted   mobility aid in reach   muscle strengthening facilitated   nonskid shoes/slippers when out of bed   room organization consistent   safety round/check completed  Taken 12/7/2023 1400 by Nehemias Coker RN  Safety Promotion/Fall Prevention:   activity supervised   assistive device/personal items within reach   clutter free environment maintained   fall prevention program maintained   gait belt   lighting adjusted   mobility aid in reach   muscle strengthening facilitated   nonskid shoes/slippers when out of bed   room organization consistent   safety round/check completed  Taken 12/7/2023 1200 by Nehemias Coker RN  Safety Promotion/Fall Prevention:   activity supervised   assistive device/personal items within reach   clutter free environment maintained   fall prevention program maintained   lighting adjusted   gait belt   mobility aid  in reach   muscle strengthening facilitated   nonskid shoes/slippers when out of bed   room organization consistent   safety round/check completed  Taken 12/7/2023 1000 by Nehemias Coker RN  Safety Promotion/Fall Prevention:   activity supervised   assistive device/personal items within reach   clutter free environment maintained   fall prevention program maintained   gait belt   lighting adjusted   mobility aid in reach   muscle strengthening facilitated   nonskid shoes/slippers when out of bed   room organization consistent   safety round/check completed  Taken 12/7/2023 0815 by Nehemias Coker RN  Safety Promotion/Fall Prevention:   activity supervised   assistive device/personal items within reach   clutter free environment maintained   fall prevention program maintained   gait belt   lighting adjusted   mobility aid in reach   muscle strengthening facilitated   nonskid shoes/slippers when out of bed   safety round/check completed   room organization consistent  Intervention: Prevent Skin Injury  Recent Flowsheet Documentation  Taken 12/7/2023 0815 by Nehemias Coker RN  Body Position: supine  Goal: Optimal Comfort and Wellbeing  Outcome: Ongoing, Progressing  Intervention: Provide Person-Centered Care  Recent Flowsheet Documentation  Taken 12/7/2023 0815 by Nehemias Coker RN  Trust Relationship/Rapport:   care explained   choices provided   questions answered   questions encouraged  Goal: Readiness for Transition of Care  Outcome: Ongoing, Progressing   Goal Outcome Evaluation:  Plan of Care Reviewed With: patient, family

## 2023-12-07 NOTE — CONSULTS
Neurology Consult Note    Consult Date: 12/7/2023    Referring MD: No ref. provider found    Reason for Consult I have been asked to see the patient in neurological consultation to render advice and opinion regarding dizziness after taking hydrocodone    Alba Correa is a 89 y.o. female past medical history of chronic CHF, chronic kidney disease, hypothyroidism previous DVTs on blood thinner first-degree AV block and also previous history of stroke with no residual deficits.    Patient states she lives at an assisted living facility and yesterday when she took her medications and was going to the sink to get some water she had a fall and lost consciousness may be briefly she had a laceration and swelling about her left elbow bruised her left forehead.  She went to [facility] where she had a CT head CT cervical spine which were negative.  Was discharged home on hydrocodone and Zofran after taking hydrocodone she complained about dizziness and feeling unwell and came back to the ER.    She was transferred to [facility] for further workup.      On my examination she denies having dizziness headache or any other neurological complaints. she states she feels 100% back to herself actually better than normal and wants to go home    Past Medical History:   Diagnosis Date    Acquired hypothyroidism 5/3/2022    Acute diastolic CHF (congestive heart failure) 3/20/2022    Chronic kidney disease     CVA (cerebral vascular accident)     Degeneration of intervertebral disc     Depression     DVT (deep venous thrombosis)     First degree AV block 05/26/2021    Gout     H/O complete eye exam 09/2016    Hyperlipidemia     IBS (irritable bowel syndrome)     Nonrheumatic mitral valve regurgitation     OAB (overactive bladder)     Osteopenia     Osteoporosis     PAF (paroxysmal atrial fibrillation) 11/08/2019    Peripheral neuropathy     Rheumatoid arthritis     Sinus bradycardia 05/26/2021       Exam  /60  "(BP Location: Left arm, Patient Position: Sitting)   Pulse 102   Temp 97.9 °F (36.6 °C) (Oral)   Resp 18   Ht 157.5 cm (62\")   Wt 50.8 kg (112 lb)   LMP  (LMP Unknown)   SpO2 97%   BMI 20.49 kg/m²   Gen: NAD, vitals reviewed  MS: oriented x3, normal comprehension repetition and fluency  CN: visual acuity grossly normal, PERRL, EOMI, no facial droop, no dysarthria  Motor:  Right upper extremity is chronically weak 4+5 because of recent fracture, other extremities 5/5  Normal sensation to pinprick bilateral upper and lower extremity  DATA:    Lab Results   Component Value Date    GLUCOSE 111 (H) 12/07/2023    CALCIUM 9.1 12/07/2023     (L) 12/07/2023    K 4.8 12/07/2023    CO2 21.0 (L) 12/07/2023     12/07/2023    BUN 27 (H) 12/07/2023    CREATININE 1.13 (H) 12/07/2023    EGFRIFAFRI 35 (L) 09/01/2021    EGFRIFNONA 40 (L) 10/11/2021    BCR 23.9 12/07/2023    ANIONGAP 7.0 12/07/2023     Lab Results   Component Value Date    WBC 5.26 12/07/2023    HGB 10.1 (L) 12/07/2023    HCT 29.9 (L) 12/07/2023    .4 (H) 12/07/2023     12/07/2023       Lab review:   Sodium 135  Creatinine 1.13  Normal LFTs    WBC 5.26  Hemoglobin 10.1 and platelets 201    Urinalysis trace ketones and trace leukocytes    Imaging review:   CT head done yesterday 12/6/2023 negative no acute abnormality    Diagnoses:  Traumatic fall  Allergic reaction to hydrocodone  Possible postconcussive syndrome    Pre-stroke MRS: 0  NIHSS: 0      Patient's examination and CT head imaging have been reviewed.  Admission looks very good and she is back to her baseline she denies any neurological problems this morning.  She states that her dizziness, weakness nausea and vomiting started after taking hydrocodone medication, and now she feels 100% back to normal.    Considering the patient is looking good and she denies any neurological symptoms I would not recommend recommend any further imaging at this point.    However if her symptoms " recur or she develops new headache or other neurological problems I would recommend CTA head and neck.    Neurology will sign off call us with any questions.

## 2023-12-07 NOTE — DISCHARGE INSTRUCTIONS
Staple removal in 7 to 10 days of left elbow.  Weightbearing as tolerated left lower extremity.  Take pain and nausea medication as needed.  Use walker to weight-bear at all times.  Apply antibiotic ointment to left elbow twice a day.  Follow-up with primary care physician, if still in severe pain in 2 days, please report to [facility] in [place] for admission to the hospital.

## 2023-12-07 NOTE — ED NOTES
Nursing report ED to floor  Alba Correa  89 y.o.  female    HPI (triage note):   Chief Complaint   Patient presents with    Dizziness       Admitting doctor:   Akanksha Richardson MD    Admitting diagnosis:   The primary encounter diagnosis was Generalized weakness. Diagnoses of Closed head injury, initial encounter, Nausea and vomiting, unspecified vomiting type, and Adverse effect of drug, initial encounter were also pertinent to this visit.    Code status:   Current Code Status       Date Active Code Status Order ID Comments User Context       12/7/2023 0532 CPR (Attempt to Resuscitate) [id number]  Nehal Gallardo APRN ED        Question Answer    Code Status (Patient has no pulse and is not breathing) CPR (Attempt to Resuscitate)    Medical Interventions (Patient has pulse or is breathing) Full Support                    Allergies:   Sulfamethoxazole-trimethoprim, Lisinopril, and Levofloxacin    Past Medical History:  Past Medical History:   Diagnosis Date    Acquired hypothyroidism 5/3/2022    Acute diastolic CHF (congestive heart failure) 3/20/2022    Chronic kidney disease     CVA (cerebral vascular accident)     Degeneration of intervertebral disc     Depression     DVT (deep venous thrombosis)     First degree AV block 05/26/2021    Gout     H/O complete eye exam 09/2016    Hyperlipidemia     IBS (irritable bowel syndrome)     Nonrheumatic mitral valve regurgitation     OAB (overactive bladder)     Osteopenia     Osteoporosis     PAF (paroxysmal atrial fibrillation) 11/08/2019    Peripheral neuropathy     Rheumatoid arthritis     Sinus bradycardia 05/26/2021        Weight:       12/07/23  0235   Weight: 50.8 kg (112 lb)       Most recent vitals:   Vitals:    12/07/23 0331 12/07/23 0333 12/07/23 0454 12/07/23 0501   BP: 108/50   113/52   BP Location:       Patient Position:       Pulse:  84 94 104   Resp:       Temp:       TempSrc:       SpO2:  97% 95% 95%   Weight:       Height:           Active  LDAs/IV Access:   Lines, Drains & Airways       Active LDAs       Name Placement date Placement time Site Days    Peripheral IV 12/07/23 0314 Right Antecubital 12/07/23 0314  Antecubital  less than 1                    Labs (abnormal labs have a star):   Labs Reviewed   COMPREHENSIVE METABOLIC PANEL - Abnormal; Notable for the following components:       Result Value    Glucose 139 (*)     BUN 31 (*)     Creatinine 1.38 (*)     Calcium 9.8 (*)     eGFR 36.4 (*)     All other components within normal limits    Narrative:     GFR Normal >60  Chronic Kidney Disease <60  Kidney Failure <15    The GFR formula is only valid for adults with stable renal function between ages 18 and 70.   CBC WITH AUTO DIFFERENTIAL - Abnormal; Notable for the following components:    RBC 3.29 (*)     Hemoglobin 11.3 (*)     Hematocrit 33.8 (*)     .7 (*)     MCH 34.3 (*)     RDW 12.2 (*)     Lymphocyte % 13.1 (*)     All other components within normal limits   PROTIME-INR - Abnormal; Notable for the following components:    Protime 14.8 (*)     INR 1.15 (*)     All other components within normal limits   URINALYSIS W/ MICROSCOPIC IF INDICATED (NO CULTURE) - Abnormal; Notable for the following components:    Ketones, UA Trace (*)     Leuk Esterase, UA Trace (*)     All other components within normal limits   TROPONIN - Abnormal; Notable for the following components:    HS Troponin T 15 (*)     All other components within normal limits    Narrative:     High Sensitive Troponin T Reference Range:  <14.0 ng/L- Negative Female for AMI  <22.0 ng/L- Negative Male for AMI  >=14 - Abnormal Female indicating possible myocardial injury.  >=22 - Abnormal Male indicating possible myocardial injury.   Clinicians would have to utilize clinical acumen, EKG, Troponin, and serial changes to determine if it is an Acute Myocardial Infarction or myocardial injury due to an underlying chronic condition.        HIGH SENSITIVITIY TROPONIN T 2HR - Abnormal;  Notable for the following components:    HS Troponin T 14 (*)     All other components within normal limits    Narrative:     High Sensitive Troponin T Reference Range:  <14.0 ng/L- Negative Female for AMI  <22.0 ng/L- Negative Male for AMI  >=14 - Abnormal Female indicating possible myocardial injury.  >=22 - Abnormal Male indicating possible myocardial injury.   Clinicians would have to utilize clinical acumen, EKG, Troponin, and serial changes to determine if it is an Acute Myocardial Infarction or myocardial injury due to an underlying chronic condition.        APTT - Normal   LACTIC ACID, PLASMA - Normal   URINALYSIS, MICROSCOPIC ONLY   BASIC METABOLIC PANEL   CBC (NO DIFF)   CBC AND DIFFERENTIAL    Narrative:     The following orders were created for panel order CBC & Differential.  Procedure                               Abnormality         Status                     ---------                               -----------         ------                     CBC Auto Differential[[id number]]        Abnormal            Final result                 Please view results for these tests on the individual orders.       EKG:   ECG 12 Lead Other; weakness   Preliminary Result   HEART RATE= 89  bpm   RR Interval= 674  ms   ID Interval=   ms   P Horizontal Axis=   deg   P Front Axis=   deg   QRSD Interval= 77  ms   QT Interval= 368  ms   QTcB= 448  ms   QRS Axis= -13  deg   T Wave Axis= 29  deg   - ABNORMAL ECG -   Atrial fibrillation   Borderline low voltage, extremity leads   Electronically Signed By:    Date and Time of Study: 2023-12-07 03:50:12          Meds given in ED:   Medications   sodium chloride 0.9 % flush 10 mL (has no administration in time range)   sodium chloride 0.9 % flush 10 mL (has no administration in time range)   sodium chloride 0.9 % flush 10 mL (has no administration in time range)   sodium chloride 0.9 % infusion 40 mL (has no administration in time range)   sodium chloride 0.9 % infusion (has no  administration in time range)   ondansetron (ZOFRAN) tablet 4 mg (has no administration in time range)     Or   ondansetron (ZOFRAN) injection 4 mg (has no administration in time range)   calcium carbonate (TUMS) chewable tablet 500 mg (200 mg elemental) (has no administration in time range)   sodium chloride 0.9 % bolus 1,000 mL (0 mL Intravenous Stopped 12/7/23 0439)   ondansetron (ZOFRAN) injection 4 mg (4 mg Intravenous Given 12/7/23 0330)       Imaging results:  CT Head Without Contrast    Result Date: 12/7/2023  Electronically signed by Eva Guillen MD on 12-07-23 at 0339    CT Lumbar Spine Without Contrast    Result Date: 12/7/2023  Electronically signed by Basilia Salinas MD on 12-07-23 at 0206    CT Pelvis Without Contrast    Result Date: 12/7/2023  Electronically signed by Basilia Salinas MD on 12-07-23 at 0139    CT Head Without Contrast    Result Date: 12/6/2023  Electronically signed by Dilip Barrientos MD on 12-06-23 at 2355    XR Elbow 3+ View Left    Result Date: 12/6/2023  Plate and screw fixation of the distal humerus and proximal ulna. Hardware is intact without evidence of loosening. No evidence of periprosthetic or proximal radial fracture. Osseous evaluation is partially limited by extensive hardware. Lateral radiograph positioning limits evaluation for joint effusion.  This report was finalized on 12/6/2023 10:50 PM by Dr. Epifanio Gutierrez M.D on Workstation: [id number]      XR Spine Lumbar Complete 4+VW    Result Date: 12/6/2023  L2 and L3 compression fractures of indeterminate chronicity. These are at least new since 2020 CT abdomen pelvis. No subluxation. Lumbar spine levocurvature with apex at L3-L4. Multilevel disc degeneration, most advanced at L2-L3 and L3-L4. Advanced lower lumbar facet arthropathy.  This report was finalized on 12/6/2023 10:47 PM by Dr. Epifanio Gutierrez M.D on Workstation: [id number]       Ambulatory status:   - assist x1    Social issues:   Social History      Socioeconomic History    Marital status:      Spouse name: Peña    Number of children: 3    Years of education: High school   Tobacco Use    Smoking status: Former     Years: 5     Types: Cigarettes     Quit date:      Years since quittin.9    Smokeless tobacco: Never    Tobacco comments:     caffeine use: 2 cups coffee in AM   Vaping Use    Vaping Use: Never used   Substance and Sexual Activity    Alcohol use: Yes     Comment: Occasional- glass of wine few times a week    Drug use: No    Sexual activity: Defer     Partners: Male     Birth control/protection: Surgical          NIH Stroke Scale:         Vinicio Call RN  23 05:38 EST

## 2023-12-07 NOTE — ED NOTES
Patient states she was turning to take some medications when she tripped and fell. States she did hit head, no loc, hematoma to left forehead, laceration and pain noted to left elbow   
81 yo F w/ hx of asthma, DLD, HTN, chronic incontinence, arthritis p/w decreased sensation to hands and bilateral LEs from knees down. endorses generalized weakness, denies chest pain, sob, fevers. symptoms started on Saturday- she received her flu shot on Friday.

## 2023-12-07 NOTE — PROGRESS NOTES
Discharge Planning Assessment  [facility]     Patient Name: Alba Correa  MRN: [id number]  Today's Date: 12/7/2023    Admit Date: 12/7/2023    Plan: Return to [place] [facility] with on site therapies   Discharge Needs Assessment       Row Name 12/07/23 1503       Living Environment    People in Home facility resident    Current Living Arrangements independent living facility    Primary Care Provided by self    Provides Primary Care For no one    Family Caregiver if Needed child(ja), adult    Family Caregiver Names Son/HCS on file Ankit Doty [phone]    Quality of Family Relationships helpful;involved;supportive    Able to Return to Prior Arrangements yes       Transition Planning    Patient/Family Anticipates Transition to home with help/services    Patient/Family Anticipated Services at Transition home health care;rehabilitation services    Transportation Anticipated family or friend will provide       Discharge Needs Assessment    Equipment Currently Used at Home walker, rolling    Concerns to be Addressed discharge planning    Outpatient/Agency/Support Group Needs outpatient therapy    Discharge Facility/Level of Care Needs [facility];outpatient therapy    Provided Post Acute Provider List? Refused    Refused Provider List Comment [place]    Discharge Coordination/Progress [place]                   Discharge Plan       Row Name 12/07/23 1504       Plan    Plan Return to [place] [facility] with on site therapies    Patient/Family in Agreement with Plan yes    Plan Comments CCP met with pt at bedside to discuss d/c planning. Pt resides in independent living at [facility], uses walker, and [place] therapies [place], as well as past home health (cannot recall the agency name) and past SNF at [place]. Pt uses [facility] in [place]. Pt prefers to  return [place] apartment with on site therapies if able. PT eval pending. CCP to follow. Shelly Pickard LCSW                   Continued Care and Services - Admitted Since 12/7/2023    Coordination has not been started for this encounter.       Expected Discharge Date and Time       Expected Discharge Date Expected Discharge Time    Dec 9, 2023            Demographic Summary       Row Name 12/07/23 1457       General Information    Admission Type inpatient    Arrived From home    Required Notices Provided Important Message from Medicare    Referral Source admission list    Reason for Consult discharge planning    Preferred Language English                   Functional Status       Row Name 12/07/23 1502       Functional Status    Usual Activity Tolerance good    Current Activity Tolerance moderate       Functional Status, IADL    Medications assistive equipment    Meal Preparation assistive equipment    Housekeeping assistive equipment    Laundry assistive equipment    Shopping assistive equipment       Mental Status Summary    Recent Changes in Mental Status/Cognitive Functioning no changes                   Psychosocial    No documentation.                  Abuse/Neglect    No documentation.                  Legal    No documentation.                  Substance Abuse    No documentation.                  Patient Forms    No documentation.                     Connie Pickard LCSW

## 2023-12-07 NOTE — ED NOTES
"Pt arrives from home via EMS.    EMS states \"Fall earlier today seen at [facility]. Pt sent home with hydrocodone and zofran, now is having dizziness after taking the medication. Pt does have Hx of vertigo.\"    Bruising noted to the left forehead. Pt has Hx lumbar spine Fx, no new injuries from earlier fall.    "

## 2023-12-07 NOTE — NURSING NOTE
Patient arrived from ED, alert and oriented x 4. No c/o pain. Placed on telemetry. Vital signs taken and recorded.

## 2023-12-08 LAB
ANION GAP SERPL CALCULATED.3IONS-SCNC: 8 MMOL/L (ref 5–15)
BUN SERPL-MCNC: 23 MG/DL (ref 8–23)
BUN/CREAT SERPL: 23.2 (ref 7–25)
CALCIUM SPEC-SCNC: 9.2 MG/DL (ref 8.2–9.6)
CHLORIDE SERPL-SCNC: 107 MMOL/L (ref 98–107)
CO2 SERPL-SCNC: 24 MMOL/L (ref 22–29)
CREAT SERPL-MCNC: 0.99 MG/DL (ref 0.57–1)
DEPRECATED RDW RBC AUTO: 44.1 FL (ref 37–54)
EGFRCR SERPLBLD CKD-EPI 2021: 54.3 ML/MIN/1.73
ERYTHROCYTE [DISTWIDTH] IN BLOOD BY AUTOMATED COUNT: 12 % (ref 12.3–15.4)
FERRITIN SERPL-MCNC: 88.9 NG/ML (ref 13–150)
FOLATE SERPL-MCNC: >20 NG/ML (ref 4.78–24.2)
GLUCOSE SERPL-MCNC: 96 MG/DL (ref 65–99)
HCT VFR BLD AUTO: 30.5 % (ref 34–46.6)
HGB BLD-MCNC: 10.2 G/DL (ref 12–15.9)
IRON 24H UR-MRATE: 27 MCG/DL (ref 37–145)
IRON SATN MFR SERPL: 9 % (ref 20–50)
MCH RBC QN AUTO: 34.2 PG (ref 26.6–33)
MCHC RBC AUTO-ENTMCNC: 33.4 G/DL (ref 31.5–35.7)
MCV RBC AUTO: 102.3 FL (ref 79–97)
PLATELET # BLD AUTO: 205 10*3/MM3 (ref 140–450)
PMV BLD AUTO: 10.4 FL (ref 6–12)
POTASSIUM SERPL-SCNC: 4.3 MMOL/L (ref 3.5–5.2)
RBC # BLD AUTO: 2.98 10*6/MM3 (ref 3.77–5.28)
SODIUM SERPL-SCNC: 139 MMOL/L (ref 136–145)
TIBC SERPL-MCNC: 288 MCG/DL (ref 298–536)
TRANSFERRIN SERPL-MCNC: 193 MG/DL (ref 200–360)
VIT B12 BLD-MCNC: 781 PG/ML (ref 211–946)
WBC NRBC COR # BLD AUTO: 5.5 10*3/MM3 (ref 3.4–10.8)

## 2023-12-08 PROCEDURE — 80048 BASIC METABOLIC PNL TOTAL CA: CPT | Performed by: NURSE PRACTITIONER

## 2023-12-08 PROCEDURE — 82728 ASSAY OF FERRITIN: CPT | Performed by: STUDENT IN AN ORGANIZED HEALTH CARE EDUCATION/TRAINING PROGRAM

## 2023-12-08 PROCEDURE — 97161 PT EVAL LOW COMPLEX 20 MIN: CPT

## 2023-12-08 PROCEDURE — 85027 COMPLETE CBC AUTOMATED: CPT | Performed by: NURSE PRACTITIONER

## 2023-12-08 PROCEDURE — 82607 VITAMIN B-12: CPT | Performed by: STUDENT IN AN ORGANIZED HEALTH CARE EDUCATION/TRAINING PROGRAM

## 2023-12-08 PROCEDURE — 83540 ASSAY OF IRON: CPT | Performed by: STUDENT IN AN ORGANIZED HEALTH CARE EDUCATION/TRAINING PROGRAM

## 2023-12-08 PROCEDURE — 82746 ASSAY OF FOLIC ACID SERUM: CPT | Performed by: STUDENT IN AN ORGANIZED HEALTH CARE EDUCATION/TRAINING PROGRAM

## 2023-12-08 PROCEDURE — G0378 HOSPITAL OBSERVATION PER HR: HCPCS

## 2023-12-08 PROCEDURE — 84466 ASSAY OF TRANSFERRIN: CPT | Performed by: STUDENT IN AN ORGANIZED HEALTH CARE EDUCATION/TRAINING PROGRAM

## 2023-12-08 PROCEDURE — 99222 1ST HOSP IP/OBS MODERATE 55: CPT | Performed by: NURSE PRACTITIONER

## 2023-12-08 RX ORDER — FERROUS SULFATE 325(65) MG
325 TABLET ORAL EVERY OTHER DAY
Status: DISCONTINUED | OUTPATIENT
Start: 2023-12-08 | End: 2023-12-09 | Stop reason: HOSPADM

## 2023-12-08 RX ADMIN — FERROUS SULFATE TAB 325 MG (65 MG ELEMENTAL FE) 325 MG: 325 (65 FE) TAB at 17:46

## 2023-12-08 RX ADMIN — DULOXETINE HYDROCHLORIDE 60 MG: 60 CAPSULE, DELAYED RELEASE ORAL at 08:03

## 2023-12-08 RX ADMIN — Medication 10 ML: at 20:00

## 2023-12-08 RX ADMIN — Medication 10 ML: at 08:03

## 2023-12-08 RX ADMIN — ALLOPURINOL 100 MG: 100 TABLET ORAL at 08:03

## 2023-12-08 RX ADMIN — LEVOTHYROXINE SODIUM 50 MCG: 50 TABLET ORAL at 07:56

## 2023-12-08 NOTE — CONSULTS
Patient Name: Alba Correa  :1933  89 y.o.    Date of Admission: 2023  Date of Consultation:  23  Encounter Provider: GROVER Cevallos  Place of Service: [facility] CARDIOLOGY  Referring Provider: No ref. provider found  Patient Care Team:  Aramis Doty MD as PCP - General (Family Medicine)  Juan Jose Muñiz MD as Consulting Physician (Neurology)  Roshan Martines III, MD as Consulting Physician (Cardiology)  Nehal Murray MD as Consulting Physician (Nephrology)      Chief complaint: fall , dizziness     History of Present Illness:     Ms. Correa is a 89 year old woman followed by Dr. Platt and Dr. Martines. She has paroxysmal atrial fibrillation and sick sinus syndrome but has not required a pacemaker. She had syncope in  and had a loop recorder placed. She has chronic kidney disease, previous stroke, hyperlipidemia. Recent loop recorder interrogation showed AF burden of 94%. Beta blocker has been stopped in the past for bradycardia.     She came to the emergency room yesterday with complaints of dizziness that lead to fall and traumatic hematoma of the scalp and thigh. She also had a fall in March requiring hospitalization and elbow surgery.     She says she was standing in her kitchen and without warning fell and had possible syncope. She denies feeling dizzy as reported above.  Stress 2022  Post regadenoson EKG does not show evidence for ischemia.  Left ventricular ejection fraction is hyperdynamic (Calculated EF > 70%). .  Myocardial perfusion imaging indicates a normal myocardial perfusion study with no evidence of ischemia.  Impressions are consistent with a low risk study.  There is no prior study available for comparison.    Echo 2022  Left ventricular systolic function is hyperdynamic (EF > 70%). Normal left ventricular cavity size noted. Left ventricular wall thickness is consistent with mild concentric hypertrophy. All  left ventricular wall segments contract normally. Left ventricular diastolic function was normal. No evidence of left ventricular thrombus or mass present.  There is mild calcification of the aortic valve. Mild to moderate aortic valve regurgitation is present. No hemodynamically significant aortic valve stenosis is present.  Mild to moderate mitral valve regurgitation is present. No significant mitral valve stenosis is present.  Estimated right ventricular systolic pressure from tricuspid regurgitation is mildly elevated (35-45 mmHg).  Past Medical History:   Diagnosis Date    Acquired hypothyroidism 5/3/2022    Acute diastolic CHF (congestive heart failure) 3/20/2022    Chronic kidney disease     CVA (cerebral vascular accident)     Degeneration of intervertebral disc     Depression     DVT (deep venous thrombosis)     First degree AV block 05/26/2021    Gout     H/O complete eye exam 09/2016    Hyperlipidemia     IBS (irritable bowel syndrome)     Nonrheumatic mitral valve regurgitation     OAB (overactive bladder)     Osteopenia     Osteoporosis     PAF (paroxysmal atrial fibrillation) 11/08/2019    Peripheral neuropathy     Rheumatoid arthritis     Sinus bradycardia 05/26/2021       Past Surgical History:   Procedure Laterality Date    APPENDECTOMY      BREAST BIOPSY      CARDIAC ELECTROPHYSIOLOGY PROCEDURE N/A 10/12/2021    Procedure: Loop insertion linq;  Surgeon: Tone Anguiano MD;  Location: [facility] INVASIVE LOCATION;  Service: Cardiovascular;  Laterality: N/A;    CARDIOVERSION  08/03/2020    Dr. Colby    COLONOSCOPY      declines    ELBOW OPEN REDUCTION INTERNAL FIXATION Left 3/11/2023    Procedure: ELBOW OPEN REDUCTION INTERNAL FIXATION;  Surgeon: Ramón Encinas II, MD;  Location: [facility] OR;  Service: Orthopedics;  Laterality: Left;    HERNIA REPAIR  1965    HYSTERECTOMY      still has ovaries    MAMMO BILATERAL  11/16/2016    pt declines    PAP SMEAR  11/16/2016    declines          Prior to Admission medications    Medication Sig Start Date End Date Taking? Authorizing Provider   acetaminophen (TYLENOL) 650 MG 8 hr tablet Take 2 tablets by mouth Every 8 (Eight) Hours As Needed for Mild Pain.   Yes Vanessa Castle MD   allopurinol (ZYLOPRIM) 100 MG tablet Take 1 tablet by mouth Daily. 23  Yes Aramis Doty MD   amLODIPine (NORVASC) 5 MG tablet Take 1 tablet by mouth Daily. 11/10/23  Yes Roshan Martines III, MD   apixaban (Eliquis) 2.5 MG tablet tablet Take 1 tablet by mouth Every 12 (Twelve) Hours. 11/10/23  Yes Roshan Martines III, MD   DULoxetine (CYMBALTA) 60 MG capsule Take 1 capsule by mouth Daily. 23  Yes Aramis Doty MD   levothyroxine (SYNTHROID, LEVOTHROID) 50 MCG tablet Take 1 tablet by mouth Every Morning. 23  Yes Aramis Doty MD   losartan (COZAAR) 25 MG tablet TAKE 1 TABLET BY MOUTH  TWICE DAILY 22  Yes Roshan Martines III, MD   Multiple Vitamins-Minerals (CENTRUM SILVER) tablet Take 1 tablet by mouth Daily.   Yes Vanessa Castle MD   Omega-3 Fatty Acids (OMEGA-3 FISH OIL PO) Take 1,600 mg by mouth Every Morning.   Yes Vanessa Castle MD   ondansetron ODT (ZOFRAN-ODT) 4 MG disintegrating tablet Place 1 tablet on the tongue Every 6 (Six) Hours As Needed for Nausea or Vomiting. 23  Yes Dada Miller MD   hydrALAZINE (APRESOLINE) 10 MG tablet Take 1 tablet by mouth Every 8 (Eight) Hours. 3/27/22 3/27/22  Holland Ryan MD       Allergies   Allergen Reactions    Hydrocodone-Acetaminophen Nausea And Vomiting and Dizziness    Sulfamethoxazole-Trimethoprim Delirium    Lisinopril Unknown (See Comments)     Unknown per pt    Levofloxacin Rash       Social History     Socioeconomic History    Marital status:      Spouse name: Peña    Number of children: 3    Years of education: High school   Tobacco Use    Smoking status: Former     Years: 5     Types: Cigarettes     Quit date:      Years since quittin.9     Smokeless tobacco: Never    Tobacco comments:     caffeine use: 2 cups coffee in AM   Vaping Use    Vaping Use: Never used   Substance and Sexual Activity    Alcohol use: Yes     Comment: Occasional- glass of wine few times a week    Drug use: No    Sexual activity: Defer     Partners: Male     Birth control/protection: Surgical       Family History   Problem Relation Age of Onset    Alcohol abuse Other     Cancer Other     Hypertension Other     Kidney disease Other     Lung disease Other     Heart disease Mother 89    Heart failure Mother     Ovarian cancer Daughter 60    Stomach cancer Father     Kidney cancer Father 52    Lung cancer Father 52    Ovarian cancer Sister     Lung cancer Brother 65       REVIEW OF SYSTEMS:   All systems reviewed.  Pertinent positives identified in HPI.  All other systems are negative.      Objective:     Vitals:    12/07/23 2015 12/08/23 0015 12/08/23 0410 12/08/23 0947   BP: 113/60 122/55 120/59 126/79   BP Location: Left arm Left arm Left arm Left arm   Patient Position: Lying Lying Lying Sitting   Pulse: 110 104 85    Resp: 16 16 16 18   Temp: 98.4 °F (36.9 °C) 98.4 °F (36.9 °C) 98.1 °F (36.7 °C) 97.9 °F (36.6 °C)   TempSrc: Oral Oral Oral Oral   SpO2: 92% 91% 97%    Weight:       Height:         Body mass index is 20.49 kg/m².    Physical Exam:  Constitutional: She is oriented to person, place, and time. She appears well-developed. She does not appear ill.   HENT:   Head: Normocephalic and atraumatic. Head is without contusion.   Right Ear: Hearing normal. No drainage.   Left Ear: Hearing normal. No drainage.   Nose: No nasal deformity. No epistaxis.   Eyes: Lids are normal. Right eye exhibits no exudate. Left eye exhibits no exudate.  Neck: No JVD present. Carotid bruit is not present. No tracheal deviation present. No thyroid mass and no thyromegaly present.   Cardiovascular: Normal rate, regular rhythm and normal heart sounds.    Pulses:       Posterior tibial pulses are 2+  on the right side, and 2+ on the left side.   Pulmonary/Chest: Effort normal and breath sounds normal.   Abdominal: Soft. Normal appearance and bowel sounds are normal. There is no tenderness.   Musculoskeletal: Normal range of motion.        Right shoulder: She exhibits no deformity.        Left shoulder: She exhibits no deformity.   Neurological: She is alert and oriented to person, place, and time. She has normal strength.   Skin: Skin is warm, dry and intact. No rash noted.   Psychiatric: She has a normal mood and affect. Her behavior is normal. Thought content normal.   Vitals reviewed      Lab Review:     Results from last 7 days   Lab Units 12/08/23  0610 12/07/23  0750 12/07/23  0314   SODIUM mmol/L 139   < > 138   POTASSIUM mmol/L 4.3   < > 4.8   CHLORIDE mmol/L 107   < > 103   CO2 mmol/L 24.0   < > 22.6   BUN mg/dL 23   < > 31*   CREATININE mg/dL 0.99   < > 1.38*   CALCIUM mg/dL 9.2   < > 9.8*   BILIRUBIN mg/dL  --   --  0.5   ALK PHOS U/L  --   --  78   ALT (SGPT) U/L  --   --  10   AST (SGOT) U/L  --   --  20   GLUCOSE mg/dL 96   < > 139*    < > = values in this interval not displayed.     Results from last 7 days   Lab Units 12/07/23  0508 12/07/23  0314   HSTROP T ng/L 14* 15*     Results from last 7 days   Lab Units 12/08/23  0610   WBC 10*3/mm3 5.50   HEMOGLOBIN g/dL 10.2*   HEMATOCRIT % 30.5*   PLATELETS 10*3/mm3 205     Results from last 7 days   Lab Units 12/07/23  0314   INR  1.15*   APTT seconds 29.0                     Current Facility-Administered Medications:     acetaminophen (TYLENOL) tablet 1,000 mg, 1,000 mg, Oral, Q8H PRN, Júnior Gerardo MD    allopurinol (ZYLOPRIM) tablet 100 mg, 100 mg, Oral, Daily, Pema Momin APRN, 100 mg at 12/08/23 0803    calcium carbonate (TUMS) chewable tablet 500 mg (200 mg elemental), 2 tablet, Oral, BID PRN, Gallardo, Nehal Melani, APRN    DULoxetine (CYMBALTA) DR capsule 60 mg, 60 mg, Oral, Daily, Pema Momin APRN, 60 mg at 12/08/23 0803     levothyroxine (SYNTHROID, LEVOTHROID) tablet 50 mcg, 50 mcg, Oral, SHEBAM, Pema Momin, APRN, 50 mcg at 12/08/23 0756    ondansetron (ZOFRAN) tablet 4 mg, 4 mg, Oral, Q6H PRN **OR** ondansetron (ZOFRAN) injection 4 mg, 4 mg, Intravenous, Q6H PRN, Nehal Gallardo APRN    [COMPLETED] Insert Peripheral IV, , , Once **AND** sodium chloride 0.9 % flush 10 mL, 10 mL, Intravenous, PRN, Denys Lama MD    sodium chloride 0.9 % flush 10 mL, 10 mL, Intravenous, Q12H, Nehal Gallardo APRN, 10 mL at 12/08/23 0803    sodium chloride 0.9 % flush 10 mL, 10 mL, Intravenous, PRN, Nehal Gallardo APRN    sodium chloride 0.9 % infusion 40 mL, 40 mL, Intravenous, PRN, Nehal Gallardo APRN    Assessment and Plan:       Active Hospital Problems    Diagnosis  POA    **Episode of dizziness [R42]  Yes    Closed head injury [S09.90XA]  Yes    Hematoma of thigh [S70.10XA]  Yes    Traumatic hematoma of scalp [S00.03XA]  Unknown    Abnormal CT of spine [R93.7]  Unknown    Chronic kidney disease, stage 3a [N18.31]  Yes    Sick sinus syndrome [I49.5]  Yes    Acquired hypothyroidism [E03.9]  Yes    Atrial fibrillation, persistent [I48.19]  Yes    History of ischemic right MCA stroke with extension [Z86.73]  Not Applicable    Hyperlipidemia [E78.5]  Yes    History of DVT (deep vein thrombosis) [Z86.718]  Not Applicable    Anemia in chronic kidney disease [N18.9, D63.1]  Yes    Essential hypertension [I10]  Yes    Rheumatoid arthritis [M06.9]  Yes      Resolved Hospital Problems   No resolved problems to display.     Dizziness, near syncope - check orthostatics and interrogate device.   Paroxysmal atrial fibrillation, recent loop interrogation with 94% AF burden. Has early SSS, beta blocker stopped in the past for bradycardia. Anticoagulation appropriately held at this time to scalp and thigh hematoma . She has had several traumatic falls requiring hospitalization. She has an appointment with Dr. Martines on 12/15 and we  can reassess long term recommendations for anticoagulation at that time.   History of DVT  History of stroke  Hypertension  Loop recorder in place  History of traumatic falls.     Chanda Bird, APRN  12/08/23  10:17 EST

## 2023-12-08 NOTE — PLAN OF CARE
Goal Outcome Evaluation:  Plan of Care Reviewed With: patient            Pt fell with onset of Head injury, thigh hematoma, face/neck bruising and was sent home with pain medication causing dizziness and further issues.Cardiac work up in progress.  Pt lives in senior living apt and reports indep amb with rollator inside her building. Hx of fall 2/2023 with left elbow fx . Other: gout, DDD, bradycardia, nerve disorder, RA, syncoper, HTN, DVT, afib, MCA CVA, CKD with anemia, SSS. Hgb 10.2 today. Pt moved to eob indep today and performed STS to rwx. She amb with cga then sba for 300' until lob with self recovery noted while turning from rwx to recliner.  One episode of unsteadiness noted. Pt is  near baseline but impaired DF rom dru ankles noted. Further balance testing and activities recommended at this time as well as HHPT at discharge due to fall risk/repeated falls.       Anticipated Discharge Disposition (PT): home with home health

## 2023-12-08 NOTE — PROGRESS NOTES
Name: Alba Correa ADMIT: 2023   : 1933  PCP: Aramis Doty MD    MRN: [id number] LOS: 1 days   AGE/SEX: 89 y.o. female  ROOM: [facility]     Subjective   Subjective   Sitting up in the bed, reports she is feeling better.  No significant headache but does have left hip pain.  No dizziness, palpitation, shortness of breath, chest pain.    Review of Systems   As above  Objective   Objective   Vital Signs  Temp:  [97.7 °F (36.5 °C)-98.4 °F (36.9 °C)] 97.7 °F (36.5 °C)  Heart Rate:  [] 85  Resp:  [16-18] 18  BP: (113-136)/(55-83) 136/72  SpO2:  [91 %-97 %] 96 %  on   ;   Device (Oxygen Therapy): room air  Body mass index is 20.49 kg/m².  Physical Exam    General: Alert, oriented x 3, sitting up in the bed,  HEENT: Normocephalic, left fronta scalp bruising improving  CV: Irregular rhythm, normal rate  Lungs: Clear to auscultation bilaterally, no crackles or wheezes  Abdomen: Soft, nontender, nondistended  Extremities: No significant peripheral edema , left thigh hematoma/bruising, stable compared to yesterday     Results Review     I reviewed the patient's new clinical results.  Results from last 7 days   Lab Units 23  0610 23  0750 23  0314   WBC 10*3/mm3 5.50 5.26 8.61   HEMOGLOBIN g/dL 10.2* 10.1* 11.3*   PLATELETS 10*3/mm3 205 202 225     Results from last 7 days   Lab Units 23  0610 23  0750 23  0314   SODIUM mmol/L 139 135* 138   POTASSIUM mmol/L 4.3 4.8 4.8   CHLORIDE mmol/L 107 107 103   CO2 mmol/L 24.0 21.0* 22.6   BUN mg/dL 23 27* 31*   CREATININE mg/dL 0.99 1.13* 1.38*   GLUCOSE mg/dL 96 111* 139*   Estimated Creatinine Clearance: 30.3 mL/min (by C-G formula based on SCr of 0.99 mg/dL).  Results from last 7 days   Lab Units 23  0314   ALBUMIN g/dL 3.8   BILIRUBIN mg/dL 0.5   ALK PHOS U/L 78   AST (SGOT) U/L 20   ALT (SGPT) U/L 10     Results from last 7 days   Lab Units 23  0610 23  0750 23  0314   CALCIUM mg/dL 9.2 9.1 9.8*  "  ALBUMIN g/dL  --   --  3.8     Results from last 7 days   Lab Units 12/07/23  0314   LACTATE mmol/L 1.3     COVID19   Date Value Ref Range Status   04/29/2022 Not Detected Not Detected - Ref. Range Final   03/25/2022 Not Detected Not Detected - Ref. Range Final     No results found for: \"HGBA1C\", \"POCGLU\"        XR Chest 1 View  Narrative: Patient: KACI CHAVEZ  Time Out: 06:05  Exam(s): XR CXR 1 VIEW     EXAM:    XR Chest, 1 View    CLINICAL HISTORY:       Shortness of breath.    TECHNIQUE:    Frontal view of the chest.    COMPARISON:    Chest radiograph 4 29 2022    FINDINGS:    Lungs:  Unremarkable.  No consolidation.    Pleural space:  Blunting of the costophrenic angles may represent   pleural scarring or small pleural effusions.  No pneumothorax.    Heart:  Unremarkable.  No cardiomegaly.    Mediastinum:  Unremarkable.  Normal mediastinal contour.    Bones joints:  There are degenerative changes of the spine.  No acute   fracture.    Lymph nodes:  Calcified hilar lymph nodes are stable.    Tubes, lines and devices:  An implanted cardiac device is   redemonstrated.    IMPRESSION:         Blunting of the costophrenic angles may represent pleural scarring or   small pleural effusions.  Impression: Electronically signed by Ladi Martel MD on 12-07-23 at 0605  CT Head Without Contrast  Narrative: Patient: KACI CHAVEZ  Time Out: 03:39  Exam(s): CT HEAD Without Contrast     EXAM:    CT Head Without Intravenous Contrast    CLINICAL HISTORY:     Reason for exam: Head trauma, minor (Age >= 65y).    TECHNIQUE:    Axial computed tomography images of the head brain without intravenous   contrast.  CTDI is 56 mGy and DLP is 922 mGy-cm.  This CT exam was   performed according to the principle of ALARA (As Low As Reasonably   Achievable) by using one or more of the following dose reduction   techniques: automated exposure control, adjustment of the mA and or kV   according to patient size, and or use of iterative " reconstruction   technique.    COMPARISON:    No relevant prior studies available.    FINDINGS:    Brain:  No hemorrhage or mass effect.  Senescent changes.    Ventricles:  No hydrocephalus.    Bones joints:  Unremarkable.    Soft tissues: Left frontal scalp hematoma.    Sinuses:  No air fluid level.    Mastoid air cells:  Clear.    IMPRESSION:         No acute hemorrhage, hydrocephalus, or mass effect.  Impression: Electronically signed by Eva Guillen MD on 12-07-23 at 0339  CT Lumbar Spine Without Contrast  Narrative: Patient: KACI CHAVEZ  Time Out: 02:06  Exam(s): CT L SPINE     EXAM:    CT Lumbar Spine Without Intravenous Contrast    CLINICAL HISTORY:     Reason for exam: Compression fracture seen on x-ray.    TECHNIQUE:    Axial computed tomography images of the lumbar spine without   intravenous contrast.  CTDI is 19.55 mGy and DLP is 522.5 mGy-cm.  This   CT exam was performed according to the principle of ALARA (As Low As   Reasonably Achievable) by using one or more of the following dose   reduction techniques: automated exposure control, adjustment of the mA   and or kV according to patient size, and or use of iterative   reconstruction technique.    COMPARISON:    No relevant prior studies available.    FINDINGS:    Vertebrae:  Chronic bilateral L5 pars defects.  Generalized osteopenia.    Discs spinal canal neural foramina:  Severe disc degenerative change at   L2-L3 with superior endplate Schmorl's node at L3 present.  No spinal   canal stenosis.    Soft tissues:  Unremarkable.    IMPRESSION:         Osteopenia and degenerative changes with chronic appearing superior   endplate L3 degenerative change or Schmorl's node or just superimposed   acute compression fracture is possible.  Otherwise, no acute findings on   CT of the lumbar spine.  Chronic bilateral L5 pars defects are noted.  Impression: Electronically signed by Basilia Salinas MD on 12-07-23 at 0206  CT Pelvis Without  Contrast  Narrative: Patient: KACI CHAVEZ  Time Out: 01:39  Exam(s): CT PELVIS Without Contrast     EXAM:    CT Pelvis Without Intravenous Contrast    CLINICAL HISTORY:     Reason for exam: left hip pain.    TECHNIQUE:    Axial computed tomography images of the pelvis without intravenous   contrast.  CTDI is 8.22 mGy and DLP is 268.2 mGy-cm.  This CT exam was   performed according to the principle of ALARA (As Low As Reasonably   Achievable) by using one or more of the following dose reduction   techniques: automated exposure control, adjustment of the mA and or kV   according to patient size, and or use of iterative reconstruction   technique.    COMPARISON:    No relevant prior studies available.    FINDINGS:    Bowel:  Moderate formed stool in the rectum.  No obstruction.  No   mucosal thickening.    Appendix:  No findings to suggest acute appendicitis.    Intraperitoneal space:  Unremarkable.  No free air.  No significant   fluid collection.    Bladder:  Unremarkable.  No stones.    Reproductive:  Unremarkable as visualized.    Bones joints:  Generalized osteopenia.  Bilateral moderately advanced   hip degenerative changes.  No acute fracture or dislocation.    Soft tissues:  Acute left subcutaneous soft tissue hematoma overlying   the left greater trochanter and proximal thigh posterolaterally,   measuring 8.7 x 5.5 x 4.6 cm.    Vasculature:  Unremarkable.  No lower abdominal aortic aneurysm.    Lymph nodes:  Unremarkable.  No enlarged lymph nodes.    IMPRESSION:       No acute fracture or dislocation in the bony pelvis.  There is a left   posterolateral thigh acute hematoma measuring 8.7 x 5.5 x 4.6 cm.  Impression: Electronically signed by Basilia Salinas MD on 12-07-23 at 0139    Scheduled Medications  allopurinol, 100 mg, Oral, Daily  DULoxetine, 60 mg, Oral, Daily  ferrous sulfate, 325 mg, Oral, Every Other Day  levothyroxine, 50 mcg, Oral, QAM  sodium chloride, 10 mL, Intravenous, Q12H    Infusions    Diet  Diet: Regular/House Diet; Texture: Regular Texture (IDDSI 7); Fluid Consistency: Thin (IDDSI 0)    I have personally reviewed     [x]  Laboratory   []  Microbiology   []  Radiology   [x]  EKG/Telemetry  []  Cardiology/Vascular   []  Pathology    []  Records       Assessment/Plan     Active Hospital Problems    Diagnosis  POA    **Episode of dizziness [R42]  Yes    Closed head injury [S09.90XA]  Yes    Hematoma of thigh [S70.10XA]  Yes    Traumatic hematoma of scalp [S00.03XA]  Unknown    Abnormal CT of spine [R93.7]  Unknown    Chronic kidney disease, stage 3a [N18.31]  Yes    Sick sinus syndrome [I49.5]  Yes    Acquired hypothyroidism [E03.9]  Yes    Atrial fibrillation, persistent [I48.19]  Yes    History of ischemic right MCA stroke with extension [Z86.73]  Not Applicable    Hyperlipidemia [E78.5]  Yes    History of DVT (deep vein thrombosis) [Z86.718]  Not Applicable    Anemia in chronic kidney disease [N18.9, D63.1]  Yes    Essential hypertension [I10]  Yes    Rheumatoid arthritis [M06.9]  Yes      Resolved Hospital Problems   No resolved problems to display.       Ms. Correa is a 89 y.o. female who presented to the hospital with dizziness, nausea and vomiting after taking hydrocodone.  She was seen at the [facility] [place] ED hours prior secondary to a fall while on Eliquis.  She was found to have a left-sided head hematoma as well as left thigh hematoma but no acute fractures.  She was noted to have age-indeterminate fractures of the L-spine.  Upon returning home, she developed dizziness and nausea prompting reevaluation at the hospital.           Fall secondary to dizziness leading to closed head injury with traumatic hematoma of the scalp and hematoma of the thigh  -CT head did not show acute fracture/, but did show left periorbital hematoma  - ct of the pelvis showed  posterolateral thigh with acute hematoma measuring 8.7 x 5.5 x 4.6 cm..  -Neurology evaluated due to concern for postconcussive syndrome,  patient's symptoms improved and no indication for further imaging  -Patient with prior history of fall and had loop recorder.  -Was admitted to the hospital from 03/7/23 -03/14/2023 after a fall, was found to have elbow fracture on admission and underwent surgery.        Anemia: Hemoglobin was 11.3 on admission, trended paulina stable around 10.  Mild drop in hemoglobin secondary to hematoma from fall as above.  Hemoglobin stable today, iron panel consistent with mild iron deficiency anemia.  Started on p.o. iron every other day.  Monitor daily.        Dizziness with near syncope/paroxysmal atrial fibrillation-  - recent loop interrogation with 94% AF burden     -Not on beta-blocker due to history of bradycardia, has early SSS per cardiology.  -Cardiology following, plan for device interrogation.   -Orthostatic vitals  -Hold Eliquis for now, likely will resume in 1-2 days if hemoglobin stable and hematoma improving.      Hypothyroidism: Continue home levothyroxine    SCDs for DVT prophylaxis.  Full code.  Discussed with patient.  Anticipate discharge home with home health, likely in 1- 2 days      Copied text in this note has been reviewed and is accurate as of 12/08/23.         Dictated utilizing Dragon dictation        Júnior Gerardo MD  [facility]  12/08/23  14:59 EST

## 2023-12-08 NOTE — PLAN OF CARE
Problem: Adult Inpatient Plan of Care  Goal: Plan of Care Review  Outcome: Ongoing, Progressing  Flowsheets (Taken 12/8/2023 1457)  Progress: improving  Plan of Care Reviewed With: patient  Goal: Patient-Specific Goal (Individualized)  Outcome: Ongoing, Progressing  Goal: Absence of Hospital-Acquired Illness or Injury  Outcome: Ongoing, Progressing  Intervention: Identify and Manage Fall Risk  Recent Flowsheet Documentation  Taken 12/8/2023 1424 by Valerie Orantes RN  Safety Promotion/Fall Prevention:   activity supervised   safety round/check completed   nonskid shoes/slippers when out of bed   fall prevention program maintained   assistive device/personal items within reach  Taken 12/8/2023 1200 by Valerie Orantes RN  Safety Promotion/Fall Prevention:   activity supervised   safety round/check completed   nonskid shoes/slippers when out of bed   fall prevention program maintained   assistive device/personal items within reach  Taken 12/8/2023 1000 by Valerie Orantes RN  Safety Promotion/Fall Prevention: activity supervised  Taken 12/8/2023 0804 by Valerie Orantes RN  Safety Promotion/Fall Prevention:   activity supervised   safety round/check completed   fall prevention program maintained   assistive device/personal items within reach   nonskid shoes/slippers when out of bed  Intervention: Prevent Skin Injury  Recent Flowsheet Documentation  Taken 12/8/2023 1424 by Valerie Orantes RN  Body Position: position changed independently  Taken 12/8/2023 1200 by Valerie Orantes RN  Body Position: position changed independently  Taken 12/8/2023 1000 by Valerie Orantes RN  Body Position: position changed independently  Taken 12/8/2023 0804 by Valerie Orantes RN  Body Position: position changed independently  Intervention: Prevent and Manage VTE (Venous Thromboembolism) Risk  Recent Flowsheet Documentation  Taken 12/8/2023 1424 by Valerie Orantes RN  Activity Management: up in chair  Taken 12/8/2023 1000  by Valerie Orantes RN  Activity Management: up in chair  Taken 12/8/2023 0804 by Valerie Orantes RN  Activity Management: ambulated to bathroom  Intervention: Prevent Infection  Recent Flowsheet Documentation  Taken 12/8/2023 1424 by Valerie Orantes RN  Infection Prevention:   rest/sleep promoted   single patient room provided  Taken 12/8/2023 1200 by Valerie Orantes RN  Infection Prevention:   rest/sleep promoted   single patient room provided  Taken 12/8/2023 1000 by Valerie Orantes RN  Infection Prevention:   rest/sleep promoted   single patient room provided  Taken 12/8/2023 0804 by Valerie Orantes RN  Infection Prevention:   single patient room provided   rest/sleep promoted  Goal: Optimal Comfort and Wellbeing  Outcome: Ongoing, Progressing  Intervention: Provide Person-Centered Care  Recent Flowsheet Documentation  Taken 12/8/2023 0804 by Valerie Orantes RN  Trust Relationship/Rapport:   care explained   reassurance provided  Goal: Readiness for Transition of Care  Outcome: Ongoing, Progressing     Problem: Fall Injury Risk  Goal: Absence of Fall and Fall-Related Injury  Outcome: Ongoing, Progressing  Intervention: Identify and Manage Contributors  Recent Flowsheet Documentation  Taken 12/8/2023 0804 by Valerie Orantes RN  Medication Review/Management: medications reviewed  Intervention: Promote Injury-Free Environment  Recent Flowsheet Documentation  Taken 12/8/2023 1424 by Valerie Orantes RN  Safety Promotion/Fall Prevention:   activity supervised   safety round/check completed   nonskid shoes/slippers when out of bed   fall prevention program maintained   assistive device/personal items within reach  Taken 12/8/2023 1200 by Valerie Orantes RN  Safety Promotion/Fall Prevention:   activity supervised   safety round/check completed   nonskid shoes/slippers when out of bed   fall prevention program maintained   assistive device/personal items within reach  Taken 12/8/2023 1000 by Rolanad  Valerie, PAM  Safety Promotion/Fall Prevention: activity supervised  Taken 12/8/2023 0804 by Valerie Orantes, RN  Safety Promotion/Fall Prevention:   activity supervised   safety round/check completed   fall prevention program maintained   assistive device/personal items within reach   nonskid shoes/slippers when out of bed     Problem: Heart Failure Comorbidity  Goal: Maintenance of Heart Failure Symptom Control  Outcome: Ongoing, Progressing  Intervention: Maintain Heart Failure-Management  Recent Flowsheet Documentation  Taken 12/8/2023 0804 by Valerie Orantes, RN  Medication Review/Management: medications reviewed     Problem: Hypertension Comorbidity  Goal: Blood Pressure in Desired Range  Outcome: Ongoing, Progressing  Intervention: Maintain Blood Pressure Management  Recent Flowsheet Documentation  Taken 12/8/2023 0804 by Valerie Orantes, RN  Medication Review/Management: medications reviewed     Problem: Osteoarthritis Comorbidity  Goal: Maintenance of Osteoarthritis Symptom Control  Outcome: Ongoing, Progressing  Intervention: Maintain Osteoarthritis Symptom Control  Recent Flowsheet Documentation  Taken 12/8/2023 1424 by Valerie Orantes, RN  Activity Management: up in chair  Taken 12/8/2023 1000 by Valerie Orantes, RN  Activity Management: up in chair  Taken 12/8/2023 0804 by Valerie Orantes, RN  Activity Management: ambulated to bathroom  Assistive Device Utilized: walker  Medication Review/Management: medications reviewed   Goal Outcome Evaluation:  Plan of Care Reviewed With: patient        Progress: improving

## 2023-12-08 NOTE — THERAPY EVALUATION
Patient Name: Alba Correa  : 1933    MRN: [id number]                              Today's Date: 2023       Admit Date: 2023    Visit Dx:     ICD-10-CM ICD-9-CM   1. Generalized weakness  R53.1 780.79   2. Closed head injury, initial encounter  S09.90XA 959.01   3. Nausea and vomiting, unspecified vomiting type  R11.2 787.01   4. Adverse effect of drug, initial encounter  T50.905A E947.9     Patient Active Problem List   Diagnosis    CKD (chronic kidney disease) stage 4, GFR 15-29 ml/min    Depression    Gout    Hyperparathyroidism    Adaptive colitis    Osteopenia    Rheumatoid arthritis    Degeneration of intervertebral disc    Essential hypertension    Detrusor muscle hypertonia    History of DVT (deep vein thrombosis)    Hyperlipidemia    Macrocytosis    Nonrheumatic aortic (valve) insufficiency    History of ischemic right MCA stroke with extension    Atrial fibrillation, persistent    Anemia in chronic kidney disease    Other proteinuria    Episode of dizziness    Sinus bradycardia    First degree AV block    Nonrheumatic mitral valve regurgitation    Idiopathic peripheral neuropathy    Lower extremity edema    Acute diastolic CHF (congestive heart failure)    Chest pain    Acquired hypothyroidism    Sick sinus syndrome    Insomnia    Fall from standing, initial encounter    Closed fracture of left distal humerus    Facial laceration    Laceration of right forearm    Chronic kidney disease, stage 3a    Closed head injury    Hematoma of thigh    Traumatic hematoma of scalp    Abnormal CT of spine     Past Medical History:   Diagnosis Date    Acquired hypothyroidism 5/3/2022    Acute diastolic CHF (congestive heart failure) 3/20/2022    Chronic kidney disease     CVA (cerebral vascular accident)     Degeneration of intervertebral disc     Depression     DVT (deep venous thrombosis)     First degree AV block 2021    Gout     H/O complete eye exam 2016    Hyperlipidemia     IBS  (irritable bowel syndrome)     Nonrheumatic mitral valve regurgitation     OAB (overactive bladder)     Osteopenia     Osteoporosis     PAF (paroxysmal atrial fibrillation) 11/08/2019    Peripheral neuropathy     Rheumatoid arthritis     Sinus bradycardia 05/26/2021     Past Surgical History:   Procedure Laterality Date    APPENDECTOMY      BREAST BIOPSY      CARDIAC ELECTROPHYSIOLOGY PROCEDURE N/A 10/12/2021    Procedure: Loop insertion linq;  Surgeon: Tone Anguiano MD;  Location: [facility] CATH INVASIVE LOCATION;  Service: Cardiovascular;  Laterality: N/A;    CARDIOVERSION  08/03/2020    Dr. Colby    COLONOSCOPY      declines    ELBOW OPEN REDUCTION INTERNAL FIXATION Left 3/11/2023    Procedure: ELBOW OPEN REDUCTION INTERNAL FIXATION;  Surgeon: Ramón Encinas II, MD;  Location: [facility] MAIN OR;  Service: Orthopedics;  Laterality: Left;    HERNIA REPAIR  1965    HYSTERECTOMY      still has ovaries    MAMMO BILATERAL  11/16/2016    pt declines    PAP SMEAR  11/16/2016    declines      General Information       Row Name 12/08/23 0900          Physical Therapy Time and Intention    Document Type evaluation  -SV     Mode of Treatment individual therapy;physical therapy  -SV       Row Name 12/08/23 0900          General Information    Patient Profile Reviewed yes  -SV     Prior Level of Function independent:;all household mobility  -SV     Existing Precautions/Restrictions fall  -SV     Barriers to Rehab --  dizziness and possible cardiac issues  -SV       Row Name 12/08/23 0900          Home Main Entrance    Number of Stairs, Main Entrance none  -SV       Row Name 12/08/23 0900          Stairs Within Home, Primary    Number of Stairs, Within Home, Primary none  -SV     Stairs Comment, Within Home, Primary indep living apt  -SV       Row Name 12/08/23 0900          Cognition    Orientation Status (Cognition) oriented x 4  -SV       Row Name 12/08/23 0900          Safety Issues, Functional Mobility     Impairments Affecting Function (Mobility) balance  -               User Key  (r) = Recorded By, (t) = Taken By, (c) = Cosigned By      Initials Name Provider Type    SV Moraima Badillo PT Physical Therapist                   Mobility       Row Name 12/08/23 1459          Bed Mobility    Bed Mobility bed mobility (all) activities  -     All Activities, [place] (Bed Mobility) independent  -SV     Assistive Device (Bed Mobility) bed rails  -       Row Name 12/08/23 1451          Sit-Stand Transfer    Sit-Stand [place] (Transfers) standby assist  -SV     Assistive Device (Sit-Stand Transfers) walker, front-wheeled  -SV       Row Name 12/08/23 1455          Gait/Stairs (Locomotion)    [place] Level (Gait) standby assist;contact guard  -     Assistive Device (Gait) walker, front-wheeled  -SV     Distance in Feet (Gait) 300' one lob with self recovery during movement from rwx to recliner today, no lob or unsteadiness noted during straight amb on level surfaces  -               User Key  (r) = Recorded By, (t) = Taken By, (c) = Cosigned By      Initials Name Provider Type     Moraima Badillo, PT Physical Therapist                   Obj/Interventions       Row Name 12/08/23 1450          Range of Motion Comprehensive    General Range of Motion no range of motion deficits identified  -     Comment, General Range of Motion except pre existing elbow and shoulder limitiations from prior fall with left elbow fx. Pt also with dru ankle DF to neural only and likely tight hamstrings due to post lean with laq  -       Row Name 12/08/23 1454          Strength Comprehensive (MMT)    General Manual Muscle Testing (MMT) Assessment no strength deficits identified  -     Comment, General Manual Muscle Testing (MMT) Assessment BUE/BLE appear > 3/5 grossly except left elbow/shoulder and dru Df 2-/5 due to impaired rom  -       Row Name 12/08/23 1459          Motor Skills    Therapeutic Exercise --   educated on need for improved DF dru  -SV       Row Name 12/08/23 1455          Balance    Balance Assessment sitting static balance;standing static balance  -SV     Static Sitting Balance independent  -SV     Static Standing Balance supervision;modified independence  -SV     Position/Device Used, Standing Balance walker, rolling  -SV       Row Name 12/08/23 1455          Sensory Assessment (Somatosensory)    Sensory Assessment (Somatosensory) not tested  -SV               User Key  (r) = Recorded By, (t) = Taken By, (c) = Cosigned By      Initials Name Provider Type    SV Moraima Badillo, PT Physical Therapist                   Goals/Plan       Row Name 12/08/23 1459          Gait Training Goal 1 (PT)    Activity/Assistive Device (Gait Training Goal 1, PT) gait (walking locomotion);walker, rolling  -SV     [place] Level (Gait Training Goal 1, PT) independent  -SV     Distance (Gait Training Goal 1, PT) 400'  -SV     Time Frame (Gait Training Goal 1, PT) 10 days  -SV       Row Name 12/08/23 1459          ROM Goal 1 (PT)    ROM Goal 1 (PT) AROM DF > neutral dru  -SV     Time Frame (ROM Goal 1, PT) 10 days  -SV               User Key  (r) = Recorded By, (t) = Taken By, (c) = Cosigned By      Initials Name Provider Type    SV Moraima Badillo, PT Physical Therapist                   Clinical Impression       Row Name 12/08/23 1458          Pain    Pretreatment Pain Rating 0/10 - no pain  -SV     Posttreatment Pain Rating 0/10 - no pain  -SV       Row Name 12/08/23 1458          Plan of Care Review    Plan of Care Reviewed With patient  -SV       Row Name 12/08/23 1458          Therapy Assessment/Plan (PT)    Patient/Family Therapy Goals Statement (PT) return to [facility]  -SV     Rehab Potential (PT) good, to achieve stated therapy goals  -SV     Criteria for Skilled Interventions Met (PT) yes  -SV     Therapy Frequency (PT) 5 times/wk  -SV     Predicted Duration of Therapy Intervention (PT) recommend HHPT  -SV        Row Name 12/08/23 1458          Vital Signs    O2 Delivery Pre Treatment room air  -SV     O2 Delivery Intra Treatment room air  -SV     O2 Delivery Post Treatment room air  -SV     Pre Patient Position Supine  -SV     Intra Patient Position Standing  -SV     Post Patient Position Sitting  -SV       Row Name 12/08/23 1458          Positioning and Restraints    Pre-Treatment Position in bed  -SV     Post Treatment Position chair  -SV     In Chair reclined;sitting;call light within reach;encouraged to call for assist  -SV               User Key  (r) = Recorded By, (t) = Taken By, (c) = Cosigned By      Initials Name Provider Type    Moraima Johnson, PT Physical Therapist                   Outcome Measures       Row Name 12/08/23 1459 12/08/23 0804       How much help from another person do you currently need...    Turning from your back to your side while in flat bed without using bedrails? 3  -SV 3  -MM    Moving from lying on back to sitting on the side of a flat bed without bedrails? 3  -SV 3  -MM    Moving to and from a bed to a chair (including a wheelchair)? 3  -SV 3  -MM    Standing up from a chair using your arms (e.g., wheelchair, bedside chair)? 3  -SV 3  -MM    Climbing 3-5 steps with a railing? 3  -SV 2  -MM    To walk in hospital room? 3  -SV 3  -MM    AM-PAC 6 Clicks Score (PT) 18  -SV 17  -MM    Highest Level of Mobility Goal 6 --> Walk 10 steps or more  -SV 5 --> Static standing  -MM              User Key  (r) = Recorded By, (t) = Taken By, (c) = Cosigned By      Initials Name Provider Type    Moraima Johnson, PT Physical Therapist    Valerie James RN Registered Nurse                                 Physical Therapy Education       Title: PT OT SLP Therapies (In Progress)       Topic: Physical Therapy (In Progress)       Point: Mobility training (Done)       Learning Progress Summary             Patient Acceptance, E, VU,NR by ARETHA at 12/8/2023 1500                         Point: Home  exercise program (Done)       Learning Progress Summary             Patient Acceptance, E, VU,NR by  at 12/8/2023 1500                         Point: Body mechanics (Not Started)       Learner Progress:  Not documented in this visit.              Point: Precautions (Not Started)       Learner Progress:  Not documented in this visit.                              User Key       Initials Effective Dates Name Provider Type Discipline    SV 07/11/23 -  Moraima Badillo, PT Physical Therapist PT                  PT Recommendation and Plan     Plan of Care Reviewed With: patient     Time Calculation:         PT Charges       Row Name 12/08/23 1507             Time Calculation    Start Time 0900  -      Stop Time 0917  -      Time Calculation (min) 17 min  -      PT Received On 12/08/23  -      PT - Next Appointment 12/09/23  -      PT Goal Re-Cert Due Date 12/18/23  -                User Key  (r) = Recorded By, (t) = Taken By, (c) = Cosigned By      Initials Name Provider Type    SV Moraima Badillo, PT Physical Therapist                  Therapy Charges for Today       Code Description Service Date Service Provider Modifiers Qty    [id number] HC PT EVAL LOW COMPLEXITY 1 12/8/2023 Moraima Badillo, PT GP 1            PT G-Codes  AM-PAC 6 Clicks Score (PT): 18  PT Discharge Summary  Anticipated Discharge Disposition (PT): home with home health    Moraima Badillo PT  12/8/2023

## 2023-12-08 NOTE — PLAN OF CARE
Problem: Adult Inpatient Plan of Care  Goal: Plan of Care Review  Outcome: Ongoing, Progressing  Flowsheets (Taken 12/7/2023 2218)  Plan of Care Reviewed With: patient  Goal: Patient-Specific Goal (Individualized)  Outcome: Ongoing, Progressing  Goal: Absence of Hospital-Acquired Illness or Injury  Outcome: Ongoing, Progressing  Intervention: Identify and Manage Fall Risk  Recent Flowsheet Documentation  Taken 12/7/2023 2159 by Janie Herron RN  Safety Promotion/Fall Prevention: safety round/check completed  Taken 12/7/2023 2000 by Janie Herron RN  Safety Promotion/Fall Prevention: safety round/check completed  Intervention: Prevent Skin Injury  Recent Flowsheet Documentation  Taken 12/7/2023 2159 by Janie Herron RN  Body Position: position changed independently  Taken 12/7/2023 2000 by Janie Herron RN  Body Position: position changed independently  Skin Protection: adhesive use limited  Intervention: Prevent and Manage VTE (Venous Thromboembolism) Risk  Recent Flowsheet Documentation  Taken 12/7/2023 2000 by Janie Herron RN  VTE Prevention/Management: left  Range of Motion: active ROM (range of motion) encouraged  Intervention: Prevent Infection  Recent Flowsheet Documentation  Taken 12/7/2023 2000 by Janie Herron RN  Infection Prevention: single patient room provided  Goal: Optimal Comfort and Wellbeing  Outcome: Ongoing, Progressing  Intervention: Provide Person-Centered Care  Recent Flowsheet Documentation  Taken 12/7/2023 2000 by Janie Herron RN  Trust Relationship/Rapport:   choices provided   care explained  Goal: Readiness for Transition of Care  Outcome: Ongoing, Progressing  Intervention: Mutually Develop Transition Plan  Recent Flowsheet Documentation  Taken 12/7/2023 1919 by Janie Herron RN  Equipment Currently Used at Home: walker, rolling  Taken 12/7/2023 1918 by Janie Herron RN  Transportation Anticipated: health plan transportation  Patient/Family Anticipated Services at  Transition: none  Patient/Family Anticipates Transition to: home     Problem: Fall Injury Risk  Goal: Absence of Fall and Fall-Related Injury  Outcome: Ongoing, Progressing  Intervention: Identify and Manage Contributors  Recent Flowsheet Documentation  Taken 12/7/2023 2159 by Janie Herron RN  Medication Review/Management: medications reviewed  Taken 12/7/2023 2000 by Janie Herron RN  Medication Review/Management: medications reviewed  Intervention: Promote Injury-Free Environment  Recent Flowsheet Documentation  Taken 12/7/2023 2159 by Janie Herron RN  Safety Promotion/Fall Prevention: safety round/check completed  Taken 12/7/2023 2000 by Janie Herron RN  Safety Promotion/Fall Prevention: safety round/check completed     Problem: Heart Failure Comorbidity  Goal: Maintenance of Heart Failure Symptom Control  Outcome: Ongoing, Progressing  Intervention: Maintain Heart Failure-Management  Recent Flowsheet Documentation  Taken 12/7/2023 2159 by Janie Herron RN  Medication Review/Management: medications reviewed  Taken 12/7/2023 2000 by Janie Herron RN  Medication Review/Management: medications reviewed     Problem: Hypertension Comorbidity  Goal: Blood Pressure in Desired Range  Outcome: Ongoing, Progressing  Intervention: Maintain Blood Pressure Management  Recent Flowsheet Documentation  Taken 12/7/2023 2159 by Janie Herron RN  Medication Review/Management: medications reviewed  Taken 12/7/2023 2000 by Janie Herron RN  Syncope Management: tactile stimulated  Medication Review/Management: medications reviewed     Problem: Osteoarthritis Comorbidity  Goal: Maintenance of Osteoarthritis Symptom Control  Outcome: Ongoing, Progressing  Intervention: Maintain Osteoarthritis Symptom Control  Recent Flowsheet Documentation  Taken 12/7/2023 2159 by Janie Herron RN  Medication Review/Management: medications reviewed  Taken 12/7/2023 2000 by Janie Herron RN  Medication Review/Management:  medications reviewed   Goal Outcome Evaluation:  Plan of Care Reviewed With: patient

## 2023-12-09 ENCOUNTER — READMISSION MANAGEMENT (OUTPATIENT)
Dept: CALL CENTER | Facility: HOSPITAL | Age: 88
End: 2023-12-09
Payer: MEDICARE

## 2023-12-09 VITALS
RESPIRATION RATE: 18 BRPM | WEIGHT: 112 LBS | OXYGEN SATURATION: 97 % | DIASTOLIC BLOOD PRESSURE: 78 MMHG | HEART RATE: 87 BPM | SYSTOLIC BLOOD PRESSURE: 122 MMHG | BODY MASS INDEX: 20.61 KG/M2 | TEMPERATURE: 97.7 F | HEIGHT: 62 IN

## 2023-12-09 LAB
HCT VFR BLD AUTO: 32.8 % (ref 34–46.6)
HGB BLD-MCNC: 10.8 G/DL (ref 12–15.9)

## 2023-12-09 PROCEDURE — 85014 HEMATOCRIT: CPT | Performed by: STUDENT IN AN ORGANIZED HEALTH CARE EDUCATION/TRAINING PROGRAM

## 2023-12-09 PROCEDURE — 99232 SBSQ HOSP IP/OBS MODERATE 35: CPT | Performed by: INTERNAL MEDICINE

## 2023-12-09 PROCEDURE — 85018 HEMOGLOBIN: CPT | Performed by: STUDENT IN AN ORGANIZED HEALTH CARE EDUCATION/TRAINING PROGRAM

## 2023-12-09 PROCEDURE — G0378 HOSPITAL OBSERVATION PER HR: HCPCS

## 2023-12-09 RX ORDER — FERROUS SULFATE 325(65) MG
325 TABLET ORAL EVERY OTHER DAY
Qty: 15 TABLET | Refills: 0 | Status: SHIPPED | OUTPATIENT
Start: 2023-12-10 | End: 2024-01-09

## 2023-12-09 RX ADMIN — DULOXETINE HYDROCHLORIDE 60 MG: 60 CAPSULE, DELAYED RELEASE ORAL at 09:16

## 2023-12-09 RX ADMIN — LEVOTHYROXINE SODIUM 50 MCG: 50 TABLET ORAL at 06:33

## 2023-12-09 RX ADMIN — Medication 10 ML: at 09:16

## 2023-12-09 RX ADMIN — ALLOPURINOL 100 MG: 100 TABLET ORAL at 09:16

## 2023-12-09 RX ADMIN — ACETAMINOPHEN 1000 MG: 500 TABLET ORAL at 06:38

## 2023-12-09 NOTE — DISCHARGE INSTRUCTIONS
Notify your primary care provider if you experience chest pain, difficulty breathing, fevers/chills, nausea or vomiting, bleeding in stool, excessive diarrhea, numbness or weakness or tingling in any part of your body.    Monitor blood pressure daily, and follow-up with blood pressure log with cardiology/primary care physician

## 2023-12-09 NOTE — PLAN OF CARE
Goal Outcome Evaluation:  Plan of Care Reviewed With: patient        Progress: improving  Outcome Evaluation: Discharged home

## 2023-12-09 NOTE — OUTREACH NOTE
Prep Survey      Flowsheet Row Responses   [place] facility patient discharged from? [place]   Is LACE score < 7 ? No   Eligibility [facility]   Date of Admission 12/07/23   Date of Discharge 12/09/23   Discharge Disposition Home-Health Care S[facility]   Discharge diagnosis Fall secondary to dizziness leading to closed head injury with traumatic hematoma of the scalp and hematoma of the thigh, Acute on chronic anemia secondary to left thigh hematoma   Does the patient have one of the following disease processes/diagnoses(primary or secondary)? Other   Does the patient have Home health ordered? Yes   What is the Home health agency?  HH ordered   Is there a DME ordered? No   General alerts for this patient [facility]   Prep survey completed? Yes            Dorota SALAZAR - Registered Nurse

## 2023-12-09 NOTE — PLAN OF CARE
Problem: Adult Inpatient Plan of Care  Goal: Plan of Care Review  Outcome: Ongoing, Progressing  Flowsheets (Taken 12/9/2023 0109)  Plan of Care Reviewed With: patient  Goal: Patient-Specific Goal (Individualized)  Outcome: Ongoing, Progressing  Goal: Absence of Hospital-Acquired Illness or Injury  Outcome: Ongoing, Progressing  Intervention: Identify and Manage Fall Risk  Recent Flowsheet Documentation  Taken 12/9/2023 0000 by Janie Herron RN  Safety Promotion/Fall Prevention: safety round/check completed  Taken 12/8/2023 2200 by Janie Herron RN  Safety Promotion/Fall Prevention: safety round/check completed  Taken 12/8/2023 2000 by Janie Herron RN  Safety Promotion/Fall Prevention: safety round/check completed  Intervention: Prevent Skin Injury  Recent Flowsheet Documentation  Taken 12/9/2023 0000 by Janie Herron RN  Body Position: position changed independently  Taken 12/8/2023 2200 by Janie Herron RN  Body Position: position changed independently  Taken 12/8/2023 2000 by Janie Herron RN  Body Position: position changed independently  Intervention: Prevent Infection  Recent Flowsheet Documentation  Taken 12/8/2023 2000 by Janie Herron RN  Infection Prevention: single patient room provided  Goal: Optimal Comfort and Wellbeing  Outcome: Ongoing, Progressing  Intervention: Provide Person-Centered Care  Recent Flowsheet Documentation  Taken 12/8/2023 2000 by Janie Herron RN  Trust Relationship/Rapport:   care explained   choices provided  Goal: Readiness for Transition of Care  Outcome: Ongoing, Progressing     Problem: Fall Injury Risk  Goal: Absence of Fall and Fall-Related Injury  Outcome: Ongoing, Progressing  Intervention: Identify and Manage Contributors  Recent Flowsheet Documentation  Taken 12/9/2023 0000 by Janie Herron RN  Medication Review/Management: medications reviewed  Taken 12/8/2023 2200 by Janie Herron RN  Medication Review/Management: medications reviewed  Taken  12/8/2023 2000 by Janie Herron RN  Medication Review/Management: medications reviewed  Intervention: Promote Injury-Free Environment  Recent Flowsheet Documentation  Taken 12/9/2023 0000 by Janie Herron RN  Safety Promotion/Fall Prevention: safety round/check completed  Taken 12/8/2023 2200 by Janie Herron RN  Safety Promotion/Fall Prevention: safety round/check completed  Taken 12/8/2023 2000 by Janie Herron RN  Safety Promotion/Fall Prevention: safety round/check completed     Problem: Heart Failure Comorbidity  Goal: Maintenance of Heart Failure Symptom Control  Outcome: Ongoing, Progressing  Intervention: Maintain Heart Failure-Management  Recent Flowsheet Documentation  Taken 12/9/2023 0000 by Janie Herron RN  Medication Review/Management: medications reviewed  Taken 12/8/2023 2200 by Janie Herron RN  Medication Review/Management: medications reviewed  Taken 12/8/2023 2000 by Janie Herron RN  Medication Review/Management: medications reviewed     Problem: Hypertension Comorbidity  Goal: Blood Pressure in Desired Range  Outcome: Ongoing, Progressing  Intervention: Maintain Blood Pressure Management  Recent Flowsheet Documentation  Taken 12/9/2023 0000 by Janie Herron RN  Medication Review/Management: medications reviewed  Taken 12/8/2023 2200 by Janie Herron RN  Medication Review/Management: medications reviewed  Taken 12/8/2023 2000 by Janie Herron RN  Medication Review/Management: medications reviewed     Problem: Osteoarthritis Comorbidity  Goal: Maintenance of Osteoarthritis Symptom Control  Outcome: Ongoing, Progressing  Intervention: Maintain Osteoarthritis Symptom Control  Recent Flowsheet Documentation  Taken 12/9/2023 0000 by Janie Herron RN  Medication Review/Management: medications reviewed  Taken 12/8/2023 2200 by Janie Herron RN  Medication Review/Management: medications reviewed  Taken 12/8/2023 2000 by Janie Herron RN  Medication Review/Management:  medications reviewed   Goal Outcome Evaluation:  Plan of Care Reviewed With: patient

## 2023-12-09 NOTE — PROGRESS NOTES
Hospital Follow Up    LOS: 1  Patient Name: Alba Correa  Age/Sex: 89 y.o. female  : 1933  MRN: [id number]    Day of Service: 23   Length of Stay: 1  Encounter Provider: Roland Brown MD  Place of Service: [facility] CARDIOLOGY  Patient Care Team:  Aramis Doty MD as PCP - General (Family Medicine)  Juan Jose Muñiz MD as Consulting Physician (Neurology)  Roshan Martines III, MD as Consulting Physician (Cardiology)  Nehal Murray MD as Consulting Physician (Nephrology)    Subjective:     Chief Complaint: Dizzy    Interval History: Patient says she is feeling well today all of her dizziness has completely resolved.    Objective:     Objective:  Temp:  [97.7 °F (36.5 °C)-98.8 °F (37.1 °C)] 97.7 °F (36.5 °C)  Heart Rate:  [86-90] 87  Resp:  [16-18] 18  BP: (122-152)/(54-86) 122/78     Intake/Output Summary (Last 24 hours) at 2023 1324  Last data filed at 2023 1424  Gross per 24 hour   Intake 360 ml   Output --   Net 360 ml     Body mass index is 20.49 kg/m².      23  0235   Weight: 50.8 kg (112 lb)     Weight change:       Physical Exam:   General :  Alert, cooperative, in no acute distress.  Neuro:   Alert,cooperative and oriented.  Lungs:  CTAB. Normal respiratory effort and rate.  CV:  Regular rate and rhythm, normal S1 and S2, no murmurs, gallops or rubs.   ABD:  Soft, nontender, nondistended. Positive bowel sounds.  Extr:  No edema or cyanosis, moves all extremities.    Lab Review:   Results from last 7 days   Lab Units 23  0610 23  0750 23  0314   SODIUM mmol/L 139 135* 138   POTASSIUM mmol/L 4.3 4.8 4.8   CHLORIDE mmol/L 107 107 103   CO2 mmol/L 24.0 21.0* 22.6   BUN mg/dL 23 27* 31*   CREATININE mg/dL 0.99 1.13* 1.38*   GLUCOSE mg/dL 96 111* 139*   CALCIUM mg/dL 9.2 9.1 9.8*   AST (SGOT) U/L  --   --  20   ALT (SGPT) U/L  --   --  10     Results from last 7 days   Lab Units 23  0508 23  0314   HSTROP T  "ng/L 14* 15*     Results from last 7 days   Lab Units 12/09/23  0655 12/08/23  0610 12/07/23  0750   WBC 10*3/mm3  --  5.50 5.26   HEMOGLOBIN g/dL 10.8* 10.2* 10.1*   HEMATOCRIT % 32.8* 30.5* 29.9*   PLATELETS 10*3/mm3  --  205 202     Results from last 7 days   Lab Units 12/07/23  0314   INR  1.15*   APTT seconds 29.0               Invalid input(s): \"LDLCALC\"            Current Medications:   Scheduled Meds:allopurinol, 100 mg, Oral, Daily  DULoxetine, 60 mg, Oral, Daily  ferrous sulfate, 325 mg, Oral, Every Other Day  levothyroxine, 50 mcg, Oral, QAM  sodium chloride, 10 mL, Intravenous, Q12H      Continuous Infusions:     Allergies:  Allergies   Allergen Reactions    Hydrocodone-Acetaminophen Nausea And Vomiting and Dizziness    Sulfamethoxazole-Trimethoprim Delirium    Lisinopril Unknown (See Comments)     Unknown per pt    Levofloxacin Rash       Assessment:       Episode of dizziness    Rheumatoid arthritis    Essential hypertension    History of DVT (deep vein thrombosis)    Hyperlipidemia    History of ischemic right MCA stroke with extension    Atrial fibrillation, persistent    Anemia in chronic kidney disease    Acquired hypothyroidism    Sick sinus syndrome    Chronic kidney disease, stage 3a    Closed head injury    Hematoma of thigh    Traumatic hematoma of scalp    Abnormal CT of spine        Plan:      Dizziness, near syncope - check orthostatics and interrogate device.   Paroxysmal atrial fibrillation, recent loop interrogation with 94% AF burden. Has early SSS, beta blocker stopped in the past for bradycardia. Anticoagulation appropriately held at this time to scalp and thigh hematoma . She has had several traumatic falls requiring hospitalization. She has an appointment with Dr. Martines on 12/15 and we can reassess long term recommendations for anticoagulation at that time.   History of DVT  History of stroke  Hypertension  Loop recorder in place  History of traumatic falls.   Patient is doing well " today wants to go home which I agree with.  As per note above she has an appoint with Dr. Martines on 12/15/2023 I told her to keep that appointment for consideration of long-term anticoagulation however right now it is not appropriate until she recovers.  Okay to discharge from a cardiovascular standpoint.    Roland Brown MD  12/09/23  13:24 EST

## 2023-12-09 NOTE — DISCHARGE SUMMARY
Patient Name: Alba Correa  : 1933  MRN: [id number]    Date of Admission: 2023  Date of Discharge:  2023  Primary Care Physician: Aramis Doty MD      Chief Complaint:   Dizziness      Discharge Diagnoses     Active Hospital Problems    Diagnosis  POA    **Episode of dizziness [R42]  Yes    Closed head injury [S09.90XA]  Yes    Hematoma of thigh [S70.10XA]  Yes    Traumatic hematoma of scalp [S00.03XA]  Unknown    Abnormal CT of spine [R93.7]  Unknown    Chronic kidney disease, stage 3a [N18.31]  Yes    Sick sinus syndrome [I49.5]  Yes    Acquired hypothyroidism [E03.9]  Yes    Atrial fibrillation, persistent [I48.19]  Yes    History of ischemic right MCA stroke with extension [Z86.73]  Not Applicable    Hyperlipidemia [E78.5]  Yes    History of DVT (deep vein thrombosis) [Z86.718]  Not Applicable    Anemia in chronic kidney disease [N18.9, D63.1]  Yes    Essential hypertension [I10]  Yes    Rheumatoid arthritis [M06.9]  Yes      Resolved Hospital Problems   No resolved problems to display.        Hospital Course   Ms. Correa is a 89 y.o. female who presented to the hospital with dizziness, nausea and vomiting after taking hydrocodone.  She was seen at the [facility] ED hours prior secondary to a fall while on Eliquis.  She was found to have a left-sided head hematoma as well as left thigh hematoma but no acute fractures.  She was noted to have age-indeterminate fractures of the L-spine.  Upon returning home, she developed dizziness and nausea prompting reevaluation at the hospital.           Fall secondary to dizziness leading to closed head injury with traumatic hematoma of the scalp and hematoma of the thigh  -CT head did not show acute fracture/, but did show left periorbital hematoma,  ct of the pelvis showed  posterolateral thigh with acute hematoma measuring 8.7 x 5.5 x 4.6 cm..Neurology evaluated due to concern for postconcussive syndrome, patient's symptoms improved and no  indication for further imaging.  Patient with prior history of fall and had loop recordeed, and was admitted to the hospital from 03/7/23 -03/14/2023 after a fall, was found to have elbow fracture on admission and underwent surgery.  Orthostatic vitals were negative.  Cardiology was consulted, recommended to hold Eliquis until follow-up with Dr. Martines on 12/15/2022 to discuss long-term risk and benefits of continuation of Eliquis in the setting of recurrent falls.           Acute on chronic anemia secondary to left thigh hematoma.  Hemoglobin was 11.3 on admission, trended and remained stable 10.-10.8.  Recommend repeat CBC in 1 week to monitor hemoglobin.  Holding Eliquis on discharge until seen by Dr. Martines  Iron panel was consistent with mild iron deficiency anemia.  Discharged on p.o. iron every other day.        Hypertension: Patient is on amlodipine and losartan at home, both medications were held on admission, blood pressure remained largely stable off blood pressure medications in the hospital.  Would avoid tight blood pressure control in 89 year-old patient with history of recurrent falls, dizziness, and would target  and below.  Held both losartan and amlodipine at discharge, placed instruction to record blood pressure daily and follow-up with cardiology/PCP for further evaluation and resumption of blood pressure medication as indicated.          At the time of discharge patient was told to take all medications as prescribed, keep all follow-up appointments, and call their doctor or return to the hospital with any worsening or concerning symptoms.           Day of Discharge     Subjective:  No acute events overnight, denies significant pain, does have mild hip pain primarily with moving.  No headache, vision changes, nausea or vomiting.  Wants to be discharged home.      Physical Exam:  Temp:  [97.7 °F (36.5 °C)-98.8 °F (37.1 °C)] 97.7 °F (36.5 °C)  Heart Rate:  [86-90] 87  Resp:  [16-18] 18  BP:  (122-152)/(54-86) 122/78  Body mass index is 20.49 kg/m².  Physical Exam    General: Alert, laying in bed, not in distress,  HEENT: Normocephalic, left fronta scalp bruising, improving  CV: Irregular rhythm, normal rate  Lungs: Clear to auscultation bilaterally, no crackles or wheezes  Abdomen: Soft, nontender, nondistended  Extremities: No significant peripheral edema , left thigh hematoma/bruising, no significant change compared to admission.  Consultants     Consult Orders (all) (From admission, onward)       Start     Ordered    12/07/23 1026  Inpatient Cardiology Consult  Once        Specialty:  Cardiology  Provider:  Roshan Martines III, MD    12/07/23 1025    12/07/23 1026  Inpatient Neurology Consult General  Once        Specialty:  Neurology  Provider:  Gabriel Willson MD    12/07/23 1025    12/07/23 0509  [facility] (on-call MD unless specified) Details  Once        Specialty:  Hospitalist  Provider:  (Not yet assigned)    12/07/23 0508                  Procedures     * Surgery not found *      Imaging Results (All)       Procedure Component Value Units Date/Time    XR Chest 1 View [[id number]] Collected: 12/07/23 0605     Updated: 12/07/23 0605    Narrative:        Patient: KACI CHAVEZ  Time Out: 06:05  Exam(s): XR CXR 1 VIEW     EXAM:    XR Chest, 1 View    CLINICAL HISTORY:       Shortness of breath.    TECHNIQUE:    Frontal view of the chest.    COMPARISON:    Chest radiograph 4 29 2022    FINDINGS:    Lungs:  Unremarkable.  No consolidation.    Pleural space:  Blunting of the costophrenic angles may represent   pleural scarring or small pleural effusions.  No pneumothorax.    Heart:  Unremarkable.  No cardiomegaly.    Mediastinum:  Unremarkable.  Normal mediastinal contour.    Bones joints:  There are degenerative changes of the spine.  No acute   fracture.    Lymph nodes:  Calcified hilar lymph nodes are stable.    Tubes, lines and devices:  An implanted cardiac device is    redemonstrated.    IMPRESSION:         Blunting of the costophrenic angles may represent pleural scarring or   small pleural effusions.      Impression:          Electronically signed by Ladi Martel MD on 12-07-23 at 0605    CT Head Without Contrast [[id number]] Collected: 12/07/23 0340     Updated: 12/07/23 0340    Narrative:        Patient: KACI CHAVEZ  Time Out: 03:39  Exam(s): CT HEAD Without Contrast     EXAM:    CT Head Without Intravenous Contrast    CLINICAL HISTORY:     Reason for exam: Head trauma, minor (Age >= 65y).    TECHNIQUE:    Axial computed tomography images of the head brain without intravenous   contrast.  CTDI is 56 mGy and DLP is 922 mGy-cm.  This CT exam was   performed according to the principle of ALARA (As Low As Reasonably   Achievable) by using one or more of the following dose reduction   techniques: automated exposure control, adjustment of the mA and or kV   according to patient size, and or use of iterative reconstruction   technique.    COMPARISON:    No relevant prior studies available.    FINDINGS:    Brain:  No hemorrhage or mass effect.  Senescent changes.    Ventricles:  No hydrocephalus.    Bones joints:  Unremarkable.    Soft tissues: Left frontal scalp hematoma.    Sinuses:  No air fluid level.    Mastoid air cells:  Clear.    IMPRESSION:         No acute hemorrhage, hydrocephalus, or mass effect.      Impression:          Electronically signed by Eva Guillen MD on 12-07-23 at 0339          Results for orders placed during the hospital encounter of 10/10/21    Duplex Carotid Ultrasound CAR    Interpretation Summary  · Proximal right internal carotid artery mild stenosis.  · Proximal left internal carotid artery is normal.  · Mid left internal carotid artery is normal.    Results for orders placed during the hospital encounter of 03/25/22    Adult Transthoracic Echo Complete W/ Cont if Necessary Per Protocol    Interpretation Summary  · Left ventricular systolic  "function is hyperdynamic (EF > 70%). Normal left ventricular cavity size noted. Left ventricular wall thickness is consistent with mild concentric hypertrophy. All left ventricular wall segments contract normally. Left ventricular diastolic function was normal. No evidence of left ventricular thrombus or mass present.  · There is mild calcification of the aortic valve. Mild to moderate aortic valve regurgitation is present. No hemodynamically significant aortic valve stenosis is present.  · Mild to moderate mitral valve regurgitation is present. No significant mitral valve stenosis is present.  · Estimated right ventricular systolic pressure from tricuspid regurgitation is mildly elevated (35-45 mmHg).    Pertinent Labs     Results from last 7 days   Lab Units 12/09/23  0655 12/08/23  0610 12/07/23  0750 12/07/23  0314   WBC 10*3/mm3  --  5.50 5.26 8.61   HEMOGLOBIN g/dL 10.8* 10.2* 10.1* 11.3*   PLATELETS 10*3/mm3  --  205 202 225     Results from last 7 days   Lab Units 12/08/23  0610 12/07/23  0750 12/07/23  0314   SODIUM mmol/L 139 135* 138   POTASSIUM mmol/L 4.3 4.8 4.8   CHLORIDE mmol/L 107 107 103   CO2 mmol/L 24.0 21.0* 22.6   BUN mg/dL 23 27* 31*   CREATININE mg/dL 0.99 1.13* 1.38*   GLUCOSE mg/dL 96 111* 139*   Estimated Creatinine Clearance: 30.3 mL/min (by C-G formula based on SCr of 0.99 mg/dL).  Results from last 7 days   Lab Units 12/07/23  0314   ALBUMIN g/dL 3.8   BILIRUBIN mg/dL 0.5   ALK PHOS U/L 78   AST (SGOT) U/L 20   ALT (SGPT) U/L 10     Results from last 7 days   Lab Units 12/08/23  0610 12/07/23  0750 12/07/23  0314   CALCIUM mg/dL 9.2 9.1 9.8*   ALBUMIN g/dL  --   --  3.8       Results from last 7 days   Lab Units 12/07/23  0508 12/07/23  0314   HSTROP T ng/L 14* 15*           Invalid input(s): \"LDLCALC\"          Test Results Pending at Discharge       Discharge Details        Discharge Medications        New Medications        Instructions Start Date   ferrous sulfate 325 (65 FE) MG " tablet   325 mg, Oral, Every Other Day   Start Date: December 10, 2023            Continue These Medications        Instructions Start Date   acetaminophen 650 MG 8 hr tablet  Commonly known as: TYLENOL   1,300 mg, Oral, Every 8 Hours PRN      allopurinol 100 MG tablet  Commonly known as: ZYLOPRIM   100 mg, Oral, Daily      Centrum Silver tablet  Generic drug: multivitamin with minerals   1 tablet, Oral, Daily      DULoxetine 60 MG capsule  Commonly known as: CYMBALTA   60 mg, Oral, Daily      levothyroxine 50 MCG tablet  Commonly known as: SYNTHROID, LEVOTHROID   50 mcg, Oral, Every Morning      OMEGA-3 FISH OIL PO   1,600 mg, Oral, Every Morning      ondansetron ODT 4 MG disintegrating tablet  Commonly known as: ZOFRAN-ODT   4 mg, Translingual, Every 6 Hours PRN             Stop These Medications      amLODIPine 5 MG tablet  Commonly known as: NORVASC     apixaban 2.5 MG tablet tablet  Commonly known as: Eliquis     losartan 25 MG tablet  Commonly known as: COZAAR              Allergies   Allergen Reactions    Hydrocodone-Acetaminophen Nausea And Vomiting and Dizziness    Sulfamethoxazole-Trimethoprim Delirium    Lisinopril Unknown (See Comments)     Unknown per pt    Levofloxacin Rash       Discharge Disposition:  Home-Health Care Svc      Discharge Diet:  Diet Order   Procedures    Diet: Regular/House Diet; Texture: Regular Texture (IDDSI 7); Fluid Consistency: Thin (IDDSI 0)       Discharge Activity:       CODE STATUS:    Code Status and Medical Interventions:   Ordered at: 12/07/23 0741     Medical Intervention Limits:    NO intubation (DNI)     Code Status (Patient has no pulse and is not breathing):    No CPR (Do Not Attempt to Resuscitate)     Medical Interventions (Patient has pulse or is breathing):    Limited Support       Future Appointments   Date Time Provider Department Center   12/15/2023  2:15 PM Roshan Martines III, MD [facility]     Additional Instructions for the Follow-ups that You Need to  Schedule       Ambulatory Referral to [facility])   As directed      Face to Face Visit Date: 12/9/2023   Follow-up provider for Plan of Care?: I treated the patient in an acute care facility and will not continue treatment after discharge.   Follow-up provider: NICKY DOTY [[id number]]   Reason/Clinical Findings: Fall secondary to dizziness leading to closed head injury with traumatic hematoma of the scalp and hematoma of the thigh   Describe mobility limitations that make leaving home difficult: generalzied weakness   Nursing/Therapeutic Services Requested: Physical Therapy   PT orders: Therapeutic exercise Gait Training Transfer training   Weight Bearing Status: As Tolerated   Frequency: 1 Week 1               Follow-up Information       Nicky Doty MD. Schedule an appointment as soon as possible for a visit in 1 week(s).    Specialty: Family Medicine  Contact information:  [address] [facility] [facility] [address]  [facility] [id number] [phone]               Roshan Martines III, MD Follow up on 12/15/2023.    Specialty: Cardiology  Contact information:  [address]  [facility] [id number] [phone]                             Additional Instructions for the Follow-ups that You Need to Schedule       Ambulatory Referral to [facility] ([facility])   As directed      Face to Face Visit Date: 12/9/2023   Follow-up provider for Plan of Care?: I treated the patient in an acute care facility and will not continue treatment after discharge.   Follow-up provider: NICKY DOTY [[id number]]   Reason/Clinical Findings: Fall secondary to dizziness leading to closed head injury with traumatic hematoma of the scalp and hematoma of the thigh   Describe mobility limitations that make leaving home difficult: generalzied weakness   Nursing/Therapeutic Services Requested: Physical Therapy   PT orders: Therapeutic exercise Gait Training Transfer training   Weight Bearing Status: As Tolerated   Frequency: 1 Week 1             Time Spent on Discharge:  Greater than 30 minutes      Júnior Gerardo MD  [facility]  12/09/23  14:11 EST

## 2023-12-11 ENCOUNTER — TRANSITIONAL CARE MANAGEMENT TELEPHONE ENCOUNTER (OUTPATIENT)
Dept: CALL CENTER | Facility: HOSPITAL | Age: 88
End: 2023-12-11
Payer: MEDICARE

## 2023-12-11 ENCOUNTER — TELEPHONE (OUTPATIENT)
Dept: FAMILY MEDICINE CLINIC | Facility: CLINIC | Age: 88
End: 2023-12-11
Payer: MEDICARE

## 2023-12-11 NOTE — OUTREACH NOTE
Call Center TCM Note      Flowsheet Row Responses   [facility] patient discharged from? [place]   Does the patient have one of the following disease processes/diagnoses(primary or secondary)? Other   TCM attempt successful? Yes   Call start time 0929   Call end time 0930   General alerts for this patient [place] at [facility]   Discharge diagnosis Fall secondary to dizziness leading to closed head injury with traumatic hematoma of the scalp and hematoma of the thigh, Acute on chronic anemia secondary to left thigh hematoma   Meds reviewed with patient/caregiver? Yes   Is the patient having any side effects they believe may be caused by any medication additions or changes? No   Does the patient have all medications ordered at discharge? Yes   Is the patient taking all medications as directed (includes completed medication regime)? Yes   Comments Pt states her children manage her appts as they are her transportation, and she will have them call to sched TCM APPT with PCP Dr Doty   Does the patient have an appointment with their PCP within 7-14 days of discharge? No   Nursing Interventions Routed TCM call to PCP office   What is the Home health agency?  HH ordered   Psychosocial issues? No   Did the patient receive a copy of their discharge instructions? Yes   Nursing interventions Reviewed instructions with patient   What is the patient's perception of their health status since discharge? Improving   Is the patient/caregiver able to teach back signs and symptoms related to disease process for when to call PCP? Yes   Is the patient/caregiver able to teach back signs and symptoms related to disease process for when to call 911? Yes   Is the patient/caregiver able to teach back the hierarchy of who to call/visit for symptoms/problems? PCP, Specialist, Home health nurse, Urgent Care, ED, 911 Yes   If the patient is a current smoker, are they able to teach back resources for cessation? Not a smoker   TCM  call completed? Yes   Wrap up additional comments D/C DX: Fall 2ndry to dizziness,  closed head injury w/traumatic hematoma of scalp,  hematoma left thigh, a/c anemia secondary to left thigh hematoma - Pt states she is feeling very well. She has started Ferrous sulfate as directed at d/c, and Amlodipine, Apixaban, Losartan have been discontinued for now. Pt states her children manage her appts as they are her transportation, and she will have them call to sched TCM APPT with PCP Dr Doty   Call end time [id number]            Alba Galloway MA    12/11/2023, 09:36 EST

## 2023-12-11 NOTE — TELEPHONE ENCOUNTER
CONSTIPATION FOR 1 WEEK  - PER DR REID MIRALAX BID AND MILK OF MAGNES. IF THIS DOES NOT HELP PT WAS TOLD TO CONTACT GI

## 2023-12-15 ENCOUNTER — OFFICE VISIT (OUTPATIENT)
Dept: CARDIOLOGY | Facility: CLINIC | Age: 88
End: 2023-12-15
Payer: MEDICARE

## 2023-12-15 ENCOUNTER — TELEPHONE (OUTPATIENT)
Dept: CARDIOLOGY | Facility: CLINIC | Age: 88
End: 2023-12-15

## 2023-12-15 VITALS
DIASTOLIC BLOOD PRESSURE: 80 MMHG | BODY MASS INDEX: 20.24 KG/M2 | OXYGEN SATURATION: 99 % | WEIGHT: 110 LBS | HEIGHT: 62 IN | SYSTOLIC BLOOD PRESSURE: 145 MMHG | HEART RATE: 71 BPM

## 2023-12-15 DIAGNOSIS — I35.1 NONRHEUMATIC AORTIC (VALVE) INSUFFICIENCY: Chronic | ICD-10-CM

## 2023-12-15 DIAGNOSIS — Z09 HOSPITAL DISCHARGE FOLLOW-UP: Primary | ICD-10-CM
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
## 2023-12-15 DIAGNOSIS — E78.2 MIXED HYPERLIPIDEMIA: Chronic | ICD-10-CM

## 2023-12-15 DIAGNOSIS — Z86.73 HISTORY OF ISCHEMIC RIGHT MCA STROKE: ICD-10-CM

## 2023-12-15 DIAGNOSIS — I48.19 ATRIAL FIBRILLATION, PERSISTENT: ICD-10-CM

## 2023-12-15 DIAGNOSIS — I34.0 NONRHEUMATIC MITRAL VALVE REGURGITATION: ICD-10-CM

## 2023-12-15 DIAGNOSIS — I10 ESSENTIAL HYPERTENSION: Chronic | ICD-10-CM

## 2023-12-15 NOTE — PROGRESS NOTES
Subjective:     Encounter Date: 12/15/23        Patient ID: Alba Correa is a 89 y.o. female.    Chief Complaint: PAF, CVA  Atrial Fibrillation  Past medical history includes atrial fibrillation.     Dear Dr. Doty,    I had the pleasure of seeing this patient in the office today.     She comes in today for hospital follow-up.  She was admitted December 7 after a fall.  She had trauma.  Because of hematomas anticoagulation..  She was found be orthostatic and so antihypertensive therapy was stopped.  She was seen by Dr. Brown.  He noted that she was going to have her device interrogated but I cannot find an interrogation.    She is concerned being off of the anticoagulation.  No neurologic events.  Hematomas are still resolving.    She was hospitalized April 29, 2022. She was hospitalized with chest heaviness.  She was seen by my partner Dr. Aguillon.  EKG and troponin were unremarkable.  Stress test was performed that showed ejection rate 70% and no ischemia.  She was then discharged.    Patient was then seen in our office by Dr. Platt for bradycardia and sick sinus syndrome.  While in the hospital beta-blockade was stopped and her heart rates have been higher on home monitoring.    She has had a history of persistent atrial fibrillation.  We performed a cardioversion 7/2020 after she has been on rate control and anticoagulation for an appropriate period of time.  She then had another Holter monitor she was having some palpitations.  This showed episodes of ectopic atrial rhythm and brief 3-4 beat runs of wide-complex tachycardia but no atrial fibrillation.  It was determined to keep her on her medical therapy.    She was hospitalized October 2021 after episode of syncope cardiac evaluation was unremarkable.  She had an echocardiogram that was unremarkable.  She ended up having a loop recorder placed.  So far no arrhythmias been seen.    We saw her initially in the hospital in 2019.  She presented for a  "CVA.  We were asked to see her for evaluation of a cardiac source of emboli.  None was found.  Transthoracic and transesophageal echocardiograms did not demonstrate any cardiac source of emboli.  14 day monitor showed no ectopy.  Then recently seen in our office with question about bradycardia.  A 48-hour Holter monitor was placed.  During that time, she was in atrial fibrillation the entire 48 hours.  She was unaware of this.  She did not feel any palpitations or tachycardia.  No irregularity.  Heart rate was 70 bpm during the 48-hour recording with no pauses.      The following portions of the patient's history were reviewed and updated as appropriate: allergies, current medications, past family history, past medical history, past social history, past surgical history and problem list.    Past Medical History:   Diagnosis Date    Acquired hypothyroidism 5/3/2022    Acute diastolic CHF (congestive heart failure) 3/20/2022    Chronic kidney disease     CVA (cerebral vascular accident)     Degeneration of intervertebral disc     Depression     DVT (deep venous thrombosis)     First degree AV block 05/26/2021    Gout     H/O complete eye exam 09/2016    Hyperlipidemia     IBS (irritable bowel syndrome)     Nonrheumatic mitral valve regurgitation     OAB (overactive bladder)     Osteopenia     Osteoporosis     PAF (paroxysmal atrial fibrillation) 11/08/2019    Peripheral neuropathy     Rheumatoid arthritis     Sinus bradycardia 05/26/2021       Procedures       Objective:     Vitals:    12/15/23 1432   BP: 145/80   Pulse: 71   SpO2: 99%   Weight: 49.9 kg (110 lb)   Height: 157.5 cm (62\")             General Appearance:    Alert, cooperative, in no acute distress   Head:    Normocephalic, without obvious abnormality   Eyes:            Lids and lashes normal, conjunctivae and sclerae normal, no icterus, no pallor, corneas clear   Ears:    Ears appear intact with no abnormalities noted   Throat:   No oral lesions, oral " mucosa moist   Neck:   No adenopathy, supple, trachea midline, no thyromegaly, no carotid bruit, no JVD   Back:     No kyphosis present, no erythema or scars, no tenderness to palpation    Lungs:     Clear to auscultation,respirations regular, even and unlabored    Heart:    irregular rhythm and normal rate, normal S1 and S2, no murmur, no gallop, no rub, no click   Chest Wall:    No abnormalities observed   Abdomen:     Normal bowel sounds, no masses, no organomegaly, soft        non-tender, non-distended, no guarding   Extremities:   Moves all extremities well, no edema, no cyanosis, no redness   Pulses:  Bilateral carotids brisk   Skin:  Psychiatric:   No bleeding or rash    Alert and oriented, normal mood and affect       Lab Review:             Results for orders placed during the hospital encounter of 03/25/22    Adult Transthoracic Echo Complete W/ Cont if Necessary Per Protocol    Interpretation Summary  · Left ventricular systolic function is hyperdynamic (EF > 70%). Normal left ventricular cavity size noted. Left ventricular wall thickness is consistent with mild concentric hypertrophy. All left ventricular wall segments contract normally. Left ventricular diastolic function was normal. No evidence of left ventricular thrombus or mass present.  · There is mild calcification of the aortic valve. Mild to moderate aortic valve regurgitation is present. No hemodynamically significant aortic valve stenosis is present.  · Mild to moderate mitral valve regurgitation is present. No significant mitral valve stenosis is present.  · Estimated right ventricular systolic pressure from tricuspid regurgitation is mildly elevated (35-45 mmHg).    Lab Results   Component Value Date    GLUCOSE 96 12/08/2023    BUN 23 12/08/2023    CREATININE 0.99 12/08/2023    EGFRIFNONA 40 (L) 10/11/2021    EGFRIFAFRI 35 (L) 09/01/2021    BCR 23.2 12/08/2023    K 4.3 12/08/2023    CO2 24.0 12/08/2023    CALCIUM 9.2 12/08/2023     PROTENTOTREF 8.0 01/20/2023    ALBUMIN 3.8 12/07/2023    LABIL2 1.2 01/20/2023    AST 20 12/07/2023    ALT 10 12/07/2023     CBC          12/7/2023    03:14 12/7/2023    07:50 12/8/2023    06:10 12/9/2023    06:55   CBC   WBC 8.61  5.26  5.50     RBC 3.29  2.95  2.98     Hemoglobin 11.3  10.1  10.2  10.8    Hematocrit 33.8  29.9  30.5  32.8    .7  101.4  102.3     MCH 34.3  34.2  34.2     MCHC 33.4  33.8  33.4     RDW 12.2  12.0  12.0     Platelets 225  202  205         CHADS-VASc Risk Assessment              7 Total Score    1 CHF    1 Hypertension    2 Age >/= 75    2 PRIOR STROKE/TIA/THROMBO    1 Sex: Female        Criteria that do not apply:    DM    Vascular Disease    Age 65-74                Assessment:          Diagnosis Plan   1. Hospital discharge follow-up        2. Fall from standing, initial encounter        3. History of ischemic right MCA stroke with extension        4. Atrial fibrillation, persistent        5. Essential hypertension        6. Mixed hyperlipidemia        7. Nonrheumatic aortic (valve) insufficiency        8. Nonrheumatic mitral valve regurgitation                 Plan:       1.  Aortic insufficiency-stable, currently off afterload reduction.  2.  History of CVA-continue rosuvastatin.  Creatinine BUN reviewed, patient is followed by neurology, currently off Eliquis as noted above  3.  Benign essential hypertension-continue current medical regimen with amlodipine losartan, BUN/creatinine reviewed  4.  Persistent atrial fibrillation- in sinus rhythm.  Loop recorder in place, continue to follow, off beta-blockers, being followed by EP  5.  Chronic renal failure, most recent creatinine BUN reviewed.  Age 89 years old, discussed, will remain on the 2.5 mg twice daily of the Eliquis.  6.  Syncope and collapse-following with neurology.  Had a fall, it stated that her device was going to be interrogated when she was hospitalized but I cannot find any documentation of that, will contact  the device clinic to get the device interrogated  7.  Patient has a high risk of stroke off anticoagulation, her AAF1PM3-DFXc score is 7.  However now high risk to be on anticoagulation.  I think that she would be a good candidate for a Watchman device, she is interested in considering that because she really does not want to have a stroke.  I have reached out to Dr. Aguillon      Thank you very much for allowing us to participate in the care of this pleasant patient.  Please don't hesitate to call if I can be of assistance in any way.      Current Outpatient Medications:     acetaminophen (TYLENOL) 650 MG 8 hr tablet, Take 2 tablets by mouth Every 8 (Eight) Hours As Needed for Mild Pain., Disp: , Rfl:     allopurinol (ZYLOPRIM) 100 MG tablet, Take 1 tablet by mouth Daily., Disp: 90 tablet, Rfl: 1    DULoxetine (CYMBALTA) 60 MG capsule, Take 1 capsule by mouth Daily., Disp: 90 capsule, Rfl: 1    ferrous sulfate 325 (65 FE) MG tablet, Take 1 tablet by mouth Every Other Day for 30 days., Disp: 15 tablet, Rfl: 0    levothyroxine (SYNTHROID, LEVOTHROID) 50 MCG tablet, Take 1 tablet by mouth Every Morning., Disp: 90 tablet, Rfl: 1    Multiple Vitamins-Minerals (CENTRUM SILVER) tablet, Take 1 tablet by mouth Daily., Disp: , Rfl:     Omega-3 Fatty Acids (OMEGA-3 FISH OIL PO), Take 1,600 mg by mouth Every Morning., Disp: , Rfl:

## 2023-12-15 NOTE — TELEPHONE ENCOUNTER
Pt and pt's son has been contacted, Pt has not had her device connected for a few weeks now. Pt's son is going to try and connect device over the weekend and send the data from pt's ER visit/fall day.     Son advised to call us Monday if any issues and we can go from there.

## 2023-12-18 NOTE — PROGRESS NOTES
Transitional Care Follow Up Visit  Subjective     Alba Correa is a 89 y.o. female who presents for a transitional care management visit.    Within 48 business hours after discharge our office contacted her via telephone to coordinate her care and needs.      I reviewed and discussed the details of that call along with the discharge summary, hospital problems, inpatient lab results, inpatient diagnostic studies, and consultation reports with Alba.     Current outpatient and discharge medications have been reconciled for the patient.  Reviewed by: Aramis Doty MD          12/9/2023     5:43 PM   Date of TCM Phone Call   [facility]   Date of Admission 12/7/2023   Date of Discharge 12/9/2023   Discharge Disposition [facility]     Risk for Readmission (LACE) Score: 9 (12/9/2023  6:00 AM)      History of Present Illness   Course During Hospital Stay:      Date of Admission: 12/7/2023  Date of Discharge:  12/9/2023  Primary Care Physician: Aramis Doty MD        Chief Complaint:   Dizziness        Discharge Diagnoses            Active Hospital Problems     Diagnosis   POA    **Episode of dizziness [R42]   Yes    Closed head injury [S09.90XA]   Yes    Hematoma of thigh [S70.10XA]   Yes    Traumatic hematoma of scalp [S00.03XA]   Unknown    Abnormal CT of spine [R93.7]   Unknown    Chronic kidney disease, stage 3a [N18.31]   Yes    Sick sinus syndrome [I49.5]   Yes    Acquired hypothyroidism [E03.9]   Yes    Atrial fibrillation, persistent [I48.19]   Yes    History of ischemic right MCA stroke with extension [Z86.73]   Not Applicable    Hyperlipidemia [E78.5]   Yes    History of DVT (deep vein thrombosis) [Z86.718]   Not Applicable    Anemia in chronic kidney disease [N18.9, D63.1]   Yes    Essential hypertension [I10]   Yes    Rheumatoid arthritis [M06.9]   Yes       Resolved Hospital Problems   No resolved problems to display.         Hospital Course   Ms. Correa is a 89 y.o. female who presented  to the hospital with dizziness, nausea and vomiting after taking hydrocodone.  She was seen at the [facility] ED hours prior secondary to a fall while on Eliquis.  She was found to have a left-sided head hematoma as well as left thigh hematoma but no acute fractures.  She was noted to have age-indeterminate fractures of the L-spine.  Upon returning home, she developed dizziness and nausea prompting reevaluation at the hospital.           Fall secondary to dizziness leading to closed head injury with traumatic hematoma of the scalp and hematoma of the thigh  -CT head did not show acute fracture/, but did show left periorbital hematoma,  ct of the pelvis showed  posterolateral thigh with acute hematoma measuring 8.7 x 5.5 x 4.6 cm..Neurology evaluated due to concern for postconcussive syndrome, patient's symptoms improved and no indication for further imaging.  Patient with prior history of fall and had loop recordeed, and was admitted to the hospital from 03/7/23 -03/14/2023 after a fall, was found to have elbow fracture on admission and underwent surgery.  Orthostatic vitals were negative.  Cardiology was consulted, recommended to hold Eliquis until follow-up with Dr. Martines on 12/15/2022 to discuss long-term risk and benefits of continuation of Eliquis in the setting of recurrent falls.           Acute on chronic anemia secondary to left thigh hematoma.  Hemoglobin was 11.3 on admission, trended and remained stable 10.-10.8.  Recommend repeat CBC in 1 week to monitor hemoglobin.  Holding Eliquis on discharge until seen by Dr. Martines  Iron panel was consistent with mild iron deficiency anemia.  Discharged on p.o. iron every other day.        Hypertension: Patient is on amlodipine and losartan at home, both medications were held on admission, blood pressure remained largely stable off blood pressure medications in the hospital.  Would avoid tight blood pressure control in 89 year-old patient with history of recurrent  "falls, dizziness, and would target  and below.  Held both losartan and amlodipine at discharge, placed instruction to record blood pressure daily and follow-up with cardiology/PCP for further evaluation and resumption of blood pressure medication as indicated.              At the time of discharge patient was told to take all medications as prescribed, keep all follow-up appointments, and call their doctor or return to the hospital with any worsening or concerning symptoms.    Current outpatient and discharge medications have been reconciled for the patient.  Reviewed by: Aramis Doty MD    Pt saw her cardiologist last week and they d/c her Eliquis and is considering the Watchman procedure.      The following portions of the patient's history were reviewed and updated as appropriate: allergies, current medications, past family history, past medical history, past social history, past surgical history, and problem list.    Review of Systems   Constitutional:  Negative for activity change, chills and fever.   Respiratory:  Negative for cough.    Cardiovascular:  Negative for chest pain.   Psychiatric/Behavioral:  Negative for dysphoric mood.        /88   Pulse 86   Temp 97.5 °F (36.4 °C) (Oral)   Resp 16   Ht 157.5 cm (62\")   Wt 49.9 kg (110 lb)   LMP  (LMP Unknown)   SpO2 94%   BMI 20.12 kg/m²     Objective   Physical Exam  Constitutional:       General: She is not in acute distress.     Appearance: She is well-developed.   Cardiovascular:      Rate and Rhythm: Normal rate. Rhythm irregular.   Pulmonary:      Effort: Pulmonary effort is normal.      Breath sounds: Normal breath sounds.   Neurological:      Mental Status: She is alert and oriented to person, place, and time.   Psychiatric:         Behavior: Behavior normal.         Thought Content: Thought content normal.     Hospital records reviewed with pt confirming HPI.      Assessment & Plan   Diagnoses and all orders for this visit:    1. " Fall from standing, subsequent encounter (Primary)    2. Closed head injury, subsequent encounter    3. Hematoma  -     CBC & Differential    4. Acute on chronic anemia  -     CBC & Differential    5. Hospital discharge follow-up    6. Essential hypertension  -     losartan (Cozaar) 25 MG tablet; Take 0.5 tablets by mouth Daily.    Patient to slowly resume normal activity.  Patient to keep all of her scheduled specialist follow-up appointments.  Patient to start back on the 12.5 mg losartan and will very slowly titrate to keep the systolic pressure around or slightly higher than 140.

## 2023-12-19 ENCOUNTER — OFFICE VISIT (OUTPATIENT)
Dept: FAMILY MEDICINE CLINIC | Facility: CLINIC | Age: 88
End: 2023-12-19
Payer: MEDICARE

## 2023-12-19 ENCOUNTER — READMISSION MANAGEMENT (OUTPATIENT)
Dept: CALL CENTER | Facility: HOSPITAL | Age: 88
End: 2023-12-19
Payer: MEDICARE

## 2023-12-19 VITALS
SYSTOLIC BLOOD PRESSURE: 154 MMHG | HEART RATE: 86 BPM | RESPIRATION RATE: 16 BRPM | DIASTOLIC BLOOD PRESSURE: 88 MMHG | OXYGEN SATURATION: 94 % | BODY MASS INDEX: 20.24 KG/M2 | TEMPERATURE: 97.5 F | HEIGHT: 62 IN | WEIGHT: 110 LBS

## 2023-12-19 DIAGNOSIS — T14.8XXA HEMATOMA: ICD-10-CM

## 2023-12-19 DIAGNOSIS — I10 ESSENTIAL HYPERTENSION: Chronic | ICD-10-CM

## 2023-12-19 DIAGNOSIS — S09.90XD CLOSED HEAD INJURY, SUBSEQUENT ENCOUNTER: ICD-10-CM

## 2023-12-19 DIAGNOSIS — Z09 HOSPITAL DISCHARGE FOLLOW-UP: ICD-10-CM

## 2023-12-19 DIAGNOSIS — D64.9 ACUTE ON CHRONIC ANEMIA: ICD-10-CM
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
## 2023-12-19 RX ORDER — LOSARTAN POTASSIUM 25 MG/1
12.5 TABLET ORAL DAILY
Qty: 90 TABLET | Refills: 1
Start: 2023-12-19 | End: 2023-12-21 | Stop reason: SDUPTHER

## 2023-12-19 RX ORDER — LOSARTAN POTASSIUM 25 MG/1
12.5 TABLET ORAL DAILY
Start: 2023-12-19 | End: 2023-12-19 | Stop reason: SDUPTHER

## 2023-12-19 NOTE — OUTREACH NOTE
Medical Week 1 Survey      Flowsheet Row Responses   [facility] patient discharged from? [place]   Does the patient have one of the following disease processes/diagnoses(primary or secondary)? Other   Call start time 1630   Call end time 1631   General alerts for this patient [facility] at [facility]   Discharge diagnosis Fall secondary to dizziness leading to closed head injury with traumatic hematoma of the scalp and hematoma of the thigh, Acute on chronic anemia secondary to left thigh hematoma   Meds reviewed with patient/caregiver? Yes   Is the patient taking all medications as directed (includes completed medication regime)? Yes   Does the patient have a primary care provider?  Yes   Does the patient have an appointment with their PCP within 7 days of discharge? Yes   Has the patient kept scheduled appointments due by today? Yes   Has home health visited the patient within 72 hours of discharge? N/A   Psychosocial issues? No   Did the patient receive a copy of their discharge instructions? Yes   Nursing interventions Reviewed instructions with patient   What is the patient's perception of their health status since discharge? Improving   Is the patient/caregiver able to teach back signs and symptoms related to disease process for when to call PCP? Yes   Is the patient/caregiver able to teach back signs and symptoms related to disease process for when to call 911? Yes   Is the patient/caregiver able to teach back the hierarchy of who to call/visit for symptoms/problems? PCP, Specialist, Home health nurse, Urgent Care, ED, 911 Yes   Wrap up additional comments Pt reports she is doing very well and saw PCP today   Call end time 1631            PORFIRIO GRANADOS - Registered Nurse

## 2023-12-20 LAB
BASOPHILS # BLD AUTO: 0.06 10*3/MM3 (ref 0–0.2)
BASOPHILS NFR BLD AUTO: 1.3 % (ref 0–1.5)
EOSINOPHIL # BLD AUTO: 0.19 10*3/MM3 (ref 0–0.4)
EOSINOPHIL NFR BLD AUTO: 4.2 % (ref 0.3–6.2)
ERYTHROCYTE [DISTWIDTH] IN BLOOD BY AUTOMATED COUNT: 12.4 % (ref 12.3–15.4)
HCT VFR BLD AUTO: 37 % (ref 34–46.6)
HGB BLD-MCNC: 11.9 G/DL (ref 12–15.9)
IMM GRANULOCYTES # BLD AUTO: 0.01 10*3/MM3 (ref 0–0.05)
IMM GRANULOCYTES NFR BLD AUTO: 0.2 % (ref 0–0.5)
LYMPHOCYTES # BLD AUTO: 2 10*3/MM3 (ref 0.7–3.1)
LYMPHOCYTES NFR BLD AUTO: 44.6 % (ref 19.6–45.3)
MCH RBC QN AUTO: 32.3 PG (ref 26.6–33)
MCHC RBC AUTO-ENTMCNC: 32.2 G/DL (ref 31.5–35.7)
MCV RBC AUTO: 100.5 FL (ref 79–97)
MONOCYTES # BLD AUTO: 0.59 10*3/MM3 (ref 0.1–0.9)
MONOCYTES NFR BLD AUTO: 13.2 % (ref 5–12)
NEUTROPHILS # BLD AUTO: 1.63 10*3/MM3 (ref 1.7–7)
NEUTROPHILS NFR BLD AUTO: 36.5 % (ref 42.7–76)
NRBC BLD AUTO-RTO: 0 /100 WBC (ref 0–0.2)
PLATELET # BLD AUTO: 407 10*3/MM3 (ref 140–450)
RBC # BLD AUTO: 3.68 10*6/MM3 (ref 3.77–5.28)
WBC # BLD AUTO: 4.48 10*3/MM3 (ref 3.4–10.8)

## 2023-12-21 ENCOUNTER — TELEPHONE (OUTPATIENT)
Dept: FAMILY MEDICINE CLINIC | Facility: CLINIC | Age: 88
End: 2023-12-21
Payer: MEDICARE

## 2023-12-21 DIAGNOSIS — I10 ESSENTIAL HYPERTENSION: Chronic | ICD-10-CM

## 2023-12-21 RX ORDER — LOSARTAN POTASSIUM 25 MG/1
12.5 TABLET ORAL DAILY
Qty: 90 TABLET | Refills: 1 | Status: SHIPPED | OUTPATIENT
Start: 2023-12-21

## 2023-12-21 NOTE — TELEPHONE ENCOUNTER
Caller: Angy Doty    Relationship: Emergency Contact    Best call back number:[phone]     Which medication are you concerned about: losartan (Cozaar) 25 MG tablet     What are your concerns: PATIENTS DAUGHTER [place] CALLING STATING THAT THE [facility] NEVER RECEIVED THE PRESCRIPTION SHE WOULD LIKE FOR THIS TO BE RESENT

## 2023-12-26 ENCOUNTER — TELEPHONE (OUTPATIENT)
Dept: CARDIOLOGY | Facility: CLINIC | Age: 88
End: 2023-12-26

## 2023-12-26 DIAGNOSIS — F33.42 RECURRENT MAJOR DEPRESSIVE DISORDER, IN FULL REMISSION: Chronic | ICD-10-CM

## 2023-12-26 RX ORDER — DULOXETIN HYDROCHLORIDE 60 MG/1
60 CAPSULE, DELAYED RELEASE ORAL DAILY
Qty: 90 CAPSULE | Refills: 0 | Status: SHIPPED | OUTPATIENT
Start: 2023-12-26

## 2023-12-26 NOTE — TELEPHONE ENCOUNTER
Caller: Ankit Doty    Relationship: Emergency Contact    Best call back number: [phone]    What is the best time to reach you: ANY    Who are you requesting to speak with (clinical staff, provider,  specific staff member): CLINICAL    Do you know the name of the person who called: PTS SON    What was the call regarding: PTS SON STATES THAT LAST TIME PT WAS SEEN BY DR. MOORE THEY WERE INFORMED THAT A DIFFERENT PROVIDER FROM THIS OFFICE WILL BE REACHING OUT TO THEM AS DR. MOORE DOES NOT PERFORM THE PROCEDURE THAT THE PT IS REQUIRED TO HAVE. PT AND SON HAVE NOT BEEN CONTACTED YET. PLEASE REACH OUT TO HER SON AND DISCUSS THIS FURTHER THANK YOU.    Is it okay if the provider responds through [facility]: NO

## 2023-12-27 ENCOUNTER — TELEPHONE (OUTPATIENT)
Dept: CARDIOLOGY | Facility: HOSPITAL | Age: 88
End: 2023-12-27
Payer: MEDICARE

## 2023-12-27 NOTE — TELEPHONE ENCOUNTER
Can we try and get her in to see BK for a Watchman Consult. Once scheduled, can you please let Daniel know? Thank you

## 2023-12-27 NOTE — TELEPHONE ENCOUNTER
Do you guys know anything about this?  last note 12/15/23, he stated that he would reach out to .

## 2023-12-27 NOTE — TELEPHONE ENCOUNTER
I called and spoke with patients son Ankit introducing the structural heart program and briefly discussing the left atrial appendage occlusion procedure I have asked that an appointment be scheduled for his mother to meet with Dr Aguillon to discuss the Watchman procedure. I also mailed him our introductory packet including contact information He will call me back with any further questions or concerns

## 2023-12-28 NOTE — TELEPHONE ENCOUNTER
12.28.23    Called and left a voicemail for patient to return call to schedule this appointment.     Thanks   Rustam

## 2024-01-02 ENCOUNTER — TELEPHONE (OUTPATIENT)
Dept: CARDIOLOGY | Facility: CLINIC | Age: 89
End: 2024-01-02
Payer: MEDICARE

## 2024-01-02 ENCOUNTER — TELEPHONE (OUTPATIENT)
Age: 89
End: 2024-01-02

## 2024-01-02 ENCOUNTER — PATIENT MESSAGE (OUTPATIENT)
Dept: CARDIOLOGY | Facility: CLINIC | Age: 89
End: 2024-01-02
Payer: MEDICARE

## 2024-01-02 NOTE — TELEPHONE ENCOUNTER
----- Message from Tone Jensen, RN sent at 12/29/2023  2:09 PM EST -----  Please schedule Alba Correa [birth year] to see Dr Aguillon as soon as possible for a Watchman evaluation Please speak with her son Ankit Doty to st up the appointment  ThanksDaniel      1/2/24 - I spoke to pts son Ankit and he stated that the pt has decided against doing this procedure.  He also stated that they did some research on this procedure and spoke to his wife's sister who is a Dr.

## 2024-01-02 NOTE — TELEPHONE ENCOUNTER
FYI: MD Martines, I see in your last office note that you had not had an updated LOOP report. I am reviewing the most current LOOP report and AF status. Per the device the pt's AT/AF burden is 80.7% (graph below) and longest listed recent AF episode on 12/25 was 16 hrs in duration. I see she is no longer on AC due to recent falls/hospitalization and is awaiting consultation regarding watchman procedure. I just wanted to update you.   Kindly,   Sharmin Hidalgo Device RN

## 2024-01-02 NOTE — TELEPHONE ENCOUNTER
From: Alba Correa  To: Roshan Martines  Sent: 1/2/2024 9:48 AM EST  Subject: Eliquis    On Dec. 15, at my appointment with Dr. Martines, he told me about the Watchman procedure. After talking about it with my children, I have decided I do not want to have this done at my age. After my fall on Dec. 6th, I was taken off the Eliquis 2.5 mg (every 12 hours.) My question is, when should I start taking it again?  Thanks,  Alba Correa

## 2024-01-03 ENCOUNTER — TELEPHONE (OUTPATIENT)
Dept: CARDIOLOGY | Facility: HOSPITAL | Age: 89
End: 2024-01-03
Payer: MEDICARE

## 2024-01-03 NOTE — TELEPHONE ENCOUNTER
Mrs Correa has decided not to pursue the Watchman procedure at this time She has our contact information and will call back if she changes her mind

## 2024-02-10 DIAGNOSIS — F33.42 RECURRENT MAJOR DEPRESSIVE DISORDER, IN FULL REMISSION: Chronic | ICD-10-CM

## 2024-02-11 RX ORDER — DULOXETIN HYDROCHLORIDE 60 MG/1
60 CAPSULE, DELAYED RELEASE ORAL DAILY
Qty: 90 CAPSULE | Refills: 0 | Status: SHIPPED | OUTPATIENT
Start: 2024-02-11

## 2024-02-14 DIAGNOSIS — F33.42 RECURRENT MAJOR DEPRESSIVE DISORDER, IN FULL REMISSION: Chronic | ICD-10-CM

## 2024-02-15 RX ORDER — DULOXETIN HYDROCHLORIDE 60 MG/1
60 CAPSULE, DELAYED RELEASE ORAL DAILY
Qty: 90 CAPSULE | Refills: 0 | OUTPATIENT
Start: 2024-02-15

## 2024-03-07 ENCOUNTER — TELEPHONE (OUTPATIENT)
Dept: FAMILY MEDICINE CLINIC | Facility: CLINIC | Age: 89
End: 2024-03-07

## 2024-03-07 ENCOUNTER — TELEPHONE (OUTPATIENT)
Dept: CARDIOLOGY | Facility: CLINIC | Age: 89
End: 2024-03-07
Payer: MEDICARE

## 2024-03-07 ENCOUNTER — NURSE TRIAGE (OUTPATIENT)
Dept: CALL CENTER | Facility: HOSPITAL | Age: 89
End: 2024-03-07
Payer: MEDICARE

## 2024-03-07 NOTE — TELEPHONE ENCOUNTER
Call transferred from [facility], unable to reach the office. Caller's BP running higher than normal. Warm transfer to the office.

## 2024-03-07 NOTE — TELEPHONE ENCOUNTER
Pt called the office today. Her B/P was 186/88 yesterday and it's down to 154/88 today. HR is running around 100. She said she tried to call Dr. Doty's office about her B/P, but he wasn't available today. I let her know that if she would like us to manage her B/P medications, we would be happy to do so. We would just need to manage them from here on out.    She said she is going to try to call Dr. Doty again and see if they might just tell her to take an extra dose of her medication. I asked her to call us back if she needs to and we will be able to help. She verbalized understanding.    Thank you,    Kristen Fisher, RN  [facility] [place]  03/07/24 12:48 EST

## 2024-03-07 NOTE — TELEPHONE ENCOUNTER
"Reason for Disposition   Systolic BP  >= 180 OR Diastolic >= 110    Additional Information   Negative: Difficult to awaken or acting confused (e.g., disoriented, slurred speech)   Negative: SEVERE difficulty breathing (e.g., struggling for each breath, speaks in single words)   Negative: [1] Weakness of the face, arm or leg on one side of the body AND [2] new-onset   Negative: [1] Numbness (i.e., loss of sensation) of the face, arm or leg on one side of the body AND [2] new-onset   Negative: [1] Chest pain lasts > 5 minutes AND [2] history of heart disease (i.e., heart attack, bypass surgery, angina, angioplasty, CHF)   Negative: [1] Chest pain AND [2] took nitrogylcerin AND [3] pain was not relieved   Negative: Sounds like a life-threatening emergency to the triager   Negative: Symptom is main concern (e.g., headache, chest pain)   Negative: Low blood pressure is main concern   Negative: [1] Systolic BP  >= 160 OR Diastolic >= 100 AND [2] cardiac (e.g., breathing difficulty, chest pain) or neurologic symptoms (e.g., new-onset blurred or double vision, unsteady gait)   Negative: [1] Pregnant 20 or more weeks (or postpartum < 6 weeks) AND [2] new hand or face swelling   Negative: [1] Pregnant 20 or more weeks (or postpartum < 6 weeks) AND [2] Systolic BP >= 160 OR Diastolic >= 110   Negative: [1] Systolic BP  >= 200 OR Diastolic >= 120 AND [2] having NO cardiac or neurologic symptoms   Negative: [1] Pregnant 20 or more weeks (or postpartum < 6 weeks) AND [2] Systolic BP  >= 140 OR Diastolic >= 90   Negative: [1] Systolic BP  >= 180 OR Diastolic >= 110 AND [2] missed most recent dose of blood pressure medication   Negative: Ran out of BP medications    Answer Assessment - Initial Assessment Questions  1. BLOOD PRESSURE: \"What is the blood pressure?\" \"Did you take at least two measurements 5 minutes apart?\"      186/88 last night  2. ONSET: \"When did you take your blood pressure?\"      Last night  3. HOW: \"How did you " "take your blood pressure?\" (e.g., automatic home BP monitor, visiting nurse)      Home monitor  4. HISTORY: \"Do you have a history of high blood pressure?\"      yes  5. MEDICINES: \"Are you taking any medicines for blood pressure?\" \"Have you missed any doses recently?\"      Yes on meds, no missed doses  6. OTHER SYMPTOMS: \"Do you have any symptoms?\" (e.g., blurred vision, chest pain, difficulty breathing, headache, weakness)      HA this am.  7. PREGNANCY: \"Is there any chance you are pregnant?\" \"When was your last menstrual period?\"      na    Protocols used: Blood Pressure - High-ADULT-AH    "

## 2024-03-07 NOTE — TELEPHONE ENCOUNTER
"  Caller: Alba Correa \"SERGIO\"    Relationship: Self    Best call back number: [phone]      What was the call regarding:   HIGH BLOOD PRESSURE 186/88 REQUEST DR. REID MONITOR HER BLOOD PRESSURE.  PLEASE ADVISE    NOTE NURSE TRIAGE WAS CALLED THIS MORNING  "

## 2024-03-07 NOTE — TELEPHONE ENCOUNTER
I TALKED TO PT. PT WAS TOLD -  If this was not a manually completed blood pressure, I am a little suspect that this is accurate.  Would recommend an appointment for patient here so that we can check her manually.   PT TO CALL BACK AND SCHEDULE AN APPOINTMENT

## 2024-03-08 ENCOUNTER — APPOINTMENT (OUTPATIENT)
Dept: CT IMAGING | Facility: HOSPITAL | Age: 89
End: 2024-03-08
Payer: MEDICARE

## 2024-03-08 ENCOUNTER — TELEPHONE (OUTPATIENT)
Dept: FAMILY MEDICINE CLINIC | Facility: CLINIC | Age: 89
End: 2024-03-08
Payer: MEDICARE

## 2024-03-08 ENCOUNTER — APPOINTMENT (OUTPATIENT)
Dept: GENERAL RADIOLOGY | Facility: HOSPITAL | Age: 89
End: 2024-03-08
Payer: MEDICARE

## 2024-03-08 ENCOUNTER — HOSPITAL ENCOUNTER (OUTPATIENT)
Facility: HOSPITAL | Age: 89
Setting detail: OBSERVATION
Discharge: HOME | End: 2024-03-09
Attending: EMERGENCY MEDICINE | Admitting: EMERGENCY MEDICINE
Payer: MEDICARE

## 2024-03-08 DIAGNOSIS — I10 POORLY-CONTROLLED HYPERTENSION: Primary | ICD-10-CM

## 2024-03-08 DIAGNOSIS — M54.9 UPPER BACK PAIN: ICD-10-CM

## 2024-03-08 DIAGNOSIS — R79.89 ELEVATED TROPONIN: ICD-10-CM

## 2024-03-08 LAB
ALBUMIN SERPL-MCNC: 4.3 G/DL (ref 3.5–5.2)
ALBUMIN/GLOB SERPL: 1.1 G/DL
ALP SERPL-CCNC: 88 U/L (ref 39–117)
ALT SERPL W P-5'-P-CCNC: 11 U/L (ref 1–33)
ANION GAP SERPL CALCULATED.3IONS-SCNC: 11.1 MMOL/L (ref 5–15)
AST SERPL-CCNC: 28 U/L (ref 1–32)
BASOPHILS # BLD AUTO: 0.1 10*3/MM3 (ref 0–0.2)
BASOPHILS NFR BLD AUTO: 2.2 % (ref 0–1.5)
BILIRUB SERPL-MCNC: 0.3 MG/DL (ref 0–1.2)
BUN SERPL-MCNC: 16 MG/DL (ref 8–23)
BUN/CREAT SERPL: 14.3 (ref 7–25)
CALCIUM SPEC-SCNC: 10.1 MG/DL (ref 8.2–9.6)
CHLORIDE SERPL-SCNC: 97 MMOL/L (ref 98–107)
CHOLEST SERPL-MCNC: 216 MG/DL (ref 0–200)
CO2 SERPL-SCNC: 26.9 MMOL/L (ref 22–29)
CREAT SERPL-MCNC: 1.12 MG/DL (ref 0.57–1)
DEPRECATED RDW RBC AUTO: 43.6 FL (ref 37–54)
EGFRCR SERPLBLD CKD-EPI 2021: 46.8 ML/MIN/1.73
EOSINOPHIL # BLD AUTO: 0.32 10*3/MM3 (ref 0–0.4)
EOSINOPHIL NFR BLD AUTO: 7 % (ref 0.3–6.2)
ERYTHROCYTE [DISTWIDTH] IN BLOOD BY AUTOMATED COUNT: 11.8 % (ref 12.3–15.4)
GEN 5 2HR TROPONIN T REFLEX: 23 NG/L
GLOBULIN UR ELPH-MCNC: 4 GM/DL
GLUCOSE SERPL-MCNC: 104 MG/DL (ref 65–99)
HCT VFR BLD AUTO: 42.9 % (ref 34–46.6)
HDLC SERPL-MCNC: 43 MG/DL (ref 40–60)
HGB BLD-MCNC: 13.9 G/DL (ref 12–15.9)
HOLD SPECIMEN: NORMAL
HOLD SPECIMEN: NORMAL
IMM GRANULOCYTES # BLD AUTO: 0.01 10*3/MM3 (ref 0–0.05)
IMM GRANULOCYTES NFR BLD AUTO: 0.2 % (ref 0–0.5)
LDLC SERPL CALC-MCNC: 147 MG/DL (ref 0–100)
LDLC/HDLC SERPL: 3.36 {RATIO}
LYMPHOCYTES # BLD AUTO: 2.3 10*3/MM3 (ref 0.7–3.1)
LYMPHOCYTES NFR BLD AUTO: 50.4 % (ref 19.6–45.3)
MCH RBC QN AUTO: 32.6 PG (ref 26.6–33)
MCHC RBC AUTO-ENTMCNC: 32.4 G/DL (ref 31.5–35.7)
MCV RBC AUTO: 100.5 FL (ref 79–97)
MONOCYTES # BLD AUTO: 0.52 10*3/MM3 (ref 0.1–0.9)
MONOCYTES NFR BLD AUTO: 11.4 % (ref 5–12)
NEUTROPHILS NFR BLD AUTO: 1.31 10*3/MM3 (ref 1.7–7)
NEUTROPHILS NFR BLD AUTO: 28.8 % (ref 42.7–76)
NRBC BLD AUTO-RTO: 0 /100 WBC (ref 0–0.2)
PLATELET # BLD AUTO: 280 10*3/MM3 (ref 140–450)
PMV BLD AUTO: 9.8 FL (ref 6–12)
POTASSIUM SERPL-SCNC: 4.7 MMOL/L (ref 3.5–5.2)
PROT SERPL-MCNC: 8.3 G/DL (ref 6–8.5)
QT INTERVAL: 348 MS
QTC INTERVAL: 381 MS
RBC # BLD AUTO: 4.27 10*6/MM3 (ref 3.77–5.28)
SODIUM SERPL-SCNC: 135 MMOL/L (ref 136–145)
TRIGL SERPL-MCNC: 142 MG/DL (ref 0–150)
TROPONIN T DELTA: 4 NG/L
TROPONIN T SERPL HS-MCNC: 19 NG/L
VLDLC SERPL-MCNC: 26 MG/DL (ref 5–40)
WBC NRBC COR # BLD AUTO: 4.56 10*3/MM3 (ref 3.4–10.8)
WHOLE BLOOD HOLD COAG: NORMAL
WHOLE BLOOD HOLD SPECIMEN: NORMAL

## 2024-03-08 PROCEDURE — 93010 ELECTROCARDIOGRAM REPORT: CPT | Performed by: INTERNAL MEDICINE

## 2024-03-08 PROCEDURE — G0378 HOSPITAL OBSERVATION PER HR: HCPCS

## 2024-03-08 PROCEDURE — 25510000001 IOPAMIDOL PER 1 ML: Performed by: EMERGENCY MEDICINE

## 2024-03-08 PROCEDURE — 85025 COMPLETE CBC W/AUTO DIFF WBC: CPT

## 2024-03-08 PROCEDURE — 71275 CT ANGIOGRAPHY CHEST: CPT

## 2024-03-08 PROCEDURE — 93005 ELECTROCARDIOGRAM TRACING: CPT

## 2024-03-08 PROCEDURE — 71045 X-RAY EXAM CHEST 1 VIEW: CPT

## 2024-03-08 PROCEDURE — 80053 COMPREHEN METABOLIC PANEL: CPT

## 2024-03-08 PROCEDURE — 36415 COLL VENOUS BLD VENIPUNCTURE: CPT

## 2024-03-08 PROCEDURE — 84484 ASSAY OF TROPONIN QUANT: CPT

## 2024-03-08 PROCEDURE — 99285 EMERGENCY DEPT VISIT HI MDM: CPT

## 2024-03-08 PROCEDURE — 80061 LIPID PANEL: CPT | Performed by: NURSE PRACTITIONER

## 2024-03-08 PROCEDURE — 74174 CTA ABD&PLVS W/CONTRAST: CPT

## 2024-03-08 RX ORDER — ASPIRIN 81 MG/1
324 TABLET, CHEWABLE ORAL ONCE
Status: DISCONTINUED | OUTPATIENT
Start: 2024-03-08 | End: 2024-03-08 | Stop reason: SDUPTHER

## 2024-03-08 RX ORDER — DULOXETIN HYDROCHLORIDE 60 MG/1
60 CAPSULE, DELAYED RELEASE ORAL DAILY
Status: DISCONTINUED | OUTPATIENT
Start: 2024-03-09 | End: 2024-03-09 | Stop reason: HOSPADM

## 2024-03-08 RX ORDER — LEVOTHYROXINE SODIUM 0.05 MG/1
50 TABLET ORAL EVERY MORNING
Status: DISCONTINUED | OUTPATIENT
Start: 2024-03-09 | End: 2024-03-09 | Stop reason: HOSPADM

## 2024-03-08 RX ORDER — ASPIRIN 325 MG
325 TABLET ORAL ONCE
Status: COMPLETED | OUTPATIENT
Start: 2024-03-08 | End: 2024-03-08

## 2024-03-08 RX ORDER — ALLOPURINOL 100 MG/1
100 TABLET ORAL DAILY
Status: DISCONTINUED | OUTPATIENT
Start: 2024-03-09 | End: 2024-03-09 | Stop reason: HOSPADM

## 2024-03-08 RX ORDER — SODIUM CHLORIDE 9 MG/ML
40 INJECTION, SOLUTION INTRAVENOUS AS NEEDED
Status: DISCONTINUED | OUTPATIENT
Start: 2024-03-08 | End: 2024-03-09 | Stop reason: HOSPADM

## 2024-03-08 RX ORDER — ASPIRIN 81 MG/1
81 TABLET ORAL DAILY
Status: DISCONTINUED | OUTPATIENT
Start: 2024-03-09 | End: 2024-03-09 | Stop reason: HOSPADM

## 2024-03-08 RX ORDER — CLONIDINE HYDROCHLORIDE 0.1 MG/1
0.1 TABLET ORAL ONCE
Status: COMPLETED | OUTPATIENT
Start: 2024-03-08 | End: 2024-03-08

## 2024-03-08 RX ORDER — SODIUM CHLORIDE 0.9 % (FLUSH) 0.9 %
10 SYRINGE (ML) INJECTION AS NEEDED
Status: DISCONTINUED | OUTPATIENT
Start: 2024-03-08 | End: 2024-03-09 | Stop reason: HOSPADM

## 2024-03-08 RX ORDER — LOSARTAN POTASSIUM 25 MG/1
12.5 TABLET ORAL DAILY
Status: DISCONTINUED | OUTPATIENT
Start: 2024-03-09 | End: 2024-03-09 | Stop reason: HOSPADM

## 2024-03-08 RX ORDER — LABETALOL HYDROCHLORIDE 5 MG/ML
10 INJECTION, SOLUTION INTRAVENOUS EVERY 4 HOURS PRN
Status: DISCONTINUED | OUTPATIENT
Start: 2024-03-08 | End: 2024-03-09 | Stop reason: HOSPADM

## 2024-03-08 RX ORDER — SODIUM CHLORIDE 0.9 % (FLUSH) 0.9 %
10 SYRINGE (ML) INJECTION EVERY 12 HOURS SCHEDULED
Status: DISCONTINUED | OUTPATIENT
Start: 2024-03-08 | End: 2024-03-09 | Stop reason: HOSPADM

## 2024-03-08 RX ORDER — NITROGLYCERIN 0.4 MG/1
0.4 TABLET SUBLINGUAL
Status: DISCONTINUED | OUTPATIENT
Start: 2024-03-08 | End: 2024-03-09 | Stop reason: HOSPADM

## 2024-03-08 RX ADMIN — Medication 10 ML: at 21:26

## 2024-03-08 RX ADMIN — CLONIDINE HYDROCHLORIDE 0.1 MG: 0.1 TABLET ORAL at 14:36

## 2024-03-08 RX ADMIN — ASPIRIN 325 MG: 325 TABLET ORAL at 14:08

## 2024-03-08 RX ADMIN — APIXABAN 2.5 MG: 2.5 TABLET, FILM COATED ORAL at 21:26

## 2024-03-08 RX ADMIN — IOPAMIDOL 95 ML: 755 INJECTION, SOLUTION INTRAVENOUS at 18:53

## 2024-03-08 NOTE — TELEPHONE ENCOUNTER
Pt called in c c/o /84, back pain in the middle of back noticed this am, fatigue and lightheaded ness, Hx of Afib.   Pt roxane to see PCP on 3/12/2024.     At this time since pt was symptomatic she was instructed to go to [facility] for manual B/P reading and further assessment. Pt verbalized understanding.     Will send to PCP for review.

## 2024-03-08 NOTE — ED PROVIDER NOTES
EMERGENCY DEPARTMENT ENCOUNTER    Room Number:  109/1  PCP: Aramis Doty MD  Historian: Patient      HPI:  Chief Complaint: Elevated blood pressure  A complete HPI/ROS/PMH/PSH/SH/FH are unobtainable due to: Nothing  Context: Alba Correa is a 89 y.o. female with a medical history of tension, atrial fibrillation, chronic kidney disease, CHF who presents to the ED c/o acute blood pressure elevation.  Patient checks her blood pressure about once a week.  She checked it several days ago and it was 180/110.  Since then, she reports that her blood pressure has been high off and on.  She also reports intermittent dizziness and a mild headache.  She also complains of pain in her upper back that began earlier today.  Pain is worse with movement.  She denies recent injury.  She has been able to ambulate without difficulty.  Denies fever, chills, vision changes, chest pain, shortness of breath, nausea, vomiting, abdominal pain, trouble walking, or numbness/tingling/weakness in her extremities.  She went to urgent care earlier today earlier today.  Patient takes losartan 12.5 mg daily and has not missed any recent doses.            PAST MEDICAL HISTORY  Active Ambulatory Problems     Diagnosis Date Noted    CKD (chronic kidney disease) stage 4, GFR 15-29 ml/min     Depression     Gout     Hyperparathyroidism     Adaptive colitis     Osteopenia     Rheumatoid arthritis     Degeneration of intervertebral disc     Essential hypertension 11/20/2015    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    History of DVT (deep vein thrombosis) 07/11/2017    Hyperlipidemia 01/10/2018    Macrocytosis 02/28/2018    Nonrheumatic aortic (valve) insufficiency 10/08/2018    History of ischemic right MCA stroke with extension 11/20/2018    Atrial fibrillation, persistent 11/08/2019    Anemia in chronic kidney disease 12/08/2016    Other proteinuria 01/26/2017    Episode of dizziness 10/15/2020    Sinus bradycardia 05/26/2021    First degree AV block  05/26/2021    Nonrheumatic mitral valve regurgitation     Idiopathic peripheral neuropathy 04/19/2022    Lower extremity edema 04/21/2022    Acute diastolic CHF (congestive heart failure) 03/20/2022    Chest pain 04/29/2022    Acquired hypothyroidism 05/03/2022    Sick sinus syndrome 05/05/2022    Insomnia 05/19/2022    Fall from standing, initial encounter 03/07/2023    Closed fracture of left distal humerus 03/07/2023    Facial laceration 03/07/2023    Laceration of right forearm 03/07/2023    Chronic kidney disease, stage 3a 03/07/2023    Closed head injury 12/07/2023    Hematoma of thigh 12/07/2023    Traumatic hematoma of scalp 12/07/2023    Abnormal CT of spine 12/07/2023     Resolved Ambulatory Problems     Diagnosis Date Noted    DVT (deep venous thrombosis)     Edema     Elevated cholesterol     Osteoporosis     Benign essential hypertension     D (diarrhea) 11/20/2015    Open leg wound 11/20/2015    Vaginal odor 11/20/2015    Infected wound 11/20/2015    History of depression 07/11/2017    History of renal insufficiency syndrome 07/11/2017    History of degenerative disc disease 07/11/2017    History of osteoporosis 07/11/2017    History of rheumatoid arthritis 07/11/2017    Right leg weakness 08/05/2018    CVA (cerebral vascular accident) 08/06/2018    Acute intractable headache 04/02/2019    Nausea 04/02/2019    Bilateral leg weakness 04/04/2019    Chest pain 10/15/2020    Afib 10/15/2020    On amiodarone therapy 05/26/2021    History of syncope 10/10/2021    Bradycardia 03/25/2022    FATIMAH (acute kidney injury) 03/26/2022     Past Medical History:   Diagnosis Date    Chronic kidney disease     H/O complete eye exam 09/2016    IBS (irritable bowel syndrome)     OAB (overactive bladder)     PAF (paroxysmal atrial fibrillation) 11/08/2019    Peripheral neuropathy          PAST SURGICAL HISTORY  Past Surgical History:   Procedure Laterality Date    APPENDECTOMY      BREAST BIOPSY      CARDIAC  ELECTROPHYSIOLOGY PROCEDURE N/A 10/12/2021    Procedure: Loop insertion linq;  Surgeon: Tone Anguiano MD;  Location:  [facility];  Service: Cardiovascular;  Laterality: N/A;    CARDIOVERSION  2020    Dr. Colby    COLONOSCOPY      declines    ELBOW OPEN REDUCTION INTERNAL FIXATION Left 3/11/2023    Procedure: ELBOW OPEN REDUCTION INTERNAL FIXATION;  Surgeon: Ramón Encinas II, MD;  Location: [facility] MAIN OR;  Service: Orthopedics;  Laterality: Left;    HERNIA REPAIR  1965    HYSTERECTOMY      still has ovaries    MAMMO BILATERAL  2016    pt declines    PAP SMEAR  2016    declines         FAMILY HISTORY  Family History   Problem Relation Age of Onset    Alcohol abuse Other     Cancer Other     Hypertension Other     Kidney disease Other     Lung disease Other     Heart disease Mother 89    Heart failure Mother     Ovarian cancer Daughter 60    Stomach cancer Father     Kidney cancer Father 52    Lung cancer Father 52    Ovarian cancer Sister     Lung cancer Brother 65         SOCIAL HISTORY  Social History     Socioeconomic History    Marital status:      Spouse name: Peña    Number of children: 3    Years of education: High school   Tobacco Use    Smoking status: Former     Current packs/day: 0.00     Types: Cigarettes     Start date:      Quit date:      Years since quittin.2    Smokeless tobacco: Never    Tobacco comments:     caffeine use: 2 cups coffee in AM   Vaping Use    Vaping status: Never Used   Substance and Sexual Activity    Alcohol use: Yes     Comment: Occasional- glass of wine few times a week    Drug use: No    Sexual activity: Defer     Partners: Male     Birth control/protection: Surgical         ALLERGIES  Hydrocodone-acetaminophen, Sulfamethoxazole-trimethoprim, Lisinopril, and Levofloxacin    REVIEW OF SYSTEMS  Review of Systems  Included in HPI  All systems reviewed and negative except for those discussed in  HPI.      PHYSICAL EXAM  ED Triage Vitals   Temp Heart Rate Resp BP SpO2   03/08/24 1335 03/08/24 1335 03/08/24 1335 03/08/24 1341 03/08/24 1335   96.2 °F (35.7 °C) 87 20 (!) 208/114 94 %      Temp src Heart Rate Source Patient Position BP Location FiO2 (%)   03/08/24 1335 03/08/24 1335 03/08/24 1341 03/08/24 1341 --   Tympanic Monitor Lying Left arm        Physical Exam      GENERAL: Awake, alert, oriented x 3.  Well-developed, well-nourished elderly female.  Resting comfortably in no acute distress  HENT: NCAT, nares patent, no sinus tenderness, no temporal artery tenderness  EYES:, EOMI, no nystagmus  CV: Irregularly irregular rhythm, normal rate, equal bilateral radial pulses  RESPIRATORY: normal effort, clear to auscultation bilaterally  ABDOMEN: soft, nondistended, nontender  MUSCULOSKELETAL: Extremities are nontender with full range of motion.  No edema.  Neck is supple  NEURO: Speech is clear and fluent.  No aphasia.  No facial droop.  Tongue protrudes midline.  Normal strength and light touch sensation in all extremities.  Normal finger-to-nose and heel-to-shin testing bilaterally  PSYCH:  calm, cooperative  SKIN: warm, dry    Vital signs and nursing notes reviewed.          LAB RESULTS  Recent Results (from the past 24 hour(s))   ECG 12 Lead ED Triage Standing Order; Chest Pain    Collection Time: 03/08/24  1:40 PM   Result Value Ref Range    QT Interval 348 ms    QTC Interval 381 ms   Comprehensive Metabolic Panel    Collection Time: 03/08/24  1:48 PM    Specimen: Blood   Result Value Ref Range    Glucose 104 (H) 65 - 99 mg/dL    BUN 16 8 - 23 mg/dL    Creatinine 1.12 (H) 0.57 - 1.00 mg/dL    Sodium 135 (L) 136 - 145 mmol/L    Potassium 4.7 3.5 - 5.2 mmol/L    Chloride 97 (L) 98 - 107 mmol/L    CO2 26.9 22.0 - 29.0 mmol/L    Calcium 10.1 (H) 8.2 - 9.6 mg/dL    Total Protein 8.3 6.0 - 8.5 g/dL    Albumin 4.3 3.5 - 5.2 g/dL    ALT (SGPT) 11 1 - 33 U/L    AST (SGOT) 28 1 - 32 U/L    Alkaline Phosphatase 88  39 - 117 U/L    Total Bilirubin 0.3 0.0 - 1.2 mg/dL    Globulin 4.0 gm/dL    A/G Ratio 1.1 g/dL    BUN/Creatinine Ratio 14.3 7.0 - 25.0    Anion Gap 11.1 5.0 - 15.0 mmol/L    eGFR 46.8 (L) >60.0 mL/min/1.73   High Sensitivity Troponin T    Collection Time: 03/08/24  1:48 PM    Specimen: Blood   Result Value Ref Range    HS Troponin T 19 (H) <14 ng/L   Green Top (Gel)    Collection Time: 03/08/24  1:48 PM   Result Value Ref Range    Extra Tube Hold for add-ons.    Lavender Top    Collection Time: 03/08/24  1:48 PM   Result Value Ref Range    Extra Tube hold for add-on    Gold Top - SST    Collection Time: 03/08/24  1:48 PM   Result Value Ref Range    Extra Tube Hold for add-ons.    Light Blue Top    Collection Time: 03/08/24  1:48 PM   Result Value Ref Range    Extra Tube Hold for add-ons.    CBC Auto Differential    Collection Time: 03/08/24  1:48 PM    Specimen: Blood   Result Value Ref Range    WBC 4.56 3.40 - 10.80 10*3/mm3    RBC 4.27 3.77 - 5.28 10*6/mm3    Hemoglobin 13.9 12.0 - 15.9 g/dL    Hematocrit 42.9 34.0 - 46.6 %    .5 (H) 79.0 - 97.0 fL    MCH 32.6 26.6 - 33.0 pg    MCHC 32.4 31.5 - 35.7 g/dL    RDW 11.8 (L) 12.3 - 15.4 %    RDW-SD 43.6 37.0 - 54.0 fl    MPV 9.8 6.0 - 12.0 fL    Platelets 280 140 - 450 10*3/mm3    Neutrophil % 28.8 (L) 42.7 - 76.0 %    Lymphocyte % 50.4 (H) 19.6 - 45.3 %    Monocyte % 11.4 5.0 - 12.0 %    Eosinophil % 7.0 (H) 0.3 - 6.2 %    Basophil % 2.2 (H) 0.0 - 1.5 %    Immature Grans % 0.2 0.0 - 0.5 %    Neutrophils, Absolute 1.31 (L) 1.70 - 7.00 10*3/mm3    Lymphocytes, Absolute 2.30 0.70 - 3.10 10*3/mm3    Monocytes, Absolute 0.52 0.10 - 0.90 10*3/mm3    Eosinophils, Absolute 0.32 0.00 - 0.40 10*3/mm3    Basophils, Absolute 0.10 0.00 - 0.20 10*3/mm3    Immature Grans, Absolute 0.01 0.00 - 0.05 10*3/mm3    nRBC 0.0 0.0 - 0.2 /100 WBC   High Sensitivity Troponin T 2Hr    Collection Time: 03/08/24  3:57 PM    Specimen: Blood   Result Value Ref Range    HS Troponin T 23 (H)  <14 ng/L    Troponin T Delta 4 (C) >=-4 - <+4 ng/L   Lipid Panel    Collection Time: 03/08/24  3:57 PM    Specimen: Blood   Result Value Ref Range    Total Cholesterol 216 (H) 0 - 200 mg/dL    Triglycerides 142 0 - 150 mg/dL    HDL Cholesterol 43 40 - 60 mg/dL    LDL Cholesterol  147 (H) 0 - 100 mg/dL    VLDL Cholesterol 26 5 - 40 mg/dL    LDL/HDL Ratio 3.36        Ordered the above labs and reviewed the results.        RADIOLOGY  CT Angiogram Chest, CT Angiogram Abdomen Pelvis    Result Date: 3/8/2024  CT ANGIOGRAM CHEST-, CT ANGIOGRAM ABDOMEN PELVIS-  INDICATIONS: Pain. Radiation dose reduction techniques were utilized, including automated exposure control and exposure modulation based on body size.  TECHNIQUE: CT angiography of the chest, abdomen, pelvis. Three-dimensional reconstructions. NASCET criteria.  COMPARISON: Abdomen pelvis CT from 3/25/2020  FINDINGS:  Vascular:  No aortic dissection. The ascending aorta is dilated, 4.0 cm. No abdominal aortic aneurysm is noted. Scattered calcifications are seen in the aorta without high-grade stenosis (0-49% stenosis).  No pulmonary embolism.  The arteries arising from the aortic arch are patent.  Patent celiac artery. 40% narrowing at the origin of the superior mesenteric artery. The MARY is patent.  The bilateral renal arteries are patent.  Scattered calcifications are seen in the bilateral iliac and partly included femoral arteries without high-grade stenosis (0-49% stenosis).    Chest CT:  The heart size is borderline without pericardial effusion. A precarinal lymph node measures 1.5 cm short axis, abnormal, nonspecific, could be reactive in nature or could be evidence of neoplasm, clinical correlation and follow-up recommended. If further imaging evaluation is indicated, PET/CT correlation could be obtained. A 9 mm short axis right hilar lymph node is seen on axial image 68.  Thyroid nodularity is apparent, suboptimally evaluated with this technique, thyroid  ultrasound correlation advised as indicated.  The airways appear clear.  No pleural effusion or pneumothorax.  The lungs show no focal pulmonary consolidation or mass. Small fibronodular changes are seen at the lung apices. A subpleural right upper lobe nodule on image 36 measures 3 mm. A 2 mm pleural-based right upper lobe nodule is seen on image 39. Follow-up chest CT in 3 to 6 months advised to characterize change.      Abdomen pelvis CT:  Enhancing lesion of the posterior right hepatic lobe, 1.4 cm on axial image 141 is not seen on the prior unenhanced exam that may be result of difference in technique, the lesion may be new. Separately, an enhancing lesion at the hepatic dome measures 7 mm on axial image 123. These lesions may be vascular in nature, but possibility of neoplasm is not excluded, hepatic MRI correlation advised as indicated, interval follow-up can characterize change.  A subcentimeter splenic low-density is too small to characterize. A left renal cyst is evident.  Otherwise unremarkable appearance of the liver, spleen, adrenal glands, pancreas, kidneys, bladder.  No bowel obstruction or abnormal bowel thickening is identified.  No free intraperitoneal gas or free fluid.  Scattered small mesenteric and para-aortic lymph nodes are seen that are not significant by size criteria.   Degenerative changes are seen in the spine. No acute fracture is identified.          1. No aortic dissection. Dilated ascending aorta, 4.0 cm. Estimated 40% focal stenosis at the origin of the superior mesenteric artery.  2. Small pulmonary nodules, nonspecific mediastinal adenopathy, follow-up evaluation recommended.  3. Indeterminate liver lesions, further evaluation/follow-up advised.  4. Thyroid nodularity.  This report was finalized on 3/8/2024 7:23 PM by Dr. Levy Mock M.D on Workstation: [id number]      XR Chest 1 View    Result Date: 3/8/2024  XR CHEST 1 VW-  HISTORY: Female who is 89 years-old, chest pain   TECHNIQUE: Frontal view of the chest  COMPARISON: 12/7/2023  FINDINGS: The heart size is borderline. Aorta is calcified. Pulmonary vasculature is unremarkable. No focal pulmonary consolidation, pleural effusion, or pneumothorax. Thoracic dextroscoliosis is evident. No acute osseous process.      No no focal pulmonary consolidation. Borderline heart size. Follow-up as clinical indications persist.  This report was finalized on 3/8/2024 2:17 PM by Dr. Levy Mock M.D on Workstation: [id number]       Ordered the above noted radiological studies. Reviewed by me in PACS.            PROCEDURES  Procedures        OUTPATIENT MEDICATION MANAGEMENT:  Current Facility-Administered Medications Ordered in Epic   Medication Dose Route Frequency Provider Last Rate Last Admin    [START ON 3/9/2024] allopurinol (ZYLOPRIM) tablet 100 mg  100 mg Oral Daily QaAbbie teague APRN        apixaban (ELIQUIS) tablet 2.5 mg  2.5 mg Oral Once QaAbbie teague APRN        [START ON 3/9/2024] aspirin EC tablet 81 mg  81 mg Oral Daily Lynette Murillo APRN        [START ON 3/9/2024] DULoxetine (CYMBALTA) DR capsule 60 mg  60 mg Oral Daily Abbie Coombs APRN        labetalol (NORMODYNE,TRANDATE) injection 10 mg  10 mg Intravenous Q4H PRN Lynette Murillo APRN        [START ON 3/9/2024] levothyroxine (SYNTHROID, LEVOTHROID) tablet 50 mcg  50 mcg Oral QAM Abbie Coombs APRN        [START ON 3/9/2024] losartan (COZAAR) tablet 12.5 mg  12.5 mg Oral Daily Qaho, GROVER Leiva        nitroglycerin (NITROSTAT) SL tablet 0.4 mg  0.4 mg Sublingual Q5 Min PRN Lynette Murillo APRN        sodium chloride 0.9 % flush 10 mL  10 mL Intravenous PRN Emergency, Triage Protocol, MD        sodium chloride 0.9 % flush 10 mL  10 mL Intravenous Q12H Lynette Murillo APRN        sodium chloride 0.9 % flush 10 mL  10 mL Intravenous PRN Lynette Murillo APRN        sodium chloride 0.9 % infusion 40 mL  40 mL Intravenous PRN Herth, Lynette, GROVER         No current  [facility]-ordered outpatient medications on file.           MEDICATIONS GIVEN IN ER  Medications   sodium chloride 0.9 % flush 10 mL (has no administration in time range)   nitroglycerin (NITROSTAT) SL tablet 0.4 mg (has no administration in time range)   sodium chloride 0.9 % flush 10 mL (has no administration in time range)   sodium chloride 0.9 % flush 10 mL (has no administration in time range)   sodium chloride 0.9 % infusion 40 mL (has no administration in time range)   aspirin EC tablet 81 mg (has no administration in time range)   labetalol (NORMODYNE,TRANDATE) injection 10 mg (has no administration in time range)   allopurinol (ZYLOPRIM) tablet 100 mg (has no administration in time range)   DULoxetine (CYMBALTA) DR capsule 60 mg (has no administration in time range)   apixaban (ELIQUIS) tablet 2.5 mg (has no administration in time range)   levothyroxine (SYNTHROID, LEVOTHROID) tablet 50 mcg (has no administration in time range)   losartan (COZAAR) tablet 12.5 mg (has no administration in time range)   aspirin tablet 325 mg (325 mg Oral Given 3/8/24 1408)   cloNIDine (CATAPRES) tablet 0.1 mg (0.1 mg Oral Given 3/8/24 1436)   iopamidol (ISOVUE-370) 76 % injection 100 mL (95 mL Intravenous Given by Other 3/8/24 1853)                   MEDICAL DECISION MAKING, PROGRESS, and CONSULTS    All labs have been independently reviewed by me.  All radiology studies have been reviewed by me and I have also reviewed the radiology report.   EKG's independently viewed and interpreted by me.  Discussion below represents my analysis of pertinent findings related to patient's condition, differential diagnosis, treatment plan and final disposition.      Additional sources:    - Discussed/ obtained information from independent historians: None    - External (non-ED) record review: Patient was seen at urgent care earlier today for headache back pain, and elevated blood pressure.  She was sent to the ED for further evaluation.  She was  last admitted here in December 2023 for dizziness    -Prescription drug monitoring program review:     N/A    - Chronic or social conditions impacting patient care (Social Determinants of Health): None          Orders placed during this visit:  Orders Placed This Encounter   Procedures    XR Chest 1 View    CT Angiogram Chest    CT Angiogram Abdomen Pelvis    Bryant Draw    Comprehensive Metabolic Panel    High Sensitivity Troponin T    CBC Auto Differential    High Sensitivity Troponin T 2Hr    Basic Metabolic Panel    CBC (No Diff)    High Sensitivity Troponin T    Lipid Panel    Magnesium    NPO Diet NPO Type: Sips with Meds    Diet: Regular/House; Fluid Consistency: Thin (IDDSI 0)    Undress & Gown    Vital Signs Every 15 Minutes Until Stable, Then Every 4 Hours    Telemetry - Place Orders & Notify Provider of Results When Patient Experiences Acute Chest Pain, Dysrhythmia or Respiratory Distress    May Be Off Telemetry for Tests    Pulse Oximetry, Continuous    Notify Physician For Unrelieved Chest Pain    Intake & Output    Weigh Patient    Saline Lock & Maintain IV Access    Code Status and Medical Interventions:    Inpatient Cardiology Consult    Oxygen Therapy- Nasal Cannula; Titrate 1-6 LPM Per SpO2; 90 - 95%    ECG 12 Lead ED Triage Standing Order; Chest Pain    ECG 12 Lead ED Triage Standing Order; Chest Pain    ECG 12 Lead Chest Pain    ECG 12 Lead Chest Pain    ECG 12 Lead Chest Pain    Adult Transthoracic Echo Complete W/ Cont if Necessary Per Protocol    Insert Peripheral IV    Insert Peripheral IV    Initiate ED Observation Status    CBC & Differential    Green Top (Gel)    Lavender Top    Gold Top - SST    Light Blue Top         Additional orders considered but not ordered:          Differential diagnosis includes, but is not limited to:    Poorly controlled hypertension, acute coronary syndrome, electrolyte abnormality, aortic dissection/aneurysm, musculoskeletal pain, degenerative disc  disease      Independent interpretation of labs, radiology studies, and discussions with consultants:  ED Course as of 03/08/24 2026   Fri Mar 08, 2024   1419 BP(!): 210/110 [WH]   1422 EKG personally interpreted by me at 1345.  My personal interpretation is:          EKG time: 1340  Rhythm/Rate: Atrial fibrillation, rate 72  P waves and HI: Irregular, varying  QRS, axis: LAD  ST and T waves: Specific T wave changes in the anterior leads     Interpreted Contemporaneously by me, independently viewed  EKG is not significantly changed compared to prior EKG done on 12/7/2023   [WH]   1423 HS Troponin T(!): 19 [WH]   1423 WBC: 4.56 [WH]   1423 Hemoglobin: 13.9 [WH]   1423 Glucose(!): 104 [WH]   1423 Creatinine(!): 1.12  Stable [WH]   1423 Chest x-ray personally interpreted by me.  My personal trepidation is: Heart size is borderline.  Lungs are clear.  No pleural effusion. [WH]   1424 EKG personally interpreted by me at 1416.  My personal interpretation is:          EKG time: 1411  Rhythm/Rate: Sinus rhythm with PACs, rate 60  P waves and HI: LAE  QRS, axis: LAD, LVH, late transition  ST and T waves: Nonspecific ST/T wave changes in the inferior leads    Interpreted Contemporaneously by me, independently viewed  EKG is not significantly changed compared to prior EKG done on 10/10/2023   [WH]   1440 BP(!): 196/106 [WH]   1505 BP(!): 200/95 [WH]   1533 Blood pressure is currently 184/94 [WH]   1605 BP: 156/96 [WH]   1638 HS Troponin T(!): 23 [WH]   1638 Troponin T Delta(!!): 4 [WH]   1651 Blood pressure is now 158/92.  O2 sats are 99% on room air.  Heart rate is in the 70s.  Patient is no longer having any back pain.  Shared decision making was discussed and admission was recommended.  She is agreeable with this. [WH]   1653 Case discussed with MINA Ojeda, and she agrees to admit the patient to Dr. Muñiz.  Pertinent history, exam findings, test results, ED course, and diagnoses were discussed with her. [WH]   1710 MDM:  Patient presented to ED complaining of elevated blood pressure, mild headache, and upper back pain.  She denied chest pain.  Back pain seems to be musculoskeletal and was reproducible with palpation.  Initial troponin was 19.  Repeat had a delta of +4.  EKG did not have any acute ischemic changes.  Patient was initially quite hypertensive.  Blood pressure improved with 1 dose of clonidine.  She will be admitted for further evaluation. [WH]      ED Course User Index  [WH] Sami Gomez MD         COMPLEXITY OF CARE  The patient requires admission.      DIAGNOSIS  Final diagnoses:   Poorly-controlled hypertension   Elevated troponin   Upper back pain         DISPOSITION  ADMISSION    Discussed treatment plan and reason for admission with pt/family and admitting physician.  Pt/family voiced understanding of the plan for admission for further testing/treatment as needed.               Latest Documented Vital Signs:  AS OF 20:26 EST VITALS:    BP - 139/73  HR - 76  TEMP - 98.4 °F (36.9 °C) (Oral)  O2 SATS - 99%            --    Please note that portions of this were completed with a voice recognition program.       Note Disclaimer: At [facility], we believe that sharing information builds trust and better relationships. You are receiving this note because you are receiving care at [facility] or recently visited. It is possible you will see health information before a provider has talked with you about it. This kind of information can be easy to misunderstand. To help you fully understand what it means for your health, we urge you to discuss this note with your provider.             Sami Gomez MD  03/08/24 2027

## 2024-03-08 NOTE — ED TRIAGE NOTES
Patient reports headache and high BP for 3 days; back pain since this morning. Patient states she is compliant w/ home BP meds.

## 2024-03-08 NOTE — PROGRESS NOTES
LINDA THRASHER Attestation Note    I supervised care provided by the midlevel provider.    The MINA and I have discussed this patient's history, physical exam, and treatment plan. I have reviewed the documentation and personally had a face to face interaction with the patient  I affirm the documentation and agree with the treatment and plan. I provided a substantive portion of the care of this patient.  I personally performed the physical exam, in its entirety.  My personal findings are documented in below:    History:  89-year-old female was noticed uncontrolled blood pressure over the last 3 days also has been experiencing increased amount of back pain with some abdominal discomfort and occasional chest discomfort though she seems to be downplaying that a bit at this time.  Patient's primary discomfort that she tells me about at this time is headaches that she has been having for several weeks.    Physical Exam:  General: No acute distress.  HENT: NCAT, PERRL, Nares patent.  Eyes: no scleral icterus.  Neck: trachea midline, no ROM limitations.  CV: Pink warm and well-perfused throughout.  Regular  Respiratory: No distress or increased work of breathing  Abdomen: soft, no focal tenderness or rigidity.  No pulsatile mass appreciated.  Musculoskeletal: no deformity.  Neuro: alert, moves all extremities, follows commands.  Skin: warm, dry.    Assessment and Plan:  Patient has asymmetric bilateral upper extremity blood pressures greater than 20 mmHg difference between her systolic pressures.  Patient has 89 with back pain and now some twinges of discomfort in her chest as well as abdominal pain, aortic comes to mind and thus CT angiography of the chest abdomen pelvis have been ordered.  I have asked the patient not to have anything to eat or drink until we have cleared this.  We will keep a close eye on her blood pressure and try to maintain systolic less than 160.

## 2024-03-08 NOTE — ED NOTES
Nursing report ED to floor  Alba Correa  89 y.o.  female    HPI :  HPI (Adult)  Stated Reason for Visit: back pain today (denies injury), high BP and headache for 3 days  History Obtained From: patient    Chief Complaint  Chief Complaint   Patient presents with    Hypertension    Back Pain    Headache       Admitting doctor:   Binu Muñiz MD    Admitting diagnosis:   The primary encounter diagnosis was Poorly-controlled hypertension. Diagnoses of Elevated troponin and Upper back pain were also pertinent to this visit.    Code status:   Current Code Status       Date Active Code Status Order ID Comments User Context       Prior            Allergies:   Hydrocodone-acetaminophen, Sulfamethoxazole-trimethoprim, Lisinopril, and Levofloxacin    Isolation:   No active isolations    Intake and Output  No intake or output data in the 24 hours ending 03/08/24 1657    Weight:       03/08/24  1353   Weight: 49.9 kg (110 lb)       Most recent vitals:   Vitals:    03/08/24 1501 03/08/24 1531 03/08/24 1601 03/08/24 1631   BP: (!) 200/95 (!) 184/94 156/96 158/92   BP Location:       Patient Position:       Pulse: 75 78 78 96   Resp:  16     Temp:       TempSrc:       SpO2: 95% 95% 96% 98%   Weight:       Height:           Active LDAs/IV Access:   Lines, Drains & Airways       Active LDAs       Name Placement date Placement time Site Days    Peripheral IV 03/08/24 1347 Right Antecubital 03/08/24  1347  Antecubital  less than 1                    Labs (abnormal labs have a star):   Labs Reviewed   COMPREHENSIVE METABOLIC PANEL - Abnormal; Notable for the following components:       Result Value    Glucose 104 (*)     Creatinine 1.12 (*)     Sodium 135 (*)     Chloride 97 (*)     Calcium 10.1 (*)     eGFR 46.8 (*)     All other components within normal limits    Narrative:     GFR Normal >60  Chronic Kidney Disease <60  Kidney Failure <15    The GFR formula is only valid for adults with stable renal function between ages 18 and  70.   TROPONIN - Abnormal; Notable for the following components:    HS Troponin T 19 (*)     All other components within normal limits    Narrative:     High Sensitive Troponin T Reference Range:  <14.0 ng/L- Negative Female for AMI  <22.0 ng/L- Negative Male for AMI  >=14 - Abnormal Female indicating possible myocardial injury.  >=22 - Abnormal Male indicating possible myocardial injury.   Clinicians would have to utilize clinical acumen, EKG, Troponin, and serial changes to determine if it is an Acute Myocardial Infarction or myocardial injury due to an underlying chronic condition.        CBC WITH AUTO DIFFERENTIAL - Abnormal; Notable for the following components:    .5 (*)     RDW 11.8 (*)     Neutrophil % 28.8 (*)     Lymphocyte % 50.4 (*)     Eosinophil % 7.0 (*)     Basophil % 2.2 (*)     Neutrophils, Absolute 1.31 (*)     All other components within normal limits   HIGH SENSITIVITIY TROPONIN T 2HR - Abnormal; Notable for the following components:    HS Troponin T 23 (*)     Troponin T Delta 4 (*)     All other components within normal limits    Narrative:     High Sensitive Troponin T Reference Range:  <14.0 ng/L- Negative Female for AMI  <22.0 ng/L- Negative Male for AMI  >=14 - Abnormal Female indicating possible myocardial injury.  >=22 - Abnormal Male indicating possible myocardial injury.   Clinicians would have to utilize clinical acumen, EKG, Troponin, and serial changes to determine if it is an Acute Myocardial Infarction or myocardial injury due to an underlying chronic condition.        RAINBOW DRAW    Narrative:     The following orders were created for panel order [place] Draw.  Procedure                               Abnormality         Status                     ---------                               -----------         ------                     Green Top (Gel)[[id number]]                                  Final result               Lavender Top[[id number]]                                      Final result               Gold Top - SST[[id number]]                                   Final result               Light Blue Top[[id number]]                                   Final result                 Please view results for these tests on the individual orders.   LIPID PANEL   CBC AND DIFFERENTIAL    Narrative:     The following orders were created for panel order CBC & Differential.  Procedure                               Abnormality         Status                     ---------                               -----------         ------                     CBC Auto Differential[[id number]]        Abnormal            Final result                 Please view results for these tests on the individual orders.   GREEN TOP   LAVENDER TOP   GOLD TOP - SST   LIGHT BLUE TOP       EKG:   ECG 12 Lead ED Triage Standing Order; Chest Pain   Final Result   HEART RATE= 72  bpm   RR Interval= 833  ms   WI Interval= 152  ms   P Horizontal Axis= 142  deg   P Front Axis= 0  deg   QRSD Interval= 88  ms   QT Interval= 348  ms   QTcB= 381  ms   QRS Axis= -36  deg   T Wave Axis= 46  deg   - ABNORMAL ECG -   Sinus rhythm   Supraventricular bigeminy   Left axis deviation   Anteroseptal infarct, age indeterminate   Poor quality tracing, repeat.   Electronically Signed By: Onelia Hernandez ([facility]) 08-Mar-2024 16:45:45   Date and Time of Study: 2024-03-08 13:40:49      ECG 12 Lead Chest Pain    (Results Pending)   ECG 12 Lead Chest Pain    (Results Pending)       Meds given in ED:   Medications   sodium chloride 0.9 % flush 10 mL (has no administration in time range)   nitroglycerin (NITROSTAT) SL tablet 0.4 mg (has no administration in time range)   sodium chloride 0.9 % flush 10 mL (has no administration in time range)   sodium chloride 0.9 % flush 10 mL (has no administration in time range)   sodium chloride 0.9 % infusion 40 mL (has no administration in time range)   aspirin EC tablet 81 mg (has no administration in time range)   aspirin  tablet 325 mg (325 mg Oral Given 3/8/24 1408)   cloNIDine (CATAPRES) tablet 0.1 mg (0.1 mg Oral Given 3/8/24 1436)       Imaging results:  XR Chest 1 View    Result Date: 3/8/2024  No no focal pulmonary consolidation. Borderline heart size. Follow-up as clinical indications persist.  This report was finalized on 3/8/2024 2:17 PM by Dr. Levy Mock M.D on Workstation: [employer]       Ambulatory status:   - standby    Social issues:   Social History     Socioeconomic History    Marital status:      Spouse name: Ray    Number of children: 3    Years of education: High school   Tobacco Use    Smoking status: Former     Current packs/day: 0.00     Types: Cigarettes     Start date:      Quit date:      Years since quittin.2    Smokeless tobacco: Never    Tobacco comments:     caffeine use: 2 cups coffee in AM   Vaping Use    Vaping status: Never Used   Substance and Sexual Activity    Alcohol use: Yes     Comment: Occasional- glass of wine few times a week    Drug use: No    Sexual activity: Defer     Partners: Male     Birth control/protection: Surgical       Peripheral Neurovascular  Peripheral Neurovascular (Adult)  Peripheral Neurovascular WDL: WDL    Neuro Cognitive  Neuro Cognitive (Adult)  Cognitive/Neuro/Behavioral WDL: .WDL except  Additional Documentation: [place] Coma Scale (Group), Pupils (Group), Headache Assessment (Group)  Headache Assessment  Headache Location: frontal  Description/Character: dull  Associated Signs/Symptoms: neck pain (feels light headedness)  Pupils  Pupil PERRLA: yes  Maximo Coma Scale  Best Eye Response: 4-->(E4) spontaneous  Best Motor Response: 6-->(M6) obeys commands  Best Verbal Response: 5-->(V5) oriented  [place] Coma Scale Score: 15    Learning  Learning Assessment (Adult)  Learning Readiness and Ability: no barriers identified  Education Provided  Person Taught: patient    Respiratory  Respiratory WDL  Respiratory WDL: WDL    Abdominal Pain       Pain  Assessments  Pain (Adult)  (0-10) Pain Rating: Rest: 2  Pain Location: back  Pain Side/Orientation: upper  Pain Onset/Duration: this morning  Pain Description: constant, aching  Pain Assessment Additional Detail  Pain Onset/Duration: this morning    NIH Stroke Scale       Shagufta Molina RN  03/08/24 16:57 EST

## 2024-03-09 ENCOUNTER — READMISSION MANAGEMENT (OUTPATIENT)
Dept: CALL CENTER | Facility: HOSPITAL | Age: 89
End: 2024-03-09
Payer: MEDICARE

## 2024-03-09 VITALS
WEIGHT: 103 LBS | HEIGHT: 62 IN | HEART RATE: 71 BPM | BODY MASS INDEX: 18.95 KG/M2 | DIASTOLIC BLOOD PRESSURE: 86 MMHG | TEMPERATURE: 98.4 F | RESPIRATION RATE: 16 BRPM | SYSTOLIC BLOOD PRESSURE: 150 MMHG | OXYGEN SATURATION: 96 %

## 2024-03-09 LAB
ANION GAP SERPL CALCULATED.3IONS-SCNC: 11.5 MMOL/L (ref 5–15)
BUN SERPL-MCNC: 15 MG/DL (ref 8–23)
BUN/CREAT SERPL: 15.6 (ref 7–25)
CALCIUM SPEC-SCNC: 9.5 MG/DL (ref 8.2–9.6)
CHLORIDE SERPL-SCNC: 101 MMOL/L (ref 98–107)
CO2 SERPL-SCNC: 24.5 MMOL/L (ref 22–29)
CREAT SERPL-MCNC: 0.96 MG/DL (ref 0.57–1)
DEPRECATED RDW RBC AUTO: 43.6 FL (ref 37–54)
EGFRCR SERPLBLD CKD-EPI 2021: 56.3 ML/MIN/1.73
ERYTHROCYTE [DISTWIDTH] IN BLOOD BY AUTOMATED COUNT: 11.8 % (ref 12.3–15.4)
GLUCOSE SERPL-MCNC: 93 MG/DL (ref 65–99)
HCT VFR BLD AUTO: 39.8 % (ref 34–46.6)
HGB BLD-MCNC: 12.9 G/DL (ref 12–15.9)
MAGNESIUM SERPL-MCNC: 2.1 MG/DL (ref 1.6–2.4)
MCH RBC QN AUTO: 32.7 PG (ref 26.6–33)
MCHC RBC AUTO-ENTMCNC: 32.4 G/DL (ref 31.5–35.7)
MCV RBC AUTO: 100.8 FL (ref 79–97)
PLATELET # BLD AUTO: 268 10*3/MM3 (ref 140–450)
PMV BLD AUTO: 10.1 FL (ref 6–12)
POTASSIUM SERPL-SCNC: 4.1 MMOL/L (ref 3.5–5.2)
RBC # BLD AUTO: 3.95 10*6/MM3 (ref 3.77–5.28)
SODIUM SERPL-SCNC: 137 MMOL/L (ref 136–145)
TROPONIN T SERPL HS-MCNC: 20 NG/L
WBC NRBC COR # BLD AUTO: 6.11 10*3/MM3 (ref 3.4–10.8)

## 2024-03-09 PROCEDURE — 83735 ASSAY OF MAGNESIUM: CPT | Performed by: NURSE PRACTITIONER

## 2024-03-09 PROCEDURE — 84484 ASSAY OF TROPONIN QUANT: CPT | Performed by: NURSE PRACTITIONER

## 2024-03-09 PROCEDURE — 93010 ELECTROCARDIOGRAM REPORT: CPT | Performed by: INTERNAL MEDICINE

## 2024-03-09 PROCEDURE — 93005 ELECTROCARDIOGRAM TRACING: CPT | Performed by: NURSE PRACTITIONER

## 2024-03-09 PROCEDURE — 99214 OFFICE O/P EST MOD 30 MIN: CPT | Performed by: STUDENT IN AN ORGANIZED HEALTH CARE EDUCATION/TRAINING PROGRAM

## 2024-03-09 PROCEDURE — 80048 BASIC METABOLIC PNL TOTAL CA: CPT | Performed by: NURSE PRACTITIONER

## 2024-03-09 PROCEDURE — G0378 HOSPITAL OBSERVATION PER HR: HCPCS

## 2024-03-09 PROCEDURE — 85027 COMPLETE CBC AUTOMATED: CPT | Performed by: NURSE PRACTITIONER

## 2024-03-09 RX ORDER — AMLODIPINE BESYLATE 5 MG/1
2.5 TABLET ORAL
Status: DISCONTINUED | OUTPATIENT
Start: 2024-03-09 | End: 2024-03-09 | Stop reason: HOSPADM

## 2024-03-09 RX ORDER — AMLODIPINE BESYLATE 2.5 MG/1
2.5 TABLET ORAL
Qty: 30 TABLET | Refills: 0 | Status: SHIPPED | OUTPATIENT
Start: 2024-03-09 | End: 2024-03-13 | Stop reason: SDUPTHER

## 2024-03-09 RX ORDER — AMLODIPINE BESYLATE 5 MG/1
5 TABLET ORAL
Status: DISCONTINUED | OUTPATIENT
Start: 2024-03-09 | End: 2024-03-09

## 2024-03-09 RX ADMIN — Medication 10 ML: at 10:35

## 2024-03-09 RX ADMIN — AMLODIPINE BESYLATE 2.5 MG: 5 TABLET ORAL at 10:32

## 2024-03-09 NOTE — OUTREACH NOTE
Prep Survey      Flowsheet Row Responses   [facility] patient discharged from? [place]   Is LACE score < 7 ? No   Eligibility [facility]   Date of Admission 03/08/24   Date of Discharge 03/09/24   Discharge Disposition Home or Self Care   Discharge diagnosis Chest pain   Does the patient have one of the following disease processes/diagnoses(primary or secondary)? Other   Prep survey completed? Yes            Brittny ROSALES - Registered Nurse

## 2024-03-09 NOTE — PROGRESS NOTES
MD ATTESTATION NOTE    The MINA and I have discussed this patient's history, physical exam, and treatment plan.  I have reviewed the documentation and personally had a face to face interaction with the patient. I affirm the documentation and agree with the treatment and plan.  The attached note describes my personal findings.      I provided a substantive portion of the care of the patient.  I personally performed the physical exam in its entirety, and below are my findings.        Brief HPI: This patient is a 89-year-old female with a history of hypertension that reports significant elevations in her blood pressure readings over the past few days.  She was admitted to the observation unit due to hypertension as well as reported chest and back discomfort.  Currently, she reports that her chest discomfort is very minimal.  She denies shortness of breath, nausea/vomiting, diaphoresis, or fever/chills.      PHYSICAL EXAM  ED Triage Vitals   Temp Heart Rate Resp BP SpO2   03/08/24 1335 03/08/24 1335 03/08/24 1335 03/08/24 1341 03/08/24 1335   96.2 °F (35.7 °C) 87 20 (!) 208/114 94 %      Temp src Heart Rate Source Patient Position BP Location FiO2 (%)   03/08/24 1335 03/08/24 1335 03/08/24 1341 03/08/24 1341 --   Tympanic Monitor Lying Left arm          GENERAL: Resting comfortably and in no acute distress, nontoxic in appearance  HENT: nares patent  EYES: no scleral icterus  CV: regular rhythm, normal rate, no M/R/G  RESPIRATORY: normal effort, lungs clear bilaterally  ABDOMEN: soft, nontender, no rebound or guarding  MUSCULOSKELETAL: no deformity, no edema  NEURO: alert, moves all extremities, follows commands  PSYCH:  calm, cooperative  SKIN: warm, dry    Vital signs and nursing notes reviewed.        Plan: The patient's blood pressure is currently very well-controlled following oral and IV treatment with medication.  We will obtain an echocardiogram and ask cardiology to see the patient in morning consultation.  Will  monitor and reassess following.

## 2024-03-09 NOTE — DISCHARGE SUMMARY
ED OBSERVATION PROGRESS/DISCHARGE SUMMARY    Date of Admission: 3/8/2024   LOS: 0 days   PCP: Aramis Doty MD    Subjective patient reports feeling well this morning, voices no complaints.  Eager to go home.  Denies chest pain.    Hospital Outcome: 89-year-old female admitted to the observation unit for further evaluation of elevated blood pressure and lightheadedness.  Patient arrived to ED hypertensive 200s over 100s.  Patient received 0.1 mg clonidine p.o. in ED and blood pressure improved.  Patient is on losartan 12.5 mg at home.    Patient also complained of back pain.  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  These findings were discussed with patient.    Patient was seen by cardiology, discussed with Dr. Hightower.  Patient will be discharged home on amlodipine 2.5 mg daily, continue home dose losartan.  No further cardiac testing warranted this admission.  Usual return to ER precautions advised, patient expresses understanding and is in agreement with plan.    ROS:  General: no fevers, chills  Respiratory: no cough, dyspnea  Cardiovascular: no chest pain, palpitations  Abdomen: No abdominal pain, nausea, vomiting, or diarrhea  Neurologic: No focal weakness    Objective   Physical Exam:  I have reviewed the vital signs.  Temp:  [96.2 °F (35.7 °C)-98.4 °F (36.9 °C)] 98 °F (36.7 °C)  Heart Rate:  [70-98] 82  Resp:  [16-20] 16  BP: (128-210)/() 144/91  General Appearance:    Alert, cooperative, no distress  Head:    Normocephalic, atraumatic  Eyes:    Sclerae anicteric  Neck:   Supple, no mass  Lungs: Clear to auscultation bilaterally, respirations unlabored  Heart: Regular rate and rhythm, S1 and S2 normal, no murmur, rub or gallop  Abdomen:  Soft, nontender, bowel sounds active, nondistended  Extremities: No clubbing, cyanosis,  or edema to lower extremities  Pulses:  2+ and symmetric in distal lower extremities  Skin: No rashes   Neurologic: Oriented x3, Normal strength to extremities    Results Review:    I have reviewed the labs, radiology results and diagnostic studies.    Results from last 7 days   Lab Units 03/09/24  0336   WBC 10*3/mm3 6.11   HEMOGLOBIN g/dL 12.9   HEMATOCRIT % 39.8   PLATELETS 10*3/mm3 268     Results from last 7 days   Lab Units 03/09/24  0336 03/08/24  1348   SODIUM mmol/L 137 135*   POTASSIUM mmol/L 4.1 4.7   CHLORIDE mmol/L 101 97*   CO2 mmol/L 24.5 26.9   BUN mg/dL 15 16   CREATININE mg/dL 0.96 1.12*   CALCIUM mg/dL 9.5 10.1*   BILIRUBIN mg/dL  --  0.3   ALK PHOS U/L  --  88   ALT (SGPT) U/L  --  11   AST (SGOT) U/L  --  28   GLUCOSE mg/dL 93 104*     Imaging Results (Last 24 Hours)       Procedure Component Value Units Date/Time    CT Angiogram Chest [[id number]] Collected: 03/08/24 1909     Updated: 03/08/24 1926    Narrative:      CT ANGIOGRAM CHEST-, CT ANGIOGRAM ABDOMEN PELVIS-     INDICATIONS: Pain. Radiation dose reduction techniques were utilized,  including automated exposure control and exposure modulation based on  body size.     TECHNIQUE: CT angiography of the chest, abdomen, pelvis.  Three-dimensional reconstructions. NASCET criteria.     COMPARISON: Abdomen pelvis CT from 3/25/2020     FINDINGS:     Vascular:     No aortic dissection. The ascending aorta is dilated, 4.0 cm. No  abdominal aortic aneurysm is noted. Scattered calcifications are seen in  the aorta without high-grade stenosis (0-49% stenosis).     No pulmonary embolism.     The arteries arising from the aortic arch are patent.     Patent celiac artery. 40% narrowing at the origin of the superior  mesenteric artery. The MARY is patent.     The bilateral renal arteries are patent.     Scattered calcifications are seen in the bilateral iliac and partly  included femoral arteries without high-grade stenosis (0-49% stenosis).           Chest  CT:     The heart size is borderline without pericardial effusion. A precarinal  lymph node measures 1.5 cm short axis, abnormal, nonspecific, could be  reactive in nature or could be evidence of neoplasm, clinical  correlation and follow-up recommended. If further imaging evaluation is  indicated, PET/CT correlation could be obtained. A 9 mm short axis right  hilar lymph node is seen on axial image 68.     Thyroid nodularity is apparent, suboptimally evaluated with this  technique, thyroid ultrasound correlation advised as indicated.     The airways appear clear.     No pleural effusion or pneumothorax.     The lungs show no focal pulmonary consolidation or mass. Small  fibronodular changes are seen at the lung apices. A subpleural right  upper lobe nodule on image 36 measures 3 mm. A 2 mm pleural-based right  upper lobe nodule is seen on image 39. Follow-up chest CT in 3 to 6  months advised to characterize change.                 Abdomen pelvis CT:     Enhancing lesion of the posterior right hepatic lobe, 1.4 cm on axial  image 141 is not seen on the prior unenhanced exam that may be result of  difference in technique, the lesion may be new. Separately, an enhancing  lesion at the hepatic dome measures 7 mm on axial image 123. These  lesions may be vascular in nature, but possibility of neoplasm is not  excluded, hepatic MRI correlation advised as indicated, interval  follow-up can characterize change.     A subcentimeter splenic low-density is too small to characterize. A left  renal cyst is evident.     Otherwise unremarkable appearance of the liver, spleen, adrenal glands,  pancreas, kidneys, bladder.     No bowel obstruction or abnormal bowel thickening is identified.     No free intraperitoneal gas or free fluid.     Scattered small mesenteric and para-aortic lymph nodes are seen that are  not significant by size criteria.        Degenerative changes are seen in the spine. No acute fracture is  identified.              Impression:            1. No aortic dissection. Dilated ascending aorta, 4.0 cm. 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     2. Small pulmonary nodules, nonspecific mediastinal adenopathy,  follow-up evaluation recommended.     3. Indeterminate liver lesions, further evaluation/follow-up advised.     4. Thyroid nodularity.     This report was finalized on 3/8/2024 7:23 PM by Dr. Levy Mock M.D on Workstation: [id number]       CT Angiogram Abdomen Pelvis [[id number]] Collected: 03/08/24 1909     Updated: 03/08/24 1926    Narrative:      CT ANGIOGRAM CHEST-, CT ANGIOGRAM ABDOMEN PELVIS-     INDICATIONS: Pain. Radiation dose reduction techniques were utilized,  including automated exposure control and exposure modulation based on  body size.     TECHNIQUE: CT angiography of the chest, abdomen, pelvis.  Three-dimensional reconstructions. NASCET criteria.     COMPARISON: Abdomen pelvis CT from 3/25/2020     FINDINGS:     Vascular:     No aortic dissection. The ascending aorta is dilated, 4.0 cm. No  abdominal aortic aneurysm is noted. Scattered calcifications are seen in  the aorta without high-grade stenosis (0-49% stenosis).     No pulmonary embolism.     The arteries arising from the aortic arch are patent.     Patent celiac artery. 40% narrowing at the origin of the superior  mesenteric artery. The MARY is patent.     The bilateral renal arteries are patent.     Scattered calcifications are seen in the bilateral iliac and partly  included femoral arteries without high-grade stenosis (0-49% stenosis).           Chest CT:     The heart size is borderline without pericardial effusion. A precarinal  lymph node measures 1.5 cm short axis, abnormal, nonspecific, could be  reactive in nature or could be evidence of neoplasm, clinical  correlation and follow-up recommended. If further imaging evaluation is  indicated, PET/CT correlation could be obtained. A 9 mm short axis  right  hilar lymph node is seen on axial image 68.     Thyroid nodularity is apparent, suboptimally evaluated with this  technique, thyroid ultrasound correlation advised as indicated.     The airways appear clear.     No pleural effusion or pneumothorax.     The lungs show no focal pulmonary consolidation or mass. Small  fibronodular changes are seen at the lung apices. A subpleural right  upper lobe nodule on image 36 measures 3 mm. A 2 mm pleural-based right  upper lobe nodule is seen on image 39. Follow-up chest CT in 3 to 6  months advised to characterize change.                 Abdomen pelvis CT:     Enhancing lesion of the posterior right hepatic lobe, 1.4 cm on axial  image 141 is not seen on the prior unenhanced exam that may be result of  difference in technique, the lesion may be new. Separately, an enhancing  lesion at the hepatic dome measures 7 mm on axial image 123. These  lesions may be vascular in nature, but possibility of neoplasm is not  excluded, hepatic MRI correlation advised as indicated, interval  follow-up can characterize change.     A subcentimeter splenic low-density is too small to characterize. A left  renal cyst is evident.     Otherwise unremarkable appearance of the liver, spleen, adrenal glands,  pancreas, kidneys, bladder.     No bowel obstruction or abnormal bowel thickening is identified.     No free intraperitoneal gas or free fluid.     Scattered small mesenteric and para-aortic lymph nodes are seen that are  not significant by size criteria.        Degenerative changes are seen in the spine. No acute fracture is  identified.             Impression:            1. No aortic dissection. Dilated ascending aorta, 4.0 cm. 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     2. Small pulmonary nodules, nonspecific mediastinal adenopathy,  follow-up evaluation recommended.     3. Indeterminate liver lesions, further evaluation/follow-up advised.     4.  Thyroid nodularity.     This report was finalized on 3/8/2024 7:23 PM by Dr. Levy Mock M.D on Workstation: [id number]       XR Chest 1 View [[id number]] Collected: 03/08/24 1408     Updated: 03/08/24 1420    Narrative:      XR CHEST 1 VW-     HISTORY: Female who is 89 years-old, chest pain     TECHNIQUE: Frontal view of the chest     COMPARISON: 12/7/2023     FINDINGS: The heart size is borderline. Aorta is calcified. Pulmonary  vasculature is unremarkable. No focal pulmonary consolidation, pleural  effusion, or pneumothorax. Thoracic dextroscoliosis is evident. No acute  osseous process.       Impression:      No no focal pulmonary consolidation. Borderline heart size.  Follow-up as clinical indications persist.     This report was finalized on 3/8/2024 2:17 PM by Dr. Levy Mock M.D on Workstation: [id number]               I have reviewed the medications.  ---------------------------------------------------------------------------------------------  Assessment & Plan   Assessment/Problem List    Chest pain      Plan:    Uncontrolled hypertension  Lightheadedness  Elevated troponin  -Blood pressure was elevated in the ED therefore patient received 0.1 mg of p.o. clonidine and since then improved and currently 139/73  -Cardiac monitoring  -Vital signs per nursing  -Continue losartan  -Troponin 19, 23, 20  -EKG nonischemic  -CTA chest and abdomen shows dilated ascending aorta with an estimated 40% focal stenosis of the region of the superior mesenteric artery.  There is a large pericardial lymph node and indeterminate liver lesions and recommend follow-up for further evaluation.  Above findings were discussed with the patient and was advised to follow-up with her PCP for further evaluation.  -Cardiology consult, Dr. Hightower  -Amlodipine 2.5 mg daily at discharge     Paroxysmal A-fib  -Cardiac monitoring  -Continue Eliquis     Hypothyroidism  -Continue levothyroxine    Disposition: Home    Follow-up after  Discharge: PCP, cardiology    33 minutes has been spent by [facility] providers in the care of this patient while under observation status     This note will serve as discharge summary    EFFIE Lagunas 03/09/24 07:33 EST    I have worn appropriate PPE during this patient encounter and sanitized my hands with entering and exiting patient room.

## 2024-03-09 NOTE — CONSULTS
[facility]        Patient Name: Alba Correa  Age/Sex: 89 y.o. female  : 1933  MRN: [id number]    Date of Admission: 3/8/2024  Date of Encounter Visit: 24  Encounter Provider: William Hightower MD  Referring Provider: No ref. provider found  Place of Service: [facility] CARDIOLOGY  Patient Care Team:  Aramis Doty MD as PCP - General (Family Medicine)  Juan Jose Muñiz MD as Consulting Physician (Neurology)  Roshan Martines III, MD as Consulting Physician (Cardiology)  Nehal Murray MD as Consulting Physician (Nephrology)    Subjective:     Chief Complaint:  Elevated Blood Pressure     Reason for consult:  Hypertension and Chest Pain     History of Present Illness:  Alba Correa is a 89 y.o. female who follows Dr. Martines in our office with  a past medical history notable for persistent atrial fibrillation on chronic AC, hypertension, hyperlipidemia and ischemic stroke.     She transferred here from immediate care with complaints of headache and chest pain radiating into her back.  Blood  pressure upon arrival was 208/114.      Work up is notable for HS troponin's 19->23->20, creatinine 0.96 improved from 1.12, CBC is unremarkable.    CXR is negative.  CTA of the chest is negative for dissection.  EKG shows atrial fibrillation and is unchanged from previous.    She received clonidine 0.1 mg with improvement of her blood pressures and back pain.  She reports her main complaint was a pressure-like sensation in the center of her back.  She thinks it was related to her blood pressure.  She denies any chest pain at the time my evaluation and states that she never actually complained of any chest pain.  But the blood pressure was concerning for her.  Unfortunately, she has had a chon course with her blood pressure management over the last several months.    She was hospitalized and discharged in 2023 for dizziness and mechanical fall versus  presyncopal episode with closed head injury with traumatic hematoma of the scalp.  She is recommended to hold all of her blood pressure medications at that time.  Her blood pressure medications were held.  She then followed up with her PCP and was noted to have worsening hypertension.  Losartan was resumed.     She then presented to the ER with escalating hypertension again.        Prior Cardiac Testing:    STRESS TEST 4-30-22  Post regadenoson EKG does not show evidence for ischemia.  Left ventricular ejection fraction is hyperdynamic (Calculated EF > 70%). .  Myocardial perfusion imaging indicates a normal myocardial perfusion study with no evidence of ischemia.  Impressions are consistent with a low risk study.  There is no prior study available for compariso      ECHO 3-27-22  Left ventricular systolic function is hyperdynamic (EF > 70%). Normal left ventricular cavity size noted. Left ventricular wall thickness is consistent with mild concentric hypertrophy. All left ventricular wall segments contract normally. Left ventricular diastolic function was normal. No evidence of left ventricular thrombus or mass present.  There is mild calcification of the aortic valve. Mild to moderate aortic valve regurgitation is present. No hemodynamically significant aortic valve stenosis is present.  Mild to moderate mitral valve regurgitation is present. No significant mitral valve stenosis is present.  Estimated right ventricular systolic pressure from tricuspid regurgitation is mildly elevated (35-45 mmHg).    EVENT MONITOR 4-7-21  An abnormal monitor study.  Physical atrial fibrillation occurs frequently, constituting approximately 40% of the recording.  Average heart rate during atrial fibrillation is generally well controlled. No postconversion pauses are seen. No episodes of significant bradycardia are seen. No pauses are presen      Past Medical History:  Past Medical History:   Diagnosis Date    Acquired hypothyroidism  5/3/2022    Acute diastolic CHF (congestive heart failure) 3/20/2022    Chronic kidney disease     CVA (cerebral vascular accident)     Degeneration of intervertebral disc     Depression     DVT (deep venous thrombosis)     First degree AV block 05/26/2021    Gout     H/O complete eye exam 09/2016    Hyperlipidemia     IBS (irritable bowel syndrome)     Nonrheumatic mitral valve regurgitation     OAB (overactive bladder)     Osteopenia     Osteoporosis     PAF (paroxysmal atrial fibrillation) 11/08/2019    Peripheral neuropathy     Rheumatoid arthritis     Sinus bradycardia 05/26/2021       Past Surgical History:   Procedure Laterality Date    APPENDECTOMY      BREAST BIOPSY      CARDIAC ELECTROPHYSIOLOGY PROCEDURE N/A 10/12/2021    Procedure: Loop insertion linq;  Surgeon: Tone Anguiano MD;  Location: [facility] CATH INVASIVE LOCATION;  Service: Cardiovascular;  Laterality: N/A;    CARDIOVERSION  08/03/2020    Dr. Colby    COLONOSCOPY      declines    ELBOW OPEN REDUCTION INTERNAL FIXATION Left 3/11/2023    Procedure: ELBOW OPEN REDUCTION INTERNAL FIXATION;  Surgeon: Ramón Encinas II, MD;  Location: [facility] MAIN OR;  Service: Orthopedics;  Laterality: Left;    HERNIA REPAIR  1965    HYSTERECTOMY      still has ovaries    MAMMO BILATERAL  11/16/2016    pt declines    PAP SMEAR  11/16/2016    declines       Home Medications:   No medications prior to admission.       Allergies:  Allergies   Allergen Reactions    Hydrocodone-Acetaminophen Nausea And Vomiting and Dizziness    Sulfamethoxazole-Trimethoprim Delirium    Lisinopril Unknown (See Comments)     Unknown per pt    Levofloxacin Rash       Past Social History:  Social History     Socioeconomic History    Marital status:      Spouse name: Ray    Number of children: 3    Years of education: High school   Tobacco Use    Smoking status: Former     Current packs/day: 0.00     Types: Cigarettes     Start date: 1970     Quit date: 1975      Years since quittin.2    Smokeless tobacco: Never    Tobacco comments:     caffeine use: 2 cups coffee in AM   Vaping Use    Vaping status: Never Used   Substance and Sexual Activity    Alcohol use: Yes     Comment: Occasional- glass of wine few times a week    Drug use: No    Sexual activity: Defer     Partners: Male     Birth control/protection: Surgical       Past Family History:  Family History   Problem Relation Age of Onset    Alcohol abuse Other     Cancer Other     Hypertension Other     Kidney disease Other     Lung disease Other     Heart disease Mother 89    Heart failure Mother     Ovarian cancer Daughter 60    Stomach cancer Father     Kidney cancer Father 52    Lung cancer Father 52    Ovarian cancer Sister     Lung cancer Brother 65       REVIEW OF SYSTEMS:   14 point ROS was performed and is negative except as outlined in HPI          Objective:   Temp:  [96.2 °F (35.7 °C)-98.4 °F (36.9 °C)] 98.4 °F (36.9 °C)  Heart Rate:  [70-98] 71  Resp:  [16-20] 16  BP: (128-210)/() 150/86   No intake or output data in the 24 hours ending 24 1326  Body mass index is 18.84 kg/m².      24  1353 24  1758   Weight: 49.9 kg (110 lb) 46.7 kg (103 lb)     Weight change:     General Appearance:    Alert, cooperative, in no acute distress   Head:    Normocephalic, without obvious abnormality, atraumatic   Eyes:            Lids and lashes normal, conjunctivae and sclerae normal, no   icterus, no pallor, corneas clear, PERRLA   Ears:    Ears appear intact with no abnormalities noted   Throat:   No oral lesions, no thrush, oral mucosa moist   Neck:   No adenopathy, supple, trachea midline, no thyromegaly, no   carotid bruit, no JVD   Back:     No kyphosis present, no scoliosis present, no skin lesions, erythema or scars, no tenderness to percussion or palpation, range of motion normal   Lungs:     Clear to auscultation,respirations regular, even and unlabored    Heart:  Irregularly irregular,  normal S1 and S2, no murmur, no gallop, no rub, no click   Chest Wall:    No abnormalities observed   Abdomen:     Normal bowel sounds, no masses, no organomegaly, soft, nontender, nondistended, no guarding, no rebound  tenderness   Extremities:   Moves all extremities well, no edema, no cyanosis, no redness   Pulses:   Pulses palpable and equal bilaterally. Normal radial, carotid, femoral, dorsalis pedis and posterior tibial pulses bilaterally. Normal abdominal aorta   Skin:  Psychiatric:   No bleeding, bruising or rash    Alert and oriented x 3, normal mood and affect     Lab Review:   Results from last 7 days   Lab Units 03/09/24  0336 03/08/24  1348   SODIUM mmol/L 137 135*   POTASSIUM mmol/L 4.1 4.7   CHLORIDE mmol/L 101 97*   CO2 mmol/L 24.5 26.9   BUN mg/dL 15 16   CREATININE mg/dL 0.96 1.12*   GLUCOSE mg/dL 93 104*   CALCIUM mg/dL 9.5 10.1*   AST (SGOT) U/L  --  28   ALT (SGPT) U/L  --  11     Results from last 7 days   Lab Units 03/09/24  0336 03/08/24  1557 03/08/24  1348   HSTROP T ng/L 20* 23* 19*     Results from last 7 days   Lab Units 03/09/24  0336 03/08/24  1348   WBC 10*3/mm3 6.11 4.56   HEMOGLOBIN g/dL 12.9 13.9   HEMATOCRIT % 39.8 42.9   PLATELETS 10*3/mm3 268 280         Results from last 7 days   Lab Units 03/09/24  0336   MAGNESIUM mg/dL 2.1     Results from last 7 days   Lab Units 03/08/24  1557   CHOLESTEROL mg/dL 216*   TRIGLYCERIDES mg/dL 142   HDL CHOL mg/dL 43               Echo EF Estimated  Lab Results   Component Value Date    ECHOEFEST 66 10/11/2021       EKG:     3-9-24      12-7-23      I personally viewed and interpreted the patient's EKG    Imaging:  Imaging Results (Most Recent)       Procedure Component Value Units Date/Time    CT Angiogram Chest [[id number]] Collected: 03/08/24 1909     Updated: 03/08/24 1926    Narrative:      CT ANGIOGRAM CHEST-, CT ANGIOGRAM ABDOMEN PELVIS-     INDICATIONS: Pain. Radiation dose reduction techniques were utilized,  including automated  exposure control and exposure modulation based on  body size.     TECHNIQUE: CT angiography of the chest, abdomen, pelvis.  Three-dimensional reconstructions. NASCET criteria.     COMPARISON: Abdomen pelvis CT from 3/25/2020     FINDINGS:     Vascular:     No aortic dissection. The ascending aorta is dilated, 4.0 cm. No  abdominal aortic aneurysm is noted. Scattered calcifications are seen in  the aorta without high-grade stenosis (0-49% stenosis).     No pulmonary embolism.     The arteries arising from the aortic arch are patent.     Patent celiac artery. 40% narrowing at the origin of the superior  mesenteric artery. The MARY is patent.     The bilateral renal arteries are patent.     Scattered calcifications are seen in the bilateral iliac and partly  included femoral arteries without high-grade stenosis (0-49% stenosis).           Chest CT:     The heart size is borderline without pericardial effusion. A precarinal  lymph node measures 1.5 cm short axis, abnormal, nonspecific, could be  reactive in nature or could be evidence of neoplasm, clinical  correlation and follow-up recommended. If further imaging evaluation is  indicated, PET/CT correlation could be obtained. A 9 mm short axis right  hilar lymph node is seen on axial image 68.     Thyroid nodularity is apparent, suboptimally evaluated with this  technique, thyroid ultrasound correlation advised as indicated.     The airways appear clear.     No pleural effusion or pneumothorax.     The lungs show no focal pulmonary consolidation or mass. Small  fibronodular changes are seen at the lung apices. A subpleural right  upper lobe nodule on image 36 measures 3 mm. A 2 mm pleural-based right  upper lobe nodule is seen on image 39. Follow-up chest CT in 3 to 6  months advised to characterize change.                 Abdomen pelvis CT:     Enhancing lesion of the posterior right hepatic lobe, 1.4 cm on axial  image 141 is not seen on the prior unenhanced exam that  may be result of  difference in technique, the lesion may be new. Separately, an enhancing  lesion at the hepatic dome measures 7 mm on axial image 123. These  lesions may be vascular in nature, but possibility of neoplasm is not  excluded, hepatic MRI correlation advised as indicated, interval  follow-up can characterize change.     A subcentimeter splenic low-density is too small to characterize. A left  renal cyst is evident.     Otherwise unremarkable appearance of the liver, spleen, adrenal glands,  pancreas, kidneys, bladder.     No bowel obstruction or abnormal bowel thickening is identified.     No free intraperitoneal gas or free fluid.     Scattered small mesenteric and para-aortic lymph nodes are seen that are  not significant by size criteria.        Degenerative changes are seen in the spine. No acute fracture is  identified.             Impression:            1. No aortic dissection. Dilated ascending aorta, 4.0 cm. 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     2. Small pulmonary nodules, nonspecific mediastinal adenopathy,  follow-up evaluation recommended.     3. Indeterminate liver lesions, further evaluation/follow-up advised.     4. Thyroid nodularity.     This report was finalized on 3/8/2024 7:23 PM by Dr. Levy Mock M.D on Workstation: [id number]       CT Angiogram Abdomen Pelvis [[id number]] Collected: 03/08/24 1909     Updated: 03/08/24 1926    Narrative:      CT ANGIOGRAM CHEST-, CT ANGIOGRAM ABDOMEN PELVIS-     INDICATIONS: Pain. Radiation dose reduction techniques were utilized,  including automated exposure control and exposure modulation based on  body size.     TECHNIQUE: CT angiography of the chest, abdomen, pelvis.  Three-dimensional reconstructions. NASCET criteria.     COMPARISON: Abdomen pelvis CT from 3/25/2020     FINDINGS:     Vascular:     No aortic dissection. The ascending aorta is dilated, 4.0 cm. No  abdominal aortic aneurysm is  noted. Scattered calcifications are seen in  the aorta without high-grade stenosis (0-49% stenosis).     No pulmonary embolism.     The arteries arising from the aortic arch are patent.     Patent celiac artery. 40% narrowing at the origin of the superior  mesenteric artery. The MARY is patent.     The bilateral renal arteries are patent.     Scattered calcifications are seen in the bilateral iliac and partly  included femoral arteries without high-grade stenosis (0-49% stenosis).           Chest CT:     The heart size is borderline without pericardial effusion. A precarinal  lymph node measures 1.5 cm short axis, abnormal, nonspecific, could be  reactive in nature or could be evidence of neoplasm, clinical  correlation and follow-up recommended. If further imaging evaluation is  indicated, PET/CT correlation could be obtained. A 9 mm short axis right  hilar lymph node is seen on axial image 68.     Thyroid nodularity is apparent, suboptimally evaluated with this  technique, thyroid ultrasound correlation advised as indicated.     The airways appear clear.     No pleural effusion or pneumothorax.     The lungs show no focal pulmonary consolidation or mass. Small  fibronodular changes are seen at the lung apices. A subpleural right  upper lobe nodule on image 36 measures 3 mm. A 2 mm pleural-based right  upper lobe nodule is seen on image 39. Follow-up chest CT in 3 to 6  months advised to characterize change.                 Abdomen pelvis CT:     Enhancing lesion of the posterior right hepatic lobe, 1.4 cm on axial  image 141 is not seen on the prior unenhanced exam that may be result of  difference in technique, the lesion may be new. Separately, an enhancing  lesion at the hepatic dome measures 7 mm on axial image 123. These  lesions may be vascular in nature, but possibility of neoplasm is not  excluded, hepatic MRI correlation advised as indicated, interval  follow-up can characterize change.     A subcentimeter  splenic low-density is too small to characterize. A left  renal cyst is evident.     Otherwise unremarkable appearance of the liver, spleen, adrenal glands,  pancreas, kidneys, bladder.     No bowel obstruction or abnormal bowel thickening is identified.     No free intraperitoneal gas or free fluid.     Scattered small mesenteric and para-aortic lymph nodes are seen that are  not significant by size criteria.        Degenerative changes are seen in the spine. No acute fracture is  identified.             Impression:            1. No aortic dissection. Dilated ascending aorta, 4.0 cm. 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     2. Small pulmonary nodules, nonspecific mediastinal adenopathy,  follow-up evaluation recommended.     3. Indeterminate liver lesions, further evaluation/follow-up advised.     4. Thyroid nodularity.     This report was finalized on 3/8/2024 7:23 PM by Dr. Levy Mock M.D on Workstation: [id number]       XR Chest 1 View [[id number]] Collected: 03/08/24 1408     Updated: 03/08/24 1420    Narrative:      XR CHEST 1 VW-     HISTORY: Female who is 89 years-old, chest pain     TECHNIQUE: Frontal view of the chest     COMPARISON: 12/7/2023     FINDINGS: The heart size is borderline. Aorta is calcified. Pulmonary  vasculature is unremarkable. No focal pulmonary consolidation, pleural  effusion, or pneumothorax. Thoracic dextroscoliosis is evident. No acute  osseous process.       Impression:      No no focal pulmonary consolidation. Borderline heart size.  Follow-up as clinical indications persist.     This report was finalized on 3/8/2024 2:17 PM by Dr. Levy Mock M.D on Workstation: [id number]                   Assessment:     Active Hospital Problems    Diagnosis  POA    Chest pain [R07.9]  Yes      Resolved Hospital Problems   No resolved problems to display.          Plan:     Hypertension: Rather labile.  However she has had some traumatic falls  due to lightheadedness may be due to overmedication.  Continue losartan 12.5  We will add low-dose amlodipine 2.5.  I would highly suggest not aggressively treating her hypertension with goal blood pressure 140/80 given her lightheadedness and previous falls.  I do think she may require some synergistic effect of 2 antihypertensives so we will continue this regimen for now.  She has some chronic lower extremity venous insufficiency but this low-dose of amlodipine should be tolerated fine.  Appears that she was on losartan 25 and amlodipine 5 when she had her initial follow-up and so with these lower doses are better tolerated with preventing hypertensive spikes  Bradycardia, sinus: Likely has underlying sick sinus syndrome.  Most recent loop recorder shows 94% A-fib.  Avoiding beta-blockers or AV marlene agents  Ideally, carvedilol be nice to prevent hypertensive spikes but given her history of symptomatic bradycardia we will avoid this  A-fib, more persistent now.  Unlikely contributing.  Asymptomatic    Thank you for allowing me to participate in the care of Alba Correa. Feel free to contact me directly with any further questions or concerns.    William Hightower MD  [facility]  03/09/24  11:23 EST      Part of this note may be an electronic transcription/translation of spoken language to printed text using the Dragon Dictation System.

## 2024-03-09 NOTE — H&P
. [facility]   HISTORY AND PHYSICAL    Patient Name: Alba Correa  : 1933  MRN: [id number]  Primary Care Physician:  Aramis Doty MD  Date of admission: 3/8/2024    Subjective   Subjective     Chief Complaint: Elevated blood pressure    HPI:    Alba Correa is a 89 y.o. female with past medical history including but not limited to hypothyroidism, diastolic CHF, CKD, history of A-fib and DVT on Xarelto and RA presents to [facility] with elevated blood pressure and lightheadedness.  Patient reports that she has been monitoring her blood pressure daily and for the past few days it has been elevated with SBP>180.  States that she is currently on losartan 12.5 mg daily and has not missed a dose recently.  Furthermore patient complain of lightheadedness when lying flat and contributed to her elevated blood pressure.  Denies headache, visual disturbance, dysarthria, numbness, tingling, or unilateral weakness.  Denies chest pain, palpitation, or dyspnea.  Denies abdominal pain, nausea, vomit, or diarrhea.  Denies cough, fever, chills, or lower extremity edema.    ED workup reviewed high-sensitivity troponin 19, repeat 23 with a delta of 4, sodium 135, creatinine 1.12, GFR 46.8, WBC 4.56, hemoglobin 13.9 and platelets 280.    CTA chest and abdomen    1. No aortic dissection. Dilated ascending aorta, 4.0 cm. 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     2. Small pulmonary nodules, nonspecific mediastinal adenopathy,  follow-up evaluation recommended.     3. Indeterminate liver lesions, further evaluation/follow-up advised.     4. Thyroid nodularity.    Review of Systems   All systems were reviewed and negative except for: Patient above in HPI    Personal History     Past Medical History:   Diagnosis Date    Acquired hypothyroidism 5/3/2022    Acute diastolic CHF (congestive heart failure) 3/20/2022    Chronic kidney disease     CVA (cerebral vascular  accident)     Degeneration of intervertebral disc     Depression     DVT (deep venous thrombosis)     First degree AV block 05/26/2021    Gout     H/O complete eye exam 09/2016    Hyperlipidemia     IBS (irritable bowel syndrome)     Nonrheumatic mitral valve regurgitation     OAB (overactive bladder)     Osteopenia     Osteoporosis     PAF (paroxysmal atrial fibrillation) 11/08/2019    Peripheral neuropathy     Rheumatoid arthritis     Sinus bradycardia 05/26/2021       Past Surgical History:   Procedure Laterality Date    APPENDECTOMY      BREAST BIOPSY      CARDIAC ELECTROPHYSIOLOGY PROCEDURE N/A 10/12/2021    Procedure: Loop insertion linq;  Surgeon: Tone Anguiano MD;  Location: [facility] CATH INVASIVE LOCATION;  Service: Cardiovascular;  Laterality: N/A;    CARDIOVERSION  08/03/2020    Dr. Colby    COLONOSCOPY      declines    ELBOW OPEN REDUCTION INTERNAL FIXATION Left 3/11/2023    Procedure: ELBOW OPEN REDUCTION INTERNAL FIXATION;  Surgeon: Ramón Encinas II, MD;  Location: [facility] OR;  Service: Orthopedics;  Laterality: Left;    HERNIA REPAIR  1965    HYSTERECTOMY      still has ovaries    MAMMO BILATERAL  11/16/2016    pt declines    PAP SMEAR  11/16/2016    declines       Family History: family history includes Alcohol abuse in an other family member; Cancer in an other family member; Heart disease (age of onset: 89) in her mother; Heart failure in her mother; Hypertension in an other family member; Kidney cancer (age of onset: 52) in her father; Kidney disease in an other family member; Lung cancer (age of onset: 52) in her father; Lung cancer (age of onset: 65) in her brother; Lung disease in an other family member; Ovarian cancer in her sister; Ovarian cancer (age of onset: 60) in her daughter; Stomach cancer in her father. Otherwise pertinent FHx was reviewed and not pertinent to current issue.    Social History:  reports that she quit smoking about 49 years ago. Her smoking use  included cigarettes. She started smoking about 54 years ago. She has never used smokeless tobacco. She reports current alcohol use. She reports that she does not use drugs.    Home Medications:  DULoxetine, Omega-3 Fatty Acids, acetaminophen, allopurinol, amLODIPine, apixaban, hydrALAZINE, levothyroxine, losartan, and multivitamin with minerals    Allergies:  Allergies   Allergen Reactions    Hydrocodone-Acetaminophen Nausea And Vomiting and Dizziness    Sulfamethoxazole-Trimethoprim Delirium    Lisinopril Unknown (See Comments)     Unknown per pt    Levofloxacin Rash       Objective   Objective     Vitals:   Temp:  [96.2 °F (35.7 °C)-98.4 °F (36.9 °C)] 98.4 °F (36.9 °C)  Heart Rate:  [70-98] 76  Resp:  [16-20] 18  BP: (128-210)/() 139/73  Physical Exam    Constitutional: Awake, alert   Eyes: PERRLA, sclerae anicteric, no conjunctival injection   HENT: NCAT, mucous membranes moist   Neck: Supple, no thyromegaly, no lymphadenopathy, trachea midline   Respiratory: Clear to auscultation bilaterally, nonlabored respirations    Cardiovascular: RRR, no murmurs, rubs, or gallops, palpable pedal pulses bilaterally   Gastrointestinal: Positive bowel sounds, soft, nontender, nondistended   Musculoskeletal: No bilateral ankle edema, no clubbing or cyanosis to extremities   Psychiatric: Appropriate affect, cooperative   Neurologic: Oriented x 3, strength symmetric in all extremities, Cranial Nerves grossly intact to confrontation, speech clear   Skin: No rashes     Result Review    Result Review:  I have personally reviewed the results from the time of this admission to 3/8/2024 20:06 EST and agree with these findings:  [x]  Laboratory list / accordion  []  Microbiology  [x]  Radiology  []  EKG/Telemetry   []  Cardiology/Vascular   []  Pathology  []  Old records  []  Other:        Assessment & Plan   Assessment / Plan     Brief Patient Summary:  Alba Correa is a 89 y.o. female who been admitted to the observation unit  due to elevated blood pressure and lightheadedness    Active Hospital Problems:  Active Hospital Problems    Diagnosis     Chest pain      Plan:   Uncontrolled hypertension  Lightheadedness  Elevated troponin  -Blood pressure was elevated in the ED therefore patient received 0.1 mg of p.o. clonidine and since then improved and currently 139/73  -Cardiology consult  -Cardiac monitoring  -Vital signs per nursing  -Echocardiogram ordered  -Continue losartan  -Initial troponin 19, repeat 23 with a delta of 4  -EKG nonischemic  -CTA chest and abdomen shows dilated ascending aorta with an estimated 40% focal stenosis of the region of the superior mesenteric artery.  There is a large pericardial lymph node and indeterminate liver lesions and recommend follow-up for further evaluation.  Above findings were discussed with the patient and was advised to follow-up with her PCP for further evaluation.    Paroxysmal A-fib  -Cardiac monitoring  -Continue Eliquis    Hypothyroidism  -Continue levothyroxine      DVT prophylaxis:  No DVT prophylaxis order currently exists.      CODE STATUS:    Level Of Support Discussed With: Patient  Code Status (Patient has no pulse and is not breathing): CPR (Attempt to Resuscitate)  Medical Interventions (Patient has pulse or is breathing): Full Support    Admission Status:  I believe this patient meets observation status.    74 minutes has been spent by [facility] providers in the care of this patient while under observation status    .During patient visit, I utilized appropriate personal protective equipment including gloves. Appropriate PPE was worn during the entire visit.  Hand hygiene was completed before and after  Electronically signed by GROVER Juarez, 03/08/24, 8:06 PM EST.         no

## 2024-03-09 NOTE — DISCHARGE INSTRUCTIONS
Begin taking amlodipine 2.5 mg daily.  Follow up with cardiology and your primary care provider.      Return to the emergency department with worsening symptoms, uncontrolled pain, inability to tolerate oral liquids, fever greater than 101°F not controlled by Tylenol or as needed with emergent concerns.

## 2024-03-09 NOTE — PLAN OF CARE
Goal Outcome Evaluation:  Plan of Care Reviewed With: patient        Progress: improving  Outcome Evaluation: Alert, oriented, denies further episodes of chest pain, atrial fib on monitor, AM labs WNL, VSS

## 2024-03-09 NOTE — PLAN OF CARE
Goal Outcome Evaluation:   Family is in room at time of discharge. They understand medication change and appt follow ups. Family has all belongings. Daughter to drive.

## 2024-03-10 LAB
QT INTERVAL: 385 MS
QTC INTERVAL: 430 MS

## 2024-03-11 ENCOUNTER — TRANSITIONAL CARE MANAGEMENT TELEPHONE ENCOUNTER (OUTPATIENT)
Dept: CALL CENTER | Facility: HOSPITAL | Age: 89
End: 2024-03-11
Payer: MEDICARE

## 2024-03-11 NOTE — OUTREACH NOTE
Call Center TCM Note      Flowsheet Row Responses   [facility] patient discharged from? [place]   Does the patient have one of the following disease processes/diagnoses(primary or secondary)? Other   TCM attempt successful? Yes   Call start time 1148   Call end time 1204   Discharge diagnosis Chest pain   Person spoke with today (if not patient) and relationship pt   Meds reviewed with patient/caregiver? Yes   Is the patient having any side effects they believe may be caused by any medication additions or changes? No   Does the patient have all medications ordered at discharge? Yes   Is the patient taking all medications as directed (includes completed medication regime)? Yes   Comments Pt advised to make a cardiology fu appt   Does the patient have an appointment with their PCP within 7-14 days of discharge? Yes  [3/14/2024 at 1:00 PM]   Psychosocial issues? No   Did the patient receive a copy of their discharge instructions? Yes   Nursing interventions Reviewed instructions with patient   What is the patient's perception of their health status since discharge? Improving   Is the patient/caregiver able to teach back signs and symptoms related to disease process for when to call PCP? Yes   Is the patient/caregiver able to teach back signs and symptoms related to disease process for when to call 911? Yes   Is the patient/caregiver able to teach back the hierarchy of who to call/visit for symptoms/problems? PCP, Specialist, Home health nurse, Urgent Care, ED, 911 Yes   If the patient is a current smoker, are they able to teach back resources for cessation? Not a smoker   TCM call completed? Yes   Wrap up additional comments Pt states she is doing better, and has been checking her BP daily,  last /67 right arm,  146/71 left arm. Reviewed AVS/meds with pt. Pt educated on BP, and when to call PCP/cardiologist to report BP.  Pt verified PCP appt, and advised to make a cardiologist fu appt.   Call end time  1204   Would this patient benefit from a Referral to [facility]? No   Is the patient interested in additional calls from an ambulatory ? No            Lois Solano RN    3/11/2024, 12:06 EDT

## 2024-03-13 NOTE — PROGRESS NOTES
Transitional Care Follow Up Visit  Subjective     Alba Correa is a 89 y.o. female who presents for a transitional care management visit.    Within 48 business hours after discharge our office contacted her via telephone to coordinate her care and needs.      I reviewed and discussed the details of that call along with the discharge summary, hospital problems, inpatient lab results, inpatient diagnostic studies, and consultation reports with Alba.     Current outpatient and discharge medications have been reconciled for the patient.  Reviewed by: Aramis Doty MD          3/9/2024    12:28 PM   Date of TCM Phone Call   [facility]   Date of Admission 3/8/2024   Date of Discharge 3/9/2024   Discharge Disposition Home or Self Care     Risk for Readmission (LACE) Score: 9 (3/9/2024  6:00 AM)      History of Present Illness   Course During Hospital Stay:      Hospital Outcome: 89-year-old female admitted to the observation unit for further evaluation of elevated blood pressure and lightheadedness.  Patient arrived to ED hypertensive 200s over 100s.  Patient received 0.1 mg clonidine p.o. in ED and blood pressure improved.  Patient is on losartan 12.5 mg at home.     Patient also complained of back pain.  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  These findings were discussed with patient.     Patient was seen by cardiology, discussed with Dr. Hightower.  Patient will be discharged home on amlodipine 2.5 mg daily, continue home dose losartan.  No further cardiac testing warranted this admission.  Usual return to ER precautions advised, patient expresses understanding and is in agreement with plan.    Current outpatient and discharge medications have been reconciled for the patient.  Reviewed by: Aramis Doty MD         The  "following portions of the patient's history were reviewed and updated as appropriate: allergies, current medications, past family history, past medical history, past social history, past surgical history, and problem list.    Review of Systems   Constitutional:  Negative for activity change, chills and fever.   Respiratory:  Negative for cough.    Cardiovascular:  Negative for chest pain.   Psychiatric/Behavioral:  Negative for dysphoric mood.        /82   Pulse 76   Temp 97.5 °F (36.4 °C) (Oral)   Resp 16   Ht 157.5 cm (62\")   Wt 49 kg (108 lb)   LMP  (LMP Unknown)   SpO2 97%   BMI 19.75 kg/m²     Objective   Physical Exam  Constitutional:       General: She is not in acute distress.     Appearance: She is well-developed.   Cardiovascular:      Rate and Rhythm: Normal rate and regular rhythm.   Pulmonary:      Effort: Pulmonary effort is normal.      Breath sounds: Normal breath sounds.   Neurological:      Mental Status: She is alert and oriented to person, place, and time.   Psychiatric:         Behavior: Behavior normal.         Thought Content: Thought content normal.     Hospital records reviewed with pt confirming HPI.      Assessment & Plan   Diagnoses and all orders for this visit:    1. Essential hypertension (Primary)  -     amLODIPine (NORVASC) 2.5 MG tablet; Take 1 tablet by mouth Daily.  Dispense: 90 tablet; Refill: 1  -     losartan (Cozaar) 25 MG tablet; Take 0.5 tablets by mouth Daily.  Dispense: 45 tablet; Refill: 1    2. Pulmonary nodules  -     CT Chest Without Contrast; Future    3. Hospital discharge follow-up    4. Hepatic lesion  -     MRI abdomen wo contrast    5. Thyroid nodule  -     US Thyroid    6. Gout, unspecified cause, unspecified chronicity, unspecified site  -     allopurinol (ZYLOPRIM) 100 MG tablet; Take 1 tablet by mouth Daily.  Dispense: 90 tablet; Refill: 1    7. Recurrent major depressive disorder, in full remission  -     DULoxetine (CYMBALTA) 60 MG capsule; " Take 1 capsule by mouth Daily.  Dispense: 90 capsule; Refill: 1    8. Acquired hypothyroidism  -     levothyroxine (SYNTHROID, LEVOTHROID) 50 MCG tablet; Take 1 tablet by mouth Every Morning.  Dispense: 90 tablet; Refill: 1      Patient has a follow-up appointment with cardiology on 319 and is encouraged to keep that.

## 2024-03-14 ENCOUNTER — OFFICE VISIT (OUTPATIENT)
Dept: FAMILY MEDICINE CLINIC | Facility: CLINIC | Age: 89
End: 2024-03-14
Payer: MEDICARE

## 2024-03-14 VITALS
HEART RATE: 76 BPM | DIASTOLIC BLOOD PRESSURE: 82 MMHG | SYSTOLIC BLOOD PRESSURE: 136 MMHG | TEMPERATURE: 97.5 F | RESPIRATION RATE: 16 BRPM | BODY MASS INDEX: 19.88 KG/M2 | WEIGHT: 108 LBS | OXYGEN SATURATION: 97 % | HEIGHT: 62 IN

## 2024-03-14 DIAGNOSIS — I10 ESSENTIAL HYPERTENSION: Primary | Chronic | ICD-10-CM

## 2024-03-14 DIAGNOSIS — R91.8 PULMONARY NODULES: ICD-10-CM

## 2024-03-14 DIAGNOSIS — K76.9 HEPATIC LESION: ICD-10-CM

## 2024-03-14 DIAGNOSIS — M10.9 GOUT, UNSPECIFIED CAUSE, UNSPECIFIED CHRONICITY, UNSPECIFIED SITE: Chronic | ICD-10-CM

## 2024-03-14 DIAGNOSIS — Z09 HOSPITAL DISCHARGE FOLLOW-UP: ICD-10-CM

## 2024-03-14 DIAGNOSIS — E04.1 THYROID NODULE: ICD-10-CM

## 2024-03-14 DIAGNOSIS — E03.9 ACQUIRED HYPOTHYROIDISM: ICD-10-CM

## 2024-03-14 DIAGNOSIS — F33.42 RECURRENT MAJOR DEPRESSIVE DISORDER, IN FULL REMISSION: Chronic | ICD-10-CM

## 2024-03-14 RX ORDER — LOSARTAN POTASSIUM 25 MG/1
12.5 TABLET ORAL DAILY
Qty: 45 TABLET | Refills: 1 | Status: SHIPPED | OUTPATIENT
Start: 2024-03-14

## 2024-03-14 RX ORDER — AMLODIPINE BESYLATE 2.5 MG/1
2.5 TABLET ORAL
Qty: 90 TABLET | Refills: 1 | Status: SHIPPED | OUTPATIENT
Start: 2024-03-14

## 2024-03-14 RX ORDER — DULOXETIN HYDROCHLORIDE 60 MG/1
60 CAPSULE, DELAYED RELEASE ORAL DAILY
Qty: 90 CAPSULE | Refills: 1 | Status: SHIPPED | OUTPATIENT
Start: 2024-03-14

## 2024-03-14 RX ORDER — LEVOTHYROXINE SODIUM 0.05 MG/1
50 TABLET ORAL EVERY MORNING
Qty: 90 TABLET | Refills: 1 | Status: SHIPPED | OUTPATIENT
Start: 2024-03-14

## 2024-03-14 RX ORDER — ALLOPURINOL 100 MG/1
100 TABLET ORAL DAILY
Qty: 90 TABLET | Refills: 1 | Status: SHIPPED | OUTPATIENT
Start: 2024-03-14

## 2024-03-19 ENCOUNTER — OFFICE VISIT (OUTPATIENT)
Dept: CARDIOLOGY | Facility: CLINIC | Age: 89
End: 2024-03-19
Payer: MEDICARE

## 2024-03-19 VITALS
WEIGHT: 107 LBS | HEART RATE: 91 BPM | HEIGHT: 62 IN | OXYGEN SATURATION: 99 % | DIASTOLIC BLOOD PRESSURE: 70 MMHG | SYSTOLIC BLOOD PRESSURE: 130 MMHG | BODY MASS INDEX: 19.69 KG/M2

## 2024-03-19 DIAGNOSIS — K55.1 SUPERIOR MESENTERIC ARTERY STENOSIS: ICD-10-CM

## 2024-03-19 DIAGNOSIS — I35.1 NONRHEUMATIC AORTIC (VALVE) INSUFFICIENCY: Chronic | ICD-10-CM

## 2024-03-19 DIAGNOSIS — I34.0 NONRHEUMATIC MITRAL VALVE REGURGITATION: ICD-10-CM

## 2024-03-19 DIAGNOSIS — Z86.73 HISTORY OF ISCHEMIC RIGHT MCA STROKE: ICD-10-CM

## 2024-03-19 DIAGNOSIS — I10 ESSENTIAL HYPERTENSION: Primary | Chronic | ICD-10-CM

## 2024-03-19 DIAGNOSIS — I48.0 PAF (PAROXYSMAL ATRIAL FIBRILLATION): ICD-10-CM

## 2024-03-19 PROCEDURE — 1160F RVW MEDS BY RX/DR IN RCRD: CPT | Performed by: NURSE PRACTITIONER

## 2024-03-19 PROCEDURE — 1159F MED LIST DOCD IN RCRD: CPT | Performed by: NURSE PRACTITIONER

## 2024-03-19 PROCEDURE — 99214 OFFICE O/P EST MOD 30 MIN: CPT | Performed by: NURSE PRACTITIONER

## 2024-03-19 PROCEDURE — 93000 ELECTROCARDIOGRAM COMPLETE: CPT | Performed by: NURSE PRACTITIONER

## 2024-03-19 NOTE — PROGRESS NOTES
Date of Office Visit: 2024  Encounter Provider: GROVER Howard  Place of Service: [facility] CARDIOLOGY  Patient Name: Alba Correa  :1933  Primary Cardiologist: Dr. Roshan Martines     Chief Complaint   Patient presents with    Hypertension    Hospital Follow Up Visit   :     HPI: Alba Correa is a 89 y.o. female who presents today for hospital follow-up visit. I reviewed her medical records.    She has been diagnosed with hypothyroidism, hyperlipidemia, rheumatoid arthritis, and chronic kidney disease.  In 2019, she had an acute stroke and was diagnosed with atrial fibrillation (status post cardioversion).    In 2021, she had a syncopal episode of unknown etiology.  30-day event monitor showed 40% burden of atrial fibrillation.    In , she was having chest tightness a myocardial perfusion study was normal.  Echocardiogram showed EF 70%, RV mildly dilated, mild aortic valve calcification, mild to moderate aortic regurgitation, mitral valve calcification, mild to moderate mitral regurgitation, mild tricuspid regurgitation, mild pulmonary hypertension    In 2023, she was hospitalized after a fall and developed a scalp hematoma.  She was found to be orthostatic and antihypertensive therapy was discontinued.    In 2024, her atrial fibrillation burden was 81%.  She was not on anticoagulation due to recent fall/hospitalization.  She was offered Watchman device procedure and she declined.  Dr. Martines recommended that she take her apixaban twice per day.    She recently presented to [facility] ED with back pain, lightheadedness and hypertension (2 BP 200s/100s).  She was given clonidine in the ED and her BP improved.  CTA of the chest/abdomen/pelvis was negative for aortic dissection.  She was noted to have a 40% focal stenosis at the origin of the superior mesenteric artery, small pulmonary nodules, indeterminate liver lesions, and thyroid  nodule.  She was discharged on home losartan and amlodipine 2.5 mg daily was added to her regimen.    She presents today for follow-up visit and her son is accompanying her.  Blood pressure well-controlled today.  Her blood pressures over the last week have been 146/75, 132/86, 120/75, and 144/78.  She is checking her blood pressures at least an hour after her morning medications.  She has double checked her blood pressure machine and it is accurate.  Her son is questioning if she should be taking the apixaban once or twice per day.  The discharge she reports rare palpitations and occasional lower extremity edema.  She denies chest pain, shortness of air, dizziness, syncope, or bleeding.      Past Medical History:   Diagnosis Date    Acquired hypothyroidism 05/03/2022    Acute diastolic CHF (congestive heart failure) 03/20/2022    Chronic kidney disease     Closed fracture of left distal humerus 03/07/2023    Closed head injury 12/07/2023    CVA (cerebral vascular accident)     Degeneration of intervertebral disc     Depression     DVT (deep venous thrombosis)     Facial laceration 03/07/2023    First degree AV block 05/26/2021    Gout     H/O complete eye exam 09/2016    Hyperlipidemia     IBS (irritable bowel syndrome)     Laceration of right forearm 03/07/2023    Nonrheumatic mitral valve regurgitation     OAB (overactive bladder)     Osteopenia     Osteoporosis     PAF (paroxysmal atrial fibrillation) 11/08/2019    Peripheral neuropathy     Rheumatoid arthritis     Sinus bradycardia 05/26/2021    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 Colby    COLONOSCOPY       declines    ELBOW OPEN REDUCTION INTERNAL FIXATION Left 3/11/2023    Procedure: ELBOW OPEN REDUCTION INTERNAL FIXATION;  Surgeon: Ramón Encinas II, MD;  Location: [facility];  Service: Orthopedics;  Laterality: Left;    HERNIA REPAIR      HYSTERECTOMY      still has ovaries    MAMMO BILATERAL  2016    pt declines    PAP SMEAR  2016    declines       Social History     Socioeconomic History    Marital status:      Spouse name: Peña    Number of children: 3    Years of education: High school   Tobacco Use    Smoking status: Former     Current packs/day: 0.00     Types: Cigarettes     Start date:      Quit date:      Years since quittin.2    Smokeless tobacco: Never    Tobacco comments:     caffeine use: 2 cups coffee in AM   Vaping Use    Vaping status: Never Used   Substance and Sexual Activity    Alcohol use: Yes     Comment: Occasional- glass of wine few times a week    Drug use: No    Sexual activity: Defer     Partners: Male     Birth control/protection: Surgical       Family History   Problem Relation Age of Onset    Alcohol abuse Other     Cancer Other     Hypertension Other     Kidney disease Other     Lung disease Other     Heart disease Mother 89    Heart failure Mother     Ovarian cancer Daughter 60    Stomach cancer Father     Kidney cancer Father 52    Lung cancer Father 52    Ovarian cancer Sister     Lung cancer Brother 65       The following portion of the patient's history were reviewed and updated as appropriate: past medical history, past surgical history, past social history, past family history, allergies, current medications, and problem list.    Review of Systems   Constitutional: Positive for weight loss.   Cardiovascular:  Positive for leg swelling and palpitations.   Respiratory: Negative.     Hematologic/Lymphatic: Bruises/bleeds easily.   Musculoskeletal:  Positive for joint pain.   Neurological: Negative.        Allergies   Allergen  "Reactions    Hydrocodone-Acetaminophen Nausea And Vomiting and Dizziness    Sulfamethoxazole-Trimethoprim Delirium    Lisinopril Unknown (See Comments)     Unknown per pt    Levofloxacin Rash         Current Outpatient Medications:     acetaminophen (TYLENOL) 650 MG 8 hr tablet, Take 2 tablets by mouth Every 8 (Eight) Hours As Needed for Mild Pain., Disp: , Rfl:     allopurinol (ZYLOPRIM) 100 MG tablet, Take 1 tablet by mouth Daily., Disp: 90 tablet, Rfl: 1    amLODIPine (NORVASC) 2.5 MG tablet, Take 1 tablet by mouth Daily., Disp: 90 tablet, Rfl: 1    DULoxetine (CYMBALTA) 60 MG capsule, Take 1 capsule by mouth Daily., Disp: 90 capsule, Rfl: 1    Eliquis 2.5 MG tablet tablet, Take 1 tablet by mouth 2 (Two) Times a Day., Disp: , Rfl:     levothyroxine (SYNTHROID, LEVOTHROID) 50 MCG tablet, Take 1 tablet by mouth Every Morning., Disp: 90 tablet, Rfl: 1    losartan (Cozaar) 25 MG tablet, Take 0.5 tablets by mouth Daily., Disp: 45 tablet, Rfl: 1    Multiple Vitamins-Minerals (CENTRUM SILVER) tablet, Take 1 tablet by mouth Daily., Disp: , Rfl:     Omega-3 Fatty Acids (OMEGA-3 FISH OIL PO), Take 1,600 mg by mouth Every Morning., Disp: , Rfl:          Objective:     Vitals:    03/19/24 1440   BP: 130/70   BP Location: Left arm   Patient Position: Sitting   Cuff Size: Adult   Pulse: 91   SpO2: 99%   Weight: 48.5 kg (107 lb)   Height: 157.5 cm (62.01\")     Body mass index is 19.57 kg/m².    PHYSICAL EXAM:    Vitals Reviewed.   General Appearance: No acute distress, well developed and well nourished. Thin.   Eyes: Glasses.   HENT: No hearing loss noted.    Respiratory: No signs of respiratory distress. Respiration rhythm and depth normal.  Clear to auscultation.   Cardiovascular:  Jugular Venous Pressure: Normal  Heart Rate and Rhythm: Irregularly irregular.  Heart Sounds: Normal S1 and S2. No S3 or S4 noted.  Murmurs: No murmurs noted. No rubs, thrills, or gallops.   Lower Extremities: No edema noted.  Musculoskeletal: " Normal movement of extremities.  Skin: General appearance normal.    Psychiatric: Patient alert and oriented to person, place, and time. Speech and behavior appropriate. Normal mood and affect.       ECG 12 Lead    Date/Time: 3/19/2024 1:44 PM  Performed by: Celia Quijano APRN    Authorized by: Celia Quijano APRN  Comparison: compared with previous ECG from 3/9/2024  Similar to previous ECG  Rhythm: atrial fibrillation  Rate: normal  BPM: 91  Q waves: V1 and V2    ST Segments: ST segments normal  T Waves: T waves normal  QRS axis: left    Clinical impression: abnormal EKG            Assessment:       Diagnosis Plan   1. Essential hypertension        2. PAF (paroxysmal atrial fibrillation)        3. History of ischemic right MCA stroke with extension        4. Nonrheumatic mitral valve regurgitation        5. Nonrheumatic aortic (valve) insufficiency        6. Superior mesenteric artery stenosis               Plan:       1.  Hypertension: Blood pressure well-controlled today.  The last 3 out of 5 blood pressures have been well-controlled.  I recommended continuing with her current dose of losartan and amlodipine.  If her blood pressure remains elevated, she will contact our office or Dr. Doty's.    2.  Atrial Fibrillation: Rate controlled naturally. She remains on apixaban and has only been taking it once a day since she was discharged from the hospital.  I think that this was an error as the hospital thought she was only taking it once a day, but she came in taking it twice per day.  I reconciled this for her and she will take apixaban 2.5 mg 1 tablet twice per day going forward.  She has decided against the Watchman device. [id number] score of 6.  If she continues to have falls, we will need to reconsider anticoagulation long-term.    3.  History of stroke in 2019.    4/5.  Mild to moderate mitral and aortic regurgitation.    6.  Abnormal CT scan showing superior mesenteric artery stenosis, pulmonary  nodules, liver lesions, thyroid nodule.  Defer to PCP.    7.  She will follow-up with Dr. Martines in 3 months.    As always, it has been a pleasure to participate in your patient's care. Thank you.         Sincerely,         GROVER Del Cid  [facility] Cardiology      Dictated utilizing Dragon Dictation

## 2024-03-21 ENCOUNTER — READMISSION MANAGEMENT (OUTPATIENT)
Dept: CALL CENTER | Facility: HOSPITAL | Age: 89
End: 2024-03-21
Payer: MEDICARE
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
## 2024-03-21 PROBLEM — S51.811A LACERATION OF RIGHT FOREARM: Status: RESOLVED | Noted: 2023-03-07 | Resolved: 2024-03-21

## 2024-03-21 PROBLEM — S09.90XA CLOSED HEAD INJURY: Status: RESOLVED | Noted: 2023-12-07 | Resolved: 2024-03-21

## 2024-03-21 PROBLEM — R07.9 CHEST PAIN: Status: RESOLVED | Noted: 2022-04-29 | Resolved: 2024-03-21

## 2024-03-21 PROBLEM — S42.402A CLOSED FRACTURE OF LEFT DISTAL HUMERUS: Status: RESOLVED | Noted: 2023-03-07 | Resolved: 2024-03-21

## 2024-03-21 PROBLEM — R42 EPISODE OF DIZZINESS: Status: RESOLVED | Noted: 2020-10-15 | Resolved: 2024-03-21

## 2024-03-21 PROBLEM — S01.81XA FACIAL LACERATION: Status: RESOLVED | Noted: 2023-03-07 | Resolved: 2024-03-21

## 2024-03-21 PROBLEM — K55.1 SUPERIOR MESENTERIC ARTERY STENOSIS: Status: ACTIVE | Noted: 2024-03-01

## 2024-03-21 RX ORDER — APIXABAN 2.5 MG/1
2.5 TABLET, FILM COATED ORAL 2 TIMES DAILY
Qty: 180 TABLET | Refills: 3 | Status: SHIPPED | OUTPATIENT
Start: 2024-03-21

## 2024-03-21 NOTE — OUTREACH NOTE
Medical Week 2 Survey      Flowsheet Row Responses   [facility] patient discharged from? [place]   Does the patient have one of the following disease processes/diagnoses(primary or secondary)? Other   Week 2 attempt successful? No   Unsuccessful attempts Attempt 1            Brittny ROSALES - Registered Nurse

## 2024-03-26 ENCOUNTER — READMISSION MANAGEMENT (OUTPATIENT)
Dept: CALL CENTER | Facility: HOSPITAL | Age: 89
End: 2024-03-26
Payer: MEDICARE

## 2024-03-26 NOTE — OUTREACH NOTE
Medical Week 2 Survey      Flowsheet Row Responses   [facility] patient discharged from? [place]   Does the patient have one of the following disease processes/diagnoses(primary or secondary)? Other   Week 2 attempt successful? Yes   Call start time 1744   Discharge diagnosis Chest pain   Call end time 1746   Person spoke with today (if not patient) and relationship Son   Is the patient taking all medications as directed (includes completed medication regime)? Yes   Does the patient have a primary care provider?  Yes   Has the patient kept scheduled appointments due by today? Yes   Comments Has seen Dr. Doty and Dr. Martines   Psychosocial issues? No   What is the patient's perception of their health status since discharge? Improving   Is the patient/caregiver able to teach back signs and symptoms related to disease process for when to call PCP? Yes   Is the patient/caregiver able to teach back signs and symptoms related to disease process for when to call 911? Yes   Is the patient/caregiver able to teach back the hierarchy of who to call/visit for symptoms/problems? PCP, Specialist, Home health nurse, Urgent Care, ED, 911 Yes   If the patient is a current smoker, are they able to teach back resources for cessation? Not a smoker   Additional teach back comments Son states she is doing well.  The are monitor bp and has improved.   Week 2 Call Completed? Yes   Graduated Yes   Graduated/Revoked comments Denies questions or needs at this time.   Call end time 1746            Makeda SCHULER - Licensed Nurse

## 2024-04-18 ENCOUNTER — TELEPHONE (OUTPATIENT)
Dept: FAMILY MEDICINE CLINIC | Facility: CLINIC | Age: 89
End: 2024-04-18

## 2024-04-18 NOTE — TELEPHONE ENCOUNTER
Caller: Angy Doty    Relationship to patient: Emergency Contact    Best call back number: [phone]    Chief complaint: RIGHT EAR HAD EXCESSIVE WAX, HARD TIME HEARING    Type of visit: IN-OFFICE PROCEDURE    Requested date: ASAP- IN THE AFTERNOON IS PREFERABLE    If rescheduling, when is the original appointment: N/A     Additional notes:PATIENT WAS GETTING HEARING AIDE ADJUSTED AS SHE WAS HAVING TROUBLE HEARING OUT OF HER RIGHT EAR AND THE PERSON NOTED HER HEAR HAD A LOT OF WAX.. PER [facility]UB WORKFLOW EAR WAX REMOVAL IS AN IN-OFFICE PROCEDURE AND A SCHEDULING REQUEST IS NEEDED.

## 2024-04-23 NOTE — PROGRESS NOTES
"Subjective   Alba Correa is a 89 y.o. female.     CC\" Ear \"Fullness\"    History of Present Illness     Patient comes in today reporting some right ear fullness and was told while getting her hearing aids adjusted, that her right ear was completely occluded with wax.  Patient has had some fullness and decreased hearing in that ear.      The following portions of the patient's history were reviewed and updated as appropriate: allergies, current medications, past family history, past medical history, past social history, past surgical history, and problem list.    Review of Systems   Constitutional:  Negative for activity change, chills and fever.   HENT:  Positive for ear pain.    Respiratory:  Negative for cough.    Cardiovascular:  Negative for chest pain.   Psychiatric/Behavioral:  Negative for dysphoric mood.        /84   Pulse 74   Temp 97.6 °F (36.4 °C) (Oral)   Resp 16   Ht 157.5 cm (62.01\")   Wt 49.4 kg (109 lb)   LMP  (LMP Unknown)   SpO2 96%   BMI 19.93 kg/m²     Objective   Physical Exam  Vitals and nursing note reviewed.   Constitutional:       General: She is not in acute distress.     Appearance: She is well-developed.   HENT:      Left Ear: Tympanic membrane and ear canal normal.      Ears:      Comments: Right TM completely occluded with wax.  Attempts to irrigate this out caused pain, thus was not unable to be accomplished  Pulmonary:      Effort: Pulmonary effort is normal.   Neurological:      Mental Status: She is alert and oriented to person, place, and time.   Psychiatric:         Behavior: Behavior normal.         Thought Content: Thought content normal.         Assessment & Plan   Diagnoses and all orders for this visit:    1. Excessive cerumen in right ear canal (Primary)    I called her ENTs office, and they are going to work her in this afternoon at 2 PM.  They will use the suction to remove this at that time.             "

## 2024-04-24 ENCOUNTER — OFFICE VISIT (OUTPATIENT)
Dept: FAMILY MEDICINE CLINIC | Facility: CLINIC | Age: 89
End: 2024-04-24
Payer: MEDICARE

## 2024-04-24 VITALS
OXYGEN SATURATION: 96 % | HEIGHT: 62 IN | BODY MASS INDEX: 20.06 KG/M2 | RESPIRATION RATE: 16 BRPM | WEIGHT: 109 LBS | SYSTOLIC BLOOD PRESSURE: 158 MMHG | DIASTOLIC BLOOD PRESSURE: 84 MMHG | TEMPERATURE: 97.6 F | HEART RATE: 74 BPM

## 2024-04-24 DIAGNOSIS — H61.21 EXCESSIVE CERUMEN IN RIGHT EAR CANAL: Primary | ICD-10-CM

## 2024-04-24 PROCEDURE — 1159F MED LIST DOCD IN RCRD: CPT | Performed by: FAMILY MEDICINE

## 2024-04-24 PROCEDURE — 99212 OFFICE O/P EST SF 10 MIN: CPT | Performed by: FAMILY MEDICINE

## 2024-04-24 PROCEDURE — 1160F RVW MEDS BY RX/DR IN RCRD: CPT | Performed by: FAMILY MEDICINE

## 2024-05-08 NOTE — OUTREACH NOTE
Prep Survey      Responses   [facility] patient discharged from? [place]   Is LACE score < 7 ? Yes   Emergency Room discharge w/ pulse ox? No   Eligibility [facility]   Date of Admission 10/10/21   Date of Discharge 10/12/21   Discharge Disposition Home or Self Care   Discharge diagnosis syncope and collapse, bradycardia   Does the patient have one of the following disease processes/diagnoses(primary or secondary)? Other   Does the patient have Home health ordered? No   Is there a DME ordered? No   Prep survey completed? Yes          Gabbie Rose RN         Single

## 2024-05-18 ENCOUNTER — APPOINTMENT (OUTPATIENT)
Dept: GENERAL RADIOLOGY | Facility: HOSPITAL | Age: 89
DRG: 543 | End: 2024-05-18
Payer: MEDICARE

## 2024-05-18 ENCOUNTER — HOSPITAL ENCOUNTER (INPATIENT)
Facility: HOSPITAL | Age: 89
LOS: 4 days | Discharge: NURSING, REHAB OR LONG-TERM CARE | DRG: 543 | End: 2024-05-24
Attending: EMERGENCY MEDICINE | Admitting: INTERNAL MEDICINE
Payer: MEDICARE

## 2024-05-18 ENCOUNTER — APPOINTMENT (OUTPATIENT)
Dept: CT IMAGING | Facility: HOSPITAL | Age: 89
DRG: 543 | End: 2024-05-18
Payer: MEDICARE

## 2024-05-18 ENCOUNTER — APPOINTMENT (OUTPATIENT)
Dept: ULTRASOUND IMAGING | Facility: HOSPITAL | Age: 89
DRG: 543 | End: 2024-05-18
Payer: MEDICARE

## 2024-05-18 DIAGNOSIS — S22.060A COMPRESSION FRACTURE OF T8 VERTEBRA, INITIAL ENCOUNTER: Primary | ICD-10-CM
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
## 2024-05-18 PROBLEM — D75.89 MACROCYTOSIS: Status: ACTIVE | Noted: 2024-05-18

## 2024-05-18 PROBLEM — R10.9 ABDOMINAL PAIN: Status: ACTIVE | Noted: 2024-05-18

## 2024-05-18 PROBLEM — I10 ESSENTIAL HYPERTENSION: Status: ACTIVE | Noted: 2024-05-18

## 2024-05-18 PROBLEM — E21.3 HYPERPARATHYROIDISM: Status: ACTIVE | Noted: 2024-05-18

## 2024-05-18 PROBLEM — E83.52 HYPERCALCEMIA: Status: ACTIVE | Noted: 2024-05-18

## 2024-05-18 PROBLEM — N18.30 CHRONIC KIDNEY FAILURE, STAGE 3 (MODERATE): Status: ACTIVE | Noted: 2024-05-18

## 2024-05-18 PROBLEM — E04.1 THYROID NODULE: Status: ACTIVE | Noted: 2024-05-18

## 2024-05-18 PROBLEM — I50.32 CHRONIC DIASTOLIC CHF (CONGESTIVE HEART FAILURE): Status: ACTIVE | Noted: 2024-05-18

## 2024-05-18 PROBLEM — E78.5 HYPERLIPIDEMIA: Status: ACTIVE | Noted: 2024-05-18

## 2024-05-18 LAB
25(OH)D3 SERPL-MCNC: 53.1 NG/ML (ref 30–100)
ALBUMIN SERPL-MCNC: 4.4 G/DL (ref 3.5–5.2)
ALBUMIN/GLOB SERPL: 1.1 G/DL
ALP SERPL-CCNC: 100 U/L (ref 39–117)
ALT SERPL W P-5'-P-CCNC: 15 U/L (ref 1–33)
ANION GAP SERPL CALCULATED.3IONS-SCNC: 12 MMOL/L (ref 5–15)
ANION GAP SERPL CALCULATED.3IONS-SCNC: 12 MMOL/L (ref 5–15)
AST SERPL-CCNC: 24 U/L (ref 1–32)
BASOPHILS # BLD AUTO: 0.06 10*3/MM3 (ref 0–0.2)
BASOPHILS NFR BLD AUTO: 0.9 % (ref 0–1.5)
BILIRUB SERPL-MCNC: 0.4 MG/DL (ref 0–1.2)
BILIRUB UR QL STRIP: NEGATIVE
BUN SERPL-MCNC: 25 MG/DL (ref 8–23)
BUN SERPL-MCNC: 33 MG/DL (ref 8–23)
BUN/CREAT SERPL: 32.1 (ref 7–25)
BUN/CREAT SERPL: 32.7 (ref 7–25)
CALCIUM SPEC-SCNC: 9.5 MG/DL (ref 8.2–9.6)
CALCIUM SPEC-SCNC: 9.8 MG/DL (ref 8.2–9.6)
CHLORIDE SERPL-SCNC: 103 MMOL/L (ref 98–107)
CHLORIDE SERPL-SCNC: 106 MMOL/L (ref 98–107)
CLARITY UR: CLEAR
CO2 SERPL-SCNC: 24 MMOL/L (ref 22–29)
CO2 SERPL-SCNC: 27 MMOL/L (ref 22–29)
COLOR UR: YELLOW
CREAT SERPL-MCNC: 0.78 MG/DL (ref 0.57–1)
CREAT SERPL-MCNC: 1.01 MG/DL (ref 0.57–1)
DEPRECATED RDW RBC AUTO: 47.1 FL (ref 37–54)
EGFRCR SERPLBLD CKD-EPI 2021: 53 ML/MIN/1.73
EGFRCR SERPLBLD CKD-EPI 2021: 72.3 ML/MIN/1.73
EOSINOPHIL # BLD AUTO: 0.11 10*3/MM3 (ref 0–0.4)
EOSINOPHIL NFR BLD AUTO: 1.7 % (ref 0.3–6.2)
ERYTHROCYTE [DISTWIDTH] IN BLOOD BY AUTOMATED COUNT: 12.4 % (ref 12.3–15.4)
FOLATE SERPL-MCNC: >20 NG/ML (ref 4.78–24.2)
GEN 5 2HR TROPONIN T REFLEX: 14 NG/L
GLOBULIN UR ELPH-MCNC: 4 GM/DL
GLUCOSE SERPL-MCNC: 100 MG/DL (ref 65–99)
GLUCOSE SERPL-MCNC: 105 MG/DL (ref 65–99)
GLUCOSE UR STRIP-MCNC: NEGATIVE MG/DL
HCT VFR BLD AUTO: 41 % (ref 34–46.6)
HGB BLD-MCNC: 13.7 G/DL (ref 12–15.9)
HGB UR QL STRIP.AUTO: NEGATIVE
IMM GRANULOCYTES # BLD AUTO: 0.02 10*3/MM3 (ref 0–0.05)
IMM GRANULOCYTES NFR BLD AUTO: 0.3 % (ref 0–0.5)
KETONES UR QL STRIP: NEGATIVE
LEUKOCYTE ESTERASE UR QL STRIP.AUTO: NEGATIVE
LIPASE SERPL-CCNC: 62 U/L (ref 13–60)
LYMPHOCYTES # BLD AUTO: 1.16 10*3/MM3 (ref 0.7–3.1)
LYMPHOCYTES NFR BLD AUTO: 18.2 % (ref 19.6–45.3)
MAGNESIUM SERPL-MCNC: 2.1 MG/DL (ref 1.6–2.4)
MCH RBC QN AUTO: 34.3 PG (ref 26.6–33)
MCHC RBC AUTO-ENTMCNC: 33.4 G/DL (ref 31.5–35.7)
MCV RBC AUTO: 102.8 FL (ref 79–97)
MONOCYTES # BLD AUTO: 0.48 10*3/MM3 (ref 0.1–0.9)
MONOCYTES NFR BLD AUTO: 7.5 % (ref 5–12)
NEUTROPHILS NFR BLD AUTO: 4.54 10*3/MM3 (ref 1.7–7)
NEUTROPHILS NFR BLD AUTO: 71.4 % (ref 42.7–76)
NITRITE UR QL STRIP: NEGATIVE
NRBC BLD AUTO-RTO: 0 /100 WBC (ref 0–0.2)
PH UR STRIP.AUTO: 7.5 [PH] (ref 5–8)
PLATELET # BLD AUTO: 258 10*3/MM3 (ref 140–450)
PMV BLD AUTO: 9.6 FL (ref 6–12)
POTASSIUM SERPL-SCNC: 4.3 MMOL/L (ref 3.5–5.2)
POTASSIUM SERPL-SCNC: 4.5 MMOL/L (ref 3.5–5.2)
PROT SERPL-MCNC: 8.4 G/DL (ref 6–8.5)
PROT UR QL STRIP: NEGATIVE
PTH-INTACT SERPL-MCNC: 36.8 PG/ML (ref 15–65)
RBC # BLD AUTO: 3.99 10*6/MM3 (ref 3.77–5.28)
SODIUM SERPL-SCNC: 142 MMOL/L (ref 136–145)
SODIUM SERPL-SCNC: 142 MMOL/L (ref 136–145)
SP GR UR STRIP: 1.01 (ref 1–1.03)
T4 FREE SERPL-MCNC: 1.41 NG/DL (ref 0.93–1.7)
TROPONIN T DELTA: -1 NG/L
TROPONIN T SERPL HS-MCNC: 15 NG/L
TSH SERPL DL<=0.05 MIU/L-ACNC: 1.17 UIU/ML (ref 0.27–4.2)
UROBILINOGEN UR QL STRIP: NORMAL
VIT B12 BLD-MCNC: 1094 PG/ML (ref 211–946)
WBC NRBC COR # BLD AUTO: 6.37 10*3/MM3 (ref 3.4–10.8)

## 2024-05-18 PROCEDURE — 76536 US EXAM OF HEAD AND NECK: CPT

## 2024-05-18 PROCEDURE — 82746 ASSAY OF FOLIC ACID SERUM: CPT | Performed by: INTERNAL MEDICINE

## 2024-05-18 PROCEDURE — 99285 EMERGENCY DEPT VISIT HI MDM: CPT

## 2024-05-18 PROCEDURE — 71045 X-RAY EXAM CHEST 1 VIEW: CPT

## 2024-05-18 PROCEDURE — 84443 ASSAY THYROID STIM HORMONE: CPT | Performed by: PHYSICIAN ASSISTANT

## 2024-05-18 PROCEDURE — 25810000003 SODIUM CHLORIDE 0.9 % SOLUTION: Performed by: PHYSICIAN ASSISTANT

## 2024-05-18 PROCEDURE — 83690 ASSAY OF LIPASE: CPT | Performed by: PHYSICIAN ASSISTANT

## 2024-05-18 PROCEDURE — 25010000002 MORPHINE PER 10 MG: Performed by: INTERNAL MEDICINE

## 2024-05-18 PROCEDURE — 25810000003 SODIUM CHLORIDE 0.9 % SOLUTION: Performed by: INTERNAL MEDICINE

## 2024-05-18 PROCEDURE — 84439 ASSAY OF FREE THYROXINE: CPT | Performed by: INTERNAL MEDICINE

## 2024-05-18 PROCEDURE — 72072 X-RAY EXAM THORAC SPINE 3VWS: CPT

## 2024-05-18 PROCEDURE — 72125 CT NECK SPINE W/O DYE: CPT

## 2024-05-18 PROCEDURE — 70450 CT HEAD/BRAIN W/O DYE: CPT

## 2024-05-18 PROCEDURE — 25510000001 IOPAMIDOL 61 % SOLUTION: Performed by: EMERGENCY MEDICINE

## 2024-05-18 PROCEDURE — G0378 HOSPITAL OBSERVATION PER HR: HCPCS

## 2024-05-18 PROCEDURE — 83970 ASSAY OF PARATHORMONE: CPT | Performed by: INTERNAL MEDICINE

## 2024-05-18 PROCEDURE — 84481 FREE ASSAY (FT-3): CPT | Performed by: INTERNAL MEDICINE

## 2024-05-18 PROCEDURE — 80053 COMPREHEN METABOLIC PANEL: CPT | Performed by: PHYSICIAN ASSISTANT

## 2024-05-18 PROCEDURE — 36415 COLL VENOUS BLD VENIPUNCTURE: CPT

## 2024-05-18 PROCEDURE — 25010000002 MORPHINE PER 10 MG: Performed by: EMERGENCY MEDICINE

## 2024-05-18 PROCEDURE — 81003 URINALYSIS AUTO W/O SCOPE: CPT | Performed by: PHYSICIAN ASSISTANT

## 2024-05-18 PROCEDURE — 82306 VITAMIN D 25 HYDROXY: CPT | Performed by: INTERNAL MEDICINE

## 2024-05-18 PROCEDURE — 85025 COMPLETE CBC W/AUTO DIFF WBC: CPT | Performed by: PHYSICIAN ASSISTANT

## 2024-05-18 PROCEDURE — 82607 VITAMIN B-12: CPT | Performed by: INTERNAL MEDICINE

## 2024-05-18 PROCEDURE — 72110 X-RAY EXAM L-2 SPINE 4/>VWS: CPT

## 2024-05-18 PROCEDURE — 84484 ASSAY OF TROPONIN QUANT: CPT | Performed by: PHYSICIAN ASSISTANT

## 2024-05-18 PROCEDURE — 74177 CT ABD & PELVIS W/CONTRAST: CPT

## 2024-05-18 PROCEDURE — 83735 ASSAY OF MAGNESIUM: CPT | Performed by: PHYSICIAN ASSISTANT

## 2024-05-18 RX ORDER — MORPHINE SULFATE 2 MG/ML
4 INJECTION, SOLUTION INTRAMUSCULAR; INTRAVENOUS ONCE
Status: COMPLETED | OUTPATIENT
Start: 2024-05-18 | End: 2024-05-18

## 2024-05-18 RX ORDER — BISACODYL 5 MG/1
5 TABLET, DELAYED RELEASE ORAL DAILY PRN
Status: DISCONTINUED | OUTPATIENT
Start: 2024-05-18 | End: 2024-05-24 | Stop reason: HOSPADM

## 2024-05-18 RX ORDER — HYDROCODONE BITARTRATE AND ACETAMINOPHEN 5; 325 MG/1; MG/1
1 TABLET ORAL EVERY 4 HOURS PRN
Status: DISCONTINUED | OUTPATIENT
Start: 2024-05-18 | End: 2024-05-24 | Stop reason: HOSPADM

## 2024-05-18 RX ORDER — POLYETHYLENE GLYCOL 3350 17 G/17G
17 POWDER, FOR SOLUTION ORAL DAILY PRN
Status: DISCONTINUED | OUTPATIENT
Start: 2024-05-18 | End: 2024-05-24 | Stop reason: HOSPADM

## 2024-05-18 RX ORDER — ALLOPURINOL 100 MG/1
100 TABLET ORAL DAILY
Status: DISCONTINUED | OUTPATIENT
Start: 2024-05-18 | End: 2024-05-24 | Stop reason: HOSPADM

## 2024-05-18 RX ORDER — SODIUM CHLORIDE 0.9 % (FLUSH) 0.9 %
10 SYRINGE (ML) INJECTION EVERY 12 HOURS SCHEDULED
Status: DISCONTINUED | OUTPATIENT
Start: 2024-05-18 | End: 2024-05-24 | Stop reason: HOSPADM

## 2024-05-18 RX ORDER — SODIUM CHLORIDE 9 MG/ML
40 INJECTION, SOLUTION INTRAVENOUS AS NEEDED
Status: DISCONTINUED | OUTPATIENT
Start: 2024-05-18 | End: 2024-05-24 | Stop reason: HOSPADM

## 2024-05-18 RX ORDER — LIDOCAINE 4 G/G
1 PATCH TOPICAL
Status: DISCONTINUED | OUTPATIENT
Start: 2024-05-18 | End: 2024-05-24 | Stop reason: HOSPADM

## 2024-05-18 RX ORDER — ACETAMINOPHEN 325 MG/1
650 TABLET ORAL EVERY 4 HOURS PRN
Status: DISCONTINUED | OUTPATIENT
Start: 2024-05-18 | End: 2024-05-24 | Stop reason: HOSPADM

## 2024-05-18 RX ORDER — UREA 10 %
5 LOTION (ML) TOPICAL NIGHTLY PRN
Status: DISCONTINUED | OUTPATIENT
Start: 2024-05-18 | End: 2024-05-24 | Stop reason: HOSPADM

## 2024-05-18 RX ORDER — MECLIZINE HYDROCHLORIDE 25 MG/1
25 TABLET ORAL ONCE
Status: COMPLETED | OUTPATIENT
Start: 2024-05-18 | End: 2024-05-18

## 2024-05-18 RX ORDER — FAMOTIDINE 20 MG/1
40 TABLET, FILM COATED ORAL DAILY
Status: DISCONTINUED | OUTPATIENT
Start: 2024-05-18 | End: 2024-05-19

## 2024-05-18 RX ORDER — NITROGLYCERIN 0.4 MG/1
0.4 TABLET SUBLINGUAL
Status: DISCONTINUED | OUTPATIENT
Start: 2024-05-18 | End: 2024-05-24 | Stop reason: HOSPADM

## 2024-05-18 RX ORDER — BISACODYL 10 MG
10 SUPPOSITORY, RECTAL RECTAL DAILY PRN
Status: DISCONTINUED | OUTPATIENT
Start: 2024-05-18 | End: 2024-05-24 | Stop reason: HOSPADM

## 2024-05-18 RX ORDER — ACETAMINOPHEN 650 MG/1
650 SUPPOSITORY RECTAL EVERY 4 HOURS PRN
Status: DISCONTINUED | OUTPATIENT
Start: 2024-05-18 | End: 2024-05-22

## 2024-05-18 RX ORDER — THIAMINE HCL 100 MG
1 TABLET ORAL DAILY
COMMUNITY
End: 2024-05-24 | Stop reason: HOSPADM

## 2024-05-18 RX ORDER — ACETAMINOPHEN 160 MG/5ML
650 SOLUTION ORAL EVERY 4 HOURS PRN
Status: DISCONTINUED | OUTPATIENT
Start: 2024-05-18 | End: 2024-05-22

## 2024-05-18 RX ORDER — DULOXETIN HYDROCHLORIDE 60 MG/1
60 CAPSULE, DELAYED RELEASE ORAL DAILY
Status: DISCONTINUED | OUTPATIENT
Start: 2024-05-18 | End: 2024-05-24 | Stop reason: HOSPADM

## 2024-05-18 RX ORDER — LORAZEPAM 0.5 MG/1
0.5 TABLET ORAL EVERY 8 HOURS PRN
Status: DISCONTINUED | OUTPATIENT
Start: 2024-05-18 | End: 2024-05-22

## 2024-05-18 RX ORDER — SODIUM CHLORIDE 0.9 % (FLUSH) 0.9 %
10 SYRINGE (ML) INJECTION AS NEEDED
Status: DISCONTINUED | OUTPATIENT
Start: 2024-05-18 | End: 2024-05-24 | Stop reason: HOSPADM

## 2024-05-18 RX ORDER — ACETAMINOPHEN 500 MG
500 TABLET ORAL EVERY 6 HOURS PRN
Status: DISCONTINUED | OUTPATIENT
Start: 2024-05-18 | End: 2024-05-18 | Stop reason: SDUPTHER

## 2024-05-18 RX ORDER — LEVOTHYROXINE SODIUM 0.05 MG/1
50 TABLET ORAL EVERY MORNING
Status: DISCONTINUED | OUTPATIENT
Start: 2024-05-19 | End: 2024-05-24 | Stop reason: HOSPADM

## 2024-05-18 RX ORDER — ONDANSETRON 4 MG/1
4 TABLET, ORALLY DISINTEGRATING ORAL EVERY 6 HOURS PRN
Status: DISCONTINUED | OUTPATIENT
Start: 2024-05-18 | End: 2024-05-24 | Stop reason: HOSPADM

## 2024-05-18 RX ORDER — AMOXICILLIN 250 MG
2 CAPSULE ORAL 2 TIMES DAILY
Status: DISCONTINUED | OUTPATIENT
Start: 2024-05-18 | End: 2024-05-24 | Stop reason: HOSPADM

## 2024-05-18 RX ORDER — ACETAMINOPHEN 500 MG
1000 TABLET ORAL ONCE
Status: COMPLETED | OUTPATIENT
Start: 2024-05-18 | End: 2024-05-18

## 2024-05-18 RX ORDER — ONDANSETRON 2 MG/ML
4 INJECTION INTRAMUSCULAR; INTRAVENOUS EVERY 6 HOURS PRN
Status: DISCONTINUED | OUTPATIENT
Start: 2024-05-18 | End: 2024-05-24 | Stop reason: HOSPADM

## 2024-05-18 RX ORDER — SODIUM CHLORIDE 9 MG/ML
100 INJECTION, SOLUTION INTRAVENOUS CONTINUOUS
Status: DISCONTINUED | OUTPATIENT
Start: 2024-05-18 | End: 2024-05-19

## 2024-05-18 RX ORDER — MORPHINE SULFATE 2 MG/ML
1 INJECTION, SOLUTION INTRAMUSCULAR; INTRAVENOUS EVERY 4 HOURS PRN
Status: DISCONTINUED | OUTPATIENT
Start: 2024-05-18 | End: 2024-05-22

## 2024-05-18 RX ORDER — LOSARTAN POTASSIUM 25 MG/1
12.5 TABLET ORAL DAILY
Status: DISCONTINUED | OUTPATIENT
Start: 2024-05-18 | End: 2024-05-21

## 2024-05-18 RX ADMIN — Medication 10 ML: at 21:03

## 2024-05-18 RX ADMIN — MECLIZINE HYDROCHLORIDE 25 MG: 25 TABLET ORAL at 11:36

## 2024-05-18 RX ADMIN — DULOXETINE HYDROCHLORIDE 60 MG: 60 CAPSULE, DELAYED RELEASE ORAL at 21:03

## 2024-05-18 RX ADMIN — MORPHINE SULFATE 1 MG: 2 INJECTION, SOLUTION INTRAMUSCULAR; INTRAVENOUS at 17:24

## 2024-05-18 RX ADMIN — MORPHINE SULFATE 1 MG: 2 INJECTION, SOLUTION INTRAMUSCULAR; INTRAVENOUS at 22:33

## 2024-05-18 RX ADMIN — ALLOPURINOL 100 MG: 100 TABLET ORAL at 21:02

## 2024-05-18 RX ADMIN — ACETAMINOPHEN 1000 MG: 500 TABLET ORAL at 11:38

## 2024-05-18 RX ADMIN — LOSARTAN POTASSIUM 12.5 MG: 25 TABLET, FILM COATED ORAL at 21:02

## 2024-05-18 RX ADMIN — SODIUM CHLORIDE 500 ML: 9 INJECTION, SOLUTION INTRAVENOUS at 11:31

## 2024-05-18 RX ADMIN — MORPHINE SULFATE 4 MG: 2 INJECTION, SOLUTION INTRAMUSCULAR; INTRAVENOUS at 12:18

## 2024-05-18 RX ADMIN — FAMOTIDINE 40 MG: 20 TABLET, FILM COATED ORAL at 21:02

## 2024-05-18 RX ADMIN — APIXABAN 2.5 MG: 2.5 TABLET, FILM COATED ORAL at 21:02

## 2024-05-18 RX ADMIN — IOPAMIDOL 85 ML: 612 INJECTION, SOLUTION INTRAVENOUS at 13:24

## 2024-05-18 RX ADMIN — SODIUM CHLORIDE 100 ML/HR: 9 INJECTION, SOLUTION INTRAVENOUS at 18:14

## 2024-05-18 NOTE — ED PROVIDER NOTES
EMERGENCY DEPARTMENT ENCOUNTER      PCP: Aramis Doty MD  Patient Care Team:  Aramis Doty MD as PCP - General (Family Medicine)  Juan Jose Muñiz MD as Consulting Physician (Neurology)  Roshan Martines III, MD as Cardiologist (Cardiology)  Nehal Murray MD as Consulting Physician (Nephrology)   Independent Historians: Patient, family at bedside    HPI:  Chief Complaint: Fall, abdominal pain, back pain, dizziness   A complete HPI/ROS/PMH/PSH/SH/FH are unobtainable due to: None    Chronic or social conditions impacting patient care (social determinants of health): None    Context: Alba Correa is a 89 y.o. female who presents to the ED c/o acute back pain and abdominal pain after a fall that occurred this morning.  Patient states she has been feeling dizzy, worse over the past few days and worse in the morning when getting out of bed.  She states this morning she went to the restroom using her walker when she was washing her hands dropped a tissue.  She attempted to bend over to  the tissue when she fell forward.  She began having abdominal pain and back pain after the fall. She was able to get up and ambulate back to her bed where she laid until close to 10:00 and called family due to pain.  She does not believe she hit her head.  She did not lose consciousness.  She denies any chest pain, shortness of breath.  She has not had any black or bloody stools.  She has not taken her dose of Eliquis this morning.    Review of prior external notes and/or external test results outside of this encounter: CMP on 3/8/24 showed creatinine of 1.12, normal LFTs.      PAST MEDICAL HISTORY  Active Ambulatory Problems     Diagnosis Date Noted    CKD (chronic kidney disease) stage 4, GFR 15-29 ml/min     Depression     Gout     Hyperparathyroidism     Adaptive colitis     Osteopenia     Rheumatoid arthritis     Degeneration of intervertebral disc     Essential hypertension 11/20/2015    Detrusor muscle  hypertonia 11/20/2015    History of DVT (deep vein thrombosis) 07/11/2017    Hyperlipidemia 01/10/2018    Macrocytosis 02/28/2018    Nonrheumatic aortic (valve) insufficiency 10/08/2018    History of ischemic right MCA stroke with extension 11/20/2018    PAF (paroxysmal atrial fibrillation)     Anemia in chronic kidney disease 12/08/2016    Other proteinuria 01/26/2017    Sinus bradycardia 05/26/2021    First degree AV block 05/26/2021    Nonrheumatic mitral valve regurgitation     Idiopathic peripheral neuropathy 04/19/2022    Lower extremity edema 04/21/2022    Acute diastolic CHF (congestive heart failure) 03/20/2022    Acquired hypothyroidism 05/03/2022    Sick sinus syndrome 05/05/2022    Insomnia 05/19/2022    Chronic kidney disease, stage 3a 03/07/2023    Hematoma of thigh 12/07/2023    Traumatic hematoma of scalp 12/07/2023    Abnormal CT of spine 12/07/2023    At risk for falling     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     Resolved Ambulatory Problems     Diagnosis Date Noted    DVT (deep venous thrombosis)     Edema     Elevated cholesterol     Osteoporosis     Benign essential hypertension     D (diarrhea) 11/20/2015    Open leg wound 11/20/2015    Vaginal odor 11/20/2015    Infected wound 11/20/2015    History of depression 07/11/2017    History of renal insufficiency syndrome 07/11/2017    History of degenerative disc disease 07/11/2017    History of osteoporosis 07/11/2017    History of rheumatoid arthritis 07/11/2017    Right leg weakness 08/05/2018    CVA (cerebral vascular accident) 08/06/2018    Acute intractable headache 04/02/2019    Nausea 04/02/2019    Bilateral leg weakness 04/04/2019    Chest pain 10/15/2020    Afib 10/15/2020    Episode of dizziness 10/15/2020    On amiodarone therapy 05/26/2021    History of syncope 10/10/2021    Bradycardia 03/25/2022    FATIMAH (acute kidney injury) 03/26/2022    Chest pain 04/29/2022    Fall from standing, initial encounter 03/07/2023    Closed  fracture of left distal humerus 2023    Facial laceration 2023    Laceration of right forearm 2023    Closed head injury 2023     Past Medical History:   Diagnosis Date    Chronic kidney disease     H/O complete eye exam 2016    IBS (irritable bowel syndrome)     OAB (overactive bladder)     Peripheral neuropathy        The patient has started, but not completed, their COVID-19 vaccination series.    PAST SURGICAL HISTORY  Past Surgical History:   Procedure Laterality Date    APPENDECTOMY      BREAST BIOPSY      CARDIAC ELECTROPHYSIOLOGY PROCEDURE N/A 10/12/2021    Procedure: Loop insertion linq;  Surgeon: Tone Anguiano MD;  Location: [facility] CATH INVASIVE LOCATION;  Service: Cardiovascular;  Laterality: N/A;    CARDIOVERSION  2020    Dr. Colby    COLONOSCOPY      declines    ELBOW OPEN REDUCTION INTERNAL FIXATION Left 3/11/2023    Procedure: ELBOW OPEN REDUCTION INTERNAL FIXATION;  Surgeon: Ramón Encinas II, MD;  Location: [facility] MAIN OR;  Service: Orthopedics;  Laterality: Left;    HERNIA REPAIR  1965    HYSTERECTOMY      still has ovaries    MAMMO BILATERAL  2016    pt declines    PAP SMEAR  2016    declines         FAMILY HISTORY  Family History   Problem Relation Age of Onset    Alcohol abuse Other     Cancer Other     Hypertension Other     Kidney disease Other     Lung disease Other     Heart disease Mother 89    Heart failure Mother     Ovarian cancer Daughter 60    Stomach cancer Father     Kidney cancer Father 52    Lung cancer Father 52    Ovarian cancer Sister     Lung cancer Brother 65         SOCIAL HISTORY  Social History     Socioeconomic History    Marital status:      Spouse name: Ray    Number of children: 3    Years of education: High school   Tobacco Use    Smoking status: Former     Current packs/day: 0.00     Types: Cigarettes     Start date:      Quit date:      Years since quittin.4    Smokeless tobacco:  Never    Tobacco comments:     caffeine use: 2 cups coffee in AM   Vaping Use    Vaping status: Never Used   Substance and Sexual Activity    Alcohol use: Yes     Comment: Occasional- glass of wine few times a week    Drug use: No    Sexual activity: Defer     Partners: Male     Birth control/protection: Surgical         ALLERGIES  Hydrocodone-acetaminophen, Sulfamethoxazole-trimethoprim, Lisinopril, and Levofloxacin        REVIEW OF SYSTEMS  Review of Systems   Respiratory:  Negative for shortness of breath.    Cardiovascular:  Negative for chest pain.   Gastrointestinal:  Positive for abdominal pain.   Musculoskeletal:  Positive for back pain. Negative for neck pain.   Neurological:  Positive for dizziness and light-headedness. Negative for numbness.        All systems reviewed and negative except for those discussed in HPI.       PHYSICAL EXAM    I have reviewed the triage vital signs and nursing notes.    ED Triage Vitals [05/18/24 1056]   Temp Heart Rate Resp BP SpO2   98.2 °F (36.8 °C) 78 16 (!) 193/100 96 %      Temp src Heart Rate Source Patient Position BP Location FiO2 (%)   -- Monitor -- -- --       Physical Exam  GENERAL: alert, no acute distress  SKIN: Warm, dry  HENT: Normocephalic, atraumatic  EYES: no scleral icterus  CV: regular rhythm, regular rate  RESPIRATORY: normal effort, lungs clear  ABDOMEN: soft, generalized abdominal pain to palpation worse in epigastric region, no ecchymosis, nondistended  MUSCULOSKELETAL: middle thoracic midline tenderness to palpation, diffuse lumbar midline tenderness, pelvis stable, no shortening or rotation of lower extremities, no midline cervical tenderness  NEURO: alert, moves all extremities, follows commands          LAB RESULTS  Recent Results (from the past 24 hour(s))   Comprehensive Metabolic Panel    Collection Time: 05/18/24 11:30 AM    Specimen: Blood   Result Value Ref Range    Glucose 105 (H) 65 - 99 mg/dL    BUN 33 (H) 8 - 23 mg/dL    Creatinine 1.01  (H) 0.57 - 1.00 mg/dL    Sodium 142 136 - 145 mmol/L    Potassium 4.5 3.5 - 5.2 mmol/L    Chloride 103 98 - 107 mmol/L    CO2 27.0 22.0 - 29.0 mmol/L    Calcium 9.8 (H) 8.2 - 9.6 mg/dL    Total Protein 8.4 6.0 - 8.5 g/dL    Albumin 4.4 3.5 - 5.2 g/dL    ALT (SGPT) 15 1 - 33 U/L    AST (SGOT) 24 1 - 32 U/L    Alkaline Phosphatase 100 39 - 117 U/L    Total Bilirubin 0.4 0.0 - 1.2 mg/dL    Globulin 4.0 gm/dL    A/G Ratio 1.1 g/dL    BUN/Creatinine Ratio 32.7 (H) 7.0 - 25.0    Anion Gap 12.0 5.0 - 15.0 mmol/L    eGFR 53.0 (L) >60.0 mL/min/1.73   Lipase    Collection Time: 05/18/24 11:30 AM    Specimen: Blood   Result Value Ref Range    Lipase 62 (H) 13 - 60 U/L   High Sensitivity Troponin T    Collection Time: 05/18/24 11:30 AM    Specimen: Blood   Result Value Ref Range    HS Troponin T 15 (H) <14 ng/L   Magnesium    Collection Time: 05/18/24 11:30 AM    Specimen: Blood   Result Value Ref Range    Magnesium 2.1 1.6 - 2.4 mg/dL   TSH Rfx On Abnormal To Free T4    Collection Time: 05/18/24 11:30 AM    Specimen: Blood   Result Value Ref Range    TSH 1.170 0.270 - 4.200 uIU/mL   CBC Auto Differential    Collection Time: 05/18/24 11:30 AM    Specimen: Blood   Result Value Ref Range    WBC 6.37 3.40 - 10.80 10*3/mm3    RBC 3.99 3.77 - 5.28 10*6/mm3    Hemoglobin 13.7 12.0 - 15.9 g/dL    Hematocrit 41.0 34.0 - 46.6 %    .8 (H) 79.0 - 97.0 fL    MCH 34.3 (H) 26.6 - 33.0 pg    MCHC 33.4 31.5 - 35.7 g/dL    RDW 12.4 12.3 - 15.4 %    RDW-SD 47.1 37.0 - 54.0 fl    MPV 9.6 6.0 - 12.0 fL    Platelets 258 140 - 450 10*3/mm3    Neutrophil % 71.4 42.7 - 76.0 %    Lymphocyte % 18.2 (L) 19.6 - 45.3 %    Monocyte % 7.5 5.0 - 12.0 %    Eosinophil % 1.7 0.3 - 6.2 %    Basophil % 0.9 0.0 - 1.5 %    Immature Grans % 0.3 0.0 - 0.5 %    Neutrophils, Absolute 4.54 1.70 - 7.00 10*3/mm3    Lymphocytes, Absolute 1.16 0.70 - 3.10 10*3/mm3    Monocytes, Absolute 0.48 0.10 - 0.90 10*3/mm3    Eosinophils, Absolute 0.11 0.00 - 0.40 10*3/mm3     Basophils, Absolute 0.06 0.00 - 0.20 10*3/mm3    Immature Grans, Absolute 0.02 0.00 - 0.05 10*3/mm3    nRBC 0.0 0.0 - 0.2 /100 WBC   Urinalysis With Microscopic If Indicated (No Culture) - Urine, Clean Catch    Collection Time: 05/18/24  1:39 PM    Specimen: Urine, Clean Catch   Result Value Ref Range    Color, UA Yellow Yellow, Straw    Appearance, UA Clear Clear    pH, UA 7.5 5.0 - 8.0    Specific Gravity, UA 1.010 1.005 - 1.030    Glucose, UA Negative Negative    Ketones, UA Negative Negative    Bilirubin, UA Negative Negative    Blood, UA Negative Negative    Protein, UA Negative Negative    Leuk Esterase, UA Negative Negative    Nitrite, UA Negative Negative    Urobilinogen, UA 0.2 E.U./dL 0.2 - 1.0 E.U./dL   High Sensitivity Troponin T 2Hr    Collection Time: 05/18/24  1:41 PM    Specimen: Blood   Result Value Ref Range    HS Troponin T 14 (H) <14 ng/L    Troponin T Delta -1 >=-4 - <+4 ng/L       Ordered the above labs and independently reviewed and interpreted the results.        RADIOLOGY  CT Abdomen Pelvis With Contrast    Result Date: 5/18/2024  CT ABDOMEN AND PELVIS WITH IV CONTRAST  HISTORY: 89-year-old female fell forward trying to  a tissue and began having abdominal pain and back pain following a fall. No physical bruising. Hysterectomy and appendectomy in the past.  TECHNIQUE: Radiation dose reduction techniques were utilized, including automated exposure control and exposure modulation based on body size. 3 mm images were obtained through the abdomen and pelvis after the administration of IV contrast. Compared with CTA abdomen/pelvis 03/08/2024.  FINDINGS: There is some streak artifact from the patient's arms.  1. The liver appears unremarkable other than a faintly seen hyperenhancing lesion at the posterior hepatic segment which was significantly more evident on the previous CTA. Gallbladder appears unremarkable and there is no biliary dilatation. The spleen, pancreas, adrenals, and kidneys  appear stable. Small left renal cyst is noted. Urinary bladder is significantly distended. Please correlate clinically for urinary retention.  2. There is no acute bowel abnormality. There is no bowel ileus or obstruction. There is an average volume of formed stool within the colon. Stomach and duodenum are nearly collapsed. No free fluid or fluid collections.  3. There are extensive abdominal aortic atherosclerotic changes without aneurysmal dilatation. No acute fractures are seen. There are dependent atelectatic changes at the lung bases. There are no pleural or pericardial effusions at the visualized chest.      CT Head Without Contrast, CT Cervical Spine Without Contrast    Result Date: 5/18/2024  CT HEAD WO CONTRAST-, CT CERVICAL SPINE WO CONTRAST-  INDICATIONS: Dizziness  TECHNIQUE: Radiation dose reduction techniques were utilized, including automated exposure control and exposure modulation based on body size. Noncontrast head CT, cervical spine CT  COMPARISON: 12/7/2023  FINDINGS:  Head CT:  No acute intracranial hemorrhage, midline shift or mass effect. No acute territorial infarct is identified.  Mild to moderate periventricular hypodensities suggest chronic small vessel ischemic change in a patient this age.  Arterial calcifications are seen at the base of the brain.  Ventricles, cisterns, cerebral sulci are unremarkable for patient age.  The visualized paranasal sinuses, orbits, mastoid air cells are unremarkable.   Cervical spine CT:  No acute fracture is identified.  Mild anterolisthesis of C3 on C4, C4 on C5.  No prominent osseous narrowing of the neural foramina. No high-grade central stenosis is identified without the benefit of intrathecal contrast material.  Degenerative changes are seen at the temporomandibular joints.  Right thyroid nodule is noted, suboptimally evaluated with this technique, thyroid ultrasound correlation advised as indicated. Carotid arterial calcification is present.   Fibronodular changes at the lung apices appears similar from 3/8/2024 chest CT (other small nodules noted on the prior CT exam are not included on this exam, stability cannot be characterized).        No acute intracranial hemorrhage or hydrocephalus. If there is further clinical concern, MRI could be considered for further evaluation.  Thyroid nodularity, as described.  This report was finalized on 5/18/2024 1:41 PM by Dr. Levy Mock M.D on Workstation: [email or web address]      XR Chest 1 View, XR Spine Thoracic 3 View, XR Spine Lumbar Complete 4+VW    Result Date: 5/18/2024  XR CHEST 1 VW-, XR SPINE LUMBAR COMPLETE 4+VW-, XR SPINE THORACIC 3 VW-  HISTORY: Female who is 89 years-old, dizziness, fall injury  TECHNIQUE: Frontal views of the chest, 3 views of the thoracic spine, 5 views of the lumbar spine  COMPARISON: 3/8/2024  FINDINGS: Heart size is borderline. Pulmonary vasculature is unremarkable. Aorta is calcified. Cardiac loop recorder is apparent. No focal pulmonary consolidation, pleural effusion, or pneumothorax. Mild biapical fibronodular changes are seen. No acute osseous process.  Thoracic and lumbar spine: Mild S-shaped thoracolumbar scoliosis is apparent. A T8 compression fracture is age-indeterminate, but new from the prior exam, with about 40% loss of height anteriorly. No other fractures are identified. Mild anterior listhesis of L4 on L5 is noted. Multilevel endplate spurring and lower lumbar facet arthropathy are present. Disc space narrowing is apparent at L2/3. If further imaging evaluation of the spine is indicated, MRI could be considered.      As described.  This report was finalized on 5/18/2024 12:16 PM by Dr. Levy Mock M.D on Workstation: [email or web address]       I ordered the above noted radiological studies. Independently reviewed and interpreted by me.  See dictation for official radiology interpretation.      PROCEDURES    Procedures      MEDICATIONS GIVEN IN ER    Medications   sodium  chloride 0.9 % bolus 500 mL (0 mL Intravenous Stopped 5/18/24 1201)   meclizine (ANTIVERT) tablet 25 mg (25 mg Oral Given 5/18/24 1136)   acetaminophen (TYLENOL) tablet 1,000 mg (1,000 mg Oral Given 5/18/24 1138)   morphine injection 4 mg (4 mg Intravenous Given 5/18/24 1218)   iopamidol (ISOVUE-300) 61 % injection 85 mL (85 mL Intravenous Given 5/18/24 1324)         PROGRESS, DATA ANALYSIS, CONSULTS, AND MEDICAL DECISION MAKING    All labs have been independently reviewed and interpreted by me.  All radiology studies have been independently reviewed and interpreted by me and discussed with radiologist dictating the report.   EKG's independently reviewed and interpreted by me.  Discussion below represents my analysis of pertinent findings related to patient's condition, differential diagnosis, treatment plan and final disposition.    My differential diagnosis for back pain includes but is not limited to:  Musculoskeletal strain, contusion, retroperitoneal hematoma, disc protrusion, vertebral fracture, transverse process fracture, rib fracture, facet syndrome, sacroiliac joint strain, sciatica, renal injury, splenic injury, pancreatic injury, osteoarthritis, lumbar spondylosis, spinal stenosis, ankylosing spondylitis, sacroiliac joint inflammation, pancreatitis, perforated peptic ulcer, diverticulitis, epidural abscess, osteomyelitis, retroperitoneal abscess, pyelonephritis, pneumonia, subphrenic abscess, tuberculosis, neurofibroma, meningioma, multiple myeloma, lymphoma, metastatic cancer, primary cancer, AAA, aortic dissection, spinal ischemia, referred pain, ureterolithiasis    My differential diagnosis for abdominal pain includes but is not limited to:    Gastritis, gastroenteritis, peptic ulcer disease, GERD, esophageal perforation, acute appendicitis, mesenteric adenitis, Meckel’s diverticulum, epiploic appendagitis, diverticulitis, colon cancer, ulcerative colitis, Crohn’s disease, intussusception, small bowel  obstruction, adhesions, ischemic bowel, perforated viscus, ileus, obstipation, biliary colic, cholecystitis, cholelithiasis, Seg[place]co, hepatitis, pancreatitis, common bile duct obstruction, cholangitis, bile leak, splenic trauma, splenic rupture, splenic infarction, splenic abscess, abdominal abscess, ascites, spontaneous bacterial peritonitis, hernia, UTI, cystitis, ureterolithiasis, urinary obstruction, ovarian cyst, torsion, pregnancy, ectopic pregnancy, PID, pelvic abscess, mittelschmerz, endometriosis, AAA, myocardial infarction, pneumonia, cancer, porphyria, DKA, medications, sickle cell, viral syndrome, herpes zoster      ED Course as of 05/18/24 1453   Sat May 18, 2024   1145 WBC: 6.37 [DC]   1145 Hemoglobin: 13.7 [DC]   1200 WBC: 6.37 [DC-2]   1200 Hemoglobin: 13.7 [DC-2]   1200 Glucose(!): 105 [DC-2]   1200 BUN(!): 33 [DC-2]   1201 Creatinine(!): 1.01  1.12 two months ago [DC-2]   1201 Sodium: 142 [DC-2]   1201 Potassium: 4.5 [DC-2]   1201 ALT (SGPT): 15 [DC-2]   1201 AST (SGOT): 24 [DC-2]   1201 Alkaline Phosphatase: 100 [DC-2]   1201 Total Bilirubin: 0.4 [DC-2]   1201 Magnesium: 2.1 [DC-2]   1201 Lipase(!): 62 [DC-2]   1205 XR Chest 1 View  My independent dictation of the chest x-ray, no evidence of pneumothorax [DC-2]   1401 Discussed case with Dr. Alaniz, [facility], who will admit patient. [DC]      ED Course User Index  [DC] Jocelin Fisher PA  [DC-2] Eulogio Berg MD             AS OF 14:53 EDT VITALS:    BP - 132/92  HR - 85  TEMP - 98.2 °F (36.8 °C)  O2 SATS - 95%        DIAGNOSIS  Final diagnoses:   Compression fracture of T8 vertebra, initial encounter   Ground-level fall         DISPOSITION  ED Disposition       ED Disposition   Decision to Admit    Condition   --    Comment   Level of Care: Telemetry [5]   Diagnosis: Compression fracture of T8 vertebra, initial encounter [[id number]]   Admitting Physician: WILMAR ALANIZ [[id number]]   Attending Physician: WILMAR ALANIZ [[id number]]                     Note Disclaimer: At [facility], we believe that sharing information builds trust and better relationships. You are receiving this note because you recently visited [facility]. It is possible you will see health information before a provider has talked with you about it. This kind of information can be easy to misunderstand. To help you fully understand what it means for your health, we urge you to discuss this note with your provider.         Jocelin Fisher PA  05/18/24 6176

## 2024-05-18 NOTE — ED NOTES
While walking out of the bathroom, she leaned over to  a tissue, and fell forward, landing on laminate toile. She reports crawling to her bed. Reports chest pain, and abdominal pain. She re ports that she feels dizzy in the mornings.

## 2024-05-18 NOTE — LETTER
EMS Transport Request  For use at [facility], [place], [place], [place], and [place] only   Patient Name: Alba Correa : 1933   Weight:47.6 kg (105 lb) Pick-up Location:  BLS/ALS: BLS/ALS: BLS   Insurance: [employer] MEDICARE REPLACEMENT Auth End Date:    Pre-Cert #:approved  D/C Summary complete:    Destination: [facility]    Contact Precautions: None   Equipment (O2, Fluids, etc.): None   Arrive By Date/Time: 2024 after lunch  Stretcher/WC: Wheelchair   CM Requesting: Nicky Hunyh RN Ext: [phone]   Notes/Medical Necessity: wheel chair van      ______________________________________________________________________    *Only 2 patient bags OR 1 carry-on size bag are permitted.  Wheelchairs and walkers CANNOT transported with the patient. Acknowledge: Yes

## 2024-05-18 NOTE — ED TRIAGE NOTES
Pt got up this morning and fell forward. Pt was able to ambulate after fall. Pt is supposed to use a walker when ambulating.   Pt complains of back pain and upper abd pain. Denies N/V.     Pt denies hitting head, denies LOC, does take blood thinner.

## 2024-05-18 NOTE — ED NOTES
Nursing report ED to floor  Alba Correa  89 y.o.  female    HPI :  HPI (Adult)  Stated Reason for Visit: fall, back and upper abd pain past fall  History Obtained From: EMS    Chief Complaint  Chief Complaint   Patient presents with    Fall    Back Pain    Abdominal Pain     Upper abd pain, generalize back pain        Admitting doctor:   Fe Alaniz MD    Admitting diagnosis:   The primary encounter diagnosis was Compression fracture of T8 vertebra, initial encounter. A diagnosis of Ground-level fall was also pertinent to this visit.    Code status:   Current Code Status       Date Active Code Status Order ID Comments User Context       Prior            Allergies:   Hydrocodone-acetaminophen, Sulfamethoxazole-trimethoprim, Lisinopril, and Levofloxacin    Isolation:   No active isolations    Intake and Output    Intake/Output Summary (Last 24 hours) at 5/18/2024 1416  Last data filed at 5/18/2024 1347  Gross per 24 hour   Intake --   Output 500 ml   Net -500 ml       Weight:   There were no vitals filed for this visit.    Most recent vitals:   Vitals:    05/18/24 1056 05/18/24 1259 05/18/24 1311   BP: (!) 193/100 147/98    Pulse: 78 103 96   Resp: 16     Temp: 98.2 °F (36.8 °C)     SpO2: 96%  95%       Active LDAs/IV Access:   Lines, Drains & Airways       Active LDAs       Name Placement date Placement time Site Days    Peripheral IV 05/18/24 1129 Anterior;Proximal;Right Forearm 05/18/24  1129  Forearm  less than 1                    Labs (abnormal labs have a star):   Labs Reviewed   COMPREHENSIVE METABOLIC PANEL - Abnormal; Notable for the following components:       Result Value    Glucose 105 (*)     BUN 33 (*)     Creatinine 1.01 (*)     Calcium 9.8 (*)     BUN/Creatinine Ratio 32.7 (*)     eGFR 53.0 (*)     All other components within normal limits    Narrative:     GFR Normal >60  Chronic Kidney Disease <60  Kidney Failure <15    The GFR formula is only valid for adults with stable renal function  between ages 18 and 70.   LIPASE - Abnormal; Notable for the following components:    Lipase 62 (*)     All other components within normal limits   TROPONIN - Abnormal; Notable for the following components:    HS Troponin T 15 (*)     All other components within normal limits    Narrative:     High Sensitive Troponin T Reference Range:  <14.0 ng/L- Negative Female for AMI  <22.0 ng/L- Negative Male for AMI  >=14 - Abnormal Female indicating possible myocardial injury.  >=22 - Abnormal Male indicating possible myocardial injury.   Clinicians would have to utilize clinical acumen, EKG, Troponin, and serial changes to determine if it is an Acute Myocardial Infarction or myocardial injury due to an underlying chronic condition.        CBC WITH AUTO DIFFERENTIAL - Abnormal; Notable for the following components:    .8 (*)     MCH 34.3 (*)     Lymphocyte % 18.2 (*)     All other components within normal limits   HIGH SENSITIVITIY TROPONIN T 2HR - Abnormal; Notable for the following components:    HS Troponin T 14 (*)     All other components within normal limits    Narrative:     High Sensitive Troponin T Reference Range:  <14.0 ng/L- Negative Female for AMI  <22.0 ng/L- Negative Male for AMI  >=14 - Abnormal Female indicating possible myocardial injury.  >=22 - Abnormal Male indicating possible myocardial injury.   Clinicians would have to utilize clinical acumen, EKG, Troponin, and serial changes to determine if it is an Acute Myocardial Infarction or myocardial injury due to an underlying chronic condition.        URINALYSIS W/ MICROSCOPIC IF INDICATED (NO CULTURE) - Normal    Narrative:     Urine microscopic not indicated.   MAGNESIUM - Normal   TSH RFX ON ABNORMAL TO FREE T4 - Normal   CBC AND DIFFERENTIAL    Narrative:     The following orders were created for panel order CBC & Differential.  Procedure                               Abnormality         Status                     ---------                                -----------         ------                     CBC Auto Differential[[id number]]        Abnormal            Final result                 Please view results for these tests on the individual orders.       EKG:   Telemetry Scan   Final Result      Telemetry Scan   Final Result          Meds given in ED:   Medications   sodium chloride 0.9 % bolus 500 mL (0 mL Intravenous Stopped 5/18/24 1201)   meclizine (ANTIVERT) tablet 25 mg (25 mg Oral Given 5/18/24 1136)   acetaminophen (TYLENOL) tablet 1,000 mg (1,000 mg Oral Given 5/18/24 1138)   morphine injection 4 mg (4 mg Intravenous Given 5/18/24 1218)   iopamidol (ISOVUE-300) 61 % injection 85 mL (85 mL Intravenous Given 5/18/24 1324)       Imaging results:  CT Head Without Contrast    Result Date: 5/18/2024   No acute intracranial hemorrhage or hydrocephalus. If there is further clinical concern, MRI could be considered for further evaluation.  Thyroid nodularity, as described.  This report was finalized on 5/18/2024 1:41 PM by Dr. Levy Mock M.D on Workstation: [email or web address]      CT Cervical Spine Without Contrast    Result Date: 5/18/2024   No acute intracranial hemorrhage or hydrocephalus. If there is further clinical concern, MRI could be considered for further evaluation.  Thyroid nodularity, as described.  This report was finalized on 5/18/2024 1:41 PM by Dr. Levy Mock M.D on Workstation: [email or web address]      XR Chest 1 View    Result Date: 5/18/2024  As described.  This report was finalized on 5/18/2024 12:16 PM by Dr. Levy Mock M.D on Workstation: [email or web address]      XR Spine Thoracic 3 View    Result Date: 5/18/2024  As described.  This report was finalized on 5/18/2024 12:16 PM by Dr. Levy Mock M.D on Workstation: [email or web address]      XR Spine Lumbar Complete 4+VW    Result Date: 5/18/2024  As described.  This report was finalized on 5/18/2024 12:16 PM by Dr. Levy Mock M.D on Workstation: [email or web address]       Ambulatory status:   -  bedrest    Social issues:   Social History     Socioeconomic History    Marital status:      Spouse name: Peña    Number of children: 3    Years of education: High school   Tobacco Use    Smoking status: Former     Current packs/day: 0.00     Types: Cigarettes     Start date:      Quit date:      Years since quittin.4    Smokeless tobacco: Never    Tobacco comments:     caffeine use: 2 cups coffee in AM   Vaping Use    Vaping status: Never Used   Substance and Sexual Activity    Alcohol use: Yes     Comment: Occasional- glass of wine few times a week    Drug use: No    Sexual activity: Defer     Partners: Male     Birth control/protection: Surgical       Peripheral Neurovascular  Peripheral Neurovascular (Adult)  Peripheral Neurovascular WDL: WDL    Neuro Cognitive  Neuro Cognitive (Adult)  Cognitive/Neuro/Behavioral WDL: WDL  Maximo Coma Scale  Best Eye Response: 4-->(E4) spontaneous  Best Motor Response: 6-->(M6) obeys commands  Best Verbal Response: 5-->(V5) oriented  [place] Coma Scale Score: 15    Learning  Learning Assessment (Adult)  Learning Readiness and Ability: physical limitation noted  Education Provided  Person Taught: patient  Teaching Method: verbal instruction  Teaching Focus: symptom/problem overview, diagnostic test, medication administration, opioid medication management  Education Outcome Evaluation: acceptance expressed    Respiratory  Respiratory WDL  Respiratory WDL: .WDL except, rhythm/pattern  Rhythm/Pattern, Respiratory: shallow, shortness of breath  Breath Sounds  Breath Sounds: All Fields  All Lung Fields Breath Sounds: clear, equal bilaterally    Abdominal Pain       Pain Assessments  Pain (Adult)  (0-10) Pain Rating: Rest: 4  (0-10) Pain Rating: Activity: 9  Pain Location: back  Pain Side/Orientation: bilateral, upper  Pain Description: constant, sharp, aching    NIH Stroke Scale       Jessa Alejandro RN  24 14:16 EDT

## 2024-05-18 NOTE — ED PROVIDER NOTES
Pt presents to the ED c/o acute back pain and abdominal pain after a fall that occurred this morning, has been feeling dizzy over the last couple days, worse when she gets out of bed in the morning.  Was going to the bathroom with her walker when she was washing hands and dropped a tissue, attempted to bend over to pick it up when she fell forward.  Began having back pain and abdominal pain after the fall.  Get up and ambulate back to her bed.  Denies hitting her head, no loss of conscious.  Did not take her Eliquis at this morning but does take it chronically.  Currently pain is improved after getting pain medications prior to my evaluation.     On exam,   General: No acute distress, nontoxic  HEENT: EOMI  Pulm: Symmetric chest rise, nonlabored breathing  CV: Regular rate and rhythm  GI: Nondistended  MSK: No deformity  Skin: Warm, dry  Neuro: Awake, alert, oriented x 4, moving all extremities, no focal deficits  Psych: Calm, cooperative    Vital signs and nursing notes reviewed.           Plan: Initial concern for head injury or intracranial hemorrhage, spinal fracture including compression fracture of the thoracic or lumbar spine, lower concern for cervical fracture but will check with CT scan as well.  Abdominal pain to be evaluated I think is less likely to be intraperitoneal related but could be referred from back pain.  Plan for labs, pain control, and reevaluation with results.    ED Course as of 05/18/24 1414   Sat May 18, 2024   1145 WBC: 6.37 [DC]   1145 Hemoglobin: 13.7 [DC]   1200 WBC: 6.37 [DC-2]   1200 Hemoglobin: 13.7 [DC-2]   1200 Glucose(!): 105 [DC-2]   1200 BUN(!): 33 [DC-2]   1201 Creatinine(!): 1.01  1.12 two months ago [DC-2]   1201 Sodium: 142 [DC-2]   1201 Potassium: 4.5 [DC-2]   1201 ALT (SGPT): 15 [DC-2]   1201 AST (SGOT): 24 [DC-2]   1201 Alkaline Phosphatase: 100 [DC-2]   1201 Total Bilirubin: 0.4 [DC-2]   1201 Magnesium: 2.1 [DC-2]   1201 Lipase(!): 62 [DC-2]   1205 XR Chest 1 View  My  independent dictation of the chest x-ray, no evidence of pneumothorax [DC-2]   1401 Discussed case with Dr. Alaniz, [facility], who will admit patient. [DC]      ED Course User Index  [DC] Jocelin Fisher PA  [DC-2] Eulogio Berg MD       Patient found to have a 40% loss of height of the thoracic T8 compression fracture, I suspect this is the source of her pain, no acute emergent findings noted on imaging otherwise.  Plan for hospitalization for ongoing pain control, patient and family at bedside fully updated on findings today and need for hospital stay.  All questions and concerns addressed.     MD Attestation Note    SHARED VISIT: This visit was performed by BOTH a physician and an APC. The substantive portion of the medical decision making was performed by this attesting physician who made or approved the management plan and takes responsibility for patient management. All studies in the APC note (if performed) were independently interpreted by me.                   Eulogio Berg MD  05/18/24 5850

## 2024-05-18 NOTE — PROGRESS NOTES
Clinical Pharmacy Services: Medication History    Alba Correa is a 89 y.o. female presenting to [facility] for   Chief Complaint   Patient presents with    Fall    Back Pain    Abdominal Pain     Upper abd pain, generalize back pain        She  has a past medical history of Acquired hypothyroidism (05/03/2022), Acute diastolic CHF (congestive heart failure) (03/20/2022), Chronic kidney disease, Closed fracture of left distal humerus (03/07/2023), Closed head injury (12/07/2023), CVA (cerebral vascular accident), Degeneration of intervertebral disc, Depression, DVT (deep venous thrombosis), Facial laceration (03/07/2023), First degree AV block (05/26/2021), Gout, H/O complete eye exam (09/2016), Hyperlipidemia, IBS (irritable bowel syndrome), Laceration of right forearm (03/07/2023), Nonrheumatic mitral valve regurgitation, OAB (overactive bladder), Osteopenia, Osteoporosis, PAF (paroxysmal atrial fibrillation) (11/08/2019), Peripheral neuropathy, Rheumatoid arthritis, Sinus bradycardia (05/26/2021), and 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    Allergies as of 05/18/2024 - Reviewed 05/18/2024   Allergen Reaction Noted    Hydrocodone-acetaminophen Nausea And Vomiting and Dizziness 12/07/2023    Sulfamethoxazole-trimethoprim Delirium 12/22/2015    Lisinopril Unknown (See Comments) 01/10/2018    Levofloxacin Rash 11/20/2015       Medication information was obtained from: family member  Pharmacy and Phone Number:     Prior to Admission Medications       Prescriptions Last Dose Informant Patient Reported? Taking?    acetaminophen (TYLENOL) 650 MG 8 hr tablet 5/17/2024 Family Member Yes Yes    Take 2 tablets by mouth Every 8 (Eight) Hours As Needed for Mild Pain.    allopurinol (ZYLOPRIM) 100 MG tablet 5/17/2024 Family Member No Yes    Take 1 tablet by mouth Daily.    Calcium Citrate-Vitamin D3 (CITRACAL) 315-6.25 MG-MCG tablet tablet 5/17/2024 Family Member Yes Yes    Take 1 tablet by mouth  Daily.    DULoxetine (CYMBALTA) 60 MG capsule 5/17/2024 Family Member No Yes    Take 1 capsule by mouth Daily.    Eliquis 2.5 MG tablet tablet 5/17/2024 Family Member No Yes    Take 1 tablet by mouth 2 (Two) Times a Day.    levothyroxine (SYNTHROID, LEVOTHROID) 50 MCG tablet 5/17/2024 Family Member No Yes    Take 1 tablet by mouth Every Morning.    losartan (Cozaar) 25 MG tablet 5/17/2024 Family Member No Yes    Take 0.5 tablets by mouth Daily.    Multiple Vitamins-Minerals (CENTRUM SILVER) tablet 5/17/2024 Self, Family Member Yes Yes    Take 1 tablet by mouth Daily.    Omega-3 Fatty Acids (OMEGA-3 FISH OIL PO) 5/17/2024 Family Member Yes Yes    Take 1,600 mg by mouth Every Morning.              Medication notes:     This medication list is complete to the best of my knowledge as of 5/18/2024    Please call if questions.    Leeroy Anderson   Pharmacy Intern   [phone]    5/18/2024 12:53 EDT

## 2024-05-18 NOTE — H&P
Patient Name:  Alba Correa  YOB: 1933  MRN:  [id number]  Admit Date:  5/18/2024  Patient Care Team:  Aramis Doty MD as PCP - General (Family Medicine)  Juan Jose Muñiz MD as Consulting Physician (Neurology)  Roshan Martines III, MD as Cardiologist (Cardiology)  Nehal Murray MD as Consulting Physician (Nephrology)        Chief complaint.  Falls and dizziness leading to mid back pain and lower chest pain.    History Present Illness     A very pleasant 89 years old female who has a past history of SMA stenosis/A-fib/MR/AR/hypertension/chronic diastolic congestive heart failure//peripheral neuropathy/stage IIIa renal failure/hyperparathyroidism/hypercalcemia/dyslipidemia/CVA with left-sided weakness/history of DVT/hypothyroidism/anemia of chronic disease and macrocytosis.  Patient lives with at home and ambulates with a walker.  She presented to the emergency department today after a fall leading to back pain and lower chest pain.  She states that she has bent forward to  a tissue and the restroom and as she was bending forward she lost her balance and fell down but cannot really remember on which side of her body she landed.  She is sure that she did not hit her head.  She does describe dizziness which is chronic but no loss of consciousness or seizure.  She does describe old left upper extremity weakness.  No slurred speech.  No double vision or loss of the vision.  Patient denies radiation of the pain to the lower extremity.  No lower extremity numbness or weakness.  No chest pain.  No palpitation.  Positive pleuritic, chest pain at both sides of the lateral rib cage.  No cough.  No wheeze.  No hemoptysis.  No fever or chills.  In the emergency department CBC was normal except an MCV of 102.8.  TSH was normal.  Magnesium was normal.  Serial troponin is 15 and 14 with negative delta.  Lipase was mildly elevated at 62.  CMP normal except a blood sugar of 105, BUN 33,  creatinine 1.01.  Chest x-ray with no acute disease and presence of loop recorder.  CT scan of the brain revealed small vessel disease and no acute hemorrhage or mass.  CT scan of the neck reveals degenerative disc disease of the C-spine and no fracture.  CT scan of the abdomen pelvis with IV contrast revealed distended urinary bladder otherwise no acute event.  Thoracic lumbar spine x-ray revealed T8 compression fracture that is new and degenerative changes.  Patient was given morphine in the emergency department and was subsequently admitted.        Review of Systems   .  No dysuria or hematuria with normal urine stream.  GI.  No abdominal pain.  No nausea or vomiting.  Normal bowel movement yesterday without fresh bright blood per rectum or melena.  No constipation or diarrhea.  Cardiovascular.  As above.  CNS.  As above.  Hospital skeletal.  Positive mid back pain and lower chest pain but no other joint pain or swelling.    Personal History     Past Medical History:   Diagnosis Date   • Acquired hypothyroidism 05/03/2022   • Acute diastolic CHF (congestive heart failure) 03/20/2022   • Chronic kidney disease    • Closed fracture of left distal humerus 03/07/2023   • Closed head injury 12/07/2023   • CVA (cerebral vascular accident)    • Degeneration of intervertebral disc    • Depression    • DVT (deep venous thrombosis)    • Facial laceration 03/07/2023   • First degree AV block 05/26/2021   • Gout    • H/O complete eye exam 09/2016   • Hyperlipidemia    • IBS (irritable bowel syndrome)    • Laceration of right forearm 03/07/2023   • Nonrheumatic mitral valve regurgitation    • OAB (overactive bladder)    • Osteopenia    • Osteoporosis    • PAF (paroxysmal atrial fibrillation) 11/08/2019   • Peripheral neuropathy    • Rheumatoid arthritis    • Sinus bradycardia 05/26/2021   • 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 Colby   • COLONOSCOPY      declines   • ELBOW OPEN REDUCTION INTERNAL FIXATION Left 3/11/2023    Procedure: ELBOW OPEN REDUCTION INTERNAL FIXATION;  Surgeon: Ramón Encinas II, MD;  Location: [facility] MAIN OR;  Service: Orthopedics;  Laterality: Left;   • HERNIA REPAIR     • HYSTERECTOMY      still has ovaries   • MAMMO BILATERAL  2016    pt declines   • PAP SMEAR  2016    declines     Family History   Problem Relation Age of Onset   • Alcohol abuse Other    • Cancer Other    • Hypertension Other    • Kidney disease Other    • Lung disease Other    • Heart disease Mother 89   • Heart failure Mother    • Ovarian cancer Daughter 60   • Stomach cancer Father    • Kidney cancer Father 52   • Lung cancer Father 52   • Ovarian cancer Sister    • Lung cancer Brother 65     Social History     Tobacco Use   • Smoking status: Former     Current packs/day: 0.00     Types: Cigarettes     Start date:      Quit date:      Years since quittin.4   • Smokeless tobacco: Never   • Tobacco comments:     caffeine use: 2 cups coffee in AM   Vaping Use   • Vaping status: Never Used   Substance Use Topics   • Alcohol use: Yes     Comment: Occasional- glass of wine few times a week   • Drug use: No     No current facility-administered medications on file prior to encounter.     Current Outpatient Medications on File Prior to Encounter   Medication Sig Dispense Refill   • acetaminophen (TYLENOL) 650 MG 8 hr tablet Take 2 tablets by mouth Every 8 (Eight) Hours As Needed for Mild Pain.     • allopurinol (ZYLOPRIM) 100 MG tablet Take 1 tablet by mouth Daily. 90 tablet 1   • Calcium Citrate-Vitamin D3 (CITRACAL) 315-6.25 MG-MCG tablet tablet Take 1 tablet by  mouth Daily.     • DULoxetine (CYMBALTA) 60 MG capsule Take 1 capsule by mouth Daily. 90 capsule 1   • Eliquis 2.5 MG tablet tablet Take 1 tablet by mouth 2 (Two) Times a Day. 180 tablet 3   • levothyroxine (SYNTHROID, LEVOTHROID) 50 MCG tablet Take 1 tablet by mouth Every Morning. 90 tablet 1   • losartan (Cozaar) 25 MG tablet Take 0.5 tablets by mouth Daily. 45 tablet 1   • Multiple Vitamins-Minerals (CENTRUM SILVER) tablet Take 1 tablet by mouth Daily.     • Omega-3 Fatty Acids (OMEGA-3 FISH OIL PO) Take 1,600 mg by mouth Every Morning.     • [DISCONTINUED] amLODIPine (NORVASC) 2.5 MG tablet Take 1 tablet by mouth Daily. 90 tablet 1   • [DISCONTINUED] hydrALAZINE (APRESOLINE) 10 MG tablet Take 1 tablet by mouth Every 8 (Eight) Hours. 90 tablet 0     Allergies   Allergen Reactions   • Hydrocodone-Acetaminophen Nausea And Vomiting and Dizziness   • Sulfamethoxazole-Trimethoprim Delirium   • Lisinopril Unknown (See Comments)     Unknown per pt   • Levofloxacin Rash       Objective    Objective     Vital Signs  Temp:  [97.5 °F (36.4 °C)-98.2 °F (36.8 °C)] 97.5 °F (36.4 °C)  Heart Rate:  [] 85  Resp:  [16-20] 20  BP: (132-193)/() 163/92  SpO2:  [95 %-96 %] 95 %  on   ;   Device (Oxygen Therapy): room air  Body mass index is 20.51 kg/m².    Physical Exam  General.  Elderly female.  Alert and oriented x 4.  No apparent pain/distress/diaphoresis.  Normal mood and affect.  Positive pain with movement  HEENT.  No external head injury.  Status post bilateral cataract surgery.  Pupils equal round and reactive.  Intact extraocular musculature.  No pallor or jaundice.  Moist mucous membrane.  Neck.  No C-spine tenderness.  Neck is supple.  No JVD.  No carotid bruit.  No lymphadenopathy or thyromegaly.  Cardiovascular.  Controlled rate atrial fibrillation and grade 2 systolic murmur  Chest.  Clear to auscultation bilaterally with no added sounds.  Reproducible lateral chest pain on chest wall palpation  bilaterally  Abdomen.  Soft lax.  No tenderness.  No organomegaly.  No guarding or rebound  Extremities.  No clubbing/cyanosis.  Positive bilateral lower extremity trace edema.  Skin.  Multiple bruises.  CNS.  Old grade 4/5 weakness of the left upper extremity otherwise intact CNS examination.      Results Review:  I reviewed the patient's new clinical results.  I reviewed the patient's new imaging results and agree with the interpretation.  I reviewed the patient's other test results and agree with the interpretation  I personally viewed and interpreted the patient's EKG/Telemetry data  Discussed with ED provider.    Lab Results (last 24 hours)       Procedure Component Value Units Date/Time    CBC & Differential [[id number]]  (Abnormal) Collected: 05/18/24 1130    Specimen: Blood Updated: 05/18/24 1141    Narrative:      The following orders were created for panel order CBC & Differential.  Procedure                               Abnormality         Status                     ---------                               -----------         ------                     CBC Auto Differential[[id number]]        Abnormal            Final result                 Please view results for these tests on the individual orders.    Comprehensive Metabolic Panel [[id number]]  (Abnormal) Collected: 05/18/24 1130    Specimen: Blood Updated: 05/18/24 1200     Glucose 105 mg/dL      BUN 33 mg/dL      Creatinine 1.01 mg/dL      Sodium 142 mmol/L      Potassium 4.5 mmol/L      Chloride 103 mmol/L      CO2 27.0 mmol/L      Calcium 9.8 mg/dL      Total Protein 8.4 g/dL      Albumin 4.4 g/dL      ALT (SGPT) 15 U/L      AST (SGOT) 24 U/L      Alkaline Phosphatase 100 U/L      Total Bilirubin 0.4 mg/dL      Globulin 4.0 gm/dL      A/G Ratio 1.1 g/dL      BUN/Creatinine Ratio 32.7     Anion Gap 12.0 mmol/L      eGFR 53.0 mL/min/1.73     Narrative:      GFR Normal >60  Chronic Kidney Disease <60  Kidney Failure <15    The GFR formula is only valid  for adults with stable renal function between ages 18 and 70.    Lipase [[id number]]  (Abnormal) Collected: 05/18/24 1130    Specimen: Blood Updated: 05/18/24 1200     Lipase 62 U/L     High Sensitivity Troponin T [[id number]]  (Abnormal) Collected: 05/18/24 1130    Specimen: Blood Updated: 05/18/24 1207     HS Troponin T 15 ng/L     Narrative:      High Sensitive Troponin T Reference Range:  <14.0 ng/L- Negative Female for AMI  <22.0 ng/L- Negative Male for AMI  >=14 - Abnormal Female indicating possible myocardial injury.  >=22 - Abnormal Male indicating possible myocardial injury.   Clinicians would have to utilize clinical acumen, EKG, Troponin, and serial changes to determine if it is an Acute Myocardial Infarction or myocardial injury due to an underlying chronic condition.         Magnesium [[id number]]  (Normal) Collected: 05/18/24 1130    Specimen: Blood Updated: 05/18/24 1200     Magnesium 2.1 mg/dL     TSH Rfx On Abnormal To Free T4 [[id number]]  (Normal) Collected: 05/18/24 1130    Specimen: Blood Updated: 05/18/24 1207     TSH 1.170 uIU/mL     CBC Auto Differential [[id number]]  (Abnormal) Collected: 05/18/24 1130    Specimen: Blood Updated: 05/18/24 1141     WBC 6.37 10*3/mm3      RBC 3.99 10*6/mm3      Hemoglobin 13.7 g/dL      Hematocrit 41.0 %      .8 fL      MCH 34.3 pg      MCHC 33.4 g/dL      RDW 12.4 %      RDW-SD 47.1 fl      MPV 9.6 fL      Platelets 258 10*3/mm3      Neutrophil % 71.4 %      Lymphocyte % 18.2 %      Monocyte % 7.5 %      Eosinophil % 1.7 %      Basophil % 0.9 %      Immature Grans % 0.3 %      Neutrophils, Absolute 4.54 10*3/mm3      Lymphocytes, Absolute 1.16 10*3/mm3      Monocytes, Absolute 0.48 10*3/mm3      Eosinophils, Absolute 0.11 10*3/mm3      Basophils, Absolute 0.06 10*3/mm3      Immature Grans, Absolute 0.02 10*3/mm3      nRBC 0.0 /100 WBC     Urinalysis With Microscopic If Indicated (No Culture) - Urine, Clean Catch [[id number]]  (Normal) Collected: 05/18/24  1339    Specimen: Urine, Clean Catch Updated: 05/18/24 1401     Color, UA Yellow     Appearance, UA Clear     pH, UA 7.5     Specific Gravity, UA 1.010     Glucose, UA Negative     Ketones, UA Negative     Bilirubin, UA Negative     Blood, UA Negative     Protein, UA Negative     Leuk Esterase, UA Negative     Nitrite, UA Negative     Urobilinogen, UA 0.2 E.U./dL    Narrative:      Urine microscopic not indicated.    High Sensitivity Troponin T 2Hr [[id number]]  (Abnormal) Collected: 05/18/24 1341    Specimen: Blood Updated: 05/18/24 1415     HS Troponin T 14 ng/L      Troponin T Delta -1 ng/L     Narrative:      High Sensitive Troponin T Reference Range:  <14.0 ng/L- Negative Female for AMI  <22.0 ng/L- Negative Male for AMI  >=14 - Abnormal Female indicating possible myocardial injury.  >=22 - Abnormal Male indicating possible myocardial injury.   Clinicians would have to utilize clinical acumen, EKG, Troponin, and serial changes to determine if it is an Acute Myocardial Infarction or myocardial injury due to an underlying chronic condition.                 Imaging Results (Last 24 Hours)       Procedure Component Value Units Date/Time    CT Abdomen Pelvis With Contrast [[id number]] Collected: 05/18/24 1350     Updated: 05/18/24 1350    Narrative:      CT ABDOMEN AND PELVIS WITH IV CONTRAST     HISTORY: 89-year-old female fell forward trying to  a tissue and  began having abdominal pain and back pain following a fall. No physical  bruising. Hysterectomy and appendectomy in the past.     TECHNIQUE: Radiation dose reduction techniques were utilized, including  automated exposure control and exposure modulation based on body size.   3 mm images were obtained through the abdomen and pelvis after the  administration of IV contrast. Compared with CTA abdomen/pelvis  03/08/2024.     FINDINGS:  There is some streak artifact from the patient's arms.     1. The liver appears unremarkable other than a faintly  seen  hyperenhancing lesion at the posterior hepatic segment which was  significantly more evident on the previous CTA. Gallbladder appears  unremarkable and there is no biliary dilatation. The spleen, pancreas,  adrenals, and kidneys appear stable. Small left renal cyst is noted.  Urinary bladder is significantly distended. Please correlate clinically  for urinary retention.     2. There is no acute bowel abnormality. There is no bowel ileus or  obstruction. There is an average volume of formed stool within the  colon. Stomach and duodenum are nearly collapsed. No free fluid or fluid  collections.     3. There are extensive abdominal aortic atherosclerotic changes without  aneurysmal dilatation. No acute fractures are seen. There are dependent  atelectatic changes at the lung bases. There are no pleural or  pericardial effusions at the visualized chest.       CT Head Without Contrast [[id number]] Collected: 05/18/24 1333     Updated: 05/18/24 1348    Narrative:      CT HEAD WO CONTRAST-, CT CERVICAL SPINE WO CONTRAST-     INDICATIONS: Dizziness     TECHNIQUE: Radiation dose reduction techniques were utilized, including  automated exposure control and exposure modulation based on body size.  Noncontrast head CT, cervical spine CT     COMPARISON: 12/7/2023     FINDINGS:     Head CT:     No acute intracranial hemorrhage, midline shift or mass effect. No acute  territorial infarct is identified.     Mild to moderate periventricular hypodensities suggest chronic small  vessel ischemic change in a patient this age.     Arterial calcifications are seen at the base of the brain.     Ventricles, cisterns, cerebral sulci are unremarkable for patient age.     The visualized paranasal sinuses, orbits, mastoid air cells are  unremarkable.        Cervical spine CT:     No acute fracture is identified.     Mild anterolisthesis of C3 on C4, C4 on C5.     No prominent osseous narrowing of the neural foramina. No high-grade  central  stenosis is identified without the benefit of intrathecal  contrast material.     Degenerative changes are seen at the temporomandibular joints.     Right thyroid nodule is noted, suboptimally evaluated with this  technique, thyroid ultrasound correlation advised as indicated. Carotid  arterial calcification is present.     Fibronodular changes at the lung apices appears similar from 3/8/2024  chest CT (other small nodules noted on the prior CT exam are not  included on this exam, stability cannot be characterized).          Impression:         No acute intracranial hemorrhage or hydrocephalus. If there is further  clinical concern, MRI could be considered for further evaluation.     Thyroid nodularity, as described.     This report was finalized on 5/18/2024 1:41 PM by Dr. Levy Mock M.D on Workstation: [employer]       CT Cervical Spine Without Contrast [[id number]] Collected: 05/18/24 1333     Updated: 05/18/24 1348    Narrative:      CT HEAD WO CONTRAST-, CT CERVICAL SPINE WO CONTRAST-     INDICATIONS: Dizziness     TECHNIQUE: Radiation dose reduction techniques were utilized, including  automated exposure control and exposure modulation based on body size.  Noncontrast head CT, cervical spine CT     COMPARISON: 12/7/2023     FINDINGS:     Head CT:     No acute intracranial hemorrhage, midline shift or mass effect. No acute  territorial infarct is identified.     Mild to moderate periventricular hypodensities suggest chronic small  vessel ischemic change in a patient this age.     Arterial calcifications are seen at the base of the brain.     Ventricles, cisterns, cerebral sulci are unremarkable for patient age.     The visualized paranasal sinuses, orbits, mastoid air cells are  unremarkable.        Cervical spine CT:     No acute fracture is identified.     Mild anterolisthesis of C3 on C4, C4 on C5.     No prominent osseous narrowing of the neural foramina. No high-grade  central stenosis is identified  without the benefit of intrathecal  contrast material.     Degenerative changes are seen at the temporomandibular joints.     Right thyroid nodule is noted, suboptimally evaluated with this  technique, thyroid ultrasound correlation advised as indicated. Carotid  arterial calcification is present.     Fibronodular changes at the lung apices appears similar from 3/8/2024  chest CT (other small nodules noted on the prior CT exam are not  included on this exam, stability cannot be characterized).          Impression:         No acute intracranial hemorrhage or hydrocephalus. If there is further  clinical concern, MRI could be considered for further evaluation.     Thyroid nodularity, as described.     This report was finalized on 5/18/2024 1:41 PM by Dr. Levy Mock M.D on Workstation: [employer]       XR Chest 1 View [[id number]] Collected: 05/18/24 1211     Updated: 05/18/24 1219    Narrative:      XR CHEST 1 VW-, XR SPINE LUMBAR COMPLETE 4+VW-, XR SPINE THORACIC 3 VW-     HISTORY: Female who is 89 years-old, dizziness, fall injury     TECHNIQUE: Frontal views of the chest, 3 views of the thoracic spine, 5  views of the lumbar spine     COMPARISON: 3/8/2024     FINDINGS: Heart size is borderline. Pulmonary vasculature is  unremarkable. Aorta is calcified. Cardiac loop recorder is apparent. No  focal pulmonary consolidation, pleural effusion, or pneumothorax. Mild  biapical fibronodular changes are seen. No acute osseous process.     Thoracic and lumbar spine: Mild S-shaped thoracolumbar scoliosis is  apparent. A T8 compression fracture is age-indeterminate, but new from  the prior exam, with about 40% loss of height anteriorly. No other  fractures are identified. Mild anterior listhesis of L4 on L5 is noted.  Multilevel endplate spurring and lower lumbar facet arthropathy are  present. Disc space narrowing is apparent at L2/3. If further imaging  evaluation of the spine is indicated, MRI could be considered.        Impression:      As described.     This report was finalized on 5/18/2024 12:16 PM by Dr. Levy Mock M.D on Workstation: [email or web address]       XR Spine Thoracic 3 View [[id number]] Collected: 05/18/24 1211     Updated: 05/18/24 1219    Narrative:      XR CHEST 1 VW-, XR SPINE LUMBAR COMPLETE 4+VW-, XR SPINE THORACIC 3 VW-     HISTORY: Female who is 89 years-old, dizziness, fall injury     TECHNIQUE: Frontal views of the chest, 3 views of the thoracic spine, 5  views of the lumbar spine     COMPARISON: 3/8/2024     FINDINGS: Heart size is borderline. Pulmonary vasculature is  unremarkable. Aorta is calcified. Cardiac loop recorder is apparent. No  focal pulmonary consolidation, pleural effusion, or pneumothorax. Mild  biapical fibronodular changes are seen. No acute osseous process.     Thoracic and lumbar spine: Mild S-shaped thoracolumbar scoliosis is  apparent. A T8 compression fracture is age-indeterminate, but new from  the prior exam, with about 40% loss of height anteriorly. No other  fractures are identified. Mild anterior listhesis of L4 on L5 is noted.  Multilevel endplate spurring and lower lumbar facet arthropathy are  present. Disc space narrowing is apparent at L2/3. If further imaging  evaluation of the spine is indicated, MRI could be considered.       Impression:      As described.     This report was finalized on 5/18/2024 12:16 PM by Dr. Levy Mock M.D on Workstation: [email or web address]       XR Spine Lumbar Complete 4+VW [[id number]] Collected: 05/18/24 1211     Updated: 05/18/24 1219    Narrative:      XR CHEST 1 VW-, XR SPINE LUMBAR COMPLETE 4+VW-, XR SPINE THORACIC 3 VW-     HISTORY: Female who is 89 years-old, dizziness, fall injury     TECHNIQUE: Frontal views of the chest, 3 views of the thoracic spine, 5  views of the lumbar spine     COMPARISON: 3/8/2024     FINDINGS: Heart size is borderline. Pulmonary vasculature is  unremarkable. Aorta is calcified. Cardiac loop recorder is  apparent. No  focal pulmonary consolidation, pleural effusion, or pneumothorax. Mild  biapical fibronodular changes are seen. No acute osseous process.     Thoracic and lumbar spine: Mild S-shaped thoracolumbar scoliosis is  apparent. A T8 compression fracture is age-indeterminate, but new from  the prior exam, with about 40% loss of height anteriorly. No other  fractures are identified. Mild anterior listhesis of L4 on L5 is noted.  Multilevel endplate spurring and lower lumbar facet arthropathy are  present. Disc space narrowing is apparent at L2/3. If further imaging  evaluation of the spine is indicated, MRI could be considered.       Impression:      As described.     This report was finalized on 5/18/2024 12:16 PM by Dr. Levy Mock M.D on Workstation: [email or web address]               Results for orders placed during the hospital encounter of 03/25/22    Adult Transthoracic Echo Complete W/ Cont if Necessary Per Protocol    Interpretation Summary  · Left ventricular systolic function is hyperdynamic (EF > 70%). Normal left ventricular cavity size noted. Left ventricular wall thickness is consistent with mild concentric hypertrophy. All left ventricular wall segments contract normally. Left ventricular diastolic function was normal. No evidence of left ventricular thrombus or mass present.  · There is mild calcification of the aortic valve. Mild to moderate aortic valve regurgitation is present. No hemodynamically significant aortic valve stenosis is present.  · Mild to moderate mitral valve regurgitation is present. No significant mitral valve stenosis is present.  · Estimated right ventricular systolic pressure from tricuspid regurgitation is mildly elevated (35-45 mmHg).      Telemetry Scan   Final Result      Telemetry Scan   Final Result               Assessment/Plan     Active Hospital Problems    Diagnosis  POA   • **Compression fracture of T8 vertebra, initial encounter [S22.929A]  Yes   • Abdominal pain  [R10.9]  Yes   • Macrocytosis [D75.89]  Yes   • Hypercalcemia [E83.52]  Yes   • Hyperparathyroidism [E21.3]  Yes   • Hyperlipidemia [E78.5]  Yes   • Thyroid nodule [E04.1]  Yes   • Essential hypertension [I10]  Yes   • Chronic diastolic CHF (congestive heart failure) [I50.32]  Yes   • Chronic kidney failure, stage 3 (moderate) [N18.30]  Yes   • Fall [W19.XXXA]  Yes   • Hypothyroidism (acquired) [E03.9]  Yes   • Dizzy [R42]  Yes   • Atrial fibrillation [I48.91]  Yes   • History of CVA (cerebrovascular accident) [Z86.73]  Not Applicable      Resolved Hospital Problems   No resolved problems to display.           Fall leading to back pain because of acute T8 fracture and musculoskeletal lower chest pain.  Patient usually ambulate with a walker.  Patient has recurrent falls.  CT scan of the abdomen is negative for acute disease.  CT scan of the brain without acute hemorrhage.  CT of the C-spine is negative for fractures.  Will check MRI of the thoracolumbar spine.  Consult neurosurgery for kyphoplasty.  Monitor neurochecks.  At this time there is no evidence of myelopathy or radiculopathy.  Pain control with Tylenol/Percocet/morphine/lidocaine patch.  Chronic dizziness in a patient with a history of CVA and left upper extremity weakness.  CT scan of the brain without acute disease.  Will check orthostasis.  Will monitor neurochecks.  Consult neurology.  I will leave any further neuroimaging for neurology.  PA as the patient is anticoagulated.  History of A-fib/MR/AR/hypertension/chronic diastolic congestive heart failure.  Patient with chest pain that appears to be atypical secondary to chest contusion.  She has a loop recorder in place.  Will check EKG.  Patient last echo on 3/22 revealed a normal ejection fraction moderate AR and MR.  Check orthostasis.  Will continue losartan and Eliquis.  There is no evidence clinically of angina or congestive heart failure.  Will consult cardiology to see if arrhythmia as a cause  for her dizziness and falls.  Check orthostasis.  I had a long discussion with the patient and the family about the pros and cons of anticoagulation with Eliquis especially with her high risk of fall.  Patient has chooses to continue Eliquis understanding the risks and benefits.  A-fib rate is controlled.  Blood pressure is mildly elevated and will closely monitor her medications.  Macrocytosis.  Old.  Around the baseline.  TSH is normal.  Will check B12, folate, cortisol level.  Hyperparathyroidism/hypercalcemia.  Will stop calcium and vitamin D.  Will check parathyroid hormone/ionized calcium/vitamin D level.  Start IV fluid and monitor.  Hyperlipidemia.  Will check lipids.  Not on any treatment  Urinary bladder distention by CAT scan.  Will follow urinary retention protocol.  UA was negative.  Hypothyroidism/thyroid nodules.  TSH is normal and I will check free T4 and total and free T3.  Will check thyroid ultrasound.  Stage IIIa chronic renal failure.  Baseline creatinine between 0.9 and 1.3.  Patient appears euvolemic.  Creatinine is about the baseline.  VTE prophylaxis.  Patient anticoagulated.        Discussed my findings and plan of treatment with the patient/family/ER provider.    Code Status -full code.       Fe Alaniz MD  [facility]  05/18/24  18:06 EDT

## 2024-05-19 PROBLEM — E83.52 HYPERCALCEMIA: Status: RESOLVED | Noted: 2024-05-18 | Resolved: 2024-05-19

## 2024-05-19 LAB
ANION GAP SERPL CALCULATED.3IONS-SCNC: 10 MMOL/L (ref 5–15)
BASOPHILS # BLD AUTO: 0.05 10*3/MM3 (ref 0–0.2)
BASOPHILS NFR BLD AUTO: 0.9 % (ref 0–1.5)
BUN SERPL-MCNC: 19 MG/DL (ref 8–23)
BUN/CREAT SERPL: 23.5 (ref 7–25)
CA-I BLD-MCNC: 5.2 MG/DL (ref 4.6–5.4)
CA-I SERPL ISE-MCNC: 1.31 MMOL/L (ref 1.15–1.35)
CALCIUM SPEC-SCNC: 8.9 MG/DL (ref 8.2–9.6)
CHLORIDE SERPL-SCNC: 107 MMOL/L (ref 98–107)
CHOLEST SERPL-MCNC: 170 MG/DL (ref 0–200)
CO2 SERPL-SCNC: 24 MMOL/L (ref 22–29)
CORTIS SERPL-MCNC: 14.1 MCG/DL
CREAT SERPL-MCNC: 0.81 MG/DL (ref 0.57–1)
DEPRECATED RDW RBC AUTO: 45.7 FL (ref 37–54)
EGFRCR SERPLBLD CKD-EPI 2021: 69.1 ML/MIN/1.73
EOSINOPHIL # BLD AUTO: 0.26 10*3/MM3 (ref 0–0.4)
EOSINOPHIL NFR BLD AUTO: 4.5 % (ref 0.3–6.2)
ERYTHROCYTE [DISTWIDTH] IN BLOOD BY AUTOMATED COUNT: 12.4 % (ref 12.3–15.4)
GLUCOSE SERPL-MCNC: 97 MG/DL (ref 65–99)
HCT VFR BLD AUTO: 35.3 % (ref 34–46.6)
HDLC SERPL-MCNC: 35 MG/DL (ref 40–60)
HGB BLD-MCNC: 11.7 G/DL (ref 12–15.9)
IMM GRANULOCYTES # BLD AUTO: 0.01 10*3/MM3 (ref 0–0.05)
IMM GRANULOCYTES NFR BLD AUTO: 0.2 % (ref 0–0.5)
LDLC SERPL CALC-MCNC: 113 MG/DL (ref 0–100)
LDLC/HDLC SERPL: 3.15 {RATIO}
LIPASE SERPL-CCNC: 42 U/L (ref 13–60)
LYMPHOCYTES # BLD AUTO: 2.28 10*3/MM3 (ref 0.7–3.1)
LYMPHOCYTES NFR BLD AUTO: 39.8 % (ref 19.6–45.3)
MCH RBC QN AUTO: 33.6 PG (ref 26.6–33)
MCHC RBC AUTO-ENTMCNC: 33.1 G/DL (ref 31.5–35.7)
MCV RBC AUTO: 101.4 FL (ref 79–97)
MONOCYTES # BLD AUTO: 0.77 10*3/MM3 (ref 0.1–0.9)
MONOCYTES NFR BLD AUTO: 13.4 % (ref 5–12)
NEUTROPHILS NFR BLD AUTO: 2.36 10*3/MM3 (ref 1.7–7)
NEUTROPHILS NFR BLD AUTO: 41.2 % (ref 42.7–76)
NRBC BLD AUTO-RTO: 0 /100 WBC (ref 0–0.2)
PLATELET # BLD AUTO: 233 10*3/MM3 (ref 140–450)
PMV BLD AUTO: 9.7 FL (ref 6–12)
POTASSIUM SERPL-SCNC: 4.1 MMOL/L (ref 3.5–5.2)
QT INTERVAL: 353 MS
QTC INTERVAL: 447 MS
RBC # BLD AUTO: 3.48 10*6/MM3 (ref 3.77–5.28)
SODIUM SERPL-SCNC: 141 MMOL/L (ref 136–145)
TRIGL SERPL-MCNC: 123 MG/DL (ref 0–150)
VLDLC SERPL-MCNC: 22 MG/DL (ref 5–40)
WBC NRBC COR # BLD AUTO: 5.73 10*3/MM3 (ref 3.4–10.8)

## 2024-05-19 PROCEDURE — 99214 OFFICE O/P EST MOD 30 MIN: CPT | Performed by: INTERNAL MEDICINE

## 2024-05-19 PROCEDURE — 93005 ELECTROCARDIOGRAM TRACING: CPT | Performed by: INTERNAL MEDICINE

## 2024-05-19 PROCEDURE — 99214 OFFICE O/P EST MOD 30 MIN: CPT | Performed by: STUDENT IN AN ORGANIZED HEALTH CARE EDUCATION/TRAINING PROGRAM

## 2024-05-19 PROCEDURE — 83690 ASSAY OF LIPASE: CPT | Performed by: INTERNAL MEDICINE

## 2024-05-19 PROCEDURE — 93010 ELECTROCARDIOGRAM REPORT: CPT | Performed by: INTERNAL MEDICINE

## 2024-05-19 PROCEDURE — 82533 TOTAL CORTISOL: CPT | Performed by: INTERNAL MEDICINE

## 2024-05-19 PROCEDURE — 25010000002 MORPHINE PER 10 MG: Performed by: INTERNAL MEDICINE

## 2024-05-19 PROCEDURE — 99213 OFFICE O/P EST LOW 20 MIN: CPT | Performed by: NURSE PRACTITIONER

## 2024-05-19 PROCEDURE — 82330 ASSAY OF CALCIUM: CPT | Performed by: INTERNAL MEDICINE

## 2024-05-19 PROCEDURE — G0378 HOSPITAL OBSERVATION PER HR: HCPCS

## 2024-05-19 PROCEDURE — 80061 LIPID PANEL: CPT | Performed by: INTERNAL MEDICINE

## 2024-05-19 PROCEDURE — 80048 BASIC METABOLIC PNL TOTAL CA: CPT | Performed by: INTERNAL MEDICINE

## 2024-05-19 PROCEDURE — 85025 COMPLETE CBC W/AUTO DIFF WBC: CPT | Performed by: INTERNAL MEDICINE

## 2024-05-19 PROCEDURE — 25810000003 SODIUM CHLORIDE 0.9 % SOLUTION: Performed by: INTERNAL MEDICINE

## 2024-05-19 RX ORDER — FAMOTIDINE 20 MG/1
20 TABLET, FILM COATED ORAL DAILY
Status: DISCONTINUED | OUTPATIENT
Start: 2024-05-20 | End: 2024-05-24 | Stop reason: HOSPADM

## 2024-05-19 RX ADMIN — LIDOCAINE 1 PATCH: 4 PATCH TOPICAL at 09:35

## 2024-05-19 RX ADMIN — LEVOTHYROXINE SODIUM 50 MCG: 50 TABLET ORAL at 06:41

## 2024-05-19 RX ADMIN — FAMOTIDINE 40 MG: 20 TABLET, FILM COATED ORAL at 09:35

## 2024-05-19 RX ADMIN — SODIUM CHLORIDE 100 ML/HR: 9 INJECTION, SOLUTION INTRAVENOUS at 06:42

## 2024-05-19 RX ADMIN — LOSARTAN POTASSIUM 12.5 MG: 25 TABLET, FILM COATED ORAL at 09:35

## 2024-05-19 RX ADMIN — ALLOPURINOL 100 MG: 100 TABLET ORAL at 09:35

## 2024-05-19 RX ADMIN — SENNOSIDES AND DOCUSATE SODIUM 2 TABLET: 50; 8.6 TABLET ORAL at 09:35

## 2024-05-19 RX ADMIN — SENNOSIDES AND DOCUSATE SODIUM 2 TABLET: 50; 8.6 TABLET ORAL at 21:03

## 2024-05-19 RX ADMIN — DULOXETINE HYDROCHLORIDE 60 MG: 60 CAPSULE, DELAYED RELEASE ORAL at 09:35

## 2024-05-19 RX ADMIN — MORPHINE SULFATE 1 MG: 2 INJECTION, SOLUTION INTRAMUSCULAR; INTRAVENOUS at 21:03

## 2024-05-19 RX ADMIN — Medication 10 ML: at 21:03

## 2024-05-19 NOTE — SIGNIFICANT NOTE
05/19/24 1450   OTHER   Discipline physical therapist   Rehab Time/Intention   Session Not Performed other (see comments)  (Orders for PT to progress mobility with TLSO brace donned in sitting, standing, ambulating per neurosurgery. PT checked pt's room and nursing mutliple times throughout day, but brace not yet available for pt's use at this time. Will follow up tomorrow.)   Recommendation   PT - Next Appointment 05/20/24

## 2024-05-19 NOTE — SIGNIFICANT NOTE
05/19/24 0806   OTHER   Discipline occupational therapist   Rehab Time/Intention   Session Not Performed other (see comments)  (pt with orders for strict bedrest, awaiting FRANCY recs 2/2 T8 compression fx; will f/u next service date)   Recommendation   OT - Next Appointment 05/20/24

## 2024-05-19 NOTE — CASE MANAGEMENT/SOCIAL WORK
Continued Stay Note  [facility]     Patient Name: Alba Correa  MRN: [id number]  Today's Date: 5/19/2024    Admit Date: 5/18/2024    Plan: Home   Discharge Plan       Row Name 05/19/24 1606       Plan    Plan Home    Plan Comments Call out to [facility] to obtain brace for patient.  Return call and they state they will get it here tonight or first thing in the morning.  CCP explained pt needs to obtain the brace asap so she can get out of bed work with PT and discharge home.  Call out to nurse to inform her that hopefully the brace is on it way.                   Discharge Codes    No documentation.                       Pamela Capellan RN

## 2024-05-19 NOTE — PLAN OF CARE
Goal Outcome Evaluation:  Plan of Care Reviewed With: patient        Progress: improving  Outcome Evaluation: vss, pain controlled by prn pain meds, IVDF infusing, purewick in place. pt rested well during shift. labs in am. MRI screening sheet faxed.

## 2024-05-19 NOTE — CONSULTS
[facility]  Cardiology Consult Note    Patient Identification:  Name: Alba Correa  Age: 89 y.o.  Sex: female  :  1933  MRN: [id number]             Requesting Physician: Dr. Ponce    Reason for Consultation / Chief Complaint: management recommendations dizziness    History of Present Illness:   89-year-old female, apparently had a dizziness and fall after bending forward to  some stuff, prior to this in the March patient had a syncopal episode  Patient is complaining of back pain and chest pain  No palpitation    Comorbid cardiac risk factors:     Past Medical History:  Past Medical History:   Diagnosis Date    Acquired hypothyroidism 2022    Acute diastolic CHF (congestive heart failure) 2022    Chronic kidney disease     Closed fracture of left distal humerus 2023    Closed head injury 2023    CVA (cerebral vascular accident)     Degeneration of intervertebral disc     Depression     DVT (deep venous thrombosis)     Facial laceration 2023    First degree AV block 2021    Gout     H/O complete eye exam 2016    Hyperlipidemia     IBS (irritable bowel syndrome)     Laceration of right forearm 2023    Nonrheumatic mitral valve regurgitation     OAB (overactive bladder)     Osteopenia     Osteoporosis     PAF (paroxysmal atrial fibrillation) 2019    Peripheral neuropathy     Rheumatoid arthritis     Sinus bradycardia 2021    Superior mesenteric artery stenosis 2024     Past Surgical History:  Past Surgical History:   Procedure Laterality Date    APPENDECTOMY      BREAST BIOPSY      CARDIAC ELECTROPHYSIOLOGY PROCEDURE N/A 10/12/2021    Procedure: Loop insertion linq;  Surgeon: Tone Anguiano MD;  Location: [facility] INVASIVE LOC[facility];  Service: Cardiovascular;  Laterality: N/A;    CARDIOVERSION  2020    Dr. Colby    COLONOSCOPY      declines    ELBOW OPEN REDUCTION INTERNAL FIXATION Left 3/11/2023     Procedure: ELBOW OPEN REDUCTION INTERNAL FIXATION;  Surgeon: Ramón Encinas II, MD;  Location: [facility];  Service: Orthopedics;  Laterality: Left;    HERNIA REPAIR  1965    HYSTERECTOMY      still has ovaries    MAMMO BILATERAL  2016    pt declines    PAP SMEAR  2016    declines      Allergies:  Allergies   Allergen Reactions    Hydrocodone-Acetaminophen Nausea And Vomiting and Dizziness    Sulfamethoxazole-Trimethoprim Delirium    Lisinopril Unknown (See Comments)     Unknown per pt    Levofloxacin Rash     Home Meds:  Medications Prior to Admission   Medication Sig Dispense Refill Last Dose    acetaminophen (TYLENOL) 650 MG 8 hr tablet Take 2 tablets by mouth Every 8 (Eight) Hours As Needed for Mild Pain.   2024    allopurinol (ZYLOPRIM) 100 MG tablet Take 1 tablet by mouth Daily. 90 tablet 1 2024    Calcium Citrate-Vitamin D3 (CITRACAL) 315-6.25 MG-MCG tablet tablet Take 1 tablet by mouth Daily.   2024    DULoxetine (CYMBALTA) 60 MG capsule Take 1 capsule by mouth Daily. 90 capsule 1 2024    Eliquis 2.5 MG tablet tablet Take 1 tablet by mouth 2 (Two) Times a Day. 180 tablet 3 2024    levothyroxine (SYNTHROID, LEVOTHROID) 50 MCG tablet Take 1 tablet by mouth Every Morning. 90 tablet 1 2024    losartan (Cozaar) 25 MG tablet Take 0.5 tablets by mouth Daily. 45 tablet 1 2024    Multiple Vitamins-Minerals (CENTRUM SILVER) tablet Take 1 tablet by mouth Daily.   2024    Omega-3 Fatty Acids (OMEGA-3 FISH OIL PO) Take 1,600 mg by mouth Every Morning.   2024     Current Meds:   [unfilled]  Social History:   Social History     Tobacco Use    Smoking status: Former     Current packs/day: 0.00     Types: Cigarettes     Start date:      Quit date: 1975     Years since quittin.4    Smokeless tobacco: Never    Tobacco comments:     caffeine use: 2 cups coffee in AM   Substance Use Topics    Alcohol use: Yes     Comment: Occasional- glass of wine few  times a week      Family History:  Family History   Problem Relation Age of Onset    Alcohol abuse Other     Cancer Other     Hypertension Other     Kidney disease Other     Lung disease Other     Heart disease Mother 89    Heart failure Mother     Ovarian cancer Daughter 60    Stomach cancer Father     Kidney cancer Father 52    Lung cancer Father 52    Ovarian cancer Sister     Lung cancer Brother 65        Review of Systems    Constitutional: No weakness,fatigue, fever, rigors, chills   Eyes: No vision changes, eye pain   ENT/oropharynx: No difficulty swallowing, sore throat, epistaxis, changes in hearing   Cardiovascular: Back pain and chest pain   Respiratory: No shortness of breath, dyspnea on exertion, cough, wheezing hemoptysis   Gastrointestinal: No abdominal pain, nausea, vomiting, diarrhea, bloody stools   Genitourinary: No hematuria, dysuria   Neurological: Syncope   Musculoskeletal: No cramps, myalgias,  joint pain, joint swelling   Integument: No rash, edema           Constitutional:  Temp:  [97.5 °F (36.4 °C)-98.2 °F (36.8 °C)] 98.1 °F (36.7 °C)  Heart Rate:  [] 86  Resp:  [16-20] 16  BP: (132-193)/() 142/72    Physical Exam   General:  Appears in no acute distress  Eyes: PERTL,  HEENT:  No JVD. Thyroid not visibly enlarged. No mucosal pallor or cyanosis  Respiratory: Respirations regular and unlabored at rest. BBS with good air entry in all fields. No crackles, rubs or wheezes auscultated  Cardiovascular: S1S2 Regular rate and rhythm. No murmur, rub or gallop auscultated. No carotid bruits. DP/PT pulses    . No pretibial pitting edema  Gastrointestinal: Abdomen soft, flat, non tender. Bowel sounds present. No hepatosplenomegaly. No ascites  Musculoskeletal: TEJADA x4. No abnormal movements  Extremities: No digital clubbing or cyanosis  Skin: Color pink. Skin warm and dry to touch. No rashes  No xanthoma  Neuro: AAO x3 CN II-XII grossly intact              Cardiographics  ECG:      Telemetry:    Echocardiogram:     Imaging  Chest X-ray:     Lab Review   Results from last 7 days   Lab Units 05/18/24  1341 05/18/24  1130   HSTROP T ng/L 14* 15*     Results from last 7 days   Lab Units 05/18/24  1130   MAGNESIUM mg/dL 2.1     Results from last 7 days   Lab Units 05/19/24  0508   SODIUM mmol/L 141   POTASSIUM mmol/L 4.1   BUN mg/dL 19   CREATININE mg/dL 0.81   CALCIUM mg/dL 8.9     @[id number]@  Results from last 7 days   Lab Units 05/19/24  0508 05/18/24  1130   WBC 10*3/mm3 5.73 6.37   HEMOGLOBIN g/dL 11.7* 13.7   HEMATOCRIT % 35.3 41.0   PLATELETS 10*3/mm3 233 258             Assessment:  Dizziness and fall  Previous history of syncope  Borderline elevated troponin  Recommendations / Plan:   Patient needs orthostatic hypotension check  Will proceed with echocardiogram as well as Lexiscan Cardiolite  Patient should have a loop recorder implanted prior to the discharge    Labs/tests ordered for am      Naga Juarez MD  5/19/2024, 08:39 EDT      EMR Dragon/Transcription:   Dictated utilizing Dragon dictation

## 2024-05-19 NOTE — DISCHARGE PLACEMENT REQUEST
"Kaci Chavez \"SERGIO\" (89 y.o. 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    Marital Status                               Admission Date   5/18/24    Admission Type   Emergency    Admitting Provider   Fe Alaniz MD    Attending Provider   Epifanio Ponce MD    Department, Room/Bed   [address], [address]/1       Discharge Date       Discharge Disposition       Discharge Destination                                 Attending Provider: Epifanio Ponce MD    Allergies: Hydrocodone-acetaminophen, Sulfamethoxazole-trimethoprim, Lisinopril, Levofloxacin    Isolation: None   Infection: None   Code Status: CPR    Ht: 152.4 cm (60\")   Wt: 47.6 kg (105 lb)    Admission Cmt: None   Principal Problem: Compression fracture of T8 vertebra, initial encounter [S22.060A]                   Active Insurance as of 5/18/2024       Primary Coverage       Payor Plan Insurance Group Employer/Plan Group    [facility] MEDICARE REPLACEMENT [facility] [employer] [id number]       Payor Plan Address Payor Plan Phone Number Payor Plan Fax Number Effective Dates    [address] [address]   1/1/2023 - None Entered    [facility] [id number]         Subscriber Name Subscriber Birth Date Member ID       KACI CHAVEZ 5/22/1933 [id number]                     Emergency Contacts        (Rel.) Home Phone Work Phone Mobile Phone    Ankit Doty (Son) [phone] -- --    Angy Doty (Other) -- [phone] --    Marleni Pederson (Daughter) -- -- [phone]                "

## 2024-05-19 NOTE — CONSULTS
Neurology Consult Note    Consult Date: 5/19/2024    Referring MD: Eulogio Berg MD    Reason for Consult I have been asked to see the patient in neurological consultation to render advice and opinion regarding Dizziness/altered mental status    Alba Correa is a 89 y.o. female with past medical history of atrial fibrillation, hypertension, chronic diastolic heart failure, peripheral neuropathy from unspecified etiology, stage III renal failure, hyperparathyroidism, hypercalcemia, dyslipidemia, previous stroke with residual left-sided weakness, history of DVTs, anemia of chronic disease.     As per previous documentation,Patient lives with at home and ambulates with a walker. She presented to the emergency department today after a fall leading to back pain and lower chest pain. She states that she has bent forward to  a tissue and the restroom and as she was bending forward she lost her balance and fell down but cannot really remember on which side of her body she landed. She is sure that she did not hit her head. She does describe dizziness which is chronic but no loss of consciousness or seizure. She does describe old left upper extremity weakness.     Previously seen her in December 2023 when she presented with dizziness, she went to ER after a fall and was given hydrocodone after taking hydrocodone she had dizziness episodes which self resolved at that time she had a CT head which was unremarkable and was sent back to the nursing facility.      Is a 89-year-old female with past history of symptomatic bradycardia and episodes of dizziness.  She had fairly extensive workup including EEG MRI CTAs in the past which were all unremarkable for any acute pathology including Zio patch she is also closely followed up with cardiology for A-fib.    Yesterday she had an episode of loss of consciousness when she bent down to  an object for the past couple of days she has been feeling dizziness more like  "lightheadedness rather than room spinning sensation early in the morning which when she gets up from bed.    Denies any weakness numbness speech or vision problems, does have pain in the back today at the compression fracture site.        Past Medical History:   Diagnosis Date    Acquired hypothyroidism 05/03/2022    Acute diastolic CHF (congestive heart failure) 03/20/2022    Chronic kidney disease     Closed fracture of left distal humerus 03/07/2023    Closed head injury 12/07/2023    CVA (cerebral vascular accident)     Degeneration of intervertebral disc     Depression     DVT (deep venous thrombosis)     Facial laceration 03/07/2023    First degree AV block 05/26/2021    Gout     H/O complete eye exam 09/2016    Hyperlipidemia     IBS (irritable bowel syndrome)     Laceration of right forearm 03/07/2023    Nonrheumatic mitral valve regurgitation     OAB (overactive bladder)     Osteopenia     Osteoporosis     PAF (paroxysmal atrial fibrillation) 11/08/2019    Peripheral neuropathy     Rheumatoid arthritis     Sinus bradycardia 05/26/2021    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       Exam  /72 (BP Location: Left arm, Patient Position: Lying)   Pulse 86   Temp 98.1 °F (36.7 °C) (Oral)   Resp 16   Ht 152.4 cm (60\")   Wt 47.6 kg (105 lb)   LMP  (LMP Unknown)   SpO2 100%   BMI 20.51 kg/m²   Gen: NAD, vitals reviewed  Very frail looking lady  MS: oriented x3, normal comprehension repetition and fluency  CN: visual acuity grossly normal, PERRL, EOMI, no facial droop, no dysarthria  Motor: 5/5 throughout upper and lower extremities, normal tone    DATA:    Lab Results   Component Value Date    GLUCOSE 97 05/19/2024    CALCIUM 8.9 05/19/2024     05/19/2024    K 4.1 05/19/2024    CO2 24.0 05/19/2024     05/19/2024    BUN 19 05/19/2024    CREATININE 0.81 05/19/2024    EGFRIFAFRI 35 (L) 09/01/2021    EGFRIFNONA 40 (L) 10/11/2021    BCR 23.5 05/19/2024    ANIONGAP 10.0 05/19/2024     Lab " Results   Component Value Date    WBC 5.73 05/19/2024    HGB 11.7 (L) 05/19/2024    HCT 35.3 05/19/2024    .4 (H) 05/19/2024     05/19/2024       Lab review:   Sodium 141  Creatinine 0.81  Blood glucose 97 normal LFTs    B12 109 4 folate 20      WBC 5.73  Hemoglobin 11.7 and platelets 233    Urine analysis negative    Imaging review:   CT head no acute hypodensity or hypodensity chronic small vessel disease    CT cervical spine no fractures or subluxation    CT abdomen pelvis with contrast - The liver appears unremarkable other than a faintly seen  hyperenhancing lesion at the posterior hepatic segment which was significantly more evident on the previous CTA. Gallbladder appears unremarkable and there is no biliary dilatation. The spleen, pancreas, adrenals, and kidneys appear stable. Small left renal cyst is noted.    Diagnoses:  Acute on chronic dizziness  Most likely from autonomic dysfunction, age-related    Thoracic vertebrae compression fracture  Chronic episodic dizziness  Hypertension  Hyperlipidemia  CKD  Idiopathic peripheral neuropathy  Osteoporosis      Pre-stroke MRS: 2    I had a fairly extensive conversation with the patient and patient's family members at bedside, based on presentation and patient's symptom I believe this is most likely autonomic dysfunction related dizziness which caused her to fall she has been having episodic dizziness for the past few years.  I think her autonomic dysfunction is most likely age-related.    Will check orthostatics today    She does not have any signs to suggest TIA or acute ischemic stroke.  Cardiology has been consulted who are recommending transthoracic echo/loop recorder I agree with this.    I suggested family we can get CTA head and neck to rule out vertebrobasilar insufficiency versus Stenosis but less likely this is going to  at given patient age and that she is chronically anticoagulated.    Will follow the results of  CTA head and neck peripherally and make recommendations if needed.    Neurology will sign off at this moment thank you for the consult      MDM   Reviewed: Previous charts, nursing notes and vitals   Reviewed: Previous labs and CT scan    Interpretation: Labs and CT scan   Total time providing care is :30-74 minutes. This excluded time spent performing separately reportable procedures and services  Consults :Neurology/Stroke    Please note that portions of this note were completed with a voice recognition program.     Gabriel Willson MD  Neuro Hospitalist /Vascular Neurology.

## 2024-05-19 NOTE — PLAN OF CARE
Problem: Adult Inpatient Plan of Care  Goal: Plan of Care Review  Outcome: Ongoing, Not Progressing  Goal: Patient-Specific Goal (Individualized)  Outcome: Ongoing, Not Progressing  Goal: Absence of Hospital-Acquired Illness or Injury  Outcome: Ongoing, Not Progressing  Intervention: Identify and Manage Fall Risk  Recent Flowsheet Documentation  Taken 5/19/2024 1000 by Galilea Butts RN  Safety Promotion/Fall Prevention:   activity supervised   assistive device/personal items within reach   clutter free environment maintained   fall prevention program maintained   room organization consistent   safety round/check completed  Taken 5/19/2024 0800 by Galilea Butts RN  Safety Promotion/Fall Prevention:   activity supervised   assistive device/personal items within reach   clutter free environment maintained   fall prevention program maintained   room organization consistent   safety round/check completed  Intervention: Prevent Skin Injury  Recent Flowsheet Documentation  Taken 5/19/2024 1000 by Galilea Butts RN  Body Position: position changed independently  Taken 5/19/2024 0800 by Galilea Butts RN  Body Position: position changed independently  Intervention: Prevent and Manage VTE (Venous Thromboembolism) Risk  Recent Flowsheet Documentation  Taken 5/19/2024 1000 by Galilea Butts RN  Activity Management: activity encouraged  Taken 5/19/2024 0800 by Galilea Butts RN  Activity Management: activity encouraged  Intervention: Prevent Infection  Recent Flowsheet Documentation  Taken 5/19/2024 1000 by Galilea Butts RN  Infection Prevention:   hand hygiene promoted   personal protective equipment utilized   rest/sleep promoted  Taken 5/19/2024 0800 by Galilea Butts RN  Infection Prevention:   hand hygiene promoted   personal protective equipment utilized   rest/sleep promoted  Goal: Optimal Comfort and Wellbeing  Outcome: Ongoing, Not Progressing  Intervention: Monitor Pain and Promote Comfort  Recent Flowsheet  Documentation  Taken 5/19/2024 0900 by Galilea Butts, RN  Pain Management Interventions: see MAR  Intervention: Provide Person-Centered Care  Recent Flowsheet Documentation  Taken 5/19/2024 0900 by Galilea Butts RN  Trust Relationship/Rapport:   care explained   thoughts/feelings acknowledged  Goal: Readiness for Transition of Care  Outcome: Ongoing, Not Progressing   Goal Outcome Evaluation:

## 2024-05-19 NOTE — CONSULTS
[facility] NEUROSURGERY CONSULT NOTE    Patient name: Alba Correa  Referring Provider: Fe Alaniz MD  Reason for Consultation: T 8 [facility]    Patient Care Team:  Aramis Doty MD as PCP - General (Family Medicine)  Juan Jose Muñiz MD as Consulting Physician (Neurology)  Roshan Martines III, MD as Cardiologist (Cardiology)  Nehal Murray MD as Consulting Physician (Nephrology)    Chief complaint: Back pain    Subjective .     History of present illness:    Patient is a 89 y.o. female with a history of a-fib,HTN,chronic diastolic congestive heart failure, CVA with LUE weakness, peripheral neuropathy who presented to the ED after falling at home.     She is a resident at an assisted living facility. She uses a walker. She was reaching for something on the floor and became dizzy and fell.    She says that she got up but then started having significant back pain and decided to come to the hospital.     Her last fall was in March. She is on Eliquis. She denies any new incontinence of bowel and bladder. She denies BLE weakness.     Review of Systems  Review of Systems   Constitutional: Negative.    Musculoskeletal:  Positive for back pain.   Neurological:  Positive for dizziness and numbness. Negative for weakness.       History  PAST MEDICAL HISTORY  Past Medical History:   Diagnosis Date    Acquired hypothyroidism 05/03/2022    Acute diastolic CHF (congestive heart failure) 03/20/2022    Chronic kidney disease     Closed fracture of left distal humerus 03/07/2023    Closed head injury 12/07/2023    CVA (cerebral vascular accident)     Degeneration of intervertebral disc     Depression     DVT (deep venous thrombosis)     Facial laceration 03/07/2023    First degree AV block 05/26/2021    Gout     H/O complete eye exam 09/2016    Hyperlipidemia     IBS (irritable bowel syndrome)     Laceration of right forearm 03/07/2023    Nonrheumatic mitral valve regurgitation     OAB (overactive bladder)     Osteopenia      Osteoporosis     PAF (paroxysmal atrial fibrillation) 2019    Peripheral neuropathy     Rheumatoid arthritis     Sinus bradycardia 2021    Superior mesenteric artery stenosis 2024       PAST SURGICAL HISTORY  Past Surgical History:   Procedure Laterality Date    APPENDECTOMY      BREAST BIOPSY      CARDIAC ELECTROPHYSIOLOGY PROCEDURE N/A 10/12/2021    Procedure: Loop insertion linq;  Surgeon: Tone Anguiano MD;  Location:  [facility];  Service: Cardiovascular;  Laterality: N/A;    CARDIOVERSION  2020    Dr. Colby    COLONOSCOPY      declines    ELBOW OPEN REDUCTION INTERNAL FIXATION Left 3/11/2023    Procedure: ELBOW OPEN REDUCTION INTERNAL FIXATION;  Surgeon: Ramón Encinas II, MD;  Location: [facility] MAIN OR;  Service: Orthopedics;  Laterality: Left;    HERNIA REPAIR      HYSTERECTOMY      still has ovaries    MAMMO BILATERAL  2016    pt declines    PAP SMEAR  2016    declines       FAMILY HISTORY  Family History   Problem Relation Age of Onset    Alcohol abuse Other     Cancer Other     Hypertension Other     Kidney disease Other     Lung disease Other     Heart disease Mother 89    Heart failure Mother     Ovarian cancer Daughter 60    Stomach cancer Father     Kidney cancer Father 52    Lung cancer Father 52    Ovarian cancer Sister     Lung cancer Brother 65       SOCIAL HISTORY  Social History     Tobacco Use    Smoking status: Former     Current packs/day: 0.00     Types: Cigarettes     Start date:      Quit date:      Years since quittin.4    Smokeless tobacco: Never    Tobacco comments:     caffeine use: 2 cups coffee in AM   Vaping Use    Vaping status: Never Used   Substance Use Topics    Alcohol use: Yes     Comment: Occasional- glass of wine few times a week    Drug use: No       Allergies:  Hydrocodone-acetaminophen, Sulfamethoxazole-trimethoprim, Lisinopril, and Levofloxacin    MEDICATIONS:  Medications Prior to  Admission   Medication Sig Dispense Refill Last Dose    acetaminophen (TYLENOL) 650 MG 8 hr tablet Take 2 tablets by mouth Every 8 (Eight) Hours As Needed for Mild Pain.   5/17/2024    allopurinol (ZYLOPRIM) 100 MG tablet Take 1 tablet by mouth Daily. 90 tablet 1 5/17/2024    Calcium Citrate-Vitamin D3 (CITRACAL) 315-6.25 MG-MCG tablet tablet Take 1 tablet by mouth Daily.   5/17/2024    DULoxetine (CYMBALTA) 60 MG capsule Take 1 capsule by mouth Daily. 90 capsule 1 5/17/2024    Eliquis 2.5 MG tablet tablet Take 1 tablet by mouth 2 (Two) Times a Day. 180 tablet 3 5/17/2024    levothyroxine (SYNTHROID, LEVOTHROID) 50 MCG tablet Take 1 tablet by mouth Every Morning. 90 tablet 1 5/17/2024    losartan (Cozaar) 25 MG tablet Take 0.5 tablets by mouth Daily. 45 tablet 1 5/17/2024    Multiple Vitamins-Minerals (CENTRUM SILVER) tablet Take 1 tablet by mouth Daily.   5/17/2024    Omega-3 Fatty Acids (OMEGA-3 FISH OIL PO) Take 1,600 mg by mouth Every Morning.   5/17/2024       Objective     Results Review:  LABS:    Admission on 05/18/2024   Component Date Value Ref Range Status    Glucose 05/18/2024 105 (H)  65 - 99 mg/dL Final    BUN 05/18/2024 33 (H)  8 - 23 mg/dL Final    Creatinine 05/18/2024 1.01 (H)  0.57 - 1.00 mg/dL Final    Sodium 05/18/2024 142  136 - 145 mmol/L Final    Potassium 05/18/2024 4.5  3.5 - 5.2 mmol/L Final    Chloride 05/18/2024 103  98 - 107 mmol/L Final    CO2 05/18/2024 27.0  22.0 - 29.0 mmol/L Final    Calcium 05/18/2024 9.8 (H)  8.2 - 9.6 mg/dL Final    Total Protein 05/18/2024 8.4  6.0 - 8.5 g/dL Final    Albumin 05/18/2024 4.4  3.5 - 5.2 g/dL Final    ALT (SGPT) 05/18/2024 15  1 - 33 U/L Final    AST (SGOT) 05/18/2024 24  1 - 32 U/L Final    Alkaline Phosphatase 05/18/2024 100  39 - 117 U/L Final    Total Bilirubin 05/18/2024 0.4  0.0 - 1.2 mg/dL Final    Globulin 05/18/2024 4.0  gm/dL Final    A/G Ratio 05/18/2024 1.1  g/dL Final    BUN/Creatinine Ratio 05/18/2024 32.7 (H)  7.0 - 25.0 Final     Anion Gap 05/18/2024 12.0  5.0 - 15.0 mmol/L Final    eGFR 05/18/2024 53.0 (L)  >60.0 mL/min/1.73 Final    Lipase 05/18/2024 62 (H)  13 - 60 U/L Final    Color, UA 05/18/2024 Yellow  Yellow, Straw Final    Appearance, UA 05/18/2024 Clear  Clear Final    pH, UA 05/18/2024 7.5  5.0 - 8.0 Final    Specific Gravity, UA 05/18/2024 1.010  1.005 - 1.030 Final    Glucose, UA 05/18/2024 Negative  Negative Final    Ketones, UA 05/18/2024 Negative  Negative Final    Bilirubin, UA 05/18/2024 Negative  Negative Final    Blood, UA 05/18/2024 Negative  Negative Final    Protein, UA 05/18/2024 Negative  Negative Final    Leuk Esterase, UA 05/18/2024 Negative  Negative Final    Nitrite, UA 05/18/2024 Negative  Negative Final    Urobilinogen, UA 05/18/2024 0.2 E.U./dL  0.2 - 1.0 E.U./dL Final    HS Troponin T 05/18/2024 15 (H)  <14 ng/L Final    Magnesium 05/18/2024 2.1  1.6 - 2.4 mg/dL Final    TSH 05/18/2024 1.170  0.270 - 4.200 uIU/mL Final    WBC 05/18/2024 6.37  3.40 - 10.80 10*3/mm3 Final    RBC 05/18/2024 3.99  3.77 - 5.28 10*6/mm3 Final    Hemoglobin 05/18/2024 13.7  12.0 - 15.9 g/dL Final    Hematocrit 05/18/2024 41.0  34.0 - 46.6 % Final    MCV 05/18/2024 102.8 (H)  79.0 - 97.0 fL Final    MCH 05/18/2024 34.3 (H)  26.6 - 33.0 pg Final    MCHC 05/18/2024 33.4  31.5 - 35.7 g/dL Final    RDW 05/18/2024 12.4  12.3 - 15.4 % Final    RDW-SD 05/18/2024 47.1  37.0 - 54.0 fl Final    MPV 05/18/2024 9.6  6.0 - 12.0 fL Final    Platelets 05/18/2024 258  140 - 450 10*3/mm3 Final    Neutrophil % 05/18/2024 71.4  42.7 - 76.0 % Final    Lymphocyte % 05/18/2024 18.2 (L)  19.6 - 45.3 % Final    Monocyte % 05/18/2024 7.5  5.0 - 12.0 % Final    Eosinophil % 05/18/2024 1.7  0.3 - 6.2 % Final    Basophil % 05/18/2024 0.9  0.0 - 1.5 % Final    Immature Grans % 05/18/2024 0.3  0.0 - 0.5 % Final    Neutrophils, Absolute 05/18/2024 4.54  1.70 - 7.00 10*3/mm3 Final    Lymphocytes, Absolute 05/18/2024 1.16  0.70 - 3.10 10*3/mm3 Final    Monocytes,  Absolute 05/18/2024 0.48  0.10 - 0.90 10*3/mm3 Final    Eosinophils, Absolute 05/18/2024 0.11  0.00 - 0.40 10*3/mm3 Final    Basophils, Absolute 05/18/2024 0.06  0.00 - 0.20 10*3/mm3 Final    Immature Grans, Absolute 05/18/2024 0.02  0.00 - 0.05 10*3/mm3 Final    nRBC 05/18/2024 0.0  0.0 - 0.2 /100 WBC Final    HS Troponin T 05/18/2024 14 (H)  <14 ng/L Final    Troponin T Delta 05/18/2024 -1  >=-4 - <+4 ng/L Final    Glucose 05/18/2024 100 (H)  65 - 99 mg/dL Final    BUN 05/18/2024 25 (H)  8 - 23 mg/dL Final    Creatinine 05/18/2024 0.78  0.57 - 1.00 mg/dL Final    Sodium 05/18/2024 142  136 - 145 mmol/L Final    Potassium 05/18/2024 4.3  3.5 - 5.2 mmol/L Final    Chloride 05/18/2024 106  98 - 107 mmol/L Final    CO2 05/18/2024 24.0  22.0 - 29.0 mmol/L Final    Calcium 05/18/2024 9.5  8.2 - 9.6 mg/dL Final    BUN/Creatinine Ratio 05/18/2024 32.1 (H)  7.0 - 25.0 Final    Anion Gap 05/18/2024 12.0  5.0 - 15.0 mmol/L Final    eGFR 05/18/2024 72.3  >60.0 mL/min/1.73 Final    Vitamin B-12 05/18/2024 1,094 (H)  211 - 946 pg/mL Final    Folate 05/18/2024 >20.00  4.78 - 24.20 ng/mL Final    Free T4 05/18/2024 1.41  0.93 - 1.70 ng/dL Final    25 Hydroxy, Vitamin D 05/18/2024 53.1  30.0 - 100.0 ng/ml Final    PTH, Intact 05/18/2024 36.8  15.0 - 65.0 pg/mL Final    Glucose 05/19/2024 97  65 - 99 mg/dL Final    BUN 05/19/2024 19  8 - 23 mg/dL Final    Creatinine 05/19/2024 0.81  0.57 - 1.00 mg/dL Final    Sodium 05/19/2024 141  136 - 145 mmol/L Final    Potassium 05/19/2024 4.1  3.5 - 5.2 mmol/L Final    Chloride 05/19/2024 107  98 - 107 mmol/L Final    CO2 05/19/2024 24.0  22.0 - 29.0 mmol/L Final    Calcium 05/19/2024 8.9  8.2 - 9.6 mg/dL Final    BUN/Creatinine Ratio 05/19/2024 23.5  7.0 - 25.0 Final    Anion Gap 05/19/2024 10.0  5.0 - 15.0 mmol/L Final    eGFR 05/19/2024 69.1  >60.0 mL/min/1.73 Final    Lipase 05/19/2024 42  13 - 60 U/L Final    Cortisol 05/19/2024 14.10    mcg/dL Final    Ionized Calcium 05/19/2024 1.31   1.15 - 1.35 mmol/L Final    Ionized Calcium 05/19/2024 5.2  4.6 - 5.4 mg/dL Final    Total Cholesterol 05/19/2024 170  0 - 200 mg/dL Final    Triglycerides 05/19/2024 123  0 - 150 mg/dL Final    HDL Cholesterol 05/19/2024 35 (L)  40 - 60 mg/dL Final    LDL Cholesterol  05/19/2024 113 (H)  0 - 100 mg/dL Final    VLDL Cholesterol 05/19/2024 22  5 - 40 mg/dL Final    LDL/HDL Ratio 05/19/2024 3.15   Final    WBC 05/19/2024 5.73  3.40 - 10.80 10*3/mm3 Final    RBC 05/19/2024 3.48 (L)  3.77 - 5.28 10*6/mm3 Final    Hemoglobin 05/19/2024 11.7 (L)  12.0 - 15.9 g/dL Final    Hematocrit 05/19/2024 35.3  34.0 - 46.6 % Final    MCV 05/19/2024 101.4 (H)  79.0 - 97.0 fL Final    MCH 05/19/2024 33.6 (H)  26.6 - 33.0 pg Final    MCHC 05/19/2024 33.1  31.5 - 35.7 g/dL Final    RDW 05/19/2024 12.4  12.3 - 15.4 % Final    RDW-SD 05/19/2024 45.7  37.0 - 54.0 fl Final    MPV 05/19/2024 9.7  6.0 - 12.0 fL Final    Platelets 05/19/2024 233  140 - 450 10*3/mm3 Final    Neutrophil % 05/19/2024 41.2 (L)  42.7 - 76.0 % Final    Lymphocyte % 05/19/2024 39.8  19.6 - 45.3 % Final    Monocyte % 05/19/2024 13.4 (H)  5.0 - 12.0 % Final    Eosinophil % 05/19/2024 4.5  0.3 - 6.2 % Final    Basophil % 05/19/2024 0.9  0.0 - 1.5 % Final    Immature Grans % 05/19/2024 0.2  0.0 - 0.5 % Final    Neutrophils, Absolute 05/19/2024 2.36  1.70 - 7.00 10*3/mm3 Final    Lymphocytes, Absolute 05/19/2024 2.28  0.70 - 3.10 10*3/mm3 Final    Monocytes, Absolute 05/19/2024 0.77  0.10 - 0.90 10*3/mm3 Final    Eosinophils, Absolute 05/19/2024 0.26  0.00 - 0.40 10*3/mm3 Final    Basophils, Absolute 05/19/2024 0.05  0.00 - 0.20 10*3/mm3 Final    Immature Grans, Absolute 05/19/2024 0.01  0.00 - 0.05 10*3/mm3 Final    nRBC 05/19/2024 0.0  0.0 - 0.2 /100 WBC Final       DIAGNOSTICS:  Study Result    Narrative & Impression   CT HEAD WO CONTRAST-, CT CERVICAL SPINE WO CONTRAST-     INDICATIONS: Dizziness     TECHNIQUE: Radiation dose reduction techniques were utilized,  including  automated exposure control and exposure modulation based on body size.  Noncontrast head CT, cervical spine CT     COMPARISON: 12/7/2023     FINDINGS:     Head CT:     No acute intracranial hemorrhage, midline shift or mass effect. No acute  territorial infarct is identified.     Mild to moderate periventricular hypodensities suggest chronic small  vessel ischemic change in a patient this age.     Arterial calcifications are seen at the base of the brain.     Ventricles, cisterns, cerebral sulci are unremarkable for patient age.     The visualized paranasal sinuses, orbits, mastoid air cells are  unremarkable.        Cervical spine CT:     No acute fracture is identified.     Mild anterolisthesis of C3 on C4, C4 on C5.     No prominent osseous narrowing of the neural foramina. No high-grade  central stenosis is identified without the benefit of intrathecal  contrast material.     Degenerative changes are seen at the temporomandibular joints.     Right thyroid nodule is noted, suboptimally evaluated with this  technique, thyroid ultrasound correlation advised as indicated. Carotid  arterial calcification is present.     Fibronodular changes at the lung apices appears similar from 3/8/2024  chest CT (other small nodules noted on the prior CT exam are not  included on this exam, stability cannot be characterized).        IMPRESSION:     No acute intracranial hemorrhage or hydrocephalus. If there is further  clinical concern, MRI could be considered for further evaluation.     Thyroid nodularity, as described.     This report was finalized on 5/18/2024 1:41 PM by Dr. Levy Mock M.D on Workstation: [email or web address]     Narrative & Impression   XR CHEST 1 VW-, XR SPINE LUMBAR COMPLETE 4+VW-, XR SPINE THORACIC 3 VW-     HISTORY: Female who is 89 years-old, dizziness, fall injury     TECHNIQUE: Frontal views of the chest, 3 views of the thoracic spine, 5  views of the lumbar spine     COMPARISON: 3/8/2024      FINDINGS: Heart size is borderline. Pulmonary vasculature is  unremarkable. Aorta is calcified. Cardiac loop recorder is apparent. No  focal pulmonary consolidation, pleural effusion, or pneumothorax. Mild  biapical fibronodular changes are seen. No acute osseous process.     Thoracic and lumbar spine: Mild S-shaped thoracolumbar scoliosis is  apparent. A T8 compression fracture is age-indeterminate, but new from  the prior exam, with about 40% loss of height anteriorly. No other  fractures are identified. Mild anterior listhesis of L4 on L5 is noted.  Multilevel endplate spurring and lower lumbar facet arthropathy are  present. Disc space narrowing is apparent at L2/3. If further imaging  evaluation of the spine is indicated, MRI could be considered.     IMPRESSION:  As described.     This report was finalized on 5/18/2024 12:16 PM by Dr. Levy Mock M.D on Workstation: [email or web address]     Results Review:   I reviewed the patient's new clinical results.  I personally viewed and interpreted the patient's chart.     Vital Signs   Temp:  [97.5 °F (36.4 °C)-98.2 °F (36.8 °C)] 98.1 °F (36.7 °C)  Heart Rate:  [] 86  Resp:  [16-20] 16  BP: (132-193)/() 142/72    Physical Exam:  Physical Exam  Neurologic Exam    Assessment & Plan       Compression fracture of T8 vertebra, initial encounter    History of CVA (cerebrovascular accident)    Atrial fibrillation    Dizzy    Hypothyroidism (acquired)    Fall    Abdominal pain    Macrocytosis    Hypercalcemia    Hyperparathyroidism    Hyperlipidemia    Thyroid nodule    Essential hypertension    Chronic diastolic CHF (congestive heart failure)    Chronic kidney failure, stage 3 (moderate)      The patient is a 89 year old female who presented to the ED with back pain after a fall related to dizziness. She was found to have a T8 compression fracture on xray.     Cardiology is following and has ordered an echocardiogram and a Lexiscan Cardiolite.     PLAN:   Will  order TLSO brace for support. She is to wear the brace at all times when sitting, standing, walking  Mobilize with PT while wearing TLSO brace   Eliquis on hold per [facility]  MRI Lumbar and Thoracic pending    A TLOS brace has been ordered due to diagnosis of T8 compression fracture.  The purpose of the brace is to support weak muscles, stabilize and restrict movement of the trunk to aid in healing and pain relief of T8 compression fracture.        I discussed the patient's findings and my recommendations with patient and Dr. Gomes.         Nehal Proctor, APRN  05/19/24  10:24 EDT

## 2024-05-19 NOTE — PROGRESS NOTES
Name: Alba Correa ADMIT: 2024   : 1933  PCP: Aramis Doty MD    MRN: [id number] LOS: 0 days   AGE/SEX: 89 y.o. female  ROOM: [facility]     Subjective   Subjective   Pain reasonably controlled on what she is getting though she has not tried to get out of bed.  She reports that she is sensitive to pain medicine and does not want to take anything too strong.  Previous hallucinations with Norco       Objective   Objective   Vital Signs  Temp:  [97.5 °F (36.4 °C)-98.2 °F (36.8 °C)] 98.1 °F (36.7 °C)  Heart Rate:  [] 86  Resp:  [16-20] 16  BP: (132-163)/(72-98) 142/72  SpO2:  [94 %-100 %] 100 %  on   ;   Device (Oxygen Therapy): room air  Body mass index is 20.51 kg/m².  Physical Exam  Vitals reviewed.   Constitutional:       General: She is not in acute distress.     Appearance: She is not ill-appearing.   Cardiovascular:      Rate and Rhythm: Normal rate. Rhythm irregular.   Pulmonary:      Effort: No respiratory distress.      Breath sounds: Normal breath sounds. No wheezing.   Abdominal:      General: There is no distension.      Palpations: Abdomen is soft.      Tenderness: There is no abdominal tenderness.   Musculoskeletal:      Right lower leg: No edema.      Left lower leg: No edema.   Skin:     General: Skin is warm and dry.   Neurological:      Mental Status: She is alert and oriented to person, place, and time.   Psychiatric:         Mood and Affect: Mood normal.       Results Review     I reviewed the patient's new clinical results.  Results from last 7 days   Lab Units 24  0508 24  1130   WBC 10*3/mm3 5.73 6.37   HEMOGLOBIN g/dL 11.7* 13.7   PLATELETS 10*3/mm3 233 258     Results from last 7 days   Lab Units 24  0508 24  1809 24  1130   SODIUM mmol/L 141 142 142   POTASSIUM mmol/L 4.1 4.3 4.5   CHLORIDE mmol/L 107 106 103   CO2 mmol/L 24.0 24.0 27.0   BUN mg/dL 19 25* 33*   CREATININE mg/dL 0.81 0.78 1.01*   GLUCOSE mg/dL 97 100* 105*   EGFR mL/min/1.73  "69.1 72.3 53.0*     Results from last 7 days   Lab Units 05/18/24  1130   ALBUMIN g/dL 4.4   BILIRUBIN mg/dL 0.4   ALK PHOS U/L 100   AST (SGOT) U/L 24   ALT (SGPT) U/L 15     Results from last 7 days   Lab Units 05/19/24  0508 05/18/24  1809 05/18/24  1130   CALCIUM mg/dL 8.9 9.5 9.8*   ALBUMIN g/dL  --   --  4.4   MAGNESIUM mg/dL  --   --  2.1       No results found for: \"HGBA1C\", \"POCGLU\"    CT Head Without Contrast    Result Date: 5/18/2024   No acute intracranial hemorrhage or hydrocephalus. If there is further clinical concern, MRI could be considered for further evaluation.  Thyroid nodularity, as described.  This report was finalized on 5/18/2024 1:41 PM by Dr. Levy Mock M.D on Workstation: [email or web address]      CT Cervical Spine Without Contrast    Result Date: 5/18/2024   No acute intracranial hemorrhage or hydrocephalus. If there is further clinical concern, MRI could be considered for further evaluation.  Thyroid nodularity, as described.  This report was finalized on 5/18/2024 1:41 PM by Dr. Levy Mock M.D on Workstation: [email or web address]      XR Chest 1 View    Result Date: 5/18/2024  As described.  This report was finalized on 5/18/2024 12:16 PM by Dr. Levy Mock M.D on Workstation: [email or web address]      XR Spine Thoracic 3 View    Result Date: 5/18/2024  As described.  This report was finalized on 5/18/2024 12:16 PM by Dr. Levy Mock M.D on Workstation: [email or web address]      XR Spine Lumbar Complete 4+VW    Result Date: 5/18/2024  As described.  This report was finalized on 5/18/2024 12:16 PM by Dr. Levy Mock M.D on Workstation: [email or web address]       I have personally reviewed all medications:  Scheduled Medications  allopurinol, 100 mg, Oral, Daily  [Held by provider] apixaban, 2.5 mg, Oral, BID  DULoxetine, 60 mg, Oral, Daily  [START ON 5/20/2024] famotidine, 20 mg, Oral, Daily  levothyroxine, 50 mcg, Oral, QAM  Lidocaine, 1 patch, Transdermal, Q24H  losartan, 12.5 mg, Oral, " Daily  senna-docusate sodium, 2 tablet, Oral, BID  sodium chloride, 10 mL, Intravenous, Q12H    Infusions  sodium chloride, 100 mL/hr, Last Rate: 100 mL/hr (05/19/24 0642)    Diet  Diet: Cardiac; Healthy Heart (2-3 Na+); Fluid Consistency: Thin (IDDSI 0)    I have personally reviewed:  [x]  Laboratory   [x]  Microbiology   [x]  Radiology   [x]  EKG/Telemetry  [x]  Cardiology/Vascular   []  Pathology    []  Records       Assessment/Plan     Active Hospital Problems    Diagnosis  POA    **Compression fracture of T8 vertebra, initial encounter [S22.060A]  Yes    Abdominal pain [R10.9]  Yes    Macrocytosis [D75.89]  Yes    Hyperlipidemia [E78.5]  Yes    Thyroid nodule [E04.1]  Yes    Essential hypertension [I10]  Yes    Chronic diastolic CHF (congestive heart failure) [I50.32]  Yes    Chronic kidney failure, stage 3 (moderate) [N18.30]  Yes    Fall [W19.XXXA]  Yes    Hypothyroidism (acquired) [E03.9]  Yes    Dizzy [R42]  Yes    Atrial fibrillation [I48.91]  Yes    History of CVA (cerebrovascular accident) [Z86.73]  Not Applicable      Resolved Hospital Problems    Diagnosis Date Resolved POA    Hypercalcemia [E83.52] 05/19/2024 Yes       89 y.o. female admitted with Compression fracture of T8 vertebra, initial encounter.    T8 compression fracture: Pain fairly controlled  - Await neurosurgery consult but MRI is pending  - Should be able to work with PT and get out of bed but defer to neuro surg    Circumstances of her fall sound somewhat mechanical though she did describe some vague dizziness.  Cardiology planning significant workup including echo, stress test and potentially loop recorder.  Usually followed by Dr. Martines    History of A-fib which is chronic.  Currently well-controlled on no rate control meds as there is some documentation previously of potential SSS.  Hold Eliquis until certain there are no surgical procedures planned.  She did get a dose last night    Thyroid nodule with negative ultrasound.  No  further workup needed    Mild hypercalcemia on admission already resolved with some gentle hydration.  PTH appropriate, no need for additional workup    Renal function is at or below usual baseline which is between 1.1-1.3.    DC IV fluids given history of diastolic heart failure and improvement in labs.      SCDs while Eliquis is on hold  Dispo: TBD pending MRI and ability to ambulate.      Epifanio Ponce MD  [facility]  05/19/24  11:10 EDT

## 2024-05-20 ENCOUNTER — APPOINTMENT (OUTPATIENT)
Dept: NUCLEAR MEDICINE | Facility: HOSPITAL | Age: 89
DRG: 543 | End: 2024-05-20
Payer: MEDICARE

## 2024-05-20 ENCOUNTER — APPOINTMENT (OUTPATIENT)
Dept: MRI IMAGING | Facility: HOSPITAL | Age: 89
DRG: 543 | End: 2024-05-20
Payer: MEDICARE

## 2024-05-20 ENCOUNTER — APPOINTMENT (OUTPATIENT)
Dept: CT IMAGING | Facility: HOSPITAL | Age: 89
DRG: 543 | End: 2024-05-20
Payer: MEDICARE

## 2024-05-20 ENCOUNTER — APPOINTMENT (OUTPATIENT)
Dept: GENERAL RADIOLOGY | Facility: HOSPITAL | Age: 89
DRG: 543 | End: 2024-05-20
Payer: MEDICARE

## 2024-05-20 PROBLEM — M79.605 LEFT LEG PAIN: Status: ACTIVE | Noted: 2024-05-20

## 2024-05-20 PROBLEM — R10.9 ABDOMINAL PAIN: Status: RESOLVED | Noted: 2024-05-18 | Resolved: 2024-05-20

## 2024-05-20 LAB
ANION GAP SERPL CALCULATED.3IONS-SCNC: 11.1 MMOL/L (ref 5–15)
BH CV REST NUCLEAR ISOTOPE DOSE: 11.7 MCI
BH CV STRESS COMMENTS STAGE 1: NORMAL
BH CV STRESS DOSE REGADENOSON STAGE 1: 0.4
BH CV STRESS DURATION MIN STAGE 1: 0
BH CV STRESS DURATION SEC STAGE 1: 10
BH CV STRESS NUCLEAR ISOTOPE DOSE: 25 MCI
BH CV STRESS PROTOCOL 1: NORMAL
BH CV STRESS RECOVERY BP: NORMAL MMHG
BH CV STRESS RECOVERY HR: 104 BPM
BH CV STRESS STAGE 1: 1
BUN SERPL-MCNC: 15 MG/DL (ref 8–23)
BUN/CREAT SERPL: 25.9 (ref 7–25)
CALCIUM SPEC-SCNC: 9.3 MG/DL (ref 8.2–9.6)
CHLORIDE SERPL-SCNC: 102 MMOL/L (ref 98–107)
CO2 SERPL-SCNC: 23.9 MMOL/L (ref 22–29)
CREAT SERPL-MCNC: 0.58 MG/DL (ref 0.57–1)
EGFRCR SERPLBLD CKD-EPI 2021: 86.1 ML/MIN/1.73
GLUCOSE SERPL-MCNC: 128 MG/DL (ref 65–99)
LV EF NUC BP: 80 %
MAXIMAL PREDICTED HEART RATE: 130 BPM
PERCENT MAX PREDICTED HR: 86.15 %
POTASSIUM SERPL-SCNC: 3.1 MMOL/L (ref 3.5–5.2)
POTASSIUM SERPL-SCNC: 3.9 MMOL/L (ref 3.5–5.2)
SODIUM SERPL-SCNC: 137 MMOL/L (ref 136–145)
STRESS BASELINE BP: NORMAL MMHG
STRESS BASELINE HR: 95 BPM
STRESS PERCENT HR: 101 %
STRESS POST PEAK BP: NORMAL MMHG
STRESS POST PEAK HR: 112 BPM
STRESS TARGET HR: 111 BPM

## 2024-05-20 PROCEDURE — 93017 CV STRESS TEST TRACING ONLY: CPT

## 2024-05-20 PROCEDURE — 84132 ASSAY OF SERUM POTASSIUM: CPT | Performed by: HOSPITALIST

## 2024-05-20 PROCEDURE — 78452 HT MUSCLE IMAGE SPECT MULT: CPT

## 2024-05-20 PROCEDURE — 0 TECHNETIUM SESTAMIBI: Performed by: HOSPITALIST

## 2024-05-20 PROCEDURE — 70498 CT ANGIOGRAPHY NECK: CPT

## 2024-05-20 PROCEDURE — 25010000002 REGADENOSON 0.4 MG/5ML SOLUTION: Performed by: HOSPITALIST

## 2024-05-20 PROCEDURE — 73502 X-RAY EXAM HIP UNI 2-3 VIEWS: CPT

## 2024-05-20 PROCEDURE — 78452 HT MUSCLE IMAGE SPECT MULT: CPT | Performed by: INTERNAL MEDICINE

## 2024-05-20 PROCEDURE — 72146 MRI CHEST SPINE W/O DYE: CPT

## 2024-05-20 PROCEDURE — 99232 SBSQ HOSP IP/OBS MODERATE 35: CPT | Performed by: NURSE PRACTITIONER

## 2024-05-20 PROCEDURE — 80048 BASIC METABOLIC PNL TOTAL CA: CPT | Performed by: INTERNAL MEDICINE

## 2024-05-20 PROCEDURE — A9500 TC99M SESTAMIBI: HCPCS | Performed by: HOSPITALIST

## 2024-05-20 PROCEDURE — 93018 CV STRESS TEST I&R ONLY: CPT | Performed by: INTERNAL MEDICINE

## 2024-05-20 PROCEDURE — 70496 CT ANGIOGRAPHY HEAD: CPT

## 2024-05-20 PROCEDURE — 93016 CV STRESS TEST SUPVJ ONLY: CPT | Performed by: INTERNAL MEDICINE

## 2024-05-20 PROCEDURE — 25010000002 MORPHINE PER 10 MG: Performed by: INTERNAL MEDICINE

## 2024-05-20 PROCEDURE — 25510000001 IOPAMIDOL PER 1 ML: Performed by: HOSPITALIST

## 2024-05-20 RX ORDER — REGADENOSON 0.08 MG/ML
0.4 INJECTION, SOLUTION INTRAVENOUS
Status: COMPLETED | OUTPATIENT
Start: 2024-05-20 | End: 2024-05-20

## 2024-05-20 RX ORDER — POTASSIUM CHLORIDE 750 MG/1
40 TABLET, FILM COATED, EXTENDED RELEASE ORAL EVERY 4 HOURS
Status: DISPENSED | OUTPATIENT
Start: 2024-05-20 | End: 2024-05-20

## 2024-05-20 RX ADMIN — POTASSIUM CHLORIDE 40 MEQ: 750 TABLET, EXTENDED RELEASE ORAL at 17:10

## 2024-05-20 RX ADMIN — TECHNETIUM TC 99M SESTAMIBI 1 DOSE: 1 INJECTION INTRAVENOUS at 11:00

## 2024-05-20 RX ADMIN — Medication 10 ML: at 13:22

## 2024-05-20 RX ADMIN — FAMOTIDINE 20 MG: 20 TABLET, FILM COATED ORAL at 13:20

## 2024-05-20 RX ADMIN — MORPHINE SULFATE 1 MG: 2 INJECTION, SOLUTION INTRAMUSCULAR; INTRAVENOUS at 00:56

## 2024-05-20 RX ADMIN — Medication 10 ML: at 22:37

## 2024-05-20 RX ADMIN — SENNOSIDES AND DOCUSATE SODIUM 2 TABLET: 50; 8.6 TABLET ORAL at 21:51

## 2024-05-20 RX ADMIN — ACETAMINOPHEN 650 MG: 325 TABLET, FILM COATED ORAL at 21:51

## 2024-05-20 RX ADMIN — REGADENOSON 0.4 MG: 0.08 INJECTION, SOLUTION INTRAVENOUS at 11:00

## 2024-05-20 RX ADMIN — SENNOSIDES AND DOCUSATE SODIUM 2 TABLET: 50; 8.6 TABLET ORAL at 13:20

## 2024-05-20 RX ADMIN — TECHNETIUM TC 99M SESTAMIBI 1 DOSE: 1 INJECTION INTRAVENOUS at 06:24

## 2024-05-20 RX ADMIN — LIDOCAINE 1 PATCH: 4 PATCH TOPICAL at 13:22

## 2024-05-20 RX ADMIN — ALLOPURINOL 100 MG: 100 TABLET ORAL at 13:20

## 2024-05-20 RX ADMIN — POTASSIUM CHLORIDE 40 MEQ: 750 TABLET, EXTENDED RELEASE ORAL at 13:20

## 2024-05-20 RX ADMIN — DULOXETINE HYDROCHLORIDE 60 MG: 60 CAPSULE, DELAYED RELEASE ORAL at 13:21

## 2024-05-20 RX ADMIN — LOSARTAN POTASSIUM 12.5 MG: 25 TABLET, FILM COATED ORAL at 13:21

## 2024-05-20 RX ADMIN — LEVOTHYROXINE SODIUM 50 MCG: 50 TABLET ORAL at 13:21

## 2024-05-20 RX ADMIN — IOPAMIDOL 95 ML: 755 INJECTION, SOLUTION INTRAVENOUS at 12:14

## 2024-05-20 NOTE — PROGRESS NOTES
NEUROSURGERY         MRI thoracic and lumbar spine pending. Neurosurgery will see after imaging is complete.

## 2024-05-20 NOTE — PROGRESS NOTES
Nutrition Services    Patient Name:  Alba Correa  YOB: 1933  MRN: [id number]  Admit Date:  5/18/2024    Assessment Date:  05/20/24    Summary: MST    Pt is a 89 y.o. female admitted for compression fracture of T8 vertebra after a fall. History of atrial fibrillation, HTN, chronic diastolic heart failure, peripheral neuropathy, stage III renal failure, hyperparathyroidism, hypercalcemia, dyslipidemia, previous stroke, DVTs, anemia.    Pt is currently NPO for stress test and echo. Pt is on bedrest until TLSO back brace delivered. Attempted to visit bedside, pt CHADWICK for testing. Intake at meals prior to NPO, 50-75%. Consider adding oral nutrition supplement once diet advances. Wt hx reviewed; 8.8% wt loss noted x 1 year. Recommend standing scale wt once cleared from bedrest.     Plan/Recommend:  ADAT per MD  Addition of Boost Glucose Control daily once diet advanced  Standing scale wt once cleared from bedrest  Will continue to monitor intake, labs, wt    RD to follow.    CLINICAL NUTRITION ASSESSMENT      Reason for Assessment MST score 2+     Diagnosis/Problem   Hypercalcemia - resolved  T8 compression fracture   Acute on chronic dizziness   History of A-fib    Medical/Surgical History Past Medical History:   Diagnosis Date    Acquired hypothyroidism 05/03/2022    Acute diastolic CHF (congestive heart failure) 03/20/2022    Chronic kidney disease     Closed fracture of left distal humerus 03/07/2023    Closed head injury 12/07/2023    CVA (cerebral vascular accident)     Degeneration of intervertebral disc     Depression     DVT (deep venous thrombosis)     Facial laceration 03/07/2023    First degree AV block 05/26/2021    Gout     H/O complete eye exam 09/2016    Hyperlipidemia     IBS (irritable bowel syndrome)     Laceration of right forearm 03/07/2023    Nonrheumatic mitral valve regurgitation     OAB (overactive bladder)     Osteopenia     Osteoporosis     PAF (paroxysmal atrial fibrillation)  "11/08/2019    Peripheral neuropathy     Rheumatoid arthritis     Sinus bradycardia 05/26/2021    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 Colby    COLONOSCOPY      declines    ELBOW OPEN REDUCTION INTERNAL FIXATION Left 3/11/2023    Procedure: ELBOW OPEN REDUCTION INTERNAL FIXATION;  Surgeon: Ramón Encinas II, MD;  Location: [facility] OR;  Service: Orthopedics;  Laterality: Left;    HERNIA REPAIR  1965    HYSTERECTOMY      still has ovaries    MAMMO BILATERAL  11/16/2016    pt declines    PAP SMEAR  11/16/2016    declines        Anthropometrics        Current Height  Current Weight  BMI kg/m2 Height: 152.4 cm (60\")  Weight: 47.6 kg (105 lb) (05/18/24 1446)  Body mass index is 20.51 kg/m².   Adjusted BMI (if applicable)    BMI Category Normal/Healthy (18.4 - 24.9)   Ideal Body Weight (IBW) 100 lb   Usual Body Weight (UBW) 110-120 lb   Weight Trend Loss, Amount/Timeframe: 8.8% x 1 year   Weight History Wt Readings from Last 30 Encounters:   05/18/24 1446 47.6 kg (105 lb)   04/24/24 1259 49.4 kg (109 lb)   03/19/24 1440 48.5 kg (107 lb)   03/14/24 1253 49 kg (108 lb)   03/08/24 1758 46.7 kg (103 lb)   03/08/24 1353 49.9 kg (110 lb)   03/08/24 1236 49.9 kg (110 lb)   12/18/23 1419 49.9 kg (110 lb)   12/15/23 1432 49.9 kg (110 lb)   12/07/23 0235 50.8 kg (112 lb)   08/29/23 1443 50.8 kg (112 lb)   06/08/23 0814 52.2 kg (115 lb)   03/14/23 0600 60.6 kg (133 lb 9.6 oz)   03/13/23 0600 58.9 kg (129 lb 13.6 oz)   03/12/23 0605 60.6 kg (133 lb 9.6 oz)   03/11/23 0500 55 kg (121 lb 4.1 oz)   03/10/23 0600 55.2 kg (121 lb 11.1 oz)   03/09/23 0517 55.2 kg (121 lb 11.1 oz) "   03/08/23 0600 53.2 kg (117 lb 4.6 oz)   03/08/23 0304 53.2 kg (117 lb 4.6 oz)   03/08/23 0108 50.8 kg (111 lb 15.9 oz)   03/07/23 2024 52.2 kg (115 lb)   01/17/23 1325 54.4 kg (120 lb)   01/10/23 1401 54.4 kg (120 lb)   07/11/22 1305 53.1 kg (117 lb)   06/28/22 1349 52.6 kg (116 lb)   05/18/22 1346 51.7 kg (114 lb)   05/05/22 1036 51.7 kg (114 lb)   04/29/22 1715 52.2 kg (115 lb)   04/29/22 1344 52.2 kg (115 lb)   04/21/22 1031 54 kg (119 lb)   04/19/22 1537 53.1 kg (117 lb 1.6 oz)   03/27/22 0514 51.9 kg (114 lb 6.4 oz)   03/26/22 0315 53.3 kg (117 lb 8 oz)   03/25/22 2100 53.8 kg (118 lb 9.7 oz)   03/25/22 1710 52.2 kg (115 lb)   03/17/22 1303 53.1 kg (117 lb)   12/28/21 1443 53.5 kg (118 lb)   10/25/21 1621 49.4 kg (109 lb)   10/11/21 1517 49.9 kg (110 lb)   10/11/21 0503 50.3 kg (110 lb 14.3 oz)   10/10/21 1553 52.2 kg (115 lb)   09/22/21 0923 54.4 kg (120 lb)   08/19/21 1605 54.4 kg (120 lb)   05/26/21 1309 53.6 kg (118 lb 3.2 oz)   03/08/21 1328 53.1 kg (117 lb)      --  Labs       Pertinent Labs    Results from last 7 days   Lab Units 05/20/24  0628 05/19/24  0508 05/18/24  1809 05/18/24  1130   SODIUM mmol/L 137 141 142 142   POTASSIUM mmol/L 3.1* 4.1 4.3 4.5   CHLORIDE mmol/L 102 107 106 103   CO2 mmol/L 23.9 24.0 24.0 27.0   BUN mg/dL 15 19 25* 33*   CREATININE mg/dL 0.58 0.81 0.78 1.01*   CALCIUM mg/dL 9.3 8.9 9.5 9.8*   BILIRUBIN mg/dL  --   --   --  0.4   ALK PHOS U/L  --   --   --  100   ALT (SGPT) U/L  --   --   --  15   AST (SGOT) U/L  --   --   --  24   GLUCOSE mg/dL 128* 97 100* 105*     Results from last 7 days   Lab Units 05/19/24  0508 05/18/24  1130   MAGNESIUM mg/dL  --  2.1   HEMOGLOBIN g/dL 11.7* 13.7   HEMATOCRIT % 35.3 41.0   WBC 10*3/mm3 5.73 6.37   TRIGLYCERIDES mg/dL 123  --    ALBUMIN g/dL  --  4.4     Results from last 7 days   Lab Units 05/19/24  0508 05/18/24  1130   PLATELETS 10*3/mm3 233 258     COVID19   Date Value Ref Range Status   04/29/2022 Not Detected Not Detected - Ref.  Range Final     Lab Results   Component Value Date    HGBA1C 5.20 04/03/2019          Medications           Scheduled Medications allopurinol, 100 mg, Oral, Daily  [Held by provider] apixaban, 2.5 mg, Oral, BID  DULoxetine, 60 mg, Oral, Daily  famotidine, 20 mg, Oral, Daily  levothyroxine, 50 mcg, Oral, QAM  Lidocaine, 1 patch, Transdermal, Q24H  losartan, 12.5 mg, Oral, Daily  potassium chloride ER, 40 mEq, Oral, Q4H  senna-docusate sodium, 2 tablet, Oral, BID  sodium chloride, 10 mL, Intravenous, Q12H       Infusions     PRN Medications   acetaminophen **OR** acetaminophen **OR** acetaminophen    senna-docusate sodium **AND** polyethylene glycol **AND** bisacodyl **AND** bisacodyl    Calcium Replacement - Follow Nurse / BPA Driven Protocol    HYDROcodone-acetaminophen    LORazepam    Magnesium Standard Dose Replacement - Follow Nurse / BPA Driven Protocol    melatonin    Morphine    nitroglycerin    ondansetron ODT **OR** ondansetron    Phosphorus Replacement - Follow Nurse / BPA Driven Protocol    Potassium Replacement - Follow Nurse / BPA Driven Protocol    sodium chloride    sodium chloride     Physical Findings          General Findings alert, hard of hearing, room air   Oral/Mouth Cavity tooth or teeth missing   Edema  no edema   Gastrointestinal non-distended    Skin  skin tear   Tubes/Drains/Lines other: External Urinary Catheter   NFPE No clinical signs of muscle wasting or fat loss   --  Current Nutrition Orders & Evaluation of Intake       Oral Nutrition     Food Allergies NKFA   Current PO Diet NPO Diet NPO Type: Sips with Meds   Supplement n/a   PO Evaluation     % PO Intake 50-75%    Factors Affecting Intake: decreased appetite     Estimated Requirements         Weight used  47.6 kg    Calories  1666 - 1904 kcals (35-40 kcal/kg)    Protein  57 - 71 g (1.2 - 1.5 gm/kg)    Fluid   (Defer to physician)     PES STATEMENT / NUTRITION DIAGNOSIS      Nutrition Dx Problem  Problem: Inadequate Oral  Intake  Etiology: Factors Affecting Nutrition - decreased appetite    Signs/Symptoms: Report of Minimal PO Intake and Report/Observation     NUTRITION INTERVENTION / PLAN OF CARE      Intervention Goal(s) Establish goals of care, Reduce/improve symptoms, Meet estimated needs, Advance diet, and No significant weight loss         RD Intervention/Action Await initiation/advancement of PO diet and Continue to monitor   --      Prescription/Orders:       PO Diet       Supplements Will add once diet adv.      Enteral Nutrition       Parenteral Nutrition    New Prescription Ordered? No changes at this time   --      Monitor/Evaluation Per protocol   Discharge Plan/Needs Pending clinical course   --    RD to follow per protocol.      Electronically signed by:  Aisha Richards  05/20/24 08:41 EDT

## 2024-05-20 NOTE — PLAN OF CARE
Goal Outcome Evaluation:  Plan of Care Reviewed With: patient        Progress: no change  Outcome Evaluation: A&O x4, VSS, on bedrest until TLSO back brace deleivered, PRN IV pain meds given, patient does not tolerate PO pain pills well, no nasuea, q2 turns when tolerated, tolerating reg/thin diet, NPO at MD for stress test and echo, plan of care ongoing, no further requests at this time, call light within reach

## 2024-05-20 NOTE — PROGRESS NOTES
Discharge Planning Assessment  [facility]     Patient Name: Alba Correa  MRN: [id number]  Today's Date: 5/20/2024    Admit Date: 5/18/2024    Plan: SNF vs[place] [facility]   Discharge Needs Assessment       Row Name 05/20/24 1344       Living Environment    People in Home alone    Current Living Arrangements independent living facility    Potentially Unsafe Housing Conditions none    Primary Care Provided by self    Provides Primary Care For no one    Family Caregiver if Needed child(ja), adult    Family Caregiver Names Son Ankit Doty [phone]    Quality of Family Relationships helpful    Able to Return to Prior Arrangements other (see comments)  Unsure - waiting PT eval       Resource/Environmental Concerns    Resource/Environmental Concerns none    Transportation Concerns none       Transition Planning    Patient/Family Anticipates Transition to inpatient rehabilitation facility    Patient/Family Anticipated Services at Transition rehabilitation services    Transportation Anticipated family or friend will provide       Discharge Needs Assessment    Readmission Within the Last 30 Days no previous admission in last 30 days    Equipment Currently Used at Home grab bar;bath bench;walker, rolling    Concerns to be Addressed discharge planning    Anticipated Changes Related to Illness inability to care for self    Equipment Needed After Discharge none                   Discharge Plan       Row Name 05/20/24 5920       Plan    Plan SNF vs. return [place] [facility]    Patient/Family in Agreement with Plan yes    Provided Post Acute Provider List? Yes    Post Acute Provider List Nursing Home    Delivered To Patient    Method of Delivery In person    Plan Comments Spoke with patient at bedside.  She is from [place] @ [facility] [address]  She uses a walker, has never used HH or been to SNF.  PCP is Dr. Aramis Doty and pharmacy is [facility] on [address] @ [place].  [place]  Ankit [phone] is emergency contact.  TLSO brace was delivered by [facility].  Patient hopes to return to [facility] - call to facility and awaiting return call for criteria to return.  Given [facility] and list of SNF by area.  Waiting PT jaquanal.  CCP will follow.  Meredith OROZCO                  Continued Care and Services - Admitted Since 5/18/2024       Durable Medical Equipment       Service Provider Request Status Selected Services Address Phone Fax Patient Preferred    [facility] Pending - Request Sent N/A [id number] [id number] [phone] --                  Expected Discharge Date and Time       Expected Discharge Date Expected Discharge Time    May 21, 2024            Demographic Summary       Row Name 05/20/24 1343       General Information    Admission Type observation    Arrived From home    Referral Source admission list    Reason for Consult discharge planning    Preferred Language English                   Functional Status       Row Name 05/20/24 1343       Functional Status    Usual Activity Tolerance moderate    Current Activity Tolerance fair       Functional Status, IADL    Medications independent    Meal Preparation assistive person    Housekeeping assistive person    Laundry assistive person    Shopping assistive person       Mental Status Summary    Recent Changes in Mental Status/Cognitive Functioning no changes                              Becky S. [place], RN

## 2024-05-20 NOTE — SIGNIFICANT NOTE
05/20/24 0946   OTHER   Discipline physical therapist   Rehab Time/Intention   Session Not Performed other (see comments)  (Continue to await TLSO delivery. Patient also going CHADWICK for test. Will f/u in PM if brace delivered.)   Recommendation   PT - Next Appointment 05/21/24

## 2024-05-20 NOTE — PROGRESS NOTES
Name: Alba Correa ADMIT: 2024   : 1933  PCP: Aramis Doty MD    MRN: [id number] LOS: 0 days   AGE/SEX: 89 y.o. female  ROOM: [facility]     Subjective   Subjective   Complains of severe left leg pain.  Having difficulty moving it.  Says it hurts all the way down, no specific joint       Objective   Objective   Vital Signs  Temp:  [98.4 °F (36.9 °C)-99.3 °F (37.4 °C)] 98.6 °F (37 °C)  Heart Rate:  [] 88  Resp:  [16] 16  BP: (104-166)/(55-87) 104/55  SpO2:  [95 %-97 %] 97 %  on   ;   Device (Oxygen Therapy): room air  Body mass index is 20.51 kg/m².  Physical Exam  Vitals reviewed.   Constitutional:       General: She is not in acute distress.     Appearance: She is not ill-appearing.   Cardiovascular:      Rate and Rhythm: Normal rate. Rhythm irregular.   Pulmonary:      Effort: No respiratory distress.      Breath sounds: Normal breath sounds. No wheezing.   Abdominal:      General: There is no distension.      Palpations: Abdomen is soft.      Tenderness: There is no abdominal tenderness.   Musculoskeletal:      Right lower leg: No edema.      Left lower leg: No edema.      Comments: Limited range of motion of the left leg due to pain.  Limited exam as patient was on stretcher on her way to stress test when I saw her   Skin:     General: Skin is warm and dry.      Findings: Bruising present.   Neurological:      Mental Status: She is alert and oriented to person, place, and time.   Psychiatric:         Mood and Affect: Mood normal.       Results Review     I reviewed the patient's new clinical results.  Results from last 7 days   Lab Units 24  0508 24  1130   WBC 10*3/mm3 5.73 6.37   HEMOGLOBIN g/dL 11.7* 13.7   PLATELETS 10*3/mm3 233 258     Results from last 7 days   Lab Units 24  0628 24  0508 24  1809 24  1130   SODIUM mmol/L 137 141 142 142   POTASSIUM mmol/L 3.1* 4.1 4.3 4.5   CHLORIDE mmol/L 102 107 106 103   CO2 mmol/L 23.9 24.0 24.0 27.0   BUN  "mg/dL 15 19 25* 33*   CREATININE mg/dL 0.58 0.81 0.78 1.01*   GLUCOSE mg/dL 128* 97 100* 105*   EGFR mL/min/1.73 86.1 69.1 72.3 53.0*     Results from last 7 days   Lab Units 05/18/24  1130   ALBUMIN g/dL 4.4   BILIRUBIN mg/dL 0.4   ALK PHOS U/L 100   AST (SGOT) U/L 24   ALT (SGPT) U/L 15     Results from last 7 days   Lab Units 05/20/24  0628 05/19/24  0508 05/18/24  1809 05/18/24  1130   CALCIUM mg/dL 9.3 8.9 9.5 9.8*   ALBUMIN g/dL  --   --   --  4.4   MAGNESIUM mg/dL  --   --   --  2.1       No results found for: \"HGBA1C\", \"POCGLU\"    No radiology results for the last day    I have personally reviewed all medications:  Scheduled Medications  allopurinol, 100 mg, Oral, Daily  [Held by provider] apixaban, 2.5 mg, Oral, BID  DULoxetine, 60 mg, Oral, Daily  famotidine, 20 mg, Oral, Daily  levothyroxine, 50 mcg, Oral, QAM  Lidocaine, 1 patch, Transdermal, Q24H  losartan, 12.5 mg, Oral, Daily  potassium chloride ER, 40 mEq, Oral, Q4H  senna-docusate sodium, 2 tablet, Oral, BID  sodium chloride, 10 mL, Intravenous, Q12H    Infusions     Diet  Diet: Cardiac; Healthy Heart (2-3 Na+); Fluid Consistency: Thin (IDDSI 0)    I have personally reviewed:  [x]  Laboratory   []  Microbiology   [x]  Radiology   []  EKG/Telemetry  []  Cardiology/Vascular   []  Pathology    []  Records       Assessment/Plan     Active Hospital Problems    Diagnosis  POA    **Compression fracture of T8 vertebra, initial encounter [S22.060A]  Yes    Abdominal pain [R10.9]  Yes    Macrocytosis [D75.89]  Yes    Hyperlipidemia [E78.5]  Yes    Thyroid nodule [E04.1]  Yes    Essential hypertension [I10]  Yes    Chronic diastolic CHF (congestive heart failure) [I50.32]  Yes    Chronic kidney failure, stage 3 (moderate) [N18.30]  Yes    Fall [W19.XXXA]  Yes    Hypothyroidism (acquired) [E03.9]  Yes    Dizzy [R42]  Yes    Atrial fibrillation [I48.91]  Yes    History of CVA (cerebrovascular accident) [Z86.73]  Not Applicable      Resolved Hospital Problems    " Diagnosis Date Resolved POA    Hypercalcemia [E83.52] 05/19/2024 Yes       89 y.o. female admitted with Compression fracture of T8 vertebra, initial encounter.    T8 compression fracture: Pain fairly controlled  - Neurosurgery consult noted.  TLSO brace ordered with MRI still pending.    - Should be able to work with PT and get out of bed once brace delivered    Circumstances of her fall sound somewhat mechanical though she did describe some vague dizziness.  - Stress test negative per cardiology note.  Echo pending  - Linq device already in place  - Orthostatics will be difficult to obtain given leg pain  - CTA pending    History of A-fib which is chronic.  Currently well-controlled on no rate control meds as there is some documentation previously of potential SSS.  - Holding Eliquis until certain there are no surgical procedures planned.      Thyroid nodule with negative ultrasound.  No further workup needed    Mild hypercalcemia on admission already resolved with some gentle hydration.  PTH appropriate, no need for additional workup    Left leg pain, uncertain etiology.  CT pelvis did not mention any bony abnormalities in report.  Will check hip x-ray for completeness.      SCDs while Eliquis is on hold  Dispo: TBD pending MRI/above workup and ability to ambulate.      Epifanio Ponce MD  [facility]  05/20/24  14:31 EDT

## 2024-05-20 NOTE — SIGNIFICANT NOTE
05/20/24 1526   OTHER   Discipline physical therapist   Rehab Time/Intention   Session Not Performed other (see comments)  (Patient CHADWICK all AM and awaiting brace. When followed up in PM patient CHADWICK for L hip x-ray. Patient returned to room but feels her hip is broken. Will await radiologist report and ortho recs for WB status.)   Recommendation   PT - Next Appointment 05/21/24

## 2024-05-20 NOTE — PROGRESS NOTES
"DOS: 2024  NAME: Alba Correa   : 1933  PCP: Aramis Doty MD  Chief Complaint   Patient presents with    Fall    Back Pain    Abdominal Pain     Upper abd pain, generalize back pain      Stroke    Subjective: She was seen in Nuclear Medicine department. States she doesn't feel well, reports development of headache after receiving medicine for stress test.  She also reports left lower extremity pain and difficulty moving her left lower extremity since her fall. Pt seen in follow up today, however the problem is new to the examiner.      Objective:  Vital signs: /55 (BP Location: Left arm, Patient Position: Lying)   Pulse 88   Temp 98.6 °F (37 °C) (Oral)   Resp 16   Ht 152.4 cm (60\")   Wt 47.6 kg (105 lb)   LMP  (LMP Unknown)   SpO2 97%   BMI 20.51 kg/m²       General appearance: Well developed, well nourished, alert and cooperative.   HEENT: Normocephalic.   Cardiac: Regular rate and rhythm.   Chest Exam: Clear to auscultation bilaterally, no wheezes, no rhonchi.  Extremities: Normal, no edema.   Skin: No rashes or birthmarks.     Higher integrative function: Oriented to time, place, person, intact recent and remote memory, attention span, concentration and language. Spontaneous speech, fund of vocabulary are normal.   CN II: Normal  visual fields.   CN III IV VI: Extraocular movements are full without nystagmus. Pupils are equal, round, and reactive to light.   CN V: Normal facial sensation.  CN VII: Facial movements are symmetric, no weakness.   CN VIII: Auditory acuity is normal.   CN IX & X: Symmetric palatal movement.   CN XI: Sternocleidomastoid and trapezius are normal. No weakness.   CN XII: The tongue is midline.   Motor: Bilateral upper extremities 5 out of 5, right lower extremity 4-5, left lower extremity briefly 3/5 (antalgic).  Normal tone.  Sensation: Intact/symmetric to LT x4.  Station and gait: Deferred.  Coordination: Finger to nose test showed no dysmetria. "     Scheduled Meds:allopurinol, 100 mg, Oral, Daily  [Held by provider] apixaban, 2.5 mg, Oral, BID  DULoxetine, 60 mg, Oral, Daily  famotidine, 20 mg, Oral, Daily  levothyroxine, 50 mcg, Oral, QAM  Lidocaine, 1 patch, Transdermal, Q24H  losartan, 12.5 mg, Oral, Daily  potassium chloride ER, 40 mEq, Oral, Q4H  senna-docusate sodium, 2 tablet, Oral, BID  sodium chloride, 10 mL, Intravenous, Q12H      Continuous Infusions:   PRN Meds:.  acetaminophen **OR** acetaminophen **OR** acetaminophen    senna-docusate sodium **AND** polyethylene glycol **AND** bisacodyl **AND** bisacodyl    Calcium Replacement - Follow Nurse / BPA Driven Protocol    HYDROcodone-acetaminophen    LORazepam    Magnesium Standard Dose Replacement - Follow Nurse / BPA Driven Protocol    melatonin    Morphine    nitroglycerin    ondansetron ODT **OR** ondansetron    Phosphorus Replacement - Follow Nurse / BPA Driven Protocol    Potassium Replacement - Follow Nurse / BPA Driven Protocol    sodium chloride    sodium chloride    Laboratory results:  Lab Results   Component Value Date    GLUCOSE 128 (H) 05/20/2024    CALCIUM 9.3 05/20/2024     05/20/2024    K 3.1 (L) 05/20/2024    CO2 23.9 05/20/2024     05/20/2024    BUN 15 05/20/2024    CREATININE 0.58 05/20/2024    EGFRIFAFRI 35 (L) 09/01/2021    EGFRIFNONA 40 (L) 10/11/2021    BCR 25.9 (H) 05/20/2024    ANIONGAP 11.1 05/20/2024     Lab Results   Component Value Date    WBC 5.73 05/19/2024    HGB 11.7 (L) 05/19/2024    HCT 35.3 05/19/2024    .4 (H) 05/19/2024     05/19/2024     Lab Results   Component Value Date    CHOL 170 05/19/2024    CHOL 216 (H) 03/08/2024    CHOL 241 (H) 04/29/2022     Lab Results   Component Value Date    HDL 35 (L) 05/19/2024    HDL 43 03/08/2024    HDL 45 01/20/2023     Lab Results   Component Value Date     (H) 05/19/2024     (H) 03/08/2024     (H) 01/20/2023     Lab Results   Component Value Date    TRIG 123 05/19/2024    TRIG  142 03/08/2024    TRIG 158 (H) 01/20/2023          Review and interpretation of imaging:  Stress Test With Myocardial Perfusion One Day    Result Date: 5/20/2024    Myocardial perfusion imaging indicates a normal myocardial perfusion study with no evidence of ischemia. Impressions are consistent with a low risk study.   Left ventricular ejection fraction is hyperdynamic (Calculated EF > 70%).     US Thyroid    Result Date: 5/18/2024  US THYROID-  Clinical: Thyroid nodule  COMPARISON: None  FINDINGS: The right lobe measures 4.5 x 1.6 x 1.5 cm. The left lobe measures 3.8 x 1.2 x 1.3 cm and the isthmus is 1 mm.  There is a colloid cyst within the right thyroid which measures 9 x 6 x 7 mm. TI-RADS category 1, benign. No nodule within the isthmus or left gland.  CONCLUSION: Colloid cyst within the right thyroid, TI-RADS category 1, benign. No additional follow-up is indicated.  This report was finalized on 5/18/2024 8:42 PM by Dr. Ivan Dean M.D on Workstation: [id number]      CT Abdomen Pelvis With Contrast    Result Date: 5/18/2024  CT ABDOMEN AND PELVIS WITH IV CONTRAST  HISTORY: 89-year-old female fell forward trying to  a tissue and began having abdominal pain and back pain following a fall. No physical bruising. Hysterectomy and appendectomy in the past.  TECHNIQUE: Radiation dose reduction techniques were utilized, including automated exposure control and exposure modulation based on body size. 3 mm images were obtained through the abdomen and pelvis after the administration of IV contrast. Compared with CTA abdomen/pelvis 03/08/2024.  FINDINGS: There is some streak artifact from the patient's arms.  1. The liver appears unremarkable other than a faintly seen hyperenhancing lesion at the posterior hepatic segment which was significantly more evident on the previous CTA. Gallbladder appears unremarkable and there is no biliary dilatation. The spleen, pancreas, adrenals, and kidneys appear stable. Small  left renal cyst is noted. Urinary bladder is significantly distended. Please correlate clinically for urinary retention.  2. There is no acute bowel abnormality. There is no bowel ileus or obstruction. There is an average volume of formed stool within the colon. Stomach and duodenum are nearly collapsed. No free fluid or fluid collections.  3. There are extensive abdominal aortic atherosclerotic changes without aneurysmal dilatation. No acute fractures are seen. There are dependent atelectatic changes at the lung bases. There are no pleural or pericardial effusions at the visualized chest.  This report was finalized on 5/18/2024 7:14 PM by Dr. Aydee Walker M.D on Workstation: [id number]      CT Head Without Contrast    Result Date: 5/18/2024  CT HEAD WO CONTRAST-, CT CERVICAL SPINE WO CONTRAST-  INDICATIONS: Dizziness  TECHNIQUE: Radiation dose reduction techniques were utilized, including automated exposure control and exposure modulation based on body size. Noncontrast head CT, cervical spine CT  COMPARISON: 12/7/2023  FINDINGS:  Head CT:  No acute intracranial hemorrhage, midline shift or mass effect. No acute territorial infarct is identified.  Mild to moderate periventricular hypodensities suggest chronic small vessel ischemic change in a patient this age.  Arterial calcifications are seen at the base of the brain.  Ventricles, cisterns, cerebral sulci are unremarkable for patient age.  The visualized paranasal sinuses, orbits, mastoid air cells are unremarkable.   Cervical spine CT:  No acute fracture is identified.  Mild anterolisthesis of C3 on C4, C4 on C5.  No prominent osseous narrowing of the neural foramina. No high-grade central stenosis is identified without the benefit of intrathecal contrast material.  Degenerative changes are seen at the temporomandibular joints.  Right thyroid nodule is noted, suboptimally evaluated with this technique, thyroid ultrasound correlation advised as indicated.  Carotid arterial calcification is present.  Fibronodular changes at the lung apices appears similar from 3/8/2024 chest CT (other small nodules noted on the prior CT exam are not included on this exam, stability cannot be characterized).        No acute intracranial hemorrhage or hydrocephalus. If there is further clinical concern, MRI could be considered for further evaluation.  Thyroid nodularity, as described.  This report was finalized on 5/18/2024 1:41 PM by Dr. Levy Mock M.D on Workstation: [id number]      CT Cervical Spine Without Contrast    Result Date: 5/18/2024  CT HEAD WO CONTRAST-, CT CERVICAL SPINE WO CONTRAST-  INDICATIONS: Dizziness  TECHNIQUE: Radiation dose reduction techniques were utilized, including automated exposure control and exposure modulation based on body size. Noncontrast head CT, cervical spine CT  COMPARISON: 12/7/2023  FINDINGS:  Head CT:  No acute intracranial hemorrhage, midline shift or mass effect. No acute territorial infarct is identified.  Mild to moderate periventricular hypodensities suggest chronic small vessel ischemic change in a patient this age.  Arterial calcifications are seen at the base of the brain.  Ventricles, cisterns, cerebral sulci are unremarkable for patient age.  The visualized paranasal sinuses, orbits, mastoid air cells are unremarkable.   Cervical spine CT:  No acute fracture is identified.  Mild anterolisthesis of C3 on C4, C4 on C5.  No prominent osseous narrowing of the neural foramina. No high-grade central stenosis is identified without the benefit of intrathecal contrast material.  Degenerative changes are seen at the temporomandibular joints.  Right thyroid nodule is noted, suboptimally evaluated with this technique, thyroid ultrasound correlation advised as indicated. Carotid arterial calcification is present.  Fibronodular changes at the lung apices appears similar from 3/8/2024 chest CT (other small nodules noted on the prior CT exam  are not included on this exam, stability cannot be characterized).        No acute intracranial hemorrhage or hydrocephalus. If there is further clinical concern, MRI could be considered for further evaluation.  Thyroid nodularity, as described.  This report was finalized on 5/18/2024 1:41 PM by Dr. Levy Mock M.D on Workstation: [email or web address]      XR Chest 1 View    Result Date: 5/18/2024  XR CHEST 1 VW-, XR SPINE LUMBAR COMPLETE 4+VW-, XR SPINE THORACIC 3 VW-  HISTORY: Female who is 89 years-old, dizziness, fall injury  TECHNIQUE: Frontal views of the chest, 3 views of the thoracic spine, 5 views of the lumbar spine  COMPARISON: 3/8/2024  FINDINGS: Heart size is borderline. Pulmonary vasculature is unremarkable. Aorta is calcified. Cardiac loop recorder is apparent. No focal pulmonary consolidation, pleural effusion, or pneumothorax. Mild biapical fibronodular changes are seen. No acute osseous process.  Thoracic and lumbar spine: Mild S-shaped thoracolumbar scoliosis is apparent. A T8 compression fracture is age-indeterminate, but new from the prior exam, with about 40% loss of height anteriorly. No other fractures are identified. Mild anterior listhesis of L4 on L5 is noted. Multilevel endplate spurring and lower lumbar facet arthropathy are present. Disc space narrowing is apparent at L2/3. If further imaging evaluation of the spine is indicated, MRI could be considered.      As described.  This report was finalized on 5/18/2024 12:16 PM by Dr. Levy Mock M.D on Workstation: [email or web address]      XR Spine Thoracic 3 View    Result Date: 5/18/2024  XR CHEST 1 VW-, XR SPINE LUMBAR COMPLETE 4+VW-, XR SPINE THORACIC 3 VW-  HISTORY: Female who is 89 years-old, dizziness, fall injury  TECHNIQUE: Frontal views of the chest, 3 views of the thoracic spine, 5 views of the lumbar spine  COMPARISON: 3/8/2024  FINDINGS: Heart size is borderline. Pulmonary vasculature is unremarkable. Aorta is calcified. Cardiac loop  recorder is apparent. No focal pulmonary consolidation, pleural effusion, or pneumothorax. Mild biapical fibronodular changes are seen. No acute osseous process.  Thoracic and lumbar spine: Mild S-shaped thoracolumbar scoliosis is apparent. A T8 compression fracture is age-indeterminate, but new from the prior exam, with about 40% loss of height anteriorly. No other fractures are identified. Mild anterior listhesis of L4 on L5 is noted. Multilevel endplate spurring and lower lumbar facet arthropathy are present. Disc space narrowing is apparent at L2/3. If further imaging evaluation of the spine is indicated, MRI could be considered.      As described.  This report was finalized on 5/18/2024 12:16 PM by Dr. Levy Mock M.D on Workstation: [id number]      XR Spine Lumbar Complete 4+VW    Result Date: 5/18/2024  XR CHEST 1 VW-, XR SPINE LUMBAR COMPLETE 4+VW-, XR SPINE THORACIC 3 VW-  HISTORY: Female who is 89 years-old, dizziness, fall injury  TECHNIQUE: Frontal views of the chest, 3 views of the thoracic spine, 5 views of the lumbar spine  COMPARISON: 3/8/2024  FINDINGS: Heart size is borderline. Pulmonary vasculature is unremarkable. Aorta is calcified. Cardiac loop recorder is apparent. No focal pulmonary consolidation, pleural effusion, or pneumothorax. Mild biapical fibronodular changes are seen. No acute osseous process.  Thoracic and lumbar spine: Mild S-shaped thoracolumbar scoliosis is apparent. A T8 compression fracture is age-indeterminate, but new from the prior exam, with about 40% loss of height anteriorly. No other fractures are identified. Mild anterior listhesis of L4 on L5 is noted. Multilevel endplate spurring and lower lumbar facet arthropathy are present. Disc space narrowing is apparent at L2/3. If further imaging evaluation of the spine is indicated, MRI could be considered.      As described.  This report was finalized on 5/18/2024 12:16 PM by Dr. 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  Following that she developed back pain and upper abdominal/chest pain.  She describes dizziness which is chronic but no loss of consciousness or seizure.  /100 on arrival but has been as low as 95/84.  Initial thoracic x-ray showed T8 compression fracture which is age-indeterminate but new from prior exam.    Workup:  CT head negative for acute findings  No acute findings on CT abdomen and pelvis  TSH 1.17, free T41.41, UA unremarkable,B12 1094, folate greater than 20, vitamin D 53, , calcium 9.8 with normal intact PTH and ionized calcium    Diagnosis:  Acute on chronic dizziness with associated fall, suspect most likely from autonomic dysfunction  Thoracic vertebral compression fracture  A-fib on Eliquis prior to arrival  Idiopathic peripheral neuropathy  History of right MCA stroke  LLE weakness/pain s/p fall    Plan:  Check orthostatic vital signs  Follow-up CTA head/neck  Follow-up stress test  MRI thoracic and lumbar spine, left hip x-ray ordered by primary team  Neurosurgery following, recommended TLSO brace  D/W Dr Monk today. Will follow up CTA h/n, if unremarkable no further recommendations from neurology standpoint will sign it.  If orthostatics positive recommend blood pressure management, placement of compression hose, etc.

## 2024-05-20 NOTE — PROGRESS NOTES
"[facility]    Patient Name: Alba Correa  :1933  89 y.o.  LOS: 0  Encounter Provider: GROVER Fay      Patient Care Team:  Aramis Doty MD as PCP - General (Family Medicine)  Juan Jose Muñiz MD as Consulting Physician (Neurology)  Roshan Martines III, MD as Cardiologist (Cardiology)  Nehal Murray MD as Consulting Physician (Nephrology)    Chief Complaint:  Follow up dizziness    Interval History: Feeling better. Just returned to floor from echo, stress test and MRI. Results pending.       Objective   Vital Signs  Temp:  [98.2 °F (36.8 °C)-99.3 °F (37.4 °C)] 98.6 °F (37 °C)  Heart Rate:  [] 85  Resp:  [16] 16  BP: ()/(75-87) 164/87    Intake/Output Summary (Last 24 hours) at 2024 0852  Last data filed at 2024 0500  Gross per 24 hour   Intake 564 ml   Output 2500 ml   Net -1936 ml     Flowsheet Rows      Flowsheet Row First Filed Value   Admission Height 152.4 cm (60\") Documented at 2024 1446   Admission Weight 47.6 kg (105 lb) Documented at 2024 1446              Vitals reviewed.   Constitutional:       General: Not in acute distress.     Appearance: Well-developed and not in distress. Frail. Not diaphoretic.   HENT:      Head: Normocephalic.   Pulmonary:      Effort: Pulmonary effort is normal. No respiratory distress.      Breath sounds: Normal breath sounds. No wheezing. No rhonchi. No rales.   Cardiovascular:      Normal rate. Regular rhythm.      Murmurs: There is no murmur.   Pulses:     Radial: 2+ bilaterally.  Edema:     Peripheral edema absent.   Skin:     General: Skin is warm and dry. There is no cyanosis.      Findings: No rash.   Neurological:      Mental Status: Alert and oriented to person, place, and time.   Psychiatric:         Behavior: Behavior normal. Behavior is cooperative.         Thought Content: Thought content normal.         Judgment: Judgment normal.           Pertinent Test Results:  Results from " last 7 days   Lab Units 05/20/24  0628 05/19/24  0508 05/18/24  1809 05/18/24  1130   SODIUM mmol/L 137 141 142 142   POTASSIUM mmol/L 3.1* 4.1 4.3 4.5   CHLORIDE mmol/L 102 107 106 103   CO2 mmol/L 23.9 24.0 24.0 27.0   BUN mg/dL 15 19 25* 33*   CREATININE mg/dL 0.58 0.81 0.78 1.01*   GLUCOSE mg/dL 128* 97 100* 105*   CALCIUM mg/dL 9.3 8.9 9.5 9.8*   AST (SGOT) U/L  --   --   --  24   ALT (SGPT) U/L  --   --   --  15     Results from last 7 days   Lab Units 05/18/24  1341 05/18/24  1130   HSTROP T ng/L 14* 15*     Results from last 7 days   Lab Units 05/19/24  0508 05/18/24  1130   WBC 10*3/mm3 5.73 6.37   HEMOGLOBIN g/dL 11.7* 13.7   HEMATOCRIT % 35.3 41.0   PLATELETS 10*3/mm3 233 258         Results from last 7 days   Lab Units 05/18/24  1130   MAGNESIUM mg/dL 2.1     Results from last 7 days   Lab Units 05/19/24  0508   CHOLESTEROL mg/dL 170   TRIGLYCERIDES mg/dL 123   HDL CHOL mg/dL 35*         Results from last 7 days   Lab Units 05/18/24  1130   TSH uIU/mL 1.170           Medication Review:   allopurinol, 100 mg, Oral, Daily  [Held by provider] apixaban, 2.5 mg, Oral, BID  DULoxetine, 60 mg, Oral, Daily  famotidine, 20 mg, Oral, Daily  levothyroxine, 50 mcg, Oral, QAM  Lidocaine, 1 patch, Transdermal, Q24H  losartan, 12.5 mg, Oral, Daily  potassium chloride ER, 40 mEq, Oral, Q4H  senna-docusate sodium, 2 tablet, Oral, BID  sodium chloride, 10 mL, Intravenous, Q12H              Assessment & Plan     Active Hospital Problems    Diagnosis  POA    **Compression fracture of T8 vertebra, initial encounter [S22.060A]  Yes    Abdominal pain [R10.9]  Yes    Macrocytosis [D75.89]  Yes    Hyperlipidemia [E78.5]  Yes    Thyroid nodule [E04.1]  Yes    Essential hypertension [I10]  Yes    Chronic diastolic CHF (congestive heart failure) [I50.32]  Yes    Chronic kidney failure, stage 3 (moderate) [N18.30]  Yes    Fall [W19.XXXA]  Yes    Hypothyroidism (acquired) [E03.9]  Yes    Dizzy [R42]  Yes    Atrial fibrillation  [I48.91]  Yes    History of CVA (cerebrovascular accident) [Z86.73]  Not Applicable      Resolved Hospital Problems    Diagnosis Date Resolved POA    Hypercalcemia [E83.52] 05/19/2024 Yes        Dizziness and fall with history of syncope: Reorder orthostatic BP and HR.  Elevated troponin: Stress test negative for ischemia. Echo pending. Patient has Linq device in place, last transmission 5/14/24.  CKD 3  Atrial fibrillation: On Eliquis at home. Rate controlled and avoiding AV marlene blocker therapy due to early SSS. Eliquis held pending neurosurgery/neurology review in case surgical procedures planned. Would restart as soon as possible due to elevated [id number].  T8 compression fracture: TLSO brace per neurosurgery           GROVER Fay  [facility]   [facility]  [address] [place] [address] [address] [place] [address]  Office: [phone]    05/20/24  08:52 EDT

## 2024-05-20 NOTE — SIGNIFICANT NOTE
05/20/24 8056   OTHER   Discipline occupational therapist   Rehab Time/Intention   Session Not Performed other (see comments)  (Patient CHADWICK all AM and awaiting TLSO brace. When followed up in PM patient CHADWICK for L hip x-ray. Patient returned to room but feels her hip is broken. OT will await radiologist report and ortho recs for WB status.)   Therapy Assessment/Plan (PT)   Criteria for Skilled Interventions Met (PT) yes;skilled treatment is necessary   Recommendation   OT - Next Appointment 05/21/24

## 2024-05-21 ENCOUNTER — APPOINTMENT (OUTPATIENT)
Dept: CARDIOLOGY | Facility: HOSPITAL | Age: 89
DRG: 543 | End: 2024-05-21
Payer: MEDICARE

## 2024-05-21 ENCOUNTER — APPOINTMENT (OUTPATIENT)
Dept: MRI IMAGING | Facility: HOSPITAL | Age: 89
DRG: 543 | End: 2024-05-21
Payer: MEDICARE

## 2024-05-21 LAB
ANION GAP SERPL CALCULATED.3IONS-SCNC: 10.5 MMOL/L (ref 5–15)
AORTIC ARCH: 2.1 CM
AORTIC DIMENSIONLESS INDEX: 0.7 (DI)
ASCENDING AORTA: 3.9 CM
BH CV ECHO MEAS - ACS: 1.81 CM
BH CV ECHO MEAS - AI P1/2T: 487.4 MSEC
BH CV ECHO MEAS - AO MAX PG: 4.4 MMHG
BH CV ECHO MEAS - AO MEAN PG: 2.22 MMHG
BH CV ECHO MEAS - AO ROOT DIAM: 2.7 CM
BH CV ECHO MEAS - AO V2 MAX: 105 CM/SEC
BH CV ECHO MEAS - AO V2 VTI: 20.7 CM
BH CV ECHO MEAS - AVA(I,D): 2.06 CM2
BH CV ECHO MEAS - EDV(CUBED): 55.3 ML
BH CV ECHO MEAS - EDV(MOD-SP2): 57 ML
BH CV ECHO MEAS - EDV(MOD-SP4): 59 ML
BH CV ECHO MEAS - EF(MOD-BP): 64 %
BH CV ECHO MEAS - EF(MOD-SP2): 63.2 %
BH CV ECHO MEAS - EF(MOD-SP4): 64.4 %
BH CV ECHO MEAS - ESV(CUBED): 30.1 ML
BH CV ECHO MEAS - ESV(MOD-SP2): 21 ML
BH CV ECHO MEAS - ESV(MOD-SP4): 21 ML
BH CV ECHO MEAS - FS: 18.3 %
BH CV ECHO MEAS - IVS/LVPW: 1.16 CM
BH CV ECHO MEAS - IVSD: 1.14 CM
BH CV ECHO MEAS - LAT PEAK E' VEL: 12.9 CM/SEC
BH CV ECHO MEAS - LV MASS(C)D: 128.1 GRAMS
BH CV ECHO MEAS - LV MAX PG: 2.03 MMHG
BH CV ECHO MEAS - LV MEAN PG: 1.04 MMHG
BH CV ECHO MEAS - LV V1 MAX: 71.2 CM/SEC
BH CV ECHO MEAS - LV V1 VTI: 14.7 CM
BH CV ECHO MEAS - LVIDD: 3.8 CM
BH CV ECHO MEAS - LVIDS: 3.1 CM
BH CV ECHO MEAS - LVOT AREA: 2.9 CM2
BH CV ECHO MEAS - LVOT DIAM: 1.92 CM
BH CV ECHO MEAS - LVPWD: 0.98 CM
BH CV ECHO MEAS - MED PEAK E' VEL: 9.1 CM/SEC
BH CV ECHO MEAS - MR MAX PG: 155.9 MMHG
BH CV ECHO MEAS - MR MAX VEL: 624.4 CM/SEC
BH CV ECHO MEAS - MV DEC SLOPE: 483.2 CM/SEC2
BH CV ECHO MEAS - MV DEC TIME: 0.19 SEC
BH CV ECHO MEAS - MV E MAX VEL: 74.7 CM/SEC
BH CV ECHO MEAS - MV MEAN PG: 2.26 MMHG
BH CV ECHO MEAS - MV P1/2T: 61.4 MSEC
BH CV ECHO MEAS - MV V2 VTI: 18.3 CM
BH CV ECHO MEAS - MVA(P1/2T): 3.6 CM2
BH CV ECHO MEAS - MVA(VTI): 2.32 CM2
BH CV ECHO MEAS - PA ACC SLOPE: 688.2 CM/SEC2
BH CV ECHO MEAS - PA ACC TIME: 0.08 SEC
BH CV ECHO MEAS - PA V2 MAX: 77.6 CM/SEC
BH CV ECHO MEAS - RAP SYSTOLE: 3 MMHG
BH CV ECHO MEAS - RV MAX PG: 1.14 MMHG
BH CV ECHO MEAS - RV V1 MAX: 53.3 CM/SEC
BH CV ECHO MEAS - RV V1 VTI: 9.7 CM
BH CV ECHO MEAS - RVSP: 29 MMHG
BH CV ECHO MEAS - SUP REN AO DIAM: 1.9 CM
BH CV ECHO MEAS - SV(LVOT): 42.6 ML
BH CV ECHO MEAS - SV(MOD-SP2): 36 ML
BH CV ECHO MEAS - SV(MOD-SP4): 38 ML
BH CV ECHO MEAS - TAPSE (>1.6): 1.3 CM
BH CV ECHO MEAS - TR MAX PG: 26 MMHG
BH CV ECHO MEAS - TR MAX VEL: 255.2 CM/SEC
BH CV ECHO MEASUREMENTS AVERAGE E/E' RATIO: 6.79
BH CV XLRA - RV BASE: 2.9 CM
BH CV XLRA - TDI S': 9.43 CM/SEC
BUN SERPL-MCNC: 19 MG/DL (ref 8–23)
BUN/CREAT SERPL: 22.6 (ref 7–25)
CALCIUM SPEC-SCNC: 9.2 MG/DL (ref 8.2–9.6)
CHLORIDE SERPL-SCNC: 104 MMOL/L (ref 98–107)
CO2 SERPL-SCNC: 21.5 MMOL/L (ref 22–29)
CREAT SERPL-MCNC: 0.84 MG/DL (ref 0.57–1)
EGFRCR SERPLBLD CKD-EPI 2021: 66.1 ML/MIN/1.73
GLUCOSE SERPL-MCNC: 106 MG/DL (ref 65–99)
LEFT ATRIUM VOLUME INDEX: 33.1 ML/M2
POTASSIUM SERPL-SCNC: 4.3 MMOL/L (ref 3.5–5.2)
SODIUM SERPL-SCNC: 136 MMOL/L (ref 136–145)
STJ: 2.7 CM
T3 SERPL-MCNC: 95 NG/DL (ref 71–180)
T3FREE SERPL-MCNC: 2.7 PG/ML (ref 2–4.4)
WHOLE BLOOD HOLD SPECIMEN: NORMAL

## 2024-05-21 PROCEDURE — 99231 SBSQ HOSP IP/OBS SF/LOW 25: CPT | Performed by: NURSE PRACTITIONER

## 2024-05-21 PROCEDURE — 97530 THERAPEUTIC ACTIVITIES: CPT

## 2024-05-21 PROCEDURE — 25510000001 PERFLUTREN (DEFINITY) 8.476 MG IN SODIUM CHLORIDE (PF) 0.9 % 10 ML INJECTION: Performed by: INTERNAL MEDICINE

## 2024-05-21 PROCEDURE — 80048 BASIC METABOLIC PNL TOTAL CA: CPT | Performed by: INTERNAL MEDICINE

## 2024-05-21 PROCEDURE — 93306 TTE W/DOPPLER COMPLETE: CPT | Performed by: INTERNAL MEDICINE

## 2024-05-21 PROCEDURE — 97535 SELF CARE MNGMENT TRAINING: CPT

## 2024-05-21 PROCEDURE — 93306 TTE W/DOPPLER COMPLETE: CPT

## 2024-05-21 PROCEDURE — 99232 SBSQ HOSP IP/OBS MODERATE 35: CPT | Performed by: NURSE PRACTITIONER

## 2024-05-21 PROCEDURE — 97162 PT EVAL MOD COMPLEX 30 MIN: CPT

## 2024-05-21 PROCEDURE — 97166 OT EVAL MOD COMPLEX 45 MIN: CPT

## 2024-05-21 PROCEDURE — 97116 GAIT TRAINING THERAPY: CPT

## 2024-05-21 RX ORDER — LOSARTAN POTASSIUM 25 MG/1
25 TABLET ORAL DAILY
Status: DISCONTINUED | OUTPATIENT
Start: 2024-05-22 | End: 2024-05-24 | Stop reason: HOSPADM

## 2024-05-21 RX ADMIN — Medication 10 ML: at 21:07

## 2024-05-21 RX ADMIN — ALLOPURINOL 100 MG: 100 TABLET ORAL at 11:14

## 2024-05-21 RX ADMIN — LIDOCAINE 1 PATCH: 4 PATCH TOPICAL at 11:14

## 2024-05-21 RX ADMIN — FAMOTIDINE 20 MG: 20 TABLET, FILM COATED ORAL at 11:14

## 2024-05-21 RX ADMIN — APIXABAN 2.5 MG: 2.5 TABLET, FILM COATED ORAL at 21:06

## 2024-05-21 RX ADMIN — DULOXETINE HYDROCHLORIDE 60 MG: 60 CAPSULE, DELAYED RELEASE ORAL at 11:14

## 2024-05-21 RX ADMIN — Medication 10 ML: at 11:15

## 2024-05-21 RX ADMIN — LOSARTAN POTASSIUM 12.5 MG: 25 TABLET, FILM COATED ORAL at 11:14

## 2024-05-21 RX ADMIN — ACETAMINOPHEN 650 MG: 325 TABLET, FILM COATED ORAL at 21:09

## 2024-05-21 RX ADMIN — LEVOTHYROXINE SODIUM 50 MCG: 50 TABLET ORAL at 11:15

## 2024-05-21 RX ADMIN — SENNOSIDES AND DOCUSATE SODIUM 2 TABLET: 50; 8.6 TABLET ORAL at 21:06

## 2024-05-21 RX ADMIN — PERFLUTREN 3 ML: 6.52 INJECTION, SUSPENSION INTRAVENOUS at 09:06

## 2024-05-21 NOTE — PROGRESS NOTES
"[facility]    Patient Name: Alba Correa  :1933  89 y.o.  LOS: 1  Encounter Provider: GROVER Fay      Patient Care Team:  Aramis Doty MD as PCP - General (Family Medicine)  Juan Jose Muñiz MD as Consulting Physician (Neurology)  Roshan Martines III, MD as Cardiologist (Cardiology)  Nehal Murray MD as Consulting Physician (Nephrology)    Chief Complaint:  Dizziness    Interval History: Now with TLSO brace in place, working with PT. No chest pain, palpitations, or dizziness.       Objective   Vital Signs  Temp:  [97.3 °F (36.3 °C)-98.6 °F (37 °C)] 97.3 °F (36.3 °C)  Heart Rate:  [88-99] 93  Resp:  [16] 16  BP: (104-185)/(55-99) 185/99    Intake/Output Summary (Last 24 hours) at 2024 0939  Last data filed at 2024 0917  Gross per 24 hour   Intake 0 ml   Output 800 ml   Net -800 ml     Flowsheet Rows      Flowsheet Row First Filed Value   Admission Height 152.4 cm (60\") Documented at 2024 1446   Admission Weight 47.6 kg (105 lb) Documented at 2024 1446              Vitals reviewed.   Constitutional:       General: Not in acute distress.     Appearance: Well-developed and not in distress. Not diaphoretic.   HENT:      Head: Normocephalic.   Pulmonary:      Effort: Pulmonary effort is normal. No respiratory distress.      Breath sounds: Normal breath sounds. No wheezing. No rhonchi. No rales.   Cardiovascular:      Normal rate. Irregularly irregular rhythm.      Murmurs: There is no murmur.   Pulses:     Radial: 2+ bilaterally.  Edema:     Peripheral edema absent.   Skin:     General: Skin is warm and dry. There is no cyanosis.      Findings: No rash.   Neurological:      Mental Status: Alert and oriented to person, place, and time.   Psychiatric:         Behavior: Behavior normal. Behavior is cooperative.         Thought Content: Thought content normal.         Judgment: Judgment normal.           Pertinent Test Results:  Results from last 7 " days   Lab Units 05/21/24  0443 05/20/24  2120 05/20/24  0628 05/19/24  0508 05/18/24  1809 05/18/24  1130   SODIUM mmol/L 136  --  137 141 142 142   POTASSIUM mmol/L 4.3 3.9 3.1* 4.1 4.3 4.5   CHLORIDE mmol/L 104  --  102 107 106 103   CO2 mmol/L 21.5*  --  23.9 24.0 24.0 27.0   BUN mg/dL 19  --  15 19 25* 33*   CREATININE mg/dL 0.84  --  0.58 0.81 0.78 1.01*   GLUCOSE mg/dL 106*  --  128* 97 100* 105*   CALCIUM mg/dL 9.2  --  9.3 8.9 9.5 9.8*   AST (SGOT) U/L  --   --   --   --   --  24   ALT (SGPT) U/L  --   --   --   --   --  15     Results from last 7 days   Lab Units 05/18/24  1341 05/18/24  1130   HSTROP T ng/L 14* 15*     Results from last 7 days   Lab Units 05/19/24  0508 05/18/24  1130   WBC 10*3/mm3 5.73 6.37   HEMOGLOBIN g/dL 11.7* 13.7   HEMATOCRIT % 35.3 41.0   PLATELETS 10*3/mm3 233 258         Results from last 7 days   Lab Units 05/18/24  1130   MAGNESIUM mg/dL 2.1     Results from last 7 days   Lab Units 05/19/24  0508   CHOLESTEROL mg/dL 170   TRIGLYCERIDES mg/dL 123   HDL CHOL mg/dL 35*         Results from last 7 days   Lab Units 05/18/24  1130   TSH uIU/mL 1.170     Echo 5/21/24    Left ventricular ejection fraction appears to be 61 - 65%.    Left ventricular diastolic function was indeterminate.    Estimated right ventricular systolic pressure from tricuspid regurgitation is normal (<35 mmHg).      Medication Review:   allopurinol, 100 mg, Oral, Daily  [Held by provider] apixaban, 2.5 mg, Oral, BID  DULoxetine, 60 mg, Oral, Daily  famotidine, 20 mg, Oral, Daily  levothyroxine, 50 mcg, Oral, QAM  Lidocaine, 1 patch, Transdermal, Q24H  losartan, 12.5 mg, Oral, Daily  senna-docusate sodium, 2 tablet, Oral, BID  sodium chloride, 10 mL, Intravenous, Q12H              Assessment & Plan     Active Hospital Problems    Diagnosis  POA    **Compression fracture of T8 vertebra, initial encounter [S22.060A]  Yes    Left leg pain [M79.605]  Yes    Macrocytosis [D75.89]  Yes    Hyperlipidemia [E78.5]  Yes     Thyroid nodule [E04.1]  Yes    Essential hypertension [I10]  Yes    Chronic diastolic CHF (congestive heart failure) [I50.32]  Yes    Chronic kidney failure, stage 3 (moderate) [N18.30]  Yes    Fall [W19.XXXA]  Yes    Hypothyroidism (acquired) [E03.9]  Yes    Dizzy [R42]  Yes    Atrial fibrillation [I48.91]  Yes    History of CVA (cerebrovascular accident) [Z86.73]  Not Applicable      Resolved Hospital Problems    Diagnosis Date Resolved POA    Abdominal pain [R10.9] 05/20/2024 Yes    Hypercalcemia [E83.52] 05/19/2024 Yes        Dizziness and fall with history of syncope: Orthostatics difficult to obtain due to leg pain. Neurology recommended compression stockings.  Elevated troponin: Stress test negative for ischemia. Echo with normal LVEF and diastolic function with normal wall motion. Patient has Linq device in place, last transmission 5/14/24 with no new events.  CKD 3  Atrial fibrillation: On Eliquis at home. Rate controlled and avoiding AV marlene blocker therapy due to early SSS. Eliquis restarted today.  T8 compression fracture: TLSO brace per neurosurgery           GROVER Fay  [facility]   [facility]  [address] [place] [address] - [address]  [place] [address]  Office: [phone]    05/21/24  09:39 EDT

## 2024-05-21 NOTE — PLAN OF CARE
Goal Outcome Evaluation:  Plan of Care Reviewed With: patient, son, family        Progress: improving  Outcome Evaluation: Pt is a 89 y/o F who presented to ED with c/o acute back and abdominal pain after a fall. Per chart pt bent down to  a tissue and fell fwd. Imaging (+) T8 compression fx. Per neurosx pt to wear TLSO when OOB. Pt reports she lives in an independent living facility, alone, uses a rollator and is frequently out of her apt w/ friends at the facility playing games. Pt and later family when they arrived were educated on spinal precautions, log roll technique and application of her TLSO. Today, pt performed a R log roll to sitting w/ SBA and increased time, STS to RW w/ CGA and ambulation of 30' with a RW and CGA. Pt required increased time for all mobility/transfers due to generalized weakness and fatigue. Pt denied any pain, dizziness or SOB during the session and had no knee buckling or LOB. Pt presents with the above stated deficits and will benefit from skilled PT services to address these deficits. LESLYE w/ RN following session. Discussed DC options w/ pt and her family who will further discuss and speak w/ CCP. Currently HH vs SNF pending pts family being able to stay and assist during the day. Also discussed use of BSC at night if famliy unable to assist at night.      Anticipated Discharge Disposition (PT): home with home health, home with assist, skilled nursing facility (SNF vs HH pending pts family being able to provide daily assist/supervision.)

## 2024-05-21 NOTE — DISCHARGE PLACEMENT REQUEST
"Kaci Chavez \"SERGIO\" (89 y.o. Female)       Date of Birth   05/22/1933    Social Security Number       Address   [facility] [address] [place] [id number]    Home Phone   [phone]    MRN   [id number]       Congregational   Jew    Marital Status                               Admission Date   5/18/24    Admission Type   Emergency    Admitting Provider   Fe Alaniz MD    Attending Provider   Epifanio Ponce MD    Department, Room/Bed   [address], [address]/1       Discharge Date       Discharge Disposition       Discharge Destination                                 Attending Provider: Epifanio Ponce MD    Allergies: Hydrocodone-acetaminophen, Sulfamethoxazole-trimethoprim, Lisinopril, Levofloxacin    Isolation: None   Infection: None   Code Status: CPR    Ht: 152.4 cm (60\")   Wt: 47.6 kg (105 lb)    Admission Cmt: None   Principal Problem: Compression fracture of T8 vertebra, initial encounter [S22.060A]                   Active Insurance as of 5/18/2024       Primary Coverage       Payor Plan Insurance Group Employer/Plan Group    [facility] MEDICARE REPLACEMENT [facility] MED ADV GROUP [id number]       Payor Plan Address Payor Plan Phone Number Payor Plan Fax Number Effective Dates    [address] [address]   1/1/2023 - None Entered    [facility] [id number]         Subscriber Name Subscriber Birth Date Member ID       KACI CHAVEZ 5/22/1933 [id number]                     Emergency Contacts        (Rel.) Home Phone Work Phone Mobile Phone    Ankit Doty (Son) [phone] -- --    Angy Doty (Other) -- [phone] --    Marleni Pederson (Daughter) -- -- [phone]                "

## 2024-05-21 NOTE — THERAPY EVALUATION
Patient Name: Alba Correa  : 1933    MRN: [id number]                              Today's Date: 2024       Admit Date: 2024    Visit Dx:     ICD-10-CM ICD-9-CM   1. Compression fracture of T8 vertebra, initial encounter  S22.060A 805.2   2. Ground-level fall  W18.30XA E888.9     Patient Active Problem List   Diagnosis    CKD (chronic kidney disease) stage 4, GFR 15-29 ml/min    Depression    Gout    Hyperparathyroidism    Adaptive colitis    Osteopenia    Rheumatoid arthritis    Degeneration of intervertebral disc    Essential hypertension    Detrusor muscle hypertonia    History of DVT (deep vein thrombosis)    Hyperlipidemia    Macrocytosis    Nonrheumatic aortic (valve) insufficiency    History of CVA (cerebrovascular accident)    Atrial fibrillation    Anemia in chronic kidney disease    Other proteinuria    Dizzy    Sinus bradycardia    First degree AV block    Nonrheumatic mitral valve regurgitation    Idiopathic peripheral neuropathy    Lower extremity edema    Acute diastolic CHF (congestive heart failure)    Hypothyroidism (acquired)    Sick sinus syndrome    Insomnia    Fall    Chronic kidney disease, stage 3a    Hematoma of thigh    Traumatic hematoma of scalp    Abnormal CT of spine    At risk for falling    Superior mesenteric artery stenosis    Compression fracture of T8 vertebra, initial encounter    Macrocytosis    Hyperlipidemia    Thyroid nodule    Essential hypertension    Chronic diastolic CHF (congestive heart failure)    Chronic kidney failure, stage 3 (moderate)    Left leg pain     Past Medical History:   Diagnosis Date    Acquired hypothyroidism 2022    Acute diastolic CHF (congestive heart failure) 2022    Chronic kidney disease     Closed fracture of left distal humerus 2023    Closed head injury 2023    CVA (cerebral vascular accident)     Degeneration of intervertebral disc     Depression     DVT (deep venous thrombosis)     Facial laceration  03/07/2023    First degree AV block 05/26/2021    Gout     H/O complete eye exam 09/2016    Hyperlipidemia     IBS (irritable bowel syndrome)     Laceration of right forearm 03/07/2023    Nonrheumatic mitral valve regurgitation     OAB (overactive bladder)     Osteopenia     Osteoporosis     PAF (paroxysmal atrial fibrillation) 11/08/2019    Peripheral neuropathy     Rheumatoid arthritis     Sinus bradycardia 05/26/2021    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     Past Surgical History:   Procedure Laterality Date    APPENDECTOMY      BREAST BIOPSY      CARDIAC ELECTROPHYSIOLOGY PROCEDURE N/A 10/12/2021    Procedure: Loop insertion linq;  Surgeon: Tone Anguiano MD;  Location: [facility] CATH INVASIVE LOCATION;  Service: Cardiovascular;  Laterality: N/A;    CARDIOVERSION  08/03/2020    Dr. Colby    COLONOSCOPY      declines    ELBOW OPEN REDUCTION INTERNAL FIXATION Left 3/11/2023    Procedure: ELBOW OPEN REDUCTION INTERNAL FIXATION;  Surgeon: Ramón Encinas II, MD;  Location: [facility] MAIN OR;  Service: Orthopedics;  Laterality: Left;    HERNIA REPAIR  1965    HYSTERECTOMY      still has ovaries    MAMMO BILATERAL  11/16/2016    pt declines    PAP SMEAR  11/16/2016    declines      General Information       Row Name 05/21/24 1509          OT Time and Intention    Document Type evaluation  -AG     Mode of Treatment individual therapy;occupational therapy  -AG       Row Name 05/21/24 1509          General Information    Patient Profile Reviewed yes  -AG     Prior Level of Function independent:  use of rollator and living in an ILF  -AG     Existing Precautions/Restrictions fall;TLSO;brace worn when out of bed  -AG     Barriers to Rehab none identified  -AG       Row Name 05/21/24 1509          Living Environment    People in Home alone  -AG       Row Name 05/21/24 1509          Home Main Entrance    Number of Stairs, Main Entrance none  -AG       Row Name 05/21/24 1509          Cognition     Orientation Status (Cognition) oriented x 3  -AG       Row Name 05/21/24 1509          Safety Issues, Functional Mobility    Safety Issues Affecting Function (Mobility) insight into deficits/self-awareness;judgment;safety precaution awareness  -AG     Impairments Affecting Function (Mobility) endurance/activity tolerance;strength  -AG               User Key  (r) = Recorded By, (t) = Taken By, (c) = Cosigned By      Initials Name Provider Type     Jonah Mcdowell OT Occupational Therapist                     Mobility/ADL's       Row Name 05/21/24 1509          Bed Mobility    Bed Mobility rolling right;supine-sit;sit-supine  -AG     Rolling Right [place] (Bed Mobility) standby assist;verbal cues  -AG     Supine-Sit [place] (Bed Mobility) standby assist;verbal cues  -AG     Sit-Supine [place] (Bed Mobility) standby assist;verbal cues  -AG     Assistive Device (Bed Mobility) bed rails  -AG     Comment, (Bed Mobility) cues for log roll technique with excess time and education for hand placement  -       Row Name 05/21/24 1509          Transfers    Transfers sit-stand transfer  -       Row Name 05/21/24 1509          Sit-Stand Transfer    Sit-Stand [place] (Transfers) contact guard;verbal cues  -AG     Assistive Device (Sit-Stand Transfers) walker, front-wheeled  -       Row Name 05/21/24 1509          Functional Mobility    Functional Mobility- Comment short distance in room w RW  -[facility] Name 05/21/24 1509          Activities of Daily Living    BADL Assessment/Intervention upper body dressing;lower body dressing;grooming;toileting  -       Row Name 05/21/24 1509          Upper Body Dressing Assessment/Training    Comment, (Upper Body Dressing) max A for donning TLSO seated EOB with education provided  -       Row Name 05/21/24 1509          Lower Body Dressing Assessment/Training    [place] Level (Lower Body Dressing) lower body dressing skills;don;socks;maximum assist (25%  patient effort)  -AG     Position (Lower Body Dressing) edge of bed sitting  -AG       Row Name 05/21/24 1509          Grooming Assessment/Training    [place] Level (Grooming) grooming skills;hair care, combing/brushing;wash face, hands;set up  -AG     Position (Grooming) edge of bed sitting  -AG       Row Name 05/21/24 1509          Toileting Assessment/Training    [place] Level (Toileting) dependent (less than 25% patient effort)  -AG     Comment, (Toileting) purewick suction - encouraged to use BSC for increase activity  -AG               User Key  (r) = Recorded By, (t) = Taken By, (c) = Cosigned By      Initials Name Provider Type    AG Jonah Mcdowell OT Occupational Therapist                   Obj/Interventions       Row Name 05/21/24 1512          Sensory Assessment (Somatosensory)    Sensory Assessment (Somatosensory) sensation intact  -AG       [place] [facility] Name 05/21/24 1512          Vision Assessment/Intervention    Visual Impairment/Limitations WFL  -AG       [place] [facility] Name 05/21/24 1512          Range of Motion Comprehensive    General Range of Motion no range of motion deficits identified  -AG       Row Name 05/21/24 1512          Strength Comprehensive (MMT)    Comment, General Manual Muscle Testing (MMT) Assessment Generalized weakness  -AG       Row Name 05/21/24 1512          Motor Skills    Motor Skills functional endurance  -AG     Functional Endurance fair-  -[facility] Name 05/21/24 1512          Balance    Balance Assessment sitting static balance;sitting dynamic balance;sit to stand dynamic balance;standing static balance;standing dynamic balance  -AG     Static Sitting Balance standby assist  -AG     Dynamic Sitting Balance standby assist  -AG     Position, Sitting Balance unsupported;sitting edge of bed  -AG     Static Standing Balance contact guard  -AG     Dynamic Standing Balance contact guard  -AG     Position/Device Used, Standing Balance supported;walker, front-wheeled  -AG      Balance Interventions sitting;standing;sit to stand;supported;static;dynamic;minimal challenge;occupation based/functional task  -AG     Comment, Balance no LOB  -AG               User Key  (r) = Recorded By, (t) = Taken By, (c) = Cosigned By      Initials Name Provider Type    Jonah Kirby, OT Occupational Therapist                   Goals/Plan       Row Name 05/21/24 1515          Bed Mobility Goal 1 (OT)    Activity/Assistive Device (Bed Mobility Goal 1, OT) bed mobility activities, all  -AG     [place] Level/Cues Needed (Bed Mobility Goal 1, OT) modified independence  -AG     Time Frame (Bed Mobility Goal 1, OT) short term goal (STG);2 weeks  -AG     Progress/Outcomes (Bed Mobility Goal 1, OT) goal ongoing  -AG       Row Name 05/21/24 1515          Transfer Goal 1 (OT)    Activity/Assistive Device (Transfer Goal 1, OT) transfers, all  -AG     [place] Level/Cues Needed (Transfer Goal 1, OT) modified independence  -AG     Time Frame (Transfer Goal 1, OT) 2 weeks  -AG     Progress/Outcome (Transfer Goal 1, OT) goal ongoing  -AG       Row Name 05/21/24 1515          Dressing Goal 1 (OT)    Activity/Device (Dressing Goal 1, OT) dressing skills, all  -AG     [place]/Cues Needed (Dressing Goal 1, OT) modified independence  -AG     Time Frame (Dressing Goal 1, OT) short term goal (STG);2 weeks  -AG     Progress/Outcome (Dressing Goal 1, OT) goal ongoing  -AG       Row Name 05/21/24 1515          Toileting Goal 1 (OT)    Activity/Device (Toileting Goal 1, OT) toileting skills, all  -AG     [place] Level/Cues Needed (Toileting Goal 1, OT) modified independence  -AG     Time Frame (Toileting Goal 1, OT) short term goal (STG);2 weeks  -AG     Progress/Outcome (Toileting Goal 1, OT) goal ongoing  -AG       Row Name 05/21/24 1515          Grooming Goal 1 (OT)    Activity/Device (Grooming Goal 1, OT) grooming skills, all  -AG     [place] (Grooming Goal 1, OT) modified independence  -AG     Time  Frame (Grooming Goal 1, OT) short term goal (STG);2 weeks  -AG     Progress/Outcome (Grooming Goal 1, OT) goal ongoing  -AG       Row Name 05/21/24 1515          Therapy Assessment/Plan (OT)    Planned Therapy Interventions (OT) activity tolerance training;functional balance retraining;occupation/activity based interventions;ROM/therapeutic exercise;IADL retraining;adaptive equipment training;BADL retraining;neuromuscular control/coordination retraining;patient/caregiver education/training;transfer/mobility retraining;strengthening exercise  -AG               User Key  (r) = Recorded By, (t) = Taken By, (c) = Cosigned By      Initials Name Provider Type    AG Jonah Mcdowell, MAURY Occupational Therapist                   Clinical Impression       Row Name 05/21/24 1512          Pain Assessment    Pretreatment Pain Rating 0/10 - no pain  -AG     Posttreatment Pain Rating 0/10 - no pain  -AG       Row Name 05/21/24 1512          Plan of Care Review    Plan of Care Reviewed With patient  -AG     Progress no change  -AG     Outcome Evaluation 89 y/o F admitted to [facility] from ILF for fall with Dx of T8 compression fx and subsequent need for TLSO when OOB. Prior to admission pt. was independent with ADLS, completing laundry and prepping breakfast for self with use of rollator for UE support. Pt. required extensive education on log rolling, spinal precautions and donning/doffing TLSO. Pt. was able to perform bed mob with SBA, STS CGA, func mob with CGA and use of RW. Pt. presents with decreased act amaya, strength and safety needed to complete ADLS. OT services required to address the above mentioned deficits and improve functional independence with self care. OT recommending dc to SNF vs HH pending pts family ability to assist.  -AG       Row Name 05/21/24 1512          Therapy Assessment/Plan (OT)    Rehab Potential (OT) good, to achieve stated therapy goals  -AG     Criteria for Skilled Therapeutic Interventions Met (OT)  yes;meets criteria;skilled treatment is necessary  -AG     Therapy Frequency (OT) 5 times/wk  -AG       Row Name 05/21/24 1512          Therapy Plan Review/Discharge Plan (OT)    Anticipated Discharge Disposition (OT) home with 24/7 care;home with home health;skilled nursing facility  -AG       Row Name 05/21/24 1512          Positioning and Restraints    Pre-Treatment Position in bed  -AG     Post Treatment Position bed  -AG     In Bed notified nsg;encouraged to call for assist;exit alarm on;fowlers;call light within reach  -AG               User Key  (r) = Recorded By, (t) = Taken By, (c) = Cosigned By      Initials Name Provider Type    Jonah Kirby, MAURY Occupational Therapist                   Outcome Measures       Row Name 05/21/24 1516          How much help from another is currently needed...    Putting on and taking off regular lower body clothing? 1  -AG     Bathing (including washing, rinsing, and drying) 1  -AG     Toileting (which includes using toilet bed pan or urinal) 1  -AG     Putting on and taking off regular upper body clothing 2  -AG     Taking care of personal grooming (such as brushing teeth) 3  -AG     Eating meals 4  -AG     AM-PAC 6 Clicks Score (OT) 12  -AG       Row Name 05/21/24 1247 05/21/24 1055       How much help from another person do you currently need...    Turning from your back to your side while in flat bed without using bedrails? 3  -MG 3  -CHERRY    Moving from lying on back to sitting on the side of a flat bed without bedrails? 3  -MG 3  -CHERRY    Moving to and from a bed to a chair (including a wheelchair)? 3  -MG 2  -CHERRY    Standing up from a chair using your arms (e.g., wheelchair, bedside chair)? 3  -MG 1  -CHERRY    Climbing 3-5 steps with a railing? 2  -MG 1  -CHERRY    To walk in hospital room? 3  -MG 1  -HCERRY    AM-PAC 6 Clicks Score (PT) 17  -MG 11  -CHERRY    Highest Level of Mobility Goal 5 --> Static standing  -MG 4 --> Transfer to chair/commode  -CHERRY      Row Name 05/21/24 8450           Functional Assessment    Outcome Measure Options AM-PAC 6 Clicks Daily Activity (OT)  -               User Key  (r) = Recorded By, (t) = Taken By, (c) = Cosigned By      Initials Name Provider Type     Madai Jackson, PT Physical Therapist    Monster Frias, RN Registered Nurse    Jonah Kirby OT Occupational Therapist                    Occupational Therapy Education       Title: PT OT SLP Therapies (In Progress)       Topic: Occupational Therapy (Done)       Point: ADL training (Done)       Description:   Instruct learner(s) on proper safety adaptation and remediation techniques during self care or transfers.   Instruct in proper use of assistive devices.                  Learning Progress Summary             Patient Acceptance, E,TB, VU by  at 5/21/2024 1516                         Point: Home exercise program (Done)       Description:   Instruct learner(s) on appropriate technique for monitoring, assisting and/or progressing therapeutic exercises/activities.                  Learning Progress Summary             Patient Acceptance, E,TB, VU by  at 5/21/2024 1516                         Point: Precautions (Done)       Description:   Instruct learner(s) on prescribed precautions during self-care and functional transfers.                  Learning Progress Summary             Patient Acceptance, E,TB, VU by  at 5/21/2024 1516                         Point: Body mechanics (Done)       Description:   Instruct learner(s) on proper positioning and spine alignment during self-care, functional mobility activities and/or exercises.                  Learning Progress Summary             Patient Acceptance, E,TB, VU by  at 5/21/2024 1516                                         User Key       Initials Effective Dates Name Provider Type [facility] 01/23/24 -  Jonah Mcdowell, MAURY Occupational Therapist OT                  OT Recommendation and Plan  Planned Therapy Interventions (OT):  activity tolerance training, functional balance retraining, occupation/activity based interventions, ROM/therapeutic exercise, IADL retraining, adaptive equipment training, BADL retraining, neuromuscular control/coordination retraining, patient/caregiver education/training, transfer/mobility retraining, strengthening exercise  Therapy Frequency (OT): 5 times/wk  Plan of Care Review  Plan of Care Reviewed With: patient  Progress: no change  Outcome Evaluation: 89 y/o F admitted [facility] from ILF for fall with Dx of T8 compression fx and subsequent need for TLSO when OOB. Prior to admission pt. was independent with ADLS, completing laundry and prepping breakfast for self with use of rollator for UE support. Pt. required extensive education on log rolling, spinal precautions and donning/doffing TLSO. Pt. was able to perform bed mob with SBA, STS CGA, func mob with CGA and use of RW. Pt. presents with decreased act amaya, strength and safety needed to complete ADLS. OT services required to address the above mentioned deficits and improve functional independence with self care. OT recommending dc to SNF vs HH pending pts family ability to assist.     Time Calculation:   Evaluation Complexity (OT)  Review Occupational Profile/Medical/Therapy History Complexity: expanded/moderate complexity  Assessment, Occupational Performance/Identification of Deficit Complexity: 3-5 performance deficits  Clinical Decision Making Complexity (OT): detailed assessment/moderate complexity  Overall Complexity of Evaluation (OT): moderate complexity     Time Calculation- OT       Row Name 05/21/24 1516             Time Calculation- OT    OT Start Time 1327  -AG      OT Stop Time 1351  -AG      OT Time Calculation (min) 24 min  -AG      Total Timed Code Minutes- OT 18 minute(s)  -      OT Received On 05/21/24  -      OT - Next Appointment 05/22/24  -      OT Goal Re-Cert Due Date 06/04/24  -         Timed Charges    01414 - OT Therapeutic  Activity Minutes 8  -AG      01437 - OT Self Care/Mgmt Minutes 10  -AG         Untimed Charges    OT Eval/Re-eval Minutes 6  -AG         Total Minutes    Timed Charges Total Minutes 18  -AG      Untimed Charges Total Minutes 6  -AG       Total Minutes 24  -AG                User Key  (r) = Recorded By, (t) = Taken By, (c) = Cosigned By      Initials Name Provider Type     Jonah Mcdowell, OT Occupational Therapist                  Therapy Charges for Today       Code Description Service Date Service Provider Modifiers Qty    14089923015 HC OT SELF CARE/MGMT/TRAIN EA 15 MIN 5/21/2024 Jonah Mcdowell, OT GO 1    98958875516 HC OT EVAL MOD COMPLEXITY 3 5/21/2024 Jonah Mcdowell, OT GO 1                 Jonah Mcdowell OT  5/21/2024

## 2024-05-21 NOTE — PROGRESS NOTES
LOS: 1 day   Patient Care Team:  Aramis Doty MD as PCP - General (Family Medicine)  Juan Jose Muñiz MD as Consulting Physician (Neurology)  Roshan Martines III, MD as Cardiologist (Cardiology)  Nehal Murray MD as Consulting Physician (Nephrology)    Chief Complaint: Compression fracture, back pain    Subjective       Interval History: Thoracic MRI performed yesterday evening however patient did not want to proceed with lumbar MRI.      History taken from: patient chart    Objective        Vital Signs  Temp:  [97.3 °F (36.3 °C)-98.6 °F (37 °C)] 98.6 °F (37 °C)  Heart Rate:  [93-99] 97  Resp:  [16-18] 18  BP: (144-185)/(82-99) 144/82    Physical Exam:     Lying in bed. Increased back pain with movement of body and/or legs.   Tender to palpation mid to lower thoracic region and upper lumbar region.   No motor or sensory deficits.   No hyperreflexia or clonus bilateral LE's.        Results Review:     I reviewed the patient's new clinical results.  I reviewed the patient's new imaging results and agree with the interpretation.  Discussed with Dr. Gomes and the patient    MRI THORACIC SPINE WO CONTRAST    Mildly displaced 2 column fracture noted in the superior endplate of T8 with 47% body height loss.  Extensive marrow edema.  There is bulging into the spinal canal posteriorly with no evidence of significant stenosis.  No evidence of disc herniation or severe canal stenosis.  No abnormal cord signal or soft tissue abnormality.      Assessment & Plan       Compression fracture of T8 vertebra, initial encounter    History of CVA (cerebrovascular accident)    Atrial fibrillation    Dizzy    Hypothyroidism (acquired)    Fall    Macrocytosis    Hyperlipidemia    Thyroid nodule    Essential hypertension    Chronic diastolic CHF (congestive heart failure)    Chronic kidney failure, stage 3 (moderate)    Left leg pain    Acute thoracolumbar pain after a fall.  T8 acute osteoporotic compression  fracture    T8 fracture might be amenable to kyphoplasty, but patient has expressed a desire to avoid surgery.   Will attempt to rule out any acute fracture or injury in the lumbar spine pending results of lumbar MRI.   Dr. Gomes to render an opinion on treatment once lumbar MRI complete.   Continue to mobilize in brace to see how she does. PT/OT following.     Janine Healy, APRN  05/21/24  18:26 EDT

## 2024-05-21 NOTE — PROGRESS NOTES
Name: Alba Correa ADMIT: 2024   : 1933  PCP: Aramis Doty MD    MRN: [id number] LOS: 1 days   AGE/SEX: 89 y.o. female  ROOM: [facility]     Subjective   Subjective   She feels a lot better today.  Left leg is not hurting as much and not as much back pain.       Objective   Objective   Vital Signs  Temp:  [97.3 °F (36.3 °C)-98.6 °F (37 °C)] 98.6 °F (37 °C)  Heart Rate:  [93-99] 97  Resp:  [16-18] 18  BP: (144-185)/(82-99) 144/82  SpO2:  [95 %] 95 %  on   ;   Device (Oxygen Therapy): room air  Body mass index is 20.51 kg/m².  Physical Exam  Vitals reviewed.   Constitutional:       General: She is not in acute distress.     Appearance: She is not ill-appearing.   Cardiovascular:      Rate and Rhythm: Normal rate. Rhythm irregular.   Pulmonary:      Effort: No respiratory distress.      Breath sounds: Normal breath sounds. No wheezing.   Abdominal:      General: There is no distension.      Palpations: Abdomen is soft.      Tenderness: There is no abdominal tenderness.   Musculoskeletal:      Right lower leg: No edema.      Left lower leg: No edema.   Skin:     General: Skin is warm and dry.      Findings: Bruising present.   Neurological:      Mental Status: She is alert and oriented to person, place, and time.   Psychiatric:         Mood and Affect: Mood normal.       Results Review     I reviewed the patient's new clinical results.  Results from last 7 days   Lab Units 24  0508 24  1130   WBC 10*3/mm3 5.73 6.37   HEMOGLOBIN g/dL 11.7* 13.7   PLATELETS 10*3/mm3 233 258     Results from last 7 days   Lab Units 24  0443 24  2120 24  0628 24  0508 24  1809   SODIUM mmol/L 136  --  137 141 142   POTASSIUM mmol/L 4.3 3.9 3.1* 4.1 4.3   CHLORIDE mmol/L 104  --  102 107 106   CO2 mmol/L 21.5*  --  23.9 24.0 24.0   BUN mg/dL 19  --  15 19 25*   CREATININE mg/dL 0.84  --  0.58 0.81 0.78   GLUCOSE mg/dL 106*  --  128* 97 100*   EGFR mL/min/1.73 66.1  --  86.1 69.1 72.3  "    Results from last 7 days   Lab Units 05/18/24  1130   ALBUMIN g/dL 4.4   BILIRUBIN mg/dL 0.4   ALK PHOS U/L 100   AST (SGOT) U/L 24   ALT (SGPT) U/L 15     Results from last 7 days   Lab Units 05/21/24  0443 05/20/24  0628 05/19/24  0508 05/18/24  1809 05/18/24  1130   CALCIUM mg/dL 9.2 9.3 8.9 9.5 9.8*   ALBUMIN g/dL  --   --   --   --  4.4   MAGNESIUM mg/dL  --   --   --   --  2.1       No results found for: \"HGBA1C\", \"POCGLU\"    MRI Thoracic Spine Without Contrast    Result Date: 5/20/2024  Electronically signed by Merari Archuleta MD on 05-20-24 at 2323    CT Angiogram Head    Result Date: 5/20/2024  1. Head CT demonstrates no convincing acute intracranial abnormality. There is moderate small vessel disease in cerebral white matter. There is mild diffuse cerebral atrophy. There are bilateral intraocular lens implants from previous bilateral cataract surgery. Otherwise, the head CT is within normal limits. 2. Mild cervical spondylosis is present as described above. 3. Calcified plaque mildly narrows the left subclavian artery origin noncalcified plaque mildly narrows the mid left subclavian artery. There is noncalcified plaque that at least mild-moderate up to moderately narrows the left vertebral artery origin and there is multifocal at least mild up to areas of mild-moderate stenosis of the cervical segment of the left vertebral artery. The proximal intracranial segment of the left vertebral artery is widely patent to the left PICA origin. 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 No additional stenosis is seen in the great vessels the neck. There is a focal mild to moderate stenosis of the mid basilar artery which is a new finding when compared to MRA of the head 04/03/2019 and there is a mild-moderate stenosis at the origin and proximal inferior division M2 segment of the left middle cerebral artery that is " also new when compared to 04/03/2019 and is felt to be on the basis of some intracranial atherosclerotic disease. The remainder of the CT angiogram of the head and neck is normal. If there remains any clinical suspicion of acute infarct causing the patient's dizziness an MRI of the brain could be obtained for more complete assessment. The results were communicated to Dr. Chani Monk by telephone on 05/20/2024 at 230 p.m.  Radiation dose reduction techniques were utilized, including automated exposure control and exposure modulation based on body size.   This report was finalized on 5/20/2024 3:31 PM by Dr. Ankit Boss M.D on Workstation: [id number]      CT Angiogram Neck    Result Date: 5/20/2024  1. Head CT demonstrates no convincing acute intracranial abnormality. There is moderate small vessel disease in cerebral white matter. There is mild diffuse cerebral atrophy. There are bilateral intraocular lens implants from previous bilateral cataract surgery. Otherwise, the head CT is within normal limits. 2. Mild cervical spondylosis is present as described above. 3. Calcified plaque mildly narrows the left subclavian artery origin noncalcified plaque mildly narrows the mid left subclavian artery. There is noncalcified plaque that at least mild-moderate up to moderately narrows the left vertebral artery origin and there is multifocal at least mild up to areas of mild-moderate stenosis of the cervical segment of the left vertebral artery. The proximal intracranial segment of the left vertebral artery is widely patent to the left PICA origin. 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 No additional stenosis is seen in the great vessels the neck. There is a focal mild to moderate stenosis of the mid basilar artery which is a new finding when compared to MRA of the head 04/03/2019 and there is a mild-moderate  stenosis at the origin and proximal inferior division M2 segment of the left middle cerebral artery that is also new when compared to 04/03/2019 and is felt to be on the basis of some intracranial atherosclerotic disease. The remainder of the CT angiogram of the head and neck is normal. If there remains any clinical suspicion of acute infarct causing the patient's dizziness an MRI of the brain could be obtained for more complete assessment. The results were communicated to Dr. Chani Monk by telephone on 05/20/2024 at 230 p.m.  Radiation dose reduction techniques were utilized, including automated exposure control and exposure modulation based on body size.   This report was finalized on 5/20/2024 3:31 PM by Dr. Ankit Boss M.D on Workstation: [id number]       I have personally reviewed all medications:  Scheduled Medications  allopurinol, 100 mg, Oral, Daily  apixaban, 2.5 mg, Oral, BID  DULoxetine, 60 mg, Oral, Daily  famotidine, 20 mg, Oral, Daily  levothyroxine, 50 mcg, Oral, QAM  Lidocaine, 1 patch, Transdermal, Q24H  [START ON 5/22/2024] losartan, 25 mg, Oral, Daily  senna-docusate sodium, 2 tablet, Oral, BID  sodium chloride, 10 mL, Intravenous, Q12H    Infusions     Diet  Diet: Cardiac; Healthy Heart (2-3 Na+); Fluid Consistency: Thin (IDDSI 0)    I have personally reviewed:  [x]  Laboratory   []  Microbiology   [x]  Radiology   []  EKG/Telemetry  []  Cardiology/Vascular   []  Pathology    []  Records       Assessment/Plan     Active Hospital Problems    Diagnosis  POA    **Compression fracture of T8 vertebra, initial encounter [S22.060A]  Yes    Left leg pain [M79.605]  Yes    Macrocytosis [D75.89]  Yes    Hyperlipidemia [E78.5]  Yes    Thyroid nodule [E04.1]  Yes    Essential hypertension [I10]  Yes    Chronic diastolic CHF (congestive heart failure) [I50.32]  Yes    Chronic kidney failure, stage 3 (moderate) [N18.30]  Yes    Fall [W19.XXXA]  Yes    Hypothyroidism (acquired) [E03.9]  Yes    Dizzy  [R42]  Yes    Atrial fibrillation [I48.91]  Yes    History of CVA (cerebrovascular accident) [Z86.73]  Not Applicable      Resolved Hospital Problems    Diagnosis Date Resolved POA    Abdominal pain [R10.9] 05/20/2024 Yes    Hypercalcemia [E83.52] 05/19/2024 Yes       89 y.o. female admitted with Compression fracture of T8 vertebra, initial encounter.    T8 compression fracture: Pain fairly controlled  - Neurosurgery consult noted.  TLSO brace ordered  -MRI with acute T8 compression fracture.  Defer to neurosurgery regarding plans, note pending  - Patient tolerated PT and OT wearing the brace today    Circumstances of her fall sound somewhat mechanical though she did describe some vague dizziness.  - Stress test and echo unremarkable   - Linq device already in place  - Orthostatics will be difficult to obtain given leg pain  -CTA negative other than some chronic cerebrovascular disease but nothing significant    Chronic A-fib: Currently well-controlled on no rate control meds as there is some documentation previously of potential SSS.  - Resume Eliquis as patient does not feel she would want surgery    Thyroid nodule with negative ultrasound.  No further workup needed    Mild hypercalcemia on admission already resolved with some gentle hydration.  PTH appropriate, no need for additional workup    Left leg pain: No fracture on x-ray.  Does have significant osteoarthritis       SCDs/Eliquis  Dispo: TBD.  Not clear if she can go back to her usual apartment as she does live alone in independent living.  May need SNF which would require pre-CERT.      Epifanio Ponce MD  [facility]  05/21/24  17:58 EDT

## 2024-05-21 NOTE — PLAN OF CARE
Goal Outcome Evaluation:  Plan of Care Reviewed With: patient        Progress: improving     Patient had a calm rest all through the shift, she was carried along with her plan of care. She has no new complain made this moment.

## 2024-05-21 NOTE — THERAPY EVALUATION
Patient Name: Alba Correa  : 1933    MRN: [id number]                              Today's Date: 2024       Admit Date: 2024    Visit Dx:     ICD-10-CM ICD-9-CM   1. Compression fracture of T8 vertebra, initial encounter  S22.060A 805.2   2. Ground-level fall  W18.30XA E888.9     Patient Active Problem List   Diagnosis    CKD (chronic kidney disease) stage 4, GFR 15-29 ml/min    Depression    Gout    Hyperparathyroidism    Adaptive colitis    Osteopenia    Rheumatoid arthritis    Degeneration of intervertebral disc    Essential hypertension    Detrusor muscle hypertonia    History of DVT (deep vein thrombosis)    Hyperlipidemia    Macrocytosis    Nonrheumatic aortic (valve) insufficiency    History of CVA (cerebrovascular accident)    Atrial fibrillation    Anemia in chronic kidney disease    Other proteinuria    Dizzy    Sinus bradycardia    First degree AV block    Nonrheumatic mitral valve regurgitation    Idiopathic peripheral neuropathy    Lower extremity edema    Acute diastolic CHF (congestive heart failure)    Hypothyroidism (acquired)    Sick sinus syndrome    Insomnia    Fall    Chronic kidney disease, stage 3a    Hematoma of thigh    Traumatic hematoma of scalp    Abnormal CT of spine    At risk for falling    Superior mesenteric artery stenosis    Compression fracture of T8 vertebra, initial encounter    Macrocytosis    Hyperlipidemia    Thyroid nodule    Essential hypertension    Chronic diastolic CHF (congestive heart failure)    Chronic kidney failure, stage 3 (moderate)    Left leg pain     Past Medical History:   Diagnosis Date    Acquired hypothyroidism 2022    Acute diastolic CHF (congestive heart failure) 2022    Chronic kidney disease     Closed fracture of left distal humerus 2023    Closed head injury 2023    CVA (cerebral vascular accident)     Degeneration of intervertebral disc     Depression     DVT (deep venous thrombosis)     Facial laceration  03/07/2023    First degree AV block 05/26/2021    Gout     H/O complete eye exam 09/2016    Hyperlipidemia     IBS (irritable bowel syndrome)     Laceration of right forearm 03/07/2023    Nonrheumatic mitral valve regurgitation     OAB (overactive bladder)     Osteopenia     Osteoporosis     PAF (paroxysmal atrial fibrillation) 11/08/2019    Peripheral neuropathy     Rheumatoid arthritis     Sinus bradycardia 05/26/2021    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     Past Surgical History:   Procedure Laterality Date    APPENDECTOMY      BREAST BIOPSY      CARDIAC ELECTROPHYSIOLOGY PROCEDURE N/A 10/12/2021    Procedure: Loop insertion linq;  Surgeon: Tone Anguiano MD;  Location: [facility] CATH INVASIVE LOCATION;  Service: Cardiovascular;  Laterality: N/A;    CARDIOVERSION  08/03/2020    Dr. Colby    COLONOSCOPY      declines    ELBOW OPEN REDUCTION INTERNAL FIXATION Left 3/11/2023    Procedure: ELBOW OPEN REDUCTION INTERNAL FIXATION;  Surgeon: Ramón Encinas II, MD;  Location: [facility] MAIN OR;  Service: Orthopedics;  Laterality: Left;    HERNIA REPAIR  1965    HYSTERECTOMY      still has ovaries    MAMMO BILATERAL  11/16/2016    pt declines    PAP SMEAR  11/16/2016    declines      General Information       Row Name 05/21/24 1156          Physical Therapy Time and Intention    Document Type evaluation  -MG     Mode of Treatment individual therapy;physical therapy  -MG       Row Name 05/21/24 1156          General Information    Patient Profile Reviewed yes  -MG     Prior Level of Function independent:  Uses a rollator. States she rarely stays in her apt during the day. Primarily w/ friends in her facility playing games.  -MG     Existing Precautions/Restrictions fall;TLSO;brace worn when out of bed  -MG     Barriers to Rehab none identified  -MG       Row Name 05/21/24 1156          Living Environment    People in Home alone  Independent living facility.  -MG       Row Name 05/21/24 1156           Home Main Entrance    Number of Stairs, Main Entrance none  -MG       Row Name 05/21/24 1156          Stairs Within Home, Primary    Number of Stairs, Within Home, Primary none  -MG       Row Name 05/21/24 1156          Cognition    Orientation Status (Cognition) oriented x 3  -MG       Row Name 05/21/24 1156          Safety Issues, Functional Mobility    Impairments Affecting Function (Mobility) endurance/activity tolerance;strength  -MG     Comment, Safety Issues/Impairments (Mobility) Gait belt and non-skid socks donned.  -MG               User Key  (r) = Recorded By, (t) = Taken By, (c) = Cosigned By      Initials Name Provider Type    MG Madai Jackson, PT Physical Therapist                   Mobility       Row Name 05/21/24 1157          Bed Mobility    Bed Mobility rolling right;supine-sit;sit-supine  -MG     Rolling Right [place] (Bed Mobility) standby assist;verbal cues  -MG     Supine-Sit [place] (Bed Mobility) standby assist;verbal cues  -MG     Sit-Supine [place] (Bed Mobility) standby assist;verbal cues  -MG     Comment, (Bed Mobility) Via R log roll. Increased time to complete. Cues for sequencing. TLSO brace donned/doffed w/ MaxA while seated EOB.  -MG       Row Name 05/21/24 1157          Sit-Stand Transfer    Sit-Stand [place] (Transfers) contact guard;verbal cues  -MG     Assistive Device (Sit-Stand Transfers) walker, front-wheeled  -MG     Comment, (Sit-Stand Transfer) Increased time to complete.  -MG       Row Name 05/21/24 1157          Gait/Stairs (Locomotion)    [place] Level (Gait) contact guard;verbal cues  -MG     Assistive Device (Gait) walker, front-wheeled  -MG     Distance in Feet (Gait) 30  -MG     Deviations/Abnormal Patterns (Gait) gait speed decreased;base of support, narrow  -MG     Comment, (Gait/Stairs) Pt amb in room and just outside of her room. Demos NBOS and slow pace. Cues for upright posture, wider JOSE ALEJANDRO and looking ahead vs the floor. No knee  buckling, dizziness, LOB or SOB. Limited by fatigue.  -MG               User Key  (r) = Recorded By, (t) = Taken By, (c) = Cosigned By      Initials Name Provider Type    Madai Glez, PT Physical Therapist                   Obj/Interventions       Row Name 05/21/24 1201          Range of Motion Comprehensive    General Range of Motion bilateral lower extremity ROM WFL  -MG       [place] [facility] Name 05/21/24 1201          Strength Comprehensive (MMT)    General Manual Muscle Testing (MMT) Assessment lower extremity strength deficits identified  -MG     Comment, General Manual Muscle Testing (MMT) Assessment Generalized weakness. Grossly 4/5.  -MG       Row Name 05/21/24 1201          Balance    Balance Assessment sitting static balance;sitting dynamic balance;standing static balance;standing dynamic balance  -MG     Static Sitting Balance standby assist  -MG     Dynamic Sitting Balance standby assist  -MG     Position, Sitting Balance sitting edge of bed  -MG     Static Standing Balance contact guard  -MG     Dynamic Standing Balance contact guard  -MG     Position/Device Used, Standing Balance supported;walker, front-wheeled  -MG     Comment, Balance No knee buckling or overt LOB.  -MG       Row Name 05/21/24 1201          Sensory Assessment (Somatosensory)    Sensory Assessment (Somatosensory) sensation intact  Neuropathy to her feet  -MG               User Key  (r) = Recorded By, (t) = Taken By, (c) = Cosigned By      Initials Name Provider Type    Madai Glez, PT Physical Therapist                   Goals/Plan       Row Name 05/21/24 1247          Bed Mobility Goal 1 (PT)    Activity/Assistive Device (Bed Mobility Goal 1, PT) bed mobility activities, all  -MG     [place] Level/Cues Needed (Bed Mobility Goal 1, PT) independent  -MG     Time Frame (Bed Mobility Goal 1, PT) 1 week  -MG       Row Name 05/21/24 1247          Transfer Goal 1 (PT)    Activity/Assistive Device (Transfer Goal 1, PT) transfers, all   -MG     [place] Level/Cues Needed (Transfer Goal 1, PT) modified independence  -MG     Time Frame (Transfer Goal 1, PT) 1 week  -MG       Row Name 05/21/24 1247          Gait Training Goal 1 (PT)    Activity/Assistive Device (Gait Training Goal 1, PT) gait (walking locomotion)  -MG     [place] Level (Gait Training Goal 1, PT) supervision required  -MG     Distance (Gait Training Goal 1, PT) 150  -MG     Time Frame (Gait Training Goal 1, PT) 1 week  -MG       Row Name 05/21/24 1247          Therapy Assessment/Plan (PT)    Planned Therapy Interventions (PT) balance training;bed mobility training;gait training;home exercise program;transfer training;ROM (range of motion);neuromuscular re-education;strengthening;stretching;patient/family education  -MG               User Key  (r) = Recorded By, (t) = Taken By, (c) = Cosigned By      Initials Name Provider Type    MG Madai Jackson, PT Physical Therapist                   Clinical Impression       Row Name 05/21/24 1228          Pain    Pretreatment Pain Rating 0/10 - no pain  -MG     Posttreatment Pain Rating 0/10 - no pain  -MG     Pain Intervention(s) Medication (See MAR);Ambulation/increased activity;Repositioned;Rest  -MG       Row Name 05/21/24 1223          Plan of Care Review    Plan of Care Reviewed With patient;son;family  -MG     Progress improving  -MG     Outcome Evaluation Pt is a 89 y/o F who presented to ED with c/o acute back and abdominal pain after a fall. Per chart pt bent down to  a tissue and fell fwd. Imaging (+) T8 compression fx. Per neurosx pt to wear TLSO when OOB. Pt reports she lives in an independent living facility, alone, uses a rollator and is frequently out of her apt w/ friends at the facility playing games. Pt and later family when they arrived were educated on spinal precautions, log roll technique and application of her TLSO. Today, pt performed a R log roll to sitting w/ SBA and increased time, STS to RW w/ CGA and  ambulation of 30' with a RW and CGA. Pt required increased time for all mobility/transfers due to generalized weakness and fatigue. Pt denied any pain, dizziness or SOB during the session and had no knee buckling or LOB. Pt presents with the above stated deficits and will benefit from skilled PT services to address these deficits. SBAR w/ RN following session. Discussed DC options w/ pt and her family who will further discuss and speak w/ CCP. Currently HH vs SNF pending pts family being able to stay and assist during the day. Also discussed use of BSC at night if famliy unable to assist at night.  -MG       Row Name 05/21/24 1228          Therapy Assessment/Plan (PT)    Rehab Potential (PT) good, to achieve stated therapy goals  -MG     Criteria for Skilled Interventions Met (PT) yes;skilled treatment is necessary  -MG     Therapy Frequency (PT) 5 times/wk  -MG       Row Name 05/21/24 1228          Vital Signs    O2 Delivery Pre Treatment room air  -MG     O2 Delivery Intra Treatment room air  -MG     O2 Delivery Post Treatment room air  -MG     Pre Patient Position Supine  -MG     Intra Patient Position Standing  -MG     Post Patient Position Supine  -MG       Row Name 05/21/24 1228          Positioning and Restraints    Pre-Treatment Position in bed  -MG     Post Treatment Position bed  -MG     In Bed notified nsg;supine;fowlers;call light within reach;encouraged to call for assist;exit alarm on;with family/caregiver;side rails up x2  -MG               User Key  (r) = Recorded By, (t) = Taken By, (c) = Cosigned By      Initials Name Provider Type    MG Madai Jackson, PT Physical Therapist                   Outcome Measures       Row Name 05/21/24 1247 05/21/24 1055       How much help from another person do you currently need...    Turning from your back to your side while in flat bed without using bedrails? 3  -MG 3  -CHERRY    Moving from lying on back to sitting on the side of a flat bed without bedrails? 3  -MG  3  -CHERRY    Moving to and from a bed to a chair (including a wheelchair)? 3  -MG 2  -CHERRY    Standing up from a chair using your arms (e.g., wheelchair, bedside chair)? 3  -MG 1  -CHERRY    Climbing 3-5 steps with a railing? 2  -MG 1  -CHERRY    To walk in hospital room? 3  -MG 1  -CHERRY    AM-PAC 6 Clicks Score (PT) 17  -MG 11  -CHERRY    Highest Level of Mobility Goal 5 --> Static standing  -MG 4 --> Transfer to chair/commode  -CHERRY              User Key  (r) = Recorded By, (t) = Taken By, (c) = Cosigned By      Initials Name Provider Type    MG Madai Jackson, PT Physical Therapist    Monster Frias, RN Registered Nurse                                 Physical Therapy Education       Title: PT OT SLP Therapies (In Progress)       Topic: Physical Therapy (In Progress)       Point: Mobility training (Done)       Learning Progress Summary             Patient Acceptance, E,TB,D, VU,NR by  at 5/21/2024 1248                         Point: Home exercise program (Not Started)       Learner Progress:  Not documented in this visit.              Point: Body mechanics (Done)       Learning Progress Summary             Patient Acceptance, E,TB,D, VU,NR by  at 5/21/2024 1248                         Point: Precautions (Done)       Learning Progress Summary             Patient Acceptance, E,TB,D, VU,NR by  at 5/21/2024 1248                                         User Key       Initials Effective Dates Name Provider Type Discipline     05/24/22 -  Madai Jackson, PT Physical Therapist PT                  PT Recommendation and Plan  Planned Therapy Interventions (PT): balance training, bed mobility training, gait training, home exercise program, transfer training, ROM (range of motion), neuromuscular re-education, strengthening, stretching, patient/family education  Plan of Care Reviewed With: patient, son, family  Progress: improving  Outcome Evaluation: Pt is a 89 y/o F who presented to ED with c/o acute back and abdominal pain after a  fall. Per chart pt bent down to  a tissue and fell fwd. Imaging (+) T8 compression fx. Per neurosx pt to wear TLSO when OOB. Pt reports she lives in an independent living facility, alone, uses a rollator and is frequently out of her apt w/ friends at the facility playing games. Pt and later family when they arrived were educated on spinal precautions, log roll technique and application of her TLSO. Today, pt performed a R log roll to sitting w/ SBA and increased time, STS to RW w/ CGA and ambulation of 30' with a RW and CGA. Pt required increased time for all mobility/transfers due to generalized weakness and fatigue. Pt denied any pain, dizziness or SOB during the session and had no knee buckling or LOB. Pt presents with the above stated deficits and will benefit from skilled PT services to address these deficits. SBAR w/ RN following session. Discussed DC options w/ pt and her family who will further discuss and speak w/ CCP. Currently HH vs SNF pending pts family being able to stay and assist during the day. Also discussed use of BSC at night if famliy unable to assist at night.     Time Calculation:         PT Charges       Row Name 05/21/24 1248             Time Calculation    Start Time 1106  -MG      Stop Time 1149  -MG      Time Calculation (min) 43 min  -MG      PT Received On 05/21/24  -MG      PT - Next Appointment 05/22/24  -MG      PT Goal Re-Cert Due Date 06/04/24  -MG         Time Calculation- PT    Total Timed Code Minutes- PT 31 minute(s)  -MG                User Key  (r) = Recorded By, (t) = Taken By, (c) = Cosigned By      Initials Name Provider Type    MG Madai Jackson, PT Physical Therapist                  Therapy Charges for Today       Code Description Service Date Service Provider Modifiers Qty    47920435162 HC PT EVAL MOD COMPLEXITY 2 5/21/2024 Madai Jackson, PT GP 1    26664332833 HC PT THERAPEUTIC ACT EA 15 MIN 5/21/2024 Madai Jackson, PT GP 1    36943798686 HC GAIT TRAINING EA  15 MIN 5/21/2024 Madai Jackson, PT GP 1            PT G-Codes  AM-PAC 6 Clicks Score (PT): 17  PT Discharge Summary  Anticipated Discharge Disposition (PT): home with home health, home with assist, skilled nursing facility (SNF vs HH pending pts family being able to provide daily assist/supervision.)    Madai Jackson, PT  5/21/2024

## 2024-05-21 NOTE — PLAN OF CARE
Goal Outcome Evaluation:  Plan of Care Reviewed With: patient        Progress: no change  Outcome Evaluation: 89 y/o F admitted to [facility] from [facility] for fall with Dx of T8 compression fx and subsequent need for TLSO when OOB. Prior to admission pt. was independent with ADLS, completing laundry and prepping breakfast for self with use of rollator for UE support. Pt. required extensive education on log rolling, spinal precautions and donning/doffing TLSO. Pt. was able to perform bed mob with SBA, STS CGA, func mob with CGA and use of RW. Pt. presents with decreased act amaya, strength and safety needed to complete ADLS. OT services required to address the above mentioned deficits and improve functional independence with self care. OT recommending dc to SNF vs HH pending pts family ability to assist.      Anticipated Discharge Disposition (OT): home with 24/7 care, home with home health, skilled nursing facility

## 2024-05-21 NOTE — PROGRESS NOTES
Continued Stay Note  [facility]     Patient Name: Alba Correa  MRN: [id number]  Today's Date: 5/21/2024    Admit Date: 5/18/2024    Plan: Referral to [facility] pending - will need pre-cert   Discharge Plan       Row Name 05/21/24 1532       Plan    [facility] will [facility]    Patient/Family in Agreement with Plan yes    Plan Comments Another call to Praveena @ [facility] and they have not returned call.  Spoke with patient at bedside.  She is agreeable to referral to [facility] - spoke with Kamila and she will evaluate.  Will need pre-cert.  CCP following.  Meredith OROZCO                             Expected Discharge Date and Time       Expected Discharge Date Expected Discharge Time    May 23, 2024               Becky S. [place], RN

## 2024-05-22 ENCOUNTER — APPOINTMENT (OUTPATIENT)
Dept: MRI IMAGING | Facility: HOSPITAL | Age: 89
DRG: 543 | End: 2024-05-22
Payer: MEDICARE

## 2024-05-22 LAB
ANION GAP SERPL CALCULATED.3IONS-SCNC: 14 MMOL/L (ref 5–15)
BUN SERPL-MCNC: 26 MG/DL (ref 8–23)
BUN/CREAT SERPL: 29.9 (ref 7–25)
CALCIUM SPEC-SCNC: 9.2 MG/DL (ref 8.2–9.6)
CHLORIDE SERPL-SCNC: 101 MMOL/L (ref 98–107)
CO2 SERPL-SCNC: 20 MMOL/L (ref 22–29)
CREAT SERPL-MCNC: 0.87 MG/DL (ref 0.57–1)
EGFRCR SERPLBLD CKD-EPI 2021: 63 ML/MIN/1.73
GLUCOSE SERPL-MCNC: 134 MG/DL (ref 65–99)
POTASSIUM SERPL-SCNC: 4.1 MMOL/L (ref 3.5–5.2)
SODIUM SERPL-SCNC: 135 MMOL/L (ref 136–145)
WHOLE BLOOD HOLD SPECIMEN: NORMAL

## 2024-05-22 PROCEDURE — 99231 SBSQ HOSP IP/OBS SF/LOW 25: CPT | Performed by: NURSE PRACTITIONER

## 2024-05-22 PROCEDURE — 72148 MRI LUMBAR SPINE W/O DYE: CPT

## 2024-05-22 PROCEDURE — 80048 BASIC METABOLIC PNL TOTAL CA: CPT | Performed by: INTERNAL MEDICINE

## 2024-05-22 PROCEDURE — 97530 THERAPEUTIC ACTIVITIES: CPT

## 2024-05-22 PROCEDURE — 99232 SBSQ HOSP IP/OBS MODERATE 35: CPT | Performed by: NURSE PRACTITIONER

## 2024-05-22 RX ADMIN — LIDOCAINE 1 PATCH: 4 PATCH TOPICAL at 09:07

## 2024-05-22 RX ADMIN — ACETAMINOPHEN 650 MG: 325 TABLET, FILM COATED ORAL at 12:17

## 2024-05-22 RX ADMIN — ALLOPURINOL 100 MG: 100 TABLET ORAL at 09:06

## 2024-05-22 RX ADMIN — APIXABAN 2.5 MG: 2.5 TABLET, FILM COATED ORAL at 09:06

## 2024-05-22 RX ADMIN — Medication 10 ML: at 20:10

## 2024-05-22 RX ADMIN — SENNOSIDES AND DOCUSATE SODIUM 2 TABLET: 50; 8.6 TABLET ORAL at 09:09

## 2024-05-22 RX ADMIN — LEVOTHYROXINE SODIUM 50 MCG: 50 TABLET ORAL at 09:06

## 2024-05-22 RX ADMIN — LOSARTAN POTASSIUM 25 MG: 25 TABLET, FILM COATED ORAL at 09:08

## 2024-05-22 RX ADMIN — DULOXETINE HYDROCHLORIDE 60 MG: 60 CAPSULE, DELAYED RELEASE ORAL at 09:06

## 2024-05-22 RX ADMIN — ACETAMINOPHEN 650 MG: 325 TABLET, FILM COATED ORAL at 20:10

## 2024-05-22 RX ADMIN — APIXABAN 2.5 MG: 2.5 TABLET, FILM COATED ORAL at 20:08

## 2024-05-22 RX ADMIN — Medication 10 ML: at 09:08

## 2024-05-22 RX ADMIN — FAMOTIDINE 20 MG: 20 TABLET, FILM COATED ORAL at 09:06

## 2024-05-22 NOTE — PLAN OF CARE
Goal Outcome Evaluation:  Plan of Care Reviewed With: patient           Outcome Evaluation: Pt in supine, awake and alert, agreeable to therapy, no c/o pain. patient performed bed mobility with SBA, TLSO donned once patient up on EOB. Patient performed sit to stand with min/CGA with cues for hand placement, ambulated in hallway with rwx and CGA, demonstrating increased tolerance to activity and improved gait stability. Recommend SNU at d/c.      Anticipated Discharge Disposition (PT): skilled nursing facility

## 2024-05-22 NOTE — PROGRESS NOTES
Continued Stay Note  [facility]     Patient Name: Alba Correa  MRN: [id number]  Today's Date: 5/22/2024    Admit Date: 5/18/2024    Plan: [facility] - will need pre-cert   Discharge Plan       Row Name 05/22/24 1453       Plan    Plan [facility] - will need pre-cert    Patient/Family in Agreement with Plan yes    Plan Comments per Kamila/[facility] on Thursday and Friday.  Waiting neuro to give recommendations after MRI.  Will need pre-cert.  Packet in CCP office.  Meredith OROZCO                 Expected Discharge Date and Time       Expected Discharge Date Expected Discharge Time    May 23, 2024               Becky S. [place], RN

## 2024-05-22 NOTE — PROGRESS NOTES
Name: Alba Correa ADMIT: 2024   : 1933  PCP: Aramis Doty MD    MRN: [id number] LOS: 2 days   AGE/SEX: 89 y.o. female  ROOM: [facility]     Subjective   Subjective   Still complaining quite a bit of pain.  Nursing reports some confusion at night.       Objective   Objective   Vital Signs  Temp:  [98.2 °F (36.8 °C)-99.9 °F (37.7 °C)] 99.9 °F (37.7 °C)  Heart Rate:  [] 96  Resp:  [18] 18  BP: (133-151)/(60-83) 136/82  SpO2:  [95 %-97 %] 97 %  on   ;   Device (Oxygen Therapy): room air  Body mass index is 20.51 kg/m².  Physical Exam  Vitals reviewed.   Constitutional:       General: She is not in acute distress.     Appearance: She is not ill-appearing.   Cardiovascular:      Rate and Rhythm: Normal rate. Rhythm irregular.   Pulmonary:      Effort: No respiratory distress.      Breath sounds: Normal breath sounds. No wheezing.   Abdominal:      General: There is no distension.      Palpations: Abdomen is soft.      Tenderness: There is no abdominal tenderness.   Musculoskeletal:      Right lower leg: No edema.      Left lower leg: No edema.   Skin:     General: Skin is warm and dry.      Findings: Bruising present.   Neurological:      Mental Status: She is alert and oriented to person, place, and time.   Psychiatric:         Mood and Affect: Mood normal.       Results Review     I reviewed the patient's new clinical results.  Results from last 7 days   Lab Units 24  0508 24  1130   WBC 10*3/mm3 5.73 6.37   HEMOGLOBIN g/dL 11.7* 13.7   PLATELETS 10*3/mm3 233 258     Results from last 7 days   Lab Units 24  0818 24  0443 24  2120 24  0628 24  0508   SODIUM mmol/L 135* 136  --  137 141   POTASSIUM mmol/L 4.1 4.3 3.9 3.1* 4.1   CHLORIDE mmol/L 101 104  --  102 107   CO2 mmol/L 20.0* 21.5*  --  23.9 24.0   BUN mg/dL 26* 19  --  15 19   CREATININE mg/dL 0.87 0.84  --  0.58 0.81   GLUCOSE mg/dL 134* 106*  --  128* 97   EGFR mL/min/1.73 63.0 66.1  --  86.1 69.1  "    Results from last 7 days   Lab Units 05/18/24  1130   ALBUMIN g/dL 4.4   BILIRUBIN mg/dL 0.4   ALK PHOS U/L 100   AST (SGOT) U/L 24   ALT (SGPT) U/L 15     Results from last 7 days   Lab Units 05/22/24  0818 05/21/24  0443 05/20/24  0628 05/19/24  0508 05/18/24  1809 05/18/24  1130   CALCIUM mg/dL 9.2 9.2 9.3 8.9   < > 9.8*   ALBUMIN g/dL  --   --   --   --   --  4.4   MAGNESIUM mg/dL  --   --   --   --   --  2.1    < > = values in this interval not displayed.       No results found for: \"HGBA1C\", \"POCGLU\"    MRI Lumbar Spine Without Contrast    Result Date: 5/22/2024  1. No convincing acute osseous abnormality is seen in the lumbar spine.  2. This patient has a levoscoliotic curvature of lumbar spine with its apex at the L2-3 lumbar level. At L2-3 there is moderate to severe disc space narrowing and there is 3 mm retrolisthesis of L2 with respect to L3, mild posterior spurring and there is essentially no canal narrowing. There is spurring into the foramina with mild left and moderate right bony foraminal narrowing.  3. At L4-5, there is mild to moderate bilateral facet overgrowth and a 4-5 mm degenerative anterolisthesis of L4 with respect to L5 but there is no canal narrowing and only mild bilateral foraminal narrowing.  4. 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 There is a minimal 2-3 mm anterolisthesis of L5 on S1 and there is no canal or lateral recess or right foraminal narrowing. There is a 8 x 5 x 5 mm cyst projecting over the lateral aspect of the left neural foramen that may be a pedunculated cyst off the anterior superior lateral margin of the left facets, comes close to the posterior margin of the exiting left L5 nerve root. The remainder of the MRI of the lumbar spine is unremarkable.      MRI Thoracic Spine Without Contrast    Result Date: " 5/20/2024  Electronically signed by Merari Archuleta MD on 05-20-24 at 2323     I have personally reviewed all medications:  Scheduled Medications  allopurinol, 100 mg, Oral, Daily  apixaban, 2.5 mg, Oral, BID  DULoxetine, 60 mg, Oral, Daily  famotidine, 20 mg, Oral, Daily  levothyroxine, 50 mcg, Oral, QAM  Lidocaine, 1 patch, Transdermal, Q24H  losartan, 25 mg, Oral, Daily  senna-docusate sodium, 2 tablet, Oral, BID  sodium chloride, 10 mL, Intravenous, Q12H    Infusions     Diet  Diet: Cardiac; Healthy Heart (2-3 Na+); Fluid Consistency: Thin (IDDSI 0)    I have personally reviewed:  [x]  Laboratory   []  Microbiology   [x]  Radiology   []  EKG/Telemetry  []  Cardiology/Vascular   []  Pathology    []  Records       Assessment/Plan     Active Hospital Problems    Diagnosis  POA   • **Compression fracture of T8 vertebra, initial encounter [S22.060A]  Yes   • Left leg pain [M79.605]  Yes   • Macrocytosis [D75.89]  Yes   • Hyperlipidemia [E78.5]  Yes   • Thyroid nodule [E04.1]  Yes   • Essential hypertension [I10]  Yes   • Chronic diastolic CHF (congestive heart failure) [I50.32]  Yes   • Chronic kidney failure, stage 3 (moderate) [N18.30]  Yes   • Fall [W19.XXXA]  Yes   • Hypothyroidism (acquired) [E03.9]  Yes   • Dizzy [R42]  Yes   • Atrial fibrillation [I48.91]  Yes   • History of CVA (cerebrovascular accident) [Z86.73]  Not Applicable      Resolved Hospital Problems    Diagnosis Date Resolved POA   • Abdominal pain [R10.9] 05/20/2024 Yes   • Hypercalcemia [E83.52] 05/19/2024 Yes       89 y.o. female admitted with Compression fracture of T8 vertebra, initial encounter.    MRI lumbar spine evidently completed this morning.  No evidence of acute osseous injury.  Chronic changes noted.  CCP working on discharge arrangements.  Will need SNU at discharge.  Otherwise continue current pain control efforts, back brace, mobilization, etc.       T8 compression fracture: Pain fairly controlled  - Neurosurgery following.  TLSO  brace ordered  -MRI with acute T8 compression fracture.  Defer to neurosurgery regarding plans, note pending  - Patient tolerated PT and OT wearing the brace today    Circumstances of her fall sound somewhat mechanical though she did describe some vague dizziness.  - Stress test and echo unremarkable   - Linq device already in place  - Orthostatics will be difficult to obtain given leg pain  -CTA negative other than some chronic cerebrovascular disease but nothing significant    Chronic A-fib: Currently well-controlled on no rate control meds as there is some documentation previously of potential SSS.  - Eliquis restarted as patient does not feel she would want surgery    Left leg pain: No fracture on x-ray.  Does have significant osteoarthritis       Eliquis (home med) for DVT prophylaxis.  Full code.  Discussed with patient and nursing staff.  Anticipate discharge to SNU facility once arrangements have been made.      Erasto Pompa MD  [facility]  05/22/24  13:15 EDT

## 2024-05-22 NOTE — THERAPY TREATMENT NOTE
Patient Name: Alba Correa  : 1933    MRN: [id number]                              Today's Date: 2024       Admit Date: 2024    Visit Dx:     ICD-10-CM ICD-9-CM   1. Compression fracture of T8 vertebra, initial encounter  S22.060A 805.2   2. Ground-level fall  W18.30XA E888.9     Patient Active Problem List   Diagnosis    CKD (chronic kidney disease) stage 4, GFR 15-29 ml/min    Depression    Gout    Hyperparathyroidism    Adaptive colitis    Osteopenia    Rheumatoid arthritis    Degeneration of intervertebral disc    Essential hypertension    Detrusor muscle hypertonia    History of DVT (deep vein thrombosis)    Hyperlipidemia    Macrocytosis    Nonrheumatic aortic (valve) insufficiency    History of CVA (cerebrovascular accident)    Atrial fibrillation    Anemia in chronic kidney disease    Other proteinuria    Dizzy    Sinus bradycardia    First degree AV block    Nonrheumatic mitral valve regurgitation    Idiopathic peripheral neuropathy    Lower extremity edema    Acute diastolic CHF (congestive heart failure)    Hypothyroidism (acquired)    Sick sinus syndrome    Insomnia    Fall    Chronic kidney disease, stage 3a    Hematoma of thigh    Traumatic hematoma of scalp    Abnormal CT of spine    At risk for falling    Superior mesenteric artery stenosis    Compression fracture of T8 vertebra, initial encounter    Macrocytosis    Hyperlipidemia    Thyroid nodule    Essential hypertension    Chronic diastolic CHF (congestive heart failure)    Chronic kidney failure, stage 3 (moderate)    Left leg pain     Past Medical History:   Diagnosis Date    Acquired hypothyroidism 2022    Acute diastolic CHF (congestive heart failure) 2022    Chronic kidney disease     Closed fracture of left distal humerus 2023    Closed head injury 2023    CVA (cerebral vascular accident)     Degeneration of intervertebral disc     Depression     DVT (deep venous thrombosis)     Facial laceration  03/07/2023    First degree AV block 05/26/2021    Gout     H/O complete eye exam 09/2016    Hyperlipidemia     IBS (irritable bowel syndrome)     Laceration of right forearm 03/07/2023    Nonrheumatic mitral valve regurgitation     OAB (overactive bladder)     Osteopenia     Osteoporosis     PAF (paroxysmal atrial fibrillation) 11/08/2019    Peripheral neuropathy     Rheumatoid arthritis     Sinus bradycardia 05/26/2021    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     Past Surgical History:   Procedure Laterality Date    APPENDECTOMY      BREAST BIOPSY      CARDIAC ELECTROPHYSIOLOGY PROCEDURE N/A 10/12/2021    Procedure: Loop insertion linq;  Surgeon: Tone Anguiano MD;  Location: [facility] CATH INVASIVE LOCATION;  Service: Cardiovascular;  Laterality: N/A;    CARDIOVERSION  08/03/2020    Dr. Colby    COLONOSCOPY      declines    ELBOW OPEN REDUCTION INTERNAL FIXATION Left 3/11/2023    Procedure: ELBOW OPEN REDUCTION INTERNAL FIXATION;  Surgeon: Ramón Encinas II, MD;  Location: [facility] MAIN OR;  Service: Orthopedics;  Laterality: Left;    HERNIA REPAIR  1965    HYSTERECTOMY      still has ovaries    MAMMO BILATERAL  11/16/2016    pt declines    PAP SMEAR  11/16/2016    declines      General Information       Row Name 05/22/24 1701          Physical Therapy Time and Intention    Document Type therapy note (daily note)  -EM     Mode of Treatment individual therapy;physical therapy  -EM       Row Name 05/22/24 1701          General Information    Existing Precautions/Restrictions fall;TLSO  -EM               User Key  (r) = Recorded By, (t) = Taken By, (c) = Cosigned By      Initials Name Provider Type    EM Simley Roblero, PT Physical Therapist                   Mobility       Row Name 05/22/24 1701          Bed Mobility    Supine-Sit Rose (Bed Mobility) standby assist;verbal cues  -EM       Row Name 05/22/24 1701          Sit-Stand Transfer    Sit-Stand Rose (Transfers)  contact guard;minimum assist (75% patient effort);verbal cues  -EM     Assistive Device (Sit-Stand Transfers) walker, front-wheeled  -EM       Row Name 05/22/24 1701          Gait/Stairs (Locomotion)    Sherman Level (Gait) contact guard  -EM     Assistive Device (Gait) walker, front-wheeled  -EM     Distance in Feet (Gait) 75  -EM     Deviations/Abnormal Patterns (Gait) gait speed decreased;stride length decreased  -EM               User Key  (r) = Recorded By, (t) = Taken By, (c) = Cosigned By      Initials Name Provider Type    Smiley Escudero PT Physical Therapist                   Obj/Interventions       Row Name 05/22/24 1704          Motor Skills    Therapeutic Exercise other (see comments)  AP, LAQ  -EM               User Key  (r) = Recorded By, (t) = Taken By, (c) = Cosigned By      Initials Name Provider Type    Smiley Escudero PT Physical Therapist                   Goals/Plan    No documentation.                  Clinical Impression       Row Name 05/22/24 1704          Pain    Pretreatment Pain Rating 0/10 - no pain  -EM       Row Name 05/22/24 1704          Plan of Care Review    Plan of Care Reviewed With patient  -EM     Outcome Evaluation Pt in supine, awake and alert, agreeable to therapy, no c/o pain. patient performed bed mobility with SBA, TLSO donned once patient up on EOB. Patient performed sit to stand with min/CGA with cues for hand placement, ambulated in hallway with rwx and CGA, demonstrating increased tolerance to activity and improved gait stability. Recommend SNU at d/c.  -EM       Row Name 05/22/24 1704          Positioning and Restraints    Pre-Treatment Position in bed  -EM     Post Treatment Position chair  -EM     In Chair reclined;call light within reach;exit alarm on;with family/caregiver  -EM               User Key  (r) = Recorded By, (t) = Taken By, (c) = Cosigned By      Initials Name Provider Type    Smiley Escudero PT Physical Therapist                    Outcome Measures       Row Name 05/22/24 1707 05/22/24 1600       How much help from another person do you currently need...    Turning from your back to your side while in flat bed without using bedrails? 3  -EM 3  -CM    Moving from lying on back to sitting on the side of a flat bed without bedrails? 3  -EM 3  -CM    Moving to and from a bed to a chair (including a wheelchair)? 3  -EM 3  -CM    Standing up from a chair using your arms (e.g., wheelchair, bedside chair)? 3  -EM 3  -CM    Climbing 3-5 steps with a railing? 3  -EM 2  -CM    To walk in hospital room? 3  -EM 3  -CM    AM-PAC 6 Clicks Score (PT) 18  -EM 17  -CM    Highest Level of Mobility Goal 6 --> Walk 10 steps or more  -EM 5 --> Static standing  -CM              User Key  (r) = Recorded By, (t) = Taken By, (c) = Cosigned By      Initials Name Provider Type     Smiley Roblero PT Physical Therapist    CM Trisha Damon, RN Registered Nurse                                 Physical Therapy Education       Title: PT OT SLP Therapies (In Progress)       Topic: Physical Therapy (In Progress)       Point: Mobility training (Done)       Learning Progress Summary             Patient Acceptance, E, VU by  at 5/22/2024 1707    Acceptance, E,TB,D, VU,NR by  at 5/21/2024 1248                         Point: Home exercise program (Not Started)       Learner Progress:  Not documented in this visit.              Point: Body mechanics (Done)       Learning Progress Summary             Patient Acceptance, E,TB,D, VU,NR by  at 5/21/2024 1248                         Point: Precautions (Done)       Learning Progress Summary             Patient Acceptance, E,TB,D, VU,NR by  at 5/21/2024 1248                                         User Key       Initials Effective Dates Name Provider Type Discipline     06/16/21 -  Smiley Roblero, PT Physical Therapist PT    MG 05/24/22 -  Madai Jackson PT Physical Therapist PT                  PT  Recommendation and Plan     Plan of Care Reviewed With: patient  Outcome Evaluation: Pt in supine, awake and alert, agreeable to therapy, no c/o pain. patient performed bed mobility with SBA, TLSO donned once patient up on EOB. Patient performed sit to stand with min/CGA with cues for hand placement, ambulated in hallway with rwx and CGA, demonstrating increased tolerance to activity and improved gait stability. Recommend SNU at d/c.     Time Calculation:         PT Charges       Row Name 05/22/24 1708             Time Calculation    Start Time 1607  -EM      Stop Time 1630  -EM      Time Calculation (min) 23 min  -EM      PT Received On 05/22/24  -EM      PT - Next Appointment 05/23/24  -EM         Time Calculation- PT    Total Timed Code Minutes- PT 23 minute(s)  -EM         Timed Charges    92398 - PT Therapeutic Activity Minutes 23  -EM         Total Minutes    Timed Charges Total Minutes 23  -EM       Total Minutes 23  -EM                User Key  (r) = Recorded By, (t) = Taken By, (c) = Cosigned By      Initials Name Provider Type    EM Smiley Roblero PT Physical Therapist                  Therapy Charges for Today       Code Description Service Date Service Provider Modifiers Qty    [id number]  PT THERAPEUTIC ACT EA 15 MIN 5/22/2024 Smiley Roblero PT GP 2            PT G-Codes  Outcome Measure Options: AM-PAC 6 Clicks Daily Activity (OT)  AM-PAC 6 Clicks Score (PT): 18  AM-PAC 6 Clicks Score (OT): 12  PT Discharge Summary  Anticipated Discharge Disposition (PT): skilled nursing facility    Smiley Roblero PT  5/22/2024

## 2024-05-22 NOTE — SIGNIFICANT NOTE
05/22/24 1331   OTHER   Discipline occupational therapist   Rehab Time/Intention   Session Not Performed other (see comments)  (Per chart review, lumbar MRI still pending this AM and OT will await Dr. Negro's recs and f/u once complete)   Recommendation   OT - Next Appointment 05/23/24

## 2024-05-22 NOTE — PLAN OF CARE
Goal Outcome Evaluation:  Plan of Care Reviewed With: patient        Progress: declining     Patient had episodes of confusions and was not cooperative, she refused her MRI test stating she will do it in the morning instead. She was redirected appropriately. Patient is asleep this moment. Constant monitoring and bed alarm on to ensure patient safety because she made numerous attempt to get up from her bed with an unsteady gait

## 2024-05-22 NOTE — PROGRESS NOTES
"[facility]    Patient Name: Alba Correa  :1933  89 y.o.  LOS: 2  Encounter Provider: GROVER Fay      Patient Care Team:  Aramis Doty MD as PCP - General (Family Medicine)  Juan Jose Muñiz MD as Consulting Physician (Neurology)  Roshan Martines III, MD as Cardiologist (Cardiology)  Nehal Murray MD as Consulting Physician (Nephrology)    Chief Complaint:  Dizziness     Interval History: No acute events overnight. No surgical procedures planned. Awaiting Lumbar MRI prior to further neurosurgery recommendations, but patient states she would prefer to avoid surgery if at all possible.       Objective   Vital Signs  Temp:  [97.7 °F (36.5 °C)-98.8 °F (37.1 °C)] 98.8 °F (37.1 °C)  Heart Rate:  [] 96  Resp:  [16-18] 18  BP: (133-173)/(60-93) 151/76    Intake/Output Summary (Last 24 hours) at 2024 0906  Last data filed at 2024 0847  Gross per 24 hour   Intake 240 ml   Output 500 ml   Net -260 ml     Flowsheet Rows      Flowsheet Row First Filed Value   Admission Height 152.4 cm (60\") Documented at 2024 1446   Admission Weight 47.6 kg (105 lb) Documented at 2024 1446              Vitals reviewed.   Constitutional:       General: Not in acute distress.     Appearance: Well-developed and not in distress. Not diaphoretic.   HENT:      Head: Normocephalic.   Pulmonary:      Effort: Pulmonary effort is normal. No respiratory distress.      Breath sounds: Normal breath sounds. No wheezing. No rhonchi. No rales.   Cardiovascular:      Normal rate. Regular rhythm.      Murmurs: There is no murmur.   Pulses:     Radial: 2+ bilaterally.  Edema:     Peripheral edema absent.   Skin:     General: Skin is warm and dry. There is no cyanosis.      Findings: No rash.   Neurological:      Mental Status: Alert and oriented to person, place, and time.   Psychiatric:         Behavior: Behavior normal. Behavior is cooperative.         Thought Content: Thought " content normal.         Judgment: Judgment normal.           Pertinent Test Results:  Results from last 7 days   Lab Units 05/22/24  0818 05/21/24  0443 05/20/24  2120 05/20/24  0628 05/19/24  0508 05/18/24  1809 05/18/24  1130   SODIUM mmol/L 135* 136  --  137 141 142 142   POTASSIUM mmol/L 4.1 4.3 3.9 3.1* 4.1 4.3 4.5   CHLORIDE mmol/L 101 104  --  102 107 106 103   CO2 mmol/L 20.0* 21.5*  --  23.9 24.0 24.0 27.0   BUN mg/dL 26* 19  --  15 19 25* 33*   CREATININE mg/dL 0.87 0.84  --  0.58 0.81 0.78 1.01*   GLUCOSE mg/dL 134* 106*  --  128* 97 100* 105*   CALCIUM mg/dL 9.2 9.2  --  9.3 8.9 9.5 9.8*   AST (SGOT) U/L  --   --   --   --   --   --  24   ALT (SGPT) U/L  --   --   --   --   --   --  15     Results from last 7 days   Lab Units 05/18/24  1341 05/18/24  1130   HSTROP T ng/L 14* 15*     Results from last 7 days   Lab Units 05/19/24  0508 05/18/24  1130   WBC 10*3/mm3 5.73 6.37   HEMOGLOBIN g/dL 11.7* 13.7   HEMATOCRIT % 35.3 41.0   PLATELETS 10*3/mm3 233 258         Results from last 7 days   Lab Units 05/18/24  1130   MAGNESIUM mg/dL 2.1     Results from last 7 days   Lab Units 05/19/24  0508   CHOLESTEROL mg/dL 170   TRIGLYCERIDES mg/dL 123   HDL CHOL mg/dL 35*         Results from last 7 days   Lab Units 05/18/24  1130   TSH uIU/mL 1.170           Medication Review:   allopurinol, 100 mg, Oral, Daily  apixaban, 2.5 mg, Oral, BID  DULoxetine, 60 mg, Oral, Daily  famotidine, 20 mg, Oral, Daily  levothyroxine, 50 mcg, Oral, QAM  Lidocaine, 1 patch, Transdermal, Q24H  losartan, 25 mg, Oral, Daily  senna-docusate sodium, 2 tablet, Oral, BID  sodium chloride, 10 mL, Intravenous, Q12H              Assessment & Plan     Active Hospital Problems    Diagnosis  POA    **Compression fracture of T8 vertebra, initial encounter [S22.060A]  Yes    Left leg pain [M79.605]  Yes    Macrocytosis [D75.89]  Yes    Hyperlipidemia [E78.5]  Yes    Thyroid nodule [E04.1]  Yes    Essential hypertension [I10]  Yes    Chronic  diastolic CHF (congestive heart failure) [I50.32]  Yes    Chronic kidney failure, stage 3 (moderate) [N18.30]  Yes    Fall [W19.XXXA]  Yes    Hypothyroidism (acquired) [E03.9]  Yes    Dizzy [R42]  Yes    Atrial fibrillation [I48.91]  Yes    History of CVA (cerebrovascular accident) [Z86.73]  Not Applicable      Resolved Hospital Problems    Diagnosis Date Resolved POA    Abdominal pain [R10.9] 05/20/2024 Yes    Hypercalcemia [E83.52] 05/19/2024 Yes        Dizziness and fall with history of syncope: Orthostatics difficult to obtain due to leg pain. Neurology recommended compression stockings.  Elevated troponin: Stress test negative for ischemia. Echo with normal LVEF and diastolic function with normal wall motion. Patient has Linq device in place, last transmission 5/14/24 with no new events.  CKD 3  Atrial fibrillation: On Eliquis at home. Rate controlled and avoiding AV marlene blocker therapy due to early SSS. Eliquis restarted.  T8 compression fracture: TLSO brace per neurosurgery. Await MRI and FRANCY recommendations.    Cardiology will sign off. No objection to discharge from cardiac standpoint. Patient has currently scheduled follow up with Dr. Martines on 6/11/24 and should keep that appointment if possible.           GROVER Fay  [facility]   [facility]  [address] [place] [address]  Office: [phone]    05/22/24  09:06 EDT

## 2024-05-23 PROCEDURE — 97530 THERAPEUTIC ACTIVITIES: CPT

## 2024-05-23 RX ADMIN — ALLOPURINOL 100 MG: 100 TABLET ORAL at 10:26

## 2024-05-23 RX ADMIN — SENNOSIDES AND DOCUSATE SODIUM 2 TABLET: 50; 8.6 TABLET ORAL at 10:26

## 2024-05-23 RX ADMIN — ACETAMINOPHEN 650 MG: 325 TABLET, FILM COATED ORAL at 17:24

## 2024-05-23 RX ADMIN — LEVOTHYROXINE SODIUM 50 MCG: 50 TABLET ORAL at 10:26

## 2024-05-23 RX ADMIN — LOSARTAN POTASSIUM 25 MG: 25 TABLET, FILM COATED ORAL at 10:26

## 2024-05-23 RX ADMIN — FAMOTIDINE 20 MG: 20 TABLET, FILM COATED ORAL at 10:26

## 2024-05-23 RX ADMIN — APIXABAN 2.5 MG: 2.5 TABLET, FILM COATED ORAL at 20:37

## 2024-05-23 RX ADMIN — DULOXETINE HYDROCHLORIDE 60 MG: 60 CAPSULE, DELAYED RELEASE ORAL at 10:26

## 2024-05-23 RX ADMIN — Medication 10 ML: at 10:26

## 2024-05-23 RX ADMIN — ACETAMINOPHEN 650 MG: 325 TABLET, FILM COATED ORAL at 10:31

## 2024-05-23 RX ADMIN — APIXABAN 2.5 MG: 2.5 TABLET, FILM COATED ORAL at 10:26

## 2024-05-23 RX ADMIN — Medication 10 ML: at 20:37

## 2024-05-23 NOTE — PLAN OF CARE
Plan of Care Reviewed With: patient  Outcome Evaluation: vss. telemetry monitor, a fib. alert and oriented x3, forgetful. falls precautions. assist x1, tlso brace when out of bed, up to chair. awaiting pre cert to snf.

## 2024-05-23 NOTE — THERAPY EVALUATION
Patient Name: Alba Correa  : 1933    MRN: [id number]                              Today's Date: 2024       Admit Date: 2024    Visit Dx:     ICD-10-CM ICD-9-CM   1. Compression fracture of T8 vertebra, initial encounter  S22.060A 805.2   2. Ground-level fall  W18.30XA E888.9     Patient Active Problem List   Diagnosis    CKD (chronic kidney disease) stage 4, GFR 15-29 ml/min    Depression    Gout    Hyperparathyroidism    Adaptive colitis    Osteopenia    Rheumatoid arthritis    Degeneration of intervertebral disc    Essential hypertension    Detrusor muscle hypertonia    History of DVT (deep vein thrombosis)    Hyperlipidemia    Macrocytosis    Nonrheumatic aortic (valve) insufficiency    History of CVA (cerebrovascular accident)    Atrial fibrillation    Anemia in chronic kidney disease    Other proteinuria    Dizzy    Sinus bradycardia    First degree AV block    Nonrheumatic mitral valve regurgitation    Idiopathic peripheral neuropathy    Lower extremity edema    Acute diastolic CHF (congestive heart failure)    Hypothyroidism (acquired)    Sick sinus syndrome    Insomnia    Fall    Chronic kidney disease, stage 3a    Hematoma of thigh    Traumatic hematoma of scalp    Abnormal CT of spine    At risk for falling    Superior mesenteric artery stenosis    Compression fracture of T8 vertebra, initial encounter    Macrocytosis    Hyperlipidemia    Thyroid nodule    Essential hypertension    Chronic diastolic CHF (congestive heart failure)    Chronic kidney failure, stage 3 (moderate)    Left leg pain     Past Medical History:   Diagnosis Date    Acquired hypothyroidism 2022    Acute diastolic CHF (congestive heart failure) 2022    Chronic kidney disease     Closed fracture of left distal humerus 2023    Closed head injury 2023    CVA (cerebral vascular accident)     Degeneration of intervertebral disc     Depression     DVT (deep venous thrombosis)     Facial laceration  03/07/2023    First degree AV block 05/26/2021    Gout     H/O complete eye exam 09/2016    Hyperlipidemia     IBS (irritable bowel syndrome)     Laceration of right forearm 03/07/2023    Nonrheumatic mitral valve regurgitation     OAB (overactive bladder)     Osteopenia     Osteoporosis     PAF (paroxysmal atrial fibrillation) 11/08/2019    Peripheral neuropathy     Rheumatoid arthritis     Sinus bradycardia 05/26/2021    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     Past Surgical History:   Procedure Laterality Date    APPENDECTOMY      BREAST BIOPSY      CARDIAC ELECTROPHYSIOLOGY PROCEDURE N/A 10/12/2021    Procedure: Loop insertion linq;  Surgeon: Tone Anguiano MD;  Location: [facility] CATH INVASIVE LOCATION;  Service: Cardiovascular;  Laterality: N/A;    CARDIOVERSION  08/03/2020    Dr. Colby    COLONOSCOPY      declines    ELBOW OPEN REDUCTION INTERNAL FIXATION Left 3/11/2023    Procedure: ELBOW OPEN REDUCTION INTERNAL FIXATION;  Surgeon: Ramón Encinas II, MD;  Location: [facility] MAIN OR;  Service: Orthopedics;  Laterality: Left;    HERNIA REPAIR  1965    HYSTERECTOMY      still has ovaries    MAMMO BILATERAL  11/16/2016    pt declines    PAP SMEAR  11/16/2016    declines      General Information       Row Name 05/23/24 1529          Physical Therapy Time and Intention    Document Type therapy note (daily note)  -LW     Mode of Treatment individual therapy;physical therapy  -LW       Row Name 05/23/24 1529          General Information    Patient Profile Reviewed yes  -LW     Existing Precautions/Restrictions fall;TLSO  -LW       Row Name 05/23/24 1529          Cognition    Orientation Status (Cognition) oriented x 3  -LW       Row Name 05/23/24 1529          Safety Issues, Functional Mobility    Impairments Affecting Function (Mobility) endurance/activity tolerance;strength  -LW               User Key  (r) = Recorded By, (t) = Taken By, (c) = Cosigned By      Initials Name Provider Type     Mariya Deleon PT Physical Therapist                   Mobility       Row Name 05/23/24 1529          Bed Mobility    Bed Mobility rolling right;supine-sit  -LW     Rolling Right Many (Bed Mobility) standby assist;verbal cues  -LW     Supine-Sit Many (Bed Mobility) standby assist;verbal cues  -LW     Assistive Device (Bed Mobility) bed rails  -LW       Row Name 05/23/24 1529          Sit-Stand Transfer    Sit-Stand Many (Transfers) contact guard;minimum assist (75% patient effort);verbal cues  -LW     Assistive Device (Sit-Stand Transfers) walker, front-wheeled  -LW       Row Name 05/23/24 1529          Gait/Stairs (Locomotion)    Many Level (Gait) contact guard  -LW     Assistive Device (Gait) walker, front-wheeled  -LW     Distance in Feet (Gait) 40  -LW     Deviations/Abnormal Patterns (Gait) gait speed decreased;stride length decreased  -LW     Comment, (Gait/Stairs) limited by fatigue, no LOB or unsteadiness  -LW               User Key  (r) = Recorded By, (t) = Taken By, (c) = Cosigned By      Initials Name Provider Type    Mariya Deleon PT Physical Therapist                   Obj/Interventions       Row Name 05/23/24 1531          Balance    Balance Assessment sitting static balance;sitting dynamic balance;standing static balance;standing dynamic balance  -LW     Static Sitting Balance standby assist  -LW     Dynamic Sitting Balance standby assist  -LW     Position, Sitting Balance sitting edge of bed;unsupported  -LW     Static Standing Balance contact guard  -LW     Dynamic Standing Balance contact guard;standby assist  -LW     Position/Device Used, Standing Balance walker, front-wheeled  -LW     Balance Interventions sitting;standing;sit to stand;supported;static;dynamic  -LW               User Key  (r) = Recorded By, (t) = Taken By, (c) = Cosigned By      Initials Name Provider Type    Mariya Deleon PT Physical Therapist                   Goals/Plan    No  documentation.                  Clinical Impression       Row Name 05/23/24 1531          Pain    Pretreatment Pain Rating 0/10 - no pain  -LW     Posttreatment Pain Rating 0/10 - no pain  -LW       Row Name 05/23/24 1531          Plan of Care Review    Plan of Care Reviewed With patient  -LW     Progress improving  -LW     Outcome Evaluation Pt was agreeable to working with PT. She was SBA for bed mobility with cuing for a log roll and stood with CGA and rwx. She ambulated 40' with CGA and rwx. Her gait was slow but steady with no LOB. Pt will benefit from continued skilled PT addressing limitations in functional mobility to maximize safety and independence.  -LW       Row Name 05/23/24 1531          Positioning and Restraints    Pre-Treatment Position in bed  -LW     Post Treatment Position chair  -LW     In Chair reclined;call light within reach;encouraged to call for assist;exit alarm on  -LW               User Key  (r) = Recorded By, (t) = Taken By, (c) = Cosigned By      Initials Name Provider Type    Mariya Deleon PT Physical Therapist                   Outcome Measures       Row Name 05/23/24 1532          How much help from another person do you currently need...    Turning from your back to your side while in flat bed without using bedrails? 4  -LW     Moving from lying on back to sitting on the side of a flat bed without bedrails? 4  -LW     Moving to and from a bed to a chair (including a wheelchair)? 3  -LW     Standing up from a chair using your arms (e.g., wheelchair, bedside chair)? 3  -LW     Climbing 3-5 steps with a railing? 3  -LW     To walk in hospital room? 3  -LW     AM-PAC 6 Clicks Score (PT) 20  -LW     Highest Level of Mobility Goal 6 --> Walk 10 steps or more  -LW       Row Name 05/23/24 1532          Functional Assessment    Outcome Measure Options AM-PAC 6 Clicks Basic Mobility (PT)  -LW               User Key  (r) = Recorded By, (t) = Taken By, (c) = Cosigned By      Initials Name  Provider Type    LW Mariya Santiago, PT Physical Therapist                                 Physical Therapy Education       Title: PT OT SLP Therapies (In Progress)       Topic: Physical Therapy (In Progress)       Point: Mobility training (Done)       Learning Progress Summary             Patient Acceptance, E, VU by LW at 5/23/2024 1532    Acceptance, E, VU by EM at 5/22/2024 1707    Acceptance, E,TB,D, VU,NR by MG at 5/21/2024 1248                         Point: Home exercise program (Not Started)       Learner Progress:  Not documented in this visit.              Point: Body mechanics (Done)       Learning Progress Summary             Patient Acceptance, E, VU by LW at 5/23/2024 1532    Acceptance, E,TB,D, VU,NR by MG at 5/21/2024 1248                         Point: Precautions (Done)       Learning Progress Summary             Patient Acceptance, E, VU by LW at 5/23/2024 1532    Acceptance, E,TB,D, VU,NR by MG at 5/21/2024 1248                                         User Key       Initials Effective Dates Name Provider Type Discipline    EM 06/16/21 -  Smiley Roblero, PT Physical Therapist PT    MG 05/24/22 -  Madai Jackson, PT Physical Therapist PT    LW 05/08/23 -  Mariya Santiago, PT Physical Therapist PT                  PT Recommendation and Plan     Plan of Care Reviewed With: patient  Progress: improving  Outcome Evaluation: Pt was agreeable to working with PT. She was SBA for bed mobility with cuing for a log roll and stood with CGA and rwx. She ambulated 40' with CGA and rwx. Her gait was slow but steady with no LOB. Pt will benefit from continued skilled PT addressing limitations in functional mobility to maximize safety and independence.     Time Calculation:         PT Charges       Row Name 05/23/24 1533             Time Calculation    Start Time 1450  -LW      Stop Time 1513  -LW      Time Calculation (min) 23 min  -LW      PT Received On 05/23/24  -LW      PT - Next Appointment 05/24/24  -LW          Time Calculation- PT    Total Timed Code Minutes- PT 23 minute(s)  -LW         Timed Charges    28653 - PT Therapeutic Activity Minutes 23  -LW         Total Minutes    Timed Charges Total Minutes 23  -LW       Total Minutes 23  -LW                User Key  (r) = Recorded By, (t) = Taken By, (c) = Cosigned By      Initials Name Provider Type    Mariya Deleon PT Physical Therapist                  Therapy Charges for Today       Code Description Service Date Service Provider Modifiers Qty    88708779614 HC PT THERAPEUTIC ACT EA 15 MIN 5/23/2024 Mariya Santiago, PT GP 2            PT G-Codes  Outcome Measure Options: AM-PAC 6 Clicks Basic Mobility (PT)  AM-PAC 6 Clicks Score (PT): 20  AM-PAC 6 Clicks Score (OT): 12       Mariya Santiago PT  5/23/2024

## 2024-05-23 NOTE — PROGRESS NOTES
Name: Alba Correa ADMIT: 2024   : 1933  PCP: Aramis Doty MD    MRN: [id number] LOS: 3 days   AGE/SEX: 89 y.o. female  ROOM: [facility]     Subjective   Subjective   No new issues.     Objective   Objective   Vital Signs  Temp:  [97.3 °F (36.3 °C)-98.4 °F (36.9 °C)] 98.2 °F (36.8 °C)  Heart Rate:  [] 108  Resp:  [16-18] 16  BP: (110-138)/(66-78) 110/71  SpO2:  [94 %-96 %] 96 %  on   ;   Device (Oxygen Therapy): room air  Body mass index is 20.51 kg/m².  Physical Exam  Vitals reviewed.   Constitutional:       General: She is not in acute distress.     Appearance: She is not ill-appearing.   Cardiovascular:      Rate and Rhythm: Normal rate. Rhythm irregular.   Pulmonary:      Effort: No respiratory distress.      Breath sounds: Normal breath sounds. No wheezing.   Abdominal:      General: There is no distension.      Palpations: Abdomen is soft.      Tenderness: There is no abdominal tenderness.   Musculoskeletal:      Right lower leg: No edema.      Left lower leg: No edema.   Skin:     General: Skin is warm and dry.      Findings: Bruising present.   Neurological:      Mental Status: She is alert and oriented to person, place, and time.   Psychiatric:         Mood and Affect: Mood normal.       Results Review     I reviewed the patient's new clinical results.  Results from last 7 days   Lab Units 24  0508 24  1130   WBC 10*3/mm3 5.73 6.37   HEMOGLOBIN g/dL 11.7* 13.7   PLATELETS 10*3/mm3 233 258     Results from last 7 days   Lab Units 24  0818 24  0443 24  2120 24  0628 24  0508   SODIUM mmol/L 135* 136  --  137 141   POTASSIUM mmol/L 4.1 4.3 3.9 3.1* 4.1   CHLORIDE mmol/L 101 104  --  102 107   CO2 mmol/L 20.0* 21.5*  --  23.9 24.0   BUN mg/dL 26* 19  --  15 19   CREATININE mg/dL 0.87 0.84  --  0.58 0.81   GLUCOSE mg/dL 134* 106*  --  128* 97   EGFR mL/min/1.73 63.0 66.1  --  86.1 69.1     Results from last 7 days   Lab Units 24  7172  "  ALBUMIN g/dL 4.4   BILIRUBIN mg/dL 0.4   ALK PHOS U/L 100   AST (SGOT) U/L 24   ALT (SGPT) U/L 15     Results from last 7 days   Lab Units 05/22/24  0818 05/21/24  0443 05/20/24  0628 05/19/24  0508 05/18/24  1809 05/18/24  1130   CALCIUM mg/dL 9.2 9.2 9.3 8.9   < > 9.8*   ALBUMIN g/dL  --   --   --   --   --  4.4   MAGNESIUM mg/dL  --   --   --   --   --  2.1    < > = values in this interval not displayed.       No results found for: \"HGBA1C\", \"POCGLU\"    MRI Lumbar Spine Without Contrast    Result Date: 5/22/2024  1. No convincing acute osseous abnormality is seen in the lumbar spine.  2. This patient has a levoscoliotic curvature of lumbar spine with its apex at the L2-3 lumbar level. At L2-3 there is moderate to severe disc space narrowing and there is 3 mm retrolisthesis of L2 with respect to L3, mild posterior spurring and there is essentially no canal narrowing. There is spurring into the foramina with mild left and moderate right bony foraminal narrowing.  3. At L4-5, there is mild to moderate bilateral facet overgrowth and a 4-5 mm degenerative anterolisthesis of L4 with respect to L5 but there is no canal narrowing and only mild bilateral foraminal narrowing.  4. 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 There is a minimal 2-3 mm anterolisthesis of L5 on S1 and there is no canal or lateral recess or right foraminal narrowing. There is a 8 x 5 x 5 mm cyst projecting over the lateral aspect of the left neural foramen that may be a pedunculated cyst off the anterior superior lateral margin of the left facets, comes close to the posterior margin of the exiting left L5 nerve root. The remainder of the MRI of the lumbar spine is unremarkable.  This report was finalized on 5/22/2024 5:03 PM by Dr. Ankit Boss M.D on Workstation: [id number]       I have personally " reviewed all medications:  Scheduled Medications  allopurinol, 100 mg, Oral, Daily  apixaban, 2.5 mg, Oral, BID  DULoxetine, 60 mg, Oral, Daily  famotidine, 20 mg, Oral, Daily  levothyroxine, 50 mcg, Oral, QAM  Lidocaine, 1 patch, Transdermal, Q24H  losartan, 25 mg, Oral, Daily  senna-docusate sodium, 2 tablet, Oral, BID  sodium chloride, 10 mL, Intravenous, Q12H    Infusions     Diet  Diet: Cardiac; Healthy Heart (2-3 Na+); Fluid Consistency: Thin (IDDSI 0)    I have personally reviewed:  [x]  Laboratory   []  Microbiology   [x]  Radiology   []  EKG/Telemetry  []  Cardiology/Vascular   []  Pathology    []  Records       Assessment/Plan     Active Hospital Problems    Diagnosis  POA   • **Compression fracture of T8 vertebra, initial encounter [S22.060A]  Yes   • Left leg pain [M79.605]  Yes   • Macrocytosis [D75.89]  Yes   • Hyperlipidemia [E78.5]  Yes   • Thyroid nodule [E04.1]  Yes   • Essential hypertension [I10]  Yes   • Chronic diastolic CHF (congestive heart failure) [I50.32]  Yes   • Chronic kidney failure, stage 3 (moderate) [N18.30]  Yes   • Fall [W19.XXXA]  Yes   • Hypothyroidism (acquired) [E03.9]  Yes   • Dizzy [R42]  Yes   • Atrial fibrillation [I48.91]  Yes   • History of CVA (cerebrovascular accident) [Z86.73]  Not Applicable      Resolved Hospital Problems    Diagnosis Date Resolved POA   • Abdominal pain [R10.9] 05/20/2024 Yes   • Hypercalcemia [E83.52] 05/19/2024 Yes       89 y.o. female admitted with Compression fracture of T8 vertebra, initial encounter.    No new complaints.  Feel like she is doing little better.  CCP working on discharge arrangements.  Will need SNU at discharge.  Otherwise continue current pain control efforts, back brace, mobilization, etc.     Cleared for discharge by neurosurgery and cardiology.      Eliquis (home med) for DVT prophylaxis.  Full code.  Discussed with patient.  Anticipate discharge to SNU facility once precert has been obtained.      Erasto Pompa,  MD [facility]  05/23/24  15:53 EDT

## 2024-05-23 NOTE — PLAN OF CARE
Goal Outcome Evaluation:  Plan of Care Reviewed With: patient        Progress: no change   Patient had a calm shift all through the shift. No episode of patient sundowning also. She was carried along with her plan of care. No new complain made this moment.

## 2024-05-23 NOTE — CASE MANAGEMENT/SOCIAL WORK
Post-Acute Authorization Submission      Post Acute Pre-Cert Documentation  Request Submitted by Facility - Type:: Hospital  Post-Acute Authorization Type Submitted:: SNF  Date Post Acute Pre-Cert Inititated per Facility: 05/23/24  Accepting Facility: [facility] Discharge Date Requested: 05/24/24  All Clinicals Submitted?: Yes  Had Accepting Facility at Time of Submission: Yes  Response Communicated to:: , Accepting Facility Liaison  Authorization Number:: [id number]              CAMELIA Wen

## 2024-05-23 NOTE — PLAN OF CARE
Goal Outcome Evaluation:  Plan of Care Reviewed With: patient        Progress: improving  Outcome Evaluation: Pt was agreeable to working with PT. She was SBA for bed mobility with cuing for a log roll and stood with CGA and rwx. She ambulated 40' with CGA and rwx. Her gait was slow but steady with no LOB. Pt will benefit from continued skilled PT addressing limitations in functional mobility to maximize safety and independence.

## 2024-05-23 NOTE — CASE MANAGEMENT/SOCIAL WORK
Continued Stay Note  [facility]     Patient Name: Alba Correa  MRN: [id number]  Today's Date: 5/23/2024    Admit Date: 5/18/2024    Plan: [facility] Berger pending precert   Discharge Plan       Row Name 05/23/24 1340       Plan    Plan Angelique Titus pending precert    Plan Comments Bed available at [facility] Thursday or Friday. CCP requested [employer] medicare replacement precert be initiated via [facility] post acute authorizations. CCP left [facility] for Kamila/Angelique to notify of precert initiation. BABAK CAREY                   Discharge Codes    No documentation.                 Expected Discharge Date and Time       Expected Discharge Date Expected Discharge Time    May 24, 2024               BABAK Gandara

## 2024-05-24 ENCOUNTER — TELEPHONE (OUTPATIENT)
Dept: NEUROSURGERY | Facility: CLINIC | Age: 89
End: 2024-05-24
Payer: MEDICARE

## 2024-05-24 VITALS
RESPIRATION RATE: 16 BRPM | SYSTOLIC BLOOD PRESSURE: 135 MMHG | BODY MASS INDEX: 20.62 KG/M2 | WEIGHT: 105 LBS | TEMPERATURE: 98.4 F | HEIGHT: 60 IN | HEART RATE: 101 BPM | OXYGEN SATURATION: 98 % | DIASTOLIC BLOOD PRESSURE: 85 MMHG

## 2024-05-24 DIAGNOSIS — S22.060A COMPRESSION FRACTURE OF T8 VERTEBRA, INITIAL ENCOUNTER: Primary | ICD-10-CM

## 2024-05-24 RX ORDER — AMOXICILLIN 250 MG
2 CAPSULE ORAL 2 TIMES DAILY
Start: 2024-05-24

## 2024-05-24 RX ORDER — LIDOCAINE 4 G/G
1 PATCH TOPICAL
Start: 2024-05-25

## 2024-05-24 RX ADMIN — APIXABAN 2.5 MG: 2.5 TABLET, FILM COATED ORAL at 08:40

## 2024-05-24 RX ADMIN — Medication 10 ML: at 08:40

## 2024-05-24 RX ADMIN — ACETAMINOPHEN 650 MG: 325 TABLET, FILM COATED ORAL at 08:49

## 2024-05-24 RX ADMIN — LOSARTAN POTASSIUM 25 MG: 25 TABLET, FILM COATED ORAL at 08:40

## 2024-05-24 RX ADMIN — DULOXETINE HYDROCHLORIDE 60 MG: 60 CAPSULE, DELAYED RELEASE ORAL at 08:40

## 2024-05-24 RX ADMIN — LIDOCAINE 1 PATCH: 4 PATCH TOPICAL at 08:39

## 2024-05-24 RX ADMIN — FAMOTIDINE 20 MG: 20 TABLET, FILM COATED ORAL at 08:40

## 2024-05-24 RX ADMIN — LEVOTHYROXINE SODIUM 50 MCG: 50 TABLET ORAL at 06:59

## 2024-05-24 RX ADMIN — ALLOPURINOL 100 MG: 100 TABLET ORAL at 08:40

## 2024-05-24 NOTE — CASE MANAGEMENT/SOCIAL WORK
Post-Acute Authorization Submission      Post Acute Pre-Cert Documentation  Request Submitted by Facility - Type:: Hospital  Post-Acute Authorization Type Submitted:: SNF  Date Post Acute Pre-Cert Inititated per Facility: 05/23/24  Date Post Acute Pre-Cert Completed: 05/24/24  Accepting Facility: [facility] Discharge Date Requested: 05/24/24  All Clinicals Submitted?: Yes  Had Accepting Facility at Time of Submission: Yes  Response Received from Insurance?: Approval  Response Communicated to:: , Accepting Facility Liaison, Accepting Facility Auth Department  Authorization Number:: [id number]  Post Acute Pre-Cert Initiated Comment: VALID TO ADMIT UPTO 11:59PM ON 5/28/24.              Julio Castañeda, PCT

## 2024-05-24 NOTE — TELEPHONE ENCOUNTER
Valerie Bridges, Mariela Valentin MA; Berta Bach RegSched Rep  Patient will need 3-4 week hospital follow up for T8 compression fracture appointment with [facility] and will need Thoracic spine xray.    Thank you,    Marian

## 2024-05-24 NOTE — PLAN OF CARE
Goal Outcome Evaluation:  Plan of Care Reviewed With: patient        Progress: improving  Outcome Evaluation: Pt on RA, purewick in place. Rested well. Q2 turns as tolerated. Possible discharge today. VSS.

## 2024-05-24 NOTE — PROGRESS NOTES
"Nutrition Services    Patient Name:  Alba Correa  YOB: 1933  MRN: [id number]  Admit Date:  5/18/2024    Assessment Date:  05/24/24    Summary: follow-up    Current diet is Healthy Heart with thin liquids. Pt appetite is fair to good, po intakes 50-75%. Pt noted with some confusion. Plan for pt to go to SNF for further rehabilitative care. Plan for DC today. Labs reviewed. No new wt per EMR. Last BM; 5/22 - pericolace ordered.    Plan/Recommend:  Boost Glucose Control daily  Encourage good po intakes  Bowel regimen  Will continue to monitor intake, labs, wt    RD to follow.    CLINICAL NUTRITION ASSESSMENT      Reason for Assessment Follow-up Protocol     Diagnosis/Problem   Hypercalcemia - resolved  T8 compression fracture   Acute on chronic dizziness   History of A-fib      Anthropometrics        Current Height  Current Weight  BMI kg/m2 Height: 152.4 cm (60\")  Weight: 47.6 kg (105 lb) (05/21/24 0906)  Body mass index is 20.51 kg/m².   Adjusted BMI (if applicable)    BMI Category Normal/Healthy (18.4 - 24.9)   Ideal Body Weight (IBW) 100 lb   Usual Body Weight (UBW) 110-120 lb   Weight Trend Loss, Amount/Timeframe: 8.8% x 1 year   --  Labs       Pertinent Labs    Results from last 7 days   Lab Units 05/22/24  0818 05/21/24  0443 05/20/24  2120 05/20/24  0628 05/18/24  1809 05/18/24  1130   SODIUM mmol/L 135* 136  --  137   < > 142   POTASSIUM mmol/L 4.1 4.3 3.9 3.1*   < > 4.5   CHLORIDE mmol/L 101 104  --  102   < > 103   CO2 mmol/L 20.0* 21.5*  --  23.9   < > 27.0   BUN mg/dL 26* 19  --  15   < > 33*   CREATININE mg/dL 0.87 0.84  --  0.58   < > 1.01*   CALCIUM mg/dL 9.2 9.2  --  9.3   < > 9.8*   BILIRUBIN mg/dL  --   --   --   --   --  0.4   ALK PHOS U/L  --   --   --   --   --  100   ALT (SGPT) U/L  --   --   --   --   --  15   AST (SGOT) U/L  --   --   --   --   --  24   GLUCOSE mg/dL 134* 106*  --  128*   < > 105*    < > = values in this interval not displayed.     Results from last 7 days "   Lab Units 05/19/24  0508 05/18/24  1130   MAGNESIUM mg/dL  --  2.1   HEMOGLOBIN g/dL 11.7* 13.7   HEMATOCRIT % 35.3 41.0   WBC 10*3/mm3 5.73 6.37   TRIGLYCERIDES mg/dL 123  --    ALBUMIN g/dL  --  4.4     Results from last 7 days   Lab Units 05/19/24  0508 05/18/24  1130   PLATELETS 10*3/mm3 233 258     COVID19   Date Value Ref Range Status   04/29/2022 Not Detected Not Detected - Ref. Range Final     Lab Results   Component Value Date    HGBA1C 5.20 04/03/2019          Medications           Scheduled Medications allopurinol, 100 mg, Oral, Daily  apixaban, 2.5 mg, Oral, BID  DULoxetine, 60 mg, Oral, Daily  famotidine, 20 mg, Oral, Daily  levothyroxine, 50 mcg, Oral, QAM  Lidocaine, 1 patch, Transdermal, Q24H  losartan, 25 mg, Oral, Daily  senna-docusate sodium, 2 tablet, Oral, BID  sodium chloride, 10 mL, Intravenous, Q12H       Infusions     PRN Medications   acetaminophen **OR** [DISCONTINUED] acetaminophen **OR** [DISCONTINUED] acetaminophen    senna-docusate sodium **AND** polyethylene glycol **AND** bisacodyl **AND** bisacodyl    HYDROcodone-acetaminophen    melatonin    nitroglycerin    ondansetron ODT **OR** ondansetron    sodium chloride    sodium chloride     Physical Findings          General Findings disoriented, room air   Oral/Mouth Cavity tooth or teeth missing   Edema  no edema   Gastrointestinal last bowel movement: 5/22   Skin  skin tear   Tubes/Drains/Lines other: External Urinary Catheter   NFPE No clinical signs of muscle wasting or fat loss   --  Current Nutrition Orders & Evaluation of Intake       Oral Nutrition     Food Allergies NKFA   Current PO Diet Diet: Cardiac; Healthy Heart (2-3 Na+); Fluid Consistency: Thin (IDDSI 0)   Supplement Boost Glucose Control, Daily   PO Evaluation     % PO Intake 50-75%    Factors Affecting Intake: decreased appetite     Estimated Requirements         Weight used  47.6 kg    Calories  1666 - 1904 kcals (35-40 kcal/kg)    Protein  57 - 71 g (1.2 - 1.5  gm/kg)    Fluid   (Defer to physician)     PES STATEMENT / NUTRITION DIAGNOSIS      Nutrition Dx Problem  Problem: Inadequate Oral Intake  Etiology: Factors Affecting Nutrition - decreased appetite    Signs/Symptoms: Report of Minimal PO Intake and Report/Observation     NUTRITION INTERVENTION / PLAN OF CARE      Intervention Goal(s) Establish goals of care, Reduce/improve symptoms, Meet estimated needs, Advance diet, and No significant weight loss         RD Intervention/Action Await initiation/advancement of PO diet and Continue to monitor   --      Prescription/Orders:       PO Diet       Supplements Boost GC daily      Enteral Nutrition       Parenteral Nutrition    New Prescription Ordered? Continue same per protocol, No changes at this time   --      Monitor/Evaluation Per protocol   Discharge Plan/Needs Pending clinical course   --    RD to follow per protocol.      Electronically signed by:  Aisha Richards  05/24/24 12:16 EDT

## 2024-05-24 NOTE — CASE MANAGEMENT/SOCIAL WORK
Case Management Discharge Note      Final Note: SNF at [facility] via private vehicle    Provided Post Acute Provider List?: Yes  Post Acute Provider List: Nursing Home  Delivered To: Patient  Method of Delivery: In person    Selected Continued Care - Discharged on 5/24/2024 Admission date: 5/18/2024 - Discharge disposition: Skilled Nursing Facility (DC - External)      Destination Coordination complete.      Service Provider Selected Services Address Phone Fax Patient Preferred    [facility] [address] [place] [address] [phone] --              Durable Medical Equipment    No services have been selected for the patient.                Dialysis/Infusion    No services have been selected for the patient.                Home Medical Care    No services have been selected for the patient.                Therapy    No services have been selected for the patient.                Community Resources    No services have been selected for the patient.                Community & DME    No services have been selected for the patient.                    Transportation Services  Private: Car    Final Discharge Disposition Code: 03 - skilled nursing facility (SNF)

## 2024-05-29 LAB
QT INTERVAL: 353 MS
QTC INTERVAL: 447 MS

## 2024-06-04 ENCOUNTER — TELEPHONE (OUTPATIENT)
Dept: NEUROSURGERY | Facility: CLINIC | Age: 89
End: 2024-06-04
Payer: MEDICARE

## 2024-06-04 NOTE — TELEPHONE ENCOUNTER
Caller: DARIA REID AND ADAM REID    Relationship to patient: SON AND D-I-L    Best call back number: [phone]      Type of visit: HOSPITAL F/U ON COMPRESSION FRACTURE    Additional notes:TRIED TO W/T TO OFFICE, NO ANSWER.  THEY WOULD LIKE TO GET PT SCHEDULED FOR F/U.      PLEASE CALL BACK TO SCHEDULE APPT.    SHAUNA JEFFERY CALLED AS WELL, TO SCHEDULE PATIENT.  SPOKE TO DARVIN.  LET HER KNOW WE WOULD BE CALLING BACK TO SCHEDULE.  HER NUMBER -[phone].

## 2024-06-05 NOTE — TELEPHONE ENCOUNTER
Scheduled appt with patient's son Ankit. Due to transportation patient will have to get x-rays day of appt. I gave date time and location for  xrays and in office appt.    Patient's son states patient will be getting out of rehab this Friday or Saturday and ask if there's any limitation for patient or anything they should watch out for. Please advise.

## 2024-06-06 NOTE — TELEPHONE ENCOUNTER
"Called and s/w Hans to advise of the message per Marian, \"She just needs to be in her brace when out of bed. If she were to develop worsening back pain he should let us know. Other than that, not really\"     Also to inform to have xray done an hour before appt. Gave apt time and address to our office and where to go for the xray.     Hans expressed understanding.   "

## 2024-06-07 ENCOUNTER — HOME HEALTH ADMISSION (OUTPATIENT)
Dept: HOME HEALTH SERVICES | Facility: HOME HEALTHCARE | Age: 89
End: 2024-06-07
Payer: MEDICARE

## 2024-06-07 ENCOUNTER — TRANSCRIBE ORDERS (OUTPATIENT)
Dept: HOME HEALTH SERVICES | Facility: HOME HEALTHCARE | Age: 89
End: 2024-06-07
Payer: MEDICARE

## 2024-06-07 DIAGNOSIS — S22.060D CLOSED WEDGE COMPRESSION FRACTURE OF T7 VERTEBRA WITH ROUTINE HEALING, SUBSEQUENT ENCOUNTER: Primary | ICD-10-CM

## 2024-06-08 ENCOUNTER — HOME CARE VISIT (OUTPATIENT)
Dept: HOME HEALTH SERVICES | Facility: HOME HEALTHCARE | Age: 89
End: 2024-06-08
Payer: MEDICARE

## 2024-06-08 PROCEDURE — G0151 HHCP-SERV OF PT,EA 15 MIN: HCPCS

## 2024-06-08 NOTE — Clinical Note
Skilled PT was ordered for SOC visit but she currently appears independent with her functional mobility and near her baseline except for some ongoing thoracic pain from T8 compression fracture. She is compliant with TLSO brace. Recommend she continue a walking program on her own and she is motivated to resume the group exercise class at the facility or can get referral to onsite PT program there so she can utilize their equipment. Patient is in agreement with 1 x visit and will have follow up with neuro 6/11/24 and then sees her PCP, Dr Doty, on 6/17/24. PT 1x visit. OT not needed. She is independent with all mobility and ADLs    None identified except patient has returned to taking her Centrum Silver Multi-vitamin, Omega 3 Fish oild, and Calcium with Vit D. These were noted to be discontinued at hospital discharge so patient was advised to hold until cleared by neuro or Dr. Doty. Patient did not have her rehab discharge med list or her medication bottles because her daughter in law has them at her house and is assisting her with meds.

## 2024-06-09 VITALS
RESPIRATION RATE: 18 BRPM | OXYGEN SATURATION: 96 % | DIASTOLIC BLOOD PRESSURE: 70 MMHG | SYSTOLIC BLOOD PRESSURE: 131 MMHG | TEMPERATURE: 98.2 F | HEART RATE: 74 BPM

## 2024-06-09 NOTE — HOME HEALTH
"SOC Note: 89 y/o female admitted to [facility] 5/18/24 to 5/24/24 after a fall. She was found to have a thoracic compression fracture at T8. Cardiac and vascular work up was negative. Neurosurgery recommended conservative treatment and she was placed in a TLSO brace and will have to wear when OOB for 6-8 weeks. Patient initially had issues with pain control and mobility so was sent to [facility] for SNF rehab and discharged home yesterday. Patient lives alone in an independent Living Facility. Her pain is much better controlled and her mobility is now signficantly improved. She has been using a rollator walker for the last several years.     She is a very pleasant and remarkable lady for 89 y/o. She is able to ambulated up and down on 3 floors in the building without difficulty. She participates in a facility exercise class every morning M-F. Patient fixes her own breakfast and has a snack for lunch and then ambulates to the dining puri for dinnter. She has lived at the facility for a year and appears to be thriving there.     Home Health ordered for: disciplines PT and OT    Reason for Hosp/Primary Dx/Co-morbidities: Closed wedge compression fracture of T8 vertebra     Focus of Care: Skilled PT was ordered for SOC visit but she currently appears independent with her functional mobility and near her baseline except for some ongoing thoracic pain from T8 compression fracture. She is compliant with TLSO brace. Recommend she continue a walking program on her own and she is motivated to resume the group exercise class at the facility or can get referral to onsite PT program there so she can utilize their equipment. Patient is in agreement with 1 x visit and will have follow up with neuro 6/11/24 and then sees her PCP, Dr Doty, on 6/17/24. PT 1x visit. OT not needed. She is independent with all mobility and ADLs    Patient's goal(s): \"I just want to get back to going to the exercise program here and continue to walk the " "hallways with my friend.\"    Current Functional status/mobility/DME:  Independent functional mobility with rollator walker and TLSO brace.     HB status/Living Arrangements: Lives in an Independent Living facility. Pt can participate in an onsite PT program if cleared by Neuro.     Skin Integrity/wound status: Intact. She has skin tears on rt arm and great toe but open to air, scabbed, and healing well. Pics on media file    Code Status: Full    Fall Risk/Safety concerns: She does have some mild balance deficits but is compliant with rollator and does well with it.     Educated on Emergency Plan, steps to take prior to going to the ER and when to Call Home Health First:  Yes    Medication issues/Concerns: None identified except patient has returned to taking her Centrum Silver Multi-vitamin, Omega 3 Fish oild, and Calcium with Vit D. These were noted to be discontinued at hospital discharge so patient was advised to hold until cleared by neuro or Dr. Doty. Patient did not have her rehab discharge med list or her medication bottles because her daughter in law has them at her house and is assisting her with meds.     Additional Problems/Concerns: None    SDOH Barriers (i.e. caregiver concerns, social isolation, transportation, food insecurity, environment, income etc.)/Need for MSW: NA    Plan for next visit: No further visits planned. Patient to continue with her own walking program, compliance with TLSO brace when out of bed and can participate in an onsite exercise program. Pt to discussed referral to onsite PT program at the facility when she follows up with neuro."

## 2024-06-10 NOTE — PROGRESS NOTES
Subjective   Patient ID: Alba Correa is a 89 y.o. female is here today for follow-up with xray thoracic spine done on 6/11/24. Accompanied by son.     History of Present Illness    Ms. Correa is here today for follow-up from recent hospital consultation for T8 vertebral compression fracture which occurred spontaneously while bending over.  The pain was quite severe taking her to her knees.  She did not want to consider any type of surgical procedure such as kyphoplasty.  She was fitted with a TLSO brace and seems to be doing well with that.  She denies any radiating leg pain, leg heaviness, weakness in her legs or numbness/tingling.  She denies any bowel or bladder incontinence.  She continues to have some thoracic pain but gets adequate relief with Tylenol.  She resides in a assisted living apartment and is getting out going to lunch/dinner and socializing with her friends there.  She is having some difficulty with the TLSO brace writing up when she is sitting.  She is here today with new thoracic spine x-rays.    The following portions of the patient's history were reviewed and updated as appropriate: allergies, current medications, past family history, past medical history, past social history, past surgical history, and problem list.    Review of Systems   Gastrointestinal:  Negative for constipation.   Genitourinary:  Negative for difficulty urinating and enuresis.   Musculoskeletal:  Positive for back pain. Negative for gait problem.   Neurological:  Negative for weakness and numbness.   Psychiatric/Behavioral:  Negative for sleep disturbance.      Objective     Vitals:    06/11/24 1331   BP: 118/70   Pulse: 96   SpO2: 97%   Weight: 47.6 kg (105 lb)     Body mass index is 20.51 kg/m².    Tobacco Use: Medium Risk (5/20/2024)    Patient History     Smoking Tobacco Use: Former     Smokeless Tobacco Use: Never     Passive Exposure: Not on file          Physical Exam  Vitals reviewed.   Constitutional:        General: She is not in acute distress.     Appearance: Normal appearance. She is well-developed and normal weight. She is not ill-appearing, toxic-appearing or diaphoretic.   HENT:      Head: Normocephalic and atraumatic.      Right Ear: External ear normal.      Left Ear: External ear normal.      Mouth/Throat:      Mouth: Mucous membranes are moist.   Eyes:      General:         Right eye: No discharge.         Left eye: No discharge.      Conjunctiva/sclera: Conjunctivae normal.   Neck:      Trachea: No tracheal deviation.   Cardiovascular:      Rate and Rhythm: Normal rate.   Pulmonary:      Effort: Pulmonary effort is normal. No respiratory distress.   Abdominal:      General: There is no distension.      Palpations: Abdomen is soft.      Tenderness: There is no abdominal tenderness.   Musculoskeletal:         General: Tenderness and deformity present. Normal range of motion.      Cervical back: Normal range of motion and neck supple.      Right lower leg: No edema.      Left lower leg: No edema.      Comments: Tender to palpation at the mid aspect of the thoracic spine.  Thoracic kyphosis noted.  Wearing TLSO brace.   Skin:     General: Skin is warm and dry.      Findings: No erythema.   Neurological:      Mental Status: She is alert and oriented to person, place, and time.      GCS: GCS eye subscore is 4. GCS verbal subscore is 5. GCS motor subscore is 6.      Sensory: No sensory deficit.      Motor: No weakness or abnormal muscle tone.      Coordination: Coordination normal.      Deep Tendon Reflexes: Reflexes are normal and symmetric. Reflexes normal.      Comments: No motor or sensory deficits. DTR's normal. Negative Winter's; negative clonus. SLR and negative bilaterally.  Able to stand up from wheelchair independently without difficulty.   Psychiatric:         Mood and Affect: Mood normal.         Behavior: Behavior is cooperative.         Thought Content: Thought content normal.       Neurologic Exam      Mental Status   Oriented to person, place, and time.     Assessment & Plan   Independent Review of Radiographic Studies:      I personally reviewed the images from the following studies.    Independently reviewed the thoracic spine x-rays with Dr. Zhou who is here today in the office.  The x-rays continue to show compression deformity at T8 with possibly 1 mm of increased compression as compared to the prior MRI thoracic spine dated May 20, 2024.    Medical Decision Making:      Ms. Correa is doing quite well.  She is having some difficulty with the TLSO brace writing up.  I did make contact with our brace representative.  She will contact the patient in the morning and make a call to her apartment and try to assist with finding her more appropriately fitting TLSO brace.  The patient is aware that she will need to continue wearing the brace for about 6-8 more weeks.  She does not need to wear the brace when lying down or with showering.  Will plan to see her back in the office with another set of thoracic spine x-rays in 6 weeks.    Diagnoses and all orders for this visit:    1. Osteoporotic compression fracture of vertebra with routine healing, subsequent encounter (Primary)  -     XR Spine Thoracic 2 View; Future    2. Pain in thoracic spine  -     XR Spine Thoracic 2 View; Future      Return in about 6 weeks (around 7/23/2024) for 6 weeks with new thoracic Xrays; with any APC.

## 2024-06-11 ENCOUNTER — HOSPITAL ENCOUNTER (OUTPATIENT)
Dept: GENERAL RADIOLOGY | Facility: HOSPITAL | Age: 89
Discharge: HOME | End: 2024-06-11
Admitting: NURSE PRACTITIONER
Payer: MEDICARE

## 2024-06-11 ENCOUNTER — OFFICE VISIT (OUTPATIENT)
Dept: NEUROSURGERY | Facility: CLINIC | Age: 89
End: 2024-06-11
Payer: MEDICARE

## 2024-06-11 ENCOUNTER — TELEPHONE (OUTPATIENT)
Dept: NEUROSURGERY | Facility: CLINIC | Age: 89
End: 2024-06-11

## 2024-06-11 VITALS
SYSTOLIC BLOOD PRESSURE: 118 MMHG | BODY MASS INDEX: 20.51 KG/M2 | OXYGEN SATURATION: 97 % | HEART RATE: 96 BPM | DIASTOLIC BLOOD PRESSURE: 70 MMHG | WEIGHT: 105 LBS

## 2024-06-11 DIAGNOSIS — M54.6 PAIN IN THORACIC SPINE: ICD-10-CM

## 2024-06-11 DIAGNOSIS — S22.060A COMPRESSION FRACTURE OF T8 VERTEBRA, INITIAL ENCOUNTER: ICD-10-CM

## 2024-06-11 DIAGNOSIS — M80.88XD OSTEOPOROTIC COMPRESSION FRACTURE OF VERTEBRA WITH ROUTINE HEALING, SUBSEQUENT ENCOUNTER: Primary | ICD-10-CM

## 2024-06-11 PROCEDURE — 1160F RVW MEDS BY RX/DR IN RCRD: CPT | Performed by: NURSE PRACTITIONER

## 2024-06-11 PROCEDURE — 72070 X-RAY EXAM THORAC SPINE 2VWS: CPT

## 2024-06-11 PROCEDURE — 1159F MED LIST DOCD IN RCRD: CPT | Performed by: NURSE PRACTITIONER

## 2024-06-11 PROCEDURE — 99212 OFFICE O/P EST SF 10 MIN: CPT | Performed by: NURSE PRACTITIONER

## 2024-06-11 NOTE — TELEPHONE ENCOUNTER
6/11/24 PT TO FOLLOW UP WITH FERMIN GARDUNO AFTER XRAY THORACIC  ROX TO CALL PT'S SON (FAITH:[phone]) TO SCHEDULE FOR F/U  I ROUTED XRAY REFERRAL TO [facility], PER PT REQUEST.

## 2024-06-11 NOTE — TELEPHONE ENCOUNTER
Scheduled appt with patient's son Kurt. Gave date time and location of new office and instructed patient's son to get patient xrays 1-2 days prior. Gave location for imaging

## 2024-06-16 NOTE — PROGRESS NOTES
Subjective   Alba Correa is a 89 y.o. female.     CC: Hospital F/U for Compression Fx    History of Present Illness     Pt returns today after admission to [facility] mid-May, with recent f/u with Neurosurgery on 6/11/24 with this note:    Patient ID: Alba Correa is a 89 y.o. female is here today for follow-up with xray thoracic spine done on 6/11/24. Accompanied by son.      History of Present Illness     Ms. Correa is here today for follow-up from recent hospital consultation for T8 vertebral compression fracture which occurred spontaneously while bending over.  The pain was quite severe taking her to her knees.  She did not want to consider any type of surgical procedure such as kyphoplasty.  She was fitted with a TLSO brace and seems to be doing well with that.  She denies any radiating leg pain, leg heaviness, weakness in her legs or numbness/tingling.  She denies any bowel or bladder incontinence.  She continues to have some thoracic pain but gets adequate relief with Tylenol.  She resides in a assisted living apartment and is getting out going to lunch/dinner and socializing with her friends there.  She is having some difficulty with the TLSO brace riding up when she is sitting.  She is here today with new thoracic spine x-rays.    Independently reviewed the thoracic spine x-rays with Dr. Zhou who is here today in the office.  The x-rays continue to show compression deformity at T8 with possibly 1 mm of increased compression as compared to the prior MRI thoracic spine dated May 20, 2024.    Ms. Correa is doing quite well.  She is having some difficulty with the TLSO brace riding up.  I did make contact with our brace representative.  She will contact the patient in the morning and make a call to her apartment and try to assist with finding her more appropriately fitting TLSO brace.  The patient is aware that she will need to continue wearing the brace for about 6-8 more weeks.  She does not need to wear the  "brace when lying down or with showering.  Will plan to see her back in the office with another set of thoracic spine x-rays in 6 weeks.     Current outpatient and discharge medications have been reconciled for the patient.  Reviewed by: Aramis Doty MD          The following portions of the patient's history were reviewed and updated as appropriate: allergies, current medications, past family history, past medical history, past social history, past surgical history, and problem list.    Review of Systems   Constitutional:  Negative for activity change, chills and fever.   Respiratory:  Negative for cough.    Cardiovascular:  Negative for chest pain.   Psychiatric/Behavioral:  Negative for dysphoric mood.      /58   Pulse 66   Temp 97.8 °F (36.6 °C) (Oral)   Ht 154.9 cm (61\")   Wt 48.1 kg (106 lb)   LMP  (LMP Unknown)   SpO2 99%   BMI 20.03 kg/m²     Objective   Physical Exam  Vitals and nursing note reviewed.   Constitutional:       General: She is not in acute distress.     Appearance: She is well-developed.   Cardiovascular:      Rate and Rhythm: Normal rate. Rhythm irregular.   Pulmonary:      Effort: Pulmonary effort is normal.      Breath sounds: Normal breath sounds.   Neurological:      Mental Status: She is alert and oriented to person, place, and time.   Psychiatric:         Behavior: Behavior normal.         Thought Content: Thought content normal.     Hospital records reviewed with pt confirming HPI.      Assessment & Plan   Diagnoses and all orders for this visit:    1. Closed wedge compression fracture of T8 vertebra with routine healing, subsequent encounter (Primary)    2. Hospital discharge follow-up    Pt encouraged with regard to using her TLSO brace and slowly increasing activity, with close Neurosurgical f/u as planned.             "

## 2024-06-17 ENCOUNTER — OFFICE VISIT (OUTPATIENT)
Dept: FAMILY MEDICINE CLINIC | Facility: CLINIC | Age: 89
End: 2024-06-17
Payer: MEDICARE

## 2024-06-17 VITALS
DIASTOLIC BLOOD PRESSURE: 58 MMHG | HEART RATE: 66 BPM | TEMPERATURE: 97.8 F | SYSTOLIC BLOOD PRESSURE: 116 MMHG | WEIGHT: 106 LBS | BODY MASS INDEX: 20.01 KG/M2 | OXYGEN SATURATION: 99 % | HEIGHT: 61 IN

## 2024-06-17 DIAGNOSIS — Z09 HOSPITAL DISCHARGE FOLLOW-UP: ICD-10-CM

## 2024-06-17 DIAGNOSIS — S22.060D CLOSED WEDGE COMPRESSION FRACTURE OF T8 VERTEBRA WITH ROUTINE HEALING, SUBSEQUENT ENCOUNTER: Primary | ICD-10-CM

## 2024-06-17 PROCEDURE — 99213 OFFICE O/P EST LOW 20 MIN: CPT | Performed by: FAMILY MEDICINE

## 2024-06-17 PROCEDURE — 1160F RVW MEDS BY RX/DR IN RCRD: CPT | Performed by: FAMILY MEDICINE

## 2024-06-17 PROCEDURE — 1159F MED LIST DOCD IN RCRD: CPT | Performed by: FAMILY MEDICINE

## 2024-06-17 PROCEDURE — 1126F AMNT PAIN NOTED NONE PRSNT: CPT | Performed by: FAMILY MEDICINE

## 2024-06-17 PROCEDURE — 1111F DSCHRG MED/CURRENT MED MERGE: CPT | Performed by: FAMILY MEDICINE

## 2024-06-20 ENCOUNTER — HOSPITAL ENCOUNTER (EMERGENCY)
Facility: HOSPITAL | Age: 89
Discharge: HOME | End: 2024-06-20
Attending: EMERGENCY MEDICINE
Payer: MEDICARE

## 2024-06-20 ENCOUNTER — APPOINTMENT (OUTPATIENT)
Dept: CT IMAGING | Facility: HOSPITAL | Age: 89
End: 2024-06-20
Payer: MEDICARE

## 2024-06-20 VITALS
BODY MASS INDEX: 20.02 KG/M2 | HEIGHT: 61 IN | TEMPERATURE: 98.3 F | WEIGHT: 106.04 LBS | RESPIRATION RATE: 17 BRPM | OXYGEN SATURATION: 98 % | DIASTOLIC BLOOD PRESSURE: 83 MMHG | HEART RATE: 72 BPM | SYSTOLIC BLOOD PRESSURE: 163 MMHG

## 2024-06-20 DIAGNOSIS — S81.811A SKIN TEAR OF RIGHT LOWER LEG WITHOUT COMPLICATION, INITIAL ENCOUNTER: Primary | ICD-10-CM

## 2024-06-20 DIAGNOSIS — Z79.01 CHRONIC ANTICOAGULATION: ICD-10-CM
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
## 2024-06-20 PROCEDURE — 70450 CT HEAD/BRAIN W/O DYE: CPT

## 2024-06-20 PROCEDURE — 99283 EMERGENCY DEPT VISIT LOW MDM: CPT

## 2024-06-20 RX ORDER — LIDOCAINE HYDROCHLORIDE AND EPINEPHRINE 10; 10 MG/ML; UG/ML
10 INJECTION, SOLUTION INFILTRATION; PERINEURAL ONCE
Status: COMPLETED | OUTPATIENT
Start: 2024-06-20 | End: 2024-06-20

## 2024-06-20 RX ADMIN — LIDOCAINE HYDROCHLORIDE AND EPINEPHRINE 10 ML: 10; 10 INJECTION, SOLUTION INFILTRATION; PERINEURAL at 18:30

## 2024-06-20 NOTE — DISCHARGE INSTRUCTIONS
You can shower normally.  Change the dressing daily by removing the absorbent gauze and pad, applying antibiotic ointment to the wound through the mesh sticker and then reapplying a new absorbent pad and gauze    You should follow-up with wound care or your PCP for wound check within 1 week    If you notice signs of infection such as increasing pain, pus drainage, fevers, spreading redness, you should seek evaluation sooner

## 2024-06-20 NOTE — ED PROVIDER NOTES
EMERGENCY DEPARTMENT ENCOUNTER  Room Number:  03/03  PCP: Aramis Doty MD  Independent Historians: Patient      HPI:  Chief Complaint: had concerns including Fall and Laceration.       A complete HPI/ROS/PMH/PSH/SH/FH are unobtainable due to: None    Chronic or social conditions impacting patient care (Social Determinants of Health): None      Context: Alba Correa is a 89 y.o. female with a medical history of CKD, osteopenia, chronic anticoagulation due to A-fib who presents to the ED c/o acute injuries related to a fall.  She states that she was walking with her walker and fell landing on her buttocks.  She cut her right lower leg on her walker.  She is unsure of her last tetanus vaccination.  She denies any head strike, is not chronically anticoagulated on Eliquis due to history of atrial fibrillation.  She is currently recovering from a compression fracture, is wearing a TLSO.  She denies any increase in her back pain, denies any neck pain.      Review of prior external notes (non-ED) -and- Review of prior external test results outside of this encounter:  Immunization record reviewed.  Most recent Tdap administered 12/6/2023.        PAST MEDICAL HISTORY  Active Ambulatory Problems     Diagnosis Date Noted    CKD (chronic kidney disease) stage 4, GFR 15-29 ml/min     Depression     Gout     Hyperparathyroidism     Adaptive colitis     Osteopenia     Rheumatoid arthritis     Degeneration of intervertebral disc     Essential hypertension 11/20/2015    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    History of DVT (deep vein thrombosis) 07/11/2017    Hyperlipidemia 01/10/2018    Macrocytosis 02/28/2018    Nonrheumatic aortic (valve) insufficiency 10/08/2018    History of CVA (cerebrovascular accident) 11/20/2018    Atrial fibrillation     Anemia in chronic kidney disease 12/08/2016    Other proteinuria 01/26/2017    Dizzy 10/15/2020    Sinus bradycardia 05/26/2021    First degree AV block 05/26/2021    Nonrheumatic  mitral valve regurgitation     Idiopathic peripheral neuropathy 04/19/2022    Lower extremity edema 04/21/2022    Acute diastolic CHF (congestive heart failure) 03/20/2022    Hypothyroidism (acquired) 05/03/2022    Sick sinus syndrome 05/05/2022    Insomnia 05/19/2022    Fall 03/07/2023    Chronic kidney disease, stage 3a 03/07/2023    Hematoma of thigh 12/07/2023    Traumatic hematoma of scalp 12/07/2023    Abnormal CT of spine 12/07/2023    At risk for falling     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    Closed wedge compression fracture of T8 vertebra 05/18/2024    Macrocytosis 05/18/2024    Hyperlipidemia 05/18/2024    Thyroid nodule 05/18/2024    Essential hypertension 05/18/2024    Chronic diastolic CHF (congestive heart failure) 05/18/2024    Chronic kidney failure, stage 3 (moderate) 05/18/2024    Left leg pain 05/20/2024     Resolved Ambulatory Problems     Diagnosis Date Noted    DVT (deep venous thrombosis)     Edema     Elevated cholesterol     Osteoporosis     Benign essential hypertension     D (diarrhea) 11/20/2015    Open leg wound 11/20/2015    Vaginal odor 11/20/2015    Infected wound 11/20/2015    History of depression 07/11/2017    History of renal insufficiency syndrome 07/11/2017    History of degenerative disc disease 07/11/2017    History of osteoporosis 07/11/2017    History of rheumatoid arthritis 07/11/2017    Right leg weakness 08/05/2018    CVA (cerebral vascular accident) 08/06/2018    Acute intractable headache 04/02/2019    Nausea 04/02/2019    Bilateral leg weakness 04/04/2019    Chest pain 10/15/2020    Afib 10/15/2020    On amiodarone therapy 05/26/2021    History of syncope 10/10/2021    Bradycardia 03/25/2022    FATIMAH (acute kidney injury) 03/26/2022    Chest pain 04/29/2022    Closed fracture of left distal humerus 03/07/2023    Facial laceration 03/07/2023    Laceration of right forearm 03/07/2023    Closed head injury 12/07/2023    Abdominal pain 05/18/2024     Hypercalcemia 2024     Past Medical History:   Diagnosis Date    Acquired hypothyroidism 2022    Chronic kidney disease     H/O complete eye exam 2016    IBS (irritable bowel syndrome)     OAB (overactive bladder)     PAF (paroxysmal atrial fibrillation) 2019    Peripheral neuropathy          PAST SURGICAL HISTORY  Past Surgical History:   Procedure Laterality Date    APPENDECTOMY      BREAST BIOPSY      CARDIAC ELECTROPHYSIOLOGY PROCEDURE N/A 10/12/2021    Procedure: Loop insertion linq;  Surgeon: Tone Anguiano MD;  Location: [facility] CATH INVASIVE LOCATION;  Service: Cardiovascular;  Laterality: N/A;    CARDIOVERSION  2020    Dr. Colby    COLONOSCOPY      declines    ELBOW OPEN REDUCTION INTERNAL FIXATION Left 3/11/2023    Procedure: ELBOW OPEN REDUCTION INTERNAL FIXATION;  Surgeon: Ramón Encinas II, MD;  Location: [facility] MAIN OR;  Service: Orthopedics;  Laterality: Left;    HERNIA REPAIR  1965    HYSTERECTOMY      still has ovaries    MAMMO BILATERAL  2016    pt declines    PAP SMEAR  2016    declines         FAMILY HISTORY  Family History   Problem Relation Age of Onset    Alcohol abuse Other     Cancer Other     Hypertension Other     Kidney disease Other     Lung disease Other     Heart disease Mother 89    Heart failure Mother     Ovarian cancer Daughter 60    Stomach cancer Father     Kidney cancer Father 52    Lung cancer Father 52    Ovarian cancer Sister     Lung cancer Brother 65         SOCIAL HISTORY  Social History     Socioeconomic History    Marital status:      Spouse name: Peña    Number of children: 3    Years of education: High school   Tobacco Use    Smoking status: Former     Current packs/day: 0.00     Types: Cigarettes     Start date:      Quit date:      Years since quittin.5    Smokeless tobacco: Never    Tobacco comments:     caffeine use: 2 cups coffee in AM   Vaping Use    Vaping status: Never Used    Substance and Sexual Activity    Alcohol use: Yes     Comment: Occasional- glass of wine few times a week    Drug use: No    Sexual activity: Defer     Partners: Male     Birth control/protection: Surgical         ALLERGIES  Hydrocodone-acetaminophen, Lisinopril, Sulfamethoxazole-trimethoprim, and Levofloxacin      REVIEW OF SYSTEMS  Review of Systems  Included in HPI  All systems reviewed and negative except for those discussed in HPI.      PHYSICAL EXAM    I have reviewed the triage vital signs and nursing notes.    ED Triage Vitals [06/20/24 1708]   Temp Heart Rate Resp BP SpO2   98.3 °F (36.8 °C) 72 17 163/83 98 %      Temp src Heart Rate Source Patient Position BP Location FiO2 (%)   -- -- -- -- --       Physical Exam  GENERAL: alert, no acute distress  SKIN: Warm, dry.  There is a large skin tear to the posterior lateral right lower leg with a deeper subcutaneous laceration.  DP and PT pulses 2+, she has strong dorsiflexion and plantarflexion.  Sensation intact distally.  HENT: Normocephalic, atraumatic  EYES: no scleral icterus  CV: regular rhythm, regular rate  RESPIRATORY: normal effort, lungs clear  ABDOMEN: nondistended  MUSCULOSKELETAL: no deformity  NEURO: alert, moves all extremities, follows commands            LAB RESULTS  No results found for this or any previous visit (from the past 24 hour(s)).      RADIOLOGY  CT Head Without Contrast    Result Date: 6/20/2024  CT OF THE BRAIN WITHOUT CONTRAST 06/20/2024  HISTORY: Fell, head injury. Patient on blood thinners.  TECHNIQUE: Axial images were obtained through the brain without intravenous contrast.  FINDINGS: There is moderate diffuse atrophy. There is decreased attenuation of the periventricular white matter bilaterally consistent with small vessel white matter ischemic disease. There is no evidence of acute infarction, hemorrhage, midline shift or mass effect.  No bony abnormalities are seen.      1. No acute process.  Radiation dose reduction  techniques were utilized, including automated exposure control and exposure modulation based on body size.          MEDICATIONS GIVEN IN ER  Medications   lidocaine 1% - EPINEPHrine 1:149108 (XYLOCAINE W/EPI) 1 %-1:543355 injection 10 mL (10 mL Injection Given 6/20/24 1830)         ORDERS PLACED DURING THIS VISIT:  Orders Placed This Encounter   Procedures    Laceration Repair    CT Head Without Contrast         OUTPATIENT MEDICATION MANAGEMENT:  No current Epic-ordered facility-administered medications on file.     Current Outpatient Medications Ordered in Epic   Medication Sig Dispense Refill    acetaminophen (TYLENOL) 650 MG 8 hr tablet Take 2 tablets by mouth Every 8 (Eight) Hours As Needed for Mild Pain. Indications: Pain      allopurinol (ZYLOPRIM) 100 MG tablet Take 1 tablet by mouth Daily. 90 tablet 1    DULoxetine (CYMBALTA) 60 MG capsule Take 1 capsule by mouth Daily. 90 capsule 1    Eliquis 2.5 MG tablet tablet Take 1 tablet by mouth 2 (Two) Times a Day. 180 tablet 3    levothyroxine (SYNTHROID, LEVOTHROID) 50 MCG tablet Take 1 tablet by mouth Every Morning. 90 tablet 1    Lidocaine 4 % Place 1 patch on the skin as directed by provider Daily. Remove & Discard patch within 12 hours or as directed by MD      losartan (Cozaar) 25 MG tablet Take 0.5 tablets by mouth Daily. 45 tablet 1         PROCEDURES  Laceration Repair    Date/Time: 6/20/2024 6:35 PM    Performed by: Mira Richardson PA-C  Authorized by: Jose J Roberts MD    Consent:     Consent obtained:  Verbal    Consent given by:  Patient    Risks, benefits, and alternatives were discussed: yes      Risks discussed:  Infection, pain, poor cosmetic result and poor wound healing    Alternatives discussed:  No treatment  Anesthesia:     Anesthesia method:  Local infiltration    Local anesthetic:  Lidocaine 1% WITH epi  Laceration details:     Location:  Leg    Leg location:  R lower leg    Length (cm):  3  Pre-procedure details:     Preparation:   Patient was prepped and draped in usual sterile fashion  Exploration:     Hemostasis achieved with:  Epinephrine and direct pressure    Wound exploration: wound explored through full range of motion and entire depth of wound visualized      Wound extent: no foreign bodies/material noted      Contaminated: no    Treatment:     Area cleansed with:  Saline and chlorhexidine    Amount of cleaning:  Extensive    Irrigation solution:  Sterile saline    Irrigation method:  Syringe    Debridement:  None    Layers/structures repaired:  Deep subcutaneous  Deep subcutaneous:     Suture size:  3-0    Suture material:  Vicryl    Suture technique:  Simple interrupted    Number of sutures:  3  Repair type:     Repair type:  Simple  Comments:      After suture repair of the deep subcutaneous tissue, reapproximated the skin, applied Mepitel, Polysporin ointment, 4 x 4 and Kerlix.              PROGRESS, DATA ANALYSIS, CONSULTS, AND MEDICAL DECISION MAKING  All labs have been independently interpreted by me.  All radiology studies have been reviewed by me. All EKG's have been independently viewed and interpreted by me.  Discussion below represents my analysis of pertinent findings related to patient's condition, differential diagnosis, treatment plan and final disposition.    DIFFERENTIAL    My differential diagnosis includes but is not limited to ICH due to direct trauma on chronic anticoagulation, skin tear, laceration      ED Course as of 06/20/24 2249   Thu Jun 20, 2024   1813 CT Head Without Contrast  My independent interpretation of the imaging study is no acute hemorrhage [TR]      ED Course User Index  [TR] Jose J Roberts MD       Wound repaired without complication.  Counseled patient on wound care, follow-up and indications for return.  She is well-established with the wound care clinic.  Due to increased risk of intracranial hemorrhage due to age and chronic anticoagulation even with indirect injury CT head performed and  shows no acute intracranial findings.  She is stable for discharge.      AS OF 22:49 EDT VITALS:    BP - 163/83  HR - 72  TEMP - 98.3 °F (36.8 °C)  O2 SATS - 98%    COMPLEXITY OF CARE  Admission was considered but after careful review of the patient's presentation, physical examination, diagnostic results, and response to treatment the patient may be safely discharged with outpatient follow-up.      DIAGNOSIS  Final diagnoses:   Skin tear of right lower leg without complication, initial encounter   Fall, initial encounter   Chronic anticoagulation         DISPOSITION  ED Disposition       ED Disposition   Discharge    Condition   Good    Comment   --                  FOLLOW UP  Aramis Doty MD  [address]  [facility] [address]  [phone]          [facility]  [address] [address] [phone]                  Please note that portions of this document were completed with a voice recognition program.    Note Disclaimer: At [facility], we believe that sharing information builds trust and better relationships. You are receiving this note because you recently visited [facility]. It is possible you will see health information before a provider has talked with you about it. This kind of information can be easy to misunderstand. To help you fully understand what it means for your health, we urge you to discuss this note with your provider.         Mira Richardson PA-C  06/20/24 6912

## 2024-06-20 NOTE — ED TRIAGE NOTES
Patient from home via EMS reporting a mechanical fall. States she fell backward onto her bottom, cut her right lower leg on her walker. Last tetanus unknown. Patient takes eliquis.

## 2024-06-20 NOTE — ED PROVIDER NOTES
EMERGENCY DEPARTMENT MD ATTESTATION NOTE    Room Number:  03/03  PCP: Aramis Doty MD  Independent Historians: Patient, Family, and EMS    HPI:  A complete HPI/ROS/PMH/PSH/SH/FH are unobtainable due to: None    Chronic or social conditions impacting patient care (Social Determinants of Health): None      Context: Alba Correa is a 89 y.o. female with a medical history of compression fracture, IBS, osteoporosis who presents to the ED c/o acute fall.  The patient reports that she had a mechanical fall just prior to arrival.  She reports she fell and landed on her bottom.  She reports injury to her right leg.  She is unsure when she last had a tetanus shot.  She denies any new neck or back pain.  She denies hitting her head.        Review of prior external notes (non-ED) -and- Review of prior external test results outside of this encounter:  Laboratory evaluation 5/22/2024 shows a BMP with normal renal function .  CBC on 5/19/2024 shows mild anemia with a hemoglobin of 11    Prescription drug monitoring program review:           PHYSICAL EXAM    I have reviewed the triage vital signs and nursing notes.    ED Triage Vitals [06/20/24 1708]   Temp Heart Rate Resp BP SpO2   98.3 °F (36.8 °C) 72 17 163/83 98 %      Temp src Heart Rate Source Patient Position BP Location FiO2 (%)   -- -- -- -- --       Physical Exam  GENERAL: Awake, alert, no acute distress  SKIN: Warm, dry  HENT: Normocephalic, atraumatic  EYES: no scleral icterus  CV: regular rhythm, regular rate  RESPIRATORY: normal effort, lungs clear  ABDOMEN: soft, nontender, nondistended  MUSCULOSKELETAL: no deformity.  Right lower leg on the lateral aspect with a skin tear with some deep components.  NEURO: alert, moves all extremities, follows commands            MEDICATIONS GIVEN IN ER  Medications   lidocaine 1% - EPINEPHrine 1:999136 (XYLOCAINE W/EPI) 1 %-1:025613 injection 10 mL (has no administration in time range)         ORDERS PLACED DURING THIS  VISIT:  Orders Placed This Encounter   Procedures    CT Head Without Contrast         PROCEDURES  Procedures            PROGRESS, DATA ANALYSIS, CONSULTS, AND MEDICAL DECISION MAKING  All labs have been independently interpreted by me.  All radiology studies have been reviewed by me. All EKG's have been independently viewed and interpreted by me.  Discussion below represents my analysis of pertinent findings related to patient's condition, differential diagnosis, treatment plan and final disposition.    Differential diagnosis includes but is not limited to skin laceration, deep tissue laceration, intracranial hemorrhage, spine fracture.    Clinical Scores:                   ED Course as of 06/20/24 1816   Thu Jun 20, 2024   1813 CT Head Without Contrast  My independent interpretation of the imaging study is no acute hemorrhage [TR]      ED Course User Index  [TR] Jose J Roberts MD       MDM: The patient's tetanus status is up-to-date according to old records.  Plan to close the wound as best possible.  Will CT her head to rule out intracranial hemorrhage given her age and that she is on Eliquis.      COMPLEXITY OF CARE  Admission was considered but after careful review of the patient's presentation, physical examination, diagnostic results, and response to treatment the patient may be safely discharged with outpatient follow-up.    Please note that portions of this document were completed with a voice recognition program.    Note Disclaimer: At [facility], we believe that sharing information builds trust and better relationships. You are receiving this note because you recently visited [facility]. It is possible you will see health information before a provider has talked with you about it. This kind of information can be easy to misunderstand. To help you fully understand what it means for your health, we urge you to discuss this note with your provider.         Jose J Roberts MD  06/20/24 1818

## 2024-06-27 ENCOUNTER — TELEPHONE (OUTPATIENT)
Dept: FAMILY MEDICINE CLINIC | Facility: CLINIC | Age: 89
End: 2024-06-27
Payer: MEDICARE

## 2024-06-27 NOTE — TELEPHONE ENCOUNTER
"  Caller: Alba Correa \"SERGIO\"    Relationship to patient: Self    Best call back number: [phone]    New or established patient?  [] New  [x] Established    Date of discharge: 06/20/2024    Facility discharged from: [facility]    Diagnosis/Symptoms: SORE ON RIGHT LEG    Length of stay (If applicable): JUST ER    Specialty Only: Did you see a [facility] health provider?    [] Yes  [] No  If so, who?       PLEASE CALL PATIENT TO SCHEDULE A HOSP FOLLOW UP FOR THE PATIENT.  HUB DID NOT HAVE ANYTHING WITHIN 7 DAYS  "

## 2024-06-30 NOTE — PROGRESS NOTES
"Subjective   Alba Correa is a 89 y.o. female.     CC: ED F/U for Wound    History of Present Illness     Pt comes in today after being seen in the [facility] ED on 6/20/24 with this note:    Context: Alba Correa is a 89 y.o. female with a medical history of compression fracture, IBS, osteoporosis who presents to the ED c/o acute fall.  The patient reports that she had a mechanical fall just prior to arrival.  She reports she fell and landed on her bottom.  She reports injury to her right leg.  She is unsure when she last had a tetanus shot.  She denies any new neck or back pain.  She denies hitting her head.     CT head NEGATIVE.    Wound repaired on the RLE with 3 sutures (dissolvable)    DIAGNOSIS  Final diagnoses:  Skin tear of right lower leg without complication, initial encounter  Fall, initial encounter  Chronic anticoagulation    Current outpatient and discharge medications have been reconciled for the patient.  Reviewed by: Aramis Doty MD        The following portions of the patient's history were reviewed and updated as appropriate: allergies, current medications, past family history, past medical history, past social history, past surgical history, and problem list.    Review of Systems   Constitutional:  Negative for activity change, chills and fever.   Respiratory:  Negative for cough.    Cardiovascular:  Negative for chest pain.   Psychiatric/Behavioral:  Negative for dysphoric mood.        /62   Pulse 92   Temp 96.4 °F (35.8 °C) (Temporal)   Resp 16   Ht 154.9 cm (60.98\")   Wt 49 kg (108 lb)   LMP  (LMP Unknown)   SpO2 97%   BMI 20.42 kg/m²     Objective   Physical Exam  Constitutional:       General: She is not in acute distress.     Appearance: She is well-developed.   Pulmonary:      Effort: Pulmonary effort is normal.   Neurological:      Mental Status: She is alert and oriented to person, place, and time.   Psychiatric:         Behavior: Behavior normal.         Thought Content: " Thought content normal.     Hospital records reviewed with pt confirming HPI.          Assessment & Plan   Diagnoses and all orders for this visit:    1. Noninfected skin tear of right lower extremity, subsequent encounter (Primary)    2. Fall, subsequent encounter    3. Hospital discharge follow-up    4. Visit for suture removal    Wound care discussed. Area dressed.

## 2024-07-01 ENCOUNTER — OFFICE VISIT (OUTPATIENT)
Dept: FAMILY MEDICINE CLINIC | Facility: CLINIC | Age: 89
End: 2024-07-01
Payer: MEDICARE

## 2024-07-01 VITALS
SYSTOLIC BLOOD PRESSURE: 124 MMHG | RESPIRATION RATE: 16 BRPM | WEIGHT: 108 LBS | HEIGHT: 61 IN | OXYGEN SATURATION: 97 % | DIASTOLIC BLOOD PRESSURE: 62 MMHG | HEART RATE: 92 BPM | TEMPERATURE: 96.4 F | BODY MASS INDEX: 20.39 KG/M2

## 2024-07-01 DIAGNOSIS — S81.811D NONINFECTED SKIN TEAR OF RIGHT LOWER EXTREMITY, SUBSEQUENT ENCOUNTER: Primary | ICD-10-CM
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
## 2024-07-01 DIAGNOSIS — Z09 HOSPITAL DISCHARGE FOLLOW-UP: ICD-10-CM

## 2024-07-01 DIAGNOSIS — Z48.02 VISIT FOR SUTURE REMOVAL: ICD-10-CM

## 2024-07-01 PROCEDURE — 99212 OFFICE O/P EST SF 10 MIN: CPT | Performed by: FAMILY MEDICINE

## 2024-07-01 PROCEDURE — 1159F MED LIST DOCD IN RCRD: CPT | Performed by: FAMILY MEDICINE

## 2024-07-01 PROCEDURE — 1160F RVW MEDS BY RX/DR IN RCRD: CPT | Performed by: FAMILY MEDICINE

## 2024-07-01 PROCEDURE — 1111F DSCHRG MED/CURRENT MED MERGE: CPT | Performed by: FAMILY MEDICINE

## 2024-07-01 PROCEDURE — 15853 REMOVAL SUTR/STAPL XREQ ANES: CPT | Performed by: FAMILY MEDICINE

## 2024-07-01 PROCEDURE — 1126F AMNT PAIN NOTED NONE PRSNT: CPT | Performed by: FAMILY MEDICINE

## 2024-07-12 ENCOUNTER — TELEPHONE (OUTPATIENT)
Dept: NEUROSURGERY | Facility: CLINIC | Age: 89
End: 2024-07-12

## 2024-07-12 NOTE — TELEPHONE ENCOUNTER
"  Caller: IVÁN REID    Relationship: DAUGHTER - IN -LAW    Best call back number: [phone]    What was the call regarding: PATIENTS DAUGHTER IN LAW  CALLED NEEDING TO RESCHEDULE 07/23/24 APPT WITH [facility] - STATES THEY WILL BE OUT OF TOWN AND WONT BE BACK IN TIME TO BRING HER     UNABLE TO RESCHEDULE WITHIN GIVEN TIME FRAME    PER LAST OV NOTES \" Return in about 6 weeks (around 7/23/2024) for 6 weeks with new thoracic Xrays; with any APC.       PLEASE CALL AND ADVISE  THANK YOU  "

## 2024-07-12 NOTE — TELEPHONE ENCOUNTER
Called patient's DIL back. Informed er I will call and r/s patient when August fully opens. She agrees to this.

## 2024-08-01 NOTE — PROGRESS NOTES
"Subjective   Patient ID: Alba Correa is a 89 y.o. female is here today for follow-up of T8 compression fracture with new thoracic xrays.  She was first seen by our service on 5/19/2024 after a fall resulting in T8 fracture.  She was treated conservatively with TLSO bracing.  She was last seen in the office on 6/11/2024.    History of Present Illness  She reported compliance with the brace.  She was tolerating it well. Not much back pain.     Remote smoking history.  No cancer history.  No prior spine surgery.  On Eliquis for atrial fibrillation.  History of osteoporosis.    The following portions of the patient's history were reviewed and updated as appropriate: allergies, current medications, past medical history, past social history, past surgical history, and problem list.    Review of Systems   Constitutional:  Positive for fatigue. Negative for fever.   Musculoskeletal:  Negative for back pain and gait problem.   Neurological:  Negative for weakness and numbness.   All other systems reviewed and are negative.    Objective     Vitals:    08/09/24 1259   BP: 124/62   BP Location: Right arm   Patient Position: Sitting   Cuff Size: Adult   Resp: 20   Weight: 49 kg (108 lb)   Height: 154.9 cm (60.98\")   PainSc: 0-No pain     Body mass index is 20.42 kg/m².    Tobacco Use: Medium Risk (8/9/2024)    Patient History     Smoking Tobacco Use: Former     Smokeless Tobacco Use: Never     Passive Exposure: Past          Physical Exam  Vitals reviewed.   Constitutional:       Appearance: Normal appearance.      Comments: Younger appearing than stated age female in no acute distress.  She presents with her son.   Pulmonary:      Effort: Pulmonary effort is normal.   Musculoskeletal:      Thoracic back: No bony tenderness.      Lumbar back: No bony tenderness.   Neurological:      General: No focal deficit present.      Mental Status: She is alert.      Gait: Gait is intact.   Psychiatric:         Mood and Affect: Mood " normal.         Speech: Speech normal.         Thought Content: Thought content normal.       Neurologic Exam     Mental Status   Speech: speech is normal   Level of consciousness: alert  Knowledge: good.   Normal comprehension.     Gait, Coordination, and Reflexes     Gait  Gait: normal (Stable with rollator walker)  Able to rise on heels and toes and squat unassisted       Assessment & Plan   Independent Review of Radiographic Studies:      I personally reviewed the images from the following studies.    Thoracic x-rays reveal stable T8 compression deformity when compared to prior x-ray 6/11/2024    Medical Decision Making:      Patient presents for follow-up of T8 compression fracture treated conservatively with bracing.  Overall she is doing well.  She takes Tylenol twice a day routinely for arthritis and has not required any other pain medications.  She has not used a Lidoderm patch in quite a while.  Denies any significant back discomfort.  X-rays show stable T8 fracture that appears to be healing at the superior endplate fracture site.  No progressive kyphosis.  She has been compliant with her brace.  At this time I think she can wean out of her brace slowly over the next 1 to 2 weeks.  She can continue to participate in exercises at her assisted living facility as long as she is avoiding impact.  She will follow-up with us on a as needed basis.    Diagnoses and all orders for this visit:    1. Closed wedge compression fracture of T8 vertebra with routine healing, subsequent encounter (Primary)      Return if symptoms worsen or fail to improve.

## 2024-08-09 ENCOUNTER — OFFICE VISIT (OUTPATIENT)
Dept: NEUROSURGERY | Facility: CLINIC | Age: 89
End: 2024-08-09
Payer: MEDICARE

## 2024-08-09 VITALS
WEIGHT: 108 LBS | HEIGHT: 61 IN | RESPIRATION RATE: 20 BRPM | DIASTOLIC BLOOD PRESSURE: 62 MMHG | BODY MASS INDEX: 20.39 KG/M2 | SYSTOLIC BLOOD PRESSURE: 124 MMHG

## 2024-08-09 DIAGNOSIS — S22.060D CLOSED WEDGE COMPRESSION FRACTURE OF T8 VERTEBRA WITH ROUTINE HEALING, SUBSEQUENT ENCOUNTER: Primary | ICD-10-CM

## 2024-08-12 ENCOUNTER — TELEPHONE (OUTPATIENT)
Dept: FAMILY MEDICINE CLINIC | Facility: CLINIC | Age: 89
End: 2024-08-12
Payer: MEDICARE

## 2024-08-12 NOTE — TELEPHONE ENCOUNTER
Caller: DAMIEN ALGUNA [facility]    Relationship to patient:     Best call back number: [phone]    Patient is needing: WOULD LIKE TO GET THE LAST 2 OFFICE NOTES, LABS AND XRAYS SENT TO THEM -[phone]

## 2024-08-31 PROCEDURE — 93298 REM INTERROG DEV EVAL SCRMS: CPT | Performed by: INTERNAL MEDICINE

## 2024-09-04 NOTE — PROGRESS NOTES
Subjective   The ABCs of the Annual Wellness Visit  Medicare Wellness Visit      Alba Correa is a 89 y.o. patient who presents for a Medicare Wellness Visit.    The following portions of the patient's history were reviewed and   updated as appropriate: allergies, current medications, past family history, past medical history, past social history, past surgical history, and problem list.    Compared to one year ago, the patient's physical   health is the same.  Compared to one year ago, the patient's mental   health is the same.    Recent Hospitalizations:  This patient has had a [facility] admission record on file within the last 365 days.  Current Medical Providers:  Patient Care Team:  Aramis Doty MD as PCP - General (Family Medicine)  Juan Jose Muñiz MD as Consulting Physician (Neurology)  Roshan Martines III, MD as Cardiologist (Cardiology)  Nehal Murray MD as Consulting Physician (Nephrology)  Celia Quijano APRN as Nurse Practitioner (Cardiology)  Janine Healy APRN as Nurse Practitioner (Nurse Practitioner)  Diana Bowman APRN as Nurse Practitioner (Nurse Practitioner)    Outpatient Medications Prior to Visit   Medication Sig Dispense Refill    acetaminophen (TYLENOL) 650 MG 8 hr tablet Take 2 tablets by mouth Every 8 (Eight) Hours As Needed for Mild Pain. Indications: Pain      amLODIPine (NORVASC) 2.5 MG tablet Take 1 tablet by mouth Daily.      Eliquis 2.5 MG tablet tablet Take 1 tablet by mouth 2 (Two) Times a Day. 180 tablet 3    Lidocaine 4 % Place 1 patch on the skin as directed by provider Daily. Remove & Discard patch within 12 hours or as directed by MD      allopurinol (ZYLOPRIM) 100 MG tablet Take 1 tablet by mouth Daily. 90 tablet 1    DULoxetine (CYMBALTA) 60 MG capsule Take 1 capsule by mouth Daily. 90 capsule 1    levothyroxine (SYNTHROID, LEVOTHROID) 50 MCG tablet Take 1 tablet by mouth Every Morning. 90 tablet 1    losartan (Cozaar) 25 MG tablet Take 0.5  tablets by mouth Daily. 45 tablet 1     No facility-administered medications prior to visit.     No opioid medication identified on active medication list. I have reviewed chart for other potential  high risk medication/s and harmful drug interactions in the elderly.      Aspirin is not on active medication list.  Aspirin use is not indicated based on review of current medical condition/s. Risk of harm outweighs potential benefits.  .    Patient Active Problem List   Diagnosis    CKD (chronic kidney disease) stage 4, GFR 15-29 ml/min    Depression    Gout    Hyperparathyroidism    Adaptive colitis    Osteopenia    Rheumatoid arthritis    Degeneration of intervertebral disc    Essential hypertension    Detrusor muscle hypertonia    History of DVT (deep vein thrombosis)    Hyperlipidemia    Macrocytosis    Nonrheumatic aortic (valve) insufficiency    History of CVA (cerebrovascular accident)    Atrial fibrillation    Anemia in chronic kidney disease    Other proteinuria    Dizzy    Sinus bradycardia    First degree AV block    Nonrheumatic mitral valve regurgitation    Idiopathic peripheral neuropathy    Lower extremity edema    Acute diastolic CHF (congestive heart failure)    Acquired hypothyroidism    Sick sinus syndrome    Insomnia    Fall    Chronic kidney disease, stage 3a    Hematoma of thigh    Traumatic hematoma of scalp    Abnormal CT of spine    At risk for falling    Superior mesenteric artery stenosis    Closed wedge compression fracture of T8 vertebra    Macrocytosis    Hyperlipidemia    Thyroid nodule    Essential hypertension    Chronic diastolic CHF (congestive heart failure)    Chronic kidney failure, stage 3 (moderate)    Left leg pain     Advance Care Planning Advance Directive is on file.  ACP discussion was held with the patient during this visit. Patient has an advance directive in EMR which is still valid.             Objective   Vitals:    09/05/24 1345   BP: 152/70   Pulse: 75   Temp: 96.9 °F  "(36.1 °C)   TempSrc: Temporal   SpO2: 99%   Weight: 48.1 kg (106 lb)   Height: 154.9 cm (60.98\")   PainSc:   1   PainLoc: Ankle       Estimated body mass index is 20.04 kg/m² as calculated from the following:    Height as of this encounter: 154.9 cm (60.98\").    Weight as of this encounter: 48.1 kg (106 lb).    BMI is within normal parameters. No other follow-up for BMI required.       Does the patient have evidence of cognitive impairment? No                                                                                                Health  Risk Assessment    Smoking Status:  Social History     Tobacco Use   Smoking Status Former    Current packs/day: 0.00    Types: Cigarettes    Start date:     Quit date:     Years since quittin.7    Passive exposure: Past   Smokeless Tobacco Never   Tobacco Comments    caffeine use: 2 cups coffee in AM     Alcohol Consumption:  Social History     Substance and Sexual Activity   Alcohol Use Yes    Comment: Occasional- glass of wine few times a week       Fall Risk Screen  STEADI Fall Risk Assessment was completed, and patient is at MODERATE risk for falls. Assessment completed on:2024    Depression Screenin/5/2024     2:00 PM   PHQ-2/PHQ-9 Depression Screening   Little Interest or Pleasure in Doing Things 0-->not at all   Feeling Down, Depressed or Hopeless 0-->not at all   PHQ-9: Brief Depression Severity Measure Score 0     Health Habits and Functional and Cognitive Screenin/5/2024     2:00 PM   Functional & Cognitive Status   Do you have difficulty preparing food and eating? No   Do you have difficulty bathing yourself, getting dressed or grooming yourself? No   Do you have difficulty using the toilet? No   Do you have difficulty moving around from place to place? No   Do you have trouble with steps or getting out of a bed or a chair? No   Do you need help using the phone?  No   Are you deaf or do you have serious difficulty hearing?  No "   Do you need help to go to places out of walking distance? No   Do you need help shopping? No   Do you need help preparing meals?  No   Do you need help with housework?  No   Do you need help with laundry? No   Do you need help taking your medications? No   Do you need help managing money? No   Do you ever drive or ride in a car without wearing a seat belt? No   Have you felt unusual stress, anger or loneliness in the last month? No   Who do you live with? Alone   If you need help, do you have trouble finding someone available to you? No   Have you been bothered in the last four weeks by sexual problems? No   Do you have difficulty concentrating, remembering or making decisions? No           Age-appropriate Screening Schedule:  Refer to the list below for future screening recommendations based on patient's age, sex and/or medical conditions. Orders for these recommended tests are listed in the plan section. The patient has been provided with a written plan.    Health Maintenance List  Health Maintenance   Topic Date Due    RSV Vaccine - Adults (1 - 1-dose 60+ series) Never done    ANNUAL WELLNESS VISIT  06/08/2024    COVID-19 Vaccine (9 - 2023-24 season) 09/01/2024    INFLUENZA VACCINE  08/01/2024    ZOSTER VACCINE (2 of 2) 12/19/2024 (Originally 2/26/2012)    LIPID PANEL  05/19/2025    Pneumococcal Vaccine 65+  Completed    MAMMOGRAM  Discontinued    DXA SCAN  Discontinued    TDAP/TD VACCINES  Discontinued    COLORECTAL CANCER SCREENING  Discontinued                                                                                                                                                CMS Preventative Services Quick Reference  Risk Factors Identified During Encounter  None Identified    The above risks/problems have been discussed with the patient.  Pertinent information has been shared with the patient in the After Visit Summary.  An After Visit Summary and PPPS were made available to the patient.    Follow  "Up:   Next Medicare Wellness visit to be scheduled in 1 year.         Additional E&M Note during same encounter follows:  Patient has additional, significant, and separately identifiable condition(s)/problem(s) that require work above and beyond the Medicare Wellness Visit     Chief Complaint  Ankle Pain and Dizziness    Subjective   HPI  SERGIO is also being seen today for additional medical problem/s.  Patient has a long history of positional vertigo that comes and goes, and awakened this morning quite dizzy since then.  Denies tinnitus/change in hearing.    Patient also has a 2+ week history of left lateral ankle pain without remembered injury.  No prior history of gout and previous uric acid levels always very good.    Review of Systems   Constitutional:  Negative for chills and fever.   HENT:  Negative for congestion and sinus pressure.    Eyes:  Negative for visual disturbance.   Respiratory:  Negative for cough and shortness of breath.    Cardiovascular:  Negative for chest pain.   Gastrointestinal:  Negative for abdominal pain.   Genitourinary:  Negative for dysuria.   Musculoskeletal:         Left ankle pain.   Skin:  Negative for rash.   Neurological:  Positive for dizziness.   Hematological:  Negative for adenopathy.   Psychiatric/Behavioral:  Negative for dysphoric mood.               Objective   Vital Signs:  /70   Pulse 75   Temp 96.9 °F (36.1 °C) (Temporal)   Ht 154.9 cm (60.98\")   Wt 48.1 kg (106 lb)   SpO2 99%   BMI 20.04 kg/m²   Physical Exam  Vitals and nursing note reviewed.   Constitutional:       General: She is not in acute distress.     Appearance: She is well-developed.   Cardiovascular:      Rate and Rhythm: Normal rate and regular rhythm.   Pulmonary:      Effort: Pulmonary effort is normal.      Breath sounds: Normal breath sounds.   Musculoskeletal:      Comments: Tenderness of the distal left fibula noted without joint laxity or edema.   Neurological:      Mental Status: She " is alert and oriented to person, place, and time.   Psychiatric:         Behavior: Behavior normal.         Thought Content: Thought content normal.         The following data was reviewed by: Aramis Doty MD on 09/05/2024:        Assessment and Plan Additional age appropriate preventative wellness advice topics were discussed during today's preventative wellness exam(some topics already addressed during AWV portion of the note above):    Physical Activity: Advised cardiovascular activity 150 minutes per week as tolerated. (example brisk walk for 30 minutes, 5 days a week).     Nutrition: Discussed nutrition plan with patient. Information shared in after visit summary. Goal is for a well balanced diet to enhance overall health.              Encounter for subsequent annual wellness visit (AWV) in Medicare patient    Gout, unspecified cause, unspecified chronicity, unspecified site    Recurrent major depressive disorder, in full remission  Patient's depression is a single episode that is mild without psychosis. Depression is in full remission and stable.    Plan:   Continue current medication therapy     Followup in 6 months.   Acquired hypothyroidism    Essential hypertension  Hypertension is stable and controlled  Continue current treatment regimen.  Blood pressure will be reassessed in 6 months.  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    Acute left ankle pain      Orders Placed This Encounter   Procedures    XR Ankle 3+ View Left     Order Specific Question:   Reason for Exam:     Answer:   lateral ankle pain     Order Specific Question:   Release to patient     Answer:   Routine Release [[id number]]    Ambulatory Referral to Physical Therapy for Evaluation & Treatment     Referral Priority:   Routine     Referral Type:   Physical Therapy     Referral Reason:   Specialty Services Required     Requested Specialty:   Physical Therapy     Number of Visits Requested:   1     New  Medications Ordered This Visit   Medications    allopurinol (ZYLOPRIM) 100 MG tablet     Sig: Take 1 tablet by mouth Daily. Indications: Gout     Dispense:  90 tablet     Refill:  1    DULoxetine (CYMBALTA) 60 MG capsule     Sig: Take 1 capsule by mouth Daily. Indications: Major Depressive Disorder     Dispense:  90 capsule     Refill:  1    levothyroxine (SYNTHROID, LEVOTHROID) 50 MCG tablet     Sig: Take 1 tablet by mouth Every Morning. Indications: Underactive Thyroid     Dispense:  90 tablet     Refill:  1    losartan (Cozaar) 25 MG tablet     Sig: Take 0.5 tablets by mouth Daily. Indications: High Blood Pressure Disorder     Dispense:  45 tablet     Refill:  1    Scopolamine 1 MG/3DAYS patch     Sig: Place 1 patch on the skin as directed by provider Every 72 (Seventy-Two) Hours.     Dispense:  10 patch     Refill:  2        I spent 15 minutes caring for Alba on this date of service. This time includes time spent by me in the following activities:preparing for the visit, performing a medically appropriate examination and/or evaluation , ordering medications, tests, or procedures, and documenting information in the medical record  Follow Up   No follow-ups on file.  Patient was given instructions and counseling regarding her condition or for health maintenance advice. Please see specific information pulled into the AVS if appropriate.

## 2024-09-05 ENCOUNTER — HOSPITAL ENCOUNTER (OUTPATIENT)
Dept: GENERAL RADIOLOGY | Facility: HOSPITAL | Age: 89
Discharge: HOME | End: 2024-09-05
Admitting: FAMILY MEDICINE
Payer: MEDICARE

## 2024-09-05 ENCOUNTER — OFFICE VISIT (OUTPATIENT)
Dept: FAMILY MEDICINE CLINIC | Facility: CLINIC | Age: 89
End: 2024-09-05
Payer: MEDICARE

## 2024-09-05 VITALS
TEMPERATURE: 96.9 F | HEART RATE: 75 BPM | DIASTOLIC BLOOD PRESSURE: 70 MMHG | OXYGEN SATURATION: 99 % | WEIGHT: 106 LBS | SYSTOLIC BLOOD PRESSURE: 152 MMHG | BODY MASS INDEX: 20.01 KG/M2 | HEIGHT: 61 IN

## 2024-09-05 DIAGNOSIS — E03.9 ACQUIRED HYPOTHYROIDISM: Chronic | ICD-10-CM

## 2024-09-05 DIAGNOSIS — I10 ESSENTIAL HYPERTENSION: Chronic | ICD-10-CM

## 2024-09-05 DIAGNOSIS — H81.13 BENIGN PAROXYSMAL POSITIONAL VERTIGO DUE TO BILATERAL VESTIBULAR DISORDER: ICD-10-CM

## 2024-09-05 DIAGNOSIS — F33.42 RECURRENT MAJOR DEPRESSIVE DISORDER, IN FULL REMISSION: Chronic | ICD-10-CM

## 2024-09-05 DIAGNOSIS — M10.9 GOUT, UNSPECIFIED CAUSE, UNSPECIFIED CHRONICITY, UNSPECIFIED SITE: Chronic | ICD-10-CM

## 2024-09-05 DIAGNOSIS — M25.572 ACUTE LEFT ANKLE PAIN: ICD-10-CM

## 2024-09-05 DIAGNOSIS — Z00.00 ENCOUNTER FOR SUBSEQUENT ANNUAL WELLNESS VISIT (AWV) IN MEDICARE PATIENT: Primary | ICD-10-CM

## 2024-09-05 PROCEDURE — 73610 X-RAY EXAM OF ANKLE: CPT

## 2024-09-05 RX ORDER — AMLODIPINE BESYLATE 2.5 MG/1
1 TABLET ORAL DAILY
COMMUNITY
Start: 2024-08-05

## 2024-09-05 RX ORDER — DULOXETIN HYDROCHLORIDE 60 MG/1
60 CAPSULE, DELAYED RELEASE ORAL DAILY
Qty: 90 CAPSULE | Refills: 1 | Status: SHIPPED | OUTPATIENT
Start: 2024-09-05

## 2024-09-05 RX ORDER — LOSARTAN POTASSIUM 25 MG/1
12.5 TABLET ORAL DAILY
Qty: 45 TABLET | Refills: 1 | Status: SHIPPED | OUTPATIENT
Start: 2024-09-05

## 2024-09-05 RX ORDER — ALLOPURINOL 100 MG/1
100 TABLET ORAL DAILY
Qty: 90 TABLET | Refills: 1 | Status: SHIPPED | OUTPATIENT
Start: 2024-09-05

## 2024-09-05 RX ORDER — SCOLOPAMINE TRANSDERMAL SYSTEM 1 MG/1
1 PATCH, EXTENDED RELEASE TRANSDERMAL
Qty: 10 PATCH | Refills: 2 | Status: SHIPPED | OUTPATIENT
Start: 2024-09-05

## 2024-09-05 RX ORDER — LEVOTHYROXINE SODIUM 50 UG/1
50 TABLET ORAL EVERY MORNING
Qty: 90 TABLET | Refills: 1 | Status: SHIPPED | OUTPATIENT
Start: 2024-09-05

## 2024-09-05 NOTE — PATIENT INSTRUCTIONS
Medicare Wellness  Personal Prevention Plan of Service     Date of Office Visit:    Encounter Provider:  Aramis Doty MD  Place of Service:  [facility]  Patient Name: Alba Correa  :  1933    As part of the Medicare Wellness portion of your visit today, we are providing you with this personalized preventive plan of services (PPPS). This plan is based upon recommendations of the [place] Preventive Services Task Force (USPSTF) and the Advisory Committee on Immunization Practices (ACIP).    This lists the preventive care services that should be considered, and provides dates of when you are due. Items listed as completed are up-to-date and do not require any further intervention.    Health Maintenance   Topic Date Due    RSV Vaccine - Adults (1 - 1-dose 60+ series) Never done    ANNUAL WELLNESS VISIT  2024    COVID-19 Vaccine (2023-24 season) 2024    INFLUENZA VACCINE  2024    ZOSTER VACCINE (2 of 2) 2024 (Originally 2012)    LIPID PANEL  2025    Pneumococcal Vaccine 65+  Completed    MAMMOGRAM  Discontinued    DXA SCAN  Discontinued    TDAP/TD VACCINES  Discontinued    COLORECTAL CANCER SCREENING  Discontinued       Orders Placed This Encounter   Procedures    XR Ankle 3+ View Left     Order Specific Question:   Reason for Exam:     Answer:   lateral ankle pain     Order Specific Question:   Release to patient     Answer:   Routine Release [[id number]]    Ambulatory Referral to Physical Therapy for Evaluation & Treatment     Referral Priority:   Routine     Referral Type:   Physical Therapy     Referral Reason:   Specialty Services Required     Requested Specialty:   Physical Therapy     Number of Visits Requested:   1       Return in about 6 months (around 3/5/2025) for Recheck.

## 2024-09-10 ENCOUNTER — TELEPHONE (OUTPATIENT)
Dept: FAMILY MEDICINE CLINIC | Facility: CLINIC | Age: 89
End: 2024-09-10
Payer: MEDICARE

## 2024-09-10 ENCOUNTER — HOSPITAL ENCOUNTER (OUTPATIENT)
Dept: CT IMAGING | Facility: HOSPITAL | Age: 89
Discharge: HOME | End: 2024-09-10
Admitting: FAMILY MEDICINE
Payer: MEDICARE

## 2024-09-10 DIAGNOSIS — R51.9 NONINTRACTABLE HEADACHE, UNSPECIFIED CHRONICITY PATTERN, UNSPECIFIED HEADACHE TYPE: Primary | ICD-10-CM

## 2024-09-10 PROCEDURE — 70450 CT HEAD/BRAIN W/O DYE: CPT

## 2024-09-10 NOTE — TELEPHONE ENCOUNTER
I SPOKE WITH DARIA - PT REQUESTING CT SCAN OF HEAD TO BE DONE TODAY  PT SCHEDULED  FOR 9/10/2024  PM [facility] . FAMILY ADVISED .   SPOKE WITH LETY CAMPOS AND WAS TOLD TO CALL DR REID WITH STAT RESULTS

## 2024-09-10 NOTE — TELEPHONE ENCOUNTER
LEFT MESSAGE FOR PT   PT WAS TOLD   Call patient and her son and make them aware that she does have a small tiny avulsion fracture of the ankle as previously discussed.  2 options: They could pick her up 1 of those Aircast's that I described to the son and she can wear that for the next 3 to 4 weeks, or I could write her a cam walker prescription that he can take to [facility].  I would recommend the former, but if you rather do the other, let me know.   OK FOR THE HUB TO RELAY MESSAGE IF NEEEDED

## 2024-09-10 NOTE — TELEPHONE ENCOUNTER
----- Message from Aramis Doty sent at 9/9/2024  8:17 PM EDT -----  Call patient and her son and make them aware that she does have a small tiny avulsion fracture of the ankle as previously discussed.  2 options: They could pick her up 1 of those Aircast's that I described to the son and she can wear that for the next 3 to 4 weeks, or I could write her a cam walker prescription that he can take to [facility].  I would recommend the former, but if you rather do the other, let me know.

## 2024-09-10 NOTE — TELEPHONE ENCOUNTER
Caller: Daria Doty    Relationship: Emergency Contact    Best call back number: [phone]     What was the call regarding: DARIA IS CALLING TO SPEAK TO LENY REGARDING THE PATIENT HAVING DIZZINESS AND FALLS AS WELL AS HEADACHES. SHE WOULD LIKE TO TALK TO LENY ABOUT THIS     PLEASE CALL AND ADVISE

## 2024-12-02 RX ORDER — AMLODIPINE BESYLATE 2.5 MG/1
2.5 TABLET ORAL DAILY
Qty: 90 TABLET | Refills: 0 | OUTPATIENT
Start: 2024-12-02

## 2024-12-05 RX ORDER — AMLODIPINE BESYLATE 2.5 MG/1
2.5 TABLET ORAL DAILY
Qty: 90 TABLET | Refills: 0 | Status: SHIPPED | OUTPATIENT
Start: 2024-12-05

## 2025-01-16 DIAGNOSIS — F33.42 RECURRENT MAJOR DEPRESSIVE DISORDER, IN FULL REMISSION: Chronic | ICD-10-CM

## 2025-01-16 RX ORDER — DULOXETIN HYDROCHLORIDE 60 MG/1
60 CAPSULE, DELAYED RELEASE ORAL DAILY
Qty: 90 CAPSULE | Refills: 1 | OUTPATIENT
Start: 2025-01-16

## 2025-01-21 DIAGNOSIS — F33.42 RECURRENT MAJOR DEPRESSIVE DISORDER, IN FULL REMISSION: Chronic | ICD-10-CM

## 2025-01-22 RX ORDER — DULOXETIN HYDROCHLORIDE 60 MG/1
60 CAPSULE, DELAYED RELEASE ORAL DAILY
Qty: 90 CAPSULE | Refills: 1 | OUTPATIENT
Start: 2025-01-22

## 2025-01-24 ENCOUNTER — OFFICE VISIT (OUTPATIENT)
Dept: FAMILY MEDICINE CLINIC | Facility: CLINIC | Age: OVER 89
End: 2025-01-24
Payer: MEDICARE

## 2025-01-24 VITALS
OXYGEN SATURATION: 97 % | DIASTOLIC BLOOD PRESSURE: 82 MMHG | HEIGHT: 61 IN | BODY MASS INDEX: 20.58 KG/M2 | HEART RATE: 89 BPM | TEMPERATURE: 98.2 F | SYSTOLIC BLOOD PRESSURE: 150 MMHG | WEIGHT: 109 LBS

## 2025-01-24 DIAGNOSIS — R10.30 LOWER ABDOMINAL PAIN: ICD-10-CM

## 2025-01-24 DIAGNOSIS — R39.198 DIFFICULTY URINATING: Primary | ICD-10-CM

## 2025-01-24 DIAGNOSIS — M54.50 ACUTE LOW BACK PAIN, UNSPECIFIED BACK PAIN LATERALITY, UNSPECIFIED WHETHER SCIATICA PRESENT: ICD-10-CM

## 2025-01-24 LAB
BILIRUB BLD-MCNC: NEGATIVE MG/DL
CLARITY, POC: ABNORMAL
COLOR UR: YELLOW
EXPIRATION DATE: ABNORMAL
GLUCOSE UR STRIP-MCNC: NEGATIVE MG/DL
KETONES UR QL: NEGATIVE
LEUKOCYTE EST, POC: NEGATIVE
Lab: ABNORMAL
NITRITE UR-MCNC: NEGATIVE MG/ML
PH UR: 7.5 [PH] (ref 5–8)
PROT UR STRIP-MCNC: ABNORMAL MG/DL
RBC # UR STRIP: NEGATIVE /UL
SP GR UR: 1.01 (ref 1–1.03)
UROBILINOGEN UR QL: ABNORMAL

## 2025-01-24 PROCEDURE — 1159F MED LIST DOCD IN RCRD: CPT

## 2025-01-24 PROCEDURE — 1125F AMNT PAIN NOTED PAIN PRSNT: CPT

## 2025-01-24 PROCEDURE — 1160F RVW MEDS BY RX/DR IN RCRD: CPT

## 2025-01-24 PROCEDURE — 81003 URINALYSIS AUTO W/O SCOPE: CPT

## 2025-01-24 PROCEDURE — 99214 OFFICE O/P EST MOD 30 MIN: CPT

## 2025-01-24 NOTE — PROGRESS NOTES
Chief Complaint  Abdominal Pain (X's 1 week), Difficulty Urinating, Fatigue, Back Pain (Lower/), and Fever    Subjective          Abdominal Pain  Associated symptoms include a fever. Pertinent negatives include no constipation, dysuria, frequency, hematuria, nausea or vomiting.   Difficulty Urinating   Pertinent negatives include no chills, flank pain, frequency, hematuria, nausea, urgency or vomiting.   Fatigue  Symptoms include abdominal pain (lower abdomen), fatigue and a fever.    Pertinent negative symptoms include no chest pain, no chills, no cough, no nausea, no rash, no dysuria and no vomiting.   Back Pain  Associated symptoms include abdominal pain (lower abdomen) and a fever. Pertinent negatives include no chest pain, dysuria or pelvic pain.   Fever   Associated symptoms include abdominal pain (lower abdomen). Pertinent negatives include no chest pain, coughing, nausea, rash, urinary pain or vomiting.   Diarrhea   Associated symptoms include abdominal pain (lower abdomen) and a fever. Pertinent negatives include no chills, coughing or vomiting.     History of Present Illness    Alba Correa 89 y.o.female reports urinary symptoms. She reports fever  , lower abdominal pain, foul smelling urine, and lower back pain.  She has had symptoms for 1 week. The symptoms are moderate. Patient denies flank pain, gross blood in urine, urgency, frequency, dysuria, nausea, and vomiting. Patient has tried increasing fluids for relief of symptoms. Patient does not have a history of recurrent UTI. Patient does not have a history of pyelonephritis.      Review of Systems   Constitutional:  Positive for fatigue and fever. Negative for chills.   Eyes:  Negative for visual disturbance.   Respiratory:  Negative for cough and shortness of breath.    Cardiovascular:  Negative for chest pain and palpitations.   Gastrointestinal:  Positive for abdominal pain (lower abdomen). Negative for constipation, nausea and vomiting.  "  Genitourinary:  Positive for difficulty urinating. Negative for decreased urine volume, dysuria, flank pain, frequency, hematuria, pelvic pain and urgency.   Musculoskeletal:  Positive for back pain.   Skin:  Negative for rash.   Neurological:  Positive for confusion. Negative for dizziness, syncope and light-headedness.        Objective   Vital Signs:   /82   Pulse 89   Temp 98.2 °F (36.8 °C) (Oral)   Ht 154.9 cm (60.98\")   Wt 49.4 kg (109 lb)   SpO2 97%   BMI 20.61 kg/m²      BMI is within normal parameters. No other follow-up for BMI required.       Physical Exam  Vitals and nursing note reviewed.   Constitutional:       General: She is not in acute distress.     Appearance: She is well-developed. She is not ill-appearing.   HENT:      Head: Normocephalic and atraumatic.   Eyes:      Conjunctiva/sclera: Conjunctivae normal.   Cardiovascular:      Rate and Rhythm: Normal rate. Rhythm irregular.   Pulmonary:      Effort: Pulmonary effort is normal. No respiratory distress.      Breath sounds: Normal breath sounds.   Abdominal:      General: Bowel sounds are normal. There is no distension.      Palpations: Abdomen is soft. There is no mass.      Tenderness: There is abdominal tenderness in the suprapubic area. There is no right CVA tenderness, left CVA tenderness, guarding or rebound.      Comments: Mild suprapubic abdominal tenderness with palpation.  egative bilateral CVA tenderness. Abdomen palpates soft. No guarding or rebound tenderness.   Musculoskeletal:      Cervical back: Neck supple.      Lumbar back: No swelling, edema, spasms, tenderness or bony tenderness.   Skin:     General: Skin is warm and dry.      Findings: No rash.   Neurological:      Mental Status: She is alert and oriented to person, place, and time.      Deep Tendon Reflexes: Reflexes are normal and symmetric.   Psychiatric:         Mood and Affect: Mood normal.        Physical Exam      The following data was reviewed by: " RadhaGROVER Flores on 01/24/2025:  POCT urinalysis dipstick, automated (01/24/2025 15:12)   Basic Metabolic Panel (05/22/2024 08:18)   Results                 Assessment and Plan    Assessment & Plan      Assessment & Plan  Difficulty urinating  She has a history of CKD, so will check a BMP today.   She is aware that she should avoid all NSAID's.  UA is negative. Urine culture ordered.  Will check BMP.  Return if symptoms worsen.    Orders:    POCT urinalysis dipstick, automated    Urine Culture - Urine, Urine, Clean Catch    Basic metabolic panel    Acute low back pain, unspecified back pain laterality, unspecified whether sciatica present  Tylenol as needed. She takes Eliquis. She is aware that she should avoid all NSAID's.  UA is negative. Urine culture ordered.  Will check BMP.  Return if symptoms worsen.    Orders:    POCT urinalysis dipstick, automated    Urine Culture - Urine, Urine, Clean Catch    Basic metabolic panel    Lower abdominal pain    Orders:    POCT urinalysis dipstick, automated    Urine Culture - Urine, Urine, Clean Catch    Basic metabolic panel             Follow Up     Return if symptoms worsen or fail to improve.    Patient was given instructions and counseling regarding her condition or for health maintenance advice. Please see specific information pulled into the AVS if appropriate.

## 2025-01-25 LAB
BUN SERPL-MCNC: 23 MG/DL (ref 10–36)
BUN/CREAT SERPL: 22 (ref 12–28)
CALCIUM SERPL-MCNC: 10.9 MG/DL (ref 8.7–10.3)
CHLORIDE SERPL-SCNC: 97 MMOL/L (ref 96–106)
CO2 SERPL-SCNC: 26 MMOL/L (ref 20–29)
CREAT SERPL-MCNC: 1.06 MG/DL (ref 0.57–1)
EGFRCR SERPLBLD CKD-EPI 2021: 50 ML/MIN/1.73
GLUCOSE SERPL-MCNC: 90 MG/DL (ref 70–99)
POTASSIUM SERPL-SCNC: 4.8 MMOL/L (ref 3.5–5.2)
SODIUM SERPL-SCNC: 138 MMOL/L (ref 134–144)

## 2025-01-26 LAB
BACTERIA UR CULT: NO GROWTH
BACTERIA UR CULT: NORMAL

## 2025-01-27 DIAGNOSIS — E83.52 SERUM CALCIUM ELEVATED: Primary | ICD-10-CM

## 2025-02-03 NOTE — PROGRESS NOTES
Subjective   Alba Correa is a 89 y.o. female.     CC: Abdominal Discomfort/Back Pain      History of Present Illness     Patient comes in today reporting some abdominal discomfort     Patient does have x-ray proven T8 compression fracture on x-ray from August 2024.  Most recent lumbar spine MRI was completed on 5/22/2024 with this summary:    IMPRESSION:  1. No convincing acute osseous abnormality is seen in the lumbar spine.     2. 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 At L2-3 there is moderate to severe disc  space narrowing and there is 3 mm retrolisthesis of L2 with respect to  L3, mild posterior spurring and there is essentially no canal narrowing.  There is spurring into the foramina with mild left and moderate right  bony foraminal narrowing.     3. At L4-5, there is mild to moderate bilateral facet overgrowth and a  4-5 mm degenerative anterolisthesis of L4 with respect to L5 but there  is no canal narrowing and only mild bilateral foraminal narrowing.     4. 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 There is a  minimal 2-3 mm anterolisthesis of L5 on S1 and there is no canal or  lateral recess or right foraminal narrowing. There is a 8 x 5 x 5 mm  cyst projecting over the lateral aspect of the left neural foramen that  may be a pedunculated cyst off the anterior superior lateral margin of  the left facets, comes close to the posterior margin of the exiting left  L5 nerve root. The remainder of the MRI of the lumbar spine is  unremarkable.    Patient also had seen Radha tate on 1/24/2025 with this note:    Alba Correa 89 y.o.female reports urinary symptoms. She reports fever  , lower abdominal pain, foul smelling urine, and lower back pain.  She has had symptoms for 1 week. The symptoms are  moderate. Patient denies flank pain, gross blood in urine, urgency, frequency, dysuria, nausea, and vomiting. Patient has tried increasing fluids for relief of symptoms. Patient does not have a history of recurrent UTI. Patient does not have a history of pyelonephritis.    Assessment & Plan  Difficulty urinating  She has a history of CKD, so will check a BMP today.   She is aware that she should avoid all NSAID's.  UA is negative. Urine culture ordered.  Will check BMP.  Return if symptoms worsen.     Orders:    POCT urinalysis dipstick, automated    Urine Culture - Urine, Urine, Clean Catch    Basic metabolic panel     Acute low back pain, unspecified back pain laterality, unspecified whether sciatica present  Tylenol as needed. She takes Eliquis. She is aware that she should avoid all NSAID's.  UA is negative. Urine culture ordered.  Will check BMP.  Return if symptoms worsen.     Orders:    POCT urinalysis dipstick, automated    Urine Culture - Urine, Urine, Clean Catch    Basic metabolic panel    Urine culture was negative.    In discussion, patient reports she has had chronic constipation for almost a year now, although this morning had a very normal bowel movement and feels better.  In discussion, she had recently started back on some daily fiber and this seems to be making a difference.    The following portions of the patient's history were reviewed and updated as appropriate: allergies, current medications, past family history, past medical history, past social history, past surgical history, and problem list.    Review of Systems   Constitutional:  Negative for activity change, chills and fever.   Respiratory:  Negative for cough.    Cardiovascular:  Negative for chest pain.   Gastrointestinal:  Positive for abdominal pain.   Musculoskeletal:  Positive for back pain.   Psychiatric/Behavioral:  Negative for dysphoric mood.        /82   Pulse 72   Temp 97.5 °F (36.4 °C) (Oral)   Resp 16   Ht 154.9  "cm (60.98\")   Wt 49.4 kg (109 lb)   LMP  (LMP Unknown)   SpO2 94%   BMI 20.61 kg/m²     Objective   Physical Exam  Vitals and nursing note reviewed. Exam conducted with a chaperone present.   Constitutional:       General: She is not in acute distress.     Appearance: She is well-developed.   Pulmonary:      Effort: Pulmonary effort is normal.   Abdominal:      General: Abdomen is flat.      Palpations: Abdomen is soft.      Tenderness: There is abdominal tenderness (mild, bilateral lower quadrant). There is no guarding or rebound.   Neurological:      Mental Status: She is alert and oriented to person, place, and time.   Psychiatric:         Behavior: Behavior normal.         Thought Content: Thought content normal.     Sara present for exam.    Assessment & Plan   Diagnoses and all orders for this visit:    1. Chronic constipation (Primary)  -     docusate sodium (Colace) 100 MG capsule; Take 1 capsule by mouth 2 (Two) Times a Day.  Dispense: 180 capsule; Refill: 1    2. Degeneration of intervertebral disc of lumbar region with discogenic back pain  -     Ambulatory Referral to Physical Therapy for Evaluation & Treatment    3. Elevated serum creatinine  -     Basic Metabolic Panel    4. Other fatigue  -     CBC & Differential    Patient declines further MRI/CT scans or further GI workup at this time.  Will start with some physical therapy, will try to continue to have good BMs to decompress the colon, but patient will recontact the office in 1 month if we need to go further with this workup.  Pt has nephrology appt in March.         "

## 2025-02-04 ENCOUNTER — OFFICE VISIT (OUTPATIENT)
Dept: FAMILY MEDICINE CLINIC | Facility: CLINIC | Age: OVER 89
End: 2025-02-04
Payer: MEDICARE

## 2025-02-04 VITALS
HEIGHT: 61 IN | OXYGEN SATURATION: 94 % | BODY MASS INDEX: 20.58 KG/M2 | DIASTOLIC BLOOD PRESSURE: 82 MMHG | WEIGHT: 109 LBS | SYSTOLIC BLOOD PRESSURE: 148 MMHG | RESPIRATION RATE: 16 BRPM | TEMPERATURE: 97.5 F | HEART RATE: 72 BPM

## 2025-02-04 DIAGNOSIS — M51.360 DEGENERATION OF INTERVERTEBRAL DISC OF LUMBAR REGION WITH DISCOGENIC BACK PAIN: ICD-10-CM

## 2025-02-04 DIAGNOSIS — K59.09 CHRONIC CONSTIPATION: Primary | ICD-10-CM

## 2025-02-04 DIAGNOSIS — R53.83 OTHER FATIGUE: ICD-10-CM

## 2025-02-04 DIAGNOSIS — R79.89 ELEVATED SERUM CREATININE: ICD-10-CM

## 2025-02-04 PROCEDURE — 1159F MED LIST DOCD IN RCRD: CPT | Performed by: FAMILY MEDICINE

## 2025-02-04 PROCEDURE — 1125F AMNT PAIN NOTED PAIN PRSNT: CPT | Performed by: FAMILY MEDICINE

## 2025-02-04 PROCEDURE — 99214 OFFICE O/P EST MOD 30 MIN: CPT | Performed by: FAMILY MEDICINE

## 2025-02-04 PROCEDURE — 1160F RVW MEDS BY RX/DR IN RCRD: CPT | Performed by: FAMILY MEDICINE

## 2025-02-04 RX ORDER — DOCUSATE SODIUM 100 MG/1
100 CAPSULE, LIQUID FILLED ORAL 2 TIMES DAILY
Qty: 180 CAPSULE | Refills: 1 | Status: SHIPPED | OUTPATIENT
Start: 2025-02-04

## 2025-02-17 ENCOUNTER — TELEPHONE (OUTPATIENT)
Dept: FAMILY MEDICINE CLINIC | Facility: CLINIC | Age: OVER 89
End: 2025-02-17
Payer: MEDICARE

## 2025-02-17 DIAGNOSIS — K59.09 CHRONIC CONSTIPATION: Primary | ICD-10-CM

## 2025-02-17 DIAGNOSIS — R10.9 ABDOMINAL PAIN, UNSPECIFIED ABDOMINAL LOCATION: ICD-10-CM

## 2025-02-17 NOTE — TELEPHONE ENCOUNTER
"Name: Alba Correa \"SERGIO\"      Relationship: Self      Best Callback Number: [phone]       HUB PROVIDED THE RELAY MESSAGE FROM THE OFFICE      PATIENT WAS READ THE MESSAGE AND HAD MORE INFORMATION TO RELAY TO PCP     ADDITIONAL INFORMATION: PATIENT STATES THAT THE PAIN IS MORE IN HER STOMACH AND THE PHYSICAL THERAPIST AT HER FACILITY THINKS SHE HAS A BOWEL BLOCKAGE CAUSING HER PAIN. PATIENT STATES SHE IS HAVING VERY SMALL BOWEL MOVEMENTS ONCE PER DAY. SHE STATES IT IS MORE OF A SORENESS INSTEAD OF A SHARP PAIN, BUT SHE IS STILL VERY UNCOMFORTABLE. SHE IS WILLING TO BE SEEN IN THE OFFICE AGAIN, IF NEEDED.   "

## 2025-02-17 NOTE — TELEPHONE ENCOUNTER
"  Caller: Alba Correa \"SERGIO\"    Relationship: Self    Best call back number: [phone]     What is the best time to reach you: ANY    Who are you requesting to speak with (clinical staff, provider,  specific staff member): LENY        What was the call regarding: DR REID ADVISED SEEING THE THERAPIST AT HER HOME. THE THERAPIST STATES THERE IS NOTHING HE CAN DO FOR HER AND TO CONTACT DR REID FOR NEXT STEPS. PATIENT STATES SHE IS STILL HAVING PAIN IN THE BOTTOM OF HER STOMACH, AROUND TO HER BACK    Is it okay if the provider responds through MyChart: PHONE CALL    "

## 2025-02-17 NOTE — TELEPHONE ENCOUNTER
LEFT MESSAGE FOR PT TO RETURN CALL   OK FOR HUB TO RELAY MESSAGE   The idea was for the physical therapist to work on her lumbar degenerative disc disease as being a culprit for some of her pain.  Are they saying that at [facility], where she lives, the physical therapist cannot work on her back?  If that is the case, asked patient if I could order outpatient physical therapy that she goes to.

## 2025-02-18 NOTE — TELEPHONE ENCOUNTER
"Name: Alba Correa \"SERGIO\"      Relationship: Self      Best Callback Number: [phone]       HUB PROVIDED THE RELAY MESSAGE FROM THE OFFICE      PATIENT: HAS FURTHER QUESTIONS AND WOULD LIKE A CALL BACK AT THE FOLLOWING PHONE NUMBER     ADDITIONAL INFORMATION: PATIENT STATES THAT SHE HAS CHANGED HER MIND AND WOULD LIKE TO CAT SCAN AND BE REFERRED TO GASTRO       "

## 2025-02-18 NOTE — TELEPHONE ENCOUNTER
I talked to pt. Pt DOES WANT ct scan now   Dr cabrales advised   Pt told if pain gets severe she will need to go to er for further evaluation

## 2025-02-18 NOTE — TELEPHONE ENCOUNTER
I talked to pt. Pt was told -  I placed the order for a CAT scan as well as a gastro referral.  Please let patient know she will be contacted to get the dates and times arranged.

## 2025-02-18 NOTE — TELEPHONE ENCOUNTER
Left message for pt. Pt was told   When she was here on 2/4/2025, we discussed getting a CAT scan of her belly and getting her to gastro for further workup.  At that time, she declined.  Please call her back and see if she has changed her mind so I can start that workup.   OK FOR THE HUB TO RELAY MESSAGE IF NEEDED

## 2025-02-22 NOTE — PROGRESS NOTES
Chief Complaint  No chief complaint on file.    Subjective        Alba Correa is a 89-year-old female new to our practice.  According to PCP office visit on 2/4/2025, patient presented with a chief complaint of chronic constipation and was put on Dulcolax 100 mg twice daily.  CT abdomen pelvis was ordered but it appears that the patient may have declined this, advised to consult GI service for further evaluation and recommendation.    CT abdomen pelvis was scheduled for 3/5/2025 -pending   History of Present Illness  The patient is a 89-year-old female who presents with chronic constipation issues. She is new to our practice. She is accompanied by her son.    She reports experiencing abdominal discomfort that started a few months ago, characterized by pain at the lower part of her stomach that radiates to her back, occasionally extending upwards. The pain is predominantly localized on the left side. She describes a sensation of pressure in her abdomen upon rising each morning, which subsides after approximately an hour. She likens this sensation to a cramping feeling, as if something is bearing down on her stomach. She has a long-standing history of constipation, with her bowel movements often resembling small, hard balls.     Despite increasing her fiber intake as recommended by her PCP, she continues to experience constipation. She initiated a regimen of Dulcolax 100 mg last week, taking one pill at night, now that she has been taking two pills daily, but her bowel movements remain firm and pellet-like. She does not report any straining during defecation.     Her last colonoscopy was performed in 2010 and yielded normal results and she seems reluctant to undergo this endoscopy screening. She reports no rectal bleeding. She does not take any narcotic medications at home, only Tylenol.    She has a history of thyroid issues and is currently on thyroid medication.    Supplemental Information  She has had two hernia  "repairs in the past.    FAMILY HISTORY  She does not have a family history of colon cancer.    MEDICATIONS  Current: Dulcolax, Colace, thyroid medicine     Results Reviewed:  1/24/25  - Urine culture negative      [facility] COLONOSCOPY (01/01/2010) Noted \"normal\" under result notes comment but unable to retrieve a complete operative report.    No recent change in weight or appetite.   Patient denies personal or family history of colorectal or upper GI cancer.    Objective   Vital Signs:  /80   Pulse 90   Temp 97.9 °F (36.6 °C)   Ht 154.9 cm (61\")   Wt 50 kg (110 lb 3.2 oz)   SpO2 95%   BMI 20.82 kg/m²   Estimated body mass index is 20.82 kg/m² as calculated from the following:    Height as of this encounter: 154.9 cm (61\").    Weight as of this encounter: 50 kg (110 lb 3.2 oz).       BMI is within normal parameters. No other follow-up for BMI required.      Physical Exam  Vitals and nursing note reviewed.   Constitutional:       General: She is not in acute distress.     Appearance: Normal appearance. She is normal weight. She is not ill-appearing, toxic-appearing or diaphoretic.   Eyes:      General: No scleral icterus.     Conjunctiva/sclera: Conjunctivae normal.   Abdominal:      General: Abdomen is flat. Bowel sounds are normal. There is no distension.      Palpations: Abdomen is soft. There is no mass.      Tenderness: There is no abdominal tenderness. There is no right CVA tenderness, left CVA tenderness, guarding or rebound.      Hernia: No hernia is present.   Skin:     Coloration: Skin is not jaundiced or pale.      Findings: No erythema or rash.   Neurological:      Mental Status: She is alert and oriented to person, place, and time. Mental status is at baseline.   Psychiatric:         Mood and Affect: Mood normal.             Assessment and Plan     Diagnoses and all orders for this visit:    1. Chronic constipation (Primary)    2. Lower abdominal pain    3. Hypothyroidism, unspecified type     "   Assessment & Plan  1. Chronic constipation.  2. Lower abdominal pain  3. Hypothyroidism  Given her advance age, absence of a family history of colon cancer, along with no reported rectal bleeding, a colonoscopy is not deemed necessary at this time. The possibility of diverticulosis was discussed, and it was explained that constipation could exacerbate this condition.   She is advised to maintain her scheduled CT scan on 03/05/2025.   An abdominal x-ray will be conducted today to assess for any stool burden or impaction.   She is instructed to commence a regimen of MiraLAX powder, one packet in the morning and one at night, which can be reduced to once daily or as needed upon the establishment of regular soft bowel movements. She may continue her current Colace intake, one in the morning and one at night. She is also advised to continue her fiber supplement and consider the addition of a daily magnesium supplement.   Additional labs order today: CBC, CMP, TSH.     Follow-up  The patient will follow up after the CT scan.    PROCEDURE  Colonoscopy in 2010 yielded normal results.       Return for Follow up with me in 4 weeks. .    I have reviewed patient's current medications list, relevant clinical information necessary for today's encounter. I discussed the clinical impression and treatment plan with the patient, who verbalized understanding and in agreement.  All questions were answered and support was provided.    EMR Dragon/Transcription Disclaimer:  This document has been Dictated utilizing Dragon dictation.     Patient or patient representative verbalized consent for the use of Ambient Listening during the visit with  GROVER Hyatt for chart documentation. 2/25/2025  10:15 EST    Patient Instructions   Start Miralax 2 times a day until you establish a good soft bowel regimens, then reduce to daily or as needed.   Continue Colace daily  Proceed with Xray abdomen today  Follow up with CT abdomen and  pelvis imaging study that has been scheduled 3/5/25      We recommend that you start a daily fiber supplement such as Metamucil, Benefiber, Citrucel  As well as consuming at least 20 to 30 g of dietary fiber per day.  This helps to keep stools soft, formed, and easy transit throughout the colon to produce regular incomplete bowel movements.  When starting fiber regimen, I recommend a low dose and gradually increase to 1 tablespoon every 7 days as tolerated.  This will help your body acclimate to the higher fiber diet and reduce risk of unwanted side effects, which may include bloating, gas,, abdominal fullness, and cramping.  For example: Begin taking 1 tablespoon daily for at least 7 days, if this is not successful in producing regular bowel movements, you can begin taking 1 tablespoon twice daily, and finally if this is not successful after 2 tablespoons for 7 days, you can further increase to maximum recommended dosing of 1 tablespoon 3 times per day.  It is important to consume increased amounts of fluid throughout the day to help improve the effectiveness of fiber supplement  Avoid gamete formulations of fiber as this can further increase your risk of the above side effects due to artificial sweeteners in the Gummies.  Fiber supplements can take anywhere from 12 to 72 hours to work.  Make sure to increase free water intake between 6 to 12 ounces of water or noncarbonated beverage with fiber supplement.

## 2025-02-24 DIAGNOSIS — I10 ESSENTIAL HYPERTENSION: Chronic | ICD-10-CM

## 2025-02-24 RX ORDER — LOSARTAN POTASSIUM 25 MG/1
TABLET ORAL
Qty: 45 TABLET | Refills: 0 | OUTPATIENT
Start: 2025-02-24

## 2025-02-25 ENCOUNTER — OFFICE VISIT (OUTPATIENT)
Dept: GASTROENTEROLOGY | Facility: CLINIC | Age: OVER 89
End: 2025-02-25
Payer: MEDICARE

## 2025-02-25 ENCOUNTER — HOSPITAL ENCOUNTER (OUTPATIENT)
Dept: GENERAL RADIOLOGY | Facility: HOSPITAL | Age: OVER 89
Discharge: HOME | End: 2025-02-25
Payer: MEDICARE

## 2025-02-25 ENCOUNTER — LAB (OUTPATIENT)
Dept: LAB | Facility: HOSPITAL | Age: OVER 89
End: 2025-02-25
Payer: MEDICARE

## 2025-02-25 VITALS
TEMPERATURE: 97.9 F | WEIGHT: 110.2 LBS | SYSTOLIC BLOOD PRESSURE: 138 MMHG | DIASTOLIC BLOOD PRESSURE: 80 MMHG | BODY MASS INDEX: 20.81 KG/M2 | HEART RATE: 90 BPM | HEIGHT: 61 IN | OXYGEN SATURATION: 95 %

## 2025-02-25 DIAGNOSIS — K59.09 CHRONIC CONSTIPATION: Primary | ICD-10-CM

## 2025-02-25 DIAGNOSIS — R10.30 LOWER ABDOMINAL PAIN: ICD-10-CM

## 2025-02-25 DIAGNOSIS — E03.9 HYPOTHYROIDISM, UNSPECIFIED TYPE: ICD-10-CM

## 2025-02-25 LAB
ALBUMIN SERPL-MCNC: 4.1 G/DL (ref 3.5–5.2)
ALBUMIN/GLOB SERPL: 1.1 G/DL
ALP SERPL-CCNC: 90 U/L (ref 39–117)
ALT SERPL W P-5'-P-CCNC: 13 U/L (ref 1–33)
ANION GAP SERPL CALCULATED.3IONS-SCNC: 9.1 MMOL/L (ref 5–15)
AST SERPL-CCNC: 27 U/L (ref 1–32)
BASOPHILS # BLD AUTO: 0.08 10*3/MM3 (ref 0–0.2)
BASOPHILS NFR BLD AUTO: 1.5 % (ref 0–1.5)
BILIRUB SERPL-MCNC: 0.3 MG/DL (ref 0–1.2)
BUN SERPL-MCNC: 32 MG/DL (ref 8–23)
BUN/CREAT SERPL: 32 (ref 7–25)
CALCIUM SPEC-SCNC: 10.6 MG/DL (ref 8.2–9.6)
CHLORIDE SERPL-SCNC: 98 MMOL/L (ref 98–107)
CO2 SERPL-SCNC: 29.9 MMOL/L (ref 22–29)
CREAT SERPL-MCNC: 1 MG/DL (ref 0.57–1)
DEPRECATED RDW RBC AUTO: 49 FL (ref 37–54)
EGFRCR SERPLBLD CKD-EPI 2021: 53.3 ML/MIN/1.73
EOSINOPHIL # BLD AUTO: 0.17 10*3/MM3 (ref 0–0.4)
EOSINOPHIL NFR BLD AUTO: 3.2 % (ref 0.3–6.2)
ERYTHROCYTE [DISTWIDTH] IN BLOOD BY AUTOMATED COUNT: 13.1 % (ref 12.3–15.4)
GLOBULIN UR ELPH-MCNC: 3.7 GM/DL
GLUCOSE SERPL-MCNC: 92 MG/DL (ref 65–99)
HCT VFR BLD AUTO: 39.3 % (ref 34–46.6)
HGB BLD-MCNC: 12.8 G/DL (ref 12–15.9)
IMM GRANULOCYTES # BLD AUTO: 0 10*3/MM3 (ref 0–0.05)
IMM GRANULOCYTES NFR BLD AUTO: 0 % (ref 0–0.5)
LYMPHOCYTES # BLD AUTO: 1.97 10*3/MM3 (ref 0.7–3.1)
LYMPHOCYTES NFR BLD AUTO: 36.8 % (ref 19.6–45.3)
MCH RBC QN AUTO: 33 PG (ref 26.6–33)
MCHC RBC AUTO-ENTMCNC: 32.6 G/DL (ref 31.5–35.7)
MCV RBC AUTO: 101.3 FL (ref 79–97)
MONOCYTES # BLD AUTO: 0.76 10*3/MM3 (ref 0.1–0.9)
MONOCYTES NFR BLD AUTO: 14.2 % (ref 5–12)
NEUTROPHILS NFR BLD AUTO: 2.37 10*3/MM3 (ref 1.7–7)
NEUTROPHILS NFR BLD AUTO: 44.3 % (ref 42.7–76)
NRBC BLD AUTO-RTO: 0 /100 WBC (ref 0–0.2)
PLATELET # BLD AUTO: 246 10*3/MM3 (ref 140–450)
PMV BLD AUTO: 9.4 FL (ref 6–12)
POTASSIUM SERPL-SCNC: 4.6 MMOL/L (ref 3.5–5.2)
PROT SERPL-MCNC: 7.8 G/DL (ref 6–8.5)
RBC # BLD AUTO: 3.88 10*6/MM3 (ref 3.77–5.28)
SODIUM SERPL-SCNC: 137 MMOL/L (ref 136–145)
TSH SERPL DL<=0.05 MIU/L-ACNC: 2.19 UIU/ML (ref 0.27–4.2)
WBC NRBC COR # BLD AUTO: 5.35 10*3/MM3 (ref 3.4–10.8)

## 2025-02-25 PROCEDURE — 99214 OFFICE O/P EST MOD 30 MIN: CPT | Performed by: NURSE PRACTITIONER

## 2025-02-25 PROCEDURE — 74018 RADEX ABDOMEN 1 VIEW: CPT

## 2025-02-25 PROCEDURE — 36415 COLL VENOUS BLD VENIPUNCTURE: CPT | Performed by: NURSE PRACTITIONER

## 2025-02-25 PROCEDURE — 85025 COMPLETE CBC W/AUTO DIFF WBC: CPT | Performed by: NURSE PRACTITIONER

## 2025-02-25 PROCEDURE — 84443 ASSAY THYROID STIM HORMONE: CPT | Performed by: NURSE PRACTITIONER

## 2025-02-25 PROCEDURE — 80053 COMPREHEN METABOLIC PANEL: CPT | Performed by: NURSE PRACTITIONER

## 2025-02-25 RX ORDER — POLYETHYLENE GLYCOL 3350 17 G/17G
17 POWDER, FOR SOLUTION ORAL 2 TIMES DAILY
Qty: 60 PACKET | Refills: 2 | Status: SHIPPED | OUTPATIENT
Start: 2025-02-25 | End: 2025-03-27

## 2025-02-25 NOTE — PATIENT INSTRUCTIONS
Start Miralax 2 times a day until you establish a good soft bowel regimens, then reduce to daily or as needed.   Continue Colace daily  Proceed with Xray abdomen today  Follow up with CT abdomen and pelvis imaging study that has been scheduled 3/5/25      We recommend that you start a daily fiber supplement such as Metamucil, Benefiber, Citrucel  As well as consuming at least 20 to 30 g of dietary fiber per day.  This helps to keep stools soft, formed, and easy transit throughout the colon to produce regular incomplete bowel movements.  When starting fiber regimen, I recommend a low dose and gradually increase to 1 tablespoon every 7 days as tolerated.  This will help your body acclimate to the higher fiber diet and reduce risk of unwanted side effects, which may include bloating, gas,, abdominal fullness, and cramping.  For example: Begin taking 1 tablespoon daily for at least 7 days, if this is not successful in producing regular bowel movements, you can begin taking 1 tablespoon twice daily, and finally if this is not successful after 2 tablespoons for 7 days, you can further increase to maximum recommended dosing of 1 tablespoon 3 times per day.  It is important to consume increased amounts of fluid throughout the day to help improve the effectiveness of fiber supplement  Avoid gamete formulations of fiber as this can further increase your risk of the above side effects due to artificial sweeteners in the Gummies.  Fiber supplements can take anywhere from 12 to 72 hours to work.  Make sure to increase free water intake between 6 to 12 ounces of water or noncarbonated beverage with fiber supplement.

## 2025-03-05 ENCOUNTER — HOSPITAL ENCOUNTER (OUTPATIENT)
Dept: CT IMAGING | Facility: HOSPITAL | Age: OVER 89
Discharge: HOME | End: 2025-03-05
Admitting: FAMILY MEDICINE
Payer: MEDICARE

## 2025-03-05 DIAGNOSIS — R10.9 ABDOMINAL PAIN, UNSPECIFIED ABDOMINAL LOCATION: ICD-10-CM

## 2025-03-05 DIAGNOSIS — K59.09 CHRONIC CONSTIPATION: ICD-10-CM

## 2025-03-05 PROCEDURE — 74176 CT ABD & PELVIS W/O CONTRAST: CPT

## 2025-03-06 NOTE — TELEPHONE ENCOUNTER
Changed losartan to 50mg daily will send new rx. Pt told to make sure to take one tab daily because i am sending a 50 mg tab. Pt vu  
(V5) oriented, appropriate

## 2025-03-10 ENCOUNTER — RESULTS FOLLOW-UP (OUTPATIENT)
Dept: CT IMAGING | Facility: HOSPITAL | Age: OVER 89
End: 2025-03-10
Payer: MEDICARE

## 2025-03-11 DIAGNOSIS — I10 ESSENTIAL HYPERTENSION: Chronic | ICD-10-CM

## 2025-03-11 RX ORDER — AMLODIPINE BESYLATE 2.5 MG/1
2.5 TABLET ORAL DAILY
Qty: 90 TABLET | Refills: 0 | Status: SHIPPED | OUTPATIENT
Start: 2025-03-11

## 2025-03-11 RX ORDER — LOSARTAN POTASSIUM 25 MG/1
TABLET ORAL
Qty: 45 TABLET | Refills: 0 | Status: SHIPPED | OUTPATIENT
Start: 2025-03-11

## 2025-03-11 NOTE — TELEPHONE ENCOUNTER
Unable to reach pt's son   Phone number not accepting calls  Pt was told test results   Call patient's son, her POA, and let him know that the CT scan thankfully shows nothing worrisome, although she does have a moderate amount of constipation, which very much likely is the cause of her issues. Would recommend twice daily MiraLAX at this time to see how that works, and if that is not getting things better in the next week to 10 days, I would go ahead and add some OTC Colace daily also. The referral to GI has already been placed, and she is encouraged to keep that appointment.

## 2025-04-23 NOTE — TELEPHONE ENCOUNTER
Patient is scheduled to see you on 5/15/25.  Patient's daughter called stating patient will be out of her Eliquis this week and her prescription is . Patient needs a new prescription sent to her pharmacy.         Please Advise

## 2025-04-23 NOTE — TELEPHONE ENCOUNTER
Last OV  3/19/24 (TK)  Next OV  (not scheduled)  Labs 2/25/25           Failed protocol checklist         Please Advise

## 2025-04-24 RX ORDER — APIXABAN 2.5 MG/1
2.5 TABLET, FILM COATED ORAL 2 TIMES DAILY
Qty: 180 TABLET | Refills: 3 | Status: SHIPPED | OUTPATIENT
Start: 2025-04-24

## 2025-04-29 DIAGNOSIS — E03.9 ACQUIRED HYPOTHYROIDISM: Chronic | ICD-10-CM

## 2025-04-29 RX ORDER — LEVOTHYROXINE SODIUM 50 UG/1
TABLET ORAL
Qty: 14 TABLET | Refills: 0 | Status: SHIPPED | OUTPATIENT
Start: 2025-04-29

## 2025-05-03 DIAGNOSIS — K59.09 CHRONIC CONSTIPATION: ICD-10-CM

## 2025-05-05 RX ORDER — DOCUSATE SODIUM 100 MG/1
100 CAPSULE, LIQUID FILLED ORAL 2 TIMES DAILY
Qty: 180 CAPSULE | Refills: 0 | Status: SHIPPED | OUTPATIENT
Start: 2025-05-05

## 2025-05-11 DIAGNOSIS — E03.9 ACQUIRED HYPOTHYROIDISM: Chronic | ICD-10-CM

## 2025-05-11 RX ORDER — LEVOTHYROXINE SODIUM 50 UG/1
TABLET ORAL
Qty: 90 TABLET | Refills: 0 | Status: SHIPPED | OUTPATIENT
Start: 2025-05-11

## 2025-05-15 ENCOUNTER — OFFICE VISIT (OUTPATIENT)
Dept: CARDIOLOGY | Age: OVER 89
End: 2025-05-15
Payer: MEDICARE

## 2025-05-15 VITALS
DIASTOLIC BLOOD PRESSURE: 78 MMHG | BODY MASS INDEX: 20.32 KG/M2 | HEART RATE: 85 BPM | WEIGHT: 107.6 LBS | SYSTOLIC BLOOD PRESSURE: 128 MMHG | HEIGHT: 61 IN

## 2025-05-15 DIAGNOSIS — I34.0 NONRHEUMATIC MITRAL VALVE REGURGITATION: ICD-10-CM

## 2025-05-15 DIAGNOSIS — I35.1 NONRHEUMATIC AORTIC (VALVE) INSUFFICIENCY: Chronic | ICD-10-CM

## 2025-05-15 DIAGNOSIS — I48.0 PAROXYSMAL ATRIAL FIBRILLATION: Primary | ICD-10-CM

## 2025-05-15 DIAGNOSIS — Z86.73 HISTORY OF CVA (CEREBROVASCULAR ACCIDENT): ICD-10-CM

## 2025-05-15 DIAGNOSIS — I10 PRIMARY HYPERTENSION: ICD-10-CM

## 2025-05-15 PROBLEM — D75.89 MACROCYTOSIS: Status: RESOLVED | Noted: 2018-02-28 | Resolved: 2025-05-15

## 2025-05-15 PROBLEM — R42 DIZZY: Status: RESOLVED | Noted: 2020-10-15 | Resolved: 2025-05-15

## 2025-05-15 PROCEDURE — 93000 ELECTROCARDIOGRAM COMPLETE: CPT | Performed by: NURSE PRACTITIONER

## 2025-05-15 PROCEDURE — 1159F MED LIST DOCD IN RCRD: CPT | Performed by: NURSE PRACTITIONER

## 2025-05-15 PROCEDURE — 99214 OFFICE O/P EST MOD 30 MIN: CPT | Performed by: NURSE PRACTITIONER

## 2025-05-15 PROCEDURE — 1160F RVW MEDS BY RX/DR IN RCRD: CPT | Performed by: NURSE PRACTITIONER

## 2025-05-15 RX ORDER — LATANOPROST 50 UG/ML
SOLUTION/ DROPS OPHTHALMIC
COMMUNITY
Start: 2025-03-25

## 2025-05-15 NOTE — Clinical Note
She is seeing a retinal specialist for occlusion of the vessel in the eye.  She is on apixaban and has paroxysmal atrial fibrillation.  Is there anything else that I need to do further?

## 2025-05-15 NOTE — PROGRESS NOTES
Date of Office Visit: 05/15/2025  Encounter Provider: GROVER Howard  Place of Service: [facility] CARDIOLOGY  Patient Name: Alba Correa  :1933  Primary Cardiologist: Dr. Roshan Martines     Chief Complaint   Patient presents with    Atrial Fibrillation    Annual Exam   :     HPI: Alba Correa is a 89 y.o. female who presents today for annual cardiac follow-up visit.  I reviewed her medical records.    She has been diagnosed with hypothyroidism, hyperlipidemia, rheumatoid arthritis, and chronic kidney disease.      In 2019, she had an acute stroke and was diagnosed with atrial fibrillation.  She underwent electrical cardioversion.     In 2021, she had a syncopal episode of unknown etiology.  30-day event monitor showed 40% burden of atrial fibrillation.    In 2023, she was hospitalized after a fall and developed a scalp hematoma.  She was found to be orthostatic and antihypertensive therapy was discontinued.    In 2024, her atrial fibrillation burden was 81%.  She was not on anticoagulation due to recent fall/hospitalization.  She was offered Watchman device procedure and she declined.  Dr. Martines recommended that she take her apixaban twice per day.    In May 2024, she was hospitalized after a fall and had a thoracic compression fracture.  There was concern about a cardiac or vascular event.  Myocardial perfusion study showed no ischemia.  Echocardiogram showed normal LVEF, mild to moderate aortic regurgitation, mild to moderate tricuspid regurgitation, and trace pulmonic regurgitation.    She presents today for follow-up visit with her son accompanying her.  She is still living at home.  She says occasionally she will feel a thumping sensation and have ankle edema.  She is working with physical therapy because her balance has not been good.  She denies chest pain, shortness of air, dizziness, syncope, or falls.  She is seeing a retina  "specialist because of vision loss \"from a clotted blood vessel\".  Blood pressure and heart rate are normal.      Past Medical History:   Diagnosis Date    Acquired hypothyroidism 05/03/2022    Acute diastolic CHF (congestive heart failure) 03/20/2022    Chronic kidney disease     Closed fracture of left distal humerus 03/07/2023    Closed head injury 12/07/2023    CVA (cerebral vascular accident)     Degeneration of intervertebral disc     Depression     DVT (deep venous thrombosis)     Facial laceration 03/07/2023    First degree AV block 05/26/2021    Gout     H/O complete eye exam 09/2016    Hyperlipidemia     IBS (irritable bowel syndrome)     Laceration of right forearm 03/07/2023    Nonrheumatic mitral valve regurgitation     OAB (overactive bladder)     Osteopenia     Osteoporosis     PAF (paroxysmal atrial fibrillation) 11/08/2019    Peripheral neuropathy     Rheumatoid arthritis     Sinus bradycardia 05/26/2021    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 Colby    COLONOSCOPY N/A 01/01/2010    ELBOW OPEN REDUCTION INTERNAL FIXATION Left 03/11/2023    Procedure: ELBOW OPEN REDUCTION INTERNAL FIXATION;  Surgeon: Ramón Encinas II, MD;  Location: [facility];  Service: Orthopedics;  Laterality: Left;    HERNIA REPAIR  1965    HYSTERECTOMY      still has ovaries    MAMMO BILATERAL  11/16/2016    pt declines    PAP SMEAR  11/16/2016    declines       Social History     Socioeconomic History    Marital status:      Spouse name: Ray    Number of children: 3    Years of education: High school   Tobacco Use    Smoking status: Former     Current packs/day: " 0.50     Average packs/day: 0.5 packs/day for 55.4 years (27.7 ttl pk-yrs)     Types: Cigarettes     Start date: 1970     Passive exposure: Past    Smokeless tobacco: Never    Tobacco comments:     caffeine use: 2 cups coffee in AM   Vaping Use    Vaping status: Never Used   Substance and Sexual Activity    Alcohol use: Yes     Comment: Occasional- glass of wine few times a week    Drug use: No    Sexual activity: Defer     Partners: Male     Birth control/protection: Surgical       Family History   Problem Relation Age of Onset    Heart disease Mother 89    Heart failure Mother     Stomach cancer Father     Kidney cancer Father 52    Lung cancer Father 52    Ovarian cancer Sister     Lung cancer Brother 65    Ovarian cancer Daughter 60    Alcohol abuse Other     Cancer Other     Hypertension Other     Kidney disease Other     Lung disease Other     Colon cancer Neg Hx     Colon polyps Neg Hx     Crohn's disease Neg Hx     Irritable bowel syndrome Neg Hx     Ulcerative colitis Neg Hx        The following portion of the patient's history were reviewed and updated as appropriate: past medical history, past surgical history, past social history, past family history, allergies, current medications, and problem list.    Review of Systems   Constitutional: Positive for weight loss.   Eyes:  Positive for vision loss in right eye.   Cardiovascular:  Positive for leg swelling and palpitations.   Respiratory: Negative.     Hematologic/Lymphatic: Bruises/bleeds easily.   Musculoskeletal:  Positive for joint pain.   Neurological: Negative.        Allergies   Allergen Reactions    Hydrocodone-Acetaminophen Nausea And Vomiting and Dizziness    Lisinopril Unknown (See Comments)     Unknown per pt    Sulfamethoxazole-Trimethoprim Delirium    Levofloxacin Rash         Current Outpatient Medications:     acetaminophen (TYLENOL) 650 MG 8 hr tablet, Take 2 tablets by mouth Every 8 (Eight) Hours As Needed for Mild Pain. Indications:  "Pain, Disp: , Rfl:     allopurinol (ZYLOPRIM) 100 MG tablet, Take 1 tablet by mouth Daily. Indications: Gout, Disp: 90 tablet, Rfl: 1    amLODIPine (NORVASC) 2.5 MG tablet, Take 1 tablet by mouth once daily, Disp: 90 tablet, Rfl: 0    docusate sodium (Colace) 100 MG capsule, Take 1 capsule by mouth 2 (Two) Times a Day., Disp: 180 capsule, Rfl: 0    DULoxetine (CYMBALTA) 60 MG capsule, Take 1 capsule by mouth Daily. Indications: Major Depressive Disorder, Disp: 90 capsule, Rfl: 1    Eliquis 2.5 MG tablet tablet, Take 1 tablet by mouth 2 (Two) Times a Day. Indications: history of DVT/PE, Hx CVA, Disp: 180 tablet, Rfl: 3    latanoprost (XALATAN) 0.005 % ophthalmic solution, INSTILL 1 DROP INTO RIGHT EYE ONCE DAILY AT BEDTIME, Disp: , Rfl:     levothyroxine (SYNTHROID, LEVOTHROID) 50 MCG tablet, TAKE 1 TABLET BY MOUTH IN THE MORNING FOR  UNDERACTIVE  THYROID, Disp: 90 tablet, Rfl: 0    losartan (COZAAR) 25 MG tablet, TAKE 1/2 (ONE-HALF) TABLET BY MOUTH ONCE DAILY FOR  HIGH  BLOOD  PRESSURE DISORDER, Disp: 45 tablet, Rfl: 0          Objective:     Vitals:    05/15/25 1427   BP: 128/78   Pulse: 85   Weight: 48.8 kg (107 lb 9.6 oz)   Height: 154.9 cm (61\")     Body mass index is 20.33 kg/m².    PHYSICAL EXAM:    Vitals Reviewed.   General Appearance: No acute distress, well developed and well nourished. Thin.   Eyes: Glasses.   HENT: Wears hearing aids.     Respiratory: No signs of respiratory distress. Respiration rhythm and depth normal.  Clear to auscultation.   Cardiovascular:  Jugular Venous Pressure: Normal  Heart Rate and Rhythm: Irregularly irregular.  Heart Sounds: Normal S1 and S2. No S3 or S4 noted.  Murmurs: No murmurs noted. No rubs, thrills, or gallops.   Lower Extremities: No edema noted.  Musculoskeletal: Normal movement of extremities.  Skin: General appearance normal.    Psychiatric: Patient alert and oriented to person, place, and time. Speech and behavior appropriate. Normal mood and affect.       ECG " 12 Lead    Date/Time: 5/15/2025 2:30 PM  Performed by: Celia Quijano APRN    Authorized by: Celia Quijano APRN  Comparison: compared with previous ECG from 5/19/2024  Similar to previous ECG  Rhythm: atrial fibrillation  Rate: normal  BPM: 85  Q waves: V1 and V2    ST Segments: ST segments normal  T inversion: V1 and V2  QRS axis: normal    Clinical impression: abnormal EKG            Assessment:       Diagnosis Plan   1. Paroxysmal atrial fibrillation  ECG 12 Lead      2. History of CVA (cerebrovascular accident)        3. Nonrheumatic aortic (valve) insufficiency  ECG 12 Lead      4. Nonrheumatic mitral valve regurgitation        5. Primary hypertension                 Plan:       1.  Paroxysmal Atrial Fibrillation.  Currently in atrial fibrillation asymptomatic.  Rate controlled.  [id number] score of 8 and remains on apixaban.  She has declined a Watchman device in the past.    2.  History of stroke in 2019.  Now she is seeing a retina specialist for vision loss in her right eye from clotted blood vessel.    3/4.  Mild to moderate mitral and aortic regurgitation.    5.  Hypertension: Blood pressure well-controlled.    6.  She previously was on rosuvastatin.  She may benefit from going back on statin therapy with her history of stroke and eye issues.  I will defer to her PCP.    7.  Continue current medications.  Follow-up with Dr. Martines in 6 months.    As always, it has been a pleasure to participate in your patient's care. Thank you.         Sincerely,         GROVER Del Cid  [facility] Cardiology      Dictated utilizing Dragon Dictation

## 2025-05-16 ENCOUNTER — TELEPHONE (OUTPATIENT)
Dept: CARDIOLOGY | Age: OVER 89
End: 2025-05-16
Payer: MEDICARE

## 2025-05-16 NOTE — TELEPHONE ENCOUNTER
I told son that we would call today. I spoke with Ankit. She has been diagnosed with retinal vein occlusion per her son.    Please call Dr. Rony Trevizo's office and obtain any testing and the last office note.  Thank you

## 2025-05-16 NOTE — TELEPHONE ENCOUNTER
Please call son.  Obtain name of ophthalmologist and call their office and request any testing and their office note.  Thank you

## 2025-05-22 NOTE — TELEPHONE ENCOUNTER
I spoke to Dr. Casiano's office again and requested any testing and office notes  5/22/25 to be faxed to [phone]

## 2025-05-27 ENCOUNTER — TELEPHONE (OUTPATIENT)
Dept: CARDIOLOGY | Age: OVER 89
End: 2025-05-27
Payer: MEDICARE

## 2025-05-27 NOTE — TELEPHONE ENCOUNTER
Please call patient's son.  Let him know that Dr. Martines and I reviewed the ophthalmologist note which said she had a central vein occlusion in her eye.  Dr. Martines recommended continuing apixaban and did not recommend any further testing.  Thank you

## 2025-05-27 NOTE — TELEPHONE ENCOUNTER
Ms. 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  The ophthalmologist, Dr. Rony Fan noted that she also has high intraocular pressure (IOP) and glaucoma.    Dr. Martines-she has known atrial fibrillation and previous stroke.  She is currently on apixaban 2.5 mg twice per day.    Do I need to do anything further from a cardiac standpoint with this new diagnosis?    Thank you.

## 2025-05-28 NOTE — TELEPHONE ENCOUNTER
Reviewed recommendations with patient's son Ankit (ok per ANDRES), verbalized understanding, will call with any further questions or complaints.    Lilibeth Oh RN  Triage Nurse  05/28/25 08:13 EDT

## 2025-06-05 DIAGNOSIS — M10.9 GOUT, UNSPECIFIED CAUSE, UNSPECIFIED CHRONICITY, UNSPECIFIED SITE: Chronic | ICD-10-CM

## 2025-06-05 RX ORDER — ALLOPURINOL 100 MG/1
TABLET ORAL
Qty: 90 TABLET | Refills: 0 | Status: SHIPPED | OUTPATIENT
Start: 2025-06-05

## 2025-06-06 PROCEDURE — 93298 REM INTERROG DEV EVAL SCRMS: CPT | Performed by: INTERNAL MEDICINE

## 2025-06-06 RX ORDER — AMLODIPINE BESYLATE 2.5 MG/1
2.5 TABLET ORAL DAILY
Qty: 90 TABLET | Refills: 0 | Status: SHIPPED | OUTPATIENT
Start: 2025-06-06

## 2025-06-11 DIAGNOSIS — I10 ESSENTIAL HYPERTENSION: Chronic | ICD-10-CM

## 2025-06-11 RX ORDER — LOSARTAN POTASSIUM 25 MG/1
TABLET ORAL
Qty: 45 TABLET | Refills: 0 | Status: SHIPPED | OUTPATIENT
Start: 2025-06-11

## 2025-06-11 NOTE — TELEPHONE ENCOUNTER
Rx Refill Note  Requested Prescriptions     Pending Prescriptions Disp Refills    losartan (COZAAR) 25 MG tablet [Pharmacy Med Name: Losartan Potassium 25 MG Oral Tablet] 45 tablet 0     Sig: TAKE 1/2 (ONE-HALF) TABLET BY MOUTH ONCE DAILY FOR HIGH BLOOD PRESSURE DISORDER      Last office visit with prescribing clinician: 2/4/2025   Last telemedicine visit with prescribing clinician: Visit date not found   Next office visit with prescribing clinician: Visit date not found   {    VLADISLAV Espinoza(R)  06/11/25, 12:42 EDT

## 2025-06-16 ENCOUNTER — TELEPHONE (OUTPATIENT)
Dept: FAMILY MEDICINE CLINIC | Facility: CLINIC | Age: OVER 89
End: 2025-06-16
Payer: MEDICARE

## 2025-06-16 ENCOUNTER — NURSE TRIAGE (OUTPATIENT)
Dept: CALL CENTER | Facility: HOSPITAL | Age: OVER 89
End: 2025-06-16
Payer: MEDICARE

## 2025-06-16 NOTE — ASSESSMENT & PLAN NOTE
Orders:    levothyroxine (SYNTHROID, LEVOTHROID) 50 MCG tablet; Take 1 tablet by mouth Every Morning.    TSH    T4, Free    Lipid Panel

## 2025-06-16 NOTE — ASSESSMENT & PLAN NOTE
Hypertension is stable and controlled  Continue current treatment regimen.  Blood pressure will be reassessed in 6 months.    Orders:    amLODIPine (NORVASC) 2.5 MG tablet; Take 1 tablet by mouth Daily.    losartan (COZAAR) 25 MG tablet; Take 0.5 tablets by mouth Daily.    Comprehensive Metabolic Panel    CBC & Differential

## 2025-06-16 NOTE — ASSESSMENT & PLAN NOTE
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 Depression is in full remission and stable.    Plan:   Continue current medication therapy     Followup in 6 months.     Orders:    DULoxetine (CYMBALTA) 60 MG capsule; Take 1 capsule by mouth Daily. Indications: Major Depressive Disorder

## 2025-06-16 NOTE — PROGRESS NOTES
Subjective   The ABCs of the Annual Wellness Visit  Medicare Wellness Visit      Alba Correa is a 89 y.o. patient who presents for a Medicare Wellness Visit.    The following portions of the patient's history were reviewed and   updated as appropriate: allergies, current medications, past family history, past medical history, past social history, past surgical history, and problem list.    Compared to one year ago, the patient's physical   health is the same.  Compared to one year ago, the patient's mental   health is the same.    Recent Hospitalizations:  She was not admitted to the hospital during the last year.     Current Medical Providers:  Patient Care Team:  Aramis Doty MD as PCP - General (Family Medicine)  Juan Jose Muñiz MD as Consulting Physician (Neurology)  Roshan Martines III, MD as Cardiologist (Cardiology)  Nehal Murray MD as Consulting Physician (Nephrology)  Celia Quijano APRN as Nurse Practitioner (Cardiology)  Janine Healy APRN as Nurse Practitioner (Nurse Practitioner)  Diana Bowman APRN as Nurse Practitioner (Nurse Practitioner)  Dada Platt MD as Consulting Physician (Cardiology)  Brain Ryan APRN as Nurse Practitioner (Gastroenterology)    Outpatient Medications Prior to Visit   Medication Sig Dispense Refill    acetaZOLAMIDE (DIAMOX) 250 MG tablet Take 1 tablet by mouth Every 12 (Twelve) Hours.      acetaminophen (TYLENOL) 650 MG 8 hr tablet Take 2 tablets by mouth Every 8 (Eight) Hours As Needed for Mild Pain. Indications: Pain      Eliquis 2.5 MG tablet tablet Take 1 tablet by mouth 2 (Two) Times a Day. Indications: history of DVT/PE, Hx  tablet 3    latanoprost (XALATAN) 0.005 % ophthalmic solution INSTILL 1 DROP INTO RIGHT EYE ONCE DAILY AT BEDTIME      allopurinol (ZYLOPRIM) 100 MG tablet TAKE 1 TABLET BY MOUTH ONCE DAILY FOR GOUT 90 tablet 0    amLODIPine (NORVASC) 2.5 MG tablet Take 1 tablet by mouth once daily 90 tablet 0     docusate sodium (Colace) 100 MG capsule Take 1 capsule by mouth 2 (Two) Times a Day. 180 capsule 0    DULoxetine (CYMBALTA) 60 MG capsule Take 1 capsule by mouth Daily. Indications: Major Depressive Disorder 90 capsule 1    levothyroxine (SYNTHROID, LEVOTHROID) 50 MCG tablet TAKE 1 TABLET BY MOUTH IN THE MORNING FOR  UNDERACTIVE  THYROID 90 tablet 0    losartan (COZAAR) 25 MG tablet TAKE 1/2 (ONE-HALF) TABLET BY MOUTH ONCE DAILY FOR HIGH BLOOD PRESSURE DISORDER 45 tablet 0     No facility-administered medications prior to visit.     No opioid medication identified on active medication list. I have reviewed chart for other potential  high risk medication/s and harmful drug interactions in the elderly.      Aspirin is not on active medication list.  Aspirin use is not indicated based on review of current medical condition/s. Risk of harm outweighs potential benefits.  .    Patient Active Problem List   Diagnosis    CKD (chronic kidney disease) stage 4, GFR 15-29 ml/min    Depression    Gout    Hyperparathyroidism    Adaptive colitis    Osteopenia    Rheumatoid arthritis    Degeneration of intervertebral disc    Detrusor muscle hypertonia    History of DVT (deep vein thrombosis)    Hyperlipidemia    Nonrheumatic aortic (valve) insufficiency    History of CVA (cerebrovascular accident)    Atrial fibrillation    Anemia in chronic kidney disease    Other proteinuria    Sinus bradycardia    First degree AV block    Nonrheumatic mitral valve regurgitation    Idiopathic peripheral neuropathy    Lower extremity edema    Acute diastolic CHF (congestive heart failure)    Acquired hypothyroidism    Sick sinus syndrome    Insomnia    Fall    Chronic kidney disease, stage 3a    Hematoma of thigh    Traumatic hematoma of scalp    Abnormal CT of spine    At risk for falling    Superior mesenteric artery stenosis    Closed wedge compression fracture of T8 vertebra    Macrocytosis    Hyperlipidemia    Thyroid nodule    Essential  "hypertension    Chronic diastolic CHF (congestive heart failure)    Chronic kidney failure, stage 3 (moderate)    Left leg pain    Nonrheumatic tricuspid valve regurgitation    Central retinal vein occlusion    Other specified glaucoma    Intraocular pressure increase     Advance Care Planning Advance Directive is on file.  ACP discussion was held with the patient during this visit. Patient has an advance directive in EMR which is still valid.             Objective   Vitals:    25 1128   BP: 128/74   Pulse: 82   Resp: 16   Temp: 97.9 °F (36.6 °C)   TempSrc: Oral   SpO2: 99%   Weight: 48.6 kg (107 lb 3.2 oz)   Height: 154.9 cm (61\")   PainSc: 3        Estimated body mass index is 20.26 kg/m² as calculated from the following:    Height as of this encounter: 154.9 cm (61\").    Weight as of this encounter: 48.6 kg (107 lb 3.2 oz).    BMI is within normal parameters. No other follow-up for BMI required.           Does the patient have evidence of cognitive impairment? No                                                                                                Health  Risk Assessment    Smoking Status:  Social History     Tobacco Use   Smoking Status Former    Current packs/day: 0.50    Average packs/day: 0.5 packs/day for 55.5 years (27.7 ttl pk-yrs)    Types: Cigarettes    Start date:     Passive exposure: Past   Smokeless Tobacco Never   Tobacco Comments    I smoked for about 4yrs in the70s     Alcohol Consumption:  Social History     Substance and Sexual Activity   Alcohol Use Yes    Alcohol/week: 2.0 standard drinks of alcohol    Types: 2 Glasses of wine per week    Comment: Occasional- glass of wine few times a week       Fall Risk Screen  STEADI Fall Risk Assessment has not been completed.    Depression Screening   The PHQ has not been completed during this encounter.     Health Habits and Functional and Cognitive Screenin/17/2025    11:00 AM   Functional & Cognitive Status   Do you have " difficulty preparing food and eating? No   Do you have difficulty bathing yourself, getting dressed or grooming yourself? No   Do you have difficulty using the toilet? No   Do you have difficulty moving around from place to place? No   Do you have trouble with steps or getting out of a bed or a chair? No   Current Diet Well Balanced Diet   Dental Exam Up to date   Eye Exam Up to date   Exercise (times per week) 0 times per week   Current Exercises Include No Regular Exercise   Do you need help using the phone?  No   Are you deaf or do you have serious difficulty hearing?  No   Do you need help to go to places out of walking distance? No   Do you need help shopping? No   Do you need help preparing meals?  No   Do you need help with housework?  No   Do you need help with laundry? No   Do you need help taking your medications? No   Do you need help managing money? No   Do you ever drive or ride in a car without wearing a seat belt? No   Have you felt unusual stress, anger or loneliness in the last month? No   Who do you live with? Other   If you need help, do you have trouble finding someone available to you? No   Have you been bothered in the last four weeks by sexual problems? No   Do you have difficulty concentrating, remembering or making decisions? No           Age-appropriate Screening Schedule:  Refer to the list below for future screening recommendations based on patient's age, sex and/or medical conditions. Orders for these recommended tests are listed in the plan section. The patient has been provided with a written plan.    Health Maintenance List  Health Maintenance   Topic Date Due    LIPID PANEL  05/19/2025    ZOSTER VACCINE (2 of 2) 07/01/2025 (Originally 2/26/2012)    COVID-19 Vaccine (9 - 2024-25 season) 12/17/2025 (Originally 9/1/2024)    RSV Vaccine - Adults (1 - 1-dose 75+ series) 02/04/2026 (Originally 5/22/2008)    INFLUENZA VACCINE  07/01/2025    ANNUAL WELLNESS VISIT  06/17/2026    Pneumococcal  Vaccine 50+  Completed    MAMMOGRAM  Discontinued    DXA SCAN  Discontinued    TDAP/TD VACCINES  Discontinued    COLORECTAL CANCER SCREENING  Discontinued                                                                                                                                                CMS Preventative Services Quick Reference  Risk Factors Identified During Encounter  None Identified    The above risks/problems have been discussed with the patient.  Pertinent information has been shared with the patient in the After Visit Summary.  An After Visit Summary and PPPS were made available to the patient.    Follow Up:   Next Medicare Wellness visit to be scheduled in 1 year.         Additional E&M Note during same encounter follows:  Patient has additional, significant, and separately identifiable condition(s)/problem(s) that require work above and beyond the Medicare Wellness Visit     Chief Complaint  LIGHTHEADNESS (Dizziness x  1-2 weeks ), Hypertension, Hyperlipidemia, Hypothyroidism, Arthritis, and MEDICARE WELLNESS (DUE)    Subjective   HPI  SERGIO is also being seen today for additional medical problem/s.  Patient had a retinal occlusion of the right eye back in February of this year, currently under the care of ophthalmology, and since that time has had issues with her balance.  Patient is using a walker and reports some slight improvement with time.  Also of note is review of the chart showing a previous elevated BUN, signifying decreased hydration.    Review of Systems   Constitutional:  Negative for chills and fever.   HENT:  Negative for congestion and sinus pressure.    Eyes:  Negative for visual disturbance.   Respiratory:  Negative for cough and shortness of breath.    Cardiovascular:  Negative for chest pain.   Gastrointestinal:  Negative for abdominal pain.   Genitourinary:  Negative for dysuria.   Skin:  Negative for rash.   Hematological:  Negative for adenopathy.   Psychiatric/Behavioral:   "Negative for dysphoric mood.               Objective   Vital Signs:  /74   Pulse 82   Temp 97.9 °F (36.6 °C) (Oral)   Resp 16   Ht 154.9 cm (61\")   Wt 48.6 kg (107 lb 3.2 oz)   SpO2 99%   BMI 20.26 kg/m²   Physical Exam  Vitals and nursing note reviewed.   Constitutional:       General: She is not in acute distress.     Appearance: She is well-developed.   Cardiovascular:      Rate and Rhythm: Normal rate and regular rhythm.   Pulmonary:      Effort: Pulmonary effort is normal.      Breath sounds: Normal breath sounds.   Neurological:      Mental Status: She is alert and oriented to person, place, and time.   Psychiatric:         Behavior: Behavior normal.         Thought Content: Thought content normal.                    Assessment and Plan Additional age appropriate preventative wellness advice topics were discussed during today's preventative wellness exam(some topics already addressed during AWV portion of the note above):    Physical Activity: Advised cardiovascular activity 150 minutes per week as tolerated. (example brisk walk for 30 minutes, 5 days a week).     Nutrition: Discussed nutrition plan with patient. Information shared in after visit summary. Goal is for a well balanced diet to enhance overall health.     Encounter for subsequent annual wellness visit (AWV) in Medicare patient         Gout, unspecified cause, unspecified chronicity, unspecified site    Orders:    allopurinol (ZYLOPRIM) 100 MG tablet; Take 1 tablet by mouth Daily.    Chronic constipation    Orders:    docusate sodium (Colace) 100 MG capsule; Take 1 capsule by mouth 2 (Two) Times a Day.    Recurrent major depressive disorder, in full remission  Patient's depression is a recurrent episode that is mild without psychosis. Depression is in full remission and stable.    Plan:   Continue current medication therapy     Followup in 6 months.     Orders:    DULoxetine (CYMBALTA) 60 MG capsule; Take 1 capsule by mouth Daily. " Indications: Major Depressive Disorder    Acquired hypothyroidism    Orders:    levothyroxine (SYNTHROID, LEVOTHROID) 50 MCG tablet; Take 1 tablet by mouth Every Morning.    TSH    T4, Free    Lipid Panel    Essential hypertension  Hypertension is stable and controlled  Continue current treatment regimen.  Blood pressure will be reassessed in 6 months.    Orders:    amLODIPine (NORVASC) 2.5 MG tablet; Take 1 tablet by mouth Daily.    losartan (COZAAR) 25 MG tablet; Take 0.5 tablets by mouth Daily.    Comprehensive Metabolic Panel    CBC & Differential    Discussed the importance of hydration as well as continuing to see ophthalmology and use her walker regarding her balance issues.  Patient voices understanding.     I spent 15 minutes caring for Alba on this date of service. This time includes time spent by me in the following activities:preparing for the visit, performing a medically appropriate examination and/or evaluation , ordering medications, tests, or procedures, and documenting information in the medical record  Follow Up   Return in about 6 months (around 12/17/2025) for Recheck.  Patient was given instructions and counseling regarding her condition or for health maintenance advice. Please see specific information pulled into the AVS if appropriate.

## 2025-06-16 NOTE — TELEPHONE ENCOUNTER
HUB TO RELAY  Left patient a voicemail to inform her that I have scheduled her to see Dr. Doty tomorrow at 1130 am

## 2025-06-16 NOTE — TELEPHONE ENCOUNTER
"HUB call - caller requesting appointment with PCP regarding lightheadedness and loss of vision. Unable to reach PCP.    Spoke with caller who states has been seeing retina specialist for past several months re: loss of vision/blood clot in right eye. Further states that lightheadedness began about one month ago with gradual onset but is worsening and she is afraid she may fall.  Denies chest pain, SOA, no skipped beats. Reports BP \"is the best it has ever been\" but unable to give exact numbers. States HR was 84 at last check (yesterday).  Reports left eye vision is not bad but has floaters that retina specialist and eye doctor are \"watching closely\"    Guidelines followed, protocols reviewed. Advised caller that she needs to be seen today and will transfer to PCP office for appointment if available.  This nurse spoke with Dr. Doty's office staff and informed of information as above and need to be seen today or make PCP aware and get recommendations. States Dr. Doty does not have any available appointment today but can get her in with his partner. Caller was then transferred though to schedule.    Reason for Disposition   Patient sounds very sick or weak to the triager    Additional Information   Negative: SEVERE difficulty breathing (e.g., struggling for each breath, speaks in single words)   Negative: [1] Difficulty breathing or swallowing AND [2] started suddenly after medicine, an allergic food or bee sting   Negative: Shock suspected (e.g., cold/pale/clammy skin, too weak to stand, low BP, rapid pulse)   Negative: Difficult to awaken or acting confused (e.g., disoriented, slurred speech)   Negative: [1] Weakness (i.e., paralysis, loss of muscle strength) of the face, arm or leg on one side of the body AND [2] sudden onset AND [3] present now   Negative: [1] Numbness (i.e., loss of sensation) of the face, arm or leg on one side of the body AND [2] sudden onset AND [3] present now   Negative: [1] Loss of " "speech or garbled speech AND [2] sudden onset AND [3] present now   Negative: Overdose (accidental or intentional) of medications   Negative: [1] Fainted > 15 minutes ago AND [2] still feels too weak or dizzy to stand   Negative: Heart beating < 50 beats per minute OR > 140 beats per minute   Negative: Sounds like a life-threatening emergency to the triager   Negative: Chest pain   Negative: Rectal bleeding, bloody stool, or tarry-black stool   Negative: [1] Vomiting AND [2] contains red blood or black (\"coffee ground\") material   Negative: Vomiting is main symptom   Negative: Diarrhea is main symptom   Negative: Headache is main symptom   Negative: Patient states that they are having an anxiety or panic attack   Negative: Dizziness from low blood sugar (i.e., < 60 mg/dl or 3.5 mmol/l)   Negative: Dizziness is described as a spinning sensation (i.e., vertigo)   Negative: Heat exhaustion suspected (i.e., dehydration from heat exposure)   Negative: Difficulty breathing   Negative: SEVERE dizziness (e.g., unable to stand, requires support to walk, feels like passing out now)   Negative: Extra heartbeats, irregular heart beating, or heart is beating very fast  (i.e., \"palpitations\")   Negative: [1] Drinking very little AND [2] dehydration suspected (e.g., no urine > 12 hours, very dry mouth, very lightheaded)   Negative: [1] Weakness (i.e., paralysis, loss of muscle strength) of the face, arm / hand, or leg / foot on one side of the body AND [2] sudden onset AND [3] brief (now gone)   Negative: [1] Numbness (i.e., loss of sensation) of the face, arm / hand, or leg / foot on one side of the body AND [2] sudden onset AND [3] brief (now gone)   Negative: [1] Loss of speech or garbled speech AND [2] sudden onset AND [3] brief (now gone)   Negative: Loss of vision or double vision  (Exception: Similar to previous migraines.)    Answer Assessment - Initial Assessment Questions  1. DESCRIPTION: \"Describe your dizziness.\"      " "Feels like, going to fall, balance is off  2. LIGHTHEADED: \"Do you feel lightheaded?\" (e.g., somewhat faint, woozy, weak upon standing)      Woozy upon standing  3. VERTIGO: \"Do you feel like either you or the room is spinning or tilting?\" (i.e. vertigo)      No  4. SEVERITY: \"How bad is it?\"  \"Do you feel like you are going to faint?\" \"Can you stand and walk?\"    - MILD: Feels slightly dizzy, but walking normally.    - MODERATE: Feels unsteady when walking, but not falling; interferes with normal activities (e.g., school, work).    - SEVERE: Unable to walk without falling, or requires assistance to walk without falling; feels like passing out now.       Moderate  5. ONSET:  \"When did the dizziness begin?\"      Gradual over last month  6. AGGRAVATING FACTORS: \"Does anything make it worse?\" (e.g., standing, change in head position)      Standing  7. HEART RATE: \"Can you tell me your heart rate?\" \"How many beats in 15 seconds?\"  (Note: not all patients can do this)        PMH  of A-Fib - HR 84  8. CAUSE: \"What do you think is causing the dizziness?\"      Thinks is related to loss of vision in Right eye several months ago - Seeing retina specialist  9. RECURRENT SYMPTOM: \"Have you had dizziness before?\" If Yes, ask: \"When was the last time?\" \"What happened that time?\"      Has had vertigo, \"but this is different\"  10. OTHER SYMPTOMS: \"Do you have any other symptoms?\" (e.g., fever, chest pain, vomiting, diarrhea, bleeding)        Denies. States BP and Heart rate WNL  11. PREGNANCY: \"Is there any chance you are pregnant?\" \"When was your last menstrual period?\"        N/A    Protocols used: Dizziness - Lightheadedness-ADULT-AH    "

## 2025-06-16 NOTE — TELEPHONE ENCOUNTER
Patient called into the office stating that she has been light-headed for about a month. BP has been in the normal range. Patient requesting to a same no appointment. Patient was notified that her provider does not have anything available for today. Informed her that I could schedule her with another provider in office. Patient declined. I advised patient that he provider has a few available slot for tomorrow but she would need to contact our office tomorrow morning to possibly obtain those appt. I advise patient that I would send a message to Dr. Doty to get permission to use one of his same day slot tomorrow at 1130. Informed her that I would contact her once he gives me permission to schedule

## 2025-06-17 ENCOUNTER — OFFICE VISIT (OUTPATIENT)
Dept: FAMILY MEDICINE CLINIC | Facility: CLINIC | Age: OVER 89
End: 2025-06-17
Payer: MEDICARE

## 2025-06-17 VITALS
HEART RATE: 82 BPM | RESPIRATION RATE: 16 BRPM | SYSTOLIC BLOOD PRESSURE: 128 MMHG | WEIGHT: 107.2 LBS | TEMPERATURE: 97.9 F | HEIGHT: 61 IN | BODY MASS INDEX: 20.24 KG/M2 | DIASTOLIC BLOOD PRESSURE: 74 MMHG | OXYGEN SATURATION: 99 %

## 2025-06-17 DIAGNOSIS — K59.09 CHRONIC CONSTIPATION: ICD-10-CM

## 2025-06-17 DIAGNOSIS — Z00.00 ENCOUNTER FOR SUBSEQUENT ANNUAL WELLNESS VISIT (AWV) IN MEDICARE PATIENT: Primary | ICD-10-CM

## 2025-06-17 DIAGNOSIS — F33.42 RECURRENT MAJOR DEPRESSIVE DISORDER, IN FULL REMISSION: Chronic | ICD-10-CM

## 2025-06-17 DIAGNOSIS — M10.9 GOUT, UNSPECIFIED CAUSE, UNSPECIFIED CHRONICITY, UNSPECIFIED SITE: Chronic | ICD-10-CM

## 2025-06-17 DIAGNOSIS — I10 ESSENTIAL HYPERTENSION: ICD-10-CM

## 2025-06-17 DIAGNOSIS — E03.9 ACQUIRED HYPOTHYROIDISM: Chronic | ICD-10-CM

## 2025-06-17 PROCEDURE — 99214 OFFICE O/P EST MOD 30 MIN: CPT | Performed by: FAMILY MEDICINE

## 2025-06-17 PROCEDURE — 1170F FXNL STATUS ASSESSED: CPT | Performed by: FAMILY MEDICINE

## 2025-06-17 PROCEDURE — 1160F RVW MEDS BY RX/DR IN RCRD: CPT | Performed by: FAMILY MEDICINE

## 2025-06-17 PROCEDURE — 1125F AMNT PAIN NOTED PAIN PRSNT: CPT | Performed by: FAMILY MEDICINE

## 2025-06-17 PROCEDURE — G0439 PPPS, SUBSEQ VISIT: HCPCS | Performed by: FAMILY MEDICINE

## 2025-06-17 PROCEDURE — 1159F MED LIST DOCD IN RCRD: CPT | Performed by: FAMILY MEDICINE

## 2025-06-17 RX ORDER — ALLOPURINOL 100 MG/1
100 TABLET ORAL DAILY
Qty: 90 TABLET | Refills: 1 | Status: ON HOLD | OUTPATIENT
Start: 2025-06-17

## 2025-06-17 RX ORDER — LOSARTAN POTASSIUM 25 MG/1
12.5 TABLET ORAL DAILY
Qty: 45 TABLET | Refills: 1 | Status: ON HOLD | OUTPATIENT
Start: 2025-06-17

## 2025-06-17 RX ORDER — ACETAZOLAMIDE 250 MG/1
1 TABLET ORAL EVERY 12 HOURS SCHEDULED
Status: ON HOLD | COMMUNITY
Start: 2025-06-05

## 2025-06-17 RX ORDER — DOCUSATE SODIUM 100 MG/1
100 CAPSULE, LIQUID FILLED ORAL 2 TIMES DAILY
Qty: 180 CAPSULE | Refills: 1 | Status: ON HOLD | OUTPATIENT
Start: 2025-06-17

## 2025-06-17 RX ORDER — LEVOTHYROXINE SODIUM 50 UG/1
50 TABLET ORAL
Qty: 90 TABLET | Refills: 1 | Status: ON HOLD | OUTPATIENT
Start: 2025-06-17

## 2025-06-17 RX ORDER — DULOXETIN HYDROCHLORIDE 60 MG/1
60 CAPSULE, DELAYED RELEASE ORAL DAILY
Qty: 90 CAPSULE | Refills: 1 | Status: ON HOLD | OUTPATIENT
Start: 2025-06-17

## 2025-06-17 RX ORDER — AMLODIPINE BESYLATE 2.5 MG/1
2.5 TABLET ORAL DAILY
Qty: 90 TABLET | Refills: 1 | Status: ON HOLD | OUTPATIENT
Start: 2025-06-17

## 2025-06-17 NOTE — PATIENT INSTRUCTIONS
Medicare Wellness  Personal Prevention Plan of Service     Date of Office Visit:    Encounter Provider:  Aramis Doty MD  Place of Service:  [facility]  Patient Name: Alba Correa  :  1933    As part of the Medicare Wellness portion of your visit today, we are providing you with this personalized preventive plan of services (PPPS). This plan is based upon recommendations of the [place] Preventive Services Task Force (USPSTF) and the Advisory Committee on Immunization Practices (ACIP).    This lists the preventive care services that should be considered, and provides dates of when you are due. Items listed as completed are up-to-date and do not require any further intervention.    Health Maintenance   Topic Date Due    LIPID PANEL  2025    ZOSTER VACCINE (2 of 2) 2025 (Originally 2012)    COVID-19 Vaccine (2024- season) 2025 (Originally 2024)    RSV Vaccine - Adults (1 - 1-dose 75+ series) 2026 (Originally 2008)    INFLUENZA VACCINE  2025    ANNUAL WELLNESS VISIT  2026    Pneumococcal Vaccine 50+  Completed    MAMMOGRAM  Discontinued    DXA SCAN  Discontinued    TDAP/TD VACCINES  Discontinued    COLORECTAL CANCER SCREENING  Discontinued       No orders of the defined types were placed in this encounter.      Return in about 6 months (around 2025) for Recheck.

## 2025-06-22 ENCOUNTER — HOSPITAL ENCOUNTER (INPATIENT)
Facility: HOSPITAL | Age: OVER 89
LOS: 7 days | Discharge: NURSING, REHAB OR LONG-TERM CARE | End: 2025-07-01
Attending: EMERGENCY MEDICINE | Admitting: INTERNAL MEDICINE
Payer: MEDICARE

## 2025-06-22 ENCOUNTER — APPOINTMENT (OUTPATIENT)
Dept: CT IMAGING | Facility: HOSPITAL | Age: OVER 89
End: 2025-06-22
Payer: MEDICARE

## 2025-06-22 ENCOUNTER — APPOINTMENT (OUTPATIENT)
Dept: CARDIOLOGY | Facility: HOSPITAL | Age: OVER 89
End: 2025-06-22
Payer: MEDICARE

## 2025-06-22 ENCOUNTER — APPOINTMENT (OUTPATIENT)
Dept: GENERAL RADIOLOGY | Facility: HOSPITAL | Age: OVER 89
End: 2025-06-22
Payer: MEDICARE

## 2025-06-22 DIAGNOSIS — S09.90XA INJURY OF HEAD, INITIAL ENCOUNTER: ICD-10-CM

## 2025-06-22 DIAGNOSIS — R55 SYNCOPE AND COLLAPSE: Primary | ICD-10-CM

## 2025-06-22 DIAGNOSIS — R55 VASOVAGAL SYNCOPE: ICD-10-CM

## 2025-06-22 LAB
ALBUMIN SERPL-MCNC: 4.2 G/DL (ref 3.5–5.2)
ALBUMIN/GLOB SERPL: 1 G/DL
ALP SERPL-CCNC: 94 U/L (ref 39–117)
ALT SERPL W P-5'-P-CCNC: 15 U/L (ref 1–33)
ANION GAP SERPL CALCULATED.3IONS-SCNC: 11 MMOL/L (ref 5–15)
AORTIC ARCH: 2.2 CM
AORTIC DIMENSIONLESS INDEX: 0.6 (DI)
ASCENDING AORTA: 3.4 CM
AST SERPL-CCNC: 31 U/L (ref 1–32)
AV MEAN PRESS GRAD SYS DOP V1V2: 3.2 MMHG
AV VMAX SYS DOP: 119.1 CM/SEC
BACTERIA UR QL AUTO: NORMAL /HPF
BASOPHILS # BLD AUTO: 0.08 10*3/MM3 (ref 0–0.2)
BASOPHILS NFR BLD AUTO: 2 % (ref 0–1.5)
BH CV ECHO MEAS - ACS: 1.96 CM
BH CV ECHO MEAS - AI P1/2T: 552.2 MSEC
BH CV ECHO MEAS - AO MAX PG: 5.7 MMHG
BH CV ECHO MEAS - AO ROOT AREA (BSA CORRECTED): 1.9 CM2
BH CV ECHO MEAS - AO ROOT DIAM: 2.8 CM
BH CV ECHO MEAS - AO V2 VTI: 20.4 CM
BH CV ECHO MEAS - AVA(I,D): 1.72 CM2
BH CV ECHO MEAS - EDV(CUBED): 84.1 ML
BH CV ECHO MEAS - EDV(MOD-SP2): 59 ML
BH CV ECHO MEAS - EDV(MOD-SP4): 55 ML
BH CV ECHO MEAS - EF(MOD-SP2): 59.3 %
BH CV ECHO MEAS - EF(MOD-SP4): 61.8 %
BH CV ECHO MEAS - ESV(CUBED): 31.4 ML
BH CV ECHO MEAS - ESV(MOD-SP2): 24 ML
BH CV ECHO MEAS - ESV(MOD-SP4): 21 ML
BH CV ECHO MEAS - FS: 28 %
BH CV ECHO MEAS - IVS/LVPW: 1.47 CM
BH CV ECHO MEAS - IVSD: 0.99 CM
BH CV ECHO MEAS - LA DIMENSION: 3.5 CM
BH CV ECHO MEAS - LV DIASTOLIC VOL/BSA (35-75): 38 CM2
BH CV ECHO MEAS - LV MASS(C)D: 113.6 GRAMS
BH CV ECHO MEAS - LV MAX PG: 2.38 MMHG
BH CV ECHO MEAS - LV MEAN PG: 0.93 MMHG
BH CV ECHO MEAS - LV SYSTOLIC VOL/BSA (12-30): 14.5 CM2
BH CV ECHO MEAS - LV V1 MAX: 77.1 CM/SEC
BH CV ECHO MEAS - LV V1 VTI: 12.2 CM
BH CV ECHO MEAS - LVIDD: 4.4 CM
BH CV ECHO MEAS - LVIDS: 3.2 CM
BH CV ECHO MEAS - LVOT AREA: 2.9 CM2
BH CV ECHO MEAS - LVOT DIAM: 1.92 CM
BH CV ECHO MEAS - LVPWD: 0.67 CM
BH CV ECHO MEAS - MR MAX PG: 145.3 MMHG
BH CV ECHO MEAS - MR MAX VEL: 602.7 CM/SEC
BH CV ECHO MEAS - MV DEC SLOPE: 660.4 CM/SEC2
BH CV ECHO MEAS - MV DEC TIME: 0.15 SEC
BH CV ECHO MEAS - MV E MAX VEL: 99.4 CM/SEC
BH CV ECHO MEAS - MV MAX PG: 5.4 MMHG
BH CV ECHO MEAS - MV MEAN PG: 2.37 MMHG
BH CV ECHO MEAS - MV P1/2T: 54.2 MSEC
BH CV ECHO MEAS - MV V2 VTI: 15.2 CM
BH CV ECHO MEAS - MVA(P1/2T): 4.1 CM2
BH CV ECHO MEAS - MVA(VTI): 2.31 CM2
BH CV ECHO MEAS - PA ACC TIME: 0.15 SEC
BH CV ECHO MEAS - PA V2 MAX: 62.4 CM/SEC
BH CV ECHO MEAS - QP/QS: 0.73
BH CV ECHO MEAS - RAP SYSTOLE: 3 MMHG
BH CV ECHO MEAS - RV MAX PG: 0.72 MMHG
BH CV ECHO MEAS - RV V1 MAX: 42.4 CM/SEC
BH CV ECHO MEAS - RV V1 VTI: 7.3 CM
BH CV ECHO MEAS - RVDD: 2.4 CM
BH CV ECHO MEAS - RVOT DIAM: 2.12 CM
BH CV ECHO MEAS - RVSP: 33.8 MMHG
BH CV ECHO MEAS - SUP REN AO DIAM: 1.4 CM
BH CV ECHO MEAS - SV(LVOT): 35.1 ML
BH CV ECHO MEAS - SV(MOD-SP2): 35 ML
BH CV ECHO MEAS - SV(MOD-SP4): 34 ML
BH CV ECHO MEAS - SV(RVOT): 25.6 ML
BH CV ECHO MEAS - SVI(LVOT): 24.3 ML/M2
BH CV ECHO MEAS - SVI(MOD-SP2): 24.2 ML/M2
BH CV ECHO MEAS - SVI(MOD-SP4): 23.5 ML/M2
BH CV ECHO MEAS - TAPSE (>1.6): 1.4 CM
BH CV ECHO MEAS - TR MAX PG: 30.8 MMHG
BH CV ECHO MEAS - TR MAX VEL: 277.5 CM/SEC
BH CV XLRA - RV BASE: 2.42 CM
BH CV XLRA - RV LENGTH: 4.5 CM
BH CV XLRA - RV MID: 1.74 CM
BILIRUB SERPL-MCNC: 0.4 MG/DL (ref 0–1.2)
BILIRUB UR QL STRIP: NEGATIVE
BUN SERPL-MCNC: 16 MG/DL (ref 8–23)
BUN/CREAT SERPL: 18.6 (ref 7–25)
CALCIUM SPEC-SCNC: 10 MG/DL (ref 8.2–9.6)
CHLORIDE SERPL-SCNC: 101 MMOL/L (ref 98–107)
CLARITY UR: CLEAR
CO2 SERPL-SCNC: 29 MMOL/L (ref 22–29)
COLOR UR: YELLOW
CREAT SERPL-MCNC: 0.86 MG/DL (ref 0.57–1)
DEPRECATED RDW RBC AUTO: 46.8 FL (ref 37–54)
EGFRCR SERPLBLD CKD-EPI 2021: 63.5 ML/MIN/1.73
EOSINOPHIL # BLD AUTO: 0.15 10*3/MM3 (ref 0–0.4)
EOSINOPHIL NFR BLD AUTO: 3.7 % (ref 0.3–6.2)
ERYTHROCYTE [DISTWIDTH] IN BLOOD BY AUTOMATED COUNT: 12.3 % (ref 12.3–15.4)
GEN 5 1HR TROPONIN T REFLEX: 14 NG/L
GLOBULIN UR ELPH-MCNC: 4.1 GM/DL
GLUCOSE SERPL-MCNC: 101 MG/DL (ref 65–99)
GLUCOSE UR STRIP-MCNC: NEGATIVE MG/DL
HCT VFR BLD AUTO: 40.8 % (ref 34–46.6)
HGB BLD-MCNC: 13.2 G/DL (ref 12–15.9)
HGB UR QL STRIP.AUTO: NEGATIVE
HYALINE CASTS UR QL AUTO: NORMAL /LPF
IMM GRANULOCYTES # BLD AUTO: 0.01 10*3/MM3 (ref 0–0.05)
IMM GRANULOCYTES NFR BLD AUTO: 0.2 % (ref 0–0.5)
KETONES UR QL STRIP: NEGATIVE
LEFT ATRIUM VOLUME INDEX: 17 ML/M2
LEUKOCYTE ESTERASE UR QL STRIP.AUTO: NEGATIVE
LV EF BIPLANE MOD: 60.5 %
LYMPHOCYTES # BLD AUTO: 1.33 10*3/MM3 (ref 0.7–3.1)
LYMPHOCYTES NFR BLD AUTO: 32.8 % (ref 19.6–45.3)
MCH RBC QN AUTO: 33.5 PG (ref 26.6–33)
MCHC RBC AUTO-ENTMCNC: 32.4 G/DL (ref 31.5–35.7)
MCV RBC AUTO: 103.6 FL (ref 79–97)
MONOCYTES # BLD AUTO: 0.58 10*3/MM3 (ref 0.1–0.9)
MONOCYTES NFR BLD AUTO: 14.3 % (ref 5–12)
NEUTROPHILS NFR BLD AUTO: 1.9 10*3/MM3 (ref 1.7–7)
NEUTROPHILS NFR BLD AUTO: 47 % (ref 42.7–76)
NITRITE UR QL STRIP: NEGATIVE
NRBC BLD AUTO-RTO: 0 /100 WBC (ref 0–0.2)
PH UR STRIP.AUTO: 8 [PH] (ref 5–8)
PLATELET # BLD AUTO: 244 10*3/MM3 (ref 140–450)
PMV BLD AUTO: 9.9 FL (ref 6–12)
POTASSIUM SERPL-SCNC: 3.9 MMOL/L (ref 3.5–5.2)
PROT SERPL-MCNC: 8.3 G/DL (ref 6–8.5)
PROT UR QL STRIP: ABNORMAL
QT INTERVAL: 393 MS
QTC INTERVAL: 461 MS
RBC # BLD AUTO: 3.94 10*6/MM3 (ref 3.77–5.28)
RBC # UR STRIP: NORMAL /HPF
REF LAB TEST METHOD: NORMAL
SINUS: 2.8 CM
SODIUM SERPL-SCNC: 141 MMOL/L (ref 136–145)
SP GR UR STRIP: 1.01 (ref 1–1.03)
SQUAMOUS #/AREA URNS HPF: NORMAL /HPF
STJ: 2.8 CM
TROPONIN T % DELTA: -7
TROPONIN T NUMERIC DELTA: -1 NG/L
TROPONIN T SERPL HS-MCNC: 15 NG/L
UROBILINOGEN UR QL STRIP: ABNORMAL
WBC # UR STRIP: NORMAL /HPF
WBC NRBC COR # BLD AUTO: 4.05 10*3/MM3 (ref 3.4–10.8)

## 2025-06-22 PROCEDURE — 36415 COLL VENOUS BLD VENIPUNCTURE: CPT

## 2025-06-22 PROCEDURE — 70450 CT HEAD/BRAIN W/O DYE: CPT

## 2025-06-22 PROCEDURE — 25810000003 SODIUM CHLORIDE 0.9 % SOLUTION: Performed by: PHYSICIAN ASSISTANT

## 2025-06-22 PROCEDURE — 85025 COMPLETE CBC W/AUTO DIFF WBC: CPT | Performed by: EMERGENCY MEDICINE

## 2025-06-22 PROCEDURE — G0378 HOSPITAL OBSERVATION PER HR: HCPCS

## 2025-06-22 PROCEDURE — 99285 EMERGENCY DEPT VISIT HI MDM: CPT

## 2025-06-22 PROCEDURE — 80053 COMPREHEN METABOLIC PANEL: CPT | Performed by: EMERGENCY MEDICINE

## 2025-06-22 PROCEDURE — 93306 TTE W/DOPPLER COMPLETE: CPT

## 2025-06-22 PROCEDURE — 93306 TTE W/DOPPLER COMPLETE: CPT | Performed by: INTERNAL MEDICINE

## 2025-06-22 PROCEDURE — 93010 ELECTROCARDIOGRAM REPORT: CPT | Performed by: INTERNAL MEDICINE

## 2025-06-22 PROCEDURE — 93005 ELECTROCARDIOGRAM TRACING: CPT | Performed by: EMERGENCY MEDICINE

## 2025-06-22 PROCEDURE — 25510000001 PERFLUTREN 6.52 MG/ML SUSPENSION 2 ML VIAL: Performed by: PHYSICIAN ASSISTANT

## 2025-06-22 PROCEDURE — 99214 OFFICE O/P EST MOD 30 MIN: CPT | Performed by: INTERNAL MEDICINE

## 2025-06-22 PROCEDURE — 81001 URINALYSIS AUTO W/SCOPE: CPT | Performed by: INTERNAL MEDICINE

## 2025-06-22 PROCEDURE — 72125 CT NECK SPINE W/O DYE: CPT

## 2025-06-22 PROCEDURE — 84484 ASSAY OF TROPONIN QUANT: CPT | Performed by: EMERGENCY MEDICINE

## 2025-06-22 PROCEDURE — 71045 X-RAY EXAM CHEST 1 VIEW: CPT

## 2025-06-22 RX ORDER — LOSARTAN POTASSIUM 25 MG/1
12.5 TABLET ORAL DAILY
Status: DISCONTINUED | OUTPATIENT
Start: 2025-06-22 | End: 2025-07-01 | Stop reason: HOSPADM

## 2025-06-22 RX ORDER — NITROGLYCERIN 0.4 MG/1
0.4 TABLET SUBLINGUAL
Status: DISCONTINUED | OUTPATIENT
Start: 2025-06-22 | End: 2025-07-01 | Stop reason: HOSPADM

## 2025-06-22 RX ORDER — ALLOPURINOL 100 MG/1
100 TABLET ORAL DAILY
Status: DISCONTINUED | OUTPATIENT
Start: 2025-06-22 | End: 2025-07-01 | Stop reason: HOSPADM

## 2025-06-22 RX ORDER — SODIUM CHLORIDE 0.9 % (FLUSH) 0.9 %
10 SYRINGE (ML) INJECTION EVERY 12 HOURS SCHEDULED
Status: DISCONTINUED | OUTPATIENT
Start: 2025-06-22 | End: 2025-07-01 | Stop reason: HOSPADM

## 2025-06-22 RX ORDER — SODIUM CHLORIDE 9 MG/ML
100 INJECTION, SOLUTION INTRAVENOUS CONTINUOUS
Status: ACTIVE | OUTPATIENT
Start: 2025-06-22 | End: 2025-06-22

## 2025-06-22 RX ORDER — LOSARTAN POTASSIUM 25 MG/1
25 TABLET ORAL ONCE
Status: COMPLETED | OUTPATIENT
Start: 2025-06-22 | End: 2025-06-22

## 2025-06-22 RX ORDER — AMLODIPINE BESYLATE 5 MG/1
2.5 TABLET ORAL
Status: DISCONTINUED | OUTPATIENT
Start: 2025-06-22 | End: 2025-06-22

## 2025-06-22 RX ORDER — LATANOPROST 50 UG/ML
1 SOLUTION/ DROPS OPHTHALMIC NIGHTLY
Status: DISCONTINUED | OUTPATIENT
Start: 2025-06-22 | End: 2025-07-01 | Stop reason: HOSPADM

## 2025-06-22 RX ORDER — AMOXICILLIN 250 MG
2 CAPSULE ORAL 2 TIMES DAILY PRN
Status: DISCONTINUED | OUTPATIENT
Start: 2025-06-22 | End: 2025-07-01 | Stop reason: HOSPADM

## 2025-06-22 RX ORDER — DULOXETIN HYDROCHLORIDE 60 MG/1
60 CAPSULE, DELAYED RELEASE ORAL DAILY
Status: DISCONTINUED | OUTPATIENT
Start: 2025-06-22 | End: 2025-07-01 | Stop reason: HOSPADM

## 2025-06-22 RX ORDER — SODIUM CHLORIDE 0.9 % (FLUSH) 0.9 %
10 SYRINGE (ML) INJECTION AS NEEDED
Status: DISCONTINUED | OUTPATIENT
Start: 2025-06-22 | End: 2025-07-01 | Stop reason: HOSPADM

## 2025-06-22 RX ORDER — AMLODIPINE BESYLATE 5 MG/1
2.5 TABLET ORAL DAILY
Status: DISCONTINUED | OUTPATIENT
Start: 2025-06-23 | End: 2025-06-23

## 2025-06-22 RX ORDER — BISACODYL 10 MG
10 SUPPOSITORY, RECTAL RECTAL DAILY PRN
Status: DISCONTINUED | OUTPATIENT
Start: 2025-06-22 | End: 2025-07-01 | Stop reason: HOSPADM

## 2025-06-22 RX ORDER — LEVOTHYROXINE SODIUM 50 UG/1
50 TABLET ORAL
Status: DISCONTINUED | OUTPATIENT
Start: 2025-06-22 | End: 2025-07-01 | Stop reason: HOSPADM

## 2025-06-22 RX ORDER — POLYETHYLENE GLYCOL 3350 17 G/17G
17 POWDER, FOR SOLUTION ORAL DAILY PRN
Status: DISCONTINUED | OUTPATIENT
Start: 2025-06-22 | End: 2025-07-01 | Stop reason: HOSPADM

## 2025-06-22 RX ORDER — BISACODYL 5 MG/1
5 TABLET, DELAYED RELEASE ORAL DAILY PRN
Status: DISCONTINUED | OUTPATIENT
Start: 2025-06-22 | End: 2025-07-01 | Stop reason: HOSPADM

## 2025-06-22 RX ORDER — SODIUM CHLORIDE 9 MG/ML
40 INJECTION, SOLUTION INTRAVENOUS AS NEEDED
Status: DISCONTINUED | OUTPATIENT
Start: 2025-06-22 | End: 2025-07-01 | Stop reason: HOSPADM

## 2025-06-22 RX ORDER — ACETAZOLAMIDE 250 MG/1
250 TABLET ORAL EVERY 12 HOURS SCHEDULED
Status: DISCONTINUED | OUTPATIENT
Start: 2025-06-22 | End: 2025-06-24

## 2025-06-22 RX ADMIN — PERFLUTREN 2 ML: 6.52 INJECTION, SUSPENSION INTRAVENOUS at 15:35

## 2025-06-22 RX ADMIN — DULOXETINE 60 MG: 60 CAPSULE, DELAYED RELEASE ORAL at 16:03

## 2025-06-22 RX ADMIN — APIXABAN 2.5 MG: 2.5 TABLET, FILM COATED ORAL at 20:29

## 2025-06-22 RX ADMIN — LEVOTHYROXINE SODIUM 50 MCG: 50 TABLET ORAL at 16:03

## 2025-06-22 RX ADMIN — LOSARTAN POTASSIUM 25 MG: 25 TABLET, FILM COATED ORAL at 09:12

## 2025-06-22 RX ADMIN — ALLOPURINOL 100 MG: 100 TABLET ORAL at 16:03

## 2025-06-22 RX ADMIN — SODIUM CHLORIDE 100 ML/HR: 9 INJECTION, SOLUTION INTRAVENOUS at 12:52

## 2025-06-22 RX ADMIN — Medication 10 ML: at 20:29

## 2025-06-22 RX ADMIN — LATANOPROST 1 DROP: 50 SOLUTION/ DROPS OPHTHALMIC at 20:29

## 2025-06-22 RX ADMIN — AMLODIPINE BESYLATE 2.5 MG: 5 TABLET ORAL at 09:11

## 2025-06-22 NOTE — CONSULTS
[facility]  Consult Note                                                                              6/22/2025  Satya Sterling MD    Patient Identification:  Alba Correa:   89 y.o.  female  5/22/1933     Date of Admission:6/22/2025    CC: Syncope    History of Present Illness:    This is a pleasant 89 year-old female with a past medical history significant for CKD, hypertension, hyperlipidemia, atrial fibrillation, DVT, first-degree AV block, sick sinus syndrome, diastolic CHF.    In July 2019 she had an acute stroke and was diagnosed with atrial fibrillation.  She underwent electrical cardioversion.    In October 2020 when she had a syncopal episode of unknown etiology.  30-day event monitor showed 40% atrial fibrillation burden.    In December 2023 he she was hospitalized after a fall and developed a scalp hematoma.  She was found to be orthostatic and antihypertensive therapy was discontinued.    In January 2024 her atrial fibrillation burden was 81%.  She was not on anticoagulation due to recurrent falls and hospitalization.  She was offered Watchman device procedure and she declined.  Dr. Martines recommended that she take her apixaban twice per day.    In May 2024 she was hospitalized after fall and had a thoracic compression fracture.  There was concern about a cardiac or vascular event.  Myocardial perfusion study showed no ischemia.  Echocardiogram showed normal LVEF, mild to moderate aortic regurgitation, mild to moderate tricuspid regurgitation, and trace pulmonic regurgitation.    She presented to the emergency department today complaining of a syncopal event.  This occurred yesterday evening.  She was standing up before bed and the next thing she remembers is being on the ground.  She does not recall hitting her head.  She denies feeling lightheadedness, palpitations, chest pain, shortness of breath prior to the event.  In the emergency room CT scan was negative for intracranial  hemorrhage, C-spine showed no acute process.  EKG showed atrial fibrillation, rate 81, LAFB, no acute ST or T wave changes.  CBC largely unremarkable.  CMP shows creatinine 0.86, unremarkable electrolytes.  Troponin flat at 15 and 14.  She was admitted to the observation unit for further management.  Cardiology has been consulted for further evaluation of a syncopal episode.        ECHO 5/21/2024    Left ventricular ejection fraction appears to be 61 - 65%.    Left ventricular diastolic function was indeterminate.    Estimated right ventricular systolic pressure from tricuspid regurgitation is normal (<35 mmHg).    Stress Test 5/20/2024    Myocardial perfusion imaging indicates a normal myocardial perfusion study with no evidence of ischemia. Impressions are consistent with a low risk study.    Left ventricular ejection fraction is hyperdynamic (Calculated EF > 70%).    Past Medical History:  Past Medical History:   Diagnosis Date    Acquired hypothyroidism 05/03/2022    Acute diastolic CHF (congestive heart failure) 03/20/2022    Anxiety 2003    Cataract     Chronic kidney disease     Closed fracture of left distal humerus 03/07/2023    Closed head injury 12/07/2023    CVA (cerebral vascular accident)     Degeneration of intervertebral disc     Depression     DVT (deep venous thrombosis)     Facial laceration 03/07/2023    First degree AV block 05/26/2021    Gout     H/O complete eye exam 09/2016    Hyperlipidemia     Hypertension     IBS (irritable bowel syndrome)     Laceration of right forearm 03/07/2023    Nonrheumatic mitral valve regurgitation     OAB (overactive bladder)     Osteopenia     Osteoporosis     PAF (paroxysmal atrial fibrillation) 11/08/2019    Peripheral neuropathy     Renal insufficiency     Rheumatoid arthritis     Sinus bradycardia 05/26/2021    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 Colby    COLONOSCOPY N/A 01/01/2010    ELBOW OPEN REDUCTION INTERNAL FIXATION Left 03/11/2023    Procedure: ELBOW OPEN REDUCTION INTERNAL FIXATION;  Surgeon: Ramón Encinas II, MD;  Location: [facility] MAIN OR;  Service: Orthopedics;  Laterality: Left;    HERNIA REPAIR  1965    HYSTERECTOMY      still has ovaries    MAMMO BILATERAL  11/16/2016    pt declines    PAP SMEAR  11/16/2016    declines       Allergies:  Allergies   Allergen Reactions    Hydrocodone-Acetaminophen Nausea And Vomiting and Dizziness    Lisinopril Unknown (See Comments)     Unknown per pt    Sulfamethoxazole-Trimethoprim Delirium    Levofloxacin Rash       Home Meds:  Medications Prior to Admission   Medication Sig Dispense Refill Last Dose/Taking    acetaminophen (TYLENOL) 650 MG 8 hr tablet Take 2 tablets by mouth Every 8 (Eight) Hours As Needed for Mild Pain. Indications: Pain   6/21/2025 Bedtime    acetaZOLAMIDE (DIAMOX) 250 MG tablet Take 1 tablet by mouth Every 12 (Twelve) Hours.   6/21/2025 Morning    allopurinol (ZYLOPRIM) 100 MG tablet Take 1 tablet by mouth Daily. 90 tablet 1 6/21/2025 Morning    amLODIPine (NORVASC) 2.5 MG tablet Take 1 tablet by mouth Daily. 90 tablet 1 6/21/2025 Morning    docusate sodium (Colace) 100 MG capsule Take 1 capsule by mouth 2 (Two) Times a Day. 180 capsule 1 6/21/2025 Morning    DULoxetine (CYMBALTA) 60 MG capsule Take 1 capsule by mouth Daily. Indications: Major Depressive Disorder 90 capsule 1 6/21/2025 Morning    Eliquis 2.5 MG tablet tablet Take 1 tablet by mouth 2 (Two) Times a Day. Indications: history of DVT/PE, Hx  tablet 3 6/21/2025 Bedtime    latanoprost (XALATAN) 0.005 % ophthalmic solution INSTILL 1 DROP INTO RIGHT  EYE ONCE DAILY AT BEDTIME   6/21/2025 Bedtime    levothyroxine (SYNTHROID, LEVOTHROID) 50 MCG tablet Take 1 tablet by mouth Every Morning. 90 tablet 1 6/21/2025 Morning    losartan (COZAAR) 25 MG tablet Take 0.5 tablets by mouth Daily. 45 tablet 1 6/21/2025 Morning       Current Meds  Scheduled Meds:acetaZOLAMIDE, 250 mg, Oral, Q12H  allopurinol, 100 mg, Oral, Daily  [START ON 6/23/2025] amLODIPine, 2.5 mg, Oral, Daily  apixaban, 2.5 mg, Oral, BID  DULoxetine, 60 mg, Oral, Daily  latanoprost, 1 drop, Both Eyes, Nightly  levothyroxine, 50 mcg, Oral, Q AM  losartan, 12.5 mg, Oral, Daily  sodium chloride, 10 mL, Intravenous, Q12H      Continuous Infusions:sodium chloride, 100 mL/hr, Last Rate: 100 mL/hr (06/22/25 1252)      PRN Meds:.  senna-docusate sodium **AND** polyethylene glycol **AND** bisacodyl **AND** bisacodyl    nitroglycerin    sodium chloride    sodium chloride    Social History:   Social History     Socioeconomic History    Marital status:      Spouse name: Peña    Number of children: 3    Years of education: High school   Tobacco Use    Smoking status: Former     Current packs/day: 0.50     Average packs/day: 0.5 packs/day for 55.5 years (27.7 ttl pk-yrs)     Types: Cigarettes     Start date: 1970     Passive exposure: Past    Smokeless tobacco: Never    Tobacco comments:     I smoked for about 4yrs in the70s   Vaping Use    Vaping status: Never Used   Substance and Sexual Activity    Alcohol use: Yes     Alcohol/week: 2.0 standard drinks of alcohol     Types: 2 Glasses of wine per week     Comment: Occasional- glass of wine few times a week    Drug use: No    Sexual activity: Not Currently     Partners: Female     Birth control/protection: Surgical       Family History:  Family History   Problem Relation Age of Onset    Heart disease Mother 89    Heart failure Mother     Stomach cancer Father     Kidney cancer Father 52    Lung cancer Father 52    Ovarian cancer Sister     Lung cancer Brother 65  "   Ovarian cancer Daughter 60    Alcohol abuse Other     Cancer Other     Hypertension Other     Kidney disease Other     Lung disease Other     Colon cancer Neg Hx     Colon polyps Neg Hx     Crohn's disease Neg Hx     Irritable bowel syndrome Neg Hx     Ulcerative colitis Neg Hx        REVIEW OF SYSTEMS:   CONSTITUTIONAL: No weight loss, fever, chills, weakness or fatigue.   HEENT: Eyes: No visual loss, blurred vision, double vision or yellow sclerae. Ears, Nose, Throat: No hearing loss, sneezing, congestion, runny nose or sore throat.   SKIN: No rash or itching.     RESPIRATORY: No shortness of breath, hemoptysis, cough or sputum.   GASTROINTESTINAL: No anorexia, nausea, vomiting or diarrhea. No abdominal pain, bright red blood per rectum or melena.  GENITOURINARY: No burning on urination, hematuria or increased frequency.  NEUROLOGICAL: No headache, dizziness, syncope, paralysis, ataxia, numbness or tingling in the extremities. No change in bowel or bladder control.   MUSCULOSKELETAL: No muscle, back pain, joint pain or stiffness.   HEMATOLOGIC: No anemia, bleeding or bruising.   LYMPHATICS: No enlarged nodes. No history of splenectomy.   PSYCHIATRIC: No history of depression, anxiety, hallucinations.   ENDOCRINOLOGIC: No reports of sweating, cold or heat intolerance. No polyuria or polydipsia.     Physical Exam    /76 (BP Location: Left arm, Patient Position: Lying)   Pulse 80   Temp 99 °F (37.2 °C) (Oral)   Resp 17   Ht 154.9 cm (61\")   Wt 48.5 kg (107 lb)   LMP  (LMP Unknown)   SpO2 95%   BMI 20.22 kg/m²     General Appearance Elderly frail female in no acute distress   Head Normocephalic, without abnormality, atraumatic   Ears Ears appear intact with no abnormalities noted   Throat No oral lesions, no thrush, oral mucosa moist   Neck No adenopathy, supple, trachea midline, no thyromegaly, no carotid bruit, no JVD   Back No skin lesions, erythema or scars, no tenderness to percussion or " palpation,range of motion is normal   Lungs Clear to auscultation,respirations regular, even and unlabored   Heart Regular rhythm and normal rate, normal S1 and S2, no murmur, no gallop, no rub, no click   Chest wall No abnormalities observed   Abdomen Normal bowel sounds, no masses, no hepatomegaly,    Extremities Moves all extremities well, no edema, no cyanosis, no redness   Pulses Pulses palpable and equal bilaterally. Normal radial, carotid, femoral, dorsalis pedis and posterior tibial pulses bilaterally. Normal abdominal aorta   Skin No bleeding, bruising or rash   Psychiatric Alert and oriented x 3, normal mood and affect     Results from last 7 days   Lab Units 06/22/25  0829   SODIUM mmol/L 141   POTASSIUM mmol/L 3.9   CHLORIDE mmol/L 101   CO2 mmol/L 29.0   BUN mg/dL 16.0   CREATININE mg/dL 0.86   CALCIUM mg/dL 10.0*   BILIRUBIN mg/dL 0.4   ALK PHOS U/L 94   ALT (SGPT) U/L 15   AST (SGOT) U/L 31   GLUCOSE mg/dL 101*     Results from last 7 days   Lab Units 06/22/25  0940 06/22/25  0829   HSTROP T ng/L 14* 15*     Results from last 7 days   Lab Units 06/22/25  0829   WBC 10*3/mm3 4.05   HEMOGLOBIN g/dL 13.2   HEMATOCRIT % 40.8   PLATELETS 10*3/mm3 244                   I personally viewed and interpreted the patient's EKG/Telemetry data  I have reviewed HPI and ROS above.    Assessment and Plan    1.  Syncope.  Troponin normal EKG relatively unremarkable.  She does have conduction disease with bradycardia and bundle block 6.  History of prior but nothing that appears worse or new.  Check an echocardiogram observe vitals check orthostatics.  2.  Persistent atrial fibrillation rate rates appear controlled remains on anticoagulant therapy  3.  Chronic renal insufficiency  4.  History of hypertension.  May need to decrease amlodipine dose as she may have a component of autonomic dysfunction.  Will observe blood pressure over the next day  5.  Rheumatoid arthritis          Mini Bains  6/22/2025  21:39 EDT    50min  spent in reviewing records, discussion and examination of the patient and discussion with other members of the patient's medical team.     Dictated utilizing Dragon dictation

## 2025-06-22 NOTE — ED PROVIDER NOTES
EMERGENCY DEPARTMENT ENCOUNTER    Room Number:  16/16  PCP: Aarmis Doty MD  Historian: Patient      HPI:  Chief Complaint: Fall possible syncope head injury  A complete HPI/ROS/PMH/PSH/SH/FH are unobtainable due to: None  Context: Alab Correa is a 89 y.o. female who presents to the ED c/o fall possible injury syncope.  Patient states she has had falls before.  Patient states she had not went to bed yet.  Patient states she was up and then hit the ground.  Patient states he does not remember hitting the ground.  Was able to get up afterwards.  Patient states she did hit her head and is on Eliquis.  Patient states she woke up with headache this morning.  Patient felt nauseated.  Felt lightheaded.  Patient states the headache and nausea have gotten somewhat better.  Has had no fevers or chills.  Has no chest pain abdominal pain.              PAST MEDICAL HISTORY  Active Ambulatory Problems     Diagnosis Date Noted    CKD (chronic kidney disease) stage 4, GFR 15-29 ml/min     Depression     Gout     Hyperparathyroidism     Adaptive colitis     Osteopenia     Rheumatoid arthritis     Degeneration of intervertebral disc     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    History of DVT (deep vein thrombosis) 07/11/2017    Hyperlipidemia 01/10/2018    Nonrheumatic aortic (valve) insufficiency 10/08/2018    History of CVA (cerebrovascular accident) 11/20/2018    Atrial fibrillation     Anemia in chronic kidney disease 12/08/2016    Other proteinuria 01/26/2017    Sinus bradycardia 05/26/2021    First degree AV block 05/26/2021    Nonrheumatic mitral valve regurgitation     Idiopathic peripheral neuropathy 04/19/2022    Lower extremity edema 04/21/2022    Acute diastolic CHF (congestive heart failure) 03/20/2022    Acquired hypothyroidism 05/03/2022    Sick sinus syndrome 05/05/2022    Insomnia 05/19/2022    Fall 03/07/2023    Chronic kidney disease, stage 3a 03/07/2023    Hematoma of thigh 12/07/2023    Traumatic hematoma  of scalp 12/07/2023    Abnormal CT of spine 12/07/2023    At risk for falling     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    Closed wedge compression fracture of T8 vertebra 05/18/2024    Macrocytosis 05/18/2024    Hyperlipidemia 05/18/2024    Thyroid nodule 05/18/2024    Essential hypertension 05/18/2024    Chronic diastolic CHF (congestive heart failure) 05/18/2024    Chronic kidney failure, stage 3 (moderate) 05/18/2024    Left leg pain 05/20/2024    Nonrheumatic tricuspid valve regurgitation     Central retinal vein occlusion     Other specified glaucoma     Intraocular pressure increase      Resolved Ambulatory Problems     Diagnosis Date Noted    DVT (deep venous thrombosis)     Edema     Elevated cholesterol     Osteoporosis     Benign essential hypertension     D (diarrhea) 11/20/2015    Essential hypertension 11/20/2015    Open leg wound 11/20/2015    Vaginal odor 11/20/2015    Infected wound 11/20/2015    History of depression 07/11/2017    History of renal insufficiency syndrome 07/11/2017    History of degenerative disc disease 07/11/2017    History of osteoporosis 07/11/2017    History of rheumatoid arthritis 07/11/2017    Macrocytosis 02/28/2018    Right leg weakness 08/05/2018    CVA (cerebral vascular accident) 08/06/2018    Acute intractable headache 04/02/2019    Nausea 04/02/2019    Bilateral leg weakness 04/04/2019    Chest pain 10/15/2020    Afib 10/15/2020    Dizzy 10/15/2020    On amiodarone therapy 05/26/2021    History of syncope 10/10/2021    Bradycardia 03/25/2022    FATIMAH (acute kidney injury) 03/26/2022    Chest pain 04/29/2022    Closed fracture of left distal humerus 03/07/2023    Facial laceration 03/07/2023    Laceration of right forearm 03/07/2023    Closed head injury 12/07/2023    Abdominal pain 05/18/2024    Hypercalcemia 05/18/2024     Past Medical History:   Diagnosis Date    Anxiety 2003    Cataract     Chronic kidney disease     H/O complete eye exam 09/2016    Hypertension      IBS (irritable bowel syndrome)     OAB (overactive bladder)     PAF (paroxysmal atrial fibrillation) 11/08/2019    Peripheral neuropathy     Renal insufficiency          PAST SURGICAL HISTORY  Past Surgical History:   Procedure Laterality Date    APPENDECTOMY      BREAST BIOPSY      CARDIAC ELECTROPHYSIOLOGY PROCEDURE N/A 10/12/2021    Procedure: Loop insertion linq;  Surgeon: Tone Anguiano MD;  Location:  [facility];  Service: Cardiovascular;  Laterality: N/A;    CARDIOVERSION  08/03/2020    Dr. Colby    COLONOSCOPY N/A 01/01/2010    ELBOW OPEN REDUCTION INTERNAL FIXATION Left 03/11/2023    Procedure: ELBOW OPEN REDUCTION INTERNAL FIXATION;  Surgeon: Ramón Encinas II, MD;  Location: [facility] OR;  Service: Orthopedics;  Laterality: Left;    HERNIA REPAIR  1965    HYSTERECTOMY      still has ovaries    MAMMO BILATERAL  11/16/2016    pt declines    PAP SMEAR  11/16/2016    declines         FAMILY HISTORY  Family History   Problem Relation Age of Onset    Heart disease Mother 89    Heart failure Mother     Stomach cancer Father     Kidney cancer Father 52    Lung cancer Father 52    Ovarian cancer Sister     Lung cancer Brother 65    Ovarian cancer Daughter 60    Alcohol abuse Other     Cancer Other     Hypertension Other     Kidney disease Other     Lung disease Other     Colon cancer Neg Hx     Colon polyps Neg Hx     Crohn's disease Neg Hx     Irritable bowel syndrome Neg Hx     Ulcerative colitis Neg Hx          SOCIAL HISTORY  Social History     Socioeconomic History    Marital status:      Spouse name: Peña    Number of children: 3    Years of education: High school   Tobacco Use    Smoking status: Former     Current packs/day: 0.50     Average packs/day: 0.5 packs/day for 55.5 years (27.7 ttl pk-yrs)     Types: Cigarettes     Start date: 1970     Passive exposure: Past    Smokeless tobacco: Never    Tobacco comments:     I smoked for about 4yrs in the70s    Vaping Use    Vaping status: Never Used   Substance and Sexual Activity    Alcohol use: Yes     Alcohol/week: 2.0 standard drinks of alcohol     Types: 2 Glasses of wine per week     Comment: Occasional- glass of wine few times a week    Drug use: No    Sexual activity: Not Currently     Partners: Female     Birth control/protection: Surgical         ALLERGIES  Hydrocodone-acetaminophen, Lisinopril, Sulfamethoxazole-trimethoprim, and Levofloxacin        REVIEW OF SYSTEMS  Review of Systems   Fall head injury possible syncope      PHYSICAL EXAM  ED Triage Vitals [06/22/25 0734]   Temp Heart Rate Resp BP SpO2   97.8 °F (36.6 °C) 88 16 (!) 213/105 97 %      Temp src Heart Rate Source Patient Position BP Location FiO2 (%)   -- -- -- -- --       Physical Exam      GENERAL: no acute distress  HENT: nares patent  EYES: no scleral icterus  CV: regular rhythm, normal rate  RESPIRATORY: normal effort, lungs clear  ABDOMEN: soft, nondistended nontender  MUSCULOSKELETAL: no deformity.  No hip tenderness  NEURO: alert, moves all extremities, follows commands  PSYCH:  calm, cooperative  SKIN: warm, dry    Vital signs and nursing notes reviewed.          LAB RESULTS  Recent Results (from the past 24 hours)   Comprehensive Metabolic Panel    Collection Time: 06/22/25  8:29 AM    Specimen: Blood   Result Value Ref Range    Glucose 101 (H) 65 - 99 mg/dL    BUN 16.0 8.0 - 23.0 mg/dL    Creatinine 0.86 0.57 - 1.00 mg/dL    Sodium 141 136 - 145 mmol/L    Potassium 3.9 3.5 - 5.2 mmol/L    Chloride 101 98 - 107 mmol/L    CO2 29.0 22.0 - 29.0 mmol/L    Calcium 10.0 (H) 8.2 - 9.6 mg/dL    Total Protein 8.3 6.0 - 8.5 g/dL    Albumin 4.2 3.5 - 5.2 g/dL    ALT (SGPT) 15 1 - 33 U/L    AST (SGOT) 31 1 - 32 U/L    Alkaline Phosphatase 94 39 - 117 U/L    Total Bilirubin 0.4 0.0 - 1.2 mg/dL    Globulin 4.1 gm/dL    A/G Ratio 1.0 g/dL    BUN/Creatinine Ratio 18.6 7.0 - 25.0    Anion Gap 11.0 5.0 - 15.0 mmol/L    eGFR 63.5 >60.0 mL/min/1.73   High  Sensitivity Troponin T    Collection Time: 06/22/25  8:29 AM    Specimen: Blood   Result Value Ref Range    HS Troponin T 15 (H) <14 ng/L   CBC Auto Differential    Collection Time: 06/22/25  8:29 AM    Specimen: Blood   Result Value Ref Range    WBC 4.05 3.40 - 10.80 10*3/mm3    RBC 3.94 3.77 - 5.28 10*6/mm3    Hemoglobin 13.2 12.0 - 15.9 g/dL    Hematocrit 40.8 34.0 - 46.6 %    .6 (H) 79.0 - 97.0 fL    MCH 33.5 (H) 26.6 - 33.0 pg    MCHC 32.4 31.5 - 35.7 g/dL    RDW 12.3 12.3 - 15.4 %    RDW-SD 46.8 37.0 - 54.0 fl    MPV 9.9 6.0 - 12.0 fL    Platelets 244 140 - 450 10*3/mm3    Neutrophil % 47.0 42.7 - 76.0 %    Lymphocyte % 32.8 19.6 - 45.3 %    Monocyte % 14.3 (H) 5.0 - 12.0 %    Eosinophil % 3.7 0.3 - 6.2 %    Basophil % 2.0 (H) 0.0 - 1.5 %    Immature Grans % 0.2 0.0 - 0.5 %    Neutrophils, Absolute 1.90 1.70 - 7.00 10*3/mm3    Lymphocytes, Absolute 1.33 0.70 - 3.10 10*3/mm3    Monocytes, Absolute 0.58 0.10 - 0.90 10*3/mm3    Eosinophils, Absolute 0.15 0.00 - 0.40 10*3/mm3    Basophils, Absolute 0.08 0.00 - 0.20 10*3/mm3    Immature Grans, Absolute 0.01 0.00 - 0.05 10*3/mm3    nRBC 0.0 0.0 - 0.2 /100 WBC   ECG 12 Lead Syncope    Collection Time: 06/22/25  8:44 AM   Result Value Ref Range    QT Interval 393 ms    QTC Interval 461 ms   High Sensitivity Troponin T 1Hr    Collection Time: 06/22/25  9:40 AM    Specimen: Arm, Right; Blood   Result Value Ref Range    HS Troponin T 14 (H) <14 ng/L    Troponin T Numeric Delta -1 ng/L    Troponin T % Delta -7 Abnormal if >/= 20%       Ordered the above labs and reviewed the results.        RADIOLOGY  CT Head Without Contrast  CT Head Without Contrast, CT Cervical Spine Without Contrast  Result Date: 6/22/2025  CT HEAD WO CONTRAST-, CT CERVICAL SPINE WO CONTRAST-  INDICATION: Head injury, on Eliquis, headache  COMPARISON: CT head September 10, 2024 and CT cervical spine May 18, 2024  TECHNIQUE: Routine CT head and cervical spine without IV contrast. Coronal and  sagittal reformats. Radiation dose reduction techniques were utilized, including automated exposure control and exposure modulation based on body size.  FINDINGS:  CT head: No intraparenchymal hemorrhage. Preserved gray-white differentiation. No mass effect or midline shift. Chronic small vessel ischemic changes. Stable ventriculomegaly, suspect ex vacuo dilatation from central volume loss. No intraventricular hemorrhage. No extra-axial hemorrhage or fluid collection. Cataract extractions. Left posterior superior scalp hematoma. No fractures seen. Patent mastoid air cells and middle ears. Patent paranasal sinuses.  CT cervical spine: Biapical pleural-parenchymal thickening. Hypoattenuating right thyroid lobe nodule measuring 6 mm, unchanged. Right carotid bifurcation and internal carotid artery atherosclerotic calcification. Cervical spine straightening. Grade 1 anterolisthesis of C3 on C4, measures 3 mm. Grade 1 anterolisthesis of C4 on C5, measures 2 mm. Retrolisthesis of C5 on C6, measures 2 mm. Slight anterolisthesis of C7 on T1, measuring 1 mm. No fracture. Degenerative atlantodens articulation. Spondylosis with disc height loss and marginal osteophytosis, severe at C5/C6 and C6/C7, moderate at C7/T1. Decreased bone mineralization. Multilevel facet degenerative arthropathy, most severe on the right at C7/T1 and on the left at C3/C4 and C4/C5 with facet osseous fusion on the left at C2/C3.       1. No acute intracranial process. 2. Left posterior superior scalp hematoma. 3. No cervical spine fracture identified. Chronic degenerative findings described above.  This report was finalized on 6/22/2025 8:55 AM by Dr. Gino Hopson M.D on Workstation: [id number]      XR Chest 1 View  Result Date: 6/22/2025  ONE-VIEW PORTABLE CHEST AT 8:06 A.M.  HISTORY: Syncope. Hypertension.  FINDINGS: The lungs are well expanded and clear and unchanged from 5/18/2024. There is several calcified left hilar lymph nodes. The heart  remains borderline enlarged without change and there is no acute disease.  This report was finalized on 6/22/2025 8:23 AM by Dr. Garo Larson M.D on Workstation: [id number]        Ordered the above noted radiological studies.  Chest x-ray independent interpreted by me and shows no evidence of pneumothorax          PROCEDURES  Procedures      EKG          EKG time: 844  Rhythm/Rate: Atrial fibrillation 81  P waves and ND: No P wave  QRS, axis: Normal QRS  ST and T waves: Nonspecific ST-T wave    Interpreted Contemporaneously by me, independently viewed  Unchanged compared to prior 5/19/2024      MEDICATIONS GIVEN IN ER  Medications   amLODIPine (NORVASC) tablet 2.5 mg (2.5 mg Oral Given 6/22/25 0911)   sodium chloride 0.9 % flush 10 mL (has no administration in time range)   sodium chloride 0.9 % flush 10 mL (has no administration in time range)   sodium chloride 0.9 % infusion 40 mL (has no administration in time range)   nitroglycerin (NITROSTAT) SL tablet 0.4 mg (has no administration in time range)   sennosides-docusate (PERICOLACE) 8.6-50 MG per tablet 2 tablet (has no administration in time range)     And   polyethylene glycol (MIRALAX) packet 17 g (has no administration in time range)     And   bisacodyl (DULCOLAX) EC tablet 5 mg (has no administration in time range)     And   bisacodyl (DULCOLAX) suppository 10 mg (has no administration in time range)   losartan (COZAAR) tablet 25 mg (25 mg Oral Given 6/22/25 0912)                   MEDICAL DECISION MAKING, PROGRESS, and CONSULTS    All labs have been independently reviewed by me.  All radiology studies have been reviewed by me and I have also reviewed the radiology report.   EKGs independently viewed and interpreted by me.  Discussion below represents my analysis of pertinent findings related to patient's condition, differential diagnosis, treatment plan and final disposition.      Additional sources:  - Discussed/ obtained information from independent  historians: None    - External (non-ED) record review: Epic reviewed patient seen by primary provider 6/17/2025 for annual wellness visit    - Chronic or social conditions impacting care: None    - Shared decision making: None      Orders placed during this visit:  Orders Placed This Encounter   Procedures    XR Chest 1 View    CT Head Without Contrast    CT Cervical Spine Without Contrast    Comprehensive Metabolic Panel    High Sensitivity Troponin T    CBC Auto Differential    High Sensitivity Troponin T 1Hr    CBC (No Diff)    Basic Metabolic Panel    Diet: Regular/House; Fluid Consistency: Thin (IDDSI 0)    Intake & Output    Weigh Patient    Neuro Checks    Oral Care    Continuous Pulse Oximetry    Maintain IV Access    Telemetry - Place Orders & Notify Provider of Results When Patient Experiences Acute Chest Pain, Dysrhythmia or Respiratory Distress    May Be Off Telemetry for Tests    Place Sequential Compression Device    Maintain Sequential Compression Device    Code Status and Medical Interventions: CPR (Attempt to Resuscitate); Full Support    Inpatient Cardiology Consult    ECG 12 Lead Syncope    Adult Transthoracic Echo Complete w/ Color, Spectral and Contrast if necessary per protocol    Insert Peripheral IV    Initiate Emergency Department Observation Status    CBC & Differential         Additional orders considered but not ordered:  None        Differential diagnosis includes but is not limited to:    Syncope versus fall      Independent interpretation of labs, radiology studies, and discussions with consultants:  ED Course as of 06/22/25 1116   Sun Jun 22, 2025   1030 10:30 EDT  Patient presents for fall with possible syncope.  Patient states she went down to and has a chunk of time that she cannot remember.  I have discussed this with her in many different ways and she still states that she thinks she passed out.  Patient's EKG normal.  Serial troponins negative.  Blood pressure elevated so she  was given her morning medications and it does seem to be improving.  Discussed options with patient she will be staying in the observation unit.  Has been discussed with [facility] who will admit. [SL]      ED Course User Index  [SL] Uli Wells MD                 DIAGNOSIS  Final diagnoses:   Syncope and collapse   Injury of head, initial encounter         DISPOSITION  admit            Latest Documented Vital Signs:  As of 11:16 EDT  BP- (!) 184/78 HR- 84 Temp- 97.8 °F (36.6 °C) O2 sat- 99%              --    Please note that portions of this were completed with a voice recognition program.       Note Disclaimer: At [facility], we believe that sharing information builds trust and better relationships. You are receiving this note because you are receiving care at [facility] or recently visited. It is possible you will see health information before a provider has talked with you about it. This kind of information can be easy to misunderstand. To help you fully understand what it means for your health, we urge you to discuss this note with your provider.            Uli Wells MD  06/22/25 [id number]

## 2025-06-22 NOTE — ED NOTES
Nursing report ED to floor  Alba Correa  89 y.o.  female    HPI :   Chief Complaint   Patient presents with    Fall    Head Injury    Dizziness       Admitting doctor:   Satya Sterling MD    Admitting diagnosis:   The primary encounter diagnosis was Syncope and collapse. A diagnosis of Injury of head, initial encounter was also pertinent to this visit.    Code status:   Current Code Status       Date Active Code Status Order ID Comments User Context       6/22/2025 1050 CPR (Attempt to Resuscitate) [id number]  Pranav Noland III, PA ED        Question Answer    Code Status (Patient has no pulse and is not breathing) CPR (Attempt to Resuscitate)    Medical Interventions (Patient has pulse or is breathing) Full Support    Level Of Support Discussed With Patient                    Allergies:   Hydrocodone-acetaminophen, Lisinopril, Sulfamethoxazole-trimethoprim, and Levofloxacin    Isolation:   No active isolations    Intake and Output  No intake or output data in the 24 hours ending 06/22/25 1058    Weight:   There were no vitals filed for this visit.    Most recent vitals:   Vitals:    06/22/25 0830 06/22/25 0900 06/22/25 0930 06/22/25 1030   BP: 174/90 (!) 193/87 (!) 184/119 (!) 184/78   Pulse: 83 78 95 84   Resp:       Temp:       SpO2: 100% 99% 90% 99%       Active LDAs/IV Access:   Lines, Drains & Airways       Active LDAs       Name Placement date Placement time Site Days    Peripheral IV 06/22/25 0732 20 G Anterior;Right Forearm 06/22/25  0732  Forearm  less than 1                    Labs (abnormal labs have a star):   Labs Reviewed   COMPREHENSIVE METABOLIC PANEL - Abnormal; Notable for the following components:       Result Value    Glucose 101 (*)     Calcium 10.0 (*)     All other components within normal limits    Narrative:     GFR Categories in Chronic Kidney Disease (CKD)              GFR Category          GFR (mL/min/1.73)    Interpretation  G1                    90 or greater         Normal or high (1)  G2                    60-89                Mild decrease (1)  G3a                   45-59                Mild to moderate decrease  G3b                   30-44                Moderate to severe decrease  G4                    15-29                Severe decrease  G5                    14 or less           Kidney failure    (1)In the absence of evidence of kidney disease, neither GFR category G1 or G2 fulfill the criteria for CKD.    eGFR calculation 2021 CKD-EPI creatinine equation, which does not include race as a factor   TROPONIN - Abnormal; Notable for the following components:    HS Troponin T 15 (*)     All other components within normal limits    Narrative:     High Sensitive Troponin T Reference Range:  <14.0 ng/L- Negative Female for AMI  <22.0 ng/L- Negative Male for AMI  >=14 - Abnormal Female indicating possible myocardial injury.  >=22 - Abnormal Male indicating possible myocardial injury.   Clinicians would have to utilize clinical acumen, EKG, Troponin, and serial changes to determine if it is an Acute Myocardial Infarction or myocardial injury due to an underlying chronic condition.        CBC WITH AUTO DIFFERENTIAL - Abnormal; Notable for the following components:    .6 (*)     MCH 33.5 (*)     Monocyte % 14.3 (*)     Basophil % 2.0 (*)     All other components within normal limits   HIGH SENSITIVITIY TROPONIN T 1HR - Abnormal; Notable for the following components:    HS Troponin T 14 (*)     All other components within normal limits    Narrative:     High Sensitive Troponin T Reference Range:  <14.0 ng/L- Negative Female for AMI  <22.0 ng/L- Negative Male for AMI  >=14 - Abnormal Female indicating possible myocardial injury.  >=22 - Abnormal Male indicating possible myocardial injury.   Clinicians would have to utilize clinical acumen, EKG, Troponin, and serial changes to determine if it is an Acute Myocardial Infarction or myocardial injury due to an underlying chronic  condition.        CBC AND DIFFERENTIAL    Narrative:     The following orders were created for panel order CBC & Differential.  Procedure                               Abnormality         Status                     ---------                               -----------         ------                     CBC Auto Differential[[id number]]        Abnormal            Final result                 Please view results for these tests on the individual orders.       EKG:   ECG 12 Lead Syncope   Preliminary Result   HEART RATE=81  bpm   RR Pjnzqsul=310  ms   WV Interval=  ms   P Horizontal Axis=  deg   P Front Axis=  deg   QRSD Interval=88  ms   QT Lkhlgdde=911  ms   MJyD=346  ms   QRS Axis=-40  deg   T Wave Axis=29  deg   - ABNORMAL ECG -   Atrial fibrillation   Left anterior fascicular block   Minimal ST depression, lateral leads   When compared with ECG of 19-May-2024 10:45:19,   No significant change   Date and Time of Study:2025-06-22 08:44:32          Meds given in ED:   Medications   amLODIPine (NORVASC) tablet 2.5 mg (2.5 mg Oral Given 6/22/25 0911)   sodium chloride 0.9 % flush 10 mL (has no administration in time range)   sodium chloride 0.9 % flush 10 mL (has no administration in time range)   sodium chloride 0.9 % infusion 40 mL (has no administration in time range)   nitroglycerin (NITROSTAT) SL tablet 0.4 mg (has no administration in time range)   sennosides-docusate (PERICOLACE) 8.6-50 MG per tablet 2 tablet (has no administration in time range)     And   polyethylene glycol (MIRALAX) packet 17 g (has no administration in time range)     And   bisacodyl (DULCOLAX) EC tablet 5 mg (has no administration in time range)     And   bisacodyl (DULCOLAX) suppository 10 mg (has no administration in time range)   losartan (COZAAR) tablet 25 mg (25 mg Oral Given 6/22/25 0912)       Imaging results:  CT Head Without Contrast  Result Date: 6/22/2025   1. No acute intracranial process. 2. Left posterior superior scalp hematoma.  3. No cervical spine fracture identified. Chronic degenerative findings described above.  This report was finalized on 6/22/2025 8:55 AM by Dr. Gino Hopson M.D on Workstation: [id number]      CT Cervical Spine Without Contrast  Result Date: 6/22/2025   1. No acute intracranial process. 2. Left posterior superior scalp hematoma. 3. No cervical spine fracture identified. Chronic degenerative findings described above.  This report was finalized on 6/22/2025 8:55 AM by Dr. Gino Hopson M.D on Workstation: [id number]        Ambulatory status:   - WITH ASSIST    Social issues:   Social History     Socioeconomic History    Marital status:      Spouse name: Ray    Number of children: 3    Years of education: High school   Tobacco Use    Smoking status: Former     Current packs/day: 0.50     Average packs/day: 0.5 packs/day for 55.5 years (27.7 ttl pk-yrs)     Types: Cigarettes     Start date: 1970     Passive exposure: Past    Smokeless tobacco: Never    Tobacco comments:     I smoked for about 4yrs in the70s   Vaping Use    Vaping status: Never Used   Substance and Sexual Activity    Alcohol use: Yes     Alcohol/week: 2.0 standard drinks of alcohol     Types: 2 Glasses of wine per week     Comment: Occasional- glass of wine few times a week    Drug use: No    Sexual activity: Not Currently     Partners: Female     Birth control/protection: Surgical       NIH Stroke Scale:       Gino Freed RN  06/22/25 10:58 EDT

## 2025-06-22 NOTE — PLAN OF CARE
Goal Outcome Evaluation:              Outcome Evaluation: Echo ordered,  Cardiology consulted. IV fluids infusing. Pt does not have any quesrions at this.

## 2025-06-22 NOTE — ED NOTES
Patient to ER via ems from independent living facility for fall last night around 8:30pm hit head is on blood thinners  Patient was able to get her self up    Woke up at 4 am with headache and dizziness

## 2025-06-22 NOTE — H&P
[facility]   HISTORY AND PHYSICAL    Patient Name: Alba Correa  : 1933  MRN: [id number]  Primary Care Physician:  Aramis Doty MD  Date of admission: 2025    Subjective   Subjective     Chief Complaint:   Chief Complaint   Patient presents with   • Fall   • Head Injury   • Dizziness         HPI:    Alba Correa is a 89 y.o. female with significant past medical history of CKD, hypertension, hyperlipidemia, A-fib, DVT, first-degree AV block, sick sinus syndrome, diastolic CHF on Eliquis who was admitted to the ED observation unit for further evaluation of a syncopal event.  Patient reports yesterday evening prior to going to bed she remembers standing up and the next thing she knew she was on the ground.  She does not remember hitting the ground and states she did not stumble or lose balance.  She was able to get up from the ground and get to bed.  This morning she woke up with a headache and fell she must of hit her head in the syncopal event.  With her being on Eliquis she decided to present to the ED.  She does report some associated nausea.  She was admitted to ED opts for further evaluation.    In the ED [facility] CT head without contrast showed no acute intercranial hemorrhage.  CT C-spine showed no acute process.  EKG performed at 8:44 AM in the ED showed A-fib/81, LAFB, no acute ST or T wave changes, , QTc 461.    CBC revealed the patient was not anemic with an Hgb of 13.2.  Chemistry revealed creatinine 0.86, BUN 16, GFR 63.5.  Electrolytes unremarkable.  Troponins were flat at 15 and 14.    Review of Systems   All systems were reviewed and negative except for: That listed above    Personal History     Past Medical History:   Diagnosis Date   • Acquired hypothyroidism 2022   • Acute diastolic CHF (congestive heart failure) 2022   • Anxiety    • Cataract    • Chronic kidney disease    • Closed fracture of left distal humerus 2023   • Closed head injury  12/07/2023   • CVA (cerebral vascular accident)    • Degeneration of intervertebral disc    • Depression    • DVT (deep venous thrombosis)    • Facial laceration 03/07/2023   • First degree AV block 05/26/2021   • Gout    • H/O complete eye exam 09/2016   • Hyperlipidemia    • Hypertension    • IBS (irritable bowel syndrome)    • Laceration of right forearm 03/07/2023   • Nonrheumatic mitral valve regurgitation    • OAB (overactive bladder)    • Osteopenia    • Osteoporosis    • PAF (paroxysmal atrial fibrillation) 11/08/2019   • Peripheral neuropathy    • Renal insufficiency    • Rheumatoid arthritis    • Sinus bradycardia 05/26/2021   • 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 Colby   • COLONOSCOPY N/A 01/01/2010   • ELBOW OPEN REDUCTION INTERNAL FIXATION Left 03/11/2023    Procedure: ELBOW OPEN REDUCTION INTERNAL FIXATION;  Surgeon: Ramón Encinas II, MD;  Location: [facility] OR;  Service: Orthopedics;  Laterality: Left;   • HERNIA REPAIR  1965   • HYSTERECTOMY      still has ovaries   • MAMMO BILATERAL  11/16/2016    pt declines   • PAP SMEAR  11/16/2016    declines       Family History: family history includes Alcohol abuse in an other family member; Cancer in an other family member; Heart disease (age of onset: 89) in her mother; Heart failure in her mother; Hypertension in an other family member; Kidney cancer (age of onset: 52) in her father; Kidney disease in an other family member; Lung cancer (age of onset: 52) in her father; Lung cancer (age of onset: 65) in her brother; Lung disease in an other family member; Ovarian cancer in her sister;  Ovarian cancer (age of onset: 60) in her daughter; Stomach cancer in her father. Otherwise pertinent FHx was reviewed and not pertinent to current issue.    Social History:  reports that she has quit smoking. Her smoking use included cigarettes. She started smoking about 55 years ago. She has a 27.7 pack-year smoking history. She has been exposed to tobacco smoke. She has never used smokeless tobacco. She reports current alcohol use of about 2.0 standard drinks of alcohol per week. She reports that she does not use drugs.    Home Medications:  DULoxetine, acetaZOLAMIDE, acetaminophen, allopurinol, amLODIPine, apixaban, docusate sodium, hydrALAZINE, latanoprost, levothyroxine, and losartan    Allergies:  Allergies   Allergen Reactions   • Hydrocodone-Acetaminophen Nausea And Vomiting and Dizziness   • Lisinopril Unknown (See Comments)     Unknown per pt   • Sulfamethoxazole-Trimethoprim Delirium   • Levofloxacin Rash       Objective   Objective     Vitals:   Temp:  [97.8 °F (36.6 °C)] 97.8 °F (36.6 °C)  Heart Rate:  [78-95] 80  Resp:  [16] 16  BP: (171-213)/() 171/74  Physical Exam    Constitutional: Awake, alert   Eyes: PERRLA, sclerae anicteric, no conjunctival injection   HENT: NCAT, mucous membranes moist   Neck: Supple, no thyromegaly, no lymphadenopathy, trachea midline   Respiratory: Clear to auscultation bilaterally, nonlabored respirations    Cardiovascular: RRR, no murmurs, rubs, or gallops, palpable pedal pulses bilaterally   Gastrointestinal: Positive bowel sounds, soft, nontender, nondistended   Musculoskeletal: No bilateral ankle edema, no clubbing or cyanosis to extremities   Psychiatric: Appropriate affect, cooperative   Neurologic: Oriented x 3, strength symmetric in all extremities, Cranial Nerves grossly intact to confrontation, speech clear   Skin: No rashes     Result Review    Result Review:  I have personally reviewed the results from the time of this admission to 6/22/2025 11:42 EDT and  agree with these findings:  [x]  Laboratory list / accordion  []  Microbiology  [x]  Radiology  [x]  EKG/Telemetry   []  Cardiology/Vascular   []  Pathology  []  Old records  []  Other:        Assessment & Plan   The ASCVD Risk score (José Miguel VALERIO, et al., 2019) failed to calculate for the following reasons:    The 2019 ASCVD risk score is only valid for ages 40 to 79    Risk score cannot be calculated because patient has a medical history suggesting prior/existing ASCVD    Assessment / Plan     Brief Patient Summary:  Alba Correa is a 89 y.o. female who was admitted to the ED observation unit for further evaluation of a syncopal event that occurred yesterday.  Apparently the patient had stood up and the next thing she knew she was on the ground.  She does not remember hitting the ground and there was no prodrome preceding the syncopal event.  She did hit her head in the process.  CT head and C-spine were negative.  ECG was nonischemic with normal intervals.  Troponins were flat.  Electrolytes, hemoglobin, renal function unremarkable    Active Hospital Problems:  Active Hospital Problems    Diagnosis    • **Syncope and collapse      Plan:     Syncope  - Obtain TTE echo  - Continuous cardiac monitoring  - Recheck orthostatics  - Gentle IV fluids  - Physical therapy consultation  - Cardiology consultation  - Regular diet    Hypertension  - Continue home losartan 25 mg 1/2 tab daily  - Continue home amlodipine 2.5 mg daily    Gout  - Continue home allopurinol    Hypothyroidism  - Continue home Synthroid    Constipation  - Continue home Colace      VTE Prophylaxis:  Mechanical VTE prophylaxis orders are present.        CODE STATUS:    Code Status (Patient has no pulse and is not breathing): CPR (Attempt to Resuscitate)  Medical Interventions (Patient has pulse or is breathing): Full Support  Level Of Support Discussed With: Patient    Admission Status:  I believe this patient meets observation status.    Electronically  signed by Pranav Noland III, PA, 06/22/25, 11:24 AM EDT.        75 minutes has been spent by [facility] providers in the care of this patient while under observation status on this date 06/22/25        I have worn appropriate PPE during this patient encounter, sanitized my hands both with entering and exiting patient's room.    I have discussed plan of care with patient including advance care plan and/or surrogate decision maker.  Patient advises that their son/Ankit Doty will be their primary surrogate decision maker

## 2025-06-23 LAB
ANION GAP SERPL CALCULATED.3IONS-SCNC: 11 MMOL/L (ref 5–15)
BUN SERPL-MCNC: 11 MG/DL (ref 8–23)
BUN/CREAT SERPL: 14.7 (ref 7–25)
CALCIUM SPEC-SCNC: 9.1 MG/DL (ref 8.2–9.6)
CHLORIDE SERPL-SCNC: 104 MMOL/L (ref 98–107)
CO2 SERPL-SCNC: 24 MMOL/L (ref 22–29)
CREAT SERPL-MCNC: 0.75 MG/DL (ref 0.57–1)
DEPRECATED RDW RBC AUTO: 49.8 FL (ref 37–54)
EGFRCR SERPLBLD CKD-EPI 2021: 74.8 ML/MIN/1.73
ERYTHROCYTE [DISTWIDTH] IN BLOOD BY AUTOMATED COUNT: 12.7 % (ref 12.3–15.4)
GLUCOSE SERPL-MCNC: 103 MG/DL (ref 65–99)
HCT VFR BLD AUTO: 39.9 % (ref 34–46.6)
HGB BLD-MCNC: 12.9 G/DL (ref 12–15.9)
MCH RBC QN AUTO: 34 PG (ref 26.6–33)
MCHC RBC AUTO-ENTMCNC: 32.3 G/DL (ref 31.5–35.7)
MCV RBC AUTO: 105.3 FL (ref 79–97)
PLATELET # BLD AUTO: 224 10*3/MM3 (ref 140–450)
PMV BLD AUTO: 10.1 FL (ref 6–12)
POTASSIUM SERPL-SCNC: 3.8 MMOL/L (ref 3.5–5.2)
RBC # BLD AUTO: 3.79 10*6/MM3 (ref 3.77–5.28)
SODIUM SERPL-SCNC: 139 MMOL/L (ref 136–145)
WBC NRBC COR # BLD AUTO: 4.79 10*3/MM3 (ref 3.4–10.8)

## 2025-06-23 PROCEDURE — 97161 PT EVAL LOW COMPLEX 20 MIN: CPT

## 2025-06-23 PROCEDURE — G0378 HOSPITAL OBSERVATION PER HR: HCPCS

## 2025-06-23 PROCEDURE — 99214 OFFICE O/P EST MOD 30 MIN: CPT | Performed by: NURSE PRACTITIONER

## 2025-06-23 PROCEDURE — 80048 BASIC METABOLIC PNL TOTAL CA: CPT | Performed by: PHYSICIAN ASSISTANT

## 2025-06-23 PROCEDURE — 85027 COMPLETE CBC AUTOMATED: CPT | Performed by: PHYSICIAN ASSISTANT

## 2025-06-23 PROCEDURE — 97110 THERAPEUTIC EXERCISES: CPT

## 2025-06-23 RX ORDER — AMLODIPINE BESYLATE 5 MG/1
2.5 TABLET ORAL ONCE
Status: COMPLETED | OUTPATIENT
Start: 2025-06-23 | End: 2025-06-23

## 2025-06-23 RX ORDER — AMLODIPINE BESYLATE 5 MG/1
5 TABLET ORAL DAILY
Status: DISCONTINUED | OUTPATIENT
Start: 2025-06-24 | End: 2025-06-28

## 2025-06-23 RX ADMIN — AMLODIPINE BESYLATE 2.5 MG: 5 TABLET ORAL at 08:14

## 2025-06-23 RX ADMIN — APIXABAN 2.5 MG: 2.5 TABLET, FILM COATED ORAL at 08:14

## 2025-06-23 RX ADMIN — ALLOPURINOL 100 MG: 100 TABLET ORAL at 08:14

## 2025-06-23 RX ADMIN — LOSARTAN POTASSIUM 12.5 MG: 25 TABLET, FILM COATED ORAL at 08:14

## 2025-06-23 RX ADMIN — Medication 10 ML: at 08:17

## 2025-06-23 RX ADMIN — AMLODIPINE BESYLATE 2.5 MG: 5 TABLET ORAL at 11:31

## 2025-06-23 RX ADMIN — Medication 10 ML: at 20:34

## 2025-06-23 RX ADMIN — APIXABAN 2.5 MG: 2.5 TABLET, FILM COATED ORAL at 20:23

## 2025-06-23 RX ADMIN — ACETAZOLAMIDE 250 MG: 250 TABLET ORAL at 20:23

## 2025-06-23 RX ADMIN — ACETAZOLAMIDE 250 MG: 250 TABLET ORAL at 08:14

## 2025-06-23 RX ADMIN — LATANOPROST 1 DROP: 50 SOLUTION/ DROPS OPHTHALMIC at 20:23

## 2025-06-23 RX ADMIN — DULOXETINE 60 MG: 60 CAPSULE, DELAYED RELEASE ORAL at 08:14

## 2025-06-23 RX ADMIN — LEVOTHYROXINE SODIUM 50 MCG: 50 TABLET ORAL at 08:14

## 2025-06-23 NOTE — PLAN OF CARE
Goal Outcome Evaluation:  Plan of Care Reviewed With: patient           Outcome Evaluation: Pt 89 yo female presented to ED post fall, syncope. Pt reports baseline she is independent uses rollator, from ILF. On PT exam pt pleasant and cooperative ambulated household distances Mod I rwx. encouraged generalized mobilization acutely, OOB to chair and purewick [place]'ed.anticipate return to [facility] w onsite PT at [place].    Anticipated Discharge Disposition (PT): home with home health ([facility]F)

## 2025-06-23 NOTE — PROGRESS NOTES
Patient Care Team:  Aramis Doty MD as PCP - General (Family Medicine)  Juan Jose Muñiz MD as Consulting Physician (Neurology)  Roshan Martines III, MD as Cardiologist (Cardiology)  Nehal Murray MD as Consulting Physician (Nephrology)  Celia Quijano APRN as Nurse Practitioner (Cardiology)  Janine Healy APRN as Nurse Practitioner (Nurse Practitioner)  Diana Bowman APRN as Nurse Practitioner (Nurse Practitioner)  Dada Platt MD as Consulting Physician (Cardiology)  Brain Ryan APRN as Nurse Practitioner (Gastroenterology)     LINDA THRASHER Progress Note    I supervised care provided by the midlevel provider. We have discussed this patient's history, physical exam, and treatment plan. I have reviewed the midlevel provider's note and I agree with the midlevel provider's findings and plan of care.   SHARED VISIT: This visit was performed by BOTH a physician and an APC. The substantive portion of the medical decision making was performed by this attesting physician who made or approved the management plan and takes responsibility for patient management.   I have personally had a face to face encounter with the patient.   My personal findings are documented below:    Subjective:  No acute events, denies any complaints at present    Physical Exam:  General: No acute distress.  HENT: NCAT, PERRL, Nares patent.  Eyes: no scleral icterus.  Neck: trachea midline, no ROM limitations.  CV: Irregularly irregular  Respiratory: normal effort, CTAB.  Abdomen: soft, nondistended, NTTP, no rebound tenderness, no guarding or rigidity.  Musculoskeletal: no deformity.  Neuro: alert, moves all extremities, follows commands.  Skin: warm, dry.    Assessment and Plan:  Patient admitted for syncope, ED workup unremarkable, echocardiogram obtained which showed normal LV systolic function with EF of 60.5%, mild mitral valve regurgitation present, mild tricuspid valve regurgitation present.  Orthostatics  positive early a.m. but when repeated and normal time today unremarkable, patient currently in A-fib, history of symptomatic bradycardia in the past with AV marlene blockers, blood pressure elevated, titrating antihypertensives, monitoring.

## 2025-06-23 NOTE — PLAN OF CARE
Goal Outcome Evaluation:  Plan of Care Reviewed With: patient        Progress: improving  Outcome Evaluation: Pt. being followed by PT and Cardiology. Orthostatics have been negative. Pt. still reports intermitent dizziness with ambulation. At this time VSS, pt. A&Ox4, and all questions answered.       Problem: Skin Injury Risk Increased  Goal: Skin Health and Integrity  Outcome: Progressing  Intervention: Optimize Skin Protection  Recent Flowsheet Documentation  Taken 6/23/2025 1600 by Jakob Rodriguez RN  Activity Management:   activity encouraged   back to bed  Taken 6/23/2025 1400 by Jakob Rodriguez RN  Activity Management:   activity encouraged   up in chair  Taken 6/23/2025 1200 by Jakob Rodriguez RN  Activity Management:   activity encouraged   up in chair  Taken 6/23/2025 1006 by Jakob Rodriguez RN  Activity Management:   up in chair   activity encouraged  Taken 6/23/2025 0808 by Jakob Rodriguez RN  Activity Management:   activity minimized   up in chair  Pressure Reduction Techniques: frequent weight shift encouraged  Head of Bed (HOB) Positioning: HOB elevated     Problem: Adult Inpatient Plan of Care  Goal: Plan of Care Review  Outcome: Progressing  Flowsheets (Taken 6/23/2025 1738)  Progress: improving  Outcome Evaluation: Pt. being followed by PT and Cardiology. Orthostatics have been negative. Pt. still reports intermitent dizziness with ambulation. At this time VSS, pt. A&Ox4, and all questions answered.  Plan of Care Reviewed With: patient  Goal: Patient-Specific Goal (Individualized)  Outcome: Progressing  Goal: Absence of Hospital-Acquired Illness or Injury  Outcome: Progressing  Intervention: Identify and Manage Fall Risk  Recent Flowsheet Documentation  Taken 6/23/2025 1600 by Jakob Rodriguez RN  Safety Promotion/Fall Prevention:   activity supervised   fall prevention program maintained   lighting adjusted   safety round/check completed  Taken 6/23/2025 1400 by Jakob Rodriguez, RN  Safety Promotion/Fall  Prevention:   activity supervised   fall prevention program maintained   lighting adjusted   safety round/check completed  Taken 6/23/2025 1200 by Jakob Rodriguez RN  Safety Promotion/Fall Prevention:   activity supervised   fall prevention program maintained   lighting adjusted   safety round/check completed  Taken 6/23/2025 1006 by Jakob Rodriguez RN  Safety Promotion/Fall Prevention:   activity supervised   fall prevention program maintained   lighting adjusted   safety round/check completed  Taken 6/23/2025 0808 by Jakob Rodriguez RN  Safety Promotion/Fall Prevention:   activity supervised   fall prevention program maintained   lighting adjusted   safety round/check completed  Intervention: Prevent Skin Injury  Recent Flowsheet Documentation  Taken 6/23/2025 0808 by Jakob Rodriguez RN  Body Position: position changed independently  Intervention: Prevent and Manage VTE (Venous Thromboembolism) Risk  Recent Flowsheet Documentation  Taken 6/23/2025 0808 by Jakob Rodriguez RN  VTE Prevention/Management: SCDs (sequential compression devices) off  Intervention: Prevent Infection  Recent Flowsheet Documentation  Taken 6/23/2025 1600 by Jakob Rodriguez RN  Infection Prevention:   hand hygiene promoted   single patient room provided   rest/sleep promoted  Taken 6/23/2025 1400 by Jakob Rodriguez RN  Infection Prevention:   hand hygiene promoted   single patient room provided   rest/sleep promoted  Taken 6/23/2025 1200 by Jakob Rodriguez RN  Infection Prevention:   hand hygiene promoted   single patient room provided   rest/sleep promoted  Taken 6/23/2025 1006 by Jakob Rodriguez RN  Infection Prevention:   hand hygiene promoted   single patient room provided   rest/sleep promoted  Taken 6/23/2025 0808 by Jakob Rodriguez RN  Infection Prevention:   hand hygiene promoted   single patient room provided   rest/sleep promoted  Goal: Optimal Comfort and Wellbeing  Outcome: Progressing  Intervention: Provide Person-Centered Care  Recent  Flowsheet Documentation  Taken 6/23/2025 0808 by Jakob Rodriguez RN  Trust Relationship/Rapport:   care explained   choices provided   questions answered   questions encouraged  Goal: Readiness for Transition of Care  Outcome: Progressing     Problem: Fall Injury Risk  Goal: Absence of Fall and Fall-Related Injury  Outcome: Progressing  Intervention: Identify and Manage Contributors  Recent Flowsheet Documentation  Taken 6/23/2025 1600 by Jakob Rodriguez RN  Medication Review/Management: medications reviewed  Taken 6/23/2025 1400 by Jakob Rodriguez RN  Medication Review/Management: medications reviewed  Taken 6/23/2025 1200 by Jakob Rodriguez RN  Medication Review/Management: medications reviewed  Taken 6/23/2025 1006 by Jakob Rodriguez RN  Medication Review/Management: medications reviewed  Taken 6/23/2025 0808 by Jakob Rodriguez RN  Medication Review/Management: medications reviewed  Intervention: Promote Injury-Free Environment  Recent Flowsheet Documentation  Taken 6/23/2025 1600 by Jakob Rodriguez RN  Safety Promotion/Fall Prevention:   activity supervised   fall prevention program maintained   lighting adjusted   safety round/check completed  Taken 6/23/2025 1400 by Jakob Rodriguez RN  Safety Promotion/Fall Prevention:   activity supervised   fall prevention program maintained   lighting adjusted   safety round/check completed  Taken 6/23/2025 1200 by Jakob Rodriguez RN  Safety Promotion/Fall Prevention:   activity supervised   fall prevention program maintained   lighting adjusted   safety round/check completed  Taken 6/23/2025 1006 by Jakob Rodriguez RN  Safety Promotion/Fall Prevention:   activity supervised   fall prevention program maintained   lighting adjusted   safety round/check completed  Taken 6/23/2025 0808 by Jakob Rodriguez, RN  Safety Promotion/Fall Prevention:   activity supervised   fall prevention program maintained   lighting adjusted   safety round/check completed

## 2025-06-23 NOTE — PLAN OF CARE
Goal Outcome Evaluation:  Plan of Care Reviewed With: patient        Progress: no change  Outcome Evaluation: Assumed care of patient. Patient reported having slight dizziness when head is turned but not constant and not worsening. Patient also stated that she stopped taking her Diamox 2 days ago as it gives her episodes of dizziness-provider aware. IVF completed. Urinalysis collected; Labs drawn and resulted. Cardiology following. Awaiting PT review. Encouraged symptoms reporting. For orthostats each shift.  Plan of care ongoing.

## 2025-06-23 NOTE — PROGRESS NOTES
"    Patient Name: Alba Correa  :1933  89 y.o.      Patient Care Team:  Aramis Doty MD as PCP - General (Family Medicine)  Juan Jose Muñiz MD as Consulting Physician (Neurology)  Roshan Martines III, MD as Cardiologist (Cardiology)  Nehal Murray MD as Consulting Physician (Nephrology)  Celia Quijano APRN as Nurse Practitioner (Cardiology)  Janine Healy APRN as Nurse Practitioner (Nurse Practitioner)  Diana Bowman APRN as Nurse Practitioner (Nurse Practitioner)  Dada Platt MD as Consulting Physician (Cardiology)  Brain Ryan APRN as Nurse Practitioner (Gastroenterology)    Chief Complaint: follow up syncope     Interval History: she complains of dizziness . BP is high. HR up with orthostatics. BP did not drop.        Objective   Vital Signs  Temp:  [97.9 °F (36.6 °C)-99 °F (37.2 °C)] 97.9 °F (36.6 °C)  Heart Rate:  [] 106  Resp:  [16-18] 18  BP: (110-188)/() 169/110    Intake/Output Summary (Last 24 hours) at 2025 1035  Last data filed at 2025 0449  Gross per 24 hour   Intake 240 ml   Output 2000 ml   Net -1760 ml     Flowsheet Rows      Flowsheet Row First Filed Value   Admission Height 154.9 cm (61\") Documented at 2025 1449   Admission Weight 48.5 kg (107 lb) Documented at 2025 1449            Physical Exam:   General Appearance:    Alert, cooperative, in no acute distress   Lungs:     Clear to auscultation.  Normal respiratory effort and rate.      Heart:    Regular rhythm and normal rate, normal S1 and S2, no murmurs, gallops or rubs.     Chest Wall:    No abnormalities observed   Abdomen:     Soft, nontender, positive bowel sounds.     Extremities:   no cyanosis, clubbing or edema.  No marked joint deformities.  Adequate musculoskeletal strength.       Results Review:    Results from last 7 days   Lab Units 25  0123   SODIUM mmol/L 139   POTASSIUM mmol/L 3.8   CHLORIDE mmol/L 104   CO2 mmol/L 24.0   BUN mg/dL 11.0 "   CREATININE mg/dL 0.75   GLUCOSE mg/dL 103*   CALCIUM mg/dL 9.1     Results from last 7 days   Lab Units 06/22/25  0940 06/22/25  0829   HSTROP T ng/L 14* 15*     Results from last 7 days   Lab Units 06/23/25  0123   WBC 10*3/mm3 4.79   HEMOGLOBIN g/dL 12.9   HEMATOCRIT % 39.9   PLATELETS 10*3/mm3 224                           Medication Review:   acetaZOLAMIDE, 250 mg, Oral, Q12H  allopurinol, 100 mg, Oral, Daily  amLODIPine, 2.5 mg, Oral, Daily  apixaban, 2.5 mg, Oral, BID  DULoxetine, 60 mg, Oral, Daily  latanoprost, 1 drop, Both Eyes, Nightly  levothyroxine, 50 mcg, Oral, Q AM  losartan, 12.5 mg, Oral, Daily  sodium chloride, 10 mL, Intravenous, Q12H              Assessment & Plan   Syncope   Hypertension  PAF - She has had symptomatic bradycardia (in SR) in the past and thus AV marlene blockers have been avoided. She is on apixaban 2.5 mg BID - she had a fall in 2023 with traumatic scalp hematoma.   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 Under the care of ophthalmology. Recently started on diamox  but has not taken in over a week.   Chronic kidney disease     Syncope on the evening of 6/21. I don't see that her loop recorder was interrogate. Will reach out to [email or web address].     She currently complains of dizziness. She is in AF but HR isn't bad. Avoiding beta blockers as above. Unclear etiology. BP is high. Still slowly titrate antihypertensives as she has had orthostatic hypotension in the past.       GROVER Cevallos  [facility]  06/23/25  10:35 EDT'

## 2025-06-23 NOTE — THERAPY EVALUATION
Acute Care - Physical Therapy Initial Evaluation  [facility]     Patient Name: Alba Correa  : 1933  MRN: [id number]  Today's Date: 2025      Visit Dx:     ICD-10-CM ICD-9-CM   1. Syncope and collapse  R55 780.2   2. Injury of head, initial encounter  S09.90XA 959.01     Patient Active Problem List   Diagnosis    CKD (chronic kidney disease) stage 4, GFR 15-29 ml/min    Depression    Gout    Hyperparathyroidism    Adaptive colitis    Osteopenia    Rheumatoid arthritis    Degeneration of intervertebral disc    Detrusor muscle hypertonia    History of DVT (deep vein thrombosis)    Hyperlipidemia    Nonrheumatic aortic (valve) insufficiency    History of CVA (cerebrovascular accident)    Atrial fibrillation    Anemia in chronic kidney disease    Other proteinuria    Sinus bradycardia    First degree AV block    Nonrheumatic mitral valve regurgitation    Idiopathic peripheral neuropathy    Lower extremity edema    Acute diastolic CHF (congestive heart failure)    Acquired hypothyroidism    Sick sinus syndrome    Insomnia    Fall    Chronic kidney disease, stage 3a    Hematoma of thigh    Traumatic hematoma of scalp    Abnormal CT of spine    At risk for falling    Superior mesenteric artery stenosis    Closed wedge compression fracture of T8 vertebra    Macrocytosis    Hyperlipidemia    Thyroid nodule    Essential hypertension    Chronic diastolic CHF (congestive heart failure)    Chronic kidney failure, stage 3 (moderate)    Left leg pain    Nonrheumatic tricuspid valve regurgitation    Central retinal vein occlusion    Other specified glaucoma    Intraocular pressure increase    Syncope and collapse     Past Medical History:   Diagnosis Date    Acquired hypothyroidism 2022    Acute diastolic CHF (congestive heart failure) 2022    Anxiety 2003    Cataract     Chronic kidney disease     Closed fracture of left distal humerus 2023    Closed head injury 2023    CVA (cerebral  vascular accident)     Degeneration of intervertebral disc     Depression     DVT (deep venous thrombosis)     Facial laceration 03/07/2023    First degree AV block 05/26/2021    Gout     H/O complete eye exam 09/2016    Hyperlipidemia     Hypertension     IBS (irritable bowel syndrome)     Laceration of right forearm 03/07/2023    Nonrheumatic mitral valve regurgitation     OAB (overactive bladder)     Osteopenia     Osteoporosis     PAF (paroxysmal atrial fibrillation) 11/08/2019    Peripheral neuropathy     Renal insufficiency     Rheumatoid arthritis     Sinus bradycardia 05/26/2021    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     Past Surgical History:   Procedure Laterality Date    APPENDECTOMY      BREAST BIOPSY      CARDIAC ELECTROPHYSIOLOGY PROCEDURE N/A 10/12/2021    Procedure: Loop insertion linq;  Surgeon: Tone Anguiano MD;  Location: [facility] CATH INVASIVE LOCATION;  Service: Cardiovascular;  Laterality: N/A;    CARDIOVERSION  08/03/2020    Dr. Colby    COLONOSCOPY N/A 01/01/2010    ELBOW OPEN REDUCTION INTERNAL FIXATION Left 03/11/2023    Procedure: ELBOW OPEN REDUCTION INTERNAL FIXATION;  Surgeon: Ramón Encinas II, MD;  Location: [facility] OR;  Service: Orthopedics;  Laterality: Left;    HERNIA REPAIR  1965    HYSTERECTOMY      still has ovaries    MAMMO BILATERAL  11/16/2016    pt declines    PAP SMEAR  11/16/2016    declines     PT Assessment (Last 12 Hours)       PT Evaluation and Treatment       Row Name 06/23/25 0900          Physical Therapy Time and Intention    Subjective Information no complaints  -     Document Type evaluation  -     Mode of Treatment individual therapy;physical therapy  -     Patient Effort good  -       Row Name 06/23/25 0900          General Information    Patient Profile Reviewed yes  -     Patient Observations alert;cooperative;agree to therapy  -     General Observations of Patient pt supine in bed no acute distress  -     Prior  Level of Function independent:  -     Equipment Currently Used at Home rollator  -     Pertinent History of Current Functional Problem syncope  -     Existing Precautions/Restrictions fall  -     Benefits Reviewed patient:  -       Row Name 06/23/25 0900          Living Environment    Current Living Arrangements independent living facility  -[facility] Name 06/23/25 0900          Pain    Pretreatment Pain Rating 0/10 - no pain  -     Posttreatment Pain Rating 0/10 - no pain  -[facility] Name 06/23/25 0900          Cognition    Affect/Mental Status (Cognition) WFL  -[facility] Name 06/23/25 0900          Range of Motion Comprehensive    General Range of Motion bilateral lower extremity ROM [facility]  -[facility] Name 06/23/25 0900          Strength Comprehensive (MMT)    Comment, General Manual Muscle Testing (MMT) Assessment LEs at least 3/5, generalized weakness  -[facility] Name 06/23/25 0900          Bed Mobility    Bed Mobility supine-sit;sit-supine  -     Supine-Sit [place] (Bed Mobility) modified [place]  -     Assistive Device (Bed Mobility) head of bed elevated;bed rails  -[facility] Name 06/23/25 0900          Transfers    Transfers sit-stand transfer;stand-sit transfer  -[facility] Name 06/23/25 0900          Sit-Stand Transfer    Sit-Stand [place] (Transfers) modified [facility]     Assistive Device (Sit-Stand Transfers) walker, front-wheeled  -[facility] Name 06/23/25 0900          Stand-Sit Transfer    Stand-Sit [place] (Transfers) modified [facility]     Assistive Device (Stand-Sit Transfers) walker, front-wheeled  -[facility] Name 06/23/25 0900          Gait/Stairs (Locomotion)    [place] Level (Gait) modified [facility]     Assistive Device (Gait) walker, front-wheeled  -     Distance in Feet (Gait) 30  -     Pattern (Gait) step-through  -     Deviations/Abnormal Patterns (Gait) noemi decreased  -     Bilateral Gait  Deviations forward flexed posture;heel strike decreased  -       Row Name 06/23/25 0900          Balance    Balance Assessment sitting static balance;standing static balance  -     Static Sitting Balance independent  -     Position, Sitting Balance unsupported;sitting edge of bed  -     Static Standing Balance modified independence  -     Position/Device Used, Standing Balance supported;walker, front-wheeled  -       Row Name 06/23/25 0900          Motor Skills    Therapeutic Exercise --  APs x 10  -       Row Name 06/23/25 0900          Plan of Care Review    Plan of Care Reviewed With patient  -     Outcome Evaluation Pt 89 yo female presented to ED post fall, syncope. Pt reports baseline she is independent uses rollator, from ILF. On PT exam pt pleasant and cooperative ambulated household distances Mod I rwx. encouraged generalized mobilization acutely, [facility] w onsite PT at [place].  -[facility] Name 06/23/25 0900          Positioning and Restraints    Pre-Treatment Position in bed  -     Post Treatment Position chair  -     In Chair sitting;call light within reach;encouraged to call for assist;exit alarm on  -[facility] Name 06/23/25 0900          Therapy Assessment/Plan (PT)    Rehab Potential (PT) good  -     Criteria for Skilled Interventions Met (PT) yes  -     Therapy Frequency (PT) 3 times/wk  -       Row Name 06/23/25 0900          PT Evaluation Complexity    History, PT Evaluation Complexity 1-2 personal factors and/or comorbidities  -     Examination of Body Systems (PT Eval Complexity) 1-2 elements  -     Clinical Presentation (PT Evaluation Complexity) stable  -     Clinical Decision Making (PT Evaluation Complexity) low complexity  -     Overall Complexity (PT Evaluation Complexity) low complexity  -       Row Name 06/23/25 0900          Physical Therapy Goals    Gait Training Goal Selection (PT) gait training, PT  goal 1  -       Row Name 06/23/25 0900          Gait Training Goal 1 (PT)    Activity/Assistive Device (Gait Training Goal 1, PT) gait (walking locomotion);walker, rolling  -     [place] Level (Gait Training Goal 1, PT) modified independence  -     Distance (Gait Training Goal 1, PT) 150  -     Time Frame (Gait Training Goal 1, PT) 1 week  -               User Key  (r) = Recorded By, (t) = Taken By, (c) = Cosigned By      Initials Name Provider Type     Janine Siddiqui, PT Physical Therapist                    Physical Therapy Education       Title: PT OT SLP Therapies (In Progress)       Topic: Physical Therapy (In Progress)       Point: Mobility training (In Progress)       Learning Progress Summary            Patient Acceptance, E, NR by  at 6/23/2025 0931                      Point: Home exercise program (In Progress)       Learning Progress Summary            Patient Acceptance, E, NR by  at 6/23/2025 0931                      Point: Body mechanics (In Progress)       Learning Progress Summary            Patient Acceptance, E, NR by  at 6/23/2025 0931                      Point: Precautions (In Progress)       Learning Progress Summary            Patient Acceptance, E, NR by  at 6/23/2025 0931                                      User Key       Initials Effective Dates Name Provider Type Discipline     06/16/21 -  Janine Siddiqui, PT Physical Therapist PT                  PT Recommendation and Plan  Anticipated Discharge Disposition (PT): home with home health (ILF)  Therapy Frequency (PT): 3 times/wk  Plan of Care Reviewed With: patient  Outcome Evaluation: Pt 89 yo female presented to ED post fall, syncope. Pt reports baseline she is independent uses rollator, from ILF. On PT exam pt pleasant and cooperative ambulated household distances Mod I rwx. encouraged generalized mobilization acutely, OOB to chair and purewick [place]'ed.anticipate return to ILF w onsite PT at [place].   Outcome Measures        Row Name 06/23/25 0900             How much help from another person do you currently need...    Turning from your back to your side while in flat bed without using bedrails? 4  -LH      Moving from lying on back to sitting on the side of a flat bed without bedrails? 4  -LH      Moving to and from a bed to a chair (including a wheelchair)? 4  -LH      Standing up from a chair using your arms (e.g., wheelchair, bedside chair)? 4  -LH      Climbing 3-5 steps with a railing? 3  -LH      To walk in hospital room? 3  -      AM-PAC 6 Clicks Score (PT) 22  -                User Key  (r) = Recorded By, (t) = Taken By, (c) = Cosigned By      Initials Name Provider Type     Janine Siddiqui, PT Physical Therapist                     Time Calculation:    PT Charges       Row Name 06/23/25 0932             Time Calculation    Start Time 0830  -      Stop Time 0846  -      Time Calculation (min) 16 min  -      PT Received On 06/23/25  -      PT - Next Appointment 06/25/25  -      PT Goal Re-Cert Due Date 06/30/25  -         Time Calculation- PT    Total Timed Code Minutes- PT 8 minute(s)  -                User Key  (r) = Recorded By, (t) = Taken By, (c) = Cosigned By      Initials Name Provider Type     Janine Siddiqui, PT Physical Therapist                  Therapy Charges for Today       Code Description Service Date Service Provider Modifiers Qty    88773047200 HC PT EVAL LOW COMPLEXITY 3 6/23/2025 Janine Siddiqui, PT GP 1    [id number] HC PT THER PROC EA 15 MIN 6/23/2025 Janine Siddiqui, PT GP 1            PT G-Codes  AM-PAC 6 Clicks Score (PT): 22    Janine Siddiqui, PT  6/23/2025

## 2025-06-23 NOTE — PROGRESS NOTES
ED OBSERVATION PROGRESS/DISCHARGE SUMMARY    Date of Admission: 6/22/2025   LOS: 0 days   PCP: Aramis Doty MD    Subjective: Continues to report dizziness with position changes.  Denies chest pain or shortness of breath.    Hospital Outcome:     89-year-old female admitted to the observation unit after having a syncopal episode at home.  Initial workup in the emergency department shows high-sensitivity troponin of 15, 14, delta -1, glucose 101, although the lab work is at baseline for the patient.  Chest x-ray shows that the lungs are well-expanded and clear and unchanged from 5/18/2024.  CT head without contrast shows no acute intercranial process.  CT cervical spine without contrast shows no cervical spine fracture identified.    6/23/2025  Cardiology was consulted and they have seen the patient.  Her blood pressure has been rather labile.  She continues to report dizziness with position changes.  She will remain in ED observation for continued monitoring.    ROS:  General: no fevers, chills  Respiratory: no cough, dyspnea  Cardiovascular: no chest pain, palpitations, + lightheadedness  Abdomen: No abdominal pain, nausea, vomiting, or diarrhea  Neurologic: No focal weakness    Objective   Physical Exam:  I have reviewed the vital signs.  Temp:  [97.5 °F (36.4 °C)-99 °F (37.2 °C)] 97.5 °F (36.4 °C)  Heart Rate:  [] 97  Resp:  [16-18] 18  BP: (110-188)/() 140/79  General Appearance:    Alert, cooperative, no distress  Head:    Normocephalic, atraumatic  Eyes:    Sclerae anicteric  Neck:   Supple, no mass  Lungs: Clear to auscultation bilaterally, respirations unlabored  Heart: Regular rate and rhythm, S1 and S2 normal, no murmur, rub or gallop  Abdomen:  Soft, nontender, bowel sounds active, nondistended  Extremities: No clubbing, cyanosis, or edema to lower extremities  Pulses:  2+ and symmetric in distal lower extremities  Skin: No rashes   Neurologic: Oriented x3, Normal strength to  extremities    Results Review:    I have reviewed the labs, radiology results and diagnostic studies.    Results from last 7 days   Lab Units 06/23/25  0123   WBC 10*3/mm3 4.79   HEMOGLOBIN g/dL 12.9   HEMATOCRIT % 39.9   PLATELETS 10*3/mm3 224     Results from last 7 days   Lab Units 06/23/25  0123 06/22/25  0829   SODIUM mmol/L 139 141   POTASSIUM mmol/L 3.8 3.9   CHLORIDE mmol/L 104 101   CO2 mmol/L 24.0 29.0   BUN mg/dL 11.0 16.0   CREATININE mg/dL 0.75 0.86   CALCIUM mg/dL 9.1 10.0*   BILIRUBIN mg/dL  --  0.4   ALK PHOS U/L  --  94   ALT (SGPT) U/L  --  15   AST (SGOT) U/L  --  31   GLUCOSE mg/dL 103* 101*     Imaging Results (Last 24 Hours)       ** No results found for the last 24 hours. **            I have reviewed the medications.     Discharge Medications        ASK your doctor about these medications        Instructions Start Date   acetaminophen 650 MG 8 hr tablet  Commonly known as: TYLENOL   2 tablets, Every 8 Hours PRN      acetaZOLAMIDE 250 MG tablet  Commonly known as: DIAMOX   1 tablet, Every 12 Hours Scheduled      allopurinol 100 MG tablet  Commonly known as: ZYLOPRIM   100 mg, Oral, Daily      amLODIPine 2.5 MG tablet  Commonly known as: NORVASC   2.5 mg, Oral, Daily      docusate sodium 100 MG capsule  Commonly known as: Colace   100 mg, Oral, 2 Times Daily      DULoxetine 60 MG capsule  Commonly known as: CYMBALTA   60 mg, Oral, Daily      Eliquis 2.5 MG tablet tablet  Generic drug: apixaban   2.5 mg, Oral, 2 Times Daily      latanoprost 0.005 % ophthalmic solution  Commonly known as: XALATAN   INSTILL 1 DROP INTO RIGHT EYE ONCE DAILY AT BEDTIME      levothyroxine 50 MCG tablet  Commonly known as: SYNTHROID, LEVOTHROID   50 mcg, Oral, Every Early Morning      losartan 25 MG tablet  Commonly known as: COZAAR   12.5 mg, Oral, Daily              ---------------------------------------------------------------------------------------------  Assessment & Plan   Assessment/Problem List    Syncope  and collapse    Plan:    Syncope  Continuous cardiac monitoring  Vital signs per nursing routine  Orthostatics every shift  Physical therapy recommending home with home health  Cardiology following     Hypertension  Continue home losartan 12.5 mg daily  Increase amlodipine to 5 mg daily    CRVO  Continue Diamox     Gout  Continue home allopurinol     Hypothyroidism  Continue home Synthroid     Constipation  Continue home Colace     Disposition: Will remain in ED observation overnight.    This note will serve as a progress note    Ellen Hawthorne, APRN 06/23/25 17:02 EDT    I have worn appropriate PPE during this patient encounter, sanitized my hands both with entering and exiting patient's room.    52 minutes has been spent by [facility] providers in the care of this patient while under observation status on this date 06/23/25

## 2025-06-24 ENCOUNTER — APPOINTMENT (OUTPATIENT)
Dept: GENERAL RADIOLOGY | Facility: HOSPITAL | Age: OVER 89
End: 2025-06-24
Payer: MEDICARE

## 2025-06-24 PROBLEM — R55 SYNCOPE: Status: ACTIVE | Noted: 2025-06-24

## 2025-06-24 LAB
GLUCOSE BLDC GLUCOMTR-MCNC: 111 MG/DL (ref 70–130)
MC_CV_MDC_IDC_RATE_1: 146
MC_CV_MDC_IDC_RATE_1: 3
MC_CV_MDC_IDC_RATE_1: 30
MC_CV_MDC_IDC_ZONE_ID: 1
MC_CV_MDC_IDC_ZONE_ID: 2
MC_CV_MDC_IDC_ZONE_ID: 3
MC_CV_MDC_IDC_ZONE_ID: 4
MC_CV_MDC_IDC_ZONE_ID: 7
MDC_IDC_MSMT_BATTERY_STATUS: NORMAL
MDC_IDC_PG_IMPLANT_DTM: NORMAL
MDC_IDC_PG_MFG: NORMAL
MDC_IDC_PG_MODEL: NORMAL
MDC_IDC_PG_SERIAL: NORMAL
MDC_IDC_PG_TYPE: NORMAL
MDC_IDC_SESS_DTM: NORMAL
MDC_IDC_SESS_TYPE: NORMAL
MDC_IDC_SET_ZONE_DETECTION_DETAILS: 16
MDC_IDC_SET_ZONE_DETECTION_DETAILS: 4
MDC_IDC_SET_ZONE_STATUS: NORMAL
MDC_IDC_SET_ZONE_TYPE: NORMAL
MDC_IDC_STAT_AT_BURDEN_PERCENT: 33
QT INTERVAL: 379 MS
QTC INTERVAL: 470 MS

## 2025-06-24 PROCEDURE — 82948 REAGENT STRIP/BLOOD GLUCOSE: CPT

## 2025-06-24 PROCEDURE — 93005 ELECTROCARDIOGRAM TRACING: CPT | Performed by: NURSE PRACTITIONER

## 2025-06-24 PROCEDURE — 99232 SBSQ HOSP IP/OBS MODERATE 35: CPT | Performed by: NURSE PRACTITIONER

## 2025-06-24 PROCEDURE — 93010 ELECTROCARDIOGRAM REPORT: CPT | Performed by: INTERNAL MEDICINE

## 2025-06-24 PROCEDURE — 71101 X-RAY EXAM UNILAT RIBS/CHEST: CPT

## 2025-06-24 RX ORDER — ACETAMINOPHEN 325 MG/1
650 TABLET ORAL EVERY 6 HOURS PRN
Status: DISCONTINUED | OUTPATIENT
Start: 2025-06-24 | End: 2025-07-01 | Stop reason: HOSPADM

## 2025-06-24 RX ADMIN — APIXABAN 2.5 MG: 2.5 TABLET, FILM COATED ORAL at 22:09

## 2025-06-24 RX ADMIN — DULOXETINE 60 MG: 60 CAPSULE, DELAYED RELEASE ORAL at 10:08

## 2025-06-24 RX ADMIN — APIXABAN 2.5 MG: 2.5 TABLET, FILM COATED ORAL at 10:08

## 2025-06-24 RX ADMIN — ALLOPURINOL 100 MG: 100 TABLET ORAL at 10:08

## 2025-06-24 RX ADMIN — Medication 10 ML: at 10:09

## 2025-06-24 RX ADMIN — ACETAMINOPHEN 650 MG: 325 TABLET, FILM COATED ORAL at 22:08

## 2025-06-24 RX ADMIN — LEVOTHYROXINE SODIUM 50 MCG: 50 TABLET ORAL at 06:27

## 2025-06-24 RX ADMIN — LOSARTAN POTASSIUM 12.5 MG: 25 TABLET, FILM COATED ORAL at 10:08

## 2025-06-24 RX ADMIN — Medication 10 ML: at 22:17

## 2025-06-24 NOTE — PROGRESS NOTES
ED OBSERVATION PROGRESS/DISCHARGE SUMMARY    Date of Admission: 6/22/2025   LOS: 0 days   PCP: Aramis Doty MD    Subjective: Generally feels unwell today.  Denies chest pain or shortness of breath.    Hospital Outcome:     89-year-old female admitted to the observation unit after having a syncopal episode at home.  Initial workup in the emergency department shows high-sensitivity troponin of 15, 14, delta -1, glucose 101, although the lab work is at baseline for the patient.  Chest x-ray shows that the lungs are well-expanded and clear and unchanged from 5/18/2024.  CT head without contrast shows no acute intercranial process.  CT cervical spine without contrast shows no cervical spine fracture identified.     6/23/2025  Cardiology was consulted and they have seen the patient.  Her blood pressure has been rather labile.  She continues to report dizziness with position changes.  She will remain in ED observation for continued monitoring.    6/24/2025:  0415: Patient had been assisted to the restroom, urinated and had a bowel movement, then upon standing, had a syncopal episode and was assisted to the floor by staff. Patient was unresponsive to stimuli for several minutes, with a blank stare, before being lifted back to the bed where she slowly recovered. /61. Her heart rate was noted to have slowed, but there was no pause observed.     Cardiology has reevaluated the patient.  Plan to discontinue Diamox and hold amlodipine for now.  Patient will be admitted to the hospital for continued monitoring.  Spoke with Dr. Bergeron.    ROS:  General: no fevers, chills  Respiratory: no cough, dyspnea  Cardiovascular: no chest pain, palpitations  Abdomen: No abdominal pain, nausea, vomiting, or diarrhea  Neurologic: No focal weakness    Objective   Physical Exam:  I have reviewed the vital signs.  Temp:  [97.5 °F (36.4 °C)-98.7 °F (37.1 °C)] 98.7 °F (37.1 °C)  Heart Rate:  [] 104  Resp:  [16-20] 18  BP:  (104-163)/(61-93) 163/88  General Appearance:    Alert, cooperative, no distress  Head:    Normocephalic, atraumatic  Eyes:    Sclerae anicteric  Neck:   Supple, no mass  Lungs: Clear to auscultation bilaterally, respirations unlabored  Heart: Regular rate and rhythm, S1 and S2 normal, no murmur, rub or gallop  Abdomen:  Soft, nontender, bowel sounds active, nondistended  Extremities: No clubbing, cyanosis, or edema to lower extremities  Pulses:  2+ and symmetric in distal lower extremities  Skin: No rashes   Neurologic: Oriented x3, Normal strength to extremities    Results Review:    I have reviewed the labs, radiology results and diagnostic studies.    Results from last 7 days   Lab Units 06/23/25  0123   WBC 10*3/mm3 4.79   HEMOGLOBIN g/dL 12.9   HEMATOCRIT % 39.9   PLATELETS 10*3/mm3 224     Results from last 7 days   Lab Units 06/23/25  0123 06/22/25  0829   SODIUM mmol/L 139 141   POTASSIUM mmol/L 3.8 3.9   CHLORIDE mmol/L 104 101   CO2 mmol/L 24.0 29.0   BUN mg/dL 11.0 16.0   CREATININE mg/dL 0.75 0.86   CALCIUM mg/dL 9.1 10.0*   BILIRUBIN mg/dL  --  0.4   ALK PHOS U/L  --  94   ALT (SGPT) U/L  --  15   AST (SGOT) U/L  --  31   GLUCOSE mg/dL 103* 101*     Imaging Results (Last 24 Hours)       ** No results found for the last 24 hours. **            I have reviewed the medications.     Discharge Medications        ASK your doctor about these medications        Instructions Start Date   acetaminophen 650 MG 8 hr tablet  Commonly known as: TYLENOL   2 tablets, Every 8 Hours PRN      acetaZOLAMIDE 250 MG tablet  Commonly known as: DIAMOX   1 tablet, Every 12 Hours Scheduled      allopurinol 100 MG tablet  Commonly known as: ZYLOPRIM   100 mg, Oral, Daily      amLODIPine 2.5 MG tablet  Commonly known as: NORVASC   2.5 mg, Oral, Daily      docusate sodium 100 MG capsule  Commonly known as: Colace   100 mg, Oral, 2 Times Daily      DULoxetine 60 MG capsule  Commonly known as: CYMBALTA   60 mg, Oral, Daily       Eliquis 2.5 MG tablet tablet  Generic drug: apixaban   2.5 mg, Oral, 2 Times Daily      latanoprost 0.005 % ophthalmic solution  Commonly known as: XALATAN   INSTILL 1 DROP INTO RIGHT EYE ONCE DAILY AT BEDTIME      levothyroxine 50 MCG tablet  Commonly known as: SYNTHROID, LEVOTHROID   50 mcg, Oral, Every Early Morning      losartan 25 MG tablet  Commonly known as: COZAAR   12.5 mg, Oral, Daily              ---------------------------------------------------------------------------------------------  Assessment & Plan   Assessment/Problem List    Syncope and collapse    Syncope    Plan:    Syncope  Continuous cardiac monitoring  Vital signs per nursing routine  Orthostatics every shift  Physical therapy recommending home with home health  Cardiology following  Loop monitor interrogated, no bradycardia or pauses  Hold amlodipine  Stop Diamox  Compression hose  Maintain adequate hydration  Slow position changes     Hypertension  Continue home losartan 12.5 mg daily  Hold amlodipine     CRVO  Holding Diamox in light of syncopal episode this morning  Continue latanoprost drops     Gout  Continue home allopurinol     Hypothyroidism  Continue home Synthroid     Constipation  Continue home Colace    Disposition: Admit to hospital    This note will serve as a progress/transfer note    Ellen Hawthorne, APRN 06/24/25 12:53 EDT    I have worn appropriate PPE during this patient encounter, sanitized my hands both with entering and exiting patient's room.    53 minutes has been spent by [facility] providers in the care of this patient while under observation status on this date 06/24/25

## 2025-06-24 NOTE — PLAN OF CARE
Goal Outcome Evaluation:         Pt was admitted for syncope episode. At beginning of shift patient had been progressing, with less complaints of dizziness when ambulating to the bathroom. Pt did have an episode of incontinence once this shift. Pt complained to be more dizzy when moving from standing to sitting. Pt did have a fall. Pt was ambulating to the bathroom with PACER Erin Worley. After finishing, pt seemed to lose consciousness and began leaning forward. Staff assisted her to the floor. No injuries were sustained to patient or staff. Provider and charge nurse were made aware and protocol was followed.     Problem: Fall Injury Risk  Goal: Absence of Fall and Fall-Related Injury  Outcome: Not Progressing  Intervention: Identify and Manage Contributors  Recent Flowsheet Documentation  Taken 6/23/2025 2257 by Erin Worley RN Extern  Medication Review/Management: medications reviewed  Intervention: Promote Injury-Free Environment  Recent Flowsheet Documentation  Taken 6/23/2025 2257 by Erin Worley RN Extern  Safety Promotion/Fall Prevention:   clutter free environment maintained   assistive device/personal items within reach   nonskid shoes/slippers when out of bed   room organization consistent   safety round/check completed     Problem: Skin Injury Risk Increased  Goal: Skin Health and Integrity  Outcome: Progressing  Intervention: Optimize Skin Protection  Recent Flowsheet Documentation  Taken 6/23/2025 2257 by Erin Worley RN Extern  Head of Bed (HOB) Positioning: HOB elevated     Problem: Adult Inpatient Plan of Care  Goal: Plan of Care Review  Outcome: Progressing  Goal: Patient-Specific Goal (Individualized)  Outcome: Progressing  Goal: Absence of Hospital-Acquired Illness or Injury  Outcome: Progressing  Intervention: Identify and Manage Fall Risk  Recent Flowsheet Documentation  Taken 6/23/2025 2257 by Erin Worley RN Extern  Safety Promotion/Fall Prevention:   clutter free environment maintained   assistive  device/personal items within reach   nonskid shoes/slippers when out of bed   room organization consistent   safety round/check completed  Intervention: Prevent Skin Injury  Recent Flowsheet Documentation  Taken 6/23/2025 2257 by Erin Worley RN Extern  Body Position: position changed independently  Intervention: Prevent Infection  Recent Flowsheet Documentation  Taken 6/23/2025 2257 by Erin Worley RN Extern  Infection Prevention:   environmental surveillance performed   hand hygiene promoted   single patient room provided  Goal: Optimal Comfort and Wellbeing  Outcome: Progressing  Goal: Readiness for Transition of Care  Outcome: Progressing     Problem: Adult Inpatient Plan of Care  Goal: Absence of Hospital-Acquired Illness or Injury  Intervention: Identify and Manage Fall Risk  Recent Flowsheet Documentation  Taken 6/23/2025 2257 by Erin Worley RN Extern  Safety Promotion/Fall Prevention:   clutter free environment maintained   assistive device/personal items within reach   nonskid shoes/slippers when out of bed   room organization consistent   safety round/check completed  Intervention: Prevent Skin Injury  Recent Flowsheet Documentation  Taken 6/23/2025 2257 by Erin Worley RN Extern  Body Position: position changed independently  Intervention: Prevent Infection  Recent Flowsheet Documentation  Taken 6/23/2025 2257 by Erin Worley RN Extern  Infection Prevention:   environmental surveillance performed   hand hygiene promoted   single patient room provided     Problem: Skin Injury Risk Increased  Goal: Skin Health and Integrity  Intervention: Optimize Skin Protection  Recent Flowsheet Documentation  Taken 6/23/2025 2257 by Erin Worley RN Extern  Head of Bed (HOB) Positioning: HOB elevated

## 2025-06-24 NOTE — CASE MANAGEMENT/SOCIAL WORK
Discharge Planning Assessment  [facility]     Patient Name: Abla Correa  MRN: [id number]  Today's Date: 6/24/2025    Admit Date: 6/22/2025        Discharge Needs Assessment       Row Name 06/24/25 1303       Living Environment    People in Home facility resident    Name(s) of People in Home lives in independent living    Current Living Arrangements independent living facility    Potentially Unsafe Housing Conditions none    In the past 12 months has the electric, gas, oil, or water company threatened to shut off services in your home? No    Primary Care Provided by self;homecare agency    Provides Primary Care For no one    Family Caregiver if Needed grandchild(ja), adult;child(ja), adult    Family Caregiver Names Janie -granddaughter    Quality of Family Relationships helpful;involved;supportive    Able to Return to Prior Arrangements --  to be determined       Resource/Environmental Concerns    Resource/Environmental Concerns none    Transportation Concerns none       Transportation Needs    In the past 12 months, has lack of transportation kept you from medical appointments or from getting medications? no    In the past 12 months, has lack of transportation kept you from meetings, work, or from getting things needed for daily living? No       Food Insecurity    Within the past 12 months, you worried that your food would run out before you got the money to buy more. Never true    Within the past 12 months, the food you bought just didn't last and you didn't have money to get more. Never true       Transition Planning    Patient/Family Anticipated Services at Transition   TBD    Transportation Anticipated family or friend will provide       Discharge Needs Assessment    Current Outpatient/Agency/Support Group --  facility PT    Equipment Currently Used at Home walker, rolling;grab bar;shower chair    Concerns to be Addressed decision-making;discharge planning    Do you want help finding or  keeping work or a job? Patient declined    Do you want help with school or training? For example, starting or completing job training or getting a high school diploma, GED or equivalent Patient declined    Anticipated Changes Related to Illness inability to care for self    Equipment Needed After Discharge none    Outpatient/Agency/Support Group Needs --  TBD    Discharge Facility/Level of Care Needs --  TBD    Provided Post Acute Provider List? Yes    Delivered To Patient    Method of Delivery In person    Patient's Choice of Community Agency(s) Patient would like to go with facility PT unless needing to go to higher level of care    Current Discharge Risk physical impairment;chronically ill                   Discharge Plan       Row Name 06/24/25 5351       Plan    Plan Comments Entered room, introduced self and explained role to patient and granddaughter Janie at bedside; received permission to speak in front of family; Verified information on facesheet; pt is retired, lives at [place] [facility] independent living on a 3rd floor apt accessible by elevator; pt ambulates w/assist of rolling walker, has grab bars and a shower chair in apt; Pt is mostly independent, goes to the dining room for a meal/day, family assist pt w/errands or she rides on facility transport bus; Verified PCP listed on facesheet; RX are filled at [facility] in [facility]- is interested in meds to beds; Pt has been in rehab at [facility] in the past; at this time she plans to return home with home health PT to follow; however, given the continued syncopal episodes and weakness, may need to consider short term rehab- Provided pt choice list of home health companies; CCP to follow for further planning and needs; Family noted they can transport pt upon d/c.                       Demographic Summary       Row Name 06/24/25 5533       General Information    Admission Type observation    Arrived From home    Required Notices  Provided Observation Status Notice    Referral Source physician    Reason for Consult decision-making;discharge planning    Preferred Language English       Contact Information    Permission Granted to Share Info With ;family/designee                   Functional Status       Row Name 06/24/25 4977       Functional Status    Usual Activity Tolerance moderate    Current Activity Tolerance fair       Physical Activity    On average, how many days per week do you engage in moderate to strenuous exercise (like a brisk walk)? 0 days    On average, how many minutes do you engage in exercise at this level? 0 min    Number of minutes of exercise per week 0       Assessment of Health Literacy    How often do you have someone help you read hospital materials? Occasionally    How often do you have problems learning about your medical condition because of difficulty understanding written information? Occasionally    How often do you have a problem understanding what is told to you about your medical condition? Occasionally    How confident are you filling out medical forms by yourself? Quite a bit    Health Literacy Good       Functional Status, IADL    Medications independent    Meal Preparation independent;assistive person    Housekeeping independent    Laundry independent    Shopping independent;assistive person    If for any reason you need help with day-to-day activities such as bathing, preparing meals, shopping, managing finances, etc., do you get the help you need? I get all the help I need       Mental Status    General Appearance WDL WDL       Mental Status Summary    Recent Changes in Mental Status/Cognitive Functioning no changes       Employment/    Employment Status retired                   Psychosocial    No documentation.                  Abuse/Neglect    No documentation.                  Legal    No documentation.                  Substance Abuse    No documentation.                   Patient Forms    No documentation.                     Alba Rivera RN

## 2025-06-24 NOTE — PROGRESS NOTES
LINDA THRASHER ATTESTATION NOTE    The MINA and I have discussed this patient's history, physical exam, and treatment plan.  I have reviewed the documentation and personally had a face to face interaction with the patient. I affirm the documentation and agree with the treatment and plan.  The attached note describes my personal findings.      I provided a substantive portion of the care of this patient. I personally performed the physical exam, in its entirety. I personally spent 20 minutes on the care of this patient today.    Alba Correa is a 89 y.o. female who presented to the emergency department 2 days ago complaining of a syncopal episode.  The patient required admission to the observation unit for further evaluation and management.  The patient had another syncopal episode overnight.  She has had intermittent low blood pressures and bradycardia.  Cardiology is following and once gentle control of her antihypertensives.  They want to avoid beta-blockers.  With her having another syncopal episode overnight, she will require further management.  She will likely require transition to the hospitalist.  The patient reports she feels generally poor this morning but cannot explain what is causing her to feel poor.        On exam:  GENERAL: Awake, alert, no acute distress  SKIN: Warm, dry  HENT: Normocephalic, atraumatic  EYES: no scleral icterus  CV: regular rhythm, regular rate  RESPIRATORY: normal effort, lungs clear  ABDOMEN: soft, nontender, nondistended  MUSCULOSKELETAL: no deformity  NEURO: alert, moves all extremities, follows commands          Note Disclaimer: At [facility], we believe that sharing information builds trust and better relationships. You are receiving this note because you recently visited [facility]. It is possible you will see health information before a provider has talked with you about it. This kind of information can be easy to misunderstand. To help you fully understand what it means  for your health, we urge you to discuss this note with your provider.

## 2025-06-24 NOTE — PROGRESS NOTES
"    Patient Name: Alba Correa  :1933  89 y.o.      Patient Care Team:  Aramis Doty MD as PCP - General (Family Medicine)  Juan Jose Muñiz MD as Consulting Physician (Neurology)  Roshan Martines III, MD as Cardiologist (Cardiology)  Nehal Murray MD as Consulting Physician (Nephrology)  Celia Quijano APRN as Nurse Practitioner (Cardiology)  Janine Healy APRN as Nurse Practitioner (Nurse Practitioner)  Diana Bowman APRN as Nurse Practitioner (Nurse Practitioner)  Dada Platt MD as Consulting Physician (Cardiology)  Brain Ryan APRN as Nurse Practitioner (Gastroenterology)    Chief Complaint: follow up syncope     Interval History: patient was started on diamox she took it for a few days - felt dizzy and terrible and stopped in on her own. It was given twice yesterday. She had a syncopal episode again this morning. No vitals documented around that time but sounds hypotensive.        Objective   Vital Signs  Temp:  [97.5 °F (36.4 °C)-98.1 °F (36.7 °C)] 97.7 °F (36.5 °C)  Heart Rate:  [] 96  Resp:  [16-20] 18  BP: (104-159)/(61-93) 149/80  No intake or output data in the 24 hours ending 25 1123    Flowsheet Rows      Flowsheet Row First Filed Value   Admission Height 154.9 cm (61\") Documented at 2025 1449   Admission Weight 48.5 kg (107 lb) Documented at 2025 1449            Physical Exam:   General Appearance:    Alert, cooperative, in no acute distress   Lungs:     Clear to auscultation.  Normal respiratory effort and rate.      Heart:    Regular rhythm and normal rate, normal S1 and S2, no murmurs, gallops or rubs.     Chest Wall:    No abnormalities observed   Abdomen:     Soft, nontender, positive bowel sounds.     Extremities:   no cyanosis, clubbing or edema.  No marked joint deformities.  Adequate musculoskeletal strength.       Results Review:    Results from last 7 days   Lab Units 25  0123   SODIUM mmol/L 139   POTASSIUM mmol/L " 3.8   CHLORIDE mmol/L 104   CO2 mmol/L 24.0   BUN mg/dL 11.0   CREATININE mg/dL 0.75   GLUCOSE mg/dL 103*   CALCIUM mg/dL 9.1     Results from last 7 days   Lab Units 06/22/25  0940 06/22/25  0829   HSTROP T ng/L 14* 15*     Results from last 7 days   Lab Units 06/23/25  0123   WBC 10*3/mm3 4.79   HEMOGLOBIN g/dL 12.9   HEMATOCRIT % 39.9   PLATELETS 10*3/mm3 224                           Medication Review:   [Held by provider] acetaZOLAMIDE, 250 mg, Oral, Q12H  allopurinol, 100 mg, Oral, Daily  [Held by provider] amLODIPine, 5 mg, Oral, Daily  apixaban, 2.5 mg, Oral, BID  DULoxetine, 60 mg, Oral, Daily  latanoprost, 1 drop, Both Eyes, Nightly  levothyroxine, 50 mcg, Oral, Q AM  losartan, 12.5 mg, Oral, Daily  sodium chloride, 10 mL, Intravenous, Q12H              Assessment & Plan   Syncope   Hypertension , with orthostatic hypotension. Liberalize BP management.   PAF - She has had symptomatic bradycardia (in SR) in the past and thus AV marlene blockers have been avoided. She is on apixaban 2.5 mg BID - she had a fall in 2023 with traumatic scalp hematoma.   50% AF burden on device interrogation.   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 Under the care of ophthalmology. Recently started on diamox  but has not taken in over a week.   Chronic kidney disease     Syncope on the evening of 6/21. Loop interrogated. In AF . . No bradycardia or pauses.     BP has been high. Amlodipine increased and she was also restarted on diamox (she she had stopped on her own due to dizziness last week). She had a syncopal episode early this morning.     Hold amlodipine and diamox. Compression stockings. Maintain adequate hydration. Slow position changes. Keep losartan for now. Favor liberalizing BP goals.       GROVER Cevallos  [facility]  06/24/25  11:23 EDT'

## 2025-06-24 NOTE — PROGRESS NOTES
Name: Alba Correa ADMIT: 2025   : 1933  PCP: Aramis Dtoy MD    MRN: [id number] LOS: 0 days   AGE/SEX: 89 y.o. female  ROOM: [facility]   Subjective   Chief Complaint   Patient presents with    Fall    Head Injury    Dizziness     89 yo with history P.Afib, DVT, previous stroke, central vein occlusion, CKD, she has been in the observation unit for syncope. Was on losartan, diamox and amlodipine. This morning had vasovagal episode. Cardiology had loop interrogation without bradycardia or pauses. Balancing orthostasis vs control of supine BP     ROS  No f/c  No n/v  No cp/palp  No soa/cough    Objective   Vital Signs  Temp:  [97.7 °F (36.5 °C)-98.7 °F (37.1 °C)] 98.6 °F (37 °C)  Heart Rate:  [] 104  Resp:  [16-20] 18  BP: (104-163)/(61-93) 158/86  SpO2:  [94 %-98 %] 95 %  on   ;   Device (Oxygen Therapy): room air  Body mass index is 20.22 kg/m².    Physical Exam  Constitutional:       General: She is not in acute distress.  HENT:      Head: Normocephalic and atraumatic.   Eyes:      General: No scleral icterus.  Cardiovascular:      Rate and Rhythm: Regular rhythm.      Heart sounds: Normal heart sounds.   Pulmonary:      Effort: Pulmonary effort is normal. No respiratory distress.   Abdominal:      General: There is no distension.      Palpations: Abdomen is soft.   Musculoskeletal:      Cervical back: Neck supple.   Neurological:      Mental Status: She is alert.   Psychiatric:         Behavior: Behavior normal.       Elderly, chronically ill   Results Review:       I reviewed the patient's new clinical results.  Results from last 7 days   Lab Units 25  0123 25  0829   WBC 10*3/mm3 4.79 4.05   HEMOGLOBIN g/dL 12.9 13.2   PLATELETS 10*3/mm3 224 244     Results from last 7 days   Lab Units 25  0123 25  0829   SODIUM mmol/L 139 141   POTASSIUM mmol/L 3.8 3.9   CHLORIDE mmol/L 104 101   CO2 mmol/L 24.0 29.0   BUN mg/dL 11.0 16.0   CREATININE mg/dL 0.75 0.86   GLUCOSE  "mg/dL 103* 101*   Estimated Creatinine Clearance: 36.6 mL/min (by C-G formula based on SCr of 0.75 mg/dL).  Results from last 7 days   Lab Units 06/22/25  0829   ALBUMIN g/dL 4.2   BILIRUBIN mg/dL 0.4   ALK PHOS U/L 94   AST (SGOT) U/L 31   ALT (SGPT) U/L 15     Results from last 7 days   Lab Units 06/23/25  0123 06/22/25  0829   CALCIUM mg/dL 9.1 10.0*   ALBUMIN g/dL  --  4.2         Coag     HbA1C   Lab Results   Component Value Date    HGBA1C 5.20 04/03/2019    HGBA1C 5.30 08/06/2018     Infection     Radiology(recent) No radiology results for the last day  HS Troponin T   Date Value Ref Range Status   06/22/2025 14 (H) <14 ng/L Final   06/22/2025 15 (H) <14 ng/L Final     No components found for: \"TSH;2\"    allopurinol, 100 mg, Oral, Daily  [Held by provider] amLODIPine, 5 mg, Oral, Daily  apixaban, 2.5 mg, Oral, BID  DULoxetine, 60 mg, Oral, Daily  latanoprost, 1 drop, Both Eyes, Nightly  levothyroxine, 50 mcg, Oral, Q AM  losartan, 12.5 mg, Oral, Daily  sodium chloride, 10 mL, Intravenous, Q12H       Diet: Regular/House; Fluid Consistency: Thin (IDDSI 0)      Assessment & Plan      Active Hospital Problems    Diagnosis  POA    **Syncope and collapse [R55]  Yes    Syncope [R55]  Yes    Central retinal vein occlusion [H34.8192]  Yes    Chronic diastolic CHF (congestive heart failure) [I50.32]  Yes    Atrial fibrillation [I48.91]  Yes    History of CVA (cerebrovascular accident) [Z86.73]  Not Applicable    History of DVT (deep vein thrombosis) [Z86.718]  Not Applicable    Rheumatoid arthritis [M06.9]  Yes      Resolved Hospital Problems   No resolved problems to display.       89 yo with history P.Afib, DVT, previous stroke, central vein occlusion, CKD, she has been in the observation unit for syncope. Was on losartan, diamox and amlodipine. This morning had vasovagal episode. Cardiology had loop interrogation without bradycardia or pauses. Balancing orthostasis vs control of supine BP     May end up neeind " rehab.     Reviewed the following note from Neurology 5/2024- similar situation currently:    I had a fairly extensive conversation with the patient and patient's family members at bedside, based on presentation and patient's symptom I believe this is most likely autonomic dysfunction related dizziness which caused her to fall she has been having episodic dizziness for the past few years.  I think her autonomic dysfunction is most likely age-related.     She does not have any signs to suggest TIA or acute ischemic stroke.  Cardiology has been consulted who are recommending transthoracic echo/loop recorder I agree with this.     I suggested family we can get CTA head and neck to rule out vertebrobasilar insufficiency versus Stenosis but less likely this is going to  at given patient age and that she is chronically anticoagulated.              KARLA Bergeron MD  [facility]  06/24/25  16:50 EDT

## 2025-06-25 ENCOUNTER — APPOINTMENT (OUTPATIENT)
Dept: GENERAL RADIOLOGY | Facility: HOSPITAL | Age: OVER 89
End: 2025-06-25
Payer: MEDICARE

## 2025-06-25 LAB
ANION GAP SERPL CALCULATED.3IONS-SCNC: 15 MMOL/L (ref 5–15)
BUN SERPL-MCNC: 23 MG/DL (ref 8–23)
BUN/CREAT SERPL: 20.5 (ref 7–25)
CALCIUM SPEC-SCNC: 9.6 MG/DL (ref 8.2–9.6)
CHLORIDE SERPL-SCNC: 100 MMOL/L (ref 98–107)
CO2 SERPL-SCNC: 20 MMOL/L (ref 22–29)
CREAT SERPL-MCNC: 1.12 MG/DL (ref 0.57–1)
EGFRCR SERPLBLD CKD-EPI 2021: 46.2 ML/MIN/1.73
GLUCOSE SERPL-MCNC: 116 MG/DL (ref 65–99)
POTASSIUM SERPL-SCNC: 3.3 MMOL/L (ref 3.5–5.2)
SODIUM SERPL-SCNC: 135 MMOL/L (ref 136–145)
TSH SERPL DL<=0.05 MIU/L-ACNC: 0.85 UIU/ML (ref 0.27–4.2)
VIT B12 BLD-MCNC: >2000 PG/ML (ref 211–946)

## 2025-06-25 PROCEDURE — 84443 ASSAY THYROID STIM HORMONE: CPT | Performed by: INTERNAL MEDICINE

## 2025-06-25 PROCEDURE — 80048 BASIC METABOLIC PNL TOTAL CA: CPT | Performed by: INTERNAL MEDICINE

## 2025-06-25 PROCEDURE — 73630 X-RAY EXAM OF FOOT: CPT

## 2025-06-25 PROCEDURE — 97530 THERAPEUTIC ACTIVITIES: CPT

## 2025-06-25 PROCEDURE — 82607 VITAMIN B-12: CPT | Performed by: INTERNAL MEDICINE

## 2025-06-25 PROCEDURE — 97535 SELF CARE MNGMENT TRAINING: CPT

## 2025-06-25 PROCEDURE — 99232 SBSQ HOSP IP/OBS MODERATE 35: CPT | Performed by: INTERNAL MEDICINE

## 2025-06-25 RX ORDER — POTASSIUM CHLORIDE 1500 MG/1
40 TABLET, EXTENDED RELEASE ORAL ONCE
Status: COMPLETED | OUTPATIENT
Start: 2025-06-25 | End: 2025-06-25

## 2025-06-25 RX ADMIN — APIXABAN 2.5 MG: 2.5 TABLET, FILM COATED ORAL at 21:19

## 2025-06-25 RX ADMIN — DULOXETINE 60 MG: 60 CAPSULE, DELAYED RELEASE ORAL at 08:44

## 2025-06-25 RX ADMIN — LOSARTAN POTASSIUM 12.5 MG: 25 TABLET, FILM COATED ORAL at 08:45

## 2025-06-25 RX ADMIN — LEVOTHYROXINE SODIUM 50 MCG: 50 TABLET ORAL at 05:50

## 2025-06-25 RX ADMIN — ALLOPURINOL 100 MG: 100 TABLET ORAL at 08:45

## 2025-06-25 RX ADMIN — SENNOSIDES AND DOCUSATE SODIUM 2 TABLET: 50; 8.6 TABLET ORAL at 08:51

## 2025-06-25 RX ADMIN — POTASSIUM CHLORIDE 40 MEQ: 1500 TABLET, EXTENDED RELEASE ORAL at 08:45

## 2025-06-25 RX ADMIN — APIXABAN 2.5 MG: 2.5 TABLET, FILM COATED ORAL at 08:45

## 2025-06-25 RX ADMIN — Medication 10 ML: at 08:45

## 2025-06-25 NOTE — DISCHARGE PLACEMENT REQUEST
"Kaci Chavez \"SERGIO\" (89 y.o. Female)       Date of Birth   05/22/1933    Social Security Number       Address   [address] [place] [address] [address] [facility] [address]    Home Phone   [phone]    MRN   [id number]       Worship   [employer]    Marital Status                               Admission Date   6/22/2025    Admission Type   Emergency    Admitting Provider   Tiago Bergeron MD    Attending Provider   Tiago Bergeron MD    Department, Room/Bed   [address], [address]       Discharge Date       Discharge Disposition   Home or Self Care    Discharge Destination                                 Attending Provider: Tiago Bergeron MD    Allergies: Hydrocodone-acetaminophen, Lisinopril, Sulfamethoxazole-trimethoprim, Levofloxacin    Isolation: None   Infection: None   Code Status: No CPR    Ht: 154.9 cm (61\")   Wt: 48.5 kg (107 lb)    Admission Cmt: None   Principal Problem: Syncope and collapse [R55]                   Active Insurance as of 6/22/2025       Primary Coverage       Payor Plan Insurance Group Employer/Plan Group    [employer] MEDICARE REPLACEMENT [employer] Medicare Advantage GROUP PPO [id number]       Payor Plan Address Payor Plan Phone Number Payor Plan Fax Number Effective Dates    [address] [address]   1/1/2023 - None Entered    [facility] [id number]         Subscriber Name Subscriber Birth Date Member ID       KACI CHAVEZ 5/22/1933 [id number]                     Emergency Contacts        (Rel.) Home Phone Work Phone Mobile Phone    DotyAnkit lawler (Son) [phone] -- --    Marleni Pederson (Daughter) -- -- [phone]                "

## 2025-06-25 NOTE — THERAPY EVALUATION
Patient Name: Alba Correa  : 1933    MRN: [id number]                              Today's Date: 2025       Admit Date: 2025    Visit Dx:     ICD-10-CM ICD-9-CM   1. Syncope and collapse  R55 780.2   2. Injury of head, initial encounter  S09.90XA 959.01   3. Vasovagal syncope  R55 780.2     Patient Active Problem List   Diagnosis    CKD (chronic kidney disease) stage 4, GFR 15-29 ml/min    Depression    Gout    Hyperparathyroidism    Adaptive colitis    Osteopenia    Rheumatoid arthritis    Degeneration of intervertebral disc    Detrusor muscle hypertonia    History of DVT (deep vein thrombosis)    Hyperlipidemia    Nonrheumatic aortic (valve) insufficiency    History of CVA (cerebrovascular accident)    Atrial fibrillation    Anemia in chronic kidney disease    Other proteinuria    Sinus bradycardia    First degree AV block    Nonrheumatic mitral valve regurgitation    Idiopathic peripheral neuropathy    Lower extremity edema    Acute diastolic CHF (congestive heart failure)    Acquired hypothyroidism    Sick sinus syndrome    Insomnia    Fall    Chronic kidney disease, stage 3a    Hematoma of thigh    Traumatic hematoma of scalp    Abnormal CT of spine    At risk for falling    Superior mesenteric artery stenosis    Closed wedge compression fracture of T8 vertebra    Macrocytosis    Hyperlipidemia    Thyroid nodule    Essential hypertension    Chronic diastolic CHF (congestive heart failure)    Chronic kidney failure, stage 3 (moderate)    Left leg pain    Nonrheumatic tricuspid valve regurgitation    Central retinal vein occlusion    Other specified glaucoma    Intraocular pressure increase    Syncope and collapse    Syncope     Past Medical History:   Diagnosis Date    Acquired hypothyroidism 2022    Acute diastolic CHF (congestive heart failure) 2022    Anxiety 2003    Cataract     Chronic kidney disease     Closed fracture of left distal humerus 2023    Closed head injury  12/07/2023    CVA (cerebral vascular accident)     Degeneration of intervertebral disc     Depression     DVT (deep venous thrombosis)     Facial laceration 03/07/2023    First degree AV block 05/26/2021    Gout     H/O complete eye exam 09/2016    Hyperlipidemia     Hypertension     IBS (irritable bowel syndrome)     Laceration of right forearm 03/07/2023    Nonrheumatic mitral valve regurgitation     OAB (overactive bladder)     Osteopenia     Osteoporosis     PAF (paroxysmal atrial fibrillation) 11/08/2019    Peripheral neuropathy     Renal insufficiency     Rheumatoid arthritis     Sinus bradycardia 05/26/2021    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     Past Surgical History:   Procedure Laterality Date    APPENDECTOMY      BREAST BIOPSY      CARDIAC ELECTROPHYSIOLOGY PROCEDURE N/A 10/12/2021    Procedure: Loop insertion linq;  Surgeon: Tone Anguiano MD;  Location: [facility] INVASIVE LOCATION;  Service: Cardiovascular;  Laterality: N/A;    CARDIOVERSION  08/03/2020    Dr. Colby    COLONOSCOPY N/A 01/01/2010    ELBOW OPEN REDUCTION INTERNAL FIXATION Left 03/11/2023    Procedure: ELBOW OPEN REDUCTION INTERNAL FIXATION;  Surgeon: Ramón Encinas II, MD;  Location: [facility] OR;  Service: Orthopedics;  Laterality: Left;    HERNIA REPAIR  1965    HYSTERECTOMY      still has ovaries    MAMMO BILATERAL  11/16/2016    pt declines    PAP SMEAR  11/16/2016    declines      General Information       Row Name 06/25/25 1341          OT Time and Intention    Document Type evaluation  -SM     Mode of Treatment occupational therapy  -SM     Patient Effort good  -SM       Row Name 06/25/25 1341          General Information    Patient Profile Reviewed yes  -SM     Prior Level of Function independent:;ADL's;all household mobility;community mobility  mobilizes to dining room with rollator  -SM     Existing Precautions/Restrictions fall  -SM       Row Name 06/25/25 1345          Living Environment     Current Living Arrangements independent living facility  -     People in Home alone;facility resident  -       Row Name 06/25/25 1341          Stairs Within Home, Primary    Stairs, Within Home, Primary 3rd floor with elevator  -[facility] Name 06/25/25 1341          Cognition    Orientation Status (Cognition) oriented x 4  -[facility] Name 06/25/25 1341          Safety Issues/Impairments Affecting Functional Mobility    Impairments Affecting Function (Mobility) pain;balance;endurance/activity tolerance  -               User Key  (r) = Recorded By, (t) = Taken By, (c) = Cosigned By      Initials Name Provider Type     Nicky Lujan, OTR/L, CSRS Occupational Therapist                     Mobility/ADL's       Row Name 06/25/25 1342          Bed Mobility    Supine-Sit Wedron (Bed Mobility) modified independence  -[facility] Name 06/25/25 1342          Sit-Stand Transfer    Sit-Stand Wedron (Transfers) contact guard  -     Assistive Device (Sit-Stand Transfers) walker, front-wheeled  -     Comment, (Sit-Stand Transfer) extra time to complete  -[facility] Name 06/25/25 1342          Functional Mobility    Functional Mobility- Ind. Level contact guard assist  -     Functional Mobility- Device walker, front-wheeled  -     Functional Mobility-Distance (Feet) --  20  -     Functional Mobility- Comment slow pace  -       Row Name 06/25/25 1342          Activities of Daily Living    BADL Assessment/Intervention lower body dressing;grooming;toileting  -[facility] Name 06/25/25 1342          Lower Body Dressing Assessment/Training    Wedron Level (Lower Body Dressing) don;socks;moderate assist (50% patient effort)  -     Position (Lower Body Dressing) edge of bed sitting  -     Comment, (Lower Body Dressing) fatique with completing  -[facility] Name 06/25/25 1342          Grooming Assessment/Training    Wedron Level (Grooming) standby assist;oral care regimen;hair  "care, combing/brushing  -SM     Position (Grooming) sink side;supported standing  -SM       Row Name 06/25/25 1342          Toileting Assessment/Training    Comment, (Toileting) declines attempting to use toilet or BSC reports has been using purewick \"too weak to get up to bathroom\"  -SM               User Key  (r) = Recorded By, (t) = Taken By, (c) = Cosigned By      Initials Name Provider Type     Nicky Lujan OTR/L, CSRS Occupational Therapist                   Obj/Interventions       Row Name 06/25/25 1343          Range of Motion Comprehensive    General Range of Motion bilateral upper extremity ROM WNL  -       Row Name 06/25/25 1343          Strength Comprehensive (MMT)    Comment, General Manual Muscle Testing (MMT) Assessment BUE 4/5 MMT  -       Row Name 06/25/25 1343          Motor Skills    Motor Skills functional endurance  -SM     Functional Endurance impaired  -SM       Row Name 06/25/25 1343          Balance    Static Sitting Balance independent  -SM     Position, Sitting Balance sitting edge of bed  -SM     Static Standing Balance standby assist  -SM     Dynamic Standing Balance contact guard  -SM     Position/Device Used, Standing Balance supported;walker, rolling  -SM               User Key  (r) = Recorded By, (t) = Taken By, (c) = Cosigned By      Initials Name Provider Type     Nicky Lujan OTR/L, CSRS Occupational Therapist                   Goals/Plan       Row Name 06/25/25 1347          Transfer Goal 1 (OT)    Activity/Assistive Device (Transfer Goal 1, OT) toilet;shower chair  -SM     Hickman Level/Cues Needed (Transfer Goal 1, OT) modified independence  -SM     Time Frame (Transfer Goal 1, OT) short term goal (STG);2 weeks  -SM     Progress/Outcome (Transfer Goal 1, OT) goal ongoing  -SM       Row Name 06/25/25 1347          Dressing Goal 1 (OT)    Activity/Device (Dressing Goal 1, OT) dressing skills, all  -SM     Hickman/Cues Needed (Dressing Goal 1, OT) " modified independence  -SM     Time Frame (Dressing Goal 1, OT) short term goal (STG);2 weeks  -SM     Progress/Outcome (Dressing Goal 1, OT) goal ongoing  -SM       Row Name 06/25/25 134          Toileting Goal 1 (OT)    Activity/Device (Toileting Goal 1, OT) toileting skills, all  -SM     Ritchie Level/Cues Needed (Toileting Goal 1, OT) modified independence  -SM     Time Frame (Toileting Goal 1, OT) short term goal (STG);2 weeks  -SM     Progress/Outcome (Toileting Goal 1, OT) goal ongoing  -SM       Row Name 06/25/25 1344          Grooming Goal 1 (OT)    Activity/Device (Grooming Goal 1, OT) grooming skills, all  -SM     Ritchie (Grooming Goal 1, OT) modified independence  -SM     Time Frame (Grooming Goal 1, OT) short term goal (STG);2 weeks  -SM     Strategies/Barriers (Grooming Goal 1, OT) in standing position at sink  -SM     Progress/Outcome (Grooming Goal 1, OT) goal ongoing  -       Row Name 06/25/25 6143          Therapy Assessment/Plan (OT)    Planned Therapy Interventions (OT) activity tolerance training;adaptive equipment training;BADL retraining;functional balance retraining;IADL retraining;occupation/activity based interventions;patient/caregiver education/training;ROM/therapeutic exercise;strengthening exercise;transfer/mobility retraining  -               User Key  (r) = Recorded By, (t) = Taken By, (c) = Cosigned By      Initials Name Provider Type    Nicky López, OTR/L, CSRS Occupational Therapist                   Clinical Impression       Row Name 06/25/25 0334          Pain Assessment    Pretreatment Pain Rating 0/10 - no pain  -SM     Posttreatment Pain Rating 7/10  -SM     Pain Location foot  -     Pain Side/Orientation left  -       Row Name 06/25/25 1323          Plan of Care Review    Plan of Care Reviewed With patient  -     Outcome Evaluation Pt is a 89 y.o female admitted to [facility] on 6/22 with syncope. She has hx of A fib, stroke, CKD. Lives in [facility] and goes  "to daily exercise classess. Pt reports hx of multiple syncope episodes, typically able to get herself up off the floor s/p fall. Today pt has an acute decline in ADL independence and unable to dress herself or use bathroom independently. Pt also c/o of L foot pain with mobility and has a very slow pace with activity. She reports \"I think I might have a broken foot\". Pt is typically independent and I would recommend SNF at dc for improvement in independence and safety prior to returning home. Per chart it appears pt will be dischaged back to ILF today. If unable to dc to SNF at least I would recommend  OT but concern for falls and increase assist needed than her baseline at this time.  -       Row Name 06/25/25 0473          Therapy Assessment/Plan (OT)    Rehab Potential (OT) good  -     Criteria for Skilled Therapeutic Interventions Met (OT) yes;skilled treatment is necessary  -     Therapy Frequency (OT) 5 times/wk  -       Row Name 06/25/25 4057          Therapy Plan Review/Discharge Plan (OT)    Anticipated Discharge Disposition (OT) skilled nursing facility;home with home health;home with assist  will need increased ADL assist if dc back to [facility]  -       Row Name 06/25/25 3444          Positioning and Restraints    Pre-Treatment Position in bed  -     Post Treatment Position other  -SM     Other Position with PT  -               User Key  (r) = Recorded By, (t) = Taken By, (c) = Cosigned By      Initials Name Provider Type    Nicky López, OTR/L, CSRS Occupational Therapist                   Outcome Measures       Row Name 06/25/25 8796          How much help from another is currently needed...    Putting on and taking off regular lower body clothing? 2  -SM     Bathing (including washing, rinsing, and drying) 2  -SM     Toileting (which includes using toilet bed pan or urinal) 2  -SM     Putting on and taking off regular upper body clothing 3  -SM     Taking care of personal grooming " (such as brushing teeth) 3  -SM     Eating meals 4  -SM     AM-PAC 6 Clicks Score (OT) 16  -SM       Row Name 06/25/25 1019 06/25/25 0845       How much help from another person do you currently need...    Turning from your back to your side while in flat bed without using bedrails? 4  -MS (r) SB (t) MS (c) 4  -BB    Moving from lying on back to sitting on the side of a flat bed without bedrails? 4  -MS (r) SB (t) MS (c) 4  -BB    Moving to and from a bed to a chair (including a wheelchair)? 3  -MS (r) SB (t) MS (c) 3  -BB    Standing up from a chair using your arms (e.g., wheelchair, bedside chair)? 3  -MS (r) SB (t) MS (c) 2  -BB    Climbing 3-5 steps with a railing? 2  -MS (r) SB (t) MS (c) 2  -BB    To walk in hospital room? 3  -MS (r) SB (t) MS (c) 2  -BB    AM-PAC 6 Clicks Score (PT) 19  -MS (r) SB (t) 17  -BB      Row Name 06/25/25 1347          Functional Assessment    Outcome Measure Options AM-PAC 6 Clicks Daily Activity (OT)  -               User Key  (r) = Recorded By, (t) = Taken By, (c) = Cosigned By      Initials Name Provider Type    MS Epifanio Dockery L, PT Physical Therapist     Nicky Lujan OTR/L, HELEN Occupational Therapist    Raina Dotson RN Registered Nurse    Alec Hall, PT Student PT Student                    Occupational Therapy Education       Title: PT OT SLP Therapies (In Progress)       Topic: Occupational Therapy (In Progress)       Point: ADL training (Done)       Learning Progress Summary            Patient Acceptance, E, VU by  at 6/25/2025 1347    Comment: OT goals, POC                                      User Key       Initials Effective Dates Name Provider Type [facility] 04/24/25 -  Nicky Lujan OTR/L, HELEN Occupational Therapist OT                  OT Recommendation and Plan  Planned Therapy Interventions (OT): activity tolerance training, adaptive equipment training, BADL retraining, functional balance retraining, IADL retraining,  "occupation/activity based interventions, patient/caregiver education/training, ROM/therapeutic exercise, strengthening exercise, transfer/mobility retraining  Therapy Frequency (OT): 5 times/wk  Plan of Care Review  Plan of Care Reviewed With: patient  Outcome Evaluation: Pt is a 89 y.o female admitted to [facility] on 6/22 with syncope. She has hx of A fib, stroke, CKD. Lives in ILF and goes to daily exercise classess. Pt reports hx of multiple syncope episodes, typically able to get herself up off the floor s/p fall. Today pt has an acute decline in ADL independence and unable to dress herself or use bathroom independently. Pt also c/o of L foot pain with mobility and has a very slow pace with activity. She reports \"I think I might have a broken foot\". Pt is typically independent and I would recommend SNF at dc for improvement in independence and safety prior to returning home. Per chart it appears pt will be dischaged back to ILF today. If unable to dc to SNF at least I would recommend  OT but concern for falls and increase assist needed than her baseline at this time.     Time Calculation:   Evaluation Complexity (OT)  Review Occupational Profile/Medical/Therapy History Complexity: expanded/moderate complexity  Assessment, Occupational Performance/Identification of Deficit Complexity: 3-5 performance deficits  Clinical Decision Making Complexity (OT): detailed assessment/moderate complexity  Overall Complexity of Evaluation (OT): moderate complexity     Time Calculation- OT       Row Name 06/25/25 1347             Time Calculation- OT    OT Start Time 0902  -      OT Stop Time 0919  -      OT Time Calculation (min) 17 min  -      Total Timed Code Minutes- OT 12 minute(s)  -      OT Received On 06/25/25  -      OT - Next Appointment 06/26/25  -      OT Goal Re-Cert Due Date 07/09/25  -         Timed Charges    65738 - OT Self Care/Mgmt Minutes 12  -SM         Total Minutes    Timed Charges Total Minutes " 12  -SM       Total Minutes 12  -SM                User Key  (r) = Recorded By, (t) = Taken By, (c) = Cosigned By      Initials Name Provider Type    Nicky López OTR/L, HELEN Occupational Therapist                  Therapy Charges for Today       Code Description Service Date Service Provider Modifiers Qty    [id number] HC OT SELF CARE/MGMT/TRAIN EA 15 MIN 6/25/2025 Nicky Lujan OTR/L, CSRS GO 1                 MARITZA Montoya/HELEN HEBERT  6/25/2025

## 2025-06-25 NOTE — CASE MANAGEMENT/SOCIAL WORK
Continued Stay Note  [facility]     Patient Name: Alba Correa  MRN: [id number]  Today's Date: 6/25/2025    Admit Date: 6/22/2025    Plan: STR pending acceptance and precert approval   Discharge Plan       Row Name 06/25/25 1545       Plan    Plan STR pending acceptance and precert approval    Patient/Family in Agreement with Plan yes    Plan Comments Spoke with pt and granddaughter regarding discharge plan. Pt concerned she needs more assistance than returning to her ILF apt. In the past she has discharged to STR. Discussed that patient was able to ambulate 70 ft and she may not qualify. Cardiology has cleared and made recommendations for f/u appt 7/7. Provided in home care giver list, for assistance, also suggested elevated level of care to senior living at [facility] and family involvement, they expressed frustration since they were on vacation in [place] and would not be able to assist. Dtr was going to call [facility] to discuss options. Since first discussion pt was seen by OT and had more trouble with ADL's, provided STR list and explained process of precert approval, and what would be process if denied, appeal etc. Told pt and family I would be happy to work with them to have a safe discharge plan but we would have some barriers. Referrals sent per pt request, updated MD and RN. CCP will follow -Nicky PATEL                   Discharge Codes    No documentation.                 Expected Discharge Date and Time       Expected Discharge Date Expected Discharge Time    Jun 25, 2025               Nicky Huynh RN

## 2025-06-25 NOTE — PROGRESS NOTES
LOS: 1 day   Patient Care Team:  Aramis Doty MD as PCP - General (Family Medicine)  Juan Jose Muñiz MD as Consulting Physician (Neurology)  Roshan Martines III, MD as Cardiologist (Cardiology)  Nehal Murray MD as Consulting Physician (Nephrology)  Celia Quijano APRN as Nurse Practitioner (Cardiology)  Janine Healy APRN as Nurse Practitioner (Nurse Practitioner)  Diana Bowman APRN as Nurse Practitioner (Nurse Practitioner)  Dada Platt MD as Consulting Physician (Cardiology)  Brain Ryan APRN as Nurse Practitioner (Gastroenterology)    Chief Complaint: Follow-up syncope with orthostatic hypotension, atrial fibrillation.    Interval History: She feels much better.  Her blood pressure is higher today.  No lightheadedness.  No chest pain or shortness of breath.    Vital Signs:  Temp:  [98.1 °F (36.7 °C)-99.9 °F (37.7 °C)] 98.1 °F (36.7 °C)  Heart Rate:  [] 88  Resp:  [14-18] 16  BP: (144-158)/(70-99) 144/99    Intake/Output Summary (Last 24 hours) at 6/25/2025 1435  Last data filed at 6/25/2025 0544  Gross per 24 hour   Intake --   Output 200 ml   Net -200 ml       Physical Exam:   General Appearance:    No acute distress, alert and oriented x4, elderly.   Lungs:     Clear to auscultation bilaterally     Heart:    Irregularly irregular rhythm and normal rate.  No murmurs, gallops, or rubs.   Abdomen:     Soft, nontender, nondistended.    Extremities:   No clubbing, cyanosis, or edema.     Results Review:    Results from last 7 days   Lab Units 06/25/25  0555   SODIUM mmol/L 135*   POTASSIUM mmol/L 3.3*   CHLORIDE mmol/L 100   CO2 mmol/L 20.0*   BUN mg/dL 23.0   CREATININE mg/dL 1.12*   GLUCOSE mg/dL 116*   CALCIUM mg/dL 9.6     Results from last 7 days   Lab Units 06/22/25  0940 06/22/25  0829   HSTROP T ng/L 14* 15*     Results from last 7 days   Lab Units 06/23/25  0123   WBC 10*3/mm3 4.79   HEMOGLOBIN g/dL 12.9   HEMATOCRIT % 39.9   PLATELETS 10*3/mm3 224                        I reviewed the patient's new clinical results.        Assessment:  1.  Syncope, secondary to #2  2.  Hypertension with orthostatic hypotension  3.  Paroxysmal atrial fibrillation  4.  History of symptomatic bradycardia while in sinus rhythm, not on AV marlene blockers or beta-blockers  5.  History of prior DVT  6.  History of CVA  7.  Recent Central retinal vein occlusion  8.  Rheumatoid arthritis    Plan:  - Agree with liberalizing blood pressure goals.  I told her that if her systolic blood pressure runs in the 140s to 150s, this is perfectly acceptable and she will very likely feel better with this at her age.  Elderly patients often need a higher cerebral perfusion pressure.    -I would keep her on the losartan at 12.5 mg/day.  I would also continue to hold the Diamox and amlodipine at this time.    -She is not on beta-blockers or AV marlene blocker secondary to symptomatic bradycardia while in sinus rhythm in the past.    -Continue Eliquis 2.5 mg twice daily for anticoagulation.    -Compression stockings to minimize orthostatic symptoms.    -Loop recorder interrogation showed no bradycardia or pauses.  Echocardiogram from 6/22/2025 showed an EF of 61% and mild valvular disease.    -Okay to discharge home.  She already has an appointment with Dr. Martines on 7/7/2025.    Devan Alonso MD  06/25/25  14:35 EDT

## 2025-06-25 NOTE — CASE MANAGEMENT/SOCIAL WORK
Post-Acute Authorization Submission      Post Acute Pre-Cert Documentation  Request Submitted by Facility - Type:: Hospital  Post-Acute Authorization Type Submitted:: SNF  Date Post Acute Pre-Cert Inititated per Facility: 06/25/25  Accepting Facility: [facility] Discharge Date Requested: 06/25/25  All Clinicals Submitted?: Yes  Had Accepting Facility at Time of Submission: Yes  Response Communicated to:: , Accepting Facility Liaison  Authorization Number:: PENDING [id number]              CAMELIA Wen

## 2025-06-25 NOTE — PLAN OF CARE
"Goal Outcome Evaluation:  Plan of Care Reviewed With: patient           Outcome Evaluation: Pt is a 89 y.o female admitted to [facility] on 6/22 with syncope. She has hx of A fib, stroke, CKD. Lives in ILF and goes to daily exercise classess. Pt reports hx of multiple syncope episodes, typically able to get herself up off the floor s/p fall. Today pt has an acute decline in ADL independence and unable to dress herself or use bathroom independently. Pt also c/o of L foot pain with mobility and has a very slow pace with activity. She reports \"I think I might have a broken foot\". Pt is typically independent and I would recommend SNF at [place] for improvement in independence and safety prior to returning home. Per chart it appears pt will be dischaged back to ILF today. If unable to dc to SNF at least I would recommend  OT but concern for falls and increase assist needed than her baseline at this time.    Anticipated Discharge Disposition (OT): skilled nursing facility, home with home health, home with assist (will need increased ADL assist if dc back to ILF)                        "

## 2025-06-25 NOTE — DISCHARGE SUMMARY
NAME: Alba Correa ADMIT: 2025   : 1933  PCP: Aramis Doty MD    MRN: [id number] LOS: 7 days   AGE/SEX: 89 y.o. female  ROOM: [facility]/     Date of Admission:  2025  Date of Discharge:  2025    PCP: Aramis Doty MD    CHIEF COMPLAINT  Fall, Head Injury, and Dizziness      DISCHARGE DIAGNOSIS  Active Hospital Problems    Diagnosis  POA    **Syncope and collapse [R55]  Yes    Syncope [R55]  Yes    Central retinal vein occlusion [H34.8192]  Yes    Chronic diastolic CHF (congestive heart failure) [I50.32]  Yes    Atrial fibrillation [I48.91]  Yes    History of CVA (cerebrovascular accident) [Z86.73]  Not Applicable    History of DVT (deep vein thrombosis) [Z86.718]  Not Applicable    Rheumatoid arthritis [M06.9]  Yes      Resolved Hospital Problems   No resolved problems to display.       SECONDARY DIAGNOSES  Past Medical History:   Diagnosis Date    Acquired hypothyroidism 2022    Acute diastolic CHF (congestive heart failure) 2022    Anxiety     Cataract     Chronic kidney disease     Closed fracture of left distal humerus 2023    Closed head injury 2023    CVA (cerebral vascular accident)     Degeneration of intervertebral disc     Depression     DVT (deep venous thrombosis)     Facial laceration 2023    First degree AV block 2021    Gout     H/O complete eye exam 2016    Hyperlipidemia     Hypertension     IBS (irritable bowel syndrome)     Laceration of right forearm 2023    Nonrheumatic mitral valve regurgitation     OAB (overactive bladder)     Osteopenia     Osteoporosis     PAF (paroxysmal atrial fibrillation) 2019    Peripheral neuropathy     Renal insufficiency     Rheumatoid arthritis     Sinus bradycardia 2021    Superior mesenteric artery stenosis 2024       CONSULTS   Cardiology    HOSPITAL COURSE  89 yo with history P.Afib, DVT, previous stroke, central vein occlusion, CKD, she has been in the observation  unit for syncope. Was on losartan, diamox and amlodipine. Cardiology had loop interrogation without bradycardia or pauses. Balancing orthostasis vs control of supine BP. Amlodipine and diamox stopped by Cardiology. She overall feels better today working with therapy. She can see back with her eye doctor regarding the diamox but Cardiology recommended to stop it.     Xrays of ribs were negative for fracture. Xray of foot was negative for fracture though had some abnormal area that radiologist said could be a cyst or other process- she can follow up with PCP and or Podiatry for further workup/monitoring. See below report for full differential/read.        Reviewed the following note from Neurology 5/2024- similar situation currently:     I had a fairly extensive conversation with the patient and patient's family members at bedside, based on presentation and patient's symptom I believe this is most likely autonomic dysfunction related dizziness which caused her to fall she has been having episodic dizziness for the past few years.  I think her autonomic dysfunction is most likely age-related.     She does not have any signs to suggest TIA or acute ischemic stroke.  Cardiology has been consulted who are recommending transthoracic echo/loop recorder I agree with this.     I suggested family we can get CTA head and neck to rule out vertebrobasilar insufficiency versus Stenosis but less likely this is going to  at given patient age and that she is chronically anticoagulated.     (Of note Neurology did get CTA last year and it does have some vertebrobasilar insufficiency but as listed above she is on a/c for treatment)    She can go to rehab today     DIAGNOSTICS      Collected Updated Procedure Result Status    06/29/2025 1355 06/29/2025 1455 Basic Metabolic Panel [[id number]]    (Abnormal)   Blood    Final result Component Value Units   Glucose 136 High  mg/dL   BUN 26.0 High  mg/dL   Creatinine 0.97 mg/dL    Sodium 136 mmol/L   Potassium 4.2 mmol/L   Chloride 103 mmol/L   CO2 22.0 mmol/L   Calcium 9.3 mg/dL   BUN/Creatinine Ratio 26.8 High     Anion Gap 11.0 mmol/L   eGFR 54.9 Low  mL/min/1.73          06/29/2025 1355 06/29/2025 1455 Magnesium [[id number]]   Blood    Final result Component Value Units   Magnesium 1.9 mg/dL          06/29/2025 1355 06/29/2025 1454 Phosphorus [[id number]]   Blood    Final result Component Value Units   Phosphorus 3.4 mg/dL                    XR Foot 3+ View Left [[id number]] Mo as Reviewed   Order Status: Completed Collected: 06/25/25 1339    Updated: 06/25/25 1347   Narrative:     XR FOOT 3+ VW LEFT-     INDICATIONS: Pain.     TECHNIQUE: 3 VIEWS OF THE LEFT FOOT     COMPARISON: None available     FINDINGS:     No acute fracture, erosion, or dislocation is identified. Increased  hallux valgus angulation is noted. The bones appear diffusely  osteopenic. A subcentimeter lucent focus at the medial aspect navicular  is indeterminate, could for example be degenerative subcortical cyst, or  pseudolesion, but possibility of lytic lesion not excluded, interval  follow-up can characterize change, and follow-up/further evaluation can  be obtained as indications persist.      Impression:        As described.             llected Updated Procedure Result Status    06/25/2025 0555 06/25/2025 0701 Basic Metabolic Panel [[id number]]    (Abnormal)   Blood    Final result Component Value Units   Glucose 116 High  mg/dL   BUN 23.0 mg/dL   Creatinine 1.12 High  mg/dL   Sodium 135 Low  mmol/L   Potassium 3.3 Low  mmol/L   Chloride 100 mmol/L   CO2 20.0 Low  mmol/L   Calcium 9.6 mg/dL   BUN/Creatinine Ratio 20.5    Anion Gap 15.0 mmol/L   eGFR 46.2 Low  mL/min/1.73          06/25/2025 0555 06/25/2025 0655 Vitamin B12 [[id number]]    (Abnormal)   Blood    Final result Component Value Units   Vitamin B-12 >2,000 High  pg/mL          06/25/2025 0555 06/25/2025 0702 TSH [[id number]]   Blood    Final result Component  Value Units   TSH 0.852 uIU/mL          06/23/2025 0123 06/23/2025 0156 CBC (No Diff) [[id number]]   (Abnormal)   Blood from Arm, Right    Final result Component Value Units   WBC 4.79 10*3/mm3   RBC 3.79 10*6/mm3   Hemoglobin 12.9 g/dL   Hematocrit 39.9 %   .3 High  fL   MCH 34.0 High  pg   MCHC 32.3 g/dL   RDW 12.7 %   RDW-SD 49.8 fl   MPV 10.1 fL   Platelets 224 10*3/mm3         XR Ribs Left With PA Chest [[id number]] Mo as Reviewed   Order Status: Completed Collected: 06/24/25 1906    Updated: 06/24/25 1924   Narrative:     XR RIBS LEFT W PA CHEST-     HISTORY: Female who is 89 years-old, rib pain     TECHNIQUE: Frontal view of the chest, 4 views of the left ribs     COMPARISON: 6/22/2025     FINDINGS:     The heart size is borderline. Cardiac loop recorder is apparent.  Pulmonary vasculature is unremarkable. Aorta is tortuous, calcified. No  focal pulmonary consolidation, pleural effusion, or pneumothorax.     No acute left rib fracture is identified. Follow-up/further evaluation  can be obtained as indications persist.      Impression:     As described.     This report was finalized on 6/24/2025 7:21 PM by Dr. Levy Mock M.D on Workstation: [id number]       CT Head Without Contrast [[id number]] Mo as Reviewed   Order Status: Completed Collected: 06/22/25 0844    Updated: 06/22/25 0858   Narrative:     CT HEAD WO CONTRAST-, CT CERVICAL SPINE WO CONTRAST-     INDICATION: Head injury, on Eliquis, headache     COMPARISON: CT head September 10, 2024 and CT cervical spine May 18,  2024     TECHNIQUE:  Routine CT head and cervical spine without IV contrast. Coronal and  sagittal reformats. Radiation dose reduction techniques were utilized,  including automated exposure control and exposure modulation based on  body size.     FINDINGS:     CT head: No intraparenchymal hemorrhage. Preserved gray-white  differentiation. No mass effect or midline shift. Chronic small vessel  ischemic changes. Stable  ventriculomegaly, suspect ex vacuo dilatation  from central volume loss. No intraventricular hemorrhage. No extra-axial  hemorrhage or fluid collection. Cataract extractions. Left posterior  superior scalp hematoma. No fractures seen. Patent mastoid air cells and  middle ears. Patent paranasal sinuses.     CT cervical spine: Biapical pleural-parenchymal thickening.  Hypoattenuating right thyroid lobe nodule measuring 6 mm, unchanged.  Right carotid bifurcation and internal carotid artery atherosclerotic  calcification. Cervical spine straightening. Grade 1 anterolisthesis of  C3 on C4, measures 3 mm. Grade 1 anterolisthesis of C4 on C5, measures 2  mm. Retrolisthesis of C5 on C6, measures 2 mm. Slight anterolisthesis of  C7 on T1, measuring 1 mm. No fracture. Degenerative atlantodens  articulation. Spondylosis with disc height loss and marginal  osteophytosis, severe at C5/C6 and C6/C7, moderate at C7/T1. Decreased  bone mineralization. Multilevel facet degenerative arthropathy, most  severe on the right at C7/T1 and on the left at C3/C4 and C4/C5 with  facet osseous fusion on the left at C2/C3.      Impression:        1. No acute intracranial process.  2. Left posterior superior scalp hematoma.  3. No cervical spine fracture identified. Chronic degenerative findings  described above.     This report was finalized on 6/22/2025 8:55 AM by Dr. Gino Hopson M.D on Workstation: [id number]       CT Cervical Spine Without Contrast [[id number]] Mo as Reviewed   Order Status: Completed Collected: 06/22/25 0844    Updated: 06/22/25 0858   Narrative:     CT HEAD WO CONTRAST-, CT CERVICAL SPINE WO CONTRAST-     INDICATION: Head injury, on Eliquis, headache     COMPARISON: CT head September 10, 2024 and CT cervical spine May 18,  2024     TECHNIQUE:  Routine CT head and cervical spine without IV contrast. Coronal and  sagittal reformats. Radiation dose reduction techniques were utilized,  including automated exposure  control and exposure modulation based on  body size.     FINDINGS:     CT head: No intraparenchymal hemorrhage. Preserved gray-white  differentiation. No mass effect or midline shift. Chronic small vessel  ischemic changes. Stable ventriculomegaly, suspect ex vacuo dilatation  from central volume loss. No intraventricular hemorrhage. No extra-axial  hemorrhage or fluid collection. Cataract extractions. Left posterior  superior scalp hematoma. No fractures seen. Patent mastoid air cells and  middle ears. Patent paranasal sinuses.     CT cervical spine: Biapical pleural-parenchymal thickening.  Hypoattenuating right thyroid lobe nodule measuring 6 mm, unchanged.  Right carotid bifurcation and internal carotid artery atherosclerotic  calcification. Cervical spine straightening. Grade 1 anterolisthesis of  C3 on C4, measures 3 mm. Grade 1 anterolisthesis of C4 on C5, measures 2  mm. Retrolisthesis of C5 on C6, measures 2 mm. Slight anterolisthesis of  C7 on T1, measuring 1 mm. No fracture. Degenerative atlantodens  articulation. Spondylosis with disc height loss and marginal  osteophytosis, severe at C5/C6 and C6/C7, moderate at C7/T1. Decreased  bone mineralization. Multilevel facet degenerative arthropathy, most  severe on the right at C7/T1 and on the left at C3/C4 and C4/C5 with  facet osseous fusion on the left at C2/C3.      Impression:        1. No acute intracranial process.  2. Left posterior superior scalp hematoma.  3. No cervical spine fracture identified. Chronic degenerative findings  described above.     This report was finalized on 6/22/2025 8:55 AM by Dr. Gino Hopson M.D on Workstation: [id number]       XR Chest 1 View [[id number]] Mo as Reviewed   Order Status: Completed Collected: 06/22/25 0821    Updated: 06/22/25 0826   Narrative:     ONE-VIEW PORTABLE CHEST AT 8:06 A.M.     HISTORY: Syncope. Hypertension.     FINDINGS: The lungs are well expanded and clear and unchanged from  5/18/2024.  "There is several calcified left hilar lymph nodes. The heart  remains borderline enlarged without change and there is no acute  disease.       PHYSICAL EXAM  Objective:  Vital signs: (most recent): Blood pressure 148/84, pulse 107, temperature 98.6 °F (37 °C), temperature source Oral, resp. rate 17, height 154.9 cm (61\"), weight 48.5 kg (107 lb), SpO2 97%, not currently breastfeeding.                Alert  nad  No resp distress  Elderly, chronically ill   Similar exam to past few days    CONDITION ON DISCHARGE  Stable.      DISCHARGE DISPOSITION   Skilled Nursing Facility (DC - External)      DISCHARGE MEDICATIONS       Your medication list        CONTINUE taking these medications        Instructions Last Dose Given Next Dose Due   acetaminophen 650 MG 8 hr tablet  Commonly known as: TYLENOL      Take 2 tablets by mouth Every 8 (Eight) Hours As Needed for Mild Pain. Indications: Pain       allopurinol 100 MG tablet  Commonly known as: ZYLOPRIM      Take 1 tablet by mouth Daily.       docusate sodium 100 MG capsule  Commonly known as: Colace      Take 1 capsule by mouth 2 (Two) Times a Day.       DULoxetine 60 MG capsule  Commonly known as: CYMBALTA      Take 1 capsule by mouth Daily. Indications: Major Depressive Disorder       Eliquis 2.5 MG tablet tablet  Generic drug: apixaban      Take 1 tablet by mouth 2 (Two) Times a Day. Indications: history of DVT/PE, Hx CVA       latanoprost 0.005 % ophthalmic solution  Commonly known as: XALATAN      INSTILL 1 DROP INTO RIGHT EYE ONCE DAILY AT BEDTIME       levothyroxine 50 MCG tablet  Commonly known as: SYNTHROID, LEVOTHROID      Take 1 tablet by mouth Every Morning.       losartan 25 MG tablet  Commonly known as: COZAAR      Take 0.5 tablets by mouth Daily.              STOP taking these medications      acetaZOLAMIDE 250 MG tablet  Commonly known as: DIAMOX        amLODIPine 2.5 MG tablet  Commonly known as: NORVASC                  Future Appointments   Date Time " Provider Department Center   7/7/2025  1:30 PM Roshan Martines III, MD [id number]   11/18/2025  2:30 PM Roshan Martines III, MD [id number]     Additional Instructions for the Follow-ups that You Need to Schedule       Ambulatory Referral to Home Health   As directed      Nicky Doty MD    Order Comments: Nicky Doty MD    Face to Face Visit Date: 6/25/2025   Follow-up provider for Plan of Care?: I treated the patient in an acute care facility and will not continue treatment after discharge.   Follow-up provider: NICKY DOTY [[id number]]   Reason/Clinical Findings: debility, orthostatic hypotension   Describe mobility limitations that make leaving home difficult: debility, orthostatic hypotension   Nursing/Therapeutic Services Requested: Physical Therapy Occupational Therapy   PT orders: Therapeutic exercise Strengthening   Occupational orders: Strengthening Activities of daily living Home safety assessment        Discharge Follow-up with Specialty: Opthomologist call for appointment   As directed      Specialty: Opthomologist call for appointment               Contact information for follow-up providers       Nicky Doty MD .    Specialty: Family Medicine  Contact information:  [address] [address] [address]  [phone]                       Contact information for after-discharge care       Destination       [facility] .    Service: Skilled Nursing  Contact information:  [address] [facility] [address] [phone]                                   TEST  RESULTS PENDING AT DISCHARGE         Tiago Bergeron MD  [facility]  07/01/25  12:38 EDT      Time: greater than 32 minutes on discharge  It was a pleasure taking care of this patient while in the hospital.

## 2025-06-25 NOTE — PLAN OF CARE
Goal Outcome Evaluation:  Plan of Care Reviewed With: patient    Progress: improving     Aox4 with forgetfulness. Elevated temp noted at the beginning of shift and call out to [facility] placed. N.O of tylenol was given and was effective. ABT tolerated well. Patient in bed with bed lowered and call light within reach.

## 2025-06-25 NOTE — DISCHARGE PLACEMENT REQUEST
"Kaci Chavez \"SERGIO\" (89 y.o. Female)       Date of Birth   05/22/1933    Social Security Number       Address   [address] [place] [address] [address] [facility] [address]    Home Phone   [phone]    MRN   [id number]       Yazidi   Tenriism    Marital Status                               Admission Date   6/22/2025    Admission Type   Emergency    Admitting Provider   Tiago Bergeron MD    Attending Provider   Tiago Bergeron MD    Department, Room/Bed   [address], [address]       Discharge Date       Discharge Disposition   Home or Self Care    Discharge Destination                                 Attending Provider: Tiago Bergeron MD    Allergies: Hydrocodone-acetaminophen, Lisinopril, Sulfamethoxazole-trimethoprim, Levofloxacin    Isolation: None   Infection: None   Code Status: No CPR    Ht: 154.9 cm (61\")   Wt: 48.5 kg (107 lb)    Admission Cmt: None   Principal Problem: Syncope and collapse [R55]                   Active Insurance as of 6/22/2025       Primary Coverage       Payor Plan Insurance Group Employer/Plan Group    [employer] MEDICARE REPLACEMENT UHC Medicare Advantage GROUP PPO [id number]       Payor Plan Address Payor Plan Phone Number Payor Plan Fax Number Effective Dates    [address] [address]   1/1/2023 - None Entered    [facility] [id number]         Subscriber Name Subscriber Birth Date Member ID       KACI CHAVEZ 5/22/1933 [id number]                     Emergency Contacts        (Rel.) Home Phone Work Phone Mobile Phone    Ankit Doty (Son) [phone] -- --    Marleni Pederson (Daughter) -- -- [phone]              Emergency Contact Information       Name Relation Home Work Ankit Helm Son [phone]      Marleni Pederson Daughter   [phone]          Other Contacts    None on File       "

## 2025-06-25 NOTE — PLAN OF CARE
"Goal Outcome Evaluation:  Plan of Care Reviewed With: (P) patient        Progress: (P) improving  Outcome Evaluation: (P) Pt seen for PT tx this AM and tolerated the session well. Pt was A&O x4. Today, pt was SBA for STS w/ RW and ambulated 70' w/ SBA & w/ RW. Pt was sitting EOB w/ OT when entering room, and ambulated 10' to sink to perform ADLs. After standing rest break and performind ADL's pt ambulated 60' w/ PT. Pt states that she is \"feeling a lot better today\". No overt LOB, buckling, dizziness or SOB. PT will cont to follow and rec return to [facility] w/ HH.    Anticipated Discharge Disposition (PT): (P) home with home health (At [facility].)                        "

## 2025-06-25 NOTE — THERAPY TREATMENT NOTE
Patient Name: Alba Correa  : 1933    MRN: [id number]                              Today's Date: 2025       Admit Date: 2025    Visit Dx:     ICD-10-CM ICD-9-CM   1. Syncope and collapse  R55 780.2   2. Injury of head, initial encounter  S09.90XA 959.01     Patient Active Problem List   Diagnosis    CKD (chronic kidney disease) stage 4, GFR 15-29 ml/min    Depression    Gout    Hyperparathyroidism    Adaptive colitis    Osteopenia    Rheumatoid arthritis    Degeneration of intervertebral disc    Detrusor muscle hypertonia    History of DVT (deep vein thrombosis)    Hyperlipidemia    Nonrheumatic aortic (valve) insufficiency    History of CVA (cerebrovascular accident)    Atrial fibrillation    Anemia in chronic kidney disease    Other proteinuria    Sinus bradycardia    First degree AV block    Nonrheumatic mitral valve regurgitation    Idiopathic peripheral neuropathy    Lower extremity edema    Acute diastolic CHF (congestive heart failure)    Acquired hypothyroidism    Sick sinus syndrome    Insomnia    Fall    Chronic kidney disease, stage 3a    Hematoma of thigh    Traumatic hematoma of scalp    Abnormal CT of spine    At risk for falling    Superior mesenteric artery stenosis    Closed wedge compression fracture of T8 vertebra    Macrocytosis    Hyperlipidemia    Thyroid nodule    Essential hypertension    Chronic diastolic CHF (congestive heart failure)    Chronic kidney failure, stage 3 (moderate)    Left leg pain    Nonrheumatic tricuspid valve regurgitation    Central retinal vein occlusion    Other specified glaucoma    Intraocular pressure increase    Syncope and collapse    Syncope     Past Medical History:   Diagnosis Date    Acquired hypothyroidism 2022    Acute diastolic CHF (congestive heart failure) 2022    Anxiety 2003    Cataract     Chronic kidney disease     Closed fracture of left distal humerus 2023    Closed head injury 2023    CVA (cerebral  vascular accident)     Degeneration of intervertebral disc     Depression     DVT (deep venous thrombosis)     Facial laceration 03/07/2023    First degree AV block 05/26/2021    Gout     H/O complete eye exam 09/2016    Hyperlipidemia     Hypertension     IBS (irritable bowel syndrome)     Laceration of right forearm 03/07/2023    Nonrheumatic mitral valve regurgitation     OAB (overactive bladder)     Osteopenia     Osteoporosis     PAF (paroxysmal atrial fibrillation) 11/08/2019    Peripheral neuropathy     Renal insufficiency     Rheumatoid arthritis     Sinus bradycardia 05/26/2021    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     Past Surgical History:   Procedure Laterality Date    APPENDECTOMY      BREAST BIOPSY      CARDIAC ELECTROPHYSIOLOGY PROCEDURE N/A 10/12/2021    Procedure: Loop insertion linq;  Surgeon: Tone Anguiano MD;  Location: [facility] CATH INVASIVE LOCATION;  Service: Cardiovascular;  Laterality: N/A;    CARDIOVERSION  08/03/2020    Dr. Colby    COLONOSCOPY N/A 01/01/2010    ELBOW OPEN REDUCTION INTERNAL FIXATION Left 03/11/2023    Procedure: ELBOW OPEN REDUCTION INTERNAL FIXATION;  Surgeon: Ramón Encinas II, MD;  Location: [facility] OR;  Service: Orthopedics;  Laterality: Left;    HERNIA REPAIR  1965    HYSTERECTOMY      still has ovaries    MAMMO BILATERAL  11/16/2016    pt declines    PAP SMEAR  11/16/2016    declines      General Information       Row Name 06/25/25 1001          Physical Therapy Time and Intention    Document Type therapy note (daily note) (P)   -SB     Mode of Treatment physical therapy (P)   -SB       Row Name 06/25/25 1001          General Information    Patient Profile Reviewed yes (P)   -SB     Existing Precautions/Restrictions fall (P)   -SB       Row Name 06/25/25 1001          Cognition    Orientation Status (Cognition) oriented x 4 (P)   -SB       Row Name 06/25/25 1001          Safety Issues/Impairments Affecting Functional Mobility     Safety Issues Affecting Function (Mobility) insight into deficits/self-awareness;awareness of need for assistance;problem-solving;sequencing abilities (P)   -SB     Impairments Affecting Function (Mobility) balance;endurance/activity tolerance;strength (P)   -SB     Comment, Safety Issues/Impairments (Mobility) Gait belt and non-skid socks donned. (P)   -SB               User Key  (r) = Recorded By, (t) = Taken By, (c) = Cosigned By      Initials Name Provider Type    Alec Hall, PT Student PT Student                   Mobility       Row Name 06/25/25 1003          Bed Mobility    Comment, (Bed Mobility) Pt was sitting EOB w/ OT when entering room. (P)   -SB       Row Name 06/25/25 1003          Sit-Stand Transfer    Sit-Stand [place] (Transfers) standby assist (P)   -SB     Assistive Device (Sit-Stand Transfers) walker, front-wheeled (P)   -SB     Comment, (Sit-Stand Transfer) No overt LOB, or buckling. (P)   -SB       Row Name 06/25/25 1003          Gait/Stairs (Locomotion)    [place] Level (Gait) standby assist (P)   -SB     Assistive Device (Gait) walker, front-wheeled (P)   -SB     Patient was able to Ambulate yes (P)   -SB     Distance in Feet (Gait) 70 (P)   -SB     Deviations/Abnormal Patterns (Gait) noemi decreased;gait speed decreased;stride length decreased (P)   -SB     Bilateral Gait Deviations forward flexed posture;heel strike decreased (P)   -SB     Comment, (Gait/Stairs) Pt ambulated 10' to sink w/ standing rest break for ADL's w/ OT and then ambulated 60'. No overt LOB or buckling. (P)   -SB               User Key  (r) = Recorded By, (t) = Taken By, (c) = Cosigned By      Initials Name Provider Type    Alec Hall, PT Student PT Student                   Obj/Interventions       Row Name 06/25/25 1008          Balance    Balance Assessment sitting dynamic balance;standing dynamic balance (P)   -SB     Static Sitting Balance independent (P)   -SB     Dynamic Sitting Balance standby  "assist (P)   -SB     Position, Sitting Balance unsupported;sitting edge of bed (P)   -SB     Static Standing Balance standby assist (P)   -SB     Dynamic Standing Balance standby assist (P)   -SB     Position/Device Used, Standing Balance supported;walker, front-wheeled (P)   -SB     Comment, Balance No overt LOB or dizziness. (P)   -SB               User Key  (r) = Recorded By, (t) = Taken By, (c) = Cosigned By      Initials Name Provider Type    SB Alec Rangel, PT Student PT Student                   Goals/Plan    No documentation.                  Clinical Impression       Row Name 06/25/25 1010          Pain    Pretreatment Pain Rating 0/10 - no pain (P)   -SB     Posttreatment Pain Rating 0/10 - no pain (P)   -SB       Row Name 06/25/25 1010          Plan of Care Review    Plan of Care Reviewed With patient (P)   -SB     Progress improving (P)   -SB     Outcome Evaluation Pt seen for PT tx this AM and tolerated the session well. Pt was A&O x4. Today, pt was SBA for STS w/ RW and ambulated 70' w/ SBA & w/ RW. Pt was sitting EOB w/ OT when entering room, and ambulated 10' to sink to perform ADLs. After standing rest break and performind ADL's pt ambulated 60' w/ PT. Pt states that she is \"feeling a lot better today\". No overt LOB, buckling, dizziness or SOB. PT will cont to follow and rec return to ILF w/ HH. (P)   -SB       Row Name 06/25/25 1010          Therapy Assessment/Plan (PT)    Rehab Potential (PT) good (P)   -SB     Criteria for Skilled Interventions Met (PT) yes (P)   -SB     Therapy Frequency (PT) 3 times/wk (P)   -SB       Row Name 06/25/25 1010          Vital Signs    O2 Delivery Pre Treatment room air (P)   -SB     Pre Patient Position Sitting (P)   -SB     Intra Patient Position Standing (P)   -SB     Post Patient Position Sitting (P)   -SB       Row Name 06/25/25 1010          Positioning and Restraints    Pre-Treatment Position in bed (P)   -SB     Post Treatment Position chair (P)   -SB     In " Chair notified nsg;reclined;sitting;call light within reach;encouraged to call for assist;exit alarm on;legs elevated (P)   -SB               User Key  (r) = Recorded By, (t) = Taken By, (c) = Cosigned By      Initials Name Provider Type    Alec Hall, PT Student PT Student                   Outcome Measures       Row Name 06/25/25 1019 06/25/25 0845       How much help from another person do you currently need...    Turning from your back to your side while in flat bed without using bedrails? 4 (P)   -SB 4  -BB    Moving from lying on back to sitting on the side of a flat bed without bedrails? 4 (P)   -SB 4  -BB    Moving to and from a bed to a chair (including a wheelchair)? 3 (P)   -SB 3  -BB    Standing up from a chair using your arms (e.g., wheelchair, bedside chair)? 3 (P)   -SB 2  -BB    Climbing 3-5 steps with a railing? 2 (P)   -SB 2  -BB    To walk in hospital room? 3 (P)   -SB 2  -BB    AM-PAC 6 Clicks Score (PT) 19 (P)   -SB 17  -BB      Row Name 06/25/25 0020          How much help from another person do you currently need...    Turning from your back to your side while in flat bed without using bedrails? 4  -SM     Moving from lying on back to sitting on the side of a flat bed without bedrails? 4  -SM     Moving to and from a bed to a chair (including a wheelchair)? 3  -SM     Standing up from a chair using your arms (e.g., wheelchair, bedside chair)? 2  -SM     Climbing 3-5 steps with a railing? 2  -SM     To walk in hospital room? 2  -SM     AM-PAC 6 Clicks Score (PT) 17  -SM               User Key  (r) = Recorded By, (t) = Taken By, (c) = Cosigned By      Initials Name Provider Type    Raina Dotson, RN Registered Nurse    Margy Anne RN Registered Nurse    Alec Hall, PT Student PT Student                                 Physical Therapy Education       Title: PT OT SLP Therapies (In Progress)       Topic: Physical Therapy (In Progress)       Point: Mobility training (Done)   "     Learning Progress Summary            Patient Acceptance, E, NR,VU by  at 6/25/2025 1020    Acceptance, E, NR by  at 6/23/2025 0931                      Point: Home exercise program (In Progress)       Learning Progress Summary            Patient Acceptance, E, NR by  at 6/23/2025 0931                      Point: Body mechanics (Done)       Learning Progress Summary            Patient Acceptance, E, NR,VU by  at 6/25/2025 1020    Acceptance, E, NR by  at 6/23/2025 0931                      Point: Precautions (Done)       Learning Progress Summary            Patient Acceptance, E, NR,VU by  at 6/25/2025 1020    Acceptance, E, NR by  at 6/23/2025 0931                                      User Key       Initials Effective Dates Name Provider Type Discipline     06/16/21 -  Janine Siddiqui, PT Physical Therapist PT     05/19/25 -  Alec Rangel, PT Student PT Student PT                  PT Recommendation and Plan     Progress: (P) improving  Outcome Evaluation: (P) Pt seen for PT tx this AM and tolerated the session well. Pt was A&O x4. Today, pt was SBA for STS w/ RW and ambulated 70' w/ SBA & w/ RW. Pt was sitting EOB w/ OT when entering room, and ambulated 10' to sink to perform ADLs. After standing rest break and performind ADL's pt ambulated 60' w/ PT. Pt states that she is \"feeling a lot better today\". No overt LOB, buckling, dizziness or SOB. PT will cont to follow and rec return to ILF w/ HH.     Time Calculation:         PT Charges       Row Name 06/25/25 1021             Time Calculation    Start Time 0904 (P)   -SB      Stop Time 0927 (P)   -SB      Time Calculation (min) 23 min (P)   -SB      PT Received On 06/25/25 (P)   -SB      PT - Next Appointment 06/27/25 (P)   -SB      PT Goal Re-Cert Due Date 06/30/25 (P)   -SB         Time Calculation- PT    Total Timed Code Minutes- PT 23 minute(s) (P)   -SB                User Key  (r) = Recorded By, (t) = Taken By, (c) = Cosigned By      " Initials Name Provider Type    Alec Hall, PT Student PT Student                      PT G-Codes  AM-PAC 6 Clicks Score (PT): (P) 19  PT Discharge Summary  Anticipated Discharge Disposition (PT): (P) home with home health (At [facility].)    Alec Rangel, PT Student  6/25/2025

## 2025-06-26 PROCEDURE — 97535 SELF CARE MNGMENT TRAINING: CPT

## 2025-06-26 PROCEDURE — 97530 THERAPEUTIC ACTIVITIES: CPT

## 2025-06-26 RX ADMIN — DULOXETINE 60 MG: 60 CAPSULE, DELAYED RELEASE ORAL at 09:24

## 2025-06-26 RX ADMIN — APIXABAN 2.5 MG: 2.5 TABLET, FILM COATED ORAL at 21:49

## 2025-06-26 RX ADMIN — LOSARTAN POTASSIUM 12.5 MG: 25 TABLET, FILM COATED ORAL at 09:24

## 2025-06-26 RX ADMIN — LEVOTHYROXINE SODIUM 50 MCG: 50 TABLET ORAL at 05:49

## 2025-06-26 RX ADMIN — APIXABAN 2.5 MG: 2.5 TABLET, FILM COATED ORAL at 09:24

## 2025-06-26 RX ADMIN — LATANOPROST 1 DROP: 50 SOLUTION/ DROPS OPHTHALMIC at 21:50

## 2025-06-26 RX ADMIN — Medication 10 ML: at 09:25

## 2025-06-26 RX ADMIN — Medication 10 ML: at 05:51

## 2025-06-26 RX ADMIN — ALLOPURINOL 100 MG: 100 TABLET ORAL at 09:24

## 2025-06-26 NOTE — THERAPY TREATMENT NOTE
Patient Name: Alba Correa  : 1933    MRN: [id number]                              Today's Date: 2025       Admit Date: 2025    Visit Dx:     ICD-10-CM ICD-9-CM   1. Syncope and collapse  R55 780.2   2. Injury of head, initial encounter  S09.90XA 959.01   3. Vasovagal syncope  R55 780.2     Patient Active Problem List   Diagnosis    CKD (chronic kidney disease) stage 4, GFR 15-29 ml/min    Depression    Gout    Hyperparathyroidism    Adaptive colitis    Osteopenia    Rheumatoid arthritis    Degeneration of intervertebral disc    Detrusor muscle hypertonia    History of DVT (deep vein thrombosis)    Hyperlipidemia    Nonrheumatic aortic (valve) insufficiency    History of CVA (cerebrovascular accident)    Atrial fibrillation    Anemia in chronic kidney disease    Other proteinuria    Sinus bradycardia    First degree AV block    Nonrheumatic mitral valve regurgitation    Idiopathic peripheral neuropathy    Lower extremity edema    Acute diastolic CHF (congestive heart failure)    Acquired hypothyroidism    Sick sinus syndrome    Insomnia    Fall    Chronic kidney disease, stage 3a    Hematoma of thigh    Traumatic hematoma of scalp    Abnormal CT of spine    At risk for falling    Superior mesenteric artery stenosis    Closed wedge compression fracture of T8 vertebra    Macrocytosis    Hyperlipidemia    Thyroid nodule    Essential hypertension    Chronic diastolic CHF (congestive heart failure)    Chronic kidney failure, stage 3 (moderate)    Left leg pain    Nonrheumatic tricuspid valve regurgitation    Central retinal vein occlusion    Other specified glaucoma    Intraocular pressure increase    Syncope and collapse    Syncope     Past Medical History:   Diagnosis Date    Acquired hypothyroidism 2022    Acute diastolic CHF (congestive heart failure) 2022    Anxiety 2003    Cataract     Chronic kidney disease     Closed fracture of left distal humerus 2023    Closed head injury  12/07/2023    CVA (cerebral vascular accident)     Degeneration of intervertebral disc     Depression     DVT (deep venous thrombosis)     Facial laceration 03/07/2023    First degree AV block 05/26/2021    Gout     H/O complete eye exam 09/2016    Hyperlipidemia     Hypertension     IBS (irritable bowel syndrome)     Laceration of right forearm 03/07/2023    Nonrheumatic mitral valve regurgitation     OAB (overactive bladder)     Osteopenia     Osteoporosis     PAF (paroxysmal atrial fibrillation) 11/08/2019    Peripheral neuropathy     Renal insufficiency     Rheumatoid arthritis     Sinus bradycardia 05/26/2021    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     Past Surgical History:   Procedure Laterality Date    APPENDECTOMY      BREAST BIOPSY      CARDIAC ELECTROPHYSIOLOGY PROCEDURE N/A 10/12/2021    Procedure: Loop insertion linq;  Surgeon: Tone Anguiano MD;  Location: [facility] CATH INVASIVE LOCATION;  Service: Cardiovascular;  Laterality: N/A;    CARDIOVERSION  08/03/2020    Dr. Colby    COLONOSCOPY N/A 01/01/2010    ELBOW OPEN REDUCTION INTERNAL FIXATION Left 03/11/2023    Procedure: ELBOW OPEN REDUCTION INTERNAL FIXATION;  Surgeon: Ramón Encinas II, MD;  Location: [facility] MAIN OR;  Service: Orthopedics;  Laterality: Left;    HERNIA REPAIR  1965    HYSTERECTOMY      still has ovaries    MAMMO BILATERAL  11/16/2016    pt declines    PAP SMEAR  11/16/2016    declines      General Information       Row Name 06/26/25 1226          OT Time and Intention    Document Type therapy note (daily note)  -MM     Mode of Treatment occupational therapy  -MM     Patient Effort good  -MM       Row Name 06/26/25 1226          General Information    Patient Profile Reviewed yes  -MM     Existing Precautions/Restrictions fall  -MM       Row Name 06/26/25 1226          Cognition    Orientation Status (Cognition) oriented x 4  -MM       Row Name 06/26/25 1226          Safety Issues/Impairments Affecting  Functional Mobility    Impairments Affecting Function (Mobility) pain;balance;endurance/activity tolerance;strength  -               User Key  (r) = Recorded By, (t) = Taken By, (c) = Cosigned By      Initials Name Provider Type    Aisha Garcia OT Occupational Therapist                     Mobility/ADL's       Row Name 06/26/25 1226          Bed Mobility    Comment, (Bed Mobility) NT UIC  -MM       Row Name 06/26/25 1226          Transfers    Transfers sit-stand transfer;toilet transfer  -       Row Name 06/26/25 1226          Sit-Stand Transfer    Sit-Stand [place] (Transfers) contact guard  -MM     Assistive Device (Sit-Stand Transfers) walker, front-wheeled  -MM       Row Name 06/26/25 1226          Toilet Transfer    Comment, (Toilet Transfer) demo'd approp distance to BR commode, encouraged use of BR commode to improve (I) with toileting ADL  -       Row Name 06/26/25 1226          Functional Mobility    Functional Mobility- Ind. Level contact guard assist  -MM     Functional Mobility- Device walker, front-wheeled  -MM     Functional Mobility- Comment to sinkside and into hallway before returning to chair  -       Row Name 06/26/25 1226          Activities of Daily Living    BADL Assessment/Intervention grooming;toileting  -       Row Name 06/26/25 1226          Grooming Assessment/Training    [place] Level (Grooming) standby assist;oral care regimen;hair care, combing/brushing  -     Position (Grooming) sink side;supported standing  -     Comment, (Grooming) ~10 mins  -       Row Name 06/26/25 1226          Toileting Assessment/Training    Comment, (Toileting) adjusting brief with min A  -MM               User Key  (r) = Recorded By, (t) = Taken By, (c) = Cosigned By      Initials Name Provider Type    Aisha Garcia OT Occupational Therapist                   Obj/Interventions       Row Name 06/26/25 1228          Motor Skills    Motor Skills functional endurance  -MM      Functional Endurance below baseline  -MM       Row Name 06/26/25 1228          Balance    Comment, Balance higher level dynamic challenges difficult for pt to complete  -MM               User Key  (r) = Recorded By, (t) = Taken By, (c) = Cosigned By      Initials Name Provider Type    Aisha Garcia OT Occupational Therapist                   Goals/Plan    No documentation.                  Clinical Impression       Row Name 06/26/25 1228          Pain Assessment    Pretreatment Pain Rating 0/10 - no pain  -MM     Posttreatment Pain Rating 0/10 - no pain  -MM     Pre/Posttreatment Pain Comment reports L foot pain better with mobility  -MM       Row Name 06/26/25 1228          Plan of Care Review    Plan of Care Reviewed With patient  -MM     Progress improving  -MM     Outcome Evaluation pt seen this date for OT tx session, she is agreeable, motivated and eager to participate. She is UIC on arrival, able to complete g/h routine at sinkside ~10 -12 mins with SBA. CGA for STS and mobility with rwx this date, strength and balance below prior level of function and dynamic balance activities difficult for pt. She would benefit from SNF to maximize (I) prior to returning to her ILF. continue to follow  -MM       Row Name 06/26/25 1228          Therapy Plan Review/Discharge Plan (OT)    Anticipated Discharge Disposition (OT) skilled nursing facility  -MM       Row Name 06/26/25 1228          Vital Signs    O2 Delivery Pre Treatment room air  -MM       Row Name 06/26/25 1228          Positioning and Restraints    Pre-Treatment Position sitting in chair/recliner  -MM     Post Treatment Position chair  -MM     In Chair notified nsg;reclined;call light within reach;encouraged to call for assist;exit alarm on  -MM               User Key  (r) = Recorded By, (t) = Taken By, (c) = Cosigned By      Initials Name Provider Type    Aisha Garcia OT Occupational Therapist                   Outcome Measures       Row Name  06/26/25 1231          How much help from another is currently needed...    Putting on and taking off regular lower body clothing? 2  -MM     Bathing (including washing, rinsing, and drying) 2  -MM     Toileting (which includes using toilet bed pan or urinal) 2  -MM     Putting on and taking off regular upper body clothing 3  -MM     Taking care of personal grooming (such as brushing teeth) 3  -MM     Eating meals 4  -MM     AM-PAC 6 Clicks Score (OT) 16  -MM       Row Name 06/26/25 0927          How much help from another person do you currently need...    Turning from your back to your side while in flat bed without using bedrails? 4  -AC     Moving from lying on back to sitting on the side of a flat bed without bedrails? 4  -AC     Moving to and from a bed to a chair (including a wheelchair)? 3  -AC     Standing up from a chair using your arms (e.g., wheelchair, bedside chair)? 3  -AC     Climbing 3-5 steps with a railing? 2  -AC     To walk in hospital room? 2  -AC     AM-PAC 6 Clicks Score (PT) 18  -AC       Row Name 06/26/25 1231          Functional Assessment    Outcome Measure Options AM-PAC 6 Clicks Daily Activity (OT)  -MM               User Key  (r) = Recorded By, (t) = Taken By, (c) = Cosigned By      Initials Name Provider Type    AC Katelyn Kendall, RN Registered Nurse    Aisha Garcia OT Occupational Therapist                    Occupational Therapy Education       Title: PT OT SLP Therapies (Resolved)       Topic: Occupational Therapy (Resolved)       Point: ADL training (Resolved)       Learning Progress Summary            Patient Acceptance, E, VU by  at 6/25/2025 0007    Comment: OT goals, POC                                      User Key       Initials Effective Dates Name Provider Type Discipline     04/24/25 -  Nicky Lujan OTR/L, CSRS Occupational Therapist OT                  OT Recommendation and Plan     Plan of Care Review  Plan of Care Reviewed With: patient  Progress:  improving  Outcome Evaluation: pt seen this date for OT tx session, she is agreeable, motivated and eager to participate. She is UIC on arrival, able to complete g/h routine at sinkside ~10 -12 mins with SBA. CGA for STS and mobility with rwx this date, strength and balance below prior level of function and dynamic balance activities difficult for pt. She would benefit from SNF to maximize (I) prior to returning to her ILF. continue to follow     Time Calculation:         Time Calculation- OT       Row Name 06/26/25 1231             Time Calculation- OT    OT Start Time 0918  -MM      OT Stop Time 0944  -MM      OT Time Calculation (min) 26 min  -MM      Total Timed Code Minutes- OT 26 minute(s)  -MM      OT Received On 06/26/25  -MM      OT - Next Appointment 06/27/25  -MM         Timed Charges    93633 - OT Therapeutic Activity Minutes 10  -MM      75443 - OT Self Care/Mgmt Minutes 16  -MM         Total Minutes    Timed Charges Total Minutes 26  -MM       Total Minutes 26  -MM                User Key  (r) = Recorded By, (t) = Taken By, (c) = Cosigned By      Initials Name Provider Type    Aisha Garcia OT Occupational Therapist                  Therapy Charges for Today       Code Description Service Date Service Provider Modifiers Qty    38701477011 HC OT THERAPEUTIC ACT EA 15 MIN 6/26/2025 Aisha Anne OT GO 1    11419881598 HC OT SELF CARE/MGMT/TRAIN EA 15 MIN 6/26/2025 Aisha Anne OT GO 1                 Aisha Anne OT  6/26/2025

## 2025-06-26 NOTE — CASE MANAGEMENT/SOCIAL WORK
Case Management/Social Work    Patient Name:  Alba Correa  YOB: 1933  MRN: [id number]  Admit Date:  6/22/2025        Post Acute Pre-Cert Documentation  Request Submitted by Facility - Type:: Hospital  Post-Acute Authorization Type Submitted:: SNF  Date Post Acute Pre-Cert Inititated per Facility: 06/25/25  Accepting Facility: [facility] Discharge Date Requested: 06/25/25  Response Received from Insurance?: P2P Requested    Authorization Number:: PENDING [id number]      Electronically signed by:  CAMELIA Wen  06/26/25 17:10 EDT

## 2025-06-26 NOTE — CASE MANAGEMENT/SOCIAL WORK
Continued Stay Note  [facility]     Patient Name: Alba Correa  MRN: [id number]  Today's Date: 6/26/2025    Admit Date: 6/22/2025    Plan: [facility] pending pre-cert   Discharge Plan       Row Name 06/26/25 1416       Plan    Plan [place] pending pre-cert    Plan Comments Pre-cert remains pending for Julio Pickens. CCP will follow and assist as needed. Amalia HILLIARD                   Discharge Codes    No documentation.                 Expected Discharge Date and Time       Expected Discharge Date Expected Discharge Time    Jun 27, 2025               BABAK Quiles

## 2025-06-26 NOTE — PROGRESS NOTES
Name: Alba Correa ADMIT: 2025   : 1933  PCP: Aramis Doty MD    MRN: [id number] LOS: 2 days   AGE/SEX: 89 y.o. female  ROOM: [facility]   Subjective   Chief Complaint   Patient presents with    Fall    Head Injury    Dizziness     89 yo with history P.Afib, DVT, previous stroke, central vein occlusion, CKD, she has been in the observation unit for syncope. Was on losartan, diamox and amlodipine. This morning had vasovagal episode. Cardiology had loop interrogation without bradycardia or pauses. Balancing orthostasis vs control of supine BP     Feels a little better today  To work with therapy    ROS  No f/c  No n/v  No cp/palp  No soa/cough    Objective   Vital Signs  Temp:  [98.1 °F (36.7 °C)-99.5 °F (37.5 °C)] 98.2 °F (36.8 °C)  Heart Rate:  [] 98  Resp:  [14-16] 16  BP: (117-149)/(58-99) 145/78  SpO2:  [91 %-95 %] 93 %  on   ;   Device (Oxygen Therapy): room air  Body mass index is 20.22 kg/m².    Physical Exam  Constitutional:       General: She is not in acute distress.  HENT:      Head: Normocephalic and atraumatic.   Eyes:      General: No scleral icterus.  Cardiovascular:      Rate and Rhythm: Regular rhythm.      Heart sounds: Normal heart sounds.   Pulmonary:      Effort: Pulmonary effort is normal. No respiratory distress.   Abdominal:      General: There is no distension.      Palpations: Abdomen is soft.   Musculoskeletal:      Cervical back: Neck supple.   Neurological:      Mental Status: She is alert.   Psychiatric:         Behavior: Behavior normal.       Elderly, chronically ill   Similar exam to yesterday     Results Review:       I reviewed the patient's new clinical results.  Results from last 7 days   Lab Units 25  0123 25  0829   WBC 10*3/mm3 4.79 4.05   HEMOGLOBIN g/dL 12.9 13.2   PLATELETS 10*3/mm3 224 244     Results from last 7 days   Lab Units 25  0555 25  0123 25  0829   SODIUM mmol/L 135* 139 141   POTASSIUM mmol/L 3.3* 3.8 3.9  "  CHLORIDE mmol/L 100 104 101   CO2 mmol/L 20.0* 24.0 29.0   BUN mg/dL 23.0 11.0 16.0   CREATININE mg/dL 1.12* 0.75 0.86   GLUCOSE mg/dL 116* 103* 101*   Estimated Creatinine Clearance: 24.5 mL/min (A) (by C-G formula based on SCr of 1.12 mg/dL (H)).  Results from last 7 days   Lab Units 06/22/25  0829   ALBUMIN g/dL 4.2   BILIRUBIN mg/dL 0.4   ALK PHOS U/L 94   AST (SGOT) U/L 31   ALT (SGPT) U/L 15     Results from last 7 days   Lab Units 06/25/25  0555 06/23/25  0123 06/22/25  0829   CALCIUM mg/dL 9.6 9.1 10.0*   ALBUMIN g/dL  --   --  4.2         Coag     HbA1C   Lab Results   Component Value Date    HGBA1C 5.20 04/03/2019    HGBA1C 5.30 08/06/2018     Infection     Radiology(recent) XR Foot 3+ View Left  Result Date: 6/25/2025   As described.    This report was finalized on 6/25/2025 1:44 PM by Dr. Levy Mock M.D on Workstation: [id number]      XR Ribs Left With PA Chest  Result Date: 6/24/2025  As described.  This report was finalized on 6/24/2025 7:21 PM by Dr. Levy Mock M.D on Workstation: [id number]      No results found for: \"TROPONINT\", \"TROPONINI\", \"BNP\"    No components found for: \"TSH;2\"    allopurinol, 100 mg, Oral, Daily  [Held by provider] amLODIPine, 5 mg, Oral, Daily  apixaban, 2.5 mg, Oral, BID  DULoxetine, 60 mg, Oral, Daily  latanoprost, 1 drop, Both Eyes, Nightly  levothyroxine, 50 mcg, Oral, Q AM  losartan, 12.5 mg, Oral, Daily  sodium chloride, 10 mL, Intravenous, Q12H       Diet: Regular/House; Fluid Consistency: Thin (IDDSI 0)      Assessment & Plan      Active Hospital Problems    Diagnosis  POA    **Syncope and collapse [R55]  Yes    Syncope [R55]  Yes    Central retinal vein occlusion [H34.8192]  Yes    Chronic diastolic CHF (congestive heart failure) [I50.32]  Yes    Atrial fibrillation [I48.91]  Yes    History of CVA (cerebrovascular accident) [Z86.73]  Not Applicable    History of DVT (deep vein thrombosis) [Z86.718]  Not Applicable    Rheumatoid arthritis [M06.9]  Yes    "   Resolved Hospital Problems   No resolved problems to display.       89 yo with history P.Afib, DVT, previous stroke, central vein occlusion, CKD, she has been in the observation unit for syncope. Was on losartan, diamox and amlodipine. This morning had vasovagal episode. Cardiology had loop interrogation without bradycardia or pauses. Balancing orthostasis vs control of supine BP with amlodipine and diamox held    May end up neeind rehab vs AL with HH after working with family     Replace K    Xrays of ribs were negative for fracture. Xray of foot was negative for fracture though had some abnormal area that radiologist said could be a cyst or other process- she can follow up with PCP and or Podiatry for further workup/monitoring. See below report for full differential/read.     Reviewed the following note from Neurology 5/2024- similar situation currently:    I had a fairly extensive conversation with the patient and patient's family members at bedside, based on presentation and patient's symptom I believe this is most likely autonomic dysfunction related dizziness which caused her to fall she has been having episodic dizziness for the past few years.  I think her autonomic dysfunction is most likely age-related.     She does not have any signs to suggest TIA or acute ischemic stroke.  Cardiology has been consulted who are recommending transthoracic echo/loop recorder I agree with this.     I suggested family we can get CTA head and neck to rule out vertebrobasilar insufficiency versus Stenosis but less likely this is going to  at given patient age and that she is chronically anticoagulated.              KARLA Bergeron MD  [facility]  06/25/25  16:50 EDT

## 2025-06-26 NOTE — PROGRESS NOTES
Name: Alba Correa ADMIT: 2025   : 1933  PCP: Aramis Doty MD    MRN: [id number] LOS: 2 days   AGE/SEX: 89 y.o. female  ROOM: [facility]   Subjective   Chief Complaint   Patient presents with    Fall    Head Injury    Dizziness     89 yo with history P.Afib, DVT, previous stroke, central vein occlusion, CKD, she has been in the observation unit for syncope. Was on losartan, diamox and amlodipine. This morning had vasovagal episode. Cardiology had loop interrogation without bradycardia or pauses. Balancing orthostasis vs control of supine BP     BP is fair  Working with therapy  Below her baseline    ROS  No f/c  No n/v  No cp/palp  No soa/cough    Objective   Vital Signs  Temp:  [97.9 °F (36.6 °C)-99.5 °F (37.5 °C)] 97.9 °F (36.6 °C)  Heart Rate:  [] 114  Resp:  [14-16] 16  BP: (117-149)/(58-87) 125/87  SpO2:  [93 %-98 %] 98 %  on   ;   Device (Oxygen Therapy): room air  Body mass index is 20.22 kg/m².    Physical Exam  Constitutional:       General: She is not in acute distress.  HENT:      Head: Normocephalic and atraumatic.   Eyes:      General: No scleral icterus.  Cardiovascular:      Rate and Rhythm: Regular rhythm.      Heart sounds: Normal heart sounds.   Pulmonary:      Effort: Pulmonary effort is normal. No respiratory distress.   Abdominal:      General: There is no distension.      Palpations: Abdomen is soft.   Musculoskeletal:      Cervical back: Neck supple.   Neurological:      Mental Status: She is alert.   Psychiatric:         Behavior: Behavior normal.       Elderly, chronically ill   Similar exam to yesterday     Results Review:       I reviewed the patient's new clinical results.  Results from last 7 days   Lab Units 25  0123 25  0829   WBC 10*3/mm3 4.79 4.05   HEMOGLOBIN g/dL 12.9 13.2   PLATELETS 10*3/mm3 224 244     Results from last 7 days   Lab Units 25  0555 25  0123 25  0829   SODIUM mmol/L 135* 139 141   POTASSIUM mmol/L 3.3* 3.8 3.9  "  CHLORIDE mmol/L 100 104 101   CO2 mmol/L 20.0* 24.0 29.0   BUN mg/dL 23.0 11.0 16.0   CREATININE mg/dL 1.12* 0.75 0.86   GLUCOSE mg/dL 116* 103* 101*   Estimated Creatinine Clearance: 24.5 mL/min (A) (by C-G formula based on SCr of 1.12 mg/dL (H)).  Results from last 7 days   Lab Units 06/22/25  0829   ALBUMIN g/dL 4.2   BILIRUBIN mg/dL 0.4   ALK PHOS U/L 94   AST (SGOT) U/L 31   ALT (SGPT) U/L 15     Results from last 7 days   Lab Units 06/25/25  0555 06/23/25  0123 06/22/25  0829   CALCIUM mg/dL 9.6 9.1 10.0*   ALBUMIN g/dL  --   --  4.2         Coag     HbA1C   Lab Results   Component Value Date    HGBA1C 5.20 04/03/2019    HGBA1C 5.30 08/06/2018     Infection     Radiology(recent) XR Foot 3+ View Left  Result Date: 6/25/2025   As described.    This report was finalized on 6/25/2025 1:44 PM by Dr. Levy Mock M.D on Workstation: [id number]      XR Ribs Left With PA Chest  Result Date: 6/24/2025  As described.  This report was finalized on 6/24/2025 7:21 PM by Dr. Levy Mock M.D on Workstation: [id number]      No results found for: \"TROPONINT\", \"TROPONINI\", \"BNP\"    No components found for: \"TSH;2\"    allopurinol, 100 mg, Oral, Daily  [Held by provider] amLODIPine, 5 mg, Oral, Daily  apixaban, 2.5 mg, Oral, BID  DULoxetine, 60 mg, Oral, Daily  latanoprost, 1 drop, Both Eyes, Nightly  levothyroxine, 50 mcg, Oral, Q AM  losartan, 12.5 mg, Oral, Daily  sodium chloride, 10 mL, Intravenous, Q12H       Diet: Regular/House; Fluid Consistency: Thin (IDDSI 0)      Assessment & Plan      Active Hospital Problems    Diagnosis  POA    **Syncope and collapse [R55]  Yes    Syncope [R55]  Yes    Central retinal vein occlusion [H34.8192]  Yes    Chronic diastolic CHF (congestive heart failure) [I50.32]  Yes    Atrial fibrillation [I48.91]  Yes    History of CVA (cerebrovascular accident) [Z86.73]  Not Applicable    History of DVT (deep vein thrombosis) [Z86.718]  Not Applicable    Rheumatoid arthritis [M06.9]  Yes    "   Resolved Hospital Problems   No resolved problems to display.       89 yo with history P.Afib, DVT, previous stroke, central vein occlusion, CKD, she has been in the observation unit for syncope. Was on losartan, diamox and amlodipine. This morning had vasovagal episode. Cardiology had loop interrogation without bradycardia or pauses. Balancing orthostasis vs control of supine BP with amlodipine and diamox held    Xrays of ribs were negative for fracture. Xray of foot was negative for fracture though had some abnormal area that radiologist said could be a cyst or other process- she can follow up with PCP and or Podiatry for further workup/monitoring. See below report for full differential/read. Discussed this with patient/family and they will plan on follow up with Podiatry    Reviewed the following note from Neurology 5/2024- similar situation currently- have listed this in other notes.     Likely needs subacute rehab, precert in process  Dw family at bedside     Tiago Bergeron MD  [facility]  06/25/25  16:50 EDT

## 2025-06-26 NOTE — CASE MANAGEMENT/SOCIAL WORK
Post-Acute Authorization Submission      Post Acute Pre-Cert Documentation  Request Submitted by Facility - Type:: Hospital  Post-Acute Authorization Type Submitted:: SNF  Date Post Acute Pre-Cert Inititated per Facility: 06/25/25  Accepting Facility: [facility] Discharge Date Requested: 06/25/25  All Clinicals Submitted?: Yes  Had Accepting Facility at Time of Submission: Yes  Action Taken by Requesting Facility:: Additional Clinicals Submitted (OT NOTES 6/26/25)  Response Communicated to:: , Accepting Facility Liaison  Authorization Number:: PENDING [id number]              CAMELIA Wen

## 2025-06-26 NOTE — CASE MANAGEMENT/SOCIAL WORK
Case Management/Social Work    Patient Name:  Alba Correa  YOB: 1933  MRN: [id number]  Admit Date:  6/22/2025        Post Acute Pre-Cert Documentation  Request Submitted by Facility - Type:: Hospital  Post-Acute Authorization Type Submitted:: SNF  Date Post Acute Pre-Cert Inititated per Facility: 06/25/25  Accepting Facility: [facility] Discharge Date Requested: 06/25/25    Authorization Number:: PENDING [id number]         Electronically signed by:  CAMELIA Wen  06/26/25 10:51 EDT

## 2025-06-26 NOTE — PLAN OF CARE
Goal Outcome Evaluation:  Plan of Care Reviewed With: patient        Progress: improving  Outcome Evaluation: pt seen this date for OT tx session, she is agreeable, motivated and eager to participate. She is UIC on arrival, able to complete g/h routine at [facility] ~10 -12 mins with SBA. CGA for STS and mobility with rwx this date, strength and balance below prior level of function and dynamic balance activities difficult for pt. She would benefit from SNF to maximize (I) prior to returning to her ILF. continue to follow    Anticipated Discharge Disposition (OT): skilled nursing facility

## 2025-06-26 NOTE — CASE MANAGEMENT/SOCIAL WORK
Post-Acute Authorization Submission      Post Acute Pre-Cert Documentation  Request Submitted by Facility - Type:: Hospital  Post-Acute Authorization Type Submitted:: SNF  Date Post Acute Pre-Cert Inititated per Facility: 06/25/25  Accepting Facility: [facility] Discharge Date Requested: 06/25/25  All Clinicals Submitted?: Yes  Had Accepting Facility at Time of Submission: Yes  Action Taken by Requesting Facility:: Additional Clinicals Submitted (OT NOTES 6/26/25)  Response Communicated to:: , Accepting Facility Liaison  Authorization Number:: PENDING [id number]              CAMELIA Wen

## 2025-06-27 PROCEDURE — 97530 THERAPEUTIC ACTIVITIES: CPT

## 2025-06-27 RX ADMIN — ALLOPURINOL 100 MG: 100 TABLET ORAL at 09:15

## 2025-06-27 RX ADMIN — DULOXETINE 60 MG: 60 CAPSULE, DELAYED RELEASE ORAL at 09:15

## 2025-06-27 RX ADMIN — ACETAMINOPHEN 650 MG: 325 TABLET, FILM COATED ORAL at 10:24

## 2025-06-27 RX ADMIN — Medication 10 ML: at 20:34

## 2025-06-27 RX ADMIN — LATANOPROST 1 DROP: 50 SOLUTION/ DROPS OPHTHALMIC at 20:33

## 2025-06-27 RX ADMIN — Medication 10 ML: at 09:15

## 2025-06-27 RX ADMIN — LOSARTAN POTASSIUM 12.5 MG: 25 TABLET, FILM COATED ORAL at 09:15

## 2025-06-27 RX ADMIN — APIXABAN 2.5 MG: 2.5 TABLET, FILM COATED ORAL at 20:33

## 2025-06-27 RX ADMIN — LEVOTHYROXINE SODIUM 50 MCG: 50 TABLET ORAL at 06:56

## 2025-06-27 RX ADMIN — APIXABAN 2.5 MG: 2.5 TABLET, FILM COATED ORAL at 09:15

## 2025-06-27 NOTE — PROGRESS NOTES
Name: Alba Correa ADMIT: 2025   : 1933  PCP: Aramis Doty MD    MRN: [id number] LOS: 3 days   AGE/SEX: 89 y.o. female  ROOM: [facility]   Subjective   Chief Complaint   Patient presents with    Fall    Head Injury    Dizziness     89 yo with history P.Afib, DVT, previous stroke, central vein occlusion, CKD, she has been in the observation unit for syncope. Was on losartan, diamox and amlodipine. This morning had vasovagal episode. Cardiology had loop interrogation without bradycardia or pauses. Balancing orthostasis vs control of supine BP     BP is fair  Working with therapy  Below her baseline  Hoping for rehab  No new complaints    ROS  No f/c  No n/v  No cp/palp  No soa/cough    Objective   Vital Signs  Temp:  [98.2 °F (36.8 °C)-100 °F (37.8 °C)] 99 °F (37.2 °C)  Heart Rate:  [] 99  Resp:  [16] 16  BP: (142-151)/(71-84) 151/71  SpO2:  [94 %-97 %] 97 %  on   ;   Device (Oxygen Therapy): room air  Body mass index is 20.22 kg/m².    Physical Exam  Constitutional:       General: She is not in acute distress.  HENT:      Head: Normocephalic and atraumatic.   Eyes:      General: No scleral icterus.  Cardiovascular:      Rate and Rhythm: Regular rhythm.      Heart sounds: Normal heart sounds.   Pulmonary:      Effort: Pulmonary effort is normal. No respiratory distress.   Abdominal:      General: There is no distension.      Palpations: Abdomen is soft.   Musculoskeletal:      Cervical back: Neck supple.   Neurological:      Mental Status: She is alert.   Psychiatric:         Behavior: Behavior normal.       Elderly, chronically ill   Similar exam to past few days     Results Review:       I reviewed the patient's new clinical results.  Results from last 7 days   Lab Units 25  0123 25  0829   WBC 10*3/mm3 4.79 4.05   HEMOGLOBIN g/dL 12.9 13.2   PLATELETS 10*3/mm3 224 244     Results from last 7 days   Lab Units 25  0555 25  0123 25  0829   SODIUM mmol/L 135* 139 141  "  POTASSIUM mmol/L 3.3* 3.8 3.9   CHLORIDE mmol/L 100 104 101   CO2 mmol/L 20.0* 24.0 29.0   BUN mg/dL 23.0 11.0 16.0   CREATININE mg/dL 1.12* 0.75 0.86   GLUCOSE mg/dL 116* 103* 101*   Estimated Creatinine Clearance: 24.5 mL/min (A) (by C-G formula based on SCr of 1.12 mg/dL (H)).  Results from last 7 days   Lab Units 06/22/25  0829   ALBUMIN g/dL 4.2   BILIRUBIN mg/dL 0.4   ALK PHOS U/L 94   AST (SGOT) U/L 31   ALT (SGPT) U/L 15     Results from last 7 days   Lab Units 06/25/25  0555 06/23/25  0123 06/22/25  0829   CALCIUM mg/dL 9.6 9.1 10.0*   ALBUMIN g/dL  --   --  4.2         Coag     HbA1C   Lab Results   Component Value Date    HGBA1C 5.20 04/03/2019    HGBA1C 5.30 08/06/2018     Infection     Radiology(recent) No radiology results for the last day    No results found for: \"TROPONINT\", \"TROPONINI\", \"BNP\"    No components found for: \"TSH;2\"    allopurinol, 100 mg, Oral, Daily  [Held by provider] amLODIPine, 5 mg, Oral, Daily  apixaban, 2.5 mg, Oral, BID  DULoxetine, 60 mg, Oral, Daily  latanoprost, 1 drop, Both Eyes, Nightly  levothyroxine, 50 mcg, Oral, Q AM  losartan, 12.5 mg, Oral, Daily  sodium chloride, 10 mL, Intravenous, Q12H       Diet: Regular/House; Fluid Consistency: Thin (IDDSI 0)      Assessment & Plan      Active Hospital Problems    Diagnosis  POA    **Syncope and collapse [R55]  Yes    Syncope [R55]  Yes    Central retinal vein occlusion [H34.8192]  Yes    Chronic diastolic CHF (congestive heart failure) [I50.32]  Yes    Atrial fibrillation [I48.91]  Yes    History of CVA (cerebrovascular accident) [Z86.73]  Not Applicable    History of DVT (deep vein thrombosis) [Z86.718]  Not Applicable    Rheumatoid arthritis [M06.9]  Yes      Resolved Hospital Problems   No resolved problems to display.       89 yo with history P.Afib, DVT, previous stroke, central vein occlusion, CKD, she has been in the observation unit for syncope. Was on losartan, diamox and amlodipine. This morning had vasovagal " episode. Cardiology had loop interrogation without bradycardia or pauses. Balancing orthostasis vs control of supine BP with amlodipine and diamox held    Xrays of ribs were negative for fracture. Xray of foot was negative for fracture though had some abnormal area that radiologist said could be a cyst or other process- she can follow up with PCP and or Podiatry for further workup/monitoring. See below report for full differential/read. Discussed this with patient/family and they will plan on follow up with Podiatry    Reviewed the following note from Neurology 5/2024- similar situation currently- have listed this in other notes.     Likely needs subacute rehab, precert denied by her insurance but they are appealing that decision  DW staff     Tiago Bergeron MD  [facility]  06/25/25  16:50 EDT

## 2025-06-27 NOTE — THERAPY TREATMENT NOTE
Patient Name: Alab Correa  : 1933    MRN: [id number]                              Today's Date: 2025       Admit Date: 2025    Visit Dx:     ICD-10-CM ICD-9-CM   1. Syncope and collapse  R55 780.2   2. Injury of head, initial encounter  S09.90XA 959.01   3. Vasovagal syncope  R55 780.2     Patient Active Problem List   Diagnosis    CKD (chronic kidney disease) stage 4, GFR 15-29 ml/min    Depression    Gout    Hyperparathyroidism    Adaptive colitis    Osteopenia    Rheumatoid arthritis    Degeneration of intervertebral disc    Detrusor muscle hypertonia    History of DVT (deep vein thrombosis)    Hyperlipidemia    Nonrheumatic aortic (valve) insufficiency    History of CVA (cerebrovascular accident)    Atrial fibrillation    Anemia in chronic kidney disease    Other proteinuria    Sinus bradycardia    First degree AV block    Nonrheumatic mitral valve regurgitation    Idiopathic peripheral neuropathy    Lower extremity edema    Acute diastolic CHF (congestive heart failure)    Acquired hypothyroidism    Sick sinus syndrome    Insomnia    Fall    Chronic kidney disease, stage 3a    Hematoma of thigh    Traumatic hematoma of scalp    Abnormal CT of spine    At risk for falling    Superior mesenteric artery stenosis    Closed wedge compression fracture of T8 vertebra    Macrocytosis    Hyperlipidemia    Thyroid nodule    Essential hypertension    Chronic diastolic CHF (congestive heart failure)    Chronic kidney failure, stage 3 (moderate)    Left leg pain    Nonrheumatic tricuspid valve regurgitation    Central retinal vein occlusion    Other specified glaucoma    Intraocular pressure increase    Syncope and collapse    Syncope     Past Medical History:   Diagnosis Date    Acquired hypothyroidism 2022    Acute diastolic CHF (congestive heart failure) 2022    Anxiety 2003    Cataract     Chronic kidney disease     Closed fracture of left distal humerus 2023    Closed head injury  12/07/2023    CVA (cerebral vascular accident)     Degeneration of intervertebral disc     Depression     DVT (deep venous thrombosis)     Facial laceration 03/07/2023    First degree AV block 05/26/2021    Gout     H/O complete eye exam 09/2016    Hyperlipidemia     Hypertension     IBS (irritable bowel syndrome)     Laceration of right forearm 03/07/2023    Nonrheumatic mitral valve regurgitation     OAB (overactive bladder)     Osteopenia     Osteoporosis     PAF (paroxysmal atrial fibrillation) 11/08/2019    Peripheral neuropathy     Renal insufficiency     Rheumatoid arthritis     Sinus bradycardia 05/26/2021    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     Past Surgical History:   Procedure Laterality Date    APPENDECTOMY      BREAST BIOPSY      CARDIAC ELECTROPHYSIOLOGY PROCEDURE N/A 10/12/2021    Procedure: Loop insertion linq;  Surgeon: Tone Anguiano MD;  Location: [facility] INVASIVE LOCATION;  Service: Cardiovascular;  Laterality: N/A;    CARDIOVERSION  08/03/2020    Dr. Colby    COLONOSCOPY N/A 01/01/2010    ELBOW OPEN REDUCTION INTERNAL FIXATION Left 03/11/2023    Procedure: ELBOW OPEN REDUCTION INTERNAL FIXATION;  Surgeon: Ramón Encinas II, MD;  Location: [facility] OR;  Service: Orthopedics;  Laterality: Left;    HERNIA REPAIR  1965    HYSTERECTOMY      still has ovaries    MAMMO BILATERAL  11/16/2016    pt declines    PAP SMEAR  11/16/2016    declines      General Information       Row Name 06/27/25 1141          Physical Therapy Time and Intention    Document Type therapy note (daily note) (P)   -SB     Mode of Treatment physical therapy (P)   -SB       Row Name 06/27/25 1141          General Information    Patient Profile Reviewed yes (P)   -SB     Existing Precautions/Restrictions fall (P)   -SB       Row Name 06/27/25 1141          Cognition    Orientation Status (Cognition) oriented x 4 (P)   -SB       Row Name 06/27/25 1141          Safety Issues/Impairments  "Affecting Functional Mobility    Impairments Affecting Function (Mobility) pain;balance;endurance/activity tolerance;strength (P)   -SB     Comment, Safety Issues/Impairments (Mobility) Gait belt and non-skid socks donned. (P)   -SB               User Key  (r) = Recorded By, (t) = Taken By, (c) = Cosigned By      Initials Name Provider Type    Alec Hall, PT Student PT Student                   Mobility       Row Name 06/27/25 1303          Bed Mobility    Supine-Sit Phelps (Bed Mobility) standby assist (P)   -SB     Assistive Device (Bed Mobility) head of bed elevated;bed rails (P)   -SB       Row Name 06/27/25 1303          Transfers    Comment, (Transfers) Cues for hand placement and sequencing. (P)   -SB       Row Name 06/27/25 1303          Sit-Stand Transfer    Sit-Stand Phelps (Transfers) contact guard (P)   -SB     Assistive Device (Sit-Stand Transfers) walker, front-wheeled (P)   -SB     Comment, (Sit-Stand Transfer) Increased time to task completion. (P)   -SB       Row Name 06/27/25 1303          Gait/Stairs (Locomotion)    Phelps Level (Gait) contact guard (P)   -SB     Assistive Device (Gait) walker, front-wheeled (P)   -SB     Patient was able to Ambulate yes (P)   -SB     Distance in Feet (Gait) 80 (P)   -SB     Deviations/Abnormal Patterns (Gait) noemi decreased;gait speed decreased;stride length decreased;weight shifting decreased (P)   -SB     Bilateral Gait Deviations forward flexed posture;heel strike decreased (P)   -SB     Comment, (Gait/Stairs) No overt LOB or buckling, but pt reports her legs \"feeling funny\" which she later described as soreness/stiffness. (P)   -SB               User Key  (r) = Recorded By, (t) = Taken By, (c) = Cosigned By      Initials Name Provider Type    Alec Hall, PT Student PT Student                   Obj/Interventions       Row Name 06/27/25 1313          Motor Skills    Therapeutic Exercise other (see comments) (P)   x10-15 AP, LAQ, hip " "flx.  -SB       Row Name 06/27/25 1313          Balance    Static Sitting Balance independent (P)   -SB     Dynamic Sitting Balance standby assist (P)   -SB     Position, Sitting Balance unsupported;sitting edge of bed (P)   -SB     Static Standing Balance standby assist (P)   -SB     Dynamic Standing Balance contact guard (P)   -SB     Position/Device Used, Standing Balance supported;walker, front-wheeled (P)   -SB     Comment, Balance No overt LOB or dizziness. (P)   -SB               User Key  (r) = Recorded By, (t) = Taken By, (c) = Cosigned By      Initials Name Provider Type    SB Alec Rangel, PT Student PT Student                   Goals/Plan    No documentation.                  Clinical Impression       Row Name 06/27/25 1316          Pain    Pretreatment Pain Rating 5/10 (P)   -SB     Posttreatment Pain Rating 5/10 (P)   -SB     Pain Management Interventions exercise or physical activity utilized (P)   -SB     Response to Pain Interventions activity participation with tolerable pain;functional ability improved (P)   -SB     Pre/Posttreatment Pain Comment Pt reported stiffness in LEs and headache when entering room, when leaving room pt stated that she was starting to feel better in her legs. (P)   -SB       Row Name 06/27/25 1316          Plan of Care Review    Plan of Care Reviewed With patient (P)   -SB     Progress no change (P)   -SB     Outcome Evaluation Pt seen for PT tx this AM and tolerated the session well. Pt was A&O x4. Today, pt was SBA for bed mob, CG for STS w/ RW and ambulated 80' w/ RW & w/ CG. Pt reported \"feeling funny\" in her LE's at start of gait which was later described as soreness. Pt performed x10-15 AP, LAQ, hip flx. No overt LOB, dizziness, buckling or SOB. PT will cont to follow. (P)   -SB       Row Name 06/27/25 1316          Therapy Assessment/Plan (PT)    Rehab Potential (PT) good (P)   -SB     Criteria for Skilled Interventions Met (PT) yes (P)   -SB     Therapy Frequency " (PT) 3 times/wk (P)   -SB       Row Name 06/27/25 1316          Vital Signs    O2 Delivery Pre Treatment room air (P)   -SB     Pre Patient Position Supine (P)   -SB     Intra Patient Position Standing (P)   -SB     Post Patient Position Sitting (P)   -SB       Row Name 06/27/25 1316          Positioning and Restraints    Pre-Treatment Position in bed (P)   -SB     Post Treatment Position chair (P)   -SB     In Chair notified nsg;reclined;sitting;call light within reach;encouraged to call for assist;exit alarm on;legs elevated (P)   -SB               User Key  (r) = Recorded By, (t) = Taken By, (c) = Cosigned By      Initials Name Provider Type    Alec Hall, PT Student PT Student                   Outcome Measures       Row Name 06/27/25 1325          How much help from another person do you currently need...    Turning from your back to your side while in flat bed without using bedrails? 3 (P)   -SB     Moving from lying on back to sitting on the side of a flat bed without bedrails? 3 (P)   -SB     Moving to and from a bed to a chair (including a wheelchair)? 3 (P)   -SB     Standing up from a chair using your arms (e.g., wheelchair, bedside chair)? 3 (P)   -SB     Climbing 3-5 steps with a railing? 2 (P)   -SB     To walk in hospital room? 3 (P)   -SB     AM-PAC 6 Clicks Score (PT) 17 (P)   -SB               User Key  (r) = Recorded By, (t) = Taken By, (c) = Cosigned By      Initials Name Provider Type    Alec Hall, PT Student PT Student                                 Physical Therapy Education       Title: PT OT SLP Therapies (Done)       Topic: Physical Therapy (Done)       Point: Mobility training (Done)       Learning Progress Summary            Patient Acceptance, E, NR,VU by SB at 6/27/2025 1325    Acceptance, E, NR,VU by SB at 6/25/2025 1020    Acceptance, E, NR by LH at 6/23/2025 0931   Family Acceptance, E, NR,VU by SB at 6/27/2025 1325                      Point: Home exercise program (Done)   "     Learning Progress Summary            Patient Acceptance, E, NR,VU by SB at 6/27/2025 1325    Acceptance, E, NR by LH at 6/23/2025 0931   Family Acceptance, E, NR,VU by SB at 6/27/2025 1325                      Point: Body mechanics (Done)       Learning Progress Summary            Patient Acceptance, E, NR,VU by SB at 6/27/2025 1325    Acceptance, E, NR,VU by SB at 6/25/2025 1020    Acceptance, E, NR by LH at 6/23/2025 0931   Family Acceptance, E, NR,VU by SB at 6/27/2025 1325                      Point: Precautions (Done)       Learning Progress Summary            Patient Acceptance, E, NR,VU by SB at 6/27/2025 1325    Acceptance, E, NR,VU by SB at 6/25/2025 1020    Acceptance, E, NR by LH at 6/23/2025 0931   Family Acceptance, E, NR,VU by SB at 6/27/2025 1325                                      User Key       Initials Effective Dates Name Provider Type Discipline     06/16/21 -  Janine Siddiqui, PT Physical Therapist PT     05/19/25 -  Alec Rangel, PT Student PT Student PT                  PT Recommendation and Plan     Progress: (P) no change  Outcome Evaluation: (P) Pt seen for PT tx this AM and tolerated the session well. Pt was A&O x4. Today, pt was SBA for bed mob, CG for STS w/ RW and ambulated 80' w/ RW & w/ CG. Pt reported \"feeling funny\" in her LE's at start of gait which was later described as soreness. Pt performed x10-15 AP, LAQ, hip flx. No overt LOB, dizziness, buckling or SOB. PT will cont to follow.     Time Calculation:         PT Charges       Row Name 06/27/25 1326             Time Calculation    Start Time 1049 (P)   -SB      Stop Time 1109 (P)   -SB      Time Calculation (min) 20 min (P)   -SB      PT Received On 06/27/25 (P)   -SB      PT - Next Appointment 06/30/25 (P)   -SB      PT Goal Re-Cert Due Date 06/30/25 (P)   -SB         Time Calculation- PT    Total Timed Code Minutes- PT 20 minute(s) (P)   -SB                User Key  (r) = Recorded By, (t) = Taken By, (c) = Cosigned By "      Initials Name Provider Type    Alec Hall, PT Student PT Student                      PT G-Codes  Outcome Measure Options: AM-PAC 6 Clicks Daily Activity (OT)  AM-PAC 6 Clicks Score (PT): (P) 17  AM-PAC 6 Clicks Score (OT): 16  PT Discharge Summary  Anticipated Discharge Disposition (PT): home with home health (At [facility].)    Alec Rangel, PT Student  6/27/2025

## 2025-06-27 NOTE — CASE MANAGEMENT/SOCIAL WORK
Post-Acute Authorization Submission      Post Acute Pre-Cert Documentation  Request Submitted by Facility - Type:: Hospital  Post-Acute Authorization Type Submitted:: SNF  Date Post Acute Pre-Cert Inititated per Facility: 06/25/25  Date Post Acute Pre-Cert Completed: 06/27/25  Accepting Facility: [facility] Discharge Date Requested: 06/25/25  All Clinicals Submitted?: Yes  Had Accepting Facility at Time of Submission: Yes  Response Received from Insurance?: Denial  Action Taken by Requesting Facility:: Additional Clinicals Submitted (OT NOTES 6/26/25)  Response Communicated to::   Authorization Number:: EXPEDITED APPEAL REQUESTED/SNF DENIAL# [id number]  Post Acute Pre-Cert Initiated Comment: EXPEDITED APPEAL LETTER, AOR FORM & CLINICALS SENT TO FAX# [phone]; FOLLOW UP FOR APPEAL CASE# AT # [phone]              Julio Castañeda, PCT

## 2025-06-27 NOTE — CASE MANAGEMENT/SOCIAL WORK
Post-Acute Authorization Submission      Post Acute Pre-Cert Documentation  Request Submitted by Facility - Type:: Hospital  Post-Acute Authorization Type Submitted:: SNF  Date Post Acute Pre-Cert Inititated per Facility: 06/25/25  Date Post Acute Pre-Cert Completed: 06/27/25  Accepting Facility: [facility] Discharge Date Requested: 06/25/25  All Clinicals Submitted?: Yes  Had Accepting Facility at Time of Submission: Yes  Response Received from Insurance?: Denial  Action Taken by Requesting Facility:: Additional Clinicals Submitted (OT NOTES 6/26/25)  Response Communicated to::   Authorization Number:: DENIED [id number]  Post Acute Pre-Cert Initiated Comment: [facility] FAST APPEALS PH# [phone]/FAX# [phone]              CAMELIA Wen

## 2025-06-27 NOTE — CASE MANAGEMENT/SOCIAL WORK
Continued Stay Note  [facility]     Patient Name: Alba Correa  MRN: [id number]  Today's Date: 6/27/2025    Admit Date: 6/22/2025    Plan: Julio Pickens pending appeal   Discharge Plan       Row Name 06/27/25 1652       Plan    Plan Julio Pickens pending appeal    Patient/Family in Agreement with Plan yes    Plan Comments AOR form and appeal letter received from David Whalen [phone] at bedside and given to Julio BLAND to submit to insurance.  Await determination.  KEVIN Lorenzo RN                                                Discharge Codes    No documentation.                 Expected Discharge Date and Time       Expected Discharge Date Expected Discharge Time    Jun 28, 2025               Trisha Lorenzo, RN

## 2025-06-27 NOTE — CASE MANAGEMENT/SOCIAL WORK
Continued Stay Note  [facility]     Patient Name: Alba Correa  MRN: [id number]  Today's Date: 6/27/2025    Admit Date: 6/22/2025    Plan: Julio Pickens pending appeal   Discharge Plan       Row Name 06/27/25 1402       Plan    Plan Julio Pickens pending appeal    Patient/Family in Agreement with Plan yes    Plan Comments [facility] denied for subacute rehab, offered peer to peer.  Reviewed by PA who did not proceed with peer to peer.  Discussed with family who reuqests to pursue fast appeal.  Email sent to daughter in law/Maryam at [email or web address] with AOR form and instructions. Son/Ankit to write appeal letter and send back.  KEVIN Lorenzo RN                   Discharge Codes    No documentation.                 Expected Discharge Date and Time       Expected Discharge Date Expected Discharge Time    Jun 27, 2025               Trisha Lorenzo, RN

## 2025-06-27 NOTE — PLAN OF CARE
"Goal Outcome Evaluation:  Plan of Care Reviewed With: (P) patient        Progress: (P) no change  Outcome Evaluation: (P) Pt seen for PT tx this AM and tolerated the session well. Pt was A&O x4. Today, pt was SBA for bed mob, CG for STS w/ RW and ambulated 80' w/ RW & w/ CG. Pt reported \"feeling funny\" in her LE's at start of gait which was later described as soreness. Pt performed x10-15 AP, LAQ, hip flx. No overt LOB, dizziness, buckling or SOB. PT will cont to follow.    Anticipated Discharge Disposition (PT): home with home health (At [facility].)                        "

## 2025-06-28 RX ADMIN — Medication 10 ML: at 20:41

## 2025-06-28 RX ADMIN — ALLOPURINOL 100 MG: 100 TABLET ORAL at 08:38

## 2025-06-28 RX ADMIN — DULOXETINE 60 MG: 60 CAPSULE, DELAYED RELEASE ORAL at 08:38

## 2025-06-28 RX ADMIN — APIXABAN 2.5 MG: 2.5 TABLET, FILM COATED ORAL at 08:38

## 2025-06-28 RX ADMIN — LOSARTAN POTASSIUM 12.5 MG: 25 TABLET, FILM COATED ORAL at 08:38

## 2025-06-28 RX ADMIN — Medication 10 ML: at 08:39

## 2025-06-28 RX ADMIN — LEVOTHYROXINE SODIUM 50 MCG: 50 TABLET ORAL at 06:07

## 2025-06-28 RX ADMIN — LATANOPROST 1 DROP: 50 SOLUTION/ DROPS OPHTHALMIC at 20:41

## 2025-06-28 RX ADMIN — APIXABAN 2.5 MG: 2.5 TABLET, FILM COATED ORAL at 20:40

## 2025-06-28 NOTE — PROGRESS NOTES
Continued Stay Note  [facility]     Patient Name: Alba Correa  MRN: [id number]  Today's Date: 6/28/2025    Admit Date: 6/22/2025    Plan: Julio Pickens pending appeal.......Saba OROZCO   Discharge Plan       Row Name 06/28/25 1446       Plan    Plan Julio Pickens pending appeal.......Saba OROZCO    Patient/Family in Agreement with Plan yes    Plan Comments Inbound message from MD requesting update on appeal. Per [employer] web portal last entry from 6/27/25 @ 1038 Denial letter. Expedited appeal submitted yesterday afternoon at 1652. Message left for Sonja/[employer] requesting update, in the event she has any updates to provide, await response. MD aware CCP will continue to work on this and relay any updates when available....Saba OROZCO                   Discharge Codes    No documentation.                 Expected Discharge Date and Time       Expected Discharge Date Expected Discharge Time    Jun 28, 2025               Connie Mathews, RN

## 2025-06-28 NOTE — PROGRESS NOTES
Name: Alba Correa ADMIT: 2025   : 1933  PCP: Aramis Doty MD    MRN: [id number] LOS: 4 days   AGE/SEX: 89 y.o. female  ROOM: [facility]   Subjective   Chief Complaint   Patient presents with    Fall    Head Injury    Dizziness     89 yo with history P.Afib, DVT, previous stroke, central vein occlusion, CKD, she has been in the observation unit for syncope. Was on losartan, diamox and amlodipine. This morning had vasovagal episode. Cardiology had loop interrogation without bradycardia or pauses. Balancing orthostasis vs control of supine BP     BP is fair-within goals   Working with therapy  Below her baseline  Hoping for rehab  No new complaints  Family at bedside    ROS  No f/c  No n/v  No cp/palp  No soa/cough    Objective   Vital Signs  Temp:  [98.1 °F (36.7 °C)-98.8 °F (37.1 °C)] 98.8 °F (37.1 °C)  Heart Rate:  [] 114  Resp:  [16] 16  BP: (130-147)/(69-83) 135/79  SpO2:  [93 %-95 %] 95 %  on   ;   Device (Oxygen Therapy): room air  Body mass index is 20.22 kg/m².    Physical Exam  Constitutional:       General: She is not in acute distress.  HENT:      Head: Normocephalic and atraumatic.   Eyes:      General: No scleral icterus.  Pulmonary:      Effort: Pulmonary effort is normal. No respiratory distress.   Abdominal:      General: There is no distension.      Palpations: Abdomen is soft.   Musculoskeletal:      Cervical back: Neck supple.   Neurological:      Mental Status: She is alert.   Psychiatric:         Mood and Affect: Mood normal.         Behavior: Behavior normal.       Elderly, chronically ill   Similar exam to past few days     Results Review:       I reviewed the patient's new clinical results.  Results from last 7 days   Lab Units 25  0123 25  0829   WBC 10*3/mm3 4.79 4.05   HEMOGLOBIN g/dL 12.9 13.2   PLATELETS 10*3/mm3 224 244     Results from last 7 days   Lab Units 25  0555 25  0123 25  0829   SODIUM mmol/L 135* 139 141   POTASSIUM mmol/L  "3.3* 3.8 3.9   CHLORIDE mmol/L 100 104 101   CO2 mmol/L 20.0* 24.0 29.0   BUN mg/dL 23.0 11.0 16.0   CREATININE mg/dL 1.12* 0.75 0.86   GLUCOSE mg/dL 116* 103* 101*   Estimated Creatinine Clearance: 24.5 mL/min (A) (by C-G formula based on SCr of 1.12 mg/dL (H)).  Results from last 7 days   Lab Units 06/22/25  0829   ALBUMIN g/dL 4.2   BILIRUBIN mg/dL 0.4   ALK PHOS U/L 94   AST (SGOT) U/L 31   ALT (SGPT) U/L 15     Results from last 7 days   Lab Units 06/25/25  0555 06/23/25  0123 06/22/25  0829   CALCIUM mg/dL 9.6 9.1 10.0*   ALBUMIN g/dL  --   --  4.2         Coag     HbA1C   Lab Results   Component Value Date    HGBA1C 5.20 04/03/2019    HGBA1C 5.30 08/06/2018     Infection     Radiology(recent) No radiology results for the last day    No results found for: \"TROPONINT\", \"TROPONINI\", \"BNP\"    No components found for: \"TSH;2\"    allopurinol, 100 mg, Oral, Daily  apixaban, 2.5 mg, Oral, BID  DULoxetine, 60 mg, Oral, Daily  latanoprost, 1 drop, Both Eyes, Nightly  levothyroxine, 50 mcg, Oral, Q AM  losartan, 12.5 mg, Oral, Daily  sodium chloride, 10 mL, Intravenous, Q12H       Diet: Regular/House; Fluid Consistency: Thin (IDDSI 0)      Assessment & Plan      Active Hospital Problems    Diagnosis  POA    **Syncope and collapse [R55]  Yes    Syncope [R55]  Yes    Central retinal vein occlusion [H34.8192]  Yes    Chronic diastolic CHF (congestive heart failure) [I50.32]  Yes    Atrial fibrillation [I48.91]  Yes    History of CVA (cerebrovascular accident) [Z86.73]  Not Applicable    History of DVT (deep vein thrombosis) [Z86.718]  Not Applicable    Rheumatoid arthritis [M06.9]  Yes      Resolved Hospital Problems   No resolved problems to display.       89 yo with history P.Afib, DVT, previous stroke, central vein occlusion, CKD, she has been in the observation unit for syncope. Was on losartan, diamox and amlodipine. This morning had vasovagal episode. Cardiology had loop interrogation without bradycardia or pauses. " Balancing orthostasis vs control of supine BP with amlodipine and diamox held    Xrays of ribs were negative for fracture. Xray of foot was negative for fracture though had some abnormal area that radiologist said could be a cyst or other process- she can follow up with PCP and or Podiatry for further workup/monitoring. See below report for full differential/read. Discussed this with patient/family and they will plan on follow up with Podiatry    Reviewed the following note from Neurology 5/2024- similar situation currently- have listed this in other notes.     Likely needs subacute rehab, precert denied by her insurance but they are appealing that decision  DW staff   DW family at bedside     iTago Bergeron MD  [facility]  06/25/25  16:50 EDT

## 2025-06-29 LAB
ANION GAP SERPL CALCULATED.3IONS-SCNC: 11 MMOL/L (ref 5–15)
BUN SERPL-MCNC: 26 MG/DL (ref 8–23)
BUN/CREAT SERPL: 26.8 (ref 7–25)
CALCIUM SPEC-SCNC: 9.3 MG/DL (ref 8.2–9.6)
CHLORIDE SERPL-SCNC: 103 MMOL/L (ref 98–107)
CO2 SERPL-SCNC: 22 MMOL/L (ref 22–29)
CREAT SERPL-MCNC: 0.97 MG/DL (ref 0.57–1)
EGFRCR SERPLBLD CKD-EPI 2021: 54.9 ML/MIN/1.73
GLUCOSE SERPL-MCNC: 136 MG/DL (ref 65–99)
MAGNESIUM SERPL-MCNC: 1.9 MG/DL (ref 1.7–2.3)
PHOSPHATE SERPL-MCNC: 3.4 MG/DL (ref 2.5–4.5)
POTASSIUM SERPL-SCNC: 4.2 MMOL/L (ref 3.5–5.2)
SODIUM SERPL-SCNC: 136 MMOL/L (ref 136–145)

## 2025-06-29 PROCEDURE — 84100 ASSAY OF PHOSPHORUS: CPT | Performed by: INTERNAL MEDICINE

## 2025-06-29 PROCEDURE — 80048 BASIC METABOLIC PNL TOTAL CA: CPT | Performed by: INTERNAL MEDICINE

## 2025-06-29 PROCEDURE — 83735 ASSAY OF MAGNESIUM: CPT | Performed by: INTERNAL MEDICINE

## 2025-06-29 RX ADMIN — LEVOTHYROXINE SODIUM 50 MCG: 50 TABLET ORAL at 07:18

## 2025-06-29 RX ADMIN — APIXABAN 2.5 MG: 2.5 TABLET, FILM COATED ORAL at 08:24

## 2025-06-29 RX ADMIN — Medication 10 ML: at 21:17

## 2025-06-29 RX ADMIN — LATANOPROST 1 DROP: 50 SOLUTION/ DROPS OPHTHALMIC at 21:17

## 2025-06-29 RX ADMIN — ALLOPURINOL 100 MG: 100 TABLET ORAL at 08:24

## 2025-06-29 RX ADMIN — LOSARTAN POTASSIUM 12.5 MG: 25 TABLET, FILM COATED ORAL at 08:24

## 2025-06-29 RX ADMIN — APIXABAN 2.5 MG: 2.5 TABLET, FILM COATED ORAL at 21:16

## 2025-06-29 RX ADMIN — DULOXETINE 60 MG: 60 CAPSULE, DELAYED RELEASE ORAL at 08:24

## 2025-06-29 NOTE — PROGRESS NOTES
Continued Stay Note  [facility]     Patient Name: Alba Correa  MRN: [id number]  Today's Date: 6/29/2025    Admit Date: 6/22/2025    Plan: Julio Pickens pending appeal.......Saba RN   Discharge Plan       Row Name 06/29/25 1156       Plan    Plan Comments E-mail response from Sonja/G[employer], no updates on their end at this time, appeal remains pending at this time..........Saba RN                   Discharge Codes    No documentation.                 Expected Discharge Date and Time       Expected Discharge Date Expected Discharge Time    Jun 28, 2025               Connie Mathews, RN

## 2025-06-29 NOTE — PROGRESS NOTES
Name: Alba Correa ADMIT: 2025   : 1933  PCP: Aramis Doty MD    MRN: [id number] LOS: 5 days   AGE/SEX: 89 y.o. female  ROOM: [facility]   Subjective   Chief Complaint   Patient presents with    Fall    Head Injury    Dizziness     89 yo with history P.Afib, DVT, previous stroke, central vein occlusion, CKD, she has been in the observation unit for syncope. Was on losartan, diamox and amlodipine. This morning had vasovagal episode. Cardiology had loop interrogation without bradycardia or pauses. Balancing orthostasis vs control of supine BP     BP is fair-within goals   Working with therapy  Below her baseline  Hoping for rehab  Family at bedside- different family today  No new complaints    ROS  No f/c  No n/v  No cp/palp  No soa/cough    Objective   Vital Signs  Temp:  [98.2 °F (36.8 °C)-99.5 °F (37.5 °C)] 98.2 °F (36.8 °C)  Heart Rate:  [] 86  Resp:  [16] 16  BP: (115-146)/(65-79) 115/65  SpO2:  [95 %-96 %] 96 %  on   ;   Device (Oxygen Therapy): room air  Body mass index is 20.22 kg/m².    Physical Exam  Constitutional:       General: She is not in acute distress.  HENT:      Head: Normocephalic and atraumatic.   Eyes:      General: No scleral icterus.  Pulmonary:      Effort: Pulmonary effort is normal. No respiratory distress.   Abdominal:      General: There is no distension.      Palpations: Abdomen is soft.   Musculoskeletal:      Cervical back: Neck supple.   Neurological:      Mental Status: She is alert.   Psychiatric:         Mood and Affect: Mood normal.         Behavior: Behavior normal.       Elderly, chronically ill   Similar exam to yesterday     Results Review:       I reviewed the patient's new clinical results.  Results from last 7 days   Lab Units 25  0123   WBC 10*3/mm3 4.79   HEMOGLOBIN g/dL 12.9   PLATELETS 10*3/mm3 224     Results from last 7 days   Lab Units 25  0555 25  0123   SODIUM mmol/L 135* 139   POTASSIUM mmol/L 3.3* 3.8   CHLORIDE mmol/L 100  "104   CO2 mmol/L 20.0* 24.0   BUN mg/dL 23.0 11.0   CREATININE mg/dL 1.12* 0.75   GLUCOSE mg/dL 116* 103*   Estimated Creatinine Clearance: 24.5 mL/min (A) (by C-G formula based on SCr of 1.12 mg/dL (H)).        Results from last 7 days   Lab Units 06/25/25  0555 06/23/25  0123   CALCIUM mg/dL 9.6 9.1         Coag     HbA1C   Lab Results   Component Value Date    HGBA1C 5.20 04/03/2019    HGBA1C 5.30 08/06/2018     Infection     Radiology(recent) No radiology results for the last day    No results found for: \"TROPONINT\", \"TROPONINI\", \"BNP\"    No components found for: \"TSH;2\"    allopurinol, 100 mg, Oral, Daily  apixaban, 2.5 mg, Oral, BID  DULoxetine, 60 mg, Oral, Daily  latanoprost, 1 drop, Both Eyes, Nightly  levothyroxine, 50 mcg, Oral, Q AM  losartan, 12.5 mg, Oral, Daily  sodium chloride, 10 mL, Intravenous, Q12H       Diet: Regular/House; Fluid Consistency: Thin (IDDSI 0)      Assessment & Plan      Active Hospital Problems    Diagnosis  POA    **Syncope and collapse [R55]  Yes    Syncope [R55]  Yes    Central retinal vein occlusion [H34.8192]  Yes    Chronic diastolic CHF (congestive heart failure) [I50.32]  Yes    Atrial fibrillation [I48.91]  Yes    History of CVA (cerebrovascular accident) [Z86.73]  Not Applicable    History of DVT (deep vein thrombosis) [Z86.718]  Not Applicable    Rheumatoid arthritis [M06.9]  Yes      Resolved Hospital Problems   No resolved problems to display.       89 yo with history P.Afib, DVT, previous stroke, central vein occlusion, CKD, she has been in the observation unit for syncope. Was on losartan, diamox and amlodipine. This morning had vasovagal episode. Cardiology had loop interrogation without bradycardia or pauses. Balancing orthostasis vs control of supine BP with amlodipine and diamox held    Xrays of ribs were negative for fracture. Xray of foot was negative for fracture though had some abnormal area that radiologist said could be a cyst or other process- she can " follow up with PCP and or Podiatry for further workup/monitoring. See below report for full differential/read. Discussed this with patient/family and they will plan on follow up with Podiatry    Reviewed the following note from Neurology 5/2024- similar situation currently- have listed this in other notes.     Likely needs subacute rehab, precert denied by her insurance but they are appealing that decision, hopefully we will hear back tomorrow. Can recheck BMP this afternoon.   DW staff   DW family at bedside     Tiago Bergeron MD  [facility]  06/25/25  16:50 EDT

## 2025-06-29 NOTE — PROGRESS NOTES
Continued Stay Note  [facility]     Patient Name: Alba Correa  MRN: [id number]  Today's Date: 6/29/2025    Admit Date: 6/22/2025    Plan: Julio Pickens pending appeal.......Saba RN   Discharge Plan       Row Name 06/29/25 0857       Plan    Plan Comments No updates from facility overnight. E-mail to Sonja/[facility] requesting update. Per [employer] web portal appeal pending, last entry remains 6/27/25 @ [facility]. [facility] continuing to follow............Saba OROZCO                   Discharge Codes    No documentation.                 Expected Discharge Date and Time       Expected Discharge Date Expected Discharge Time    Jun 28, 2025               Connie Mathews, RN

## 2025-06-30 PROCEDURE — 97530 THERAPEUTIC ACTIVITIES: CPT

## 2025-06-30 RX ADMIN — LEVOTHYROXINE SODIUM 50 MCG: 50 TABLET ORAL at 05:07

## 2025-06-30 RX ADMIN — APIXABAN 2.5 MG: 2.5 TABLET, FILM COATED ORAL at 10:13

## 2025-06-30 RX ADMIN — Medication 10 ML: at 10:13

## 2025-06-30 RX ADMIN — DULOXETINE 60 MG: 60 CAPSULE, DELAYED RELEASE ORAL at 10:13

## 2025-06-30 RX ADMIN — Medication 10 ML: at 21:13

## 2025-06-30 RX ADMIN — LOSARTAN POTASSIUM 12.5 MG: 25 TABLET, FILM COATED ORAL at 10:12

## 2025-06-30 RX ADMIN — APIXABAN 2.5 MG: 2.5 TABLET, FILM COATED ORAL at 21:13

## 2025-06-30 RX ADMIN — ALLOPURINOL 100 MG: 100 TABLET ORAL at 10:12

## 2025-06-30 RX ADMIN — LATANOPROST 1 DROP: 50 SOLUTION/ DROPS OPHTHALMIC at 21:14

## 2025-06-30 NOTE — PLAN OF CARE
Goal Outcome Evaluation:  Plan of Care Reviewed With: patient, grandchild(ja)        Progress: improving  Outcome Evaluation: Pt in bedside chair upon entry for PT treatment session on this date and is eager to participate in therapy session today. Pt is able to complete STS from the bedside chair with CGA and proceeded to ambulate for a total of 65' with CGA and FWW. Pt demonstrates unsteadiness during turning today but does not demonstrate any overt LOB. Pt is able to return to the bedside chair and complete 10 x STS with CGA to the FWW. After a short rest, patient is able to complete standing exercises consisting of standing hip marches and calf raises. Pt reports she feels good after the completion of her exercises. Pt remains appropriate for skilled PT services to address ongoing deficits. Pt has a strong desire to attend rehab before returning to her ILF. Anticipate patient to d/c back to ILF with therapy services or SNF pending progress while in the acute setting.    Anticipated Discharge Disposition (PT): other (see comments), skilled nursing facility (ILF with therapy services)

## 2025-06-30 NOTE — PLAN OF CARE
Problem: Adult Inpatient Plan of Care  Goal: Absence of Hospital-Acquired Illness or Injury  Intervention: Identify and Manage Fall Risk  Recent Flowsheet Documentation  Taken 6/30/2025 0400 by Елена Wilcox RN  Safety Promotion/Fall Prevention:   toileting scheduled   room organization consistent   safety round/check completed   assistive device/personal items within reach   clutter free environment maintained  Taken 6/30/2025 0200 by Елена Wilcox RN  Safety Promotion/Fall Prevention:   safety round/check completed   room organization consistent   assistive device/personal items within reach   clutter free environment maintained   toileting scheduled  Taken 6/30/2025 0000 by Елена Wilcox RN  Safety Promotion/Fall Prevention:   safety round/check completed   room organization consistent  Taken 6/29/2025 2000 by Елена Wilcox RN  Safety Promotion/Fall Prevention:   safety round/check completed   room organization consistent   toileting scheduled   assistive device/personal items within reach   clutter free environment maintained  Intervention: Prevent Skin Injury  Recent Flowsheet Documentation  Taken 6/30/2025 0400 by Елена Wilcox RN  Body Position:   position changed independently   weight shifting  Taken 6/30/2025 0200 by Елена Wilcox RN  Body Position: position changed independently  Taken 6/30/2025 0000 by Елена Wilcox RN  Body Position: position changed independently  Skin Protection: transparent dressing maintained  Taken 6/29/2025 2200 by Елена Wilcox RN  Body Position: position changed independently  Taken 6/29/2025 2000 by Елена Wilcox RN  Body Position: position changed independently  Skin Protection: transparent dressing maintained  Intervention: Prevent Infection  Recent Flowsheet Documentation  Taken 6/30/2025 0400 by Елена Wilcox RN  Infection Prevention:   single patient room provided   rest/sleep promoted   environmental surveillance performed  Taken 6/30/2025 0200 by  Елена Wilcox, RN  Infection Prevention:   single patient room provided   rest/sleep promoted   environmental surveillance performed  Taken 6/30/2025 0000 by Елена Wilcox RN  Infection Prevention:   single patient room provided   rest/sleep promoted   environmental surveillance performed  Taken 6/29/2025 2200 by Елена Wilcox, RN  Infection Prevention:   single patient room provided   rest/sleep promoted   environmental surveillance performed  Taken 6/29/2025 2000 by Елена Wilcox, RN  Infection Prevention:   single patient room provided   rest/sleep promoted   environmental surveillance performed   Goal Outcome Evaluation:   Waiting on insurance appeal for d/c. Bed alarm. Call light within reach. X1 assist r/t weakness/dizziness.

## 2025-06-30 NOTE — CASE MANAGEMENT/SOCIAL WORK
Post-Acute Authorization Submission      Post Acute Pre-Cert Documentation  Request Submitted by Facility - Type:: Hospital  Post-Acute Authorization Type Submitted:: SNF  Date Post Acute Pre-Cert Inititated per Facility: 06/25/25  Date Post Acute Pre-Cert Completed: 06/27/25  Accepting Facility: [facility] Discharge Date Requested: 06/25/25  All Clinicals Submitted?: Yes  Had Accepting Facility at Time of Submission: Yes  Response Received from Insurance?: Denial  Action Taken by Requesting Facility:: Additional Clinicals Submitted (OT NOTES 6/26/25)  Response Communicated to::   Authorization Number:: EXPEDITED APPEAL IN PROGRESS - CASE# [id number] DENIAL# [id number]  Post Acute Pre-Cert Initiated Comment: SPOKE TO APPEALS REP SANTIAGO DARLING; ALL DOCUMENTS RECEIVED. TENTATIVE DETERMINATION DUE DATE: 7/2/25 (CALL REF# [id number])              Julio Castañeda, CAMELIA

## 2025-06-30 NOTE — CASE MANAGEMENT/SOCIAL WORK
Continued Stay Note  Psychiatric     Patient Name: Alba Correa  MRN: [id number]  Today's Date: 6/30/2025    Admit Date: 6/22/2025    Plan: Julio Pickens pending appeal decision   Discharge Plan       Row Name 06/30/25 1653       Plan    Plan [place] pending appeal decision    Patient/Family in Agreement with Plan yes    Plan Comments Spoke with post acute auths, appeal pending, has 72 hours to respond (business days), will have answer by 7/2 or before, updated MD, pt working with therapy today, progressing well, able to walk 65 feet, and perform standing exercises. CCP will follow - Nicky PATEL                   Discharge Codes    No documentation.                 Expected Discharge Date and Time       Expected Discharge Date Expected Discharge Time    Jun 30, 2025               Nicky Huynh RN

## 2025-06-30 NOTE — PROGRESS NOTES
Name: Alba Correa ADMIT: 2025   : 1933  PCP: Aramis Doty MD    MRN: [id number] LOS: 6 days   AGE/SEX: 89 y.o. female  ROOM: [facility]   Subjective   Chief Complaint   Patient presents with    Fall    Head Injury    Dizziness     89 yo with history P.Afib, DVT, previous stroke, central vein occlusion, CKD, she has been in the observation unit for syncope. Was on losartan, diamox and amlodipine. This morning had vasovagal episode. Cardiology had loop interrogation without bradycardia or pauses. Balancing orthostasis vs control of supine BP     BP is fair-within goals   Working with therapy  Below her baseline  Hoping for rehab  No new complaints    ROS  No f/c  No n/v  No cp/palp  No soa/cough    Objective   Vital Signs  Temp:  [97.9 °F (36.6 °C)-98.4 °F (36.9 °C)] 98.4 °F (36.9 °C)  Heart Rate:  [] 100  Resp:  [14-18] 14  BP: (117-148)/(63-76) 138/75  SpO2:  [94 %-96 %] 96 %  on   ;   Device (Oxygen Therapy): room air  Body mass index is 20.22 kg/m².    Physical Exam  Constitutional:       General: She is not in acute distress.  HENT:      Head: Normocephalic and atraumatic.   Eyes:      General: No scleral icterus.  Pulmonary:      Effort: Pulmonary effort is normal. No respiratory distress.   Abdominal:      General: There is no distension.      Palpations: Abdomen is soft.   Musculoskeletal:      Cervical back: Neck supple.   Neurological:      Mental Status: She is alert.   Psychiatric:         Mood and Affect: Mood normal.         Behavior: Behavior normal.       Elderly, chronically ill   Similar exam to past few days     Results Review:       I reviewed the patient's new clinical results.        Results from last 7 days   Lab Units 25  1355 25  0555   SODIUM mmol/L 136 135*   POTASSIUM mmol/L 4.2 3.3*   CHLORIDE mmol/L 103 100   CO2 mmol/L 22.0 20.0*   BUN mg/dL 26.0* 23.0   CREATININE mg/dL 0.97 1.12*   GLUCOSE mg/dL 136* 116*   Estimated Creatinine Clearance: 28.3 mL/min  "(by C-G formula based on SCr of 0.97 mg/dL).        Results from last 7 days   Lab Units 06/29/25  1355 06/25/25  0555   CALCIUM mg/dL 9.3 9.6   MAGNESIUM mg/dL 1.9  --    PHOSPHORUS mg/dL 3.4  --          Coag     HbA1C   Lab Results   Component Value Date    HGBA1C 5.20 04/03/2019    HGBA1C 5.30 08/06/2018     Infection     Radiology(recent) No radiology results for the last day    No results found for: \"TROPONINT\", \"TROPONINI\", \"BNP\"    No components found for: \"TSH;2\"    allopurinol, 100 mg, Oral, Daily  apixaban, 2.5 mg, Oral, BID  DULoxetine, 60 mg, Oral, Daily  latanoprost, 1 drop, Both Eyes, Nightly  levothyroxine, 50 mcg, Oral, Q AM  losartan, 12.5 mg, Oral, Daily  sodium chloride, 10 mL, Intravenous, Q12H       Diet: Regular/House; Fluid Consistency: Thin (IDDSI 0)      Assessment & Plan      Active Hospital Problems    Diagnosis  POA    **Syncope and collapse [R55]  Yes    Syncope [R55]  Yes    Central retinal vein occlusion [H34.8192]  Yes    Chronic diastolic CHF (congestive heart failure) [I50.32]  Yes    Atrial fibrillation [I48.91]  Yes    History of CVA (cerebrovascular accident) [Z86.73]  Not Applicable    History of DVT (deep vein thrombosis) [Z86.718]  Not Applicable    Rheumatoid arthritis [M06.9]  Yes      Resolved Hospital Problems   No resolved problems to display.       89 yo with history P.Afib, DVT, previous stroke, central vein occlusion, CKD, she has been in the observation unit for syncope. Was on losartan, diamox and amlodipine. This morning had vasovagal episode. Cardiology had loop interrogation without bradycardia or pauses. Balancing orthostasis vs control of supine BP with amlodipine and diamox held    Xrays of ribs were negative for fracture. Xray of foot was negative for fracture though had some abnormal area that radiologist said could be a cyst or other process- she can follow up with PCP and or Podiatry for further workup/monitoring. See below report for full " differential/read. Discussed this with patient/family and they will plan on follow up with Podiatry    Reviewed the following note from Neurology 5/2024- similar situation currently- have listed this in other notes.     Likely needs subacute rehab, precert denied by her insurance but they are appealing that decision, hopefully we will hear back soon. Rechecked labs on 6/29 and looked stable, give lab holiday.       DW staff       Tiago Bergeron MD  [facility]  06/25/25  16:50 EDT

## 2025-06-30 NOTE — THERAPY TREATMENT NOTE
Patient Name: Alba Correa  : 1933    MRN: [id number]                              Today's Date: 2025       Admit Date: 2025    Visit Dx:     ICD-10-CM ICD-9-CM   1. Syncope and collapse  R55 780.2   2. Injury of head, initial encounter  S09.90XA 959.01   3. Vasovagal syncope  R55 780.2     Patient Active Problem List   Diagnosis    CKD (chronic kidney disease) stage 4, GFR 15-29 ml/min    Depression    Gout    Hyperparathyroidism    Adaptive colitis    Osteopenia    Rheumatoid arthritis    Degeneration of intervertebral disc    Detrusor muscle hypertonia    History of DVT (deep vein thrombosis)    Hyperlipidemia    Nonrheumatic aortic (valve) insufficiency    History of CVA (cerebrovascular accident)    Atrial fibrillation    Anemia in chronic kidney disease    Other proteinuria    Sinus bradycardia    First degree AV block    Nonrheumatic mitral valve regurgitation    Idiopathic peripheral neuropathy    Lower extremity edema    Acute diastolic CHF (congestive heart failure)    Acquired hypothyroidism    Sick sinus syndrome    Insomnia    Fall    Chronic kidney disease, stage 3a    Hematoma of thigh    Traumatic hematoma of scalp    Abnormal CT of spine    At risk for falling    Superior mesenteric artery stenosis    Closed wedge compression fracture of T8 vertebra    Macrocytosis    Hyperlipidemia    Thyroid nodule    Essential hypertension    Chronic diastolic CHF (congestive heart failure)    Chronic kidney failure, stage 3 (moderate)    Left leg pain    Nonrheumatic tricuspid valve regurgitation    Central retinal vein occlusion    Other specified glaucoma    Intraocular pressure increase    Syncope and collapse    Syncope     Past Medical History:   Diagnosis Date    Acquired hypothyroidism 2022    Acute diastolic CHF (congestive heart failure) 2022    Anxiety 2003    Cataract     Chronic kidney disease     Closed fracture of left distal humerus 2023    Closed head injury  12/07/2023    CVA (cerebral vascular accident)     Degeneration of intervertebral disc     Depression     DVT (deep venous thrombosis)     Facial laceration 03/07/2023    First degree AV block 05/26/2021    Gout     H/O complete eye exam 09/2016    Hyperlipidemia     Hypertension     IBS (irritable bowel syndrome)     Laceration of right forearm 03/07/2023    Nonrheumatic mitral valve regurgitation     OAB (overactive bladder)     Osteopenia     Osteoporosis     PAF (paroxysmal atrial fibrillation) 11/08/2019    Peripheral neuropathy     Renal insufficiency     Rheumatoid arthritis     Sinus bradycardia 05/26/2021    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     Past Surgical History:   Procedure Laterality Date    APPENDECTOMY      BREAST BIOPSY      CARDIAC ELECTROPHYSIOLOGY PROCEDURE N/A 10/12/2021    Procedure: Loop insertion linq;  Surgeon: Tone Anguiano MD;  Location: [facility] INVASIVE LOCATION;  Service: Cardiovascular;  Laterality: N/A;    CARDIOVERSION  08/03/2020    Dr. Colby    COLONOSCOPY N/A 01/01/2010    ELBOW OPEN REDUCTION INTERNAL FIXATION Left 03/11/2023    Procedure: ELBOW OPEN REDUCTION INTERNAL FIXATION;  Surgeon: Ramón Encinas II, MD;  Location: [facility] OR;  Service: Orthopedics;  Laterality: Left;    HERNIA REPAIR  1965    HYSTERECTOMY      still has ovaries    MAMMO BILATERAL  11/16/2016    pt declines    PAP SMEAR  11/16/2016    declines      General Information       Row Name 06/30/25 1612          Physical Therapy Time and Intention    Document Type therapy note (daily note)  -     Mode of Treatment individual therapy;physical therapy  -       Row Name 06/30/25 1612          General Information    Patient Profile Reviewed yes  -     Existing Precautions/Restrictions fall  -     Barriers to Rehab none identified  -       Row Name 06/30/25 7382          Cognition    Orientation Status (Cognition) oriented x 4  -       Row Name 06/30/25 9233           Safety Issues/Impairments Affecting Functional Mobility    Impairments Affecting Function (Mobility) balance;endurance/activity tolerance;strength;postural/trunk control  -     Comment, Safety Issues/Impairments (Mobility) Gait belt used for safety  -               User Key  (r) = Recorded By, (t) = Taken By, (c) = Cosigned By      Initials Name Provider Type    Epifanio Escobar, PT Physical Therapist                   Mobility       Row Name 06/30/25 1613          Bed Mobility    Supine-Sit [place] (Bed Mobility) not tested  -     Comment, (Bed Mobility) NT; upright in chair  -       Row Name 06/30/25 1613          Sit-Stand Transfer    Sit-Stand [place] (Transfers) contact guard;1 person assist  -     Assistive Device (Sit-Stand Transfers) walker, front-wheeled  -       Row Name 06/30/25 1613          Gait/Stairs (Locomotion)    [place]ano Level (Gait) contact guard;1 person assist  -     Assistive Device (Gait) walker, front-wheeled  -     Patient was able to Ambulate yes  -     Distance in Feet (Gait) 65  -     Deviations/Abnormal Patterns (Gait) gait speed decreased;stride length decreased;weight shifting decreased;noemi decreased  -     Bilateral Gait Deviations heel strike decreased;forward flexed posture;weight shift ability decreased  -               User Key  (r) = Recorded By, (t) = Taken By, (c) = Cosigned By      Initials Name Provider Type    Epifanio Escobar PT Physical Therapist                   Obj/Interventions       Row Name 06/30/25 1613          Motor Skills    Therapeutic Exercise other (see comments)  10 x STS; standing marches and calf raises  -       Row Name 06/30/25 1613          Balance    Balance Assessment sitting static balance;sitting dynamic balance;standing static balance;standing dynamic balance  -     Static Sitting Balance standby assist  -     Dynamic Sitting Balance standby assist  -     Position, Sitting Balance sitting in  chair  -     Static Standing Balance contact guard  -     Dynamic Standing Balance contact guard  -     Position/Device Used, Standing Balance walker, front-wheeled  -     Balance Interventions sitting;standing;sit to stand;dynamic;static;minimal challenge  -               User Key  (r) = Recorded By, (t) = Taken By, (c) = Cosigned By      Initials Name Provider Type     Epifanio Stone, PT Physical Therapist                   Goals/Plan    No documentation.                  Clinical Impression       [facility] Name 06/30/25 1613          Pain    Pretreatment Pain Rating 0/10 - no pain  -     Posttreatment Pain Rating 0/10 - no pain  -       Row Name 06/30/25 1613          Plan of Care Review    Plan of Care Reviewed With patient;ricardo(ja)  -     Progress improving  -     Outcome Evaluation Pt in bedside chair upon entry for PT treatment session on this date and is eager to participate in therapy session today. Pt is able to complete STS from the bedside chair with CGA and proceeded to ambulate for a total of 65' with CGA and FWW. Pt demonstrates unsteadiness during turning today but does not demonstrate any overt LOB. Pt is able to return to the bedside chair and complete 10 x STS with CGA to the FWW. After a short rest, patient is able to complete standing exercises consisting of standing hip marches and calf raises. Pt reports she feels good after the completion of her exercises. Pt remains appropriate for skilled PT services to address ongoing deficits. Pt has a strong desire to attend rehab before returning to her ILF. Anticipate patient to d/c back to ILF with therapy services or SNF pending progress while in the acute setting.  -       Row Name 06/30/25 1613          Therapy Assessment/Plan (PT)    Criteria for Skilled Interventions Met (PT) yes  -[facility] Name 06/30/25 1613          Vital Signs    O2 Delivery Pre Treatment room air  -     O2 Delivery Intra Treatment room air  -      O2 Delivery Post Treatment room air  -MH     Pre Patient Position Sitting  -MH     Intra Patient Position Standing  -MH     Post Patient Position Sitting  -MH       Row Name 06/30/25 1613          Positioning and Restraints    Pre-Treatment Position sitting in chair/recliner  -MH     Post Treatment Position chair  -MH     In Chair call light within reach;encouraged to call for assist;exit alarm on;with family/caregiver;reclined  -               User Key  (r) = Recorded By, (t) = Taken By, (c) = Cosigned By      Initials Name Provider Type    Epifanio Escobar, SANTIAGO Physical Therapist                   Outcome Measures       Row Name 06/30/25 1614          How much help from another person do you currently need...    Turning from your back to your side while in flat bed without using bedrails? 4  -MH     Moving from lying on back to sitting on the side of a flat bed without bedrails? 4  -MH     Moving to and from a bed to a chair (including a wheelchair)? 3  -MH     Standing up from a chair using your arms (e.g., wheelchair, bedside chair)? 3  -MH     Climbing 3-5 steps with a railing? 3  -MH     To walk in hospital room? 3  -     AM-PAC 6 Clicks Score (PT) 20  -MH     Highest Level of Mobility Goal Walk 10 Steps or More-6  -       Row Name 06/30/25 1614          Functional Assessment    Outcome Measure Options AM-PAC 6 Clicks Basic Mobility (PT)  -               User Key  (r) = Recorded By, (t) = Taken By, (c) = Cosigned By      Initials Name Provider Type    Epifanio Escobar, SANTIAGO Physical Therapist                                 Physical Therapy Education       Title: PT OT SLP Therapies (Done)       Topic: Physical Therapy (Done)       Point: Mobility training (Done)       Learning Progress Summary            Patient Acceptance, E, VU,DU,NR by  at 6/30/2025 1615    Acceptance, E, NR,VU by GUILELRMO at 6/27/2025 1325    Acceptance, E, NR,VU by SB at 6/25/2025 1020    Acceptance, E, NR by  at 6/23/2025 0931    Family Acceptance, E, NR,VU by SB at 6/27/2025 1325                      Point: Home exercise program (Done)       Learning Progress Summary            Patient Acceptance, E, VU,DU,NR by  at 6/30/2025 1615    Acceptance, E, NR,VU by SB at 6/27/2025 1325    Acceptance, E, NR by LH at 6/23/2025 0931   Family Acceptance, E, NR,VU by SB at 6/27/2025 1325                      Point: Body mechanics (Done)       Learning Progress Summary            Patient Acceptance, E, VU,DU,NR by  at 6/30/2025 1615    Acceptance, E, NR,VU by SB at 6/27/2025 1325    Acceptance, E, NR,VU by SB at 6/25/2025 1020    Acceptance, E, NR by  at 6/23/2025 0931   Family Acceptance, E, NR,VU by SB at 6/27/2025 1325                      Point: Precautions (Done)       Learning Progress Summary            Patient Acceptance, E, VU,DU,NR by  at 6/30/2025 1615    Acceptance, E, NR,VU by SB at 6/27/2025 1325    Acceptance, E, NR,VU by SB at 6/25/2025 1020    Acceptance, E, NR by  at 6/23/2025 0931   Family Acceptance, E, NR,VU by SB at 6/27/2025 1325                                      User Key       Initials Effective Dates Name Provider Type Discipline     06/16/21 -  Janine Siddiqui, PT Physical Therapist PT     09/11/24 -  Epifanio Stone, PT Physical Therapist PT     05/19/25 -  Alec Rangel, PT Student PT Student PT                  PT Recommendation and Plan     Progress: improving  Outcome Evaluation: Pt in bedside chair upon entry for PT treatment session on this date and is eager to participate in therapy session today. Pt is able to complete STS from the bedside chair with CGA and proceeded to ambulate for a total of 65' with CGA and FWW. Pt demonstrates unsteadiness during turning today but does not demonstrate any overt LOB. Pt is able to return to the bedside chair and complete 10 x STS with CGA to the FWW. After a short rest, patient is able to complete standing exercises consisting of standing hip marches and calf raises.  Pt reports she feels good after the completion of her exercises. Pt remains appropriate for skilled PT services to address ongoing deficits. Pt has a strong desire to attend rehab before returning to her ILF. Anticipate patient to d/c back to ILF with therapy services or SNF pending progress while in the acute setting.     Time Calculation:         PT Charges       Row Name 06/30/25 1611             Time Calculation    Start Time 1517  -MH      Stop Time 1536  -MH      Time Calculation (min) 19 min  -MH      PT Received On 06/30/25  -      PT - Next Appointment 07/01/25  -         Time Calculation- PT    Total Timed Code Minutes- PT 19 minute(s)  -MH         Timed Charges    91936 - PT Therapeutic Activity Minutes 19  -MH         Total Minutes    Timed Charges Total Minutes 19  -MH       Total Minutes 19  -MH                User Key  (r) = Recorded By, (t) = Taken By, (c) = Cosigned By      Initials Name Provider Type     Epifanio Stone, PT Physical Therapist                  Therapy Charges for Today       Code Description Service Date Service Provider Modifiers Qty    [id number]  PT THERAPEUTIC ACT EA 15 MIN 6/30/2025 Epifanio Stone, PT GP 1            PT G-Codes  Outcome Measure Options: AM-PAC 6 Clicks Basic Mobility (PT)  AM-PAC 6 Clicks Score (PT): 20  AM-PAC 6 Clicks Score (OT): 16  PT Discharge Summary  Anticipated Discharge Disposition (PT): other (see comments), skilled nursing facility (ILF with therapy services)    Epifanio Stone PT  6/30/2025

## 2025-07-01 VITALS
DIASTOLIC BLOOD PRESSURE: 79 MMHG | BODY MASS INDEX: 20.2 KG/M2 | SYSTOLIC BLOOD PRESSURE: 138 MMHG | WEIGHT: 107 LBS | TEMPERATURE: 98.6 F | HEART RATE: 99 BPM | RESPIRATION RATE: 17 BRPM | OXYGEN SATURATION: 98 % | HEIGHT: 61 IN

## 2025-07-01 RX ADMIN — APIXABAN 2.5 MG: 2.5 TABLET, FILM COATED ORAL at 09:13

## 2025-07-01 RX ADMIN — LEVOTHYROXINE SODIUM 50 MCG: 50 TABLET ORAL at 09:13

## 2025-07-01 RX ADMIN — ALLOPURINOL 100 MG: 100 TABLET ORAL at 09:13

## 2025-07-01 RX ADMIN — Medication 10 ML: at 09:15

## 2025-07-01 RX ADMIN — LOSARTAN POTASSIUM 12.5 MG: 25 TABLET, FILM COATED ORAL at 09:13

## 2025-07-01 RX ADMIN — DULOXETINE 60 MG: 60 CAPSULE, DELAYED RELEASE ORAL at 09:13

## 2025-07-01 NOTE — CASE MANAGEMENT/SOCIAL WORK
Case Management Discharge Note      Final Note: STR at [facility] via private vehicle    Provided Post Acute Provider List?: Yes  Delivered To: Patient  Method of Delivery: In person    Selected Continued Care - Admitted Since 6/22/2025       Destination Coordination complete.      Service Provider Services Address Phone Fax Patient Preferred    [facility] [address] [address] [phone] --              Durable Medical Equipment    No services have been selected for the patient.                Dialysis/Infusion    No services have been selected for the patient.                Home Medical Care    No services have been selected for the patient.                Therapy    No services have been selected for the patient.                Community Resources    No services have been selected for the patient.                Community & DME    No services have been selected for the patient.                    Transportation Services  Transportation: Private Transportation  Private: Car    Final Discharge Disposition Code: 03 - skilled nursing facility (SNF)   [facility]  [address] [place] [address]  [phone]  Mac Nguyen MD     Patient: Adelina Serna  YOB: 1954  Visit Date: 1/27/25    Adelina 

## 2025-07-01 NOTE — CASE MANAGEMENT/SOCIAL WORK
Continued Stay Note  [facility]     Patient Name: Alba Correa  MRN: [id number]  Today's Date: 7/1/2025    Admit Date: 6/22/2025    Plan: [facility]   Discharge Plan       Row Name 07/01/25 1024       Plan    Plan [facility]    Patient/Family in Agreement with Plan yes    Plan Comments Spoke with Sonja[employer], pt appeal has been completed, denial overturned, pt can dc to facility today, bed available anytime. Margy FLOR CCP mgr to speak with family, they will transport, pharmacy remains correct, pkt to RN, updated MD and RN, CCP will follow -Nicky PATEL                   Discharge Codes    No documentation.                 Expected Discharge Date and Time       Expected Discharge Date Expected Discharge Time    Jul 1, 2025               Nicky Huynh RN

## 2025-07-01 NOTE — CASE MANAGEMENT/SOCIAL WORK
Post-Acute Authorization Submission      Post Acute Pre-Cert Documentation  Request Submitted by Facility - Type:: Hospital  Post-Acute Authorization Type Submitted:: SNF  Date Post Acute Pre-Cert Inititated per Facility: 06/25/25  Date Post Acute Pre-Cert Completed: 06/27/25  Accepting Facility: [facility] Discharge Date Requested: 07/01/25  All Clinicals Submitted?: Yes  Had Accepting Facility at Time of Submission: Yes  Response Received from Insurance?: Approval  Action Taken by Requesting Facility:: Additional Clinicals Submitted (OT NOTES 6/26/25)  Response Communicated to::   Authorization Number:: EXPEDITED APPEAL IN OVERTURNED - CASE# [id number] APPROVAL# [id number]  Post Acute Pre-Cert Initiated Comment: SPOKE TO IVETH GAYTAN (CALL REF# )              Julio Castañeda, CAMELIA

## 2025-07-01 NOTE — DISCHARGE PLACEMENT REQUEST
"Kaci Chavez \"SERGIO\" (89 y.o. Female)       Date of Birth   1933    Social Security Number       Address   [address] [place] [address] [address] [place] [id number]    Home Phone   [phone]    MRN   [id number]       Religious   Worship    Marital Status                               Admission Date   2025    Admission Type   Emergency    Admitting Provider   Tiago Bergeron MD    Attending Provider   Tiago Bergeron MD    Department, Room/Bed   [address], [address]/       Discharge Date       Discharge Disposition   Skilled Nursing Facility (DC - External)    Discharge Destination                                 Attending Provider: Tiago Bergeron MD    Allergies: Hydrocodone-acetaminophen, Lisinopril, Sulfamethoxazole-trimethoprim, Levofloxacin    Isolation: None   Infection: None   Code Status: No CPR    Ht: 154.9 cm (61\")   Wt: 48.5 kg (107 lb)    Admission Cmt: None   Principal Problem: Syncope and collapse [R55]                   Active Insurance as of 2025       Primary Coverage       Payor Plan Insurance Group Employer/Plan Group    [employer] MEDICARE REPLACEMENT UHC Medicare Advantage GROUP PPO [id number]       Payor Plan Address Payor Plan Phone Number Payor Plan Fax Number Effective Dates    [address] [address]   2023 - None Entered    [facility] [id number]         Subscriber Name Subscriber Birth Date Member ID       KACI CHAVEZ 1933 [id number]                     Emergency Contacts        (Rel.) Home Phone Work Phone Mobile Phone    LalithaAnkit (Son) [phone] -- --    Marleni Pederson (Daughter) -- -- [phone]                 Discharge Summary        Tiago Bergeron MD at 25 1236                 NAME: Kaci Chavez ADMIT: 2025   : 1933  PCP: Aramis Doty MD    MRN: [id number] LOS: 7 days   AGE/SEX: 89 y.o. female  ROOM: [address]641/     Date of Admission:  " 6/22/2025  Date of Discharge:  7/1/2025    PCP: Aramis Doty MD    CHIEF COMPLAINT  Fall, Head Injury, and Dizziness      DISCHARGE DIAGNOSIS  Active Hospital Problems    Diagnosis  POA    **Syncope and collapse [R55]  Yes    Syncope [R55]  Yes    Central retinal vein occlusion [H34.8192]  Yes    Chronic diastolic CHF (congestive heart failure) [I50.32]  Yes    Atrial fibrillation [I48.91]  Yes    History of CVA (cerebrovascular accident) [Z86.73]  Not Applicable    History of DVT (deep vein thrombosis) [Z86.718]  Not Applicable    Rheumatoid arthritis [M06.9]  Yes      Resolved Hospital Problems   No resolved problems to display.       SECONDARY DIAGNOSES  Past Medical History:   Diagnosis Date    Acquired hypothyroidism 05/03/2022    Acute diastolic CHF (congestive heart failure) 03/20/2022    Anxiety 2003    Cataract     Chronic kidney disease     Closed fracture of left distal humerus 03/07/2023    Closed head injury 12/07/2023    CVA (cerebral vascular accident)     Degeneration of intervertebral disc     Depression     DVT (deep venous thrombosis)     Facial laceration 03/07/2023    First degree AV block 05/26/2021    Gout     H/O complete eye exam 09/2016    Hyperlipidemia     Hypertension     IBS (irritable bowel syndrome)     Laceration of right forearm 03/07/2023    Nonrheumatic mitral valve regurgitation     OAB (overactive bladder)     Osteopenia     Osteoporosis     PAF (paroxysmal atrial fibrillation) 11/08/2019    Peripheral neuropathy     Renal insufficiency     Rheumatoid arthritis     Sinus bradycardia 05/26/2021    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 Was on losartan, diamox and amlodipine. Cardiology had loop interrogation without bradycardia or pauses. Balancing orthostasis vs control of supine BP. Amlodipine and  diamox stopped by Cardiology. She overall feels better today working with therapy. She can see back with her eye doctor regarding the diamox but Cardiology recommended to stop it.     Xrays of ribs were negative for fracture. Xray of foot was negative for fracture though had some abnormal area that radiologist said could be a cyst or other process- she can follow up with PCP and or Podiatry for further workup/monitoring. See below report for full differential/read.        Reviewed the following note from Neurology 5/2024- similar situation currently:     I had a fairly extensive conversation with the patient and patient's family members at bedside, based on presentation and patient's symptom I believe this is most likely autonomic dysfunction related dizziness which caused her to fall she has been having episodic dizziness for the past few years.  I think her autonomic dysfunction is most likely age-related.     She does not have any signs to suggest TIA or acute ischemic stroke.  Cardiology has been consulted who are recommending transthoracic echo/loop recorder I agree with this.     I suggested family we can get CTA head and neck to rule out vertebrobasilar insufficiency versus Stenosis but less likely this is going to  at given patient age and that she is chronically anticoagulated.     (Of note Neurology did get CTA last year and it does have some vertebrobasilar insufficiency but as listed above she is on a/c for treatment)    She can go to rehab today     DIAGNOSTICS      Collected Updated Procedure Result Status    06/29/2025 1355 06/29/2025 1455 Basic Metabolic Panel [[id number]]    (Abnormal)   Blood    Final result Component Value Units   Glucose 136 High  mg/dL   BUN 26.0 High  mg/dL   Creatinine 0.97 mg/dL   Sodium 136 mmol/L   Potassium 4.2 mmol/L   Chloride 103 mmol/L   CO2 22.0 mmol/L   Calcium 9.3 mg/dL   BUN/Creatinine Ratio 26.8 High     Anion Gap 11.0 mmol/L   eGFR 54.9 Low   mL/min/1.73          06/29/2025 1355 06/29/2025 1455 Magnesium [[id number]]   Blood    Final result Component Value Units   Magnesium 1.9 mg/dL          06/29/2025 1355 06/29/2025 1454 Phosphorus [[id number]]   Blood    Final result Component Value Units   Phosphorus 3.4 mg/dL                    XR Foot 3+ View Left [[id number]] Mo as Reviewed   Order Status: Completed Collected: 06/25/25 1339    Updated: 06/25/25 1347   Narrative:     XR FOOT 3+ VW LEFT-     INDICATIONS: Pain.     TECHNIQUE: 3 VIEWS OF THE LEFT FOOT     COMPARISON: None available     FINDINGS:     No acute fracture, erosion, or dislocation is identified. Increased  hallux valgus angulation is noted. The bones appear diffusely  osteopenic. A subcentimeter lucent focus at the medial aspect navicular  is indeterminate, could for example be degenerative subcortical cyst, or  pseudolesion, but possibility of lytic lesion not excluded, interval  follow-up can characterize change, and follow-up/further evaluation can  be obtained as indications persist.      Impression:        As described.             llected Updated Procedure Result Status    06/25/2025 0555 06/25/2025 0701 Basic Metabolic Panel [[id number]]    (Abnormal)   Blood    Final result Component Value Units   Glucose 116 High  mg/dL   BUN 23.0 mg/dL   Creatinine 1.12 High  mg/dL   Sodium 135 Low  mmol/L   Potassium 3.3 Low  mmol/L   Chloride 100 mmol/L   CO2 20.0 Low  mmol/L   Calcium 9.6 mg/dL   BUN/Creatinine Ratio 20.5    Anion Gap 15.0 mmol/L   eGFR 46.2 Low  mL/min/1.73          06/25/2025 0555 06/25/2025 0655 Vitamin B12 [[id number]]    (Abnormal)   Blood    Final result Component Value Units   Vitamin B-12 >2,000 High  pg/mL          06/25/2025 0555 06/25/2025 0702 TSH [[id number]]   Blood    Final result Component Value Units   TSH 0.852 uIU/mL          06/23/2025 0123 06/23/2025 0156 CBC (No Diff) [[id number]]   (Abnormal)   Blood from Arm, Right    Final result Component Value Units   WBC  4.79 10*3/mm3   RBC 3.79 10*6/mm3   Hemoglobin 12.9 g/dL   Hematocrit 39.9 %   .3 High  fL   MCH 34.0 High  pg   MCHC 32.3 g/dL   RDW 12.7 %   RDW-SD 49.8 fl   MPV 10.1 fL   Platelets 224 10*3/mm3         XR Ribs Left With PA Chest [[id number]] Mo as Reviewed   Order Status: Completed Collected: 06/24/25 1906    Updated: 06/24/25 1924   Narrative:     XR RIBS LEFT W PA CHEST-     HISTORY: Female who is 89 years-old, rib pain     TECHNIQUE: Frontal view of the chest, 4 views of the left ribs     COMPARISON: 6/22/2025     FINDINGS:     The heart size is borderline. Cardiac loop recorder is apparent.  Pulmonary vasculature is unremarkable. Aorta is tortuous, calcified. No  focal pulmonary consolidation, pleural effusion, or pneumothorax.     No acute left rib fracture is identified. Follow-up/further evaluation  can be obtained as indications persist.      Impression:     As described.     This report was finalized on 6/24/2025 7:21 PM by Dr. Levy Mock M.D on Workstation: [id number]       CT Head Without Contrast [[id number]] Mo as Reviewed   Order Status: Completed Collected: 06/22/25 0844    Updated: 06/22/25 0858   Narrative:     CT HEAD WO CONTRAST-, CT CERVICAL SPINE WO CONTRAST-     INDICATION: Head injury, on Eliquis, headache     COMPARISON: CT head September 10, 2024 and CT cervical spine May 18,  2024     TECHNIQUE:  Routine CT head and cervical spine without IV contrast. Coronal and  sagittal reformats. Radiation dose reduction techniques were utilized,  including automated exposure control and exposure modulation based on  body size.     FINDINGS:     CT head: No intraparenchymal hemorrhage. Preserved gray-white  differentiation. No mass effect or midline shift. Chronic small vessel  ischemic changes. Stable ventriculomegaly, suspect ex vacuo dilatation  from central volume loss. No intraventricular hemorrhage. No extra-axial  hemorrhage or fluid collection. Cataract extractions. Left  posterior  superior scalp hematoma. No fractures seen. Patent mastoid air cells and  middle ears. Patent paranasal sinuses.     CT cervical spine: Biapical pleural-parenchymal thickening.  Hypoattenuating right thyroid lobe nodule measuring 6 mm, unchanged.  Right carotid bifurcation and internal carotid artery atherosclerotic  calcification. Cervical spine straightening. Grade 1 anterolisthesis of  C3 on C4, measures 3 mm. Grade 1 anterolisthesis of C4 on C5, measures 2  mm. Retrolisthesis of C5 on C6, measures 2 mm. Slight anterolisthesis of  C7 on T1, measuring 1 mm. No fracture. Degenerative atlantodens  articulation. Spondylosis with disc height loss and marginal  osteophytosis, severe at C5/C6 and C6/C7, moderate at C7/T1. Decreased  bone mineralization. Multilevel facet degenerative arthropathy, most  severe on the right at C7/T1 and on the left at C3/C4 and C4/C5 with  facet osseous fusion on the left at C2/C3.      Impression:        1. No acute intracranial process.  2. Left posterior superior scalp hematoma.  3. No cervical spine fracture identified. Chronic degenerative findings  described above.     This report was finalized on 6/22/2025 8:55 AM by Dr. Gino Hopson M.D on Workstation: [id number]       CT Cervical Spine Without Contrast [[id number]] Mo as Reviewed   Order Status: Completed Collected: 06/22/25 0844    Updated: 06/22/25 0858   Narrative:     CT HEAD WO CONTRAST-, CT CERVICAL SPINE WO CONTRAST-     INDICATION: Head injury, on Eliquis, headache     COMPARISON: CT head September 10, 2024 and CT cervical spine May 18,  2024     TECHNIQUE:  Routine CT head and cervical spine without IV contrast. Coronal and  sagittal reformats. Radiation dose reduction techniques were utilized,  including automated exposure control and exposure modulation based on  body size.     FINDINGS:     CT head: No intraparenchymal hemorrhage. Preserved gray-white  differentiation. No mass effect or midline  shift. Chronic small vessel  ischemic changes. Stable ventriculomegaly, suspect ex vacuo dilatation  from central volume loss. No intraventricular hemorrhage. No extra-axial  hemorrhage or fluid collection. Cataract extractions. Left posterior  superior scalp hematoma. No fractures seen. Patent mastoid air cells and  middle ears. Patent paranasal sinuses.     CT cervical spine: Biapical pleural-parenchymal thickening.  Hypoattenuating right thyroid lobe nodule measuring 6 mm, unchanged.  Right carotid bifurcation and internal carotid artery atherosclerotic  calcification. Cervical spine straightening. Grade 1 anterolisthesis of  C3 on C4, measures 3 mm. Grade 1 anterolisthesis of C4 on C5, measures 2  mm. Retrolisthesis of C5 on C6, measures 2 mm. Slight anterolisthesis of  C7 on T1, measuring 1 mm. No fracture. Degenerative atlantodens  articulation. Spondylosis with disc height loss and marginal  osteophytosis, severe at C5/C6 and C6/C7, moderate at C7/T1. Decreased  bone mineralization. Multilevel facet degenerative arthropathy, most  severe on the right at C7/T1 and on the left at C3/C4 and C4/C5 with  facet osseous fusion on the left at C2/C3.      Impression:        1. No acute intracranial process.  2. Left posterior superior scalp hematoma.  3. No cervical spine fracture identified. Chronic degenerative findings  described above.     This report was finalized on 6/22/2025 8:55 AM by Dr. Gino Hopson M.D on Workstation: [id number]       XR Chest 1 View [[id number]] Mo as Reviewed   Order Status: Completed Collected: 06/22/25 0821    Updated: 06/22/25 0826   Narrative:     ONE-VIEW PORTABLE CHEST AT 8:06 A.M.     HISTORY: Syncope. Hypertension.     FINDINGS: The lungs are well expanded and clear and unchanged from  5/18/2024. There is several calcified left hilar lymph nodes. The heart  remains borderline enlarged without change and there is no acute  disease.       PHYSICAL EXAM  Objective:  Vital  "signs: (most recent): Blood pressure 148/84, pulse 107, temperature 98.6 °F (37 °C), temperature source Oral, resp. rate 17, height 154.9 cm (61\"), weight 48.5 kg (107 lb), SpO2 97%, not currently breastfeeding.                Alert  nad  No resp distress  Elderly, chronically ill   Similar exam to past few days    CONDITION ON DISCHARGE  Stable.      DISCHARGE DISPOSITION   Skilled Nursing Facility (DC - External)      DISCHARGE MEDICATIONS       Your medication list        CONTINUE taking these medications        Instructions Last Dose Given Next Dose Due   acetaminophen 650 MG 8 hr tablet  Commonly known as: TYLENOL      Take 2 tablets by mouth Every 8 (Eight) Hours As Needed for Mild Pain. Indications: Pain       allopurinol 100 MG tablet  Commonly known as: ZYLOPRIM      Take 1 tablet by mouth Daily.       docusate sodium 100 MG capsule  Commonly known as: Colace      Take 1 capsule by mouth 2 (Two) Times a Day.       DULoxetine 60 MG capsule  Commonly known as: CYMBALTA      Take 1 capsule by mouth Daily. Indications: Major Depressive Disorder       Eliquis 2.5 MG tablet tablet  Generic drug: apixaban      Take 1 tablet by mouth 2 (Two) Times a Day. Indications: history of DVT/PE, Hx CVA       latanoprost 0.005 % ophthalmic solution  Commonly known as: XALATAN      INSTILL 1 DROP INTO RIGHT EYE ONCE DAILY AT BEDTIME       levothyroxine 50 MCG tablet  Commonly known as: SYNTHROID, LEVOTHROID      Take 1 tablet by mouth Every Morning.       losartan 25 MG tablet  Commonly known as: COZAAR      Take 0.5 tablets by mouth Daily.              STOP taking these medications      acetaZOLAMIDE 250 MG tablet  Commonly known as: DIAMOX        amLODIPine 2.5 MG tablet  Commonly known as: NORVASC                  Future Appointments   Date Time Provider Department Center   7/7/2025  1:30 PM Roshan Martines III, MD [id number]   11/18/2025  2:30 PM Roshan Martines III, MD [id number]     Additional Instructions for " the Follow-ups that You Need to Schedule       Ambulatory Referral to Home Health   As directed      Nicky Doty MD    Order Comments: Nicky Doty MD    Face to Face Visit Date: 6/25/2025   Follow-up provider for Plan of Care?: I treated the patient in an acute care facility and will not continue treatment after discharge.   Follow-up provider: NICKY DOTY [[id number]]   Reason/Clinical Findings: debility, orthostatic hypotension   Describe mobility limitations that make leaving home difficult: debility, orthostatic hypotension   Nursing/Therapeutic Services Requested: Physical Therapy Occupational Therapy   PT orders: Therapeutic exercise Strengthening   Occupational orders: Strengthening Activities of daily living Home safety assessment        Discharge Follow-up with Specialty: Opthomologist call for appointment   As directed      Specialty: Opthomologist call for appointment               Contact information for follow-up providers       Nicky Doty MD .    Specialty: Family Medicine  Contact information:  [address] [address] [address]  [phone]                       Contact information for after-discharge care       Destination       [facility] .    Service: Skilled Nursing  Contact information:  [address] [facility] [address] [phone]                                   TEST  RESULTS PENDING AT DISCHARGE         Tiago Bergeron MD  [facility]  07/01/25  12:38 EDT      Time: greater than 32 minutes on discharge  It was a pleasure taking care of this patient while in the hospital.       Electronically signed by Tiago Bergeron MD at 07/01/25 3404

## 2025-07-08 LAB
MC_CV_MDC_IDC_RATE_1: 146
MC_CV_MDC_IDC_RATE_1: 3
MC_CV_MDC_IDC_RATE_1: 30
MC_CV_MDC_IDC_ZONE_ID: 1
MC_CV_MDC_IDC_ZONE_ID: 2
MC_CV_MDC_IDC_ZONE_ID: 3
MC_CV_MDC_IDC_ZONE_ID: 4
MC_CV_MDC_IDC_ZONE_ID: 7
MDC_IDC_MSMT_BATTERY_STATUS: NORMAL
MDC_IDC_PG_IMPLANT_DTM: NORMAL
MDC_IDC_PG_IMPLANT_DTM: NORMAL
MDC_IDC_PG_MFG: NORMAL
MDC_IDC_PG_MFG: NORMAL
MDC_IDC_PG_MODEL: NORMAL
MDC_IDC_PG_MODEL: NORMAL
MDC_IDC_PG_SERIAL: NORMAL
MDC_IDC_PG_SERIAL: NORMAL
MDC_IDC_PG_TYPE: NORMAL
MDC_IDC_PG_TYPE: NORMAL
MDC_IDC_SESS_DTM: NORMAL
MDC_IDC_SESS_DTM: NORMAL
MDC_IDC_SESS_TYPE: NORMAL
MDC_IDC_SESS_TYPE: NORMAL
MDC_IDC_SET_ZONE_DETECTION_DETAILS: 16
MDC_IDC_SET_ZONE_DETECTION_DETAILS: 4
MDC_IDC_SET_ZONE_STATUS: NORMAL
MDC_IDC_SET_ZONE_TYPE: NORMAL
MDC_IDC_STAT_AT_BURDEN_PERCENT: 33

## 2025-07-23 NOTE — PROGRESS NOTES
RM:________     PCP: Doty, Aramis, MD                                     Last EKG :      : 1933  AGE: 89 y.o.    REASON FOR VISIT: __________________________________________    ____________________________________________________________         Chest Pain:_______   Palpitations:__________     Shortness of breathe:________  Lightheadedness/Dizziness:___________     Syncope:______ Edema:______ Fatigue:________                                                    WT: ____________ HT: ______ BP: __________ HR ______   02% _______          Lipid Panel: 2024          Recent Hospitalization/Testing:__________________________________    _____________________________________________________________      Pharmacy/Mail Order Pharmacy:_____________________________________

## 2025-07-29 ENCOUNTER — OFFICE VISIT (OUTPATIENT)
Dept: CARDIOLOGY | Age: OVER 89
End: 2025-07-29
Payer: MEDICARE

## 2025-07-29 VITALS
OXYGEN SATURATION: 98 % | DIASTOLIC BLOOD PRESSURE: 72 MMHG | BODY MASS INDEX: 20.77 KG/M2 | SYSTOLIC BLOOD PRESSURE: 126 MMHG | HEART RATE: 91 BPM | WEIGHT: 110 LBS | HEIGHT: 61 IN

## 2025-07-29 DIAGNOSIS — Z86.73 HISTORY OF CVA (CEREBROVASCULAR ACCIDENT): ICD-10-CM

## 2025-07-29 DIAGNOSIS — G60.9 IDIOPATHIC PERIPHERAL NEUROPATHY: ICD-10-CM

## 2025-07-29 DIAGNOSIS — I49.5 SICK SINUS SYNDROME: ICD-10-CM

## 2025-07-29 DIAGNOSIS — I48.11 LONGSTANDING PERSISTENT ATRIAL FIBRILLATION: ICD-10-CM

## 2025-07-29 DIAGNOSIS — Z09 HOSPITAL DISCHARGE FOLLOW-UP: Primary | ICD-10-CM

## 2025-07-29 DIAGNOSIS — R55 SYNCOPE AND COLLAPSE: ICD-10-CM

## 2025-07-29 PROCEDURE — 99214 OFFICE O/P EST MOD 30 MIN: CPT | Performed by: INTERNAL MEDICINE

## 2025-07-29 PROCEDURE — 1160F RVW MEDS BY RX/DR IN RCRD: CPT | Performed by: INTERNAL MEDICINE

## 2025-07-29 PROCEDURE — 1159F MED LIST DOCD IN RCRD: CPT | Performed by: INTERNAL MEDICINE

## 2025-07-29 RX ORDER — ACETAZOLAMIDE 250 MG/1
250 TABLET ORAL DAILY
COMMUNITY

## 2025-07-29 NOTE — PROGRESS NOTES
Subjective:     Encounter Date: 07/29/25        Patient ID: Alba Correa is a 89 y.o. female.    Chief Complaint: PAF, CVA  Atrial Fibrillation  Past medical history includes atrial fibrillation.     Dear Dr. Doty,    I had the pleasure of seeing this patient in the office today.     Comes in today for follow-up from the hospital.    Patient was recently admitted for dizziness and syncope.  She was seen in consultation.  She was hypotensive, amlodipine was discontinued.  Initially Diamox was also discontinued but then she saw her eye doctor yesterday and they told her she had to go back on it.  Device was interrogated, single episode of RVR at the time/day when she had this episode but otherwise no other episodes of tachycardia.  So that might be secondary.    She was hospitalized April 29, 2022. She was hospitalized with chest heaviness.  She was seen by my partner Dr. Aguillon.  EKG and troponin were unremarkable.  Stress test was performed that showed ejection rate 70% and no ischemia.  She was then discharged.    Patient has been seen in our office by Dr. Platt for bradycardia and sick sinus syndrome.      She has had a history of persistent atrial fibrillation.  We performed a cardioversion 7/2020 after she has been on rate control and anticoagulation for an appropriate period of time.  She then had another Holter monitor she was having some palpitations.  This showed episodes of ectopic atrial rhythm and brief 3-4 beat runs of wide-complex tachycardia but no atrial fibrillation.  It was determined to keep her on her medical therapy.    She was hospitalized October 2021 after episode of syncope cardiac evaluation was unremarkable.  She had an echocardiogram that was unremarkable.  She ended up having a loop recorder placed.  So far no arrhythmias been seen.    We saw her initially in the hospital in 2019.  She presented for a CVA.  We were asked to see her for evaluation of a cardiac source of emboli.  " None was found.  Transthoracic and transesophageal echocardiograms did not demonstrate any cardiac source of emboli.  14 day monitor showed no ectopy.  Then recently seen in our office with question about bradycardia.  A 48-hour Holter monitor was placed.  During that time, she was in atrial fibrillation the entire 48 hours.  She was unaware of this.  She did not feel any palpitations or tachycardia.  No irregularity.  Heart rate was 70 bpm during the 48-hour recording with no pauses.    The following portions of the patient's history were reviewed and updated as appropriate: allergies, current medications, past family history, past medical history, past social history, past surgical history and problem list.    Past Medical History:   Diagnosis Date    Abnormal ECG     Acquired hypothyroidism 05/03/2022    Acute diastolic CHF (congestive heart failure) 03/20/2022    Anxiety 2003    Cataract     Chronic kidney disease     Closed fracture of left distal humerus 03/07/2023    Closed head injury 12/07/2023    CVA (cerebral vascular accident)     Degeneration of intervertebral disc     Depression     DVT (deep venous thrombosis)     Facial laceration 03/07/2023    First degree AV block 05/26/2021    Gout     H/O complete eye exam 09/2016    Hyperlipidemia     Hypertension     IBS (irritable bowel syndrome)     Laceration of right forearm 03/07/2023    Nonrheumatic mitral valve regurgitation     OAB (overactive bladder)     Osteopenia     Osteoporosis     PAF (paroxysmal atrial fibrillation) 11/08/2019    Peripheral neuropathy     Renal insufficiency     Rheumatoid arthritis     Sinus bradycardia 05/26/2021    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       Procedures       Objective:     Vitals:    07/29/25 1504   BP: 126/72   BP Location: Right arm   Patient Position: Sitting   Cuff Size: Pediatric   Pulse: 91   SpO2: 98%   Weight: 49.9 kg (110 lb)   Height: 154.9 cm (61\")             General Appearance:    Alert, " cooperative, in no acute distress   Head:    Normocephalic, without obvious abnormality   Eyes:            Lids and lashes normal, conjunctivae and sclerae normal, no icterus, no pallor, corneas clear   Ears:    Ears appear intact with no abnormalities noted   Throat:   No oral lesions, oral mucosa moist   Neck:   No adenopathy, supple, trachea midline, no thyromegaly, no carotid bruit, no JVD   Back:     No kyphosis present, no erythema or scars, no tenderness to palpation    Lungs:     Clear to auscultation,respirations regular, even and unlabored    Heart:    irregular rhythm and normal rate, normal S1 and S2, no murmur, no gallop, no rub, no click   Chest Wall:    No abnormalities observed   Abdomen:     Normal bowel sounds, no masses, no organomegaly, soft        non-tender, non-distended, no guarding   Extremities:   Moves all extremities well, no edema, no cyanosis, no redness   Pulses:  Bilateral carotids brisk   Skin:  Psychiatric:   No bleeding or rash    Alert and oriented, normal mood and affect       Lab Review:             Results for orders placed during the hospital encounter of 06/22/25    Adult Transthoracic Echo Complete w/ Color, Spectral and Contrast if necessary per protocol 06/22/2025  6:59 PM    Interpretation Summary    Left ventricular systolic function is normal. Calculated left ventricular EF = 60.5% Normal left ventricular cavity size noted. Left ventricular wall thickness is consistent with mild concentric hypertrophy. All left ventricular wall segments contract normally. Left ventricular diastolic function was indeterminate.    The left atrial cavity is moderately dilated. The right atrial cavity is moderately dilated.    There is mild thickening of the aortic valve. The aortic valve appears trileaflet. Mild to moderate aortic valve regurgitation is present. No aortic valve stenosis is present.    Mild mitral annular calcification is present. There is moderate, bileaflet mitral valve  thickening present. Mild mitral valve regurgitation is present. No significant mitral valve stenosis is present.    Mild tricuspid valve regurgitation is present. Estimated right ventricular systolic pressure from tricuspid regurgitation is normal (<35 mmHg). Calculated right ventricular systolic pressure from tricuspid regurgitation is 33.8 mmHg.    Lab Results   Component Value Date    GLUCOSE 136 (H) 06/29/2025    BUN 26.0 (H) 06/29/2025    CREATININE 0.97 06/29/2025    EGFRIFNONA 40 (L) 10/11/2021    EGFRIFAFRI 35 (L) 09/01/2021    BCR 26.8 (H) 06/29/2025    K 4.2 06/29/2025    CO2 22.0 06/29/2025    CALCIUM 9.3 06/29/2025    ALBUMIN 4.2 06/22/2025    AST 31 06/22/2025    ALT 15 06/22/2025     CBC          2/25/2025    10:54 6/22/2025    08:29 6/23/2025    01:23   CBC   WBC 5.35  4.05  4.79    RBC 3.88  3.94  3.79    Hemoglobin 12.8  13.2  12.9    Hematocrit 39.3  40.8  39.9    .3  103.6  105.3    MCH 33.0  33.5  34.0    MCHC 32.6  32.4  32.3    RDW 13.1  12.3  12.7    Platelets 246  244  224        CHADS-VASc Risk Assessment              7 Total Score    1 CHF    1 Hypertension    2 Age >/= 75    2 PRIOR STROKE/TIA/THROMBO    1 Sex: Female        Criteria that do not apply:    DM    Vascular Disease    Age 65-74                Assessment:          Diagnosis Plan   1. Hospital discharge follow-up        2. Sick sinus syndrome        3. Syncope and collapse        4. History of CVA (cerebrovascular accident)        5. Idiopathic peripheral neuropathy                   Plan:       1.  Aortic insufficiency-stable, on very low-dose losartan  2.  History of CVA-continue rosuvastatin.  Creatinine BUN reviewed, patient is followed by neurology, currently off Eliquis as noted above  3.  Benign essential hypertension-continue current medical regimen with amlodipine losartan, BUN/creatinine reviewed, need to be permissive for her blood pressure  4.  Persistent atrial fibrillation-rate well-controlled, continue to  follow.  5.  Chronic renal failure, most recent creatinine BUN reviewed.  Age 89 years old, discussed, will remain on the 2.5 mg twice daily of the Eliquis.  6.  Syncope and collapse-following with neurology.  Amlodipine discontinued.  7.  Patient has a high risk of stroke-anticoagulation      Thank you very much for allowing us to participate in the care of this pleasant patient.  Please don't hesitate to call if I can be of assistance in any way.      Current Outpatient Medications:     acetaminophen (TYLENOL) 650 MG 8 hr tablet, Take 2 tablets by mouth Every 8 (Eight) Hours As Needed for Mild Pain. Indications: Pain, Disp: , Rfl:     acetaZOLAMIDE (DIAMOX) 250 MG tablet, Take 1 tablet by mouth Daily., Disp: , Rfl:     allopurinol (ZYLOPRIM) 100 MG tablet, Take 1 tablet by mouth Daily., Disp: 90 tablet, Rfl: 1    docusate sodium (Colace) 100 MG capsule, Take 1 capsule by mouth 2 (Two) Times a Day., Disp: 180 capsule, Rfl: 1    DULoxetine (CYMBALTA) 60 MG capsule, Take 1 capsule by mouth Daily. Indications: Major Depressive Disorder, Disp: 90 capsule, Rfl: 1    Eliquis 2.5 MG tablet tablet, Take 1 tablet by mouth 2 (Two) Times a Day. Indications: history of DVT/PE, Hx CVA, Disp: 180 tablet, Rfl: 3    latanoprost (XALATAN) 0.005 % ophthalmic solution, INSTILL 1 DROP INTO RIGHT EYE ONCE DAILY AT BEDTIME, Disp: , Rfl:     levothyroxine (SYNTHROID, LEVOTHROID) 50 MCG tablet, Take 1 tablet by mouth Every Morning., Disp: 90 tablet, Rfl: 1    losartan (COZAAR) 25 MG tablet, Take 0.5 tablets by mouth Daily., Disp: 45 tablet, Rfl: 1

## 2025-08-05 LAB
MDC_IDC_MSMT_BATTERY_STATUS: NORMAL
MDC_IDC_PG_IMPLANT_DTM: NORMAL
MDC_IDC_PG_MFG: NORMAL
MDC_IDC_PG_MODEL: NORMAL
MDC_IDC_PG_SERIAL: NORMAL
MDC_IDC_PG_TYPE: NORMAL
MDC_IDC_SESS_DTM: NORMAL
MDC_IDC_SESS_TYPE: NORMAL
MDC_IDC_STAT_AT_BURDEN_PERCENT: 31.39

## 2025-08-07 LAB
MDC_IDC_PG_IMPLANT_DTM: NORMAL
MDC_IDC_PG_MFG: NORMAL
MDC_IDC_PG_MODEL: NORMAL
MDC_IDC_PG_SERIAL: NORMAL
MDC_IDC_PG_TYPE: NORMAL
MDC_IDC_SESS_DTM: NORMAL
MDC_IDC_SESS_TYPE: NORMAL

## 2025-08-19 ENCOUNTER — APPOINTMENT (OUTPATIENT)
Dept: CT IMAGING | Facility: HOSPITAL | Age: OVER 89
End: 2025-08-19
Payer: MEDICARE

## 2025-08-19 ENCOUNTER — HOSPITAL ENCOUNTER (EMERGENCY)
Facility: HOSPITAL | Age: OVER 89
Discharge: HOME | End: 2025-08-19
Attending: EMERGENCY MEDICINE | Admitting: EMERGENCY MEDICINE
Payer: MEDICARE

## 2025-08-19 VITALS
WEIGHT: 107 LBS | OXYGEN SATURATION: 98 % | TEMPERATURE: 98.1 F | SYSTOLIC BLOOD PRESSURE: 172 MMHG | RESPIRATION RATE: 18 BRPM | HEART RATE: 78 BPM | DIASTOLIC BLOOD PRESSURE: 74 MMHG | BODY MASS INDEX: 21.01 KG/M2 | HEIGHT: 60 IN

## 2025-08-19 DIAGNOSIS — S09.90XA CLOSED HEAD INJURY, INITIAL ENCOUNTER: Primary | ICD-10-CM
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
## 2025-08-19 PROCEDURE — 70450 CT HEAD/BRAIN W/O DYE: CPT

## 2025-08-19 PROCEDURE — 99284 EMERGENCY DEPT VISIT MOD MDM: CPT

## (undated) DEVICE — PK ORTHO MAJ 40

## (undated) DEVICE — TBG PENCL TELESCP MEGADYNE SMOKE EVAC 10FT

## (undated) DEVICE — CONN TBG Y 5 IN 1 LF STRL

## (undated) DEVICE — GAUZE,SPONGE,FLUFF,6"X6.75",STRL,10/TRAY: Brand: MEDLINE

## (undated) DEVICE — EVOS 3.5MM X 34MM LOCKING SCREW SELF-TAPPING
Type: IMPLANTABLE DEVICE | Site: ELBOW | Status: NON-FUNCTIONAL
Brand: EVOS
Removed: 2023-03-11

## (undated) DEVICE — SUT VIC 2/0 CT2 27IN J269H

## (undated) DEVICE — ADHS SKIN PREMIERPRO EXOFIN TOPICAL HI/VISC .5ML

## (undated) DEVICE — SOL ISO/ALC 70PCT 4OZ

## (undated) DEVICE — T-DRAPE,EXTREMITY,STERILE: Brand: MEDLINE

## (undated) DEVICE — SPLNT ORTHOGLASS 3INX15FT

## (undated) DEVICE — DRSNG GZ CURAD XEROFORM NONADHR OVERWRAP 5X9IN

## (undated) DEVICE — STPLR SKIN VISISTAT WD 35CT

## (undated) DEVICE — APPL CHLORAPREP HI/LITE 26ML ORNG

## (undated) DEVICE — EVOS 3.5MM X 36MM LOCKING SCREW SELF-TAPPING
Type: IMPLANTABLE DEVICE | Site: ELBOW | Status: NON-FUNCTIONAL
Brand: EVOS
Removed: 2023-03-11

## (undated) DEVICE — EVOS 3.5MM X 30MM LOCKING SCREW SELF-TAPPING
Type: IMPLANTABLE DEVICE | Site: ELBOW | Status: NON-FUNCTIONAL
Brand: EVOS
Removed: 2023-03-11

## (undated) DEVICE — TOWEL,OR,DSP,ST,BLUE,STD,4/PK,20PK/CS: Brand: MEDLINE

## (undated) DEVICE — DRSNG GZ PETROLTM XEROFORM CURAD 1X8IN STRL

## (undated) DEVICE — GLV SURG PREMIERPRO ORTHO LTX PF SZ8.5 BRN

## (undated) DEVICE — PCH INST SURG INVISISHIELD 2PCKT

## (undated) DEVICE — SUT MNCRYL PLS ANTIB UD 4/0 PS2 18IN

## (undated) DEVICE — ANTIBACTERIAL UNDYED BRAIDED (POLYGLACTIN 910), SYNTHETIC ABSORBABLE SUTURE: Brand: COATED VICRYL

## (undated) DEVICE — GLV SURG SIGNATURE ESSENTIAL PF LTX SZ8.5

## (undated) DEVICE — SKIN PREP TRAY W/CHG: Brand: MEDLINE INDUSTRIES, INC.

## (undated) DEVICE — DRP C/ARM 41X74IN

## (undated) DEVICE — UNDERCAST PADDING: Brand: DEROYAL

## (undated) DEVICE — EVOS 2.7MM X 50MM LOCKING SCREW T8 SELF-TAPPING
Type: IMPLANTABLE DEVICE | Site: ELBOW | Status: NON-FUNCTIONAL
Brand: EVOS
Removed: 2023-03-11

## (undated) DEVICE — EVOS 3.5MM X 22MM LOCKING SCREW SELF-TAPPING
Type: IMPLANTABLE DEVICE | Site: ELBOW | Status: NON-FUNCTIONAL
Brand: EVOS
Removed: 2023-03-11

## (undated) DEVICE — TRAP FLD MINIVAC MEGADYNE 100ML

## (undated) DEVICE — PRECISION THIN (9.0 X 0.38 X 25.0MM)

## (undated) DEVICE — WEBRIL* CAST PADDING: Brand: DEROYAL

## (undated) DEVICE — BNDG ELAS ELITE V/CLOSE 4IN 5YD LF STRL

## (undated) DEVICE — DRAPE,U/ SHT,SPLIT,PLAS,STERIL: Brand: MEDLINE

## (undated) DEVICE — THIN OFFSET (13.0 X 0.38 X 39.0MM)

## (undated) DEVICE — ST IRR CYSTO W/SPK 77IN LF

## (undated) DEVICE — SPNG GZ WOVN 4X4IN 12PLY 10/BX STRL

## (undated) DEVICE — LOU LOOP RECORDER PACK: Brand: MEDLINE INDUSTRIES, INC.

## (undated) DEVICE — STCKNT IMPERV 9X36IN STRL

## (undated) DEVICE — SUT ETHLN 2/0 PS 18IN 585H